# Patient Record
Sex: FEMALE | Race: WHITE | NOT HISPANIC OR LATINO | Employment: OTHER | URBAN - METROPOLITAN AREA
[De-identification: names, ages, dates, MRNs, and addresses within clinical notes are randomized per-mention and may not be internally consistent; named-entity substitution may affect disease eponyms.]

---

## 2017-01-20 ENCOUNTER — OFFICE VISIT (OUTPATIENT)
Dept: CARDIOLOGY | Facility: MEDICAL CENTER | Age: 72
End: 2017-01-20
Payer: MEDICARE

## 2017-01-20 VITALS
HEIGHT: 67 IN | BODY MASS INDEX: 25.43 KG/M2 | HEART RATE: 74 BPM | SYSTOLIC BLOOD PRESSURE: 106 MMHG | WEIGHT: 162 LBS | OXYGEN SATURATION: 93 % | DIASTOLIC BLOOD PRESSURE: 70 MMHG

## 2017-01-20 DIAGNOSIS — I10 ESSENTIAL HYPERTENSION: ICD-10-CM

## 2017-01-20 DIAGNOSIS — I48.0 PAROXYSMAL ATRIAL FIBRILLATION (HCC): ICD-10-CM

## 2017-01-20 PROCEDURE — 99214 OFFICE O/P EST MOD 30 MIN: CPT | Performed by: INTERNAL MEDICINE

## 2017-01-20 RX ORDER — FLECAINIDE ACETATE 100 MG/1
100 TABLET ORAL 2 TIMES DAILY
Qty: 60 TAB | Refills: 11 | Status: SHIPPED | OUTPATIENT
Start: 2017-01-20 | End: 2018-01-29 | Stop reason: SDUPTHER

## 2017-01-20 ASSESSMENT — ENCOUNTER SYMPTOMS
PND: 0
FEVER: 0
COUGH: 0
PALPITATIONS: 1
NAUSEA: 0
NERVOUS/ANXIOUS: 1
HEARTBURN: 0
DIZZINESS: 0
SHORTNESS OF BREATH: 0
DEPRESSION: 1
EYE DISCHARGE: 0
BACK PAIN: 1
BLURRED VISION: 0
BRUISES/BLEEDS EASILY: 0
HEADACHES: 0
MYALGIAS: 0
CHILLS: 0

## 2017-01-20 NOTE — MR AVS SNAPSHOT
"Jeanie Hutchison   2017 1:00 PM   Office Visit   MRN: [id number]    Department:  Heart [facility]   Dept Phone:  [phone]    Description:  Female : 1945   Provider:  Spenser Allen M.D.           Allergies as of 2017     No Known Allergies      You were diagnosed with     Paroxysmal atrial fibrillation (CMS-HCC)   [317121]       Essential hypertension   [0421815]         Vital Signs     Blood Pressure Pulse Height Weight Body Mass Index Oxygen Saturation    106/70 mmHg 74 1.689 m (5' 6.5\") 73.483 kg (162 lb) 25.76 kg/m2 93%    Smoking Status                   Never Smoker            Basic Information     Date Of Birth Sex Race Ethnicity Preferred Language    1945 Female White Non- English      Your appointments     2017  1:15 PM   FOLLOW UP with Spenser Allen M.D.   [facility] (--)    [address]  [facility] [id number] [phone]              Problem List              ICD-10-CM Priority Class Noted - Resolved    Drug abuse F19.10   2014 - Present    Anxiety F41.9   2014 - Present    Clavicle fracture S42.009A   2014 - Present    Poor historian Z78.9   2014 - Present    Non compliance w medication regimen Z91.14   2014 - Present    Hypotension I95.9 High  2014 - Present    Syncope and collapse R55   2014 - Present    PAF (paroxysmal atrial fibrillation) (CMS-HCC) I48.0   9/10/2014 - Present    HTN (hypertension) I10   2015 - Present      Health Maintenance        Date Due Completion Dates    IMM DTaP/Tdap/Td Vaccine (1 - Tdap) 8/3/1966 ---    PAP SMEAR 8/3/1968 ---    COLONOSCOPY 8/3/1997 ---    IMM ZOSTER VACCINE 8/3/2007 ---    IMM PNEUMOCOCCAL 65+ (ADULT) LOW/MEDIUM RISK SERIES (1 of 2 - PCV13) 8/3/2012 ---    MAMMOGRAM 2013, 2011, 2009, 2009, 9/3/2008, 9/3/2008, 2008, 2008, 2008, 2006, 2005, " 8/11/2004    BONE DENSITY 6/17/2016 6/17/2011    IMM INFLUENZA (1) 9/1/2016 ---            Current Immunizations     No immunizations on file.      Below and/or attached are the medications your provider expects you to take. Review all of your home medications and newly ordered medications with your provider and/or pharmacist. Follow medication instructions as directed by your provider and/or pharmacist. Please keep your medication list with you and share with your provider. Update the information when medications are discontinued, doses are changed, or new medications (including over-the-counter products) are added; and carry medication information at all times in the event of emergency situations     Allergies:  No Known Allergies          Medications  Valid as of: January 20, 2017 -  1:36 PM    Generic Name Brand Name Tablet Size Instructions for use    Aspirin (Tablet Delayed Response) aspirin 81 MG Take 1 Tab by mouth every day.        Carvedilol (Tab) COREG 12.5 MG Take 1 Tab by mouth 2 times a day, with meals. GENERIC FOR COREG        Flecainide Acetate (Tab) TAMBOCOR 100 MG Take 1 Tab by mouth 2 times a day.        Levothyroxine Sodium (Tab) SYNTHROID 50 MCG TAKE 1 TABLET BY MOUTH DAILY -GENERICFOR SYNTHROID        LORazepam (Tab) ATIVAN 1 MG Take 1 Tab by mouth every 8 hours as needed for Anxiety.        Oxycodone-Acetaminophen (Tab) PERCOCET 5-325 MG         PARoxetine HCl (TABLET SR 24 HR) PAXIL-CR 37.5 MG Take 37.5 mg by mouth every day.        Zolpidem Tartrate (Tab) AMBIEN 10 MG Take 10 mg by mouth at bedtime as needed. Indications: Trouble Sleeping        .                 Medicines prescribed today were sent to:     [id number] - [place] - [address]    [address] [place] [address]    Phone: [phone] Fax: [phone]    Open 24 Hours?: No    [id number] - [id number]    [id number] [place] [address]    Phone: [phone] Fax:  [phone]    Open 24 Hours?: No      Medication refill instructions:       If your prescription bottle indicates you have medication refills left, it is not necessary to call your provider’s office. Please contact your pharmacy and they will refill your medication.    If your prescription bottle indicates you do not have any refills left, you may request refills at any time through one of the following ways: The online Epunchit system (except Urgent Care), by calling your provider’s office, or by asking your pharmacy to contact your provider’s office with a refill request. Medication refills are processed only during regular business hours and may not be available until the next business day. Your provider may request additional information or to have a follow-up visit with you prior to refilling your medication.   *Please Note: Medication refills are assigned a new Rx number when refilled electronically. Your pharmacy may indicate that no refills were authorized even though a new prescription for the same medication is available at the pharmacy. Please request the medicine by name with the pharmacy before contacting your provider for a refill.           Epunchit Access Code: Activation code not generated  Current Epunchit Status: Active

## 2017-01-20 NOTE — PROGRESS NOTES
Subjective:   Jeanie Hutchison is a 71 y.o. female who presents today in follow-up for paroxysmal atrial fibrillation. She continues to have unknown episodes of breakthrough atrial fibrillation which always improved with an extra 50 mg of flecainide.  Very major. Family stressors. Continue. One of her daughters is in ICU now      Past Medical History   Diagnosis Date   • A-fib 5/1/2014   • Psychiatric disorder      history of depression and anxiety    • Chronic pain      r/t pelvic fracture     Past Surgical History   Procedure Laterality Date   • Breast biopsy       bilateral neg breast bxs   • Other orthopedic surgery       pelvis fracture. 10 years ago    • Clavicle orif  5/21/2014     Performed by Wilfred Gonzalez M.D. at SURGERY [facility]     No family history on file.  History   Smoking status   • Never Smoker    Smokeless tobacco   • Not on file     No Known Allergies  Outpatient Encounter Prescriptions as of 1/20/2017   Medication Sig Dispense Refill   • flecainide (TAMBOCOR) 100 MG Tab Take 1 Tab by mouth 2 times a day. 60 Tab 11   • carvedilol (COREG) 12.5 MG Tab Take 1 Tab by mouth 2 times a day, with meals. GENERIC FOR COREG 90 Tab 1   • lorazepam (ATIVAN) 1 MG Tab Take 1 Tab by mouth every 8 hours as needed for Anxiety.     • oxycodone-acetaminophen (PERCOCET) 5-325 MG Tab      • paroxetine (PAXIL-CR) 37.5 MG CR tablet Take 37.5 mg by mouth every day.     • aspirin EC 81 MG EC tablet Take 1 Tab by mouth every day. 30 Tab 2   • zolpidem (AMBIEN) 10 MG TABS Take 10 mg by mouth at bedtime as needed. Indications: Trouble Sleeping     • [DISCONTINUED] flecainide (TAMBOCOR) 50 MG tablet Take 1.5 Tabs by mouth 2 times a day. 90 Tab 11   • levothyroxine (SYNTHROID) 50 MCG Tab TAKE 1 TABLET BY MOUTH DAILY -GENERICFOR SYNTHROID 90 Tab 0     No facility-administered encounter medications on file as of 1/20/2017.     Review of Systems   Constitutional: Negative for fever, chills and malaise/fatigue.   Eyes:  "Negative for blurred vision and discharge.   Respiratory: Negative for cough and shortness of breath.    Cardiovascular: Positive for palpitations. Negative for chest pain, leg swelling and PND.   Gastrointestinal: Negative for heartburn and nausea.   Genitourinary: Negative for dysuria and urgency.   Musculoskeletal: Positive for back pain. Negative for myalgias.   Skin: Negative for itching and rash.   Neurological: Negative for dizziness and headaches.   Endo/Heme/Allergies: Negative for environmental allergies. Does not bruise/bleed easily.   Psychiatric/Behavioral: Positive for depression. The patient is nervous/anxious.         Objective:   /70 mmHg  Pulse 74  Ht 1.689 m (5' 6.5\")  Wt 73.483 kg (162 lb)  BMI 25.76 kg/m2  SpO2 93%    Physical Exam   Constitutional: She is oriented to person, place, and time. She appears well-developed and well-nourished.   HENT:   Head: Normocephalic and atraumatic.   Eyes: Conjunctivae and EOM are normal. No scleral icterus.   Neck: Neck supple. No JVD present. No thyromegaly present.   Cardiovascular: Normal rate, regular rhythm and normal heart sounds.  Exam reveals no gallop and no friction rub.    No murmur heard.  Pulmonary/Chest: Effort normal and breath sounds normal. No respiratory distress. She has no wheezes. She has no rales. She exhibits no tenderness.   Abdominal: Soft. Bowel sounds are normal. She exhibits no distension and no mass. There is no tenderness.   Neurological: She is alert and oriented to person, place, and time. Coordination normal.   Skin: Skin is warm and dry. No rash noted. No pallor.   Psychiatric: She has a normal mood and affect. Her behavior is normal. Judgment and thought content normal.   Tearful       Assessment:     1. Paroxysmal atrial fibrillation (CMS-[facility])  flecainide (TAMBOCOR) 100 MG Tab   2. Essential hypertension         Medical Decision Making:  Today's Assessment / Status / Plan:   Increase flecainide to 100 mg twice a " day  She may take an extra 50 mg if there is breakthrough arrhythmias, but I predict no further breakthrough  Follow-up in 3 months

## 2017-01-20 NOTE — Clinical Note
[facility]   [address]  [place] [address]  Phone: [phone]  Fax: [phone]              Jeanie Hutchison  1945    Encounter Date: 1/20/2017    Spenser Allen M.D.          PROGRESS NOTE:  Subjective:   Jeanie Hutchison is a 71 y.o. female who presents today in follow-up for paroxysmal atrial fibrillation. She continues to have unknown episodes of breakthrough atrial fibrillation which always improved with an extra 50 mg of flecainide.  Very major. Family stressors. Continue. One of her daughters is in ICU now      Past Medical History   Diagnosis Date   • A-fib 5/1/2014   • Psychiatric disorder      history of depression and anxiety    • Chronic pain      r/t pelvic fracture     Past Surgical History   Procedure Laterality Date   • Breast biopsy       bilateral neg breast bxs   • Other orthopedic surgery       pelvis fracture. 10 years ago    • Clavicle orif  5/21/2014     Performed by Wilfred Gonzalez M.D. at SURGERY [facility]     No family history on file.  History   Smoking status   • Never Smoker    Smokeless tobacco   • Not on file     No Known Allergies  Outpatient Encounter Prescriptions as of 1/20/2017   Medication Sig Dispense Refill   • flecainide (TAMBOCOR) 100 MG Tab Take 1 Tab by mouth 2 times a day. 60 Tab 11   • carvedilol (COREG) 12.5 MG Tab Take 1 Tab by mouth 2 times a day, with meals. GENERIC FOR COREG 90 Tab 1   • lorazepam (ATIVAN) 1 MG Tab Take 1 Tab by mouth every 8 hours as needed for Anxiety.     • oxycodone-acetaminophen (PERCOCET) 5-325 MG Tab      • paroxetine (PAXIL-CR) 37.5 MG CR tablet Take 37.5 mg by mouth every day.     • aspirin EC 81 MG EC tablet Take 1 Tab by mouth every day. 30 Tab 2   • zolpidem (AMBIEN) 10 MG TABS Take 10 mg by mouth at bedtime as needed. Indications: Trouble Sleeping     • [DISCONTINUED] flecainide (TAMBOCOR) 50 MG tablet Take 1.5 Tabs by mouth 2 times a day. 90  "Tab 11   • levothyroxine (SYNTHROID) 50 MCG Tab TAKE 1 TABLET BY MOUTH DAILY -GENERICFOR SYNTHROID 90 Tab 0     No facility-administered encounter medications on file as of 1/20/2017.     Review of Systems   Constitutional: Negative for fever, chills and malaise/fatigue.   Eyes: Negative for blurred vision and discharge.   Respiratory: Negative for cough and shortness of breath.    Cardiovascular: Positive for palpitations. Negative for chest pain, leg swelling and PND.   Gastrointestinal: Negative for heartburn and nausea.   Genitourinary: Negative for dysuria and urgency.   Musculoskeletal: Positive for back pain. Negative for myalgias.   Skin: Negative for itching and rash.   Neurological: Negative for dizziness and headaches.   Endo/Heme/Allergies: Negative for environmental allergies. Does not bruise/bleed easily.   Psychiatric/Behavioral: Positive for depression. The patient is nervous/anxious.         Objective:   /70 mmHg  Pulse 74  Ht 1.689 m (5' 6.5\")  Wt 73.483 kg (162 lb)  BMI 25.76 kg/m2  SpO2 93%    Physical Exam   Constitutional: She is oriented to person, place, and time. She appears well-developed and well-nourished.   HENT:   Head: Normocephalic and atraumatic.   Eyes: Conjunctivae and EOM are normal. No scleral icterus.   Neck: Neck supple. No JVD present. No thyromegaly present.   Cardiovascular: Normal rate, regular rhythm and normal heart sounds.  Exam reveals no gallop and no friction rub.    No murmur heard.  Pulmonary/Chest: Effort normal and breath sounds normal. No respiratory distress. She has no wheezes. She has no rales. She exhibits no tenderness.   Abdominal: Soft. Bowel sounds are normal. She exhibits no distension and no mass. There is no tenderness.   Neurological: She is alert and oriented to person, place, and time. Coordination normal.   Skin: Skin is warm and dry. No rash noted. No pallor.   Psychiatric: She has a normal mood and affect. Her behavior is normal. " Judgment and thought content normal.   Tearful       Assessment:     1. Paroxysmal atrial fibrillation (CMS-HCC)  flecainide (TAMBOCOR) 100 MG Tab   2. Essential hypertension         Medical Decision Making:  Today's Assessment / Status / Plan:   Increase flecainide to 100 mg twice a day  She may take an extra 50 mg if there is breakthrough arrhythmias, but I predict no further breakthrough  Follow-up in 3 months        Anais Iraheta M.D.  [address]  [address] [address]  VIA In Basket

## 2017-04-21 ENCOUNTER — OFFICE VISIT (OUTPATIENT)
Dept: CARDIOLOGY | Facility: MEDICAL CENTER | Age: 72
End: 2017-04-21
Payer: MEDICARE

## 2017-04-21 VITALS
HEIGHT: 66 IN | WEIGHT: 166 LBS | OXYGEN SATURATION: 95 % | HEART RATE: 64 BPM | SYSTOLIC BLOOD PRESSURE: 110 MMHG | DIASTOLIC BLOOD PRESSURE: 70 MMHG | BODY MASS INDEX: 26.68 KG/M2

## 2017-04-21 DIAGNOSIS — I48.0 PAROXYSMAL ATRIAL FIBRILLATION (HCC): ICD-10-CM

## 2017-04-21 DIAGNOSIS — I10 ESSENTIAL HYPERTENSION: ICD-10-CM

## 2017-04-21 PROCEDURE — 3017F COLORECTAL CA SCREEN DOC REV: CPT | Mod: 8P | Performed by: INTERNAL MEDICINE

## 2017-04-21 PROCEDURE — 1101F PT FALLS ASSESS-DOCD LE1/YR: CPT | Mod: 8P | Performed by: INTERNAL MEDICINE

## 2017-04-21 PROCEDURE — 1036F TOBACCO NON-USER: CPT | Performed by: INTERNAL MEDICINE

## 2017-04-21 PROCEDURE — 3014F SCREEN MAMMO DOC REV: CPT | Mod: 8P | Performed by: INTERNAL MEDICINE

## 2017-04-21 PROCEDURE — G8432 DEP SCR NOT DOC, RNG: HCPCS | Performed by: INTERNAL MEDICINE

## 2017-04-21 PROCEDURE — 99213 OFFICE O/P EST LOW 20 MIN: CPT | Performed by: INTERNAL MEDICINE

## 2017-04-21 PROCEDURE — 4040F PNEUMOC VAC/ADMIN/RCVD: CPT | Mod: 8P | Performed by: INTERNAL MEDICINE

## 2017-04-21 PROCEDURE — G8419 CALC BMI OUT NRM PARAM NOF/U: HCPCS | Performed by: INTERNAL MEDICINE

## 2017-04-21 ASSESSMENT — ENCOUNTER SYMPTOMS
DIZZINESS: 0
SHORTNESS OF BREATH: 0
CHILLS: 0
PALPITATIONS: 1
BLURRED VISION: 0
BACK PAIN: 1
FEVER: 0
NERVOUS/ANXIOUS: 1
COUGH: 0
PND: 0
NAUSEA: 0
HEARTBURN: 0
DEPRESSION: 1
MYALGIAS: 0
BRUISES/BLEEDS EASILY: 0
HEADACHES: 0
EYE DISCHARGE: 0

## 2017-04-21 NOTE — PROGRESS NOTES
Subjective:   Jeanie Hutchison is a 71 y.o. female who presents today in follow-up for Rythmol therapy for paroxysmal atrial fibrillation.  Since the increase in Rythmol, she's had a single episode of atrial fibrillation after drinking excessive wine. She took half of a flecainide and within 30 minutes palpitations had resolved.  Family stressors continue  She's now on Depakote for anxiety and depression    Past Medical History   Diagnosis Date   • A-fib 5/1/2014   • Psychiatric disorder      history of depression and anxiety    • Chronic pain      r/t pelvic fracture     Past Surgical History   Procedure Laterality Date   • Breast biopsy       bilateral neg breast bxs   • Other orthopedic surgery       pelvis fracture. 10 years ago    • Clavicle orif  5/21/2014     Performed by Wilfred Gonzalez M.D. at SURGERY [facility]     No family history on file.  History   Smoking status   • Never Smoker    Smokeless tobacco   • Not on file     No Known Allergies  Outpatient Encounter Prescriptions as of 4/21/2017   Medication Sig Dispense Refill   • flecainide (TAMBOCOR) 100 MG Tab Take 1 Tab by mouth 2 times a day. 60 Tab 11   • carvedilol (COREG) 12.5 MG Tab Take 1 Tab by mouth 2 times a day, with meals. GENERIC FOR COREG 90 Tab 1   • lorazepam (ATIVAN) 1 MG Tab Take 1 Tab by mouth every 8 hours as needed for Anxiety.     • oxycodone-acetaminophen (PERCOCET) 5-325 MG Tab      • paroxetine (PAXIL-CR) 37.5 MG CR tablet Take 37.5 mg by mouth every day.     • aspirin EC 81 MG EC tablet Take 1 Tab by mouth every day. 30 Tab 2   • levothyroxine (SYNTHROID) 50 MCG Tab TAKE 1 TABLET BY MOUTH DAILY -GENERICFOR SYNTHROID 90 Tab 0   • zolpidem (AMBIEN) 10 MG TABS Take 10 mg by mouth at bedtime as needed. Indications: Trouble Sleeping       No facility-administered encounter medications on file as of 4/21/2017.     Review of Systems   Constitutional: Negative for fever, chills and malaise/fatigue.   Eyes: Negative for blurred  "vision and discharge.   Respiratory: Negative for cough and shortness of breath.    Cardiovascular: Positive for palpitations. Negative for chest pain, leg swelling and PND.   Gastrointestinal: Negative for heartburn and nausea.   Genitourinary: Negative for dysuria and urgency.   Musculoskeletal: Positive for back pain. Negative for myalgias.   Skin: Negative for itching and rash.   Neurological: Negative for dizziness and headaches.   Endo/Heme/Allergies: Negative for environmental allergies. Does not bruise/bleed easily.   Psychiatric/Behavioral: Positive for depression. The patient is nervous/anxious.         Objective:   /70 mmHg  Pulse 64  Ht 1.689 m (5' 6.5\")  Wt 75.297 kg (166 lb)  BMI 26.39 kg/m2  SpO2 95%    Physical Exam   Constitutional: She is oriented to person, place, and time. She appears well-developed and well-nourished.   HENT:   Head: Normocephalic and atraumatic.   Eyes: Conjunctivae and EOM are normal. No scleral icterus.   Neck: Neck supple. No JVD present. No thyromegaly present.   Cardiovascular: Normal rate, regular rhythm and normal heart sounds.  Exam reveals no gallop and no friction rub.    No murmur heard.  Pulmonary/Chest: Effort normal and breath sounds normal. No respiratory distress. She has no wheezes. She has no rales. She exhibits no tenderness.   Abdominal: Soft. Bowel sounds are normal. She exhibits no distension and no mass. There is no tenderness.   Neurological: She is alert and oriented to person, place, and time. Coordination normal.   Skin: Skin is warm and dry. No rash noted. No pallor.   Psychiatric: She has a normal mood and affect. Her behavior is normal. Judgment and thought content normal.   Tearful       Assessment:     1. Paroxysmal atrial fibrillation (CMS-HCC)     2. Essential hypertension         Medical Decision Making:  Today's Assessment / Status / Plan:   Clinical status has improved  Continue current dose of flecainide  Alcohol usage " discussed  Return in 6 months

## 2017-04-21 NOTE — MR AVS SNAPSHOT
"Jeanie Hutchison   2017 1:15 PM   Office Visit   MRN: [id number]    Department:  [facility]   Dept Phone:  [phone]    Description:  Female : 1945   Provider:  Spenser Allen M.D.           Allergies as of 2017     No Known Allergies      You were diagnosed with     Paroxysmal atrial fibrillation (CMS-[facility])   [682198]       Essential hypertension   [1875542]         Vital Signs     Blood Pressure Pulse Height Weight Body Mass Index Oxygen Saturation    110/70 mmHg 64 1.689 m (5' 6.5\") 75.297 kg (166 lb) 26.39 kg/m2 95%    Smoking Status                   Never Smoker            Basic Information     Date Of Birth Sex Race Ethnicity Preferred Language    1945 Female White Non- English      Your appointments     May 31, 2017 11:20 AM   Access New Patient with Jane Armstrong M.D.   [facility] [address]    [address]  [address]  [place] [id number] [phone]           Please bring Photo ID, Insurance Cards, All Medication Bottles and copies of any legal documents (such as Living Will, Power of ) If speaking a language besides English please bring an adult . Please arrive 30 minutes prior for check in and registration. You will be receiving a confirmation call a few days before your appointment from our automated call confirmation system.            Oct 31, 2017  1:00 PM   FOLLOW UP with Spenser Allen M.D.   [facility]    [address]  [address]  [place] [id number] [phone]              Problem List              ICD-10-CM Priority Class Noted - Resolved    Drug abuse F19.10   2014 - Present    Anxiety F41.9   2014 - Present    Clavicle fracture S42.009A   2014 - Present    Poor historian Z78.9   2014 - Present    Non compliance w medication regimen Z91.14   2014 - Present    Hypotension I95.9 High  2014 " - Present    Syncope and collapse R55   5/17/2014 - Present    PAF (paroxysmal atrial fibrillation) (CMS-HCC) I48.0   9/10/2014 - Present    HTN (hypertension) I10   1/20/2015 - Present      Health Maintenance        Date Due Completion Dates    IMM DTaP/Tdap/Td Vaccine (1 - Tdap) 8/3/1966 ---    PAP SMEAR 8/3/1968 ---    COLONOSCOPY 8/3/1997 ---    IMM ZOSTER VACCINE 8/3/2007 ---    IMM PNEUMOCOCCAL 65+ (ADULT) LOW/MEDIUM RISK SERIES (1 of 2 - PCV13) 8/3/2012 ---    MAMMOGRAM 7/11/2013 7/11/2012, 6/17/2011, 6/18/2009, 6/18/2009, 9/3/2008, 9/3/2008, 2/20/2008, 2/4/2008, 2/4/2008, 11/13/2006, 8/16/2005, 8/11/2004    BONE DENSITY 6/17/2016 6/17/2011            Current Immunizations     No immunizations on file.      Below and/or attached are the medications your provider expects you to take. Review all of your home medications and newly ordered medications with your provider and/or pharmacist. Follow medication instructions as directed by your provider and/or pharmacist. Please keep your medication list with you and share with your provider. Update the information when medications are discontinued, doses are changed, or new medications (including over-the-counter products) are added; and carry medication information at all times in the event of emergency situations     Allergies:  No Known Allergies          Medications  Valid as of: April 21, 2017 -  1:48 PM    Generic Name Brand Name Tablet Size Instructions for use    Aspirin (Tablet Delayed Response) aspirin 81 MG Take 1 Tab by mouth every day.        Carvedilol (Tab) COREG 12.5 MG Take 1 Tab by mouth 2 times a day, with meals. GENERIC FOR COREG        Flecainide Acetate (Tab) TAMBOCOR 100 MG Take 1 Tab by mouth 2 times a day.        Levothyroxine Sodium (Tab) SYNTHROID 50 MCG TAKE 1 TABLET BY MOUTH DAILY -GENERICFOR SYNTHROID        LORazepam (Tab) ATIVAN 1 MG Take 1 Tab by mouth every 8 hours as needed for Anxiety.        Oxycodone-Acetaminophen (Tab) PERCOCET  5-325 MG         PARoxetine HCl (TABLET SR 24 HR) PAXIL-CR 37.5 MG Take 37.5 mg by mouth every day.        Zolpidem Tartrate (Tab) AMBIEN 10 MG Take 10 mg by mouth at bedtime as needed. Indications: Trouble Sleeping        .                 Medicines prescribed today were sent to:     [id number] - [place] - [address]    [address] [place] [address]    Phone: [phone] Fax: [phone]    Open 24 Hours?: No    [id number] - [place], [place] - [id number]     [id number] [place] [address]    Phone: [phone] Fax: [phone]    Open 24 Hours?: No      Medication refill instructions:       If your prescription bottle indicates you have medication refills left, it is not necessary to call your provider’s office. Please contact your pharmacy and they will refill your medication.    If your prescription bottle indicates you do not have any refills left, you may request refills at any time through one of the following ways: The online TextDigger system (except Urgent Care), by calling your provider’s office, or by asking your pharmacy to contact your provider’s office with a refill request. Medication refills are processed only during regular business hours and may not be available until the next business day. Your provider may request additional information or to have a follow-up visit with you prior to refilling your medication.   *Please Note: Medication refills are assigned a new Rx number when refilled electronically. Your pharmacy may indicate that no refills were authorized even though a new prescription for the same medication is available at the pharmacy. Please request the medicine by name with the pharmacy before contacting your provider for a refill.           TextDigger Access Code: Activation code not generated  Current TextDigger Status: Active

## 2017-04-21 NOTE — Clinical Note
[facility]   [address]   [address]  [place] [address]  Phone: [phone]  Fax: [phone]              Jeanie Hutchison  1945    Encounter Date: 4/21/2017    Spenser Allen M.D.          PROGRESS NOTE:  Subjective:   Jeanie Hutchison is a 71 y.o. female who presents today in follow-up for Rythmol therapy for paroxysmal atrial fibrillation.  Since the increase in Rythmol, she's had a single episode of atrial fibrillation after drinking excessive wine. She took half of a flecainide and within 30 minutes palpitations had resolved.  Family stressors continue  She's now on Depakote for anxiety and depression    Past Medical History   Diagnosis Date   • A-fib 5/1/2014   • Psychiatric disorder      history of depression and anxiety    • Chronic pain      r/t pelvic fracture     Past Surgical History   Procedure Laterality Date   • Breast biopsy       bilateral neg breast bxs   • Other orthopedic surgery       pelvis fracture. 10 years ago    • Clavicle orif  5/21/2014     Performed by Wilfred Gonzalez M.D. at SURGERY [facility]     No family history on file.  History   Smoking status   • Never Smoker    Smokeless tobacco   • Not on file     No Known Allergies  Outpatient Encounter Prescriptions as of 4/21/2017   Medication Sig Dispense Refill   • flecainide (TAMBOCOR) 100 MG Tab Take 1 Tab by mouth 2 times a day. 60 Tab 11   • carvedilol (COREG) 12.5 MG Tab Take 1 Tab by mouth 2 times a day, with meals. GENERIC FOR COREG 90 Tab 1   • lorazepam (ATIVAN) 1 MG Tab Take 1 Tab by mouth every 8 hours as needed for Anxiety.     • oxycodone-acetaminophen (PERCOCET) 5-325 MG Tab      • paroxetine (PAXIL-CR) 37.5 MG CR tablet Take 37.5 mg by mouth every day.     • aspirin EC 81 MG EC tablet Take 1 Tab by mouth every day. 30 Tab 2   • levothyroxine (SYNTHROID) 50 MCG Tab TAKE 1 TABLET BY MOUTH DAILY -GENERICFOR SYNTHROID 90 Tab 0   • zolpidem  "(AMBIEN) 10 MG TABS Take 10 mg by mouth at bedtime as needed. Indications: Trouble Sleeping       No facility-administered encounter medications on file as of 4/21/2017.     Review of Systems   Constitutional: Negative for fever, chills and malaise/fatigue.   Eyes: Negative for blurred vision and discharge.   Respiratory: Negative for cough and shortness of breath.    Cardiovascular: Positive for palpitations. Negative for chest pain, leg swelling and PND.   Gastrointestinal: Negative for heartburn and nausea.   Genitourinary: Negative for dysuria and urgency.   Musculoskeletal: Positive for back pain. Negative for myalgias.   Skin: Negative for itching and rash.   Neurological: Negative for dizziness and headaches.   Endo/Heme/Allergies: Negative for environmental allergies. Does not bruise/bleed easily.   Psychiatric/Behavioral: Positive for depression. The patient is nervous/anxious.         Objective:   /70 mmHg  Pulse 64  Ht 1.689 m (5' 6.5\")  Wt 75.297 kg (166 lb)  BMI 26.39 kg/m2  SpO2 95%    Physical Exam   Constitutional: She is oriented to person, place, and time. She appears well-developed and well-nourished.   HENT:   Head: Normocephalic and atraumatic.   Eyes: Conjunctivae and EOM are normal. No scleral icterus.   Neck: Neck supple. No JVD present. No thyromegaly present.   Cardiovascular: Normal rate, regular rhythm and normal heart sounds.  Exam reveals no gallop and no friction rub.    No murmur heard.  Pulmonary/Chest: Effort normal and breath sounds normal. No respiratory distress. She has no wheezes. She has no rales. She exhibits no tenderness.   Abdominal: Soft. Bowel sounds are normal. She exhibits no distension and no mass. There is no tenderness.   Neurological: She is alert and oriented to person, place, and time. Coordination normal.   Skin: Skin is warm and dry. No rash noted. No pallor.   Psychiatric: She has a normal mood and affect. Her behavior is normal. Judgment and " thought content normal.   Tearful       Assessment:     1. Paroxysmal atrial fibrillation (CMS-HCC)     2. Essential hypertension         Medical Decision Making:  Today's Assessment / Status / Plan:   Clinical status has improved  Continue current dose of flecainide  Alcohol usage discussed  Return in 6 months        Jane Armstrong M.D.  [address] [place] [address]  [place] [address]  VIA In Basket

## 2017-05-22 ENCOUNTER — HOSPITAL ENCOUNTER (OUTPATIENT)
Dept: LAB | Facility: MEDICAL CENTER | Age: 72
End: 2017-05-22
Attending: PSYCHIATRY & NEUROLOGY
Payer: MEDICARE

## 2017-05-22 LAB
ALBUMIN SERPL BCP-MCNC: 4.2 G/DL (ref 3.2–4.9)
ALBUMIN/GLOB SERPL: 1.5 G/DL
ALP SERPL-CCNC: 77 U/L (ref 30–99)
ALT SERPL-CCNC: 15 U/L (ref 2–50)
ANION GAP SERPL CALC-SCNC: 8 MMOL/L (ref 0–11.9)
AST SERPL-CCNC: 17 U/L (ref 12–45)
BASOPHILS # BLD AUTO: 1.1 % (ref 0–1.8)
BASOPHILS # BLD: 0.06 K/UL (ref 0–0.12)
BILIRUB SERPL-MCNC: 1.1 MG/DL (ref 0.1–1.5)
BUN SERPL-MCNC: 17 MG/DL (ref 8–22)
CALCIUM SERPL-MCNC: 9.5 MG/DL (ref 8.5–10.5)
CHLORIDE SERPL-SCNC: 107 MMOL/L (ref 96–112)
CO2 SERPL-SCNC: 25 MMOL/L (ref 20–33)
CREAT SERPL-MCNC: 0.62 MG/DL (ref 0.5–1.4)
EOSINOPHIL # BLD AUTO: 0.18 K/UL (ref 0–0.51)
EOSINOPHIL NFR BLD: 3.3 % (ref 0–6.9)
ERYTHROCYTE [DISTWIDTH] IN BLOOD BY AUTOMATED COUNT: 45.3 FL (ref 35.9–50)
GFR SERPL CREATININE-BSD FRML MDRD: >60 ML/MIN/1.73 M 2
GLOBULIN SER CALC-MCNC: 2.8 G/DL (ref 1.9–3.5)
GLUCOSE SERPL-MCNC: 82 MG/DL (ref 65–99)
HCT VFR BLD AUTO: 45.3 % (ref 37–47)
HGB BLD-MCNC: 14.9 G/DL (ref 12–16)
IMM GRANULOCYTES # BLD AUTO: 0.02 K/UL (ref 0–0.11)
IMM GRANULOCYTES NFR BLD AUTO: 0.4 % (ref 0–0.9)
LYMPHOCYTES # BLD AUTO: 1.73 K/UL (ref 1–4.8)
LYMPHOCYTES NFR BLD: 31.4 % (ref 22–41)
MCH RBC QN AUTO: 33.1 PG (ref 27–33)
MCHC RBC AUTO-ENTMCNC: 32.9 G/DL (ref 33.6–35)
MCV RBC AUTO: 100.7 FL (ref 81.4–97.8)
MONOCYTES # BLD AUTO: 0.44 K/UL (ref 0–0.85)
MONOCYTES NFR BLD AUTO: 8 % (ref 0–13.4)
NEUTROPHILS # BLD AUTO: 3.08 K/UL (ref 2–7.15)
NEUTROPHILS NFR BLD: 55.8 % (ref 44–72)
NRBC # BLD AUTO: 0 K/UL
NRBC BLD AUTO-RTO: 0 /100 WBC
PLATELET # BLD AUTO: 215 K/UL (ref 164–446)
PMV BLD AUTO: 9.5 FL (ref 9–12.9)
POTASSIUM SERPL-SCNC: 4.3 MMOL/L (ref 3.6–5.5)
PROT SERPL-MCNC: 7 G/DL (ref 6–8.2)
RBC # BLD AUTO: 4.5 M/UL (ref 4.2–5.4)
SODIUM SERPL-SCNC: 140 MMOL/L (ref 135–145)
T4 FREE SERPL-MCNC: 0.84 NG/DL (ref 0.53–1.43)
TSH SERPL DL<=0.005 MIU/L-ACNC: 5.28 UIU/ML (ref 0.3–3.7)
VALPROATE SERPL-MCNC: 35.6 UG/ML (ref 50–100)
WBC # BLD AUTO: 5.5 K/UL (ref 4.8–10.8)

## 2017-05-22 PROCEDURE — 84443 ASSAY THYROID STIM HORMONE: CPT

## 2017-05-22 PROCEDURE — 85025 COMPLETE CBC W/AUTO DIFF WBC: CPT

## 2017-05-22 PROCEDURE — 84439 ASSAY OF FREE THYROXINE: CPT

## 2017-05-22 PROCEDURE — 80164 ASSAY DIPROPYLACETIC ACD TOT: CPT

## 2017-05-22 PROCEDURE — 36415 COLL VENOUS BLD VENIPUNCTURE: CPT

## 2017-05-22 PROCEDURE — 80053 COMPREHEN METABOLIC PANEL: CPT

## 2017-05-23 ENCOUNTER — TELEPHONE (OUTPATIENT)
Dept: MEDICAL GROUP | Facility: MEDICAL CENTER | Age: 72
End: 2017-05-23

## 2017-05-23 NOTE — TELEPHONE ENCOUNTER
Future Appointments       Provider Department Center    5/31/2017 11:20 AM Jane Armstrong M.D. [facility] [address]    10/31/2017 1:00 PM Spenser Allen M.D. [facility]           Left message for patient to call back regarding new patient pre-visit planning. Please transfer call to [phone].

## 2017-05-31 ENCOUNTER — OFFICE VISIT (OUTPATIENT)
Dept: MEDICAL GROUP | Facility: MEDICAL CENTER | Age: 72
End: 2017-05-31
Payer: MEDICARE

## 2017-05-31 VITALS
RESPIRATION RATE: 14 BRPM | DIASTOLIC BLOOD PRESSURE: 80 MMHG | SYSTOLIC BLOOD PRESSURE: 112 MMHG | HEIGHT: 67 IN | TEMPERATURE: 98.1 F | BODY MASS INDEX: 26.4 KG/M2 | WEIGHT: 168.21 LBS | HEART RATE: 70 BPM | OXYGEN SATURATION: 95 %

## 2017-05-31 DIAGNOSIS — G89.29 OTHER CHRONIC PAIN: ICD-10-CM

## 2017-05-31 DIAGNOSIS — E03.9 HYPOTHYROIDISM, UNSPECIFIED TYPE: ICD-10-CM

## 2017-05-31 DIAGNOSIS — Z78.0 POST-MENOPAUSAL: ICD-10-CM

## 2017-05-31 DIAGNOSIS — I10 ESSENTIAL HYPERTENSION: ICD-10-CM

## 2017-05-31 DIAGNOSIS — F32.A DEPRESSION, UNSPECIFIED DEPRESSION TYPE: ICD-10-CM

## 2017-05-31 DIAGNOSIS — Z12.39 BREAST CANCER SCREENING: ICD-10-CM

## 2017-05-31 DIAGNOSIS — Z00.00 HEALTH CARE MAINTENANCE: ICD-10-CM

## 2017-05-31 DIAGNOSIS — I48.0 PAROXYSMAL ATRIAL FIBRILLATION (HCC): ICD-10-CM

## 2017-05-31 PROCEDURE — 4040F PNEUMOC VAC/ADMIN/RCVD: CPT | Performed by: FAMILY MEDICINE

## 2017-05-31 PROCEDURE — G0009 ADMIN PNEUMOCOCCAL VACCINE: HCPCS | Performed by: FAMILY MEDICINE

## 2017-05-31 PROCEDURE — G8419 CALC BMI OUT NRM PARAM NOF/U: HCPCS | Performed by: FAMILY MEDICINE

## 2017-05-31 PROCEDURE — 3014F SCREEN MAMMO DOC REV: CPT | Mod: 8P | Performed by: FAMILY MEDICINE

## 2017-05-31 PROCEDURE — 3017F COLORECTAL CA SCREEN DOC REV: CPT | Mod: 8P | Performed by: FAMILY MEDICINE

## 2017-05-31 PROCEDURE — 90732 PPSV23 VACC 2 YRS+ SUBQ/IM: CPT | Performed by: FAMILY MEDICINE

## 2017-05-31 PROCEDURE — 1101F PT FALLS ASSESS-DOCD LE1/YR: CPT | Performed by: FAMILY MEDICINE

## 2017-05-31 PROCEDURE — 99204 OFFICE O/P NEW MOD 45 MIN: CPT | Mod: 25 | Performed by: FAMILY MEDICINE

## 2017-05-31 PROCEDURE — 1036F TOBACCO NON-USER: CPT | Performed by: FAMILY MEDICINE

## 2017-05-31 RX ORDER — LEVOTHYROXINE SODIUM 0.03 MG/1
25 TABLET ORAL
Qty: 30 TAB | Refills: 2 | Status: SHIPPED | OUTPATIENT
Start: 2017-05-31 | End: 2017-09-05 | Stop reason: SDUPTHER

## 2017-05-31 ASSESSMENT — PATIENT HEALTH QUESTIONNAIRE - PHQ9: CLINICAL INTERPRETATION OF PHQ2 SCORE: 3

## 2017-05-31 NOTE — MR AVS SNAPSHOT
"Jeanie Hutchison   2017 11:20 AM   Office Visit   MRN: [id number]    Department:  [address] [place] [address]   Dept Phone:  [phone]    Description:  Female : 1945   Provider:  Jane Armstrong M.D.           Reason for Visit     Establish Care           Allergies as of 2017     No Known Allergies      You were diagnosed with     Essential hypertension   [1761853]       Paroxysmal atrial fibrillation (CMS-HCC)   [507470]       Depression, unspecified depression type   [8125704]       Other chronic pain   [338.29.ICD-9-CM]       Hypothyroidism, unspecified type   [1791806]       Health care maintenance   [704865]       Breast cancer screening   [332440]       Post-menopausal   [645278]         Vital Signs     Blood Pressure Pulse Temperature Respirations Height Weight    112/80 mmHg 70 36.7 °C (98.1 °F) 14 1.689 m (5' 6.5\") 76.3 kg (168 lb 3.4 oz)    Body Mass Index Oxygen Saturation Smoking Status             26.75 kg/m2 95% Never Smoker          Basic Information     Date Of Birth Sex Race Ethnicity Preferred Language    1945 Female White Non- English      Your appointments     Sep 06, 2017 11:40 AM   Established Patient with Jane Armstrong M.D.   [facility] [address]    [address] [facility]  [address]  [facility] [id number] [phone]           You will be receiving a confirmation call a few days before your appointment from our automated call confirmation system.            Oct 31, 2017  1:00 PM   FOLLOW UP with Spenser Allen M.D.   [facility]    [address]  [address]  [facility] [id number] [phone]              Problem List              ICD-10-CM Priority Class Noted - Resolved    Drug abuse F19.10   2014 - Present    Anxiety F41.9   2014 - Present    Clavicle fracture S42.009A   2014 - Present    Poor historian Z78.9   2014 - Present    Non " compliance w medication regimen Z91.14   5/17/2014 - Present    Syncope and collapse R55   5/17/2014 - Present    PAF (paroxysmal atrial fibrillation) (CMS-HCC) I48.0   9/10/2014 - Present    HTN (hypertension) I10   1/20/2015 - Present      Health Maintenance        Date Due Completion Dates    PAP SMEAR 8/3/1968 ---    COLONOSCOPY 8/3/1997 ---    IMM DTaP/Tdap/Td Vaccine (1 - Tdap) 12/4/2003 12/3/2003    MAMMOGRAM 7/11/2013 7/11/2012, 6/17/2011, 6/18/2009, 6/18/2009, 9/3/2008, 9/3/2008, 2/20/2008, 2/4/2008, 2/4/2008, 11/13/2006, 8/16/2005, 8/11/2004    IMM PNEUMOCOCCAL 65+ (ADULT) LOW/MEDIUM RISK SERIES (2 of 2 - PPSV23) 1/13/2016 1/13/2015    BONE DENSITY 6/17/2016 6/17/2011            Current Immunizations     13-VALENT PCV PREVNAR 1/13/2015    Hepatitis B Vaccine Recombivax (Adol/Adult) 12/3/2009, 8/15/2007    Pneumococcal polysaccharide vaccine (PPSV-23)  Incomplete    SHINGLES VACCINE 5/15/2007    TD Vaccine 12/3/2003      Below and/or attached are the medications your provider expects you to take. Review all of your home medications and newly ordered medications with your provider and/or pharmacist. Follow medication instructions as directed by your provider and/or pharmacist. Please keep your medication list with you and share with your provider. Update the information when medications are discontinued, doses are changed, or new medications (including over-the-counter products) are added; and carry medication information at all times in the event of emergency situations     Allergies:  No Known Allergies          Medications  Valid as of: May 31, 2017 - 12:05 PM    Generic Name Brand Name Tablet Size Instructions for use    Aspirin (Tablet Delayed Response) aspirin 81 MG Take 1 Tab by mouth every day.        Carvedilol (Tab) COREG 12.5 MG Take 1 Tab by mouth 2 times a day, with meals. GENERIC FOR COREG        Flecainide Acetate (Tab) TAMBOCOR 100 MG Take 1 Tab by mouth 2 times a day.        Levothyroxine  Sodium (Tab) SYNTHROID 25 MCG Take 1 Tab by mouth Every morning on an empty stomach.        LORazepam (Tab) ATIVAN 1 MG Take 1 Tab by mouth every 8 hours as needed for Anxiety.        PARoxetine HCl (TABLET SR 24 HR) PAXIL-CR 37.5 MG Take 37.5 mg by mouth every day.        .                 Medicines prescribed today were sent to:     [id number] - [place], [place] - [address]    [address] [place] [address]    Phone: [phone] Fax: [phone]    Open 24 Hours?: No    [id number] - [place], [place] - [id number]     [id number]   [place] [address]    Phone: [phone] Fax: [phone]    Open 24 Hours?: No      Medication refill instructions:       If your prescription bottle indicates you have medication refills left, it is not necessary to call your provider’s office. Please contact your pharmacy and they will refill your medication.    If your prescription bottle indicates you do not have any refills left, you may request refills at any time through one of the following ways: The online [employer] system (except Urgent Care), by calling your provider’s office, or by asking your pharmacy to contact your provider’s office with a refill request. Medication refills are processed only during regular business hours and may not be available until the next business day. Your provider may request additional information or to have a follow-up visit with you prior to refilling your medication.   *Please Note: Medication refills are assigned a new Rx number when refilled electronically. Your pharmacy may indicate that no refills were authorized even though a new prescription for the same medication is available at the pharmacy. Please request the medicine by name with the pharmacy before contacting your provider for a refill.        Your To Do List     Future Labs/Procedures Complete By Expires    DS-BONE DENSITY STUDY (DEXA)  As directed 12/1/2017    MA-SCREEN MAMMO W/CAD-BILAT  As  directed 7/2/2018         [employer] Access Code: Activation code not generated  Current [employer] Status: Active

## 2017-05-31 NOTE — PROGRESS NOTES
CC: Establish a new PCP.    HPI:  Jeanie presents today to establish a new primary care relationship.    Patient has been going through a lot of life stressors as mentioned below. However she has been the main care giver for the rest oif the family ( , daughter). Has the following medical issues:    Essential hypertension, BP has been adequately controlled on current medication. Denies headache, chest pain, and SOB.Has been on Carvedilol 12.5 mg bid.No side effects.    Paroxysmal atrial fibrillation , she has been stable, and asymptomatic.Denies palpitation, chest pain, and SOB.Has been on Flecainide 100 mg bids.    Depression, her mood has been fluctuating. She has been having a lot life stressors( one daughter passed away, the other one is very sick has just discharged from ICU , her  is sick and becomne alcoholic, and depressed after the death of the daughter).She was on lorazepam 1 mg 4 times a day, was seen by psych, he start to taper it down. Now she is on lorazepoam 1 mg 3 times a day.She laso has been on Paxil, and Depakote.    Has been having a chronic coccygeal pain , she follows up with pain specialist. Has been on oxycodone.I discussed with patient the risk of taking oxycodone with lorazepam. However psych has been tapering down the lorazepam.Both psych, and pain specialist know about both medications.    Hypothyroidism,her recent TSH was high. She has not been taking her levothyroxine.She was on levothyroxine 50 mcg, has not been taking it for a while, she feels like she gains a lot of weight.    Due for PPSV 23, Tdap.Due for mammogram, bone density scan.        Patient Active Problem List    Diagnosis Date Noted   • HTN (hypertension) 01/20/2015   • PAF (paroxysmal atrial fibrillation) (CMS-[facility]) 09/10/2014   • Drug abuse 05/17/2014   • Anxiety 05/17/2014   • Clavicle fracture 05/17/2014   • Poor historian 05/17/2014   • Non compliance w medication regimen 05/17/2014   • Syncope and  "collapse 05/17/2014       Current Outpatient Prescriptions   Medication Sig Dispense Refill   • levothyroxine (SYNTHROID) 25 MCG Tab Take 1 Tab by mouth Every morning on an empty stomach. 30 Tab 2   • flecainide (TAMBOCOR) 100 MG Tab Take 1 Tab by mouth 2 times a day. 60 Tab 11   • carvedilol (COREG) 12.5 MG Tab Take 1 Tab by mouth 2 times a day, with meals. GENERIC FOR COREG 90 Tab 1   • lorazepam (ATIVAN) 1 MG Tab Take 1 Tab by mouth every 8 hours as needed for Anxiety.     • paroxetine (PAXIL-CR) 37.5 MG CR tablet Take 37.5 mg by mouth every day.     • aspirin EC 81 MG EC tablet Take 1 Tab by mouth every day. 30 Tab 2     No current facility-administered medications for this visit.         Allergies as of 05/31/2017   • (No Known Allergies)        Social History     Social History   • Marital Status:      Spouse Name: N/A   • Number of Children: N/A   • Years of Education: N/A     Occupational History   • Not on file.     Social History Main Topics   • Smoking status: Never Smoker    • Smokeless tobacco: Not on file   • Alcohol Use: Yes      Comment: occasional    • Drug Use: Yes     Special: Oral      Comment: takes friends tranqualizers    • Sexual Activity: Not on file     Other Topics Concern   • Not on file     Social History Narrative       History reviewed. No pertinent family history.    Past Surgical History   Procedure Laterality Date   • Breast biopsy       bilateral neg breast bxs   • Other orthopedic surgery       pelvis fracture. 10 years ago    • Clavicle orif  5/21/2014     Performed by Wilfred Gonzalez M.D. at SURGERY [facility]       ROS:  Denies any Headache, Blurred Vision, Confusion Chest pain,  Shortness of breath,  Abdominal pain, Changes of bowel or bladder, Lower ext edema, Fevers, Nights sweats, Weight Changes, Focal weakness or numbness.  All other systems are negative.    /80 mmHg  Pulse 70  Temp(Src) 36.7 °C (98.1 °F)  Resp 14  Ht 1.689 m (5' 6.5\")  Wt 76.3 kg " (168 lb 3.4 oz)  BMI 26.75 kg/m2  SpO2 95%    Physical Exam:  Gen:         Alert and oriented, No apparent distress.  HEENT:   Perrla, TM clear,  Oralpharynx no erythema or exudates.  Neck:       No Jugular venous distension, Lymphadenopathy, Thyromegaly, Bruits.  Lungs:     Clear to auscultation bilaterally  CV:          Regular rate and rhythm. No murmurs, rubs or gallops.  Abd:         Soft non tender, non distended. Normal active bowel sounds. No hepatosplenomegaly, No pulsatile masses.  Ext:          No clubbing, cyanosis, edema.      Assessment and Plan.   71 y.o. female     1. Essential hypertension  Has been adequately controlled on current medication. Denies headache, chest pain, and SOB.  Continue on Carvedilol 12.5 mg bid.    2. Paroxysmal atrial fibrillation (CMS-HCC)  Stable, asymptomatic.  Has been on Flecainide 100 mg bids.    3. Depression, unspecified depression type  Fluctuating. Has been having a lot life stressors( one daughter passed away, the other one is very sick , her  is sick and becomne alcoholic, and depressed after the death of the daughter).  Was on lorazepam 1 mg 4 times a day, was seen by psych, he start to taper it down. Now she is on lorazepoam 1 mg 3 times a day.  Continue on Paxil.    4. Other chronic pain  Chronic coccygeal pain , she follows up with pain specialist. On oxycodone.  Discussed the risk of taking oxycodone with lorazepam. However psych has been tapering down the lorazepam.    5. Hypothyroidism, unspecified type  Recent TSH was high. She has not been taking her levothyroxine.  Will restart her on levothyroxine 25 mcg daily.    - TSH+FREE T4  - levothyroxine (SYNTHROID) 25 MCG Tab; Take 1 Tab by mouth Every morning on an empty stomach.  Dispense: 30 Tab; Refill: 2    6. Health care maintenance  Due for PPSV 23, Tdap.( will give PPSV 23 today, and Tdap next visit)  Due for mammogram, bone density scan.    - PNEUMOCOCCAL POLYSACCHARIDE VACCINE 23-VALENT =>3YO  SQ/IM  - MA-SCREEN MAMMO W/CAD-BILAT; Future  - DS-BONE DENSITY STUDY (DEXA); Future

## 2017-07-25 ENCOUNTER — HOSPITAL ENCOUNTER (OUTPATIENT)
Dept: RADIOLOGY | Facility: MEDICAL CENTER | Age: 72
End: 2017-07-25
Attending: FAMILY MEDICINE
Payer: MEDICARE

## 2017-07-25 DIAGNOSIS — Z12.31 ENCOUNTER FOR SCREENING MAMMOGRAM FOR BREAST CANCER: ICD-10-CM

## 2017-07-25 DIAGNOSIS — Z78.0 POST-MENOPAUSAL: ICD-10-CM

## 2017-07-25 PROCEDURE — 77063 BREAST TOMOSYNTHESIS BI: CPT

## 2017-07-25 PROCEDURE — 77080 DXA BONE DENSITY AXIAL: CPT

## 2017-09-05 DIAGNOSIS — E03.9 HYPOTHYROIDISM, UNSPECIFIED TYPE: ICD-10-CM

## 2017-09-05 RX ORDER — LEVOTHYROXINE SODIUM 0.03 MG/1
25 TABLET ORAL
Qty: 90 TAB | Refills: 1 | Status: SHIPPED | OUTPATIENT
Start: 2017-09-05 | End: 2018-02-12 | Stop reason: SDUPTHER

## 2017-09-06 ENCOUNTER — OFFICE VISIT (OUTPATIENT)
Dept: MEDICAL GROUP | Facility: MEDICAL CENTER | Age: 72
End: 2017-09-06
Payer: MEDICARE

## 2017-09-06 VITALS
OXYGEN SATURATION: 94 % | DIASTOLIC BLOOD PRESSURE: 88 MMHG | RESPIRATION RATE: 14 BRPM | HEART RATE: 74 BPM | BODY MASS INDEX: 26.54 KG/M2 | WEIGHT: 169.09 LBS | HEIGHT: 67 IN | SYSTOLIC BLOOD PRESSURE: 128 MMHG | TEMPERATURE: 98.6 F

## 2017-09-06 DIAGNOSIS — I10 ESSENTIAL HYPERTENSION: ICD-10-CM

## 2017-09-06 DIAGNOSIS — F32.4 MAJOR DEPRESSIVE DISORDER WITH SINGLE EPISODE, IN PARTIAL REMISSION (HCC): ICD-10-CM

## 2017-09-06 DIAGNOSIS — Z23 NEED FOR INFLUENZA VACCINATION: ICD-10-CM

## 2017-09-06 DIAGNOSIS — I48.0 PAF (PAROXYSMAL ATRIAL FIBRILLATION) (HCC): ICD-10-CM

## 2017-09-06 PROCEDURE — G0008 ADMIN INFLUENZA VIRUS VAC: HCPCS | Performed by: FAMILY MEDICINE

## 2017-09-06 PROCEDURE — 90662 IIV NO PRSV INCREASED AG IM: CPT | Performed by: FAMILY MEDICINE

## 2017-09-06 PROCEDURE — 99214 OFFICE O/P EST MOD 30 MIN: CPT | Mod: 25 | Performed by: FAMILY MEDICINE

## 2017-09-06 NOTE — PROGRESS NOTES
CC: multiple medical issues/ gained weight.    HPI:   Jeanie presents today to discuss the following medical issues:    Essential hypertension, BP has been adequately controlled on current medication. Denies headache, chest pain, and SOB.Has been on Carvedilol 12.5 mg bid.No side effects.     Paroxysmal atrial fibrillation , she has been stable, and asymptomatic.Denies palpitation, chest pain, and SOB.Has been on Flecainide 100 mg bids.     Depression, her mood has been fluctuating. She has been having a lot life stressors( one daughter passed away, the other one has been very sick was recently discharged from hospital, her  is sick and becomes alcoholic, and depressed after the death of the daughter).She was on lorazepam 1 mg 2 times a day,and Paxil 37.5 mg daily. She follows up with psychiatry every month.     Hypothyroidism,her recent TSH was high. She has not been taking her levothyroxine.She was on levothyroxine 50 mcg, has not been taking it for a while, she feels like she gains a lot of weight.Was started on levothyroxine 25 mg, advised to recheck her TSH last visit but she did not. However she stated that she has been gaining weight.     She is due for the flu shot.        Patient Active Problem List    Diagnosis Date Noted   • HTN (hypertension) 01/20/2015   • PAF (paroxysmal atrial fibrillation) (CMS-[facility]) 09/10/2014   • Drug abuse 05/17/2014   • Anxiety 05/17/2014   • Clavicle fracture 05/17/2014   • Poor historian 05/17/2014   • Non compliance w medication regimen 05/17/2014   • Syncope and collapse 05/17/2014       Current Outpatient Prescriptions   Medication Sig Dispense Refill   • levothyroxine (SYNTHROID) 25 MCG Tab Take 1 Tab by mouth Every morning on an empty stomach. 90 Tab 1   • flecainide (TAMBOCOR) 100 MG Tab Take 1 Tab by mouth 2 times a day. 60 Tab 11   • carvedilol (COREG) 12.5 MG Tab Take 1 Tab by mouth 2 times a day, with meals. GENERIC FOR COREG 90 Tab 1   • lorazepam (ATIVAN) 1  "MG Tab Take 1 Tab by mouth every 8 hours as needed for Anxiety.     • paroxetine (PAXIL-CR) 37.5 MG CR tablet Take 37.5 mg by mouth every day.     • aspirin EC 81 MG EC tablet Take 1 Tab by mouth every day. 30 Tab 2     No current facility-administered medications for this visit.          Allergies as of 09/06/2017   • (No Known Allergies)        ROS: Denies any chest pain, Shortness of breath, Changes bowel or bladder, Lower extremity edema.    Physical Exam:  /88   Pulse 74   Temp 37 °C (98.6 °F)   Resp 14   Ht 1.689 m (5' 6.5\")   Wt 76.7 kg (169 lb 1.5 oz)   SpO2 94%   BMI 26.88 kg/m²   Gen.: Well-developed, well-nourished, no apparent distress,pleasant and cooperative with the examination  Skin:  Warm and dry with good turgor. No rashes or suspicious lesions in visible areas  HEENT:Sinuses nontender with palpation, TMs clear, nares patent with pink mucosa and clear rhinorrhea,no septal deviation ,polyps or lesions. lips without lesions, oropharynx clear.  Neck: Trachea midline,no masses or adenopathy. No JVD.  Cor: Regular rate and rhythm without murmur, gallop or rub.  Lungs: Respirations unlabored.Clear to auscultation with equal breath sounds bilaterally. No wheezes, rhonchi.  Extremities: No cyanosis, clubbing or edema.      Assessment and Plan.   72 y.o. female     1. Need for influenza vaccination  Due for flu shot.    - INFLUENZA VACCINE, HIGH DOSE (65+ ONLY)    2. Essential hypertension  Has been adequately controlled on current medication. Denies headache, chest pain, and SOB.  Continue on Carvedilol 12.5 mg bid    3. PAF (paroxysmal atrial fibrillation) (CMS-HCC)  Stable, asymptomatic.  Has been on Flecainide 100 mg bids.    4. Major depressive disorder with single episode, in partial remission (CMS-HCC)  Fluctuating. Has been having a lot life stressors( one daughter passed away, the other one is very sick , her  is sick and becomne alcoholic, and depressed after the death of the " daughter).  Continue on Paxil 37.5 mg daily, was tapered down on the lorazepam to 1 mg bid.  Continue follow up with psychotherapy, and psychiatry.     5. Hypothyroidism, unspecified type  Recent TSH was high. She has not been taking her levothyroxine.  levothyroxine 25 mcg daily was restrated  TSH was ordered last visit, did not do it, promised to do it today.

## 2017-10-31 ENCOUNTER — OFFICE VISIT (OUTPATIENT)
Dept: CARDIOLOGY | Facility: MEDICAL CENTER | Age: 72
End: 2017-10-31
Payer: MEDICARE

## 2017-10-31 VITALS
OXYGEN SATURATION: 94 % | SYSTOLIC BLOOD PRESSURE: 130 MMHG | BODY MASS INDEX: 27.15 KG/M2 | HEART RATE: 75 BPM | HEIGHT: 67 IN | DIASTOLIC BLOOD PRESSURE: 84 MMHG | WEIGHT: 173 LBS

## 2017-10-31 DIAGNOSIS — I10 ESSENTIAL HYPERTENSION: ICD-10-CM

## 2017-10-31 DIAGNOSIS — I48.0 PAROXYSMAL ATRIAL FIBRILLATION (HCC): ICD-10-CM

## 2017-10-31 PROCEDURE — 99214 OFFICE O/P EST MOD 30 MIN: CPT | Performed by: INTERNAL MEDICINE

## 2017-10-31 ASSESSMENT — ENCOUNTER SYMPTOMS
BACK PAIN: 1
MYALGIAS: 0
CHILLS: 0
HEARTBURN: 0
BRUISES/BLEEDS EASILY: 0
NAUSEA: 0
EYE DISCHARGE: 0
DIZZINESS: 0
DEPRESSION: 1
FEVER: 0
HEADACHES: 0
NERVOUS/ANXIOUS: 1
BLURRED VISION: 0
PND: 0
PALPITATIONS: 1
COUGH: 0
SHORTNESS OF BREATH: 0
INSOMNIA: 1

## 2017-10-31 NOTE — LETTER
[facility]   [address]   [address]  [place] [address]  Phone: [phone]  Fax: [phone]              Jeanie Hutchison  1945    Encounter Date: 10/31/2017    Spenser Allen M.D.          PROGRESS NOTE:  Subjective:   Jeanie Hutchison is a 72 y.o. female who presents today In follow-up for paroxysmal atrial fibrillation and flecainide therapy  Depression and excess alcohol still an issue although things are somewhat better  She drinks wine to put her to sleep.  She's had some recurrent palpitations about every 7 days or so and takes an extra 200 mg of flecainide at that moment.  She is faithful in taking 100 mg of flecainide twice a day  No other issues except for she has restarted her thyroid as suggested by PCP.    Past Medical History:   Diagnosis Date   • A-fib (CMS-HCC) 5/1/2014   • Chronic pain     r/t pelvic fracture   • Psychiatric disorder     history of depression and anxiety      Past Surgical History:   Procedure Laterality Date   • CLAVICLE ORIF  5/21/2014    Performed by Wilfred Gonzalez M.D. at SURGERY [facility]   • BREAST BIOPSY      bilateral neg breast bxs   • OTHER ORTHOPEDIC SURGERY      pelvis fracture. 10 years ago      History reviewed. No pertinent family history.  History   Smoking Status   • Never Smoker   Smokeless Tobacco   • Never Used     No Known Allergies  Outpatient Encounter Prescriptions as of 10/31/2017   Medication Sig Dispense Refill   • levothyroxine (SYNTHROID) 25 MCG Tab Take 1 Tab by mouth Every morning on an empty stomach. 90 Tab 1   • flecainide (TAMBOCOR) 100 MG Tab Take 1 Tab by mouth 2 times a day. 60 Tab 11   • carvedilol (COREG) 12.5 MG Tab Take 1 Tab by mouth 2 times a day, with meals. GENERIC FOR COREG 90 Tab 1   • lorazepam (ATIVAN) 1 MG Tab Take 1 Tab by mouth every 8 hours as needed for Anxiety.     • paroxetine (PAXIL-CR) 37.5 MG CR tablet Take 37.5 mg by mouth every  "day.     • aspirin EC 81 MG EC tablet Take 1 Tab by mouth every day. 30 Tab 2     No facility-administered encounter medications on file as of 10/31/2017.      Review of Systems   Constitutional: Negative for chills, fever and malaise/fatigue.   Eyes: Negative for blurred vision and discharge.   Respiratory: Negative for cough and shortness of breath.    Cardiovascular: Positive for palpitations. Negative for chest pain, leg swelling and PND.   Gastrointestinal: Negative for heartburn and nausea.   Genitourinary: Negative for dysuria and urgency.   Musculoskeletal: Positive for back pain. Negative for myalgias.   Skin: Negative for itching and rash.   Neurological: Negative for dizziness and headaches.   Endo/Heme/Allergies: Negative for environmental allergies. Does not bruise/bleed easily.   Psychiatric/Behavioral: Positive for depression. The patient is nervous/anxious and has insomnia.         Objective:   /84   Pulse 75   Ht 1.689 m (5' 6.5\")   Wt 78.5 kg (173 lb)   SpO2 94%   BMI 27.50 kg/m²      Physical Exam   Constitutional: She is oriented to person, place, and time. She appears well-developed and well-nourished.   HENT:   Head: Normocephalic and atraumatic.   Eyes: Conjunctivae and EOM are normal. No scleral icterus.   Neck: Neck supple. No JVD present. No thyromegaly present.   Cardiovascular: Normal rate, regular rhythm and normal heart sounds.  Exam reveals no gallop and no friction rub.    No murmur heard.  Pulmonary/Chest: Effort normal and breath sounds normal. No respiratory distress. She has no wheezes. She has no rales. She exhibits no tenderness.   Abdominal: Soft. Bowel sounds are normal. She exhibits no distension and no mass. There is no tenderness.   Neurological: She is alert and oriented to person, place, and time. Coordination normal.   Skin: Skin is warm and dry. No rash noted. No pallor.   Psychiatric: She has a normal mood and affect. Her behavior is normal. Judgment and " thought content normal.       Assessment:     1. Paroxysmal atrial fibrillation (CMS-HCC)     2. Essential hypertension         Medical Decision Making:  Today's Assessment / Status / Plan:   We discussed alcohol usage  We discussed taking an extra 100 mg of flecainide and an extra carvedilol tablet if and when atrial fibrillation recurs  Return in 6 months      Jane Armstrong M.D.  [address] [place] [id number]  VIA In Basket

## 2017-12-01 DIAGNOSIS — I10 ESSENTIAL HYPERTENSION: ICD-10-CM

## 2017-12-01 RX ORDER — CARVEDILOL 12.5 MG/1
12.5 TABLET ORAL 2 TIMES DAILY WITH MEALS
Qty: 90 TAB | Refills: 3 | Status: SHIPPED | OUTPATIENT
Start: 2017-12-01 | End: 2019-03-26

## 2017-12-08 ENCOUNTER — OFFICE VISIT (OUTPATIENT)
Dept: MEDICAL GROUP | Facility: MEDICAL CENTER | Age: 72
End: 2017-12-08
Payer: MEDICARE

## 2017-12-08 VITALS
BODY MASS INDEX: 27.8 KG/M2 | WEIGHT: 173 LBS | HEIGHT: 66 IN | DIASTOLIC BLOOD PRESSURE: 62 MMHG | HEART RATE: 63 BPM | OXYGEN SATURATION: 93 % | RESPIRATION RATE: 16 BRPM | TEMPERATURE: 97.5 F | SYSTOLIC BLOOD PRESSURE: 98 MMHG

## 2017-12-08 DIAGNOSIS — E03.4 HYPOTHYROIDISM DUE TO ACQUIRED ATROPHY OF THYROID: ICD-10-CM

## 2017-12-08 DIAGNOSIS — F32.1 MODERATE SINGLE CURRENT EPISODE OF MAJOR DEPRESSIVE DISORDER (HCC): ICD-10-CM

## 2017-12-08 DIAGNOSIS — E78.2 MIXED HYPERLIPIDEMIA: ICD-10-CM

## 2017-12-08 DIAGNOSIS — I48.0 PAF (PAROXYSMAL ATRIAL FIBRILLATION) (HCC): ICD-10-CM

## 2017-12-08 DIAGNOSIS — I10 ESSENTIAL HYPERTENSION: ICD-10-CM

## 2017-12-08 DIAGNOSIS — Z12.11 COLON CANCER SCREENING: ICD-10-CM

## 2017-12-08 PROCEDURE — 99214 OFFICE O/P EST MOD 30 MIN: CPT | Performed by: FAMILY MEDICINE

## 2017-12-08 RX ORDER — GABAPENTIN 300 MG/1
300 CAPSULE ORAL 3 TIMES DAILY
COMMUNITY
End: 2020-01-27

## 2017-12-08 NOTE — PROGRESS NOTES
CC: Depression/ HTN/ thyroid disorder/ others    HPI:   Jeanie presents today to discuss the following medical issues:    Moderate single current episode of major depressive disorder (CMS-[facility])  His mood has been fluctuating. She has been having on going life stressors, one daughter passed away, the other one sick , her  is sick and become alcoholic, and depressed after the death of the daughter.She is currently on Paxil 37.5 mg daily, was tapered down on the lorazepam to 1 mg bid.She follows up with psychiatry next week.     Paroxysmal atrial fibrillation  She has been stable, and asymptomatic.She has been on Flecainide 100 mg bids.    Essential hypertension  Her BP has been slightly low, however patient has been asymptomatic. He denies lightheadedness, headache, chest pain, and SOB. She has been on Carvedilol 12.5 mg bid.    Hypothyroidism due to acquired atrophy of thyroid  She has been tolerating Levothyroxine, no palpitation, no cold or heat intolerance, has been levothyroxine 25 mcg daily.     Mixed hyperlipidemia  Her last lipid panel in 10/2015 was ( T chol 223,,HDL 61 , RQU450).She has been on diet control.No h/o DM, or CAD.    She is due fr colon cancer screening      Patient Active Problem List    Diagnosis Date Noted   • Moderate single current episode of major depressive disorder (CMS-[facility]) 12/08/2017   • HTN (hypertension) 01/20/2015   • PAF (paroxysmal atrial fibrillation) (CMS-HCC) 09/10/2014   • Drug abuse 05/17/2014   • Anxiety 05/17/2014       Current Outpatient Prescriptions   Medication Sig Dispense Refill   • gabapentin (NEURONTIN) 300 MG Cap Take 300 mg by mouth 3 times a day.     • carvedilol (COREG) 12.5 MG Tab Take 1 Tab by mouth 2 times a day, with meals. GENERIC FOR COREG 90 Tab 3   • levothyroxine (SYNTHROID) 25 MCG Tab Take 1 Tab by mouth Every morning on an empty stomach. 90 Tab 1   • flecainide (TAMBOCOR) 100 MG Tab Take 1 Tab by mouth 2 times a day. 60 Tab 11   •  "lorazepam (ATIVAN) 1 MG Tab Take 1 Tab by mouth every 8 hours as needed for Anxiety.     • paroxetine (PAXIL-CR) 37.5 MG CR tablet Take 37.5 mg by mouth every day.     • aspirin EC 81 MG EC tablet Take 1 Tab by mouth every day. 30 Tab 2     No current facility-administered medications for this visit.          Allergies as of 12/08/2017   • (No Known Allergies)        ROS: Denies any chest pain, Shortness of breath, Changes bowel or bladder, Lower extremity edema.    Physical Exam:  BP (!) 98/62   Pulse 63   Temp 36.4 °C (97.5 °F)   Resp 16   Ht 1.676 m (5' 6\")   Wt 78.5 kg (173 lb)   SpO2 93%   BMI 27.92 kg/m²   Gen.: Well-developed, well-nourished, no apparent distress,pleasant and cooperative with the examination  Skin:  Warm and dry with good turgor. No rashes or suspicious lesions in visible areas  HEENT:Sinuses nontender with palpation, TMs clear, nares patent with pink mucosa and clear rhinorrhea,no septal deviation ,polyps or lesions. lips without lesions, oropharynx clear.  Neck: Trachea midline,no masses or adenopathy. No JVD.  Cor: Regular rate and rhythm without murmur, gallop or rub.  Lungs: Respirations unlabored.Clear to auscultation with equal breath sounds bilaterally. No wheezes, rhonchi.  Extremities: No cyanosis, clubbing or edema.        Assessment and Plan.   72 y.o. female     1. Moderate single current episode of major depressive disorder (CMS-[facility])  Fluctuating. Has been having a lot life stressors( one daughter passed away, the other one is very sick , her  is sick and becomne alcoholic, and depressed after the death of the daughter).  Continue on Paxil 37.5 mg daily, was tapered down on the lorazepam to 1 mg bid.  Has appointment with psychiatry next week.     2. PAF (paroxysmal atrial fibrillation) (CMS-HCC)  Stable, asymptomatic.  Has been on Flecainide 100 mg bids.    3. Essential hypertension  Slightly low, however patient has been asymptomatic. He denies lightheadedness, " headache, chest pain, and SOB.  Will decrease Carvedilol to 6.25 mg bid ( he was on 12.5 mg bid)    4. Hypothyroidism due to acquired atrophy of thyroid  He has been tolerating Levothyroxine, no palpitation, no cold or heat intolerance  Continue levothyroxine 25 mcg daily.     5. Mixed hyperlipidemia  Last lipid panel in 2015 was ( T chol 223,,HDL 61 , HKV200).Has been on diet control.  Will repeat lipid panel.    - LIPID PANEL    6. Colon cancer screening    - OCCULT BLOOD FECES IMMUNOASSAY; Future

## 2017-12-11 ENCOUNTER — HOSPITAL ENCOUNTER (OUTPATIENT)
Facility: MEDICAL CENTER | Age: 72
End: 2017-12-11
Attending: FAMILY MEDICINE
Payer: MEDICARE

## 2017-12-11 PROCEDURE — 82274 ASSAY TEST FOR BLOOD FECAL: CPT

## 2017-12-14 DIAGNOSIS — Z12.11 COLON CANCER SCREENING: ICD-10-CM

## 2017-12-14 LAB — HEMOCCULT STL QL IA: NEGATIVE

## 2018-01-29 DIAGNOSIS — I48.0 PAROXYSMAL ATRIAL FIBRILLATION (HCC): ICD-10-CM

## 2018-01-29 RX ORDER — FLECAINIDE ACETATE 100 MG/1
100 TABLET ORAL 2 TIMES DAILY
Qty: 180 TAB | Refills: 2 | Status: SHIPPED | OUTPATIENT
Start: 2018-01-29 | End: 2019-01-15

## 2018-02-12 DIAGNOSIS — E03.9 HYPOTHYROIDISM, UNSPECIFIED TYPE: ICD-10-CM

## 2018-02-12 RX ORDER — LEVOTHYROXINE SODIUM 0.03 MG/1
25 TABLET ORAL
Qty: 90 TAB | Refills: 1 | Status: SHIPPED
Start: 2018-02-12 | End: 2020-01-27

## 2018-03-09 ENCOUNTER — OFFICE VISIT (OUTPATIENT)
Dept: MEDICAL GROUP | Facility: MEDICAL CENTER | Age: 73
End: 2018-03-09
Payer: MEDICARE

## 2018-03-09 VITALS
RESPIRATION RATE: 16 BRPM | OXYGEN SATURATION: 95 % | HEIGHT: 66 IN | TEMPERATURE: 96.9 F | BODY MASS INDEX: 28.28 KG/M2 | SYSTOLIC BLOOD PRESSURE: 128 MMHG | HEART RATE: 82 BPM | DIASTOLIC BLOOD PRESSURE: 84 MMHG | WEIGHT: 176 LBS

## 2018-03-09 DIAGNOSIS — F41.8 DEPRESSION WITH ANXIETY: ICD-10-CM

## 2018-03-09 DIAGNOSIS — E03.4 HYPOTHYROIDISM DUE TO ACQUIRED ATROPHY OF THYROID: ICD-10-CM

## 2018-03-09 DIAGNOSIS — I10 ESSENTIAL HYPERTENSION: ICD-10-CM

## 2018-03-09 DIAGNOSIS — E78.2 MIXED HYPERLIPIDEMIA: ICD-10-CM

## 2018-03-09 DIAGNOSIS — I48.0 PAF (PAROXYSMAL ATRIAL FIBRILLATION) (HCC): ICD-10-CM

## 2018-03-09 PROCEDURE — 99214 OFFICE O/P EST MOD 30 MIN: CPT | Performed by: FAMILY MEDICINE

## 2018-03-11 PROBLEM — E78.2 MIXED HYPERLIPIDEMIA: Status: ACTIVE | Noted: 2018-03-11

## 2018-03-11 NOTE — PROGRESS NOTES
CC: HTN, HLD, Thyroid disorder, Afib, Depression and anxiety.    HPI:    Jeanie presents today to discuss the following medical issues:    Essential hypertension  Has been adequately controlled on current medication. Denies headache, chest pain, and SOB.She has been tolerating on Carvedilol to 6.25 mg bid.No side effects.    PAF (paroxysmal atrial fibrillation) (CMS-HCC)  She has been asymptomatic.She has normal rate and rhythm.Currently on ASA, and Flecainide 100 mg bids.Continue follow up with cardiology Dr Abraham.    Depression with anxiety  Patient stated that she has no change in her mood , which has been fluctuating. She is going through a lot of life stressors( one daughter passed away, the other one is very sick , her  is sick and becomne alcoholic, and depressed after the death of the daughter).However she has been on Paxil 37.5 mg daily, has been seeing a psychiatrist, is able to taper her off of the lorazepam . Denies suicidal ideation.    Hypothyroidism due to acquired atrophy of thyroid  She has been tolerating Levothyroxine, no palpitation, no cold or heat intolerance, has been levothyroxine 25 mcg daily.      Mixed hyperlipidemia  She did not check her lipid panel since 2015 , it was ( T chol 223,,HDL 61, [id number]). It was ordered last visit but she did not do it.However she has no h/o DM, CAD, or stroke, but has A fib. She has been on diet control.      Patient Active Problem List    Diagnosis Date Noted   • Moderate single current episode of major depressive disorder (CMS-HCC) 12/08/2017   • HTN (hypertension) 01/20/2015   • PAF (paroxysmal atrial fibrillation) (CMS-HCC) 09/10/2014   • Drug abuse 05/17/2014   • Anxiety 05/17/2014       Current Outpatient Prescriptions   Medication Sig Dispense Refill   • flecainide (TAMBOCOR) 100 MG Tab Take 1 Tab by mouth 2 times a day. 180 Tab 2   • gabapentin (NEURONTIN) 300 MG Cap Take 300 mg by mouth 3 times a day.     • carvedilol (COREG) 12.5 MG  "Tab Take 1 Tab by mouth 2 times a day, with meals. GENERIC FOR COREG 90 Tab 3   • paroxetine (PAXIL-CR) 37.5 MG CR tablet Take 37.5 mg by mouth every day.     • aspirin EC 81 MG EC tablet Take 1 Tab by mouth every day. 30 Tab 2   • levothyroxine (SYNTHROID) 25 MCG Tab Take 1 Tab by mouth Every morning on an empty stomach. 90 Tab 1   • lorazepam (ATIVAN) 1 MG Tab Take 1 Tab by mouth every 8 hours as needed for Anxiety.       No current facility-administered medications for this visit.          Allergies as of 03/09/2018   • (No Known Allergies)        ROS: Denies any chest pain, Shortness of breath, Changes bowel or bladder, Lower extremity edema.    Physical Exam:  /84   Pulse 82   Temp 36.1 °C (96.9 °F)   Resp 16   Ht 1.676 m (5' 6\")   Wt 79.8 kg (176 lb)   SpO2 95%   BMI 28.41 kg/m²   Gen.: Well-developed, well-nourished, no apparent distress,pleasant and cooperative with the examination  Skin:  Warm and dry with good turgor. No rashes or suspicious lesions in visible areas  HEENT:Sinuses nontender with palpation, TMs clear, nares patent with pink mucosa and clear rhinorrhea,no septal deviation ,polyps or lesions. lips without lesions, oropharynx clear.  Neck: Trachea midline,no masses or adenopathy. No JVD.  Cor: Regular rate and rhythm without murmur, gallop or rub.  Lungs: Respirations unlabored.Clear to auscultation with equal breath sounds bilaterally. No wheezes, rhonchi.  Extremities: No cyanosis, clubbing or edema.      Assessment and Plan.   72 y.o. female     1. Essential hypertension  Has been adequately controlled on current medication. Denies headache, chest pain, and SOB.  Continue on Carvedilol to 6.25 mg bid.    - CBC WITH DIFFERENTIAL; Future  - COMP METABOLIC PANEL; Future  - LIPID PANEL    2. PAF (paroxysmal atrial fibrillation) (CMS-HCC)  Asymptomatic.has normal rate and rhythm.  Continue on ASA, and Flecainide 100 mg bids.    3. Depression with anxiety  No change, her mood has " been fluctuating. Has been having a lot life stressors( one daughter passed away, the other one is very sick , her  is sick and becomne alcoholic, and depressed after the death of the daughter).  Continue on Paxil 37.5 mg daily, has been seeing a psychiatrist, is able to taper her off of the lorazepam     4. Hypothyroidism due to acquired atrophy of thyroid  He has been tolerating Levothyroxine, no palpitation, no cold or heat intolerance  Continue levothyroxine 25 mcg daily.     - TSH+FREE T4     5. Mixed hyperlipidemia  Her last lipid panel was done in 2015 was ( T chol 223,,HDL 61 , YQZ113).Was ordered last visit but she did not do it.  She has been on diet control.  Will repeat lipid panel again.     - LIPID PANEL

## 2018-06-20 ENCOUNTER — OFFICE VISIT (OUTPATIENT)
Dept: MEDICAL GROUP | Facility: MEDICAL CENTER | Age: 73
End: 2018-06-20
Payer: MEDICARE

## 2018-06-20 VITALS
TEMPERATURE: 98.2 F | OXYGEN SATURATION: 93 % | SYSTOLIC BLOOD PRESSURE: 130 MMHG | DIASTOLIC BLOOD PRESSURE: 90 MMHG | RESPIRATION RATE: 16 BRPM | HEART RATE: 77 BPM | WEIGHT: 171.3 LBS | BODY MASS INDEX: 27.53 KG/M2 | HEIGHT: 66 IN

## 2018-06-20 DIAGNOSIS — M54.50 CHRONIC LOW BACK PAIN WITHOUT SCIATICA, UNSPECIFIED BACK PAIN LATERALITY: ICD-10-CM

## 2018-06-20 DIAGNOSIS — F33.1 MODERATE EPISODE OF RECURRENT MAJOR DEPRESSIVE DISORDER (HCC): ICD-10-CM

## 2018-06-20 DIAGNOSIS — G89.29 CHRONIC LOW BACK PAIN WITHOUT SCIATICA, UNSPECIFIED BACK PAIN LATERALITY: ICD-10-CM

## 2018-06-20 DIAGNOSIS — S92.301D: ICD-10-CM

## 2018-06-20 DIAGNOSIS — I48.0 PAF (PAROXYSMAL ATRIAL FIBRILLATION) (HCC): ICD-10-CM

## 2018-06-20 PROCEDURE — 99214 OFFICE O/P EST MOD 30 MIN: CPT | Performed by: FAMILY MEDICINE

## 2018-06-20 NOTE — PROGRESS NOTES
CC: Afib, thyroid hypofunction, MT bone fracture, depression    HPI:   Jeanie presents today  To discuss the following:    PAF (paroxysmal atrial fibrillation) ([facility])  Patient has been asymptomatic, denies palpitation, chest pain, and SOB, has normal rate and rhythm.Has been on ASA, and Flecainide 100 mg bids.    Moderate episode of recurrent major depressive disorder ([facility])  Patient stated that her mood has been fluctuating. Has been having a lot life stressors( one daughter passed away, the other one is very sick , her  is sick and becomne alcoholic, and depressed after the death of the daughter). However she denies suicidal ideation.She has been doing fine on Paxil 37.5 mg daily, lorazepam was stopped, she follows up with psychiatrist,     Open nondisplaced fracture of metatarsal bone of right foot with routine healing, unspecified metatarsal, subsequent encounter  Patient had a fracture of her 5th metatarsal bone on the right had after she knocked on her door ( at night, because she wsas locked out side her house, and her  was sleeping), however she did asked for medical advise few days after the accident , so she was found that she is not a surgical  candidate .Has been on brace.has appt next week for an x ray and evaluation     Hypothyroidism due to acquired atrophy of thyroid  She has been tolerating Levothyroxine, no palpitation, no cold or heat intolerance, has been levothyroxine 25 mcg daily.     Patient Active Problem List    Diagnosis Date Noted   • Mixed hyperlipidemia 03/11/2018   • Moderate single current episode of major depressive disorder ([facility]) 12/08/2017   • HTN (hypertension) 01/20/2015   • PAF (paroxysmal atrial fibrillation) ([facility]) 09/10/2014   • Drug abuse 05/17/2014   • Anxiety 05/17/2014       Current Outpatient Prescriptions   Medication Sig Dispense Refill   • levothyroxine (SYNTHROID) 25 MCG Tab Take 1 Tab by mouth Every morning on an empty stomach. 90 Tab 1   • flecainide  "(TAMBOCOR) 100 MG Tab Take 1 Tab by mouth 2 times a day. 180 Tab 2   • gabapentin (NEURONTIN) 300 MG Cap Take 300 mg by mouth 3 times a day.     • carvedilol (COREG) 12.5 MG Tab Take 1 Tab by mouth 2 times a day, with meals. GENERIC FOR COREG 90 Tab 3   • lorazepam (ATIVAN) 1 MG Tab Take 1 Tab by mouth every 8 hours as needed for Anxiety.     • paroxetine (PAXIL-CR) 37.5 MG CR tablet Take 37.5 mg by mouth every day.     • aspirin EC 81 MG EC tablet Take 1 Tab by mouth every day. 30 Tab 2     No current facility-administered medications for this visit.          Allergies as of 06/20/2018   • (No Known Allergies)        ROS: Denies any chest pain, Shortness of breath, Changes bowel or bladder, Lower extremity edema.    Physical Exam:  /90   Pulse 77   Temp 36.8 °C (98.2 °F)   Resp 16   Ht 1.676 m (5' 6\")   Wt 77.7 kg (171 lb 4.8 oz)   SpO2 93%   BMI 27.65 kg/m²   Gen.: Well-developed, well-nourished, no apparent distress,pleasant and cooperative with the examination  Skin:  Warm and dry with good turgor. No rashes or suspicious lesions in visible areas  HEENT:Sinuses nontender with palpation, TMs clear, nares patent with pink mucosa and clear rhinorrhea,no septal deviation ,polyps or lesions. lips without lesions, oropharynx clear.  Neck: Trachea midline,no masses or adenopathy. No JVD.  Cor: Regular rate and rhythm without murmur, gallop or rub.  Lungs: Respirations unlabored.Clear to auscultation with equal breath sounds bilaterally. No wheezes, rhonchi.  Extremities: No cyanosis, clubbing or edema. Right hand in on brace        Assessment and Plan.   72 y.o. female     1. PAF (paroxysmal atrial fibrillation) (HCC)  Asymptomatic.has normal rate and rhythm.  Continue on ASA, and Flecainide 100 mg bids.    2. Moderate episode of recurrent major depressive disorder (HCC)  Mood has been fluctuating. Has been having a lot life stressors( one daughter passed away, the other one is very sick , her  is " sick and becomne alcoholic, and depressed after the death of the daughter). Denies suicidal ideation.  Continue on Paxil 37.5 mg daily,   Continue follow up with psychiatrist, lorazepam was stopped    3. Open nondisplaced fracture of metatarsal bone of right foot with routine healing, unspecified metatarsal, subsequent encounter  Presented late ( 3-4 days later), so surgery was not an option.  Stable, on brace.has appt next week for an x ray and evaluation     4. Hypothyroidism due to acquired atrophy of thyroid  He has been tolerating Levothyroxine, no palpitation, no cold or heat intolerance  Continue levothyroxine 25 mcg daily.

## 2018-12-12 ENCOUNTER — OFFICE VISIT (OUTPATIENT)
Dept: MEDICAL GROUP | Facility: MEDICAL CENTER | Age: 73
End: 2018-12-12
Payer: MEDICARE

## 2018-12-12 VITALS
TEMPERATURE: 98.2 F | WEIGHT: 166.23 LBS | DIASTOLIC BLOOD PRESSURE: 70 MMHG | BODY MASS INDEX: 26.71 KG/M2 | HEART RATE: 72 BPM | OXYGEN SATURATION: 91 % | RESPIRATION RATE: 16 BRPM | HEIGHT: 66 IN | SYSTOLIC BLOOD PRESSURE: 120 MMHG

## 2018-12-12 DIAGNOSIS — R06.83 SNORING: ICD-10-CM

## 2018-12-12 DIAGNOSIS — I10 ESSENTIAL HYPERTENSION: ICD-10-CM

## 2018-12-12 DIAGNOSIS — I48.0 PAF (PAROXYSMAL ATRIAL FIBRILLATION) (HCC): ICD-10-CM

## 2018-12-12 DIAGNOSIS — F43.21 GRIEF REACTION: ICD-10-CM

## 2018-12-12 DIAGNOSIS — F10.21 PERSONAL HISTORY OF ALCOHOLISM (HCC): ICD-10-CM

## 2018-12-12 PROCEDURE — 99214 OFFICE O/P EST MOD 30 MIN: CPT | Performed by: FAMILY MEDICINE

## 2018-12-12 RX ORDER — BACLOFEN 20 MG/1
20 TABLET ORAL 3 TIMES DAILY
COMMUNITY
End: 2019-01-02 | Stop reason: SDUPTHER

## 2018-12-12 RX ORDER — AMIODARONE HYDROCHLORIDE 200 MG/1
200 TABLET ORAL DAILY
COMMUNITY
End: 2019-01-15 | Stop reason: SDUPTHER

## 2018-12-12 RX ORDER — TRAZODONE HYDROCHLORIDE 50 MG/1
50 TABLET ORAL NIGHTLY
COMMUNITY
End: 2019-01-02 | Stop reason: SDUPTHER

## 2018-12-12 RX ORDER — HYDROXYZINE PAMOATE 50 MG/1
50 CAPSULE ORAL 3 TIMES DAILY PRN
COMMUNITY
End: 2019-01-08 | Stop reason: SDUPTHER

## 2018-12-12 RX ORDER — CARVEDILOL 3.12 MG/1
3.12 TABLET ORAL 2 TIMES DAILY WITH MEALS
COMMUNITY
End: 2019-01-08 | Stop reason: SDUPTHER

## 2018-12-12 RX ORDER — LISINOPRIL 20 MG/1
20 TABLET ORAL DAILY
COMMUNITY
End: 2019-01-08 | Stop reason: SDUPTHER

## 2018-12-12 RX ORDER — CELECOXIB 200 MG/1
200 CAPSULE ORAL 2 TIMES DAILY
COMMUNITY
End: 2019-01-08 | Stop reason: SDUPTHER

## 2018-12-13 NOTE — PROGRESS NOTES
CC: Grief reaction, A. fib, hypertension, history of alcoholism, snoring.    HPI:   Jeanie presents today to discuss for medical issues.    Grief reaction  Patient's and her  has been my patients for a while now.  He lost their younger daughter 4 years ago and it was a great psychological trauma for them.  Both of them developed severe depression, and gradually got well.  However 3 months ago they lost their older daughter and was another traumatic event for them.  Patient was severely depressed, started to drink a lot, so she decided to go to rehab in [place] and she was successful to get off of alcohol.  Patient has an appointment with grief counselor next week.  However she has been doing better since she come out of rehab.    PAF (paroxysmal atrial fibrillation) ([facility])  He denies any new diagnosis when she was in the rehab for alcoholism.  She developed withdrawal symptoms from alcohol, and went into atrial fibrillation.  She is currently on amiodarone, carvedilol and aspirin.  Wants to establish care with a cardiologist in [place].    Essential hypertension  Blood pressure has been adequately controlled.  Denies headache, chest pain and shortness of breath.  Has been on lisinopril 20 mg daily.  No side effects.    Personal history of alcoholism ([facility])  Patient underwent rehab for alcoholism in [place] after the dates of 4 second-order 3 months ago.  It was successful.  She intends to never go back to alcohol.    Snoring  Patient has been snoring a lot at night, which has been feeling somnolent during the day.    Patient Active Problem List    Diagnosis Date Noted   • Mixed hyperlipidemia 03/11/2018   • Moderate single current episode of major depressive disorder ([facility]) 12/08/2017   • HTN (hypertension) 01/20/2015   • PAF (paroxysmal atrial fibrillation) ([facility]) 09/10/2014   • Drug abuse ([facility]) 05/17/2014   • Anxiety 05/17/2014       Current Outpatient Prescriptions   Medication Sig Dispense Refill   •  "carvedilol (COREG) 3.125 MG Tab Take 3.125 mg by mouth 2 times a day, with meals.     • amiodarone (CORDARONE) 200 MG Tab Take 200 mg by mouth every day.     • celecoxib (CELEBREX) 200 MG Cap Take 200 mg by mouth 2 times a day.     • lisinopril (PRINIVIL) 20 MG Tab Take 20 mg by mouth every day.     • hydrOXYzine pamoate (VISTARIL) 50 MG Cap Take 50 mg by mouth 3 times a day as needed for Itching.     • traZODone (DESYREL) 50 MG Tab Take 50 mg by mouth every evening.     • baclofen (LIORESAL) 20 MG tablet Take 20 mg by mouth 3 times a day.     • levothyroxine (SYNTHROID) 25 MCG Tab Take 1 Tab by mouth Every morning on an empty stomach. 90 Tab 1   • gabapentin (NEURONTIN) 300 MG Cap Take 300 mg by mouth 3 times a day.     • paroxetine (PAXIL-CR) 37.5 MG CR tablet Take 37.5 mg by mouth every day.     • aspirin EC 81 MG EC tablet Take 1 Tab by mouth every day. 30 Tab 2   • flecainide (TAMBOCOR) 100 MG Tab Take 1 Tab by mouth 2 times a day. 180 Tab 2   • carvedilol (COREG) 12.5 MG Tab Take 1 Tab by mouth 2 times a day, with meals. GENERIC FOR COREG 90 Tab 3   • lorazepam (ATIVAN) 1 MG Tab Take 1 Tab by mouth every 8 hours as needed for Anxiety.       No current facility-administered medications for this visit.          Allergies as of 12/12/2018   • (No Known Allergies)        ROS: Denies any chest pain, Shortness of breath, Changes bowel or bladder, Lower extremity edema.    Physical Exam:  /70 (BP Location: Right arm, Patient Position: Sitting, BP Cuff Size: Adult)   Pulse 72   Temp 36.8 °C (98.2 °F)   Resp 16   Ht 1.676 m (5' 6\")   Wt 75.4 kg (166 lb 3.6 oz)   SpO2 91%   BMI 26.83 kg/m²   Gen.: Well-developed, well-nourished, no apparent distress,pleasant and cooperative with the examination  Skin:  Warm and dry with good turgor. No rashes or suspicious lesions in visible areas  HEENT:Sinuses nontender with palpation, TMs clear, nares patent with pink mucosa and clear rhinorrhea,no septal deviation " ,polyps or lesions. lips without lesions, oropharynx clear.  Neck: Trachea midline,no masses or adenopathy. No JVD.  Cor: Regular rate and rhythm without murmur, gallop or rub.  Lungs: Respirations unlabored.Clear to auscultation with equal breath sounds bilaterally. No wheezes, rhonchi.  Extremities: No cyanosis, clubbing or edema.        Assessment and Plan.   73 y.o. female     1. Grief reaction  Feels a little bit better.  Continue follow-up with grief counseling.  Advised on monthly follow-up visit.    2. PAF (paroxysmal atrial fibrillation) (HCC)  Stable.  No RVR.  Continue on amiodarone 100 mg daily.  Will refer patient to cardiology.    - REFERRAL TO CARDIOLOGY    3. Essential hypertension  Adequately controlled.  Continue on lisinopril 20 mg daily.    4. Personal history of alcoholism (HCC)  Status post rehab.  Was successful.  Patient has been sober for more than a month.    5. Snoring  Associated with tiredness and somnolence during the daytime, rule out sleep apnea.  Will refer to sleep studies.    - REFERRAL TO SLEEP STUDIES

## 2019-01-02 RX ORDER — PAROXETINE HYDROCHLORIDE HEMIHYDRATE 37.5 MG/1
37.5 TABLET, FILM COATED, EXTENDED RELEASE ORAL DAILY
Qty: 90 TAB | Refills: 3 | Status: SHIPPED
Start: 2019-01-02 | End: 2020-01-27

## 2019-01-02 RX ORDER — BACLOFEN 20 MG/1
20 TABLET ORAL 3 TIMES DAILY
Qty: 90 TAB | Refills: 3 | Status: SHIPPED
Start: 2019-01-02 | End: 2020-01-27

## 2019-01-02 RX ORDER — TRAZODONE HYDROCHLORIDE 50 MG/1
50 TABLET ORAL
Qty: 90 TAB | Refills: 3 | Status: SHIPPED | OUTPATIENT
Start: 2019-01-02 | End: 2019-01-15

## 2019-01-08 RX ORDER — CELECOXIB 200 MG/1
200 CAPSULE ORAL 2 TIMES DAILY
Qty: 60 CAP | Refills: 3 | Status: SHIPPED | OUTPATIENT
Start: 2019-01-08 | End: 2019-01-11 | Stop reason: SDUPTHER

## 2019-01-08 RX ORDER — CARVEDILOL 3.12 MG/1
3.12 TABLET ORAL 2 TIMES DAILY WITH MEALS
Qty: 60 TAB | Refills: 3 | Status: SHIPPED | OUTPATIENT
Start: 2019-01-08 | End: 2019-01-18

## 2019-01-08 RX ORDER — HYDROXYZINE PAMOATE 50 MG/1
50 CAPSULE ORAL 3 TIMES DAILY PRN
Qty: 90 CAP | Refills: 3 | Status: SHIPPED
Start: 2019-01-08 | End: 2020-01-27

## 2019-01-08 RX ORDER — LISINOPRIL 20 MG/1
20 TABLET ORAL DAILY
Qty: 90 TAB | Refills: 3 | Status: SHIPPED
Start: 2019-01-08 | End: 2020-01-15

## 2019-01-11 RX ORDER — CELECOXIB 200 MG/1
200 CAPSULE ORAL 2 TIMES DAILY
Qty: 60 CAP | Refills: 3 | Status: SHIPPED
Start: 2019-01-11 | End: 2020-01-27

## 2019-01-14 RX ORDER — AMIODARONE HYDROCHLORIDE 200 MG/1
200 TABLET ORAL DAILY
Qty: 30 TAB | OUTPATIENT
Start: 2019-01-14

## 2019-01-15 ENCOUNTER — OFFICE VISIT (OUTPATIENT)
Dept: MEDICAL GROUP | Facility: MEDICAL CENTER | Age: 74
End: 2019-01-15
Payer: MEDICARE

## 2019-01-15 VITALS
WEIGHT: 167 LBS | TEMPERATURE: 98.6 F | BODY MASS INDEX: 26.84 KG/M2 | RESPIRATION RATE: 16 BRPM | SYSTOLIC BLOOD PRESSURE: 100 MMHG | OXYGEN SATURATION: 92 % | HEART RATE: 63 BPM | DIASTOLIC BLOOD PRESSURE: 62 MMHG | HEIGHT: 66 IN

## 2019-01-15 DIAGNOSIS — I48.0 PAF (PAROXYSMAL ATRIAL FIBRILLATION) (HCC): ICD-10-CM

## 2019-01-15 DIAGNOSIS — I10 ESSENTIAL HYPERTENSION: ICD-10-CM

## 2019-01-15 DIAGNOSIS — F51.04 CHRONIC INSOMNIA: ICD-10-CM

## 2019-01-15 PROCEDURE — 99214 OFFICE O/P EST MOD 30 MIN: CPT | Performed by: FAMILY MEDICINE

## 2019-01-15 RX ORDER — MIRTAZAPINE 15 MG/1
15 TABLET, FILM COATED ORAL
Qty: 30 TAB | Refills: 3 | Status: SHIPPED
Start: 2019-01-15 | End: 2020-01-27

## 2019-01-15 RX ORDER — AMIODARONE HYDROCHLORIDE 200 MG/1
200 TABLET ORAL DAILY
Qty: 30 TAB | Refills: 0 | Status: SHIPPED | OUTPATIENT
Start: 2019-01-15 | End: 2019-02-16 | Stop reason: SDUPTHER

## 2019-01-16 NOTE — PROGRESS NOTES
CC: Depression/insomnia, A. fib, hypertension.    HPI:   Jeanie presents today to discuss the following medical issues:    Depression/chronic insomnia  As a part of her grief she has been having trouble falling or staying asleep.  She has been on trazodone, baclofen, and hydroxyzine, all those have not been helping.  However her depression and grief has improved a lot.  She has been seeing a grief counseling client has been helping.    PAF (paroxysmal atrial fibrillation) ([facility])  Patient has been asymptomatic.  Denies palpitation, chest pain, shortness of breath.  She has normal rate and rhythm.  Patient was referred to cardiology but she has not seen him yet, will see him later this months.  She needs a refill of her amiodarone.  Has been on carvedilol and aspirin as well.    Essential hypertension  Blood pressure has been adequately controlled.  Denies headache, chest pain, shortness of breath.  She has been on lisinopril 20 mg daily, carvedilol 3.125 mg twice a day.  No side effects.    Patient Active Problem List    Diagnosis Date Noted   • Mixed hyperlipidemia 03/11/2018   • Moderate single current episode of major depressive disorder ([facility]) 12/08/2017   • HTN (hypertension) 01/20/2015   • PAF (paroxysmal atrial fibrillation) ([facility]) 09/10/2014   • Drug abuse ([facility]) 05/17/2014   • Anxiety 05/17/2014       Current Outpatient Prescriptions   Medication Sig Dispense Refill   • mirtazapine (REMERON) 15 MG Tab Take 1 Tab by mouth every bedtime. 30 Tab 3   • amiodarone (CORDARONE) 200 MG Tab Take 1 Tab by mouth every day. 30 Tab 0   • celecoxib (CELEBREX) 200 MG Cap Take 1 Cap by mouth 2 times a day. 60 Cap 3   • carvedilol (COREG) 3.125 MG Tab Take 1 Tab by mouth 2 times a day, with meals. 60 Tab 3   • lisinopril (PRINIVIL) 20 MG Tab Take 1 Tab by mouth every day. 90 Tab 3   • hydrOXYzine pamoate (VISTARIL) 50 MG Cap Take 1 Cap by mouth 3 times a day as needed for Itching. 90 Cap 3   • paroxetine (PAXIL-CR) 37.5 MG CR  "tablet Take 1 Tab by mouth every day. 90 Tab 3   • baclofen (LIORESAL) 20 MG tablet Take 1 Tab by mouth 3 times a day. 90 Tab 3   • levothyroxine (SYNTHROID) 25 MCG Tab Take 1 Tab by mouth Every morning on an empty stomach. 90 Tab 1   • gabapentin (NEURONTIN) 300 MG Cap Take 300 mg by mouth 3 times a day.     • carvedilol (COREG) 12.5 MG Tab Take 1 Tab by mouth 2 times a day, with meals. GENERIC FOR COREG 90 Tab 3   • lorazepam (ATIVAN) 1 MG Tab Take 1 Tab by mouth every 8 hours as needed for Anxiety.     • aspirin EC 81 MG EC tablet Take 1 Tab by mouth every day. 30 Tab 2     No current facility-administered medications for this visit.          Allergies as of 01/15/2019   • (No Known Allergies)        ROS: Denies any chest pain, Shortness of breath, Changes bowel or bladder, Lower extremity edema.    Physical Exam:  /62 (BP Location: Right arm, Patient Position: Sitting, BP Cuff Size: Adult)   Pulse 63   Temp 37 °C (98.6 °F) (Temporal)   Resp 16   Ht 1.676 m (5' 6\")   Wt 75.8 kg (167 lb)   SpO2 92%   BMI 26.95 kg/m²   Gen.: Well-developed, well-nourished, no apparent distress,pleasant and cooperative with the examination  Skin:  Warm and dry with good turgor.  Cor: Regular rate and rhythm without murmur, gallop or rub.  Lungs: Respirations unlabored.Clear to auscultation with equal breath sounds bilaterally. No wheezes, rhonchi.  Extremities: No cyanosis, clubbing or edema.  Psych: Alert and oriented x 3.slightly flat affect, has normal judgement,insight and memory.          Assessment and Plan.   73 y.o. female     1. Chronic insomnia  Has been having trouble falling or staying asleep.  She has been on trazodone, baclofen, and hydroxyzine, all those have not been helping.  However her depression and grief has improved a lot.  Will replace trazodone with mirtazapine, advised to stop baclofen gradually.    - mirtazapine (REMERON) 15 MG Tab; Take 1 Tab by mouth every bedtime.  Dispense: 30 Tab; Refill: " 3    2. PAF (paroxysmal atrial fibrillation) (HCC)  Asymptomatic.  Has normal rate and rhythm.  Needs a refill of mild amiodarone.  Patient was referred to cardiology, has an appointment with him this months.    - amiodarone (CORDARONE) 200 MG Tab; Take 1 Tab by mouth every day.  Dispense: 30 Tab; Refill: 0    3. Essential hypertension  Adequately controlled.  Continue on lisinopril 20 mg daily, carvedilol 3.125 mg twice a day.    - CBC WITH DIFFERENTIAL; Future  - COMP METABOLIC PANEL; Future  - TSH; Future  - LIPID PANEL

## 2019-01-18 ENCOUNTER — OFFICE VISIT (OUTPATIENT)
Dept: CARDIOLOGY | Facility: MEDICAL CENTER | Age: 74
End: 2019-01-18
Payer: MEDICARE

## 2019-01-18 VITALS
HEIGHT: 66 IN | BODY MASS INDEX: 25.86 KG/M2 | WEIGHT: 160.9 LBS | SYSTOLIC BLOOD PRESSURE: 128 MMHG | OXYGEN SATURATION: 94 % | DIASTOLIC BLOOD PRESSURE: 90 MMHG | HEART RATE: 60 BPM

## 2019-01-18 DIAGNOSIS — Z79.899 HIGH RISK MEDICATION USE: ICD-10-CM

## 2019-01-18 DIAGNOSIS — I10 HTN (HYPERTENSION), MALIGNANT: ICD-10-CM

## 2019-01-18 DIAGNOSIS — F10.20 ETOHISM (HCC): ICD-10-CM

## 2019-01-18 DIAGNOSIS — I48.0 PAROXYSMAL ATRIAL FIBRILLATION (HCC): ICD-10-CM

## 2019-01-18 LAB — EKG IMPRESSION: NORMAL

## 2019-01-18 PROCEDURE — 99214 OFFICE O/P EST MOD 30 MIN: CPT | Performed by: INTERNAL MEDICINE

## 2019-01-18 PROCEDURE — 93000 ELECTROCARDIOGRAM COMPLETE: CPT | Performed by: INTERNAL MEDICINE

## 2019-01-18 RX ORDER — CARVEDILOL 6.25 MG/1
6.25 TABLET ORAL 2 TIMES DAILY WITH MEALS
Qty: 60 TAB | Refills: 11 | Status: SHIPPED
Start: 2019-01-18 | End: 2020-01-15

## 2019-01-18 ASSESSMENT — ENCOUNTER SYMPTOMS
COUGH: 0
BLURRED VISION: 0
HEADACHES: 0
SENSORY CHANGE: 0
FEVER: 0
PND: 0
LOSS OF CONSCIOUSNESS: 0
BRUISES/BLEEDS EASILY: 0
DOUBLE VISION: 0
FALLS: 0
WEIGHT LOSS: 0
CHILLS: 0
BLOOD IN STOOL: 0
ORTHOPNEA: 0
ABDOMINAL PAIN: 0
NAUSEA: 0
MYALGIAS: 0
VOMITING: 0
CLAUDICATION: 0
EYE PAIN: 0
SPEECH CHANGE: 0
DIZZINESS: 0
DEPRESSION: 0
PALPITATIONS: 0
HALLUCINATIONS: 0
EYE DISCHARGE: 0
SHORTNESS OF BREATH: 0

## 2019-01-18 NOTE — PROGRESS NOTES
Chief Complaint   Patient presents with   • Atrial Fibrillation       Subjective:   Jeanie Hutchison is a 73 y.o. female who presents today for cardiac care and management of PAF on Amiodarone. Used to be on Flecainide therapy (changed to Amiodarone due to possible side effects).    I have independently interpreted and reviewed patient's ECG with patient today, which showed normal sinus rhythm, normal NV, QT intervals. No evidence of acute coronary syndrome.    In the interim, patient has been doing well without having any symptoms. Patient denies having chest pain, dyspnea, palpitation, presyncope, syncope episodes. Able to climb up at least 2 flights of stairs.      Past Medical History:   Diagnosis Date   • A-fib ([facility]) 5/1/2014   • Chronic pain     r/t pelvic fracture   • Psychiatric disorder     history of depression and anxiety      Past Surgical History:   Procedure Laterality Date   • CLAVICLE ORIF  5/21/2014    Performed by Wilfred Gonzalez M.D. at SURGERY [facility]   • BREAST BIOPSY      bilateral neg breast bxs   • OTHER ORTHOPEDIC SURGERY      pelvis fracture. 10 years ago      History reviewed. No pertinent family history.  Social History     Social History   • Marital status:      Spouse name: N/A   • Number of children: N/A   • Years of education: N/A     Occupational History   • Not on file.     Social History Main Topics   • Smoking status: Never Smoker   • Smokeless tobacco: Never Used   • Alcohol use Yes      Comment: occasional    • Drug use: Yes     Types: Oral      Comment: takes friends tranqualizers    • Sexual activity: Not on file     Other Topics Concern   • Not on file     Social History Narrative   • No narrative on file     No Known Allergies  Outpatient Encounter Prescriptions as of 1/18/2019   Medication Sig Dispense Refill   • carvedilol (COREG) 6.25 MG Tab Take 1 Tab by mouth 2 times a day, with meals. 60 Tab 11   • mirtazapine (REMERON) 15 MG Tab Take 1 Tab by mouth  every bedtime. 30 Tab 3   • amiodarone (CORDARONE) 200 MG Tab Take 1 Tab by mouth every day. 30 Tab 0   • celecoxib (CELEBREX) 200 MG Cap Take 1 Cap by mouth 2 times a day. 60 Cap 3   • lisinopril (PRINIVIL) 20 MG Tab Take 1 Tab by mouth every day. 90 Tab 3   • paroxetine (PAXIL-CR) 37.5 MG CR tablet Take 1 Tab by mouth every day. 90 Tab 3   • baclofen (LIORESAL) 20 MG tablet Take 1 Tab by mouth 3 times a day. 90 Tab 3   • levothyroxine (SYNTHROID) 25 MCG Tab Take 1 Tab by mouth Every morning on an empty stomach. 90 Tab 1   • gabapentin (NEURONTIN) 300 MG Cap Take 300 mg by mouth 3 times a day.     • aspirin EC 81 MG EC tablet Take 1 Tab by mouth every day. 30 Tab 2   • hydrOXYzine pamoate (VISTARIL) 50 MG Cap Take 1 Cap by mouth 3 times a day as needed for Itching. (Patient not taking: Reported on 1/18/2019) 90 Cap 3   • [DISCONTINUED] carvedilol (COREG) 3.125 MG Tab Take 1 Tab by mouth 2 times a day, with meals. 60 Tab 3   • carvedilol (COREG) 12.5 MG Tab Take 1 Tab by mouth 2 times a day, with meals. GENERIC FOR COREG (Patient not taking: Reported on 1/18/2019) 90 Tab 3   • lorazepam (ATIVAN) 1 MG Tab Take 1 Tab by mouth every 8 hours as needed for Anxiety.       No facility-administered encounter medications on file as of 1/18/2019.      Review of Systems   Constitutional: Negative for chills, fever, malaise/fatigue and weight loss.   HENT: Negative for ear discharge, ear pain, hearing loss and nosebleeds.    Eyes: Negative for blurred vision, double vision, pain and discharge.   Respiratory: Negative for cough and shortness of breath.    Cardiovascular: Negative for chest pain, palpitations, orthopnea, claudication, leg swelling and PND.   Gastrointestinal: Negative for abdominal pain, blood in stool, melena, nausea and vomiting.   Genitourinary: Negative for dysuria and hematuria.   Musculoskeletal: Negative for falls, joint pain and myalgias.   Skin: Negative for itching and rash.   Neurological: Negative for  "dizziness, sensory change, speech change, loss of consciousness and headaches.   Endo/Heme/Allergies: Negative for environmental allergies. Does not bruise/bleed easily.   Psychiatric/Behavioral: Negative for depression, hallucinations and suicidal ideas.        Objective:   /90 (BP Location: Left arm, Patient Position: Sitting, BP Cuff Size: Adult)   Pulse 60   Ht 1.676 m (5' 6\")   Wt 73 kg (160 lb 14.4 oz)   SpO2 94%   BMI 25.97 kg/m²     Physical Exam   Constitutional: She is oriented to person, place, and time. No distress.   HENT:   Head: Normocephalic and atraumatic.   Right Ear: External ear normal.   Left Ear: External ear normal.   Eyes: Right eye exhibits no discharge. Left eye exhibits no discharge.   Neck: No JVD present. No thyromegaly present.   Cardiovascular: Normal rate, regular rhythm, normal heart sounds and intact distal pulses.  Exam reveals no gallop and no friction rub.    No murmur heard.  Pulmonary/Chest: Breath sounds normal. No respiratory distress.   Abdominal: Bowel sounds are normal. She exhibits no distension. There is no tenderness.   Musculoskeletal: She exhibits no edema or tenderness.   Neurological: She is alert and oriented to person, place, and time. No cranial nerve deficit.   Skin: Skin is warm and dry. She is not diaphoretic.   Psychiatric: She has a normal mood and affect. Her behavior is normal.   Nursing note and vitals reviewed.      Assessment:     1. Paroxysmal atrial fibrillation (HCC)  COMP METABOLIC PANEL    LIPID PANEL    TSH+FREE T4    ZZZ CHEST X-RAY 2 VW   2. HTN (hypertension), malignant  COMP METABOLIC PANEL    LIPID PANEL    TSH+FREE T4    ZZZ CHEST X-RAY 2 VW    carvedilol (COREG) 6.25 MG Tab   3. ETOHism (HCC)  COMP METABOLIC PANEL    LIPID PANEL    TSH+FREE T4    ZZZ CHEST X-RAY 2 VW   4. High risk medication use  COMP METABOLIC PANEL    LIPID PANEL    TSH+FREE T4    ZZZ CHEST X-RAY 2 VW       Medical Decision Making:  Today's Assessment / Status " / Plan:   Today, based on physical examination findings, patient is euvolemic. No JVD, lungs are clear to auscultation, no pitting edema in bilateral lower extremities, no ascites.    Dry weight is 160 lbs.    Continue Amiodarone 200 mg po daily. Will monitor for side effects closely.    For HTN control, will increase Coreg to 6.25 mg po bid, continue Lisinopril 20 mg po daily.    I will see patient back in clinic with lab tests and studies results in 6 months.    I thank you Dr. Armstrong for referring patient to our Cardiology Clinic today.

## 2019-01-18 NOTE — LETTER
[facility]   [address]  [place] [address]  Phone: [phone]  Fax: [phone]              Jeanie Hutchison  1945    Encounter Date: 1/18/2019    Ro Orozco M.D.          PROGRESS NOTE:  Chief Complaint   Patient presents with   • Atrial Fibrillation       Subjective:   Jeanie Hutchison is a 73 y.o. female who presents today for cardiac care and management of PAF on Amiodarone. Used to be on Flecainide therapy (changed to Amiodarone due to possible side effects).    I have independently interpreted and reviewed patient's ECG with patient today, which showed normal sinus rhythm, normal WA, QT intervals. No evidence of acute coronary syndrome.    In the interim, patient has been doing well without having any symptoms. Patient denies having chest pain, dyspnea, palpitation, presyncope, syncope episodes. Able to climb up at least 2 flights of stairs.      Past Medical History:   Diagnosis Date   • A-fib ([facility]) 5/1/2014   • Chronic pain     r/t pelvic fracture   • Psychiatric disorder     history of depression and anxiety      Past Surgical History:   Procedure Laterality Date   • CLAVICLE ORIF  5/21/2014    Performed by Wilfred Gonzalez M.D. at SURGERY [facility]   • BREAST BIOPSY      bilateral neg breast bxs   • OTHER ORTHOPEDIC SURGERY      pelvis fracture. 10 years ago      History reviewed. No pertinent family history.  Social History     Social History   • Marital status:      Spouse name: N/A   • Number of children: N/A   • Years of education: N/A     Occupational History   • Not on file.     Social History Main Topics   • Smoking status: Never Smoker   • Smokeless tobacco: Never Used   • Alcohol use Yes      Comment: occasional    • Drug use: Yes     Types: Oral      Comment: takes friends tranqualizers    • Sexual activity: Not on file     Other Topics Concern   • Not on file     Social History Narrative   • No narrative on  file     No Known Allergies  Outpatient Encounter Prescriptions as of 1/18/2019   Medication Sig Dispense Refill   • carvedilol (COREG) 6.25 MG Tab Take 1 Tab by mouth 2 times a day, with meals. 60 Tab 11   • mirtazapine (REMERON) 15 MG Tab Take 1 Tab by mouth every bedtime. 30 Tab 3   • amiodarone (CORDARONE) 200 MG Tab Take 1 Tab by mouth every day. 30 Tab 0   • celecoxib (CELEBREX) 200 MG Cap Take 1 Cap by mouth 2 times a day. 60 Cap 3   • lisinopril (PRINIVIL) 20 MG Tab Take 1 Tab by mouth every day. 90 Tab 3   • paroxetine (PAXIL-CR) 37.5 MG CR tablet Take 1 Tab by mouth every day. 90 Tab 3   • baclofen (LIORESAL) 20 MG tablet Take 1 Tab by mouth 3 times a day. 90 Tab 3   • levothyroxine (SYNTHROID) 25 MCG Tab Take 1 Tab by mouth Every morning on an empty stomach. 90 Tab 1   • gabapentin (NEURONTIN) 300 MG Cap Take 300 mg by mouth 3 times a day.     • aspirin EC 81 MG EC tablet Take 1 Tab by mouth every day. 30 Tab 2   • hydrOXYzine pamoate (VISTARIL) 50 MG Cap Take 1 Cap by mouth 3 times a day as needed for Itching. (Patient not taking: Reported on 1/18/2019) 90 Cap 3   • [DISCONTINUED] carvedilol (COREG) 3.125 MG Tab Take 1 Tab by mouth 2 times a day, with meals. 60 Tab 3   • carvedilol (COREG) 12.5 MG Tab Take 1 Tab by mouth 2 times a day, with meals. GENERIC FOR COREG (Patient not taking: Reported on 1/18/2019) 90 Tab 3   • lorazepam (ATIVAN) 1 MG Tab Take 1 Tab by mouth every 8 hours as needed for Anxiety.       No facility-administered encounter medications on file as of 1/18/2019.      Review of Systems   Constitutional: Negative for chills, fever, malaise/fatigue and weight loss.   HENT: Negative for ear discharge, ear pain, hearing loss and nosebleeds.    Eyes: Negative for blurred vision, double vision, pain and discharge.   Respiratory: Negative for cough and shortness of breath.    Cardiovascular: Negative for chest pain, palpitations, orthopnea, claudication, leg swelling and PND.    "  Gastrointestinal: Negative for abdominal pain, blood in stool, melena, nausea and vomiting.   Genitourinary: Negative for dysuria and hematuria.   Musculoskeletal: Negative for falls, joint pain and myalgias.   Skin: Negative for itching and rash.   Neurological: Negative for dizziness, sensory change, speech change, loss of consciousness and headaches.   Endo/Heme/Allergies: Negative for environmental allergies. Does not bruise/bleed easily.   Psychiatric/Behavioral: Negative for depression, hallucinations and suicidal ideas.        Objective:   /90 (BP Location: Left arm, Patient Position: Sitting, BP Cuff Size: Adult)   Pulse 60   Ht 1.676 m (5' 6\")   Wt 73 kg (160 lb 14.4 oz)   SpO2 94%   BMI 25.97 kg/m²      Physical Exam   Constitutional: She is oriented to person, place, and time. No distress.   HENT:   Head: Normocephalic and atraumatic.   Right Ear: External ear normal.   Left Ear: External ear normal.   Eyes: Right eye exhibits no discharge. Left eye exhibits no discharge.   Neck: No JVD present. No thyromegaly present.   Cardiovascular: Normal rate, regular rhythm, normal heart sounds and intact distal pulses.  Exam reveals no gallop and no friction rub.    No murmur heard.  Pulmonary/Chest: Breath sounds normal. No respiratory distress.   Abdominal: Bowel sounds are normal. She exhibits no distension. There is no tenderness.   Musculoskeletal: She exhibits no edema or tenderness.   Neurological: She is alert and oriented to person, place, and time. No cranial nerve deficit.   Skin: Skin is warm and dry. She is not diaphoretic.   Psychiatric: She has a normal mood and affect. Her behavior is normal.   Nursing note and vitals reviewed.      Assessment:     1. Paroxysmal atrial fibrillation (HCC)  COMP METABOLIC PANEL    LIPID PANEL    TSH+FREE T4    ZZZ CHEST X-RAY 2 VW   2. HTN (hypertension), malignant  COMP METABOLIC PANEL    LIPID PANEL    TSH+FREE T4    ZZZ CHEST X-RAY 2 VW    carvedilol " (COREG) 6.25 MG Tab   3. ETOHism (HCC)  COMP METABOLIC PANEL    LIPID PANEL    TSH+FREE T4    ZZZ CHEST X-RAY 2 VW   4. High risk medication use  COMP METABOLIC PANEL    LIPID PANEL    TSH+FREE T4    ZZZ CHEST X-RAY 2 VW       Medical Decision Making:  Today's Assessment / Status / Plan:   Today, based on physical examination findings, patient is euvolemic. No JVD, lungs are clear to auscultation, no pitting edema in bilateral lower extremities, no ascites.    Dry weight is 160 lbs.    Continue Amiodarone 200 mg po daily. Will monitor for side effects closely.    For HTN control, will increase Coreg to 6.25 mg po bid, continue Lisinopril 20 mg po daily.    I will see patient back in clinic with lab tests and studies results in 6 months.    I thank you Dr. Armstrong for referring patient to our Cardiology Clinic today.            Jane Armstrong M.D.  [address] [place] [id number]  VIA In Basket

## 2019-01-28 ENCOUNTER — TELEPHONE (OUTPATIENT)
Dept: MEDICAL GROUP | Facility: MEDICAL CENTER | Age: 74
End: 2019-01-28

## 2019-01-28 NOTE — TELEPHONE ENCOUNTER
If she tried trazodone and mirtazapine and both did not help, then she need to work on her sleep hygiene

## 2019-01-28 NOTE — TELEPHONE ENCOUNTER
1. Caller Name: Jeanie                      Call Back Number: [phone] (home)       2. Message: patient started taking the mirtazapine and it has not helped. Patient still cannot fall asleep and is requesting to try another script.     3. Patient approves office to leave a detailed voicemail/MyChart message: N\A

## 2019-02-16 DIAGNOSIS — I48.0 PAF (PAROXYSMAL ATRIAL FIBRILLATION) (HCC): ICD-10-CM

## 2019-02-19 RX ORDER — AMIODARONE HYDROCHLORIDE 200 MG/1
TABLET ORAL
Qty: 30 TAB | Refills: 0 | Status: SHIPPED | OUTPATIENT
Start: 2019-02-19 | End: 2019-03-26

## 2019-02-28 ENCOUNTER — HOSPITAL ENCOUNTER (INPATIENT)
Dept: HOSPITAL 8 - ED | Age: 74
LOS: 3 days | Discharge: HOME | DRG: 682 | End: 2019-03-03
Attending: HOSPITALIST | Admitting: HOSPITALIST
Payer: MEDICARE

## 2019-02-28 VITALS — SYSTOLIC BLOOD PRESSURE: 140 MMHG | DIASTOLIC BLOOD PRESSURE: 81 MMHG

## 2019-02-28 VITALS — SYSTOLIC BLOOD PRESSURE: 103 MMHG | DIASTOLIC BLOOD PRESSURE: 65 MMHG

## 2019-02-28 VITALS — DIASTOLIC BLOOD PRESSURE: 62 MMHG | SYSTOLIC BLOOD PRESSURE: 119 MMHG

## 2019-02-28 VITALS — BODY MASS INDEX: 27.07 KG/M2 | WEIGHT: 168.43 LBS | HEIGHT: 66 IN

## 2019-02-28 DIAGNOSIS — R17: ICD-10-CM

## 2019-02-28 DIAGNOSIS — I10: ICD-10-CM

## 2019-02-28 DIAGNOSIS — F17.210: ICD-10-CM

## 2019-02-28 DIAGNOSIS — R79.1: ICD-10-CM

## 2019-02-28 DIAGNOSIS — R25.1: ICD-10-CM

## 2019-02-28 DIAGNOSIS — N17.0: Primary | ICD-10-CM

## 2019-02-28 DIAGNOSIS — E86.0: ICD-10-CM

## 2019-02-28 DIAGNOSIS — B97.4: ICD-10-CM

## 2019-02-28 DIAGNOSIS — J18.9: ICD-10-CM

## 2019-02-28 DIAGNOSIS — I48.91: ICD-10-CM

## 2019-02-28 DIAGNOSIS — Z82.49: ICD-10-CM

## 2019-02-28 DIAGNOSIS — F32.9: ICD-10-CM

## 2019-02-28 DIAGNOSIS — Z90.49: ICD-10-CM

## 2019-02-28 DIAGNOSIS — Z80.1: ICD-10-CM

## 2019-02-28 LAB
ALBUMIN SERPL-MCNC: 3.6 G/DL (ref 3.4–5)
ALP SERPL-CCNC: 61 U/L (ref 45–117)
ALT SERPL-CCNC: 29 U/L (ref 12–78)
ANION GAP SERPL CALC-SCNC: 8 MMOL/L (ref 5–15)
BASOPHILS # BLD AUTO: 0.02 X10^3/UL (ref 0–0.1)
BASOPHILS NFR BLD AUTO: 0 % (ref 0–1)
BILIRUB SERPL-MCNC: 1.8 MG/DL (ref 0.2–1)
CALCIUM SERPL-MCNC: 8.8 MG/DL (ref 8.5–10.1)
CHLORIDE SERPL-SCNC: 106 MMOL/L (ref 98–107)
CREAT SERPL-MCNC: 1.21 MG/DL (ref 0.55–1.02)
CULTURE INDICATED?: NO
EOSINOPHIL # BLD AUTO: 0.06 X10^3/UL (ref 0–0.4)
EOSINOPHIL NFR BLD AUTO: 1 % (ref 1–7)
ERYTHROCYTE [DISTWIDTH] IN BLOOD BY AUTOMATED COUNT: 15.4 % (ref 9.6–15.2)
LYMPHOCYTES # BLD AUTO: 1.53 X10^3/UL (ref 1–3.4)
LYMPHOCYTES NFR BLD AUTO: 22 % (ref 22–44)
MCH RBC QN AUTO: 32.3 PG (ref 27–34.8)
MCHC RBC AUTO-ENTMCNC: 33.3 G/DL (ref 32.4–35.8)
MCV RBC AUTO: 97.2 FL (ref 80–100)
MD: NO
MICROSCOPIC: (no result)
MONOCYTES # BLD AUTO: 0.62 X10^3/UL (ref 0.2–0.8)
MONOCYTES NFR BLD AUTO: 9 % (ref 2–9)
NEUTROPHILS # BLD AUTO: 4.66 X10^3/UL (ref 1.8–6.8)
NEUTROPHILS NFR BLD AUTO: 68 % (ref 42–75)
PLATELET # BLD AUTO: 244 X10^3/UL (ref 130–400)
PMV BLD AUTO: 7.3 FL (ref 7.4–10.4)
PROT SERPL-MCNC: 6.8 G/DL (ref 6.4–8.2)
RBC # BLD AUTO: 4.5 X10^6/UL (ref 3.82–5.3)
TROPONIN I SERPL-MCNC: < 0.015 NG/ML (ref 0–0.04)

## 2019-02-28 PROCEDURE — 85379 FIBRIN DEGRADATION QUANT: CPT

## 2019-02-28 PROCEDURE — 87400 INFLUENZA A/B EACH AG IA: CPT

## 2019-02-28 PROCEDURE — 84484 ASSAY OF TROPONIN QUANT: CPT

## 2019-02-28 PROCEDURE — 83735 ASSAY OF MAGNESIUM: CPT

## 2019-02-28 PROCEDURE — 87040 BLOOD CULTURE FOR BACTERIA: CPT

## 2019-02-28 PROCEDURE — 71275 CT ANGIOGRAPHY CHEST: CPT

## 2019-02-28 PROCEDURE — 80053 COMPREHEN METABOLIC PANEL: CPT

## 2019-02-28 PROCEDURE — 84100 ASSAY OF PHOSPHORUS: CPT

## 2019-02-28 PROCEDURE — 93005 ELECTROCARDIOGRAM TRACING: CPT

## 2019-02-28 PROCEDURE — 83880 ASSAY OF NATRIURETIC PEPTIDE: CPT

## 2019-02-28 PROCEDURE — 86756 RESPIRATORY VIRUS ANTIBODY: CPT

## 2019-02-28 PROCEDURE — 36415 COLL VENOUS BLD VENIPUNCTURE: CPT

## 2019-02-28 PROCEDURE — 99285 EMERGENCY DEPT VISIT HI MDM: CPT

## 2019-02-28 PROCEDURE — 84145 PROCALCITONIN (PCT): CPT

## 2019-02-28 PROCEDURE — 71046 X-RAY EXAM CHEST 2 VIEWS: CPT

## 2019-02-28 PROCEDURE — 85025 COMPLETE CBC W/AUTO DIFF WBC: CPT

## 2019-02-28 PROCEDURE — 81003 URINALYSIS AUTO W/O SCOPE: CPT

## 2019-02-28 RX ADMIN — CEFTRIAXONE SCH MLS/HR: 1 INJECTION, SOLUTION INTRAVENOUS at 20:30

## 2019-02-28 RX ADMIN — GUAIFENESIN AND DEXTROMETHORPHAN SCH ML: 100; 10 SYRUP ORAL at 21:19

## 2019-02-28 RX ADMIN — HEPARIN SODIUM SCH UNITS: 5000 INJECTION, SOLUTION INTRAVENOUS; SUBCUTANEOUS at 20:32

## 2019-02-28 RX ADMIN — SODIUM CHLORIDE AND POTASSIUM CHLORIDE SCH MLS/HR: .9; .15 SOLUTION INTRAVENOUS at 20:30

## 2019-02-28 RX ADMIN — DOXYCYCLINE HYCLATE SCH MG: 100 TABLET, FILM COATED ORAL at 20:32

## 2019-02-28 RX ADMIN — SODIUM CHLORIDE SCH MLS/HR: 0.9 INJECTION, SOLUTION INTRAVENOUS at 17:33

## 2019-02-28 RX ADMIN — SODIUM CHLORIDE SCH MLS/HR: 0.9 INJECTION, SOLUTION INTRAVENOUS at 20:30

## 2019-02-28 NOTE — NUR
REPORT CALLED TO RECEIVING RN ED. PT A&O, RESPS EVEN AND UNLABORED, SINUS 
LAURA ON CARDIAC MONITOR WITH NO ECTOPY. PT AWAITING TRANSPORT TO ROOM 420 AT 
THIS TIME.

## 2019-02-28 NOTE — NUR
PT UNABLE TO RECALL HOME MEDS, PT STATES PREFERRED PHARMACY IS [facility]. HOME MEDS VERIFIED BY CALLING PTS HOME PHARMACY, MED REC COMPLETE.

## 2019-02-28 NOTE — NUR
NARCISA Olivera at bedside to admit pt. IVF initiated. Pt provided with juice and 
water with EDMDs ok.

## 2019-02-28 NOTE — NUR
pt presents to ED with c/o cough and generalized weakness x 3 weeks. pt a&o, 
resps even and unlabored, neuro intact. pt has 4/5 strength to all extremities, 
gait steady. EKG taken and reviewed by EDMD and EDPA. all results back, chart 
up for recheck. awaiting further orders at this time.

## 2019-02-28 NOTE — NUR
labs ordered and pending at this time. pt to be admitted, pt updated with 
results and POC by EDPA. EDPA Long at bedside at this time. all monitors in 
place, pt is sinus kim rate 50's on cardiac monitor with no ectopy. call 
light in reach. 

-------------------------------------------------------------------------------

Addendum: 02/28/19 at 1551 by AUBRIE

-------------------------------------------------------------------------------

labs ordered and pending at this time. pt to be admitted, pt updated with 
results and POC by EDPA. EDPA Long at bedside at this time. all monitors in 
place, pt is sinus kim rate 50's on cardiac monitor with no ectopy. pt denies 
any chest pain/pressure. call light in reach.

## 2019-02-28 NOTE — NUR
PT BACK FROM CT. PT A&O, RESPS EVEN AND UNLABORED. PT DENIES PAIN. SINUS LAURA 
RATE 50'S ON CARDIAC MONITOR. CALL LIGHT IN REACH. PT AWAITING ADMIT ORDERS AND 
ROOM ASSIGNMENT. CONTACT AND DROPLET ISOLATION PRECAUTIONS IN PLACE D/T +RSV. 
PT AND FAMILY EDUCATED REGARDING ISOLATION PRECAUTIONS.

## 2019-02-28 NOTE — NUR
pt to CT, pt a&o, resps even and unlabored, nadn at this time. son at bedside, 
son updated with POC with pts permission.

## 2019-03-01 VITALS — DIASTOLIC BLOOD PRESSURE: 97 MMHG | SYSTOLIC BLOOD PRESSURE: 140 MMHG

## 2019-03-01 VITALS — DIASTOLIC BLOOD PRESSURE: 71 MMHG | SYSTOLIC BLOOD PRESSURE: 126 MMHG

## 2019-03-01 VITALS — DIASTOLIC BLOOD PRESSURE: 89 MMHG | SYSTOLIC BLOOD PRESSURE: 149 MMHG

## 2019-03-01 LAB
ALBUMIN SERPL-MCNC: 3.2 G/DL (ref 3.4–5)
ALP SERPL-CCNC: 59 U/L (ref 45–117)
ALT SERPL-CCNC: 24 U/L (ref 12–78)
ANION GAP SERPL CALC-SCNC: 3 MMOL/L (ref 5–15)
BASOPHILS # BLD AUTO: 0.02 X10^3/UL (ref 0–0.1)
BASOPHILS NFR BLD AUTO: 0 % (ref 0–1)
BILIRUB SERPL-MCNC: 1.2 MG/DL (ref 0.2–1)
CALCIUM SERPL-MCNC: 7.9 MG/DL (ref 8.5–10.1)
CHLORIDE SERPL-SCNC: 108 MMOL/L (ref 98–107)
CREAT SERPL-MCNC: 0.93 MG/DL (ref 0.55–1.02)
EOSINOPHIL # BLD AUTO: 0.1 X10^3/UL (ref 0–0.4)
EOSINOPHIL NFR BLD AUTO: 2 % (ref 1–7)
ERYTHROCYTE [DISTWIDTH] IN BLOOD BY AUTOMATED COUNT: 15.6 % (ref 9.6–15.2)
LYMPHOCYTES # BLD AUTO: 2.1 X10^3/UL (ref 1–3.4)
LYMPHOCYTES NFR BLD AUTO: 33 % (ref 22–44)
MCH RBC QN AUTO: 33.4 PG (ref 27–34.8)
MCHC RBC AUTO-ENTMCNC: 34.2 G/DL (ref 32.4–35.8)
MCV RBC AUTO: 97.5 FL (ref 80–100)
MD: NO
MONOCYTES # BLD AUTO: 0.8 X10^3/UL (ref 0.2–0.8)
MONOCYTES NFR BLD AUTO: 13 % (ref 2–9)
NEUTROPHILS # BLD AUTO: 3.42 X10^3/UL (ref 1.8–6.8)
NEUTROPHILS NFR BLD AUTO: 53 % (ref 42–75)
PLATELET # BLD AUTO: 191 X10^3/UL (ref 130–400)
PMV BLD AUTO: 7.7 FL (ref 7.4–10.4)
PROT SERPL-MCNC: 6.2 G/DL (ref 6.4–8.2)
RBC # BLD AUTO: 4.02 X10^6/UL (ref 3.82–5.3)

## 2019-03-01 RX ADMIN — CARVEDILOL SCH MG: 6.25 TABLET, FILM COATED ORAL at 20:43

## 2019-03-01 RX ADMIN — SODIUM CHLORIDE AND POTASSIUM CHLORIDE SCH MLS/HR: .9; .15 SOLUTION INTRAVENOUS at 15:15

## 2019-03-01 RX ADMIN — VANCOMYCIN HYDROCHLORIDE SCH MLS/HR: 1 INJECTION, POWDER, LYOPHILIZED, FOR SOLUTION INTRAVENOUS at 17:30

## 2019-03-01 RX ADMIN — HEPARIN SODIUM SCH UNITS: 5000 INJECTION, SOLUTION INTRAVENOUS; SUBCUTANEOUS at 09:15

## 2019-03-01 RX ADMIN — BENZONATATE SCH MG: 100 CAPSULE ORAL at 11:47

## 2019-03-01 RX ADMIN — BACLOFEN SCH MG: 10 TABLET ORAL at 15:16

## 2019-03-01 RX ADMIN — GUAIFENESIN AND DEXTROMETHORPHAN SCH ML: 100; 10 SYRUP ORAL at 00:00

## 2019-03-01 RX ADMIN — HEPARIN SODIUM SCH UNITS: 5000 INJECTION, SOLUTION INTRAVENOUS; SUBCUTANEOUS at 17:54

## 2019-03-01 RX ADMIN — BENZONATATE SCH MG: 100 CAPSULE ORAL at 20:44

## 2019-03-01 RX ADMIN — SODIUM CHLORIDE AND POTASSIUM CHLORIDE SCH MLS/HR: .9; .15 SOLUTION INTRAVENOUS at 06:08

## 2019-03-01 RX ADMIN — MIRTAZAPINE SCH MG: 15 TABLET, ORALLY DISINTEGRATING ORAL at 20:45

## 2019-03-01 RX ADMIN — GUAIFENESIN AND DEXTROMETHORPHAN SCH ML: 100; 10 SYRUP ORAL at 05:51

## 2019-03-01 RX ADMIN — GUAIFENESIN AND DEXTROMETHORPHAN SCH ML: 100; 10 SYRUP ORAL at 09:14

## 2019-03-01 RX ADMIN — TRAZODONE HYDROCHLORIDE SCH MG: 50 TABLET ORAL at 20:45

## 2019-03-01 RX ADMIN — HEPARIN SODIUM SCH UNITS: 5000 INJECTION, SOLUTION INTRAVENOUS; SUBCUTANEOUS at 02:40

## 2019-03-01 RX ADMIN — DOXYCYCLINE HYCLATE SCH MG: 100 TABLET, FILM COATED ORAL at 20:42

## 2019-03-01 RX ADMIN — ZOLPIDEM TARTRATE PRN MG: 5 TABLET, COATED ORAL at 21:30

## 2019-03-01 RX ADMIN — CEFTRIAXONE SCH MLS/HR: 1 INJECTION, SOLUTION INTRAVENOUS at 20:42

## 2019-03-01 RX ADMIN — SODIUM CHLORIDE AND POTASSIUM CHLORIDE SCH MLS/HR: .9; .15 SOLUTION INTRAVENOUS at 20:42

## 2019-03-01 RX ADMIN — BACLOFEN SCH MG: 10 TABLET ORAL at 20:43

## 2019-03-01 RX ADMIN — BENZONATATE SCH MG: 100 CAPSULE ORAL at 15:16

## 2019-03-01 RX ADMIN — GUAIFENESIN SCH MG: 600 TABLET, EXTENDED RELEASE ORAL at 11:47

## 2019-03-01 RX ADMIN — GUAIFENESIN SCH MG: 600 TABLET, EXTENDED RELEASE ORAL at 20:44

## 2019-03-01 RX ADMIN — DOXYCYCLINE HYCLATE SCH MG: 100 TABLET, FILM COATED ORAL at 09:14

## 2019-03-02 VITALS — DIASTOLIC BLOOD PRESSURE: 96 MMHG | SYSTOLIC BLOOD PRESSURE: 151 MMHG

## 2019-03-02 VITALS — SYSTOLIC BLOOD PRESSURE: 140 MMHG | DIASTOLIC BLOOD PRESSURE: 99 MMHG

## 2019-03-02 VITALS — DIASTOLIC BLOOD PRESSURE: 99 MMHG | SYSTOLIC BLOOD PRESSURE: 156 MMHG

## 2019-03-02 VITALS — DIASTOLIC BLOOD PRESSURE: 75 MMHG | SYSTOLIC BLOOD PRESSURE: 143 MMHG

## 2019-03-02 RX ADMIN — BUDESONIDE AND FORMOTEROL FUMARATE DIHYDRATE SCH MG: 160; 4.5 AEROSOL RESPIRATORY (INHALATION) at 09:52

## 2019-03-02 RX ADMIN — BACLOFEN SCH MG: 10 TABLET ORAL at 21:12

## 2019-03-02 RX ADMIN — CEFTRIAXONE SCH MLS/HR: 1 INJECTION, SOLUTION INTRAVENOUS at 21:12

## 2019-03-02 RX ADMIN — DOXYCYCLINE HYCLATE SCH MG: 100 TABLET, FILM COATED ORAL at 21:12

## 2019-03-02 RX ADMIN — LISINOPRIL SCH MG: 20 TABLET ORAL at 09:52

## 2019-03-02 RX ADMIN — GUAIFENESIN SCH MG: 600 TABLET, EXTENDED RELEASE ORAL at 21:12

## 2019-03-02 RX ADMIN — MIRTAZAPINE SCH MG: 15 TABLET, ORALLY DISINTEGRATING ORAL at 21:12

## 2019-03-02 RX ADMIN — SODIUM CHLORIDE AND POTASSIUM CHLORIDE SCH MLS/HR: .9; .15 SOLUTION INTRAVENOUS at 05:49

## 2019-03-02 RX ADMIN — BACLOFEN SCH MG: 10 TABLET ORAL at 16:31

## 2019-03-02 RX ADMIN — HEPARIN SODIUM SCH UNITS: 5000 INJECTION, SOLUTION INTRAVENOUS; SUBCUTANEOUS at 17:06

## 2019-03-02 RX ADMIN — DOXYCYCLINE HYCLATE SCH MG: 100 TABLET, FILM COATED ORAL at 09:52

## 2019-03-02 RX ADMIN — BACLOFEN SCH MG: 10 TABLET ORAL at 09:52

## 2019-03-02 RX ADMIN — ASPIRIN 81 MG SCH MG: 81 TABLET ORAL at 09:52

## 2019-03-02 RX ADMIN — SODIUM CHLORIDE AND POTASSIUM CHLORIDE SCH MLS/HR: .9; .15 SOLUTION INTRAVENOUS at 17:00

## 2019-03-02 RX ADMIN — BENZONATATE SCH MG: 100 CAPSULE ORAL at 16:31

## 2019-03-02 RX ADMIN — TRAZODONE HYDROCHLORIDE SCH MG: 50 TABLET ORAL at 21:12

## 2019-03-02 RX ADMIN — CARVEDILOL SCH MG: 6.25 TABLET, FILM COATED ORAL at 09:52

## 2019-03-02 RX ADMIN — BENZONATATE SCH MG: 100 CAPSULE ORAL at 21:12

## 2019-03-02 RX ADMIN — HEPARIN SODIUM SCH UNITS: 5000 INJECTION, SOLUTION INTRAVENOUS; SUBCUTANEOUS at 02:41

## 2019-03-02 RX ADMIN — GUAIFENESIN SCH MG: 600 TABLET, EXTENDED RELEASE ORAL at 09:52

## 2019-03-02 RX ADMIN — CARVEDILOL SCH MG: 6.25 TABLET, FILM COATED ORAL at 21:13

## 2019-03-02 RX ADMIN — VANCOMYCIN HYDROCHLORIDE SCH MLS/HR: 1 INJECTION, POWDER, LYOPHILIZED, FOR SOLUTION INTRAVENOUS at 17:00

## 2019-03-02 RX ADMIN — ZOLPIDEM TARTRATE PRN MG: 5 TABLET, COATED ORAL at 21:12

## 2019-03-02 RX ADMIN — BENZONATATE SCH MG: 100 CAPSULE ORAL at 09:51

## 2019-03-02 RX ADMIN — HEPARIN SODIUM SCH UNITS: 5000 INJECTION, SOLUTION INTRAVENOUS; SUBCUTANEOUS at 09:58

## 2019-03-02 RX ADMIN — AMIODARONE HYDROCHLORIDE SCH MG: 200 TABLET ORAL at 09:52

## 2019-03-03 VITALS — SYSTOLIC BLOOD PRESSURE: 128 MMHG | DIASTOLIC BLOOD PRESSURE: 92 MMHG

## 2019-03-03 VITALS — DIASTOLIC BLOOD PRESSURE: 111 MMHG | SYSTOLIC BLOOD PRESSURE: 153 MMHG

## 2019-03-03 VITALS — SYSTOLIC BLOOD PRESSURE: 154 MMHG | DIASTOLIC BLOOD PRESSURE: 101 MMHG

## 2019-03-03 VITALS — DIASTOLIC BLOOD PRESSURE: 96 MMHG | SYSTOLIC BLOOD PRESSURE: 150 MMHG

## 2019-03-03 RX ADMIN — BENZONATATE SCH MG: 100 CAPSULE ORAL at 08:18

## 2019-03-03 RX ADMIN — HEPARIN SODIUM SCH UNITS: 5000 INJECTION, SOLUTION INTRAVENOUS; SUBCUTANEOUS at 10:15

## 2019-03-03 RX ADMIN — BACLOFEN SCH MG: 10 TABLET ORAL at 08:18

## 2019-03-03 RX ADMIN — AMIODARONE HYDROCHLORIDE SCH MG: 200 TABLET ORAL at 08:19

## 2019-03-03 RX ADMIN — CARVEDILOL SCH MG: 6.25 TABLET, FILM COATED ORAL at 08:18

## 2019-03-03 RX ADMIN — ASPIRIN 81 MG SCH MG: 81 TABLET ORAL at 08:19

## 2019-03-03 RX ADMIN — BACLOFEN SCH MG: 10 TABLET ORAL at 16:27

## 2019-03-03 RX ADMIN — LISINOPRIL SCH MG: 20 TABLET ORAL at 08:19

## 2019-03-03 RX ADMIN — BENZONATATE SCH MG: 100 CAPSULE ORAL at 16:27

## 2019-03-03 RX ADMIN — DOXYCYCLINE HYCLATE SCH MG: 100 TABLET, FILM COATED ORAL at 08:19

## 2019-03-03 RX ADMIN — BUDESONIDE AND FORMOTEROL FUMARATE DIHYDRATE SCH MG: 160; 4.5 AEROSOL RESPIRATORY (INHALATION) at 08:22

## 2019-03-03 RX ADMIN — SODIUM CHLORIDE AND POTASSIUM CHLORIDE SCH MLS/HR: .9; .15 SOLUTION INTRAVENOUS at 08:22

## 2019-03-03 RX ADMIN — SODIUM CHLORIDE AND POTASSIUM CHLORIDE SCH MLS/HR: .9; .15 SOLUTION INTRAVENOUS at 01:37

## 2019-03-03 RX ADMIN — HEPARIN SODIUM SCH UNITS: 5000 INJECTION, SOLUTION INTRAVENOUS; SUBCUTANEOUS at 00:12

## 2019-03-03 RX ADMIN — GUAIFENESIN SCH MG: 600 TABLET, EXTENDED RELEASE ORAL at 08:18

## 2019-03-25 ENCOUNTER — TELEPHONE (OUTPATIENT)
Dept: CARDIOLOGY | Facility: MEDICAL CENTER | Age: 74
End: 2019-03-25

## 2019-03-25 NOTE — TELEPHONE ENCOUNTER
side effects from Amiodarone   Received: Today   Message Contents   Kindra Mckeon R.N.             TT/Guillermina       Patient is having side effects for the Amiodarone. She is having balance issues, lack of appetite, and losing hair. She can be reached at [phone].      Returned patient call. Pt states this has been x 2-3 weeks. Pt states at first she thought symptoms were related to being ill but then they continued and she looked up side effects of medications and she thinks it is her amiodarone. Pt states dizziness, off balance, uncoordinated, hair loss. ER advised. Pt adamantly refuses after having gone recently for pneumonia. Discussed that it may be medication related or otherwise and an assessment is a sound idea. Pt agreeable to being seen tomorrow but refuses ER. Pt urged to seek urgent help if symptoms worsen. Pt states understanding.

## 2019-03-26 ENCOUNTER — OFFICE VISIT (OUTPATIENT)
Dept: CARDIOLOGY | Facility: MEDICAL CENTER | Age: 74
End: 2019-03-26
Payer: MEDICARE

## 2019-03-26 VITALS
BODY MASS INDEX: 24.6 KG/M2 | OXYGEN SATURATION: 96 % | HEIGHT: 66 IN | HEART RATE: 66 BPM | WEIGHT: 153.06 LBS | SYSTOLIC BLOOD PRESSURE: 138 MMHG | DIASTOLIC BLOOD PRESSURE: 80 MMHG

## 2019-03-26 DIAGNOSIS — Z79.899 HIGH RISK MEDICATION USE: ICD-10-CM

## 2019-03-26 DIAGNOSIS — F10.20 ETOHISM (HCC): ICD-10-CM

## 2019-03-26 DIAGNOSIS — I48.0 PAROXYSMAL ATRIAL FIBRILLATION (HCC): Primary | ICD-10-CM

## 2019-03-26 DIAGNOSIS — I10 HTN (HYPERTENSION), MALIGNANT: ICD-10-CM

## 2019-03-26 LAB — EKG IMPRESSION: NORMAL

## 2019-03-26 PROCEDURE — 99215 OFFICE O/P EST HI 40 MIN: CPT | Performed by: INTERNAL MEDICINE

## 2019-03-26 PROCEDURE — 93000 ELECTROCARDIOGRAM COMPLETE: CPT | Performed by: INTERNAL MEDICINE

## 2019-03-26 ASSESSMENT — ENCOUNTER SYMPTOMS
DOUBLE VISION: 0
NAUSEA: 0
CLAUDICATION: 0
HALLUCINATIONS: 0
ABDOMINAL PAIN: 0
BLOOD IN STOOL: 0
BLURRED VISION: 0
FEVER: 0
SHORTNESS OF BREATH: 0
PALPITATIONS: 0
HEADACHES: 0
PND: 0
VOMITING: 0
EYE DISCHARGE: 0
CHILLS: 0
FALLS: 0
DEPRESSION: 0
DIZZINESS: 0
WEIGHT LOSS: 0
SPEECH CHANGE: 0
EYE PAIN: 0
ORTHOPNEA: 0
MYALGIAS: 0
SENSORY CHANGE: 0
BRUISES/BLEEDS EASILY: 0
LOSS OF CONSCIOUSNESS: 0
COUGH: 0

## 2019-03-26 NOTE — LETTER
[facility]   [address] [place] [address] [address]  [place] [address]  Phone: [phone]  Fax: [phone]              Jeanie Hutchison  1945    Encounter Date: 3/26/2019    Ro Orozco M.D.          PROGRESS NOTE:  Chief Complaint   Patient presents with   • Atrial Fibrillation       Subjective:   Jeanie Hutchison is a 73 y.o. female who presents today for cardiac care and management of PAF on Amiodarone. Used to be on Flecainide therapy (changed to Amiodarone due to possible side effects).     I have independently interpreted and reviewed patient's ECG with patient today, which showed normal sinus rhythm, normal FL, QT intervals. No evidence of acute coronary syndrome.     Since last visit, I put patient on amiodarone and replacement of flecainide therapy.  However, she has been experiencing possible side effects from amiodarone.  She has severe ataxia, peripheral neuropathy, low energy level.    Past Medical History:   Diagnosis Date   • A-fib (Formerly [facility]) 5/1/2014   • Chronic pain     r/t pelvic fracture   • Psychiatric disorder     history of depression and anxiety      Past Surgical History:   Procedure Laterality Date   • CLAVICLE ORIF  5/21/2014    Performed by Wilfred Gonzalez M.D. at SURGERY [facility]   • BREAST BIOPSY      bilateral neg breast bxs   • OTHER ORTHOPEDIC SURGERY      pelvis fracture. 10 years ago      History reviewed. No pertinent family history.  Social History     Social History   • Marital status:      Spouse name: N/A   • Number of children: N/A   • Years of education: N/A     Occupational History   • Not on file.     Social History Main Topics   • Smoking status: Never Smoker   • Smokeless tobacco: Never Used   • Alcohol use Yes      Comment: occasional    • Drug use: Yes     Types: Oral      Comment: takes friends tranqualizers    • Sexual activity: Not on file     Other Topics Concern   • Not on file     Social History Narrative     • No narrative on file     No Known Allergies  Outpatient Encounter Prescriptions as of 3/26/2019   Medication Sig Dispense Refill   • carvedilol (COREG) 6.25 MG Tab Take 1 Tab by mouth 2 times a day, with meals. 60 Tab 11   • mirtazapine (REMERON) 15 MG Tab Take 1 Tab by mouth every bedtime. 30 Tab 3   • celecoxib (CELEBREX) 200 MG Cap Take 1 Cap by mouth 2 times a day. 60 Cap 3   • lisinopril (PRINIVIL) 20 MG Tab Take 1 Tab by mouth every day. 90 Tab 3   • hydrOXYzine pamoate (VISTARIL) 50 MG Cap Take 1 Cap by mouth 3 times a day as needed for Itching. 90 Cap 3   • paroxetine (PAXIL-CR) 37.5 MG CR tablet Take 1 Tab by mouth every day. 90 Tab 3   • baclofen (LIORESAL) 20 MG tablet Take 1 Tab by mouth 3 times a day. 90 Tab 3   • levothyroxine (SYNTHROID) 25 MCG Tab Take 1 Tab by mouth Every morning on an empty stomach. 90 Tab 1   • gabapentin (NEURONTIN) 300 MG Cap Take 300 mg by mouth 3 times a day.     • lorazepam (ATIVAN) 1 MG Tab Take 1 Tab by mouth every 8 hours as needed for Anxiety.     • aspirin EC 81 MG EC tablet Take 1 Tab by mouth every day. 30 Tab 2   • [DISCONTINUED] amiodarone (CORDARONE) 200 MG Tab TAKE ONE TABLET BY MOUTH EVERY DAY 30 Tab 0   • [DISCONTINUED] carvedilol (COREG) 12.5 MG Tab Take 1 Tab by mouth 2 times a day, with meals. GENERIC FOR COREG (Patient not taking: Reported on 3/26/2019) 90 Tab 3     No facility-administered encounter medications on file as of 3/26/2019.      Review of Systems   Constitutional: Negative for chills, fever, malaise/fatigue and weight loss.   HENT: Negative for ear discharge, ear pain, hearing loss and nosebleeds.    Eyes: Negative for blurred vision, double vision, pain and discharge.   Respiratory: Negative for cough and shortness of breath.    Cardiovascular: Negative for chest pain, palpitations, orthopnea, claudication, leg swelling and PND.   Gastrointestinal: Negative for abdominal pain, blood in stool, melena, nausea and vomiting.   Genitourinary:  "Negative for dysuria and hematuria.   Musculoskeletal: Negative for falls, joint pain and myalgias.   Skin: Negative for itching and rash.   Neurological: Negative for dizziness, sensory change, speech change, loss of consciousness and headaches.   Endo/Heme/Allergies: Negative for environmental allergies. Does not bruise/bleed easily.   Psychiatric/Behavioral: Negative for depression, hallucinations and suicidal ideas.        Objective:   /80 (BP Location: Left arm, Patient Position: Sitting, BP Cuff Size: Adult)   Pulse 66   Ht 1.676 m (5' 6\")   Wt 69.4 kg (153 lb 1 oz)   SpO2 96%   BMI 24.70 kg/m²      Physical Exam   Constitutional: She is oriented to person, place, and time. No distress.   HENT:   Head: Normocephalic and atraumatic.   Right Ear: External ear normal.   Left Ear: External ear normal.   Eyes: Right eye exhibits no discharge. Left eye exhibits no discharge.   Neck: No JVD present. No thyromegaly present.   Cardiovascular: Normal rate, regular rhythm, normal heart sounds and intact distal pulses.  Exam reveals no gallop and no friction rub.    No murmur heard.  Pulmonary/Chest: Breath sounds normal. No respiratory distress.   Abdominal: Bowel sounds are normal. She exhibits no distension. There is no tenderness.   Musculoskeletal: She exhibits no edema or tenderness.   Neurological: She is alert and oriented to person, place, and time.   +ataxia.   Skin: Skin is warm and dry. She is not diaphoretic.   Psychiatric: She has a normal mood and affect. Her behavior is normal.   Nursing note and vitals reviewed.      Assessment:     1. Paroxysmal atrial fibrillation (HCC)  EKG   2. HTN (hypertension), malignant     3. ETOHism (HCC)     4. High risk medication use         Medical Decision Making:  Today's Assessment / Status / Plan:   Will stop Amiodarone therapy due to potential side effects.  Her presentation is also concern for possible neurological issues.  I recommend patient to consider " going to the ER for further workup to make sure that she does not have active CVA.  At this time she will think about it and make decision.  Continue Lisinopril and Carvedilol.      Jane Armstrong M.D.  [address] [place] [id number]  VIA In Basket

## 2019-03-26 NOTE — PROGRESS NOTES
Chief Complaint   Patient presents with   • Atrial Fibrillation       Subjective:   Jeanie Hutchison is a 73 y.o. female who presents today for cardiac care and management of PAF on Amiodarone. Used to be on Flecainide therapy (changed to Amiodarone due to possible side effects).     I have independently interpreted and reviewed patient's ECG with patient today, which showed normal sinus rhythm, normal ND, QT intervals. No evidence of acute coronary syndrome.     Since last visit, I put patient on amiodarone and replacement of flecainide therapy.  However, she has been experiencing possible side effects from amiodarone.  She has severe ataxia, peripheral neuropathy, low energy level.    Past Medical History:   Diagnosis Date   • A-fib (Formerly [facility]) 5/1/2014   • Chronic pain     r/t pelvic fracture   • Psychiatric disorder     history of depression and anxiety      Past Surgical History:   Procedure Laterality Date   • CLAVICLE ORIF  5/21/2014    Performed by Wilfred Gonzalez M.D. at SURGERY [facility]   • BREAST BIOPSY      bilateral neg breast bxs   • OTHER ORTHOPEDIC SURGERY      pelvis fracture. 10 years ago      History reviewed. No pertinent family history.  Social History     Social History   • Marital status:      Spouse name: N/A   • Number of children: N/A   • Years of education: N/A     Occupational History   • Not on file.     Social History Main Topics   • Smoking status: Never Smoker   • Smokeless tobacco: Never Used   • Alcohol use Yes      Comment: occasional    • Drug use: Yes     Types: Oral      Comment: takes friends tranqualizers    • Sexual activity: Not on file     Other Topics Concern   • Not on file     Social History Narrative   • No narrative on file     No Known Allergies  Outpatient Encounter Prescriptions as of 3/26/2019   Medication Sig Dispense Refill   • carvedilol (COREG) 6.25 MG Tab Take 1 Tab by mouth 2 times a day, with meals. 60 Tab 11   • mirtazapine (REMERON) 15 MG Tab  Take 1 Tab by mouth every bedtime. 30 Tab 3   • celecoxib (CELEBREX) 200 MG Cap Take 1 Cap by mouth 2 times a day. 60 Cap 3   • lisinopril (PRINIVIL) 20 MG Tab Take 1 Tab by mouth every day. 90 Tab 3   • hydrOXYzine pamoate (VISTARIL) 50 MG Cap Take 1 Cap by mouth 3 times a day as needed for Itching. 90 Cap 3   • paroxetine (PAXIL-CR) 37.5 MG CR tablet Take 1 Tab by mouth every day. 90 Tab 3   • baclofen (LIORESAL) 20 MG tablet Take 1 Tab by mouth 3 times a day. 90 Tab 3   • levothyroxine (SYNTHROID) 25 MCG Tab Take 1 Tab by mouth Every morning on an empty stomach. 90 Tab 1   • gabapentin (NEURONTIN) 300 MG Cap Take 300 mg by mouth 3 times a day.     • lorazepam (ATIVAN) 1 MG Tab Take 1 Tab by mouth every 8 hours as needed for Anxiety.     • aspirin EC 81 MG EC tablet Take 1 Tab by mouth every day. 30 Tab 2   • [DISCONTINUED] amiodarone (CORDARONE) 200 MG Tab TAKE ONE TABLET BY MOUTH EVERY DAY 30 Tab 0   • [DISCONTINUED] carvedilol (COREG) 12.5 MG Tab Take 1 Tab by mouth 2 times a day, with meals. GENERIC FOR COREG (Patient not taking: Reported on 3/26/2019) 90 Tab 3     No facility-administered encounter medications on file as of 3/26/2019.      Review of Systems   Constitutional: Negative for chills, fever, malaise/fatigue and weight loss.   HENT: Negative for ear discharge, ear pain, hearing loss and nosebleeds.    Eyes: Negative for blurred vision, double vision, pain and discharge.   Respiratory: Negative for cough and shortness of breath.    Cardiovascular: Negative for chest pain, palpitations, orthopnea, claudication, leg swelling and PND.   Gastrointestinal: Negative for abdominal pain, blood in stool, melena, nausea and vomiting.   Genitourinary: Negative for dysuria and hematuria.   Musculoskeletal: Negative for falls, joint pain and myalgias.   Skin: Negative for itching and rash.   Neurological: Negative for dizziness, sensory change, speech change, loss of consciousness and headaches.  "  Endo/Heme/Allergies: Negative for environmental allergies. Does not bruise/bleed easily.   Psychiatric/Behavioral: Negative for depression, hallucinations and suicidal ideas.        Objective:   /80 (BP Location: Left arm, Patient Position: Sitting, BP Cuff Size: Adult)   Pulse 66   Ht 1.676 m (5' 6\")   Wt 69.4 kg (153 lb 1 oz)   SpO2 96%   BMI 24.70 kg/m²      Physical Exam   Constitutional: She is oriented to person, place, and time. No distress.   HENT:   Head: Normocephalic and atraumatic.   Right Ear: External ear normal.   Left Ear: External ear normal.   Eyes: Right eye exhibits no discharge. Left eye exhibits no discharge.   Neck: No JVD present. No thyromegaly present.   Cardiovascular: Normal rate, regular rhythm, normal heart sounds and intact distal pulses.  Exam reveals no gallop and no friction rub.    No murmur heard.  Pulmonary/Chest: Breath sounds normal. No respiratory distress.   Abdominal: Bowel sounds are normal. She exhibits no distension. There is no tenderness.   Musculoskeletal: She exhibits no edema or tenderness.   Neurological: She is alert and oriented to person, place, and time.   +ataxia.   Skin: Skin is warm and dry. She is not diaphoretic.   Psychiatric: She has a normal mood and affect. Her behavior is normal.   Nursing note and vitals reviewed.      Assessment:     1. Paroxysmal atrial fibrillation (HCC)  EKG   2. HTN (hypertension), malignant     3. ETOHism (HCC)     4. High risk medication use         Medical Decision Making:  Today's Assessment / Status / Plan:   Will stop Amiodarone therapy due to potential side effects.  Her presentation is also concern for possible neurological issues.  I recommend patient to consider going to the ER for further workup to make sure that she does not have active CVA.  At this time she will think about it and make decision.  Continue Lisinopril and Carvedilol.  "

## 2019-04-25 ENCOUNTER — OFFICE VISIT (OUTPATIENT)
Dept: MEDICAL GROUP | Facility: MEDICAL CENTER | Age: 74
End: 2019-04-25
Payer: MEDICARE

## 2019-04-25 VITALS
SYSTOLIC BLOOD PRESSURE: 128 MMHG | RESPIRATION RATE: 16 BRPM | TEMPERATURE: 97.8 F | BODY MASS INDEX: 26.52 KG/M2 | HEART RATE: 86 BPM | DIASTOLIC BLOOD PRESSURE: 86 MMHG | HEIGHT: 66 IN | OXYGEN SATURATION: 91 % | WEIGHT: 165 LBS

## 2019-04-25 DIAGNOSIS — F10.10 ALCOHOL ABUSE: ICD-10-CM

## 2019-04-25 DIAGNOSIS — I48.0 PAF (PAROXYSMAL ATRIAL FIBRILLATION) (HCC): ICD-10-CM

## 2019-04-25 DIAGNOSIS — I10 ESSENTIAL HYPERTENSION: ICD-10-CM

## 2019-04-25 DIAGNOSIS — F32.1 MODERATE SINGLE CURRENT EPISODE OF MAJOR DEPRESSIVE DISORDER (HCC): ICD-10-CM

## 2019-04-25 DIAGNOSIS — F41.9 ANXIETY: ICD-10-CM

## 2019-04-25 PROCEDURE — 99214 OFFICE O/P EST MOD 30 MIN: CPT | Performed by: FAMILY MEDICINE

## 2019-04-25 RX ORDER — ALPRAZOLAM 0.25 MG/1
0.25 TABLET ORAL NIGHTLY PRN
Qty: 10 TAB | Refills: 0 | Status: SHIPPED | OUTPATIENT
Start: 2019-04-25 | End: 2019-04-25

## 2019-04-25 NOTE — PROGRESS NOTES
CC: Alcohol abuse, depression/anxiety, hypertension, A. fib, hypothyroidism    HPI:   Jeanie presents today to discuss the following medical issues:    Alcohol abuse  It has been a real problem. It has really been affecting her quality of life.  Patient has a lot of life stressors she lost 2 of her daughters.  However  become severely depressed and alcoholic, eventually become demented.  Patient has been having drinking issues after she lost her second daughter, she was detoxed, or sober for few months, now she is back again.  She has been missing her appointments, she was brought there to the clinic by her friend.  She has been just sitting at home sleeping and drinking.  Was called to be seen today to discuss the issue.  Patient advised to be admitted for acute detox.    Moderate single current episode of major depressive disorder (HCC)/anxiety  Has worsened, probably because of severe alcohol abuse.  She denies suicidal ideation.  She has been on Paxil.    Essential hypertension  Has been adequately controlled on current medication. Denies headache, chest pain, and SOB.patient has been on lisinopril 20 mg.  No side effects.    PAF (paroxysmal atrial fibrillation) ([facility])  Patient has been having normal heart rate and rhythm.  Denies palpitation, chest pain, and shortness of breath.  She has been on aspirin    Acquired hypothyroidism  He has been tolerating Levothyroxine, no palpitation, no cold or heat intolerance, has been on levothyroxine 25 mcg          Patient Active Problem List    Diagnosis Date Noted   • Mixed hyperlipidemia 03/11/2018   • Moderate single current episode of major depressive disorder (HCC) 12/08/2017   • HTN (hypertension) 01/20/2015   • PAF (paroxysmal atrial fibrillation) ([facility]) 09/10/2014   • Drug abuse (HCC) 05/17/2014   • Anxiety 05/17/2014       Current Outpatient Prescriptions   Medication Sig Dispense Refill   • carvedilol (COREG) 6.25 MG Tab Take 1 Tab by mouth 2 times a day,  "with meals. 60 Tab 11   • mirtazapine (REMERON) 15 MG Tab Take 1 Tab by mouth every bedtime. 30 Tab 3   • celecoxib (CELEBREX) 200 MG Cap Take 1 Cap by mouth 2 times a day. 60 Cap 3   • lisinopril (PRINIVIL) 20 MG Tab Take 1 Tab by mouth every day. 90 Tab 3   • hydrOXYzine pamoate (VISTARIL) 50 MG Cap Take 1 Cap by mouth 3 times a day as needed for Itching. 90 Cap 3   • paroxetine (PAXIL-CR) 37.5 MG CR tablet Take 1 Tab by mouth every day. 90 Tab 3   • levothyroxine (SYNTHROID) 25 MCG Tab Take 1 Tab by mouth Every morning on an empty stomach. 90 Tab 1   • lorazepam (ATIVAN) 1 MG Tab Take 1 Tab by mouth every 8 hours as needed for Anxiety.     • baclofen (LIORESAL) 20 MG tablet Take 1 Tab by mouth 3 times a day. 90 Tab 3   • gabapentin (NEURONTIN) 300 MG Cap Take 300 mg by mouth 3 times a day.     • aspirin EC 81 MG EC tablet Take 1 Tab by mouth every day. 30 Tab 2     No current facility-administered medications for this visit.          Allergies as of 04/25/2019   • (No Known Allergies)        ROS: Denies any chest pain, Shortness of breath, Changes bowel or bladder, Lower extremity edema.    Physical Exam:  /86 (BP Location: Right arm, Patient Position: Sitting, BP Cuff Size: Adult)   Pulse 86   Temp 36.6 °C (97.8 °F) (Temporal)   Resp 16   Ht 1.676 m (5' 6\")   Wt 74.8 kg (165 lb)   SpO2 91%   BMI 26.63 kg/m²   Gen.: Well-developed, well-nourished, no apparent distress,pleasant and cooperative with the examination  Skin:  Warm and dry with good turgor. No rashes or suspicious lesions in visible areas  HEENT:Sinuses nontender with palpation, TMs clear, nares patent with pink mucosa and clear rhinorrhea,no septal deviation ,polyps or lesions. lips without lesions, oropharynx clear.  Neck: Trachea midline,no masses or adenopathy. No JVD.  Cor: Regular rate and rhythm without murmur, gallop or rub.  Lungs: Respirations unlabored.Clear to auscultation with equal breath sounds bilaterally. No wheezes, " rhonchi.  Extremities: No cyanosis, clubbing or edema.        Assessment and Plan.   73 y.o. female     1. Alcohol abuse  Probably related to her depression and anxiety .  It has really been affecting her quality of life.  It has been a vicious [place], more drinking, more depression, and that leads to more drinking again.  Discussed detox program, name of the resources given to the patient.  Patient advised to go to the [facility] for acute detox.    2. Moderate single current episode of major depressive disorder (HCC)  Has worsened, probably because of severe alcohol abuse.  Patient sent to for acute detox  Refer to behavioral health after acute detox.    - REFERRAL TO BEHAVIORAL HEALTH    2. Essential hypertension  Adequately controlled.  Continue on lisinopril 20 mg    3. PAF (paroxysmal atrial fibrillation) (HCC)  Stable, asymptomatic.  Continue aspirin    4. Anxiety    - REFERRAL TO BEHAVIORAL HEALTH    5.  Acquired hypothyroidism  He has been tolerating Levothyroxine, no palpitation, no cold or heat intolerance  Continue on levothyroxine 25 mcg

## 2019-05-10 ENCOUNTER — OFFICE VISIT (OUTPATIENT)
Dept: MEDICAL GROUP | Facility: MEDICAL CENTER | Age: 74
End: 2019-05-10
Payer: MEDICARE

## 2019-05-10 VITALS
HEART RATE: 74 BPM | RESPIRATION RATE: 16 BRPM | TEMPERATURE: 98.7 F | OXYGEN SATURATION: 94 % | HEIGHT: 66 IN | DIASTOLIC BLOOD PRESSURE: 78 MMHG | BODY MASS INDEX: 27.95 KG/M2 | SYSTOLIC BLOOD PRESSURE: 100 MMHG | WEIGHT: 173.94 LBS

## 2019-05-10 DIAGNOSIS — E03.4 HYPOTHYROIDISM DUE TO ACQUIRED ATROPHY OF THYROID: ICD-10-CM

## 2019-05-10 DIAGNOSIS — N30.00 ACUTE CYSTITIS WITHOUT HEMATURIA: ICD-10-CM

## 2019-05-10 PROCEDURE — 99214 OFFICE O/P EST MOD 30 MIN: CPT | Performed by: FAMILY MEDICINE

## 2019-05-10 RX ORDER — LEVOTHYROXINE SODIUM 0.07 MG/1
75 TABLET ORAL
Qty: 60 TAB | Refills: 0 | Status: SHIPPED | OUTPATIENT
Start: 2019-05-10 | End: 2019-07-22 | Stop reason: SDUPTHER

## 2019-05-10 RX ORDER — QUETIAPINE FUMARATE 50 MG/1
50 TABLET, FILM COATED ORAL 2 TIMES DAILY
COMMUNITY
End: 2019-05-28 | Stop reason: SDUPTHER

## 2019-05-10 RX ORDER — NITROFURANTOIN 25; 75 MG/1; MG/1
100 CAPSULE ORAL 2 TIMES DAILY
Qty: 14 CAP | Refills: 0 | Status: SHIPPED | OUTPATIENT
Start: 2019-05-10 | End: 2019-05-17

## 2019-05-12 NOTE — PROGRESS NOTES
CC: Burning urination, abnormal thyroid function    HPI:   Jeanie presents today for follow-up after alcohol detoxification at [facility].  Patient stated that she feels better after the detoxification program.  But they did some urine and blood test there and she came today to discuss the result (which is scanned in epic today).     Dysuria:  Patient has been having burning urination however denies any fever, chills, abdominal /flank pain, nausea, or vomiting.  Patient came in today with the urine test that was done at [facility] showed UTI.    Hypothyroidism.  Patient has history of hypothyroidism, has been on levothyroxine 25 mcg daily.  However lately her symptoms has worsened.  She has been more depressed, which lead to the current alcohol problems for which she was detoxified.  She also been having hair falling and weight gain.  Patient stated that she has been feeling tired and weak all the time which increases her depression.  TSH was checked at [facility] and it was very high(43.17).    Patient Active Problem List    Diagnosis Date Noted   • Alcohol abuse 04/25/2019   • Mixed hyperlipidemia 03/11/2018   • Moderate single current episode of major depressive disorder (HCC) 12/08/2017   • HTN (hypertension) 01/20/2015   • PAF (paroxysmal atrial fibrillation) ([facility]) 09/10/2014   • Drug abuse (HCC) 05/17/2014   • Anxiety 05/17/2014       Current Outpatient Prescriptions   Medication Sig Dispense Refill   • quetiapine (SEROQUEL) 50 MG tablet Take 50 mg by mouth 2 times a day.     • levothyroxine (SYNTHROID) 75 MCG Tab Take 1 Tab by mouth Every morning on an empty stomach. 60 Tab 0   • nitrofurantoin monohyd macro (MACROBID) 100 MG Cap Take 1 Cap by mouth 2 times a day for 7 days. 14 Cap 0   • carvedilol (COREG) 6.25 MG Tab Take 1 Tab by mouth 2 times a day, with meals. 60 Tab 11   • mirtazapine (REMERON) 15 MG Tab Take 1 Tab by mouth every bedtime. 30 Tab 3   •  "celecoxib (CELEBREX) 200 MG Cap Take 1 Cap by mouth 2 times a day. 60 Cap 3   • lisinopril (PRINIVIL) 20 MG Tab Take 1 Tab by mouth every day. 90 Tab 3   • hydrOXYzine pamoate (VISTARIL) 50 MG Cap Take 1 Cap by mouth 3 times a day as needed for Itching. 90 Cap 3   • paroxetine (PAXIL-CR) 37.5 MG CR tablet Take 1 Tab by mouth every day. 90 Tab 3   • baclofen (LIORESAL) 20 MG tablet Take 1 Tab by mouth 3 times a day. 90 Tab 3   • levothyroxine (SYNTHROID) 25 MCG Tab Take 1 Tab by mouth Every morning on an empty stomach. 90 Tab 1   • gabapentin (NEURONTIN) 300 MG Cap Take 300 mg by mouth 3 times a day.     • lorazepam (ATIVAN) 1 MG Tab Take 1 Tab by mouth every 8 hours as needed for Anxiety.     • aspirin EC 81 MG EC tablet Take 1 Tab by mouth every day. 30 Tab 2     No current facility-administered medications for this visit.          Allergies as of 05/10/2019   • (No Known Allergies)        ROS: Denies any chest pain, Shortness of breath, Changes bowel or bladder, Lower extremity edema.    Physical Exam:  /78 (BP Location: Right arm, Patient Position: Sitting, BP Cuff Size: Adult)   Pulse 74   Temp 37.1 °C (98.7 °F) (Temporal)   Resp 16   Ht 1.676 m (5' 6\")   Wt 78.9 kg (173 lb 15.1 oz)   SpO2 94%   BMI 28.08 kg/m²   Gen.: Well-developed, well-nourished, no apparent distress,pleasant and cooperative with the examination  Skin:  Warm and dry with good turgor. No rashes or suspicious lesions in visible areas  HEENT:Sinuses nontender with palpation, TMs clear, nares patent with pink mucosa and clear rhinorrhea,no septal deviation ,polyps or lesions. lips without lesions, oropharynx clear.  Neck: Trachea midline,no masses or adenopathy. No JVD.  Cor: Regular rate and rhythm without murmur, gallop or rub.  Lungs: Respirations unlabored.Clear to auscultation with equal breath sounds bilaterally. No wheezes, rhonchi.  Extremities: No cyanosis, clubbing or edema.  Abdomen: Soft, no tenderness, no distention, " normal bowel sounds.  No costovertebral angle tenderness        Assessment and Plan.   73 y.o. female     1. Hypothyroidism due to acquired atrophy of thyroid  Recent TSH really elevated(43.17).  Will increase levothyroxine to 75 mcg daily.  We will repeat TSH/free T4 in 6 weeks.    - levothyroxine (SYNTHROID) 75 MCG Tab; Take 1 Tab by mouth Every morning on an empty stomach.  Dispense: 60 Tab; Refill: 0  - TSH+FREE T4    2. Acute cystitis without hematuria  We will start patient on Macrobid 100 mg twice a day for a week.  The test was done and [facility], so no culture will be sent.  On just wait to see if the patient responds to treatment.    - nitrofurantoin monohyd macro (MACROBID) 100 MG Cap; Take 1 Cap by mouth 2 times a day for 7 days.  Dispense: 14 Cap; Refill: 0

## 2019-05-13 ENCOUNTER — TELEPHONE (OUTPATIENT)
Dept: MEDICAL GROUP | Facility: MEDICAL CENTER | Age: 74
End: 2019-05-13

## 2019-05-28 RX ORDER — QUETIAPINE FUMARATE 50 MG/1
50 TABLET, FILM COATED ORAL 2 TIMES DAILY
Qty: 60 TAB | Refills: 2 | Status: SHIPPED
Start: 2019-05-28 | End: 2020-01-27

## 2019-06-29 ENCOUNTER — HOSPITAL ENCOUNTER (INPATIENT)
Dept: HOSPITAL 8 - ED | Age: 74
LOS: 10 days | Discharge: HOME HEALTH | DRG: 641 | End: 2019-07-09
Attending: INTERNAL MEDICINE | Admitting: HOSPITALIST
Payer: MEDICARE

## 2019-06-29 VITALS — DIASTOLIC BLOOD PRESSURE: 83 MMHG | SYSTOLIC BLOOD PRESSURE: 127 MMHG

## 2019-06-29 VITALS — HEIGHT: 66 IN | BODY MASS INDEX: 34.72 KG/M2 | WEIGHT: 216.05 LBS

## 2019-06-29 VITALS — DIASTOLIC BLOOD PRESSURE: 88 MMHG | SYSTOLIC BLOOD PRESSURE: 138 MMHG

## 2019-06-29 DIAGNOSIS — B96.20: ICD-10-CM

## 2019-06-29 DIAGNOSIS — E86.0: Primary | ICD-10-CM

## 2019-06-29 DIAGNOSIS — F10.239: ICD-10-CM

## 2019-06-29 DIAGNOSIS — R73.9: ICD-10-CM

## 2019-06-29 DIAGNOSIS — E83.51: ICD-10-CM

## 2019-06-29 DIAGNOSIS — I48.0: ICD-10-CM

## 2019-06-29 DIAGNOSIS — D75.89: ICD-10-CM

## 2019-06-29 DIAGNOSIS — D69.59: ICD-10-CM

## 2019-06-29 DIAGNOSIS — E83.39: ICD-10-CM

## 2019-06-29 DIAGNOSIS — F41.9: ICD-10-CM

## 2019-06-29 DIAGNOSIS — D53.9: ICD-10-CM

## 2019-06-29 DIAGNOSIS — Z79.82: ICD-10-CM

## 2019-06-29 DIAGNOSIS — E66.3: ICD-10-CM
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
## 2019-06-29 DIAGNOSIS — I10: ICD-10-CM

## 2019-06-29 DIAGNOSIS — E87.2: ICD-10-CM

## 2019-06-29 DIAGNOSIS — E87.0: ICD-10-CM

## 2019-06-29 DIAGNOSIS — N39.0: ICD-10-CM

## 2019-06-29 DIAGNOSIS — F32.9: ICD-10-CM

## 2019-06-29 LAB
ALBUMIN SERPL-MCNC: 3.4 G/DL (ref 3.4–5)
ALP SERPL-CCNC: 74 U/L (ref 45–117)
ALT SERPL-CCNC: 66 U/L (ref 12–78)
ANION GAP SERPL CALC-SCNC: 13 MMOL/L (ref 5–15)
APTT BLD: 27 SECONDS (ref 25–31)
BASOPHILS # BLD AUTO: 0.01 X10^3/UL (ref 0–0.1)
BASOPHILS NFR BLD AUTO: 0 % (ref 0–1)
BILIRUB SERPL-MCNC: 1.5 MG/DL (ref 0.2–1)
CALCIUM SERPL-MCNC: 7.7 MG/DL (ref 8.5–10.1)
CHLORIDE SERPL-SCNC: 109 MMOL/L (ref 98–107)
CLOSTRIDIUM DIFFICILE ANTIGEN: NEGATIVE
CLOSTRIDIUM DIFFICILE TOXIN: NEGATIVE
CREAT SERPL-MCNC: 0.89 MG/DL (ref 0.55–1.02)
EOSINOPHIL # BLD AUTO: 0.03 X10^3/UL (ref 0–0.4)
EOSINOPHIL NFR BLD AUTO: 1 % (ref 1–7)
ERYTHROCYTE [DISTWIDTH] IN BLOOD BY AUTOMATED COUNT: 14.9 % (ref 9.6–15.2)
INR PPP: 1.01 (ref 0.93–1.1)
LYMPHOCYTES # BLD AUTO: 0.85 X10^3/UL (ref 1–3.4)
LYMPHOCYTES NFR BLD AUTO: 14 % (ref 22–44)
MCH RBC QN AUTO: 34.8 PG (ref 27–34.8)
MCHC RBC AUTO-ENTMCNC: 33 G/DL (ref 32.4–35.8)
MCV RBC AUTO: 105.4 FL (ref 80–100)
MD: NO
MONOCYTES # BLD AUTO: 0.36 X10^3/UL (ref 0.2–0.8)
MONOCYTES NFR BLD AUTO: 6 % (ref 2–9)
NEUTROPHILS # BLD AUTO: 4.65 X10^3/UL (ref 1.8–6.8)
NEUTROPHILS NFR BLD AUTO: 79 % (ref 42–75)
PLATELET # BLD AUTO: 110 X10^3/UL (ref 130–400)
PMV BLD AUTO: 7.6 FL (ref 7.4–10.4)
PROT SERPL-MCNC: 6.4 G/DL (ref 6.4–8.2)
PROTHROMBIN TIME: 10.6 SECONDS (ref 9.6–11.5)
RBC # BLD AUTO: 3.9 X10^6/UL (ref 3.82–5.3)
TROPONIN I SERPL-MCNC: < 0.015 NG/ML (ref 0–0.04)
TROPONIN I SERPL-MCNC: < 0.015 NG/ML (ref 0–0.04)

## 2019-06-29 PROCEDURE — 85025 COMPLETE CBC W/AUTO DIFF WBC: CPT

## 2019-06-29 PROCEDURE — 89055 LEUKOCYTE ASSESSMENT FECAL: CPT

## 2019-06-29 PROCEDURE — 96376 TX/PRO/DX INJ SAME DRUG ADON: CPT

## 2019-06-29 PROCEDURE — 36415 COLL VENOUS BLD VENIPUNCTURE: CPT

## 2019-06-29 PROCEDURE — 96375 TX/PRO/DX INJ NEW DRUG ADDON: CPT

## 2019-06-29 PROCEDURE — 87086 URINE CULTURE/COLONY COUNT: CPT

## 2019-06-29 PROCEDURE — 80307 DRUG TEST PRSMV CHEM ANLYZR: CPT

## 2019-06-29 PROCEDURE — 87077 CULTURE AEROBIC IDENTIFY: CPT

## 2019-06-29 PROCEDURE — 81001 URINALYSIS AUTO W/SCOPE: CPT

## 2019-06-29 PROCEDURE — 93306 TTE W/DOPPLER COMPLETE: CPT

## 2019-06-29 PROCEDURE — 83605 ASSAY OF LACTIC ACID: CPT

## 2019-06-29 PROCEDURE — 93005 ELECTROCARDIOGRAM TRACING: CPT

## 2019-06-29 PROCEDURE — 83735 ASSAY OF MAGNESIUM: CPT

## 2019-06-29 PROCEDURE — 82330 ASSAY OF CALCIUM: CPT

## 2019-06-29 PROCEDURE — 85610 PROTHROMBIN TIME: CPT

## 2019-06-29 PROCEDURE — 84484 ASSAY OF TROPONIN QUANT: CPT

## 2019-06-29 PROCEDURE — 83880 ASSAY OF NATRIURETIC PEPTIDE: CPT

## 2019-06-29 PROCEDURE — 85730 THROMBOPLASTIN TIME PARTIAL: CPT

## 2019-06-29 PROCEDURE — 87186 SC STD MICRODIL/AGAR DIL: CPT

## 2019-06-29 PROCEDURE — 84100 ASSAY OF PHOSPHORUS: CPT

## 2019-06-29 PROCEDURE — 84443 ASSAY THYROID STIM HORMONE: CPT

## 2019-06-29 PROCEDURE — 87324 CLOSTRIDIUM AG IA: CPT

## 2019-06-29 PROCEDURE — 71045 X-RAY EXAM CHEST 1 VIEW: CPT

## 2019-06-29 PROCEDURE — 96366 THER/PROPH/DIAG IV INF ADDON: CPT

## 2019-06-29 PROCEDURE — 96365 THER/PROPH/DIAG IV INF INIT: CPT

## 2019-06-29 PROCEDURE — 87040 BLOOD CULTURE FOR BACTERIA: CPT

## 2019-06-29 PROCEDURE — 80053 COMPREHEN METABOLIC PANEL: CPT

## 2019-06-29 RX ADMIN — SODIUM CHLORIDE SCH MLS/HR: 0.9 INJECTION, SOLUTION INTRAVENOUS at 18:01

## 2019-06-29 RX ADMIN — LORAZEPAM PRN MG: 2 INJECTION INTRAMUSCULAR; INTRAVENOUS at 20:50

## 2019-06-29 RX ADMIN — SODIUM CHLORIDE SCH MLS/HR: 0.9 INJECTION, SOLUTION INTRAVENOUS at 16:00

## 2019-06-29 RX ADMIN — ENOXAPARIN SODIUM SCH MG: 40 INJECTION SUBCUTANEOUS at 18:01

## 2019-06-29 RX ADMIN — CALCIUM CARBONATE (ANTACID) CHEW TAB 500 MG SCH MG: 500 CHEW TAB at 18:01

## 2019-06-29 RX ADMIN — TRAZODONE HYDROCHLORIDE SCH MG: 50 TABLET ORAL at 20:49

## 2019-06-29 RX ADMIN — MIRTAZAPINE SCH MG: 15 TABLET, ORALLY DISINTEGRATING ORAL at 20:49

## 2019-06-29 RX ADMIN — Medication SCH MG: at 20:49

## 2019-06-29 RX ADMIN — CALCIUM CARBONATE (ANTACID) CHEW TAB 500 MG SCH MG: 500 CHEW TAB at 20:49

## 2019-06-29 RX ADMIN — SODIUM CHLORIDE SCH MLS/HR: 0.9 INJECTION, SOLUTION INTRAVENOUS at 18:13

## 2019-06-29 RX ADMIN — CARVEDILOL SCH MG: 6.25 TABLET, FILM COATED ORAL at 20:48

## 2019-06-29 NOTE — NUR
PT BACK FROM RAD. HR IN THE 70'S AND REGULAR. PT REPORTED N/V OVER IN RAD. 
ZOFRAN REPEATED PER DR. KIM. EKG REPEATED.

## 2019-06-29 NOTE — NUR
PT BIB [facility] FROM HOME FOR N/V/D FOR 2 DAYS WITH NEAR SYNCOPE THIS AM.  
CALLED [facility] AFTER PT FELT LIKE SHE WAS GOING TO HAVE A "WHITE OUT."  PT DID 
NOT HAVE LOC.  PT WAS IN A-FIB RVR ON ARRIVAL TO ER. FLUIDS GIVEN BY [facility]. BS 
.  PT ON MONITOR. WAITING FOR ORDERS. PT DENIES CP OR SOB. 20

## 2019-06-29 NOTE — NUR
BREAK RN: PT GIVEN WARM BLANKET, REPORTS NO OTHER NEEDS AT THIS TIME. PT IN 
BED, NAD, FAMILY MEMBER AT BEDSIDE, AWAITING ADMIT BED.

## 2019-06-30 VITALS — SYSTOLIC BLOOD PRESSURE: 139 MMHG | DIASTOLIC BLOOD PRESSURE: 97 MMHG

## 2019-06-30 VITALS — DIASTOLIC BLOOD PRESSURE: 88 MMHG | SYSTOLIC BLOOD PRESSURE: 135 MMHG

## 2019-06-30 VITALS — SYSTOLIC BLOOD PRESSURE: 126 MMHG | DIASTOLIC BLOOD PRESSURE: 86 MMHG

## 2019-06-30 VITALS — DIASTOLIC BLOOD PRESSURE: 92 MMHG | SYSTOLIC BLOOD PRESSURE: 138 MMHG

## 2019-06-30 LAB
ALBUMIN SERPL-MCNC: 2.8 G/DL (ref 3.4–5)
ALP SERPL-CCNC: 54 U/L (ref 45–117)
ALT SERPL-CCNC: 51 U/L (ref 12–78)
ANION GAP SERPL CALC-SCNC: 7 MMOL/L (ref 5–15)
BASOPHILS # BLD AUTO: 0.01 X10^3/UL (ref 0–0.1)
BASOPHILS NFR BLD AUTO: 0 % (ref 0–1)
BILIRUB SERPL-MCNC: 2.2 MG/DL (ref 0.2–1)
CALCIUM SERPL-MCNC: 6.7 MG/DL (ref 8.5–10.1)
CHLORIDE SERPL-SCNC: 113 MMOL/L (ref 98–107)
CREAT SERPL-MCNC: 0.71 MG/DL (ref 0.55–1.02)
EOSINOPHIL # BLD AUTO: 0.1 X10^3/UL (ref 0–0.4)
EOSINOPHIL NFR BLD AUTO: 3 % (ref 1–7)
ERYTHROCYTE [DISTWIDTH] IN BLOOD BY AUTOMATED COUNT: 14.9 % (ref 9.6–15.2)
LYMPHOCYTES # BLD AUTO: 1.14 X10^3/UL (ref 1–3.4)
LYMPHOCYTES NFR BLD AUTO: 29 % (ref 22–44)
MCH RBC QN AUTO: 35.1 PG (ref 27–34.8)
MCHC RBC AUTO-ENTMCNC: 33.4 G/DL (ref 32.4–35.8)
MCV RBC AUTO: 104.9 FL (ref 80–100)
MD: NO
MICROSCOPIC: (no result)
MONOCYTES # BLD AUTO: 0.27 X10^3/UL (ref 0.2–0.8)
MONOCYTES NFR BLD AUTO: 7 % (ref 2–9)
NEUTROPHILS # BLD AUTO: 2.38 X10^3/UL (ref 1.8–6.8)
NEUTROPHILS NFR BLD AUTO: 61 % (ref 42–75)
PLATELET # BLD AUTO: 77 X10^3/UL (ref 130–400)
PMV BLD AUTO: 6.9 FL (ref 7.4–10.4)
PROT SERPL-MCNC: 5.2 G/DL (ref 6.4–8.2)
RBC # BLD AUTO: 3.19 X10^6/UL (ref 3.82–5.3)
TROPONIN I SERPL-MCNC: < 0.015 NG/ML (ref 0–0.04)

## 2019-06-30 RX ADMIN — CHLORDIAZEPOXIDE HYDROCHLORIDE SCH MG: 25 CAPSULE ORAL at 09:41

## 2019-06-30 RX ADMIN — CHLORDIAZEPOXIDE HYDROCHLORIDE SCH MG: 25 CAPSULE ORAL at 16:11

## 2019-06-30 RX ADMIN — ASPIRIN SCH MG: 81 TABLET, COATED ORAL at 09:41

## 2019-06-30 RX ADMIN — Medication SCH MG: at 09:41

## 2019-06-30 RX ADMIN — FOLIC ACID SCH MG: 1 TABLET ORAL at 09:41

## 2019-06-30 RX ADMIN — TRAZODONE HYDROCHLORIDE SCH MG: 50 TABLET ORAL at 21:28

## 2019-06-30 RX ADMIN — CALCIUM CARBONATE (ANTACID) CHEW TAB 500 MG SCH MG: 500 CHEW TAB at 11:00

## 2019-06-30 RX ADMIN — SODIUM CHLORIDE SCH MLS/HR: 0.9 INJECTION, SOLUTION INTRAVENOUS at 00:59

## 2019-06-30 RX ADMIN — CHLORDIAZEPOXIDE HYDROCHLORIDE SCH MG: 25 CAPSULE ORAL at 21:26

## 2019-06-30 RX ADMIN — PANTOPRAZOLE SODIUM SCH MG: 20 TABLET, DELAYED RELEASE ORAL at 09:41

## 2019-06-30 RX ADMIN — CALCIUM CARBONATE (ANTACID) CHEW TAB 500 MG SCH MG: 500 CHEW TAB at 05:48

## 2019-06-30 RX ADMIN — Medication SCH MG: at 21:26

## 2019-06-30 RX ADMIN — ENOXAPARIN SODIUM SCH MG: 40 INJECTION SUBCUTANEOUS at 17:26

## 2019-06-30 RX ADMIN — CARVEDILOL SCH MG: 6.25 TABLET, FILM COATED ORAL at 21:26

## 2019-06-30 RX ADMIN — MIRTAZAPINE SCH MG: 15 TABLET, ORALLY DISINTEGRATING ORAL at 21:27

## 2019-06-30 RX ADMIN — PANTOPRAZOLE SODIUM SCH MG: 20 TABLET, DELAYED RELEASE ORAL at 16:11

## 2019-06-30 RX ADMIN — CALCIUM CARBONATE (ANTACID) CHEW TAB 500 MG SCH MG: 500 CHEW TAB at 21:27

## 2019-06-30 RX ADMIN — CARVEDILOL SCH MG: 6.25 TABLET, FILM COATED ORAL at 09:41

## 2019-06-30 RX ADMIN — CALCIUM CARBONATE (ANTACID) CHEW TAB 500 MG SCH MG: 500 CHEW TAB at 16:00

## 2019-06-30 RX ADMIN — LORAZEPAM PRN MG: 2 INJECTION INTRAMUSCULAR; INTRAVENOUS at 00:54

## 2019-06-30 RX ADMIN — SODIUM CHLORIDE SCH MLS/HR: 0.9 INJECTION, SOLUTION INTRAVENOUS at 09:41

## 2019-07-01 VITALS — DIASTOLIC BLOOD PRESSURE: 79 MMHG | SYSTOLIC BLOOD PRESSURE: 113 MMHG

## 2019-07-01 VITALS — DIASTOLIC BLOOD PRESSURE: 85 MMHG | SYSTOLIC BLOOD PRESSURE: 126 MMHG

## 2019-07-01 VITALS — SYSTOLIC BLOOD PRESSURE: 158 MMHG | DIASTOLIC BLOOD PRESSURE: 102 MMHG

## 2019-07-01 VITALS — DIASTOLIC BLOOD PRESSURE: 99 MMHG | SYSTOLIC BLOOD PRESSURE: 147 MMHG

## 2019-07-01 VITALS — SYSTOLIC BLOOD PRESSURE: 139 MMHG | DIASTOLIC BLOOD PRESSURE: 80 MMHG

## 2019-07-01 VITALS — DIASTOLIC BLOOD PRESSURE: 64 MMHG | SYSTOLIC BLOOD PRESSURE: 92 MMHG

## 2019-07-01 VITALS — SYSTOLIC BLOOD PRESSURE: 140 MMHG | DIASTOLIC BLOOD PRESSURE: 96 MMHG

## 2019-07-01 VITALS — DIASTOLIC BLOOD PRESSURE: 95 MMHG | SYSTOLIC BLOOD PRESSURE: 144 MMHG

## 2019-07-01 LAB
ALBUMIN SERPL-MCNC: 3 G/DL (ref 3.4–5)
ALP SERPL-CCNC: 63 U/L (ref 45–117)
ALT SERPL-CCNC: 41 U/L (ref 12–78)
ANION GAP SERPL CALC-SCNC: 7 MMOL/L (ref 5–15)
BILIRUB SERPL-MCNC: 1.4 MG/DL (ref 0.2–1)
CALCIUM SERPL-MCNC: 8.1 MG/DL (ref 8.5–10.1)
CHLORIDE SERPL-SCNC: 113 MMOL/L (ref 98–107)
CREAT SERPL-MCNC: 0.65 MG/DL (ref 0.55–1.02)
PROT SERPL-MCNC: 5.8 G/DL (ref 6.4–8.2)

## 2019-07-01 RX ADMIN — CALCIUM CARBONATE (ANTACID) CHEW TAB 500 MG SCH MG: 500 CHEW TAB at 05:24

## 2019-07-01 RX ADMIN — THIAMINE HYDROCHLORIDE SCH MLS/HR: 100 INJECTION, SOLUTION INTRAMUSCULAR; INTRAVENOUS at 16:18

## 2019-07-01 RX ADMIN — ASPIRIN SCH MG: 81 TABLET, COATED ORAL at 07:39

## 2019-07-01 RX ADMIN — PANTOPRAZOLE SODIUM SCH MG: 20 TABLET, DELAYED RELEASE ORAL at 05:24

## 2019-07-01 RX ADMIN — CARVEDILOL SCH MG: 6.25 TABLET, FILM COATED ORAL at 07:39

## 2019-07-01 RX ADMIN — CEFDINIR SCH MG: 300 CAPSULE ORAL at 07:51

## 2019-07-01 RX ADMIN — CALCIUM CARBONATE (ANTACID) CHEW TAB 500 MG SCH MG: 500 CHEW TAB at 20:31

## 2019-07-01 RX ADMIN — CARVEDILOL SCH MG: 6.25 TABLET, FILM COATED ORAL at 07:51

## 2019-07-01 RX ADMIN — MIRTAZAPINE SCH MG: 15 TABLET, ORALLY DISINTEGRATING ORAL at 20:31

## 2019-07-01 RX ADMIN — DIBASIC SODIUM PHOSPHATE, MONOBASIC POTASSIUM PHOSPHATE AND MONOBASIC SODIUM PHOSPHATE SCH MG: 852; 155; 130 TABLET ORAL at 07:51

## 2019-07-01 RX ADMIN — CHLORDIAZEPOXIDE HYDROCHLORIDE SCH MG: 25 CAPSULE ORAL at 23:52

## 2019-07-01 RX ADMIN — CHLORDIAZEPOXIDE HYDROCHLORIDE SCH MG: 25 CAPSULE ORAL at 17:46

## 2019-07-01 RX ADMIN — CALCIUM CARBONATE (ANTACID) CHEW TAB 500 MG SCH MG: 500 CHEW TAB at 10:40

## 2019-07-01 RX ADMIN — FOLIC ACID SCH MG: 1 TABLET ORAL at 07:39

## 2019-07-01 RX ADMIN — Medication SCH MG: at 20:31

## 2019-07-01 RX ADMIN — Medication SCH MG: at 07:39

## 2019-07-01 RX ADMIN — CARVEDILOL SCH MG: 6.25 TABLET, FILM COATED ORAL at 20:30

## 2019-07-01 RX ADMIN — PANTOPRAZOLE SODIUM SCH MG: 20 TABLET, DELAYED RELEASE ORAL at 16:18

## 2019-07-01 RX ADMIN — CALCIUM CARBONATE (ANTACID) CHEW TAB 500 MG SCH MG: 500 CHEW TAB at 16:00

## 2019-07-01 RX ADMIN — TRAZODONE HYDROCHLORIDE SCH MG: 50 TABLET ORAL at 20:30

## 2019-07-01 RX ADMIN — CHLORDIAZEPOXIDE HYDROCHLORIDE SCH MG: 25 CAPSULE ORAL at 07:39

## 2019-07-01 RX ADMIN — DIBASIC SODIUM PHOSPHATE, MONOBASIC POTASSIUM PHOSPHATE AND MONOBASIC SODIUM PHOSPHATE SCH MG: 852; 155; 130 TABLET ORAL at 20:31

## 2019-07-01 RX ADMIN — ENOXAPARIN SODIUM SCH MG: 40 INJECTION SUBCUTANEOUS at 17:46

## 2019-07-01 RX ADMIN — CEFDINIR SCH MG: 300 CAPSULE ORAL at 20:31

## 2019-07-01 RX ADMIN — BUDESONIDE AND FORMOTEROL FUMARATE DIHYDRATE SCH MG: 160; 4.5 AEROSOL RESPIRATORY (INHALATION) at 07:40

## 2019-07-02 VITALS — SYSTOLIC BLOOD PRESSURE: 150 MMHG | DIASTOLIC BLOOD PRESSURE: 80 MMHG

## 2019-07-02 VITALS — SYSTOLIC BLOOD PRESSURE: 124 MMHG | DIASTOLIC BLOOD PRESSURE: 88 MMHG

## 2019-07-02 VITALS — DIASTOLIC BLOOD PRESSURE: 98 MMHG | SYSTOLIC BLOOD PRESSURE: 153 MMHG

## 2019-07-02 VITALS — DIASTOLIC BLOOD PRESSURE: 86 MMHG | SYSTOLIC BLOOD PRESSURE: 144 MMHG

## 2019-07-02 VITALS — SYSTOLIC BLOOD PRESSURE: 135 MMHG | DIASTOLIC BLOOD PRESSURE: 95 MMHG

## 2019-07-02 LAB
ALBUMIN SERPL-MCNC: 2.9 G/DL (ref 3.4–5)
ALP SERPL-CCNC: 59 U/L (ref 45–117)
ALT SERPL-CCNC: 37 U/L (ref 12–78)
ANION GAP SERPL CALC-SCNC: 4 MMOL/L (ref 5–15)
BILIRUB SERPL-MCNC: 1.1 MG/DL (ref 0.2–1)
CALCIUM SERPL-MCNC: 8.2 MG/DL (ref 8.5–10.1)
CHLORIDE SERPL-SCNC: 111 MMOL/L (ref 98–107)
CREAT SERPL-MCNC: 0.72 MG/DL (ref 0.55–1.02)
PROT SERPL-MCNC: 5.8 G/DL (ref 6.4–8.2)

## 2019-07-02 RX ADMIN — CARVEDILOL SCH MG: 25 TABLET, FILM COATED ORAL at 20:07

## 2019-07-02 RX ADMIN — Medication SCH MG: at 20:06

## 2019-07-02 RX ADMIN — PANTOPRAZOLE SODIUM SCH MG: 20 TABLET, DELAYED RELEASE ORAL at 16:25

## 2019-07-02 RX ADMIN — FOLIC ACID SCH MG: 1 TABLET ORAL at 09:00

## 2019-07-02 RX ADMIN — CARVEDILOL SCH MG: 25 TABLET, FILM COATED ORAL at 09:00

## 2019-07-02 RX ADMIN — CEFDINIR SCH MG: 300 CAPSULE ORAL at 20:07

## 2019-07-02 RX ADMIN — BUDESONIDE AND FORMOTEROL FUMARATE DIHYDRATE SCH MG: 160; 4.5 AEROSOL RESPIRATORY (INHALATION) at 11:05

## 2019-07-02 RX ADMIN — PANTOPRAZOLE SODIUM SCH MG: 20 TABLET, DELAYED RELEASE ORAL at 09:00

## 2019-07-02 RX ADMIN — DIBASIC SODIUM PHOSPHATE, MONOBASIC POTASSIUM PHOSPHATE AND MONOBASIC SODIUM PHOSPHATE SCH MG: 852; 155; 130 TABLET ORAL at 08:59

## 2019-07-02 RX ADMIN — THIAMINE HYDROCHLORIDE SCH MLS/HR: 100 INJECTION, SOLUTION INTRAMUSCULAR; INTRAVENOUS at 16:25

## 2019-07-02 RX ADMIN — CHLORDIAZEPOXIDE HYDROCHLORIDE SCH MG: 25 CAPSULE ORAL at 09:00

## 2019-07-02 RX ADMIN — CALCIUM CARBONATE (ANTACID) CHEW TAB 500 MG SCH MG: 500 CHEW TAB at 16:23

## 2019-07-02 RX ADMIN — ASPIRIN SCH MG: 81 TABLET, COATED ORAL at 09:00

## 2019-07-02 RX ADMIN — CHLORDIAZEPOXIDE HYDROCHLORIDE PRN MG: 25 CAPSULE ORAL at 23:51

## 2019-07-02 RX ADMIN — Medication SCH MG: at 09:00

## 2019-07-02 RX ADMIN — CALCIUM CARBONATE (ANTACID) CHEW TAB 500 MG SCH MG: 500 CHEW TAB at 06:36

## 2019-07-02 RX ADMIN — APIXABAN SCH MG: 5 TABLET, FILM COATED ORAL at 20:07

## 2019-07-02 RX ADMIN — CEFDINIR SCH MG: 300 CAPSULE ORAL at 08:59

## 2019-07-02 RX ADMIN — CALCIUM CARBONATE (ANTACID) CHEW TAB 500 MG SCH MG: 500 CHEW TAB at 20:08

## 2019-07-02 RX ADMIN — DIBASIC SODIUM PHOSPHATE, MONOBASIC POTASSIUM PHOSPHATE AND MONOBASIC SODIUM PHOSPHATE SCH MG: 852; 155; 130 TABLET ORAL at 20:07

## 2019-07-02 RX ADMIN — CALCIUM CARBONATE (ANTACID) CHEW TAB 500 MG SCH MG: 500 CHEW TAB at 11:05

## 2019-07-03 VITALS — SYSTOLIC BLOOD PRESSURE: 147 MMHG | DIASTOLIC BLOOD PRESSURE: 96 MMHG

## 2019-07-03 VITALS — DIASTOLIC BLOOD PRESSURE: 90 MMHG | SYSTOLIC BLOOD PRESSURE: 145 MMHG

## 2019-07-03 VITALS — SYSTOLIC BLOOD PRESSURE: 110 MMHG | DIASTOLIC BLOOD PRESSURE: 74 MMHG

## 2019-07-03 VITALS — DIASTOLIC BLOOD PRESSURE: 70 MMHG | SYSTOLIC BLOOD PRESSURE: 103 MMHG

## 2019-07-03 LAB
ALBUMIN SERPL-MCNC: 3 G/DL (ref 3.4–5)
ALP SERPL-CCNC: 55 U/L (ref 45–117)
ALT SERPL-CCNC: 34 U/L (ref 12–78)
ANION GAP SERPL CALC-SCNC: 7 MMOL/L (ref 5–15)
BILIRUB SERPL-MCNC: 1.4 MG/DL (ref 0.2–1)
CALCIUM SERPL-MCNC: 8.6 MG/DL (ref 8.5–10.1)
CHLORIDE SERPL-SCNC: 109 MMOL/L (ref 98–107)
CREAT SERPL-MCNC: 0.68 MG/DL (ref 0.55–1.02)
PROT SERPL-MCNC: 5.9 G/DL (ref 6.4–8.2)

## 2019-07-03 RX ADMIN — APIXABAN SCH MG: 5 TABLET, FILM COATED ORAL at 08:53

## 2019-07-03 RX ADMIN — FOLIC ACID SCH MG: 1 TABLET ORAL at 08:53

## 2019-07-03 RX ADMIN — PANTOPRAZOLE SODIUM SCH MG: 20 TABLET, DELAYED RELEASE ORAL at 17:14

## 2019-07-03 RX ADMIN — BUDESONIDE AND FORMOTEROL FUMARATE DIHYDRATE SCH MG: 160; 4.5 AEROSOL RESPIRATORY (INHALATION) at 08:54

## 2019-07-03 RX ADMIN — CARVEDILOL SCH MG: 25 TABLET, FILM COATED ORAL at 20:25

## 2019-07-03 RX ADMIN — CARVEDILOL SCH MG: 25 TABLET, FILM COATED ORAL at 08:52

## 2019-07-03 RX ADMIN — DOCUSATE SODIUM SCH MG: 100 CAPSULE, LIQUID FILLED ORAL at 12:12

## 2019-07-03 RX ADMIN — DIBASIC SODIUM PHOSPHATE, MONOBASIC POTASSIUM PHOSPHATE AND MONOBASIC SODIUM PHOSPHATE SCH MG: 852; 155; 130 TABLET ORAL at 20:25

## 2019-07-03 RX ADMIN — PANTOPRAZOLE SODIUM SCH MG: 20 TABLET, DELAYED RELEASE ORAL at 08:53

## 2019-07-03 RX ADMIN — DIBASIC SODIUM PHOSPHATE, MONOBASIC POTASSIUM PHOSPHATE AND MONOBASIC SODIUM PHOSPHATE SCH MG: 852; 155; 130 TABLET ORAL at 08:53

## 2019-07-03 RX ADMIN — APIXABAN SCH MG: 5 TABLET, FILM COATED ORAL at 20:24

## 2019-07-03 RX ADMIN — Medication SCH MG: at 08:53

## 2019-07-03 RX ADMIN — LEVOTHYROXINE SODIUM SCH MCG: 75 TABLET ORAL at 08:52

## 2019-07-03 RX ADMIN — CEFDINIR SCH MG: 300 CAPSULE ORAL at 20:25

## 2019-07-03 RX ADMIN — DOCUSATE SODIUM SCH MG: 100 CAPSULE, LIQUID FILLED ORAL at 20:24

## 2019-07-03 RX ADMIN — CEFDINIR SCH MG: 300 CAPSULE ORAL at 08:53

## 2019-07-03 RX ADMIN — Medication SCH MG: at 20:25

## 2019-07-04 VITALS — SYSTOLIC BLOOD PRESSURE: 117 MMHG | DIASTOLIC BLOOD PRESSURE: 80 MMHG

## 2019-07-04 VITALS — DIASTOLIC BLOOD PRESSURE: 90 MMHG | SYSTOLIC BLOOD PRESSURE: 150 MMHG

## 2019-07-04 VITALS — DIASTOLIC BLOOD PRESSURE: 83 MMHG | SYSTOLIC BLOOD PRESSURE: 132 MMHG

## 2019-07-04 VITALS — DIASTOLIC BLOOD PRESSURE: 97 MMHG | SYSTOLIC BLOOD PRESSURE: 151 MMHG

## 2019-07-04 RX ADMIN — DOCUSATE SODIUM SCH MG: 100 CAPSULE, LIQUID FILLED ORAL at 09:00

## 2019-07-04 RX ADMIN — APIXABAN SCH MG: 5 TABLET, FILM COATED ORAL at 09:09

## 2019-07-04 RX ADMIN — PANTOPRAZOLE SODIUM SCH MG: 20 TABLET, DELAYED RELEASE ORAL at 17:22

## 2019-07-04 RX ADMIN — FOLIC ACID SCH MG: 1 TABLET ORAL at 09:09

## 2019-07-04 RX ADMIN — CARVEDILOL SCH MG: 25 TABLET, FILM COATED ORAL at 09:09

## 2019-07-04 RX ADMIN — CHLORDIAZEPOXIDE HYDROCHLORIDE PRN MG: 25 CAPSULE ORAL at 00:57

## 2019-07-04 RX ADMIN — CEFDINIR SCH MG: 300 CAPSULE ORAL at 09:09

## 2019-07-04 RX ADMIN — CHLORDIAZEPOXIDE HYDROCHLORIDE PRN MG: 25 CAPSULE ORAL at 22:04

## 2019-07-04 RX ADMIN — Medication SCH MG: at 09:09

## 2019-07-04 RX ADMIN — APIXABAN SCH MG: 5 TABLET, FILM COATED ORAL at 20:56

## 2019-07-04 RX ADMIN — ACETAMINOPHEN PRN MG: 325 TABLET, FILM COATED ORAL at 23:06

## 2019-07-04 RX ADMIN — LEVOTHYROXINE SODIUM SCH MCG: 75 TABLET ORAL at 05:57

## 2019-07-04 RX ADMIN — CEFDINIR SCH MG: 300 CAPSULE ORAL at 20:56

## 2019-07-04 RX ADMIN — CARVEDILOL SCH MG: 25 TABLET, FILM COATED ORAL at 20:56

## 2019-07-04 RX ADMIN — DIBASIC SODIUM PHOSPHATE, MONOBASIC POTASSIUM PHOSPHATE AND MONOBASIC SODIUM PHOSPHATE SCH MG: 852; 155; 130 TABLET ORAL at 20:56

## 2019-07-04 RX ADMIN — DIBASIC SODIUM PHOSPHATE, MONOBASIC POTASSIUM PHOSPHATE AND MONOBASIC SODIUM PHOSPHATE SCH MG: 852; 155; 130 TABLET ORAL at 09:09

## 2019-07-04 RX ADMIN — BUDESONIDE AND FORMOTEROL FUMARATE DIHYDRATE SCH MG: 160; 4.5 AEROSOL RESPIRATORY (INHALATION) at 09:10

## 2019-07-04 RX ADMIN — DOCUSATE SODIUM SCH MG: 100 CAPSULE, LIQUID FILLED ORAL at 20:56

## 2019-07-04 RX ADMIN — PANTOPRAZOLE SODIUM SCH MG: 20 TABLET, DELAYED RELEASE ORAL at 05:57

## 2019-07-04 RX ADMIN — Medication SCH MG: at 20:56

## 2019-07-05 VITALS — SYSTOLIC BLOOD PRESSURE: 143 MMHG | DIASTOLIC BLOOD PRESSURE: 93 MMHG

## 2019-07-05 VITALS — DIASTOLIC BLOOD PRESSURE: 90 MMHG | SYSTOLIC BLOOD PRESSURE: 141 MMHG

## 2019-07-05 VITALS — DIASTOLIC BLOOD PRESSURE: 92 MMHG | SYSTOLIC BLOOD PRESSURE: 125 MMHG

## 2019-07-05 VITALS — DIASTOLIC BLOOD PRESSURE: 94 MMHG | SYSTOLIC BLOOD PRESSURE: 153 MMHG

## 2019-07-05 RX ADMIN — CEFDINIR SCH MG: 300 CAPSULE ORAL at 20:46

## 2019-07-05 RX ADMIN — DIBASIC SODIUM PHOSPHATE, MONOBASIC POTASSIUM PHOSPHATE AND MONOBASIC SODIUM PHOSPHATE SCH MG: 852; 155; 130 TABLET ORAL at 08:36

## 2019-07-05 RX ADMIN — ACETAMINOPHEN PRN MG: 325 TABLET, FILM COATED ORAL at 20:46

## 2019-07-05 RX ADMIN — LEVOTHYROXINE SODIUM SCH MCG: 75 TABLET ORAL at 06:00

## 2019-07-05 RX ADMIN — DOCUSATE SODIUM SCH MG: 100 CAPSULE, LIQUID FILLED ORAL at 08:42

## 2019-07-05 RX ADMIN — BUDESONIDE AND FORMOTEROL FUMARATE DIHYDRATE SCH MG: 160; 4.5 AEROSOL RESPIRATORY (INHALATION) at 09:00

## 2019-07-05 RX ADMIN — DOCUSATE SODIUM SCH MG: 100 CAPSULE, LIQUID FILLED ORAL at 20:47

## 2019-07-05 RX ADMIN — DIBASIC SODIUM PHOSPHATE, MONOBASIC POTASSIUM PHOSPHATE AND MONOBASIC SODIUM PHOSPHATE SCH MG: 852; 155; 130 TABLET ORAL at 20:46

## 2019-07-05 RX ADMIN — CEFDINIR SCH MG: 300 CAPSULE ORAL at 08:36

## 2019-07-05 RX ADMIN — Medication SCH MG: at 08:35

## 2019-07-05 RX ADMIN — Medication SCH MG: at 20:47

## 2019-07-05 RX ADMIN — APIXABAN SCH MG: 5 TABLET, FILM COATED ORAL at 20:47

## 2019-07-05 RX ADMIN — APIXABAN SCH MG: 5 TABLET, FILM COATED ORAL at 08:35

## 2019-07-05 RX ADMIN — PANTOPRAZOLE SODIUM SCH MG: 20 TABLET, DELAYED RELEASE ORAL at 06:04

## 2019-07-05 RX ADMIN — ACETAMINOPHEN PRN MG: 325 TABLET, FILM COATED ORAL at 11:05

## 2019-07-05 RX ADMIN — ACETAMINOPHEN PRN MG: 325 TABLET, FILM COATED ORAL at 03:39

## 2019-07-05 RX ADMIN — FOLIC ACID SCH MG: 1 TABLET ORAL at 08:35

## 2019-07-05 RX ADMIN — PANTOPRAZOLE SODIUM SCH MG: 20 TABLET, DELAYED RELEASE ORAL at 16:00

## 2019-07-05 RX ADMIN — CARVEDILOL SCH MG: 25 TABLET, FILM COATED ORAL at 20:47

## 2019-07-05 RX ADMIN — CARVEDILOL SCH MG: 25 TABLET, FILM COATED ORAL at 08:36

## 2019-07-06 VITALS — DIASTOLIC BLOOD PRESSURE: 80 MMHG | SYSTOLIC BLOOD PRESSURE: 126 MMHG

## 2019-07-06 VITALS — DIASTOLIC BLOOD PRESSURE: 91 MMHG | SYSTOLIC BLOOD PRESSURE: 145 MMHG

## 2019-07-06 VITALS — SYSTOLIC BLOOD PRESSURE: 144 MMHG | DIASTOLIC BLOOD PRESSURE: 99 MMHG

## 2019-07-06 VITALS — SYSTOLIC BLOOD PRESSURE: 145 MMHG | DIASTOLIC BLOOD PRESSURE: 97 MMHG

## 2019-07-06 LAB
ALBUMIN SERPL-MCNC: 3.2 G/DL (ref 3.4–5)
ALP SERPL-CCNC: 59 U/L (ref 45–117)
ALT SERPL-CCNC: 30 U/L (ref 12–78)
ANION GAP SERPL CALC-SCNC: 7 MMOL/L (ref 5–15)
BASOPHILS # BLD AUTO: 0.02 X10^3/UL (ref 0–0.1)
BASOPHILS NFR BLD AUTO: 1 % (ref 0–1)
BILIRUB SERPL-MCNC: 1 MG/DL (ref 0.2–1)
CALCIUM SERPL-MCNC: 8.6 MG/DL (ref 8.5–10.1)
CHLORIDE SERPL-SCNC: 108 MMOL/L (ref 98–107)
CREAT SERPL-MCNC: 0.92 MG/DL (ref 0.55–1.02)
EOSINOPHIL # BLD AUTO: 0.12 X10^3/UL (ref 0–0.4)
EOSINOPHIL NFR BLD AUTO: 3 % (ref 1–7)
ERYTHROCYTE [DISTWIDTH] IN BLOOD BY AUTOMATED COUNT: 14.2 % (ref 9.6–15.2)
LYMPHOCYTES # BLD AUTO: 1.41 X10^3/UL (ref 1–3.4)
LYMPHOCYTES NFR BLD AUTO: 34 % (ref 22–44)
MCH RBC QN AUTO: 34.6 PG (ref 27–34.8)
MCHC RBC AUTO-ENTMCNC: 33.1 G/DL (ref 32.4–35.8)
MCV RBC AUTO: 104.4 FL (ref 80–100)
MD: NO
MONOCYTES # BLD AUTO: 0.62 X10^3/UL (ref 0.2–0.8)
MONOCYTES NFR BLD AUTO: 15 % (ref 2–9)
NEUTROPHILS # BLD AUTO: 1.97 X10^3/UL (ref 1.8–6.8)
NEUTROPHILS NFR BLD AUTO: 48 % (ref 42–75)
PLATELET # BLD AUTO: 175 X10^3/UL (ref 130–400)
PMV BLD AUTO: 7 FL (ref 7.4–10.4)
PROT SERPL-MCNC: 6.2 G/DL (ref 6.4–8.2)
RBC # BLD AUTO: 3.53 X10^6/UL (ref 3.82–5.3)

## 2019-07-06 RX ADMIN — APIXABAN SCH MG: 5 TABLET, FILM COATED ORAL at 20:38

## 2019-07-06 RX ADMIN — BUDESONIDE AND FORMOTEROL FUMARATE DIHYDRATE SCH MG: 160; 4.5 AEROSOL RESPIRATORY (INHALATION) at 08:59

## 2019-07-06 RX ADMIN — ACETAMINOPHEN PRN MG: 325 TABLET, FILM COATED ORAL at 20:37

## 2019-07-06 RX ADMIN — FOLIC ACID SCH MG: 1 TABLET ORAL at 08:56

## 2019-07-06 RX ADMIN — PANTOPRAZOLE SODIUM SCH MG: 20 TABLET, DELAYED RELEASE ORAL at 16:25

## 2019-07-06 RX ADMIN — DOCUSATE SODIUM SCH MG: 100 CAPSULE, LIQUID FILLED ORAL at 20:38

## 2019-07-06 RX ADMIN — Medication SCH MG: at 20:37

## 2019-07-06 RX ADMIN — LEVOTHYROXINE SODIUM SCH MCG: 75 TABLET ORAL at 05:32

## 2019-07-06 RX ADMIN — CARVEDILOL SCH MG: 25 TABLET, FILM COATED ORAL at 20:38

## 2019-07-06 RX ADMIN — Medication SCH MG: at 08:55

## 2019-07-06 RX ADMIN — CEFDINIR SCH MG: 300 CAPSULE ORAL at 08:55

## 2019-07-06 RX ADMIN — DOCUSATE SODIUM SCH MG: 100 CAPSULE, LIQUID FILLED ORAL at 08:56

## 2019-07-06 RX ADMIN — PANTOPRAZOLE SODIUM SCH MG: 20 TABLET, DELAYED RELEASE ORAL at 05:32

## 2019-07-06 RX ADMIN — APIXABAN SCH MG: 5 TABLET, FILM COATED ORAL at 08:56

## 2019-07-06 RX ADMIN — DIBASIC SODIUM PHOSPHATE, MONOBASIC POTASSIUM PHOSPHATE AND MONOBASIC SODIUM PHOSPHATE SCH MG: 852; 155; 130 TABLET ORAL at 08:55

## 2019-07-06 RX ADMIN — CARVEDILOL SCH MG: 25 TABLET, FILM COATED ORAL at 08:55

## 2019-07-07 VITALS — DIASTOLIC BLOOD PRESSURE: 99 MMHG | SYSTOLIC BLOOD PRESSURE: 151 MMHG

## 2019-07-07 VITALS — SYSTOLIC BLOOD PRESSURE: 126 MMHG | DIASTOLIC BLOOD PRESSURE: 82 MMHG

## 2019-07-07 VITALS — DIASTOLIC BLOOD PRESSURE: 89 MMHG | SYSTOLIC BLOOD PRESSURE: 133 MMHG

## 2019-07-07 VITALS — DIASTOLIC BLOOD PRESSURE: 60 MMHG | SYSTOLIC BLOOD PRESSURE: 120 MMHG

## 2019-07-07 RX ADMIN — APIXABAN SCH MG: 5 TABLET, FILM COATED ORAL at 20:22

## 2019-07-07 RX ADMIN — BUDESONIDE AND FORMOTEROL FUMARATE DIHYDRATE SCH MG: 160; 4.5 AEROSOL RESPIRATORY (INHALATION) at 09:00

## 2019-07-07 RX ADMIN — LISINOPRIL SCH MG: 5 TABLET ORAL at 10:02

## 2019-07-07 RX ADMIN — PANTOPRAZOLE SODIUM SCH MG: 20 TABLET, DELAYED RELEASE ORAL at 18:13

## 2019-07-07 RX ADMIN — Medication SCH MG: at 10:03

## 2019-07-07 RX ADMIN — PANTOPRAZOLE SODIUM SCH MG: 20 TABLET, DELAYED RELEASE ORAL at 05:38

## 2019-07-07 RX ADMIN — CARVEDILOL SCH MG: 25 TABLET, FILM COATED ORAL at 10:02

## 2019-07-07 RX ADMIN — LEVOTHYROXINE SODIUM SCH MCG: 75 TABLET ORAL at 05:38

## 2019-07-07 RX ADMIN — DOCUSATE SODIUM SCH MG: 100 CAPSULE, LIQUID FILLED ORAL at 09:00

## 2019-07-07 RX ADMIN — Medication SCH MG: at 20:22

## 2019-07-07 RX ADMIN — APIXABAN SCH MG: 5 TABLET, FILM COATED ORAL at 10:02

## 2019-07-07 RX ADMIN — CARVEDILOL SCH MG: 25 TABLET, FILM COATED ORAL at 20:23

## 2019-07-07 RX ADMIN — FOLIC ACID SCH MG: 1 TABLET ORAL at 10:03

## 2019-07-08 VITALS — DIASTOLIC BLOOD PRESSURE: 92 MMHG | SYSTOLIC BLOOD PRESSURE: 145 MMHG

## 2019-07-08 VITALS — SYSTOLIC BLOOD PRESSURE: 131 MMHG | DIASTOLIC BLOOD PRESSURE: 80 MMHG

## 2019-07-08 VITALS — SYSTOLIC BLOOD PRESSURE: 116 MMHG | DIASTOLIC BLOOD PRESSURE: 70 MMHG

## 2019-07-08 VITALS — DIASTOLIC BLOOD PRESSURE: 77 MMHG | SYSTOLIC BLOOD PRESSURE: 119 MMHG

## 2019-07-08 RX ADMIN — PANTOPRAZOLE SODIUM SCH MG: 20 TABLET, DELAYED RELEASE ORAL at 05:59

## 2019-07-08 RX ADMIN — Medication SCH MG: at 08:56

## 2019-07-08 RX ADMIN — PANTOPRAZOLE SODIUM SCH MG: 20 TABLET, DELAYED RELEASE ORAL at 16:16

## 2019-07-08 RX ADMIN — CARVEDILOL SCH MG: 25 TABLET, FILM COATED ORAL at 20:44

## 2019-07-08 RX ADMIN — APIXABAN SCH MG: 5 TABLET, FILM COATED ORAL at 08:57

## 2019-07-08 RX ADMIN — Medication SCH MG: at 20:44

## 2019-07-08 RX ADMIN — CARVEDILOL SCH MG: 25 TABLET, FILM COATED ORAL at 08:57

## 2019-07-08 RX ADMIN — APIXABAN SCH MG: 5 TABLET, FILM COATED ORAL at 20:44

## 2019-07-08 RX ADMIN — BUDESONIDE AND FORMOTEROL FUMARATE DIHYDRATE SCH MG: 160; 4.5 AEROSOL RESPIRATORY (INHALATION) at 08:57

## 2019-07-08 RX ADMIN — FOLIC ACID SCH MG: 1 TABLET ORAL at 08:56

## 2019-07-08 RX ADMIN — LEVOTHYROXINE SODIUM SCH MCG: 75 TABLET ORAL at 05:59

## 2019-07-08 RX ADMIN — LISINOPRIL SCH MG: 5 TABLET ORAL at 08:57

## 2019-07-09 VITALS — DIASTOLIC BLOOD PRESSURE: 74 MMHG | SYSTOLIC BLOOD PRESSURE: 126 MMHG

## 2019-07-09 VITALS — DIASTOLIC BLOOD PRESSURE: 88 MMHG | SYSTOLIC BLOOD PRESSURE: 138 MMHG

## 2019-07-09 VITALS — DIASTOLIC BLOOD PRESSURE: 73 MMHG | SYSTOLIC BLOOD PRESSURE: 116 MMHG

## 2019-07-09 RX ADMIN — Medication SCH MG: at 08:51

## 2019-07-09 RX ADMIN — PANTOPRAZOLE SODIUM SCH MG: 20 TABLET, DELAYED RELEASE ORAL at 05:30

## 2019-07-09 RX ADMIN — PANTOPRAZOLE SODIUM SCH MG: 20 TABLET, DELAYED RELEASE ORAL at 16:08

## 2019-07-09 RX ADMIN — APIXABAN SCH MG: 5 TABLET, FILM COATED ORAL at 08:51

## 2019-07-09 RX ADMIN — CARVEDILOL SCH MG: 25 TABLET, FILM COATED ORAL at 08:51

## 2019-07-09 RX ADMIN — LEVOTHYROXINE SODIUM SCH MCG: 75 TABLET ORAL at 05:30

## 2019-07-09 RX ADMIN — BUDESONIDE AND FORMOTEROL FUMARATE DIHYDRATE SCH MG: 160; 4.5 AEROSOL RESPIRATORY (INHALATION) at 08:51

## 2019-07-09 RX ADMIN — FOLIC ACID SCH MG: 1 TABLET ORAL at 08:51

## 2019-07-09 RX ADMIN — LISINOPRIL SCH MG: 5 TABLET ORAL at 08:51

## 2019-07-19 ENCOUNTER — OFFICE VISIT (OUTPATIENT)
Dept: MEDICAL GROUP | Facility: MEDICAL CENTER | Age: 74
End: 2019-07-19
Payer: MEDICARE

## 2019-07-19 VITALS
SYSTOLIC BLOOD PRESSURE: 100 MMHG | WEIGHT: 168 LBS | BODY MASS INDEX: 27 KG/M2 | OXYGEN SATURATION: 93 % | HEART RATE: 71 BPM | HEIGHT: 66 IN | TEMPERATURE: 98.4 F | DIASTOLIC BLOOD PRESSURE: 60 MMHG | RESPIRATION RATE: 16 BRPM

## 2019-07-19 DIAGNOSIS — I48.91 ATRIAL FIBRILLATION WITH RVR (HCC): ICD-10-CM

## 2019-07-19 DIAGNOSIS — F10.930 ALCOHOL WITHDRAWAL SYNDROME WITHOUT COMPLICATION (HCC): ICD-10-CM

## 2019-07-19 DIAGNOSIS — F51.04 CHRONIC INSOMNIA: ICD-10-CM

## 2019-07-19 DIAGNOSIS — I10 ESSENTIAL HYPERTENSION: ICD-10-CM

## 2019-07-19 DIAGNOSIS — Z09 HOSPITAL DISCHARGE FOLLOW-UP: ICD-10-CM

## 2019-07-19 DIAGNOSIS — N30.00 ACUTE CYSTITIS WITHOUT HEMATURIA: ICD-10-CM

## 2019-07-19 PROCEDURE — 99214 OFFICE O/P EST MOD 30 MIN: CPT | Performed by: FAMILY MEDICINE

## 2019-07-19 RX ORDER — FOLIC ACID 1 MG/1
1 TABLET ORAL DAILY
COMMUNITY
End: 2019-09-02 | Stop reason: SDUPTHER

## 2019-07-19 NOTE — PROGRESS NOTES
CC: Hospital discharge follow-up    HPI:   Jeanie presents today for hospital discharge follow-up  Patient coming today for hospital follow-up.  Patient was admitted to was admitted to [facility] on 6/29/2019 due to nausea and vomiting.  On admission the patient found to have dehydration and alcohol withdrawal.  She was started on CIWA protocol, folic acid, thiamine, and and IV fluids.  Patient was also found to have A. fib with RVR probably because of her dehydration, echocardiogram was obtained, HAVEN ejection fraction 55 to 60%, carvedilol was increased to 12.5 mg twice a day patient was on 6.25 mg twice a day..  Patient chads score is 2-3 so was started on Eliquis 5 mg twice a day.  Patient was found also to have a UTI, urine culture grew E. coli, patient initially was started on IV antibiotics and later transition to oral antibiotics cefdinir.  Patient was discharged from the hospital in stable condition on 7/4/2019.  Came in today for follow-up.  Denies any dizziness, chest pain, palpitation, or shortness of breath.  Has been tolerating Eliquis very well.  No history of falls.  Patient has completed her oral antibiotics for UTI, currently denies any burning urination, abdominal pain, nausea, or vomiting.  ,  Patient Active Problem List    Diagnosis Date Noted   • Alcohol abuse 04/25/2019   • Mixed hyperlipidemia 03/11/2018   • Moderate single current episode of major depressive disorder ([facility]) 12/08/2017   • HTN (hypertension) 01/20/2015   • PAF (paroxysmal atrial fibrillation) ([facility]) 09/10/2014   • Drug abuse (HCC) 05/17/2014   • Anxiety 05/17/2014       Current Outpatient Prescriptions   Medication Sig Dispense Refill   • apixaban (ELIQUIS) 5mg Tab Take 5 mg by mouth 2 Times a Day.     • folic acid (FOLVITE) 1 MG Tab Take 1 mg by mouth every day.     • quetiapine (SEROQUEL) 50 MG tablet Take 1 Tab by mouth 2 times a day. 60 Tab 2   • levothyroxine (SYNTHROID) 75 MCG Tab Take 1 Tab by mouth Every morning  "on an empty stomach. 60 Tab 0   • carvedilol (COREG) 6.25 MG Tab Take 1 Tab by mouth 2 times a day, with meals. 60 Tab 11   • mirtazapine (REMERON) 15 MG Tab Take 1 Tab by mouth every bedtime. 30 Tab 3   • celecoxib (CELEBREX) 200 MG Cap Take 1 Cap by mouth 2 times a day. 60 Cap 3   • lisinopril (PRINIVIL) 20 MG Tab Take 1 Tab by mouth every day. 90 Tab 3   • hydrOXYzine pamoate (VISTARIL) 50 MG Cap Take 1 Cap by mouth 3 times a day as needed for Itching. 90 Cap 3   • paroxetine (PAXIL-CR) 37.5 MG CR tablet Take 1 Tab by mouth every day. 90 Tab 3   • baclofen (LIORESAL) 20 MG tablet Take 1 Tab by mouth 3 times a day. 90 Tab 3   • levothyroxine (SYNTHROID) 25 MCG Tab Take 1 Tab by mouth Every morning on an empty stomach. 90 Tab 1   • gabapentin (NEURONTIN) 300 MG Cap Take 300 mg by mouth 3 times a day.     • lorazepam (ATIVAN) 1 MG Tab Take 1 Tab by mouth every 8 hours as needed for Anxiety.     • aspirin EC 81 MG EC tablet Take 1 Tab by mouth every day. 30 Tab 2     No current facility-administered medications for this visit.          Allergies as of 07/19/2019   • (No Known Allergies)        ROS: Denies any chest pain, Shortness of breath, Changes bowel or bladder, Lower extremity edema.    Physical Exam:  /60 (BP Location: Right arm, Patient Position: Sitting, BP Cuff Size: Adult)   Pulse 71   Temp 36.9 °C (98.4 °F) (Temporal)   Resp 16   Ht 1.676 m (5' 6\")   Wt 76.2 kg (168 lb)   SpO2 93%   BMI 27.12 kg/m²   Gen.: Well-developed, well-nourished, no apparent distress,pleasant and cooperative with the examination  Skin:  Warm and dry with good turgor. No rashes or suspicious lesions in visible areas  HEENT:Sinuses nontender with palpation, TMs clear, nares patent with pink mucosa and clear rhinorrhea,no septal deviation ,polyps or lesions. lips without lesions, oropharynx clear.  Neck: Trachea midline,no masses or adenopathy. No JVD.  Cor: Regular rate and rhythm without murmur, gallop or rub.  Lungs: " Respirations unlabored.Clear to auscultation with equal breath sounds bilaterally. No wheezes, rhonchi.  Extremities: No cyanosis, clubbing or edema.  Abdomen: Soft, no tenderness, no distention, normal bowel sounds.    Assessment and Plan.   73 y.o. female     1. Hospital discharge follow-up  Hospital discharge summary from [facility]    2. Atrial fibrillation with RVR (HCC)  Resolved.  No patient has normal heart rate and rhythm.  Continue on Eliquis 5 mg twice a day and carvedilol 6.25 mg twice a day.    3. Essential hypertension  Blood pressure has been adequately controlled.  Continue on lisinopril 20 mg daily.    4. Chronic insomnia  Patient reported trouble falling asleep, mirtazapine 15 mg has not been helping.  Will increase the dose to mirtazapine 30 mg.    5. Alcohol withdrawal syndrome without complication (HCC)  Stable.  Discussed decreasing alcohol consumption patient was sent to detox program multiple times.  Continue on thiamine and folic acid    6. Acute cystitis without hematuria  Resolved.  Asymptomatic.  Patient finished her oral antibiotics.

## 2019-07-22 DIAGNOSIS — E03.4 HYPOTHYROIDISM DUE TO ACQUIRED ATROPHY OF THYROID: ICD-10-CM

## 2019-07-22 RX ORDER — LEVOTHYROXINE SODIUM 0.07 MG/1
TABLET ORAL
Qty: 60 TAB | Refills: 0 | Status: SHIPPED
Start: 2019-07-22 | End: 2020-01-27

## 2019-08-09 ENCOUNTER — HOSPITAL ENCOUNTER (INPATIENT)
Dept: HOSPITAL 8 - ED | Age: 74
LOS: 7 days | Discharge: HOME HEALTH | DRG: 897 | End: 2019-08-16
Attending: INTERNAL MEDICINE | Admitting: INTERNAL MEDICINE
Payer: MEDICARE

## 2019-08-09 VITALS — HEIGHT: 65 IN | BODY MASS INDEX: 30.67 KG/M2 | WEIGHT: 184.09 LBS

## 2019-08-09 DIAGNOSIS — F41.1: ICD-10-CM

## 2019-08-09 DIAGNOSIS — F10.239: Primary | ICD-10-CM

## 2019-08-09 DIAGNOSIS — I10: ICD-10-CM

## 2019-08-09 DIAGNOSIS — G47.00: ICD-10-CM

## 2019-08-09 DIAGNOSIS — D53.9: ICD-10-CM

## 2019-08-09 DIAGNOSIS — D69.6: ICD-10-CM

## 2019-08-09 DIAGNOSIS — D68.59: ICD-10-CM

## 2019-08-09 DIAGNOSIS — E03.9: ICD-10-CM

## 2019-08-09 DIAGNOSIS — Z90.49: ICD-10-CM

## 2019-08-09 DIAGNOSIS — I48.0: ICD-10-CM

## 2019-08-09 DIAGNOSIS — F33.1: ICD-10-CM

## 2019-08-09 PROCEDURE — 99285 EMERGENCY DEPT VISIT HI MDM: CPT

## 2019-08-09 PROCEDURE — 82607 VITAMIN B-12: CPT

## 2019-08-09 PROCEDURE — 80053 COMPREHEN METABOLIC PANEL: CPT

## 2019-08-09 PROCEDURE — 93005 ELECTROCARDIOGRAM TRACING: CPT

## 2019-08-09 PROCEDURE — 83735 ASSAY OF MAGNESIUM: CPT

## 2019-08-09 PROCEDURE — 80307 DRUG TEST PRSMV CHEM ANLYZR: CPT

## 2019-08-09 PROCEDURE — 36415 COLL VENOUS BLD VENIPUNCTURE: CPT

## 2019-08-09 PROCEDURE — 80048 BASIC METABOLIC PNL TOTAL CA: CPT

## 2019-08-09 PROCEDURE — 70450 CT HEAD/BRAIN W/O DYE: CPT

## 2019-08-09 PROCEDURE — 83690 ASSAY OF LIPASE: CPT

## 2019-08-09 PROCEDURE — C9113 INJ PANTOPRAZOLE SODIUM, VIA: HCPCS

## 2019-08-09 PROCEDURE — 85025 COMPLETE CBC W/AUTO DIFF WBC: CPT

## 2019-08-09 PROCEDURE — 84100 ASSAY OF PHOSPHORUS: CPT

## 2019-08-09 PROCEDURE — 76700 US EXAM ABDOM COMPLETE: CPT

## 2019-08-09 PROCEDURE — 82040 ASSAY OF SERUM ALBUMIN: CPT

## 2019-08-16 VITALS — DIASTOLIC BLOOD PRESSURE: 83 MMHG | SYSTOLIC BLOOD PRESSURE: 150 MMHG

## 2019-08-20 ENCOUNTER — TELEPHONE (OUTPATIENT)
Dept: MEDICAL GROUP | Facility: MEDICAL CENTER | Age: 74
End: 2019-08-20

## 2019-08-20 DIAGNOSIS — R53.81 PHYSICAL DEBILITY: ICD-10-CM

## 2019-08-21 ENCOUNTER — OFFICE VISIT (OUTPATIENT)
Dept: MEDICAL GROUP | Facility: MEDICAL CENTER | Age: 74
End: 2019-08-21
Payer: MEDICARE

## 2019-08-21 VITALS
RESPIRATION RATE: 16 BRPM | WEIGHT: 165 LBS | OXYGEN SATURATION: 93 % | HEART RATE: 65 BPM | HEIGHT: 66 IN | BODY MASS INDEX: 26.52 KG/M2 | TEMPERATURE: 98 F | DIASTOLIC BLOOD PRESSURE: 74 MMHG | SYSTOLIC BLOOD PRESSURE: 128 MMHG

## 2019-08-21 DIAGNOSIS — R29.6 RECURRENT FALLS: ICD-10-CM

## 2019-08-21 DIAGNOSIS — I48.0 PAF (PAROXYSMAL ATRIAL FIBRILLATION) (HCC): ICD-10-CM

## 2019-08-21 DIAGNOSIS — F10.930 ALCOHOL WITHDRAWAL SYNDROME WITHOUT COMPLICATION (HCC): ICD-10-CM

## 2019-08-21 DIAGNOSIS — F32.1 MODERATE SINGLE CURRENT EPISODE OF MAJOR DEPRESSIVE DISORDER (HCC): ICD-10-CM

## 2019-08-21 DIAGNOSIS — Z09 HOSPITAL DISCHARGE FOLLOW-UP: ICD-10-CM

## 2019-08-21 PROCEDURE — 99214 OFFICE O/P EST MOD 30 MIN: CPT | Performed by: FAMILY MEDICINE

## 2019-08-29 ENCOUNTER — TELEPHONE (OUTPATIENT)
Dept: MEDICAL GROUP | Facility: MEDICAL CENTER | Age: 74
End: 2019-08-29

## 2019-08-30 NOTE — TELEPHONE ENCOUNTER
I called the number provided and left a message.  Anyway patient need to be seen immediately here in the clinic or the ER to be evaluated

## 2019-09-03 RX ORDER — FOLIC ACID 1 MG/1
1 TABLET ORAL DAILY
Qty: 14 TAB | Refills: 0 | Status: SHIPPED | OUTPATIENT
Start: 2019-09-03 | End: 2019-09-03 | Stop reason: SDUPTHER

## 2019-09-03 RX ORDER — FOLIC ACID 1 MG/1
1 TABLET ORAL DAILY
Qty: 14 TAB | Refills: 0 | Status: SHIPPED
Start: 2019-09-03 | End: 2020-01-27

## 2019-10-18 ENCOUNTER — OFFICE VISIT (OUTPATIENT)
Dept: MEDICAL GROUP | Facility: MEDICAL CENTER | Age: 74
End: 2019-10-18
Payer: MEDICARE

## 2019-10-18 VITALS
RESPIRATION RATE: 16 BRPM | WEIGHT: 166.67 LBS | OXYGEN SATURATION: 91 % | SYSTOLIC BLOOD PRESSURE: 142 MMHG | DIASTOLIC BLOOD PRESSURE: 98 MMHG | TEMPERATURE: 97.5 F | HEART RATE: 70 BPM | BODY MASS INDEX: 26.79 KG/M2 | HEIGHT: 66 IN

## 2019-10-18 DIAGNOSIS — F32.1 MODERATE SINGLE CURRENT EPISODE OF MAJOR DEPRESSIVE DISORDER (HCC): ICD-10-CM

## 2019-10-18 DIAGNOSIS — Z09 HOSPITAL DISCHARGE FOLLOW-UP: ICD-10-CM

## 2019-10-18 DIAGNOSIS — E03.9 ACQUIRED HYPOTHYROIDISM: ICD-10-CM

## 2019-10-18 DIAGNOSIS — F10.10 ALCOHOL ABUSE: ICD-10-CM

## 2019-10-18 DIAGNOSIS — Z92.89 S/P ALCOHOL DETOXIFICATION: ICD-10-CM

## 2019-10-18 PROCEDURE — 99214 OFFICE O/P EST MOD 30 MIN: CPT | Performed by: FAMILY MEDICINE

## 2019-10-18 NOTE — PROGRESS NOTES
CC: Hospital discharge follow-up    HPI:   Jeanie presents today for hospital discharge follow-up.    Patient was admitted to [facility] 3 weeks ago for because she has been having alcohol withdrawal, patient tried to detox herself at home but she could not tolerate that, so she was admitted to [facility] for alcohol detoxification.  Patient developed A. fib with RVR which was then treated.  Then patient was sent to [facility] for rehab where she stayed for 2 weeks.    Came in today for follow-up.  Since depressed.  Patient has a history of losing her 2 daughters within the past 5 years.  And her  is currently very depressed and develops alcohol induced dementia, currently is he stays at home, has been completely independent on her.  Patient stated that her depression has progressively getting worse.  However she denies any suicidal ideation.  She has been seeing a psychologist, however she refused seeing a psychiatrist, she stated that she used to see one, but he never talked, he just wanted to prescribe medication.  He is currently on Paxil 37.5 mg daily.  Patient has history of hypothyroidism, she has been tolerating Levothyroxine, no palpitation, no cold or heat intolerance, she is currently on levothyroxine 25 mcg daily.  She has history of proximal A. fib, currently she has normal rhythm and rate.  She is currently on carvedilol 6.25 mg twice a day and Eliquis which she has been on taking only half the dose, 2.5 mg twice a day.        Patient Active Problem List    Diagnosis Date Noted   • Recurrent falls 08/21/2019   • Alcohol abuse 04/25/2019   • Mixed hyperlipidemia 03/11/2018   • Moderate single current episode of major depressive disorder ([facility]) 12/08/2017   • HTN (hypertension) 01/20/2015   • PAF (paroxysmal atrial fibrillation) ([facility]) 09/10/2014   • Drug abuse ([facility]) 05/17/2014   • Anxiety 05/17/2014       Current Outpatient Medications   Medication Sig Dispense  "Refill   • folic acid (FOLVITE) 1 MG Tab Take 1 Tab by mouth every day. 14 Tab 0   • levothyroxine (SYNTHROID) 75 MCG Tab TAKE ONE TABLET BY MOUTH EVERY MORNING ON AN EMPTY STOMACH 60 Tab 0   • apixaban (ELIQUIS) 5mg Tab Take 5 mg by mouth 2 Times a Day.     • quetiapine (SEROQUEL) 50 MG tablet Take 1 Tab by mouth 2 times a day. 60 Tab 2   • carvedilol (COREG) 6.25 MG Tab Take 1 Tab by mouth 2 times a day, with meals. 60 Tab 11   • mirtazapine (REMERON) 15 MG Tab Take 1 Tab by mouth every bedtime. 30 Tab 3   • celecoxib (CELEBREX) 200 MG Cap Take 1 Cap by mouth 2 times a day. 60 Cap 3   • lisinopril (PRINIVIL) 20 MG Tab Take 1 Tab by mouth every day. 90 Tab 3   • hydrOXYzine pamoate (VISTARIL) 50 MG Cap Take 1 Cap by mouth 3 times a day as needed for Itching. 90 Cap 3   • paroxetine (PAXIL-CR) 37.5 MG CR tablet Take 1 Tab by mouth every day. 90 Tab 3   • baclofen (LIORESAL) 20 MG tablet Take 1 Tab by mouth 3 times a day. 90 Tab 3   • levothyroxine (SYNTHROID) 25 MCG Tab Take 1 Tab by mouth Every morning on an empty stomach. 90 Tab 1   • gabapentin (NEURONTIN) 300 MG Cap Take 300 mg by mouth 3 times a day.     • lorazepam (ATIVAN) 1 MG Tab Take 1 Tab by mouth every 8 hours as needed for Anxiety.     • aspirin EC 81 MG EC tablet Take 1 Tab by mouth every day. 30 Tab 2     No current facility-administered medications for this visit.          Allergies as of 10/18/2019   • (No Known Allergies)        ROS: Denies any chest pain, Shortness of breath, Changes bowel or bladder, Lower extremity edema.    Physical Exam:  /98 (BP Location: Right arm, Patient Position: Sitting, BP Cuff Size: Adult long)   Pulse 70   Temp 36.4 °C (97.5 °F) (Temporal)   Resp 16   Ht 1.676 m (5' 6\")   Wt 75.6 kg (166 lb 10.7 oz)   SpO2 91%   BMI 26.90 kg/m²   Gen.: Well-developed, well-nourished, no apparent distress,pleasant and cooperative with the examination  Skin:  Warm and dry with good turgor. No rashes or suspicious lesions in " visible areas  Eye: PERRLA, conjunctiva and sclera clear, lids normal  HEENT:Sinuses nontender with palpation, TMs clear, nares patent with pink mucosa, and clear rhinorrhea,no septal deviation ,polyps or lesions. lips without lesions, oropharynx clear.  Neck: Trachea midline,no masses or adenopathy. No JVD.  Thyroid: normal consistency and size. No masses or nodules. Not tender with palpation.  Cor: Regular rate and rhythm without murmur, gallop or rub.  Lungs: Respirations unlabored.Clear to auscultation with equal breath sounds bilaterally. No wheezes, rhonchi.  Abdomen: Soft nontender without hepatosplenomegaly or masses appreciated, normoactive bowel sounds. No hernias.  Extremities: No cyanosis, clubbing or edema, Symmetrical without deformities or malformations. Pulses 2+ and symmetrical both upper and lower extremities  Psych: Alert and oriented x 3.Depressed affect,however normal judgement,insight . Bad memory.        Assessment and Plan.   74 y.o. female     1. Hospital discharge follow-up  Stable.  Advised for alcohol withdrawal symptoms at [facility].  She has been detoxed for about 2 weeks.    2. Alcohol abuse/S/P alcohol detoxification  Currently stable.  But she is not sure should continue to be detoxed    3. Moderate single current episode of major depressive disorder (HCC)  Progressive getting worse.  However she denies any suicidal ideation.  Patient refused seeing a psychiatrist.  But she is okay to continue seeing her psychologist.  Continue on Paxil 37.5 mg daily.  Patient advised to have more frequent office visit to talk.  She agreed, however she is not sure that it that will help because she stated that her life already ruined and what ever she lost will never come back.    4. Acquired hypothyroidism  He has been tolerating Levothyroxine, no palpitation, no cold or heat intolerance  Continue on levothyroxine 25 mcg daily.    - TSH; Future  - FREE THYROXINE; Future    5.  Paroxysmal  A. Fib  Stable no RVR.  Continue carvedilol, and Eliquis.

## 2019-10-30 ENCOUNTER — TELEPHONE (OUTPATIENT)
Dept: MEDICAL GROUP | Facility: MEDICAL CENTER | Age: 74
End: 2019-10-30

## 2019-10-30 DIAGNOSIS — R39.9 UTI SYMPTOMS: ICD-10-CM

## 2019-10-31 ENCOUNTER — HOSPITAL ENCOUNTER (OUTPATIENT)
Dept: LAB | Facility: MEDICAL CENTER | Age: 74
End: 2019-10-31
Attending: FAMILY MEDICINE
Payer: MEDICARE

## 2019-10-31 DIAGNOSIS — R39.9 UTI SYMPTOMS: ICD-10-CM

## 2019-10-31 DIAGNOSIS — E03.9 ACQUIRED HYPOTHYROIDISM: ICD-10-CM

## 2019-10-31 LAB
APPEARANCE UR: ABNORMAL
BACTERIA #/AREA URNS HPF: ABNORMAL /HPF
BILIRUB UR QL STRIP.AUTO: NEGATIVE
COLOR UR: ABNORMAL
EPI CELLS #/AREA URNS HPF: NEGATIVE /HPF
GLUCOSE UR STRIP.AUTO-MCNC: 100 MG/DL
HYALINE CASTS #/AREA URNS LPF: ABNORMAL /LPF
KETONES UR STRIP.AUTO-MCNC: NEGATIVE MG/DL
LEUKOCYTE ESTERASE UR QL STRIP.AUTO: ABNORMAL
MICRO URNS: ABNORMAL
NITRITE UR QL STRIP.AUTO: POSITIVE
PH UR STRIP.AUTO: 5.5 [PH] (ref 5–8)
PROT UR QL STRIP: 100 MG/DL
RBC # URNS HPF: ABNORMAL /HPF
RBC UR QL AUTO: ABNORMAL
SP GR UR STRIP.AUTO: 1.02
T4 FREE SERPL-MCNC: 0.81 NG/DL (ref 0.53–1.43)
TSH SERPL DL<=0.005 MIU/L-ACNC: 9.78 UIU/ML (ref 0.38–5.33)
UROBILINOGEN UR STRIP.AUTO-MCNC: 1 MG/DL
WBC #/AREA URNS HPF: ABNORMAL /HPF

## 2019-10-31 PROCEDURE — 87077 CULTURE AEROBIC IDENTIFY: CPT

## 2019-10-31 PROCEDURE — 36415 COLL VENOUS BLD VENIPUNCTURE: CPT

## 2019-10-31 PROCEDURE — 84439 ASSAY OF FREE THYROXINE: CPT

## 2019-10-31 PROCEDURE — 87086 URINE CULTURE/COLONY COUNT: CPT

## 2019-10-31 PROCEDURE — 84443 ASSAY THYROID STIM HORMONE: CPT

## 2019-10-31 PROCEDURE — 87186 SC STD MICRODIL/AGAR DIL: CPT

## 2019-10-31 PROCEDURE — 81001 URINALYSIS AUTO W/SCOPE: CPT

## 2019-11-01 DIAGNOSIS — N30.00 ACUTE CYSTITIS WITHOUT HEMATURIA: ICD-10-CM

## 2019-11-01 RX ORDER — NITROFURANTOIN 25; 75 MG/1; MG/1
100 CAPSULE ORAL 2 TIMES DAILY
Qty: 14 CAP | Refills: 0 | Status: SHIPPED | OUTPATIENT
Start: 2019-11-01 | End: 2019-11-08

## 2019-11-03 LAB
BACTERIA UR CULT: ABNORMAL
BACTERIA UR CULT: ABNORMAL
SIGNIFICANT IND 70042: ABNORMAL
SITE SITE: ABNORMAL
SOURCE SOURCE: ABNORMAL

## 2019-11-27 ENCOUNTER — TELEPHONE (OUTPATIENT)
Dept: MEDICAL GROUP | Facility: MEDICAL CENTER | Age: 74
End: 2019-11-27

## 2020-01-13 ENCOUNTER — OFFICE VISIT (OUTPATIENT)
Dept: MEDICAL GROUP | Facility: MEDICAL CENTER | Age: 75
End: 2020-01-13
Payer: MEDICARE

## 2020-01-13 ENCOUNTER — TELEPHONE (OUTPATIENT)
Dept: MEDICAL GROUP | Facility: MEDICAL CENTER | Age: 75
End: 2020-01-13

## 2020-01-13 VITALS
BODY MASS INDEX: 26.68 KG/M2 | WEIGHT: 166 LBS | TEMPERATURE: 97.1 F | HEART RATE: 110 BPM | OXYGEN SATURATION: 94 % | DIASTOLIC BLOOD PRESSURE: 82 MMHG | RESPIRATION RATE: 16 BRPM | SYSTOLIC BLOOD PRESSURE: 124 MMHG | HEIGHT: 66 IN

## 2020-01-13 DIAGNOSIS — I48.20 CHRONIC ATRIAL FIBRILLATION (HCC): ICD-10-CM

## 2020-01-13 DIAGNOSIS — N30.00 ACUTE CYSTITIS WITHOUT HEMATURIA: ICD-10-CM

## 2020-01-13 DIAGNOSIS — Z09 HOSPITAL DISCHARGE FOLLOW-UP: ICD-10-CM

## 2020-01-13 DIAGNOSIS — F10.10 ALCOHOL ABUSE: ICD-10-CM

## 2020-01-13 PROCEDURE — 99214 OFFICE O/P EST MOD 30 MIN: CPT | Performed by: FAMILY MEDICINE

## 2020-01-13 NOTE — TELEPHONE ENCOUNTER
Patient was just here and doesn't know if she should continue taking her medications she stated that a lot of things changed but doesn't know what medications she should continue taking.

## 2020-01-14 NOTE — PROGRESS NOTES
CC: Hospital discharge follow-up    HPI:   Jeanie presents today for hospital discharge follow-up.  Patient was admitted to [facility], with palpitation and shortness of breath.  Was found to have rapid A. fib with RVR.  Was admitted to the ICU where her condition was controlled over few days, and then she was sent to the regular floor.  Patient has also been treated for urinary tract infection and possible pneumonia.  Patient is a chronic alcohol abuser and has been detoxed during this hospitalization.    Patient came in today for follow-up.  Patient stated that she was fine when she came to the clinic, she drove by herself.  However now she feels a little bit dizzy, has mild palpitation, denies any chest pain or shortness of breath.  Her heart rate was between .  Patient advised to go to the hospital but she stated that she does not want to.  She wants somebody to take her back home, but she left her phone at home and she does not remember any phone number of 5 relatives.  Patient insisted not to go to the hospital or call the ambulance to take her.  Medical assistance took the patient downstairs in a wheelchair, patient asked to call an Uber to take her to home.    Patient Active Problem List    Diagnosis Date Noted   • Recurrent falls 08/21/2019   • Alcohol abuse 04/25/2019   • Mixed hyperlipidemia 03/11/2018   • Moderate single current episode of major depressive disorder ([facility]) 12/08/2017   • HTN (hypertension) 01/20/2015   • PAF (paroxysmal atrial fibrillation) ([facility]) 09/10/2014   • Drug abuse ([facility]) 05/17/2014   • Anxiety 05/17/2014       Current Outpatient Medications   Medication Sig Dispense Refill   • folic acid (FOLVITE) 1 MG Tab Take 1 Tab by mouth every day. 14 Tab 0   • apixaban (ELIQUIS) 5mg Tab Take 5 mg by mouth 2 Times a Day.     • carvedilol (COREG) 6.25 MG Tab Take 1 Tab by mouth 2 times a day, with meals. 60 Tab 11   • levothyroxine (SYNTHROID) 75 MCG Tab TAKE ONE TABLET BY MOUTH  "EVERY MORNING ON AN EMPTY STOMACH 60 Tab 0   • quetiapine (SEROQUEL) 50 MG tablet Take 1 Tab by mouth 2 times a day. 60 Tab 2   • mirtazapine (REMERON) 15 MG Tab Take 1 Tab by mouth every bedtime. 30 Tab 3   • celecoxib (CELEBREX) 200 MG Cap Take 1 Cap by mouth 2 times a day. 60 Cap 3   • lisinopril (PRINIVIL) 20 MG Tab Take 1 Tab by mouth every day. 90 Tab 3   • hydrOXYzine pamoate (VISTARIL) 50 MG Cap Take 1 Cap by mouth 3 times a day as needed for Itching. 90 Cap 3   • paroxetine (PAXIL-CR) 37.5 MG CR tablet Take 1 Tab by mouth every day. 90 Tab 3   • baclofen (LIORESAL) 20 MG tablet Take 1 Tab by mouth 3 times a day. 90 Tab 3   • levothyroxine (SYNTHROID) 25 MCG Tab Take 1 Tab by mouth Every morning on an empty stomach. 90 Tab 1   • gabapentin (NEURONTIN) 300 MG Cap Take 300 mg by mouth 3 times a day.     • lorazepam (ATIVAN) 1 MG Tab Take 1 Tab by mouth every 8 hours as needed for Anxiety.     • aspirin EC 81 MG EC tablet Take 1 Tab by mouth every day. 30 Tab 2     No current facility-administered medications for this visit.          Allergies as of 01/13/2020   • (No Known Allergies)        ROS: Denies any chest pain, Shortness of breath, Changes bowel or bladder, Lower extremity edema.    Physical Exam:  /82 (BP Location: Right arm, Patient Position: Sitting, BP Cuff Size: Adult)   Pulse (!) 110   Temp 36.2 °C (97.1 °F) (Temporal)   Resp 16   Ht 1.676 m (5' 6\")   Wt 75.3 kg (166 lb)   SpO2 94%   BMI 26.79 kg/m²   Gen.: Well-developed, well-nourished, feels dizzy  Skin:  Warm and dry with good turgor. No rashes or suspicious lesions in visible areas  HEENT:Sinuses nontender with palpation, TMs clear, nares patent with pink mucosa and clear rhinorrhea,no septal deviation ,polyps or lesions. lips without lesions, oropharynx clear.  Neck: Trachea midline,no masses or adenopathy. No JVD.  Cor: Tachycardia (heart rate 110 ) irregular irregular rate and rhythm without murmur, gallop or rub.  Lungs: " Respirations unlabored.Clear to auscultation with equal breath sounds bilaterally. No wheezes, rhonchi.  Extremities: No cyanosis, clubbing or edema.      Assessment and Plan.   74 y.o. female     1. Hospital discharge follow-up  No discharge summary sent from [facility].  History was taken from patient    2. Chronic atrial fibrillation  Slightly high heart rate, between .  Patient is feeling dizzy.    Refused to be sent to the hospital.  However patient advised to call the ambulance if her dizziness continues.    3. Alcohol abuse  Has been off of alcohol for a few days.  Was detoxed at the hospital.    4. Acute cystitis without hematuria  Status post treatment, currently asymptomatic.

## 2020-01-15 ENCOUNTER — OFFICE VISIT (OUTPATIENT)
Dept: CARDIOLOGY | Facility: MEDICAL CENTER | Age: 75
End: 2020-01-15
Payer: MEDICARE

## 2020-01-15 VITALS
SYSTOLIC BLOOD PRESSURE: 112 MMHG | WEIGHT: 154.65 LBS | OXYGEN SATURATION: 92 % | BODY MASS INDEX: 24.85 KG/M2 | HEART RATE: 76 BPM | DIASTOLIC BLOOD PRESSURE: 54 MMHG | HEIGHT: 66 IN

## 2020-01-15 DIAGNOSIS — I10 ESSENTIAL HYPERTENSION: ICD-10-CM

## 2020-01-15 DIAGNOSIS — I48.91 ATRIAL FIBRILLATION, UNSPECIFIED TYPE (HCC): ICD-10-CM

## 2020-01-15 DIAGNOSIS — F32.A DEPRESSION, UNSPECIFIED DEPRESSION TYPE: ICD-10-CM

## 2020-01-15 DIAGNOSIS — E78.2 MIXED HYPERLIPIDEMIA: ICD-10-CM

## 2020-01-15 DIAGNOSIS — I48.0 PAF (PAROXYSMAL ATRIAL FIBRILLATION) (HCC): ICD-10-CM

## 2020-01-15 DIAGNOSIS — F10.10 ALCOHOL ABUSE: ICD-10-CM

## 2020-01-15 LAB — EKG IMPRESSION: NORMAL

## 2020-01-15 PROCEDURE — 99215 OFFICE O/P EST HI 40 MIN: CPT | Performed by: INTERNAL MEDICINE

## 2020-01-15 PROCEDURE — 93000 ELECTROCARDIOGRAM COMPLETE: CPT | Performed by: INTERNAL MEDICINE

## 2020-01-15 RX ORDER — PANTOPRAZOLE SODIUM 40 MG/1
40 TABLET, DELAYED RELEASE ORAL DAILY
COMMUNITY
End: 2020-01-27

## 2020-01-15 RX ORDER — METOPROLOL SUCCINATE 50 MG/1
50 TABLET, EXTENDED RELEASE ORAL DAILY
Qty: 90 TAB | Refills: 3 | Status: ON HOLD
Start: 2020-01-15 | End: 2020-02-09

## 2020-01-15 RX ORDER — LEVOTHYROXINE SODIUM 0.12 MG/1
125 TABLET ORAL
COMMUNITY
End: 2020-04-27 | Stop reason: SDUPTHER

## 2020-01-15 ASSESSMENT — ENCOUNTER SYMPTOMS
HEARTBURN: 0
DIARRHEA: 0
BACK PAIN: 0
ALTERED MENTAL STATUS: 0
SHORTNESS OF BREATH: 0
PALPITATIONS: 1
CLAUDICATION: 0
DECREASED APPETITE: 0
SYNCOPE: 0
FEVER: 0
NAUSEA: 0
FLANK PAIN: 0
PND: 0
ORTHOPNEA: 0
VOMITING: 0
BLURRED VISION: 0
WEIGHT LOSS: 0
WEIGHT GAIN: 0
CONSTIPATION: 0
COUGH: 0
IRREGULAR HEARTBEAT: 0
NEAR-SYNCOPE: 0
DEPRESSION: 1
DYSPNEA ON EXERTION: 0
DIZZINESS: 0
ABDOMINAL PAIN: 0

## 2020-01-15 NOTE — PROGRESS NOTES
"Cardiology Note    Chief Complaint   Patient presents with   • Atrial Fibrillation       History of Present Illness: Jeanie Hutchison is a 74 y.o. female PMH alcohol abuse, paroxysmal atrial fibrillation, HTN, HLD who presents for follow up.    Around 12/2018 went for alcohol rehab in [place] and changed to amiodarone from flecainide, however, amio has since been discontinued due to TSH elevated. Since, she was admitted to [facility] for AF with RVR. At the time, noted active alcohol abuse and detoxed inpatient.     Describes relapsed to alcohol after rehab in [place] \"Hope something.\" She is very depressed due to passing of her daughters. She lives for her grandchildren, but they remind her of her daughters which further compounds. Not on good terms with her . She does want to get better. She denies SI/HI. She will attempt to reduce alcohol intake.    Review of Systems   Constitution: Negative for decreased appetite, fever, malaise/fatigue, weight gain and weight loss.   HENT: Negative for congestion and nosebleeds.    Eyes: Negative for blurred vision.   Cardiovascular: Positive for palpitations. Negative for chest pain, claudication, dyspnea on exertion, irregular heartbeat, leg swelling, near-syncope, orthopnea, paroxysmal nocturnal dyspnea and syncope.   Respiratory: Negative for cough and shortness of breath.    Endocrine: Negative for cold intolerance and heat intolerance.   Skin: Negative for rash.   Musculoskeletal: Negative for back pain.   Gastrointestinal: Negative for abdominal pain, constipation, diarrhea, heartburn, melena, nausea and vomiting.   Genitourinary: Negative for dysuria, flank pain and hematuria.   Neurological: Negative for dizziness.   Psychiatric/Behavioral: Positive for depression. Negative for altered mental status.         Past Medical History:   Diagnosis Date   • A-fib ([facility]) 5/1/2014   • Chronic pain     r/t pelvic fracture   • Psychiatric disorder     history of " depression and anxiety          Past Surgical History:   Procedure Laterality Date   • CLAVICLE ORIF  5/21/2014    Performed by Wilfred Gonzalez M.D. at SURGERY [facility] ORS   • BREAST BIOPSY      bilateral neg breast bxs   • OTHER ORTHOPEDIC SURGERY      pelvis fracture. 10 years ago          Current Outpatient Medications   Medication Sig Dispense Refill   • levothyroxine (SYNTHROID) 125 MCG Tab      • pantoprazole (PROTONIX) 40 MG Tablet Delayed Response Take 40 mg by mouth every day.     • metoprolol SR (TOPROL XL) 50 MG TABLET SR 24 HR Take 1 Tab by mouth every day. 90 Tab 3   • folic acid (FOLVITE) 1 MG Tab Take 1 Tab by mouth every day. 14 Tab 0   • apixaban (ELIQUIS) 5mg Tab Take 5 mg by mouth 2 Times a Day.     • quetiapine (SEROQUEL) 50 MG tablet Take 1 Tab by mouth 2 times a day. 60 Tab 2   • hydrOXYzine pamoate (VISTARIL) 50 MG Cap Take 1 Cap by mouth 3 times a day as needed for Itching. 90 Cap 3   • paroxetine (PAXIL-CR) 37.5 MG CR tablet Take 1 Tab by mouth every day. 90 Tab 3   • baclofen (LIORESAL) 20 MG tablet Take 1 Tab by mouth 3 times a day. 90 Tab 3   • levothyroxine (SYNTHROID) 75 MCG Tab TAKE ONE TABLET BY MOUTH EVERY MORNING ON AN EMPTY STOMACH (Patient not taking: Reported on 1/15/2020) 60 Tab 0   • mirtazapine (REMERON) 15 MG Tab Take 1 Tab by mouth every bedtime. (Patient not taking: Reported on 1/15/2020) 30 Tab 3   • celecoxib (CELEBREX) 200 MG Cap Take 1 Cap by mouth 2 times a day. (Patient not taking: Reported on 1/15/2020) 60 Cap 3   • levothyroxine (SYNTHROID) 25 MCG Tab Take 1 Tab by mouth Every morning on an empty stomach. (Patient not taking: Reported on 1/15/2020) 90 Tab 1   • gabapentin (NEURONTIN) 300 MG Cap Take 300 mg by mouth 3 times a day.     • lorazepam (ATIVAN) 1 MG Tab Take 1 Tab by mouth every 8 hours as needed for Anxiety.       No current facility-administered medications for this visit.          No Known Allergies      History reviewed. No pertinent family  "history.      Social History     Socioeconomic History   • Marital status:      Spouse name: Not on file   • Number of children: Not on file   • Years of education: Not on file   • Highest education level: Not on file   Occupational History   • Not on file   Social Needs   • Financial resource strain: Not on file   • Food insecurity:     Worry: Not on file     Inability: Not on file   • Transportation needs:     Medical: Not on file     Non-medical: Not on file   Tobacco Use   • Smoking status: Never Smoker   • Smokeless tobacco: Never Used   Substance and Sexual Activity   • Alcohol use: Yes     Comment: occasional    • Drug use: Yes     Types: Oral     Comment: takes friends tranqualizers    • Sexual activity: Not on file   Lifestyle   • Physical activity:     Days per week: Not on file     Minutes per session: Not on file   • Stress: Not on file   Relationships   • Social connections:     Talks on phone: Not on file     Gets together: Not on file     Attends Rastafarian service: Not on file     Active member of club or organization: Not on file     Attends meetings of clubs or organizations: Not on file     Relationship status: Not on file   • Intimate partner violence:     Fear of current or ex partner: Not on file     Emotionally abused: Not on file     Physically abused: Not on file     Forced sexual activity: Not on file   Other Topics Concern   • Not on file   Social History Narrative   • Not on file         Physical Exam:  Ambulatory Vitals  /54 (BP Location: Left arm, Patient Position: Sitting, BP Cuff Size: Adult)   Pulse 76   Ht 1.676 m (5' 6\")   Wt 70.1 kg (154 lb 10.4 oz)   SpO2 92%    BP Readings from Last 4 Encounters:   01/15/20 112/54   01/13/20 124/82   10/18/19 142/98   08/21/19 128/74     Weight/BMI:   Vitals:    01/15/20 0906   BP: 112/54   Weight: 70.1 kg (154 lb 10.4 oz)   Height: 1.676 m (5' 6\")    Body mass index is 24.96 kg/m².  Wt Readings from Last 4 Encounters:   01/15/20 " 70.1 kg (154 lb 10.4 oz)   01/13/20 75.3 kg (166 lb)   10/18/19 75.6 kg (166 lb 10.7 oz)   08/21/19 74.8 kg (165 lb)       Physical Exam    Lab Data Review:  Lab Results   Component Value Date/Time    CHOLSTRLTOT 223 (H) 10/13/2015 08:39 AM     (H) 10/13/2015 08:39 AM    HDL 61 10/13/2015 08:39 AM    TRIGLYCERIDE 138 10/13/2015 08:39 AM       Lab Results   Component Value Date/Time    SODIUM 140 05/22/2017 08:33 AM    POTASSIUM 4.3 05/22/2017 08:33 AM    CHLORIDE 107 05/22/2017 08:33 AM    CO2 25 05/22/2017 08:33 AM    GLUCOSE 82 05/22/2017 08:33 AM    BUN 17 05/22/2017 08:33 AM    CREATININE 0.62 05/22/2017 08:33 AM     CrCl cannot be calculated (Patient's most recent lab result is older than the maximum 7 days allowed.).  Lab Results   Component Value Date/Time    ALKPHOSPHAT 77 05/22/2017 08:33 AM    ASTSGOT 17 05/22/2017 08:33 AM    ALTSGPT 15 05/22/2017 08:33 AM    TBILIRUBIN 1.1 05/22/2017 08:33 AM      Lab Results   Component Value Date/Time    WBC 5.5 05/22/2017 08:33 AM     No results found for: HBA1C  No components found for: TROP      Cardiac Imaging and Procedures Review:        Medical Decision Making:  Problem List Items Addressed This Visit     PAF (paroxysmal atrial fibrillation) (HCC)     Cont eliquis for cva ppx. Educated on risk of increased bleeding with alcohol abuse. Can d/c aspirin. Cont rate control, changed to metoprolol for further titration with less effect on BP. Check TTE.         Relevant Medications    metoprolol SR (TOPROL XL) 50 MG TABLET SR 24 HR    HTN (hypertension)     Well controlled. D/c lisinopril and monitor.          Relevant Medications    metoprolol SR (TOPROL XL) 50 MG TABLET SR 24 HR    Mixed hyperlipidemia     Check lipids.         Relevant Medications    metoprolol SR (TOPROL XL) 50 MG TABLET SR 24 HR    Alcohol abuse     Discussed for 15 min. Her drinking is at source of current problems.          Depression     Refer to psychiatry.            Other Visit  Diagnoses     Atrial fibrillation, unspecified type (HCC)        Relevant Medications    metoprolol SR (TOPROL XL) 50 MG TABLET SR 24 HR    Other Relevant Orders    EKG (Completed)    CBC WITH DIFFERENTIAL    Comp Metabolic Panel    LIPID PANEL    TSH    EC-ECHOCARDIOGRAM COMPLETE W/O CONT    REFERRAL TO PSYCHIATRY          It was my pleasure to meet with Ms. Hutchison.

## 2020-01-16 NOTE — ASSESSMENT & PLAN NOTE
Cont eliquis for cva ppx. Educated on risk of increased bleeding with alcohol abuse. Can d/c aspirin. Cont rate control, changed to metoprolol for further titration with less effect on BP. Check TTE.

## 2020-01-21 ENCOUNTER — HOSPITAL ENCOUNTER (OUTPATIENT)
Dept: CARDIOLOGY | Facility: MEDICAL CENTER | Age: 75
End: 2020-01-21
Attending: INTERNAL MEDICINE
Payer: MEDICARE

## 2020-01-21 ENCOUNTER — TELEPHONE (OUTPATIENT)
Dept: CARDIOLOGY | Facility: MEDICAL CENTER | Age: 75
End: 2020-01-21

## 2020-01-21 DIAGNOSIS — I48.91 ATRIAL FIBRILLATION, UNSPECIFIED TYPE (HCC): ICD-10-CM

## 2020-01-21 PROCEDURE — 93306 TTE W/DOPPLER COMPLETE: CPT

## 2020-01-21 NOTE — TELEPHONE ENCOUNTER
Received call from echo lab again. Pt left, but did tell them that she never started the metoprolol VR prescribed on 1/15/20. She is going to pick that up now. Tiger text to SUMANTH, who advises that pt be seen or get an EKG today if possible. ALENA has open slot today at 2pm. LVM with pt at [phone] requesting call back. Will schedule pt if she agrees. Edwin text to ALENA, who reports that she already s/w pt in the hospital today, and advises that a hold be placed on her 2pm slot today and on 1/23/20 slot. Holds placed on both these slots. When pt returns call will determine if she should be seen today or 1/23/20.

## 2020-01-21 NOTE — TELEPHONE ENCOUNTER
AUGUSTM with pt at [phone] requesting call back. Explained that we would like to see her in clinic to f/u and have placed a hold on JA schedule on 1/23/20 at 9:20am. Requested that she call back to schedule this appt if possible.

## 2020-01-21 NOTE — TELEPHONE ENCOUNTER
Received call from Lashawn in echo lab. Pt is there for her appointment, and her HR has been 140-170 the entirety of her exam. Lashawn says that pt is somewhat vague about whether she is symptomatic, though pt does say she doesn't feel like she is in Afib. Lashawn advised that pt go to the ER, but pt refused and requested that our office be called. Advised that with a sustained HR in that range, we would recommend ER as well. Lashawn believes that pt will still refuse.    To VR

## 2020-01-22 ENCOUNTER — TELEPHONE (OUTPATIENT)
Dept: MEDICAL GROUP | Facility: MEDICAL CENTER | Age: 75
End: 2020-01-22

## 2020-01-22 PROCEDURE — 93306 TTE W/DOPPLER COMPLETE: CPT | Mod: 26 | Performed by: INTERNAL MEDICINE

## 2020-01-22 NOTE — TELEPHONE ENCOUNTER
Message   Received: Today   Papito Will M.D.  PAVEL Feliz,   Tried to call Ms Hutchison, but no answer. If you are able to reach her let her know can increase her metoprolol to twice per day to improve with heart rate control if her heart rate is still over 100. Also, she should attempt to hold further alcohol for the time being.      Pt has still not returned phone call, but noted that she did schedule appt with JA tomorrow at 9:20am. If pt returns call, will increase metoprolol. Otherwise will a/w appt with JA tomorrow.

## 2020-01-23 NOTE — TELEPHONE ENCOUNTER
1. Caller Name: Jacky                                         Call Back Number: [phone] (home)         Patient approves a detailed voicemail message: yes    Pt called stating that she was seen last week and had an episode that caused her appt to be shortened. Pt would like to know what medications she should be on as currently she is only taking metoprolol and eliquis. Please advise     Thank you

## 2020-01-23 NOTE — TELEPHONE ENCOUNTER
Pt no showed for her JA appt this morning. Called pt to f/u. She forgot about the appt and says she tends to forget unless she writes things down. She says that she is overall feeling well, denies CP, SOB, or palpitations. She reports that she has been periodically checking her pulse and that it has been below 100, but she isn't sure of the exact HR. Requested that she write down her HR a couple times today and tomorrow, and call us with an update. When she calls tomorrow we will decide if metoprolol should be increased and if appt should be made sooner than her 2/13/20 VR appt.

## 2020-01-24 NOTE — TELEPHONE ENCOUNTER
Called pt to f/u. She reports that she has been keeping an eye on her HR, and it has stayed consistently below 100. Encouraged her to continue monitoring HR, and to notify us if it is regularly over 100, or if she experiences any symptoms.

## 2020-01-27 ENCOUNTER — APPOINTMENT (OUTPATIENT)
Dept: RADIOLOGY | Facility: MEDICAL CENTER | Age: 75
DRG: 896 | End: 2020-01-27
Attending: HOSPITALIST
Payer: MEDICARE

## 2020-01-27 ENCOUNTER — HOSPITAL ENCOUNTER (INPATIENT)
Facility: MEDICAL CENTER | Age: 75
LOS: 13 days | DRG: 896 | End: 2020-02-09
Attending: EMERGENCY MEDICINE | Admitting: HOSPITALIST
Payer: MEDICARE

## 2020-01-27 DIAGNOSIS — J96.01 ACUTE RESPIRATORY FAILURE WITH HYPOXEMIA (HCC): ICD-10-CM

## 2020-01-27 DIAGNOSIS — Z78.9 POOR INTRAVENOUS ACCESS: ICD-10-CM

## 2020-01-27 DIAGNOSIS — F10.931 ALCOHOL WITHDRAWAL SYNDROME, WITH DELIRIUM (HCC): ICD-10-CM

## 2020-01-27 DIAGNOSIS — R29.6 RECURRENT FALLS: ICD-10-CM

## 2020-01-27 DIAGNOSIS — F10.930 ALCOHOL WITHDRAWAL SYNDROME WITHOUT COMPLICATION (HCC): ICD-10-CM

## 2020-01-27 PROBLEM — R11.2 NAUSEA AND VOMITING: Status: ACTIVE | Noted: 2020-01-27

## 2020-01-27 PROBLEM — E86.0 DEHYDRATION: Status: ACTIVE | Noted: 2020-01-27

## 2020-01-27 PROBLEM — F10.939 ALCOHOL WITHDRAWAL (HCC): Status: ACTIVE | Noted: 2020-01-27

## 2020-01-27 PROBLEM — E83.42 HYPOMAGNESEMIA: Status: ACTIVE | Noted: 2020-01-27

## 2020-01-27 PROBLEM — M25.571 ACUTE RIGHT ANKLE PAIN: Status: ACTIVE | Noted: 2020-01-27

## 2020-01-27 PROBLEM — F10.20 ALCOHOL DEPENDENCE (HCC): Status: ACTIVE | Noted: 2020-01-27

## 2020-01-27 PROBLEM — E87.6 HYPOKALEMIA: Status: ACTIVE | Noted: 2020-01-27

## 2020-01-27 LAB
ALBUMIN SERPL BCP-MCNC: 3.1 G/DL (ref 3.2–4.9)
ALBUMIN/GLOB SERPL: 1 G/DL
ALP SERPL-CCNC: 110 U/L (ref 30–99)
ALT SERPL-CCNC: 20 U/L (ref 2–50)
ANION GAP SERPL CALC-SCNC: 19 MMOL/L (ref 0–11.9)
APPEARANCE UR: ABNORMAL
AST SERPL-CCNC: 39 U/L (ref 12–45)
BACTERIA #/AREA URNS HPF: ABNORMAL /HPF
BASOPHILS # BLD AUTO: 0.6 % (ref 0–1.8)
BASOPHILS # BLD: 0.04 K/UL (ref 0–0.12)
BILIRUB SERPL-MCNC: 2.3 MG/DL (ref 0.1–1.5)
BILIRUB UR QL STRIP.AUTO: NEGATIVE
BUN SERPL-MCNC: 9 MG/DL (ref 8–22)
CALCIUM SERPL-MCNC: 8.7 MG/DL (ref 8.5–10.5)
CHLORIDE SERPL-SCNC: 103 MMOL/L (ref 96–112)
CO2 SERPL-SCNC: 19 MMOL/L (ref 20–33)
COLOR UR: ABNORMAL
CREAT SERPL-MCNC: 0.57 MG/DL (ref 0.5–1.4)
EKG IMPRESSION: NORMAL
EOSINOPHIL # BLD AUTO: 0.01 K/UL (ref 0–0.51)
EOSINOPHIL NFR BLD: 0.1 % (ref 0–6.9)
EPI CELLS #/AREA URNS HPF: ABNORMAL /HPF
ERYTHROCYTE [DISTWIDTH] IN BLOOD BY AUTOMATED COUNT: 54.5 FL (ref 35.9–50)
ETHANOL BLD-MCNC: 0.08 G/DL
GLOBULIN SER CALC-MCNC: 3.1 G/DL (ref 1.9–3.5)
GLUCOSE SERPL-MCNC: 88 MG/DL (ref 65–99)
GLUCOSE UR STRIP.AUTO-MCNC: NEGATIVE MG/DL
HCT VFR BLD AUTO: 41.4 % (ref 37–47)
HGB BLD-MCNC: 13.9 G/DL (ref 12–16)
HYALINE CASTS #/AREA URNS LPF: ABNORMAL /LPF
IMM GRANULOCYTES # BLD AUTO: 0.03 K/UL (ref 0–0.11)
IMM GRANULOCYTES NFR BLD AUTO: 0.4 % (ref 0–0.9)
KETONES UR STRIP.AUTO-MCNC: 15 MG/DL
LEUKOCYTE ESTERASE UR QL STRIP.AUTO: ABNORMAL
LIPASE SERPL-CCNC: 14 U/L (ref 11–82)
LYMPHOCYTES # BLD AUTO: 1.73 K/UL (ref 1–4.8)
LYMPHOCYTES NFR BLD: 25.4 % (ref 22–41)
MAGNESIUM SERPL-MCNC: 1.4 MG/DL (ref 1.5–2.5)
MCH RBC QN AUTO: 34 PG (ref 27–33)
MCHC RBC AUTO-ENTMCNC: 33.6 G/DL (ref 33.6–35)
MCV RBC AUTO: 101.2 FL (ref 81.4–97.8)
MICRO URNS: ABNORMAL
MONOCYTES # BLD AUTO: 0.43 K/UL (ref 0–0.85)
MONOCYTES NFR BLD AUTO: 6.3 % (ref 0–13.4)
NEUTROPHILS # BLD AUTO: 4.57 K/UL (ref 2–7.15)
NEUTROPHILS NFR BLD: 67.2 % (ref 44–72)
NITRITE UR QL STRIP.AUTO: POSITIVE
NRBC # BLD AUTO: 0.08 K/UL
NRBC BLD-RTO: 1.2 /100 WBC
PH UR STRIP.AUTO: 5 [PH] (ref 5–8)
PHOSPHATE SERPL-MCNC: 3.5 MG/DL (ref 2.5–4.5)
PLATELET # BLD AUTO: 174 K/UL (ref 164–446)
PMV BLD AUTO: 9.3 FL (ref 9–12.9)
POTASSIUM SERPL-SCNC: 3.3 MMOL/L (ref 3.6–5.5)
PROT SERPL-MCNC: 6.2 G/DL (ref 6–8.2)
PROT UR QL STRIP: 30 MG/DL
RBC # BLD AUTO: 4.09 M/UL (ref 4.2–5.4)
RBC # URNS HPF: ABNORMAL /HPF
RBC UR QL AUTO: ABNORMAL
SODIUM SERPL-SCNC: 141 MMOL/L (ref 135–145)
SP GR UR STRIP.AUTO: 1.02
UROBILINOGEN UR STRIP.AUTO-MCNC: 1 MG/DL
WBC # BLD AUTO: 6.8 K/UL (ref 4.8–10.8)
WBC #/AREA URNS HPF: ABNORMAL /HPF

## 2020-01-27 PROCEDURE — 80307 DRUG TEST PRSMV CHEM ANLYZR: CPT

## 2020-01-27 PROCEDURE — 700101 HCHG RX REV CODE 250: Performed by: HOSPITALIST

## 2020-01-27 PROCEDURE — 99223 1ST HOSP IP/OBS HIGH 75: CPT | Performed by: HOSPITALIST

## 2020-01-27 PROCEDURE — 700105 HCHG RX REV CODE 258: Performed by: EMERGENCY MEDICINE

## 2020-01-27 PROCEDURE — 700105 HCHG RX REV CODE 258: Performed by: HOSPITALIST

## 2020-01-27 PROCEDURE — 96376 TX/PRO/DX INJ SAME DRUG ADON: CPT

## 2020-01-27 PROCEDURE — 700102 HCHG RX REV CODE 250 W/ 637 OVERRIDE(OP): Performed by: HOSPITALIST

## 2020-01-27 PROCEDURE — 87086 URINE CULTURE/COLONY COUNT: CPT

## 2020-01-27 PROCEDURE — 81001 URINALYSIS AUTO W/SCOPE: CPT

## 2020-01-27 PROCEDURE — 80053 COMPREHEN METABOLIC PANEL: CPT

## 2020-01-27 PROCEDURE — 87186 SC STD MICRODIL/AGAR DIL: CPT

## 2020-01-27 PROCEDURE — A9270 NON-COVERED ITEM OR SERVICE: HCPCS | Performed by: HOSPITALIST

## 2020-01-27 PROCEDURE — 96375 TX/PRO/DX INJ NEW DRUG ADDON: CPT

## 2020-01-27 PROCEDURE — 87077 CULTURE AEROBIC IDENTIFY: CPT

## 2020-01-27 PROCEDURE — 700111 HCHG RX REV CODE 636 W/ 250 OVERRIDE (IP): Performed by: EMERGENCY MEDICINE

## 2020-01-27 PROCEDURE — 96374 THER/PROPH/DIAG INJ IV PUSH: CPT

## 2020-01-27 PROCEDURE — 83690 ASSAY OF LIPASE: CPT

## 2020-01-27 PROCEDURE — 73610 X-RAY EXAM OF ANKLE: CPT | Mod: RT

## 2020-01-27 PROCEDURE — 93005 ELECTROCARDIOGRAM TRACING: CPT | Performed by: EMERGENCY MEDICINE

## 2020-01-27 PROCEDURE — 94760 N-INVAS EAR/PLS OXIMETRY 1: CPT

## 2020-01-27 PROCEDURE — 99285 EMERGENCY DEPT VISIT HI MDM: CPT

## 2020-01-27 PROCEDURE — HZ2ZZZZ DETOXIFICATION SERVICES FOR SUBSTANCE ABUSE TREATMENT: ICD-10-PCS | Performed by: HOSPITALIST

## 2020-01-27 PROCEDURE — 700111 HCHG RX REV CODE 636 W/ 250 OVERRIDE (IP): Performed by: HOSPITALIST

## 2020-01-27 PROCEDURE — 85025 COMPLETE CBC W/AUTO DIFF WBC: CPT

## 2020-01-27 PROCEDURE — 84100 ASSAY OF PHOSPHORUS: CPT

## 2020-01-27 PROCEDURE — 83735 ASSAY OF MAGNESIUM: CPT

## 2020-01-27 PROCEDURE — 770020 HCHG ROOM/CARE - TELE (206)

## 2020-01-27 RX ORDER — LORAZEPAM 2 MG/ML
2 INJECTION INTRAMUSCULAR
Status: DISCONTINUED | OUTPATIENT
Start: 2020-01-27 | End: 2020-01-30

## 2020-01-27 RX ORDER — LORAZEPAM 1 MG/1
3 TABLET ORAL
Status: DISCONTINUED | OUTPATIENT
Start: 2020-01-27 | End: 2020-01-30

## 2020-01-27 RX ORDER — LORAZEPAM 1 MG/1
4 TABLET ORAL
Status: DISCONTINUED | OUTPATIENT
Start: 2020-01-27 | End: 2020-01-30

## 2020-01-27 RX ORDER — LORAZEPAM 2 MG/ML
1.5 INJECTION INTRAMUSCULAR
Status: DISCONTINUED | OUTPATIENT
Start: 2020-01-27 | End: 2020-01-30

## 2020-01-27 RX ORDER — BACLOFEN 10 MG/1
20 TABLET ORAL 3 TIMES DAILY
Status: ON HOLD | COMMUNITY
End: 2020-02-06

## 2020-01-27 RX ORDER — POLYETHYLENE GLYCOL 3350 17 G/17G
1 POWDER, FOR SOLUTION ORAL
Status: DISCONTINUED | OUTPATIENT
Start: 2020-01-27 | End: 2020-01-30

## 2020-01-27 RX ORDER — FOLIC ACID 1 MG/1
1 TABLET ORAL DAILY
Status: DISCONTINUED | OUTPATIENT
Start: 2020-01-28 | End: 2020-01-30

## 2020-01-27 RX ORDER — LEVOTHYROXINE SODIUM 0.12 MG/1
125 TABLET ORAL
Status: DISCONTINUED | OUTPATIENT
Start: 2020-01-27 | End: 2020-01-30

## 2020-01-27 RX ORDER — HYDRALAZINE HYDROCHLORIDE 10 MG/1
5 TABLET, FILM COATED ORAL EVERY 6 HOURS PRN
Status: DISCONTINUED | OUTPATIENT
Start: 2020-01-27 | End: 2020-01-30

## 2020-01-27 RX ORDER — SODIUM CHLORIDE, SODIUM LACTATE, POTASSIUM CHLORIDE, CALCIUM CHLORIDE 600; 310; 30; 20 MG/100ML; MG/100ML; MG/100ML; MG/100ML
1000 INJECTION, SOLUTION INTRAVENOUS ONCE
Status: COMPLETED | OUTPATIENT
Start: 2020-01-27 | End: 2020-01-27

## 2020-01-27 RX ORDER — LORAZEPAM 1 MG/1
1 TABLET ORAL EVERY 4 HOURS PRN
Status: DISCONTINUED | OUTPATIENT
Start: 2020-01-27 | End: 2020-01-30

## 2020-01-27 RX ORDER — SODIUM CHLORIDE 9 MG/ML
INJECTION, SOLUTION INTRAVENOUS CONTINUOUS
Status: DISCONTINUED | OUTPATIENT
Start: 2020-01-27 | End: 2020-01-30

## 2020-01-27 RX ORDER — BISACODYL 10 MG
10 SUPPOSITORY, RECTAL RECTAL
Status: DISCONTINUED | OUTPATIENT
Start: 2020-01-27 | End: 2020-01-30

## 2020-01-27 RX ORDER — HYDRALAZINE HYDROCHLORIDE 20 MG/ML
20 INJECTION INTRAMUSCULAR; INTRAVENOUS EVERY 6 HOURS PRN
Status: DISCONTINUED | OUTPATIENT
Start: 2020-01-27 | End: 2020-02-06

## 2020-01-27 RX ORDER — HEPARIN SODIUM 5000 [USP'U]/ML
5000 INJECTION, SOLUTION INTRAVENOUS; SUBCUTANEOUS EVERY 12 HOURS
Status: DISCONTINUED | OUTPATIENT
Start: 2020-01-27 | End: 2020-01-30

## 2020-01-27 RX ORDER — FOLIC ACID 1 MG/1
1 TABLET ORAL DAILY
Status: ON HOLD | COMMUNITY
End: 2020-02-09

## 2020-01-27 RX ORDER — ACETAMINOPHEN 325 MG/1
650 TABLET ORAL EVERY 6 HOURS PRN
Status: DISCONTINUED | OUTPATIENT
Start: 2020-01-27 | End: 2020-01-30

## 2020-01-27 RX ORDER — LORAZEPAM 2 MG/ML
0.5 INJECTION INTRAMUSCULAR EVERY 4 HOURS PRN
Status: DISCONTINUED | OUTPATIENT
Start: 2020-01-27 | End: 2020-01-30

## 2020-01-27 RX ORDER — THIAMINE MONONITRATE (VIT B1) 100 MG
100 TABLET ORAL DAILY
Status: DISCONTINUED | OUTPATIENT
Start: 2020-01-28 | End: 2020-01-30

## 2020-01-27 RX ORDER — METOPROLOL SUCCINATE 50 MG/1
50 TABLET, EXTENDED RELEASE ORAL DAILY
Status: DISCONTINUED | OUTPATIENT
Start: 2020-01-28 | End: 2020-01-30

## 2020-01-27 RX ORDER — LABETALOL HYDROCHLORIDE 5 MG/ML
10 INJECTION, SOLUTION INTRAVENOUS EVERY 4 HOURS PRN
Status: DISCONTINUED | OUTPATIENT
Start: 2020-01-27 | End: 2020-02-06

## 2020-01-27 RX ORDER — POTASSIUM CHLORIDE 20 MEQ/1
40 TABLET, EXTENDED RELEASE ORAL ONCE
Status: COMPLETED | OUTPATIENT
Start: 2020-01-27 | End: 2020-01-27

## 2020-01-27 RX ORDER — AMOXICILLIN AND CLAVULANATE POTASSIUM 875; 125 MG/1; MG/1
1 TABLET, FILM COATED ORAL 2 TIMES DAILY
Status: ON HOLD | COMMUNITY
Start: 2020-01-08 | End: 2020-02-09

## 2020-01-27 RX ORDER — LORAZEPAM 2 MG/ML
1 INJECTION INTRAMUSCULAR ONCE
Status: COMPLETED | OUTPATIENT
Start: 2020-01-27 | End: 2020-01-27

## 2020-01-27 RX ORDER — LORAZEPAM 1 MG/1
0.5 TABLET ORAL EVERY 4 HOURS PRN
Status: DISCONTINUED | OUTPATIENT
Start: 2020-01-27 | End: 2020-01-30

## 2020-01-27 RX ORDER — AMOXICILLIN 250 MG
2 CAPSULE ORAL 2 TIMES DAILY
Status: DISCONTINUED | OUTPATIENT
Start: 2020-01-27 | End: 2020-01-30

## 2020-01-27 RX ORDER — ONDANSETRON 2 MG/ML
4 INJECTION INTRAMUSCULAR; INTRAVENOUS ONCE
Status: COMPLETED | OUTPATIENT
Start: 2020-01-27 | End: 2020-01-27

## 2020-01-27 RX ORDER — LORAZEPAM 2 MG/ML
1 INJECTION INTRAMUSCULAR
Status: DISCONTINUED | OUTPATIENT
Start: 2020-01-27 | End: 2020-01-30

## 2020-01-27 RX ORDER — LORAZEPAM 1 MG/1
2 TABLET ORAL
Status: DISCONTINUED | OUTPATIENT
Start: 2020-01-27 | End: 2020-01-30

## 2020-01-27 RX ORDER — PANTOPRAZOLE SODIUM 40 MG/1
40 TABLET, DELAYED RELEASE ORAL DAILY
Status: ON HOLD | COMMUNITY
End: 2020-02-09

## 2020-01-27 RX ADMIN — LORAZEPAM 1 MG: 2 INJECTION INTRAMUSCULAR; INTRAVENOUS at 21:21

## 2020-01-27 RX ADMIN — SODIUM CHLORIDE, POTASSIUM CHLORIDE, SODIUM LACTATE AND CALCIUM CHLORIDE 1000 ML: 600; 310; 30; 20 INJECTION, SOLUTION INTRAVENOUS at 11:53

## 2020-01-27 RX ADMIN — HEPARIN SODIUM 5000 UNITS: 5000 INJECTION, SOLUTION INTRAVENOUS; SUBCUTANEOUS at 18:08

## 2020-01-27 RX ADMIN — THIAMINE HYDROCHLORIDE: 100 INJECTION, SOLUTION INTRAMUSCULAR; INTRAVENOUS at 22:06

## 2020-01-27 RX ADMIN — LEVOTHYROXINE SODIUM 125 MCG: 125 TABLET ORAL at 18:08

## 2020-01-27 RX ADMIN — LORAZEPAM 1 MG: 2 INJECTION INTRAMUSCULAR; INTRAVENOUS at 14:28

## 2020-01-27 RX ADMIN — FAMOTIDINE 20 MG: 10 INJECTION INTRAVENOUS at 18:09

## 2020-01-27 RX ADMIN — LORAZEPAM 0.5 MG: 2 INJECTION INTRAMUSCULAR; INTRAVENOUS at 18:29

## 2020-01-27 RX ADMIN — LORAZEPAM 1 MG: 2 INJECTION INTRAMUSCULAR; INTRAVENOUS at 11:53

## 2020-01-27 RX ADMIN — SODIUM CHLORIDE: 9 INJECTION, SOLUTION INTRAVENOUS at 18:09

## 2020-01-27 RX ADMIN — ONDANSETRON 4 MG: 2 INJECTION INTRAMUSCULAR; INTRAVENOUS at 11:53

## 2020-01-27 RX ADMIN — POTASSIUM CHLORIDE 40 MEQ: 1500 TABLET, EXTENDED RELEASE ORAL at 18:08

## 2020-01-27 SDOH — HEALTH STABILITY: MENTAL HEALTH: HOW MANY STANDARD DRINKS CONTAINING ALCOHOL DO YOU HAVE ON A TYPICAL DAY?: 5 OR 6

## 2020-01-27 SDOH — HEALTH STABILITY: MENTAL HEALTH: HOW OFTEN DO YOU HAVE A DRINK CONTAINING ALCOHOL?: 4 OR MORE TIMES A WEEK

## 2020-01-27 ASSESSMENT — ENCOUNTER SYMPTOMS
VOMITING: 0
HEMOPTYSIS: 0
BRUISES/BLEEDS EASILY: 0
DIARRHEA: 1
FEVER: 0
SEIZURES: 0
SPEECH CHANGE: 0
BLOOD IN STOOL: 0
EYE DISCHARGE: 0
TREMORS: 1
FLANK PAIN: 0
HEADACHES: 1
CHILLS: 0
EYE REDNESS: 0
NERVOUS/ANXIOUS: 1
DIZZINESS: 1
HALLUCINATIONS: 0
PALPITATIONS: 0
COUGH: 0
SPUTUM PRODUCTION: 0
STRIDOR: 0
ABDOMINAL PAIN: 0
MYALGIAS: 1
SHORTNESS OF BREATH: 0
SORE THROAT: 0
FOCAL WEAKNESS: 0
EYE PAIN: 0
LOSS OF CONSCIOUSNESS: 0

## 2020-01-27 ASSESSMENT — LIFESTYLE VARIABLES
VISUAL DISTURBANCES: NOT PRESENT
AGITATION: NORMAL ACTIVITY
TREMOR: MODERATE TREMOR WITH ARMS EXTENDED
CONSUMPTION TOTAL: POSITIVE
ANXIETY: MODERATELY ANXIOUS OR GUARDED, SO ANXIETY IS INFERRED
PAROXYSMAL SWEATS: NO SWEAT VISIBLE
ANXIETY: MODERATELY ANXIOUS OR GUARDED, SO ANXIETY IS INFERRED
AGITATION: SOMEWHAT MORE THAN NORMAL ACTIVITY
AUDITORY DISTURBANCES: NOT PRESENT
PAROXYSMAL SWEATS: NO SWEAT VISIBLE
AGITATION: NORMAL ACTIVITY
HEADACHE, FULLNESS IN HEAD: NOT PRESENT
AUDITORY DISTURBANCES: NOT PRESENT
HEADACHE, FULLNESS IN HEAD: NOT PRESENT
EVER_SMOKED: NEVER
TOTAL SCORE: 1
HAVE YOU EVER FELT YOU SHOULD CUT DOWN ON YOUR DRINKING: YES
NAUSEA AND VOMITING: MILD NAUSEA WITH NO VOMITING
TOTAL SCORE: 11
AUDITORY DISTURBANCES: NOT PRESENT
HAVE PEOPLE ANNOYED YOU BY CRITICIZING YOUR DRINKING: NO
AVERAGE NUMBER OF DAYS PER WEEK YOU HAVE A DRINK CONTAINING ALCOHOL: 7
TOTAL SCORE: 1
VISUAL DISTURBANCES: NOT PRESENT
TREMOR: *
ORIENTATION AND CLOUDING OF SENSORIUM: ORIENTED AND CAN DO SERIAL ADDITIONS
HOW MANY TIMES IN THE PAST YEAR HAVE YOU HAD 5 OR MORE DRINKS IN A DAY: 10
ORIENTATION AND CLOUDING OF SENSORIUM: ORIENTED AND CAN DO SERIAL ADDITIONS
ANXIETY: *
AGITATION: SOMEWHAT MORE THAN NORMAL ACTIVITY
NAUSEA AND VOMITING: NO NAUSEA AND NO VOMITING
ORIENTATION AND CLOUDING OF SENSORIUM: ORIENTED AND CAN DO SERIAL ADDITIONS
TOTAL SCORE: 8
HEADACHE, FULLNESS IN HEAD: NOT PRESENT
ON A TYPICAL DAY WHEN YOU DRINK ALCOHOL HOW MANY DRINKS DO YOU HAVE: 2
TOTAL SCORE: 1
ORIENTATION AND CLOUDING OF SENSORIUM: ORIENTED AND CAN DO SERIAL ADDITIONS
PAROXYSMAL SWEATS: BARELY PERCEPTIBLE SWEATING. PALMS MOIST
TOTAL SCORE: 8
ANXIETY: MODERATELY ANXIOUS OR GUARDED, SO ANXIETY IS INFERRED
VISUAL DISTURBANCES: NOT PRESENT
DO YOU DRINK ALCOHOL: YES
EVER HAD A DRINK FIRST THING IN THE MORNING TO STEADY YOUR NERVES TO GET RID OF A HANGOVER: NO
NAUSEA AND VOMITING: MILD NAUSEA WITH NO VOMITING
EVER_SMOKED: NEVER
VISUAL DISTURBANCES: NOT PRESENT
TOTAL SCORE: 8
EVER FELT BAD OR GUILTY ABOUT YOUR DRINKING: NO
TREMOR: *
AUDITORY DISTURBANCES: NOT PRESENT
NAUSEA AND VOMITING: *
HEADACHE, FULLNESS IN HEAD: NOT PRESENT
PAROXYSMAL SWEATS: NO SWEAT VISIBLE
TREMOR: *

## 2020-01-27 ASSESSMENT — COPD QUESTIONNAIRES
HAVE YOU SMOKED AT LEAST 100 CIGARETTES IN YOUR ENTIRE LIFE: NO/DON'T KNOW
DO YOU EVER COUGH UP ANY MUCUS OR PHLEGM?: NO/ONLY WITH OCCASIONAL COLDS OR INFECTIONS

## 2020-01-27 NOTE — ED NOTES
Assist RN: First contact with patient. EKG completed per orders. Vital signs rechecked. Patient conversing approrpiately with staff. Denies needs. Gurney in low locked position with sidreails up x 2. Patient remains in high visibility room near nursing station. Hospiatlist Dr. Schreiber at bedside.

## 2020-01-27 NOTE — H&P
Hospital Medicine History & Physical Note    Date of Service  1/27/2020    Primary Care Physician  Jane Armstrong M.D.    Consultants  None    Code Status  Full code     Chief Complaint  Anxiety tremors and right ankle pain    History of Presenting Illness  74 y.o. female with a past medical history of paroxysmal atrial fibrillation, anxiety, depression, alcohol dependence who presented 1/27/2020 with 2-week history of nausea, vomiting and diarrhea.  In addition to worsening anxiety and depression.  She denies having fevers and chills.  She reports that she has been drinking 4-6 shots daily not eating much.  She feels depressed because her daughter passed recently.  She denies suicidal and homicidal ideation.  She denies having abdominal pain.  She reports right ankle pain.  That is moderate rated as 6/10, no radiation denies history of trauma worse with movement no relieving factors.    Review of Systems  Review of Systems   Constitutional: Negative for chills and fever.   HENT: Negative for congestion and sore throat.    Eyes: Negative for pain, discharge and redness.   Respiratory: Negative for cough, hemoptysis, sputum production, shortness of breath and stridor.    Cardiovascular: Negative for chest pain, palpitations and leg swelling.   Gastrointestinal: Positive for diarrhea. Negative for abdominal pain, blood in stool and vomiting.   Genitourinary: Negative for flank pain, hematuria and urgency.   Musculoskeletal: Positive for joint pain (Right Ankle pain ) and myalgias.   Skin: Negative.    Neurological: Positive for dizziness, tremors and headaches. Negative for speech change, focal weakness, seizures and loss of consciousness.   Endo/Heme/Allergies: Does not bruise/bleed easily.   Psychiatric/Behavioral: Negative for hallucinations and suicidal ideas. The patient is nervous/anxious.      Past Medical History   has a past medical history of A-fib (HCC) (5/1/2014), Chronic pain, and Psychiatric  disorder.  Alcohol dependence, anxiety and depression.    Surgical History   has a past surgical history that includes breast biopsy; other orthopedic surgery; and clavicle orif (5/21/2014).     Family History  family history includes Diabetes in her mother; Hyperlipidemia in her father.     Social History   reports that she has never smoked. She has never used smokeless tobacco. She reports current alcohol use. She reports previous drug use. Drug: Oral.    Allergies  No Known Allergies    Medications  Prior to Admission Medications   Prescriptions Last Dose Informant Patient Reported? Taking?   apixaban (ELIQUIS) 5mg Tab   Yes No   Sig: Take 5 mg by mouth 2 Times a Day.   baclofen (LIORESAL) 20 MG tablet   No No   Sig: Take 1 Tab by mouth 3 times a day.   celecoxib (CELEBREX) 200 MG Cap   No No   Sig: Take 1 Cap by mouth 2 times a day.   Patient not taking: Reported on 1/15/2020   folic acid (FOLVITE) 1 MG Tab   No No   Sig: Take 1 Tab by mouth every day.   gabapentin (NEURONTIN) 300 MG Cap   Yes No   Sig: Take 300 mg by mouth 3 times a day.   hydrOXYzine pamoate (VISTARIL) 50 MG Cap   No No   Sig: Take 1 Cap by mouth 3 times a day as needed for Itching.   levothyroxine (SYNTHROID) 125 MCG Tab   Yes No   levothyroxine (SYNTHROID) 25 MCG Tab   No No   Sig: Take 1 Tab by mouth Every morning on an empty stomach.   Patient not taking: Reported on 1/15/2020   levothyroxine (SYNTHROID) 75 MCG Tab   No No   Sig: TAKE ONE TABLET BY MOUTH EVERY MORNING ON AN EMPTY STOMACH   Patient not taking: Reported on 1/15/2020   lorazepam (ATIVAN) 1 MG Tab   Yes No   Sig: Take 1 Tab by mouth every 8 hours as needed for Anxiety.   metoprolol SR (TOPROL XL) 50 MG TABLET SR 24 HR   No No   Sig: Take 1 Tab by mouth every day.   mirtazapine (REMERON) 15 MG Tab   No No   Sig: Take 1 Tab by mouth every bedtime.   Patient not taking: Reported on 1/15/2020   pantoprazole (PROTONIX) 40 MG Tablet Delayed Response   Yes No   Sig: Take 40 mg by  mouth every day.   paroxetine (PAXIL-CR) 37.5 MG CR tablet   No No   Sig: Take 1 Tab by mouth every day.   quetiapine (SEROQUEL) 50 MG tablet   No No   Sig: Take 1 Tab by mouth 2 times a day.      Facility-Administered Medications: None       Physical Exam  Temp:  [37.1 °C (98.7 °F)] 37.1 °C (98.7 °F)  Pulse:  [] 96  Resp:  [16-20] 16  BP: (128-149)/() 138/94  SpO2:  [94 %-95 %] 94 %    Physical Exam  Constitutional:       General: She is not in acute distress.     Appearance: She is not toxic-appearing.   HENT:      Head: Normocephalic and atraumatic.      Right Ear: External ear normal.      Left Ear: External ear normal.      Nose: No congestion or rhinorrhea.      Mouth/Throat:      Mouth: Mucous membranes are dry.      Pharynx: No oropharyngeal exudate or posterior oropharyngeal erythema.   Eyes:      General: No scleral icterus.        Right eye: No discharge.         Left eye: No discharge.      Conjunctiva/sclera: Conjunctivae normal.      Pupils: Pupils are equal, round, and reactive to light.   Neck:      Musculoskeletal: Neck supple. No neck rigidity or muscular tenderness.   Cardiovascular:      Rate and Rhythm: Tachycardia present.      Heart sounds: No friction rub. No gallop.    Pulmonary:      Breath sounds: No stridor. No wheezing or rhonchi.   Abdominal:      General: There is no distension.      Palpations: Abdomen is soft.      Tenderness: There is no tenderness. There is no guarding or rebound.   Musculoskeletal:         General: No swelling.      Right lower leg: No edema.      Left lower leg: No edema.   Skin:     Capillary Refill: Capillary refill takes 2 to 3 seconds.      Coloration: Skin is not jaundiced or pale.      Findings: No bruising or erythema.   Neurological:      Mental Status: She is alert and oriented to person, place, and time.      Cranial Nerves: No cranial nerve deficit.      Sensory: No sensory deficit.      Motor: No weakness.      Coordination: Coordination  abnormal (Secondary to tumor).   Psychiatric:      Comments: Slowed          Laboratory:  Recent Labs     01/27/20  1130   WBC 6.8   RBC 4.09*   HEMOGLOBIN 13.9   HEMATOCRIT 41.4   .2*   MCH 34.0*   MCHC 33.6   RDW 54.5*   PLATELETCT 174   MPV 9.3     Recent Labs     01/27/20  1130   SODIUM 141   POTASSIUM 3.3*   CHLORIDE 103   CO2 19*   GLUCOSE 88   BUN 9   CREATININE 0.57   CALCIUM 8.7     Recent Labs     01/27/20  1130   ALTSGPT 20   ASTSGOT 39   ALKPHOSPHAT 110*   TBILIRUBIN 2.3*   LIPASE 14   GLUCOSE 88         No results for input(s): NTPROBNP in the last 72 hours.      No results for input(s): TROPONINT in the last 72 hours.    Urinalysis:    No results found     Imaging:  DX-ANKLE 3+ VIEWS RIGHT    (Results Pending)     Assessment/Plan:  I anticipate this patient is appropriate for observation status at this time.    * Alcohol withdrawal (HCC)  Assessment & Plan  [facility] protocol   Continuous cardiac monitoring  Monitor electrolytes and replace accordingly, particularly magnesium, potassium and phosphorus  Fall, seizure, aspiration precautions.  Thiamine folic acid and multivitamin.     Nausea and vomiting  Assessment & Plan  Likely secondary to alcoholic gastritis.  Associated with diarrhea no leukocytosis, low suspicion for C. difficile.  Supportive care with intravenous fluids, counseling provided.    Hypomagnesemia  Assessment & Plan  Replacing continue to monitor and replace as needed.    Hypokalemia  Assessment & Plan  Replacing, associated with hypomagnesemia  Continue to monitor replace as needed     Dehydration  Assessment & Plan  Secondary to reduced oral intake nausea vomiting and diarrhea.  Intravenous fluids.  Encourage oral intake.  Antiemetics.    Acute right ankle pain- (present on admission)  Assessment & Plan  Checking x-ray.  Multimodal pain control.    Alcohol dependence (HCC)  Assessment & Plan  With alcohol withdrawal.  See alcohol withdrawal above.  Counseling provided.  Thiamine  folic acid and multivitamin.    Depression- (present on admission)  Assessment & Plan  Worse with the recent death of her daughter.  Supportive care for now.  Will hold on starting antidepressant at this point as she is going through alcohol withdrawal.    HTN (hypertension)- (present on admission)  Assessment & Plan  Resume home metoprolol.  Vital signs per policy.  Hydralazine and labetalol as needed for extreme hypertension.     PAF (paroxysmal atrial fibrillation) (HCC)- (present on admission)  Assessment & Plan  Resume home metoprolol for rate control.  Does not appear to be on anticoagulation, counseled on starting anticoagulation.  Wants to defer this decision at this point.    Anxiety- (present on admission)  Assessment & Plan  Exacerbated by recent death of her daughter.  Supportive care.    VTE prophylaxis: SCDs, Heparin SC

## 2020-01-27 NOTE — ED TRIAGE NOTES
Pt arrived via ems. Pt declined zofran oral.     Chief Complaint   Patient presents with   • Anxiety     severe, never had this bad, recent death of daughter, denies si or hi.    • N/V     started today   • Loose Stools     x 2 wks     Pt oriented to ed. Pt ambulated to bathroom to provide urine.

## 2020-01-27 NOTE — ASSESSMENT & PLAN NOTE
[facility] protocol   Continuous cardiac monitoring  -actively withdrawing, scores remain high  -added depakote  Per psych  -I added librium as well  -aggressively replace lytes  -MVI/thiamine/folate  -h/o WD's requiring intubation, so remains very high risk

## 2020-01-27 NOTE — ED PROVIDER NOTES
ED Provider Note    Scribed for Avery Abdullahi D.O. by Pamela Hilton. 1/27/2020  11:16 AM    Primary care provider: Jane Armstrong M.D.  Means of arrival: EMS  History obtained from: Patient and EMS  History limited by: None    CHIEF COMPLAINT  Chief Complaint   Patient presents with   • Anxiety     severe, never had this bad, recent death of daughter, denies si or hi.    • N/V     started today   • Loose Stools     x 2 wks       HPI  Jeanie Hutchison is a 74 y.o. female who presents to the Emergency Department for with complaint of nausea, vomiting and diarrhea for the last 2 weeks.  In addition, she states that she is have anxiety tach secondary to her recent daughter's death.  She denies suicidal homicidal ideation, she does drink alcohol on daily basis and her last drink was prior to arrival here in the emergency department.  She did notify EMS and they did bring her here, since that time, she is had no vomiting, denies abdominal pain, fever, shakes, chills, sweats, [place], melena, hematemesis, shortness of breath, chest pain, headache, suicidal homicidal ideation.  REVIEW OF SYSTEMS  Pertinent positives include nausea vomiting, diarrhea, anxiety pertinent negatives include no fever, congestion all other systems reviewed and negative.    PAST MEDICAL HISTORY  Past Medical History:   Diagnosis Date   • A-fib (HCC) 5/1/2014   • Chronic pain     r/t pelvic fracture   • Psychiatric disorder     history of depression and anxiety        SURGICAL HISTORY  Past Surgical History:   Procedure Laterality Date   • CLAVICLE ORIF  5/21/2014    Performed by Wilfred Gonzalez M.D. at SURGERY [facility]   • BREAST BIOPSY      bilateral neg breast bxs   • OTHER ORTHOPEDIC SURGERY      pelvis fracture. 10 years ago         SOCIAL HISTORY  Social History     Tobacco Use   • Smoking status: Never Smoker   • Smokeless tobacco: Never Used   Substance Use Topics   • Alcohol use: Yes     Comment: occasional    •  Drug use: Not Currently     Types: Oral     Comment: takes friends tranqualizers       Social History     Substance and Sexual Activity   Drug Use Not Currently   • Types: Oral    Comment: takes friends tranqualizers        FAMILY HISTORY  History reviewed. No pertinent family history.    CURRENT MEDICATIONS  Home Medications    **Home medications have not yet been reviewed for this encounter**         ALLERGIES  No Known Allergies    PHYSICAL EXAM  VITAL SIGNS: /109   Pulse (!) 106   Temp 37.1 °C (98.7 °F) (Oral)   Resp 20     Nursing notes and vitals reviewed.  Constitutional: Well developed, Well nourished, moderate distress, Non-toxic appearance.   Eyes: PERRLA, EOMI, Conjunctiva normal, No discharge.   Cardiovascular: Tachycardic,  No murmurs, No rubs, No gallops.   Thorax & Lungs: No respiratory distress, No rales, No rhonchi, No wheezing, No chest tenderness.   Abdomen: Bowel sounds normal, Soft, No tenderness, No guarding, No rebound, No masses, No pulsatile masses.   Skin: Warm, Dry, No erythema, No rash.   Musculoskeletal: Intact distal pulses, No edema, No cyanosis, No clubbing. Good range of motion in all major joints. No tenderness to palpation or major deformities noted, no CVA tenderness, no midline back tenderness.   Neurologic: Alert & oriented x 3, Normal motor function, Normal sensory function, No focal deficits noted slightly tremulous.  Psychiatric: Extremely anxious, slightly pressured speech  DIAGNOSTIC STUDIES/PROCEDURES    LABS  Results for orders placed or performed during the hospital encounter of 01/27/20   CBC WITH DIFFERENTIAL   Result Value Ref Range    WBC 6.8 4.8 - 10.8 K/uL    RBC 4.09 (L) 4.20 - 5.40 M/uL    Hemoglobin 13.9 12.0 - 16.0 g/dL    Hematocrit 41.4 37.0 - 47.0 %    .2 (H) 81.4 - 97.8 fL    MCH 34.0 (H) 27.0 - 33.0 pg    MCHC 33.6 33.6 - 35.0 g/dL    RDW 54.5 (H) 35.9 - 50.0 fL    Platelet Count 174 164 - 446 K/uL    MPV 9.3 9.0 - 12.9 fL     Neutrophils-Polys 67.20 44.00 - 72.00 %    Lymphocytes 25.40 22.00 - 41.00 %    Monocytes 6.30 0.00 - 13.40 %    Eosinophils 0.10 0.00 - 6.90 %    Basophils 0.60 0.00 - 1.80 %    Immature Granulocytes 0.40 0.00 - 0.90 %    Nucleated RBC 1.20 /100 WBC    Neutrophils (Absolute) 4.57 2.00 - 7.15 K/uL    Lymphs (Absolute) 1.73 1.00 - 4.80 K/uL    Monos (Absolute) 0.43 0.00 - 0.85 K/uL    Eos (Absolute) 0.01 0.00 - 0.51 K/uL    Baso (Absolute) 0.04 0.00 - 0.12 K/uL    Immature Granulocytes (abs) 0.03 0.00 - 0.11 K/uL    NRBC (Absolute) 0.08 K/uL   COMP METABOLIC PANEL   Result Value Ref Range    Sodium 141 135 - 145 mmol/L    Potassium 3.3 (L) 3.6 - 5.5 mmol/L    Chloride 103 96 - 112 mmol/L    Co2 19 (L) 20 - 33 mmol/L    Anion Gap 19.0 (H) 0.0 - 11.9    Glucose 88 65 - 99 mg/dL    Bun 9 8 - 22 mg/dL    Creatinine 0.57 0.50 - 1.40 mg/dL    Calcium 8.7 8.5 - 10.5 mg/dL    AST(SGOT) 39 12 - 45 U/L    ALT(SGPT) 20 2 - 50 U/L    Alkaline Phosphatase 110 (H) 30 - 99 U/L    Total Bilirubin 2.3 (H) 0.1 - 1.5 mg/dL    Albumin 3.1 (L) 3.2 - 4.9 g/dL    Total Protein 6.2 6.0 - 8.2 g/dL    Globulin 3.1 1.9 - 3.5 g/dL    A-G Ratio 1.0 g/dL   LIPASE   Result Value Ref Range    Lipase 14 11 - 82 U/L   MAGNESIUM   Result Value Ref Range    Magnesium 1.4 (L) 1.5 - 2.5 mg/dL   PHOSPHORUS   Result Value Ref Range    Phosphorus 3.5 2.5 - 4.5 mg/dL   DIAGNOSTIC ALCOHOL   Result Value Ref Range    Diagnostic Alcohol 0.08 (H) 0.00 g/dL   ESTIMATED GFR   Result Value Ref Range    GFR If African American >60 >60 mL/min/1.73 m 2    GFR If Non African American >60 >60 mL/min/1.73 m 2       All labs reviewed by me.    RADIOLOGY  No orders to display     The radiologist's interpretation of all radiological studies have been reviewed by me.    COURSE & MEDICAL DECISION MAKING  Pertinent Labs & Imaging studies reviewed. (See chart for details)    11:16 AM - Patient seen and examined at bedside.   This is a charming 74 y.o. female that presents with  anxiety, alcohol withdrawal, tremors, nausea, diarrhea.  She has no significant lecture abnormality.  She received IV fluids secondary to her dehydrated state with dry mucous membranes and responded appropriate IV fluid administration.  The patient has a cap refill less than 2 seconds and normal heart rate.  She received Ativan 1 mg IV x2 secondary to her tremors and alcohol withdrawal and anxiety.  I do believe the patient would require further hospitalization secondary to alcohol withdrawal, anxiety and possible psychiatric consultation.  I discussed the patient with Dr. Benavides for further evaluation at 1420      FINAL IMPRESSION  Alcohol withdrawal  Anxiety  Nausea vomiting     Pamela TEIXEIRA (Scribe), am scribing for, and in the presence of, Avery Abdullahi D.O    Electronically signed by: Pamela Hilton (Joniibe), 1/27/2020    Avery TEIXEIRA D.O. personally performed the services described in this documentation, as scribed by Pamela Hilton in my presence, and it is both accurate and complete.    The note accurately reflects work and decisions made by me.  Avery Abdullahi D.O.  1/27/2020  2:53 PM

## 2020-01-27 NOTE — ED NOTES
Med rec completed per pt and home pharmacy   Allergies reviewed    Pt completed a 3 day course of Augmentin on 1/11/2020

## 2020-01-28 LAB
ALBUMIN SERPL BCP-MCNC: 2.6 G/DL (ref 3.2–4.9)
ALBUMIN/GLOB SERPL: 1 G/DL
ALP SERPL-CCNC: 85 U/L (ref 30–99)
ALT SERPL-CCNC: 14 U/L (ref 2–50)
ANION GAP SERPL CALC-SCNC: 6 MMOL/L (ref 0–11.9)
AST SERPL-CCNC: 25 U/L (ref 12–45)
BASOPHILS # BLD AUTO: 0.4 % (ref 0–1.8)
BASOPHILS # BLD: 0.02 K/UL (ref 0–0.12)
BILIRUB SERPL-MCNC: 3.1 MG/DL (ref 0.1–1.5)
BUN SERPL-MCNC: 11 MG/DL (ref 8–22)
CALCIUM SERPL-MCNC: 8 MG/DL (ref 8.5–10.5)
CHLORIDE SERPL-SCNC: 107 MMOL/L (ref 96–112)
CO2 SERPL-SCNC: 26 MMOL/L (ref 20–33)
CREAT SERPL-MCNC: 0.66 MG/DL (ref 0.5–1.4)
EOSINOPHIL # BLD AUTO: 0.06 K/UL (ref 0–0.51)
EOSINOPHIL NFR BLD: 1.1 % (ref 0–6.9)
ERYTHROCYTE [DISTWIDTH] IN BLOOD BY AUTOMATED COUNT: 54.4 FL (ref 35.9–50)
GLOBULIN SER CALC-MCNC: 2.5 G/DL (ref 1.9–3.5)
GLUCOSE SERPL-MCNC: 155 MG/DL (ref 65–99)
HCT VFR BLD AUTO: 34.2 % (ref 37–47)
HGB BLD-MCNC: 11 G/DL (ref 12–16)
IMM GRANULOCYTES # BLD AUTO: 0.02 K/UL (ref 0–0.11)
IMM GRANULOCYTES NFR BLD AUTO: 0.4 % (ref 0–0.9)
LYMPHOCYTES # BLD AUTO: 1.54 K/UL (ref 1–4.8)
LYMPHOCYTES NFR BLD: 27.5 % (ref 22–41)
MAGNESIUM SERPL-MCNC: 1.5 MG/DL (ref 1.5–2.5)
MCH RBC QN AUTO: 33.3 PG (ref 27–33)
MCHC RBC AUTO-ENTMCNC: 32.7 G/DL (ref 33.6–35)
MCV RBC AUTO: 101.8 FL (ref 81.4–97.8)
MONOCYTES # BLD AUTO: 0.4 K/UL (ref 0–0.85)
MONOCYTES NFR BLD AUTO: 7.2 % (ref 0–13.4)
NEUTROPHILS # BLD AUTO: 3.55 K/UL (ref 2–7.15)
NEUTROPHILS NFR BLD: 63.4 % (ref 44–72)
NRBC # BLD AUTO: 0.04 K/UL
NRBC BLD-RTO: 0.7 /100 WBC
PHOSPHATE SERPL-MCNC: 2.4 MG/DL (ref 2.5–4.5)
PLATELET # BLD AUTO: 128 K/UL (ref 164–446)
PMV BLD AUTO: 9.5 FL (ref 9–12.9)
POTASSIUM SERPL-SCNC: 3.3 MMOL/L (ref 3.6–5.5)
PROT SERPL-MCNC: 5.1 G/DL (ref 6–8.2)
RBC # BLD AUTO: 3.3 M/UL (ref 4.2–5.4)
SODIUM SERPL-SCNC: 139 MMOL/L (ref 135–145)
T4 FREE SERPL-MCNC: 1.23 NG/DL (ref 0.53–1.43)
TSH SERPL DL<=0.005 MIU/L-ACNC: 0.55 UIU/ML (ref 0.38–5.33)
WBC # BLD AUTO: 5.6 K/UL (ref 4.8–10.8)

## 2020-01-28 PROCEDURE — 99223 1ST HOSP IP/OBS HIGH 75: CPT | Mod: GC | Performed by: PSYCHIATRY & NEUROLOGY

## 2020-01-28 PROCEDURE — 700111 HCHG RX REV CODE 636 W/ 250 OVERRIDE (IP): Performed by: PSYCHIATRY & NEUROLOGY

## 2020-01-28 PROCEDURE — 84100 ASSAY OF PHOSPHORUS: CPT

## 2020-01-28 PROCEDURE — 700102 HCHG RX REV CODE 250 W/ 637 OVERRIDE(OP): Performed by: HOSPITALIST

## 2020-01-28 PROCEDURE — A9270 NON-COVERED ITEM OR SERVICE: HCPCS | Performed by: HOSPITALIST

## 2020-01-28 PROCEDURE — 36415 COLL VENOUS BLD VENIPUNCTURE: CPT

## 2020-01-28 PROCEDURE — 770020 HCHG ROOM/CARE - TELE (206)

## 2020-01-28 PROCEDURE — 83735 ASSAY OF MAGNESIUM: CPT

## 2020-01-28 PROCEDURE — 99233 SBSQ HOSP IP/OBS HIGH 50: CPT | Performed by: INTERNAL MEDICINE

## 2020-01-28 PROCEDURE — 700102 HCHG RX REV CODE 250 W/ 637 OVERRIDE(OP): Performed by: STUDENT IN AN ORGANIZED HEALTH CARE EDUCATION/TRAINING PROGRAM

## 2020-01-28 PROCEDURE — A9270 NON-COVERED ITEM OR SERVICE: HCPCS | Performed by: STUDENT IN AN ORGANIZED HEALTH CARE EDUCATION/TRAINING PROGRAM

## 2020-01-28 PROCEDURE — A9270 NON-COVERED ITEM OR SERVICE: HCPCS | Performed by: INTERNAL MEDICINE

## 2020-01-28 PROCEDURE — 84439 ASSAY OF FREE THYROXINE: CPT

## 2020-01-28 PROCEDURE — 84443 ASSAY THYROID STIM HORMONE: CPT

## 2020-01-28 PROCEDURE — 700105 HCHG RX REV CODE 258: Performed by: HOSPITALIST

## 2020-01-28 PROCEDURE — 85025 COMPLETE CBC W/AUTO DIFF WBC: CPT

## 2020-01-28 PROCEDURE — 80053 COMPREHEN METABOLIC PANEL: CPT

## 2020-01-28 PROCEDURE — 700111 HCHG RX REV CODE 636 W/ 250 OVERRIDE (IP): Performed by: HOSPITALIST

## 2020-01-28 PROCEDURE — 700102 HCHG RX REV CODE 250 W/ 637 OVERRIDE(OP): Performed by: INTERNAL MEDICINE

## 2020-01-28 RX ORDER — GABAPENTIN 100 MG/1
100 CAPSULE ORAL 3 TIMES DAILY
Status: DISCONTINUED | OUTPATIENT
Start: 2020-01-28 | End: 2020-01-28

## 2020-01-28 RX ORDER — DIVALPROEX SODIUM 250 MG/1
250 TABLET, DELAYED RELEASE ORAL EVERY 8 HOURS
Status: DISCONTINUED | OUTPATIENT
Start: 2020-01-28 | End: 2020-01-30

## 2020-01-28 RX ORDER — LORAZEPAM 2 MG/ML
1 INJECTION INTRAMUSCULAR ONCE
Status: COMPLETED | OUTPATIENT
Start: 2020-01-28 | End: 2020-01-28

## 2020-01-28 RX ADMIN — FAMOTIDINE 20 MG: 10 INJECTION INTRAVENOUS at 05:37

## 2020-01-28 RX ADMIN — THERA TABS 1 TABLET: TAB at 05:36

## 2020-01-28 RX ADMIN — METOPROLOL SUCCINATE 50 MG: 50 TABLET, EXTENDED RELEASE ORAL at 05:36

## 2020-01-28 RX ADMIN — SERTRALINE 50 MG: 50 TABLET, FILM COATED ORAL at 16:54

## 2020-01-28 RX ADMIN — LORAZEPAM 1 MG: 2 INJECTION INTRAMUSCULAR; INTRAVENOUS at 16:27

## 2020-01-28 RX ADMIN — DIVALPROEX SODIUM 250 MG: 250 TABLET, DELAYED RELEASE ORAL at 18:19

## 2020-01-28 RX ADMIN — LORAZEPAM 2 MG: 2 TABLET ORAL at 02:51

## 2020-01-28 RX ADMIN — ACETAMINOPHEN 650 MG: 325 TABLET, FILM COATED ORAL at 23:09

## 2020-01-28 RX ADMIN — DIBASIC SODIUM PHOSPHATE, MONOBASIC POTASSIUM PHOSPHATE AND MONOBASIC SODIUM PHOSPHATE 1 TABLET: 852; 155; 130 TABLET ORAL at 09:48

## 2020-01-28 RX ADMIN — LORAZEPAM 0.5 MG: 2 INJECTION INTRAMUSCULAR; INTRAVENOUS at 14:04

## 2020-01-28 RX ADMIN — Medication 100 MG: at 05:36

## 2020-01-28 RX ADMIN — LEVOTHYROXINE SODIUM 125 MCG: 125 TABLET ORAL at 05:38

## 2020-01-28 RX ADMIN — DIBASIC SODIUM PHOSPHATE, MONOBASIC POTASSIUM PHOSPHATE AND MONOBASIC SODIUM PHOSPHATE 1 TABLET: 852; 155; 130 TABLET ORAL at 21:09

## 2020-01-28 RX ADMIN — Medication 400 MG: at 16:55

## 2020-01-28 RX ADMIN — Medication 400 MG: at 05:36

## 2020-01-28 RX ADMIN — LORAZEPAM 1 MG: 2 INJECTION INTRAMUSCULAR; INTRAVENOUS at 18:46

## 2020-01-28 RX ADMIN — FAMOTIDINE 20 MG: 10 INJECTION INTRAVENOUS at 16:53

## 2020-01-28 RX ADMIN — ACETAMINOPHEN 650 MG: 325 TABLET, FILM COATED ORAL at 02:50

## 2020-01-28 RX ADMIN — LORAZEPAM 2 MG: 2 TABLET ORAL at 21:09

## 2020-01-28 RX ADMIN — DIBASIC SODIUM PHOSPHATE, MONOBASIC POTASSIUM PHOSPHATE AND MONOBASIC SODIUM PHOSPHATE 1 TABLET: 852; 155; 130 TABLET ORAL at 16:54

## 2020-01-28 RX ADMIN — HEPARIN SODIUM 5000 UNITS: 5000 INJECTION, SOLUTION INTRAVENOUS; SUBCUTANEOUS at 16:53

## 2020-01-28 RX ADMIN — SODIUM CHLORIDE: 9 INJECTION, SOLUTION INTRAVENOUS at 21:13

## 2020-01-28 RX ADMIN — DIVALPROEX SODIUM 250 MG: 250 TABLET, DELAYED RELEASE ORAL at 21:09

## 2020-01-28 RX ADMIN — HEPARIN SODIUM 5000 UNITS: 5000 INJECTION, SOLUTION INTRAVENOUS; SUBCUTANEOUS at 05:35

## 2020-01-28 RX ADMIN — SODIUM CHLORIDE: 9 INJECTION, SOLUTION INTRAVENOUS at 12:05

## 2020-01-28 RX ADMIN — LORAZEPAM 0.5 MG: 1 TABLET ORAL at 09:48

## 2020-01-28 RX ADMIN — LORAZEPAM 1 MG: 1 TABLET ORAL at 23:09

## 2020-01-28 RX ADMIN — FOLIC ACID 1 MG: 1 TABLET ORAL at 05:37

## 2020-01-28 ASSESSMENT — LIFESTYLE VARIABLES
VISUAL DISTURBANCES: NOT PRESENT
NAUSEA AND VOMITING: MILD NAUSEA WITH NO VOMITING
TREMOR: *
AGITATION: NORMAL ACTIVITY
AUDITORY DISTURBANCES: NOT PRESENT
HEADACHE, FULLNESS IN HEAD: NOT PRESENT
ORIENTATION AND CLOUDING OF SENSORIUM: ORIENTED AND CAN DO SERIAL ADDITIONS
NAUSEA AND VOMITING: *
NAUSEA AND VOMITING: NO NAUSEA AND NO VOMITING
VISUAL DISTURBANCES: NOT PRESENT
AUDITORY DISTURBANCES: NOT PRESENT
HEADACHE, FULLNESS IN HEAD: NOT PRESENT
VISUAL DISTURBANCES: NOT PRESENT
AGITATION: *
PAROXYSMAL SWEATS: NO SWEAT VISIBLE
HEADACHE, FULLNESS IN HEAD: NOT PRESENT
VISUAL DISTURBANCES: NOT PRESENT
TOTAL SCORE: 14
PAROXYSMAL SWEATS: NO SWEAT VISIBLE
PAROXYSMAL SWEATS: BARELY PERCEPTIBLE SWEATING. PALMS MOIST
AGITATION: SOMEWHAT MORE THAN NORMAL ACTIVITY
TREMOR: *
HEADACHE, FULLNESS IN HEAD: NOT PRESENT
AUDITORY DISTURBANCES: NOT PRESENT
TREMOR: MODERATE TREMOR WITH ARMS EXTENDED
TOTAL SCORE: 14
PAROXYSMAL SWEATS: BARELY PERCEPTIBLE SWEATING. PALMS MOIST
AGITATION: NORMAL ACTIVITY
AGITATION: *
TREMOR: *
PAROXYSMAL SWEATS: BARELY PERCEPTIBLE SWEATING. PALMS MOIST
TREMOR: MODERATE TREMOR WITH ARMS EXTENDED
ORIENTATION AND CLOUDING OF SENSORIUM: ORIENTED AND CAN DO SERIAL ADDITIONS
VISUAL DISTURBANCES: NOT PRESENT
TOTAL SCORE: 9
HEADACHE, FULLNESS IN HEAD: NOT PRESENT
ORIENTATION AND CLOUDING OF SENSORIUM: ORIENTED AND CAN DO SERIAL ADDITIONS
TREMOR: *
TREMOR: *
PAROXYSMAL SWEATS: BARELY PERCEPTIBLE SWEATING. PALMS MOIST
NAUSEA AND VOMITING: MILD NAUSEA WITH NO VOMITING
AGITATION: *
ORIENTATION AND CLOUDING OF SENSORIUM: ORIENTED AND CAN DO SERIAL ADDITIONS
ANXIETY: MODERATELY ANXIOUS OR GUARDED, SO ANXIETY IS INFERRED
VISUAL DISTURBANCES: NOT PRESENT
ORIENTATION AND CLOUDING OF SENSORIUM: ORIENTED AND CAN DO SERIAL ADDITIONS
AUDITORY DISTURBANCES: NOT PRESENT
AUDITORY DISTURBANCES: NOT PRESENT
ANXIETY: *
HEADACHE, FULLNESS IN HEAD: MODERATE
ANXIETY: *
NAUSEA AND VOMITING: NO NAUSEA AND NO VOMITING
AUDITORY DISTURBANCES: NOT PRESENT
VISUAL DISTURBANCES: NOT PRESENT
TOTAL SCORE: 4
NAUSEA AND VOMITING: NO NAUSEA AND NO VOMITING
AGITATION: *
AGITATION: SOMEWHAT MORE THAN NORMAL ACTIVITY
AUDITORY DISTURBANCES: NOT PRESENT
TREMOR: *
TREMOR: *
ANXIETY: MODERATELY ANXIOUS OR GUARDED, SO ANXIETY IS INFERRED
TOTAL SCORE: 11
PAROXYSMAL SWEATS: BARELY PERCEPTIBLE SWEATING. PALMS MOIST
TOTAL SCORE: 11
ORIENTATION AND CLOUDING OF SENSORIUM: ORIENTED AND CAN DO SERIAL ADDITIONS
HEADACHE, FULLNESS IN HEAD: MODERATE
AGITATION: NORMAL ACTIVITY
ANXIETY: *
TOTAL SCORE: 8
SUBSTANCE_ABUSE: 1
VISUAL DISTURBANCES: NOT PRESENT
PAROXYSMAL SWEATS: BARELY PERCEPTIBLE SWEATING. PALMS MOIST
TOTAL SCORE: 7
VISUAL DISTURBANCES: NOT PRESENT
ORIENTATION AND CLOUDING OF SENSORIUM: ORIENTED AND CAN DO SERIAL ADDITIONS
NAUSEA AND VOMITING: NO NAUSEA AND NO VOMITING
AUDITORY DISTURBANCES: NOT PRESENT
ANXIETY: MODERATELY ANXIOUS OR GUARDED, SO ANXIETY IS INFERRED
ANXIETY: *
HEADACHE, FULLNESS IN HEAD: MODERATE
PAROXYSMAL SWEATS: BARELY PERCEPTIBLE SWEATING. PALMS MOIST
AUDITORY DISTURBANCES: NOT PRESENT
NAUSEA AND VOMITING: MILD NAUSEA WITH NO VOMITING
ANXIETY: MODERATELY ANXIOUS OR GUARDED, SO ANXIETY IS INFERRED
TOTAL SCORE: 10
ANXIETY: *
HEADACHE, FULLNESS IN HEAD: NOT PRESENT
ORIENTATION AND CLOUDING OF SENSORIUM: ORIENTED AND CAN DO SERIAL ADDITIONS
NAUSEA AND VOMITING: MILD NAUSEA WITH NO VOMITING
ORIENTATION AND CLOUDING OF SENSORIUM: ORIENTED AND CAN DO SERIAL ADDITIONS

## 2020-01-28 ASSESSMENT — ENCOUNTER SYMPTOMS
COUGH: 0
FEVER: 0
ABDOMINAL PAIN: 0
CHILLS: 0
NAUSEA: 0
PALPITATIONS: 0
VOMITING: 0
DEPRESSION: 1
SHORTNESS OF BREATH: 0

## 2020-01-28 NOTE — ASSESSMENT & PLAN NOTE
Secondary to reduced oral intake nausea vomiting and diarrhea.  Intravenous fluids, continue at same rate today, consider decreasing tomorrow

## 2020-01-28 NOTE — PROGRESS NOTES
Pt arrived to unit via stretcher at 17:35. Pt oriented to room, unit, and plan of care. Tele-monitor placed and monitor room notified. All questions answered at this time. Call light within reach; fall precautions in place.

## 2020-01-28 NOTE — ASSESSMENT & PLAN NOTE
Resume home metoprolol.  Vital signs per policy.  Hydralazine and labetalol as needed for extreme hypertension.

## 2020-01-28 NOTE — PROGRESS NOTES
Assumed care of patient at 0700 from night shift RN Donna. Assessment completed, pt A&Ox4. Reports minimal ankle pain but declines medical intervention, repositioned. POC discussed with patient. Patient questions answered, agreeable to plan of care. Will continue CIWA monitoring. Fall / seizure/ aspiration precautions in place. Call bell within reach. Bed alarm on. Will continue to monitor.

## 2020-01-28 NOTE — PSYCHIATRY
"PSYCHIATRIC CONSULTATION:  Reason for admission:   Nausea, vomiting, diarrhea, right ankle pain  Reason for consult: Depression  Requesting Physician: Dr. Elodia Schreiber    Legal status:  Not on hold    Chief Complaint: \"I can't get my act together\"    HPI:   Jeanie Hutchison is a 74 y.o. year old female with a PMH of paroxysmal atrial fibrillation, anxiety, depression, EtOH dependence who was admitted to [facility] on 1/27/20 with nausea, vomiting, diarrhea, right ankle pain, low mood and anxiety. Psychiatry was consulted for her low mood.    Patient lost eldest daughter to melanoma 5 years ago. She lost her second daughter due to possible EtOH dependence, respiratory failure and ARDS 1.5 years ago. Her  has EtOH abuse and is now in assisted living past 3 months. Patient started drinking EtOH 5 years ago since the demise of her eldest daughter - \"only thing that make me feel normal\", \"calms me\". Since the demise of her second daughter 1.5 years ago, EtOH intake increased. She currently drinks on an average of 5 shots per day, but can be more , can be less.     Feels low mood, sad, hopelessness, tearful. Appetite increase past few years. Sleep disturbances. Only sleeps 3 -4 hrs/day, interrupted. No longer enjoys sports. Denies SI or HI. Denies AVH. Denies PTSD.     She was admitted to [facility] recently (~ 2 weeks ago) following ? drug overdose, was in ICU on ventilator. Patient does not remember what she overdosed on. She was s/b psychiatry but doesn't remember what medication she was given / discharged on.     She saw Dr. Ferrell few months ago, was given paroxetine - stopped it > 1 month ago as did not notice any effect. Couldn't remember if she had any side-effects. Remeron, ativan, hydroxyzine were all on her H&P, but patient doesn't really recollect names of all psych meds she was on.        Review of Systems:  Psychiatric:  Depression:  +  Clarisa: -   Anxiety/Panic Attacks : anxiety ++. No e/o " "panic attacks  PTSD symptom: denies  Psychosis: denies AVH  Other:  Constitutional: well built, tearful. Denies fever / chills. Generalized weakness  Neurologic:  Headaches chronic. No neck pain /stiffness / visual disturbances / focal weakness  ENT:  No ear ache / pain / dishcarge  Skin:  No redness / cellulitis / rash  Musculoskeletal:  Normal movement of all limbs. No joint swelling / pain / tenderness  CV:  No dyspnea / chest pain / pedal edema  Lungs:  Cough +. No dyspnea / wheeze / crepts  GI:  Nausea, non-bilious, non-bloody vomiting, non-bloody diarrhea. No abdominal pain  :    No dysuria / hematuria / flank pain or tenderness  All other systems reviewed and are negative.        Psychiatric Examination: observed phenomenon:  Vitals: /95   Pulse 83   Temp 36 °C (96.8 °F) (Temporal)   Resp 16   Ht 1.676 m (5' 6\")   Wt 72.6 kg (160 lb 0.9 oz)   SpO2 91%   BMI 25.83 kg/m²  Body mass index is 25.83 kg/m².      Appearance: lying in right lateral position with face covered up with bedsheets and pillows. Well bulit. tearful  Muscle Strength/Tone: normal. No flaccidity or rigidity  Gait/Station: not examined  Speech: normal  Thought Process: normal  Associations: no loose associations  Abnormal/Psychotic Thoughts (ex): -  Insight/Judgement: Preserved  Orientation: alert and oriented x 4  Memory: impaired slightly - could not recollect psych meds despite giving out few names  Attention/Concentration: preserved  Language: normal  Fund of Knowledge:   Mood:  Low mood, tearful          Affect:  Sad, blunt         SI/HI:   Denies SI/HI  Neurological Testing:( ie clock, cube drawing, MMSE, MOCA,etc.)       Past Psychiatric Hx:   Has diagnosis of depression and anxiety. S/b Dr. Ferrell few months ago, was given Paroxetine 37.5 mg per chart. Per pt - did not help, doesn't remember side-effects  Per chart - she also was on remeron 15 mg nightly but pt doesn't remember  Chart review also suggested h/o ativan, " hydroxyzine intake - ? Unsure when / duration  Per patient - she was admitted to ICU at [facility], was on ventilator 2 weeks ago, was s/b psychiatry but doesn't remember what medication she was given while in-pt or at [facility] Psychiatric Hx:    Social Hx:  Social History     Socioeconomic History   • Marital status:      Spouse name: Not on file   • Number of children: Not on file   • Years of education: Not on file   • Highest education level: Not on file   Occupational History   • Not on file   Social Needs   • Financial resource strain: Not on file   • Food insecurity:     Worry: Not on file     Inability: Not on file   • Transportation needs:     Medical: Not on file     Non-medical: Not on file   Tobacco Use   • Smoking status: Never Smoker   • Smokeless tobacco: Never Used   Substance and Sexual Activity   • Alcohol use: Yes     Frequency: 4 or more times a week     Drinks per session: 5 or 6     Comment: occasional    • Drug use: Not Currently     Types: Oral     Comment: takes friends tranqualizers    • Sexual activity: Not on file   Lifestyle   • Physical activity:     Days per week: Not on file     Minutes per session: Not on file   • Stress: Not on file   Relationships   • Social connections:     Talks on phone: Not on file     Gets together: Not on file     Attends Judaism service: Not on file     Active member of club or organization: Not on file     Attends meetings of clubs or organizations: Not on file     Relationship status: Not on file   • Intimate partner violence:     Fear of current or ex partner: Not on file     Emotionally abused: Not on file     Physically abused: Not on file     Forced sexual activity: Not on file   Other Topics Concern   • Not on file   Social History Narrative   • Not on file       Drug/Alcohol/Tobacco Hx:   Drugs: did dope while in college, nil since, never injected   Alcohol: 4-5 shots of vodka / day on average, some days more, some days  less. EtOH dependence for ~ 5 years   Tobacco: denies    Medical Hx: labs, MARS, medications, etc were reviewed. Only those findings of potential interest to psychiatry are noted below:  Neurological: None   TBIs:    SZs:   Strokes:   Other:  Other medical:   Thyroid:   Diabetes:   Cardiovascular disease: paroxysmal atrial fibrillation  Past Medical History:   Diagnosis Date   • A-fib (HCC) 5/1/2014   • Chronic pain     r/t pelvic fracture   • Psychiatric disorder     history of depression and anxiety      Past Surgical History:   Procedure Laterality Date   • CLAVICLE ORIF  5/21/2014    Performed by Wilfred Gonzalez M.D. at [facility]   • BREAST BIOPSY      bilateral neg breast bxs   • OTHER ORTHOPEDIC SURGERY      pelvis fracture. 10 years ago        Allergies:   No Known Allergies    Medications:  Current Facility-Administered Medications   Medication Dose Route Frequency Provider Last Rate Last Dose   • phosphorus (K-PHOS-NEUTRAL) per tablet 1 Tab  1 Tab Oral Q6HRS Gerry Taveras M.D.   1 Tab at 01/28/20 0948   • senna-docusate (PERICOLACE or SENOKOT S) 8.6-50 MG per tablet 2 Tab  2 Tab Oral BID Elodia Schreiber M.D.   Stopped at 01/27/20 1800    And   • polyethylene glycol/lytes (MIRALAX) PACKET 1 Packet  1 Packet Oral QDAY PRN Elodia Schreiber M.D.        And   • magnesium hydroxide (MILK OF MAGNESIA) suspension 30 mL  30 mL Oral QDAY PRN Elodia Schreiber M.D.        And   • bisacodyl (DULCOLAX) suppository 10 mg  10 mg Rectal QDAY PRN Elodia Schreiber M.D.       • Respiratory Care per Protocol   Nebulization Continuous RT Asem HAILEY Schreiber M.D.       • NS infusion   Intravenous Continuous Asem HAILEY Schreiber M.D. 100 mL/hr at 01/27/20 1809     • heparin injection 5,000 Units  5,000 Units Subcutaneous Q12HRS Aseflorentino Schreiber M.D.   5,000 Units at 01/28/20 0535   • acetaminophen (TYLENOL) tablet 650 mg  650 mg Oral Q6HRS PRN Aseflorentino Schreiber M.D.   650 mg at 01/28/20 0250   • LORazepam (ATIVAN)  tablet 0.5 mg  0.5 mg Oral Q4HRS PRN Elodia Schreiber M.D.   0.5 mg at 01/28/20 0948   • LORazepam (ATIVAN) tablet 1 mg  1 mg Oral Q4HRS PRN Elodia Schreiber M.D.   Stopped at 01/28/20 0536    Or   • LORazepam (ATIVAN) injection 0.5 mg  0.5 mg Intravenous Q4HRS PRN Elodia Schreiber M.D.   0.5 mg at 01/27/20 1829   • LORazepam (ATIVAN) tablet 2 mg  2 mg Oral Q2HRS PRN Elodia Schreiber M.D.   2 mg at 01/28/20 0251    Or   • LORazepam (ATIVAN) injection 1 mg  1 mg Intravenous Q2HRS PRN Elodia Schreiber M.D.   1 mg at 01/27/20 2121   • LORazepam (ATIVAN) tablet 3 mg  3 mg Oral Q HOUR PRN Eloida Schreiber M.D.        Or   • LORazepam (ATIVAN) injection 1.5 mg  1.5 mg Intravenous Q HOUR PRN Elodia Schreiber M.D.       • LORazepam (ATIVAN) tablet 4 mg  4 mg Oral Q15 MIN PRN Elodia Schreiber M.D.        Or   • LORazepam (ATIVAN) injection 2 mg  2 mg Intravenous Q15 MIN PRN Elodia Schreiber M.D.       • thiamine tablet 100 mg  100 mg Oral DAILY Elodia Schreiber M.D.   100 mg at 01/28/20 0536    And   • multivitamin (THERAGRAN) tablet 1 Tab  1 Tab Oral DAILY Elodia Schreiber M.D.   1 Tab at 01/28/20 0536    And   • folic acid (FOLVITE) tablet 1 mg  1 mg Oral DAILY Elodia Schreiber M.D.   1 mg at 01/28/20 0537   • magnesium oxide (MAG-OX) tablet 400 mg  400 mg Oral BID Elodia Schreiber M.D.   400 mg at 01/28/20 0536   • levothyroxine (SYNTHROID) tablet 125 mcg  125 mcg Oral AM ES Elodia Schreiber M.D.   125 mcg at 01/28/20 0538   • metoprolol SR (TOPROL XL) tablet 50 mg  50 mg Oral DAILY Elodia Schreiber M.D.   50 mg at 01/28/20 0536   • hydrALAZINE (APRESOLINE) tablet 5 mg  5 mg Oral Q6HRS PRN Elodia Schreiber M.D.       • hydrALAZINE (APRESOLINE) injection 20 mg  20 mg Intravenous Q6HRS PRN Elodia Schreiber M.D.       • labetalol (NORMODYNE/TRANDATE) injection 10 mg  10 mg Intravenous Q4HRS PRN Elodia Schreiber M.D.       • famotidine (PEPCID) injection 20 mg  20 mg Intravenous BID Elodia Schreiber M.D.   20 mg at 01/28/20  0537       Labs/ Testing:  Recent Results (from the past 48 hour(s))   CBC WITH DIFFERENTIAL    Collection Time: 01/27/20 11:30 AM   Result Value Ref Range    WBC 6.8 4.8 - 10.8 K/uL    RBC 4.09 (L) 4.20 - 5.40 M/uL    Hemoglobin 13.9 12.0 - 16.0 g/dL    Hematocrit 41.4 37.0 - 47.0 %    .2 (H) 81.4 - 97.8 fL    MCH 34.0 (H) 27.0 - 33.0 pg    MCHC 33.6 33.6 - 35.0 g/dL    RDW 54.5 (H) 35.9 - 50.0 fL    Platelet Count 174 164 - 446 K/uL    MPV 9.3 9.0 - 12.9 fL    Neutrophils-Polys 67.20 44.00 - 72.00 %    Lymphocytes 25.40 22.00 - 41.00 %    Monocytes 6.30 0.00 - 13.40 %    Eosinophils 0.10 0.00 - 6.90 %    Basophils 0.60 0.00 - 1.80 %    Immature Granulocytes 0.40 0.00 - 0.90 %    Nucleated RBC 1.20 /100 WBC    Neutrophils (Absolute) 4.57 2.00 - 7.15 K/uL    Lymphs (Absolute) 1.73 1.00 - 4.80 K/uL    Monos (Absolute) 0.43 0.00 - 0.85 K/uL    Eos (Absolute) 0.01 0.00 - 0.51 K/uL    Baso (Absolute) 0.04 0.00 - 0.12 K/uL    Immature Granulocytes (abs) 0.03 0.00 - 0.11 K/uL    NRBC (Absolute) 0.08 K/uL   COMP METABOLIC PANEL    Collection Time: 01/27/20 11:30 AM   Result Value Ref Range    Sodium 141 135 - 145 mmol/L    Potassium 3.3 (L) 3.6 - 5.5 mmol/L    Chloride 103 96 - 112 mmol/L    Co2 19 (L) 20 - 33 mmol/L    Anion Gap 19.0 (H) 0.0 - 11.9    Glucose 88 65 - 99 mg/dL    Bun 9 8 - 22 mg/dL    Creatinine 0.57 0.50 - 1.40 mg/dL    Calcium 8.7 8.5 - 10.5 mg/dL    AST(SGOT) 39 12 - 45 U/L    ALT(SGPT) 20 2 - 50 U/L    Alkaline Phosphatase 110 (H) 30 - 99 U/L    Total Bilirubin 2.3 (H) 0.1 - 1.5 mg/dL    Albumin 3.1 (L) 3.2 - 4.9 g/dL    Total Protein 6.2 6.0 - 8.2 g/dL    Globulin 3.1 1.9 - 3.5 g/dL    A-G Ratio 1.0 g/dL   LIPASE    Collection Time: 01/27/20 11:30 AM   Result Value Ref Range    Lipase 14 11 - 82 U/L   MAGNESIUM    Collection Time: 01/27/20 11:30 AM   Result Value Ref Range    Magnesium 1.4 (L) 1.5 - 2.5 mg/dL   PHOSPHORUS    Collection Time: 01/27/20 11:30 AM   Result Value Ref Range     Phosphorus 3.5 2.5 - 4.5 mg/dL   DIAGNOSTIC ALCOHOL    Collection Time: 20 11:30 AM   Result Value Ref Range    Diagnostic Alcohol 0.08 (H) 0.00 g/dL   ESTIMATED GFR    Collection Time: 20 11:30 AM   Result Value Ref Range    GFR If African American >60 >60 mL/min/1.73 m 2    GFR If Non African American >60 >60 mL/min/1.73 m 2   EKG    Collection Time: 20  2:31 PM   Result Value Ref Range    Report       [facility]    Test Date:  2020  Pt Name:    MARISELA DURHAM              Department: ER  MRN:        [id number]                      Room:       [facility]  Gender:     Female                       Technician: [id number]  :        [id number]                   Requested By:STEPH MUNSON  Order #:    [id number]                    Reading MD: STEPH MUNSON DO    Measurements  Intervals                                Axis  Rate:       92                           P:          52  AL:         160                          QRS:        -54  QRSD:       80                           T:          28  QT:         424  QTc:        525    Interpretive Statements  SINUS RHYTHM  VENTRICULAR PREMATURE COMPLEX  PROBABLE LEFT ATRIAL ABNORMALITY  LEFT ANTERIOR FASCICULAR BLOCK  RSR' IN V1 OR V2, PROBABLY NORMAL VARIANT  BORDERLINE T ABNORMALITIES, ANTERIOR LEADS  PROLONGED QT INTERVAL  Compared to ECG 01/15/2020 10:21:42  Ventricular premature complex(es) now pr esent  RSR' in V1 or V2 now presentT-wave abnormality now present  Prolonged QT interval now present  Incomplete right bundle-branch block no longer present  Electronically Signed On 2020 14:51:43 PST by STEPH MUNSON DO     URINALYSIS,CULTURE IF INDICATED    Collection Time: 20  3:15 PM   Result Value Ref Range    Color DK Yellow     Character Turbid (A)     Specific Gravity 1.020 <1.035    Ph 5.0 5.0 - 8.0    Glucose Negative Negative mg/dL    Ketones 15 (A) Negative mg/dL    Protein 30 (A)  Negative mg/dL    Bilirubin Negative Negative    Urobilinogen, Urine 1.0 Negative    Nitrite Positive (A) Negative    Leukocyte Esterase Moderate (A) Negative    Occult Blood Small (A) Negative    Micro Urine Req Microscopic    URINE MICROSCOPIC (W/UA)    Collection Time: 01/27/20  3:15 PM   Result Value Ref Range    WBC Packed (A) /hpf    RBC 0-2 /hpf    Bacteria Many (A) None /hpf    Epithelial Cells Few /hpf    Hyaline Cast 0-2 /lpf   CBC with Differential    Collection Time: 01/28/20  2:00 AM   Result Value Ref Range    WBC 5.6 4.8 - 10.8 K/uL    RBC 3.30 (L) 4.20 - 5.40 M/uL    Hemoglobin 11.0 (L) 12.0 - 16.0 g/dL    Hematocrit 34.2 (L) 37.0 - 47.0 %    .8 (H) 81.4 - 97.8 fL    MCH 33.3 (H) 27.0 - 33.0 pg    MCHC 32.7 (L) 33.6 - 35.0 g/dL    RDW 54.4 (H) 35.9 - 50.0 fL    Platelet Count 128 (L) 164 - 446 K/uL    MPV 9.5 9.0 - 12.9 fL    Neutrophils-Polys 63.40 44.00 - 72.00 %    Lymphocytes 27.50 22.00 - 41.00 %    Monocytes 7.20 0.00 - 13.40 %    Eosinophils 1.10 0.00 - 6.90 %    Basophils 0.40 0.00 - 1.80 %    Immature Granulocytes 0.40 0.00 - 0.90 %    Nucleated RBC 0.70 /100 WBC    Neutrophils (Absolute) 3.55 2.00 - 7.15 K/uL    Lymphs (Absolute) 1.54 1.00 - 4.80 K/uL    Monos (Absolute) 0.40 0.00 - 0.85 K/uL    Eos (Absolute) 0.06 0.00 - 0.51 K/uL    Baso (Absolute) 0.02 0.00 - 0.12 K/uL    Immature Granulocytes (abs) 0.02 0.00 - 0.11 K/uL    NRBC (Absolute) 0.04 K/uL   Comp Metabolic Panel (CMP)    Collection Time: 01/28/20  2:00 AM   Result Value Ref Range    Sodium 139 135 - 145 mmol/L    Potassium 3.3 (L) 3.6 - 5.5 mmol/L    Chloride 107 96 - 112 mmol/L    Co2 26 20 - 33 mmol/L    Anion Gap 6.0 0.0 - 11.9    Glucose 155 (H) 65 - 99 mg/dL    Bun 11 8 - 22 mg/dL    Creatinine 0.66 0.50 - 1.40 mg/dL    Calcium 8.0 (L) 8.5 - 10.5 mg/dL    AST(SGOT) 25 12 - 45 U/L    ALT(SGPT) 14 2 - 50 U/L    Alkaline Phosphatase 85 30 - 99 U/L    Total Bilirubin 3.1 (H) 0.1 - 1.5 mg/dL    Albumin 2.6 (L) 3.2 - 4.9  g/dL    Total Protein 5.1 (L) 6.0 - 8.2 g/dL    Globulin 2.5 1.9 - 3.5 g/dL    A-G Ratio 1.0 g/dL   Magnesium    Collection Time: 01/28/20  2:00 AM   Result Value Ref Range    Magnesium 1.5 1.5 - 2.5 mg/dL   Phosphorus    Collection Time: 01/28/20  2:00 AM   Result Value Ref Range    Phosphorus 2.4 (L) 2.5 - 4.5 mg/dL   FREE THYROXINE    Collection Time: 01/28/20  2:00 AM   Result Value Ref Range    Free T-4 1.23 0.53 - 1.43 ng/dL   TSH    Collection Time: 01/28/20  2:00 AM   Result Value Ref Range    TSH 0.550 0.380 - 5.330 uIU/mL   ESTIMATED GFR    Collection Time: 01/28/20  2:00 AM   Result Value Ref Range    GFR If African American >60 >60 mL/min/1.73 m 2    GFR If Non African American >60 >60 mL/min/1.73 m 2       DX-ANKLE 3+ VIEWS RIGHT   Final Result      No evidence of fracture or dislocation.          ECG: QTc 525      ASSESSMENT:   # Depression  # EtOH withdrawal (CIWAs likely underscored due to Metoprolol)  # EtOH abuse  # Prolonged QTc  # nausea, vomiting, diarrhea  # right ankle pain - no acute fracture  # paroxysmal atrial fibrillation      PLAN:  # Start Zoloft 50 mg daily  # Add depakote 250 mg TID for withdrawal  # 1 mg IV Ativan one dose prescribed for CIWA 14    Legal status: N/A  Anticipate F/U within 48 hours.   Thank you for the consult.   ,DirectAddress_Unknown

## 2020-01-28 NOTE — ASSESSMENT & PLAN NOTE
Hospitalization complicated by alcohol withdrawal  Status post ICU stay, mechanical ventilation  Patient counseled and educated regarding alcohol cessation  Will optimize nutrition  We will monitor electrolytes, replace as clinically appropriate  Clinically improved at this time  We will continue folic acid, thiamine, multivitamins

## 2020-01-28 NOTE — CARE PLAN
Problem: Psychosocial Needs:  Goal: Level of anxiety will decrease  Outcome: PROGRESSING AS EXPECTED   POC discussed with patient. CIWA protocol continued, medicated as needed. All questions answered.  Problem: Communication  Goal: The ability to communicate needs accurately and effectively will improve  Outcome: PROGRESSING AS EXPECTED   Patient opening up and discussing depression and start of ETOH abuse related to the death of her daughters. Would benefit from psych intervention.

## 2020-01-28 NOTE — PROGRESS NOTES
2 RN skin check complete with Giovana HOOVER.   Devices in place glasses.  Skin assessed under devices pink and blanching.  Confirmed pressure ulcers found on N/A.  New potential pressure ulcers noted on N/A. Wound consult placed no.  The following interventions in place pillows in use for support/position.      Bilateral ears pink and blanching.  Sacrum pink and intact.   Bilateral heels dry and blanching.

## 2020-01-28 NOTE — PROGRESS NOTES
Received report from day shift RN, assumed pt care. A&O x 4. No report of pain. Fall precautions in place. Seizures precautions and aspiration precautions in use. Call light and bedside table within reach. Assessment completed. Will continue to monitor.

## 2020-01-28 NOTE — PROGRESS NOTES
Pt having breakthrough increased anxiety. Offered emotional support. VIJAY 14. Psych team at bedside. Now x1 Ativan 1mg admin.

## 2020-01-28 NOTE — ASSESSMENT & PLAN NOTE
Patient remains in persistent atrial fibrillation with RVR  Rates have relatively improved, still approximately around 105    Not on anticoagulation, per previous outpatient cardiology notes supposed to be on Eliquis    Given that she has been not anticoagulated, would not recommend rhythm control for now, previous echocardiogram obtained in the outpatient setting reviewed by me    Would recommend aggressive rate control strategy  Transition to metoprolol 100 mg twice daily  Continue digoxin 125 mcg daily, will check digoxin levels in 48 hours from the first dose of 125 mcg    Continue Lovenox to Coumadin bridge towards the therapeutic INR    We will prefer Coumadin in the acute setting, given if any risks of bleeding easily reversible    Patient with recent critical illness, high risk of peptic ulcer disease/stress ulcer/alcoholic gastritis.  Continue omeprazole/sucralfate.  Monitor for any concerns of bleeding.    If no acute issues, would recommend outpatient cardiology follow-up    Continue close monitoring on telemetry    Monitor and replace electrolytes as clinically appropriate

## 2020-01-28 NOTE — ASSESSMENT & PLAN NOTE
Likely secondary to alcoholic gastritis.   -continue supportive Care with IVF's and IV anti-emetics, seems to be improving today

## 2020-01-28 NOTE — PROGRESS NOTES
Hospital Medicine Daily Progress Note    Date of Service  1/28/2020    Chief Complaint  74 y.o. female admitted 1/27/2020 with ETOH WD and severe depression    Hospital Course    see below      Interval Problem Update  ETOH WD-CIWA scores have ranged from 8-11. Feels very sleepy today. Eating some food. Noted electrolyte abnormalities. TELE showed SR-ST.    Depression-quite severe, but denies any active SI/HI. Has lost 2 daughters recently and  has severe medical issues.     Consultants/Specialty  Psychiatry    Code Status  fcfc    Disposition  tele    Review of Systems  Review of Systems   Constitutional: Positive for malaise/fatigue. Negative for chills and fever.   Respiratory: Negative for cough and shortness of breath.    Cardiovascular: Negative for chest pain and palpitations.   Gastrointestinal: Negative for abdominal pain, nausea and vomiting.   Psychiatric/Behavioral: Positive for depression and substance abuse. Negative for suicidal ideas.   All other systems reviewed and are negative.       Physical Exam  Temp:  [36 °C (96.8 °F)-36.7 °C (98.1 °F)] 36.3 °C (97.4 °F)  Pulse:  [83-96] 85  Resp:  [16-18] 17  BP: (128-149)/() 135/90  SpO2:  [90 %-95 %] 90 %    Physical Exam  Vitals signs and nursing note reviewed.   Constitutional:       General: She is not in acute distress.     Appearance: She is well-developed.      Comments: Fatigued, depressed appearing   HENT:      Head: Normocephalic and atraumatic.      Mouth/Throat:      Pharynx: No oropharyngeal exudate.   Eyes:      General: No scleral icterus.     Pupils: Pupils are equal, round, and reactive to light.   Neck:      Musculoskeletal: Normal range of motion and neck supple.      Thyroid: No thyromegaly.   Cardiovascular:      Rate and Rhythm: Normal rate and regular rhythm.      Heart sounds: Normal heart sounds. No murmur.   Pulmonary:      Effort: Pulmonary effort is normal. No respiratory distress.      Breath sounds: Normal breath  sounds. No wheezing.   Abdominal:      General: Bowel sounds are normal. There is no distension.      Palpations: Abdomen is soft.      Tenderness: There is no tenderness.   Musculoskeletal: Normal range of motion.         General: No tenderness.   Skin:     General: Skin is warm and dry.      Findings: No rash.   Neurological:      Mental Status: She is alert and oriented to person, place, and time.      Cranial Nerves: No cranial nerve deficit.         Fluids    Intake/Output Summary (Last 24 hours) at 1/28/2020 1343  Last data filed at 1/28/2020 0834  Gross per 24 hour   Intake 1200 ml   Output --   Net 1200 ml       Laboratory  Recent Labs     01/27/20  1130 01/28/20  0200   WBC 6.8 5.6   RBC 4.09* 3.30*   HEMOGLOBIN 13.9 11.0*   HEMATOCRIT 41.4 34.2*   .2* 101.8*   MCH 34.0* 33.3*   MCHC 33.6 32.7*   RDW 54.5* 54.4*   PLATELETCT 174 128*   MPV 9.3 9.5     Recent Labs     01/27/20  1130 01/28/20  0200   SODIUM 141 139   POTASSIUM 3.3* 3.3*   CHLORIDE 103 107   CO2 19* 26   GLUCOSE 88 155*   BUN 9 11   CREATININE 0.57 0.66   CALCIUM 8.7 8.0*                   Imaging  DX-ANKLE 3+ VIEWS RIGHT   Final Result      No evidence of fracture or dislocation.           Assessment/Plan  * Alcohol withdrawal (HCC)- (present on admission)  Assessment & Plan  [facility] protocol   Continuous cardiac monitoring  -actively withdrawing, scores around 8-11  -aggressively replace lytes  -MVI/thiamine/folate    Nausea and vomiting- (present on admission)  Assessment & Plan  Likely secondary to alcoholic gastritis.   -continue supportive Care with IVF's and IV anti-emetics, seems to be improving today    Hypomagnesemia- (present on admission)  Assessment & Plan  -replace aggressively, 2/2 ETOHism    Hypokalemia- (present on admission)  Assessment & Plan  Low again along with low PO4 levels, start KPHOS neutral, check labs daily    Dehydration- (present on admission)  Assessment & Plan  Secondary to reduced oral intake nausea  vomiting and diarrhea.  Intravenous fluids, continue at same rate today, consider decreasing tomorrow    Acute right ankle pain- (present on admission)  Assessment & Plan  Checking x-ray which is negative for fracture  -continue  Multimodal pain control.    Alcohol dependence (HCC)- (present on admission)  Assessment & Plan  With alcohol withdrawal.  See alcohol withdrawal above.  Counseling provided.  Thiamine folic acid and multivitamin.    Depression- (present on admission)  Assessment & Plan  Worse with the recent death of her daughter.  Supportive care for now. No active SI/HI  -quite severe  -consulted psych, almost certainly will start SNRI/SSRI    HTN (hypertension)- (present on admission)  Assessment & Plan  Resume home metoprolol.  Vital signs per policy.  Hydralazine and labetalol as needed for extreme hypertension.     PAF (paroxysmal atrial fibrillation) (HCC)- (present on admission)  Assessment & Plan  Resume home metoprolol for rate control, remains in NSR and good HR control at this time  Does not appear to be on anticoagulation, counseled on starting anticoagulation.  Wants to defer this decision at this point.    Anxiety- (present on admission)  Assessment & Plan  Exacerbated by recent death of her daughter.  Supportive care.       VTE prophylaxis: heparin

## 2020-01-29 LAB
ALBUMIN SERPL BCP-MCNC: 2.7 G/DL (ref 3.2–4.9)
ALBUMIN/GLOB SERPL: 1 G/DL
ALP SERPL-CCNC: 82 U/L (ref 30–99)
ALT SERPL-CCNC: 12 U/L (ref 2–50)
ANION GAP SERPL CALC-SCNC: 7 MMOL/L (ref 0–11.9)
AST SERPL-CCNC: 23 U/L (ref 12–45)
BACTERIA UR CULT: ABNORMAL
BACTERIA UR CULT: ABNORMAL
BASOPHILS # BLD AUTO: 0.5 % (ref 0–1.8)
BASOPHILS # BLD: 0.03 K/UL (ref 0–0.12)
BILIRUB SERPL-MCNC: 1.6 MG/DL (ref 0.1–1.5)
BUN SERPL-MCNC: 5 MG/DL (ref 8–22)
CALCIUM SERPL-MCNC: 7.7 MG/DL (ref 8.5–10.5)
CHLORIDE SERPL-SCNC: 107 MMOL/L (ref 96–112)
CO2 SERPL-SCNC: 26 MMOL/L (ref 20–33)
CREAT SERPL-MCNC: 0.5 MG/DL (ref 0.5–1.4)
EOSINOPHIL # BLD AUTO: 0.11 K/UL (ref 0–0.51)
EOSINOPHIL NFR BLD: 1.9 % (ref 0–6.9)
ERYTHROCYTE [DISTWIDTH] IN BLOOD BY AUTOMATED COUNT: 53.8 FL (ref 35.9–50)
GLOBULIN SER CALC-MCNC: 2.6 G/DL (ref 1.9–3.5)
GLUCOSE SERPL-MCNC: 91 MG/DL (ref 65–99)
HCT VFR BLD AUTO: 33.5 % (ref 37–47)
HGB BLD-MCNC: 11 G/DL (ref 12–16)
IMM GRANULOCYTES # BLD AUTO: 0.02 K/UL (ref 0–0.11)
IMM GRANULOCYTES NFR BLD AUTO: 0.3 % (ref 0–0.9)
LYMPHOCYTES # BLD AUTO: 2.05 K/UL (ref 1–4.8)
LYMPHOCYTES NFR BLD: 35.3 % (ref 22–41)
MAGNESIUM SERPL-MCNC: 1.4 MG/DL (ref 1.5–2.5)
MCH RBC QN AUTO: 33.3 PG (ref 27–33)
MCHC RBC AUTO-ENTMCNC: 32.8 G/DL (ref 33.6–35)
MCV RBC AUTO: 101.5 FL (ref 81.4–97.8)
MONOCYTES # BLD AUTO: 0.43 K/UL (ref 0–0.85)
MONOCYTES NFR BLD AUTO: 7.4 % (ref 0–13.4)
NEUTROPHILS # BLD AUTO: 3.17 K/UL (ref 2–7.15)
NEUTROPHILS NFR BLD: 54.6 % (ref 44–72)
NRBC # BLD AUTO: 0.02 K/UL
NRBC BLD-RTO: 0.3 /100 WBC
PHOSPHATE SERPL-MCNC: 3.3 MG/DL (ref 2.5–4.5)
PLATELET # BLD AUTO: 115 K/UL (ref 164–446)
PMV BLD AUTO: 9.8 FL (ref 9–12.9)
POTASSIUM SERPL-SCNC: 3.3 MMOL/L (ref 3.6–5.5)
PROT SERPL-MCNC: 5.3 G/DL (ref 6–8.2)
RBC # BLD AUTO: 3.3 M/UL (ref 4.2–5.4)
SIGNIFICANT IND 70042: ABNORMAL
SITE SITE: ABNORMAL
SODIUM SERPL-SCNC: 140 MMOL/L (ref 135–145)
SOURCE SOURCE: ABNORMAL
WBC # BLD AUTO: 5.8 K/UL (ref 4.8–10.8)

## 2020-01-29 PROCEDURE — 700111 HCHG RX REV CODE 636 W/ 250 OVERRIDE (IP): Performed by: HOSPITALIST

## 2020-01-29 PROCEDURE — 700102 HCHG RX REV CODE 250 W/ 637 OVERRIDE(OP): Performed by: STUDENT IN AN ORGANIZED HEALTH CARE EDUCATION/TRAINING PROGRAM

## 2020-01-29 PROCEDURE — 770020 HCHG ROOM/CARE - TELE (206)

## 2020-01-29 PROCEDURE — 80053 COMPREHEN METABOLIC PANEL: CPT

## 2020-01-29 PROCEDURE — 700102 HCHG RX REV CODE 250 W/ 637 OVERRIDE(OP): Performed by: HOSPITALIST

## 2020-01-29 PROCEDURE — 700105 HCHG RX REV CODE 258: Performed by: HOSPITALIST

## 2020-01-29 PROCEDURE — A9270 NON-COVERED ITEM OR SERVICE: HCPCS | Performed by: HOSPITALIST

## 2020-01-29 PROCEDURE — 700102 HCHG RX REV CODE 250 W/ 637 OVERRIDE(OP): Performed by: INTERNAL MEDICINE

## 2020-01-29 PROCEDURE — 84100 ASSAY OF PHOSPHORUS: CPT

## 2020-01-29 PROCEDURE — 99232 SBSQ HOSP IP/OBS MODERATE 35: CPT | Mod: GC | Performed by: PSYCHIATRY & NEUROLOGY

## 2020-01-29 PROCEDURE — A9270 NON-COVERED ITEM OR SERVICE: HCPCS | Performed by: STUDENT IN AN ORGANIZED HEALTH CARE EDUCATION/TRAINING PROGRAM

## 2020-01-29 PROCEDURE — A9270 NON-COVERED ITEM OR SERVICE: HCPCS | Performed by: INTERNAL MEDICINE

## 2020-01-29 PROCEDURE — 99233 SBSQ HOSP IP/OBS HIGH 50: CPT | Performed by: INTERNAL MEDICINE

## 2020-01-29 PROCEDURE — 83735 ASSAY OF MAGNESIUM: CPT

## 2020-01-29 PROCEDURE — 85025 COMPLETE CBC W/AUTO DIFF WBC: CPT

## 2020-01-29 RX ORDER — CHLORDIAZEPOXIDE HYDROCHLORIDE 25 MG/1
25 CAPSULE, GELATIN COATED ORAL EVERY 8 HOURS
Status: DISCONTINUED | OUTPATIENT
Start: 2020-01-29 | End: 2020-01-30

## 2020-01-29 RX ORDER — POTASSIUM CHLORIDE 20 MEQ/1
30 TABLET, EXTENDED RELEASE ORAL 2 TIMES DAILY
Status: DISCONTINUED | OUTPATIENT
Start: 2020-01-29 | End: 2020-01-30

## 2020-01-29 RX ADMIN — CEFTRIAXONE SODIUM 2 G: 2 INJECTION, POWDER, FOR SOLUTION INTRAMUSCULAR; INTRAVENOUS at 04:28

## 2020-01-29 RX ADMIN — CHLORDIAZEPOXIDE HYDROCHLORIDE 25 MG: 25 CAPSULE ORAL at 13:32

## 2020-01-29 RX ADMIN — LORAZEPAM 1 MG: 2 INJECTION INTRAMUSCULAR; INTRAVENOUS at 23:26

## 2020-01-29 RX ADMIN — POTASSIUM CHLORIDE 30 MEQ: 1500 TABLET, EXTENDED RELEASE ORAL at 16:18

## 2020-01-29 RX ADMIN — SERTRALINE 50 MG: 50 TABLET, FILM COATED ORAL at 04:33

## 2020-01-29 RX ADMIN — MAGNESIUM 64 MG (MAGNESIUM CHLORIDE) TABLET,DELAYED RELEASE 64 MG: at 16:18

## 2020-01-29 RX ADMIN — FOLIC ACID 1 MG: 1 TABLET ORAL at 04:27

## 2020-01-29 RX ADMIN — POTASSIUM CHLORIDE 30 MEQ: 1500 TABLET, EXTENDED RELEASE ORAL at 07:40

## 2020-01-29 RX ADMIN — LORAZEPAM 3 MG: 2 TABLET ORAL at 20:23

## 2020-01-29 RX ADMIN — DIVALPROEX SODIUM 250 MG: 250 TABLET, DELAYED RELEASE ORAL at 04:33

## 2020-01-29 RX ADMIN — LORAZEPAM 1.5 MG: 2 INJECTION INTRAMUSCULAR; INTRAVENOUS at 05:26

## 2020-01-29 RX ADMIN — LEVOTHYROXINE SODIUM 125 MCG: 125 TABLET ORAL at 04:27

## 2020-01-29 RX ADMIN — DIVALPROEX SODIUM 250 MG: 250 TABLET, DELAYED RELEASE ORAL at 13:32

## 2020-01-29 RX ADMIN — DIVALPROEX SODIUM 250 MG: 250 TABLET, DELAYED RELEASE ORAL at 20:10

## 2020-01-29 RX ADMIN — METOPROLOL SUCCINATE 50 MG: 50 TABLET, EXTENDED RELEASE ORAL at 04:31

## 2020-01-29 RX ADMIN — FAMOTIDINE 20 MG: 10 INJECTION INTRAVENOUS at 04:29

## 2020-01-29 RX ADMIN — LORAZEPAM 1.5 MG: 2 INJECTION INTRAMUSCULAR; INTRAVENOUS at 21:30

## 2020-01-29 RX ADMIN — HEPARIN SODIUM 5000 UNITS: 5000 INJECTION, SOLUTION INTRAVENOUS; SUBCUTANEOUS at 16:18

## 2020-01-29 RX ADMIN — LORAZEPAM 2 MG: 2 TABLET ORAL at 01:33

## 2020-01-29 RX ADMIN — DIBASIC SODIUM PHOSPHATE, MONOBASIC POTASSIUM PHOSPHATE AND MONOBASIC SODIUM PHOSPHATE 1 TABLET: 852; 155; 130 TABLET ORAL at 04:30

## 2020-01-29 RX ADMIN — LORAZEPAM 3 MG: 2 TABLET ORAL at 16:18

## 2020-01-29 RX ADMIN — LORAZEPAM 3 MG: 2 TABLET ORAL at 07:40

## 2020-01-29 RX ADMIN — LORAZEPAM 2 MG: 2 INJECTION INTRAMUSCULAR; INTRAVENOUS at 19:46

## 2020-01-29 RX ADMIN — Medication 100 MG: at 04:27

## 2020-01-29 RX ADMIN — Medication 400 MG: at 04:27

## 2020-01-29 RX ADMIN — FAMOTIDINE 20 MG: 10 INJECTION INTRAVENOUS at 16:18

## 2020-01-29 RX ADMIN — CHLORDIAZEPOXIDE HYDROCHLORIDE 25 MG: 25 CAPSULE ORAL at 20:10

## 2020-01-29 RX ADMIN — MAGNESIUM 64 MG (MAGNESIUM CHLORIDE) TABLET,DELAYED RELEASE 64 MG: at 07:40

## 2020-01-29 RX ADMIN — LORAZEPAM 3 MG: 2 TABLET ORAL at 10:19

## 2020-01-29 RX ADMIN — LORAZEPAM 1.5 MG: 2 INJECTION INTRAMUSCULAR; INTRAVENOUS at 04:21

## 2020-01-29 RX ADMIN — LORAZEPAM 1.5 MG: 2 INJECTION INTRAMUSCULAR; INTRAVENOUS at 12:09

## 2020-01-29 RX ADMIN — LORAZEPAM 1.5 MG: 2 INJECTION INTRAMUSCULAR; INTRAVENOUS at 18:43

## 2020-01-29 RX ADMIN — HEPARIN SODIUM 5000 UNITS: 5000 INJECTION, SOLUTION INTRAVENOUS; SUBCUTANEOUS at 04:28

## 2020-01-29 RX ADMIN — LORAZEPAM 2 MG: 2 INJECTION INTRAMUSCULAR; INTRAVENOUS at 20:05

## 2020-01-29 RX ADMIN — THERA TABS 1 TABLET: TAB at 04:32

## 2020-01-29 ASSESSMENT — LIFESTYLE VARIABLES
VISUAL DISTURBANCES: MILD SENSITIVITY
TOTAL SCORE: 4
ORIENTATION AND CLOUDING OF SENSORIUM: DATE DISORIENTATION BY MORE THAN TWO CALENDAR DAYS
AUDITORY DISTURBANCES: NOT PRESENT
TOTAL SCORE: 15
TOTAL SCORE: 17
AGITATION: MODERATELY FIDGETY AND RESTLESS
TOTAL SCORE: 25
ORIENTATION AND CLOUDING OF SENSORIUM: DISORIENTED FOR PLACE AND / OR PERSON
TREMOR: NO TREMOR
TOTAL SCORE: 25
PAROXYSMAL SWEATS: NO SWEAT VISIBLE
HEADACHE, FULLNESS IN HEAD: NOT PRESENT
ANXIETY: MILDLY ANXIOUS
ORIENTATION AND CLOUDING OF SENSORIUM: ORIENTED AND CAN DO SERIAL ADDITIONS
TREMOR: *
ANXIETY: *
NAUSEA AND VOMITING: NO NAUSEA AND NO VOMITING
PAROXYSMAL SWEATS: BARELY PERCEPTIBLE SWEATING. PALMS MOIST
VISUAL DISTURBANCES: MILD SENSITIVITY
ANXIETY: *
AUDITORY DISTURBANCES: VERY MILD HARSHNESS OR ABILITY TO FRIGHTEN
AUDITORY DISTURBANCES: NOT PRESENT
TREMOR: TREMOR NOT VISIBLE BUT CAN BE FELT, FINGERTIP TO FINGERTIP
ORIENTATION AND CLOUDING OF SENSORIUM: DATE DISORIENTATION BY MORE THAN TWO CALENDAR DAYS
AGITATION: *
HEADACHE, FULLNESS IN HEAD: NOT PRESENT
HEADACHE, FULLNESS IN HEAD: MODERATELY SEVERE
AGITATION: *
VISUAL DISTURBANCES: NOT PRESENT
NAUSEA AND VOMITING: *
PAROXYSMAL SWEATS: NO SWEAT VISIBLE
NAUSEA AND VOMITING: *
HEADACHE, FULLNESS IN HEAD: MILD
PAROXYSMAL SWEATS: BARELY PERCEPTIBLE SWEATING. PALMS MOIST
AGITATION: *
VISUAL DISTURBANCES: MILD SENSITIVITY
HEADACHE, FULLNESS IN HEAD: NOT PRESENT
TREMOR: TREMOR NOT VISIBLE BUT CAN BE FELT, FINGERTIP TO FINGERTIP
TOTAL SCORE: 17
VISUAL DISTURBANCES: NOT PRESENT
ANXIETY: MODERATELY ANXIOUS OR GUARDED, SO ANXIETY IS INFERRED
HEADACHE, FULLNESS IN HEAD: NOT PRESENT
TREMOR: *
PAROXYSMAL SWEATS: *
ORIENTATION AND CLOUDING OF SENSORIUM: DISORIENTED FOR PLACE AND / OR PERSON
PAROXYSMAL SWEATS: NO SWEAT VISIBLE
PAROXYSMAL SWEATS: NO SWEAT VISIBLE
PAROXYSMAL SWEATS: *
ANXIETY: MODERATELY ANXIOUS OR GUARDED, SO ANXIETY IS INFERRED
ORIENTATION AND CLOUDING OF SENSORIUM: DATE DISORIENTATION BY MORE THAN TWO CALENDAR DAYS
HEADACHE, FULLNESS IN HEAD: NOT PRESENT
TREMOR: TREMOR NOT VISIBLE BUT CAN BE FELT, FINGERTIP TO FINGERTIP
HEADACHE, FULLNESS IN HEAD: NOT PRESENT
AGITATION: SOMEWHAT MORE THAN NORMAL ACTIVITY
NAUSEA AND VOMITING: MILD NAUSEA WITH NO VOMITING
TREMOR: TREMOR NOT VISIBLE BUT CAN BE FELT, FINGERTIP TO FINGERTIP
AUDITORY DISTURBANCES: NOT PRESENT
VISUAL DISTURBANCES: SEVERE HALLUCINATIONS
TOTAL SCORE: 25
ORIENTATION AND CLOUDING OF SENSORIUM: DISORIENTED FOR PLACE AND / OR PERSON
ORIENTATION AND CLOUDING OF SENSORIUM: DATE DISORIENTATION BY MORE THAN TWO CALENDAR DAYS
NAUSEA AND VOMITING: MILD NAUSEA WITH NO VOMITING
VISUAL DISTURBANCES: MILD SENSITIVITY
ORIENTATION AND CLOUDING OF SENSORIUM: DATE DISORIENTATION BY MORE THAN TWO CALENDAR DAYS
ANXIETY: *
ANXIETY: *
TOTAL SCORE: 19
TREMOR: NO TREMOR
PAROXYSMAL SWEATS: BARELY PERCEPTIBLE SWEATING. PALMS MOIST
VISUAL DISTURBANCES: SEVERE HALLUCINATIONS
TOTAL SCORE: 14
AGITATION: *
ORIENTATION AND CLOUDING OF SENSORIUM: ORIENTED AND CAN DO SERIAL ADDITIONS
TREMOR: MODERATE TREMOR WITH ARMS EXTENDED
AGITATION: *
AGITATION: *
NAUSEA AND VOMITING: *
HEADACHE, FULLNESS IN HEAD: NOT PRESENT
ORIENTATION AND CLOUDING OF SENSORIUM: CANNOT DO SERIAL ADDITIONS OR IS UNCERTAIN ABOUT DATE
PAROXYSMAL SWEATS: NO SWEAT VISIBLE
SUBSTANCE_ABUSE: 1
ORIENTATION AND CLOUDING OF SENSORIUM: DISORIENTED FOR PLACE AND / OR PERSON
VISUAL DISTURBANCES: MILD SENSITIVITY
ANXIETY: NO ANXIETY (AT EASE)
AGITATION: SOMEWHAT MORE THAN NORMAL ACTIVITY
AUDITORY DISTURBANCES: NOT PRESENT
NAUSEA AND VOMITING: NO NAUSEA AND NO VOMITING
PAROXYSMAL SWEATS: *
TREMOR: MODERATE TREMOR WITH ARMS EXTENDED
AUDITORY DISTURBANCES: MILD HARSHNESS OR ABILITY TO FRIGHTEN
VISUAL DISTURBANCES: MODERATELY SEVERE HALLUCINATIONS
TOTAL SCORE: 17
PAROXYSMAL SWEATS: *
ANXIETY: MODERATELY ANXIOUS OR GUARDED, SO ANXIETY IS INFERRED
ORIENTATION AND CLOUDING OF SENSORIUM: DATE DISORIENTATION BY MORE THAN TWO CALENDAR DAYS
HEADACHE, FULLNESS IN HEAD: NOT PRESENT
TREMOR: *
NAUSEA AND VOMITING: INTERMITTENT NAUSEA WITH DRY HEAVES
AUDITORY DISTURBANCES: NOT PRESENT
ORIENTATION AND CLOUDING OF SENSORIUM: DISORIENTED FOR PLACE AND / OR PERSON
ORIENTATION AND CLOUDING OF SENSORIUM: DATE DISORIENTATION BY MORE THAN TWO CALENDAR DAYS
TREMOR: MODERATE TREMOR WITH ARMS EXTENDED
TOTAL SCORE: 13
AGITATION: *
NAUSEA AND VOMITING: INTERMITTENT NAUSEA WITH DRY HEAVES
VISUAL DISTURBANCES: NOT PRESENT
ANXIETY: *
HEADACHE, FULLNESS IN HEAD: NOT PRESENT
PAROXYSMAL SWEATS: *
AGITATION: *
TOTAL SCORE: 29
VISUAL DISTURBANCES: VERY MILD SENSITIVITY
AGITATION: *
TOTAL SCORE: 30
VISUAL DISTURBANCES: NOT PRESENT
AUDITORY DISTURBANCES: NOT PRESENT
AUDITORY DISTURBANCES: NOT PRESENT
AGITATION: NORMAL ACTIVITY
AUDITORY DISTURBANCES: VERY MILD HARSHNESS OR ABILITY TO FRIGHTEN
ANXIETY: *
AUDITORY DISTURBANCES: NOT PRESENT
HEADACHE, FULLNESS IN HEAD: NOT PRESENT
NAUSEA AND VOMITING: NO NAUSEA AND NO VOMITING
NAUSEA AND VOMITING: NO NAUSEA AND NO VOMITING
ANXIETY: MILDLY ANXIOUS
VISUAL DISTURBANCES: NOT PRESENT
TOTAL SCORE: 7
ORIENTATION AND CLOUDING OF SENSORIUM: DISORIENTED FOR PLACE AND / OR PERSON
ANXIETY: *
TREMOR: NO TREMOR
HEADACHE, FULLNESS IN HEAD: NOT PRESENT
AUDITORY DISTURBANCES: NOT PRESENT
ANXIETY: *
PAROXYSMAL SWEATS: BARELY PERCEPTIBLE SWEATING. PALMS MOIST
TREMOR: TREMOR NOT VISIBLE BUT CAN BE FELT, FINGERTIP TO FINGERTIP
VISUAL DISTURBANCES: MILD SENSITIVITY
AGITATION: *
AUDITORY DISTURBANCES: NOT PRESENT
TOTAL SCORE: 21
AUDITORY DISTURBANCES: NOT PRESENT
AGITATION: *
PAROXYSMAL SWEATS: BARELY PERCEPTIBLE SWEATING. PALMS MOIST
PAROXYSMAL SWEATS: BARELY PERCEPTIBLE SWEATING. PALMS MOIST
AUDITORY DISTURBANCES: NOT PRESENT
HEADACHE, FULLNESS IN HEAD: NOT PRESENT
VISUAL DISTURBANCES: MILD SENSITIVITY
TREMOR: MODERATE TREMOR WITH ARMS EXTENDED
NAUSEA AND VOMITING: NO NAUSEA AND NO VOMITING
NAUSEA AND VOMITING: MILD NAUSEA WITH NO VOMITING
AUDITORY DISTURBANCES: NOT PRESENT
TREMOR: NO TREMOR
NAUSEA AND VOMITING: NO NAUSEA AND NO VOMITING
AGITATION: MODERATELY FIDGETY AND RESTLESS
NAUSEA AND VOMITING: MILD NAUSEA WITH NO VOMITING
TOTAL SCORE: 4
HEADACHE, FULLNESS IN HEAD: NOT PRESENT
ANXIETY: *
ANXIETY: MODERATELY ANXIOUS OR GUARDED, SO ANXIETY IS INFERRED
HEADACHE, FULLNESS IN HEAD: NOT PRESENT
NAUSEA AND VOMITING: NO NAUSEA AND NO VOMITING

## 2020-01-29 ASSESSMENT — COGNITIVE AND FUNCTIONAL STATUS - GENERAL
SUGGESTED CMS G CODE MODIFIER MOBILITY: CK
TOILETING: A LITTLE
HELP NEEDED FOR BATHING: A LOT
DAILY ACTIVITIY SCORE: 18
CLIMB 3 TO 5 STEPS WITH RAILING: A LOT
STANDING UP FROM CHAIR USING ARMS: A LOT
SUGGESTED CMS G CODE MODIFIER DAILY ACTIVITY: CK
DRESSING REGULAR UPPER BODY CLOTHING: A LOT
MOBILITY SCORE: 17
WALKING IN HOSPITAL ROOM: A LITTLE
MOVING FROM LYING ON BACK TO SITTING ON SIDE OF FLAT BED: A LITTLE
MOVING TO AND FROM BED TO CHAIR: A LITTLE
DRESSING REGULAR LOWER BODY CLOTHING: A LITTLE

## 2020-01-29 ASSESSMENT — ENCOUNTER SYMPTOMS
FEVER: 0
COUGH: 0
POLYDIPSIA: 0
SEIZURES: 0
VOMITING: 0
CHILLS: 0
DIZZINESS: 0
ABDOMINAL PAIN: 0
NAUSEA: 0
CONSTIPATION: 0
DIARRHEA: 0
FOCAL WEAKNESS: 0
NECK PAIN: 0
INSOMNIA: 1
DEPRESSION: 1
PALPITATIONS: 0
SHORTNESS OF BREATH: 0
HEMOPTYSIS: 0
TREMORS: 1
VOMITING: 1
LOSS OF CONSCIOUSNESS: 0
FLANK PAIN: 0
SORE THROAT: 0
EYE REDNESS: 0
PHOTOPHOBIA: 0
COUGH: 1
DIARRHEA: 1
BRUISES/BLEEDS EASILY: 0
NERVOUS/ANXIOUS: 1

## 2020-01-29 ASSESSMENT — PATIENT HEALTH QUESTIONNAIRE - PHQ9
SUM OF ALL RESPONSES TO PHQ9 QUESTIONS 1 AND 2: 0
2. FEELING DOWN, DEPRESSED, IRRITABLE, OR HOPELESS: NOT AT ALL
1. LITTLE INTEREST OR PLEASURE IN DOING THINGS: NOT AT ALL

## 2020-01-29 NOTE — PROGRESS NOTES
Received report from day shift RNGiovana. Assumed care of pt. Pt reports no pain at this time. Updated pt on plan of care. Pt resting comfortably in bed. Bed alarm in place. Educated on use of call light. Hourly rounding and continuous monitoring in place.

## 2020-01-29 NOTE — PROGRESS NOTES
Pt noted to have +ucx that have not been addressed in any of previous MD notes. Pt noted to be grimacing while urinating. Dr Dietz updated. 1x dose 2g rocephin ordered.

## 2020-01-29 NOTE — PROGRESS NOTES
Pt beginning to be more forgetful and continuously attempting to go to bathroom without calling for assistance. Lap belt applied, pt demonstrated ability to remove belt on her own.

## 2020-01-29 NOTE — PSYCHIATRY
PSYCHIATRIC FOLLOW UP:    Reason for admission:   Nausea, vomiting, diarrhea, right ankle pain  Reason for consult: Depression  Requesting Physician: Dr. Elodia Schreiber  Supervising Physician : Dr. Inman    HPI:    Jeanie Hutchison is a 74 y.o. year old female with a PMH of paroxysmal atrial fibrillation (not on anticoagulation), anxiety, depression, EtOH dependence who was admitted to [facility] on 1/27/20 for EtOH withdrawal and low mood and anxiety. Psychiatry consulted for low mood and depression trigered by tragic events in family over past 5 years (loss of two daughters, 's health issues). Patient was evaluated by psychiatry and commenced on zoloft 50 mg/day (start date 1/28/20) for depression, and depakote added to CIWA protocol for EtOH withdrawal.      Interval events 1/29/20 :  CIWAs have been between 8-14 yesterday, 17 this am. Had 8.5 mg PO ativan + 5.5 mg IV ativan over the past 24 hours (including 3 mg at 7.30 am). Patient drowsy and wanted to fall asleep during pre-rounds. Tremors +.  Denies SI / HI / PTSD / AVH. One episode of diarrhea and vomiting this am. K and Mag still low. Was slightly more alert and awake on rounding with consultant.      Review of Systems:  Review of Systems   Constitutional: Negative for chills and fever.   HENT: Negative for nosebleeds and sore throat.    Eyes: Negative for photophobia and redness.   Respiratory: Positive for cough. Negative for hemoptysis and shortness of breath.    Cardiovascular: Negative for chest pain and leg swelling.   Gastrointestinal: Positive for diarrhea and vomiting. Negative for abdominal pain and constipation.   Genitourinary: Positive for dysuria. Negative for flank pain and hematuria.   Musculoskeletal: Positive for joint pain. Negative for neck pain.   Skin: Negative for itching.   Neurological: Positive for tremors. Negative for dizziness, focal weakness, seizures and loss of consciousness.   Endo/Heme/Allergies: Negative for  "polydipsia. Does not bruise/bleed easily.   Psychiatric/Behavioral: Positive for depression and substance abuse. Negative for suicidal ideas. The patient is nervous/anxious and has insomnia.           Psychiatric Examination: observed phenomenon:  Vitals: /74   Pulse 92   Temp 36.6 °C (97.9 °F) (Temporal)   Resp 18   Ht 1.676 m (5' 6\")   Wt 74.3 kg (163 lb 12.8 oz)   SpO2 94%   BMI 26.44 kg/m²  Body mass index is 26.44 kg/m².    Appearance: drowsy but easily arousable, wanted to to left alone to sleep, clean clothes  Muscle Strength/Tone: normal  Gait/Station: unable to assess  Speech: slightly slurred - possibly related to ativan  Thought Process: normal  Associations: no loose associations  Abnormal/Psychotic Thoughts (ex): none  Insight/Judgement: unable to assess  Orientation: alert, oriented   Memory: impaired - likely due to sedation and drowsiness  Attention/Concentration: poor - likely due to sedation  Language: normal  Fund of Knowledge: limited  Mood:     Low, drowsy       Affect:   Drowsy - blunt        SI/HI:   Denies SI/HI      Soc history:    Lost two daughters in the past 5 years   has EtOH addiction, health issues and is in assisted living past 3 months  Drugs: did dope while in college, nil since, never injected  Alcohol: 4-5 shots of vodka / day on average, some days more, some days less. EtOH dependence for ~ 5 years  Tobacco: denies      Lab results/tests:   Recent Results (from the past 48 hour(s))   CBC WITH DIFFERENTIAL    Collection Time: 01/27/20 11:30 AM   Result Value Ref Range    WBC 6.8 4.8 - 10.8 K/uL    RBC 4.09 (L) 4.20 - 5.40 M/uL    Hemoglobin 13.9 12.0 - 16.0 g/dL    Hematocrit 41.4 37.0 - 47.0 %    .2 (H) 81.4 - 97.8 fL    MCH 34.0 (H) 27.0 - 33.0 pg    MCHC 33.6 33.6 - 35.0 g/dL    RDW 54.5 (H) 35.9 - 50.0 fL    Platelet Count 174 164 - 446 K/uL    MPV 9.3 9.0 - 12.9 fL    Neutrophils-Polys 67.20 44.00 - 72.00 %    Lymphocytes 25.40 22.00 - 41.00 %    " Monocytes 6.30 0.00 - 13.40 %    Eosinophils 0.10 0.00 - 6.90 %    Basophils 0.60 0.00 - 1.80 %    Immature Granulocytes 0.40 0.00 - 0.90 %    Nucleated RBC 1.20 /100 WBC    Neutrophils (Absolute) 4.57 2.00 - 7.15 K/uL    Lymphs (Absolute) 1.73 1.00 - 4.80 K/uL    Monos (Absolute) 0.43 0.00 - 0.85 K/uL    Eos (Absolute) 0.01 0.00 - 0.51 K/uL    Baso (Absolute) 0.04 0.00 - 0.12 K/uL    Immature Granulocytes (abs) 0.03 0.00 - 0.11 K/uL    NRBC (Absolute) 0.08 K/uL   COMP METABOLIC PANEL    Collection Time: 01/27/20 11:30 AM   Result Value Ref Range    Sodium 141 135 - 145 mmol/L    Potassium 3.3 (L) 3.6 - 5.5 mmol/L    Chloride 103 96 - 112 mmol/L    Co2 19 (L) 20 - 33 mmol/L    Anion Gap 19.0 (H) 0.0 - 11.9    Glucose 88 65 - 99 mg/dL    Bun 9 8 - 22 mg/dL    Creatinine 0.57 0.50 - 1.40 mg/dL    Calcium 8.7 8.5 - 10.5 mg/dL    AST(SGOT) 39 12 - 45 U/L    ALT(SGPT) 20 2 - 50 U/L    Alkaline Phosphatase 110 (H) 30 - 99 U/L    Total Bilirubin 2.3 (H) 0.1 - 1.5 mg/dL    Albumin 3.1 (L) 3.2 - 4.9 g/dL    Total Protein 6.2 6.0 - 8.2 g/dL    Globulin 3.1 1.9 - 3.5 g/dL    A-G Ratio 1.0 g/dL   LIPASE    Collection Time: 01/27/20 11:30 AM   Result Value Ref Range    Lipase 14 11 - 82 U/L   MAGNESIUM    Collection Time: 01/27/20 11:30 AM   Result Value Ref Range    Magnesium 1.4 (L) 1.5 - 2.5 mg/dL   PHOSPHORUS    Collection Time: 01/27/20 11:30 AM   Result Value Ref Range    Phosphorus 3.5 2.5 - 4.5 mg/dL   DIAGNOSTIC ALCOHOL    Collection Time: 01/27/20 11:30 AM   Result Value Ref Range    Diagnostic Alcohol 0.08 (H) 0.00 g/dL   ESTIMATED GFR    Collection Time: 01/27/20 11:30 AM   Result Value Ref Range    GFR If African American >60 >60 mL/min/1.73 m 2    GFR If Non African American >60 >60 mL/min/1.73 m 2   EKG    Collection Time: 01/27/20  2:31 PM   Result Value Ref Range    Report       [facility] Emergency Dept.    Test Date:  2020-01-27  Pt Name:    MARISELA DURHAM              Department: ER  MRN:         [id number]                      Room:       [facility]  Gender:     Female                       Technician: [id number]  :        1945                   Requested By:STEPH MUNSON  Order #:    [id number]                    Reading MD: STEPH MUNSON DO    Measurements  Intervals                                Axis  Rate:       92                           P:          52  ME:         160                          QRS:        -54  QRSD:       80                           T:          28  QT:         424  QTc:        525    Interpretive Statements  SINUS RHYTHM  VENTRICULAR PREMATURE COMPLEX  PROBABLE LEFT ATRIAL ABNORMALITY  LEFT ANTERIOR FASCICULAR BLOCK  RSR' IN V1 OR V2, PROBABLY NORMAL VARIANT  BORDERLINE T ABNORMALITIES, ANTERIOR LEADS  PROLONGED QT INTERVAL  Compared to ECG 01/15/2020 10:21:42  Ventricular premature complex(es) now pr esent  RSR' in V1 or V2 now presentT-wave abnormality now present  Prolonged QT interval now present  Incomplete right bundle-branch block no longer present  Electronically Signed On 2020 14:51:43 PST by STEPH MUNSON DO     URINALYSIS,CULTURE IF INDICATED    Collection Time: 20  3:15 PM   Result Value Ref Range    Color DK Yellow     Character Turbid (A)     Specific Gravity 1.020 <1.035    Ph 5.0 5.0 - 8.0    Glucose Negative Negative mg/dL    Ketones 15 (A) Negative mg/dL    Protein 30 (A) Negative mg/dL    Bilirubin Negative Negative    Urobilinogen, Urine 1.0 Negative    Nitrite Positive (A) Negative    Leukocyte Esterase Moderate (A) Negative    Occult Blood Small (A) Negative    Micro Urine Req Microscopic    URINE MICROSCOPIC (W/UA)    Collection Time: 20  3:15 PM   Result Value Ref Range    WBC Packed (A) /hpf    RBC 0-2 /hpf    Bacteria Many (A) None /hpf    Epithelial Cells Few /hpf    Hyaline Cast 0-2 /lpf   URINE CULTURE(NEW)    Collection Time: 20  3:15 PM   Result Value Ref Range    Significant Indicator POS (POS)      Source UR     Site -     Culture Result - (A)     Culture Result (A)      Non-lactose fermenting Gram negative landon  >100,000 cfu/mL     CBC with Differential    Collection Time: 01/28/20  2:00 AM   Result Value Ref Range    WBC 5.6 4.8 - 10.8 K/uL    RBC 3.30 (L) 4.20 - 5.40 M/uL    Hemoglobin 11.0 (L) 12.0 - 16.0 g/dL    Hematocrit 34.2 (L) 37.0 - 47.0 %    .8 (H) 81.4 - 97.8 fL    MCH 33.3 (H) 27.0 - 33.0 pg    MCHC 32.7 (L) 33.6 - 35.0 g/dL    RDW 54.4 (H) 35.9 - 50.0 fL    Platelet Count 128 (L) 164 - 446 K/uL    MPV 9.5 9.0 - 12.9 fL    Neutrophils-Polys 63.40 44.00 - 72.00 %    Lymphocytes 27.50 22.00 - 41.00 %    Monocytes 7.20 0.00 - 13.40 %    Eosinophils 1.10 0.00 - 6.90 %    Basophils 0.40 0.00 - 1.80 %    Immature Granulocytes 0.40 0.00 - 0.90 %    Nucleated RBC 0.70 /100 WBC    Neutrophils (Absolute) 3.55 2.00 - 7.15 K/uL    Lymphs (Absolute) 1.54 1.00 - 4.80 K/uL    Monos (Absolute) 0.40 0.00 - 0.85 K/uL    Eos (Absolute) 0.06 0.00 - 0.51 K/uL    Baso (Absolute) 0.02 0.00 - 0.12 K/uL    Immature Granulocytes (abs) 0.02 0.00 - 0.11 K/uL    NRBC (Absolute) 0.04 K/uL   Comp Metabolic Panel (CMP)    Collection Time: 01/28/20  2:00 AM   Result Value Ref Range    Sodium 139 135 - 145 mmol/L    Potassium 3.3 (L) 3.6 - 5.5 mmol/L    Chloride 107 96 - 112 mmol/L    Co2 26 20 - 33 mmol/L    Anion Gap 6.0 0.0 - 11.9    Glucose 155 (H) 65 - 99 mg/dL    Bun 11 8 - 22 mg/dL    Creatinine 0.66 0.50 - 1.40 mg/dL    Calcium 8.0 (L) 8.5 - 10.5 mg/dL    AST(SGOT) 25 12 - 45 U/L    ALT(SGPT) 14 2 - 50 U/L    Alkaline Phosphatase 85 30 - 99 U/L    Total Bilirubin 3.1 (H) 0.1 - 1.5 mg/dL    Albumin 2.6 (L) 3.2 - 4.9 g/dL    Total Protein 5.1 (L) 6.0 - 8.2 g/dL    Globulin 2.5 1.9 - 3.5 g/dL    A-G Ratio 1.0 g/dL   Magnesium    Collection Time: 01/28/20  2:00 AM   Result Value Ref Range    Magnesium 1.5 1.5 - 2.5 mg/dL   Phosphorus    Collection Time: 01/28/20  2:00 AM   Result Value Ref Range    Phosphorus 2.4 (L)  2.5 - 4.5 mg/dL   FREE THYROXINE    Collection Time: 01/28/20  2:00 AM   Result Value Ref Range    Free T-4 1.23 0.53 - 1.43 ng/dL   TSH    Collection Time: 01/28/20  2:00 AM   Result Value Ref Range    TSH 0.550 0.380 - 5.330 uIU/mL   ESTIMATED GFR    Collection Time: 01/28/20  2:00 AM   Result Value Ref Range    GFR If African American >60 >60 mL/min/1.73 m 2    GFR If Non African American >60 >60 mL/min/1.73 m 2   CBC WITH DIFFERENTIAL    Collection Time: 01/29/20  1:44 AM   Result Value Ref Range    WBC 5.8 4.8 - 10.8 K/uL    RBC 3.30 (L) 4.20 - 5.40 M/uL    Hemoglobin 11.0 (L) 12.0 - 16.0 g/dL    Hematocrit 33.5 (L) 37.0 - 47.0 %    .5 (H) 81.4 - 97.8 fL    MCH 33.3 (H) 27.0 - 33.0 pg    MCHC 32.8 (L) 33.6 - 35.0 g/dL    RDW 53.8 (H) 35.9 - 50.0 fL    Platelet Count 115 (L) 164 - 446 K/uL    MPV 9.8 9.0 - 12.9 fL    Neutrophils-Polys 54.60 44.00 - 72.00 %    Lymphocytes 35.30 22.00 - 41.00 %    Monocytes 7.40 0.00 - 13.40 %    Eosinophils 1.90 0.00 - 6.90 %    Basophils 0.50 0.00 - 1.80 %    Immature Granulocytes 0.30 0.00 - 0.90 %    Nucleated RBC 0.30 /100 WBC    Neutrophils (Absolute) 3.17 2.00 - 7.15 K/uL    Lymphs (Absolute) 2.05 1.00 - 4.80 K/uL    Monos (Absolute) 0.43 0.00 - 0.85 K/uL    Eos (Absolute) 0.11 0.00 - 0.51 K/uL    Baso (Absolute) 0.03 0.00 - 0.12 K/uL    Immature Granulocytes (abs) 0.02 0.00 - 0.11 K/uL    NRBC (Absolute) 0.02 K/uL   Comp Metabolic Panel    Collection Time: 01/29/20  1:44 AM   Result Value Ref Range    Sodium 140 135 - 145 mmol/L    Potassium 3.3 (L) 3.6 - 5.5 mmol/L    Chloride 107 96 - 112 mmol/L    Co2 26 20 - 33 mmol/L    Anion Gap 7.0 0.0 - 11.9    Glucose 91 65 - 99 mg/dL    Bun 5 (L) 8 - 22 mg/dL    Creatinine 0.50 0.50 - 1.40 mg/dL    Calcium 7.7 (L) 8.5 - 10.5 mg/dL    AST(SGOT) 23 12 - 45 U/L    ALT(SGPT) 12 2 - 50 U/L    Alkaline Phosphatase 82 30 - 99 U/L    Total Bilirubin 1.6 (H) 0.1 - 1.5 mg/dL    Albumin 2.7 (L) 3.2 - 4.9 g/dL    Total Protein 5.3 (L)  6.0 - 8.2 g/dL    Globulin 2.6 1.9 - 3.5 g/dL    A-G Ratio 1.0 g/dL   MAGNESIUM    Collection Time: 01/29/20  1:44 AM   Result Value Ref Range    Magnesium 1.4 (L) 1.5 - 2.5 mg/dL   PHOSPHORUS    Collection Time: 01/29/20  1:44 AM   Result Value Ref Range    Phosphorus 3.3 2.5 - 4.5 mg/dL   ESTIMATED GFR    Collection Time: 01/29/20  1:44 AM   Result Value Ref Range    GFR If African American >60 >60 mL/min/1.73 m 2    GFR If Non African American >60 >60 mL/min/1.73 m 2     DX-ANKLE 3+ VIEWS RIGHT   Final Result      No evidence of fracture or dislocation.               ASSESSMENT:   # Depression  # EtOH withdrawal - on CIWA, and depakote  # EtOH abuse  # Hypokalemia  # Hypomagnesemia  # Hypocalcemia, Alek 7.7; corrected calcium 8.7  # Prolonged QTc  # nausea, vomiting, diarrhea - ongoing, but better  # right ankle pain - no acute fracture on x-ray  # paroxysmal atrial fibrillation - on toprol, not on anticoagulation        PLAN:  # Continue Zoloft 50 mg daily  # Continue depakote 250 mg TID for withdrawal  # Continue treating for EtOH withdrawal before any further changes to psych meds  # Mag and K repletion per primary team     Legal status: N/A

## 2020-01-30 ENCOUNTER — APPOINTMENT (OUTPATIENT)
Dept: RADIOLOGY | Facility: MEDICAL CENTER | Age: 75
DRG: 896 | End: 2020-01-30
Attending: PSYCHIATRY & NEUROLOGY
Payer: MEDICARE

## 2020-01-30 LAB
ACTION RANGE TRIGGERED IACRT: NO
ALBUMIN SERPL BCP-MCNC: 2.5 G/DL (ref 3.2–4.9)
ALBUMIN/GLOB SERPL: 1 G/DL
ALP SERPL-CCNC: 69 U/L (ref 30–99)
ALT SERPL-CCNC: 11 U/L (ref 2–50)
ANION GAP SERPL CALC-SCNC: 6 MMOL/L (ref 0–11.9)
AST SERPL-CCNC: 28 U/L (ref 12–45)
BASE EXCESS BLDA CALC-SCNC: -1 MMOL/L (ref -4–3)
BILIRUB SERPL-MCNC: 1.5 MG/DL (ref 0.1–1.5)
BODY TEMPERATURE: ABNORMAL DEGREES
BUN SERPL-MCNC: 5 MG/DL (ref 8–22)
CALCIUM SERPL-MCNC: 7.8 MG/DL (ref 8.5–10.5)
CHLORIDE SERPL-SCNC: 107 MMOL/L (ref 96–112)
CO2 BLDA-SCNC: 23 MMOL/L (ref 20–33)
CO2 SERPL-SCNC: 25 MMOL/L (ref 20–33)
CREAT SERPL-MCNC: 0.42 MG/DL (ref 0.5–1.4)
EKG IMPRESSION: NORMAL
GLOBULIN SER CALC-MCNC: 2.5 G/DL (ref 1.9–3.5)
GLUCOSE SERPL-MCNC: 89 MG/DL (ref 65–99)
HCO3 BLDA-SCNC: 22.4 MMOL/L (ref 17–25)
HOROWITZ INDEX BLDA+IHG-RTO: 110 MM[HG]
INST. QUALIFIED PATIENT IIQPT: YES
MAGNESIUM SERPL-MCNC: 2.7 MG/DL (ref 1.5–2.5)
O2/TOTAL GAS SETTING VFR VENT: 50 %
PCO2 BLDA: 31.7 MMHG (ref 26–37)
PH BLDA: 7.46 [PH] (ref 7.4–7.5)
PHOSPHATE SERPL-MCNC: 2.7 MG/DL (ref 2.5–4.5)
PO2 BLDA: 55 MMHG (ref 64–87)
POTASSIUM SERPL-SCNC: 4.8 MMOL/L (ref 3.6–5.5)
PROT SERPL-MCNC: 5 G/DL (ref 6–8.2)
SAO2 % BLDA: 90 % (ref 93–99)
SODIUM SERPL-SCNC: 138 MMOL/L (ref 135–145)
SPECIMEN DRAWN FROM PATIENT: ABNORMAL

## 2020-01-30 PROCEDURE — 83735 ASSAY OF MAGNESIUM: CPT

## 2020-01-30 PROCEDURE — 51798 US URINE CAPACITY MEASURE: CPT

## 2020-01-30 PROCEDURE — 700105 HCHG RX REV CODE 258: Performed by: PSYCHIATRY & NEUROLOGY

## 2020-01-30 PROCEDURE — 84100 ASSAY OF PHOSPHORUS: CPT

## 2020-01-30 PROCEDURE — 700111 HCHG RX REV CODE 636 W/ 250 OVERRIDE (IP): Performed by: PSYCHIATRY & NEUROLOGY

## 2020-01-30 PROCEDURE — 700111 HCHG RX REV CODE 636 W/ 250 OVERRIDE (IP): Performed by: HOSPITALIST

## 2020-01-30 PROCEDURE — 700102 HCHG RX REV CODE 250 W/ 637 OVERRIDE(OP): Performed by: PSYCHIATRY & NEUROLOGY

## 2020-01-30 PROCEDURE — C1751 CATH, INF, PER/CENT/MIDLINE: HCPCS

## 2020-01-30 PROCEDURE — 36556 INSERT NON-TUNNEL CV CATH: CPT

## 2020-01-30 PROCEDURE — A9270 NON-COVERED ITEM OR SERVICE: HCPCS | Performed by: PSYCHIATRY & NEUROLOGY

## 2020-01-30 PROCEDURE — 5A1955Z RESPIRATORY VENTILATION, GREATER THAN 96 CONSECUTIVE HOURS: ICD-10-PCS | Performed by: PSYCHIATRY & NEUROLOGY

## 2020-01-30 PROCEDURE — 770022 HCHG ROOM/CARE - ICU (200)

## 2020-01-30 PROCEDURE — 93005 ELECTROCARDIOGRAM TRACING: CPT | Performed by: INTERNAL MEDICINE

## 2020-01-30 PROCEDURE — 31500 INSERT EMERGENCY AIRWAY: CPT

## 2020-01-30 PROCEDURE — 02HV33Z INSERTION OF INFUSION DEVICE INTO SUPERIOR VENA CAVA, PERCUTANEOUS APPROACH: ICD-10-PCS | Performed by: PSYCHIATRY & NEUROLOGY

## 2020-01-30 PROCEDURE — 99291 CRITICAL CARE FIRST HOUR: CPT | Mod: 25 | Performed by: INTERNAL MEDICINE

## 2020-01-30 PROCEDURE — 94002 VENT MGMT INPAT INIT DAY: CPT

## 2020-01-30 PROCEDURE — 80053 COMPREHEN METABOLIC PANEL: CPT

## 2020-01-30 PROCEDURE — 302136 NUTRITION PUMP: Performed by: PSYCHIATRY & NEUROLOGY

## 2020-01-30 PROCEDURE — 92950 HEART/LUNG RESUSCITATION CPR: CPT

## 2020-01-30 PROCEDURE — 0BH17EZ INSERTION OF ENDOTRACHEAL AIRWAY INTO TRACHEA, VIA NATURAL OR ARTIFICIAL OPENING: ICD-10-PCS | Performed by: PSYCHIATRY & NEUROLOGY

## 2020-01-30 PROCEDURE — 700101 HCHG RX REV CODE 250: Performed by: INTERNAL MEDICINE

## 2020-01-30 PROCEDURE — 99152 MOD SED SAME PHYS/QHP 5/>YRS: CPT

## 2020-01-30 PROCEDURE — 36556 INSERT NON-TUNNEL CV CATH: CPT | Mod: RT | Performed by: PSYCHIATRY & NEUROLOGY

## 2020-01-30 PROCEDURE — 302214 INTUBATION BOX: Performed by: PSYCHIATRY & NEUROLOGY

## 2020-01-30 PROCEDURE — 82803 BLOOD GASES ANY COMBINATION: CPT

## 2020-01-30 PROCEDURE — 31500 INSERT EMERGENCY AIRWAY: CPT | Performed by: PSYCHIATRY & NEUROLOGY

## 2020-01-30 PROCEDURE — 93010 ELECTROCARDIOGRAM REPORT: CPT | Performed by: INTERNAL MEDICINE

## 2020-01-30 PROCEDURE — 99231 SBSQ HOSP IP/OBS SF/LOW 25: CPT | Performed by: PSYCHIATRY & NEUROLOGY

## 2020-01-30 PROCEDURE — 700105 HCHG RX REV CODE 258: Performed by: HOSPITALIST

## 2020-01-30 PROCEDURE — 700111 HCHG RX REV CODE 636 W/ 250 OVERRIDE (IP): Performed by: INTERNAL MEDICINE

## 2020-01-30 PROCEDURE — 71045 X-RAY EXAM CHEST 1 VIEW: CPT

## 2020-01-30 PROCEDURE — 700101 HCHG RX REV CODE 250: Performed by: PSYCHIATRY & NEUROLOGY

## 2020-01-30 RX ORDER — DEXMEDETOMIDINE HYDROCHLORIDE 4 UG/ML
.1-1.5 INJECTION, SOLUTION INTRAVENOUS CONTINUOUS
Status: DISCONTINUED | OUTPATIENT
Start: 2020-01-30 | End: 2020-01-30

## 2020-01-30 RX ORDER — DILTIAZEM HYDROCHLORIDE 5 MG/ML
5 INJECTION INTRAVENOUS ONCE
Status: COMPLETED | OUTPATIENT
Start: 2020-01-30 | End: 2020-01-30

## 2020-01-30 RX ORDER — ROCURONIUM BROMIDE 10 MG/ML
80 INJECTION, SOLUTION INTRAVENOUS ONCE
Status: COMPLETED | OUTPATIENT
Start: 2020-01-30 | End: 2020-01-30

## 2020-01-30 RX ORDER — LEVOTHYROXINE SODIUM 0.12 MG/1
125 TABLET ORAL
Status: DISCONTINUED | OUTPATIENT
Start: 2020-01-31 | End: 2020-02-08

## 2020-01-30 RX ORDER — BISACODYL 10 MG
10 SUPPOSITORY, RECTAL RECTAL
Status: DISCONTINUED | OUTPATIENT
Start: 2020-01-30 | End: 2020-02-06

## 2020-01-30 RX ORDER — DIVALPROEX SODIUM 125 MG/1
250 CAPSULE, COATED PELLETS ORAL EVERY 8 HOURS
Status: DISCONTINUED | OUTPATIENT
Start: 2020-01-30 | End: 2020-02-01

## 2020-01-30 RX ORDER — MAGNESIUM SULFATE HEPTAHYDRATE 40 MG/ML
2 INJECTION, SOLUTION INTRAVENOUS ONCE
Status: COMPLETED | OUTPATIENT
Start: 2020-01-30 | End: 2020-01-30

## 2020-01-30 RX ORDER — PROPOFOL 10 MG/ML
100 INJECTION, EMULSION INTRAVENOUS ONCE
Status: COMPLETED | OUTPATIENT
Start: 2020-01-30 | End: 2020-01-30

## 2020-01-30 RX ORDER — AMOXICILLIN 250 MG
2 CAPSULE ORAL 2 TIMES DAILY
Status: DISCONTINUED | OUTPATIENT
Start: 2020-01-30 | End: 2020-02-06

## 2020-01-30 RX ORDER — METOPROLOL TARTRATE 1 MG/ML
5 INJECTION, SOLUTION INTRAVENOUS
Status: COMPLETED | OUTPATIENT
Start: 2020-01-30 | End: 2020-02-06

## 2020-01-30 RX ORDER — PHENOBARBITAL SODIUM 130 MG/ML
130 INJECTION INTRAMUSCULAR; INTRAVENOUS
Status: DISCONTINUED | OUTPATIENT
Start: 2020-01-30 | End: 2020-01-30

## 2020-01-30 RX ORDER — ACETAMINOPHEN 325 MG/1
650 TABLET ORAL EVERY 6 HOURS PRN
Status: DISCONTINUED | OUTPATIENT
Start: 2020-01-30 | End: 2020-01-30

## 2020-01-30 RX ORDER — SODIUM CHLORIDE, SODIUM LACTATE, POTASSIUM CHLORIDE, CALCIUM CHLORIDE 600; 310; 30; 20 MG/100ML; MG/100ML; MG/100ML; MG/100ML
INJECTION, SOLUTION INTRAVENOUS CONTINUOUS
Status: DISCONTINUED | OUTPATIENT
Start: 2020-01-30 | End: 2020-02-02

## 2020-01-30 RX ORDER — THIAMINE MONONITRATE (VIT B1) 100 MG
100 TABLET ORAL DAILY
Status: COMPLETED | OUTPATIENT
Start: 2020-01-30 | End: 2020-01-31

## 2020-01-30 RX ORDER — FOLIC ACID 1 MG/1
1 TABLET ORAL DAILY
Status: COMPLETED | OUTPATIENT
Start: 2020-01-30 | End: 2020-01-31

## 2020-01-30 RX ORDER — PHENOBARBITAL SODIUM 130 MG/ML
260 INJECTION INTRAMUSCULAR; INTRAVENOUS
Status: DISCONTINUED | OUTPATIENT
Start: 2020-01-30 | End: 2020-01-30

## 2020-01-30 RX ORDER — POLYETHYLENE GLYCOL 3350 17 G/17G
1 POWDER, FOR SOLUTION ORAL
Status: DISCONTINUED | OUTPATIENT
Start: 2020-01-30 | End: 2020-02-06

## 2020-01-30 RX ORDER — IPRATROPIUM BROMIDE AND ALBUTEROL SULFATE 2.5; .5 MG/3ML; MG/3ML
3 SOLUTION RESPIRATORY (INHALATION)
Status: DISCONTINUED | OUTPATIENT
Start: 2020-01-30 | End: 2020-02-06

## 2020-01-30 RX ORDER — FAMOTIDINE 20 MG/1
20 TABLET, FILM COATED ORAL EVERY 12 HOURS
Status: DISCONTINUED | OUTPATIENT
Start: 2020-01-30 | End: 2020-02-05

## 2020-01-30 RX ORDER — POTASSIUM CHLORIDE 7.45 MG/ML
10 INJECTION INTRAVENOUS
Status: COMPLETED | OUTPATIENT
Start: 2020-01-30 | End: 2020-01-30

## 2020-01-30 RX ORDER — SODIUM CHLORIDE, SODIUM LACTATE, POTASSIUM CHLORIDE, AND CALCIUM CHLORIDE .6; .31; .03; .02 G/100ML; G/100ML; G/100ML; G/100ML
1000 INJECTION, SOLUTION INTRAVENOUS ONCE
Status: COMPLETED | OUTPATIENT
Start: 2020-01-30 | End: 2020-01-30

## 2020-01-30 RX ORDER — DIGOXIN 0.25 MG/ML
500 INJECTION INTRAMUSCULAR; INTRAVENOUS ONCE
Status: COMPLETED | OUTPATIENT
Start: 2020-01-30 | End: 2020-01-30

## 2020-01-30 RX ORDER — LORAZEPAM 2 MG/ML
2 INJECTION INTRAMUSCULAR
Status: DISCONTINUED | OUTPATIENT
Start: 2020-01-30 | End: 2020-02-06

## 2020-01-30 RX ADMIN — MAGNESIUM SULFATE IN WATER 2 G: 40 INJECTION, SOLUTION INTRAVENOUS at 11:44

## 2020-01-30 RX ADMIN — SODIUM CHLORIDE: 9 INJECTION, SOLUTION INTRAVENOUS at 04:55

## 2020-01-30 RX ADMIN — LORAZEPAM 1.5 MG: 2 INJECTION INTRAMUSCULAR; INTRAVENOUS at 00:46

## 2020-01-30 RX ADMIN — FAMOTIDINE 20 MG: 10 INJECTION INTRAVENOUS at 06:17

## 2020-01-30 RX ADMIN — Medication 100 MG: at 15:40

## 2020-01-30 RX ADMIN — FOLIC ACID 1 MG: 1 TABLET ORAL at 15:40

## 2020-01-30 RX ADMIN — ENOXAPARIN SODIUM 40 MG: 100 INJECTION SUBCUTANEOUS at 17:42

## 2020-01-30 RX ADMIN — PROPOFOL 20 MCG/KG/MIN: 10 INJECTION, EMULSION INTRAVENOUS at 08:36

## 2020-01-30 RX ADMIN — SODIUM CHLORIDE, POTASSIUM CHLORIDE, SODIUM LACTATE AND CALCIUM CHLORIDE 1000 ML: 600; 310; 30; 20 INJECTION, SOLUTION INTRAVENOUS at 13:18

## 2020-01-30 RX ADMIN — DIGOXIN 500 MCG: 0.25 INJECTION INTRAMUSCULAR; INTRAVENOUS at 05:03

## 2020-01-30 RX ADMIN — ROCURONIUM BROMIDE 80 MG: 10 INJECTION, SOLUTION INTRAVENOUS at 07:50

## 2020-01-30 RX ADMIN — LORAZEPAM 1.5 MG: 2 INJECTION INTRAMUSCULAR; INTRAVENOUS at 03:18

## 2020-01-30 RX ADMIN — POTASSIUM CHLORIDE 10 MEQ: 7.46 INJECTION, SOLUTION INTRAVENOUS at 10:47

## 2020-01-30 RX ADMIN — DILTIAZEM HYDROCHLORIDE 5 MG: 5 INJECTION INTRAVENOUS at 06:28

## 2020-01-30 RX ADMIN — FAMOTIDINE 20 MG: 20 TABLET ORAL at 09:22

## 2020-01-30 RX ADMIN — FAMOTIDINE 20 MG: 20 TABLET ORAL at 17:42

## 2020-01-30 RX ADMIN — POTASSIUM CHLORIDE 10 MEQ: 7.46 INJECTION, SOLUTION INTRAVENOUS at 11:55

## 2020-01-30 RX ADMIN — MAGNESIUM SULFATE IN WATER 2 G: 40 INJECTION, SOLUTION INTRAVENOUS at 09:22

## 2020-01-30 RX ADMIN — DIVALPROEX SODIUM 250 MG: 125 CAPSULE, COATED PELLETS ORAL at 13:05

## 2020-01-30 RX ADMIN — FENTANYL CITRATE 100 MCG/HR: 50 INJECTION, SOLUTION INTRAMUSCULAR; INTRAVENOUS at 09:26

## 2020-01-30 RX ADMIN — POTASSIUM CHLORIDE 10 MEQ: 7.46 INJECTION, SOLUTION INTRAVENOUS at 13:03

## 2020-01-30 RX ADMIN — DEXMEDETOMIDINE HYDROCHLORIDE 0.2 MCG/KG/HR: 100 INJECTION, SOLUTION INTRAVENOUS at 05:05

## 2020-01-30 RX ADMIN — DIVALPROEX SODIUM 250 MG: 125 CAPSULE, COATED PELLETS ORAL at 22:34

## 2020-01-30 RX ADMIN — LORAZEPAM 1.5 MG: 2 INJECTION INTRAMUSCULAR; INTRAVENOUS at 02:04

## 2020-01-30 RX ADMIN — POTASSIUM CHLORIDE 10 MEQ: 7.46 INJECTION, SOLUTION INTRAVENOUS at 09:37

## 2020-01-30 RX ADMIN — DILTIAZEM HYDROCHLORIDE 5 MG: 5 INJECTION INTRAVENOUS at 06:03

## 2020-01-30 RX ADMIN — THERA TABS 1 TABLET: TAB at 15:40

## 2020-01-30 RX ADMIN — LORAZEPAM 2 MG: 2 INJECTION INTRAMUSCULAR; INTRAVENOUS at 01:38

## 2020-01-30 RX ADMIN — POTASSIUM BICARBONATE 25 MEQ: 978 TABLET, EFFERVESCENT ORAL at 09:44

## 2020-01-30 RX ADMIN — LORAZEPAM 2 MG: 2 INJECTION INTRAMUSCULAR; INTRAVENOUS at 03:51

## 2020-01-30 RX ADMIN — LORAZEPAM 2 MG: 2 INJECTION INTRAMUSCULAR; INTRAVENOUS at 01:12

## 2020-01-30 RX ADMIN — PROPOFOL 100 MG: 10 INJECTION, EMULSION INTRAVENOUS at 07:50

## 2020-01-30 RX ADMIN — SODIUM CHLORIDE, POTASSIUM CHLORIDE, SODIUM LACTATE AND CALCIUM CHLORIDE: 600; 310; 30; 20 INJECTION, SOLUTION INTRAVENOUS at 17:09

## 2020-01-30 RX ADMIN — SODIUM CHLORIDE, POTASSIUM CHLORIDE, SODIUM LACTATE AND CALCIUM CHLORIDE 1000 ML: 600; 310; 30; 20 INJECTION, SOLUTION INTRAVENOUS at 17:08

## 2020-01-30 RX ADMIN — HEPARIN SODIUM 5000 UNITS: 5000 INJECTION, SOLUTION INTRAVENOUS; SUBCUTANEOUS at 06:17

## 2020-01-30 ASSESSMENT — LIFESTYLE VARIABLES
ANXIETY: *
TREMOR: *
PAROXYSMAL SWEATS: *
VISUAL DISTURBANCES: MODERATELY SEVERE HALLUCINATIONS
HEADACHE, FULLNESS IN HEAD: VERY MILD
NAUSEA AND VOMITING: NO NAUSEA AND NO VOMITING
AUDITORY DISTURBANCES: NOT PRESENT
HEADACHE, FULLNESS IN HEAD: NOT PRESENT
NAUSEA AND VOMITING: *
AGITATION: *
HEADACHE, FULLNESS IN HEAD: NOT PRESENT
PAROXYSMAL SWEATS: *
AGITATION: MODERATELY FIDGETY AND RESTLESS
HEADACHE, FULLNESS IN HEAD: VERY MILD
TOTAL SCORE: 20
TOTAL SCORE: 26
NAUSEA AND VOMITING: *
TREMOR: NO TREMOR
ORIENTATION AND CLOUDING OF SENSORIUM: DISORIENTED FOR PLACE AND / OR PERSON
VISUAL DISTURBANCES: MILD SENSITIVITY
AGITATION: *
PAROXYSMAL SWEATS: *
VISUAL DISTURBANCES: VERY MILD SENSITIVITY
NAUSEA AND VOMITING: NO NAUSEA AND NO VOMITING
TREMOR: *
ORIENTATION AND CLOUDING OF SENSORIUM: DISORIENTED FOR PLACE AND / OR PERSON
TOTAL SCORE: 28
PAROXYSMAL SWEATS: *
ANXIETY: *
AGITATION: *
VISUAL DISTURBANCES: MODERATELY SEVERE HALLUCINATIONS
ORIENTATION AND CLOUDING OF SENSORIUM: DISORIENTED FOR PLACE AND / OR PERSON
TOTAL SCORE: 19
AUDITORY DISTURBANCES: VERY MILD HARSHNESS OR ABILITY TO FRIGHTEN
AGITATION: *
AGITATION: *
ANXIETY: *
ANXIETY: *
TREMOR: NO TREMOR
AUDITORY DISTURBANCES: NOT PRESENT
HEADACHE, FULLNESS IN HEAD: NOT PRESENT
VISUAL DISTURBANCES: MODERATELY SEVERE HALLUCINATIONS
PAROXYSMAL SWEATS: NO SWEAT VISIBLE
TREMOR: *
AUDITORY DISTURBANCES: VERY MILD HARSHNESS OR ABILITY TO FRIGHTEN
NAUSEA AND VOMITING: MILD NAUSEA WITH NO VOMITING
ANXIETY: *
VISUAL DISTURBANCES: MODERATELY SEVERE HALLUCINATIONS
ORIENTATION AND CLOUDING OF SENSORIUM: DISORIENTED FOR PLACE AND / OR PERSON
TOTAL SCORE: 26
ORIENTATION AND CLOUDING OF SENSORIUM: DISORIENTED FOR PLACE AND / OR PERSON
TREMOR: NO TREMOR
TOTAL SCORE: 23
NAUSEA AND VOMITING: INTERMITTENT NAUSEA WITH DRY HEAVES
PAROXYSMAL SWEATS: *
AUDITORY DISTURBANCES: NOT PRESENT
ORIENTATION AND CLOUDING OF SENSORIUM: DISORIENTED FOR PLACE AND / OR PERSON
HEADACHE, FULLNESS IN HEAD: MODERATE
ANXIETY: MODERATELY ANXIOUS OR GUARDED, SO ANXIETY IS INFERRED
AUDITORY DISTURBANCES: NOT PRESENT

## 2020-01-30 NOTE — PROGRESS NOTES
Cortrak Placement    Tube Team verified patient name and medical record number prior to tube placement.  Cortrak tube (43 inches, 10 Irish) placed at 65 cm in right nare.  Per Cortrak picture, tube appears to be in the stomach.  Nursing Instructions: Awaiting KUB to confirm placement before use for medications or feeding. Once placement confirmed, flush tube with 30 ml of water, and then remove and save stylet, in patient medication drawer.

## 2020-01-30 NOTE — PROGRESS NOTES
Late entry: Pt report received from Jorge A portillo RN, during report pt labored swallow breathing, RT notified and pt changed to oxymask. Pt continued to desaturate to 89-90, Dr. Rosales notified.

## 2020-01-30 NOTE — DOCUMENTATION QUERY
[facility]                                                                       Query Response Note      PATIENT:               MARISELA DURHAM  ACCT #:                  [id number]  MRN:                     [id number]  :                      1945  ADMIT DATE:       2020 11:01 AM  DISCH DATE:          RESPONDING  PROVIDER #:        [id number]           QUERY TEXT:    On 20- Urine culture result:  Escherichia coli > 100,000 cfu/ml. Rocephin IVPB was given on 1/29/20 x1 dose.  Considering the clinical findings, can a diagnosis be provided to support these findings?     If an appropriate response is not listed below, please respond with a new note.            The patient's Clinical Indicators include:   - Urine culture : E-coli >100,000 cfu/ml   - Rocephin IV x 1 dose  Options provided:   -- Urinary tract infection   -- Abnormal urine culture E- coli   -- Findings clinically insignificant   -- Unable to determine      Query created by: Radha Clark on 2020 1:52 PM    RESPONSE TEXT:    Abnormal urine culture E- coli          Electronically signed by:  ETJAL COMBS MD 2020 3:40 PM

## 2020-01-30 NOTE — PROGRESS NOTES
CIWA scores continuously above 25, pt now sustaining HR 160s-170s. Rapid response called. See rapid flow sheet.

## 2020-01-30 NOTE — ASSESSMENT & PLAN NOTE
Physiologic s/sx improved   Thiamine/folate/MVI  Continuous cardiac monitoring  Aspiration seizure precautions

## 2020-01-30 NOTE — CONSULTS
Critical Care Consultation    Date of consult: 1/30/2020    Referring Physician  Dr Alvarez    Reason for Consultation  EtOH withdrawal    History of Presenting Illness  All information taken from chart review and sign out as patient currently quite encephalopathic and unable to give me meaningful information.    74 y.o. female with history of PAF on Eliquis, hypothyroidism, HTN, GERD, anxiety, depression, EtOH dependence who presented 1/27/2020 with alcohol withdrawal and worsening depression associated with the recent passing of her daughter.  While on telemetry patient's EtOH withdrawal progressed with elevating CIWA scores and transferred to ICU.  She is received approximately 11 mg of Ativan over the last 5 hours, arouses to tactile stimuli but disoriented and drifts immediately back to sleep.  On this admission she has been seen by psychiatry as well with titration of her antidepressant.  On arrival to ICU patient also noted to be in A. fib with RVR and rate in the 180s, SBP holding in 110s.    Code Status  Full Code    Review of Systems  Review of Systems   Unable to perform ROS: Acuity of condition       Past Medical History   has a past medical history of A-fib (HCC) (5/1/2014), Chronic pain, and Psychiatric disorder.    Surgical History   has a past surgical history that includes breast biopsy; other orthopedic surgery; and clavicle orif (5/21/2014).    Family History  family history includes Diabetes in her mother; Hyperlipidemia in her father.    Social History   reports that she has never smoked. She has never used smokeless tobacco. She reports current alcohol use. She reports previous drug use. Drug: Oral.    Medications  Home Medications     Reviewed by Giovana Rowan R.N. (Registered Nurse) on 01/27/20 at 1754  Med List Status: Complete   Medication Last Dose Status   amoxicillin-clavulanate (AUGMENTIN) 875-125 MG Tab 1/11/2020 Active   baclofen (LIORESAL) 10 MG Tab  Active   folic acid (FOLVITE)  1 MG Tab  Active   levothyroxine (SYNTHROID) 125 MCG Tab 1/26/2020 Active   metoprolol SR (TOPROL XL) 50 MG TABLET SR 24 HR 1/27/2020 Active   pantoprazole (PROTONIX) 40 MG Tablet Delayed Response  Active              Current Facility-Administered Medications   Medication Dose Route Frequency Provider Last Rate Last Dose   • Pharmacy Consult Request - Benzodiazepine review   Other PHARMACY TO DOSE Anitha Alvarez M.D.       • digoxin (LANOXIN) injection 500 mcg  500 mcg Intravenous Once Jeffrey Rodriguez M.D.       • dexmedetomidine (PRECEDEX) 400 mcg/100mL NS premix infusion  0.1-1.5 mcg/kg/hr Intravenous Continuous Jeffrey Rodriguez M.D.       • magnesium chloride (MAG-64) delayed-release tablet 64 mg  64 mg Oral BID Gerry Taveras M.D.   64 mg at 01/29/20 1618   • potassium chloride SA (Kdur) tablet 30 mEq  30 mEq Oral BID Gerry Taveras M.D.   30 mEq at 01/29/20 1618   • divalproex (DEPAKOTE) delayed-release tablet 250 mg  250 mg Oral Q8HRS Camila Street M.D.   250 mg at 01/29/20 2010   • sertraline (ZOLOFT) tablet 50 mg  50 mg Oral DAILY Camila Street M.D.   50 mg at 01/29/20 0433   • senna-docusate (PERICOLACE or SENOKOT S) 8.6-50 MG per tablet 2 Tab  2 Tab Oral BID Elodia Schreiber M.D.   Stopped at 01/27/20 1800    And   • polyethylene glycol/lytes (MIRALAX) PACKET 1 Packet  1 Packet Oral QDAY PRN Elodia Schreiber M.D.        And   • magnesium hydroxide (MILK OF MAGNESIA) suspension 30 mL  30 mL Oral QDAY PRN Elodia Schreiber M.D.        And   • bisacodyl (DULCOLAX) suppository 10 mg  10 mg Rectal QDAY PRN Asem HAILEY Schreiber M.D.       • Respiratory Care per Protocol   Nebulization Continuous RT Asem HAILEY Schreiber M.D.       • NS infusion   Intravenous Continuous Asem HAILEY Schreiber M.D. 100 mL/hr at 01/28/20 2113     • heparin injection 5,000 Units  5,000 Units Subcutaneous Q12HRS Elodia Schreiber M.D.   5,000 Units at 01/29/20 1618   • acetaminophen (TYLENOL) tablet 650 mg  650 mg Oral Q6HRS PRN Elodia  HAILEY Schreiber M.D.   650 mg at 01/28/20 2309   • thiamine tablet 100 mg  100 mg Oral DAILY Elodia Schreiber M.D.   100 mg at 01/29/20 0427    And   • multivitamin (THERAGRAN) tablet 1 Tab  1 Tab Oral DAILY Elodia Schreiber M.D.   1 Tab at 01/29/20 0432    And   • folic acid (FOLVITE) tablet 1 mg  1 mg Oral DAILY Elodia Schreiber M.D.   1 mg at 01/29/20 0427   • levothyroxine (SYNTHROID) tablet 125 mcg  125 mcg Oral AM ES Elodia Schreiber M.D.   125 mcg at 01/29/20 0427   • metoprolol SR (TOPROL XL) tablet 50 mg  50 mg Oral DAILY Elodia Schreiber M.D.   50 mg at 01/29/20 0431   • hydrALAZINE (APRESOLINE) tablet 5 mg  5 mg Oral Q6HRS PRN Elodia Schreiber M.D.       • hydrALAZINE (APRESOLINE) injection 20 mg  20 mg Intravenous Q6HRS PRN Elodia Schreiber M.D.       • labetalol (NORMODYNE/TRANDATE) injection 10 mg  10 mg Intravenous Q4HRS PRN Elodia Schreiber M.D.       • famotidine (PEPCID) injection 20 mg  20 mg Intravenous BID Elodia Schreiber M.D.   20 mg at 01/29/20 1618       Allergies  No Known Allergies    Vital Signs last 24 hours  Temp:  [36.1 °C (96.9 °F)-37.4 °C (99.3 °F)] 37.4 °C (99.3 °F)  Pulse:  [] 151  Resp:  [14-20] 14  BP: (116-147)/() 124/90  SpO2:  [91 %-96 %] 93 %    Physical Exam  Physical Exam  Vitals signs and nursing note reviewed.   Constitutional:       Appearance: She is ill-appearing and toxic-appearing. She is not diaphoretic.   HENT:      Head: Normocephalic and atraumatic.   Eyes:      Conjunctiva/sclera: Conjunctivae normal.      Pupils: Pupils are equal, round, and reactive to light.   Neck:      Musculoskeletal: Neck supple.   Cardiovascular:      Rate and Rhythm: Tachycardia present. Rhythm irregular.      Pulses: Normal pulses.   Pulmonary:      Breath sounds: Rhonchi (Scattered) present. No wheezing or rales.   Abdominal:      General: There is no distension.      Palpations: Abdomen is soft.      Tenderness: There is no tenderness.   Musculoskeletal:         General: No  swelling.   Skin:     General: Skin is warm and dry.   Neurological:      Cranial Nerves: No cranial nerve deficit.   Psychiatric:      Comments: Encephalopathic         Fluids  No intake or output data in the 24 hours ending 01/30/20 0451    Laboratory  Recent Results (from the past 48 hour(s))   CBC WITH DIFFERENTIAL    Collection Time: 01/29/20  1:44 AM   Result Value Ref Range    WBC 5.8 4.8 - 10.8 K/uL    RBC 3.30 (L) 4.20 - 5.40 M/uL    Hemoglobin 11.0 (L) 12.0 - 16.0 g/dL    Hematocrit 33.5 (L) 37.0 - 47.0 %    .5 (H) 81.4 - 97.8 fL    MCH 33.3 (H) 27.0 - 33.0 pg    MCHC 32.8 (L) 33.6 - 35.0 g/dL    RDW 53.8 (H) 35.9 - 50.0 fL    Platelet Count 115 (L) 164 - 446 K/uL    MPV 9.8 9.0 - 12.9 fL    Neutrophils-Polys 54.60 44.00 - 72.00 %    Lymphocytes 35.30 22.00 - 41.00 %    Monocytes 7.40 0.00 - 13.40 %    Eosinophils 1.90 0.00 - 6.90 %    Basophils 0.50 0.00 - 1.80 %    Immature Granulocytes 0.30 0.00 - 0.90 %    Nucleated RBC 0.30 /100 WBC    Neutrophils (Absolute) 3.17 2.00 - 7.15 K/uL    Lymphs (Absolute) 2.05 1.00 - 4.80 K/uL    Monos (Absolute) 0.43 0.00 - 0.85 K/uL    Eos (Absolute) 0.11 0.00 - 0.51 K/uL    Baso (Absolute) 0.03 0.00 - 0.12 K/uL    Immature Granulocytes (abs) 0.02 0.00 - 0.11 K/uL    NRBC (Absolute) 0.02 K/uL   Comp Metabolic Panel    Collection Time: 01/29/20  1:44 AM   Result Value Ref Range    Sodium 140 135 - 145 mmol/L    Potassium 3.3 (L) 3.6 - 5.5 mmol/L    Chloride 107 96 - 112 mmol/L    Co2 26 20 - 33 mmol/L    Anion Gap 7.0 0.0 - 11.9    Glucose 91 65 - 99 mg/dL    Bun 5 (L) 8 - 22 mg/dL    Creatinine 0.50 0.50 - 1.40 mg/dL    Calcium 7.7 (L) 8.5 - 10.5 mg/dL    AST(SGOT) 23 12 - 45 U/L    ALT(SGPT) 12 2 - 50 U/L    Alkaline Phosphatase 82 30 - 99 U/L    Total Bilirubin 1.6 (H) 0.1 - 1.5 mg/dL    Albumin 2.7 (L) 3.2 - 4.9 g/dL    Total Protein 5.3 (L) 6.0 - 8.2 g/dL    Globulin 2.6 1.9 - 3.5 g/dL    A-G Ratio 1.0 g/dL   MAGNESIUM    Collection Time: 01/29/20  1:44 AM    Result Value Ref Range    Magnesium 1.4 (L) 1.5 - 2.5 mg/dL   PHOSPHORUS    Collection Time: 20  1:44 AM   Result Value Ref Range    Phosphorus 3.3 2.5 - 4.5 mg/dL   ESTIMATED GFR    Collection Time: 20  1:44 AM   Result Value Ref Range    GFR If African American >60 >60 mL/min/1.73 m 2    GFR If Non African American >60 >60 mL/min/1.73 m 2   EKG    Collection Time: 20  4:02 AM   Result Value Ref Range    Report       [facility]    Test Date:  2020  Pt Name:    MARISELA DURHAM              Department: 183  MRN:        [id number]                      Room:       T802  Gender:     Female                       Technician: DIANN  :        [id number]                   Requested By:ASTRID HARO  Order #:    [id number]                    Reading MD:    Measurements  Intervals                                Axis  Rate:       160                          P:  VT:                                      QRS:        -46  QRSD:       84                           T:          63  QT:         292  QTc:        477    Interpretive Statements  ATRIAL FIBRILLATION WITH RAPID V-RATE  MULTIFORM VENTRICULAR PREMATURE COMPLEXES  LEFT ANTERIOR FASCICULAR BLOCK  NONSPECIFIC T ABNORMALITIES, ANT-LAT LEADS  Compared to ECG 2020 14:31:53  Sinus rhythm no longer present  Prolonged QT interval no longer present  T-wave abnormality still present         Imaging  DX-ANKLE 3+ VIEWS RIGHT   Final Result      No evidence of fracture or dislocation.          Assessment/Plan  * Alcohol withdrawal (HCC)- (present on admission)  Assessment & Plan  Thiamine/folate/MVI  Continuous cardiac monitoring  Aspiration seizure precautions  DC CIWA protocol  Start Precedex infusion with active titration  If fails Precedex will likely need intubation and utilization of propofol  Given current mentation not at best candidate for phenobarbital    Hypomagnesemia- (present on admission)  Assessment & Plan  Replace to keep greater than  2    Hypokalemia- (present on admission)  Assessment & Plan  Replace to keep greater than 4    Acute right ankle pain- (present on admission)  Assessment & Plan  No evidence of fracture on CXR per report    Alcohol dependence (HCC)- (present on admission)  Assessment & Plan  Counseling when clinically appropriate    Depression- (present on admission)  Assessment & Plan  Psychiatry following  Continue Zoloft    HTN (hypertension)- (present on admission)  Assessment & Plan  Currently with labile pressure  Continue metoprolol with holding parameters    PAF (paroxysmal atrial fibrillation) (HCC)- (present on admission)  Assessment & Plan  Likely exacerbated by underlying EtOH withdrawal  Maintain K greater than 4 mag greater than 2  Give 500 mcg of digoxin  Currently maintaining BP, if further deterioration may need emergent cardioversion  If BP sustained will utilize BB  Reviewed echo from 1/21      Discussed patient condition and risk of morbidity and/or mortality with Hospitalist, RN, RT and Pharmacy.    The patient remains critically ill.  Critical care time = 40 minutes in directly providing and coordinating critical care and extensive data review.  No time overlap and excludes procedures.

## 2020-01-30 NOTE — PROGRESS NOTES
Pt intubated per Dr. Rosales with RT and RN at bedside. Pt tolerated well. Central line preparation started pt had 3 blown IV's since 0430.

## 2020-01-30 NOTE — PROCEDURES
Central Line Insertion  Date/Time: 1/30/2020 8:56 AM  Performed by: Benjie Rosales M.D.  Authorized by: Benjie Rosales M.D.     Consent:     Consent obtained:  Emergent situation  Pre-procedure details:     Hand hygiene: Hand hygiene performed prior to insertion      Sterile barrier technique: All elements of maximal sterile technique followed      Skin preparation:  2% chlorhexidine    Skin preparation agent: Skin preparation agent completely dried prior to procedure    Sedation:     Sedation type:  Deep  Anesthesia:     Anesthesia method:  Local infiltration    Local anesthetic:  Lidocaine 1% w/o epi  Procedure details:     Location:  R internal jugular    Patient position:  Flat    Procedural supplies:  Triple lumen    Catheter size:  7 Fr    Ultrasound guidance: yes      Sterile ultrasound techniques: Sterile gel and sterile probe covers were used      Number of attempts:  1    Successful placement: yes    Post-procedure details:     Post-procedure:  Dressing applied and line sutured    Assessment:  Blood return through all ports, free fluid flow, no pneumothorax on x-ray and placement verified by x-ray    Patient tolerance of procedure:  Tolerated well, no immediate complications  Comments:      Placed for poor access in the setting of acute hypoxemic resp failure and alcohol withdrawls

## 2020-01-30 NOTE — PROGRESS NOTES
Hospital Medicine Daily Progress Note    Date of Service  1/29/2020    Chief Complaint  74 y.o. female admitted 1/27/2020 with ETOH WD and severe depression    Hospital Course    see below      Interval Problem Update  ETOH WD-remains active withdrawal. Psychiatry added depakote yesterday and zoloft. Patient is somewhat confused this AM.     Depression-quite severe, as noted above. No improvement at this time.     Consultants/Specialty  Psychiatry    Code Status  fcfc    Disposition  tele    Review of Systems  Review of Systems   Constitutional: Positive for malaise/fatigue (a bit worse today). Negative for fever.   Respiratory: Negative for cough and shortness of breath.    Cardiovascular: Negative for palpitations.   Gastrointestinal: Negative for abdominal pain, diarrhea, nausea and vomiting.   Psychiatric/Behavioral: Positive for depression and substance abuse. Negative for suicidal ideas.   All other systems reviewed and are negative.       Physical Exam  Temp:  [36.2 °C (97.1 °F)-36.9 °C (98.5 °F)] 36.2 °C (97.1 °F)  Pulse:  [] 107  Resp:  [17-18] 18  BP: (116-151)/(74-99) 130/95  SpO2:  [90 %-95 %] 92 %    Physical Exam  Vitals signs and nursing note reviewed.   Constitutional:       General: She is not in acute distress.     Appearance: She is well-developed.      Comments: Very Fatigued, depressed appearing   HENT:      Head: Normocephalic and atraumatic.      Mouth/Throat:      Pharynx: No oropharyngeal exudate.   Eyes:      General:         Right eye: No discharge.         Left eye: No discharge.      Pupils: Pupils are equal, round, and reactive to light.   Neck:      Musculoskeletal: Normal range of motion and neck supple.      Thyroid: No thyromegaly.   Cardiovascular:      Rate and Rhythm: Normal rate and regular rhythm.      Heart sounds: Normal heart sounds. No murmur.   Pulmonary:      Effort: Pulmonary effort is normal.      Breath sounds: Normal breath sounds. No wheezing.   Abdominal:       General: Bowel sounds are normal.      Palpations: Abdomen is soft.      Tenderness: There is no tenderness.   Musculoskeletal: Normal range of motion.         General: No tenderness.   Skin:     General: Skin is warm and dry.      Findings: No rash.   Neurological:      Mental Status: She is alert and oriented to person, place, and time.      Cranial Nerves: No cranial nerve deficit.         Fluids    Intake/Output Summary (Last 24 hours) at 1/29/2020 1639  Last data filed at 1/28/2020 1700  Gross per 24 hour   Intake 240 ml   Output --   Net 240 ml       Laboratory  Recent Labs     01/27/20  1130 01/28/20  0200 01/29/20  0144   WBC 6.8 5.6 5.8   RBC 4.09* 3.30* 3.30*   HEMOGLOBIN 13.9 11.0* 11.0*   HEMATOCRIT 41.4 34.2* 33.5*   .2* 101.8* 101.5*   MCH 34.0* 33.3* 33.3*   MCHC 33.6 32.7* 32.8*   RDW 54.5* 54.4* 53.8*   PLATELETCT 174 128* 115*   MPV 9.3 9.5 9.8     Recent Labs     01/27/20  1130 01/28/20  0200 01/29/20  0144   SODIUM 141 139 140   POTASSIUM 3.3* 3.3* 3.3*   CHLORIDE 103 107 107   CO2 19* 26 26   GLUCOSE 88 155* 91   BUN 9 11 5*   CREATININE 0.57 0.66 0.50   CALCIUM 8.7 8.0* 7.7*                   Imaging  DX-ANKLE 3+ VIEWS RIGHT   Final Result      No evidence of fracture or dislocation.           Assessment/Plan  * Alcohol withdrawal (HCC)- (present on admission)  Assessment & Plan  [facility] protocol   Continuous cardiac monitoring  -actively withdrawing, scores remain high  -added depakote  Per psych  -I added librium as well  -aggressively replace lytes  -MVI/thiamine/folate  -h/o WD's requiring intubation, so remains very high risk    Nausea and vomiting- (present on admission)  Assessment & Plan  Likely secondary to alcoholic gastritis.   -continue supportive Care with IVF's and IV anti-emetics, seems to be improving today    Hypomagnesemia- (present on admission)  Assessment & Plan  -replace aggressively, 2/2 ETOHism    Hypokalemia- (present on admission)  Assessment & Plan  Low again  along with low PO4 levels, start KPHOS neutral, check labs daily    Dehydration- (present on admission)  Assessment & Plan  Secondary to reduced oral intake nausea vomiting and diarrhea.  Intravenous fluids, continue at same rate today, consider decreasing tomorrow    Acute right ankle pain- (present on admission)  Assessment & Plan  Checking x-ray which is negative for fracture  -continue  Multimodal pain control.    Alcohol dependence (HCC)- (present on admission)  Assessment & Plan  With alcohol withdrawal.  See alcohol withdrawal above.  Counseling provided.  Thiamine folic acid and multivitamin.    Depression- (present on admission)  Assessment & Plan  Worse with the recent death of her daughter.  Supportive care for now. No active SI/HI  -quite severe  -consulted psych, almost certainly will start SNRI/SSRI, started zoloft 50 mg daily, monitor closely    HTN (hypertension)- (present on admission)  Assessment & Plan  Resume home metoprolol.  Vital signs per policy.  Hydralazine and labetalol as needed for extreme hypertension.     PAF (paroxysmal atrial fibrillation) (HCC)- (present on admission)  Assessment & Plan  Resume home metoprolol for rate control, remains in NSR and good HR control at this time  Does not appear to be on anticoagulation, counseled on starting anticoagulation.  Wants to defer this decision at this point.    Anxiety- (present on admission)  Assessment & Plan  Exacerbated by recent death of her daughter.  Supportive care.       VTE prophylaxis: heparin

## 2020-01-30 NOTE — PROGRESS NOTES
Pt w/ climbing CIWA scores again, jumping out of bed and pulling at lines and tele box. A&O to person only. Dr Dietz paged to receive orders for NV soft wrist restraints.

## 2020-01-30 NOTE — PROGRESS NOTES
Pt. In A-Fib RVR, rate 170-180, /84 MAP 96 notified Dr. Rodriguez who gave orders for 500mcg IV Digoxin.

## 2020-01-30 NOTE — ASSESSMENT & PLAN NOTE
Likely exacerbated by underlying EtOH withdrawal  Rate control improved with metoprolol      Considered anticoag prior to d/c in the past she did not want this; given her abuse hx may be a fall/hemorrhage risk  If needs bronchodilator will utilize Xopenex to avoid cardio stimulation

## 2020-01-30 NOTE — PROCEDURES
Intubation  Date/Time: 1/30/2020 8:54 AM  Performed by: Benjie Rosales M.D.  Authorized by: Benjie Rosales M.D.     Consent:     Consent obtained:  Emergent situation  Pre-procedure details:     Patient status:  Altered mental status    Mallampati score:  III    Pretreatment medications:  None    Paralytics:  Rocuronium  Procedure details:     Preoxygenation:  Bag valve mask    CPR in progress: no      Intubation method:  Oral    Oral intubation technique:  Video-assisted    Laryngoscope type:  GlideScope    Laryngoscope blade:  Mac 3    Cormack-Lehane Classification:  Grade 1    Tube size (mm):  7.5    Tube type:  Cuffed    Number of attempts:  1    Ventilation between attempts: no      Cricoid pressure: no      Tube visualized through cords: yes    Placement assessment:     ETT to teeth:  23    Tube secured with:  ETT walsh    Breath sounds:  Equal and absent over the epigastrium    Placement verification: chest rise, condensation, CXR verification and equal breath sounds      CXR findings:  ETT in proper place  Post-procedure details:     Patient tolerance of procedure:  Tolerated well, no immediate complications  Comments:      Intubated for acute hypoxemic respiratory failure and airway protection

## 2020-01-30 NOTE — PROGRESS NOTES
Received report from day shift RNPina. Assumed care of pt. Pt reports no pain at this time. Pt w/ severe withdrawal symptoms, most recent CIWA score of 30. Bed alarm in place. Educated on use of call light. Hourly rounding and continuous monitoring in place.

## 2020-01-30 NOTE — PROGRESS NOTES
Spoke with Son Reilly via phone. Updated contact information in [employer]. Reilly faxing over POA paperwork. Updated Reilly and events of the morning and status of pt.

## 2020-01-30 NOTE — DISCHARGE PLANNING
· This RNCM left voicemail for Pt's son, Reilly, at [phone] requesting a call back to do d/c plan assessment

## 2020-01-31 ENCOUNTER — APPOINTMENT (OUTPATIENT)
Dept: RADIOLOGY | Facility: MEDICAL CENTER | Age: 75
DRG: 896 | End: 2020-01-31
Attending: PSYCHIATRY & NEUROLOGY
Payer: MEDICARE

## 2020-01-31 PROBLEM — J96.01 ACUTE RESPIRATORY FAILURE WITH HYPOXEMIA (HCC): Status: ACTIVE | Noted: 2020-01-31

## 2020-01-31 LAB
ACTION RANGE TRIGGERED IACRT: NO
ANION GAP SERPL CALC-SCNC: 4 MMOL/L (ref 0–11.9)
BASE EXCESS BLDA CALC-SCNC: 1 MMOL/L (ref -4–3)
BASOPHILS # BLD AUTO: 0.3 % (ref 0–1.8)
BASOPHILS # BLD: 0.02 K/UL (ref 0–0.12)
BODY TEMPERATURE: ABNORMAL DEGREES
BUN SERPL-MCNC: 6 MG/DL (ref 8–22)
CALCIUM SERPL-MCNC: 7.9 MG/DL (ref 8.5–10.5)
CHLORIDE SERPL-SCNC: 106 MMOL/L (ref 96–112)
CO2 BLDA-SCNC: 28 MMOL/L (ref 20–33)
CO2 SERPL-SCNC: 28 MMOL/L (ref 20–33)
CREAT SERPL-MCNC: 0.44 MG/DL (ref 0.5–1.4)
EOSINOPHIL # BLD AUTO: 0.13 K/UL (ref 0–0.51)
EOSINOPHIL NFR BLD: 2.2 % (ref 0–6.9)
ERYTHROCYTE [DISTWIDTH] IN BLOOD BY AUTOMATED COUNT: 58.1 FL (ref 35.9–50)
GLUCOSE SERPL-MCNC: 102 MG/DL (ref 65–99)
HCO3 BLDA-SCNC: 26.6 MMOL/L (ref 17–25)
HCT VFR BLD AUTO: 36.4 % (ref 37–47)
HGB BLD-MCNC: 11.6 G/DL (ref 12–16)
HOROWITZ INDEX BLDA+IHG-RTO: 238 MM[HG]
IMM GRANULOCYTES # BLD AUTO: 0.03 K/UL (ref 0–0.11)
IMM GRANULOCYTES NFR BLD AUTO: 0.5 % (ref 0–0.9)
INST. QUALIFIED PATIENT IIQPT: YES
LYMPHOCYTES # BLD AUTO: 2.02 K/UL (ref 1–4.8)
LYMPHOCYTES NFR BLD: 34.3 % (ref 22–41)
MAGNESIUM SERPL-MCNC: 2.1 MG/DL (ref 1.5–2.5)
MCH RBC QN AUTO: 33.1 PG (ref 27–33)
MCHC RBC AUTO-ENTMCNC: 31.9 G/DL (ref 33.6–35)
MCV RBC AUTO: 104 FL (ref 81.4–97.8)
MONOCYTES # BLD AUTO: 0.48 K/UL (ref 0–0.85)
MONOCYTES NFR BLD AUTO: 8.1 % (ref 0–13.4)
NEUTROPHILS # BLD AUTO: 3.21 K/UL (ref 2–7.15)
NEUTROPHILS NFR BLD: 54.6 % (ref 44–72)
NRBC # BLD AUTO: 0.02 K/UL
NRBC BLD-RTO: 0.3 /100 WBC
O2/TOTAL GAS SETTING VFR VENT: 40 %
PCO2 BLDA: 45.5 MMHG (ref 26–37)
PCO2 TEMP ADJ BLDA: 45.8 MMHG (ref 26–37)
PH BLDA: 7.38 [PH] (ref 7.4–7.5)
PH TEMP ADJ BLDA: 7.37 [PH] (ref 7.4–7.5)
PHOSPHATE SERPL-MCNC: 2.4 MG/DL (ref 2.5–4.5)
PLATELET # BLD AUTO: 98 K/UL (ref 164–446)
PMV BLD AUTO: 10.5 FL (ref 9–12.9)
PO2 BLDA: 95 MMHG (ref 64–87)
PO2 TEMP ADJ BLDA: 96 MMHG (ref 64–87)
POTASSIUM SERPL-SCNC: 4 MMOL/L (ref 3.6–5.5)
RBC # BLD AUTO: 3.5 M/UL (ref 4.2–5.4)
SAO2 % BLDA: 97 % (ref 93–99)
SODIUM SERPL-SCNC: 138 MMOL/L (ref 135–145)
SPECIMEN DRAWN FROM PATIENT: ABNORMAL
WBC # BLD AUTO: 5.9 K/UL (ref 4.8–10.8)

## 2020-01-31 PROCEDURE — 700102 HCHG RX REV CODE 250 W/ 637 OVERRIDE(OP): Performed by: PSYCHIATRY & NEUROLOGY

## 2020-01-31 PROCEDURE — 83735 ASSAY OF MAGNESIUM: CPT

## 2020-01-31 PROCEDURE — 99291 CRITICAL CARE FIRST HOUR: CPT | Performed by: PSYCHIATRY & NEUROLOGY

## 2020-01-31 PROCEDURE — 700111 HCHG RX REV CODE 636 W/ 250 OVERRIDE (IP): Performed by: PSYCHIATRY & NEUROLOGY

## 2020-01-31 PROCEDURE — 770022 HCHG ROOM/CARE - ICU (200)

## 2020-01-31 PROCEDURE — A9270 NON-COVERED ITEM OR SERVICE: HCPCS | Performed by: PSYCHIATRY & NEUROLOGY

## 2020-01-31 PROCEDURE — 80048 BASIC METABOLIC PNL TOTAL CA: CPT

## 2020-01-31 PROCEDURE — 85025 COMPLETE CBC W/AUTO DIFF WBC: CPT

## 2020-01-31 PROCEDURE — 700105 HCHG RX REV CODE 258: Performed by: PSYCHIATRY & NEUROLOGY

## 2020-01-31 PROCEDURE — 84100 ASSAY OF PHOSPHORUS: CPT

## 2020-01-31 PROCEDURE — 94003 VENT MGMT INPAT SUBQ DAY: CPT

## 2020-01-31 PROCEDURE — 71045 X-RAY EXAM CHEST 1 VIEW: CPT

## 2020-01-31 PROCEDURE — 82803 BLOOD GASES ANY COMBINATION: CPT

## 2020-01-31 RX ADMIN — POTASSIUM BICARBONATE 25 MEQ: 978 TABLET, EFFERVESCENT ORAL at 05:31

## 2020-01-31 RX ADMIN — FAMOTIDINE 20 MG: 20 TABLET ORAL at 17:28

## 2020-01-31 RX ADMIN — Medication 100 MG: at 05:30

## 2020-01-31 RX ADMIN — FOLIC ACID 1 MG: 1 TABLET ORAL at 05:31

## 2020-01-31 RX ADMIN — FAMOTIDINE 20 MG: 20 TABLET ORAL at 05:31

## 2020-01-31 RX ADMIN — SODIUM CHLORIDE, POTASSIUM CHLORIDE, SODIUM LACTATE AND CALCIUM CHLORIDE: 600; 310; 30; 20 INJECTION, SOLUTION INTRAVENOUS at 06:43

## 2020-01-31 RX ADMIN — METOPROLOL TARTRATE 25 MG: 25 TABLET, FILM COATED ORAL at 05:32

## 2020-01-31 RX ADMIN — FENTANYL CITRATE 50 MCG: 50 INJECTION, SOLUTION INTRAMUSCULAR; INTRAVENOUS at 01:07

## 2020-01-31 RX ADMIN — DIVALPROEX SODIUM 250 MG: 125 CAPSULE, COATED PELLETS ORAL at 12:54

## 2020-01-31 RX ADMIN — SENNOSIDES AND DOCUSATE SODIUM 2 TABLET: 8.6; 5 TABLET ORAL at 05:31

## 2020-01-31 RX ADMIN — LEVOTHYROXINE SODIUM 125 MCG: 125 TABLET ORAL at 05:31

## 2020-01-31 RX ADMIN — DIBASIC SODIUM PHOSPHATE, MONOBASIC POTASSIUM PHOSPHATE AND MONOBASIC SODIUM PHOSPHATE 1 TABLET: 852; 155; 130 TABLET ORAL at 11:24

## 2020-01-31 RX ADMIN — SENNOSIDES AND DOCUSATE SODIUM 2 TABLET: 8.6; 5 TABLET ORAL at 17:29

## 2020-01-31 RX ADMIN — PROPOFOL 10 MCG/KG/MIN: 10 INJECTION, EMULSION INTRAVENOUS at 01:07

## 2020-01-31 RX ADMIN — POTASSIUM BICARBONATE 25 MEQ: 978 TABLET, EFFERVESCENT ORAL at 17:28

## 2020-01-31 RX ADMIN — DIVALPROEX SODIUM 250 MG: 125 CAPSULE, COATED PELLETS ORAL at 22:45

## 2020-01-31 RX ADMIN — DIBASIC SODIUM PHOSPHATE, MONOBASIC POTASSIUM PHOSPHATE AND MONOBASIC SODIUM PHOSPHATE 1 TABLET: 852; 155; 130 TABLET ORAL at 17:29

## 2020-01-31 RX ADMIN — THERA TABS 1 TABLET: TAB at 05:31

## 2020-01-31 RX ADMIN — SERTRALINE 50 MG: 50 TABLET, FILM COATED ORAL at 05:31

## 2020-01-31 RX ADMIN — DIVALPROEX SODIUM 250 MG: 125 CAPSULE, COATED PELLETS ORAL at 05:30

## 2020-01-31 RX ADMIN — ENOXAPARIN SODIUM 40 MG: 100 INJECTION SUBCUTANEOUS at 17:29

## 2020-01-31 ASSESSMENT — ENCOUNTER SYMPTOMS
DIAPHORESIS: 0
SHORTNESS OF BREATH: 1

## 2020-01-31 NOTE — PSYCHIATRY
"PSYCHIATRIC FOLLOW UP:    Reason for admission : alcohol withrawal   Reason for consult:  depression  Requesting physician: arpit   HPI:     Pt is a 73 y/o woman with severe ETOH disorder, MDD with multiple suicide attempts after both her daughters  several years ago. She has had multiple episodes of withdrawal requiring intubation, and was intubated this morning. She was responsive so seen by me today, but wasn't able to communicate at this time and is only really responsive when suctioned or when anything around her mouth is disturbed. She is confused when able to respond .       Review of Systems:  Review of Systems   Constitutional: Positive for malaise/fatigue. Negative for diaphoresis.   HENT: Negative.    Respiratory: Positive for shortness of breath (.respiratory failure. ).    Skin: Negative.      Pt unable to participate due to AMS.      Psychiatric Examination: observed phenomenon:  Vitals: /63   Pulse 79   Temp 36.8 °C (98.2 °F) (Temporal)   Resp 18   Ht 1.676 m (5' 6\")   Wt 72.1 kg (158 lb 15.2 oz)   SpO2 99%   BMI 25.66 kg/m²  Body mass index is 25.66 kg/m².    Appearance: grooming wnl   Muscle Strength/Tone: psychomotor retardation   Gait/Station: unable to amhbulate, intubated   Speech: not able to communicate properly.   Thought Process: confused  Associations:no loose associations   Abnormal/Psychotic Thoughts (ex): unable to evaluate   Insight/Judgement:unable to evaluate   Orientation:not oriented  Memory:n/a  Attention/Concentration:poor   Language:n/a  Fund of Knowledge:n/a  Mood:          Unable to evaluate   Affect:         Flat   SI/HI:   Unable to evaluate       Soc history:    Son indicates she has done this \"approximately 10 times\" in the last few years and has had severe withdrawals including intubations.     Lab results/tests:   Recent Results (from the past 48 hour(s))   EKG    Collection Time: 20  4:02 AM   Result Value Ref Range    Report       [facility]    Test Date:  2020  Pt Name:    MARISELA DURHAM              Department: 183  MRN:        [id number]                      Room:       R102  Gender:     Female                       Technician: DIANN  :        [id number]                   Requested By:ASTRID HARO  Order #:    [id number]                    Reading MD: Reilly Reese MD    Measurements  Intervals                                Axis  Rate:       160                          P:  SC:                                      QRS:        -46  QRSD:       84                           T:          63  QT:         292  QTc:        477    Interpretive Statements  ATRIAL FIBRILLATION WITH RAPID V-RATE  MULTIFORM VENTRICULAR PREMATURE COMPLEXES  LEFT ANTERIOR FASCICULAR BLOCK  NONSPECIFIC T ABNORMALITIES, ANT-LAT LEADS  Compared to ECG 2020 14:31:53  Sinus rhythm no longer present  Electronically Signed On 2020 19:21:33 PST by Reilly Reese MD     ISTAT ARTERIAL BLOOD GAS    Collection Time: 20  9:09 AM   Result Value Ref Range    Ph 7.456 7.400 - 7.500    Pco2 31.7 26.0 - 37.0 mmHg    Po2 55 (L) 64 - 87 mmHg    Tco2 23 20 - 33 mmol/L    S02 90 (L) 93 - 99 %    Hco3 22.4 17.0 - 25.0 mmol/L    BE -1 -4 - 3 mmol/L    Body Temp see below degrees    O2 Therapy 50 %    iPF Ratio 110     Specimen Arterial     Action Range Triggered NO     Inst. Qualified Patient YES    Comp Metabolic Panel    Collection Time: 20  4:15 PM   Result Value Ref Range    Sodium 138 135 - 145 mmol/L    Potassium 4.8 3.6 - 5.5 mmol/L    Chloride 107 96 - 112 mmol/L    Co2 25 20 - 33 mmol/L    Anion Gap 6.0 0.0 - 11.9    Glucose 89 65 - 99 mg/dL    Bun 5 (L) 8 - 22 mg/dL    Creatinine 0.42 (L) 0.50 - 1.40 mg/dL    Calcium 7.8 (L) 8.5 - 10.5 mg/dL    AST(SGOT) 28 12 - 45 U/L    ALT(SGPT) 11 2 - 50 U/L    Alkaline Phosphatase 69 30 - 99 U/L    Total Bilirubin 1.5 0.1 - 1.5 mg/dL    Albumin 2.5 (L) 3.2 - 4.9 g/dL    Total Protein 5.0 (L) 6.0 - 8.2  g/dL    Globulin 2.5 1.9 - 3.5 g/dL    A-G Ratio 1.0 g/dL   MAGNESIUM    Collection Time: 01/30/20  4:15 PM   Result Value Ref Range    Magnesium 2.7 (H) 1.5 - 2.5 mg/dL   PHOSPHORUS    Collection Time: 01/30/20  4:15 PM   Result Value Ref Range    Phosphorus 2.7 2.5 - 4.5 mg/dL   ESTIMATED GFR    Collection Time: 01/30/20  4:15 PM   Result Value Ref Range    GFR If African American >60 >60 mL/min/1.73 m 2    GFR If Non African American >60 >60 mL/min/1.73 m 2   CBC with Differential    Collection Time: 01/31/20  4:00 AM   Result Value Ref Range    WBC 5.9 4.8 - 10.8 K/uL    RBC 3.50 (L) 4.20 - 5.40 M/uL    Hemoglobin 11.6 (L) 12.0 - 16.0 g/dL    Hematocrit 36.4 (L) 37.0 - 47.0 %    .0 (H) 81.4 - 97.8 fL    MCH 33.1 (H) 27.0 - 33.0 pg    MCHC 31.9 (L) 33.6 - 35.0 g/dL    RDW 58.1 (H) 35.9 - 50.0 fL    Platelet Count 98 (L) 164 - 446 K/uL    MPV 10.5 9.0 - 12.9 fL    Neutrophils-Polys 54.60 44.00 - 72.00 %    Lymphocytes 34.30 22.00 - 41.00 %    Monocytes 8.10 0.00 - 13.40 %    Eosinophils 2.20 0.00 - 6.90 %    Basophils 0.30 0.00 - 1.80 %    Immature Granulocytes 0.50 0.00 - 0.90 %    Nucleated RBC 0.30 /100 WBC    Neutrophils (Absolute) 3.21 2.00 - 7.15 K/uL    Lymphs (Absolute) 2.02 1.00 - 4.80 K/uL    Monos (Absolute) 0.48 0.00 - 0.85 K/uL    Eos (Absolute) 0.13 0.00 - 0.51 K/uL    Baso (Absolute) 0.02 0.00 - 0.12 K/uL    Immature Granulocytes (abs) 0.03 0.00 - 0.11 K/uL    NRBC (Absolute) 0.02 K/uL   Basic Metabolic Panel (BMP)    Collection Time: 01/31/20  4:00 AM   Result Value Ref Range    Sodium 138 135 - 145 mmol/L    Potassium 4.0 3.6 - 5.5 mmol/L    Chloride 106 96 - 112 mmol/L    Co2 28 20 - 33 mmol/L    Glucose 102 (H) 65 - 99 mg/dL    Bun 6 (L) 8 - 22 mg/dL    Creatinine 0.44 (L) 0.50 - 1.40 mg/dL    Calcium 7.9 (L) 8.5 - 10.5 mg/dL    Anion Gap 4.0 0.0 - 11.9   Magnesium    Collection Time: 01/31/20  4:00 AM   Result Value Ref Range    Magnesium 2.1 1.5 - 2.5 mg/dL   Phosphorus    Collection  Time: 01/31/20  4:00 AM   Result Value Ref Range    Phosphorus 2.4 (L) 2.5 - 4.5 mg/dL   ESTIMATED GFR    Collection Time: 01/31/20  4:00 AM   Result Value Ref Range    GFR If African American >60 >60 mL/min/1.73 m 2    GFR If Non African American >60 >60 mL/min/1.73 m 2   ISTAT ARTERIAL BLOOD GAS    Collection Time: 01/31/20  4:31 AM   Result Value Ref Range    Ph 7.376 (L) 7.400 - 7.500    Pco2 45.5 (H) 26.0 - 37.0 mmHg    Po2 95 (H) 64 - 87 mmHg    Tco2 28 20 - 33 mmol/L    S02 97 93 - 99 %    Hco3 26.6 (H) 17.0 - 25.0 mmol/L    BE 1 -4 - 3 mmol/L    Body Temp 98.9 F degrees    O2 Therapy 40 %    iPF Ratio 238     Ph Temp Fawad 7.373 (L) 7.400 - 7.500    Pco2 Temp Co 45.8 (H) 26.0 - 37.0 mmHg    Po2 Temp Cor 96 (H) 64 - 87 mmHg    Specimen Arterial     Action Range Triggered NO     Inst. Qualified Patient YES      DX-CHEST-PORTABLE (1 VIEW)   Final Result      1.  Increased bibasilar atelectasis which could obscure an additional process   2.  Probable small BILATERAL pleural effusions   3.  Persistently enlarged cardiac silhouette      DX-ABDOMEN FOR TUBE PLACEMENT   Final Result      Enteric tube tip projects over the stomach.      DX-CHEST-PORTABLE (1 VIEW)   Final Result      1.  Supportive tubing as described above.   2.  Mild bibasilar infiltrate or atelectasis.   3.  No pneumothorax.      DX-ANKLE 3+ VIEWS RIGHT   Final Result      No evidence of fracture or dislocation.               Assessment:  etoh use disorder   etoh withdrawal   Respiratory failure   MDD, severe, without psychotic features            Plan:  Continue depakote, psych will follow peripherally until she is more awake.

## 2020-01-31 NOTE — CARE PLAN
Problem: Fluid Volume:  Goal: Will maintain balanced intake and output  Outcome: PROGRESSING AS EXPECTED     Problem: Psychosocial Needs:  Goal: Level of anxiety will decrease  Outcome: PROGRESSING AS EXPECTED     Problem: Skin Integrity  Goal: Risk for impaired skin integrity will decrease  Outcome: PROGRESSING AS EXPECTED     Problem: Communication  Goal: The ability to communicate needs accurately and effectively will improve  Outcome: PROGRESSING AS EXPECTED     Problem: Safety  Goal: Will remain free from injury  Outcome: PROGRESSING AS EXPECTED  Goal: Will remain free from falls  Outcome: PROGRESSING AS EXPECTED     Problem: Infection  Goal: Will remain free from infection  Outcome: PROGRESSING AS EXPECTED     Problem: Venous Thromboembolism (VTW)/Deep Vein Thrombosis (DVT) Prevention:  Goal: Patient will participate in Venous Thrombosis (VTE)/Deep Vein Thrombosis (DVT)Prevention Measures  Outcome: PROGRESSING AS EXPECTED     Problem: Bowel/Gastric:  Goal: Normal bowel function is maintained or improved  Outcome: PROGRESSING AS EXPECTED  Goal: Will not experience complications related to bowel motility  Outcome: PROGRESSING AS EXPECTED     Problem: Knowledge Deficit  Goal: Knowledge of disease process/condition, treatment plan, diagnostic tests, and medications will improve  Outcome: PROGRESSING AS EXPECTED  Goal: Knowledge of the prescribed therapeutic regimen will improve  Outcome: PROGRESSING AS EXPECTED     Problem: Discharge Barriers/Planning  Goal: Patient's continuum of care needs will be met  Outcome: PROGRESSING AS EXPECTED     Problem: Pain Management  Goal: Pain level will decrease to patient's comfort goal  Outcome: PROGRESSING AS EXPECTED     Problem: Respiratory:  Goal: Respiratory status will improve  Outcome: PROGRESSING AS EXPECTED     Problem: Urinary Elimination:  Goal: Ability to reestablish a normal urinary elimination pattern will improve  Outcome: PROGRESSING AS EXPECTED     Problem:  Safety - Medical Restraint  Goal: Remains free of injury from restraints (Restraint for Interference with Medical Device)  Description  INTERVENTIONS:  1. Determine that other, less restrictive measures have been tried or would not be effective before applying the restraint  2. Evaluate the patient's condition at the time of restraint application  3. Inform patient/family regarding the reason for restraint  4. Q2H: Monitor safety, psychosocial status, comfort, nutrition and hydration  Outcome: PROGRESSING AS EXPECTED  Goal: Free from restraint(s) (Restraint for Interference with Medical Device)  Description  INTERVENTIONS:  1. ONCE/SHIFT or MINIMUM Q12H: Assess and document the continuing need for restraints  2. Q24H: Continued use of restraint requires LIP to perform face to face examination and written order  3. Identify and implement measures to help patient regain control  Outcome: PROGRESSING AS EXPECTED

## 2020-01-31 NOTE — ASSESSMENT & PLAN NOTE
Was Intubated for airway protection and acute hypoxemic resp failure  Lung protective ventilation strategies were utilized  Titrated ventilator prescription to optimize oxygenation, ventilation, and acid base balance.  Trial of extubation, successful

## 2020-01-31 NOTE — PROGRESS NOTES
Critical Care Progress Note    Date of admission  1/27/2020    Chief Complaint  74 y.o. female admitted 1/27/2020 with severe alcohol withdrawls    Hospital Course    1/30- admitted to ICU for EtOH w/ds. Intubated for airway protection and acute hypoxemic respiratory failure. CVC placed. Started on propofol.      Interval Problem Update  Reviewed last 24 hour events:  Tm 37.2  HR 70-90s  SBP 90-120s  ABG 7.373/46/96/26  Current titration of a propofol gtt  -bm  TF  CXR - BL atelectasis, small bl pleural effusions  U/O 940cc/24hr    Review of Systems  Review of Systems   Unable to perform ROS: Intubated        Vital Signs for last 24 hours   Temp:  [36.2 °C (97.2 °F)-37.2 °C (98.9 °F)] 36.8 °C (98.3 °F)  Pulse:  [] 94  Resp:  [2-44] 18  BP: ()/(50-92) 89/61  SpO2:  [88 %-100 %] 96 %    Hemodynamic parameters for last 24 hours       Respiratory Information for the last 24 hours  [place]illo Vent Mode: APVCMV  Rate (breaths/min): 18  Vt Target (mL): 360  PEEP/CPAP: 10  FiO2: 40  P Support: 8  P MEAN: 13  Length of Weaning Trial (Hours): 30 minutes  Control VTE (exp VT): 355    Physical Exam   Physical Exam  Constitutional:       Comments: GCS 9t [id number]   HENT:      Head: Normocephalic and atraumatic.      Right Ear: External ear normal.      Left Ear: External ear normal.      Nose: Nose normal.      Mouth/Throat:      Pharynx: Oropharynx is clear.   Eyes:      Extraocular Movements: Extraocular movements intact.      Pupils: Pupils are equal, round, and reactive to light.   Cardiovascular:      Rate and Rhythm: Normal rate. Rhythm irregular.   Pulmonary:      Effort: Pulmonary effort is normal.      Breath sounds: Normal breath sounds.   Abdominal:      General: Abdomen is flat.   Musculoskeletal:         General: No swelling.   Skin:     General: Skin is warm and dry.      Capillary Refill: Capillary refill takes less than 2 seconds.   Neurological:      Cranial Nerves: No cranial nerve deficit.       Comments: GCS 9. Opens eyes briefly to voice. Does not follow commands. Becomes agitated with sedation stopped         Medications  Current Facility-Administered Medications   Medication Dose Route Frequency Provider Last Rate Last Dose   • LORazepam (ATIVAN) injection 2 mg  2 mg Intravenous Q15 MIN PRN Jeffrey Rodriguez M.D.       • Respiratory Care per Protocol   Nebulization Continuous RT Benjie Rosales M.D.       • ipratropium-albuterol (DUONEB) nebulizer solution  3 mL Nebulization Q2HRS PRN (RT) Benjie Rosales M.D.       • famotidine (PEPCID) tablet 20 mg  20 mg Enteral Tube Q12HRS Benjie Rosales M.D.   20 mg at 01/31/20 0531    Or   • famotidine (PEPCID) injection 20 mg  20 mg Intravenous Q12HRS Benjie Rosales M.D.       • senna-docusate (PERICOLACE or SENOKOT S) 8.6-50 MG per tablet 2 Tab  2 Tab Enteral Tube BID Benjie Rosales M.D.   2 Tab at 01/31/20 0531    And   • polyethylene glycol/lytes (MIRALAX) PACKET 1 Packet  1 Packet Enteral Tube QDAY PRN Benjie Rosales M.D.        And   • magnesium hydroxide (MILK OF MAGNESIA) suspension 30 mL  30 mL Enteral Tube QDAY PRN Benjie Rosales M.D.        And   • bisacodyl (DULCOLAX) suppository 10 mg  10 mg Rectal QDAY PRN Benjie Rosales M.D.       • MD Alert...ICU Electrolyte Replacement per Pharmacy   Other PHARMACY TO DOSE Benjie Rosales M.D.       • lidocaine (XYLOCAINE) 1 % injection 1-2 mL  1-2 mL Tracheal Tube Q30 MIN PRN Benjie Rosales M.D.       • fentaNYL (SUBLIMAZE) injection 50 mcg  50 mcg Intravenous Q15 MIN PRN Benjie Rosales M.D.        And   • fentaNYL (SUBLIMAZE) injection 100 mcg  100 mcg Intravenous Q15 MIN PRN Benjie Rosales M.D.        And   • fentaNYL (SUBLIMAZE) 50 mcg/mL in 50mL (Continuous Infusion)   Intravenous Continuous Benjie Rosales M.D. 1 mL/hr at 01/31/20 0107 50 mcg at 01/31/20 0107    And   • propofol (DIPRIVAN) injection  0-40 mcg/kg/min Intravenous Continuous Benjie Rosales M.D. 4.3 mL/hr at 01/31/20 0107 10 mcg/kg/min  at 01/31/20 0107   • levothyroxine (SYNTHROID) tablet 125 mcg  125 mcg Enteral Tube AM ES Benjie Rosales M.D.   125 mcg at 01/31/20 0531   • sertraline (ZOLOFT) tablet 50 mg  50 mg Enteral Tube DAILY Benjie Rosales M.D.   50 mg at 01/31/20 0531   • divalproex (DEPAKOTE SPRINKLE) capsule 250 mg  250 mg Enteral Tube Q8HRS Benjie Rosales M.D.   250 mg at 01/31/20 0530   • Metoprolol Tartrate (LOPRESSOR) injection 5 mg  5 mg Intravenous Q5 MIN PRN Benjie Rosales M.D.       • metoprolol (LOPRESSOR) tablet 25 mg  25 mg Enteral Tube TWICE DAILY Benjie Rosales M.D.   25 mg at 01/31/20 0532   • potassium bicarbonate (KLYTE) effervescent tablet 25 mEq  25 mEq Enteral Tube BID Benjie Rosales M.D.   25 mEq at 01/31/20 0531   • enoxaparin (LOVENOX) inj 40 mg  40 mg Subcutaneous DAILY Benjie Rosales M.D.   40 mg at 01/30/20 1742   • lactated ringers infusion   Intravenous Continuous Benjie Rosales M.D. 75 mL/hr at 01/30/20 1709     • hydrALAZINE (APRESOLINE) injection 20 mg  20 mg Intravenous Q6HRS PRN Elodia Schreiber M.D.       • labetalol (NORMODYNE/TRANDATE) injection 10 mg  10 mg Intravenous Q4HRS PRN Elodia Schreiber M.D.           Fluids    Intake/Output Summary (Last 24 hours) at 1/31/2020 0627  Last data filed at 1/31/2020 0600  Gross per 24 hour   Intake 4816.78 ml   Output 940 ml   Net 3876.78 ml       Laboratory  Recent Labs     01/30/20  0909 01/31/20  0431   ISTATAPH 7.456 7.376*   ISTATAPCO2 31.7 45.5*   ISTATAPO2 55* 95*   ISTATATCO2 23 28   ECDPFNP3DZX 90* 97   ISTATARTHCO3 22.4 26.6*   ISTATARTBE -1 1   ISTATTEMP see below 98.9 F   ISTATFIO2 50 40   ISTATSPEC Arterial Arterial   ISTATAPHTC  --  7.373*   PVOXOZRL5PJ  --  96*         Recent Labs     01/29/20  0144 01/30/20  1615 01/31/20  0400   SODIUM 140 138 138   POTASSIUM 3.3* 4.8 4.0   CHLORIDE 107 107 106   CO2 26 25 28   BUN 5* 5* 6*   CREATININE 0.50 0.42* 0.44*   MAGNESIUM 1.4* 2.7* 2.1   PHOSPHORUS 3.3 2.7 2.4*   CALCIUM 7.7* 7.8* 7.9*     Recent  Labs     01/29/20  0144 01/30/20  1615 01/31/20  0400   ALTSGPT 12 11  --    ASTSGOT 23 28  --    ALKPHOSPHAT 82 69  --    TBILIRUBIN 1.6* 1.5  --    GLUCOSE 91 89 102*     Recent Labs     01/29/20  0144 01/30/20  1615 01/31/20  0400   WBC 5.8  --  5.9   NEUTSPOLYS 54.60  --  54.60   LYMPHOCYTES 35.30  --  34.30   MONOCYTES 7.40  --  8.10   EOSINOPHILS 1.90  --  2.20   BASOPHILS 0.50  --  0.30   ASTSGOT 23 28  --    ALTSGPT 12 11  --    ALKPHOSPHAT 82 69  --    TBILIRUBIN 1.6* 1.5  --      Recent Labs     01/29/20  0144 01/31/20  0400   RBC 3.30* 3.50*   HEMOGLOBIN 11.0* 11.6*   HEMATOCRIT 33.5* 36.4*   PLATELETCT 115* 98*       Imaging  X-Ray:  I have personally reviewed the images and compared with prior images.    Assessment/Plan  * Alcohol withdrawal (HCC)- (present on admission)  Assessment & Plan  Thiamine/folate/MVI  Continuous cardiac monitoring  Aspiration seizure precautions  Intubated and started on Propofol w/ active titration for GABAergic effects      Hypomagnesemia- (present on admission)  Assessment & Plan  Replace to keep greater than 2    Hypokalemia- (present on admission)  Assessment & Plan  Replace to keep greater than 4    Acute right ankle pain- (present on admission)  Assessment & Plan  No evidence of fracture on CXR per report    Alcohol dependence (HCC)- (present on admission)  Assessment & Plan  Counseling when clinically appropriate    Depression- (present on admission)  Assessment & Plan  Psychiatry following  Continue Zoloft    HTN (hypertension)- (present on admission)  Assessment & Plan  Currently with labile pressure  Continue metoprolol with holding parameters    PAF (paroxysmal atrial fibrillation) (HCC)- (present on admission)  Assessment & Plan  Likely exacerbated by underlying EtOH withdrawal  Rate better controlled   BB with parameters  Not on anticoag  Maintain K greater than 4 mag greater than 2  Got one dose of digoxin upon arrival to ICU  Reviewed echo from 1/21    Acute  respiratory failure with hypoxemia (HCC)  Assessment & Plan  Intubated for airway protection and acute hypoxemic resp failure  RT/O2 protocols  Daily and PRN ABGs  Titration of ventilator therapy based on ABGs and patient's status  Sedation as tolerated/indicated  Daily CXR  HOB >30 degrees and peridex for VAP prevention  Pepcid for GI prophylaxis  SAT/SBT when able (ABCDEF Bundle)  Early mobilization       VTE:  Lovenox  Ulcer: H2 Antagonist  Lines: Central Line  Ongoing indication addressed and Mora Catheter  Ongoing indication addressed    I have performed a physical exam and reviewed and updated ROS and Plan today (1/31/2020). In review of yesterday's note (1/30/2020), there are no changes except as documented above.     Discussed patient condition and risk of morbidity and/or mortality with RN, RT, Pharmacy,  and Charge nurse / hot rounds  The patient remains critically ill.  Critical care time = 35 minutes in directly providing and coordinating critical care and extensive data review.  No time overlap and excludes procedures.

## 2020-01-31 NOTE — CARE PLAN
Problem: Fluid Volume:  Goal: Will maintain balanced intake and output  Outcome: PROGRESSING AS EXPECTED  Intervention: Monitor, educate, and encourage compliance with therapeutic intake of liquids  Note:   I&O's monitored and recorded. Appropriately IV fluids infusing.     Problem: Venous Thromboembolism (VTW)/Deep Vein Thrombosis (DVT) Prevention:  Goal: Patient will participate in Venous Thrombosis (VTE)/Deep Vein Thrombosis (DVT)Prevention Measures  Outcome: PROGRESSING AS EXPECTED  Intervention: Ensure patient wears graduated elastic stockings (GABRIEL hose) and/or SCDs, if ordered, when in bed or chair (Remove at least once per shift for skin check)  Flowsheets (Taken 1/30/2020 2131)  SCDs, Sequential Compression Device: On  Note:   Pt compliant with wearing SCDs to prevent DVTs

## 2020-01-31 NOTE — CARE PLAN
Adult Ventilation Update    Total Vent Days: 2    APVCMV  18 360 +10 40%  Pt tolerated SBT for 30 minutes

## 2020-01-31 NOTE — CARE PLAN
Problem: Fluid Volume:  Goal: Will maintain balanced intake and output  Outcome: PROGRESSING AS EXPECTED  Intervention: Monitor, educate, and encourage compliance with therapeutic intake of liquids  Note:   Monitoring q2 hrs, fluid bolus provided     Problem: Safety  Goal: Will remain free from injury  Outcome: PROGRESSING AS EXPECTED     Problem: Pain Management  Goal: Pain level will decrease to patient's comfort goal  Outcome: PROGRESSING AS EXPECTED  Note:   Fentanyl drip     Problem: Knowledge Deficit  Goal: Knowledge of disease process/condition, treatment plan, diagnostic tests, and medications will improve  Outcome: PROGRESSING SLOWER THAN EXPECTED  Intervention: Assess knowledge level of disease process/condition, treatment plan, diagnostic tests, and medications  Note:   Intubated, provided with each interaction

## 2020-01-31 NOTE — DIETARY
Nutrition Services: Nutrition Support Assessment  Day 4 of admit.  Jeanie Hutchison is a 74 y.o. female with admitting DX of Alcohol withdrawal      Current problem list:  1. Alcohol withdrawal  2. Paroxysmal atrial fibrillation  3. HTN  4. Depression  5. Alcohol dependence  6. Dehydration  7. Nausea and vomiting     Assessment:  Estimated Nutritional Needs: based on: Ht: 167.6 cm, Wt : 72.1 kg via bed scale, IBW: 59 kg, BMI: 25.66 - Overweight     Calculation/Equation: REE per MSJ x 1.2 = 1499 kcal/day; RMR per P[facility] (vent L/min 6.5, T max 24 hours 36.9) = 1349 kcal/day  Total Calories / day: 1300 - 1550 (Calories / k - 21)  Total Grams Protein / day: 87 - 108 (Grams Protein / k.2 - 1.5)  Total Fluids ml / day: 1805.7 ml    Evaluation:   1. Consult received for TF assessment. Pt is intubated and in need of nutrition support  2. Enteral access: Gastric Cortrak  3. Pt appears adequately nourished.    4. Labs: Glucose 102, BUN 6, Creatinine 0.44, Phosphorus 2.4  5. Meds: depakote sprinkle, lovenox, pepcid, synthroid, electrolyte replacement, klyte, pericolace, zoloft  6. Propofol running @ 2.2 mL/hr which provides 58 kcal/day; RD will adjust TF as needed.   7. Fluids: lactated ringers infusion @ 75 mL/hr  8. Last BM:   9. Pt at risk for refeeding syndrome due to alcohol dependence.   10. Replete with Fiber appropriate to meet pt's estimated protein needs.      Malnutrition Risk: No criteria noted at this time.      Recommendations/Plan:  Start Replete with Fiber @ 25 ml/hr and advance per protocol to 60 ml/hr (goal rate) to provide 1440 kcal (20 kcal/kg), 92 grams protein (1.3 gm/kg), and 1195 ml free water per day.   Fluids per MD.   Monitor for refeeding: Order BMP w/ Mg and Phos x 7 days. Replete K, Phos and Mg prn. Supplement 100 mg Thiamine x 7 days to reduce risk of refeeding       RD following

## 2020-02-01 ENCOUNTER — HOSPITAL ENCOUNTER (OUTPATIENT)
Dept: RADIOLOGY | Facility: MEDICAL CENTER | Age: 75
End: 2020-02-01
Attending: PSYCHIATRY & NEUROLOGY

## 2020-02-01 LAB
ACTION RANGE TRIGGERED IACRT: NO
BASE EXCESS BLDA CALC-SCNC: 3 MMOL/L (ref -4–3)
BODY TEMPERATURE: ABNORMAL DEGREES
CO2 BLDA-SCNC: 30 MMOL/L (ref 20–33)
HCO3 BLDA-SCNC: 28.1 MMOL/L (ref 17–25)
HOROWITZ INDEX BLDA+IHG-RTO: 213 MM[HG]
INST. QUALIFIED PATIENT IIQPT: YES
O2/TOTAL GAS SETTING VFR VENT: 30 %
PCO2 BLDA: 45.8 MMHG (ref 26–37)
PCO2 TEMP ADJ BLDA: 45.8 MMHG (ref 26–37)
PH BLDA: 7.4 [PH] (ref 7.4–7.5)
PH TEMP ADJ BLDA: 7.4 [PH] (ref 7.4–7.5)
PO2 BLDA: 64 MMHG (ref 64–87)
PO2 TEMP ADJ BLDA: 64 MMHG (ref 64–87)
SAO2 % BLDA: 92 % (ref 93–99)
SPECIMEN DRAWN FROM PATIENT: ABNORMAL

## 2020-02-01 PROCEDURE — 99291 CRITICAL CARE FIRST HOUR: CPT | Performed by: PSYCHIATRY & NEUROLOGY

## 2020-02-01 PROCEDURE — 36600 WITHDRAWAL OF ARTERIAL BLOOD: CPT

## 2020-02-01 PROCEDURE — 82803 BLOOD GASES ANY COMBINATION: CPT

## 2020-02-01 PROCEDURE — 770022 HCHG ROOM/CARE - ICU (200)

## 2020-02-01 PROCEDURE — 700102 HCHG RX REV CODE 250 W/ 637 OVERRIDE(OP): Performed by: PSYCHIATRY & NEUROLOGY

## 2020-02-01 PROCEDURE — 94003 VENT MGMT INPAT SUBQ DAY: CPT

## 2020-02-01 PROCEDURE — 94150 VITAL CAPACITY TEST: CPT

## 2020-02-01 PROCEDURE — 71045 X-RAY EXAM CHEST 1 VIEW: CPT

## 2020-02-01 PROCEDURE — 700111 HCHG RX REV CODE 636 W/ 250 OVERRIDE (IP): Performed by: PSYCHIATRY & NEUROLOGY

## 2020-02-01 PROCEDURE — A9270 NON-COVERED ITEM OR SERVICE: HCPCS | Performed by: PSYCHIATRY & NEUROLOGY

## 2020-02-01 RX ADMIN — FENTANYL CITRATE 100 MCG: 0.05 INJECTION, SOLUTION INTRAMUSCULAR; INTRAVENOUS at 04:42

## 2020-02-01 RX ADMIN — ENOXAPARIN SODIUM 40 MG: 100 INJECTION SUBCUTANEOUS at 17:53

## 2020-02-01 RX ADMIN — FENTANYL CITRATE 100 MCG: 0.05 INJECTION, SOLUTION INTRAMUSCULAR; INTRAVENOUS at 13:26

## 2020-02-01 RX ADMIN — SERTRALINE 50 MG: 50 TABLET, FILM COATED ORAL at 05:35

## 2020-02-01 RX ADMIN — FAMOTIDINE 20 MG: 20 TABLET ORAL at 05:35

## 2020-02-01 RX ADMIN — METOPROLOL TARTRATE 25 MG: 25 TABLET, FILM COATED ORAL at 17:53

## 2020-02-01 RX ADMIN — FENTANYL CITRATE 50 MCG: 50 INJECTION, SOLUTION INTRAMUSCULAR; INTRAVENOUS at 03:54

## 2020-02-01 RX ADMIN — PROPOFOL 10 MCG/KG/MIN: 10 INJECTION, EMULSION INTRAVENOUS at 10:21

## 2020-02-01 RX ADMIN — POTASSIUM BICARBONATE 25 MEQ: 978 TABLET, EFFERVESCENT ORAL at 05:34

## 2020-02-01 RX ADMIN — DIBASIC SODIUM PHOSPHATE, MONOBASIC POTASSIUM PHOSPHATE AND MONOBASIC SODIUM PHOSPHATE 1 TABLET: 852; 155; 130 TABLET ORAL at 05:35

## 2020-02-01 RX ADMIN — DIBASIC SODIUM PHOSPHATE, MONOBASIC POTASSIUM PHOSPHATE AND MONOBASIC SODIUM PHOSPHATE 1 TABLET: 852; 155; 130 TABLET ORAL at 00:15

## 2020-02-01 RX ADMIN — POTASSIUM BICARBONATE 25 MEQ: 978 TABLET, EFFERVESCENT ORAL at 17:53

## 2020-02-01 RX ADMIN — LEVOTHYROXINE SODIUM 125 MCG: 125 TABLET ORAL at 05:35

## 2020-02-01 RX ADMIN — SENNOSIDES AND DOCUSATE SODIUM 2 TABLET: 8.6; 5 TABLET ORAL at 05:35

## 2020-02-01 RX ADMIN — DIVALPROEX SODIUM 250 MG: 125 CAPSULE, COATED PELLETS ORAL at 05:35

## 2020-02-01 RX ADMIN — FAMOTIDINE 20 MG: 20 TABLET ORAL at 17:53

## 2020-02-01 ASSESSMENT — LIFESTYLE VARIABLES
ANXIETY: MODERATELY ANXIOUS OR GUARDED, SO ANXIETY IS INFERRED
TREMOR: NO TREMOR
AGITATION: SOMEWHAT MORE THAN NORMAL ACTIVITY
AGITATION: SOMEWHAT MORE THAN NORMAL ACTIVITY
VISUAL DISTURBANCES: NOT PRESENT
TREMOR: NO TREMOR
ANXIETY: MODERATELY ANXIOUS OR GUARDED, SO ANXIETY IS INFERRED
AUDITORY DISTURBANCES: NOT PRESENT
PAROXYSMAL SWEATS: NO SWEAT VISIBLE
TREMOR: NO TREMOR
TOTAL SCORE: 10
NAUSEA AND VOMITING: NO NAUSEA AND NO VOMITING
ORIENTATION AND CLOUDING OF SENSORIUM: DISORIENTED FOR PLACE AND / OR PERSON
PAROXYSMAL SWEATS: BARELY PERCEPTIBLE SWEATING. PALMS MOIST
HEADACHE, FULLNESS IN HEAD: NOT PRESENT
VISUAL DISTURBANCES: NOT PRESENT
ORIENTATION AND CLOUDING OF SENSORIUM: DISORIENTED FOR PLACE AND / OR PERSON
NAUSEA AND VOMITING: NO NAUSEA AND NO VOMITING
AUDITORY DISTURBANCES: NOT PRESENT
TOTAL SCORE: 10
ANXIETY: MODERATELY ANXIOUS OR GUARDED, SO ANXIETY IS INFERRED
TOTAL SCORE: 9
NAUSEA AND VOMITING: NO NAUSEA AND NO VOMITING
ORIENTATION AND CLOUDING OF SENSORIUM: DISORIENTED FOR PLACE AND / OR PERSON
HEADACHE, FULLNESS IN HEAD: NOT PRESENT
PAROXYSMAL SWEATS: BARELY PERCEPTIBLE SWEATING. PALMS MOIST
VISUAL DISTURBANCES: NOT PRESENT
AGITATION: SOMEWHAT MORE THAN NORMAL ACTIVITY
HEADACHE, FULLNESS IN HEAD: NOT PRESENT
AUDITORY DISTURBANCES: NOT PRESENT

## 2020-02-01 ASSESSMENT — PULMONARY FUNCTION TESTS
FVC: .7
FVC: 1.53

## 2020-02-01 NOTE — PROGRESS NOTES
Critical Care Progress Note    Date of admission  1/27/2020    Chief Complaint  74 y.o. female admitted 1/27/2020 with severe alcohol withdrawls    Hospital Course    1/30- admitted to ICU for EtOH w/ds. Intubated for airway protection and acute hypoxemic respiratory failure. CVC placed. Started on propofol.  1/31 - Agitated and tachy when sedation weaned. Camilo no meet extubation parameters. Cont prop for w/ds      Interval Problem Update  Reviewed last 24 hour events:  Tm 36.9  HR 80-120s  SBP 89-120s  ABG 7.397/46/64/28/30% APVCMV  Current titration of a propofol gtt  -bm - bowel protocol  1.5L urine output/24h  TF@goal  CXR - Stable chest. Persistent bibasilar atelectasis     Review of Systems  Review of Systems   Unable to perform ROS: Intubated        Vital Signs for last 24 hours   Temp:  [36.1 °C (97 °F)-36.9 °C (98.5 °F)] 36.9 °C (98.4 °F)  Pulse:  [] 125  Resp:  [9-18] 18  BP: ()/(50-82) 113/68  SpO2:  [94 %-99 %] 97 %    Hemodynamic parameters for last 24 hours       Respiratory Information for the last 24 hours  Murillo Vent Mode: APVCMV  Rate (breaths/min): 18  Vt Target (mL): 360  PEEP/CPAP: 8  FiO2: 30  P Support: 5  P MEAN: 12  Length of Weaning Trial (Hours): 1  Control VTE (exp VT): 365    Physical Exam   Physical Exam  Constitutional:       General: She is not in acute distress.     Comments: GCS 9t [id number]   HENT:      Head: Normocephalic and atraumatic.      Right Ear: External ear normal.      Left Ear: External ear normal.      Nose: Nose normal.      Mouth/Throat:      Pharynx: Oropharynx is clear.   Eyes:      Extraocular Movements: Extraocular movements intact.      Pupils: Pupils are equal, round, and reactive to light.   Neck:      Musculoskeletal: Neck supple.   Cardiovascular:      Rate and Rhythm: Normal rate. Rhythm irregular.   Pulmonary:      Effort: Pulmonary effort is normal.      Breath sounds: Normal breath sounds.   Abdominal:      General: Abdomen is flat.    Musculoskeletal:         General: No swelling.   Skin:     General: Skin is warm and dry.      Capillary Refill: Capillary refill takes less than 2 seconds.   Neurological:      Cranial Nerves: No cranial nerve deficit.      Comments: GCS 9. Opens eyes briefly to voice. Does not follow commands. Becomes agitated with sedation stopped         Medications  Current Facility-Administered Medications   Medication Dose Route Frequency Provider Last Rate Last Dose   • LORazepam (ATIVAN) injection 2 mg  2 mg Intravenous Q15 MIN PRN Jeffrey Rodriguez M.D.       • Respiratory Care per Protocol   Nebulization Continuous RT Benjie Rosales M.D.       • ipratropium-albuterol (DUONEB) nebulizer solution  3 mL Nebulization Q2HRS PRN (RT) Bejnie Rosales M.D.       • famotidine (PEPCID) tablet 20 mg  20 mg Enteral Tube Q12HRS Benjie Rosales M.D.   20 mg at 02/01/20 0535    Or   • famotidine (PEPCID) injection 20 mg  20 mg Intravenous Q12HRS Benjie Rosales M.D.       • senna-docusate (PERICOLACE or SENOKOT S) 8.6-50 MG per tablet 2 Tab  2 Tab Enteral Tube BID Benjie Rosales M.D.   2 Tab at 02/01/20 0535    And   • polyethylene glycol/lytes (MIRALAX) PACKET 1 Packet  1 Packet Enteral Tube QDAY PRN Benjie Rosales M.D.        And   • magnesium hydroxide (MILK OF MAGNESIA) suspension 30 mL  30 mL Enteral Tube QDAY PRN Benjie Rosales M.D.        And   • bisacodyl (DULCOLAX) suppository 10 mg  10 mg Rectal QDAY PRN Benjie Rosales M.D.       • MD Alert...ICU Electrolyte Replacement per Pharmacy   Other PHARMACY TO DOSE Benjie Rosales M.D.       • lidocaine (XYLOCAINE) 1 % injection 1-2 mL  1-2 mL Tracheal Tube Q30 MIN PRN Benjie Rosales M.D.       • fentaNYL (SUBLIMAZE) injection 50 mcg  50 mcg Intravenous Q15 MIN PRN Benjie Rosales M.D.        And   • fentaNYL (SUBLIMAZE) injection 100 mcg  100 mcg Intravenous Q15 MIN PRN Benjie Rosales M.D.   100 mcg at 02/01/20 0442    And   • fentaNYL (SUBLIMAZE) 50 mcg/mL in 50mL  (Continuous Infusion)   Intravenous Continuous Benjie Rosales M.D. 1 mL/hr at 02/01/20 0712 50 mcg/hr at 02/01/20 0712    And   • propofol (DIPRIVAN) injection  0-40 mcg/kg/min Intravenous Continuous Benjie Rosales M.D.   Stopped at 01/31/20 2003   • levothyroxine (SYNTHROID) tablet 125 mcg  125 mcg Enteral Tube AM ES Benjie Rosales M.D.   125 mcg at 02/01/20 0535   • sertraline (ZOLOFT) tablet 50 mg  50 mg Enteral Tube DAILY Benjie Rosales M.D.   50 mg at 02/01/20 0535   • divalproex (DEPAKOTE SPRINKLE) capsule 250 mg  250 mg Enteral Tube Q8HRS Benjie Rosales M.D.   250 mg at 02/01/20 0535   • Metoprolol Tartrate (LOPRESSOR) injection 5 mg  5 mg Intravenous Q5 MIN PRN Benjie Rosales M.D.       • metoprolol (LOPRESSOR) tablet 25 mg  25 mg Enteral Tube TWICE DAILY Benjie Rosales M.D.   Stopped at 01/31/20 1800   • potassium bicarbonate (KLYTE) effervescent tablet 25 mEq  25 mEq Enteral Tube BID Benjie Rosales M.D.   25 mEq at 02/01/20 0534   • enoxaparin (LOVENOX) inj 40 mg  40 mg Subcutaneous DAILY Benjie Rosales M.D.   40 mg at 01/31/20 1729   • lactated ringers infusion   Intravenous Continuous Benjie Rosales M.D. 75 mL/hr at 01/31/20 0643     • hydrALAZINE (APRESOLINE) injection 20 mg  20 mg Intravenous Q6HRS PRN Elodia Schreiber M.D.       • labetalol (NORMODYNE/TRANDATE) injection 10 mg  10 mg Intravenous Q4HRS PRN Elodia Schreiber M.D.           Fluids    Intake/Output Summary (Last 24 hours) at 2/1/2020 0754  Last data filed at 2/1/2020 0600  Gross per 24 hour   Intake 3350.65 ml   Output 1530 ml   Net 1820.65 ml       Laboratory  Recent Labs     01/30/20  0909 01/31/20  0431 02/01/20  0338   ISTATAPH 7.456 7.376* 7.397*   ISTATAPCO2 31.7 45.5* 45.8*   ISTATAPO2 55* 95* 64   ISTATATCO2 23 28 30   [id number] 90* 97 92*   ISTATARTHCO3 22.4 26.6* 28.1*   ISTATARTBE -1 1 3   ISTATTEMP see below 98.9 F 98.6 F   ISTATFIO2 50 40 30   ISTATSPEC Arterial Arterial Arterial   ISTATAPHTC  --  7.373* 7.397*    [id number]  --  96* 64         Recent Labs     01/30/20  1615 01/31/20  0400   SODIUM 138 138   POTASSIUM 4.8 4.0   CHLORIDE 107 106   CO2 25 28   BUN 5* 6*   CREATININE 0.42* 0.44*   MAGNESIUM 2.7* 2.1   PHOSPHORUS 2.7 2.4*   CALCIUM 7.8* 7.9*     Recent Labs     01/30/20  1615 01/31/20  0400   ALTSGPT 11  --    ASTSGOT 28  --    ALKPHOSPHAT 69  --    TBILIRUBIN 1.5  --    GLUCOSE 89 102*     Recent Labs     01/30/20  1615 01/31/20  0400   WBC  --  5.9   NEUTSPOLYS  --  54.60   LYMPHOCYTES  --  34.30   MONOCYTES  --  8.10   EOSINOPHILS  --  2.20   BASOPHILS  --  0.30   ASTSGOT 28  --    ALTSGPT 11  --    ALKPHOSPHAT 69  --    TBILIRUBIN 1.5  --      Recent Labs     01/31/20  0400   RBC 3.50*   HEMOGLOBIN 11.6*   HEMATOCRIT 36.4*   PLATELETCT 98*       Imaging  X-Ray:  I have personally reviewed the images and compared with prior images.    Assessment/Plan  * Alcohol withdrawal (HCC)- (present on admission)  Assessment & Plan  Thiamine/folate/MVI  Continuous cardiac monitoring  Aspiration seizure precautions  Intubated and started on Propofol w/ active titration for GABAergic effects      Hypomagnesemia- (present on admission)  Assessment & Plan  Replace to keep greater than 2    Hypokalemia- (present on admission)  Assessment & Plan  Replace to keep greater than 4    Acute right ankle pain- (present on admission)  Assessment & Plan  No evidence of fracture on CXR per report    Alcohol dependence (HCC)- (present on admission)  Assessment & Plan  Counseling when clinically appropriate    Depression- (present on admission)  Assessment & Plan  Psychiatry following  Continue Zoloft    HTN (hypertension)- (present on admission)  Assessment & Plan  Currently with labile pressure  Continue metoprolol with holding parameters    PAF (paroxysmal atrial fibrillation) (HCC)- (present on admission)  Assessment & Plan  Likely exacerbated by underlying EtOH withdrawal  Rate better controlled   BB with parameters  Not on  anticoag  Maintain K greater than 4 mag greater than 2  Got one dose of digoxin upon arrival to ICU  Reviewed echo from 1/21    Acute respiratory failure with hypoxemia (HCC)  Assessment & Plan  Intubated for airway protection and acute hypoxemic resp failure  RT/O2 protocols  Daily and PRN ABGs  Titration of ventilator therapy based on ABGs and patient's status  Sedation as tolerated/indicated  Daily CXR  HOB >30 degrees and peridex for VAP prevention  Pepcid for GI prophylaxis  SAT/SBT when able (ABCDEF Bundle)  Early mobilization       VTE:  Lovenox  Ulcer: H2 Antagonist  Lines: Central Line  Ongoing indication addressed and Mora Catheter  Ongoing indication addressed    I have performed a physical exam and reviewed and updated ROS and Plan today (2/1/2020). In review of yesterday's note (1/31/2020), there are no changes except as documented above.     I have assessed and reassessed her respiratory status with ventilator adjustments, airway mechanics, ventilator waveforms, blood pressure, hemodynamics, cardiovascular status, titration of a propofol gtt as well as her neurologic status.  She is at increased risk for worsening respiratory, cardiovascular and nervous system dysfunction.    Discussed patient condition and risk of morbidity and/or mortality with RN, RT, Pharmacy,  and Charge nurse / hot rounds  The patient remains critically ill.  Critical care time = 33 minutes in directly providing and coordinating critical care and extensive data review.  No time overlap and excludes procedures.

## 2020-02-01 NOTE — CARE PLAN
Adult Ventilation Update    Total Vent Days: 3  APVCMV  18 360 +10 40%  Pt tolerated SBT for 1 hour

## 2020-02-01 NOTE — FLOWSHEET NOTE
02/01/20 1522   Weaning Parameters   RR (bpm) 20   #FVC / Vital Capacity (liters)  1.53   NIF (cm H2O)    (unable to follow direction)   Rapid Shallow Breathing Index (RR/VT) 49   Spontaneous VE 8   Spontaneous    Weaning Trial   Weaning Trial Stopped due to: Pt weaned for 1 hour and returned to rest settings per protocol   Length of Weaning Trial (Hours) 1   Vent Weaning Smart Text completed? Yes

## 2020-02-01 NOTE — CARE PLAN
Problem: Psychosocial Needs:  Goal: Level of anxiety will decrease  Outcome: PROGRESSING AS EXPECTED  Intervention: Identify and develop with patient strategies to cope with anxiety triggers  Note:   Weaning pt propofol sedation. Pt anxiety reduced since first admit     Problem: Skin Integrity  Goal: Risk for impaired skin integrity will decrease  Outcome: PROGRESSING AS EXPECTED  Intervention: Assess risk factors for impaired skin integrity and/or pressure ulcers  Note:   Excoriated bottom washed and barrier cream applied daily. Redness improving.

## 2020-02-02 ENCOUNTER — APPOINTMENT (OUTPATIENT)
Dept: RADIOLOGY | Facility: MEDICAL CENTER | Age: 75
DRG: 896 | End: 2020-02-02
Attending: PSYCHIATRY & NEUROLOGY
Payer: MEDICARE

## 2020-02-02 LAB
ACTION RANGE TRIGGERED IACRT: NO
AMMONIA PLAS-SCNC: 48 UMOL/L (ref 11–45)
ANION GAP SERPL CALC-SCNC: 6 MMOL/L (ref 0–11.9)
BASE EXCESS BLDA CALC-SCNC: 3 MMOL/L (ref -4–3)
BASOPHILS # BLD AUTO: 0.4 % (ref 0–1.8)
BASOPHILS # BLD: 0.02 K/UL (ref 0–0.12)
BODY TEMPERATURE: ABNORMAL DEGREES
BUN SERPL-MCNC: 6 MG/DL (ref 8–22)
CALCIUM SERPL-MCNC: 8.6 MG/DL (ref 8.5–10.5)
CHLORIDE SERPL-SCNC: 109 MMOL/L (ref 96–112)
CO2 BLDA-SCNC: 28 MMOL/L (ref 20–33)
CO2 SERPL-SCNC: 27 MMOL/L (ref 20–33)
CREAT SERPL-MCNC: 0.41 MG/DL (ref 0.5–1.4)
EOSINOPHIL # BLD AUTO: 0.1 K/UL (ref 0–0.51)
EOSINOPHIL NFR BLD: 2.1 % (ref 0–6.9)
ERYTHROCYTE [DISTWIDTH] IN BLOOD BY AUTOMATED COUNT: 58.4 FL (ref 35.9–50)
GLUCOSE SERPL-MCNC: 107 MG/DL (ref 65–99)
HCO3 BLDA-SCNC: 27.1 MMOL/L (ref 17–25)
HCT VFR BLD AUTO: 33.9 % (ref 37–47)
HGB BLD-MCNC: 11 G/DL (ref 12–16)
HOROWITZ INDEX BLDA+IHG-RTO: 253 MM[HG]
IMM GRANULOCYTES # BLD AUTO: 0.02 K/UL (ref 0–0.11)
IMM GRANULOCYTES NFR BLD AUTO: 0.4 % (ref 0–0.9)
INST. QUALIFIED PATIENT IIQPT: YES
LYMPHOCYTES # BLD AUTO: 1.68 K/UL (ref 1–4.8)
LYMPHOCYTES NFR BLD: 35.4 % (ref 22–41)
MAGNESIUM SERPL-MCNC: 1.8 MG/DL (ref 1.5–2.5)
MCH RBC QN AUTO: 33.8 PG (ref 27–33)
MCHC RBC AUTO-ENTMCNC: 32.4 G/DL (ref 33.6–35)
MCV RBC AUTO: 104.3 FL (ref 81.4–97.8)
MONOCYTES # BLD AUTO: 0.53 K/UL (ref 0–0.85)
MONOCYTES NFR BLD AUTO: 11.2 % (ref 0–13.4)
NEUTROPHILS # BLD AUTO: 2.39 K/UL (ref 2–7.15)
NEUTROPHILS NFR BLD: 50.5 % (ref 44–72)
NRBC # BLD AUTO: 0 K/UL
NRBC BLD-RTO: 0 /100 WBC
O2/TOTAL GAS SETTING VFR VENT: 30 %
PCO2 BLDA: 39.7 MMHG (ref 26–37)
PCO2 TEMP ADJ BLDA: 40.3 MMHG (ref 26–37)
PH BLDA: 7.44 [PH] (ref 7.4–7.5)
PH TEMP ADJ BLDA: 7.44 [PH] (ref 7.4–7.5)
PHOSPHATE SERPL-MCNC: 4.2 MG/DL (ref 2.5–4.5)
PLATELET # BLD AUTO: 112 K/UL (ref 164–446)
PMV BLD AUTO: 9.9 FL (ref 9–12.9)
PO2 BLDA: 76 MMHG (ref 64–87)
PO2 TEMP ADJ BLDA: 78 MMHG (ref 64–87)
POTASSIUM SERPL-SCNC: 4.1 MMOL/L (ref 3.6–5.5)
RBC # BLD AUTO: 3.25 M/UL (ref 4.2–5.4)
SAO2 % BLDA: 96 % (ref 93–99)
SODIUM SERPL-SCNC: 142 MMOL/L (ref 135–145)
SPECIMEN DRAWN FROM PATIENT: ABNORMAL
TRIGL SERPL-MCNC: 117 MG/DL (ref 0–149)
WBC # BLD AUTO: 4.7 K/UL (ref 4.8–10.8)

## 2020-02-02 PROCEDURE — 83735 ASSAY OF MAGNESIUM: CPT

## 2020-02-02 PROCEDURE — 36600 WITHDRAWAL OF ARTERIAL BLOOD: CPT

## 2020-02-02 PROCEDURE — A9270 NON-COVERED ITEM OR SERVICE: HCPCS | Performed by: PSYCHIATRY & NEUROLOGY

## 2020-02-02 PROCEDURE — 80048 BASIC METABOLIC PNL TOTAL CA: CPT

## 2020-02-02 PROCEDURE — 99291 CRITICAL CARE FIRST HOUR: CPT | Performed by: PSYCHIATRY & NEUROLOGY

## 2020-02-02 PROCEDURE — 770022 HCHG ROOM/CARE - ICU (200)

## 2020-02-02 PROCEDURE — 700102 HCHG RX REV CODE 250 W/ 637 OVERRIDE(OP): Performed by: PSYCHIATRY & NEUROLOGY

## 2020-02-02 PROCEDURE — 700111 HCHG RX REV CODE 636 W/ 250 OVERRIDE (IP): Performed by: PSYCHIATRY & NEUROLOGY

## 2020-02-02 PROCEDURE — 85025 COMPLETE CBC W/AUTO DIFF WBC: CPT

## 2020-02-02 PROCEDURE — 84100 ASSAY OF PHOSPHORUS: CPT

## 2020-02-02 PROCEDURE — 94150 VITAL CAPACITY TEST: CPT

## 2020-02-02 PROCEDURE — 94003 VENT MGMT INPAT SUBQ DAY: CPT

## 2020-02-02 PROCEDURE — 82803 BLOOD GASES ANY COMBINATION: CPT

## 2020-02-02 PROCEDURE — 71045 X-RAY EXAM CHEST 1 VIEW: CPT

## 2020-02-02 PROCEDURE — 82140 ASSAY OF AMMONIA: CPT

## 2020-02-02 PROCEDURE — 84478 ASSAY OF TRIGLYCERIDES: CPT

## 2020-02-02 RX ORDER — FUROSEMIDE 10 MG/ML
20 INJECTION INTRAMUSCULAR; INTRAVENOUS ONCE
Status: COMPLETED | OUTPATIENT
Start: 2020-02-02 | End: 2020-02-02

## 2020-02-02 RX ORDER — MAGNESIUM SULFATE HEPTAHYDRATE 40 MG/ML
2 INJECTION, SOLUTION INTRAVENOUS ONCE
Status: COMPLETED | OUTPATIENT
Start: 2020-02-02 | End: 2020-02-02

## 2020-02-02 RX ADMIN — MAGNESIUM SULFATE IN WATER 2 G: 40 INJECTION, SOLUTION INTRAVENOUS at 08:44

## 2020-02-02 RX ADMIN — ENOXAPARIN SODIUM 40 MG: 100 INJECTION SUBCUTANEOUS at 17:56

## 2020-02-02 RX ADMIN — PROPOFOL 20 MCG/KG/MIN: 10 INJECTION, EMULSION INTRAVENOUS at 10:35

## 2020-02-02 RX ADMIN — FENTANYL CITRATE 50 MCG: 0.05 INJECTION, SOLUTION INTRAMUSCULAR; INTRAVENOUS at 12:01

## 2020-02-02 RX ADMIN — FENTANYL CITRATE 50 MCG: 0.05 INJECTION, SOLUTION INTRAMUSCULAR; INTRAVENOUS at 21:13

## 2020-02-02 RX ADMIN — FAMOTIDINE 20 MG: 20 TABLET ORAL at 17:56

## 2020-02-02 RX ADMIN — SERTRALINE 50 MG: 50 TABLET, FILM COATED ORAL at 05:01

## 2020-02-02 RX ADMIN — PROPOFOL 20 MCG/KG/MIN: 10 INJECTION, EMULSION INTRAVENOUS at 02:04

## 2020-02-02 RX ADMIN — METOPROLOL TARTRATE 25 MG: 25 TABLET, FILM COATED ORAL at 05:01

## 2020-02-02 RX ADMIN — FENTANYL CITRATE 50 MCG: 0.05 INJECTION, SOLUTION INTRAMUSCULAR; INTRAVENOUS at 13:37

## 2020-02-02 RX ADMIN — FENTANYL CITRATE 50 MCG: 0.05 INJECTION, SOLUTION INTRAMUSCULAR; INTRAVENOUS at 19:10

## 2020-02-02 RX ADMIN — FENTANYL CITRATE 50 MCG: 0.05 INJECTION, SOLUTION INTRAMUSCULAR; INTRAVENOUS at 23:25

## 2020-02-02 RX ADMIN — FENTANYL CITRATE 50 MCG: 0.05 INJECTION, SOLUTION INTRAMUSCULAR; INTRAVENOUS at 15:47

## 2020-02-02 RX ADMIN — POTASSIUM BICARBONATE 25 MEQ: 978 TABLET, EFFERVESCENT ORAL at 17:57

## 2020-02-02 RX ADMIN — LEVOTHYROXINE SODIUM 125 MCG: 125 TABLET ORAL at 05:07

## 2020-02-02 RX ADMIN — POTASSIUM BICARBONATE 25 MEQ: 978 TABLET, EFFERVESCENT ORAL at 05:01

## 2020-02-02 RX ADMIN — FAMOTIDINE 20 MG: 20 TABLET ORAL at 05:01

## 2020-02-02 RX ADMIN — FENTANYL CITRATE 50 MCG: 0.05 INJECTION, SOLUTION INTRAMUSCULAR; INTRAVENOUS at 10:28

## 2020-02-02 RX ADMIN — FENTANYL CITRATE 50 MCG: 0.05 INJECTION, SOLUTION INTRAMUSCULAR; INTRAVENOUS at 17:57

## 2020-02-02 RX ADMIN — FUROSEMIDE 20 MG: 10 INJECTION, SOLUTION INTRAVENOUS at 08:43

## 2020-02-02 ASSESSMENT — PULMONARY FUNCTION TESTS
FVC: .75
FVC: .96

## 2020-02-02 NOTE — PROGRESS NOTES
Critical Care Progress Note    Date of admission  1/27/2020    Chief Complaint  74 y.o. female admitted 1/27/2020 with severe alcohol withdrawls    Hospital Course    1/30- admitted to ICU for EtOH w/ds. Intubated for airway protection and acute hypoxemic respiratory failure. CVC placed. Started on propofol.  1/31 - Agitated and tachy when sedation weaned. Camilo no meet extubation parameters. Cont prop for w/ds  2/1 - On prop. Does not meet parameters for extubation      Interval Problem Update  Reviewed last 24 hour events:  Tm 37.3  -120s  SBP 90-140s  ABG 7.438/40/78/27/30% APVCMV  Current titration of a propofol gtt  +BM  2.4L urine output/24h  TF@goal  Repleted magnesium  CXR - Stable chest. Persistent bibasilar atelectasis,  mild bl edema - 20mg iv lasix given  Attempted call Mrs. Douglas DAOAKenyatta, however I was unable to reach her by phone.    Review of Systems  Review of Systems   Unable to perform ROS: Intubated        Vital Signs for last 24 hours   Temp:  [36.9 °C (98.5 °F)-37.3 °C (99.2 °F)] 37.3 °C (99.2 °F)  Pulse:  [] 105  Resp:  [8-36] 12  BP: ()/(56-97) 149/97  SpO2:  [92 %-99 %] 97 %    Hemodynamic parameters for last 24 hours       Respiratory Information for the last 24 hours  Murillo Vent Mode: APVCMV  Rate (breaths/min): 18  Vt Target (mL): 360  PEEP/CPAP: 8  FiO2: 30  P Support: 5  P MEAN: 11  Length of Weaning Trial (Hours): 1.0  Control VTE (exp VT): 361    Physical Exam   Physical Exam  Constitutional:       General: She is not in acute distress.     Comments: GCS 9t [id number]   HENT:      Head: Normocephalic and atraumatic.      Right Ear: External ear normal.      Left Ear: External ear normal.      Nose: Nose normal.      Mouth/Throat:      Pharynx: Oropharynx is clear.   Eyes:      Extraocular Movements: Extraocular movements intact.      Pupils: Pupils are equal, round, and reactive to light.   Neck:      Musculoskeletal: Neck supple.   Cardiovascular:      Rate and  Rhythm: Normal rate. Rhythm irregular.   Pulmonary:      Effort: Pulmonary effort is normal.      Breath sounds: Normal breath sounds.   Abdominal:      General: Abdomen is flat.   Musculoskeletal:         General: No swelling.   Skin:     General: Skin is warm and dry.      Capillary Refill: Capillary refill takes less than 2 seconds.   Neurological:      Cranial Nerves: No cranial nerve deficit.      Comments: GCS 9. Opens eyes briefly to voice. Does not follow commands. Becomes agitated with sedation stopped         Medications  Current Facility-Administered Medications   Medication Dose Route Frequency Provider Last Rate Last Dose   • LORazepam (ATIVAN) injection 2 mg  2 mg Intravenous Q15 MIN PRN Jeffrey Rodriguez M.D.       • Respiratory Care per Protocol   Nebulization Continuous RT Benjie Rosales M.D.       • ipratropium-albuterol (DUONEB) nebulizer solution  3 mL Nebulization Q2HRS PRN (RT) Benjie Rosales M.D.       • famotidine (PEPCID) tablet 20 mg  20 mg Enteral Tube Q12HRS Benjie Rosales M.D.   20 mg at 02/02/20 0501    Or   • famotidine (PEPCID) injection 20 mg  20 mg Intravenous Q12HRS Benjie Rosales M.D.       • senna-docusate (PERICOLACE or SENOKOT S) 8.6-50 MG per tablet 2 Tab  2 Tab Enteral Tube BID Benjie Rosales M.D.   Stopped at 02/01/20 1800    And   • polyethylene glycol/lytes (MIRALAX) PACKET 1 Packet  1 Packet Enteral Tube QDAY PRN Bejnie Rosales M.D.        And   • magnesium hydroxide (MILK OF MAGNESIA) suspension 30 mL  30 mL Enteral Tube QDAY PRN Benjie Rosales M.D.        And   • bisacodyl (DULCOLAX) suppository 10 mg  10 mg Rectal QDAY PRN Benjie Rosales M.D.       • MD Alert...ICU Electrolyte Replacement per Pharmacy   Other PHARMACY TO DOSE Benjie Rosales M.D.       • lidocaine (XYLOCAINE) 1 % injection 1-2 mL  1-2 mL Tracheal Tube Q30 MIN PRN Benjie Rosales M.D.       • fentaNYL (SUBLIMAZE) injection 50 mcg  50 mcg Intravenous Q15 MIN PRN Benjie Rosales M.D.        And   •  fentaNYL (SUBLIMAZE) injection 100 mcg  100 mcg Intravenous Q15 MIN PRN Benjie Rosales M.D.   100 mcg at 02/01/20 1326    And   • fentaNYL (SUBLIMAZE) 50 mcg/mL in 50mL (Continuous Infusion)   Intravenous Continuous Benjie Rosales M.D.   Stopped at 02/01/20 1100    And   • propofol (DIPRIVAN) injection  0-40 mcg/kg/min Intravenous Continuous Benjie Rosales M.D. 8.7 mL/hr at 02/02/20 0534 20 mcg/kg/min at 02/02/20 0534   • levothyroxine (SYNTHROID) tablet 125 mcg  125 mcg Enteral Tube AM ES Benjie Rosales M.D.   125 mcg at 02/02/20 0507   • sertraline (ZOLOFT) tablet 50 mg  50 mg Enteral Tube DAILY Benjie Rosales M.D.   50 mg at 02/02/20 0501   • Metoprolol Tartrate (LOPRESSOR) injection 5 mg  5 mg Intravenous Q5 MIN PRN Benjie Rosales M.D.       • metoprolol (LOPRESSOR) tablet 25 mg  25 mg Enteral Tube TWICE DAILY Benjie Rosales M.D.   25 mg at 02/02/20 0501   • potassium bicarbonate (KLYTE) effervescent tablet 25 mEq  25 mEq Enteral Tube BID Benjie Rosales M.D.   25 mEq at 02/02/20 0501   • enoxaparin (LOVENOX) inj 40 mg  40 mg Subcutaneous DAILY Benjie Rosales M.D.   40 mg at 02/01/20 1753   • lactated ringers infusion   Intravenous Continuous Benjie Rosales M.D. 75 mL/hr at 02/01/20 1900     • hydrALAZINE (APRESOLINE) injection 20 mg  20 mg Intravenous Q6HRS PRN Elodia Schreiber M.D.       • labetalol (NORMODYNE/TRANDATE) injection 10 mg  10 mg Intravenous Q4HRS PRN Elodia Schreiber M.D.           Fluids    Intake/Output Summary (Last 24 hours) at 2/2/2020 0611  Last data filed at 2/2/2020 0500  Gross per 24 hour   Intake 2988.9 ml   Output 2410 ml   Net 578.9 ml       Laboratory  Recent Labs     01/31/20  0431 02/01/20  0338 02/02/20  0411   ISTATAPH 7.376* 7.397* 7.443   ISTATAPCO2 45.5* 45.8* 39.7*   ISTATAPO2 95* 64 76   ISTATATCO2 28 30 28   [id number] 97 92* 96   ISTATARTHCO3 26.6* 28.1* 27.1*   ISTATARTBE 1 3 3   ISTATTEMP 98.9 F 98.6 F 99.2 F   ISTATFIO2 40 30 30   ISTATSPEC Arterial Arterial  Arterial   ISTATAPHTC 7.373* 7.397* 7.438   [id number] 96* 64 78         Recent Labs     01/30/20 1615 01/31/20 0400 02/02/20 0410   SODIUM 138 138 142   POTASSIUM 4.8 4.0 4.1   CHLORIDE 107 106 109   CO2 25 28 27   BUN 5* 6* 6*   CREATININE 0.42* 0.44* 0.41*   MAGNESIUM 2.7* 2.1 1.8   PHOSPHORUS 2.7 2.4* 4.2   CALCIUM 7.8* 7.9* 8.6     Recent Labs     01/30/20 1615 01/31/20 0400 02/02/20 0410   ALTSGPT 11  --   --    ASTSGOT 28  --   --    ALKPHOSPHAT 69  --   --    TBILIRUBIN 1.5  --   --    GLUCOSE 89 102* 107*     Recent Labs     01/30/20 1615 01/31/20 0400 02/02/20 0410   WBC  --  5.9 4.7*   NEUTSPOLYS  --  54.60 50.50   LYMPHOCYTES  --  34.30 35.40   MONOCYTES  --  8.10 11.20   EOSINOPHILS  --  2.20 2.10   BASOPHILS  --  0.30 0.40   ASTSGOT 28  --   --    ALTSGPT 11  --   --    ALKPHOSPHAT 69  --   --    TBILIRUBIN 1.5  --   --      Recent Labs     01/31/20 0400 02/02/20 0410   RBC 3.50* 3.25*   HEMOGLOBIN 11.6* 11.0*   HEMATOCRIT 36.4* 33.9*   PLATELETCT 98* 112*       Imaging  X-Ray:  I have personally reviewed the images and compared with prior images.    Assessment/Plan  * Alcohol withdrawal (HCC)- (present on admission)  Assessment & Plan  Thiamine/folate/MVI  Continuous cardiac monitoring  Aspiration seizure precautions  Continue Propofol w/ active titration for GABAergic effects; still going through w/ds agitated upon stopping sedation; does not follow commands  wean as able      Hypomagnesemia- (present on admission)  Assessment & Plan  Replace to keep greater than 2    Hypokalemia- (present on admission)  Assessment & Plan  Replace to keep greater than 4    Acute right ankle pain- (present on admission)  Assessment & Plan  No evidence of fracture on CXR per report    Alcohol dependence (HCC)- (present on admission)  Assessment & Plan  Counseling when clinically appropriate    Depression- (present on admission)  Assessment & Plan  Psychiatry following  Continue Zoloft    HTN (hypertension)-  (present on admission)  Assessment & Plan  Currently with labile pressure  Continue metoprolol with holding parameters    PAF (paroxysmal atrial fibrillation) (HCC)- (present on admission)  Assessment & Plan  Likely exacerbated by underlying EtOH withdrawal  Rate better controlled   BB with parameters  Not on anticoag  Maintain K greater than 4 mag greater than 2  Got one dose of digoxin upon arrival to ICU    May need anticoag prior to d/c in the past she did not want this; given her abuse hx may be a fall/hemorrhage risk      Acute respiratory failure with hypoxemia (HCC)  Assessment & Plan  Intubated for airway protection and acute hypoxemic resp failure  RT/O2 protocols  Daily and PRN ABGs  Titration of ventilator therapy based on ABGs and patient's status  Sedation as tolerated/indicated  Daily CXR  HOB >30 degrees and peridex for VAP prevention  Pepcid for GI prophylaxis  SAT/SBT when able (ABCDEF Bundle)  Early mobilization    Agitated and tachy when holding sedation; does not follow commands. MS limiting extubation at present       VTE:  Lovenox  Ulcer: H2 Antagonist  Lines: Central Line  Ongoing indication addressed and Mora Catheter  Ongoing indication addressed    I have performed a physical exam and reviewed and updated ROS and Plan today (2/2/2020). In review of yesterday's note (2/1/2020), there are no changes except as documented above.     I have assessed and reassessed her respiratory status with ventilator adjustments, airway mechanics, ventilator waveforms, blood pressure, hemodynamics, cardiovascular status, titration of a propofol gtt as well as her neurologic status.  She is at increased risk for worsening respiratory, cardiovascular and nervous system dysfunction.    Discussed patient condition and risk of morbidity and/or mortality with RN, RT, Pharmacy,  and Charge nurse / hot rounds  The patient remains critically ill.  Critical care time = 34 minutes in directly providing and  coordinating critical care and extensive data review.  No time overlap and excludes procedures.

## 2020-02-02 NOTE — RESPIRATORY CARE
Ventilator Weaning Update    Patient is on vent day 4.  SBT was tolerated for a minimum of 1.0 hours on settings of 5/8.    Wean parameters for this SBT were:  #FVC / Vital Capacity (liters) : 0.75 (02/02/20 0504)  NIF (cm H2O) : -25.9 (02/02/20 0504)  Rapid Shallow Breathing Index (RR/VT): 58 (02/02/20 0504)  RR (bpm): 15 (02/02/20 0504)  Spontaneous VE: 6.7 (02/02/20 0504)  Spontaneous VT: 573 (02/02/20 0504)    Barriers to Wean:   Weaning Trial Stopped due to:: Pt weaned for 1 hour and returned to rest settings per protocol

## 2020-02-02 NOTE — CARE PLAN
Adult Ventilation Update    Total Vent Days: 3    7.5 ETT @ 24    APVcmv 18, 360, +8, 30%    #FVC / Vital Capacity (liters) : 1.53 (02/01/20 1522)  NIF (cm H2O) : (unable to follow direction) (02/01/20 1522)  Rapid Shallow Breathing Index (RR/VT): 49 (02/01/20 1522)  Plateau Pressure (Q Shift): 19 (02/01/20 0644)  Static Compliance (ml / cm H2O): 39 (02/01/20 1114)    Cough: Productive (02/01/20 1600)  Sputum Amount: Small (02/01/20 1600)  Sputum Color: Clear;White (02/01/20 1600)  Sputum Consistency: Thin (02/01/20 1600)    Events/Summary/Plan: 1 SBT done 5/8, RSBI 49 not following for FVC or NIF

## 2020-02-02 NOTE — CARE PLAN
Adult Ventilation Update    Total Vent Days: 4  Vent: APVCMV 18/360/+8/40%    Patient Lines/Drains/Airways Status      Active Airway       Name: Placement date: Placement time: Site: Days:    Airway ETT Oral 7.5  01/30/20 0807   Oral  2                    In the last 24 hours, the patient tolerated SBT for 1.0 on settings of 5/8.    #FVC / Vital Capacity (liters) : 1.53 (02/01/20 1522)  NIF (cm H2O) : (unable to follow direction) (02/01/20 1522)  Rapid Shallow Breathing Index (RR/VT): 49 (02/01/20 1522)  Plateau Pressure (Q Shift): 15 (02/01/20 1817)  Static Compliance (ml / cm H2O): 45.4 (02/01/20 2156)    Patient failed trials because of Barriers to Wean: Other (Comments)(ETOH) (01/30/20 1520)  Barriers to SBT Weaning Trial Stopped due to:: Pt weaned for 1 hour and returned to rest settings per protocol (02/01/20 1522)  Length of Weaning Trial Length of Weaning Trial (Hours): 1 (02/01/20 1522)    Cough: Productive (02/02/20 0000)  Sputum Amount: Small (02/02/20 0000)  Sputum Color: Clear;White (02/02/20 0000)  Sputum Consistency: Thin (02/02/20 0000)    Mobility  Level of Mobility: Level IV (01/31/20 2000)  Activity Performed: Unable to mobilize (02/01/20 0800)  Time Activity Tolerated: 15 min (01/29/20 0101)  Distance Per Occurrence (ft.): 5 feet (01/29/20 0101)  # of Times Distance was Traveled: 2 (01/29/20 0101)  Assistance: Assistance of Two or More (01/29/20 0101)  Ambulation Tolerance: Tolerates Poorly (01/29/20 0101)  Pt Calls for Assistance: No (01/29/20 0101)  Staff Present for Mobilization: RN;CNA (01/29/20 0101)  Gait: Shuffle;Unsteady (01/29/20 0101)  Assistive Devices: Hand held assist (01/29/20 0101)  Reason Not Mobilized: Bed rest;Unstable condition (02/01/20 0800)  Mobilization Comments: etoh w/d, not following commands, increased agitation with movement (01/31/20 2000)    Events/Summary/Plan: Pt currently stable on vent.

## 2020-02-02 NOTE — PROGRESS NOTES
Pt produced many soft/loose brown BM. Pt not mobilized due to tachycardia with stimulation and agitation.

## 2020-02-03 ENCOUNTER — APPOINTMENT (OUTPATIENT)
Dept: RADIOLOGY | Facility: MEDICAL CENTER | Age: 75
DRG: 896 | End: 2020-02-03
Attending: PSYCHIATRY & NEUROLOGY
Payer: MEDICARE

## 2020-02-03 LAB
ACTION RANGE TRIGGERED IACRT: NO
ALBUMIN SERPL BCP-MCNC: 2.6 G/DL (ref 3.2–4.9)
ALBUMIN/GLOB SERPL: 1 G/DL
ALP SERPL-CCNC: 83 U/L (ref 30–99)
ALT SERPL-CCNC: 7 U/L (ref 2–50)
ANION GAP SERPL CALC-SCNC: 5 MMOL/L (ref 0–11.9)
AST SERPL-CCNC: 12 U/L (ref 12–45)
BASE EXCESS BLDA CALC-SCNC: 6 MMOL/L (ref -4–3)
BASOPHILS # BLD AUTO: 0.6 % (ref 0–1.8)
BASOPHILS # BLD: 0.04 K/UL (ref 0–0.12)
BILIRUB SERPL-MCNC: 0.7 MG/DL (ref 0.1–1.5)
BODY TEMPERATURE: ABNORMAL DEGREES
BUN SERPL-MCNC: 9 MG/DL (ref 8–22)
CALCIUM SERPL-MCNC: 8.6 MG/DL (ref 8.5–10.5)
CHLORIDE SERPL-SCNC: 106 MMOL/L (ref 96–112)
CO2 BLDA-SCNC: 29 MMOL/L (ref 20–33)
CO2 SERPL-SCNC: 27 MMOL/L (ref 20–33)
CREAT SERPL-MCNC: 0.47 MG/DL (ref 0.5–1.4)
EOSINOPHIL # BLD AUTO: 0.11 K/UL (ref 0–0.51)
EOSINOPHIL NFR BLD: 1.7 % (ref 0–6.9)
ERYTHROCYTE [DISTWIDTH] IN BLOOD BY AUTOMATED COUNT: 57.8 FL (ref 35.9–50)
GLOBULIN SER CALC-MCNC: 2.7 G/DL (ref 1.9–3.5)
GLUCOSE SERPL-MCNC: 126 MG/DL (ref 65–99)
HCO3 BLDA-SCNC: 28.4 MMOL/L (ref 17–25)
HCT VFR BLD AUTO: 36.3 % (ref 37–47)
HGB BLD-MCNC: 11.7 G/DL (ref 12–16)
HOROWITZ INDEX BLDA+IHG-RTO: 217 MM[HG]
IMM GRANULOCYTES # BLD AUTO: 0.02 K/UL (ref 0–0.11)
IMM GRANULOCYTES NFR BLD AUTO: 0.3 % (ref 0–0.9)
INST. QUALIFIED PATIENT IIQPT: YES
LYMPHOCYTES # BLD AUTO: 1.75 K/UL (ref 1–4.8)
LYMPHOCYTES NFR BLD: 27.3 % (ref 22–41)
MAGNESIUM SERPL-MCNC: 2 MG/DL (ref 1.5–2.5)
MCH RBC QN AUTO: 33.3 PG (ref 27–33)
MCHC RBC AUTO-ENTMCNC: 32.2 G/DL (ref 33.6–35)
MCV RBC AUTO: 103.4 FL (ref 81.4–97.8)
MONOCYTES # BLD AUTO: 0.75 K/UL (ref 0–0.85)
MONOCYTES NFR BLD AUTO: 11.7 % (ref 0–13.4)
NEUTROPHILS # BLD AUTO: 3.74 K/UL (ref 2–7.15)
NEUTROPHILS NFR BLD: 58.4 % (ref 44–72)
NRBC # BLD AUTO: 0 K/UL
NRBC BLD-RTO: 0 /100 WBC
O2/TOTAL GAS SETTING VFR VENT: 30 %
PCO2 BLDA: 31.6 MMHG (ref 26–37)
PCO2 TEMP ADJ BLDA: 32 MMHG (ref 26–37)
PH BLDA: 7.56 [PH] (ref 7.4–7.5)
PH TEMP ADJ BLDA: 7.56 [PH] (ref 7.4–7.5)
PHOSPHATE SERPL-MCNC: 4.3 MG/DL (ref 2.5–4.5)
PLATELET # BLD AUTO: 143 K/UL (ref 164–446)
PMV BLD AUTO: 10.1 FL (ref 9–12.9)
PO2 BLDA: 65 MMHG (ref 64–87)
PO2 TEMP ADJ BLDA: 66 MMHG (ref 64–87)
POTASSIUM SERPL-SCNC: 3.9 MMOL/L (ref 3.6–5.5)
PROT SERPL-MCNC: 5.3 G/DL (ref 6–8.2)
RBC # BLD AUTO: 3.51 M/UL (ref 4.2–5.4)
SAO2 % BLDA: 95 % (ref 93–99)
SODIUM SERPL-SCNC: 138 MMOL/L (ref 135–145)
SPECIMEN DRAWN FROM PATIENT: ABNORMAL
WBC # BLD AUTO: 6.4 K/UL (ref 4.8–10.8)

## 2020-02-03 PROCEDURE — 700111 HCHG RX REV CODE 636 W/ 250 OVERRIDE (IP)

## 2020-02-03 PROCEDURE — 82803 BLOOD GASES ANY COMBINATION: CPT

## 2020-02-03 PROCEDURE — 84100 ASSAY OF PHOSPHORUS: CPT

## 2020-02-03 PROCEDURE — 700102 HCHG RX REV CODE 250 W/ 637 OVERRIDE(OP): Performed by: INTERNAL MEDICINE

## 2020-02-03 PROCEDURE — 700102 HCHG RX REV CODE 250 W/ 637 OVERRIDE(OP): Performed by: PSYCHIATRY & NEUROLOGY

## 2020-02-03 PROCEDURE — A9270 NON-COVERED ITEM OR SERVICE: HCPCS | Performed by: INTERNAL MEDICINE

## 2020-02-03 PROCEDURE — 83735 ASSAY OF MAGNESIUM: CPT

## 2020-02-03 PROCEDURE — 80053 COMPREHEN METABOLIC PANEL: CPT

## 2020-02-03 PROCEDURE — 85025 COMPLETE CBC W/AUTO DIFF WBC: CPT

## 2020-02-03 PROCEDURE — 94150 VITAL CAPACITY TEST: CPT

## 2020-02-03 PROCEDURE — 700101 HCHG RX REV CODE 250: Performed by: INTERNAL MEDICINE

## 2020-02-03 PROCEDURE — 700111 HCHG RX REV CODE 636 W/ 250 OVERRIDE (IP): Performed by: PSYCHIATRY & NEUROLOGY

## 2020-02-03 PROCEDURE — 71045 X-RAY EXAM CHEST 1 VIEW: CPT

## 2020-02-03 PROCEDURE — 99291 CRITICAL CARE FIRST HOUR: CPT | Performed by: INTERNAL MEDICINE

## 2020-02-03 PROCEDURE — 94003 VENT MGMT INPAT SUBQ DAY: CPT

## 2020-02-03 PROCEDURE — A9270 NON-COVERED ITEM OR SERVICE: HCPCS | Performed by: PSYCHIATRY & NEUROLOGY

## 2020-02-03 PROCEDURE — 770022 HCHG ROOM/CARE - ICU (200)

## 2020-02-03 PROCEDURE — 36600 WITHDRAWAL OF ARTERIAL BLOOD: CPT

## 2020-02-03 RX ORDER — LIDOCAINE HYDROCHLORIDE 10 MG/ML
INJECTION, SOLUTION EPIDURAL; INFILTRATION; INTRACAUDAL; PERINEURAL
Status: COMPLETED
Start: 2020-02-03 | End: 2020-02-03

## 2020-02-03 RX ORDER — DEXMEDETOMIDINE HYDROCHLORIDE 4 UG/ML
.1-1.5 INJECTION, SOLUTION INTRAVENOUS CONTINUOUS
Status: DISCONTINUED | OUTPATIENT
Start: 2020-02-03 | End: 2020-02-05

## 2020-02-03 RX ADMIN — POTASSIUM BICARBONATE 25 MEQ: 978 TABLET, EFFERVESCENT ORAL at 17:34

## 2020-02-03 RX ADMIN — FENTANYL CITRATE 50 MCG: 0.05 INJECTION, SOLUTION INTRAMUSCULAR; INTRAVENOUS at 03:49

## 2020-02-03 RX ADMIN — FAMOTIDINE 20 MG: 20 TABLET ORAL at 17:33

## 2020-02-03 RX ADMIN — METOPROLOL TARTRATE 25 MG: 25 TABLET, FILM COATED ORAL at 17:33

## 2020-02-03 RX ADMIN — METOPROLOL TARTRATE 25 MG: 25 TABLET, FILM COATED ORAL at 05:22

## 2020-02-03 RX ADMIN — LEVOTHYROXINE SODIUM 125 MCG: 125 TABLET ORAL at 05:22

## 2020-02-03 RX ADMIN — PROPOFOL 30 MCG/KG/MIN: 10 INJECTION, EMULSION INTRAVENOUS at 06:13

## 2020-02-03 RX ADMIN — ENOXAPARIN SODIUM 40 MG: 100 INJECTION SUBCUTANEOUS at 17:33

## 2020-02-03 RX ADMIN — PROPOFOL 10 MCG/KG/MIN: 10 INJECTION, EMULSION INTRAVENOUS at 12:41

## 2020-02-03 RX ADMIN — SERTRALINE 50 MG: 50 TABLET, FILM COATED ORAL at 05:23

## 2020-02-03 RX ADMIN — POTASSIUM BICARBONATE 25 MEQ: 978 TABLET, EFFERVESCENT ORAL at 07:46

## 2020-02-03 RX ADMIN — POTASSIUM BICARBONATE 25 MEQ: 978 TABLET, EFFERVESCENT ORAL at 05:22

## 2020-02-03 RX ADMIN — LIDOCAINE HYDROCHLORIDE 2 ML: 10 INJECTION, SOLUTION EPIDURAL; INFILTRATION; INTRACAUDAL at 01:06

## 2020-02-03 RX ADMIN — Medication 2 ML: at 01:06

## 2020-02-03 RX ADMIN — DEXMEDETOMIDINE HYDROCHLORIDE 0.2 MCG/KG/HR: 100 INJECTION, SOLUTION INTRAVENOUS at 10:32

## 2020-02-03 RX ADMIN — FAMOTIDINE 20 MG: 20 TABLET ORAL at 05:22

## 2020-02-03 ASSESSMENT — PULMONARY FUNCTION TESTS: FVC: .86

## 2020-02-03 NOTE — PROGRESS NOTES
Pt mobilized to sit at side of bed with 2x RN and RT. Pt tolerated activity for 5min, max assist required.

## 2020-02-03 NOTE — CARE PLAN
Problem: Infection  Goal: Will remain free from infection  Outcome: PROGRESSING AS EXPECTED     Problem: Venous Thromboembolism (VTW)/Deep Vein Thrombosis (DVT) Prevention:  Goal: Patient will participate in Venous Thrombosis (VTE)/Deep Vein Thrombosis (DVT)Prevention Measures  Outcome: PROGRESSING AS EXPECTED     Problem: Bowel/Gastric:  Goal: Normal bowel function is maintained or improved  Outcome: PROGRESSING AS EXPECTED

## 2020-02-03 NOTE — CARE PLAN
Problem: Nutritional:  Goal: Nutrition support tolerated and meeting greater than 85% of estimated needs  Outcome: PROGRESSING AS EXPECTED

## 2020-02-03 NOTE — RESPIRATORY CARE
Ventilator Weaning Update    Patient is on vent day 5.  SBT was tolerated for a minimum of 0.5 hours on settings of 5/8.    Wean parameters for this SBT were:  #FVC / Vital Capacity (liters) : 0.86 (02/03/20 0545)  NIF (cm H2O) : -38.2 (02/03/20 0545)  Rapid Shallow Breathing Index (RR/VT): 12 (02/03/20 0545)  RR (bpm): 12 (02/03/20 0545)  Spontaneous VE: (!) 4.8 (02/03/20 0545)  Spontaneous VT: 464 (02/03/20 0545)    Barriers to Wean:   Weaning Trial Stopped due to:: Pt weaned for 1 hour and returned to rest settings per protocol

## 2020-02-03 NOTE — DIETARY
Nutrition Services:   Day 7 of admit.  Jeanie Hutchison is a 74 y.o. female with admitting DX of Alcohol withdrawal       Pt is currently TF of Replete Fiber @ 60 ml/hr, providing 1440 kcals, 92 grams protein, 1195 mL free water per day. Propofol running @ 13 mL/hr which provides 343 kcal/day; RD will adjust TF.     Recommendations/Plan:  With propofol: Peptamen Intense VHP @ 45 ml/hr to provide 1080 kcal + kcal from prop (20 kcal/kg), 99 grams protein (1.4 gm/kg), and 907 ml free water per day.   Without propofol: Replete with Fiber @ 60 ml/hr to provide 1440 kcal (20 kcal/kg), 92 grams protein (1.3 gm/kg), and 1195 ml free water per day.   Fluids per MD.     RD following

## 2020-02-03 NOTE — CARE PLAN
Adult Ventilation Update    Total Vent Days: 5  Vent: [id number]/+8/30%    Patient Lines/Drains/Airways Status      Active Airway       Name: Placement date: Placement time: Site: Days:    Airway ETT Oral 7.5  01/30/20 0807   Oral  5                    In the last 24 hours, the patient tolerated SBT for 3.0 on settings of 5/8.    #FVC / Vital Capacity (liters) : 0.96 (02/02/20 1635)  NIF (cm H2O) : -40 (02/02/20 1635)  Rapid Shallow Breathing Index (RR/VT): 19 (02/02/20 1635)  Plateau Pressure (Q Shift): 16 (02/02/20 2217)  Static Compliance (ml / cm H2O): 44.4 (02/02/20 2217)    Patient failed trials because of Barriers to Wean: Other (Comments)(ETOH) (01/30/20 1520)  Barriers to SBT Weaning Trial Stopped due to:: Pt weaned for 1 hour and returned to rest settings per protocol (02/02/20 1635)  Length of Weaning Trial Length of Weaning Trial (Hours): 2.0 hours (02/02/20 1635)    Cough: Productive (02/02/20 2217)  Sputum Amount: Small (02/02/20 2217)  Sputum Color: Clear;White (02/02/20 2217)  Sputum Consistency: Thick;Thin (02/02/20 2217)    Mobility  Level of Mobility: Level IV (02/02/20 0200)  Activity Performed: Unable to mobilize (02/02/20 0800)  Time Activity Tolerated: 15 min (01/29/20 0101)  Distance Per Occurrence (ft.): 5 feet (01/29/20 0101)  # of Times Distance was Traveled: 2 (01/29/20 0101)  Assistance: Assistance of Two or More (02/02/20 0200)  Ambulation Tolerance: Tolerates Poorly (02/02/20 0200)  Pt Calls for Assistance: No (02/02/20 0200)  Staff Present for Mobilization: RN (02/02/20 0200)  Gait: Shuffle;Unsteady (01/29/20 0101)  Assistive Devices: Hand held assist (01/29/20 0101)  Reason Not Mobilized: Bed rest;Unstable condition (02/02/20 0800)  Mobilization Comments: HR up to 150s with turns and agitated with stimulation (02/02/20 0200)    Events/Summary/Plan: SBT trial ended, placed pt back on rest settings (02/02/20 1635). Pt currently stable on vent.

## 2020-02-03 NOTE — PROGRESS NOTES
Critical Care Progress Note    Date of admission  1/27/2020    Chief Complaint  74 y.o. female admitted 1/27/2020 with severe alcohol withdrawls    Hospital Course    1/30- admitted to ICU for EtOH w/ds. Intubated for airway protection and acute hypoxemic respiratory failure. CVC placed. Started on propofol.  1/31 - Agitated and tachy when sedation weaned. Camilo no meet extubation parameters. Cont prop for w/ds  2/1 - On prop. Does not meet parameters for extubation. Attempted call Mrs. Hutchison's Kenyatta CASTELLON, however I was unable to reach her by phone.       Interval Problem Update  Reviewed last 24 hour events:              - Breakthrough tachycardia              - Tm: Afebrile              - HR: 50-160s (tachycardia resolved with Propfol              - SBP:               - Neuro: Propofol, Doesn't follow but MAEW.              - GI: EN              - UOP: 2.2 L              - Mora: Yes              - Lines: CVC              - PPx: H2, Lovenox              - CXR (personally reviewed):                  Failed SAT due to tachycardia        Review of Systems  Review of Systems   Unable to perform ROS: Intubated        Vital Signs for last 24 hours   Temp:  [36.3 °C (97.3 °F)-37.3 °C (99.2 °F)] 36.8 °C (98.2 °F)  Pulse:  [] 106  Resp:  [15-41] 18  BP: ()/() 108/66  SpO2:  [92 %-100 %] 94 %    Hemodynamic parameters for last 24 hours       Respiratory Information for the last 24 hours  Murillo Vent Mode: APVCMV  Rate (breaths/min): 18  Vt Target (mL): 360  PEEP/CPAP: 8  FiO2: 30  P MEAN: 10  Length of Weaning Trial (Hours): 0.5  Control VTE (exp VT): 359    Physical Exam   Physical Exam  Constitutional:       General: She is not in acute distress.  HENT:      Head: Normocephalic and atraumatic.      Right Ear: External ear normal.      Left Ear: External ear normal.      Nose: Nose normal.      Mouth/Throat:      Pharynx: Oropharynx is clear.   Eyes:      Extraocular Movements: Extraocular  movements intact.      Pupils: Pupils are equal, round, and reactive to light.   Neck:      Musculoskeletal: Neck supple.   Cardiovascular:      Rate and Rhythm: Normal rate. Rhythm irregular.   Pulmonary:      Effort: Pulmonary effort is normal.      Breath sounds: Normal breath sounds.   Abdominal:      General: Abdomen is flat.   Musculoskeletal:         General: No swelling.   Skin:     General: Skin is warm and dry.      Capillary Refill: Capillary refill takes less than 2 seconds.   Neurological:      Cranial Nerves: No cranial nerve deficit.      Comments: GCS 9. Opens eyes briefly to voice. Does not follow commands. Becomes agitated with sedation stopped         Medications  Current Facility-Administered Medications   Medication Dose Route Frequency Provider Last Rate Last Dose   • dexmedetomidine (PRECEDEX) 400 mcg/100mL NS premix infusion  0.1-1.5 mcg/kg/hr Intravenous Continuous Spenser Sebastian M.D. 5.8 mL/hr at 02/03/20 1130 0.3 mcg/kg/hr at 02/03/20 1130   • LORazepam (ATIVAN) injection 2 mg  2 mg Intravenous Q15 MIN PRN Jeffrey Rodriguez M.D.       • Respiratory Care per Protocol   Nebulization Continuous RT Benjie Rosales M.D.       • ipratropium-albuterol (DUONEB) nebulizer solution  3 mL Nebulization Q2HRS PRN (RT) Benjie Rosales M.D.       • famotidine (PEPCID) tablet 20 mg  20 mg Enteral Tube Q12HRS Benjie Rosales M.D.   20 mg at 02/03/20 0522   • senna-docusate (PERICOLACE or SENOKOT S) 8.6-50 MG per tablet 2 Tab  2 Tab Enteral Tube BID Benjie Rosales M.D.   Stopped at 02/01/20 1800    And   • polyethylene glycol/lytes (MIRALAX) PACKET 1 Packet  1 Packet Enteral Tube QDAY PRN Benjie Rosales M.D.        And   • magnesium hydroxide (MILK OF MAGNESIA) suspension 30 mL  30 mL Enteral Tube QDAY PRN Benjie Rosales M.D.        And   • bisacodyl (DULCOLAX) suppository 10 mg  10 mg Rectal QDAY PRN Benjie Rosales M.D.       • MD Alert...ICU Electrolyte Replacement per Pharmacy   Other PHARMACY TO  DOSE Benjie Rosales M.D.       • lidocaine (XYLOCAINE) 1 % injection 1-2 mL  1-2 mL Tracheal Tube Q30 MIN PRN Benjie Rosales M.D.   2 mL at 02/03/20 0106   • fentaNYL (SUBLIMAZE) injection 50 mcg  50 mcg Intravenous Q15 MIN PRN Benjie Rosales M.D.   50 mcg at 02/03/20 0349    And   • fentaNYL (SUBLIMAZE) injection 100 mcg  100 mcg Intravenous Q15 MIN PRN Benjie Rosales M.D.   100 mcg at 02/01/20 1326    And   • propofol (DIPRIVAN) injection  0-40 mcg/kg/min Intravenous Continuous Benjie Rosales M.D. 4.3 mL/hr at 02/03/20 1241 10 mcg/kg/min at 02/03/20 1241   • levothyroxine (SYNTHROID) tablet 125 mcg  125 mcg Enteral Tube AM ES Benjie Rosales M.D.   125 mcg at 02/03/20 0522   • sertraline (ZOLOFT) tablet 50 mg  50 mg Enteral Tube DAILY Benjie Rosales M.D.   50 mg at 02/03/20 0523   • Metoprolol Tartrate (LOPRESSOR) injection 5 mg  5 mg Intravenous Q5 MIN PRN Benjie Rosales M.D.       • metoprolol (LOPRESSOR) tablet 25 mg  25 mg Enteral Tube TWICE DAILY Benjie Rosales M.D.   25 mg at 02/03/20 0522   • potassium bicarbonate (KLYTE) effervescent tablet 25 mEq  25 mEq Enteral Tube BID Benjie Rosales M.D.   25 mEq at 02/03/20 0522   • enoxaparin (LOVENOX) inj 40 mg  40 mg Subcutaneous DAILY Benjie Rosales M.D.   40 mg at 02/02/20 1756   • hydrALAZINE (APRESOLINE) injection 20 mg  20 mg Intravenous Q6HRS PRN Elodia Schreiber M.D.       • labetalol (NORMODYNE/TRANDATE) injection 10 mg  10 mg Intravenous Q4HRS PRN Elodia Schreiber M.D.           Fluids    Intake/Output Summary (Last 24 hours) at 2/3/2020 1435  Last data filed at 2/3/2020 1200  Gross per 24 hour   Intake 2032.23 ml   Output 1310 ml   Net 722.23 ml       Laboratory  Recent Labs     02/01/20  0338 02/02/20  0411 02/03/20  0430   ISTATAPH 7.397* 7.443 7.561*   ISTATAPCO2 45.8* 39.7* 31.6   ISTATAPO2 64 76 65   ISTATATCO2 30 28 29   [id number] 92* 96 95   ISTATARTHCO3 28.1* 27.1* 28.4*   ISTATARTBE 3 3 6*   ISTATTEMP 98.6 F 99.2 F 99.1 F   ISTATFIO2  30 30 30   ISTATSPEC Arterial Arterial Arterial   ISTATAPHTC 7.397* 7.438 7.556*   [id number] 64 78 66         Recent Labs     02/02/20 0410 02/03/20  0530   SODIUM 142 138   POTASSIUM 4.1 3.9   CHLORIDE 109 106   CO2 27 27   BUN 6* 9   CREATININE 0.41* 0.47*   MAGNESIUM 1.8 2.0   PHOSPHORUS 4.2 4.3   CALCIUM 8.6 8.6     Recent Labs     02/02/20 0410 02/03/20  0530   ALTSGPT  --  7   ASTSGOT  --  12   ALKPHOSPHAT  --  83   TBILIRUBIN  --  0.7   GLUCOSE 107* 126*     Recent Labs     02/02/20 0410 02/03/20  0530   WBC 4.7* 6.4   NEUTSPOLYS 50.50 58.40   LYMPHOCYTES 35.40 27.30   MONOCYTES 11.20 11.70   EOSINOPHILS 2.10 1.70   BASOPHILS 0.40 0.60   ASTSGOT  --  12   ALTSGPT  --  7   ALKPHOSPHAT  --  83   TBILIRUBIN  --  0.7     Recent Labs     02/02/20 0410 02/03/20  0530   RBC 3.25* 3.51*   HEMOGLOBIN 11.0* 11.7*   HEMATOCRIT 33.9* 36.3*   PLATELETCT 112* 143*       Imaging  X-Ray:  I have personally reviewed the images and compared with prior images. and My impression is: Endotracheal tube 3 cm above the marlene.  There is a right IJ central venous catheter with the tip near the cavoatrial junction.  There is a feeding tube that descends below the level of the diaphragm.  There is some subtle haziness over the lower lobes bilaterally no focal opacities, possibly layering effusions. No pneumothorax.    Assessment/Plan  * Acute respiratory failure with hypoxemia (HCC)  Assessment & Plan  Intubated for airway protection and acute hypoxemic resp failure  Lung protective ventilation strategies  Titrate ventilator prescription to optimize oxygenation, ventilation, and acid base balance.  Daily ABC trials        Alcohol withdrawal (HCC)- (present on admission)  Assessment & Plan  Thiamine/folate/MVI  Continuous cardiac monitoring  Aspiration seizure precautions  Titrate and transition Propofol to Precedex as patient does not tolerate sedation holidays abruptly from propofol.        Hypomagnesemia- (present on  admission)  Assessment & Plan  Replace to keep greater than 2    Hypokalemia- (present on admission)  Assessment & Plan  Replace to keep greater than 4    Acute right ankle pain- (present on admission)  Assessment & Plan  No evidence of fracture on CXR per report    Alcohol dependence (HCC)- (present on admission)  Assessment & Plan  Counseling when clinically appropriate    Depression- (present on admission)  Assessment & Plan  Psychiatry following  Continue Zoloft    HTN (hypertension)- (present on admission)  Assessment & Plan  Currently with labile pressure  Continue metoprolol with holding parameters    PAF (paroxysmal atrial fibrillation) (HCC)- (present on admission)  Assessment & Plan  Likely exacerbated by underlying EtOH withdrawal  Rate control improved with metoprolol      May need anticoag prior to d/c in the past she did not want this; given her abuse hx may be a fall/hemorrhage risk         VTE:  Lovenox  Ulcer: H2 Antagonist  Lines: Central Line  Ongoing indication addressed and Mora Catheter  Ongoing indication addressed    I have performed a physical exam and reviewed and updated ROS and Plan today (2/3/2020). In review of yesterday's note (2/2/2020), there are no changes except as documented above.     I have assessed and reassessed her respiratory status with ventilator adjustments, airway mechanics, ventilator waveforms, blood pressure, hemodynamics, cardiovascular status, titration of a propofol gtt as well as her neurologic status.  She is at increased risk for worsening respiratory, cardiovascular and nervous system dysfunction.    Discussed patient condition and risk of morbidity and/or mortality with RN, RT, Pharmacy,  and Charge nurse / hot rounds     Critical care time 40 minutes in directly providing and coordinating critical care and extensive data review.  No time overlap and excludes procedures.

## 2020-02-03 NOTE — PROGRESS NOTES
updated on pt HR 150s afib, recommends to increase sedation (pt curently on 15mcg/kg/min) will possibly give IV metoprolol if rate continues to be high.

## 2020-02-03 NOTE — RESPIRATORY CARE
Ventilator Weaning Update    Patient is on vent day 4.  SBT was tolerated for a minimum of 2.0 hours hours on settings of 5/8.    Wean parameters for this SBT were:  #FVC / Vital Capacity (liters) : 0.96 (02/02/20 1635)  NIF (cm H2O) : -40 (02/02/20 1635)  Rapid Shallow Breathing Index (RR/VT): 19 (02/02/20 1635)  RR (bpm): 10 (02/02/20 1635)  Spontaneous VE: 7.2 (02/02/20 1635)  Spontaneous VT: 532 (02/02/20 1635)                   Events/Summary/Plan: SBT trial ended, placed pt back on rest settings (02/02/20 1635)

## 2020-02-03 NOTE — CARE PLAN
Problem: Ventilation Defect:  Goal: Ability to achieve and maintain unassisted ventilation or tolerate decreased levels of ventilator support  Outcome: NOT MET   Adult Ventilation Update    Total Vent Days: 4    Patient Lines/Drains/Airways Status      Active Airway       Name: Placement date: Placement time: Site: Days:    Airway ETT Oral 7.5  01/30/20   0807   Oral  3                    In the last 12 hours, the patient tolerated SBT for 2 hours on settings of 5/8.    #FVC / Vital Capacity (liters) : 0.96 (02/02/20 1635)  NIF (cm H2O) : -40 (02/02/20 1635)  Rapid Shallow Breathing Index (RR/VT): 19 (02/02/20 1635)  Plateau Pressure (Q Shift): 15 (02/01/20 1817)  Static Compliance (ml / cm H2O): 22.1 (02/02/20 1425)    Patient failed trials because of Barriers to Wean: Other (Comments)(ETOH) (01/30/20 1520)  Barriers to SBT Weaning Trial Stopped due to:: Pt weaned for 1 hour and returned to rest settings per protocol (02/02/20 1635)  Length of Weaning Trial Length of Weaning Trial (Hours): 2.0 hours (02/02/20 1635)    APVCMV  18  360  8  30%

## 2020-02-04 ENCOUNTER — APPOINTMENT (OUTPATIENT)
Dept: RADIOLOGY | Facility: MEDICAL CENTER | Age: 75
DRG: 896 | End: 2020-02-04
Attending: PSYCHIATRY & NEUROLOGY
Payer: MEDICARE

## 2020-02-04 LAB
ACTION RANGE TRIGGERED IACRT: NO
ANION GAP SERPL CALC-SCNC: 8 MMOL/L (ref 0–11.9)
BASE EXCESS BLDA CALC-SCNC: 0 MMOL/L (ref -4–3)
BASOPHILS # BLD AUTO: 0.5 % (ref 0–1.8)
BASOPHILS # BLD: 0.04 K/UL (ref 0–0.12)
BODY TEMPERATURE: NORMAL DEGREES
BUN SERPL-MCNC: 11 MG/DL (ref 8–22)
CALCIUM SERPL-MCNC: 8.8 MG/DL (ref 8.5–10.5)
CHLORIDE SERPL-SCNC: 105 MMOL/L (ref 96–112)
CO2 BLDA-SCNC: 25 MMOL/L (ref 20–33)
CO2 SERPL-SCNC: 25 MMOL/L (ref 20–33)
CREAT SERPL-MCNC: 0.44 MG/DL (ref 0.5–1.4)
CRP SERPL HS-MCNC: 1.16 MG/DL (ref 0–0.75)
EOSINOPHIL # BLD AUTO: 0.07 K/UL (ref 0–0.51)
EOSINOPHIL NFR BLD: 0.8 % (ref 0–6.9)
ERYTHROCYTE [DISTWIDTH] IN BLOOD BY AUTOMATED COUNT: 55.1 FL (ref 35.9–50)
GLUCOSE SERPL-MCNC: 136 MG/DL (ref 65–99)
HCO3 BLDA-SCNC: 23.7 MMOL/L (ref 17–25)
HCT VFR BLD AUTO: 39.2 % (ref 37–47)
HGB BLD-MCNC: 12.5 G/DL (ref 12–16)
HOROWITZ INDEX BLDA+IHG-RTO: 257 MM[HG]
IMM GRANULOCYTES # BLD AUTO: 0.03 K/UL (ref 0–0.11)
IMM GRANULOCYTES NFR BLD AUTO: 0.4 % (ref 0–0.9)
INST. QUALIFIED PATIENT IIQPT: YES
LYMPHOCYTES # BLD AUTO: 0.97 K/UL (ref 1–4.8)
LYMPHOCYTES NFR BLD: 11.4 % (ref 22–41)
MAGNESIUM SERPL-MCNC: 1.9 MG/DL (ref 1.5–2.5)
MCH RBC QN AUTO: 32.9 PG (ref 27–33)
MCHC RBC AUTO-ENTMCNC: 31.9 G/DL (ref 33.6–35)
MCV RBC AUTO: 103.2 FL (ref 81.4–97.8)
MONOCYTES # BLD AUTO: 0.67 K/UL (ref 0–0.85)
MONOCYTES NFR BLD AUTO: 7.8 % (ref 0–13.4)
NEUTROPHILS # BLD AUTO: 6.76 K/UL (ref 2–7.15)
NEUTROPHILS NFR BLD: 79.1 % (ref 44–72)
NRBC # BLD AUTO: 0 K/UL
NRBC BLD-RTO: 0 /100 WBC
O2/TOTAL GAS SETTING VFR VENT: 30 %
PCO2 BLDA: 33.7 MMHG (ref 26–37)
PCO2 TEMP ADJ BLDA: 33.7 MMHG (ref 26–37)
PH BLDA: 7.45 [PH] (ref 7.4–7.5)
PH TEMP ADJ BLDA: 7.45 [PH] (ref 7.4–7.5)
PHOSPHATE SERPL-MCNC: 3.4 MG/DL (ref 2.5–4.5)
PLATELET # BLD AUTO: 180 K/UL (ref 164–446)
PMV BLD AUTO: 10.1 FL (ref 9–12.9)
PO2 BLDA: 77 MMHG (ref 64–87)
PO2 TEMP ADJ BLDA: 77 MMHG (ref 64–87)
POTASSIUM SERPL-SCNC: 4.3 MMOL/L (ref 3.6–5.5)
PREALB SERPL-MCNC: 9 MG/DL (ref 18–38)
RBC # BLD AUTO: 3.8 M/UL (ref 4.2–5.4)
SAO2 % BLDA: 96 % (ref 93–99)
SODIUM SERPL-SCNC: 138 MMOL/L (ref 135–145)
SPECIMEN DRAWN FROM PATIENT: NORMAL
WBC # BLD AUTO: 8.5 K/UL (ref 4.8–10.8)

## 2020-02-04 PROCEDURE — 700102 HCHG RX REV CODE 250 W/ 637 OVERRIDE(OP): Performed by: PSYCHIATRY & NEUROLOGY

## 2020-02-04 PROCEDURE — 83735 ASSAY OF MAGNESIUM: CPT

## 2020-02-04 PROCEDURE — A9270 NON-COVERED ITEM OR SERVICE: HCPCS | Performed by: PSYCHIATRY & NEUROLOGY

## 2020-02-04 PROCEDURE — 85025 COMPLETE CBC W/AUTO DIFF WBC: CPT

## 2020-02-04 PROCEDURE — 99291 CRITICAL CARE FIRST HOUR: CPT | Performed by: INTERNAL MEDICINE

## 2020-02-04 PROCEDURE — 94003 VENT MGMT INPAT SUBQ DAY: CPT

## 2020-02-04 PROCEDURE — 700111 HCHG RX REV CODE 636 W/ 250 OVERRIDE (IP): Performed by: INTERNAL MEDICINE

## 2020-02-04 PROCEDURE — 84134 ASSAY OF PREALBUMIN: CPT

## 2020-02-04 PROCEDURE — 700111 HCHG RX REV CODE 636 W/ 250 OVERRIDE (IP): Performed by: PSYCHIATRY & NEUROLOGY

## 2020-02-04 PROCEDURE — 94690 O2 UPTK REST INDIRECT: CPT

## 2020-02-04 PROCEDURE — 80048 BASIC METABOLIC PNL TOTAL CA: CPT

## 2020-02-04 PROCEDURE — 700101 HCHG RX REV CODE 250: Performed by: INTERNAL MEDICINE

## 2020-02-04 PROCEDURE — 770022 HCHG ROOM/CARE - ICU (200)

## 2020-02-04 PROCEDURE — 86140 C-REACTIVE PROTEIN: CPT

## 2020-02-04 PROCEDURE — 71045 X-RAY EXAM CHEST 1 VIEW: CPT

## 2020-02-04 PROCEDURE — 84100 ASSAY OF PHOSPHORUS: CPT

## 2020-02-04 PROCEDURE — 82803 BLOOD GASES ANY COMBINATION: CPT

## 2020-02-04 PROCEDURE — 36600 WITHDRAWAL OF ARTERIAL BLOOD: CPT

## 2020-02-04 RX ORDER — MAGNESIUM SULFATE HEPTAHYDRATE 40 MG/ML
2 INJECTION, SOLUTION INTRAVENOUS ONCE
Status: COMPLETED | OUTPATIENT
Start: 2020-02-04 | End: 2020-02-04

## 2020-02-04 RX ADMIN — SERTRALINE 50 MG: 50 TABLET, FILM COATED ORAL at 05:27

## 2020-02-04 RX ADMIN — DEXMEDETOMIDINE HYDROCHLORIDE 0.6 MCG/KG/HR: 100 INJECTION, SOLUTION INTRAVENOUS at 02:03

## 2020-02-04 RX ADMIN — DEXMEDETOMIDINE HYDROCHLORIDE 0.6 MCG/KG/HR: 100 INJECTION, SOLUTION INTRAVENOUS at 08:47

## 2020-02-04 RX ADMIN — FAMOTIDINE 20 MG: 20 TABLET ORAL at 05:26

## 2020-02-04 RX ADMIN — POTASSIUM BICARBONATE 25 MEQ: 978 TABLET, EFFERVESCENT ORAL at 17:45

## 2020-02-04 RX ADMIN — METOPROLOL TARTRATE 25 MG: 25 TABLET, FILM COATED ORAL at 17:46

## 2020-02-04 RX ADMIN — FAMOTIDINE 20 MG: 20 TABLET ORAL at 17:46

## 2020-02-04 RX ADMIN — POTASSIUM BICARBONATE 25 MEQ: 978 TABLET, EFFERVESCENT ORAL at 05:26

## 2020-02-04 RX ADMIN — LEVOTHYROXINE SODIUM 125 MCG: 125 TABLET ORAL at 05:26

## 2020-02-04 RX ADMIN — MAGNESIUM SULFATE IN WATER 2 G: 40 INJECTION, SOLUTION INTRAVENOUS at 07:47

## 2020-02-04 RX ADMIN — DEXMEDETOMIDINE HYDROCHLORIDE 0.3 MCG/KG/HR: 100 INJECTION, SOLUTION INTRAVENOUS at 23:41

## 2020-02-04 RX ADMIN — METOPROLOL TARTRATE 25 MG: 25 TABLET, FILM COATED ORAL at 05:26

## 2020-02-04 RX ADMIN — ENOXAPARIN SODIUM 40 MG: 100 INJECTION SUBCUTANEOUS at 17:45

## 2020-02-04 NOTE — CARE PLAN
Problem: Ventilation Defect:  Goal: Ability to achieve and maintain unassisted ventilation or tolerate decreased levels of ventilator support  Outcome: NOT MET   Adult Ventilation Update    Total Vent Days: 5    Patient Lines/Drains/Airways Status      Active Airway       Name: Placement date: Placement time: Site: Days:    Airway ETT Oral 7.5  01/30/20   0807   Oral  4                    APVCMV  18  360  8  30%    Events/Summary/Plan: NO SBT trial, per MD pt went tachy with reduction in sedation, so did not perform SBT (02/03/20 2426)       Please advise- should statin be added to allergy list?

## 2020-02-04 NOTE — RESPIRATORY CARE
Ventilator Weaning Update    Patient is on vent day 6.  SBT was tolerated for a minimum of 1.0 hours on settings of 5/8.    Wean parameters for this SBT were:  #FVC / Vital Capacity (liters) : (Not following) (02/04/20 0514)  NIF (cm H2O) : (Not following) (02/04/20 0514)  Rapid Shallow Breathing Index (RR/VT): 37 (02/04/20 0514)  RR (bpm): 14 (02/04/20 0514)  Spontaneous VE: 6 (02/04/20 0514)  Spontaneous VT: 425 (02/04/20 0514)    Barriers to Wean:   Weaning Trial Stopped due to:: Pt weaned for 1 hour and returned to rest settings per protocol

## 2020-02-04 NOTE — CARE PLAN
Problem: Skin Integrity  Goal: Risk for impaired skin integrity will decrease  Outcome: PROGRESSING AS EXPECTED  Note:   Patient's sacrum is excoriated due to incontinence, barrier cream applied. Patient turned ever 2 hours, skin assessed every two hours.      Problem: Respiratory:  Goal: Respiratory status will improve  Note:   Patient is intubated, tolerated SBT this morning.

## 2020-02-04 NOTE — PROGRESS NOTES
Critical Care Progress Note    Date of admission  1/27/2020    Chief Complaint  74 y.o. female admitted 1/27/2020 with severe alcohol withdrawls    Hospital Course    1/30- admitted to ICU for EtOH w/ds. Intubated for airway protection and acute hypoxemic respiratory failure. CVC placed. Started on propofol.  1/31 - Agitated and tachy when sedation weaned. Camilo no meet extubation parameters. Cont prop for w/ds  2/1 - On prop. Does not meet parameters for extubation. Attempted call Mrs. Hutchison's Kenyatta CASTELLON, however I was unable to reach her by phone.       Interval Problem Update  Reviewed last 24 hour events:              - No over night events              - Tm: Afebrile              - HR: 90-1 15              - SBP: 117-155              - Neuro: Opens eyes to voice, follows intermittently, weak on LUE              - GI: EN              - UOP: 1.6 L              - Mora: Yes              - Lines: CVC              - PPx: H2, Lovenox              - CXR (personally reviewed):                  Infusions:  Dexmedetomidine    ABG- 7.4/34/77/24    Review of Systems  Review of Systems   Unable to perform ROS: Intubated        Vital Signs for last 24 hours   Temp:  [36.6 °C (97.9 °F)-37.2 °C (98.9 °F)] 37 °C (98.6 °F)  Pulse:  [] 117  Resp:  [11-22] 18  BP: ()/(50-99) 122/67  SpO2:  [93 %-100 %] 96 %    Hemodynamic parameters for last 24 hours       Respiratory Information for the last 24 hours  Murillo Vent Mode: APVCMV  Rate (breaths/min): 18  Vt Target (mL): 360  PEEP/CPAP: 8  FiO2: 30  P Support: 5  P MEAN: 10  Length of Weaning Trial (Hours): 1.0  Control VTE (exp VT): 374    Physical Exam   Physical Exam  Constitutional:       General: She is not in acute distress.  HENT:      Head: Normocephalic and atraumatic.      Right Ear: External ear normal.      Left Ear: External ear normal.      Nose: Nose normal.      Mouth/Throat:      Pharynx: Oropharynx is clear.   Eyes:      Extraocular Movements:  Extraocular movements intact.      Pupils: Pupils are equal, round, and reactive to light.   Neck:      Musculoskeletal: Neck supple.   Cardiovascular:      Rate and Rhythm: Normal rate. Rhythm irregular.   Pulmonary:      Effort: Pulmonary effort is normal.      Breath sounds: Normal breath sounds.   Abdominal:      General: Abdomen is flat.   Musculoskeletal:         General: No swelling.   Skin:     General: Skin is warm and dry.      Capillary Refill: Capillary refill takes less than 2 seconds.   Neurological:      Cranial Nerves: No cranial nerve deficit.      Comments: Opens eyes to voice. Nods appropriately. Does would not follow commands for me but did for RN earlier this am. Unable or unwilling to lift or hold her head off pillow?             Medications  Current Facility-Administered Medications   Medication Dose Route Frequency Provider Last Rate Last Dose   • magnesium sulfate IVPB premix 2 g  2 g Intravenous Once Spenser Sebastian M.D. 25 mL/hr at 02/04/20 0747 2 g at 02/04/20 0747   • dexmedetomidine (PRECEDEX) 400 mcg/100mL NS premix infusion  0.1-1.5 mcg/kg/hr Intravenous Continuous Spenser Sebastian M.D. 11.6 mL/hr at 02/04/20 0847 0.6 mcg/kg/hr at 02/04/20 0847   • LORazepam (ATIVAN) injection 2 mg  2 mg Intravenous Q15 MIN PRN Jeffrey Rodriguez M.D.       • Respiratory Care per Protocol   Nebulization Continuous RT Benjie Rosales M.D.       • ipratropium-albuterol (DUONEB) nebulizer solution  3 mL Nebulization Q2HRS PRN (RT) Benjie Rosales M.D.       • famotidine (PEPCID) tablet 20 mg  20 mg Enteral Tube Q12HRS Benjie Rosales M.D.   20 mg at 02/04/20 0526   • senna-docusate (PERICOLACE or SENOKOT S) 8.6-50 MG per tablet 2 Tab  2 Tab Enteral Tube BID Benjie Rosales M.D.   Stopped at 02/01/20 1800    And   • polyethylene glycol/lytes (MIRALAX) PACKET 1 Packet  1 Packet Enteral Tube QDAY PRN Benjie Rosales M.D.        And   • magnesium hydroxide (MILK OF MAGNESIA) suspension 30 mL  30 mL Enteral Tube  QDAY PRN Benjie Rosales M.D.        And   • bisacodyl (DULCOLAX) suppository 10 mg  10 mg Rectal QDAY PRN Benjie Rosales M.D.       • MD Alert...ICU Electrolyte Replacement per Pharmacy   Other PHARMACY TO DOSE Benjie Rosales M.D.       • lidocaine (XYLOCAINE) 1 % injection 1-2 mL  1-2 mL Tracheal Tube Q30 MIN PRN Benjie Rosales M.D.   2 mL at 02/03/20 0106   • fentaNYL (SUBLIMAZE) injection 50 mcg  50 mcg Intravenous Q15 MIN PRN Benjie Rosales M.D.   50 mcg at 02/03/20 0349    And   • fentaNYL (SUBLIMAZE) injection 100 mcg  100 mcg Intravenous Q15 MIN PRN Benjie Rosales M.D.   100 mcg at 02/01/20 1326    And   • propofol (DIPRIVAN) injection  0-40 mcg/kg/min Intravenous Continuous Benjie Rosales M.D.   Stopped at 02/03/20 2100   • levothyroxine (SYNTHROID) tablet 125 mcg  125 mcg Enteral Tube AM ES Benjie Rosales M.D.   125 mcg at 02/04/20 0526   • sertraline (ZOLOFT) tablet 50 mg  50 mg Enteral Tube DAILY Benjie Rosales M.D.   50 mg at 02/04/20 0527   • Metoprolol Tartrate (LOPRESSOR) injection 5 mg  5 mg Intravenous Q5 MIN PRN Benjie Rosales M.D.       • metoprolol (LOPRESSOR) tablet 25 mg  25 mg Enteral Tube TWICE DAILY Benjie Rosales M.D.   25 mg at 02/04/20 0526   • potassium bicarbonate (KLYTE) effervescent tablet 25 mEq  25 mEq Enteral Tube BID Benjie Rosales M.D.   25 mEq at 02/04/20 0526   • enoxaparin (LOVENOX) inj 40 mg  40 mg Subcutaneous DAILY Benjie Rosales M.D.   40 mg at 02/03/20 1733   • hydrALAZINE (APRESOLINE) injection 20 mg  20 mg Intravenous Q6HRS PRN Elodia Schreiber M.D.       • labetalol (NORMODYNE/TRANDATE) injection 10 mg  10 mg Intravenous Q4HRS PRN Elodia Schreiber M.D.           Fluids    Intake/Output Summary (Last 24 hours) at 2/4/2020 0933  Last data filed at 2/4/2020 0800  Gross per 24 hour   Intake 1579.88 ml   Output 1645 ml   Net -65.12 ml       Laboratory  Recent Labs     02/02/20  0411 02/03/20  0430 02/04/20  0412   ISTATAPH 7.443 7.561* 7.454   ISTATAPCO2 39.7*  31.6 33.7   ISTATAPO2 76 65 77   ISTATATCO2 28 29 25   [id number] 96 95 96   ISTATARTHCO3 27.1* 28.4* 23.7   ISTATARTBE 3 6* 0   ISTATTEMP 99.2 F 99.1 F 98.6 F   ISTATFIO2 30 30 30   ISTATSPEC Arterial Arterial Arterial   ISTATAPHTC 7.438 7.556* 7.454   [id number] 78 66 77         Recent Labs     02/02/20 0410 02/03/20  0530 02/04/20  0523   SODIUM 142 138 138   POTASSIUM 4.1 3.9 4.3   CHLORIDE 109 106 105   CO2 27 27 25   BUN 6* 9 11   CREATININE 0.41* 0.47* 0.44*   MAGNESIUM 1.8 2.0 1.9   PHOSPHORUS 4.2 4.3 3.4   CALCIUM 8.6 8.6 8.8     Recent Labs     02/02/20 0410 02/03/20 0530 02/04/20  0523   ALTSGPT  --  7  --    ASTSGOT  --  12  --    ALKPHOSPHAT  --  83  --    TBILIRUBIN  --  0.7  --    PREALBUMIN  --   --  9.0*   GLUCOSE 107* 126* 136*     Recent Labs     02/02/20 0410 02/03/20 0530 02/04/20  0523   WBC 4.7* 6.4 8.5   NEUTSPOLYS 50.50 58.40 79.10*   LYMPHOCYTES 35.40 27.30 11.40*   MONOCYTES 11.20 11.70 7.80   EOSINOPHILS 2.10 1.70 0.80   BASOPHILS 0.40 0.60 0.50   ASTSGOT  --  12  --    ALTSGPT  --  7  --    ALKPHOSPHAT  --  83  --    TBILIRUBIN  --  0.7  --      Recent Labs     02/02/20 0410 02/03/20 0530 02/04/20  0523   RBC 3.25* 3.51* 3.80*   HEMOGLOBIN 11.0* 11.7* 12.5   HEMATOCRIT 33.9* 36.3* 39.2   PLATELETCT 112* 143* 180       Imaging  X-Ray:  I have personally reviewed the images and compared with prior images. and My impression is: Endotracheal tube 2 cm above the marlene.  There is a right IJ central venous catheter with the tip near the cavoatrial junction.  There is a feeding tube that descends below the level of the diaphragm.  There is some subtle haziness over the lower lobes bilaterally no focal opacities, possibly layering effusions. No pneumothorax.    Assessment/Plan  * Acute respiratory failure with hypoxemia (HCC)  Assessment & Plan  Intubated for airway protection and acute hypoxemic resp failure  Lung protective ventilation strategies  Titrate ventilator prescription to  optimize oxygenation, ventilation, and acid base balance.  Daily ABC trials, too weak for trial of extubation today.        Alcohol withdrawal (HCC)- (present on admission)  Assessment & Plan  Physiologic s/sx improved on Precedex  Thiamine/folate/MVI  Continuous cardiac monitoring  Aspiration seizure precautions          Hypomagnesemia- (present on admission)  Assessment & Plan  Replace to keep greater than 2    Hypokalemia- (present on admission)  Assessment & Plan  Replace to keep greater than 4    Acute right ankle pain- (present on admission)  Assessment & Plan  No evidence of fracture on CXR per report    Alcohol dependence (HCC)- (present on admission)  Assessment & Plan  Counseling when clinically appropriate    Depression- (present on admission)  Assessment & Plan  Psychiatry following  Continue Zoloft    HTN (hypertension)- (present on admission)  Assessment & Plan  Currently with labile pressure  Continue metoprolol with holding parameters    PAF (paroxysmal atrial fibrillation) (HCC)- (present on admission)  Assessment & Plan  Likely exacerbated by underlying EtOH withdrawal  Rate control improved with metoprolol      May need anticoag prior to d/c in the past she did not want this; given her abuse hx may be a fall/hemorrhage risk         VTE:  Lovenox  Ulcer: H2 Antagonist  Lines: Central Line  Ongoing indication addressed and Mora Catheter  Ongoing indication addressed    I have performed a physical exam and reviewed and updated ROS and Plan today (2/4/2020). In review of yesterday's note (2/3/2020), there are no changes except as documented above.     I have assessed and reassessed her respiratory status with ventilator adjustments, airway mechanics, ventilator waveforms, blood pressure, hemodynamics, cardiovascular status, titration of a propofol gtt as well as her neurologic status.  She is at increased risk for worsening respiratory, cardiovascular and nervous system dysfunction.    Discussed  patient condition and risk of morbidity and/or mortality with RN, RT, Pharmacy,  and Charge nurse / hot rounds     Critical care time 40minutes in directly providing and coordinating critical care and extensive data review.  No time overlap and excludes procedures.

## 2020-02-04 NOTE — RESPIRATORY CARE
Conscious Sedation Respiratory Update    FiO2%: 30 % (02/04/20 0600)  O2 (LPM): 6 (01/30/20 0715)  O2 Daily Delivery Respiratory : Room Air with O2 Available (01/27/20 1240)    Events/Summary/Plan: Metabolic study done.  Pt. tolerated test well. (02/04/20 0734)

## 2020-02-04 NOTE — CARE PLAN
Problem: Fluid Volume:  Goal: Will maintain balanced intake and output  Outcome: PROGRESSING AS EXPECTED  Intake and output measured and charted.      Problem: Safety - Medical Restraint  Goal: Remains free of injury from restraints (Restraint for Interference with Medical Device)  Description  INTERVENTIONS:  1. Determine that other, less restrictive measures have been tried or would not be effective before applying the restraint  2. Evaluate the patient's condition at the time of restraint application  3. Inform patient/family regarding the reason for restraint  4. Q2H: Monitor safety, psychosocial status, comfort, nutrition and hydration  Outcome: PROGRESSING AS EXPECTED     Education provided; no evidence of learning. Q2 hour turns and safety monitoring. Frequent toileting. Skin assessment under restraint and range of motion q2 hours.

## 2020-02-04 NOTE — CARE PLAN
Adult Ventilation Update    Total Vent Days: 6  Vent: APVCMV 18/360/+8/30%    Patient Lines/Drains/Airways Status      Active Airway       Name: Placement date: Placement time: Site: Days:    Airway ETT Oral 7.5  01/30/20   0807   Oral  4                    In the last 24 hours, the patient tolerated SBT for 1.0 on settings of 5/8.    #FVC / Vital Capacity (liters) : 0.86 (02/03/20 0545)  NIF (cm H2O) : -38.2 (02/03/20 0545)  Rapid Shallow Breathing Index (RR/VT): 12 (02/03/20 0545)  Plateau Pressure (Q Shift): 16 (02/03/20 1837)  Static Compliance (ml / cm H2O): 55.3 (02/03/20 2212)    Patient failed trials because of Barriers to Wean: Other (Comments)(PEr MD no SBT if tachy with titration of sedation) (02/03/20 1455)  Barriers to SBT Weaning Trial Stopped due to:: Pt weaned for 1 hour and returned to rest settings per protocol (02/03/20 0545)  Length of Weaning Trial Length of Weaning Trial (Hours): 0.5 (02/03/20 0545)    Cough: Productive (02/03/20 2212)  Sputum Amount: Small (02/03/20 2212)  Sputum Color: White;Clear (02/03/20 2212)  Sputum Consistency: Thick;Thin (02/03/20 2212)    Mobility  Level of Mobility: Level II (02/03/20 1600)  Activity Performed: Unable to mobilize (02/03/20 1600)  Time Activity Tolerated: 5 min (02/03/20 0400)  Distance Per Occurrence (ft.): 5 feet (01/29/20 0101)  # of Times Distance was Traveled: 2 (01/29/20 0101)  Assistance: Assistance of Two or More (02/03/20 1600)  Ambulation Tolerance: General Weakness (02/03/20 0400)  Pt Calls for Assistance: No (02/03/20 1600)  Staff Present for Mobilization: RN (02/03/20 1600)  Gait: Shuffle;Unsteady (01/29/20 0101)  Assistive Devices: Hand held assist (02/03/20 0400)  Reason Not Mobilized: Bed rest;Unstable condition (02/02/20 0800)  Mobilization Comments: hr up 130 with turning. (02/03/20 1600)    Events/Summary/Plan: NO SBT trial, per MD pt went tachy with reduction in sedation, so did not perform SBT (02/03/20 3118)

## 2020-02-05 ENCOUNTER — APPOINTMENT (OUTPATIENT)
Dept: RADIOLOGY | Facility: MEDICAL CENTER | Age: 75
DRG: 896 | End: 2020-02-05
Attending: PSYCHIATRY & NEUROLOGY
Payer: MEDICARE

## 2020-02-05 ENCOUNTER — APPOINTMENT (OUTPATIENT)
Dept: RADIOLOGY | Facility: MEDICAL CENTER | Age: 75
DRG: 896 | End: 2020-02-05
Attending: INTERNAL MEDICINE
Payer: MEDICARE

## 2020-02-05 LAB
ANION GAP SERPL CALC-SCNC: 5 MMOL/L (ref 0–11.9)
BASOPHILS # BLD AUTO: 0.6 % (ref 0–1.8)
BASOPHILS # BLD: 0.06 K/UL (ref 0–0.12)
BUN SERPL-MCNC: 14 MG/DL (ref 8–22)
CALCIUM SERPL-MCNC: 8.1 MG/DL (ref 8.5–10.5)
CHLORIDE SERPL-SCNC: 106 MMOL/L (ref 96–112)
CO2 SERPL-SCNC: 26 MMOL/L (ref 20–33)
CREAT SERPL-MCNC: 0.43 MG/DL (ref 0.5–1.4)
EOSINOPHIL # BLD AUTO: 0.03 K/UL (ref 0–0.51)
EOSINOPHIL NFR BLD: 0.3 % (ref 0–6.9)
ERYTHROCYTE [DISTWIDTH] IN BLOOD BY AUTOMATED COUNT: 58.4 FL (ref 35.9–50)
GLUCOSE SERPL-MCNC: 99 MG/DL (ref 65–99)
HCT VFR BLD AUTO: 33.8 % (ref 37–47)
HGB BLD-MCNC: 10.6 G/DL (ref 12–16)
IMM GRANULOCYTES # BLD AUTO: 0.06 K/UL (ref 0–0.11)
IMM GRANULOCYTES NFR BLD AUTO: 0.6 % (ref 0–0.9)
LYMPHOCYTES # BLD AUTO: 1.47 K/UL (ref 1–4.8)
LYMPHOCYTES NFR BLD: 14.4 % (ref 22–41)
MAGNESIUM SERPL-MCNC: 1.8 MG/DL (ref 1.5–2.5)
MCH RBC QN AUTO: 32.9 PG (ref 27–33)
MCHC RBC AUTO-ENTMCNC: 31.4 G/DL (ref 33.6–35)
MCV RBC AUTO: 105 FL (ref 81.4–97.8)
MONOCYTES # BLD AUTO: 1.19 K/UL (ref 0–0.85)
MONOCYTES NFR BLD AUTO: 11.6 % (ref 0–13.4)
NEUTROPHILS # BLD AUTO: 7.41 K/UL (ref 2–7.15)
NEUTROPHILS NFR BLD: 72.5 % (ref 44–72)
NRBC # BLD AUTO: 0 K/UL
NRBC BLD-RTO: 0 /100 WBC
PHOSPHATE SERPL-MCNC: 2.9 MG/DL (ref 2.5–4.5)
PLATELET # BLD AUTO: 204 K/UL (ref 164–446)
PMV BLD AUTO: 9.9 FL (ref 9–12.9)
POTASSIUM SERPL-SCNC: 3.6 MMOL/L (ref 3.6–5.5)
RBC # BLD AUTO: 3.22 M/UL (ref 4.2–5.4)
SODIUM SERPL-SCNC: 137 MMOL/L (ref 135–145)
TRIGL SERPL-MCNC: 113 MG/DL (ref 0–149)
WBC # BLD AUTO: 10.2 K/UL (ref 4.8–10.8)

## 2020-02-05 PROCEDURE — A9270 NON-COVERED ITEM OR SERVICE: HCPCS | Performed by: PSYCHIATRY & NEUROLOGY

## 2020-02-05 PROCEDURE — 700111 HCHG RX REV CODE 636 W/ 250 OVERRIDE (IP): Performed by: PSYCHIATRY & NEUROLOGY

## 2020-02-05 PROCEDURE — 700111 HCHG RX REV CODE 636 W/ 250 OVERRIDE (IP): Performed by: INTERNAL MEDICINE

## 2020-02-05 PROCEDURE — A9270 NON-COVERED ITEM OR SERVICE: HCPCS | Performed by: INTERNAL MEDICINE

## 2020-02-05 PROCEDURE — 700102 HCHG RX REV CODE 250 W/ 637 OVERRIDE(OP): Performed by: INTERNAL MEDICINE

## 2020-02-05 PROCEDURE — 71045 X-RAY EXAM CHEST 1 VIEW: CPT

## 2020-02-05 PROCEDURE — 84100 ASSAY OF PHOSPHORUS: CPT

## 2020-02-05 PROCEDURE — 80048 BASIC METABOLIC PNL TOTAL CA: CPT

## 2020-02-05 PROCEDURE — 85025 COMPLETE CBC W/AUTO DIFF WBC: CPT

## 2020-02-05 PROCEDURE — 700102 HCHG RX REV CODE 250 W/ 637 OVERRIDE(OP): Performed by: PSYCHIATRY & NEUROLOGY

## 2020-02-05 PROCEDURE — 770020 HCHG ROOM/CARE - TELE (206)

## 2020-02-05 PROCEDURE — 700101 HCHG RX REV CODE 250: Performed by: INTERNAL MEDICINE

## 2020-02-05 PROCEDURE — 83735 ASSAY OF MAGNESIUM: CPT

## 2020-02-05 PROCEDURE — 99291 CRITICAL CARE FIRST HOUR: CPT | Performed by: INTERNAL MEDICINE

## 2020-02-05 PROCEDURE — 84478 ASSAY OF TRIGLYCERIDES: CPT

## 2020-02-05 RX ORDER — MAGNESIUM SULFATE HEPTAHYDRATE 40 MG/ML
2 INJECTION, SOLUTION INTRAVENOUS ONCE
Status: COMPLETED | OUTPATIENT
Start: 2020-02-05 | End: 2020-02-05

## 2020-02-05 RX ADMIN — POTASSIUM BICARBONATE 50 MEQ: 978 TABLET, EFFERVESCENT ORAL at 12:00

## 2020-02-05 RX ADMIN — OMEPRAZOLE 40 MG: KIT at 12:50

## 2020-02-05 RX ADMIN — POTASSIUM BICARBONATE 25 MEQ: 978 TABLET, EFFERVESCENT ORAL at 18:00

## 2020-02-05 RX ADMIN — MAGNESIUM SULFATE HEPTAHYDRATE 2 G: 40 INJECTION, SOLUTION INTRAVENOUS at 10:18

## 2020-02-05 RX ADMIN — SERTRALINE 50 MG: 50 TABLET, FILM COATED ORAL at 06:36

## 2020-02-05 RX ADMIN — POTASSIUM BICARBONATE 25 MEQ: 978 TABLET, EFFERVESCENT ORAL at 06:36

## 2020-02-05 RX ADMIN — DEXMEDETOMIDINE HYDROCHLORIDE 0.1 MCG/KG/HR: 100 INJECTION, SOLUTION INTRAVENOUS at 14:30

## 2020-02-05 RX ADMIN — METOPROLOL TARTRATE 25 MG: 25 TABLET, FILM COATED ORAL at 06:36

## 2020-02-05 RX ADMIN — METOPROLOL TARTRATE 25 MG: 25 TABLET, FILM COATED ORAL at 18:00

## 2020-02-05 RX ADMIN — ENOXAPARIN SODIUM 40 MG: 100 INJECTION SUBCUTANEOUS at 18:00

## 2020-02-05 RX ADMIN — FAMOTIDINE 20 MG: 20 TABLET ORAL at 06:36

## 2020-02-05 RX ADMIN — LEVOTHYROXINE SODIUM 125 MCG: 125 TABLET ORAL at 06:36

## 2020-02-05 NOTE — RESPIRATORY CARE
COPD EDUCATION by COPD CLINICAL EDUCATOR  2/5/2020 at 8:00 AM by Emperatriz Christianson, RRT     Patient reviewed by COPD education team. Patient does not have a history or diagnosis of COPD and is a non-smoker, therefore does not qualify for the COPD program.

## 2020-02-05 NOTE — PROGRESS NOTES
Critical Care Progress Note    Date of admission  1/27/2020    Chief Complaint  74 y.o. female admitted 1/27/2020 with severe alcohol withdrawls    Hospital Course    1/30- admitted to ICU for EtOH w/ds. Intubated for airway protection and acute hypoxemic respiratory failure. CVC placed. Started on propofol.  1/31 - Agitated and tachy when sedation weaned. Camilo no meet extubation parameters. Cont prop for w/ds  2/1 - On prop. Does not meet parameters for extubation. Attempted call Mrs. Hutchison's Kenyatta CASTELLON, however I was unable to reach her by phone.       Interval Problem Update  Reviewed last 24 hour events:              - Extubated 2/4              - Tm: Afebrile              - HR: 70s              - SBP: 100-115              - Neuro: Opens eyes to voice,neuro intact              - GI: EN,+BM              - UOP: 1.6 L              - Mora: Yes              - Lines: CVC              - PPx: Home PPI, Lovenox              - CXR (personally reviewed):                  Infusions:  Dexmedetomidine at 0.2-->0.1    ABG- 7.4/34/77/24    Review of Systems  Review of Systems   Unable to perform ROS: Intubated        Vital Signs for last 24 hours   Temp:  [36.7 °C (98 °F)-37.5 °C (99.5 °F)] 36.9 °C (98.4 °F)  Pulse:  [64-79] 64  Resp:  [20-54] 23  BP: ()/(51-77) 96/61  SpO2:  [92 %-98 %] 94 %    Hemodynamic parameters for last 24 hours       Respiratory Information for the last 24 hours       Physical Exam   Physical Exam  Constitutional:       General: She is not in acute distress.  HENT:      Head: Normocephalic and atraumatic.      Right Ear: External ear normal.      Left Ear: External ear normal.      Nose: Nose normal.      Mouth/Throat:      Pharynx: Oropharynx is clear.   Eyes:      Extraocular Movements: Extraocular movements intact.      Pupils: Pupils are equal, round, and reactive to light.   Neck:      Musculoskeletal: Neck supple.   Cardiovascular:      Rate and Rhythm: Normal rate. Rhythm irregular.    Pulmonary:      Effort: Pulmonary effort is normal.      Breath sounds: Normal breath sounds.   Abdominal:      General: Abdomen is flat.   Musculoskeletal:         General: No swelling.   Skin:     General: Skin is warm and dry.      Capillary Refill: Capillary refill takes less than 2 seconds.   Neurological:      Cranial Nerves: No cranial nerve deficit.      Comments: Opens eyes to voice.  Calm.  Nods to questions appropriately.  Moves all extremities equally.  Able to lift and hold her head up off the pillow.             Medications  Current Facility-Administered Medications   Medication Dose Route Frequency Provider Last Rate Last Dose   • omeprazole (FIRST-OMEPRAZOLE) 2 mg/mL oral susp 40 mg  40 mg Enteral Tube DAILY Spenser Sebastian M.D.   40 mg at 02/05/20 1250   • dexmedetomidine (PRECEDEX) 400 mcg/100mL NS premix infusion  0.1-1.5 mcg/kg/hr Intravenous Continuous Spenser Sebastian M.D.   Stopped at 02/05/20 1557   • LORazepam (ATIVAN) injection 2 mg  2 mg Intravenous Q15 MIN PRN Jeffrey Rodriguez M.D.       • Respiratory Care per Protocol   Nebulization Continuous RT Benjie Rosales M.D.       • ipratropium-albuterol (DUONEB) nebulizer solution  3 mL Nebulization Q2HRS PRN (RT) Benjie Rosales M.D.       • senna-docusate (PERICOLACE or SENOKOT S) 8.6-50 MG per tablet 2 Tab  2 Tab Enteral Tube BID Benjie Rosales M.D.   Stopped at 02/01/20 1800    And   • polyethylene glycol/lytes (MIRALAX) PACKET 1 Packet  1 Packet Enteral Tube QDAY PRN Benjie Rosales M.D.        And   • magnesium hydroxide (MILK OF MAGNESIA) suspension 30 mL  30 mL Enteral Tube QDAY PRN Benjie Rosales M.D.        And   • bisacodyl (DULCOLAX) suppository 10 mg  10 mg Rectal QDAY PRN Benjie Rosales M.D.       • MD Alert...ICU Electrolyte Replacement per Pharmacy   Other PHARMACY TO DOSE Benjie oRsales M.D.       • levothyroxine (SYNTHROID) tablet 125 mcg  125 mcg Enteral Tube AM ES Benjie Rosales M.D.   125 mcg at 02/05/20 0636   •  sertraline (ZOLOFT) tablet 50 mg  50 mg Enteral Tube DAILY Benjie Rosales M.D.   50 mg at 02/05/20 0636   • Metoprolol Tartrate (LOPRESSOR) injection 5 mg  5 mg Intravenous Q5 MIN PRN Benjie Rosales M.D.       • metoprolol (LOPRESSOR) tablet 25 mg  25 mg Enteral Tube TWICE DAILY Benjie Rosales M.D.   25 mg at 02/05/20 0636   • potassium bicarbonate (KLYTE) effervescent tablet 25 mEq  25 mEq Enteral Tube BID Benjie Rosales M.D.   25 mEq at 02/05/20 0636   • enoxaparin (LOVENOX) inj 40 mg  40 mg Subcutaneous DAILY Benjie Rosales M.D.   40 mg at 02/04/20 1745   • hydrALAZINE (APRESOLINE) injection 20 mg  20 mg Intravenous Q6HRS PRN Elodia Schreiber M.D.       • labetalol (NORMODYNE/TRANDATE) injection 10 mg  10 mg Intravenous Q4HRS PRN Elodia Schreiber M.D.           Fluids    Intake/Output Summary (Last 24 hours) at 2/5/2020 1721  Last data filed at 2/5/2020 1600  Gross per 24 hour   Intake 993.53 ml   Output 655 ml   Net 338.53 ml       Laboratory  Recent Labs     02/03/20  0430 02/04/20  0412   ISTATAPH 7.561* 7.454   ISTATAPCO2 31.6 33.7   ISTATAPO2 65 77   ISTATATCO2 29 25   [id number] 95 96   ISTATARTHCO3 28.4* 23.7   ISTATARTBE 6* 0   ISTATTEMP 99.1 F 98.6 F   ISTATFIO2 30 30   ISTATSPEC Arterial Arterial   ISTATAPHTC 7.556* 7.454   PBRPTLES2RH 66 77         Recent Labs     02/03/20  0530 02/04/20  0523 02/05/20  0531   SODIUM 138 138 137   POTASSIUM 3.9 4.3 3.6   CHLORIDE 106 105 106   CO2 27 25 26   BUN 9 11 14   CREATININE 0.47* 0.44* 0.43*   MAGNESIUM 2.0 1.9 1.8   PHOSPHORUS 4.3 3.4 2.9   CALCIUM 8.6 8.8 8.1*     Recent Labs     02/03/20 0530 02/04/20 0523 02/05/20 0531   ALTSGPT 7  --   --    ASTSGOT 12  --   --    ALKPHOSPHAT 83  --   --    TBILIRUBIN 0.7  --   --    PREALBUMIN  --  9.0*  --    GLUCOSE 126* 136* 99     Recent Labs     02/03/20 0530 02/04/20 0523 02/05/20 0531   WBC 6.4 8.5 10.2   NEUTSPOLYS 58.40 79.10* 72.50*   LYMPHOCYTES 27.30 11.40* 14.40*   MONOCYTES 11.70 7.80 11.60    EOSINOPHILS 1.70 0.80 0.30   BASOPHILS 0.60 0.50 0.60   ASTSGOT 12  --   --    ALTSGPT 7  --   --    ALKPHOSPHAT 83  --   --    TBILIRUBIN 0.7  --   --      Recent Labs     02/03/20  0530 02/04/20  0523 02/05/20  0531   RBC 3.51* 3.80* 3.22*   HEMOGLOBIN 11.7* 12.5 10.6*   HEMATOCRIT 36.3* 39.2 33.8*   PLATELETCT 143* 180 204       Imaging  X-Ray:  I have personally reviewed the images and compared with prior images. and My impression is: There is a right IJ central venous catheter with the tip near the cavoatrial junction.  There is a feeding tube that descends below the level of the diaphragm.  There is some subtle haziness over the lower lobes bilaterally no focal opacities.No pneumothorax.  Orthopedic hardware noted in the left clavicular region    Assessment/Plan  * Acute respiratory failure with hypoxemia (HCC)  Assessment & Plan  Intubated for airway protection and acute hypoxemic resp failure  Lung protective ventilation strategies  Titrate ventilator prescription to optimize oxygenation, ventilation, and acid base balance.  Trial of extubation        Alcohol withdrawal (HCC)- (present on admission)  Assessment & Plan  Physiologic s/sx improved on decreasing Precedex requirements.  Thiamine/folate/MVI  Continuous cardiac monitoring  Aspiration seizure precautions          Hypomagnesemia- (present on admission)  Assessment & Plan  Replace to keep greater than 2    Hypokalemia- (present on admission)  Assessment & Plan  Replace to keep greater than 4    Acute right ankle pain- (present on admission)  Assessment & Plan  No evidence of fracture on CXR per report    Alcohol dependence (HCC)- (present on admission)  Assessment & Plan  Counseling when clinically appropriate    Depression- (present on admission)  Assessment & Plan  Psychiatry following  Continue Zoloft    HTN (hypertension)- (present on admission)  Assessment & Plan  Controlled  metoprolol    PAF (paroxysmal atrial fibrillation) ([facility])- (present on  admission)  Assessment & Plan  Likely exacerbated by underlying EtOH withdrawal  Rate control improved with metoprolol      May need anticoag prior to d/c in the past she did not want this; given her abuse hx may be a fall/hemorrhage risk         VTE:  Lovenox  Ulcer: H2 Antagonist  Lines: Central Line  Ongoing indication addressed and Mora Catheter  Ongoing indication addressed    I have performed a physical exam and reviewed and updated ROS and Plan today (2/5/2020). In review of yesterday's note (2/4/2020), there are no changes except as documented above.     I have assessed and reassessed her respiratory status with ventilator adjustments, airway mechanics, ventilator waveforms, blood pressure, hemodynamics, cardiovascular status, titration of a propofol gtt as well as her neurologic status.  She is at increased risk for worsening respiratory, cardiovascular and nervous system dysfunction.    Discussed patient condition and risk of morbidity and/or mortality with RN, RT, Pharmacy,  and Charge nurse / hot rounds     Critical care time 45 minutes in directly providing and coordinating critical care and extensive data review.  No time overlap and excludes procedures.

## 2020-02-05 NOTE — CARE PLAN
Problem: Communication  Goal: The ability to communicate needs accurately and effectively will improve  Outcome: MET  Note:   Discussed with pt plan of care     Problem: Safety  Goal: Will remain free from injury  Outcome: MET  Note:   Hourly rounding, 2pt identifiers used     Problem: Infection  Goal: Will remain free from infection  Outcome: MET  Note:   Labs/culture/abx per order

## 2020-02-05 NOTE — PROGRESS NOTES
Patient noted to have pulled Cortrak out approximately 5cm. Tube feeds paused pending Cortrak machine and x-ray verification.

## 2020-02-05 NOTE — CARE PLAN
Problem: Oxygenation:  Goal: Maintain adequate oxygenation dependent on patient condition  Outcome: PROGRESSING AS EXPECTED   2L NC  Problem: Ventilation Defect:  Goal: Ability to achieve and maintain unassisted ventilation or tolerate decreased levels of ventilator support  Outcome: MET   Extubated @ 1610 per Dr. Sebastian.

## 2020-02-05 NOTE — CARE PLAN
Problem: Skin Integrity  Goal: Risk for impaired skin integrity will decrease  Outcome: PROGRESSING AS EXPECTED     Q2 hour turns with pillows for support and repositioning. Patient incontinent; frequent clean up with barrier wipes and barrier cream to sacrum and groin. Frequent Mora care. Skin assessed under devices.     Problem: Safety  Goal: Will remain free from falls  Outcome: PROGRESSING AS EXPECTED     Frequent toileting offered. Bilateral soft wrist restraints in place. Bed in low position and locked. Education on call light. Bed alarm activated.

## 2020-02-05 NOTE — RESPIRATORY CARE
Extubation    Cuff leak noted yes  Stridor present no  Patient toleration pt tolerated well  Events/Summary/Plan: extubated per MD order, placed pt on 2L NC.  No distress noted. (02/04/20 4450)

## 2020-02-06 ENCOUNTER — APPOINTMENT (OUTPATIENT)
Dept: RADIOLOGY | Facility: MEDICAL CENTER | Age: 75
DRG: 896 | End: 2020-02-06
Attending: INTERNAL MEDICINE
Payer: MEDICARE

## 2020-02-06 PROBLEM — E87.6 HYPOKALEMIA: Status: RESOLVED | Noted: 2020-01-27 | Resolved: 2020-02-06

## 2020-02-06 PROBLEM — R53.81 DEBILITY: Status: ACTIVE | Noted: 2020-02-06

## 2020-02-06 PROBLEM — R11.2 NAUSEA AND VOMITING: Status: RESOLVED | Noted: 2020-01-27 | Resolved: 2020-02-06

## 2020-02-06 PROBLEM — D53.9 MACROCYTIC ANEMIA: Status: ACTIVE | Noted: 2020-02-06

## 2020-02-06 PROBLEM — F10.939 ALCOHOL WITHDRAWAL (HCC): Status: RESOLVED | Noted: 2020-01-27 | Resolved: 2020-02-06

## 2020-02-06 PROBLEM — F32.1 CURRENT MODERATE EPISODE OF MAJOR DEPRESSIVE DISORDER WITHOUT PRIOR EPISODE (HCC): Status: ACTIVE | Noted: 2020-01-15

## 2020-02-06 PROBLEM — E86.0 DEHYDRATION: Status: RESOLVED | Noted: 2020-01-27 | Resolved: 2020-02-06

## 2020-02-06 PROBLEM — E83.42 HYPOMAGNESEMIA: Status: RESOLVED | Noted: 2020-01-27 | Resolved: 2020-02-06

## 2020-02-06 PROBLEM — M25.571 ACUTE RIGHT ANKLE PAIN: Status: RESOLVED | Noted: 2020-01-27 | Resolved: 2020-02-06

## 2020-02-06 LAB
ANION GAP SERPL CALC-SCNC: 6 MMOL/L (ref 0–11.9)
BASOPHILS # BLD AUTO: 0.7 % (ref 0–1.8)
BASOPHILS # BLD: 0.07 K/UL (ref 0–0.12)
BUN SERPL-MCNC: 11 MG/DL (ref 8–22)
CALCIUM SERPL-MCNC: 7.9 MG/DL (ref 8.5–10.5)
CHLORIDE SERPL-SCNC: 106 MMOL/L (ref 96–112)
CO2 SERPL-SCNC: 25 MMOL/L (ref 20–33)
CREAT SERPL-MCNC: 0.43 MG/DL (ref 0.5–1.4)
EKG IMPRESSION: NORMAL
EOSINOPHIL # BLD AUTO: 0.07 K/UL (ref 0–0.51)
EOSINOPHIL NFR BLD: 0.7 % (ref 0–6.9)
ERYTHROCYTE [DISTWIDTH] IN BLOOD BY AUTOMATED COUNT: 57 FL (ref 35.9–50)
GLUCOSE SERPL-MCNC: 113 MG/DL (ref 65–99)
HCT VFR BLD AUTO: 34.1 % (ref 37–47)
HGB BLD-MCNC: 10.7 G/DL (ref 12–16)
IMM GRANULOCYTES # BLD AUTO: 0.11 K/UL (ref 0–0.11)
IMM GRANULOCYTES NFR BLD AUTO: 1.1 % (ref 0–0.9)
INR PPP: 1.05 (ref 0.87–1.13)
LYMPHOCYTES # BLD AUTO: 1.66 K/UL (ref 1–4.8)
LYMPHOCYTES NFR BLD: 16.5 % (ref 22–41)
MAGNESIUM SERPL-MCNC: 2 MG/DL (ref 1.5–2.5)
MCH RBC QN AUTO: 32.6 PG (ref 27–33)
MCHC RBC AUTO-ENTMCNC: 31.4 G/DL (ref 33.6–35)
MCV RBC AUTO: 104 FL (ref 81.4–97.8)
MONOCYTES # BLD AUTO: 1.22 K/UL (ref 0–0.85)
MONOCYTES NFR BLD AUTO: 12.1 % (ref 0–13.4)
NEUTROPHILS # BLD AUTO: 6.95 K/UL (ref 2–7.15)
NEUTROPHILS NFR BLD: 68.9 % (ref 44–72)
NRBC # BLD AUTO: 0 K/UL
NRBC BLD-RTO: 0 /100 WBC
PHOSPHATE SERPL-MCNC: 2.1 MG/DL (ref 2.5–4.5)
PLATELET # BLD AUTO: 273 K/UL (ref 164–446)
PMV BLD AUTO: 10 FL (ref 9–12.9)
POTASSIUM SERPL-SCNC: 3.7 MMOL/L (ref 3.6–5.5)
PROTHROMBIN TIME: 14 SEC (ref 12–14.6)
RBC # BLD AUTO: 3.28 M/UL (ref 4.2–5.4)
SODIUM SERPL-SCNC: 137 MMOL/L (ref 135–145)
WBC # BLD AUTO: 10.1 K/UL (ref 4.8–10.8)

## 2020-02-06 PROCEDURE — 700102 HCHG RX REV CODE 250 W/ 637 OVERRIDE(OP): Performed by: INTERNAL MEDICINE

## 2020-02-06 PROCEDURE — 94760 N-INVAS EAR/PLS OXIMETRY 1: CPT

## 2020-02-06 PROCEDURE — 99232 SBSQ HOSP IP/OBS MODERATE 35: CPT | Performed by: INTERNAL MEDICINE

## 2020-02-06 PROCEDURE — 700111 HCHG RX REV CODE 636 W/ 250 OVERRIDE (IP): Performed by: INTERNAL MEDICINE

## 2020-02-06 PROCEDURE — A9270 NON-COVERED ITEM OR SERVICE: HCPCS | Performed by: PSYCHIATRY & NEUROLOGY

## 2020-02-06 PROCEDURE — 93005 ELECTROCARDIOGRAM TRACING: CPT | Performed by: INTERNAL MEDICINE

## 2020-02-06 PROCEDURE — 99233 SBSQ HOSP IP/OBS HIGH 50: CPT | Performed by: INTERNAL MEDICINE

## 2020-02-06 PROCEDURE — 80048 BASIC METABOLIC PNL TOTAL CA: CPT

## 2020-02-06 PROCEDURE — 700101 HCHG RX REV CODE 250: Performed by: INTERNAL MEDICINE

## 2020-02-06 PROCEDURE — 84100 ASSAY OF PHOSPHORUS: CPT

## 2020-02-06 PROCEDURE — 700102 HCHG RX REV CODE 250 W/ 637 OVERRIDE(OP): Performed by: PSYCHIATRY & NEUROLOGY

## 2020-02-06 PROCEDURE — 94640 AIRWAY INHALATION TREATMENT: CPT

## 2020-02-06 PROCEDURE — 700101 HCHG RX REV CODE 250: Performed by: PSYCHIATRY & NEUROLOGY

## 2020-02-06 PROCEDURE — 700111 HCHG RX REV CODE 636 W/ 250 OVERRIDE (IP): Performed by: HOSPITALIST

## 2020-02-06 PROCEDURE — 92610 EVALUATE SWALLOWING FUNCTION: CPT

## 2020-02-06 PROCEDURE — 99232 SBSQ HOSP IP/OBS MODERATE 35: CPT | Performed by: PSYCHIATRY & NEUROLOGY

## 2020-02-06 PROCEDURE — A9270 NON-COVERED ITEM OR SERVICE: HCPCS | Performed by: INTERNAL MEDICINE

## 2020-02-06 PROCEDURE — 93010 ELECTROCARDIOGRAM REPORT: CPT | Performed by: INTERNAL MEDICINE

## 2020-02-06 PROCEDURE — A9270 NON-COVERED ITEM OR SERVICE: HCPCS | Performed by: HOSPITALIST

## 2020-02-06 PROCEDURE — 770020 HCHG ROOM/CARE - TELE (206)

## 2020-02-06 PROCEDURE — 700102 HCHG RX REV CODE 250 W/ 637 OVERRIDE(OP): Performed by: HOSPITALIST

## 2020-02-06 PROCEDURE — 85025 COMPLETE CBC W/AUTO DIFF WBC: CPT

## 2020-02-06 PROCEDURE — 36415 COLL VENOUS BLD VENIPUNCTURE: CPT

## 2020-02-06 PROCEDURE — 85610 PROTHROMBIN TIME: CPT

## 2020-02-06 PROCEDURE — 83735 ASSAY OF MAGNESIUM: CPT

## 2020-02-06 RX ORDER — ONDANSETRON 2 MG/ML
4 INJECTION INTRAMUSCULAR; INTRAVENOUS EVERY 6 HOURS PRN
Status: DISCONTINUED | OUTPATIENT
Start: 2020-02-06 | End: 2020-02-06

## 2020-02-06 RX ORDER — SUCRALFATE ORAL 1 G/10ML
1 SUSPENSION ORAL EVERY 6 HOURS
Status: DISCONTINUED | OUTPATIENT
Start: 2020-02-06 | End: 2020-02-06

## 2020-02-06 RX ORDER — ONDANSETRON 2 MG/ML
4 INJECTION INTRAMUSCULAR; INTRAVENOUS EVERY 4 HOURS PRN
Status: DISCONTINUED | OUTPATIENT
Start: 2020-02-06 | End: 2020-02-09

## 2020-02-06 RX ORDER — DILTIAZEM HYDROCHLORIDE 5 MG/ML
0.25 INJECTION INTRAVENOUS ONCE
Status: COMPLETED | OUTPATIENT
Start: 2020-02-06 | End: 2020-02-06

## 2020-02-06 RX ORDER — MAGNESIUM SULFATE HEPTAHYDRATE 40 MG/ML
2 INJECTION, SOLUTION INTRAVENOUS ONCE
Status: COMPLETED | OUTPATIENT
Start: 2020-02-06 | End: 2020-02-06

## 2020-02-06 RX ORDER — DIGOXIN 125 MCG
125 TABLET ORAL DAILY
Status: DISCONTINUED | OUTPATIENT
Start: 2020-02-08 | End: 2020-02-07

## 2020-02-06 RX ORDER — DIGOXIN 0.25 MG/ML
250 INJECTION INTRAMUSCULAR; INTRAVENOUS ONCE
Status: COMPLETED | OUTPATIENT
Start: 2020-02-06 | End: 2020-02-06

## 2020-02-06 RX ORDER — DIGOXIN 0.25 MG/ML
500 INJECTION INTRAMUSCULAR; INTRAVENOUS ONCE
Status: COMPLETED | OUTPATIENT
Start: 2020-02-06 | End: 2020-02-06

## 2020-02-06 RX ORDER — DIGOXIN 125 MCG
250 TABLET ORAL ONCE
Status: COMPLETED | OUTPATIENT
Start: 2020-02-07 | End: 2020-02-07

## 2020-02-06 RX ORDER — FOLIC ACID 1 MG/1
1 TABLET ORAL DAILY
Status: DISCONTINUED | OUTPATIENT
Start: 2020-02-06 | End: 2020-02-07

## 2020-02-06 RX ORDER — DIGOXIN 125 MCG
250 TABLET ORAL ONCE
Status: DISCONTINUED | OUTPATIENT
Start: 2020-02-07 | End: 2020-02-06

## 2020-02-06 RX ORDER — FUROSEMIDE 10 MG/ML
10 INJECTION INTRAMUSCULAR; INTRAVENOUS
Status: DISCONTINUED | OUTPATIENT
Start: 2020-02-06 | End: 2020-02-09

## 2020-02-06 RX ORDER — ZOLPIDEM TARTRATE 5 MG/1
5 TABLET ORAL ONCE
Status: COMPLETED | OUTPATIENT
Start: 2020-02-06 | End: 2020-02-06

## 2020-02-06 RX ORDER — SODIUM CHLORIDE FOR INHALATION 3 %
3 VIAL, NEBULIZER (ML) INHALATION
Status: DISCONTINUED | OUTPATIENT
Start: 2020-02-06 | End: 2020-02-07

## 2020-02-06 RX ORDER — ONDANSETRON 4 MG/1
4 TABLET, ORALLY DISINTEGRATING ORAL EVERY 4 HOURS PRN
Status: DISCONTINUED | OUTPATIENT
Start: 2020-02-06 | End: 2020-02-06

## 2020-02-06 RX ORDER — SUCRALFATE ORAL 1 G/10ML
1 SUSPENSION ORAL EVERY 6 HOURS
Status: DISCONTINUED | OUTPATIENT
Start: 2020-02-06 | End: 2020-02-08

## 2020-02-06 RX ORDER — THIAMINE MONONITRATE (VIT B1) 100 MG
100 TABLET ORAL DAILY
Status: DISCONTINUED | OUTPATIENT
Start: 2020-02-06 | End: 2020-02-06

## 2020-02-06 RX ORDER — ACETAMINOPHEN 325 MG/1
650 TABLET ORAL EVERY 6 HOURS PRN
Status: DISCONTINUED | OUTPATIENT
Start: 2020-02-06 | End: 2020-02-06

## 2020-02-06 RX ORDER — ACETAMINOPHEN 325 MG/1
650 TABLET ORAL EVERY 6 HOURS PRN
Status: DISCONTINUED | OUTPATIENT
Start: 2020-02-06 | End: 2020-02-07

## 2020-02-06 RX ORDER — ACETYLCYSTEINE 200 MG/ML
3 SOLUTION ORAL; RESPIRATORY (INHALATION)
Status: DISCONTINUED | OUTPATIENT
Start: 2020-02-06 | End: 2020-02-07

## 2020-02-06 RX ORDER — DIGOXIN 125 MCG
125 TABLET ORAL DAILY
Status: DISCONTINUED | OUTPATIENT
Start: 2020-02-08 | End: 2020-02-06

## 2020-02-06 RX ORDER — THIAMINE MONONITRATE (VIT B1) 100 MG
100 TABLET ORAL DAILY
Status: DISCONTINUED | OUTPATIENT
Start: 2020-02-06 | End: 2020-02-08

## 2020-02-06 RX ORDER — IPRATROPIUM BROMIDE AND ALBUTEROL SULFATE 2.5; .5 MG/3ML; MG/3ML
3 SOLUTION RESPIRATORY (INHALATION)
Status: DISCONTINUED | OUTPATIENT
Start: 2020-02-06 | End: 2020-02-06

## 2020-02-06 RX ORDER — FOLIC ACID 1 MG/1
1 TABLET ORAL DAILY
Status: DISCONTINUED | OUTPATIENT
Start: 2020-02-06 | End: 2020-02-06

## 2020-02-06 RX ORDER — ONDANSETRON 4 MG/1
4 TABLET, ORALLY DISINTEGRATING ORAL EVERY 4 HOURS PRN
Status: DISCONTINUED | OUTPATIENT
Start: 2020-02-06 | End: 2020-02-08

## 2020-02-06 RX ADMIN — IPRATROPIUM BROMIDE 0.5 MG: 0.5 SOLUTION RESPIRATORY (INHALATION) at 18:36

## 2020-02-06 RX ADMIN — METOPROLOL TARTRATE 5 MG: 5 INJECTION, SOLUTION INTRAVENOUS at 00:02

## 2020-02-06 RX ADMIN — FUROSEMIDE 10 MG: 10 INJECTION, SOLUTION INTRAVENOUS at 14:49

## 2020-02-06 RX ADMIN — DIGOXIN 250 MCG: 0.25 INJECTION INTRAMUSCULAR; INTRAVENOUS at 21:56

## 2020-02-06 RX ADMIN — DIGOXIN 500 MCG: 0.25 INJECTION INTRAMUSCULAR; INTRAVENOUS at 14:42

## 2020-02-06 RX ADMIN — THERA TABS 1 TABLET: TAB at 15:01

## 2020-02-06 RX ADMIN — ONDANSETRON 4 MG: 2 INJECTION INTRAMUSCULAR; INTRAVENOUS at 11:13

## 2020-02-06 RX ADMIN — ENOXAPARIN SODIUM 80 MG: 80 INJECTION, SOLUTION INTRAVENOUS; SUBCUTANEOUS at 14:48

## 2020-02-06 RX ADMIN — ZOLPIDEM TARTRATE 5 MG: 5 TABLET ORAL at 00:56

## 2020-02-06 RX ADMIN — DILTIAZEM HYDROCHLORIDE 19.25 MG: 5 INJECTION INTRAVENOUS at 03:46

## 2020-02-06 RX ADMIN — SERTRALINE 50 MG: 50 TABLET, FILM COATED ORAL at 06:11

## 2020-02-06 RX ADMIN — ACETAMINOPHEN 650 MG: 325 TABLET, FILM COATED ORAL at 21:58

## 2020-02-06 RX ADMIN — ACETAMINOPHEN 650 MG: 325 TABLET, FILM COATED ORAL at 14:49

## 2020-02-06 RX ADMIN — METOPROLOL TARTRATE 25 MG: 25 TABLET, FILM COATED ORAL at 12:40

## 2020-02-06 RX ADMIN — POTASSIUM BICARBONATE 25 MEQ: 978 TABLET, EFFERVESCENT ORAL at 14:49

## 2020-02-06 RX ADMIN — METOPROLOL TARTRATE 25 MG: 25 TABLET, FILM COATED ORAL at 23:10

## 2020-02-06 RX ADMIN — POTASSIUM BICARBONATE 25 MEQ: 978 TABLET, EFFERVESCENT ORAL at 06:11

## 2020-02-06 RX ADMIN — METOPROLOL TARTRATE 25 MG: 25 TABLET, FILM COATED ORAL at 06:11

## 2020-02-06 RX ADMIN — METOPROLOL TARTRATE 5 MG: 5 INJECTION, SOLUTION INTRAVENOUS at 11:49

## 2020-02-06 RX ADMIN — HYDRALAZINE HYDROCHLORIDE 20 MG: 20 INJECTION, SOLUTION INTRAMUSCULAR; INTRAVENOUS at 11:10

## 2020-02-06 RX ADMIN — SUCRALFATE 1 G: 1 SUSPENSION ORAL at 18:00

## 2020-02-06 RX ADMIN — ACETYLCYSTEINE 3 ML: 200 INHALANT RESPIRATORY (INHALATION) at 18:40

## 2020-02-06 RX ADMIN — MAGNESIUM SULFATE HEPTAHYDRATE 2 G: 40 INJECTION, SOLUTION INTRAVENOUS at 14:49

## 2020-02-06 RX ADMIN — DIBASIC SODIUM PHOSPHATE, MONOBASIC POTASSIUM PHOSPHATE AND MONOBASIC SODIUM PHOSPHATE 2 TABLET: 852; 155; 130 TABLET ORAL at 14:59

## 2020-02-06 RX ADMIN — METOPROLOL TARTRATE 5 MG: 5 INJECTION, SOLUTION INTRAVENOUS at 00:48

## 2020-02-06 RX ADMIN — LEVOTHYROXINE SODIUM 125 MCG: 125 TABLET ORAL at 06:11

## 2020-02-06 RX ADMIN — OMEPRAZOLE 40 MG: KIT at 06:11

## 2020-02-06 RX ADMIN — FOLIC ACID 1 MG: 1 TABLET ORAL at 15:00

## 2020-02-06 RX ADMIN — Medication 100 MG: at 15:02

## 2020-02-06 RX ADMIN — ONDANSETRON 4 MG: 2 INJECTION INTRAMUSCULAR; INTRAVENOUS at 15:35

## 2020-02-06 ASSESSMENT — ENCOUNTER SYMPTOMS
ABDOMINAL PAIN: 0
NECK PAIN: 0
WEAKNESS: 1
CONSTIPATION: 0
DOUBLE VISION: 0
PALPITATIONS: 0
COUGH: 1
DEPRESSION: 0
DIARRHEA: 1
BACK PAIN: 0
NERVOUS/ANXIOUS: 1
CHILLS: 0
HEADACHES: 0
NAUSEA: 0
FEVER: 0
BLOOD IN STOOL: 0
DIZZINESS: 0
SHORTNESS OF BREATH: 1
SPUTUM PRODUCTION: 1
PND: 0
FALLS: 0
ROS GI COMMENTS: FMS IN PLACE
HEARTBURN: 0
VOMITING: 0
HEMOPTYSIS: 0
MYALGIAS: 0
WHEEZING: 0
BLURRED VISION: 0

## 2020-02-06 ASSESSMENT — CHA2DS2 SCORE
DIABETES: NO
AGE 65 TO 74: YES
VASCULAR DISEASE: NO
CHA2DS2 VASC SCORE: 3
CHF OR LEFT VENTRICULAR DYSFUNCTION: NO
PRIOR STROKE OR TIA OR THROMBOEMBOLISM: NO
AGE 75 OR GREATER: NO
SEX: FEMALE
HYPERTENSION: YES

## 2020-02-06 ASSESSMENT — LIFESTYLE VARIABLES: SUBSTANCE_ABUSE: 1

## 2020-02-06 NOTE — CARE PLAN
Problem: Psychosocial Needs:  Goal: Level of anxiety will decrease  Outcome: PROGRESSING AS EXPECTED     Problem: Safety  Goal: Will remain free from falls  Outcome: PROGRESSING AS EXPECTED  Note:   Fall precautions in place. Bed in lowest position. Non-skid socks in place. Personal possessions within reach. Mobility sign on door. Bed-alarm on. Call light within reach. Pt educated regarding fall prevention and states understanding.

## 2020-02-06 NOTE — ASSESSMENT & PLAN NOTE
Status post critical illness  Required mechanical ventilation this hospitalization  Initiate physical therapy, Occupational Therapy, speech therapy  Patient has underlying dysphagia from recent mechanical ventilation/critical illness  Advance diet per SLP recommendations  Aspiration, seizure, fall precautions in place  Anticipate patient will need postacute placement, SNF orders placed  Discussed with case management/social workers regarding disposition planning

## 2020-02-06 NOTE — ASSESSMENT & PLAN NOTE
Alcoholism related  No evidence of acute bleeding    Patient initiated on anticoagulation for underlying atrial fibrillation, recent critical illness/history of alcohol abuse with risk factors for peptic ulcer disease/alcoholic gastritis, given this patient will be maintained on omeprazole/sucralfate.    Monitor Hb / Restrictive transfusion strategy

## 2020-02-06 NOTE — PROGRESS NOTES
Order received for midline placement. This RN at beside to place, pt refusing, reports CVC is painless and does not want midline placed. Education provided, pt continues to refuse. Primary day shift  RN notified. Please call [phone] if pt agreeable to midline placement.

## 2020-02-06 NOTE — PROGRESS NOTES
1041 Spoke with Primary RN who stated patient doesn't need Midline at this time will call if they need any help with a PIV.

## 2020-02-06 NOTE — PROGRESS NOTES
Received report from ICU RN. Pt transferred to unit with ACLS RN. Hooked to tele monitor, Afib 120. Pt oriented to room. Call light and personal belongings within reach, bed in lowest locked position. POC addressed. No further questions at this time.

## 2020-02-06 NOTE — PROGRESS NOTES
Pulmonary Care Progress Note    Date of admission  1/27/2020    Chief Complaint  74 y.o. female admitted 1/27/2020 with severe alcohol withdrawls    Hospital Course    1/30- admitted to ICU for EtOH w/ds. Intubated for airway protection and acute hypoxemic respiratory failure. CVC placed. Started on propofol.    Interval Problem Update              - Extubated 2/4 2/6, Chart review from the past 24 hours includes imaging, laboratory studies, vital signs and notes available.  Pertinent data for today's visit includes transfer to telemetry, still on core track tube feeds, having some emesis.  No obvious aspiration.  Nasal prong oxygen.  Minimal scattered rhonchi but no respiratory distress.  Bedside RN indicates swallow eval pending.  Cautioned elevating the head of bed while still on tube feeds.  Respiratory status slowly better, still fragile               Review of Systems   Review of Systems   Constitutional: Negative for chills and fever.   HENT: Negative for congestion and sore throat.    Eyes: Negative for photophobia and discharge.   Respiratory: Positive for cough, sputum production, shortness of breath and wheezing.    Cardiovascular: Negative for chest pain, palpitations and leg swelling.   Gastrointestinal: Positive for  nausea and vomiting.   Musculoskeletal: Negative for back pain and myalgias.   Neurological: Negative for focal weakness, weakness and headaches.     Vital Signs for last 24 hours   Temp:  [36.7 °C (98.1 °F)-37.4 °C (99.3 °F)] 37.3 °C (99.1 °F)  Pulse:  [] 126  Resp:  [17-36] 17  BP: ()/() 132/86  SpO2:  [93 %-97 %] 93 %      Physical Exam  Constitutional:       General: She is not in acute distress.  HENT:      Head: Normocephalic and atraumatic.      Right Ear: External ear normal.      Left Ear: External ear normal.      Nose: Nose normal.      Mouth/Throat:      Pharynx: Oropharynx is clear.   Eyes:      Extraocular Movements: Extraocular movements intact.       Pupils: Pupils are equal, round, and reactive to light.   Neck:      Musculoskeletal: Neck supple.   Cardiovascular:      Rate and Rhythm: Normal rate. Rhythm irregular.   Pulmonary:      Effort: Pulmonary effort is normal.      Breath sounds: Rhonchi present.   Abdominal:      General: Abdomen is flat. There is no distension.      Tenderness: There is no tenderness. There is no guarding.      Comments: Emesis noted   Musculoskeletal:         General: No swelling.   Skin:     General: Skin is warm and dry.      Capillary Refill: Capillary refill takes less than 2 seconds.   Neurological:      Cranial Nerves: No cranial nerve deficit.      Comments: Opens eyes to voice.  Calm.  Nods to questions appropriately.  Moves all extremities equally.  Able to lift and hold her head up off the pillow.             Medications  Current Facility-Administered Medications   Medication Dose Route Frequency Provider Last Rate Last Dose   • omeprazole (FIRST-OMEPRAZOLE) 2 mg/mL oral susp 40 mg  40 mg Enteral Tube DAILY Spenser Sebastian M.D.   40 mg at 02/06/20 0611   • LORazepam (ATIVAN) injection 2 mg  2 mg Intravenous Q15 MIN PRN Jeffrey Rodriguez M.D.       • Respiratory Care per Protocol   Nebulization Continuous RT Benjie Rosales M.D.       • ipratropium-albuterol (DUONEB) nebulizer solution  3 mL Nebulization Q2HRS PRN (RT) Benjie Rosales M.D.       • senna-docusate (PERICOLACE or SENOKOT S) 8.6-50 MG per tablet 2 Tab  2 Tab Enteral Tube BID Benjie Rosales M.D.   Stopped at 02/01/20 1800    And   • polyethylene glycol/lytes (MIRALAX) PACKET 1 Packet  1 Packet Enteral Tube QDAY PRN Benjie Rosales M.D.        And   • magnesium hydroxide (MILK OF MAGNESIA) suspension 30 mL  30 mL Enteral Tube QDAY PRN Benjie Rosales M.D.        And   • bisacodyl (DULCOLAX) suppository 10 mg  10 mg Rectal QDAY PRN Benjie Rosales M.D.       • levothyroxine (SYNTHROID) tablet 125 mcg  125 mcg Enteral Tube AM ES Benjie Rosales M.D.   125 mcg at  02/06/20 0611   • sertraline (ZOLOFT) tablet 50 mg  50 mg Enteral Tube DAILY Benjie Rosales M.D.   50 mg at 02/06/20 0611   • Metoprolol Tartrate (LOPRESSOR) injection 5 mg  5 mg Intravenous Q5 MIN PRN Benjie Rosales M.D.   5 mg at 02/06/20 0048   • metoprolol (LOPRESSOR) tablet 25 mg  25 mg Enteral Tube TWICE DAILY Benjie Rosales M.D.   25 mg at 02/06/20 0611   • potassium bicarbonate (KLYTE) effervescent tablet 25 mEq  25 mEq Enteral Tube BID Benjie Rosales M.D.   25 mEq at 02/06/20 0611   • enoxaparin (LOVENOX) inj 40 mg  40 mg Subcutaneous DAILY Benjie Rosales M.D.   40 mg at 02/05/20 1800   • hydrALAZINE (APRESOLINE) injection 20 mg  20 mg Intravenous Q6HRS PRN Elodia Schreiber M.D.       • labetalol (NORMODYNE/TRANDATE) injection 10 mg  10 mg Intravenous Q4HRS PRN Elodia Schreiber M.D.           Fluids    Intake/Output Summary (Last 24 hours) at 2/6/2020 0826  Last data filed at 2/6/2020 0520  Gross per 24 hour   Intake 785.11 ml   Output 960 ml   Net -174.89 ml       Laboratory  Recent Labs     02/04/20  0412   ISTATAPH 7.454   ISTATAPCO2 33.7   ISTATAPO2 77   ISTATATCO2 25   DJPNJIT8LEG 96   ISTATARTHCO3 23.7   ISTATARTBE 0   ISTATTEMP 98.6 F   ISTATFIO2 30   ISTATSPEC Arterial   ISTATAPHTC 7.454   NFCRPNZA7GE 77         Recent Labs     02/04/20  0523 02/05/20  0531 02/06/20  0339   SODIUM 138 137 137   POTASSIUM 4.3 3.6 3.7   CHLORIDE 105 106 106   CO2 25 26 25   BUN 11 14 11   CREATININE 0.44* 0.43* 0.43*   MAGNESIUM 1.9 1.8 2.0   PHOSPHORUS 3.4 2.9 2.1*   CALCIUM 8.8 8.1* 7.9*     Recent Labs     02/04/20 0523 02/05/20 0531 02/06/20  0339   PREALBUMIN 9.0*  --   --    GLUCOSE 136* 99 113*     Recent Labs     02/04/20 0523 02/05/20  0531 02/06/20  0339   WBC 8.5 10.2 10.1   NEUTSPOLYS 79.10* 72.50* 68.90   LYMPHOCYTES 11.40* 14.40* 16.50*   MONOCYTES 7.80 11.60 12.10   EOSINOPHILS 0.80 0.30 0.70   BASOPHILS 0.50 0.60 0.70     Recent Labs     02/04/20 0523 02/05/20  0531 02/06/20  0339   RBC 3.80*  3.22* 3.28*   HEMOGLOBIN 12.5 10.6* 10.7*   HEMATOCRIT 39.2 33.8* 34.1*   PLATELETCT 180 204 273           Assessment/Plan  * Acute respiratory failure with hypoxemia (HCC)  Assessment & Plan  Was Intubated for airway protection and acute hypoxemic resp failure  Lung protective ventilation strategies were utilized  Titrated ventilator prescription to optimize oxygenation, ventilation, and acid base balance.  Trial of extubation, successful        Alcohol withdrawal (HCC)- (present on admission)  Assessment & Plan  Physiologic s/sx improved   Thiamine/folate/MVI  Continuous cardiac monitoring  Aspiration seizure precautions          Hypomagnesemia- (present on admission)  Assessment & Plan  Replace to keep greater than 2    Alcohol dependence (HCC)- (present on admission)  Assessment & Plan  Counseling when clinically appropriate    Depression- (present on admission)  Assessment & Plan  Psychiatry following  Continue Zoloft    HTN (hypertension)- (present on admission)  Assessment & Plan  Controlled  metoprolol    PAF (paroxysmal atrial fibrillation) (HCC)- (present on admission)  Assessment & Plan  Likely exacerbated by underlying EtOH withdrawal  Rate control improved with metoprolol      Considered anticoag prior to d/c in the past she did not want this; given her abuse hx may be a fall/hemorrhage risk           I have performed a physical exam and reviewed and updated ROS and Plan today (2/6/2020). In review of yesterday's note (2/5/2020), there are no changes except as documented above.     Isis Hughes MD , FCCP, Pulmonary Service

## 2020-02-06 NOTE — PROGRESS NOTES
Dr. Carlos Ramos notified that pt is sustained in Afib 130-140 range despite two doses IV Metoprolol. IV Cardizem ordered.

## 2020-02-06 NOTE — PROGRESS NOTES
2 RN skin check complete with Shefali.   Devices in place pulse ox on R earlobe, Right triple IJ, Mora, BMS, Core track.  Skin assessed under devices yes.  Confirmed pressure ulcers found on N/A.  New potential pressure ulcers noted on Right back of ear and right earlobe. Wound consult placed yes.  The following interventions in place Q2 turns, barrier cream, grey foam pads, silicone oxygen tubing.     Left ear red and blanching. Right ear has small scab behind ear and below earlobe. Pictures will be uploaded to [facility].  Nare with coretrack assessed, no sign of skin breakdown.   Central line dressing clean, dry and intact. Redness around stitching.   Trunk intact. Bruising from lovenox injections.   Sacrum is red, excoriated, and blanching. A spot that is more red than the rest above the coccyx, blanching.  Moisture related.   Bilateral lower extremities intact and blanching.   Bilateral heels red and blanching.

## 2020-02-06 NOTE — PROGRESS NOTES
MD notified of patient remaining in A-Fib 120s-140s, one time dose of metoprolol ordered. Will admin and monitor.

## 2020-02-06 NOTE — THERAPY
"Speech Language Therapy Clinical Swallow Evaluation completed.  Functional Status: Pt was A&Ox4, cooperative. Oral mech exam was WNL. Dentition is intact. Vocal quality is mildly hoarse. Pt was presented with ice chips (5), nectars via tsp/cup (1 oz), thins via tsp/cup (1 oz), purees via tsp (3 oz), soft solids (2 oz), dry solids (cracker). Oral phase was mildly prolonged for dry solids with pt reporting food sticking, needing NTL rinse to clear. Otherwise, mastication and bolus manipulation was functional. Pharyngeal swallow response was timely. Hyolaryngeal excursion was palpated as complete. Delayed breathy sounding cough occurred x2 after intake of thins via cup. No s/sx of aspiration occurred with all other textures trialed. Pt did have some difficulty self-feeding d/t hand tremors and may need assistance with tray setup/feeding. Recommend initiating a diet of Dysphagia 3/NTL with direct supervision and assistance with feeding as needed. Please wait to pull Cortrak until pt has tolerated at least 50% of 1-2 meals without any overt s/sx of aspiration.     Recommendations - Diet:  Dysphagia III, Nectar Thick Liquid                          Strategies: Direct supervision during meals and Head of Bed at 90 Degrees                          Medication Administration: Float Whole with Puree, Whole with Liquid Wash (per pt preference)  Plan of Care: Will benefit from Speech Therapy 3 times per week  Post-Acute Therapy: Recommend outpatient or home health transitional care services for continued speech therapy services      See \"Rehab Therapy-Acute\" Patient Summary Report for complete documentation.   "

## 2020-02-06 NOTE — PSYCHIATRY
"PSYCHIATRIC FOLLOW UP:    Reason for admission : ETOH withdrawal   Reason for consult:  Depression   Requesting physician: arpit.     HPI:       Pt is a 73 y/o woman with severe alcohol withdrawal, was intubated and treated in the ICU. Transferred to [facility] yesterday evening. She did have sustained a fib 130-140- range yesterday even after two doses of metoprolol, then received cardizem. Refused midline placement today. Has been depressed and tearful. She is still confused but more able to respond.     Review of Systems:  Review of Systems   Constitutional: Positive for malaise/fatigue. Negative for diaphoresis.   HENT: Negative.    Respiratory: Positive for shortness of breath. Negative for cough.    Cardiovascular: Positive for palpitations. Negative for leg swelling.   Gastrointestinal: Positive for nausea. Negative for diarrhea.   Musculoskeletal: Negative.    Skin: Negative.    Neurological: Positive for speech change (.+ams) and weakness.   Endo/Heme/Allergies: Negative.    Psychiatric/Behavioral: Positive for depression and substance abuse. The patient is nervous/anxious.           Psychiatric Examination: observed phenomenon:  Vitals: /86   Pulse (!) 126   Temp 37.3 °C (99.1 °F) (Temporal)   Resp 17   Ht 1.676 m (5' 6\")   Wt 77 kg (169 lb 12.1 oz)   SpO2 93%   BMI 27.40 kg/m²  Body mass index is 27.4 kg/m².    Appearance: grooming fair   Muscle Strength/Tone: psychomotor retardation   Gait/Station: unable to ambulate    Speech: minimal, slightly slurred  Thought Process: confused   Associations:no loose associations   Abnormal/Psychotic Thoughts (ex): uhnable to fully assess   Insight/Judgement: appears impaired  Memory:unable to fully assess  Attention/Concentration: decreased  Language: improving   Fund of Knowledge:improving   Mood:          Depressed  Affect:         blunted  SI/HI:   denies      Soc history:    Pt lost two children in the last 5 years, her  got a hematoma and is in " assisted living. Son rebecca checks on her daily.     Lab results/tests:   Recent Results (from the past 48 hour(s))   CBC with Differential    Collection Time: 02/05/20  5:31 AM   Result Value Ref Range    WBC 10.2 4.8 - 10.8 K/uL    RBC 3.22 (L) 4.20 - 5.40 M/uL    Hemoglobin 10.6 (L) 12.0 - 16.0 g/dL    Hematocrit 33.8 (L) 37.0 - 47.0 %    .0 (H) 81.4 - 97.8 fL    MCH 32.9 27.0 - 33.0 pg    MCHC 31.4 (L) 33.6 - 35.0 g/dL    RDW 58.4 (H) 35.9 - 50.0 fL    Platelet Count 204 164 - 446 K/uL    MPV 9.9 9.0 - 12.9 fL    Neutrophils-Polys 72.50 (H) 44.00 - 72.00 %    Lymphocytes 14.40 (L) 22.00 - 41.00 %    Monocytes 11.60 0.00 - 13.40 %    Eosinophils 0.30 0.00 - 6.90 %    Basophils 0.60 0.00 - 1.80 %    Immature Granulocytes 0.60 0.00 - 0.90 %    Nucleated RBC 0.00 /100 WBC    Neutrophils (Absolute) 7.41 (H) 2.00 - 7.15 K/uL    Lymphs (Absolute) 1.47 1.00 - 4.80 K/uL    Monos (Absolute) 1.19 (H) 0.00 - 0.85 K/uL    Eos (Absolute) 0.03 0.00 - 0.51 K/uL    Baso (Absolute) 0.06 0.00 - 0.12 K/uL    Immature Granulocytes (abs) 0.06 0.00 - 0.11 K/uL    NRBC (Absolute) 0.00 K/uL   Basic Metabolic Panel (BMP)    Collection Time: 02/05/20  5:31 AM   Result Value Ref Range    Sodium 137 135 - 145 mmol/L    Potassium 3.6 3.6 - 5.5 mmol/L    Chloride 106 96 - 112 mmol/L    Co2 26 20 - 33 mmol/L    Glucose 99 65 - 99 mg/dL    Bun 14 8 - 22 mg/dL    Creatinine 0.43 (L) 0.50 - 1.40 mg/dL    Calcium 8.1 (L) 8.5 - 10.5 mg/dL    Anion Gap 5.0 0.0 - 11.9   MAGNESIUM    Collection Time: 02/05/20  5:31 AM   Result Value Ref Range    Magnesium 1.8 1.5 - 2.5 mg/dL   PHOSPHORUS    Collection Time: 02/05/20  5:31 AM   Result Value Ref Range    Phosphorus 2.9 2.5 - 4.5 mg/dL   Triglyceride    Collection Time: 02/05/20  5:31 AM   Result Value Ref Range    Triglycerides 113 0 - 149 mg/dL   ESTIMATED GFR    Collection Time: 02/05/20  5:31 AM   Result Value Ref Range    GFR If African American >60 >60 mL/min/1.73 m 2    GFR If Non  African American >60 >60 mL/min/1.73 m 2   CBC with Differential    Collection Time: 02/06/20  3:39 AM   Result Value Ref Range    WBC 10.1 4.8 - 10.8 K/uL    RBC 3.28 (L) 4.20 - 5.40 M/uL    Hemoglobin 10.7 (L) 12.0 - 16.0 g/dL    Hematocrit 34.1 (L) 37.0 - 47.0 %    .0 (H) 81.4 - 97.8 fL    MCH 32.6 27.0 - 33.0 pg    MCHC 31.4 (L) 33.6 - 35.0 g/dL    RDW 57.0 (H) 35.9 - 50.0 fL    Platelet Count 273 164 - 446 K/uL    MPV 10.0 9.0 - 12.9 fL    Neutrophils-Polys 68.90 44.00 - 72.00 %    Lymphocytes 16.50 (L) 22.00 - 41.00 %    Monocytes 12.10 0.00 - 13.40 %    Eosinophils 0.70 0.00 - 6.90 %    Basophils 0.70 0.00 - 1.80 %    Immature Granulocytes 1.10 (H) 0.00 - 0.90 %    Nucleated RBC 0.00 /100 WBC    Neutrophils (Absolute) 6.95 2.00 - 7.15 K/uL    Lymphs (Absolute) 1.66 1.00 - 4.80 K/uL    Monos (Absolute) 1.22 (H) 0.00 - 0.85 K/uL    Eos (Absolute) 0.07 0.00 - 0.51 K/uL    Baso (Absolute) 0.07 0.00 - 0.12 K/uL    Immature Granulocytes (abs) 0.11 0.00 - 0.11 K/uL    NRBC (Absolute) 0.00 K/uL   Basic Metabolic Panel (BMP)    Collection Time: 02/06/20  3:39 AM   Result Value Ref Range    Sodium 137 135 - 145 mmol/L    Potassium 3.7 3.6 - 5.5 mmol/L    Chloride 106 96 - 112 mmol/L    Co2 25 20 - 33 mmol/L    Glucose 113 (H) 65 - 99 mg/dL    Bun 11 8 - 22 mg/dL    Creatinine 0.43 (L) 0.50 - 1.40 mg/dL    Calcium 7.9 (L) 8.5 - 10.5 mg/dL    Anion Gap 6.0 0.0 - 11.9   MAGNESIUM    Collection Time: 02/06/20  3:39 AM   Result Value Ref Range    Magnesium 2.0 1.5 - 2.5 mg/dL   PHOSPHORUS    Collection Time: 02/06/20  3:39 AM   Result Value Ref Range    Phosphorus 2.1 (L) 2.5 - 4.5 mg/dL   ESTIMATED GFR    Collection Time: 02/06/20  3:39 AM   Result Value Ref Range    GFR If African American >60 >60 mL/min/1.73 m 2    GFR If Non African American >60 >60 mL/min/1.73 m 2     DX-ABDOMEN FOR TUBE PLACEMENT   Final Result      Feeding tube tip in the gastric body.      DX-CHEST-PORTABLE (1 VIEW)   Final Result      1.   Removal of endotracheal tube.   2.  Stable interstitial edema and probable trace pleural effusions.      DX-CHEST-PORTABLE (1 VIEW)   Final Result         1. Stable lines and tubes.   2. Interstitial edema has improved.   3. Bibasilar atelectasis. No significant pleural effusions.         DX-CHEST-PORTABLE (1 VIEW)   Final Result         1.  Pulmonary edema and/or infiltrates are identified, which are stable since the prior exam.   2.  Trace pleural effusions, stable      DX-CHEST-PORTABLE (1 VIEW)   Final Result         1.  Pulmonary edema and/or infiltrates are identified, which are stable since the prior exam.   2.  Trace pleural effusions      DX-CHEST-PORTABLE (1 VIEW)   Final Result      Stable chest. Persistent bibasilar atelectasis or pneumonia, minor diffuse interstitial edema, and small bilateral pleural effusions.      DX-CHEST-PORTABLE (1 VIEW)   Final Result      1.  Increased bibasilar atelectasis which could obscure an additional process   2.  Probable small BILATERAL pleural effusions   3.  Persistently enlarged cardiac silhouette      DX-ABDOMEN FOR TUBE PLACEMENT   Final Result      Enteric tube tip projects over the stomach.      DX-CHEST-PORTABLE (1 VIEW)   Final Result      1.  Supportive tubing as described above.   2.  Mild bibasilar infiltrate or atelectasis.   3.  No pneumothorax.      DX-ANKLE 3+ VIEWS RIGHT   Final Result      No evidence of fracture or dislocation.      IR-MIDLINE CATHETER INSERTION WO GUIDANCE > AGE 3    (Results Pending)            Assessment:  MDD, recurrent, severe, without psychotic features   ETOH withdrawal   Prolonged QTc  TBI             Plan:  Continue zoloft, medical management     Will follow

## 2020-02-06 NOTE — CARE PLAN
Problem: Nutritional:  Goal: Nutrition support tolerated and meeting greater than 85% of estimated needs  Outcome: MET     TF at goal per I/O flowsheet.

## 2020-02-07 LAB
ALBUMIN SERPL BCP-MCNC: 2.8 G/DL (ref 3.2–4.9)
ALBUMIN/GLOB SERPL: 1 G/DL
ALP SERPL-CCNC: 55 U/L (ref 30–99)
ALT SERPL-CCNC: 5 U/L (ref 2–50)
ANION GAP SERPL CALC-SCNC: 10 MMOL/L (ref 0–11.9)
AST SERPL-CCNC: 10 U/L (ref 12–45)
BASOPHILS # BLD AUTO: 1 % (ref 0–1.8)
BASOPHILS # BLD: 0.08 K/UL (ref 0–0.12)
BILIRUB SERPL-MCNC: 0.7 MG/DL (ref 0.1–1.5)
BUN SERPL-MCNC: 8 MG/DL (ref 8–22)
CALCIUM SERPL-MCNC: 8 MG/DL (ref 8.5–10.5)
CHLORIDE SERPL-SCNC: 106 MMOL/L (ref 96–112)
CO2 SERPL-SCNC: 23 MMOL/L (ref 20–33)
CREAT SERPL-MCNC: 0.4 MG/DL (ref 0.5–1.4)
EOSINOPHIL # BLD AUTO: 0.03 K/UL (ref 0–0.51)
EOSINOPHIL NFR BLD: 0.4 % (ref 0–6.9)
ERYTHROCYTE [DISTWIDTH] IN BLOOD BY AUTOMATED COUNT: 54.9 FL (ref 35.9–50)
FERRITIN SERPL-MCNC: 149 NG/ML (ref 10–291)
FOLATE SERPL-MCNC: >24 NG/ML
GLOBULIN SER CALC-MCNC: 2.7 G/DL (ref 1.9–3.5)
GLUCOSE SERPL-MCNC: 89 MG/DL (ref 65–99)
HCT VFR BLD AUTO: 32.4 % (ref 37–47)
HGB BLD-MCNC: 10.6 G/DL (ref 12–16)
HGB RETIC QN AUTO: 28.9 PG/CELL (ref 29–35)
IMM GRANULOCYTES # BLD AUTO: 0.22 K/UL (ref 0–0.11)
IMM GRANULOCYTES NFR BLD AUTO: 2.7 % (ref 0–0.9)
IMM RETICS NFR: 31.1 % (ref 9.3–17.4)
INR PPP: 1.11 (ref 0.87–1.13)
IRON SATN MFR SERPL: 18 % (ref 15–55)
IRON SERPL-MCNC: 46 UG/DL (ref 40–170)
LYMPHOCYTES # BLD AUTO: 1.62 K/UL (ref 1–4.8)
LYMPHOCYTES NFR BLD: 20 % (ref 22–41)
MAGNESIUM SERPL-MCNC: 2.1 MG/DL (ref 1.5–2.5)
MCH RBC QN AUTO: 33.5 PG (ref 27–33)
MCHC RBC AUTO-ENTMCNC: 32.7 G/DL (ref 33.6–35)
MCV RBC AUTO: 102.5 FL (ref 81.4–97.8)
MONOCYTES # BLD AUTO: 1.31 K/UL (ref 0–0.85)
MONOCYTES NFR BLD AUTO: 16.2 % (ref 0–13.4)
NEUTROPHILS # BLD AUTO: 4.84 K/UL (ref 2–7.15)
NEUTROPHILS NFR BLD: 59.7 % (ref 44–72)
NRBC # BLD AUTO: 0 K/UL
NRBC BLD-RTO: 0 /100 WBC
PHOSPHATE SERPL-MCNC: 3.5 MG/DL (ref 2.5–4.5)
PLATELET # BLD AUTO: 323 K/UL (ref 164–446)
PMV BLD AUTO: 9.6 FL (ref 9–12.9)
POTASSIUM SERPL-SCNC: 3.6 MMOL/L (ref 3.6–5.5)
PROT SERPL-MCNC: 5.5 G/DL (ref 6–8.2)
PROTHROMBIN TIME: 14.6 SEC (ref 12–14.6)
RBC # BLD AUTO: 3.16 M/UL (ref 4.2–5.4)
RETICS # AUTO: 0.1 M/UL (ref 0.04–0.06)
RETICS/RBC NFR: 3.1 % (ref 0.8–2.1)
SODIUM SERPL-SCNC: 139 MMOL/L (ref 135–145)
TIBC SERPL-MCNC: 253 UG/DL (ref 250–450)
TSH SERPL DL<=0.005 MIU/L-ACNC: 4.18 UIU/ML (ref 0.38–5.33)
VIT B12 SERPL-MCNC: 848 PG/ML (ref 211–911)
WBC # BLD AUTO: 8.1 K/UL (ref 4.8–10.8)

## 2020-02-07 PROCEDURE — 83735 ASSAY OF MAGNESIUM: CPT

## 2020-02-07 PROCEDURE — 85046 RETICYTE/HGB CONCENTRATE: CPT

## 2020-02-07 PROCEDURE — 85610 PROTHROMBIN TIME: CPT

## 2020-02-07 PROCEDURE — 82607 VITAMIN B-12: CPT

## 2020-02-07 PROCEDURE — 770020 HCHG ROOM/CARE - TELE (206)

## 2020-02-07 PROCEDURE — 700111 HCHG RX REV CODE 636 W/ 250 OVERRIDE (IP): Performed by: INTERNAL MEDICINE

## 2020-02-07 PROCEDURE — 99233 SBSQ HOSP IP/OBS HIGH 50: CPT | Performed by: INTERNAL MEDICINE

## 2020-02-07 PROCEDURE — 97535 SELF CARE MNGMENT TRAINING: CPT

## 2020-02-07 PROCEDURE — A9270 NON-COVERED ITEM OR SERVICE: HCPCS | Performed by: INTERNAL MEDICINE

## 2020-02-07 PROCEDURE — A9270 NON-COVERED ITEM OR SERVICE: HCPCS | Performed by: PSYCHIATRY & NEUROLOGY

## 2020-02-07 PROCEDURE — 700101 HCHG RX REV CODE 250: Performed by: INTERNAL MEDICINE

## 2020-02-07 PROCEDURE — 84443 ASSAY THYROID STIM HORMONE: CPT

## 2020-02-07 PROCEDURE — 82746 ASSAY OF FOLIC ACID SERUM: CPT

## 2020-02-07 PROCEDURE — 97162 PT EVAL MOD COMPLEX 30 MIN: CPT

## 2020-02-07 PROCEDURE — 99232 SBSQ HOSP IP/OBS MODERATE 35: CPT | Performed by: INTERNAL MEDICINE

## 2020-02-07 PROCEDURE — 82728 ASSAY OF FERRITIN: CPT

## 2020-02-07 PROCEDURE — 700102 HCHG RX REV CODE 250 W/ 637 OVERRIDE(OP): Performed by: INTERNAL MEDICINE

## 2020-02-07 PROCEDURE — 83540 ASSAY OF IRON: CPT

## 2020-02-07 PROCEDURE — 85025 COMPLETE CBC W/AUTO DIFF WBC: CPT

## 2020-02-07 PROCEDURE — 36415 COLL VENOUS BLD VENIPUNCTURE: CPT

## 2020-02-07 PROCEDURE — 97165 OT EVAL LOW COMPLEX 30 MIN: CPT

## 2020-02-07 PROCEDURE — 84100 ASSAY OF PHOSPHORUS: CPT

## 2020-02-07 PROCEDURE — 94640 AIRWAY INHALATION TREATMENT: CPT

## 2020-02-07 PROCEDURE — 80053 COMPREHEN METABOLIC PANEL: CPT

## 2020-02-07 PROCEDURE — 700102 HCHG RX REV CODE 250 W/ 637 OVERRIDE(OP): Performed by: PSYCHIATRY & NEUROLOGY

## 2020-02-07 PROCEDURE — 83550 IRON BINDING TEST: CPT

## 2020-02-07 RX ORDER — FOLIC ACID 1 MG/1
1 TABLET ORAL DAILY
Status: DISCONTINUED | OUTPATIENT
Start: 2020-02-08 | End: 2020-02-09

## 2020-02-07 RX ORDER — METOPROLOL TARTRATE 50 MG/1
50 TABLET, FILM COATED ORAL EVERY 8 HOURS
Status: DISCONTINUED | OUTPATIENT
Start: 2020-02-07 | End: 2020-02-08

## 2020-02-07 RX ORDER — POTASSIUM CHLORIDE 20 MEQ/1
20 TABLET, EXTENDED RELEASE ORAL ONCE
Status: COMPLETED | OUTPATIENT
Start: 2020-02-07 | End: 2020-02-07

## 2020-02-07 RX ORDER — AMOXICILLIN 250 MG
1 CAPSULE ORAL 2 TIMES DAILY
Status: DISCONTINUED | OUTPATIENT
Start: 2020-02-07 | End: 2020-02-09

## 2020-02-07 RX ORDER — BISACODYL 10 MG
10 SUPPOSITORY, RECTAL RECTAL
Status: DISCONTINUED | OUTPATIENT
Start: 2020-02-07 | End: 2020-02-09

## 2020-02-07 RX ORDER — WARFARIN SODIUM 5 MG/1
5 TABLET ORAL DAILY
Status: DISCONTINUED | OUTPATIENT
Start: 2020-02-07 | End: 2020-02-09 | Stop reason: HOSPADM

## 2020-02-07 RX ORDER — ACETAMINOPHEN 325 MG/1
650 TABLET ORAL EVERY 6 HOURS PRN
Status: DISCONTINUED | OUTPATIENT
Start: 2020-02-07 | End: 2020-02-09 | Stop reason: HOSPADM

## 2020-02-07 RX ORDER — METOPROLOL TARTRATE 50 MG/1
50 TABLET, FILM COATED ORAL TWICE DAILY
Status: DISCONTINUED | OUTPATIENT
Start: 2020-02-07 | End: 2020-02-07

## 2020-02-07 RX ORDER — SODIUM CHLORIDE FOR INHALATION 3 %
3 VIAL, NEBULIZER (ML) INHALATION
Status: DISCONTINUED | OUTPATIENT
Start: 2020-02-07 | End: 2020-02-09

## 2020-02-07 RX ORDER — POLYETHYLENE GLYCOL 3350 17 G/17G
1 POWDER, FOR SOLUTION ORAL 2 TIMES DAILY
Status: DISCONTINUED | OUTPATIENT
Start: 2020-02-07 | End: 2020-02-09

## 2020-02-07 RX ORDER — DIGOXIN 125 MCG
125 TABLET ORAL DAILY
Status: DISCONTINUED | OUTPATIENT
Start: 2020-02-08 | End: 2020-02-09 | Stop reason: HOSPADM

## 2020-02-07 RX ADMIN — METOPROLOL TARTRATE 50 MG: 50 TABLET, FILM COATED ORAL at 14:33

## 2020-02-07 RX ADMIN — OMEPRAZOLE 40 MG: KIT at 05:39

## 2020-02-07 RX ADMIN — ENOXAPARIN SODIUM 80 MG: 80 INJECTION, SOLUTION INTRAVENOUS; SUBCUTANEOUS at 05:39

## 2020-02-07 RX ADMIN — ACETYLCYSTEINE 3 ML: 200 INHALANT RESPIRATORY (INHALATION) at 07:12

## 2020-02-07 RX ADMIN — THERA TABS 1 TABLET: TAB at 05:39

## 2020-02-07 RX ADMIN — SERTRALINE 50 MG: 50 TABLET, FILM COATED ORAL at 05:38

## 2020-02-07 RX ADMIN — ACETAMINOPHEN 650 MG: 325 TABLET, FILM COATED ORAL at 20:47

## 2020-02-07 RX ADMIN — SUCRALFATE 1 G: 1 SUSPENSION ORAL at 00:13

## 2020-02-07 RX ADMIN — DIGOXIN 250 MCG: 125 TABLET ORAL at 05:40

## 2020-02-07 RX ADMIN — SUCRALFATE 1 G: 1 SUSPENSION ORAL at 05:38

## 2020-02-07 RX ADMIN — FOLIC ACID 1 MG: 1 TABLET ORAL at 05:39

## 2020-02-07 RX ADMIN — LEVOTHYROXINE SODIUM 125 MCG: 125 TABLET ORAL at 05:39

## 2020-02-07 RX ADMIN — SUCRALFATE 1 G: 1 SUSPENSION ORAL at 17:22

## 2020-02-07 RX ADMIN — SUCRALFATE 1 G: 1 SUSPENSION ORAL at 11:34

## 2020-02-07 RX ADMIN — Medication 100 MG: at 05:39

## 2020-02-07 RX ADMIN — FUROSEMIDE 10 MG: 10 INJECTION, SOLUTION INTRAVENOUS at 15:58

## 2020-02-07 RX ADMIN — METOPROLOL TARTRATE 25 MG: 25 TABLET, FILM COATED ORAL at 05:39

## 2020-02-07 RX ADMIN — POTASSIUM CHLORIDE 20 MEQ: 1500 TABLET, EXTENDED RELEASE ORAL at 20:47

## 2020-02-07 RX ADMIN — METOPROLOL TARTRATE 50 MG: 50 TABLET, FILM COATED ORAL at 20:47

## 2020-02-07 RX ADMIN — SODIUM CHLORIDE SOLN NEBU 3% 3 ML: 3 NEBU SOLN at 07:12

## 2020-02-07 RX ADMIN — ENOXAPARIN SODIUM 80 MG: 80 INJECTION, SOLUTION INTRAVENOUS; SUBCUTANEOUS at 17:10

## 2020-02-07 RX ADMIN — IPRATROPIUM BROMIDE 0.5 MG: 0.5 SOLUTION RESPIRATORY (INHALATION) at 07:09

## 2020-02-07 RX ADMIN — ACETAMINOPHEN 650 MG: 325 TABLET, FILM COATED ORAL at 05:46

## 2020-02-07 RX ADMIN — WARFARIN SODIUM 5 MG: 5 TABLET ORAL at 17:23

## 2020-02-07 RX ADMIN — FUROSEMIDE 10 MG: 10 INJECTION, SOLUTION INTRAVENOUS at 05:39

## 2020-02-07 ASSESSMENT — ENCOUNTER SYMPTOMS
BLOOD IN STOOL: 0
HEMOPTYSIS: 0
WEAKNESS: 1
FEVER: 0
SHORTNESS OF BREATH: 1
BACK PAIN: 0
NAUSEA: 0
FALLS: 0
SPUTUM PRODUCTION: 1
HEARTBURN: 0
CHILLS: 0
CONSTIPATION: 0
NERVOUS/ANXIOUS: 1
COUGH: 1
DIARRHEA: 1
MYALGIAS: 0
DEPRESSION: 0
NECK PAIN: 0
HEADACHES: 0
BLURRED VISION: 0
ABDOMINAL PAIN: 0
PALPITATIONS: 0
PND: 0
DIZZINESS: 0
WHEEZING: 0
ROS GI COMMENTS: FMS IN PLACE
VOMITING: 0
DOUBLE VISION: 0

## 2020-02-07 ASSESSMENT — GAIT ASSESSMENTS
ASSISTIVE DEVICE: FRONT WHEEL WALKER
DISTANCE (FEET): 15
DEVIATION: SHUFFLED GAIT;BRADYKINETIC
GAIT LEVEL OF ASSIST: MINIMAL ASSIST

## 2020-02-07 ASSESSMENT — COGNITIVE AND FUNCTIONAL STATUS - GENERAL
SUGGESTED CMS G CODE MODIFIER DAILY ACTIVITY: CJ
SUGGESTED CMS G CODE MODIFIER MOBILITY: CK
WALKING IN HOSPITAL ROOM: A LITTLE
MOVING FROM LYING ON BACK TO SITTING ON SIDE OF FLAT BED: A LITTLE
DAILY ACTIVITIY SCORE: 21
MOBILITY SCORE: 19
STANDING UP FROM CHAIR USING ARMS: A LITTLE
HELP NEEDED FOR BATHING: A LITTLE
TOILETING: A LITTLE
DRESSING REGULAR LOWER BODY CLOTHING: A LITTLE
CLIMB 3 TO 5 STEPS WITH RAILING: A LOT

## 2020-02-07 ASSESSMENT — LIFESTYLE VARIABLES: SUBSTANCE_ABUSE: 1

## 2020-02-07 ASSESSMENT — ACTIVITIES OF DAILY LIVING (ADL): TOILETING: INDEPENDENT

## 2020-02-07 NOTE — PROGRESS NOTES
Inpatient Anticoagulation Service Note    Date: 2/7/2020    Reason for Anticoagulation: Atrial Fibrillation   Target INR: 2.0 to 3.0  [id number] VASc Score: 3  HAS-BLED Score: 2   Hemoglobin Value: (!) 10.6  Hematocrit Value: (!) 32.4  Lab Platelet Value: 323    INR from last 7 days     Date/Time INR Value    02/07/20 0321  1.11    02/06/20 1438  1.05        Dose from last 7 days     Date/Time Dose (mg)    02/07/20 1347  5    02/06/20 1554  5        Significant Interactions: Thyroid Medications, Proton Pump Inhibitor  Bridge Therapy: Yes(Lovenox 80 mg BID)     Comments: Pt started on warfairn 5 mg daily. Will trend the INR and adjust dosing over the next few days if needed. Pt is bridged with Lovenox. DDI with warfarin noted.     Education Material Provided?: No  Pharmacist suggested discharge dosing: TBD, warfarin 5 mg daily for now. Pt will need follow up INR within 3 days of discharge     Antwan Hartmann, PharmD

## 2020-02-07 NOTE — PROGRESS NOTES
Pulmonary Care Progress Note    Date of admission  1/27/2020    Chief Complaint  74 y.o. female admitted 1/27/2020 with severe alcohol withdrawls    Hospital Course    1/30- admitted to ICU for EtOH w/ds. Intubated for airway protection and acute hypoxemic respiratory failure. CVC placed. Started on propofol.    Interval Problem Update              - Extubated 2/4 2/6, Pertinent data for today's visit includes transfer to telemetry, still on core track tube feeds, having some emesis.  No obvious aspiration.  Nasal prong oxygen.  Minimal scattered rhonchi but no respiratory distress.  Bedside RN indicates swallow eval pending.  Cautioned elevating the head of bed while still on tube feeds.  Respiratory status slowly better, still fragile           2/7,Chart review from the past 24 hours includes imaging, laboratory studies, vital signs and notes available.  Pertinent data for today's visit includes detailed review with the patient, bedside nurse and respiratory therapy.  Mucomyst is making her nauseated and creating emesis.  Discontinued and will utilize bicarb if she needs mucolytic.  Albuterol as contribution to cardio stimulation with atrial fibrillation noted, although wheeze free at present, will utilize Xopenex if needed but seems to be improving from a pulmonary standpoint.  Still low flow oxygen, and offered her follow-up in my office regarding underlying COPD, the need for outpatient lung function testing, and guiding outpatient bronchodilator therapy in the setting of atrial fibrillation     review of Systems   Review of Systems   Constitutional: Negative for chills and fever.   HENT: Negative for congestion and sore throat.    Eyes: Negative for photophobia and discharge.   Respiratory: Positive for cough, sputum production, shortness of breath and wheezing.    Cardiovascular: Negative for chest pain, palpitations and leg swelling.   Gastrointestinal: Positive for  nausea and vomiting.    Musculoskeletal: Negative for back pain and myalgias.   Neurological: Negative for focal weakness, weakness and headaches.     Vital Signs for last 24 hours   Temp:  [36.6 °C (97.9 °F)-37.4 °C (99.4 °F)] 36.8 °C (98.2 °F)  Pulse:  [103-138] 108  Resp:  [16-18] 16  BP: (106-160)/() 160/109  SpO2:  [90 %-98 %] 96 %      Physical Exam  Constitutional:       General: She is not in acute distress.  HENT:      Head: Normocephalic and atraumatic.      Right Ear: External ear normal.      Left Ear: External ear normal.      Nose: Nose normal.      Mouth/Throat:      Pharynx: Oropharynx is clear.   Eyes:      Extraocular Movements: Extraocular movements intact.      Pupils: Pupils are equal, round, and reactive to light.   Neck:      Musculoskeletal: Neck supple.   Cardiovascular:      Rate and Rhythm: Normal rate. Rhythm irregular.   Pulmonary:      Effort: Pulmonary effort is normal.      Breath sounds: Rhonchi present.   Abdominal:      General: Abdomen is flat. There is no distension.      Tenderness: There is no tenderness. There is no guarding.      Comments: Emesis noted   Musculoskeletal:         General: No swelling.   Skin:     General: Skin is warm and dry.      Capillary Refill: Capillary refill takes less than 2 seconds.   Neurological:      Cranial Nerves: No cranial nerve deficit.      Comments: Opens eyes to voice.  Calm.  Nods to questions appropriately.  Moves all extremities equally.  Able to lift and hold her head up off the pillow.             Medications  Current Facility-Administered Medications   Medication Dose Route Frequency Provider Last Rate Last Dose   • metoprolol (LOPRESSOR) tablet 25 mg  25 mg Enteral Tube Q8HRS Nisreen Mello M.D.   25 mg at 02/07/20 0539   • sodium chloride 3% nebulizer solution 3 mL  3 mL Nebulization 4X/DAY (RT) Nisreen Mello M.D.   3 mL at 02/07/20 0712   • acetylcysteine (MUCOMYST) 20 % solution 3 mL  3 mL Inhalation Q4HRS (RT) Nisreen Mello M.D.   3 mL at 02/07/20  0712   • enoxaparin (LOVENOX) inj 80 mg  1 mg/kg Subcutaneous Q12HRS Nisreen Mello M.D.   80 mg at 02/07/20 0539   • furosemide (LASIX) injection 10 mg  10 mg Intravenous BID DIURETIC Nisreen Mello M.D.   10 mg at 02/07/20 0539   • MD Alert...Warfarin per Pharmacy   Other PHARMACY TO DOSE Nisreen Melol M.D.       • ondansetron (ZOFRAN) syringe/vial injection 4 mg  4 mg Intravenous Q4HRS PRN Nisreen Mello M.D.   4 mg at 02/06/20 1535   • [START ON 2/8/2020] digoxin (LANOXIN) tablet 125 mcg  125 mcg Enteral Tube DAILY AT 1800 Nisreen Mello M.D.       • folic acid (FOLVITE) tablet 1 mg  1 mg Enteral Tube DAILY Nisreen Mello M.D.   1 mg at 02/07/20 0539   • multivitamin (THERAGRAN) tablet 1 Tab  1 Tab Enteral Tube DAILY Nisreen Mello M.D.   1 Tab at 02/07/20 0539   • sucralfate (CARAFATE) 1 GM/10ML suspension 1 g  1 g Enteral Tube Q6HRS Nisreen Mello M.D.   1 g at 02/07/20 0538   • thiamine tablet 100 mg  100 mg Enteral Tube DAILY Nisreen Mello M.D.   100 mg at 02/07/20 0539   • acetaminophen (TYLENOL) tablet 650 mg  650 mg Enteral Tube Q6HRS PRN Nisreen Mello M.D.   650 mg at 02/07/20 0546   • ondansetron (ZOFRAN ODT) dispertab 4 mg  4 mg Enteral Tube Q4HRS PRN Nisreen Mello M.D.       • ipratropium (ATROVENT) 0.02 % nebulizer solution 0.5 mg  0.5 mg Nebulization Q4HRS (RT) Nisreen Mello M.D.   0.5 mg at 02/07/20 0709   • omeprazole (FIRST-OMEPRAZOLE) 2 mg/mL oral susp 40 mg  40 mg Enteral Tube DAILY Spenser Sebastian M.D.   40 mg at 02/07/20 0539   • levothyroxine (SYNTHROID) tablet 125 mcg  125 mcg Enteral Tube AM ES Benjie Rosales M.D.   125 mcg at 02/07/20 0539   • sertraline (ZOLOFT) tablet 50 mg  50 mg Enteral Tube DAILY Benjie Rosales M.D.   50 mg at 02/07/20 0538       Fluids    Intake/Output Summary (Last 24 hours) at 2/7/2020 0831  Last data filed at 2/6/2020 2000  Gross per 24 hour   Intake 110 ml   Output 1401 ml   Net -1291 ml       Laboratory          Recent Labs     02/05/20  0531 02/06/20  0339 02/07/20  0321    SODIUM 137 137 139   POTASSIUM 3.6 3.7 3.6   CHLORIDE 106 106 106   CO2 26 25 23   BUN 14 11 8   CREATININE 0.43* 0.43* 0.40*   MAGNESIUM 1.8 2.0 2.1   PHOSPHORUS 2.9 2.1* 3.5   CALCIUM 8.1* 7.9* 8.0*     Recent Labs     02/05/20  0531 02/06/20  0339 02/07/20  0321   ALTSGPT  --   --  5   ASTSGOT  --   --  10*   ALKPHOSPHAT  --   --  55   TBILIRUBIN  --   --  0.7   GLUCOSE 99 113* 89     Recent Labs     02/05/20  0531 02/06/20  0339 02/07/20  0321   WBC 10.2 10.1 8.1   NEUTSPOLYS 72.50* 68.90 59.70   LYMPHOCYTES 14.40* 16.50* 20.00*   MONOCYTES 11.60 12.10 16.20*   EOSINOPHILS 0.30 0.70 0.40   BASOPHILS 0.60 0.70 1.00   ASTSGOT  --   --  10*   ALTSGPT  --   --  5   ALKPHOSPHAT  --   --  55   TBILIRUBIN  --   --  0.7     Recent Labs     02/05/20  0531 02/06/20  0339 02/06/20  1438 02/07/20  0321   RBC 3.22* 3.28*  --  3.16*   HEMOGLOBIN 10.6* 10.7*  --  10.6*   HEMATOCRIT 33.8* 34.1*  --  32.4*   PLATELETCT 204 273  --  323   PROTHROMBTM  --   --  14.0 14.6   INR  --   --  1.05 1.11   IRON  --   --   --  46   FERRITIN  --   --   --  149.0   TOTIRONBC  --   --   --  253           Assessment/Plan  * Acute respiratory failure with hypoxemia (HCC)- (present on admission)  Assessment & Plan  Was Intubated for airway protection and acute hypoxemic resp failure  Lung protective ventilation strategies were utilized  Titrated ventilator prescription to optimize oxygenation, ventilation, and acid base balance.  Trial of extubation, successful        PAF (paroxysmal atrial fibrillation) (HCC)- (present on admission)  Assessment & Plan  Likely exacerbated by underlying EtOH withdrawal  Rate control improved with metoprolol      Considered anticoag prior to d/c in the past she did not want this; given her abuse hx may be a fall/hemorrhage risk      Alcohol dependence (HCC)- (present on admission)  Assessment & Plan  Counseling when clinically appropriate    Current moderate episode of major depressive disorder without prior  episode (HCC)- (present on admission)  Assessment & Plan  Psychiatry following  Continue Zoloft      Pertinent data for 2/7  visit includes detailed review with the patient, bedside nurse and respiratory therapy.  Mucomyst is making her nauseated and creating emesis.  Discontinued and will utilize bicarb if she needs mucolytic.  Albuterol as contribution to cardio stimulation with atrial fibrillation noted, although wheeze free at present, will utilize Xopenex if needed but seems to be improving from a pulmonary standpoint.  Still low flow oxygen, and offered her follow-up in my office regarding underlying COPD, the need for outpatient lung function testing, and guiding outpatient bronchodilator therapy in the setting of atrial fibrillation       I have performed a physical exam and reviewed and updated ROS and Plan today (2/7/2020). In review of yesterday's note (2/6/2020), there are no changes except as documented above.     Isis Hughes MD , FCCP, Pulmonary Service

## 2020-02-07 NOTE — DISCHARGE PLANNING
Agency/Facility Name: [facility]  Spoke To: [facility]  Outcome: Patient accepted. Bed available tomorrow.

## 2020-02-07 NOTE — PROGRESS NOTES
Report received from Elsa. Pt transported to tele unit on 2/5 @ 9443. Pt in chronic a-fib at this time (hx of paroxysmal a-fib), cards not yet consulted. Pt awake in bed A&Ox4; no complaints at this time. POC discussed; all questions answered. Bed locked in lowest position; call light in reach; bed alarm in use. Family in room at this time.

## 2020-02-07 NOTE — CARE PLAN
"  Problem: Fluid Volume:  Goal: Will maintain balanced intake and output  Outcome: PROGRESSING SLOWER THAN EXPECTED   Pt with low oral intake of fluids at this time; stated \"I will drink more in the morning.\"    Problem: Skin Integrity  Goal: Risk for impaired skin integrity will decrease  Outcome: PROGRESSING AS EXPECTED   Pt on waffle mattress; encouraged to turn every 2 hours.    Problem: Safety  Goal: Will remain free from falls  Outcome: PROGRESSING AS EXPECTED   Pt high fall risk; bed alarm in place; educated to call prior to ambulating; calls appropriately.    Problem: Urinary Elimination:  Goal: Ability to reestablish a normal urinary elimination pattern will improve  Outcome: PROGRESSING SLOWER THAN EXPECTED   Pt experiencing urgency and frequency.    Problem: Mobility  Goal: Risk for activity intolerance will decrease  Outcome: PROGRESSING SLOWER THAN EXPECTED   Pt states \"I still feel weak.\" Required x1 assist to commode.  "

## 2020-02-07 NOTE — DISCHARGE PLANNING
Anticipated Discharge Disposition:   · SNF  Action:   · LSW met with pt at bedside to discuss medical recommendation of SNF for continued therapy and skilled services. Pt agreeable with plan and completed CHOICE form for [facility]. Per pt her  is currently residing at [facility] as a LTC resident.   · CHOICE faxed to [facility] ext [phone].  · Per provider, Dr. Mello in IDT/discharge rounds pt is anticipated to be ready to discharge tomorrow 02/08/2020.    Barriers to Discharge:   · SNF acceptance     Plan:   · Care coordination will continue to follow up with discharge plans/barriers as needed.

## 2020-02-07 NOTE — PROGRESS NOTES
Inpatient Anticoagulation Service Note    Date: 2/6/2020    Reason for Anticoagulation: Atrial Fibrillation   Target INR: 2.0 to 3.0  [id number] VASc Score: 3  HAS-BLED Score: 2   Hemoglobin Value: (!) 10.7  Hematocrit Value: (!) 34.1  Lab Platelet Value: 273    INR from last 7 days     Date/Time INR Value    02/06/20 1438  1.05        Dose from last 7 days     Date/Time Dose (mg)    02/06/20 1554  5        Significant Interactions: Thyroid Medications, Proton Pump Inhibitor  Bridge Therapy: Yes(Lovenox 80 mg BID)     Comments: Pt started on warfarin and Lovenox for AFib. Will start with warfarin 5 mg daily and trend the INR. DDI with warfarin noted.    Education Material Provided?: No  Pharmacist suggested discharge dosing: TBD, starting with warfarin 5 mg daily     Antwan Hartmann, PharmD

## 2020-02-07 NOTE — THERAPY
"Occupational Therapy Evaluation completed.   Functional Status: Pt is a 75 y/o female admitted with anxiety and found to be going through etoh w/drawal. SHe was intubated for respiratory failure and was extubated on 2/4. SHe was pleasant and cooperative. Setup feeding. Supv-Dulce sit<>stand, improved throughout session. Dulce functional mobility with FWW. Dulce LB dressing. Sup toileting. Supv grooming in stance at the sink. Pt limited by weakness, fatigue, impaired balance which impacts independence in self care and functional mobility.  Plan of Care: Will benefit from Occupational Therapy 3 times per week  Discharge Recommendations:  Equipment: Will Continue to Assess for Equipment Needs. At this time, Recommend post-acute placement for additional occupational therapy services prior to discharge home. Depends on progress made - she could progress to level where she could d/c with home health. Per patient she also wants to go to alcohol rehab.    See \"Rehab Therapy-Acute\" Patient Summary Report for complete documentation.    "

## 2020-02-07 NOTE — DIETARY
Nutrition Services: Update   Day 11 of admit.  Jeanie Hutchison is a 74 y.o. female with admitting DX of Alcohol withdrawal     Pt was previously receiving TF. TF ordered was d/c today. Pt is currently on regular, dysphagia 3 diet with nectar thick liquids and 1:1 supervision. Per chart pt PO 50-75% of breakfast this morning. Wt 2/4: 77 kg via bed scale - wt increased suspect related to fluids or items on bed when weighed.     Malnutrition Risk: No criteria noted at this time.     Recommendations/Plan:  1. Diet upgrades per SLP.    2. Encourage intake of meals  3. Document intake of all meals as % taken in ADL's to provide interdisciplinary communication across all shifts.   4. Monitor weight.  5. Nutrition rep will continue to see patient for ongoing meal and snack preferences.  6. Obtain supplement order per RD as needed.    RD following

## 2020-02-07 NOTE — DISCHARGE PLANNING
Anticipated Discharge Disposition:   · SNF: [facility]     Action:   · [facility] accepted pt and able to accept. Provider updated and request transportation and discharge be set up for Sunday 02/09/2020.  · Transportation set up via [facility] for Sunday 02/09/2020 at 1100.   · Provider and BSN updated.   · COBRA initiated and left in pt's hard chart for weekend care coordinator.   · LSW left report for weekend care coordination staff.     Barriers to Discharge:   · No barriers identified at this time.     Plan:   · Care coordination will continue to follow up and provide assistance with discharge plans/barriers.

## 2020-02-07 NOTE — PROGRESS NOTES
"      Spiritual Care Note    Patient Information     Patient's Name: Jeanie Hutchison   MRN: [id number]    YOB: 1945   Age and Gender: 74 y.o. female   Service Area: [address]   Room (and Bed): T8/   Ethnicity or Nationality:     Primary Language: English   Cheondoism/Spiritual preference: None   Place of Residence: [place], [place]   Family/Friends/Others Present: no   Clinical Team Present: HealthCareProvider   Medical Diagnosis(-es)/Procedure(s): Alcohol withdrawal   Code Status: Full Code    Date of Admission: 1/27/2020   Length of Stay: 11 days        Spiritual Care Provider Information:  Name of Spiritual Care Provider: Jaelyn Aviles  Title of Spiritual Care Provider: Associate   Phone Number: [phone]  E-mail: [email or web address]  Total time : 15 minutes    Spiritual Screen Results:    Gen Nursing  Spiritual Screen  Is your spiritual health or inner well-being important to you as you cope with your medical condition?: No  Would you like to receive a visit from our Spiritual Care team or your own Taoist or spiritual leader?: No  Was spiritual care education provided to the patient?: Declined     Palliative Care  PC Cheondoism/Spiritual Screening  Was spiritual care education provided to the patient?: Declined      Encounter/Request Information  Encounter/Request Type   Visited With: Patient, Health care provider  Nature of the Visit: Initial, On shift  Continue Visiting: (UPON REQUEST)  General Visit: Yes  Referral From/ Origin of Request: Epic nursing    Religous Needs/Values       Spiritual Assessment   Spiritual Care Encounters    Observations/Symptoms: (Pt alert, sitting in chair after ambulating from Bathroom)    Interacton/Conversation:  introduced self to pt. Pt welcomed visit, yet described herself, \"I'm not an especially spiritual person.\" Pt stated, \"You're kind of freaking me out, though.,You remind me so much of a friend of mine who " "is a little older than you and she passed away a couple of years ago. She was born and raised in [place].\" Pt declined any particular spiritual care needs at this time. Pt confirmed she understood she could ask nurse if she would like to hold conversation with  again. Pt was grateful for 's visit.    Assessment: (none at this time)    Interventions: Conversation, Companionship    Outcomes: Love/Belonging/ Compassion/Kindness/Acceptance, Value/Dignity/Respect    Plan: Visit Upon Request    Notes:            "

## 2020-02-07 NOTE — PROGRESS NOTES
Assumed care at 0700. Bedside report received from Petr. Patient's chart and MAR reviewed. Pt denies pain at this time. Pt is A & O 4. Patient was updated on plan of care for the day. Questions answered and concerns addressed.  Pt denies any additional needs at this time. White board updated. Call light, phone and personal belongings within reach.

## 2020-02-07 NOTE — DISCHARGE PLANNING
Received Transport Form at 1420  Spoke to Dedrick at [facility]  Transport is scheduled for Sunday 2/9/20 at 1100 going to [facility].    [facility] at [place] informed of transport time.

## 2020-02-08 LAB
ANION GAP SERPL CALC-SCNC: 8 MMOL/L (ref 0–11.9)
BUN SERPL-MCNC: 7 MG/DL (ref 8–22)
CALCIUM SERPL-MCNC: 8.5 MG/DL (ref 8.5–10.5)
CHLORIDE SERPL-SCNC: 104 MMOL/L (ref 96–112)
CO2 SERPL-SCNC: 26 MMOL/L (ref 20–33)
CREAT SERPL-MCNC: 0.37 MG/DL (ref 0.5–1.4)
GLUCOSE SERPL-MCNC: 89 MG/DL (ref 65–99)
HCT VFR BLD AUTO: 36.8 % (ref 37–47)
HGB BLD-MCNC: 12 G/DL (ref 12–16)
INR PPP: 1.11 (ref 0.87–1.13)
MAGNESIUM SERPL-MCNC: 1.8 MG/DL (ref 1.5–2.5)
PHOSPHATE SERPL-MCNC: 3.1 MG/DL (ref 2.5–4.5)
POTASSIUM SERPL-SCNC: 3.4 MMOL/L (ref 3.6–5.5)
PROTHROMBIN TIME: 14.6 SEC (ref 12–14.6)
SODIUM SERPL-SCNC: 138 MMOL/L (ref 135–145)

## 2020-02-08 PROCEDURE — 85018 HEMOGLOBIN: CPT

## 2020-02-08 PROCEDURE — 700102 HCHG RX REV CODE 250 W/ 637 OVERRIDE(OP): Performed by: INTERNAL MEDICINE

## 2020-02-08 PROCEDURE — 80048 BASIC METABOLIC PNL TOTAL CA: CPT

## 2020-02-08 PROCEDURE — A9270 NON-COVERED ITEM OR SERVICE: HCPCS | Performed by: INTERNAL MEDICINE

## 2020-02-08 PROCEDURE — 85014 HEMATOCRIT: CPT

## 2020-02-08 PROCEDURE — 36415 COLL VENOUS BLD VENIPUNCTURE: CPT

## 2020-02-08 PROCEDURE — 770020 HCHG ROOM/CARE - TELE (206)

## 2020-02-08 PROCEDURE — 83735 ASSAY OF MAGNESIUM: CPT

## 2020-02-08 PROCEDURE — 84100 ASSAY OF PHOSPHORUS: CPT

## 2020-02-08 PROCEDURE — 99233 SBSQ HOSP IP/OBS HIGH 50: CPT | Performed by: INTERNAL MEDICINE

## 2020-02-08 PROCEDURE — 99231 SBSQ HOSP IP/OBS SF/LOW 25: CPT | Performed by: INTERNAL MEDICINE

## 2020-02-08 PROCEDURE — A9270 NON-COVERED ITEM OR SERVICE: HCPCS | Performed by: HOSPITALIST

## 2020-02-08 PROCEDURE — 700102 HCHG RX REV CODE 250 W/ 637 OVERRIDE(OP): Performed by: PSYCHIATRY & NEUROLOGY

## 2020-02-08 PROCEDURE — 85610 PROTHROMBIN TIME: CPT

## 2020-02-08 PROCEDURE — 700111 HCHG RX REV CODE 636 W/ 250 OVERRIDE (IP): Performed by: INTERNAL MEDICINE

## 2020-02-08 PROCEDURE — A9270 NON-COVERED ITEM OR SERVICE: HCPCS | Performed by: PSYCHIATRY & NEUROLOGY

## 2020-02-08 PROCEDURE — 700102 HCHG RX REV CODE 250 W/ 637 OVERRIDE(OP): Performed by: HOSPITALIST

## 2020-02-08 RX ORDER — MAGNESIUM SULFATE HEPTAHYDRATE 40 MG/ML
4 INJECTION, SOLUTION INTRAVENOUS ONCE
Status: COMPLETED | OUTPATIENT
Start: 2020-02-08 | End: 2020-02-08

## 2020-02-08 RX ORDER — METOPROLOL TARTRATE 50 MG/1
50 TABLET, FILM COATED ORAL ONCE
Status: COMPLETED | OUTPATIENT
Start: 2020-02-08 | End: 2020-02-08

## 2020-02-08 RX ORDER — POTASSIUM CHLORIDE 20 MEQ/1
40 TABLET, EXTENDED RELEASE ORAL EVERY 4 HOURS
Status: COMPLETED | OUTPATIENT
Start: 2020-02-08 | End: 2020-02-08

## 2020-02-08 RX ORDER — ONDANSETRON 4 MG/1
4 TABLET, ORALLY DISINTEGRATING ORAL EVERY 4 HOURS PRN
Status: DISCONTINUED | OUTPATIENT
Start: 2020-02-08 | End: 2020-02-09

## 2020-02-08 RX ORDER — LEVOTHYROXINE SODIUM 0.12 MG/1
125 TABLET ORAL
Status: DISCONTINUED | OUTPATIENT
Start: 2020-02-08 | End: 2020-02-09 | Stop reason: HOSPADM

## 2020-02-08 RX ORDER — THIAMINE MONONITRATE (VIT B1) 100 MG
100 TABLET ORAL DAILY
Status: DISCONTINUED | OUTPATIENT
Start: 2020-02-08 | End: 2020-02-09

## 2020-02-08 RX ORDER — OMEPRAZOLE 20 MG/1
20 CAPSULE, DELAYED RELEASE ORAL DAILY
Status: DISCONTINUED | OUTPATIENT
Start: 2020-02-08 | End: 2020-02-09 | Stop reason: HOSPADM

## 2020-02-08 RX ORDER — POTASSIUM CHLORIDE 20 MEQ/1
20 TABLET, EXTENDED RELEASE ORAL ONCE
Status: COMPLETED | OUTPATIENT
Start: 2020-02-08 | End: 2020-02-08

## 2020-02-08 RX ORDER — METOPROLOL TARTRATE 50 MG/1
100 TABLET, FILM COATED ORAL TWICE DAILY
Status: DISCONTINUED | OUTPATIENT
Start: 2020-02-08 | End: 2020-02-09 | Stop reason: HOSPADM

## 2020-02-08 RX ORDER — SUCRALFATE ORAL 1 G/10ML
1 SUSPENSION ORAL EVERY 6 HOURS
Status: DISCONTINUED | OUTPATIENT
Start: 2020-02-08 | End: 2020-02-09

## 2020-02-08 RX ADMIN — POTASSIUM CHLORIDE 20 MEQ: 1500 TABLET, EXTENDED RELEASE ORAL at 06:31

## 2020-02-08 RX ADMIN — ACETAMINOPHEN 650 MG: 325 TABLET, FILM COATED ORAL at 03:51

## 2020-02-08 RX ADMIN — ENOXAPARIN SODIUM 80 MG: 80 INJECTION, SOLUTION INTRAVENOUS; SUBCUTANEOUS at 16:56

## 2020-02-08 RX ADMIN — SUCRALFATE 1 G: 1 SUSPENSION ORAL at 10:33

## 2020-02-08 RX ADMIN — WARFARIN SODIUM 5 MG: 5 TABLET ORAL at 18:33

## 2020-02-08 RX ADMIN — FUROSEMIDE 10 MG: 10 INJECTION, SOLUTION INTRAVENOUS at 04:57

## 2020-02-08 RX ADMIN — Medication 100 MG: at 06:31

## 2020-02-08 RX ADMIN — LEVOTHYROXINE SODIUM 125 MCG: 125 TABLET ORAL at 04:57

## 2020-02-08 RX ADMIN — METOPROLOL TARTRATE 50 MG: 50 TABLET, FILM COATED ORAL at 08:11

## 2020-02-08 RX ADMIN — SERTRALINE HYDROCHLORIDE 50 MG: 50 TABLET ORAL at 06:31

## 2020-02-08 RX ADMIN — THERA TABS 1 TABLET: TAB at 04:58

## 2020-02-08 RX ADMIN — OMEPRAZOLE 20 MG: 20 CAPSULE, DELAYED RELEASE ORAL at 06:31

## 2020-02-08 RX ADMIN — POTASSIUM CHLORIDE 40 MEQ: 1500 TABLET, EXTENDED RELEASE ORAL at 10:33

## 2020-02-08 RX ADMIN — FUROSEMIDE 10 MG: 10 INJECTION, SOLUTION INTRAVENOUS at 15:51

## 2020-02-08 RX ADMIN — MAGNESIUM SULFATE IN WATER 4 G: 40 INJECTION, SOLUTION INTRAVENOUS at 08:11

## 2020-02-08 RX ADMIN — SUCRALFATE 1 G: 1 SUSPENSION ORAL at 16:56

## 2020-02-08 RX ADMIN — METOPROLOL TARTRATE 100 MG: 50 TABLET, FILM COATED ORAL at 16:55

## 2020-02-08 RX ADMIN — FOLIC ACID 1 MG: 1 TABLET ORAL at 04:58

## 2020-02-08 RX ADMIN — POTASSIUM CHLORIDE 40 MEQ: 1500 TABLET, EXTENDED RELEASE ORAL at 15:50

## 2020-02-08 RX ADMIN — METOPROLOL TARTRATE 50 MG: 50 TABLET, FILM COATED ORAL at 04:58

## 2020-02-08 RX ADMIN — SUCRALFATE 1 G: 1 SUSPENSION ORAL at 00:08

## 2020-02-08 RX ADMIN — ENOXAPARIN SODIUM 80 MG: 80 INJECTION, SOLUTION INTRAVENOUS; SUBCUTANEOUS at 04:58

## 2020-02-08 RX ADMIN — SUCRALFATE 1 G: 1 SUSPENSION ORAL at 06:31

## 2020-02-08 RX ADMIN — DIGOXIN 125 MCG: 125 TABLET ORAL at 04:58

## 2020-02-08 ASSESSMENT — ENCOUNTER SYMPTOMS
NAUSEA: 0
CHILLS: 0
DIZZINESS: 0
HEADACHES: 0
CONSTIPATION: 0
BACK PAIN: 0
MYALGIAS: 0
BLOOD IN STOOL: 0
NECK PAIN: 0
DEPRESSION: 0
BLURRED VISION: 0
ROS GI COMMENTS: FMS IN PLACE
PND: 0
ABDOMINAL PAIN: 0
FEVER: 0
HEMOPTYSIS: 0
VOMITING: 0
SHORTNESS OF BREATH: 1
DIARRHEA: 1
PALPITATIONS: 0
FALLS: 0
HEARTBURN: 0
WEAKNESS: 1
WHEEZING: 0
SPUTUM PRODUCTION: 1
NERVOUS/ANXIOUS: 1
DOUBLE VISION: 0
COUGH: 1

## 2020-02-08 ASSESSMENT — LIFESTYLE VARIABLES: SUBSTANCE_ABUSE: 1

## 2020-02-08 NOTE — PROGRESS NOTES
Assumed care at 0700. Bedside report received from Bobbi. Patient's chart and MAR reviewed. Pt denies pain at this time. Pt is A & O 4. Patient was updated on plan of care for the day. Questions answered and concerns addressed.  Pt denies any additional needs at this time. White board updated. Call light, phone and personal belongings within reach.

## 2020-02-08 NOTE — PROGRESS NOTES
Received report from previous nurse, Monique, regarding prior 12 hours.  POC reviewed with pt, white board updated, pt verbalized understanding, call light within reach.  Pt encouraged to call before getting up.  Bed in lowest position, treaded slippers on.

## 2020-02-08 NOTE — CARE PLAN
Pt educated regarding plan of care and medications. All questions answered.  Pt turned and positioned every two hours. Incontinence care provided and barrier paste applied as needed.

## 2020-02-08 NOTE — CARE PLAN
Pt turned and positioned every two hours. Incontinence care provided and barrier paste applied as needed.  Pt educated on the importance fall prevention methods, such as treaded sock and the bed alarm. Pt stated they will use the call light prior to any attempts of ambulation. Ambulatory ability assessed, treaded socks in place, bed locked and in low position, frequent trips to bathroom offered, and call light and phone within reach.

## 2020-02-08 NOTE — PROGRESS NOTES
[facility] Medicine Daily Progress Note    Date of Service  2/7/2020    Chief Complaint  74 y.o. female admitted 1/27/2020 with Anxiety / Tremors / Ankle pain     Hospital Course    Patient presented with above concerns to the hospital, underlying history of alcoholism, recent stressors, admitted to the hospital for alcohol withdrawal/detoxification.  Hospitalization complicated by worsening alcohol withdrawal requiring transfer to the ICU on January 30, 2020, further complicated by development of respiratory failure requiring intubation.  Successfully extubated, clinically improved in the ICU and now transferred to the telemetry unit, hospitals medicine resuming care February 6, 2020      Interval Problem Update  Patient seen and evaluated on rounds  Discussed with nursing staff on rounds    Mora removed, fecal management system removed, IJ removed, cortrack to be removed today, diet initiated    Patient doing well, alert and oriented x4  Advised ongoing ambulation  Still with RVR, metoprolol uptitrated  Continue close clinical monitoring, anticipate SNF soon  Interval laboratory studies to be obtained tomorrow    Consultants/Specialty  Pulmonology/critical care    Code Status  Full code    Disposition  Continue telemetry monitoring, PT/OT/SLP evaluations.  Anticipate postacute placement to skilled nursing facility, orders placed  Discussed with /case management on rounds     Review of Systems  Review of Systems   Constitutional: Positive for malaise/fatigue. Negative for chills and fever.   HENT: Negative for hearing loss and tinnitus.    Eyes: Negative for blurred vision and double vision.   Respiratory: Positive for cough, sputum production and shortness of breath. Negative for hemoptysis and wheezing.    Cardiovascular: Positive for leg swelling. Negative for chest pain, palpitations and PND.   Gastrointestinal: Positive for diarrhea. Negative for abdominal pain, blood in stool, constipation,  heartburn, melena, nausea and vomiting.        FMS in place   Genitourinary:        Mora in place   Musculoskeletal: Negative for back pain, falls, joint pain, myalgias and neck pain.   Skin: Negative for rash.   Neurological: Positive for weakness. Negative for dizziness and headaches.   Psychiatric/Behavioral: Positive for substance abuse (ETOH Abuse). Negative for depression and suicidal ideas. The patient is nervous/anxious.         Physical Exam  Temp:  [36.4 °C (97.6 °F)-36.9 °C (98.5 °F)] 36.4 °C (97.6 °F)  Pulse:  [103-129] 117  Resp:  [16] 16  BP: (136-160)/() 155/99  SpO2:  [91 %-98 %] 97 %    Physical Exam  HENT:      Head: Normocephalic and atraumatic.      Right Ear: External ear normal.      Left Ear: External ear normal.      Nose: Nose normal.      Mouth/Throat:      Mouth: Mucous membranes are moist.   Eyes:      Extraocular Movements: Extraocular movements intact.      Conjunctiva/sclera: Conjunctivae normal.      Pupils: Pupils are equal, round, and reactive to light.   Neck:      Musculoskeletal: Normal range of motion.      Comments: Right IJ in place.  Cardiovascular:      Rate and Rhythm: Tachycardia present. Rhythm irregular.      Pulses: Normal pulses.      Heart sounds: No murmur. No friction rub. No gallop.    Pulmonary:      Effort: Pulmonary effort is normal. No respiratory distress.      Breath sounds: No stridor. No wheezing, rhonchi or rales.      Comments: Improved aeration rhonchi  Chest:      Chest wall: No tenderness.   Abdominal:      General: Abdomen is flat. Bowel sounds are normal. There is no distension.      Palpations: Abdomen is soft. There is no mass.      Tenderness: There is no tenderness. There is no right CVA tenderness, left CVA tenderness, guarding or rebound.      Hernia: No hernia is present.      Comments: Fecal management system in place   Genitourinary:     Comments: Mora in place, draining yellow urine.  No purulence in the urine.  No  hematuria.  Musculoskeletal: Normal range of motion.      Right lower leg: Edema present.      Left lower leg: Edema present.   Skin:     General: Skin is warm and dry.      Capillary Refill: Capillary refill takes less than 2 seconds.   Neurological:      General: No focal deficit present.      Mental Status: She is alert and oriented to person, place, and time. Mental status is at baseline.      Cranial Nerves: No cranial nerve deficit.      Sensory: No sensory deficit.      Motor: No weakness.      Coordination: Coordination normal.      Gait: Gait normal.      Deep Tendon Reflexes: Reflexes normal.   Psychiatric:         Mood and Affect: Mood normal.         Behavior: Behavior normal.         Thought Content: Thought content normal.         Judgment: Judgment normal.         Fluids    Intake/Output Summary (Last 24 hours) at 2/7/2020 1931  Last data filed at 2/7/2020 1555  Gross per 24 hour   Intake 720 ml   Output 100 ml   Net 620 ml       Laboratory  Recent Labs     02/05/20  0531 02/06/20  0339 02/07/20  0321   WBC 10.2 10.1 8.1   RBC 3.22* 3.28* 3.16*   HEMOGLOBIN 10.6* 10.7* 10.6*   HEMATOCRIT 33.8* 34.1* 32.4*   .0* 104.0* 102.5*   MCH 32.9 32.6 33.5*   MCHC 31.4* 31.4* 32.7*   RDW 58.4* 57.0* 54.9*   PLATELETCT 204 273 323   MPV 9.9 10.0 9.6     Recent Labs     02/05/20  0531 02/06/20  0339 02/07/20  0321   SODIUM 137 137 139   POTASSIUM 3.6 3.7 3.6   CHLORIDE 106 106 106   CO2 26 25 23   GLUCOSE 99 113* 89   BUN 14 11 8   CREATININE 0.43* 0.43* 0.40*   CALCIUM 8.1* 7.9* 8.0*     Recent Labs     02/06/20  1438 02/07/20  0321   INR 1.05 1.11         Recent Labs     02/05/20  0531   TRIGLYCERIDE 113       Imaging  DX-ABDOMEN FOR TUBE PLACEMENT   Final Result      Feeding tube tip in the gastric body.      DX-CHEST-PORTABLE (1 VIEW)   Final Result      1.  Removal of endotracheal tube.   2.  Stable interstitial edema and probable trace pleural effusions.      DX-CHEST-PORTABLE (1 VIEW)   Final Result          1. Stable lines and tubes.   2. Interstitial edema has improved.   3. Bibasilar atelectasis. No significant pleural effusions.         DX-CHEST-PORTABLE (1 VIEW)   Final Result         1.  Pulmonary edema and/or infiltrates are identified, which are stable since the prior exam.   2.  Trace pleural effusions, stable      DX-CHEST-PORTABLE (1 VIEW)   Final Result         1.  Pulmonary edema and/or infiltrates are identified, which are stable since the prior exam.   2.  Trace pleural effusions      DX-CHEST-PORTABLE (1 VIEW)   Final Result      Stable chest. Persistent bibasilar atelectasis or pneumonia, minor diffuse interstitial edema, and small bilateral pleural effusions.      DX-CHEST-PORTABLE (1 VIEW)   Final Result      1.  Increased bibasilar atelectasis which could obscure an additional process   2.  Probable small BILATERAL pleural effusions   3.  Persistently enlarged cardiac silhouette      DX-ABDOMEN FOR TUBE PLACEMENT   Final Result      Enteric tube tip projects over the stomach.      DX-CHEST-PORTABLE (1 VIEW)   Final Result      1.  Supportive tubing as described above.   2.  Mild bibasilar infiltrate or atelectasis.   3.  No pneumothorax.      DX-ANKLE 3+ VIEWS RIGHT   Final Result      No evidence of fracture or dislocation.           Assessment/Plan  * Acute respiratory failure with hypoxemia (HCC)- (present on admission)  Assessment & Plan  Status post mechanical ventilation, improved    RT protocol in place  Improving, monitor    PAF (paroxysmal atrial fibrillation) (HCC)- (present on admission)  Assessment & Plan  Patient remains in persistent atrial fibrillation with RVR  Rates have relatively improved, still approximately around 110    Not on anticoagulation, per previous outpatient cardiology notes supposed to be on Eliquis    Given that she has been not anticoagulated, would not recommend rhythm control for now, previous echocardiogram obtained in the outpatient setting reviewed by  me    Would recommend aggressive rate control strategy  Metoprolol 50 mg every 8 hour  Loaded with digoxin yesterday  Continue digoxin 125 mcg daily, will check digoxin levels in 48 hours from the first dose of 125 mcg    Continue Lovenox to Coumadin bridge towards the therapeutic INR    We will prefer Coumadin in the acute setting, given if any risks of bleeding easily reversible    Patient with recent critical illness, high risk of peptic ulcer disease/stress ulcer/alcoholic gastritis.  Continue omeprazole/sucralfate.  Monitor for any concerns of bleeding.    If no acute issues, would recommend outpatient cardiology follow-up    Continue close monitoring on telemetry    Monitor and replace electrolytes as clinically appropriate    Debility- (present on admission)  Assessment & Plan  Status post critical illness  Required mechanical ventilation this hospitalization  Initiate physical therapy, Occupational Therapy, speech therapy  Patient has underlying dysphagia from recent mechanical ventilation/critical illness  Advance diet per SLP recommendations  Aspiration, seizure, fall precautions in place  Anticipate patient will need postacute placement, SNF orders placed  Discussed with case management/social workers regarding disposition planning    Alcohol dependence (HCC)- (present on admission)  Assessment & Plan  Hospitalization complicated by alcohol withdrawal  Status post ICU stay, mechanical ventilation  Patient counseled and educated regarding alcohol cessation  Will optimize nutrition  We will monitor electrolytes, replace as clinically appropriate  Clinically improved at this time  We will continue folic acid, thiamine, multivitamins    Macrocytic anemia- (present on admission)  Assessment & Plan  Alcoholism related  No evidence of acute bleeding    Patient initiated on anticoagulation for underlying atrial fibrillation, recent critical illness/history of alcohol abuse with risk factors for peptic ulcer  disease/alcoholic gastritis, given this patient will be maintained on omeprazole/sucralfate.    Monitor Hb / Restrictive transfusion strategy    Current moderate episode of major depressive disorder without prior episode (HCC)- (present on admission)  Assessment & Plan  Continue sertraline, outpatient psychiatric follow-up       VTE prophylaxis: Lovenox/Coumadin

## 2020-02-08 NOTE — THERAPY
"Physical Therapy Evaluation completed.   Bed Mobility:  Supine to Sit: Supervised  Transfers: Sit to Stand: Supervised  Gait: Level Of Assist: Minimal Assist with Front-Wheel Walker       Plan of Care: Will benefit from Physical Therapy 3 times per week  Discharge Recommendations: Equipment: Will Continue to Assess for Equipment Needs. Recommend post-acute placement for continued physical therapy services prior to discharge home.         Assessment: 75 yo, previously indep F presents with impaired gait, balance and activity tolerance. Patient's HR ranging from 80-140s with minimal activity in Afib/Aflutter, limiting progression of mobility. Patient able to ambulate 15' to and from bathroom with Micah for safety with all transfers. Patient requires Min cueing for safe use of FWW and sequencing. Patient will benefit from placement for further PT, as she was indep prior to admit.      See \"Rehab Therapy-Acute\" Patient Summary Report for complete documentation.     Sofia Napier, PT, DPT, GCS  "

## 2020-02-08 NOTE — WOUND TEAM
Received wound consult for R ear. Dry yellow scab 0.3x0.3 cm on posterior ear. Unknown etiology. Unable to remove, therefore, honey colloid applied to encourage autolytic debridement. Wound team will continue to follow.    Pt has O2 cannula in place with gray foam behind ear. Tender  applied to cheeks and cannula can be removed from behind ear.

## 2020-02-08 NOTE — CARE PLAN
Problem: Fluid Volume:  Goal: Will maintain balanced intake and output  Outcome: PROGRESSING AS EXPECTED     Problem: Psychosocial Needs:  Goal: Level of anxiety will decrease  Outcome: PROGRESSING AS EXPECTED     Problem: Skin Integrity  Goal: Risk for impaired skin integrity will decrease  Outcome: PROGRESSING AS EXPECTED     Problem: Safety  Goal: Will remain free from falls  Outcome: PROGRESSING AS EXPECTED     Problem: Venous Thromboembolism (VTW)/Deep Vein Thrombosis (DVT) Prevention:  Goal: Patient will participate in Venous Thrombosis (VTE)/Deep Vein Thrombosis (DVT)Prevention Measures  Outcome: PROGRESSING AS EXPECTED     Problem: Pain Management  Goal: Pain level will decrease to patient's comfort goal  Outcome: PROGRESSING AS EXPECTED

## 2020-02-08 NOTE — PROGRESS NOTES
Pulmonary Care Progress Note    Date of admission  1/27/2020    Chief Complaint  74 y.o. female admitted 1/27/2020 with severe alcohol withdrawls    Hospital Course    1/30- admitted to ICU for EtOH w/ds. Intubated for airway protection and acute hypoxemic respiratory failure. CVC placed. Started on propofol.    Interval Problem Update              - Extubated 2/4 2/6, Pertinent data for today's visit includes transfer to telemetry, still on core track tube feeds, having some emesis.  No obvious aspiration.  Nasal prong oxygen.  Minimal scattered rhonchi but no respiratory distress.  Bedside RN indicates swallow eval pending.  Cautioned elevating the head of bed while still on tube feeds.  Respiratory status slowly better, still fragile           2/7,  Pertinent data for today's visit includes detailed review with the patient, bedside nurse and respiratory therapy.  Mucomyst is making her nauseated and creating emesis.  Discontinued and will utilize bicarb if she needs mucolytic.  Albuterol as contribution to cardio stimulation with atrial fibrillation noted, although wheeze free at present, will utilize Xopenex if needed but seems to be improving from a pulmonary standpoint.  Still low flow oxygen, and offered her follow-up in my office regarding underlying COPD, the need for outpatient lung function testing, and guiding outpatient bronchodilator therapy in the setting of atrial fibrillation     2/8,Chart review from the past 24 hours includes imaging, laboratory studies, vital signs and notes available.  Pertinent data for today's visit includes much better spirits, no longer tearful.  Emesis resolved off Mucomyst.  No problems with bronchodilator therapy aggravating heart rate or rhythm.  Preparing for rehab, gave patient my card and offered follow-up in 4 to 6 weeks if she would like, we would consider lung function testing and adjustment of her outpatient bronchodilators.  Pulmonary will sign off, please  call if again needed   Review of Systems   Constitutional: Negative for chills and fever.   HENT: Negative for congestion and sore throat.    Eyes: Negative for photophobia and discharge.   Respiratory: Positive for cough, sputum production, shortness of breath and wheezing.    Cardiovascular: Negative for chest pain, palpitations and leg swelling.   Gastrointestinal: Positive for  nausea and vomiting.   Musculoskeletal: Negative for back pain and myalgias.   Neurological: Negative for focal weakness, weakness and headaches.     Vital Signs for last 24 hours   Temp:  [36.4 °C (97.6 °F)-36.9 °C (98.5 °F)] 36.9 °C (98.5 °F)  Pulse:  [101-134] 120  Resp:  [16] 16  BP: (134-155)/() 150/105  SpO2:  [91 %-100 %] 100 %      Physical Exam  Constitutional:       General: She is not in acute distress.  HENT:      Head: Normocephalic and atraumatic.      Right Ear: External ear normal.      Left Ear: External ear normal.      Nose: Nose normal.      Mouth/Throat:      Pharynx: Oropharynx is clear.   Eyes:      Extraocular Movements: Extraocular movements intact.      Pupils: Pupils are equal, round, and reactive to light.   Neck:      Musculoskeletal: Neck supple.   Cardiovascular:      Rate and Rhythm: Normal rate. Rhythm irregular.   Pulmonary:      Effort: Pulmonary effort is normal.      Breath sounds: Rhonchi present.   Abdominal:      General: Abdomen is flat. There is no distension.      Tenderness: There is no tenderness. There is no guarding.      Comments: Emesis noted   Musculoskeletal:         General: No swelling.   Skin:     General: Skin is warm and dry.      Capillary Refill: Capillary refill takes less than 2 seconds.   Neurological:      Cranial Nerves: No cranial nerve deficit.      Comments: Opens eyes to voice.  Calm.  Nods to questions appropriately.  Moves all extremities equally.  Able to lift and hold her head up off the pillow.             Medications  Current Facility-Administered Medications    Medication Dose Route Frequency Provider Last Rate Last Dose   • levothyroxine (SYNTHROID) tablet 125 mcg  125 mcg Oral AM ES Benjie Rosales M.D.   125 mcg at 02/08/20 0457   • multivitamin (THERAGRAN) tablet 1 Tab  1 Tab Oral DAILY Nisreen Mello M.D.   1 Tab at 02/08/20 0458   • ondansetron (ZOFRAN ODT) dispertab 4 mg  4 mg Oral Q4HRS PRN Nisreen Mello M.D.       • sertraline (ZOLOFT) tablet 50 mg  50 mg Oral DAILY Benjie Rosales M.D.   50 mg at 02/08/20 0631   • thiamine tablet 100 mg  100 mg Oral DAILY Nisreen Mello M.D.   100 mg at 02/08/20 0631   • sucralfate (CARAFATE) 1 GM/10ML suspension 1 g  1 g Oral Q6HRS Nisreen Mello M.D.   1 g at 02/08/20 0631   • omeprazole (PRILOSEC) capsule 20 mg  20 mg Oral DAILY Spenser Sebastian M.D.   20 mg at 02/08/20 0631   • magnesium sulfate IVPB premix 4 g  4 g Intravenous Once Nisreen Mello M.D. 25 mL/hr at 02/08/20 0811 4 g at 02/08/20 0811   • potassium chloride SA (Kdur) tablet 40 mEq  40 mEq Oral Q4HRS Nisreen Mello M.D.       • metoprolol (LOPRESSOR) tablet 100 mg  100 mg Oral TWICE DAILY Nisreen Mello M.D.       • warfarin (COUMADIN) tablet 5 mg  5 mg Oral DAILY AT 1800 Nisreen Mello M.D.   5 mg at 02/07/20 1723   • ipratropium (ATROVENT) 0.02 % nebulizer solution 0.5 mg  0.5 mg Nebulization Q4H PRN (RT) Isis Hughes M.D.       • sodium chloride 3% nebulizer solution 3 mL  3 mL Nebulization 4X/DAY PRN (RT) Isis Hughes M.D.       • polyethylene glycol/lytes (MIRALAX) PACKET 1 Packet  1 Packet Oral BID Nisreen Mello M.D.       • senna-docusate (PERICOLACE or SENOKOT S) 8.6-50 MG per tablet 1 Tab  1 Tab Oral BID Nisreen Mello M.D.       • bisacodyl (DULCOLAX) suppository 10 mg  10 mg Rectal QDAY PRN Nisreen Mello M.D.       • acetaminophen (TYLENOL) tablet 650 mg  650 mg Oral Q6HRS PRN Nisreen Mello M.D.   650 mg at 02/08/20 0351   • digoxin (LANOXIN) tablet 125 mcg  125 mcg Oral DAILY AT 1800 Nisreen Mello M.D.   125 mcg at 02/08/20 0458   • folic acid (FOLVITE) tablet 1 mg   1 mg Oral DAILY Nisreen Mello M.D.   1 mg at 02/08/20 0458   • enoxaparin (LOVENOX) inj 80 mg  1 mg/kg Subcutaneous Q12HRS Nisreen Mello M.D.   80 mg at 02/08/20 0458   • furosemide (LASIX) injection 10 mg  10 mg Intravenous BID DIURETIC Nisreen Mello M.D.   10 mg at 02/08/20 0457   • MD Alert...Warfarin per Pharmacy   Other PHARMACY TO DOSE Nisreen Mello M.D.       • ondansetron (ZOFRAN) syringe/vial injection 4 mg  4 mg Intravenous Q4HRS PRN Nisreen Mello M.D.   4 mg at 02/06/20 1535       Fluids    Intake/Output Summary (Last 24 hours) at 2/8/2020 0828  Last data filed at 2/7/2020 2020  Gross per 24 hour   Intake 720 ml   Output 200 ml   Net 520 ml       Laboratory          Recent Labs     02/06/20  0339 02/07/20  0321 02/08/20  0330   SODIUM 137 139 138   POTASSIUM 3.7 3.6 3.4*   CHLORIDE 106 106 104   CO2 25 23 26   BUN 11 8 7*   CREATININE 0.43* 0.40* 0.37*   MAGNESIUM 2.0 2.1 1.8   PHOSPHORUS 2.1* 3.5 3.1   CALCIUM 7.9* 8.0* 8.5     Recent Labs     02/06/20  0339 02/07/20  0321 02/08/20  0330   ALTSGPT  --  5  --    ASTSGOT  --  10*  --    ALKPHOSPHAT  --  55  --    TBILIRUBIN  --  0.7  --    GLUCOSE 113* 89 89     Recent Labs     02/06/20  0339 02/07/20  0321   WBC 10.1 8.1   NEUTSPOLYS 68.90 59.70   LYMPHOCYTES 16.50* 20.00*   MONOCYTES 12.10 16.20*   EOSINOPHILS 0.70 0.40   BASOPHILS 0.70 1.00   ASTSGOT  --  10*   ALTSGPT  --  5   ALKPHOSPHAT  --  55   TBILIRUBIN  --  0.7     Recent Labs     02/06/20  0339 02/06/20  1438 02/07/20  0321 02/08/20  0330   RBC 3.28*  --  3.16*  --    HEMOGLOBIN 10.7*  --  10.6* 12.0   HEMATOCRIT 34.1*  --  32.4* 36.8*   PLATELETCT 273  --  323  --    PROTHROMBTM  --  14.0 14.6 14.6   INR  --  1.05 1.11 1.11   IRON  --   --  46  --    FERRITIN  --   --  149.0  --    TOTIRONBC  --   --  253  --            Assessment/Plan  * Acute respiratory failure with hypoxemia (HCC)- (present on admission)  Assessment & Plan  Was Intubated for airway protection and acute hypoxemic resp  failure  Lung protective ventilation strategies were utilized  Titrated ventilator prescription to optimize oxygenation, ventilation, and acid base balance.  Trial of extubation, successful        PAF (paroxysmal atrial fibrillation) (HCC)- (present on admission)  Assessment & Plan  Likely exacerbated by underlying EtOH withdrawal  Rate control improved with metoprolol      Considered anticoag prior to d/c in the past she did not want this; given her abuse hx may be a fall/hemorrhage risk  If needs bronchodilator will utilize Xopenex to avoid cardio stimulation    Alcohol dependence (HCC)- (present on admission)  Assessment & Plan  Counseling when clinically appropriate    Current moderate episode of major depressive disorder without prior episode (HCC)- (present on admission)  Assessment & Plan  Psychiatry following  Continue Zoloft      Pertinent data for today's visit includes much better spirits, no longer tearful.  Emesis resolved off Mucomyst.  No problems with bronchodilator therapy aggravating heart rate or rhythm.  Preparing for rehab, gave patient my card and offered follow-up in 4 to 6 weeks if she would like, we would consider lung function testing and adjustment of her outpatient bronchodilators.  Pulmonary will sign off, please call if again needed        I have performed a physical exam and reviewed and updated ROS and Plan today (2/8/2020). In review of yesterday's note (2/7/2020), there are no changes except as documented above.     Isis Hughes MD , FCCP, Pulmonary Service

## 2020-02-08 NOTE — PROGRESS NOTES
Monitor summary:     --/0.08/--     Aflutter 100 - 110     with rare, occasional PVCs, rare couplets

## 2020-02-08 NOTE — PROGRESS NOTES
Inpatient Anticoagulation Service Note    Date: 2/8/2020    Reason for Anticoagulation: Atrial Fibrillation   Target INR: 2.0 to 3.0  [id number] VASc Score: 3  HAS-BLED Score: 2   Hemoglobin Value: 12  Hematocrit Value: (!) 36.8  Lab Platelet Value: 323    INR from last 7 days     Date/Time INR Value    02/08/20 0330  1.11    02/07/20 0321  1.11    02/06/20 1438  1.05        Dose from last 7 days     Date/Time Dose (mg)    02/08/20 0833  5    02/07/20 1347  5    02/06/20 1554  5        Significant Interactions: Thyroid Medications, Proton Pump Inhibitor  Bridge Therapy: Yes(Lovenox 80 mg BID)     Comments: INR 1.11 after 3 doses of warfarin. Will continue warfarin 5 mg daily for now and trend the INR. Pt is being bridged with Lovenox. DDI with warfarin noted.    Education Material Provided?: No  Pharmacist suggested discharge dosing: TBD, warfarin 5 mg daily for now. Pt will need a follow up INR within 3 days of discharge     Antwan Hartmann, PharmD

## 2020-02-09 VITALS
WEIGHT: 153.44 LBS | DIASTOLIC BLOOD PRESSURE: 99 MMHG | HEART RATE: 100 BPM | HEIGHT: 66 IN | RESPIRATION RATE: 16 BRPM | BODY MASS INDEX: 24.66 KG/M2 | SYSTOLIC BLOOD PRESSURE: 152 MMHG | TEMPERATURE: 98.1 F | OXYGEN SATURATION: 92 %

## 2020-02-09 PROBLEM — J96.01 ACUTE RESPIRATORY FAILURE WITH HYPOXEMIA (HCC): Status: RESOLVED | Noted: 2020-01-31 | Resolved: 2020-02-09

## 2020-02-09 LAB
ANION GAP SERPL CALC-SCNC: 9 MMOL/L (ref 0–11.9)
BUN SERPL-MCNC: 8 MG/DL (ref 8–22)
CALCIUM SERPL-MCNC: 8.7 MG/DL (ref 8.5–10.5)
CHLORIDE SERPL-SCNC: 106 MMOL/L (ref 96–112)
CO2 SERPL-SCNC: 23 MMOL/L (ref 20–33)
CREAT SERPL-MCNC: 0.64 MG/DL (ref 0.5–1.4)
GLUCOSE SERPL-MCNC: 82 MG/DL (ref 65–99)
HCT VFR BLD AUTO: 38 % (ref 37–47)
HGB BLD-MCNC: 12.6 G/DL (ref 12–16)
INR PPP: 1.38 (ref 0.87–1.13)
MAGNESIUM SERPL-MCNC: 1.9 MG/DL (ref 1.5–2.5)
PHOSPHATE SERPL-MCNC: 2.6 MG/DL (ref 2.5–4.5)
POTASSIUM SERPL-SCNC: 3.6 MMOL/L (ref 3.6–5.5)
PROTHROMBIN TIME: 17.3 SEC (ref 12–14.6)
SODIUM SERPL-SCNC: 138 MMOL/L (ref 135–145)

## 2020-02-09 PROCEDURE — A9270 NON-COVERED ITEM OR SERVICE: HCPCS | Performed by: PSYCHIATRY & NEUROLOGY

## 2020-02-09 PROCEDURE — 85610 PROTHROMBIN TIME: CPT

## 2020-02-09 PROCEDURE — 84100 ASSAY OF PHOSPHORUS: CPT

## 2020-02-09 PROCEDURE — 36415 COLL VENOUS BLD VENIPUNCTURE: CPT

## 2020-02-09 PROCEDURE — 99239 HOSP IP/OBS DSCHRG MGMT >30: CPT | Performed by: INTERNAL MEDICINE

## 2020-02-09 PROCEDURE — 85018 HEMOGLOBIN: CPT

## 2020-02-09 PROCEDURE — 700102 HCHG RX REV CODE 250 W/ 637 OVERRIDE(OP): Performed by: INTERNAL MEDICINE

## 2020-02-09 PROCEDURE — 85014 HEMATOCRIT: CPT

## 2020-02-09 PROCEDURE — A9270 NON-COVERED ITEM OR SERVICE: HCPCS | Performed by: INTERNAL MEDICINE

## 2020-02-09 PROCEDURE — 80048 BASIC METABOLIC PNL TOTAL CA: CPT

## 2020-02-09 PROCEDURE — 700111 HCHG RX REV CODE 636 W/ 250 OVERRIDE (IP): Performed by: INTERNAL MEDICINE

## 2020-02-09 PROCEDURE — 83735 ASSAY OF MAGNESIUM: CPT

## 2020-02-09 PROCEDURE — 700102 HCHG RX REV CODE 250 W/ 637 OVERRIDE(OP): Performed by: PSYCHIATRY & NEUROLOGY

## 2020-02-09 RX ORDER — METOPROLOL TARTRATE 100 MG/1
100 TABLET ORAL 2 TIMES DAILY
Qty: 60 TAB
Start: 2020-02-09 | End: 2020-08-03 | Stop reason: SDUPTHER

## 2020-02-09 RX ORDER — MAGNESIUM SULFATE HEPTAHYDRATE 40 MG/ML
2 INJECTION, SOLUTION INTRAVENOUS ONCE
Status: COMPLETED | OUTPATIENT
Start: 2020-02-09 | End: 2020-02-09

## 2020-02-09 RX ORDER — OMEPRAZOLE 20 MG/1
20 CAPSULE, DELAYED RELEASE ORAL DAILY
Qty: 30 CAP
Start: 2020-02-10 | End: 2023-05-23

## 2020-02-09 RX ORDER — WARFARIN SODIUM 5 MG/1
5 TABLET ORAL DAILY
Qty: 30 TAB | Refills: 3
Start: 2020-02-09 | End: 2020-03-26

## 2020-02-09 RX ORDER — POTASSIUM CHLORIDE 20 MEQ/1
40 TABLET, EXTENDED RELEASE ORAL EVERY 4 HOURS
Status: COMPLETED | OUTPATIENT
Start: 2020-02-09 | End: 2020-02-09

## 2020-02-09 RX ORDER — DIGOXIN 125 MCG
125 TABLET ORAL DAILY
Qty: 30 TAB
Start: 2020-02-09 | End: 2021-02-23 | Stop reason: SDUPTHER

## 2020-02-09 RX ADMIN — SENNOSIDES AND DOCUSATE SODIUM 1 TABLET: 8.6; 5 TABLET ORAL at 04:31

## 2020-02-09 RX ADMIN — SERTRALINE HYDROCHLORIDE 50 MG: 50 TABLET ORAL at 04:31

## 2020-02-09 RX ADMIN — SUCRALFATE 1 G: 1 SUSPENSION ORAL at 00:56

## 2020-02-09 RX ADMIN — METOPROLOL TARTRATE 100 MG: 50 TABLET, FILM COATED ORAL at 04:31

## 2020-02-09 RX ADMIN — SUCRALFATE 1 G: 1 SUSPENSION ORAL at 06:08

## 2020-02-09 RX ADMIN — POTASSIUM CHLORIDE 40 MEQ: 1500 TABLET, EXTENDED RELEASE ORAL at 08:18

## 2020-02-09 RX ADMIN — ACETAMINOPHEN 650 MG: 325 TABLET, FILM COATED ORAL at 00:57

## 2020-02-09 RX ADMIN — OMEPRAZOLE 20 MG: 20 CAPSULE, DELAYED RELEASE ORAL at 04:31

## 2020-02-09 RX ADMIN — POTASSIUM CHLORIDE 40 MEQ: 1500 TABLET, EXTENDED RELEASE ORAL at 11:07

## 2020-02-09 RX ADMIN — Medication 100 MG: at 04:31

## 2020-02-09 RX ADMIN — LEVOTHYROXINE SODIUM 125 MCG: 125 TABLET ORAL at 04:32

## 2020-02-09 RX ADMIN — FOLIC ACID 1 MG: 1 TABLET ORAL at 04:31

## 2020-02-09 RX ADMIN — THERA TABS 1 TABLET: TAB at 04:31

## 2020-02-09 RX ADMIN — ENOXAPARIN SODIUM 80 MG: 80 INJECTION, SOLUTION INTRAVENOUS; SUBCUTANEOUS at 04:31

## 2020-02-09 RX ADMIN — MAGNESIUM SULFATE HEPTAHYDRATE 2 G: 40 INJECTION, SOLUTION INTRAVENOUS at 08:20

## 2020-02-09 NOTE — PROGRESS NOTES
[facility] Medicine Daily Progress Note    Date of Service  2/8/2020    Chief Complaint  74 y.o. female admitted 1/27/2020 with Anxiety / Tremors / Ankle pain     Hospital Course    Patient presented with above concerns to the hospital, underlying history of alcoholism, recent stressors, admitted to the hospital for alcohol withdrawal/detoxification.  Hospitalization complicated by worsening alcohol withdrawal requiring transfer to the ICU on January 30, 2020, further complicated by development of respiratory failure requiring intubation.  Successfully extubated, clinically improved in the ICU and now transferred to the telemetry unit, [facility] medicine resuming care February 6, 2020      Interval Problem Update  Patient seen and evaluated on rounds  Discussed with nursing staff on rounds    Still having intermittent RVR, heart rates up to 105    Metoprolol increased 200 mg twice daily    Overall patient feeling better, getting stronger    Appears that rehab will be able to accept her tomorrow, rates remain controlled    We will continue close clinical monitoring    Continue anticoagulation, Lovenox to Coumadin bridge, closely monitoring hemoglobin, electrolytes, renal function and INR    Consultants/Specialty  Pulmonology/critical care    Code Status  Full code    Disposition  Continue telemetry monitoring, PT/OT/SLP evaluations.  Anticipate postacute placement to skilled nursing facility, orders placed  Discussed with /case management on rounds     Review of Systems  Review of Systems   Constitutional: Positive for malaise/fatigue. Negative for chills and fever.   HENT: Negative for hearing loss and tinnitus.    Eyes: Negative for blurred vision and double vision.   Respiratory: Positive for cough, sputum production and shortness of breath. Negative for hemoptysis and wheezing.    Cardiovascular: Positive for leg swelling. Negative for chest pain, palpitations and PND.   Gastrointestinal: Positive for  diarrhea. Negative for abdominal pain, blood in stool, constipation, heartburn, melena, nausea and vomiting.        FMS in place   Genitourinary:        Mora in place   Musculoskeletal: Negative for back pain, falls, joint pain, myalgias and neck pain.   Skin: Negative for rash.   Neurological: Positive for weakness. Negative for dizziness and headaches.   Psychiatric/Behavioral: Positive for substance abuse (ETOH Abuse). Negative for depression and suicidal ideas. The patient is nervous/anxious.         Physical Exam  Temp:  [36.2 °C (97.1 °F)-36.9 °C (98.5 °F)] 36.2 °C (97.1 °F)  Pulse:  [] 99  Resp:  [15-16] 15  BP: (124-154)/() 132/95  SpO2:  [92 %-100 %] 93 %    Physical Exam  HENT:      Head: Normocephalic and atraumatic.      Right Ear: External ear normal.      Left Ear: External ear normal.      Nose: Nose normal.      Mouth/Throat:      Mouth: Mucous membranes are moist.   Eyes:      Extraocular Movements: Extraocular movements intact.      Conjunctiva/sclera: Conjunctivae normal.      Pupils: Pupils are equal, round, and reactive to light.   Neck:      Musculoskeletal: Normal range of motion.      Comments: Right IJ in place.  Cardiovascular:      Rate and Rhythm: Tachycardia present. Rhythm irregular.      Pulses: Normal pulses.      Heart sounds: No murmur. No friction rub. No gallop.    Pulmonary:      Effort: Pulmonary effort is normal. No respiratory distress.      Breath sounds: No stridor. No wheezing, rhonchi or rales.      Comments: Improved aeration rhonchi  Chest:      Chest wall: No tenderness.   Abdominal:      General: Abdomen is flat. Bowel sounds are normal. There is no distension.      Palpations: Abdomen is soft. There is no mass.      Tenderness: There is no tenderness. There is no right CVA tenderness, left CVA tenderness, guarding or rebound.      Hernia: No hernia is present.      Comments: Fecal management system in place   Genitourinary:     Comments: Mora in place,  draining yellow urine.  No purulence in the urine.  No hematuria.  Musculoskeletal: Normal range of motion.      Right lower leg: Edema present.      Left lower leg: Edema present.   Skin:     General: Skin is warm and dry.      Capillary Refill: Capillary refill takes less than 2 seconds.   Neurological:      General: No focal deficit present.      Mental Status: She is alert and oriented to person, place, and time. Mental status is at baseline.      Cranial Nerves: No cranial nerve deficit.      Sensory: No sensory deficit.      Motor: No weakness.      Coordination: Coordination normal.      Gait: Gait normal.      Deep Tendon Reflexes: Reflexes normal.   Psychiatric:         Mood and Affect: Mood normal.         Behavior: Behavior normal.         Thought Content: Thought content normal.         Judgment: Judgment normal.         Fluids    Intake/Output Summary (Last 24 hours) at 2/8/2020 1741  Last data filed at 2/7/2020 2020  Gross per 24 hour   Intake --   Output 200 ml   Net -200 ml       Laboratory  Recent Labs     02/06/20  0339 02/07/20  0321 02/08/20  0330   WBC 10.1 8.1  --    RBC 3.28* 3.16*  --    HEMOGLOBIN 10.7* 10.6* 12.0   HEMATOCRIT 34.1* 32.4* 36.8*   .0* 102.5*  --    MCH 32.6 33.5*  --    MCHC 31.4* 32.7*  --    RDW 57.0* 54.9*  --    PLATELETCT 273 323  --    MPV 10.0 9.6  --      Recent Labs     02/06/20  0339 02/07/20  0321 02/08/20  0330   SODIUM 137 139 138   POTASSIUM 3.7 3.6 3.4*   CHLORIDE 106 106 104   CO2 25 23 26   GLUCOSE 113* 89 89   BUN 11 8 7*   CREATININE 0.43* 0.40* 0.37*   CALCIUM 7.9* 8.0* 8.5     Recent Labs     02/06/20  1438 02/07/20  0321 02/08/20  0330   INR 1.05 1.11 1.11               Imaging  DX-ABDOMEN FOR TUBE PLACEMENT   Final Result      Feeding tube tip in the gastric body.      DX-CHEST-PORTABLE (1 VIEW)   Final Result      1.  Removal of endotracheal tube.   2.  Stable interstitial edema and probable trace pleural effusions.      DX-CHEST-PORTABLE (1  VIEW)   Final Result         1. Stable lines and tubes.   2. Interstitial edema has improved.   3. Bibasilar atelectasis. No significant pleural effusions.         DX-CHEST-PORTABLE (1 VIEW)   Final Result         1.  Pulmonary edema and/or infiltrates are identified, which are stable since the prior exam.   2.  Trace pleural effusions, stable      DX-CHEST-PORTABLE (1 VIEW)   Final Result         1.  Pulmonary edema and/or infiltrates are identified, which are stable since the prior exam.   2.  Trace pleural effusions      DX-CHEST-PORTABLE (1 VIEW)   Final Result      Stable chest. Persistent bibasilar atelectasis or pneumonia, minor diffuse interstitial edema, and small bilateral pleural effusions.      DX-CHEST-PORTABLE (1 VIEW)   Final Result      1.  Increased bibasilar atelectasis which could obscure an additional process   2.  Probable small BILATERAL pleural effusions   3.  Persistently enlarged cardiac silhouette      DX-ABDOMEN FOR TUBE PLACEMENT   Final Result      Enteric tube tip projects over the stomach.      DX-CHEST-PORTABLE (1 VIEW)   Final Result      1.  Supportive tubing as described above.   2.  Mild bibasilar infiltrate or atelectasis.   3.  No pneumothorax.      DX-ANKLE 3+ VIEWS RIGHT   Final Result      No evidence of fracture or dislocation.           Assessment/Plan  * Acute respiratory failure with hypoxemia (HCC)- (present on admission)  Assessment & Plan  Status post mechanical ventilation, improved    RT protocol in place  Improving, monitor  Gentle diuresis been continued    PAF (paroxysmal atrial fibrillation) (HCC)- (present on admission)  Assessment & Plan  Patient remains in persistent atrial fibrillation with RVR  Rates have relatively improved, still approximately around 105    Not on anticoagulation, per previous outpatient cardiology notes supposed to be on Eliquis    Given that she has been not anticoagulated, would not recommend rhythm control for now, previous  echocardiogram obtained in the outpatient setting reviewed by me    Would recommend aggressive rate control strategy  Transition to metoprolol 100 mg twice daily  Continue digoxin 125 mcg daily, will check digoxin levels in 48 hours from the first dose of 125 mcg    Continue Lovenox to Coumadin bridge towards the therapeutic INR    We will prefer Coumadin in the acute setting, given if any risks of bleeding easily reversible    Patient with recent critical illness, high risk of peptic ulcer disease/stress ulcer/alcoholic gastritis.  Continue omeprazole/sucralfate.  Monitor for any concerns of bleeding.    If no acute issues, would recommend outpatient cardiology follow-up    Continue close monitoring on telemetry    Monitor and replace electrolytes as clinically appropriate    Debility- (present on admission)  Assessment & Plan  Status post critical illness  Required mechanical ventilation this hospitalization  Initiate physical therapy, Occupational Therapy, speech therapy  Patient has underlying dysphagia from recent mechanical ventilation/critical illness  Advance diet per SLP recommendations  Aspiration, seizure, fall precautions in place  Anticipate patient will need postacute placement, SNF orders placed  Discussed with case management/social workers regarding disposition planning    Alcohol dependence (HCC)- (present on admission)  Assessment & Plan  Hospitalization complicated by alcohol withdrawal  Status post ICU stay, mechanical ventilation  Patient counseled and educated regarding alcohol cessation  Will optimize nutrition  We will monitor electrolytes, replace as clinically appropriate  Clinically improved at this time  We will continue folic acid, thiamine, multivitamins    Macrocytic anemia- (present on admission)  Assessment & Plan  Alcoholism related  No evidence of acute bleeding    Patient initiated on anticoagulation for underlying atrial fibrillation, recent critical illness/history of alcohol  abuse with risk factors for peptic ulcer disease/alcoholic gastritis, given this patient will be maintained on omeprazole/sucralfate.    Monitor Hb / Restrictive transfusion strategy    Current moderate episode of major depressive disorder without prior episode (HCC)- (present on admission)  Assessment & Plan  Continue sertraline, outpatient psychiatric follow-up       VTE prophylaxis: Lovenox/Coumadin

## 2020-02-09 NOTE — DISCHARGE PLANNING
SW updated on discharge time to [place].  SW completed Transfer packet and completed COBRA.  Per PT she has updated her son regarding the transfer to [place] today.

## 2020-02-09 NOTE — CARE PLAN
Problem: Fluid Volume:  Goal: Will maintain balanced intake and output  Outcome: PROGRESSING AS EXPECTED   Pt has good oral fluid intake.    Problem: Skin Integrity  Goal: Risk for impaired skin integrity will decrease  Outcome: PROGRESSING AS EXPECTED   Pt on waffle mattress; encouraged to turn every 2 hours.    Problem: Safety  Goal: Will remain free from falls  Outcome: PROGRESSING AS EXPECTED   Pt high fall risk; bed alarm in place; calls prior to ambulating.    Problem: Respiratory:  Goal: Respiratory status will improve  Outcome: PROGRESSING SLOWER THAN EXPECTED   Pt has diminished lung sounds in bilat bases.    Problem: Urinary Elimination:  Goal: Ability to reestablish a normal urinary elimination pattern will improve  Outcome: PROGRESSING SLOWER THAN EXPECTED   Pt experiencing urgency/frequency.

## 2020-02-09 NOTE — DISCHARGE SUMMARY
Discharge Summary    CHIEF COMPLAINT ON ADMISSION  Chief Complaint   Patient presents with   • Anxiety     severe, never had this bad, recent death of daughter, denies si or hi.    • N/V     started today   • Loose Stools     x 2 wks       Reason for Admission  EMS     Admission Date  1/27/2020    CODE STATUS  Full Code    HPI & HOSPITAL COURSE  This is a 74 y.o. female here with anxiety, nausea/vomiting and diarrhea.     Patient with underlying severe stressors recently prior to presentation, alcohol dependence and alcohol abuse, history of atrial fibrillation followed by cardiology.  Patient presented to the hospital on January 27, 2020 with complaints of profound anxiety, nausea and vomiting and was found to have active alcohol withdrawal.  Patient was admitted to the hospital.  Patient was found to have worsening depression, psychiatry evaluation was obtained and patient was initiated on SSRI.  Patient had worsening alcohol withdrawal during her hospitalization requiring transfer to the ICU on January 30, 2020, alcohol withdrawal further complicated by development of respiratory failure requiring need for intubation and mechanical ventilation in the ICU.  Patient was successfully extubated, weaned off mechanical ventilation and subsequently transferred out of ICU to the regular nursing floor.  During the hospitalization, with her critical illness patient required initiation of enteral feeding, cortrack was placed.  Enteral feeding was to complicated by development of severe diarrhea requiring placement of fecal management system.  In addition because of critical illness, Mora catheter was placed.  Also with her critical illness, central venous access catheter was placed.  With improvement, following extubation all these line drains have been removed.  Patient has had excoriation to the skin from underlying critical illness/debility, she was followed closely with aggressive nursing interventions during her  hospitalization.  Her respiratory failure has completely resolved at this time.  Patient has known history of paroxysmal atrial fibrillation, prior to discharge patient has been initiated on anticoagulation with Coumadin, patient was bridged with Lovenox during her hospitalization.  Do not recommend further bridging following discharge.  Patient being discharged on Coumadin 5 mg once daily, Coumadin dosing needs to be followed closely by physicians at the [facility] nursing facility and appropriate dosage needs to be established to maintain a therapeutic INR of 2-3.  Patient was noted to have intermittent RVR during her hospitalization, at this time because of lack of anticoagulation rhythm control was not recommended.  Rate control was recommended.  Patient's beta-blocker regimen was titrated and digoxin was initiated.  Patient is on metoprolol 100 mg twice daily at this time and digoxin 125 mcg daily.  Patient does intermittently get RVR, recommend outpatient cardiology follow-up.  Previous echocardiogram in January does not reveal any evidence of congestive heart failure.  Recommend monitoring of digoxin levels at the [facility] nursing facility, with the first trough level being February 10, 2020 at the [facility].    Patient with underlying alcohol dependence, she needs ongoing counseling and education at the [facility] nursing facility.  Also with underlying depression, she has been started on sertraline.  Recommend outpatient follow-up with psychiatry while at the [facility] nursing facility.  In addition patient should be given all resources upon discharge from the skilled nursing facility and if able established with AA.    Patient with underlying debility, seen by physical therapy, Occupational Therapy and speech therapy during her hospitalization.  Recommendations for discharge to skilled nursing facility.    For ongoing rehabilitation needs, patient at this time being discharged to skilled nursing  facility.  Patient will need ongoing monitoring of her rate control at the [facility], would not recommend transferring to the hospital unless and until heart rates are greater than 130 or any hemodynamic instability is noted.  Ongoing titration of regimen per physician at the [facility].  Continue metoprolol/digoxin, check digoxin levels trough levels on February 10, 2020.  Continue Coumadin, dosage of Coumadin per physicians at the [facility] with aim for a therapeutic INR of 2-3.  Monitor CBC/CMP in 1 week of hospital discharge at the [facility].  Outpatient cardiology follow-up.  We informed the schedulers to arrange outpatient follow-up for the patient.  To follow-up with PCP following discharge from the [facility].      Therefore, she is discharged in good and stable condition to [facility].    The patient met 2-midnight criteria for an inpatient stay at the time of discharge.    Discharge Date  02/09/20    FOLLOW UP ITEMS POST DISCHARGE  SNF discharge as noted above     DISCHARGE DIAGNOSES  Principal Problem (Resolved):    Acute respiratory failure with hypoxemia (HCC) POA: Yes  Active Problems:    PAF (paroxysmal atrial fibrillation) (HCC) POA: Yes    Alcohol dependence (HCC) POA: Yes    Debility POA: Yes    Current moderate episode of major depressive disorder without prior episode (HCC) POA: Yes    Macrocytic anemia POA: Yes      FOLLOW UP  Future Appointments   Date Time Provider Department Center   2/13/2020  1:00 PM HARRY Barraza None     No follow-up provider specified.    MEDICATIONS ON DISCHARGE     Medication List      START taking these medications      Instructions   digoxin 125 MCG Tabs  Commonly known as:  LANOXIN   Take 1 Tab by mouth every day at 6 PM.  Dose:  125 mcg     metoprolol 100 MG Tabs  Commonly known as:  LOPRESSOR   Take 1 Tab by mouth 2 Times a Day.  Dose:  100 mg     multivitamin  Tabs  Start taking on:  February 10, 2020   Take 1 Tab by mouth every day.  Dose:  1 Tab     omeprazole 20 MG delayed-release capsule  Start taking on:  February 10, 2020  Commonly known as:  PRILOSEC   Take 1 Cap by mouth every day.  Dose:  20 mg     sertraline 50 MG Tabs  Start taking on:  February 10, 2020  Commonly known as:  ZOLOFT   Take 1 Tab by mouth every day.  Dose:  50 mg     warfarin 5 MG Tabs  Commonly known as:  COUMADIN   Take 1 Tab by mouth every day at 6 PM.  Dose:  5 mg        CONTINUE taking these medications      Instructions   levothyroxine 125 MCG Tabs  Commonly known as:  SYNTHROID   Take 125 mcg by mouth Every morning on an empty stomach.  Dose:  125 mcg        STOP taking these medications    amoxicillin-clavulanate 875-125 MG Tabs  Commonly known as:  AUGMENTIN     folic acid 1 MG Tabs  Commonly known as:  FOLVITE     metoprolol SR 50 MG Tb24  Commonly known as:  TOPROL XL     pantoprazole 40 MG Tbec  Commonly known as:  PROTONIX            Allergies  No Known Allergies    DIET  Orders Placed This Encounter   Procedures   • Diet Order Regular     Standing Status:   Standing     Number of Occurrences:   1     Order Specific Question:   Diet:     Answer:   Regular [1]     Order Specific Question:   Texture/Fiber modifications:     Answer:   Dysphagia 3(Mechanical Soft)specify fluid consistency(question 6) [3]     Order Specific Question:   Consistency/Fluid modifications:     Answer:   Nectar Thick [2]     Order Specific Question:   Miscellaneous modifications:     Answer:   SLP - 1:1 Supervision by Nursing [21]     Order Specific Question:   Miscellaneous modifications:     Answer:   SLP - Deliver to Nursing Station [22]       ACTIVITY  As tolerated.  Weight bearing as tolerated    CONSULTATIONS  Pulmonology/critical care    PROCEDURES  During the hospitalization patient required intubation and placement of central venous catheter    LABORATORY  Lab Results   Component Value Date    SODIUM  138 02/09/2020    POTASSIUM 3.6 02/09/2020    CHLORIDE 106 02/09/2020    CO2 23 02/09/2020    GLUCOSE 82 02/09/2020    BUN 8 02/09/2020    CREATININE 0.64 02/09/2020        Lab Results   Component Value Date    WBC 8.1 02/07/2020    HEMOGLOBIN 12.6 02/09/2020    HEMATOCRIT 38.0 02/09/2020    PLATELETCT 323 02/07/2020        Total time of the discharge process exceeds 35 minutes.

## 2020-02-09 NOTE — PROGRESS NOTES
Pt dc'd to SNF. IV and monitor removed; monitor room notified. Pt left unit via wheelchair with pt transport. Personal belongings with pt when leaving unit. Pt given discharge instructions prior to leaving unit including prescription and when to visit with physician; verbalizes understanding. Copy of discharge instructions with pt and in the chart. PT left with COBRA and all belongings. Report given to SNF RN.

## 2020-02-09 NOTE — PROGRESS NOTES
Report received from [facility]. Pt still in a-fib, metoprolol increased to 100mg bid. Mg and K replaced today. Pt awake in bed A&Ox4; no complaints at this time. POC discussed; all questions answered. Bed locked in lowest position; call light in reach; bed alarm in use.

## 2020-02-09 NOTE — DISCHARGE INSTRUCTIONS
Discharge Instructions    Discharged to home by medical transportation with escort. Discharged via wheelchair, hospital escort: Yes.  Special equipment needed: Not Applicable    Be sure to schedule a follow-up appointment with your primary care doctor or any specialists as instructed.     Discharge Plan:   Diet Plan: Discussed  Activity Level: Discussed  Confirmed Follow up Appointment: Appointment Scheduled  Confirmed Symptoms Management: Discussed  Medication Reconciliation Updated: Yes  Influenza Vaccine Indication: Not indicated: Previously immunized this influenza season and > 8 years of age    I understand that a diet low in cholesterol, fat, and sodium is recommended for good health. Unless I have been given specific instructions below for another diet, I accept this instruction as my diet prescription.   Other diet: Resume regular diet    Special Instructions: None    · Is patient discharged on Warfarin / Coumadin?   Yes    You are receiving the drug warfarin. Please understand the importance of monitoring warfarin with scheduled PT/INR blood draws.  Follow-up with Skilled nursing facility.    IMPORTANT: HOW TO USE THIS INFORMATION:  This is a summary and does NOT have all possible information about this product. This information does not assure that this product is safe, effective, or appropriate for you. This information is not individual medical advice and does not substitute for the advice of your health care professional. Always ask your health care professional for complete information about this product and your specific health needs.      WARFARIN - ORAL (WARF-uh-rin)      COMMON BRAND NAME(S): Coumadin      WARNING:  Warfarin can cause very serious (possibly fatal) bleeding. This is more likely to occur when you first start taking this medication or if you take too much warfarin. To decrease your risk for bleeding, your doctor or other health care provider will monitor you closely and check your lab  "results (INR test) to make sure you are not taking too much warfarin. Keep all medical and laboratory appointments. Tell your doctor right away if you notice any signs of serious bleeding. See also Side Effects section.      USES:  This medication is used to treat blood clots (such as in deep vein thrombosis-DVT or pulmonary embolus-PE) and/or to prevent new clots from forming in your body. Preventing harmful blood clots helps to reduce the risk of a stroke or heart attack. Conditions that increase your risk of developing blood clots include a certain type of irregular heart rhythm (atrial fibrillation), heart valve replacement, recent heart attack, and certain surgeries (such as hip/knee replacement). Warfarin is commonly called a \"blood thinner,\" but the more correct term is \"anticoagulant.\" It helps to keep blood flowing smoothly in your body by decreasing the amount of certain substances (clotting proteins) in your blood.      HOW TO USE:  Read the Medication Guide provided by your pharmacist before you start taking warfarin and each time you get a refill. If you have any questions, ask your doctor or pharmacist. Take this medication by mouth with or without food as directed by your doctor or other health care professional, usually once a day. It is very important to take it exactly as directed. Do not increase the dose, take it more frequently, or stop using it unless directed by your doctor. Dosage is based on your medical condition, laboratory tests (such as INR), and response to treatment. Your doctor or other health care provider will monitor you closely while you are taking this medication to determine the right dose for you. Use this medication regularly to get the most benefit from it. To help you remember, take it at the same time each day. It is important to eat a balanced, consistent diet while taking warfarin. Some foods can affect how warfarin works in your body and may affect your treatment and " dose. Avoid sudden large increases or decreases in your intake of foods high in vitamin K (such as broccoli, cauliflower, cabbage, brussels sprouts, kale, spinach, and other green leafy vegetables, liver, green tea, certain vitamin supplements). If you are trying to lose weight, check with your doctor before you try to go on a diet. Cranberry products may also affect how your warfarin works. Limit the amount of cranberry juice (16 ounces/480 milliliters a day) or other cranberry products you may drink or eat.      SIDE EFFECTS:  Nausea, loss of appetite, or stomach/abdominal pain may occur. If any of these effects persist or worsen, tell your doctor or pharmacist promptly. Remember that your doctor has prescribed this medication because he or she has judged that the benefit to you is greater than the risk of side effects. Many people using this medication do not have serious side effects. This medication can cause serious bleeding if it affects your blood clotting proteins too much (shown by unusually high INR lab results). Even if your doctor stops your medication, this risk of bleeding can continue for up to a week. Tell your doctor right away if you have any signs of serious bleeding, including: unusual pain/swelling/discomfort, unusual/easy bruising, prolonged bleeding from cuts or gums, persistent/frequent nosebleeds, unusually heavy/prolonged menstrual flow, pink/dark urine, coughing up blood, vomit that is bloody or looks like coffee grounds, severe headache, dizziness/fainting, unusual or persistent tiredness/weakness, bloody/black/tarry stools, chest pain, shortness of breath, difficulty swallowing. Tell your doctor right away if any of these unlikely but serious side effects occur: persistent nausea/vomiting, severe stomach/abdominal pain, yellowing eyes/skin. This drug rarely has caused very serious (possibly fatal) problems if its effects lead to small blood clots (usually at the beginning of treatment).  This can lead to severe skin/tissue damage that may require surgery or amputation if left untreated. Patients with certain blood conditions (protein C or S deficiency) may be at greater risk. Get medical help right away if any of these rare but serious side effects occur: painful/red/purplish patches on the skin (such as on the toe, breast, abdomen), change in the amount of urine, vision changes, confusion, slurred speech, weakness on one side of the body. A very serious allergic reaction to this drug is rare. However, get medical help right away if you notice any symptoms of a serious allergic reaction, including: rash, itching/swelling (especially of the face/tongue/throat), severe dizziness, trouble breathing. This is not a complete list of possible side effects. If you notice other effects not listed above, contact your doctor or pharmacist. In the [place] - Call your doctor for medical advice about side effects. You may report side effects to FDA at 5-942-AXN-6418. In [place] - Call your doctor for medical advice about side effects. You may report side effects to [facility] [place] at 1-103.877.4462.      PRECAUTIONS:  Before taking warfarin, tell your doctor or pharmacist if you are allergic to it; or if you have any other allergies. This product may contain inactive ingredients, which can cause allergic reactions or other problems. Talk to your pharmacist for more details. Before using this medication, tell your doctor or pharmacist your medical history, especially of: blood disorders (such as anemia, hemophilia), bleeding problems (such as bleeding of the stomach/intestines, bleeding in the brain), blood vessel disorders (such as aneurysms), recent major injury/surgery, liver disease, alcohol use, mental/mood disorders (including memory problems), frequent falls/injuries. It is important that all your doctors and dentists know that you take warfarin. Before having surgery or any medical/dental procedures, tell your  doctor or dentist that you are taking this medication and about all the products you use (including prescription drugs, nonprescription drugs, and herbal products). Avoid getting injections into the muscles. If you must have an injection into a muscle (for example, a flu shot), it should be given in the arm. This way, it will be easier to check for bleeding and/or apply pressure bandages. This medication may cause stomach bleeding. Daily use of alcohol while using this medicine will increase your risk for stomach bleeding and may also affect how this medication works. Limit or avoid alcoholic beverages. If you have not been eating well, if you have an illness or infection that causes fever, vomiting, or diarrhea for more than 2 days, or if you start using any antibiotic medications, contact your doctor or pharmacist immediately because these conditions can affect how warfarin works. This medication can cause heavy bleeding. To lower the chance of getting cut, bruised, or injured, use great caution with sharp objects like safety razors and nail cutters. Use an electric razor when shaving and a soft toothbrush when brushing your teeth. Avoid activities such as contact sports. If you fall or injure yourself, especially if you hit your head, call your doctor immediately. Your doctor may need to check you. The Food & Drug Administration has stated that generic warfarin products are interchangeable. However, consult your doctor or pharmacist before switching warfarin products. Be careful not to take more than one medication that contains warfarin unless specifically directed by the doctor or health care provider who is monitoring your warfarin treatment. Older adults may be at greater risk for bleeding while using this drug. This medication is not recommended for use during pregnancy because of serious (possibly fatal) harm to an unborn baby. Discuss the use of reliable forms of birth control with your doctor. If you  "become pregnant or think you may be pregnant, tell your doctor immediately. If you are planning pregnancy, discuss a plan for managing your condition with your doctor before you become pregnant. Your doctor may switch the type of medication you use during pregnancy. Very small amounts of this medication may pass into breast milk but is unlikely to harm a nursing infant. Consult your doctor before breast-feeding.      DRUG INTERACTIONS:  Drug interactions may change how your medications work or increase your risk for serious side effects. This document does not contain all possible drug interactions. Keep a list of all the products you use (including prescription/nonprescription drugs and herbal products) and share it with your doctor and pharmacist. Do not start, stop, or change the dosage of any medicines without your doctor's approval. Warfarin interacts with many prescription, nonprescription, vitamin, and herbal products. This includes medications that are applied to the skin or inside the vagina or rectum. The interactions with warfarin usually result in an increase or decrease in the \"blood-thinning\" (anticoagulant) effect. Your doctor or other health care professional should closely monitor you to prevent serious bleeding or clotting problems. While taking warfarin, it is very important to tell your doctor or pharmacist of any changes in medications, vitamins, or herbal products that you are taking. Some products that may interact with this drug include: capecitabine, imatinib, mifepristone. Aspirin, aspirin-like drugs (salicylates), and nonsteroidal anti-inflammatory drugs (NSAIDs such as ibuprofen, naproxen, celecoxib) may have effects similar to warfarin. These drugs may increase the risk of bleeding problems if taken during treatment with warfarin. Carefully check all prescription/nonprescription product labels (including drugs applied to the skin such as pain-relieving creams) since the products may " contain NSAIDs or salicylates. Talk to your doctor about using a different medication (such as acetaminophen) to treat pain/fever. Low-dose aspirin and related drugs (such as clopidogrel, ticlopidine) should be continued if prescribed by your doctor for specific medical reasons such as heart attack or stroke prevention. Consult your doctor or pharmacist for more details. Many herbal products interact with warfarin. Tell your doctor before taking any herbal products, especially bromelains, coenzyme Q10, cranberry, danshen, dong quai, fenugreek, garlic, ginkgo biloba, ginseng, and [place]'s wort, among others. This medication may interfere with a certain laboratory test to measure theophylline levels, possibly causing false test results. Make sure laboratory personnel and all your doctors know you use this drug.      OVERDOSE:  If overdose is suspected, contact a poison control center or emergency room immediately. [place] residents can call the [place] Poison Hotline at 1-358.188.4531. [place] residents can call a provincial poison control center. Symptoms of overdose may include: bloody/black/tarry stools, pink/dark urine, unusual/prolonged bleeding.      NOTES:  Do not share this medication with others. Laboratory and/or medical tests (such as INR, complete blood count) must be performed periodically to monitor your progress or check for side effects. Consult your doctor for more details.      MISSED DOSE:  For the best possible benefit, do not miss any doses. If you do miss a dose and remember on the same day, take it as soon as you remember. If you remember on the next day, skip the missed dose and resume your usual dosing schedule. Do not double the dose to catch up because this could increase your risk for bleeding. Keep a record of missed doses to give to your doctor or pharmacist. Contact your doctor or pharmacist if you miss 2 or more doses in a row.      STORAGE:  Store at room temperature away from light  and moisture. Do not store in the bathroom. Keep all medications away from children and pets. Do not flush medications down the toilet or pour them into a drain unless instructed to do so. Properly discard this product when it is  or no longer needed. Consult your pharmacist or local waste disposal company for more details about how to safely discard your product.      MEDICAL ALERT:  Your condition and medication can cause complications in a medical emergency. For information about enrolling in MedicAlert, call 1-916.790.7027 ([place]) or 1-534.439.3921 ([place]).      Information last revised 2010 Copyright(c)  [employer]             Depression / Suicide Risk    As you are discharged from this [facility] facility, it is important to learn how to keep safe from harming yourself.    Recognize the warning signs:  · Abrupt changes in personality, positive or negative- including increase in energy   · Giving away possessions  · Change in eating patterns- significant weight changes-  positive or negative  · Change in sleeping patterns- unable to sleep or sleeping all the time   · Unwillingness or inability to communicate  · Depression  · Unusual sadness, discouragement and loneliness  · Talk of wanting to die  · Neglect of personal appearance   · Rebelliousness- reckless behavior  · Withdrawal from people/activities they love  · Confusion- inability to concentrate     If you or a loved one observes any of these behaviors or has concerns about self-harm, here's what you can do:  · Talk about it- your feelings and reasons for harming yourself  · Remove any means that you might use to hurt yourself (examples: pills, rope, extension cords, firearm)  · Get professional help from the community (Mental Health, Substance Abuse, psychological counseling)  · Do not be alone:Call your Safe Contact- someone whom you trust who will be there for you.  · Call your local CRISIS HOTLINE 924-6325 or  495.748.8187  · Call your local Children's Mobile Crisis Response Team [place] (773) 004-2937 or www.SumAll  · Call the toll free National Suicide Prevention Hotlines   · National Suicide Prevention Lifeline 513-188-YYKQ (0879)  · National Hope Line Network 800-SUICIDE (643-5275)        Alcohol Abuse and Nutrition  Alcohol abuse is any pattern of alcohol consumption that harms your health, relationships, or work. Alcohol abuse can affect how your body breaks down and absorbs nutrients from food by causing your liver to work abnormally. Additionally, many people who abuse alcohol do not eat enough carbohydrates, protein, fat, vitamins, and minerals. This can cause poor nutrition (malnutrition) and a lack of nutrients (nutrient deficiencies), which can lead to further complications.  Nutrients that are commonly lacking (deficient) among people who abuse alcohol include:  · Vitamins.  ¨ Vitamin A. This is stored in your liver. It is important for your vision, metabolism, and ability to fight off infections (immunity).  ¨ B vitamins. These include vitamins such as folate, thiamin, and niacin. These are important in new cell growth and maintenance.  ¨ Vitamin C. This plays an important role in iron absorption, wound healing, and immunity.  ¨ Vitamin D. This is produced by your liver, but you can also get vitamin D from food. Vitamin D is necessary for your body to absorb and use calcium.  · Minerals.  ¨ Calcium. This is important for your bones and your heart and blood vessel (cardiovascular) function.  ¨ Iron. This is important for blood, muscle, and nervous system functioning.  ¨ Magnesium. This plays an important role in muscle and nerve function, and it helps to control blood sugar and blood pressure.  ¨ Zinc. This is important for the normal function of your nervous system and digestive system (gastrointestinal tract).  Nutrition is an essential component of therapy for alcohol abuse. Your health  care provider or dietitian will work with you to design a plan that can help restore nutrients to your body and prevent potential complications.  What is my plan?  Your dietitian may develop a specific diet plan that is based on your condition and any other complications you may have. A diet plan will commonly include:  · A balanced diet.  ¨ Grains: 6-8 oz per day.  ¨ Vegetables: 2-3 cups per day.  ¨ Fruits: 1-2 cups per day.  ¨ Meat and other protein: 5-6 oz per day.  ¨ Dairy: 2-3 cups per day.  · Vitamin and mineral supplements.  What do I need to know about alcohol and nutrition?  · Consume foods that are high in antioxidants, such as grapes, berries, nuts, green tea, and dark green and orange vegetables. This can help to counteract some of the stress that is placed on your liver by consuming alcohol.  · Avoid food and drinks that are high in fat and sugar. Foods such as sugared soft drinks, salty snack foods, and candy contain empty calories. This means that they lack important nutrients such as protein, fiber, and vitamins.  · Eat frequent meals and snacks. Try to eat 5-6 small meals each day.  · Eat a variety of fresh fruits and vegetables each day. This will help you get plenty of water, fiber, and vitamins in your diet.  · Drink plenty of water and other clear fluids. Try to drink at least 48-64 oz (1.5-2 L) of water per day.  · If you are a vegetarian, eat a variety of protein-rich foods. Pair whole grains with plant-based proteins at meals and snacks to obtain the greatest nutrient benefit from your food. For example, eat rice with beans, put peanut butter on whole-grain toast, or eat oatmeal with sunflower seeds.  · Soak beans and whole grains overnight before cooking. This can help your body to absorb the nutrients more easily.  · Include foods fortified with vitamins and minerals in your diet. Commonly fortified foods include milk, orange juice, cereal, and bread.  · If you are malnourished, your  dietitian may recommend a high-protein, high-calorie diet. This may include:  ¨ 2,000-3,000 calories (kilocalories) per day.  ¨  grams of protein per day.  · Your health care provider may recommend a complete nutritional supplement beverage. This can help to restore calories, protein, and vitamins to your body. Depending on your condition, you may be advised to consume this instead of or in addition to meals.  · Limit your intake of caffeine. Replace drinks like coffee and black tea with decaffeinated coffee and herbal tea.  · Eat a variety of foods that are high in omega fatty acids. These include fish, nuts and seeds, and soybeans. These foods may help your liver to recover and may also stabilize your mood.  · Certain medicines may cause changes in your appetite, taste, and weight. Work with your health care provider and dietitian to make any adjustments to your medicines and diet plan.  · Include other healthy lifestyle choices in your daily routine.  ¨ Be physically active.  ¨ Get enough sleep.  ¨ Spend time doing activities that you enjoy.  · If you are unable to take in enough food and calories by mouth, your health care provider may recommend a feeding tube. This is a tube that passes through your nose and throat, directly into your stomach. Nutritional supplement beverages can be given to you through the feeding tube to help you get the nutrients you need.  · Take vitamin or mineral supplements as recommended by your health care provider.  What foods can I eat?  Grains   Enriched pasta. Enriched rice. Fortified whole-grain bread. Fortified whole-grain cereal. Barley. Brown rice. Quinoa. Millet.  Vegetables   All fresh, frozen, and canned vegetables. Spinach. Kale. Artichoke. Carrots. Winter squash and pumpkin. Sweet potatoes. Broccoli. Cabbage. Cucumbers. Tomatoes. Sweet peppers. Green beans. Peas. Corn.  Fruits   All fresh and frozen fruits. Berries. Grapes. [place]. Papaya. Guava. Cherries. Apples.  Bananas. Peaches. Plums. Pineapple. Watermelon. Cantaloupe. Oranges. Avocado.  Meats and Other Protein Sources   Beef liver. Lean beef. Pork. Fresh and canned chicken. Fresh fish. Oysters. Sardines. Canned tuna. Shrimp. Eggs with yolks. Nuts and seeds. Peanut butter. Beans and lentils. Soybeans. Tofu.  Dairy   Whole, low-fat, and nonfat milk. Whole, low-fat, and nonfat yogurt. Cottage cheese. Sour cream. Hard and soft cheeses.  Beverages   Water. Herbal tea. Decaffeinated coffee. Decaffeinated green tea. 100% fruit juice. 100% vegetable juice. Instant breakfast shakes.  Condiments   Ketchup. Mayonnaise. Mustard. Salad dressing. Barbecue sauce.  Sweets and Desserts   Sugar-free ice cream. Sugar-free pudding. Sugar-free gelatin.  Fats and Oils   Butter. Vegetable oil, flaxseed oil, olive oil, and walnut oil.  Other   Complete nutrition shakes. Protein bars. Sugar-free gum.  The items listed above may not be a complete list of recommended foods or beverages. Contact your dietitian for more options.   What foods are not recommended?  Grains   Sugar-sweetened breakfast cereals. Flavored instant oatmeal. Fried breads.  Vegetables   Breaded or deep-fried vegetables.  Fruits   Dried fruit with added sugar. Candied fruit. Canned fruit in syrup.  Meats and Other Protein Sources   Breaded or deep-fried meats.  Dairy   Flavored milks. Fried cheese curds or fried cheese sticks.  Beverages   Alcohol. Sugar-sweetened soft drinks. Sugar-sweetened tea. Caffeinated coffee and tea.  Condiments   Sugar. Honey. Agave nectar. Molasses.  Sweets and Desserts   Chocolate. Cake. Cookies. Candy.  Other   Potato chips. Pretzels. Salted nuts. Candied nuts.  The items listed above may not be a complete list of foods and beverages to avoid. Contact your dietitian for more information.   This information is not intended to replace advice given to you by your health care provider. Make sure you discuss any questions you have with your health care  provider.  Document Released: 10/12/2006 Document Revised: 04/26/2017 Document Reviewed: 07/21/2015  [employer] Interactive Patient Education © 2017 [employer]        Atrial Fibrillation  Introduction  Atrial fibrillation is a type of heartbeat that is irregular or fast (rapid). If you have this condition, your heart keeps quivering in a weird (chaotic) way. This condition can make it so your heart cannot pump blood normally. Having this condition gives a person more risk for stroke, heart failure, and other heart problems. There are different types of atrial fibrillation. Talk with your doctor to learn about the type that you have.  Follow these instructions at home:  · Take over-the-counter and prescription medicines only as told by your doctor.  · If your doctor prescribed a blood-thinning medicine, take it exactly as told. Taking too much of it can cause bleeding. If you do not take enough of it, you will not have the protection that you need against stroke and other problems.  · Do not use any tobacco products. These include cigarettes, chewing tobacco, and e-cigarettes. If you need help quitting, ask your doctor.  · If you have apnea (obstructive sleep apnea), manage it as told by your doctor.  · Do not drink alcohol.  · Do not drink beverages that have caffeine. These include coffee, soda, and tea.  · Maintain a healthy weight. Do not use diet pills unless your doctor says they are safe for you. Diet pills may make heart problems worse.  · Follow diet instructions as told by your doctor.  · Exercise regularly as told by your doctor.  · Keep all follow-up visits as told by your doctor. This is important.  Contact a doctor if:  · You notice a change in the speed, rhythm, or strength of your heartbeat.  · You are taking a blood-thinning medicine and you notice more bruising.  · You get tired more easily when you move or exercise.  Get help right away if:  · You have pain in your chest or your belly  (abdomen).  · You have sweating or weakness.  · You feel sick to your stomach (nauseous).  · You notice blood in your throw up (vomit), poop (stool), or pee (urine).  · You are short of breath.  · You suddenly have swollen feet and ankles.  · You feel dizzy.  · Your suddenly get weak or numb in your face, arms, or legs, especially if it happens on one side of your body.  · You have trouble talking, trouble understanding, or both.  · Your face or your eyelid droops on one side.  These symptoms may be an emergency. Do not wait to see if the symptoms will go away. Get medical help right away. Call your local emergency services (911 in the [place] Do not drive yourself to the hospital.   This information is not intended to replace advice given to you by your health care provider. Make sure you discuss any questions you have with your health care provider.  Document Released: 09/26/2009 Document Revised: 05/25/2017 Document Reviewed: 04/13/2016  © 2017 Elsevier            Acute Respiratory Failure, Adult  Acute respiratory failure occurs when there is not enough oxygen passing from your lungs to your body. When this happens, your lungs have trouble removing carbon dioxide from the blood. This causes your blood oxygen level to drop too low as carbon dioxide builds up.  Acute respiratory failure is a medical emergency. It can develop quickly, but it is temporary if treated promptly. Your lung capacity, or how much air your lungs can hold, may improve with time, exercise, and treatment.  What are the causes?  There are many possible causes of acute respiratory failure, including:  · Lung injury.  · Chest injury or damage to the ribs or tissues near the lungs.  · Lung conditions that affect the flow of air and blood into and out of the lungs, such as pneumonia, acute respiratory distress syndrome, and cystic fibrosis.  · Medical conditions, such as strokes or spinal cord injuries, that affect the muscles and nerves that  control breathing.  · Blood infection (sepsis).  · Inflammation of the pancreas (pancreatitis).  · A blood clot in the lungs (pulmonary embolism).  · A large-volume blood transfusion.  · Burns.  · Near-drowning.  · Seizure.  · Smoke inhalation.  · Reaction to medicines.  · Alcohol or drug overdose.  What increases the risk?  This condition is more likely to develop in people who have:  · A blocked airway.  · Asthma.  · A condition or disease that damages or weakens the muscles, nerves, bones, or tissues that are involved in breathing.  · A serious infection.  · A health problem that blocks the unconscious reflex that is involved in breathing, such as hypothyroidism or sleep apnea.  · A lung injury or trauma.  What are the signs or symptoms?  Trouble breathing is the main symptom of acute respiratory failure. Symptoms may also include:  · Rapid breathing.  · Restlessness or anxiety.  · Skin, lips, or fingernails that appear blue (cyanosis).  · Rapid heart rate.  · Abnormal heart rhythms (arrhythmias).  · Confusion or changes in behavior.  · Tiredness or loss of energy.  · Feeling sleepy or having a loss of consciousness.  How is this diagnosed?  Your health care provider can diagnose acute respiratory failure with a medical history and physical exam. During the exam, your health care provider will listen to your heart and check for crackling or wheezing sounds in your lungs. Your may also have tests to confirm the diagnosis and determine what is causing respiratory failure. These tests may include:  · Measuring the amount of oxygen in your blood (pulse oximetry). The measurement comes from a small device that is placed on your finger, earlobe, or toe.  · Other blood tests to measure blood gases and to look for signs of infection.  · Sampling your cerebral spinal fluid or tracheal fluid to check for infections.  · Chest X-ray to look for fluid in spaces that should be filled with air.  · Electrocardiogram (ECG) to  look at the heart's electrical activity.  How is this treated?  Treatment for this condition usually takes places in a hospital intensive care unit (ICU). Treatment depends on what is causing the condition. It may include one or more treatments until your symptoms improve. Treatment may include:  · Supplemental oxygen. Extra oxygen is given through a tube in the nose, a face mask, or a velazquez.  · A device such as a continuous positive airway pressure (CPAP) or bi-level positive airway pressure (BiPAP or BPAP) machine. This treatment uses mild air pressure to keep the airways open. A mask or other device will be placed over your nose or mouth. A tube that is connected to a motor will deliver oxygen through the mask.  · Ventilator. This treatment helps move air into and out of the lungs. This may be done with a bag and mask or a machine. For this treatment, a tube is placed in your windpipe (trachea) so air and oxygen can flow to the lungs.  · Extracorporeal membrane oxygenation (ECMO). This treatment temporarily takes over the function of the heart and lungs, supplying oxygen and removing carbon dioxide. ECMO gives the lungs a chance to recover. It may be used if a ventilator is not effective.  · Tracheostomy. This is a procedure that creates a hole in the neck to insert a breathing tube.  · Receiving fluids and medicines.  · Rocking the bed to help breathing.  Follow these instructions at home:  · Take over-the-counter and prescription medicines only as told by your health care provider.  · Return to normal activities as told by your health care provider. Ask your health care provider what activities are safe for you.  · Keep all follow-up visits as told by your health care provider. This is important.  How is this prevented?  Treating infections and medical conditions that may lead to acute respiratory failure can help prevent the condition from developing.  Contact a health care provider if:  · You have a  fever.  · Your symptoms do not improve or they get worse.  Get help right away if:  · You are having trouble breathing.  · You lose consciousness.  · Your have cyanosis or turn blue.  · You develop a rapid heart rate.  · You are confused.  These symptoms may represent a serious problem that is an emergency. Do not wait to see if the symptoms will go away. Get medical help right away. Call your local emergency services (911 in the [place] Do not drive yourself to the hospital.   This information is not intended to replace advice given to you by your health care provider. Make sure you discuss any questions you have with your health care provider.  Document Released: 12/23/2014 Document Revised: 07/15/2017 Document Reviewed: 07/05/2017  Elsevier Interactive Patient Education © 2017 Elsevier Inc[employer]

## 2020-03-02 ASSESSMENT — ENCOUNTER SYMPTOMS
MUSCULOSKELETAL NEGATIVE: 1
DIAPHORESIS: 0
NERVOUS/ANXIOUS: 1
DIARRHEA: 0
SHORTNESS OF BREATH: 1
DEPRESSION: 1
COUGH: 0
WEAKNESS: 1
SPEECH CHANGE: 1
PALPITATIONS: 1
NAUSEA: 1

## 2020-03-02 ASSESSMENT — LIFESTYLE VARIABLES: SUBSTANCE_ABUSE: 1

## 2020-03-18 ENCOUNTER — TELEPHONE (OUTPATIENT)
Dept: CARDIOLOGY | Facility: MEDICAL CENTER | Age: 75
End: 2020-03-18

## 2020-03-20 ENCOUNTER — HOSPITAL ENCOUNTER (OUTPATIENT)
Dept: BEHAVIORAL HEALTH | Facility: MEDICAL CENTER | Age: 75
End: 2020-03-20
Attending: PSYCHIATRY & NEUROLOGY
Payer: MEDICARE

## 2020-03-20 DIAGNOSIS — F10.21 ALCOHOL DEPENDENCE IN REMISSION (HCC): ICD-10-CM

## 2020-03-20 PROCEDURE — 90791 PSYCH DIAGNOSTIC EVALUATION: CPT

## 2020-03-20 SDOH — HEALTH STABILITY: MENTAL HEALTH: HOW OFTEN DO YOU HAVE 6 OR MORE DRINKS ON ONE OCCASION?: DAILY OR ALMOST DAILY

## 2020-03-20 NOTE — PROGRESS NOTES
I have reviewed the note by Concepcion Sales RN and agree with the assessment and treatment plan.    1. Admit to Intensive Outpatient Program - date pending   - Group therapy per schedule,    - Psychoeducational groups per schedule,   - Individual/family counseling sessions with  per treatment plan.  2. Symptoms necessitating Intensive Outpatient Treatment: excessive alcohol use  3. Medical screening/Physical exam per primary care provider or referring facility.    Marietta Cheema MD

## 2020-03-20 NOTE — BH THERAPY
[facility]  INITIAL ASSESSMENT    Name: Jeanie Hutchison  MRN: [id number]  : 1945  Age: 74 y.o.  Date of assessment: 3/20/2020  PCP: Jane Armstrong M.D.  Persons in attendance: Patient  Total session time: 60 minutes      CHIEF COMPLAINT AND HISTORY OF PRESENTING PROBLEM:  (as stated by Patient):  Jeanie Hutchison is a 74 y.o., White female referred for assessment by [facility] treatment.  Primary presenting issue includes   Chief Complaint   Patient presents with   • Addiction Problem   . Depression, unresolved grief     FAMILY/SOCIAL HISTORY  Current living situation/household members: alone,  in Assisted Living x 3 months  Relevant family history/structure/dynamics: daughters  seven and two years ago,  declined past year - he's alcoholic  Current family/social stressors:  away, care of the home and yard has declined  Quality/quantity of current family and/or social support: son, two friends, sister  Does patient/parent report a family history of behavioral health issues, diagnoses, or treatment? Yes  Family History   Problem Relation Age of Onset   • Diabetes Mother    • Anxiety disorder Mother    • Depression Mother    • Hyperlipidemia Father         BEHAVIORAL HEALTH TREATMENT HISTORY  Does patient/parent report a history of prior behavioral health treatment for patient? Yes:    Dates Level of Care Facilty/Provider Diagnosis/Problem Medications   2018 [facility] alcoholism    -3/18/20 [facility]idential [place] alcoholism    -present OP Dr [facility] life                                                           History of untreated behavioral health issues identified? Yes    MEDICAL HISTORY  Primary care behavioral health screenings: @PHQ@   Past medical/surgical history:   Past Medical History:   Diagnosis Date   • A-fib (HCC) 2014   • Alcoholism ([facility])    • Chronic pain     r/t pelvic fracture   • Psychiatric disorder      history of depression and anxiety       Past Surgical History:   Procedure Laterality Date   • CLAVICLE ORIF  5/21/2014    Performed by Wilfred Gonzalez M.D. at SURGERY [facility]   • BREAST BIOPSY      bilateral neg breast bxs   • OTHER ORTHOPEDIC SURGERY      pelvis fracture. 10 years ago         Medication Allergies:  Patient has no known allergies.   Medical history provided by patient during current evaluation: brief    Patient reports last physical exam: 2/18/2020 at [place]  Does patient/parent report any history of or current developmental concerns? No  Does patient/parent report nutritional concerns? No  Does patient/parent report change in appetite or weight loss/gain? No  Does patient/parent report history of eating disorder symptoms? No  Does patient/parent report dental problem? No  Does patient/parent report physical pain? No   Indicate if pain is acute or chronic, and location: aches to tailbone   Pain scale rating: [unfilled]   Does patient/parent report functional impact of medical, developmental, or pain issues?   no    EDUCATIONAL/LEARNING HISTORY  Is patient currently enrolled in a school/educational program?   No:   Highest grade level completed: Masters in Nursing  School performance/functioning: good  History of Special Education/repeated grades/learning issues: no  Preferred learning style: none identified  Current learning needs (large print, language barrier, etc):  None noted  What is the pts  attitude about school and attitude about school when they were a student?  Do they have future education plans? no  Vocational assessment indicated? no       Referred tor assessment?    no    To whom?      EMPLOYMENT/RESOURCES  Is the patient currently employed? No  Does the patient/parent report adequate financial resources? Yes  Does patient identify impact of presenting issue on work functioning? No  Work or income-related stressors:  retired     HISTORY:  Does patient report  current or past enlistment? No    [If yes, complete below items]  Does patient report history of exposure to combat? No  Does patient report history of  sexual trauma? No  Does patient report other -related stressors? No    SPIRITUAL/CULTURAL/IDENTITY:  What are the patient’s/family’s spiritual beliefs or practices? none  What is the patient’s cultural or ethnic background/identity?   How does the patient identify their sexual orientation? heterosexual  How does the patient identify their gender? female  Does the patient identify any spiritual/cultural/identity factors as relevant to the presenting issue? No    LEGAL HISTORY  Has the patient ever been involved with juvenile, adult, or family legal systems? No   [If yes, trigger section below:]  Does patient report ever being a victim of a crime?  No  Does patient report involvement in any current legal issues?  No  Does patient report ever being arrested or committing a crime? No  Does patient report any current agency (parole/probation/CPS/) involvement? No    ABUSE/NEGLECT/TRAUMA SCREENING  Does patient report feeling “unsafe” in his/her home, or afraid of anyone? No  Does patient report any history of physical, sexual, or emotional abuse? No  Does parent or significant other report any of the above? No  Is there evidence of neglect by self? No  Is there evidence of neglect by a caregiver? No  Does the patient/parent report any history of CPS/APS/police involvement related to suspected abuse/neglect or domestic violence? No  Does the patient/parent report any other history of potentially traumatic life events? No  Based on the information provided during the current assessment, is a mandated report of suspected abuse/neglect being made?  No     SAFETY ASSESSMENT - SELF  Does patient acknowledge current or past symptoms of dangerousness to self? No  Does parent/significant other report patient has current or past symptoms of  dangerousness to self? No      Recent change in frequency/specificity/intensity of suicidal thoughts or self-harm behavior? No  Current access to firearms, medications, or other identified means of suicide/self-harm? No  If yes, willing to restrict access to means of suicide/self-harm? Yes  Protective factors present: Future-oriented, Hopefulness, Optimism and Strong family connections    Current Suicide Risk: Low  Crisis Safety Plan completed and copy given to patient: No      COLUMBIA-SUICIDE SEVERITY RATING SCALE Screen Version    SUICIDE IDEATION DEFINITIONS AND PROMPTS  Past month or since last visit:    Ask questions that are bolded and underlined.  YES  NO    Ask Questions 1 and 2    1) Wish to be Dead: no  Person endorses thoughts about a wish to be dead or not alive anymore, or wish to fall asleep and not wake up.   Have you wished you were dead or wished you could go to sleep and not wake up?    2) Suicidal Thoughts: no  General non-specific thoughts of wanting to end one’s life/commit suicide, “I’ve thought about killing myself” without general thoughts of ways to kill oneself/associated methods, intent, or plan.   Have you actually had any thoughts of killing yourself?    If YES to 2, ask questions 3, 4, 5, and 6. If NO to 2, go directly to question 6.    3) Suicidal Thoughts with Method (without Specific Plan or Intent to Act):   Person endorses thoughts of suicide and has thought of a least one method during the assessment period. This is different than a specific plan with time, place or method details worked out. “I thought about taking an overdose but I never made a specific plan as to when where or how I would actually do it….and I would never go through with it.”   Have you been thinking about how you might kill yourself?    4) Suicidal Intent (without Specific Plan):   Active suicidal thoughts of killing oneself and patient reports having some intent to act on such thoughts, as opposed to “I have  the thoughts but I definitely will not do anything about them.”   Have you had these thoughts and had some intention of acting on them?    5) Suicide Intent with Specific Plan:   Thoughts of killing oneself with details of plan fully or partially worked out and person has some intent to carry it out.   Have you started to work out or worked out the details of how to kill yourself? Do you intend to carry out this plan?    6) Suicide Behavior Question:   Have you ever done anything, started to do anything, or prepared to do anything to end your life?   Examples: Collected pills, obtained a gun, gave away valuables, wrote a will or suicide note, took out pills but didn’t swallow any, held a gun but changed your mind or it was grabbed from your hand, went to the roof but didn’t jump; or actually took pills, tried to shoot yourself, cut yourself, tried to hang yourself, etc.   If YES, ask: How long ago did you do any of these?   Over a year ago? Between three months and a year ago? Within the last three months?          SAFETY ASSESSMENT - OTHERS  Does paor past symptoms of aggressive behavior or risk to others? No  Does parent/significant othtient acknowledge current or past symptoms of aggressive behavior or risk to others? No  Does parent/significant other report patient has current or past symptoms of aggressive behavior or risk to others? No    Recent change in frequency/specificity/intensity of thoughts or threats to harm others? No  Current access to firearms/other identified means of harm? No  If yes, willing to restrict access to weapons/means of harm? Yes  Protective factors present: Well-developed sense of empathy    Current Homicide Risk:  Low  Crisis Safety Plan completed and copy given to patient? No  Based on information provided during the current assessment, is a mandated “duty to warn” being exercised? No    SUBSTANCE USE/ADDICTION HISTORY  [] Not applicable - patient 10 years of age or younger    Is  there a family history of substance use/addiction? No  Does patient acknowledge or parent/significant other report use of/dependence on substances? Yes  Last time patient used alcohol: January 2020  Within the past week? No  Last time patient used marijuana: 40 years ago  Within the past month? No  Any other street drugs ever tried even once? No  Any use of prescription medications/pills without a prescription, or for reasons others than originally prescribed?  No  Any other addictive behavior reported (gambling, shopping, sex)? No     Drug History:  Amphetamine:  Amphetamine frequency: Never used      Cannibis:  Cannabis frequency: Past rare use      Cocaine:  Cocaine frequency: Past rare use      Ecstasy:  Ecstasy frequency: Never used      Hallucinogen:  Hallucinogen frequency: Never used      Inhalant:   Inhalant frequency: Never used      Opiate:  Opiate frequency: Never used  Cannabis frequency: Past rare use      Other:  Other drug frequency: Never used      Sedative:   Sedative frequency: Never used          What consequences does the patient associate with any of the above substance use and or addictive behaviors? Health problems: serious heart problems    Patient’s motivation/readiness for change: highly motivated to establish local care after residential treatment    [] Patient denies use of any substance/addictive behaviors    STRENGTHS/ASSETS  Strengths Identified by interviewer: Self-awareness, Family suppport, Sense of humor, Cognitive flexibility and History of effective treatment  Strengths Identified by patient: tenacious     MENTAL STATUS/OBSERVATIONS   Participation: Active verbal participation, Attentive, Engaged and Open to feedback  Grooming: Casual and Neat  Orientation:Alert and Fully Oriented   Behavior: Calm  Eye contact: Good   Mood:Depressed  Affect:Flexible, Full range, Congruent with content, Sad and Tearful  Thought process: Logical and Goal-directed  Thought content:  Within normal  limits  Speech: Rate within normal limits and Volume within normal limits  Perception: Within normal limits  Memory: No gross evidence of memory deficits  Insight: Adequate  Judgment:  Adequate  Other:    Family/couple interaction observations: n/a    RESULTS OF SCREENING MEASURES:  [] Not applicable  Measure:   Score:     Measure:   Score:       CLINICAL FORMULATION: 74 year old CF for intake following residential treatment in [place] at [facility]. Mehnaz describes a history of drinking wine socially up until seven years ago following the death of her daughter when she switched to hard liquor. She states things exacerbated further with the death of her second daughter in 2018. She received residential treatment then. She is tearful in describing the unresolved grief and loss and the attempts to numb with vodka. Most recently, she returned home to [place] on Wednesday following thirty days in [place], [place] for residential treatment. She is looking for continuity of care and recovery locally. Her support system includes her son locally and a couple of friends. 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 Her  is in Assisted Living x 3 months; she felt she could no longer care for him at home. She is not able to see him with current restrictions. At this time she is offered two counseling sessions weekly until the IOP resumes following Covid-19 pandemic protocols.       DIAGNOSTIC IMPRESSION(S): AUD, severe    IDENTIFIED NEEDS/PLAN:  [If any of these marked, trigger DISPOSITION list]  Mood/anxiety and Substance use/Addictive behavior  Refer to [facility]: Outpatient Therapy followed by [facility] MWF when groups resume.     Does patient express agreement with the above plan? Yes     Referral appointment(s) scheduled? No but will call her Monday at [phone] as she is also scheduling with cardiologist and psychologist and will know her schedule then.      Concepcion Sales,  PAVEL

## 2020-03-23 ENCOUNTER — TELEPHONE (OUTPATIENT)
Dept: BEHAVIORAL HEALTH | Facility: MEDICAL CENTER | Age: 75
End: 2020-03-23

## 2020-03-25 ENCOUNTER — OFFICE VISIT (OUTPATIENT)
Dept: BEHAVIORAL HEALTH | Facility: CLINIC | Age: 75
End: 2020-03-25
Payer: MEDICARE

## 2020-03-25 ENCOUNTER — APPOINTMENT (OUTPATIENT)
Dept: BEHAVIORAL HEALTH | Facility: MEDICAL CENTER | Age: 75
End: 2020-03-25
Attending: PSYCHIATRY & NEUROLOGY
Payer: MEDICARE

## 2020-03-25 DIAGNOSIS — F10.21 ALCOHOL DEPENDENCE IN REMISSION (HCC): ICD-10-CM

## 2020-03-25 PROCEDURE — 98968 PH1 ASSMT&MGMT NQHP 21-30: CPT | Mod: CR | Performed by: MARRIAGE & FAMILY THERAPIST

## 2020-03-25 PROCEDURE — 98967 PH1 ASSMT&MGMT NQHP 11-20: CPT | Mod: CR | Performed by: MARRIAGE & FAMILY THERAPIST

## 2020-03-25 NOTE — BH THERAPY
[facility]  Therapy Progress Note    Patient Name: Jeanie Hutchison  Patient MRN: [id number]  Today's Date: 3/25/2020     Type of session:Individual psychotherapy  Length of session: 45 minutes  Persons in attendance:Patient    Subjective/New Info: This appt was conducted by telephone as authorized by COVID-19. The call was initiated by this therapist and pt agreed to this media use.  Pt was tearful throughout the session with much grief and loss from a dtr who  7 years ago to melanoma and another dtr who  2 years ago to a rare syndrome. Son from first dtr lives with  of second dtr and their two girls in [place]. Pt's  is in assisted living with physical issues and Hx of alcoholism.  Pt just competed 30 IP days at [place], [place] and remains sober.  She denied SI.  She denied any spiritual beliefs. She reported chronic insomnia and has tried medications. She appeared open to suggestions and recommendations.     Objective/Observations:   Participation: Active verbal participation, Attentive, Engaged and Open to feedback   Grooming: not observed   Cognition: Alert and Fully Oriented   Eye contact: NA   Mood: Depressed   Affect: Labile   Thought process: Logical   Speech: Rate within normal limits and Soft   Other:     Diagnoses:   1. Alcohol dependence in remission (HCC)         Current risk:   SUICIDE: Low   Homicide: Low   Self-harm: Low   Relapse: Moderate   Other:    Safety Plan reviewed? Not Indicated   If evidence of imminent risk is present, intervention/plan:     Therapeutic Intervention(s): Cognitive modification, Establish rapport, Limit-setting, Maladaptive behavior addressed and Supportive psychotherapy    Treatment Goal(s)/Objec, whom e she has a long term established relationship withtive(s) addressed: Pt to connect with support resources in the community.  Pt to explore and start online AAmeetings at [employer]. Pt to check in with PCP, Dr. Armstrong whom she has a  long standing relationship with.  Pt to explore self calming techniques and check out guided imagery sessions on YouTube to aid her sleep.     Progress toward Treatment Goals: Mild improvement    Plan:  - Patient is in agreement with the above plan:  YES, review homework assigned next session on 3/30/20 at 3pm.    GABRIELE Gallardo  3/25/2020

## 2020-03-26 ENCOUNTER — TELEPHONE (OUTPATIENT)
Dept: CARDIOLOGY | Facility: MEDICAL CENTER | Age: 75
End: 2020-03-26

## 2020-03-26 ENCOUNTER — TELEPHONE (OUTPATIENT)
Dept: VASCULAR LAB | Facility: MEDICAL CENTER | Age: 75
End: 2020-03-26

## 2020-03-26 ENCOUNTER — OFFICE VISIT (OUTPATIENT)
Dept: CARDIOLOGY | Facility: MEDICAL CENTER | Age: 75
End: 2020-03-26
Payer: MEDICARE

## 2020-03-26 ENCOUNTER — HOME HEALTH ADMISSION (OUTPATIENT)
Dept: HOME HEALTH SERVICES | Facility: HOME HEALTHCARE | Age: 75
End: 2020-03-26
Payer: MEDICARE

## 2020-03-26 VITALS — BODY MASS INDEX: 24.11 KG/M2 | WEIGHT: 150 LBS | HEIGHT: 66 IN

## 2020-03-26 DIAGNOSIS — F19.10 DRUG ABUSE (HCC): ICD-10-CM

## 2020-03-26 DIAGNOSIS — F32.A DEPRESSION, UNSPECIFIED DEPRESSION TYPE: ICD-10-CM

## 2020-03-26 DIAGNOSIS — Z79.899 HIGH RISK MEDICATION USE: ICD-10-CM

## 2020-03-26 DIAGNOSIS — I48.0 PAF (PAROXYSMAL ATRIAL FIBRILLATION) (HCC): ICD-10-CM

## 2020-03-26 DIAGNOSIS — F10.21 ALCOHOL DEPENDENCE IN REMISSION (HCC): ICD-10-CM

## 2020-03-26 DIAGNOSIS — I48.91 ATRIAL FIBRILLATION, UNSPECIFIED TYPE (HCC): ICD-10-CM

## 2020-03-26 DIAGNOSIS — E78.2 MIXED HYPERLIPIDEMIA: ICD-10-CM

## 2020-03-26 PROCEDURE — 99999 PR NO CHARGE: CPT | Mod: CR | Performed by: INTERNAL MEDICINE

## 2020-03-26 RX ORDER — APIXABAN 5 MG/1
5 TABLET, FILM COATED ORAL 2 TIMES DAILY
COMMUNITY
Start: 2020-03-21 | End: 2020-08-24 | Stop reason: SDUPTHER

## 2020-03-26 ASSESSMENT — ENCOUNTER SYMPTOMS
DEPRESSION: 1
BACK PAIN: 0
FLANK PAIN: 0
ORTHOPNEA: 0
HEARTBURN: 0
PALPITATIONS: 1
DECREASED APPETITE: 0
CONSTIPATION: 0
DIZZINESS: 0
SYNCOPE: 0
WEIGHT LOSS: 0
SHORTNESS OF BREATH: 0
IRREGULAR HEARTBEAT: 0
FEVER: 0
ABDOMINAL PAIN: 0
ALTERED MENTAL STATUS: 0
NEAR-SYNCOPE: 0
NAUSEA: 0
DYSPNEA ON EXERTION: 0
COUGH: 0
PND: 0
CLAUDICATION: 0
DIARRHEA: 0
BLURRED VISION: 0
WEIGHT GAIN: 0
VOMITING: 0

## 2020-03-26 ASSESSMENT — FIBROSIS 4 INDEX: FIB4 SCORE: 1

## 2020-03-26 NOTE — TELEPHONE ENCOUNTER
[facility]    Received anticoagulation referral.    PCP: Nathaniel  INS: Medicare  Preferred location: [address]    Please see notes on referral.  Pt needs close monitoring with DOAC.  LM for pt to call clinic and establish care.     Dedrick Schulte, PharmD

## 2020-03-26 NOTE — PROGRESS NOTES
"Cardiology Note    Chief Complaint   Patient presents with   • Atrial Fibrillation       History of Present Illness: Jeanie Hutchison is a 74 y.o. female PMH alcohol abuse, paroxysmal atrial fibrillation, HTN, HLD who presents for follow up.    Admission 1/2020 for alcohol withdrawal requiring MICU and intubation.  [facility] admission 12/2019 alcohol intoxication leading to AF w RVR with stay in MICU leading to intubation    Claims last drank alcohol 6 weeks due to going to being in skilled nursing facility. At current time sounds very confused and keeps repeating herself \"I went to a place..and...I went to a place...\" she is redirectable and answers that she is not currently drinking nor having any cardiac symptoms. Denies SI/HI.    Previously noted: around 12/2018 went for alcohol rehab in [place] and changed to amiodarone from flecainide, however, amio has since been discontinued due to TSH elevated. Since, she was admitted to [facility] for AF with RVR. At the time, noted active alcohol abuse and detoxed inpatient. Describes relapsed to alcohol after rehab in [place] \"Hope something.\" She is very depressed due to passing of her daughters. She lives for her grandchildren, but they remind her of her daughters which further compounds. Not on good terms with her .     Review of Systems   Constitution: Negative for decreased appetite, fever, malaise/fatigue, weight gain and weight loss.   HENT: Negative for congestion and nosebleeds.    Eyes: Negative for blurred vision.   Cardiovascular: Positive for palpitations. Negative for chest pain, claudication, dyspnea on exertion, irregular heartbeat, leg swelling, near-syncope, orthopnea, paroxysmal nocturnal dyspnea and syncope.   Respiratory: Negative for cough and shortness of breath.    Endocrine: Negative for cold intolerance and heat intolerance.   Skin: Negative for rash.   Musculoskeletal: Negative for back pain.   Gastrointestinal: Negative for " abdominal pain, constipation, diarrhea, heartburn, melena, nausea and vomiting.   Genitourinary: Negative for dysuria, flank pain and hematuria.   Neurological: Negative for dizziness.   Psychiatric/Behavioral: Positive for depression. Negative for altered mental status.         Past Medical History:   Diagnosis Date   • A-fib ([facility]) 5/1/2014   • Alcoholism ([facility])    • Chronic pain     r/t pelvic fracture   • Psychiatric disorder     history of depression and anxiety          Past Surgical History:   Procedure Laterality Date   • CLAVICLE ORIF  5/21/2014    Performed by Wilfred Gonzalez M.D. at [facility]   • BREAST BIOPSY      bilateral neg breast bxs   • OTHER ORTHOPEDIC SURGERY      pelvis fracture. 10 years ago          Current Outpatient Medications   Medication Sig Dispense Refill   • ELIQUIS 5 MG Tab Take 5 mg by mouth 2 Times a Day.     • digoxin (LANOXIN) 125 MCG Tab Take 1 Tab by mouth every day at 6 PM. 30 Tab    • metoprolol (LOPRESSOR) 100 MG Tab Take 1 Tab by mouth 2 Times a Day. 60 Tab    • multivitamin (THERAGRAN) Tab Take 1 Tab by mouth every day. 30 Tab    • omeprazole (PRILOSEC) 20 MG delayed-release capsule Take 1 Cap by mouth every day. 30 Cap    • sertraline (ZOLOFT) 50 MG Tab Take 1 Tab by mouth every day. 30 Tab 11   • levothyroxine (SYNTHROID) 125 MCG Tab Take 125 mcg by mouth Every morning on an empty stomach.     • warfarin (COUMADIN) 5 MG Tab Take 1 Tab by mouth every day at 6 PM. (Patient not taking: Reported on 3/26/2020) 30 Tab 3     No current facility-administered medications for this visit.          No Known Allergies      Family History   Problem Relation Age of Onset   • Diabetes Mother    • Anxiety disorder Mother    • Depression Mother    • Hyperlipidemia Father          Social History     Socioeconomic History   • Marital status:      Spouse name: Not on file   • Number of children: Not on file   • Years of education: Not on file   • Highest education level:  "Not on file   Occupational History   • Not on file   Social Needs   • Financial resource strain: Not on file   • Food insecurity     Worry: Not on file     Inability: Not on file   • Transportation needs     Medical: Not on file     Non-medical: Not on file   Tobacco Use   • Smoking status: Never Smoker   • Smokeless tobacco: Never Used   Substance and Sexual Activity   • Alcohol use: Yes     Frequency: 4 or more times a week     Drinks per session: 5 or 6     Binge frequency: Daily or almost daily     Comment: last drink January 2020   • Drug use: Not Currently     Types: Oral     Comment: takes friends tranqualizers    • Sexual activity: Not Currently   Lifestyle   • Physical activity     Days per week: Not on file     Minutes per session: Not on file   • Stress: Not on file   Relationships   • Social connections     Talks on phone: Not on file     Gets together: Not on file     Attends [employer] service: Not on file     Active member of club or organization: Not on file     Attends meetings of clubs or organizations: Not on file     Relationship status: Not on file   • Intimate partner violence     Fear of current or ex partner: Not on file     Emotionally abused: Not on file     Physically abused: Not on file     Forced sexual activity: Not on file   Other Topics Concern   • Not on file   Social History Narrative   • Not on file         Physical Exam:  Ambulatory Vitals  Ht 1.676 m (5' 6\")   Wt 68 kg (150 lb)    BP Readings from Last 4 Encounters:   02/09/20 152/99   01/15/20 112/54   01/13/20 124/82   10/18/19 142/98     Weight/BMI:   Vitals:    03/26/20 1022   Weight: 68 kg (150 lb)   Height: 1.676 m (5' 6\")    Body mass index is 24.21 kg/m².  Wt Readings from Last 4 Encounters:   03/26/20 68 kg (150 lb)   02/08/20 69.6 kg (153 lb 7 oz)   01/15/20 70.1 kg (154 lb 10.4 oz)   01/13/20 75.3 kg (166 lb)       Physical Exam  Telephone interview    Lab Data Review:  Lab Results   Component Value Date/Time    " CHOLSTRLTOT 223 (H) 10/13/2015 08:39 AM     (H) 10/13/2015 08:39 AM    HDL 61 10/13/2015 08:39 AM    TRIGLYCERIDE 113 02/05/2020 05:31 AM       Lab Results   Component Value Date/Time    SODIUM 138 02/09/2020 03:09 AM    POTASSIUM 3.6 02/09/2020 03:09 AM    CHLORIDE 106 02/09/2020 03:09 AM    CO2 23 02/09/2020 03:09 AM    GLUCOSE 82 02/09/2020 03:09 AM    BUN 8 02/09/2020 03:09 AM    CREATININE 0.64 02/09/2020 03:09 AM     CrCl cannot be calculated (Patient's most recent lab result is older than the maximum 7 days allowed.).  Lab Results   Component Value Date/Time    ALKPHOSPHAT 55 02/07/2020 03:21 AM    ASTSGOT 10 (L) 02/07/2020 03:21 AM    ALTSGPT 5 02/07/2020 03:21 AM    TBILIRUBIN 0.7 02/07/2020 03:21 AM      Lab Results   Component Value Date/Time    WBC 8.1 02/07/2020 03:21 AM     No results found for: HBA1C  No components found for: TROP      Cardiac Imaging and Procedures Review:      TTE 1/2020  CONCLUSIONS  Technically limited and difficult to interpret while in atrial   fibrillation with rapid ventricular rate.  Left ventricular size and function probably normal. Right ventricular   size and function probably normal.  Normal pericardium without effusion.  Ascending aorta diameter is 2.8  cm, which is normal.    Medical Decision Making:  Problem List Items Addressed This Visit     PAF (paroxysmal atrial fibrillation) (HCC)    Relevant Medications    ELIQUIS 5 MG Tab    Alcohol dependence (HCC)    Drug abuse (HCC)    Mixed hyperlipidemia    Relevant Medications    ELIQUIS 5 MG Tab        Paroxysmal AF - chadsvasc 2 - continue eliquis. Continue rate control. Referred to anticoag clinic. Referred for home visits. Concerned about patient's alcohol and doac use.     HLD check lipids.    Alcohol and drug abuse - councilled against use and danger in association with current medications.     Depression - previously referred to psychiatry. Advised that am always available for urgent visit if she wants to  talk. Advised call 911 if any thoughts of self harm or harm towards others.    It was my pleasure to meet with Ms. Hutchison.    This encounter was conducted via telephone.  Verbal consent was obtained. Patient's identity was verified.    22 min spent interviewing patient.

## 2020-03-26 NOTE — TELEPHONE ENCOUNTER
Papito Will M.D.  Olga Wen R.N.; Jane Armstrong M.D.             Wood Espinoza,   Can we establish home visits for this patient? Am concerned about her ability to manage medications and abstain from alcohol. Referred to [facility] clinic to call her at regular intervals to remind her how to dose eliquis. But may need someone to check in also. Maybe her Dr Armstrong has some resources.      D/w VR. He would like referral to be placed to home health. Referral placed. LVM with pt at [phone] notifying that as discussed at VR appt today, referral would be placed to home health. Advised her to notify us of any questions and to let us know if she does not hear from home health in 1-2 weeks.

## 2020-03-27 NOTE — TELEPHONE ENCOUNTER
Home health referral changed to [facility].    ----- Message -----   From: Mac Valiente   Sent: 3/26/2020   2:19 PM PDT   To: Guillermina Luevano, Janice García, *     We are unfortunately at capacity and we will most likely not be able to see patients until Tuesday. I see that she has Medicare, so other Home Health agencies can take this patient.   ----- Message -----   From: Papito Will M.D.   Sent: 3/26/2020   2:11 PM PDT   To: Guillermina Luevano, Janice García, *       Can you please expand on the denial reason?   ----- Message -----   From: Mac Valiente   Sent: 3/26/2020   1:47 PM PDT   To: Janice Villanueva, *     Reason for referral denial: Unable to see patient in safe and timely matter               Unfortunately, we are not able to accept this referral for the reason listed above. If further clarity is needed, our Transitional Care Specialists are available to discuss any barriers to service at [facility].

## 2020-03-30 ENCOUNTER — TELEPHONE (OUTPATIENT)
Dept: VASCULAR LAB | Facility: MEDICAL CENTER | Age: 75
End: 2020-03-30

## 2020-03-30 ENCOUNTER — APPOINTMENT (OUTPATIENT)
Dept: BEHAVIORAL HEALTH | Facility: CLINIC | Age: 75
End: 2020-03-30
Payer: MEDICARE

## 2020-03-30 ENCOUNTER — TELEPHONE (OUTPATIENT)
Dept: BEHAVIORAL HEALTH | Facility: CLINIC | Age: 75
End: 2020-03-30

## 2020-03-30 NOTE — TELEPHONE ENCOUNTER
Called [facility] to see if/when they will be opening pt to service.   Per [facility] their systems were down.  Will try again tomorrow.     Rashida Bose, PharmD, BCACP, [facility]

## 2020-03-31 ENCOUNTER — TELEPHONE (OUTPATIENT)
Dept: VASCULAR LAB | Facility: MEDICAL CENTER | Age: 75
End: 2020-03-31

## 2020-03-31 NOTE — TELEPHONE ENCOUNTER
[facility]     Received anticoagulation referral.     PCP: Nathaniel  INS: Medicare  Preferred location: [address]    Spoke with [facility], they do not have a referral or know this pt.  It does not appear they have HH.     Please see notes on referral.  Pt needs close monitoring with DOAC.  LM for pt to call clinic and establish care.      Dedrick Schulte, PharmD

## 2020-04-01 ENCOUNTER — TELEPHONE (OUTPATIENT)
Dept: BEHAVIORAL HEALTH | Facility: CLINIC | Age: 75
End: 2020-04-01

## 2020-04-06 ENCOUNTER — OFFICE VISIT (OUTPATIENT)
Dept: BEHAVIORAL HEALTH | Facility: CLINIC | Age: 75
End: 2020-04-06
Payer: MEDICARE

## 2020-04-06 ENCOUNTER — TELEPHONE (OUTPATIENT)
Dept: MEDICAL GROUP | Facility: MEDICAL CENTER | Age: 75
End: 2020-04-06

## 2020-04-06 DIAGNOSIS — F51.05 INSOMNIA DUE TO OTHER MENTAL DISORDER: ICD-10-CM

## 2020-04-06 DIAGNOSIS — F10.21 ALCOHOL DEPENDENCE IN REMISSION (HCC): ICD-10-CM

## 2020-04-06 DIAGNOSIS — F99 INSOMNIA DUE TO OTHER MENTAL DISORDER: ICD-10-CM

## 2020-04-06 DIAGNOSIS — F33.1 MAJOR DEPRESSIVE DISORDER, RECURRENT EPISODE, MODERATE (HCC): ICD-10-CM

## 2020-04-06 PROCEDURE — 98968 PH1 ASSMT&MGMT NQHP 21-30: CPT | Mod: CR | Performed by: MARRIAGE & FAMILY THERAPIST

## 2020-04-06 PROCEDURE — 98967 PH1 ASSMT&MGMT NQHP 11-20: CPT | Mod: CR | Performed by: MARRIAGE & FAMILY THERAPIST

## 2020-04-06 RX ORDER — QUETIAPINE FUMARATE 25 MG/1
25 TABLET, FILM COATED ORAL
Qty: 90 TAB | Refills: 0 | Status: SHIPPED | OUTPATIENT
Start: 2020-04-06 | End: 2020-09-09

## 2020-04-06 NOTE — BH THERAPY
[facility]  Therapy Progress Note    Patient Name: Jeanie Hutchison  Patient MRN: [id number]  Today's Date: 4/6/2020     Type of session:Individual psychotherapy  Length of session: 45 minutes  Persons in attendance:Patient    Subjective/New Info: This appt was conducted via telephone as authorized by COVID-19.  The call was initiated by this therapist and pt agreed to this media use. Pt stated that she drank on 3/31/20 as she did not sleep well the night before and wanted to get to sleep.  Drinking caused her esophagus to flair up and she vomited causing more pain and irritation.  She was hospitalized on 4/1/20 for 3 days at [place] and has been weak and tired and did not get to her homework assigned last week. She admitted that this was the second time she drank since returning home from  at [place].  Her  is now on lock down at assisted living due to COVID-19. This pt could definitely benefit from participating in a group setting PHP/IOP Program as a step down from  but due to COVID-19 the programs have been closed due to group settings.     Objective/Observations:   Participation: Active verbal participation, Attentive and Engaged   Grooming: not observed   Cognition: Alert   Eye contact: NA   Mood: Depressed   Affect: Constricted   Thought process: Logical   Speech: Rate within normal limits and Volume within normal limits   Other:     Diagnoses:   1. Alcohol dependence in remission (HCC)    2. Major depressive disorder, recurrent episode, moderate (HCC)         Current risk:   SUICIDE: Low   Homicide: Low   Self-harm: Low   Relapse: High   Other:    Safety Plan reviewed? Not Indicated   If evidence of imminent risk is present, intervention/plan:     Therapeutic Intervention(s): Cognitive modification, Limit-setting, Maladaptive behavior addressed, Positive behavior reinforced and Supportive psychotherapy    Treatment Goal(s)/Objective(s) addressed: Sobriety maintenance and  "depression reduction.     Progress toward Treatment Goals: Moderate decline    Plan:  - \"Homework\" recommendation: Exercise 2-3 times a week for 30 minutes, look up SMART Recovery, try a guided imagery  to help fall asleep. Review with therapist next session on 4/8/20.    GABRIELE aGllardo  4/6/2020                                 "

## 2020-04-08 ENCOUNTER — OFFICE VISIT (OUTPATIENT)
Dept: BEHAVIORAL HEALTH | Facility: CLINIC | Age: 75
End: 2020-04-08
Payer: MEDICARE

## 2020-04-08 DIAGNOSIS — F10.20 ACUTE ALCOHOLISM (HCC): ICD-10-CM

## 2020-04-08 PROCEDURE — 98968 PH1 ASSMT&MGMT NQHP 21-30: CPT | Mod: CR | Performed by: MARRIAGE & FAMILY THERAPIST

## 2020-04-08 PROCEDURE — 98967 PH1 ASSMT&MGMT NQHP 11-20: CPT | Mod: CR | Performed by: MARRIAGE & FAMILY THERAPIST

## 2020-04-08 NOTE — BH THERAPY
" [facility]  [facility]    Patient Name: Jeanie Hutchison  Patient MRN: [id number]  Today's Date: 4/8/2020     Type of session:Individual psychotherapy  Length of session: 45 minutes  Persons in attendance:Patient    Subjective/New Info: This appt. was conducted via telephone as authorized by COVID-19. The call was initiated by this therapist and pt agreed to this media use.  Pt stated she did not sleep at all last night and that is why she drank alcohol one week ago to help her get to sleep, even though it made her ill. Past sleep aids she has tried incude Trazodone, Serequel and melatonin. She got motivated for exercise and rode her incumbent bike the past 3 days for 30 minutes a day. She has cravings for alcohol to help her sleep. Discussed possibly moving to ZOOM with assistance of [facility] to help her get set up.     Objective/Observations:   Participation: Active verbal participation, Attentive, Engaged and Open to feedback   Grooming: not observed   Cognition: Alert and Fully Oriented   Eye contact: NA   Mood: Depressed   Affect: Flexible   Thought process: Logical and Goal-directed   Speech: Rate within normal limits and Volume within normal limits   Other:     Diagnoses:   1. Acute alcoholism (HCC)         Current risk:   SUICIDE: Low   Homicide: Low   Self-harm: Low   Relapse: Moderate   Other:    Safety Plan reviewed? Not Indicated   If evidence of imminent risk is present, intervention/plan:     Therapeutic Intervention(s): Cognitive modification, Limit-setting, Maladaptive behavior addressed, Positive behavior reinforced and Supportive psychotherapy    Treatment Goal(s)/Objective(s) addressed: Sobriety maintenance and depression reduction.      Progress toward Treatment Goals: Moderate improvement    Plan:  - \"Homework\" recommendation: Check out AA online, look up PAWS. Look into signing up for [employer] and [employer].   Discuss with therapist next session on 4/13/20.    Molly" GABRIELE Snyder  4/8/2020

## 2020-04-10 ENCOUNTER — TELEPHONE (OUTPATIENT)
Dept: VASCULAR LAB | Facility: MEDICAL CENTER | Age: 75
End: 2020-04-10

## 2020-04-10 ENCOUNTER — TELEPHONE (OUTPATIENT)
Dept: MEDICAL GROUP | Facility: MEDICAL CENTER | Age: 75
End: 2020-04-10

## 2020-04-10 NOTE — LETTER
April 10, 2020    Jeanie Hutchison  [address] [address]      Dear Jeanie Hutchison ,    We have been unsuccessful in our attempts to contact you regarding your Anticoagulation Service referral.  Anticoagulants are medications that can cause potential harmful side effects when not monitored properly. Without proper monitoring there is potential for serious, sometimes life-threatening bleeding problems or life-threatening blood clots or stroke could result.    Please contact our clinic so we may assist you.  We are open Monday-Friday 8 am until 5 pm.  You may reach our Service at [phone].    To monitor your anticoagulant effectively, we need to be able to communicate with you.  This is a requirement to be followed by our Service.  Please contact the clinic as soon as possible to establish care and provide your current contact information.  Thank you.        Sincerely,        Dino Kenney, PharmD, [facility]  Pharmacy Clinical Supervisor  [facility]  Outpatient Anticoagulation Service

## 2020-04-10 NOTE — TELEPHONE ENCOUNTER
Received anticoagulation referral for Eliquis due to Afib from Dr. Will on 03/26/20    Per referral - Patient long history of alcohol abuse and confusing medications. She              states was switched to eliquis. Can continue this but needs regular              check ins to make sure taking appropriately and maintaining              abstinence from alcohol.    Tried to call x2, phone isn't working, will send letter and FYI to referring provider.     Insurance: medicare  [facility]: [facility]  Locations to be seen:  [facility] at [phone], fax [phone]    Rashida Bose, PharmD

## 2020-04-13 ENCOUNTER — TELEMEDICINE (OUTPATIENT)
Dept: BEHAVIORAL HEALTH | Facility: CLINIC | Age: 75
End: 2020-04-13
Payer: MEDICARE

## 2020-04-13 DIAGNOSIS — F10.21 ALCOHOL DEPENDENCE IN REMISSION (HCC): ICD-10-CM

## 2020-04-13 PROCEDURE — 98968 PH1 ASSMT&MGMT NQHP 21-30: CPT | Mod: CR | Performed by: MARRIAGE & FAMILY THERAPIST

## 2020-04-13 PROCEDURE — 98967 PH1 ASSMT&MGMT NQHP 11-20: CPT | Mod: CR | Performed by: MARRIAGE & FAMILY THERAPIST

## 2020-04-13 NOTE — BH THERAPY
" [facility]  Therapy Progress Note    Patient Name: Jeanie Hutchison  Patient MRN: [id number]  Today's Date: 4/13/2020     Type of session:Individual psychotherapy  Length of session: 45 minutes  Persons in attendance:Patient    Subjective/New Info: This appt was conducted via telephone as authorized by COVID-19. The call was initiated by this therapist and pt agreed to this media use.  Pt did not get to any homework assigned.  She expressed interest in SMART Recovery. Insomnia continues and she stated that her drinking was related to her anxiety and insomnia to help her sleep. She did try guided imagery but no sleep followed. She did her recumbent bike a couple times for some exercise.     Objective/Observations:   Participation: Active verbal participation, Attentive, Engaged and Open to feedback   Grooming: not observed   Cognition: Alert and Fully Oriented   Eye contact: NA   Mood: Depressed   Affect: Flexible   Thought process: Logical and Goal-directed   Speech: Rate within normal limits and Volume within normal limits   Other:     Diagnoses:   1. Alcohol dependence in remission (HCC)         Current risk:   SUICIDE: Low   Homicide: Low   Self-harm: Low   Relapse: Moderate   Other:    Safety Plan reviewed? Not Indicated   If evidence of imminent risk is present, intervention/plan:     Therapeutic Intervention(s): Cognitive modification, Limit-setting, Maladaptive behavior addressed, Positive behavior reinforced and Supportive psychotherapy    Treatment Goal(s)/Objective(s) addressed: Sobriety maintenance & depression reduction.     Progress toward Treatment Goals: Mild improvement    Plan:  - \"Homework\" recommendation: Look into online SMART Recovery meetings, look at schedule for topic interests, complete handout on addiction and loss. Review next session on 4/15/20.    GABRIELE Gallardo  4/13/2020                                 "

## 2020-04-15 ENCOUNTER — OFFICE VISIT (OUTPATIENT)
Dept: BEHAVIORAL HEALTH | Facility: CLINIC | Age: 75
End: 2020-04-15
Payer: MEDICARE

## 2020-04-15 DIAGNOSIS — F33.1 MAJOR DEPRESSIVE DISORDER, RECURRENT EPISODE, MODERATE (HCC): ICD-10-CM

## 2020-04-15 DIAGNOSIS — F10.21 ALCOHOL DEPENDENCE IN REMISSION (HCC): ICD-10-CM

## 2020-04-15 PROCEDURE — 98968 PH1 ASSMT&MGMT NQHP 21-30: CPT | Mod: CR | Performed by: MARRIAGE & FAMILY THERAPIST

## 2020-04-15 PROCEDURE — 98967 PH1 ASSMT&MGMT NQHP 11-20: CPT | Mod: CR | Performed by: MARRIAGE & FAMILY THERAPIST

## 2020-04-15 NOTE — BH THERAPY
" [facility]  Therapy Progress Note    Patient Name: Jeanie Hutchison  Patient MRN: [id number]  Today's Date: 4/15/2020     Type of session:Individual psychotherapy  Length of session: 45 minutes  Persons in attendance:Patient    Subjective/New Info: This appt was conducted via telephone as agreed upon by pt and authorized by [employer].The call was initiated by this therapist and pt agreed to this media use. We discussed at length her frustration with not being able to use technology due to forgotten passwords and out dated equipment.  Pt processed the thought to upgrade to new laptop and printer.Pt accepted a referral to an [employer]cco for hire. Pt remains sober.     Objective/Observations:   Participation: Active verbal participation, Attentive, Engaged and Open to feedback   Grooming: Not observed   Cognition: Alert and Fully Oriented   Eye contact: NA   Mood: Depressed   Affect: Flexible   Thought process: Logical and Goal-directed   Speech: Rate within normal limits and Volume within normal limits   Other:     Diagnoses:   1. Alcohol dependence in remission ([facility])    2. Major depressive disorder, recurrent episode, moderate ([facility])         Current risk:   SUICIDE: Low   Homicide: Low   Self-harm: Low   Relapse: Moderate   Other:    Safety Plan reviewed? Not Indicated   If evidence of imminent risk is present, intervention/plan:     Therapeutic Intervention(s): Clarify:  Clarify feelings and Clarify thoughts, Goal-setting, Positive behavior reinforced and Supportive psychotherapy    Treatment Goal(s)/Objective(s) addressed: Technology and need for new equipment to run ZOOM and MyChart.  Sobriety maintenance.    Progress toward Treatment Goals: Mild improvement    Plan:  - \"Homework\" recommendation: Complete Addiction  & Loss handout, make appt. with PCP, google laptops and printers and consider upgrading equipment to be able to access ZOOM for virtual appts.     Molly Vo, " GABRIELE  4/15/2020

## 2020-04-16 ENCOUNTER — TELEPHONE (OUTPATIENT)
Dept: MEDICAL GROUP | Facility: MEDICAL CENTER | Age: 75
End: 2020-04-16

## 2020-04-16 ENCOUNTER — TELEPHONE (OUTPATIENT)
Dept: VASCULAR LAB | Facility: MEDICAL CENTER | Age: 75
End: 2020-04-16

## 2020-04-16 DIAGNOSIS — F99 INSOMNIA DUE TO OTHER MENTAL DISORDER: ICD-10-CM

## 2020-04-16 DIAGNOSIS — F51.05 INSOMNIA DUE TO OTHER MENTAL DISORDER: ICD-10-CM

## 2020-04-16 RX ORDER — MIRTAZAPINE 15 MG/1
15 TABLET, FILM COATED ORAL
Qty: 30 TAB | Refills: 3 | Status: ON HOLD
Start: 2020-04-16 | End: 2021-02-09

## 2020-04-16 NOTE — TELEPHONE ENCOUNTER
Patient is having trouble sleeping, trazodone is not helping she is asking about lunesta to help her sleep or can she get something else

## 2020-04-22 ENCOUNTER — TELEPHONE (OUTPATIENT)
Dept: VASCULAR LAB | Facility: MEDICAL CENTER | Age: 75
End: 2020-04-22

## 2020-04-27 ENCOUNTER — TELEPHONE (OUTPATIENT)
Dept: MEDICAL GROUP | Facility: MEDICAL CENTER | Age: 75
End: 2020-04-27

## 2020-04-27 ENCOUNTER — OFFICE VISIT (OUTPATIENT)
Dept: BEHAVIORAL HEALTH | Facility: CLINIC | Age: 75
End: 2020-04-27
Payer: MEDICARE

## 2020-04-27 ENCOUNTER — TELEPHONE (OUTPATIENT)
Dept: BEHAVIORAL HEALTH | Facility: MEDICAL CENTER | Age: 75
End: 2020-04-27

## 2020-04-27 DIAGNOSIS — F10.21 ALCOHOL DEPENDENCE IN REMISSION (HCC): ICD-10-CM

## 2020-04-27 RX ORDER — POTASSIUM CHLORIDE 20 MEQ/1
TABLET, EXTENDED RELEASE ORAL
Qty: 60 TAB | Refills: 0 | Status: ON HOLD
Start: 2020-04-27 | End: 2021-02-09

## 2020-04-27 RX ORDER — POTASSIUM CHLORIDE 20 MEQ/1
TABLET, EXTENDED RELEASE ORAL
COMMUNITY
Start: 2020-04-05 | End: 2020-04-27 | Stop reason: SDUPTHER

## 2020-04-27 RX ORDER — LEVOTHYROXINE SODIUM 0.12 MG/1
125 TABLET ORAL
Qty: 90 TAB | Refills: 3 | Status: SHIPPED | OUTPATIENT
Start: 2020-04-27 | End: 2023-05-23

## 2020-04-27 NOTE — TELEPHONE ENCOUNTER
LM to call this writer re: starting IOP on 5/4/2020. Please call for details and getting started with us.

## 2020-04-30 RX ORDER — FOLIC ACID 1 MG/1
1 TABLET ORAL DAILY
Qty: 90 TAB | Refills: 3 | Status: SHIPPED | OUTPATIENT
Start: 2020-04-30 | End: 2023-05-23

## 2020-05-09 RX ORDER — HYDROXYZINE PAMOATE 50 MG/1
CAPSULE ORAL
Qty: 90 CAP | Refills: 0 | Status: ON HOLD
Start: 2020-05-09 | End: 2021-02-09

## 2020-06-09 NOTE — DISCHARGE PLANNING
----- Message from Blossom Hernandez PA-C sent at 6/8/2020  8:14 AM CDT -----  Glu sl up at 102, and lipids are quite up, need to work hard on diet/ exercise/ wt loss. Want her to repeat labs in 6 mos for lipids and comp and do a1c, with dx hyperlipidemia and elevated fasting glu. pls order and send order to pt.   Care Transition Team Assessment  In the case of an emergency, pt's NOK is Reilly Hutchison (Son) [phone].     This RNCM spoke with Dione Hai, Pt's sister, over the phone and obtained the information used in this assessment. Dione verified accuracy of facesheet. Pt lives alone in a 2 story house. Pt's  suffered a hematoma from a fall that resulted in brain damage and now he lives in an assisted living facility. Pt lost two children over the last five years. Pt uses the [facility] pharmacy on [address]. Prior to current hospitalization, pt was completely independent with ADLS/IADLS. Pt drives her vehicle to and from doctors appointments. Pt collects social security and long-term. Pt is a retired RN. Pt has current alcohol abuse and history of opioid abuse. Pt struggles with anxiety and depression. Pt has support from her son, Reilly, who checks on her daily. Pt reported to have an AD, but Dione does not know of where a copy is. Pt's discharge plan TBD. Pt's family would like assistance in getting Pt into a SA rehab facility, should Pt agree. CM to F/U with Pt and offer SA resources once Pt oriented and off the ventilator (if applicable).       Upon D/C, pt's son, Reilly will provide transport home if applicable.       Information Source  Orientation : Unable to Assess  Information Given By: Relative(Sister)  Informant's Name: Dione Hai    Readmission Evaluation  Is this a readmission?: Yes - unplanned readmission  Why do you think you were readmitted?: Alcohol withdrawl, suspected AMA d/c from [place] within last months    Elopement Risk  Legal Hold: No  Ambulatory or Self Mobile in Wheelchair: No-Not an Elopement Risk  Disoriented: No  Psychiatric Symptoms: None  History of Wandering: No  Elopement this Admit: No  Vocalizing Wanting to Leave: No  Displays Behaviors, Body Language Wanting to Leave: No-Not at Risk for Elopement  Elopement Risk: Not at Risk for  Elopement    Interdisciplinary Discharge Planning  Primary Care Physician: Dr. Armstrong  Lives with - Patient's Self Care Capacity: Alone and Able to Care For Self(Pt struggles with ongoing alcohol addiction )  Patient or legal guardian wants to designate a caregiver (see row info): No  Support Systems: Children, Other (Comments)(sibling)  Housing / Facility: [address] [facility]  Prior Services: Intermittent Physical Support for ADL Per Family  Durable Medical Equipment: Not Applicable    Discharge Preparedness  What is your plan after discharge?: Uncertain - pending medical team collaboration  What are your discharge supports?: Child  Prior Functional Level: Ambulatory, Drives Self, Independent with Activities of Daily Living, Independent with Medication Management  Difficulity with ADLs: None  Difficulity with IADLs: None    Functional Assesment  Prior Functional Level: Ambulatory, Drives Self, Independent with Activities of Daily Living, Independent with Medication Management    Finances  Financial Barriers to Discharge: No  Prescription Coverage: Yes    Vision / Hearing Impairment  Vision Impairment : No  Right Eye Vision: Impaired, Wears Glasses  Left Eye Vision: Impaired, Wears Glasses  Hearing Impairment : No       Advance Directive  Advance Directive?: DPOA for Health Care(Per Pt's sister, POA: son and sister, no idea where copy is)    Domestic Abuse  Have you ever been the victim of abuse or violence?: No  Physical Abuse or Sexual Abuse: No  Verbal Abuse or Emotional Abuse: No  Possible Abuse Reported to:: Not Applicable    Psychological Assessment  History of Substance Abuse: Alcohol, Prescription opioids  Date Last Used - Alcohol: 1/27/20  Date Last Used - Prescription Opioids: 11/2018  Substance Abuse Comments: Family trying to get pt into inpatient SA Rehab  History of Psychiatric Problems: Yes(Depression and Anxiety)  Newly Diagnosed Illness: No    Discharge Risks or Barriers  Discharge risks or barriers?:  Substance abuse  Patient risk factors: Recent loss    Anticipated Discharge Information  Anticipated discharge disposition: Discharge needs currently unknown, Home, Chemical dependency treatment  Discharge Address: [address] [place] [id number]  Discharge Contact Phone Number: [phone]

## 2020-06-25 NOTE — ADDENDUM NOTE
Addended by: OCTAVIANO MACHUCA on: 6/25/2020 03:34 PM     Modules accepted: Level of Service, SmartSet

## 2020-07-30 ENCOUNTER — OFFICE VISIT (OUTPATIENT)
Dept: MEDICAL GROUP | Facility: MEDICAL CENTER | Age: 75
End: 2020-07-30
Payer: MEDICARE

## 2020-07-30 VITALS
TEMPERATURE: 99 F | BODY MASS INDEX: 24.43 KG/M2 | HEIGHT: 66 IN | RESPIRATION RATE: 16 BRPM | DIASTOLIC BLOOD PRESSURE: 80 MMHG | WEIGHT: 152 LBS | OXYGEN SATURATION: 94 % | HEART RATE: 81 BPM | SYSTOLIC BLOOD PRESSURE: 138 MMHG

## 2020-07-30 DIAGNOSIS — G47.00 INSOMNIA, UNSPECIFIED TYPE: ICD-10-CM

## 2020-07-30 DIAGNOSIS — Z09 HOSPITAL DISCHARGE FOLLOW-UP: ICD-10-CM

## 2020-07-30 DIAGNOSIS — I45.81 LONG Q-T SYNDROME: ICD-10-CM

## 2020-07-30 DIAGNOSIS — M62.838 MUSCLE SPASM: ICD-10-CM

## 2020-07-30 PROCEDURE — 99214 OFFICE O/P EST MOD 30 MIN: CPT | Performed by: FAMILY MEDICINE

## 2020-07-30 RX ORDER — TIZANIDINE 4 MG/1
4 TABLET ORAL 2 TIMES DAILY
Qty: 14 TAB | Refills: 0 | Status: SHIPPED | OUTPATIENT
Start: 2020-07-30 | End: 2020-09-09

## 2020-07-30 RX ORDER — TRIAZOLAM 0.12 MG/1
0.12 TABLET ORAL NIGHTLY PRN
Qty: 7 TAB | Refills: 0 | Status: SHIPPED | OUTPATIENT
Start: 2020-07-30 | End: 2020-08-06

## 2020-07-30 ASSESSMENT — FIBROSIS 4 INDEX: FIB4 SCORE: 1

## 2020-07-30 NOTE — PROGRESS NOTES
CC: Hospital discharge follow-up    HPI:   Jeanie presents today for hospital discharge follow-up    Hospital discharge follow-up  Patient was hospitalized to [facility], had cardiac arrest, underwent cardiac resuscitation that was successful.  Patient was found to have a long QT syndrome probably because of excessive alcohol drinking.  However coronary angiogram showed no sign of coronary disease, echocardiogram showed normal left ventricular size, slightly low ejection fraction, 50%.  AICD was placed.  Patient has been doing fine since then.  Patient was seen by the cardiology recently, AICD was interrogated.  As per cardiology we will change metoprolol to nadolol.     Insomnia, unspecified type  Has been on having a problem with sleeping, so was given triazolam at the rehab, prescribed by out there is an error in the prescription.  Requested a new prescription.  Patient stated that she has been on rehab for alcohol when she was in the hospital and she has not been drinking at all    Muscle spasm  Been having muscle spasm on her right hand and right chest due to the cardiac compression.  Tizanidine has been given at the rehab center has been helping.          Patient Active Problem List    Diagnosis Date Noted   • PAF (paroxysmal atrial fibrillation) ([facility]) 09/10/2014     Priority: High   • Debility 02/06/2020     Priority: Medium   • Alcohol dependence ([facility]) 01/27/2020     Priority: Medium   • Macrocytic anemia 02/06/2020     Priority: Low   • Current moderate episode of major depressive disorder without prior episode ([facility]) 01/15/2020     Priority: Low   • Recurrent falls 08/21/2019   • Alcohol abuse 04/25/2019   • Mixed hyperlipidemia 03/11/2018   • Moderate single current episode of major depressive disorder ([facility]) 12/08/2017   • Drug abuse ([facility]) 05/17/2014       Current Outpatient Medications   Medication Sig Dispense Refill   • triazolam (HALCION) 0.125 MG tablet Take 1 Tab by mouth at bedtime as  "needed for up to 7 days. 7 Tab 0   • tizanidine (ZANAFLEX) 4 MG Tab Take 1 Tab by mouth 2 times a day for 7 days. 14 Tab 0   • hydrOXYzine pamoate (VISTARIL) 50 MG Cap TAKE ONE CAPSULE BY MOUTH THREE TIMES A DAY AS NEEDED FOR ITCHING 90 Cap 0   • folic acid (FOLVITE) 1 MG Tab Take 1 Tab by mouth every day. 90 Tab 3   • levothyroxine (SYNTHROID) 125 MCG Tab Take 1 Tab by mouth Every morning on an empty stomach. 90 Tab 3   • potassium chloride SA (KDUR) 20 MEQ Tab CR TAKE 2 TABLETS BY MOUTH 60 Tab 0   • ELIQUIS 5 MG Tab Take 5 mg by mouth 2 Times a Day.     • metoprolol (LOPRESSOR) 100 MG Tab Take 1 Tab by mouth 2 Times a Day. 60 Tab    • multivitamin (THERAGRAN) Tab Take 1 Tab by mouth every day. 30 Tab    • mirtazapine (REMERON) 15 MG Tab Take 1 Tab by mouth every bedtime. (Patient not taking: Reported on 7/30/2020) 30 Tab 3   • QUEtiapine (SEROQUEL) 25 MG Tab Take 1 Tab by mouth every bedtime. (Patient not taking: Reported on 7/30/2020) 90 Tab 0   • digoxin (LANOXIN) 125 MCG Tab Take 1 Tab by mouth every day at 6 PM. (Patient not taking: Reported on 7/30/2020) 30 Tab    • omeprazole (PRILOSEC) 20 MG delayed-release capsule Take 1 Cap by mouth every day. (Patient not taking: Reported on 7/30/2020) 30 Cap    • sertraline (ZOLOFT) 50 MG Tab Take 1 Tab by mouth every day. (Patient not taking: Reported on 7/30/2020) 30 Tab 11     No current facility-administered medications for this visit.          Allergies as of 07/30/2020   • (No Known Allergies)        ROS: Denies any chest pain, Shortness of breath, Changes bowel or bladder, Lower extremity edema.    Physical Exam:  /80 (BP Location: Left arm, Patient Position: Sitting)   Pulse 81   Temp 37.2 °C (99 °F) (Temporal)   Resp 16   Ht 1.676 m (5' 6\")   Wt 68.9 kg (152 lb)   SpO2 94%   BMI 24.53 kg/m²   Gen.: Well-developed, well-nourished, no apparent distress,pleasant and cooperative with the examination  Skin:  Warm and dry with good turgor. No rashes or " suspicious lesions in visible areas  HEENT:Sinuses nontender with palpation, TMs clear, nares patent with pink mucosa and clear rhinorrhea,no septal deviation ,polyps or lesions. lips without lesions, oropharynx clear.  Neck: Trachea midline,no masses or adenopathy. No JVD.  Cor: Regular rate and rhythm without murmur, gallop or rub.  Lungs: Respirations unlabored.Clear to auscultation with equal breath sounds bilaterally. No wheezes, rhonchi.  Extremities: No cyanosis, clubbing or edema.        Assessment and Plan.   74 y.o. female     1. Hospital discharge follow-up  Status post hospitalization at [facility], status post cardiac arrest with successful resuscitation, was found to have long QT syndrome ICD placement    2. Long Q-T syndrome  Status post AICD placement  As per cardiology we will change metoprolol to nadolol.  ICD was interrogated recently by cardiology  Continue follow-up with cardiology    3. Insomnia, unspecified type  Has been on having a problem with sleeping, so was given triazolam at the rehab, prescribed by out there is an error in the prescription.  Requested a new prescription.  Patient advised to avoid alcohol taking especially this medication.  Medication refilled only for a week.    - triazolam (HALCION) 0.125 MG tablet; Take 1 Tab by mouth at bedtime as needed for up to 7 days.  Dispense: 7 Tab; Refill: 0    4. Muscle spasm  Been having muscle spasm on her right hand and right chest due to the cardiac compression.  Tizanidine has been given at the rehab center has been helping.  Medication refilled only for a week.    - tizanidine (ZANAFLEX) 4 MG Tab; Take 1 Tab by mouth 2 times a day for 7 days.  Dispense: 14 Tab; Refill: 0

## 2020-08-03 RX ORDER — METOPROLOL TARTRATE 100 MG/1
100 TABLET ORAL 2 TIMES DAILY
Qty: 180 TAB | Refills: 1 | Status: SHIPPED | OUTPATIENT
Start: 2020-08-03 | End: 2020-12-23 | Stop reason: SDUPTHER

## 2020-08-12 ENCOUNTER — TELEPHONE (OUTPATIENT)
Dept: MEDICAL GROUP | Facility: MEDICAL CENTER | Age: 75
End: 2020-08-12

## 2020-08-12 NOTE — TELEPHONE ENCOUNTER
Patient was recently hospitalized dur to intoxication.  PT went to her house yesterday and found her intoxicated again. There was nothing much he could do to help her but he helped her back to bed and gave her fluid. Apparently this is the second time, last was with a Home Health Nurse. He just wanted to let you know

## 2020-08-13 ENCOUNTER — HOSPITAL ENCOUNTER (INPATIENT)
Dept: HOSPITAL 8 - ED | Age: 75
LOS: 6 days | Discharge: HOME | DRG: 896 | End: 2020-08-19
Attending: INTERNAL MEDICINE | Admitting: HOSPITALIST
Payer: MEDICARE

## 2020-08-13 VITALS — WEIGHT: 153.22 LBS | HEIGHT: 66 IN | BODY MASS INDEX: 24.62 KG/M2

## 2020-08-13 DIAGNOSIS — B96.1: ICD-10-CM

## 2020-08-13 DIAGNOSIS — F32.9: ICD-10-CM

## 2020-08-13 DIAGNOSIS — E03.9: ICD-10-CM

## 2020-08-13 DIAGNOSIS — G47.00: ICD-10-CM

## 2020-08-13 DIAGNOSIS — F29: ICD-10-CM

## 2020-08-13 DIAGNOSIS — N39.0: ICD-10-CM

## 2020-08-13 DIAGNOSIS — I10: ICD-10-CM

## 2020-08-13 DIAGNOSIS — F10.24: ICD-10-CM

## 2020-08-13 DIAGNOSIS — F10.220: ICD-10-CM

## 2020-08-13 DIAGNOSIS — I48.0: ICD-10-CM

## 2020-08-13 DIAGNOSIS — G92: ICD-10-CM

## 2020-08-13 DIAGNOSIS — F10.239: Primary | ICD-10-CM

## 2020-08-13 LAB
ALBUMIN SERPL-MCNC: 3.5 G/DL (ref 3.4–5)
ALP SERPL-CCNC: 97 U/L (ref 45–117)
ALT SERPL-CCNC: 19 U/L (ref 12–78)
ANION GAP SERPL CALC-SCNC: 12 MMOL/L (ref 5–15)
BASOPHILS # BLD AUTO: 0.02 X10^3/UL (ref 0–0.1)
BASOPHILS NFR BLD AUTO: 0 % (ref 0–1)
BILIRUB SERPL-MCNC: 2.8 MG/DL (ref 0.2–1)
CALCIUM SERPL-MCNC: 9.1 MG/DL (ref 8.5–10.1)
CHLORIDE SERPL-SCNC: 105 MMOL/L (ref 98–107)
CREAT SERPL-MCNC: 0.88 MG/DL (ref 0.55–1.02)
EOSINOPHIL # BLD AUTO: 0.02 X10^3/UL (ref 0–0.4)
EOSINOPHIL NFR BLD AUTO: 0 % (ref 1–7)
ERYTHROCYTE [DISTWIDTH] IN BLOOD BY AUTOMATED COUNT: 14.1 % (ref 9.6–15.2)
LYMPHOCYTES # BLD AUTO: 3.57 X10^3/UL (ref 1–3.4)
LYMPHOCYTES NFR BLD AUTO: 39 % (ref 22–44)
MCH RBC QN AUTO: 32.5 PG (ref 27–34.8)
MCHC RBC AUTO-ENTMCNC: 32.9 G/DL (ref 32.4–35.8)
MCV RBC AUTO: 98.7 FL (ref 80–100)
MD: NO
MONOCYTES # BLD AUTO: 0.72 X10^3/UL (ref 0.2–0.8)
MONOCYTES NFR BLD AUTO: 8 % (ref 2–9)
NEUTROPHILS # BLD AUTO: 4.78 X10^3/UL (ref 1.8–6.8)
NEUTROPHILS NFR BLD AUTO: 52 % (ref 42–75)
PLATELET # BLD AUTO: 284 X10^3/UL (ref 130–400)
PMV BLD AUTO: 7.8 FL (ref 7.4–10.4)
PROT SERPL-MCNC: 7.3 G/DL (ref 6.4–8.2)
RBC # BLD AUTO: 4.65 X10^6/UL (ref 3.82–5.3)
TROPONIN I SERPL-MCNC: 0.02 NG/ML (ref 0–0.04)
TROPONIN I SERPL-MCNC: 0.02 NG/ML (ref 0–0.04)

## 2020-08-13 PROCEDURE — 84443 ASSAY THYROID STIM HORMONE: CPT

## 2020-08-13 PROCEDURE — 84484 ASSAY OF TROPONIN QUANT: CPT

## 2020-08-13 PROCEDURE — 83735 ASSAY OF MAGNESIUM: CPT

## 2020-08-13 PROCEDURE — 80307 DRUG TEST PRSMV CHEM ANLYZR: CPT

## 2020-08-13 PROCEDURE — 87077 CULTURE AEROBIC IDENTIFY: CPT

## 2020-08-13 PROCEDURE — 80053 COMPREHEN METABOLIC PANEL: CPT

## 2020-08-13 PROCEDURE — 81001 URINALYSIS AUTO W/SCOPE: CPT

## 2020-08-13 PROCEDURE — 36415 COLL VENOUS BLD VENIPUNCTURE: CPT

## 2020-08-13 PROCEDURE — 83690 ASSAY OF LIPASE: CPT

## 2020-08-13 PROCEDURE — 87086 URINE CULTURE/COLONY COUNT: CPT

## 2020-08-13 PROCEDURE — 93005 ELECTROCARDIOGRAM TRACING: CPT

## 2020-08-13 PROCEDURE — 84100 ASSAY OF PHOSPHORUS: CPT

## 2020-08-13 PROCEDURE — 71045 X-RAY EXAM CHEST 1 VIEW: CPT

## 2020-08-13 PROCEDURE — 87186 SC STD MICRODIL/AGAR DIL: CPT

## 2020-08-13 PROCEDURE — 80048 BASIC METABOLIC PNL TOTAL CA: CPT

## 2020-08-13 PROCEDURE — 85025 COMPLETE CBC W/AUTO DIFF WBC: CPT

## 2020-08-13 PROCEDURE — 84439 ASSAY OF FREE THYROXINE: CPT

## 2020-08-13 RX ADMIN — METOPROLOL TARTRATE PRN MG: 5 INJECTION, SOLUTION INTRAVENOUS at 21:54

## 2020-08-13 RX ADMIN — METOPROLOL TARTRATE PRN MG: 5 INJECTION, SOLUTION INTRAVENOUS at 21:29

## 2020-08-13 RX ADMIN — METOPROLOL TARTRATE PRN MG: 5 INJECTION, SOLUTION INTRAVENOUS at 20:24

## 2020-08-13 NOTE — NUR
PT PROVIDED WITH MEAL TRAY PER REQUEST, AFTER ERP VERBAL OK. PROVIDED WITH WARM 
BLANKETS. VITALS STABLE, PT DENIES ANY NEEDS OR CONCERNS AT THIS TIME. CALL 
LIGHT IN REACH.

## 2020-08-14 VITALS — DIASTOLIC BLOOD PRESSURE: 88 MMHG | SYSTOLIC BLOOD PRESSURE: 132 MMHG

## 2020-08-14 VITALS — DIASTOLIC BLOOD PRESSURE: 97 MMHG | SYSTOLIC BLOOD PRESSURE: 137 MMHG

## 2020-08-14 VITALS — SYSTOLIC BLOOD PRESSURE: 111 MMHG | DIASTOLIC BLOOD PRESSURE: 73 MMHG

## 2020-08-14 VITALS — DIASTOLIC BLOOD PRESSURE: 69 MMHG | SYSTOLIC BLOOD PRESSURE: 104 MMHG

## 2020-08-14 VITALS — DIASTOLIC BLOOD PRESSURE: 84 MMHG | SYSTOLIC BLOOD PRESSURE: 118 MMHG

## 2020-08-14 VITALS — SYSTOLIC BLOOD PRESSURE: 128 MMHG | DIASTOLIC BLOOD PRESSURE: 90 MMHG

## 2020-08-14 VITALS — DIASTOLIC BLOOD PRESSURE: 88 MMHG | SYSTOLIC BLOOD PRESSURE: 145 MMHG

## 2020-08-14 LAB
ANION GAP SERPL CALC-SCNC: 7 MMOL/L (ref 5–15)
BASOPHILS # BLD AUTO: 0.06 X10^3/UL (ref 0–0.1)
BASOPHILS NFR BLD AUTO: 1 % (ref 0–1)
CALCIUM SERPL-MCNC: 8.1 MG/DL (ref 8.5–10.1)
CHLORIDE SERPL-SCNC: 109 MMOL/L (ref 98–107)
CREAT SERPL-MCNC: 0.57 MG/DL (ref 0.55–1.02)
EOSINOPHIL # BLD AUTO: 0.07 X10^3/UL (ref 0–0.4)
EOSINOPHIL NFR BLD AUTO: 1 % (ref 1–7)
ERYTHROCYTE [DISTWIDTH] IN BLOOD BY AUTOMATED COUNT: 14.1 % (ref 9.6–15.2)
LYMPHOCYTES # BLD AUTO: 2.95 X10^3/UL (ref 1–3.4)
LYMPHOCYTES NFR BLD AUTO: 39 % (ref 22–44)
MCH RBC QN AUTO: 32.7 PG (ref 27–34.8)
MCHC RBC AUTO-ENTMCNC: 32.9 G/DL (ref 32.4–35.8)
MCV RBC AUTO: 99.6 FL (ref 80–100)
MD: NO
MICROSCOPIC: (no result)
MONOCYTES # BLD AUTO: 0.55 X10^3/UL (ref 0.2–0.8)
MONOCYTES NFR BLD AUTO: 7 % (ref 2–9)
NEUTROPHILS # BLD AUTO: 3.92 X10^3/UL (ref 1.8–6.8)
NEUTROPHILS NFR BLD AUTO: 52 % (ref 42–75)
PLATELET # BLD AUTO: 194 X10^3/UL (ref 130–400)
PMV BLD AUTO: 8.1 FL (ref 7.4–10.4)
RBC # BLD AUTO: 3.88 X10^6/UL (ref 3.82–5.3)
TROPONIN I SERPL-MCNC: 0.02 NG/ML (ref 0–0.04)

## 2020-08-14 RX ADMIN — CHLORDIAZEPOXIDE HYDROCHLORIDE SCH MG: 25 CAPSULE ORAL at 17:37

## 2020-08-14 RX ADMIN — LORAZEPAM PRN MG: 2 INJECTION INTRAMUSCULAR; INTRAVENOUS at 04:48

## 2020-08-14 RX ADMIN — LORAZEPAM PRN MG: 2 INJECTION INTRAMUSCULAR; INTRAVENOUS at 15:23

## 2020-08-14 RX ADMIN — METOPROLOL SUCCINATE SCH MG: 100 TABLET, FILM COATED, EXTENDED RELEASE ORAL at 05:58

## 2020-08-14 RX ADMIN — LORAZEPAM PRN MG: 2 INJECTION INTRAMUSCULAR; INTRAVENOUS at 19:21

## 2020-08-14 RX ADMIN — FOLIC ACID SCH MG: 1 TABLET ORAL at 08:18

## 2020-08-14 RX ADMIN — LORAZEPAM PRN MG: 2 INJECTION INTRAMUSCULAR; INTRAVENOUS at 12:26

## 2020-08-14 RX ADMIN — LORAZEPAM PRN MG: 2 INJECTION INTRAMUSCULAR; INTRAVENOUS at 01:50

## 2020-08-14 RX ADMIN — CHLORDIAZEPOXIDE HYDROCHLORIDE SCH MG: 25 CAPSULE ORAL at 22:31

## 2020-08-14 RX ADMIN — CHLORDIAZEPOXIDE HYDROCHLORIDE SCH MG: 25 CAPSULE ORAL at 11:46

## 2020-08-14 RX ADMIN — DILTIAZEM HYDROCHLORIDE SCH MLS/HR: 5 INJECTION INTRAVENOUS at 08:19

## 2020-08-14 RX ADMIN — PANTOPRAZOLE SODIUM SCH MG: 40 TABLET, DELAYED RELEASE ORAL at 05:58

## 2020-08-14 RX ADMIN — DILTIAZEM HYDROCHLORIDE SCH MLS/HR: 5 INJECTION INTRAVENOUS at 19:55

## 2020-08-14 RX ADMIN — POTASSIUM CHLORIDE SCH MEQ: 20 TABLET, EXTENDED RELEASE ORAL at 08:19

## 2020-08-14 RX ADMIN — CEFTRIAXONE SCH MLS/HR: 1 INJECTION, SOLUTION INTRAVENOUS at 15:23

## 2020-08-14 RX ADMIN — METOPROLOL SUCCINATE SCH MG: 100 TABLET, FILM COATED, EXTENDED RELEASE ORAL at 17:37

## 2020-08-14 RX ADMIN — LORAZEPAM PRN MG: 2 INJECTION INTRAMUSCULAR; INTRAVENOUS at 08:18

## 2020-08-14 RX ADMIN — LORAZEPAM PRN MG: 2 INJECTION INTRAMUSCULAR; INTRAVENOUS at 22:31

## 2020-08-14 RX ADMIN — Medication PRN MG: at 21:15

## 2020-08-14 RX ADMIN — Medication SCH TAB: at 08:19

## 2020-08-14 RX ADMIN — CHLORDIAZEPOXIDE HYDROCHLORIDE SCH MG: 25 CAPSULE ORAL at 05:59

## 2020-08-14 RX ADMIN — LEVOTHYROXINE SODIUM SCH MCG: 125 TABLET ORAL at 05:58

## 2020-08-14 RX ADMIN — APIXABAN SCH MG: 5 TABLET, FILM COATED ORAL at 20:00

## 2020-08-14 RX ADMIN — APIXABAN SCH MG: 5 TABLET, FILM COATED ORAL at 08:19

## 2020-08-14 RX ADMIN — DOCUSATE SODIUM 50MG AND SENNOSIDES 8.6MG SCH TAB: 8.6; 5 TABLET, FILM COATED ORAL at 08:19

## 2020-08-15 VITALS — SYSTOLIC BLOOD PRESSURE: 143 MMHG | DIASTOLIC BLOOD PRESSURE: 94 MMHG

## 2020-08-15 VITALS — DIASTOLIC BLOOD PRESSURE: 69 MMHG | SYSTOLIC BLOOD PRESSURE: 102 MMHG

## 2020-08-15 VITALS — SYSTOLIC BLOOD PRESSURE: 114 MMHG | DIASTOLIC BLOOD PRESSURE: 79 MMHG

## 2020-08-15 LAB
ANION GAP SERPL CALC-SCNC: 8 MMOL/L (ref 5–15)
BASOPHILS # BLD AUTO: 0.04 X10^3/UL (ref 0–0.1)
BASOPHILS NFR BLD AUTO: 1 % (ref 0–1)
CALCIUM SERPL-MCNC: 9.2 MG/DL (ref 8.5–10.1)
CHLORIDE SERPL-SCNC: 107 MMOL/L (ref 98–107)
CREAT SERPL-MCNC: 0.47 MG/DL (ref 0.55–1.02)
EOSINOPHIL # BLD AUTO: 0.14 X10^3/UL (ref 0–0.4)
EOSINOPHIL NFR BLD AUTO: 2 % (ref 1–7)
ERYTHROCYTE [DISTWIDTH] IN BLOOD BY AUTOMATED COUNT: 14.5 % (ref 9.6–15.2)
LYMPHOCYTES # BLD AUTO: 2.81 X10^3/UL (ref 1–3.4)
LYMPHOCYTES NFR BLD AUTO: 37 % (ref 22–44)
MCH RBC QN AUTO: 32.6 PG (ref 27–34.8)
MCHC RBC AUTO-ENTMCNC: 32.8 G/DL (ref 32.4–35.8)
MCV RBC AUTO: 99.5 FL (ref 80–100)
MD: NO
MONOCYTES # BLD AUTO: 0.5 X10^3/UL (ref 0.2–0.8)
MONOCYTES NFR BLD AUTO: 7 % (ref 2–9)
NEUTROPHILS # BLD AUTO: 4.1 X10^3/UL (ref 1.8–6.8)
NEUTROPHILS NFR BLD AUTO: 54 % (ref 42–75)
PLATELET # BLD AUTO: 175 X10^3/UL (ref 130–400)
PMV BLD AUTO: 8.5 FL (ref 7.4–10.4)
RBC # BLD AUTO: 3.75 X10^6/UL (ref 3.82–5.3)

## 2020-08-15 RX ADMIN — CHLORDIAZEPOXIDE HYDROCHLORIDE SCH MG: 25 CAPSULE ORAL at 21:04

## 2020-08-15 RX ADMIN — PANTOPRAZOLE SODIUM SCH MG: 40 TABLET, DELAYED RELEASE ORAL at 05:12

## 2020-08-15 RX ADMIN — APIXABAN SCH MG: 5 TABLET, FILM COATED ORAL at 08:53

## 2020-08-15 RX ADMIN — LORAZEPAM PRN MG: 2 INJECTION INTRAMUSCULAR; INTRAVENOUS at 01:57

## 2020-08-15 RX ADMIN — CHLORDIAZEPOXIDE HYDROCHLORIDE SCH MG: 25 CAPSULE ORAL at 05:12

## 2020-08-15 RX ADMIN — FOLIC ACID SCH MG: 1 TABLET ORAL at 08:54

## 2020-08-15 RX ADMIN — METOPROLOL SUCCINATE SCH MG: 100 TABLET, FILM COATED, EXTENDED RELEASE ORAL at 05:14

## 2020-08-15 RX ADMIN — LORAZEPAM PRN MG: 2 INJECTION INTRAMUSCULAR; INTRAVENOUS at 05:45

## 2020-08-15 RX ADMIN — Medication PRN MG: at 23:57

## 2020-08-15 RX ADMIN — DILTIAZEM HYDROCHLORIDE SCH MLS/HR: 5 INJECTION INTRAVENOUS at 09:05

## 2020-08-15 RX ADMIN — LORAZEPAM PRN MG: 2 INJECTION INTRAMUSCULAR; INTRAVENOUS at 23:58

## 2020-08-15 RX ADMIN — POTASSIUM CHLORIDE SCH MEQ: 20 TABLET, EXTENDED RELEASE ORAL at 08:53

## 2020-08-15 RX ADMIN — LORAZEPAM PRN MG: 2 INJECTION INTRAMUSCULAR; INTRAVENOUS at 16:39

## 2020-08-15 RX ADMIN — LORAZEPAM PRN MG: 2 INJECTION INTRAMUSCULAR; INTRAVENOUS at 21:04

## 2020-08-15 RX ADMIN — LEVOTHYROXINE SODIUM SCH MCG: 125 TABLET ORAL at 05:12

## 2020-08-15 RX ADMIN — CHLORDIAZEPOXIDE HYDROCHLORIDE SCH MG: 25 CAPSULE ORAL at 16:39

## 2020-08-15 RX ADMIN — Medication SCH TAB: at 08:54

## 2020-08-15 RX ADMIN — DOCUSATE SODIUM 50MG AND SENNOSIDES 8.6MG SCH TAB: 8.6; 5 TABLET, FILM COATED ORAL at 08:54

## 2020-08-15 RX ADMIN — Medication SCH MG: at 08:53

## 2020-08-15 RX ADMIN — CEFTRIAXONE SCH MLS/HR: 1 INJECTION, SOLUTION INTRAVENOUS at 14:22

## 2020-08-15 RX ADMIN — CHLORDIAZEPOXIDE HYDROCHLORIDE SCH MG: 25 CAPSULE ORAL at 11:27

## 2020-08-15 RX ADMIN — APIXABAN SCH MG: 5 TABLET, FILM COATED ORAL at 21:04

## 2020-08-16 VITALS — DIASTOLIC BLOOD PRESSURE: 91 MMHG | SYSTOLIC BLOOD PRESSURE: 137 MMHG

## 2020-08-16 VITALS — DIASTOLIC BLOOD PRESSURE: 96 MMHG | SYSTOLIC BLOOD PRESSURE: 143 MMHG

## 2020-08-16 VITALS — DIASTOLIC BLOOD PRESSURE: 85 MMHG | SYSTOLIC BLOOD PRESSURE: 129 MMHG

## 2020-08-16 VITALS — SYSTOLIC BLOOD PRESSURE: 140 MMHG | DIASTOLIC BLOOD PRESSURE: 92 MMHG

## 2020-08-16 LAB
ANION GAP SERPL CALC-SCNC: 8 MMOL/L (ref 5–15)
BASOPHILS # BLD AUTO: 0.02 X10^3/UL (ref 0–0.1)
BASOPHILS NFR BLD AUTO: 0 % (ref 0–1)
CALCIUM SERPL-MCNC: 9.8 MG/DL (ref 8.5–10.1)
CHLORIDE SERPL-SCNC: 104 MMOL/L (ref 98–107)
CREAT SERPL-MCNC: 0.55 MG/DL (ref 0.55–1.02)
EOSINOPHIL # BLD AUTO: 0.14 X10^3/UL (ref 0–0.4)
EOSINOPHIL NFR BLD AUTO: 2 % (ref 1–7)
ERYTHROCYTE [DISTWIDTH] IN BLOOD BY AUTOMATED COUNT: 13.9 % (ref 9.6–15.2)
LYMPHOCYTES # BLD AUTO: 1.84 X10^3/UL (ref 1–3.4)
LYMPHOCYTES NFR BLD AUTO: 28 % (ref 22–44)
MCH RBC QN AUTO: 32.5 PG (ref 27–34.8)
MCHC RBC AUTO-ENTMCNC: 33.2 G/DL (ref 32.4–35.8)
MCV RBC AUTO: 97.8 FL (ref 80–100)
MD: NO
MONOCYTES # BLD AUTO: 0.39 X10^3/UL (ref 0.2–0.8)
MONOCYTES NFR BLD AUTO: 6 % (ref 2–9)
NEUTROPHILS # BLD AUTO: 4.09 X10^3/UL (ref 1.8–6.8)
NEUTROPHILS NFR BLD AUTO: 63 % (ref 42–75)
PLATELET # BLD AUTO: 154 X10^3/UL (ref 130–400)
PMV BLD AUTO: 8.7 FL (ref 7.4–10.4)
RBC # BLD AUTO: 4.11 X10^6/UL (ref 3.82–5.3)

## 2020-08-16 RX ADMIN — METOPROLOL SUCCINATE SCH MG: 100 TABLET, FILM COATED, EXTENDED RELEASE ORAL at 05:49

## 2020-08-16 RX ADMIN — POTASSIUM CHLORIDE SCH MEQ: 20 TABLET, EXTENDED RELEASE ORAL at 09:01

## 2020-08-16 RX ADMIN — CHLORDIAZEPOXIDE HYDROCHLORIDE SCH MG: 25 CAPSULE ORAL at 05:48

## 2020-08-16 RX ADMIN — APIXABAN SCH MG: 5 TABLET, FILM COATED ORAL at 09:01

## 2020-08-16 RX ADMIN — LEVOTHYROXINE SODIUM SCH MCG: 125 TABLET ORAL at 05:49

## 2020-08-16 RX ADMIN — Medication SCH MG: at 09:01

## 2020-08-16 RX ADMIN — PANTOPRAZOLE SODIUM SCH MG: 40 TABLET, DELAYED RELEASE ORAL at 05:49

## 2020-08-16 RX ADMIN — Medication SCH TAB: at 09:01

## 2020-08-16 RX ADMIN — CEFTRIAXONE SCH MLS/HR: 1 INJECTION, SOLUTION INTRAVENOUS at 14:29

## 2020-08-16 RX ADMIN — CHLORDIAZEPOXIDE HYDROCHLORIDE SCH MG: 25 CAPSULE ORAL at 11:00

## 2020-08-16 RX ADMIN — FOLIC ACID SCH MG: 1 TABLET ORAL at 09:01

## 2020-08-17 VITALS — DIASTOLIC BLOOD PRESSURE: 88 MMHG | SYSTOLIC BLOOD PRESSURE: 122 MMHG

## 2020-08-17 VITALS — SYSTOLIC BLOOD PRESSURE: 120 MMHG | DIASTOLIC BLOOD PRESSURE: 84 MMHG

## 2020-08-17 VITALS — DIASTOLIC BLOOD PRESSURE: 72 MMHG | SYSTOLIC BLOOD PRESSURE: 103 MMHG

## 2020-08-17 VITALS — SYSTOLIC BLOOD PRESSURE: 128 MMHG | DIASTOLIC BLOOD PRESSURE: 87 MMHG

## 2020-08-17 RX ADMIN — DOCUSATE SODIUM 50MG AND SENNOSIDES 8.6MG SCH TAB: 8.6; 5 TABLET, FILM COATED ORAL at 00:58

## 2020-08-17 RX ADMIN — APIXABAN SCH MG: 5 TABLET, FILM COATED ORAL at 22:33

## 2020-08-17 RX ADMIN — APIXABAN SCH MG: 5 TABLET, FILM COATED ORAL at 00:58

## 2020-08-17 RX ADMIN — FOLIC ACID SCH MG: 1 TABLET ORAL at 08:47

## 2020-08-17 RX ADMIN — LORAZEPAM PRN MG: 2 INJECTION INTRAMUSCULAR; INTRAVENOUS at 22:32

## 2020-08-17 RX ADMIN — THIAMINE HYDROCHLORIDE SCH MLS/HR: 100 INJECTION, SOLUTION INTRAMUSCULAR; INTRAVENOUS at 08:47

## 2020-08-17 RX ADMIN — METOPROLOL SUCCINATE SCH MG: 100 TABLET, FILM COATED, EXTENDED RELEASE ORAL at 05:49

## 2020-08-17 RX ADMIN — DOCUSATE SODIUM 50MG AND SENNOSIDES 8.6MG SCH TAB: 8.6; 5 TABLET, FILM COATED ORAL at 08:47

## 2020-08-17 RX ADMIN — APIXABAN SCH MG: 5 TABLET, FILM COATED ORAL at 08:47

## 2020-08-17 RX ADMIN — PANTOPRAZOLE SODIUM SCH MG: 40 TABLET, DELAYED RELEASE ORAL at 05:49

## 2020-08-17 RX ADMIN — CEFTRIAXONE SCH MLS/HR: 1 INJECTION, SOLUTION INTRAVENOUS at 15:21

## 2020-08-17 RX ADMIN — Medication SCH TAB: at 08:47

## 2020-08-17 RX ADMIN — POTASSIUM CHLORIDE SCH MEQ: 20 TABLET, EXTENDED RELEASE ORAL at 08:47

## 2020-08-17 RX ADMIN — LEVOTHYROXINE SODIUM SCH MCG: 125 TABLET ORAL at 05:49

## 2020-08-18 VITALS — SYSTOLIC BLOOD PRESSURE: 127 MMHG | DIASTOLIC BLOOD PRESSURE: 88 MMHG

## 2020-08-18 VITALS — DIASTOLIC BLOOD PRESSURE: 81 MMHG | SYSTOLIC BLOOD PRESSURE: 115 MMHG

## 2020-08-18 VITALS — SYSTOLIC BLOOD PRESSURE: 114 MMHG | DIASTOLIC BLOOD PRESSURE: 79 MMHG

## 2020-08-18 VITALS — SYSTOLIC BLOOD PRESSURE: 125 MMHG | DIASTOLIC BLOOD PRESSURE: 87 MMHG

## 2020-08-18 VITALS — DIASTOLIC BLOOD PRESSURE: 77 MMHG | SYSTOLIC BLOOD PRESSURE: 110 MMHG

## 2020-08-18 RX ADMIN — APIXABAN SCH MG: 5 TABLET, FILM COATED ORAL at 08:42

## 2020-08-18 RX ADMIN — Medication SCH TAB: at 08:43

## 2020-08-18 RX ADMIN — PANTOPRAZOLE SODIUM SCH MG: 40 TABLET, DELAYED RELEASE ORAL at 05:27

## 2020-08-18 RX ADMIN — CEFTRIAXONE SCH MLS/HR: 1 INJECTION, SOLUTION INTRAVENOUS at 16:02

## 2020-08-18 RX ADMIN — FOLIC ACID SCH MG: 1 TABLET ORAL at 08:43

## 2020-08-18 RX ADMIN — POTASSIUM CHLORIDE SCH MEQ: 20 TABLET, EXTENDED RELEASE ORAL at 08:42

## 2020-08-18 RX ADMIN — LEVOTHYROXINE SODIUM SCH MCG: 125 TABLET ORAL at 05:29

## 2020-08-18 RX ADMIN — METOPROLOL SUCCINATE SCH MG: 100 TABLET, FILM COATED, EXTENDED RELEASE ORAL at 05:29

## 2020-08-18 RX ADMIN — DOCUSATE SODIUM 50MG AND SENNOSIDES 8.6MG SCH TAB: 8.6; 5 TABLET, FILM COATED ORAL at 08:43

## 2020-08-18 RX ADMIN — THIAMINE HYDROCHLORIDE SCH MLS/HR: 100 INJECTION, SOLUTION INTRAMUSCULAR; INTRAVENOUS at 08:49

## 2020-08-18 RX ADMIN — APIXABAN SCH MG: 5 TABLET, FILM COATED ORAL at 21:46

## 2020-08-18 RX ADMIN — LORAZEPAM PRN MG: 2 INJECTION INTRAMUSCULAR; INTRAVENOUS at 21:47

## 2020-08-19 VITALS — SYSTOLIC BLOOD PRESSURE: 122 MMHG | DIASTOLIC BLOOD PRESSURE: 82 MMHG

## 2020-08-19 VITALS — DIASTOLIC BLOOD PRESSURE: 63 MMHG | SYSTOLIC BLOOD PRESSURE: 110 MMHG

## 2020-08-19 VITALS — DIASTOLIC BLOOD PRESSURE: 72 MMHG | SYSTOLIC BLOOD PRESSURE: 104 MMHG

## 2020-08-19 RX ADMIN — METOPROLOL SUCCINATE SCH MG: 100 TABLET, FILM COATED, EXTENDED RELEASE ORAL at 06:27

## 2020-08-19 RX ADMIN — PANTOPRAZOLE SODIUM SCH MG: 40 TABLET, DELAYED RELEASE ORAL at 06:27

## 2020-08-19 RX ADMIN — Medication PRN MG: at 01:16

## 2020-08-19 RX ADMIN — POTASSIUM CHLORIDE SCH MEQ: 20 TABLET, EXTENDED RELEASE ORAL at 10:02

## 2020-08-19 RX ADMIN — LEVOTHYROXINE SODIUM SCH MCG: 125 TABLET ORAL at 06:27

## 2020-08-19 RX ADMIN — Medication SCH TAB: at 10:02

## 2020-08-19 RX ADMIN — DOCUSATE SODIUM 50MG AND SENNOSIDES 8.6MG SCH TAB: 8.6; 5 TABLET, FILM COATED ORAL at 10:01

## 2020-08-19 RX ADMIN — FOLIC ACID SCH MG: 1 TABLET ORAL at 10:02

## 2020-08-19 RX ADMIN — APIXABAN SCH MG: 5 TABLET, FILM COATED ORAL at 10:02

## 2020-08-19 RX ADMIN — THIAMINE HYDROCHLORIDE SCH MLS/HR: 100 INJECTION, SOLUTION INTRAMUSCULAR; INTRAVENOUS at 10:01

## 2020-08-24 RX ORDER — APIXABAN 5 MG/1
5 TABLET, FILM COATED ORAL 2 TIMES DAILY
Qty: 180 TAB | Refills: 2 | Status: SHIPPED | OUTPATIENT
Start: 2020-08-24 | End: 2021-09-03 | Stop reason: SDUPTHER

## 2020-09-09 DIAGNOSIS — M62.838 MUSCLE SPASM: ICD-10-CM

## 2020-09-09 DIAGNOSIS — F99 INSOMNIA DUE TO OTHER MENTAL DISORDER: ICD-10-CM

## 2020-09-09 DIAGNOSIS — F51.05 INSOMNIA DUE TO OTHER MENTAL DISORDER: ICD-10-CM

## 2020-09-09 RX ORDER — QUETIAPINE FUMARATE 25 MG/1
TABLET, FILM COATED ORAL
Qty: 90 TAB | Refills: 0 | Status: SHIPPED | OUTPATIENT
Start: 2020-09-09 | End: 2021-01-07 | Stop reason: SDUPTHER

## 2020-09-09 RX ORDER — TIZANIDINE 4 MG/1
TABLET ORAL
Qty: 14 TAB | Refills: 0 | Status: ON HOLD
Start: 2020-09-09 | End: 2021-02-09

## 2020-10-22 ENCOUNTER — HOSPITAL ENCOUNTER (EMERGENCY)
Dept: HOSPITAL 8 - ED | Age: 75
Discharge: PSYCHIATRIC OR SUBSTANCE USE FACILITY | End: 2020-10-22
Payer: MEDICARE

## 2020-10-22 VITALS — BODY MASS INDEX: 24.8 KG/M2 | HEIGHT: 66 IN | WEIGHT: 154.32 LBS

## 2020-10-22 VITALS — DIASTOLIC BLOOD PRESSURE: 82 MMHG | SYSTOLIC BLOOD PRESSURE: 126 MMHG

## 2020-10-22 DIAGNOSIS — R10.9: ICD-10-CM
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
## 2020-10-22 DIAGNOSIS — I48.91: ICD-10-CM

## 2020-10-22 DIAGNOSIS — F32.9: Primary | ICD-10-CM

## 2020-10-22 DIAGNOSIS — Z95.0: ICD-10-CM

## 2020-10-22 DIAGNOSIS — F10.229: ICD-10-CM

## 2020-10-22 LAB
<PLATELET ESTIMATE>: (no result)
ALBUMIN SERPL-MCNC: 3.2 G/DL (ref 3.4–5)
ALP SERPL-CCNC: 153 U/L (ref 45–117)
ALT SERPL-CCNC: 121 U/L (ref 12–78)
ANION GAP SERPL CALC-SCNC: 16 MMOL/L (ref 5–15)
BASOPHILS # BLD AUTO: 0 X10^3/UL (ref 0–0.1)
BASOPHILS NFR BLD AUTO: 1 % (ref 0–1)
BILIRUB DIRECT SERPL-MCNC: NORMAL MG/DL
BILIRUB SERPL-MCNC: 1.8 MG/DL (ref 0.2–1)
CALCIUM SERPL-MCNC: 8.7 MG/DL (ref 8.5–10.1)
CHLORIDE SERPL-SCNC: 107 MMOL/L (ref 98–107)
CREAT SERPL-MCNC: 0.54 MG/DL (ref 0.55–1.02)
EOSINOPHIL # BLD AUTO: 0.1 X10^3/UL (ref 0–0.4)
EOSINOPHIL NFR BLD AUTO: 1 % (ref 1–7)
ERYTHROCYTE [DISTWIDTH] IN BLOOD BY AUTOMATED COUNT: 16.4 % (ref 9.6–15.2)
LG PLATELETS BLD QL SMEAR: (no result)
LYMPHOCYTES # BLD AUTO: 1.9 X10^3/UL (ref 1–3.4)
LYMPHOCYTES NFR BLD AUTO: 38 % (ref 22–44)
MCH RBC QN AUTO: 31.8 PG (ref 27–34.8)
MCHC RBC AUTO-ENTMCNC: 32.4 G/DL (ref 32.4–35.8)
MD: (no result)
MICROSCOPIC: (no result)
MONOCYTES # BLD AUTO: 0.3 X10^3/UL (ref 0.2–0.8)
MONOCYTES NFR BLD AUTO: 6 % (ref 2–9)
NEUTROPHILS # BLD AUTO: 2.7 X10^3/UL (ref 1.8–6.8)
NEUTROPHILS NFR BLD AUTO: 54 % (ref 42–75)
PLATELET # BLD AUTO: 56 X10^3/UL (ref 130–400)
PMV BLD AUTO: 8.1 FL (ref 7.4–10.4)
PROT SERPL-MCNC: 6.7 G/DL (ref 6.4–8.2)
RBC # BLD AUTO: 4.33 X10^6/UL (ref 3.82–5.3)

## 2020-10-22 PROCEDURE — 87186 SC STD MICRODIL/AGAR DIL: CPT

## 2020-10-22 PROCEDURE — 36415 COLL VENOUS BLD VENIPUNCTURE: CPT

## 2020-10-22 PROCEDURE — 80053 COMPREHEN METABOLIC PANEL: CPT

## 2020-10-22 PROCEDURE — 80307 DRUG TEST PRSMV CHEM ANLYZR: CPT

## 2020-10-22 PROCEDURE — 99285 EMERGENCY DEPT VISIT HI MDM: CPT

## 2020-10-22 PROCEDURE — 71045 X-RAY EXAM CHEST 1 VIEW: CPT

## 2020-10-22 PROCEDURE — 96372 THER/PROPH/DIAG INJ SC/IM: CPT

## 2020-10-22 PROCEDURE — 85025 COMPLETE CBC W/AUTO DIFF WBC: CPT

## 2020-10-22 PROCEDURE — 81001 URINALYSIS AUTO W/SCOPE: CPT

## 2020-10-22 PROCEDURE — 83690 ASSAY OF LIPASE: CPT

## 2020-10-22 PROCEDURE — 87077 CULTURE AEROBIC IDENTIFY: CPT

## 2020-10-22 PROCEDURE — 87086 URINE CULTURE/COLONY COUNT: CPT

## 2020-10-22 NOTE — NUR
AMBULATED PATIENT TO [facility] AND BACK TO [facility].

## 2020-10-22 NOTE — NUR
PT ABLE TO PROVIDE RN WITH A URINE SAMPLE. RN WALKED URINE SAMPLE DOWN TO LAB. 
PT REQUESING "SOMETHING THAT YOU GIVE ALCOHOLICS WHO ARE DETOXING." RN TO 
INFORM ERMD. MEAL TRAY ORDERED.

## 2020-10-22 NOTE — NUR
LUNCH PROVIDED TO PT. RN INFORMED ERMD THAT PT IS REQUESTING MEDICATION FOR 
DETOX. NO NEW INTERVENTIONS AT THIS TIME.

## 2020-10-22 NOTE — NUR
SW AND RN TALKED TO PT ABOUT A DISCHARGE PLAN. SW GAVE PT OPTIONS OF REHAB AND 
OR MENTAL HEALTH FACILITY. PT STATED "SHE JUST WANTS TO GO HOME AND GO TO 
SLEEP." PT STILL DENIES SI AT THIS TIME. OKAY BY PT TO TALK TO SON NELLY. 
NELLY'S NUMBER [phone]. IKE CURRENTLY TALKING TO NELLY.

## 2020-10-22 NOTE — NUR
RN HELPED PT TO THE [facility] TO PROVIDE WITH A URINE SAMPLE. PT STATED DOESNT NEED 
TO VOID. OKAY BY LATRICE TO PROVIDE PT WITH WATER. PT ALSO REQUESTING FOOD. OKAY 
BY LATRICE TO ORDER PT A MEAL TRAY.

## 2020-10-22 NOTE — NUR
PATIENT TRANSFERRED [facility]IA [facility]URNEY [facility] BY ED TECH.  ALL PATIENT BELONGINGS TAKEN 
UP WITH PATIENT.

## 2020-10-22 NOTE — NUR
REPORT TO DWIGHT PRICE TO ASSUME PRIMARY CARE. 



PER REPORT FROM AMANDA NP PT IS WILLING TO GO VOLUNTARY TO [facility]. AMANDA TO 
CALL SW TO UPDATE ON POC.

## 2020-10-22 NOTE — NUR
BEDSIDE REPORT RECEIVED FROM DWIGHT CASTILLO. PATIENT RESTING IN [facility] WITH EYES 
CLOSED, CONNECTED TO CARDIAC MONITOR, NO SIGNS OF ACUTE DISTRESS, CALL LIGHT 
WITHIN REACH.

## 2020-10-23 ENCOUNTER — HOSPITAL ENCOUNTER (INPATIENT)
Dept: HOSPITAL 8 - 5SO | Age: 75
LOS: 5 days | Discharge: PSYCHIATRIC OR SUBSTANCE USE FACILITY | DRG: 309 | End: 2020-10-28
Payer: MEDICARE

## 2020-10-23 VITALS — DIASTOLIC BLOOD PRESSURE: 94 MMHG | SYSTOLIC BLOOD PRESSURE: 137 MMHG

## 2020-10-23 VITALS — DIASTOLIC BLOOD PRESSURE: 88 MMHG | SYSTOLIC BLOOD PRESSURE: 125 MMHG

## 2020-10-23 VITALS — SYSTOLIC BLOOD PRESSURE: 147 MMHG | DIASTOLIC BLOOD PRESSURE: 106 MMHG

## 2020-10-23 VITALS — WEIGHT: 158.73 LBS | HEIGHT: 66 IN | BODY MASS INDEX: 25.51 KG/M2

## 2020-10-23 VITALS — DIASTOLIC BLOOD PRESSURE: 92 MMHG | SYSTOLIC BLOOD PRESSURE: 132 MMHG

## 2020-10-23 DIAGNOSIS — I10: ICD-10-CM

## 2020-10-23 DIAGNOSIS — Z87.891: ICD-10-CM

## 2020-10-23 DIAGNOSIS — F51.04: ICD-10-CM

## 2020-10-23 DIAGNOSIS — E86.0: ICD-10-CM

## 2020-10-23 DIAGNOSIS — N39.0: ICD-10-CM

## 2020-10-23 DIAGNOSIS — Z86.74: ICD-10-CM

## 2020-10-23 DIAGNOSIS — Z79.01: ICD-10-CM

## 2020-10-23 DIAGNOSIS — D69.6: ICD-10-CM

## 2020-10-23 DIAGNOSIS — F33.2: ICD-10-CM

## 2020-10-23 DIAGNOSIS — Z82.0: ICD-10-CM

## 2020-10-23 DIAGNOSIS — Z80.1: ICD-10-CM

## 2020-10-23 DIAGNOSIS — Z79.899: ICD-10-CM

## 2020-10-23 DIAGNOSIS — K70.10: ICD-10-CM

## 2020-10-23 DIAGNOSIS — Z95.810: ICD-10-CM

## 2020-10-23 DIAGNOSIS — I48.0: Primary | ICD-10-CM

## 2020-10-23 DIAGNOSIS — I47.2: ICD-10-CM

## 2020-10-23 DIAGNOSIS — F41.9: ICD-10-CM

## 2020-10-23 DIAGNOSIS — I35.8: ICD-10-CM

## 2020-10-23 DIAGNOSIS — E03.9: ICD-10-CM

## 2020-10-23 DIAGNOSIS — F10.239: ICD-10-CM

## 2020-10-23 DIAGNOSIS — Z82.5: ICD-10-CM

## 2020-10-23 LAB
ALBUMIN SERPL-MCNC: 3.4 G/DL (ref 3.4–5)
ALP SERPL-CCNC: 148 U/L (ref 45–117)
ALT SERPL-CCNC: 93 U/L (ref 12–78)
ANION GAP SERPL CALC-SCNC: 11 MMOL/L (ref 5–15)
BASOPHILS # BLD AUTO: 0 X10^3/UL (ref 0–0.1)
BASOPHILS NFR BLD AUTO: 1 % (ref 0–1)
BILIRUB SERPL-MCNC: 3.6 MG/DL (ref 0.2–1)
CALCIUM SERPL-MCNC: 9.4 MG/DL (ref 8.5–10.1)
CHLORIDE SERPL-SCNC: 104 MMOL/L (ref 98–107)
CREAT SERPL-MCNC: 0.61 MG/DL (ref 0.55–1.02)
EOSINOPHIL # BLD AUTO: 0 X10^3/UL (ref 0–0.4)
EOSINOPHIL NFR BLD AUTO: 1 % (ref 1–7)
ERYTHROCYTE [DISTWIDTH] IN BLOOD BY AUTOMATED COUNT: 16 % (ref 9.6–15.2)
INR PPP: 1.02 (ref 0.93–1.1)
LYMPHOCYTES # BLD AUTO: 1.6 X10^3/UL (ref 1–3.4)
LYMPHOCYTES NFR BLD AUTO: 36 % (ref 22–44)
MCH RBC QN AUTO: 32.3 PG (ref 27–34.8)
MCHC RBC AUTO-ENTMCNC: 33 G/DL (ref 32.4–35.8)
MD: (no result)
MONOCYTES # BLD AUTO: 0.3 X10^3/UL (ref 0.2–0.8)
MONOCYTES NFR BLD AUTO: 8 % (ref 2–9)
NEUTROPHILS # BLD AUTO: 2.4 X10^3/UL (ref 1.8–6.8)
NEUTROPHILS NFR BLD AUTO: 54 % (ref 42–75)
PLATELET # BLD AUTO: 34 X10^3/UL (ref 130–400)
PMV BLD AUTO: 8.9 FL (ref 7.4–10.4)
PROT SERPL-MCNC: 6.8 G/DL (ref 6.4–8.2)
PROTHROMBIN TIME: 10.8 SECONDS (ref 9.6–11.5)
RBC # BLD AUTO: 4.37 X10^6/UL (ref 3.82–5.3)
T4 FREE SERPL-MCNC: 0.86 NG/DL (ref 0.76–1.46)

## 2020-10-23 PROCEDURE — 82607 VITAMIN B-12: CPT

## 2020-10-23 PROCEDURE — 84132 ASSAY OF SERUM POTASSIUM: CPT

## 2020-10-23 PROCEDURE — 84439 ASSAY OF FREE THYROXINE: CPT

## 2020-10-23 PROCEDURE — 80061 LIPID PANEL: CPT

## 2020-10-23 PROCEDURE — 84443 ASSAY THYROID STIM HORMONE: CPT

## 2020-10-23 PROCEDURE — 84100 ASSAY OF PHOSPHORUS: CPT

## 2020-10-23 PROCEDURE — 80053 COMPREHEN METABOLIC PANEL: CPT

## 2020-10-23 PROCEDURE — 85025 COMPLETE CBC W/AUTO DIFF WBC: CPT

## 2020-10-23 PROCEDURE — 85610 PROTHROMBIN TIME: CPT

## 2020-10-23 PROCEDURE — 36415 COLL VENOUS BLD VENIPUNCTURE: CPT

## 2020-10-23 PROCEDURE — 80048 BASIC METABOLIC PNL TOTAL CA: CPT

## 2020-10-23 PROCEDURE — 4B02XTZ MEASUREMENT OF CARDIAC DEFIBRILLATOR, EXTERNAL APPROACH: ICD-10-PCS

## 2020-10-23 PROCEDURE — 83735 ASSAY OF MAGNESIUM: CPT

## 2020-10-23 PROCEDURE — 71045 X-RAY EXAM CHEST 1 VIEW: CPT

## 2020-10-23 RX ADMIN — CHLORDIAZEPOXIDE HYDROCHLORIDE SCH MG: 25 CAPSULE ORAL at 10:19

## 2020-10-23 RX ADMIN — APIXABAN SCH MG: 5 TABLET, FILM COATED ORAL at 11:00

## 2020-10-23 RX ADMIN — METOPROLOL SUCCINATE SCH MG: 50 TABLET, FILM COATED, EXTENDED RELEASE ORAL at 18:01

## 2020-10-23 RX ADMIN — Medication SCH TAB: at 10:19

## 2020-10-23 RX ADMIN — CEFTRIAXONE SCH MLS/HR: 1 INJECTION, SOLUTION INTRAVENOUS at 14:05

## 2020-10-23 RX ADMIN — Medication SCH MG: at 20:19

## 2020-10-23 RX ADMIN — LORAZEPAM PRN MG: 2 INJECTION INTRAMUSCULAR; INTRAVENOUS at 14:12

## 2020-10-23 RX ADMIN — CHLORDIAZEPOXIDE HYDROCHLORIDE SCH MG: 25 CAPSULE ORAL at 20:18

## 2020-10-23 RX ADMIN — APIXABAN SCH MG: 5 TABLET, FILM COATED ORAL at 20:53

## 2020-10-23 RX ADMIN — POTASSIUM CHLORIDE SCH MLS/HR: 2 INJECTION, SOLUTION, CONCENTRATE INTRAVENOUS at 15:43

## 2020-10-23 RX ADMIN — Medication SCH MG: at 18:00

## 2020-10-23 RX ADMIN — PANTOPRAZOLE SODIUM SCH MG: 40 TABLET, DELAYED RELEASE ORAL at 14:06

## 2020-10-24 VITALS — SYSTOLIC BLOOD PRESSURE: 136 MMHG | DIASTOLIC BLOOD PRESSURE: 88 MMHG

## 2020-10-24 VITALS — DIASTOLIC BLOOD PRESSURE: 94 MMHG | SYSTOLIC BLOOD PRESSURE: 135 MMHG

## 2020-10-24 VITALS — SYSTOLIC BLOOD PRESSURE: 117 MMHG | DIASTOLIC BLOOD PRESSURE: 79 MMHG

## 2020-10-24 VITALS — SYSTOLIC BLOOD PRESSURE: 126 MMHG | DIASTOLIC BLOOD PRESSURE: 84 MMHG

## 2020-10-24 VITALS — SYSTOLIC BLOOD PRESSURE: 126 MMHG | DIASTOLIC BLOOD PRESSURE: 85 MMHG

## 2020-10-24 VITALS — DIASTOLIC BLOOD PRESSURE: 91 MMHG | SYSTOLIC BLOOD PRESSURE: 135 MMHG

## 2020-10-24 LAB
ALBUMIN SERPL-MCNC: 3 G/DL (ref 3.4–5)
ALP SERPL-CCNC: 161 U/L (ref 45–117)
ALT SERPL-CCNC: 74 U/L (ref 12–78)
ANION GAP SERPL CALC-SCNC: 7 MMOL/L (ref 5–15)
BASOPHILS # BLD AUTO: 0 X10^3/UL (ref 0–0.1)
BASOPHILS NFR BLD AUTO: 1 % (ref 0–1)
BILIRUB SERPL-MCNC: 2 MG/DL (ref 0.2–1)
CALCIUM SERPL-MCNC: 8.9 MG/DL (ref 8.5–10.1)
CHLORIDE SERPL-SCNC: 108 MMOL/L (ref 98–107)
CHOL/HDL RATIO: 1.8
CREAT SERPL-MCNC: 0.58 MG/DL (ref 0.55–1.02)
EOSINOPHIL # BLD AUTO: 0.1 X10^3/UL (ref 0–0.4)
EOSINOPHIL NFR BLD AUTO: 2 % (ref 1–7)
ERYTHROCYTE [DISTWIDTH] IN BLOOD BY AUTOMATED COUNT: 15.8 % (ref 9.6–15.2)
HDL CHOL %: 56 % (ref 28–40)
HDL CHOLESTEROL (DIRECT): 108 MG/DL (ref 40–60)
LDL CHOLESTEROL,CALCULATED: 73 MG/DL (ref 54–169)
LDLC/HDLC SERPL: 0.7 {RATIO} (ref 0.5–3)
LYMPHOCYTES # BLD AUTO: 1.6 X10^3/UL (ref 1–3.4)
LYMPHOCYTES NFR BLD AUTO: 39 % (ref 22–44)
MCH RBC QN AUTO: 32.5 PG (ref 27–34.8)
MCHC RBC AUTO-ENTMCNC: 32.8 G/DL (ref 32.4–35.8)
MD: (no result)
MONOCYTES # BLD AUTO: 0.3 X10^3/UL (ref 0.2–0.8)
MONOCYTES NFR BLD AUTO: 8 % (ref 2–9)
NEUTROPHILS # BLD AUTO: 2.2 X10^3/UL (ref 1.8–6.8)
NEUTROPHILS NFR BLD AUTO: 51 % (ref 42–75)
PLATELET # BLD AUTO: 33 X10^3/UL (ref 130–400)
PMV BLD AUTO: 9 FL (ref 7.4–10.4)
PROT SERPL-MCNC: 6.2 G/DL (ref 6.4–8.2)
RBC # BLD AUTO: 3.97 X10^6/UL (ref 3.82–5.3)
TRIGL SERPL-MCNC: 58 MG/DL (ref 50–200)
VLDLC SERPL CALC-MCNC: 12 MG/DL (ref 0–25)

## 2020-10-24 RX ADMIN — TEMAZEPAM PRN MG: 15 CAPSULE ORAL at 21:46

## 2020-10-24 RX ADMIN — CHLORDIAZEPOXIDE HYDROCHLORIDE SCH MG: 25 CAPSULE ORAL at 14:36

## 2020-10-24 RX ADMIN — METOPROLOL SUCCINATE SCH MG: 50 TABLET, FILM COATED, EXTENDED RELEASE ORAL at 17:13

## 2020-10-24 RX ADMIN — POTASSIUM CHLORIDE SCH MLS/HR: 2 INJECTION, SOLUTION, CONCENTRATE INTRAVENOUS at 14:36

## 2020-10-24 RX ADMIN — Medication SCH MG: at 17:12

## 2020-10-24 RX ADMIN — LORAZEPAM PRN MG: 2 INJECTION INTRAMUSCULAR; INTRAVENOUS at 00:18

## 2020-10-24 RX ADMIN — METOPROLOL SUCCINATE SCH MG: 50 TABLET, FILM COATED, EXTENDED RELEASE ORAL at 09:00

## 2020-10-24 RX ADMIN — CEFTRIAXONE SCH MLS/HR: 1 INJECTION, SOLUTION INTRAVENOUS at 12:45

## 2020-10-24 RX ADMIN — Medication SCH MG: at 20:19

## 2020-10-24 RX ADMIN — APIXABAN SCH MG: 5 TABLET, FILM COATED ORAL at 20:20

## 2020-10-24 RX ADMIN — Medication SCH TAB: at 09:00

## 2020-10-24 RX ADMIN — CHLORDIAZEPOXIDE HYDROCHLORIDE SCH MG: 25 CAPSULE ORAL at 02:37

## 2020-10-24 RX ADMIN — CHLORDIAZEPOXIDE HYDROCHLORIDE SCH MG: 25 CAPSULE ORAL at 20:20

## 2020-10-24 RX ADMIN — LORAZEPAM PRN MG: 2 INJECTION INTRAMUSCULAR; INTRAVENOUS at 23:27

## 2020-10-24 RX ADMIN — Medication SCH MG: at 09:00

## 2020-10-24 RX ADMIN — POTASSIUM CHLORIDE SCH MLS/HR: 2 INJECTION, SOLUTION, CONCENTRATE INTRAVENOUS at 02:49

## 2020-10-24 RX ADMIN — LEVOTHYROXINE SODIUM SCH MCG: 125 TABLET ORAL at 05:36

## 2020-10-24 RX ADMIN — APIXABAN SCH MG: 5 TABLET, FILM COATED ORAL at 09:00

## 2020-10-24 RX ADMIN — HYDROCODONE BITARTRATE AND ACETAMINOPHEN PRN TAB: 5; 325 TABLET ORAL at 17:15

## 2020-10-24 RX ADMIN — POTASSIUM CHLORIDE SCH MLS/HR: 2 INJECTION, SOLUTION, CONCENTRATE INTRAVENOUS at 22:48

## 2020-10-24 RX ADMIN — CHLORDIAZEPOXIDE HYDROCHLORIDE SCH MG: 25 CAPSULE ORAL at 09:00

## 2020-10-24 RX ADMIN — PANTOPRAZOLE SODIUM SCH MG: 40 TABLET, DELAYED RELEASE ORAL at 09:00

## 2020-10-25 VITALS — DIASTOLIC BLOOD PRESSURE: 76 MMHG | SYSTOLIC BLOOD PRESSURE: 108 MMHG

## 2020-10-25 VITALS — SYSTOLIC BLOOD PRESSURE: 131 MMHG | DIASTOLIC BLOOD PRESSURE: 87 MMHG

## 2020-10-25 VITALS — DIASTOLIC BLOOD PRESSURE: 87 MMHG | SYSTOLIC BLOOD PRESSURE: 131 MMHG

## 2020-10-25 VITALS — DIASTOLIC BLOOD PRESSURE: 97 MMHG | SYSTOLIC BLOOD PRESSURE: 138 MMHG

## 2020-10-25 VITALS — DIASTOLIC BLOOD PRESSURE: 84 MMHG | SYSTOLIC BLOOD PRESSURE: 119 MMHG

## 2020-10-25 VITALS — DIASTOLIC BLOOD PRESSURE: 91 MMHG | SYSTOLIC BLOOD PRESSURE: 138 MMHG

## 2020-10-25 LAB
ANION GAP SERPL CALC-SCNC: 7 MMOL/L (ref 5–15)
BASOPHILS # BLD AUTO: 0 X10^3/UL (ref 0–0.1)
BASOPHILS NFR BLD AUTO: 1 % (ref 0–1)
CALCIUM SERPL-MCNC: 8.8 MG/DL (ref 8.5–10.1)
CHLORIDE SERPL-SCNC: 109 MMOL/L (ref 98–107)
CREAT SERPL-MCNC: 0.55 MG/DL (ref 0.55–1.02)
EOSINOPHIL # BLD AUTO: 0.2 X10^3/UL (ref 0–0.4)
EOSINOPHIL NFR BLD AUTO: 4 % (ref 1–7)
ERYTHROCYTE [DISTWIDTH] IN BLOOD BY AUTOMATED COUNT: 15.6 % (ref 9.6–15.2)
LYMPHOCYTES # BLD AUTO: 1.5 X10^3/UL (ref 1–3.4)
LYMPHOCYTES NFR BLD AUTO: 40 % (ref 22–44)
MCH RBC QN AUTO: 32.7 PG (ref 27–34.8)
MCHC RBC AUTO-ENTMCNC: 33 G/DL (ref 32.4–35.8)
MD: (no result)
MONOCYTES # BLD AUTO: 0.4 X10^3/UL (ref 0.2–0.8)
MONOCYTES NFR BLD AUTO: 10 % (ref 2–9)
NEUTROPHILS # BLD AUTO: 1.7 X10^3/UL (ref 1.8–6.8)
NEUTROPHILS NFR BLD AUTO: 46 % (ref 42–75)
PLATELET # BLD AUTO: 44 X10^3/UL (ref 130–400)
PMV BLD AUTO: 9.7 FL (ref 7.4–10.4)
RBC # BLD AUTO: 3.98 X10^6/UL (ref 3.82–5.3)

## 2020-10-25 RX ADMIN — CEFTRIAXONE SCH MLS/HR: 1 INJECTION, SOLUTION INTRAVENOUS at 13:00

## 2020-10-25 RX ADMIN — POTASSIUM CHLORIDE SCH MLS/HR: 2 INJECTION, SOLUTION, CONCENTRATE INTRAVENOUS at 11:00

## 2020-10-25 RX ADMIN — APIXABAN SCH MG: 5 TABLET, FILM COATED ORAL at 09:32

## 2020-10-25 RX ADMIN — CHLORDIAZEPOXIDE HYDROCHLORIDE SCH MG: 25 CAPSULE ORAL at 09:32

## 2020-10-25 RX ADMIN — HYDROCODONE BITARTRATE AND ACETAMINOPHEN PRN TAB: 5; 325 TABLET ORAL at 18:02

## 2020-10-25 RX ADMIN — Medication SCH MG: at 09:32

## 2020-10-25 RX ADMIN — POTASSIUM CHLORIDE SCH MLS/HR: 2 INJECTION, SOLUTION, CONCENTRATE INTRAVENOUS at 21:56

## 2020-10-25 RX ADMIN — Medication SCH MG: at 18:02

## 2020-10-25 RX ADMIN — APIXABAN SCH MG: 5 TABLET, FILM COATED ORAL at 21:56

## 2020-10-25 RX ADMIN — METOPROLOL SUCCINATE SCH MG: 50 TABLET, FILM COATED, EXTENDED RELEASE ORAL at 05:04

## 2020-10-25 RX ADMIN — PANTOPRAZOLE SODIUM SCH MG: 40 TABLET, DELAYED RELEASE ORAL at 07:31

## 2020-10-25 RX ADMIN — CHLORDIAZEPOXIDE HYDROCHLORIDE SCH MG: 25 CAPSULE ORAL at 02:13

## 2020-10-25 RX ADMIN — HYDROCODONE BITARTRATE AND ACETAMINOPHEN PRN TAB: 5; 325 TABLET ORAL at 12:53

## 2020-10-25 RX ADMIN — LEVOTHYROXINE SODIUM SCH MCG: 125 TABLET ORAL at 05:04

## 2020-10-25 RX ADMIN — Medication SCH MG: at 21:56

## 2020-10-25 RX ADMIN — LORAZEPAM PRN MG: 2 INJECTION INTRAMUSCULAR; INTRAVENOUS at 07:32

## 2020-10-25 RX ADMIN — METOPROLOL SUCCINATE SCH MG: 50 TABLET, FILM COATED, EXTENDED RELEASE ORAL at 18:02

## 2020-10-25 RX ADMIN — Medication SCH TAB: at 09:32

## 2020-10-25 RX ADMIN — HYDROCODONE BITARTRATE AND ACETAMINOPHEN PRN TAB: 5; 325 TABLET ORAL at 03:45

## 2020-10-26 VITALS — DIASTOLIC BLOOD PRESSURE: 87 MMHG | SYSTOLIC BLOOD PRESSURE: 130 MMHG

## 2020-10-26 VITALS — DIASTOLIC BLOOD PRESSURE: 77 MMHG | SYSTOLIC BLOOD PRESSURE: 111 MMHG

## 2020-10-26 VITALS — SYSTOLIC BLOOD PRESSURE: 131 MMHG | DIASTOLIC BLOOD PRESSURE: 87 MMHG

## 2020-10-26 VITALS — SYSTOLIC BLOOD PRESSURE: 126 MMHG | DIASTOLIC BLOOD PRESSURE: 86 MMHG

## 2020-10-26 VITALS — DIASTOLIC BLOOD PRESSURE: 87 MMHG | SYSTOLIC BLOOD PRESSURE: 133 MMHG

## 2020-10-26 LAB
ANION GAP SERPL CALC-SCNC: 6 MMOL/L (ref 5–15)
BASOPHILS # BLD AUTO: 0 X10^3/UL (ref 0–0.1)
BASOPHILS NFR BLD AUTO: 1 % (ref 0–1)
CALCIUM SERPL-MCNC: 8.6 MG/DL (ref 8.5–10.1)
CHLORIDE SERPL-SCNC: 112 MMOL/L (ref 98–107)
CREAT SERPL-MCNC: 0.48 MG/DL (ref 0.55–1.02)
EOSINOPHIL # BLD AUTO: 0.1 X10^3/UL (ref 0–0.4)
EOSINOPHIL NFR BLD AUTO: 4 % (ref 1–7)
ERYTHROCYTE [DISTWIDTH] IN BLOOD BY AUTOMATED COUNT: 15.3 % (ref 9.6–15.2)
LYMPHOCYTES # BLD AUTO: 1.4 X10^3/UL (ref 1–3.4)
LYMPHOCYTES NFR BLD AUTO: 44 % (ref 22–44)
MCH RBC QN AUTO: 32.7 PG (ref 27–34.8)
MCHC RBC AUTO-ENTMCNC: 32.8 G/DL (ref 32.4–35.8)
MD: (no result)
MONOCYTES # BLD AUTO: 0.4 X10^3/UL (ref 0.2–0.8)
MONOCYTES NFR BLD AUTO: 13 % (ref 2–9)
NEUTROPHILS # BLD AUTO: 1.2 X10^3/UL (ref 1.8–6.8)
NEUTROPHILS NFR BLD AUTO: 38 % (ref 42–75)
PLATELET # BLD AUTO: 55 X10^3/UL (ref 130–400)
PMV BLD AUTO: 8.6 FL (ref 7.4–10.4)
RBC # BLD AUTO: 3.78 X10^6/UL (ref 3.82–5.3)

## 2020-10-26 RX ADMIN — CEFTRIAXONE SCH MLS/HR: 1 INJECTION, SOLUTION INTRAVENOUS at 13:12

## 2020-10-26 RX ADMIN — LEVOTHYROXINE SODIUM SCH MCG: 125 TABLET ORAL at 06:32

## 2020-10-26 RX ADMIN — Medication SCH MG: at 08:05

## 2020-10-26 RX ADMIN — Medication SCH TAB: at 08:05

## 2020-10-26 RX ADMIN — PANTOPRAZOLE SODIUM SCH MG: 40 TABLET, DELAYED RELEASE ORAL at 06:32

## 2020-10-26 RX ADMIN — CHLORDIAZEPOXIDE HYDROCHLORIDE SCH MG: 25 CAPSULE ORAL at 20:52

## 2020-10-26 RX ADMIN — METOPROLOL SUCCINATE SCH MG: 50 TABLET, FILM COATED, EXTENDED RELEASE ORAL at 06:32

## 2020-10-26 RX ADMIN — METOPROLOL SUCCINATE SCH MG: 50 TABLET, FILM COATED, EXTENDED RELEASE ORAL at 18:04

## 2020-10-26 RX ADMIN — POTASSIUM CHLORIDE SCH MLS/HR: 2 INJECTION, SOLUTION, CONCENTRATE INTRAVENOUS at 13:03

## 2020-10-26 RX ADMIN — CHLORDIAZEPOXIDE HYDROCHLORIDE SCH MG: 25 CAPSULE ORAL at 13:11

## 2020-10-26 RX ADMIN — APIXABAN SCH MG: 5 TABLET, FILM COATED ORAL at 08:05

## 2020-10-26 RX ADMIN — HYDROCODONE BITARTRATE AND ACETAMINOPHEN PRN TAB: 5; 325 TABLET ORAL at 00:10

## 2020-10-26 RX ADMIN — APIXABAN SCH MG: 5 TABLET, FILM COATED ORAL at 20:51

## 2020-10-26 RX ADMIN — HYDROCODONE BITARTRATE AND ACETAMINOPHEN PRN TAB: 5; 325 TABLET ORAL at 22:24

## 2020-10-27 VITALS — DIASTOLIC BLOOD PRESSURE: 93 MMHG | SYSTOLIC BLOOD PRESSURE: 144 MMHG

## 2020-10-27 VITALS — SYSTOLIC BLOOD PRESSURE: 119 MMHG | DIASTOLIC BLOOD PRESSURE: 80 MMHG

## 2020-10-27 VITALS — SYSTOLIC BLOOD PRESSURE: 154 MMHG | DIASTOLIC BLOOD PRESSURE: 107 MMHG

## 2020-10-27 VITALS — SYSTOLIC BLOOD PRESSURE: 138 MMHG | DIASTOLIC BLOOD PRESSURE: 92 MMHG

## 2020-10-27 VITALS — SYSTOLIC BLOOD PRESSURE: 120 MMHG | DIASTOLIC BLOOD PRESSURE: 82 MMHG

## 2020-10-27 VITALS — SYSTOLIC BLOOD PRESSURE: 122 MMHG | DIASTOLIC BLOOD PRESSURE: 85 MMHG

## 2020-10-27 VITALS — DIASTOLIC BLOOD PRESSURE: 87 MMHG | SYSTOLIC BLOOD PRESSURE: 135 MMHG

## 2020-10-27 LAB
ANION GAP SERPL CALC-SCNC: 6 MMOL/L (ref 5–15)
BASOPHILS # BLD AUTO: 0 X10^3/UL (ref 0–0.1)
BASOPHILS NFR BLD AUTO: 1 % (ref 0–1)
CALCIUM SERPL-MCNC: 8.6 MG/DL (ref 8.5–10.1)
CHLORIDE SERPL-SCNC: 112 MMOL/L (ref 98–107)
CREAT SERPL-MCNC: 0.59 MG/DL (ref 0.55–1.02)
EOSINOPHIL # BLD AUTO: 0.1 X10^3/UL (ref 0–0.4)
EOSINOPHIL NFR BLD AUTO: 3 % (ref 1–7)
ERYTHROCYTE [DISTWIDTH] IN BLOOD BY AUTOMATED COUNT: 15.3 % (ref 9.6–15.2)
LYMPHOCYTES # BLD AUTO: 1.5 X10^3/UL (ref 1–3.4)
LYMPHOCYTES NFR BLD AUTO: 41 % (ref 22–44)
MCH RBC QN AUTO: 32.6 PG (ref 27–34.8)
MCHC RBC AUTO-ENTMCNC: 32.9 G/DL (ref 32.4–35.8)
MD: NO
MONOCYTES # BLD AUTO: 0.6 X10^3/UL (ref 0.2–0.8)
MONOCYTES NFR BLD AUTO: 16 % (ref 2–9)
NEUTROPHILS # BLD AUTO: 1.5 X10^3/UL (ref 1.8–6.8)
NEUTROPHILS NFR BLD AUTO: 40 % (ref 42–75)
PLATELET # BLD AUTO: 77 X10^3/UL (ref 130–400)
PMV BLD AUTO: 8.2 FL (ref 7.4–10.4)
RBC # BLD AUTO: 3.74 X10^6/UL (ref 3.82–5.3)

## 2020-10-27 RX ADMIN — PANTOPRAZOLE SODIUM SCH MG: 40 TABLET, DELAYED RELEASE ORAL at 07:49

## 2020-10-27 RX ADMIN — CHLORDIAZEPOXIDE HYDROCHLORIDE SCH MG: 25 CAPSULE ORAL at 16:07

## 2020-10-27 RX ADMIN — CHLORDIAZEPOXIDE HYDROCHLORIDE SCH MG: 25 CAPSULE ORAL at 20:53

## 2020-10-27 RX ADMIN — TEMAZEPAM PRN MG: 15 CAPSULE ORAL at 02:06

## 2020-10-27 RX ADMIN — Medication SCH TAB: at 07:50

## 2020-10-27 RX ADMIN — LEVOTHYROXINE SODIUM SCH MCG: 125 TABLET ORAL at 05:26

## 2020-10-27 RX ADMIN — APIXABAN SCH MG: 5 TABLET, FILM COATED ORAL at 07:50

## 2020-10-27 RX ADMIN — POTASSIUM CHLORIDE SCH MLS/HR: 2 INJECTION, SOLUTION, CONCENTRATE INTRAVENOUS at 10:55

## 2020-10-27 RX ADMIN — CHLORDIAZEPOXIDE HYDROCHLORIDE SCH MG: 25 CAPSULE ORAL at 05:26

## 2020-10-27 RX ADMIN — METOPROLOL SUCCINATE SCH MG: 50 TABLET, FILM COATED, EXTENDED RELEASE ORAL at 05:27

## 2020-10-27 RX ADMIN — CHLORDIAZEPOXIDE HYDROCHLORIDE SCH MG: 25 CAPSULE ORAL at 10:55

## 2020-10-27 RX ADMIN — CHLORDIAZEPOXIDE HYDROCHLORIDE SCH MG: 25 CAPSULE ORAL at 03:00

## 2020-10-27 RX ADMIN — METOPROLOL SUCCINATE SCH MG: 50 TABLET, FILM COATED, EXTENDED RELEASE ORAL at 17:33

## 2020-10-27 RX ADMIN — CEFTRIAXONE SCH MLS/HR: 1 INJECTION, SOLUTION INTRAVENOUS at 12:16

## 2020-10-27 RX ADMIN — HYDROCODONE BITARTRATE AND ACETAMINOPHEN PRN TAB: 5; 325 TABLET ORAL at 07:50

## 2020-10-27 RX ADMIN — APIXABAN SCH MG: 5 TABLET, FILM COATED ORAL at 20:53

## 2020-10-28 ENCOUNTER — HOSPITAL ENCOUNTER (INPATIENT)
Dept: HOSPITAL 8 - 3E | Age: 75
LOS: 6 days | Discharge: HOME | DRG: 885 | End: 2020-11-03
Attending: PSYCHIATRY & NEUROLOGY | Admitting: PSYCHIATRY & NEUROLOGY
Payer: MEDICARE

## 2020-10-28 VITALS — SYSTOLIC BLOOD PRESSURE: 139 MMHG | DIASTOLIC BLOOD PRESSURE: 89 MMHG

## 2020-10-28 VITALS — SYSTOLIC BLOOD PRESSURE: 134 MMHG | DIASTOLIC BLOOD PRESSURE: 85 MMHG

## 2020-10-28 VITALS — SYSTOLIC BLOOD PRESSURE: 121 MMHG | DIASTOLIC BLOOD PRESSURE: 84 MMHG

## 2020-10-28 VITALS — DIASTOLIC BLOOD PRESSURE: 96 MMHG | SYSTOLIC BLOOD PRESSURE: 157 MMHG

## 2020-10-28 VITALS — HEIGHT: 66 IN | WEIGHT: 149.91 LBS | BODY MASS INDEX: 24.09 KG/M2

## 2020-10-28 DIAGNOSIS — R62.7: ICD-10-CM

## 2020-10-28 DIAGNOSIS — Z79.890: ICD-10-CM

## 2020-10-28 DIAGNOSIS — G47.00: ICD-10-CM

## 2020-10-28 DIAGNOSIS — Z95.810: ICD-10-CM

## 2020-10-28 DIAGNOSIS — J81.1: ICD-10-CM

## 2020-10-28 DIAGNOSIS — F10.20: ICD-10-CM

## 2020-10-28 DIAGNOSIS — I10: ICD-10-CM

## 2020-10-28 DIAGNOSIS — I48.0: ICD-10-CM

## 2020-10-28 DIAGNOSIS — K21.9: ICD-10-CM

## 2020-10-28 DIAGNOSIS — Z91.81: ICD-10-CM

## 2020-10-28 DIAGNOSIS — J96.01: ICD-10-CM

## 2020-10-28 DIAGNOSIS — E03.9: ICD-10-CM

## 2020-10-28 DIAGNOSIS — F33.2: Primary | ICD-10-CM

## 2020-10-28 DIAGNOSIS — Z86.74: ICD-10-CM

## 2020-10-28 DIAGNOSIS — E87.6: ICD-10-CM

## 2020-10-28 LAB
BASOPHILS # BLD AUTO: 0 X10^3/UL (ref 0–0.1)
BASOPHILS NFR BLD AUTO: 1 % (ref 0–1)
EOSINOPHIL # BLD AUTO: 0.1 X10^3/UL (ref 0–0.4)
EOSINOPHIL NFR BLD AUTO: 3 % (ref 1–7)
ERYTHROCYTE [DISTWIDTH] IN BLOOD BY AUTOMATED COUNT: 15.4 % (ref 9.6–15.2)
LYMPHOCYTES # BLD AUTO: 1.3 X10^3/UL (ref 1–3.4)
LYMPHOCYTES NFR BLD AUTO: 36 % (ref 22–44)
MCH RBC QN AUTO: 32.5 PG (ref 27–34.8)
MCHC RBC AUTO-ENTMCNC: 32.6 G/DL (ref 32.4–35.8)
MD: NO
MONOCYTES # BLD AUTO: 0.7 X10^3/UL (ref 0.2–0.8)
MONOCYTES NFR BLD AUTO: 18 % (ref 2–9)
NEUTROPHILS # BLD AUTO: 1.6 X10^3/UL (ref 1.8–6.8)
NEUTROPHILS NFR BLD AUTO: 43 % (ref 42–75)
PLATELET # BLD AUTO: 89 X10^3/UL (ref 130–400)
PMV BLD AUTO: 8.3 FL (ref 7.4–10.4)
RBC # BLD AUTO: 3.97 X10^6/UL (ref 3.82–5.3)

## 2020-10-28 PROCEDURE — 84145 PROCALCITONIN (PCT): CPT

## 2020-10-28 PROCEDURE — 83880 ASSAY OF NATRIURETIC PEPTIDE: CPT

## 2020-10-28 PROCEDURE — 71045 X-RAY EXAM CHEST 1 VIEW: CPT

## 2020-10-28 PROCEDURE — 93005 ELECTROCARDIOGRAM TRACING: CPT

## 2020-10-28 PROCEDURE — 36415 COLL VENOUS BLD VENIPUNCTURE: CPT

## 2020-10-28 PROCEDURE — 93306 TTE W/DOPPLER COMPLETE: CPT

## 2020-10-28 PROCEDURE — 80048 BASIC METABOLIC PNL TOTAL CA: CPT

## 2020-10-28 PROCEDURE — 82803 BLOOD GASES ANY COMBINATION: CPT

## 2020-10-28 PROCEDURE — 84443 ASSAY THYROID STIM HORMONE: CPT

## 2020-10-28 PROCEDURE — 84484 ASSAY OF TROPONIN QUANT: CPT

## 2020-10-28 PROCEDURE — 85379 FIBRIN DEGRADATION QUANT: CPT

## 2020-10-28 PROCEDURE — 85025 COMPLETE CBC W/AUTO DIFF WBC: CPT

## 2020-10-28 RX ADMIN — TEMAZEPAM PRN MG: 15 CAPSULE ORAL at 00:13

## 2020-10-28 RX ADMIN — CHLORDIAZEPOXIDE HYDROCHLORIDE SCH MG: 25 CAPSULE ORAL at 06:06

## 2020-10-28 RX ADMIN — ACAMPROSATE CALCIUM ENTERIC-COATED SCH MG: 333 TABLET, DELAYED RELEASE ORAL at 20:23

## 2020-10-28 RX ADMIN — POTASSIUM CHLORIDE SCH MLS/HR: 2 INJECTION, SOLUTION, CONCENTRATE INTRAVENOUS at 11:27

## 2020-10-28 RX ADMIN — APIXABAN SCH MG: 5 TABLET, FILM COATED ORAL at 20:23

## 2020-10-28 RX ADMIN — TEMAZEPAM PRN MG: 15 CAPSULE ORAL at 03:23

## 2020-10-28 RX ADMIN — PANTOPRAZOLE SODIUM SCH MG: 40 TABLET, DELAYED RELEASE ORAL at 10:12

## 2020-10-28 RX ADMIN — METOPROLOL SUCCINATE SCH MG: 50 TABLET, FILM COATED, EXTENDED RELEASE ORAL at 17:51

## 2020-10-28 RX ADMIN — LEVOTHYROXINE SODIUM SCH MCG: 125 TABLET ORAL at 06:06

## 2020-10-28 RX ADMIN — CHLORDIAZEPOXIDE HYDROCHLORIDE SCH MG: 25 CAPSULE ORAL at 11:27

## 2020-10-28 RX ADMIN — Medication PRN MG: at 20:23

## 2020-10-28 RX ADMIN — CEFTRIAXONE SCH MLS/HR: 1 INJECTION, SOLUTION INTRAVENOUS at 14:28

## 2020-10-28 RX ADMIN — METOPROLOL SUCCINATE SCH MG: 50 TABLET, FILM COATED, EXTENDED RELEASE ORAL at 06:06

## 2020-10-28 RX ADMIN — APIXABAN SCH MG: 5 TABLET, FILM COATED ORAL at 10:12

## 2020-10-28 RX ADMIN — Medication SCH TAB: at 10:12

## 2020-10-29 VITALS — SYSTOLIC BLOOD PRESSURE: 129 MMHG | DIASTOLIC BLOOD PRESSURE: 74 MMHG

## 2020-10-29 VITALS — DIASTOLIC BLOOD PRESSURE: 77 MMHG | SYSTOLIC BLOOD PRESSURE: 133 MMHG

## 2020-10-29 RX ADMIN — METOPROLOL SUCCINATE SCH MG: 50 TABLET, FILM COATED, EXTENDED RELEASE ORAL at 06:09

## 2020-10-29 RX ADMIN — Medication SCH TAB: at 10:00

## 2020-10-29 RX ADMIN — ACAMPROSATE CALCIUM ENTERIC-COATED SCH MG: 333 TABLET, DELAYED RELEASE ORAL at 21:08

## 2020-10-29 RX ADMIN — Medication PRN MG: at 21:08

## 2020-10-29 RX ADMIN — FOLIC ACID SCH MG: 1 TABLET ORAL at 09:59

## 2020-10-29 RX ADMIN — PANTOPRAZOLE SODIUM SCH MG: 40 TABLET, DELAYED RELEASE ORAL at 06:09

## 2020-10-29 RX ADMIN — METOPROLOL SUCCINATE SCH MG: 50 TABLET, FILM COATED, EXTENDED RELEASE ORAL at 16:43

## 2020-10-29 RX ADMIN — ACAMPROSATE CALCIUM ENTERIC-COATED SCH MG: 333 TABLET, DELAYED RELEASE ORAL at 10:00

## 2020-10-29 RX ADMIN — LEVOTHYROXINE SODIUM SCH MCG: 125 TABLET ORAL at 06:09

## 2020-10-29 RX ADMIN — ACAMPROSATE CALCIUM ENTERIC-COATED SCH MG: 333 TABLET, DELAYED RELEASE ORAL at 16:41

## 2020-10-29 RX ADMIN — APIXABAN SCH MG: 5 TABLET, FILM COATED ORAL at 10:00

## 2020-10-29 RX ADMIN — APIXABAN SCH MG: 5 TABLET, FILM COATED ORAL at 21:08

## 2020-10-29 RX ADMIN — Medication SCH MG: at 10:00

## 2020-10-30 VITALS — DIASTOLIC BLOOD PRESSURE: 58 MMHG | SYSTOLIC BLOOD PRESSURE: 99 MMHG

## 2020-10-30 VITALS — DIASTOLIC BLOOD PRESSURE: 65 MMHG | SYSTOLIC BLOOD PRESSURE: 99 MMHG

## 2020-10-30 VITALS — DIASTOLIC BLOOD PRESSURE: 83 MMHG | SYSTOLIC BLOOD PRESSURE: 130 MMHG

## 2020-10-30 RX ADMIN — ACAMPROSATE CALCIUM ENTERIC-COATED SCH MG: 333 TABLET, DELAYED RELEASE ORAL at 20:23

## 2020-10-30 RX ADMIN — METOPROLOL SUCCINATE SCH MG: 50 TABLET, FILM COATED, EXTENDED RELEASE ORAL at 06:10

## 2020-10-30 RX ADMIN — METOPROLOL SUCCINATE SCH MG: 50 TABLET, FILM COATED, EXTENDED RELEASE ORAL at 18:01

## 2020-10-30 RX ADMIN — APIXABAN SCH MG: 5 TABLET, FILM COATED ORAL at 10:18

## 2020-10-30 RX ADMIN — ACAMPROSATE CALCIUM ENTERIC-COATED SCH MG: 333 TABLET, DELAYED RELEASE ORAL at 14:53

## 2020-10-30 RX ADMIN — Medication PRN MG: at 20:23

## 2020-10-30 RX ADMIN — Medication SCH TAB: at 10:18

## 2020-10-30 RX ADMIN — FOLIC ACID SCH MG: 1 TABLET ORAL at 10:18

## 2020-10-30 RX ADMIN — APIXABAN SCH MG: 5 TABLET, FILM COATED ORAL at 20:23

## 2020-10-30 RX ADMIN — Medication SCH MG: at 10:18

## 2020-10-30 RX ADMIN — LEVOTHYROXINE SODIUM SCH MCG: 125 TABLET ORAL at 06:13

## 2020-10-30 RX ADMIN — ACAMPROSATE CALCIUM ENTERIC-COATED SCH MG: 333 TABLET, DELAYED RELEASE ORAL at 10:18

## 2020-10-30 RX ADMIN — VENLAFAXINE HYDROCHLORIDE SCH MG: 37.5 CAPSULE, EXTENDED RELEASE ORAL at 13:12

## 2020-10-30 RX ADMIN — PANTOPRAZOLE SODIUM SCH MG: 40 TABLET, DELAYED RELEASE ORAL at 06:10

## 2020-10-31 VITALS — SYSTOLIC BLOOD PRESSURE: 106 MMHG | DIASTOLIC BLOOD PRESSURE: 73 MMHG

## 2020-10-31 VITALS — DIASTOLIC BLOOD PRESSURE: 76 MMHG | SYSTOLIC BLOOD PRESSURE: 116 MMHG

## 2020-10-31 VITALS — SYSTOLIC BLOOD PRESSURE: 139 MMHG | DIASTOLIC BLOOD PRESSURE: 92 MMHG

## 2020-10-31 VITALS — SYSTOLIC BLOOD PRESSURE: 133 MMHG | DIASTOLIC BLOOD PRESSURE: 89 MMHG

## 2020-10-31 VITALS — DIASTOLIC BLOOD PRESSURE: 88 MMHG | SYSTOLIC BLOOD PRESSURE: 135 MMHG

## 2020-10-31 VITALS — SYSTOLIC BLOOD PRESSURE: 134 MMHG | DIASTOLIC BLOOD PRESSURE: 90 MMHG

## 2020-10-31 LAB
BASOPHILS # BLD AUTO: 0.1 X10^3/UL (ref 0–0.1)
BASOPHILS NFR BLD AUTO: 1 % (ref 0–1)
EOSINOPHIL # BLD AUTO: 0.1 X10^3/UL (ref 0–0.4)
EOSINOPHIL NFR BLD AUTO: 2 % (ref 1–7)
ERYTHROCYTE [DISTWIDTH] IN BLOOD BY AUTOMATED COUNT: 15.1 % (ref 9.6–15.2)
LYMPHOCYTES # BLD AUTO: 1.7 X10^3/UL (ref 1–3.4)
LYMPHOCYTES NFR BLD AUTO: 40 % (ref 22–44)
MCH RBC QN AUTO: 32.6 PG (ref 27–34.8)
MCHC RBC AUTO-ENTMCNC: 33.1 G/DL (ref 32.4–35.8)
MD: NO
MONOCYTES # BLD AUTO: 0.9 X10^3/UL (ref 0.2–0.8)
MONOCYTES NFR BLD AUTO: 20 % (ref 2–9)
NEUTROPHILS # BLD AUTO: 1.6 X10^3/UL (ref 1.8–6.8)
NEUTROPHILS NFR BLD AUTO: 37 % (ref 42–75)
PLATELET # BLD AUTO: 220 X10^3/UL (ref 130–400)
PMV BLD AUTO: 7.7 FL (ref 7.4–10.4)
RBC # BLD AUTO: 3.64 X10^6/UL (ref 3.82–5.3)

## 2020-10-31 RX ADMIN — Medication SCH TAB: at 08:56

## 2020-10-31 RX ADMIN — Medication PRN MG: at 20:32

## 2020-10-31 RX ADMIN — ACAMPROSATE CALCIUM ENTERIC-COATED SCH MG: 333 TABLET, DELAYED RELEASE ORAL at 16:17

## 2020-10-31 RX ADMIN — Medication SCH MG: at 08:56

## 2020-10-31 RX ADMIN — ACAMPROSATE CALCIUM ENTERIC-COATED SCH MG: 333 TABLET, DELAYED RELEASE ORAL at 20:32

## 2020-10-31 RX ADMIN — METOPROLOL SUCCINATE SCH MG: 50 TABLET, FILM COATED, EXTENDED RELEASE ORAL at 18:00

## 2020-10-31 RX ADMIN — PANTOPRAZOLE SODIUM SCH MG: 40 TABLET, DELAYED RELEASE ORAL at 06:17

## 2020-10-31 RX ADMIN — METOPROLOL SUCCINATE SCH MG: 50 TABLET, FILM COATED, EXTENDED RELEASE ORAL at 06:17

## 2020-10-31 RX ADMIN — FOLIC ACID SCH MG: 1 TABLET ORAL at 08:56

## 2020-10-31 RX ADMIN — ACAMPROSATE CALCIUM ENTERIC-COATED SCH MG: 333 TABLET, DELAYED RELEASE ORAL at 08:56

## 2020-10-31 RX ADMIN — LEVOTHYROXINE SODIUM SCH MCG: 125 TABLET ORAL at 06:17

## 2020-10-31 RX ADMIN — VENLAFAXINE HYDROCHLORIDE SCH MG: 37.5 CAPSULE, EXTENDED RELEASE ORAL at 08:56

## 2020-10-31 RX ADMIN — APIXABAN SCH MG: 5 TABLET, FILM COATED ORAL at 20:31

## 2020-10-31 RX ADMIN — APIXABAN SCH MG: 5 TABLET, FILM COATED ORAL at 08:56

## 2020-11-01 VITALS — DIASTOLIC BLOOD PRESSURE: 84 MMHG | SYSTOLIC BLOOD PRESSURE: 120 MMHG

## 2020-11-01 VITALS — DIASTOLIC BLOOD PRESSURE: 89 MMHG | SYSTOLIC BLOOD PRESSURE: 142 MMHG

## 2020-11-01 VITALS — SYSTOLIC BLOOD PRESSURE: 123 MMHG | DIASTOLIC BLOOD PRESSURE: 79 MMHG

## 2020-11-01 VITALS — DIASTOLIC BLOOD PRESSURE: 87 MMHG | SYSTOLIC BLOOD PRESSURE: 129 MMHG

## 2020-11-01 LAB
<PLATELET ESTIMATE>: ADEQUATE
<PLT MORPHOLOGY>: (no result)
ANION GAP SERPL CALC-SCNC: 8 MMOL/L (ref 5–15)
BILIRUB DIRECT SERPL-MCNC: NORMAL MG/DL
CALCIUM SERPL-MCNC: 9.2 MG/DL (ref 8.5–10.1)
CHLORIDE SERPL-SCNC: 105 MMOL/L (ref 98–107)
CREAT SERPL-MCNC: 0.65 MG/DL (ref 0.55–1.02)
ERYTHROCYTE [DISTWIDTH] IN BLOOD BY AUTOMATED COUNT: 15.3 % (ref 9.6–15.2)
LYMPH#(MANUAL): 1.4 X10^3/UL (ref 1–3.4)
LYMPHS% (MANUAL): 35 % (ref 22–44)
MCH RBC QN AUTO: 32.5 PG (ref 27–34.8)
MCHC RBC AUTO-ENTMCNC: 33 G/DL (ref 32.4–35.8)
MD: YES
MONOS#(MANUAL): 0.76 X10^3/UL (ref 0.3–2.7)
MONOS% (MANUAL): 19 % (ref 2–9)
PLATELET # BLD AUTO: 252 X10^3/UL (ref 130–400)
PMV BLD AUTO: 7.5 FL (ref 7.4–10.4)
RBC # BLD AUTO: 3.99 X10^6/UL (ref 3.82–5.3)
SEG#(MANUAL): 1.84 X10^3/UL (ref 1.8–6.8)
SEGS% (MANUAL): 46 % (ref 42–75)
TROPONIN I SERPL-MCNC: < 0.015 NG/ML (ref 0–0.04)
TROPONIN I SERPL-MCNC: < 0.015 NG/ML (ref 0–0.04)

## 2020-11-01 RX ADMIN — ACAMPROSATE CALCIUM ENTERIC-COATED SCH MG: 333 TABLET, DELAYED RELEASE ORAL at 16:29

## 2020-11-01 RX ADMIN — POTASSIUM CHLORIDE SCH MEQ: 20 TABLET, EXTENDED RELEASE ORAL at 09:08

## 2020-11-01 RX ADMIN — FOLIC ACID SCH MG: 1 TABLET ORAL at 09:00

## 2020-11-01 RX ADMIN — APIXABAN SCH MG: 5 TABLET, FILM COATED ORAL at 09:00

## 2020-11-01 RX ADMIN — VENLAFAXINE HYDROCHLORIDE SCH MG: 37.5 CAPSULE, EXTENDED RELEASE ORAL at 09:00

## 2020-11-01 RX ADMIN — APIXABAN SCH MG: 5 TABLET, FILM COATED ORAL at 20:45

## 2020-11-01 RX ADMIN — PANTOPRAZOLE SODIUM SCH MG: 40 TABLET, DELAYED RELEASE ORAL at 05:35

## 2020-11-01 RX ADMIN — Medication SCH TAB: at 09:00

## 2020-11-01 RX ADMIN — ACAMPROSATE CALCIUM ENTERIC-COATED SCH MG: 333 TABLET, DELAYED RELEASE ORAL at 09:01

## 2020-11-01 RX ADMIN — Medication SCH MG: at 08:59

## 2020-11-01 RX ADMIN — METOPROLOL SUCCINATE SCH MG: 50 TABLET, FILM COATED, EXTENDED RELEASE ORAL at 07:15

## 2020-11-01 RX ADMIN — LEVOTHYROXINE SODIUM SCH MCG: 125 TABLET ORAL at 05:36

## 2020-11-01 RX ADMIN — ACAMPROSATE CALCIUM ENTERIC-COATED SCH MG: 333 TABLET, DELAYED RELEASE ORAL at 20:45

## 2020-11-02 VITALS — SYSTOLIC BLOOD PRESSURE: 125 MMHG | DIASTOLIC BLOOD PRESSURE: 88 MMHG

## 2020-11-02 VITALS — DIASTOLIC BLOOD PRESSURE: 82 MMHG | SYSTOLIC BLOOD PRESSURE: 148 MMHG

## 2020-11-02 VITALS — DIASTOLIC BLOOD PRESSURE: 89 MMHG | SYSTOLIC BLOOD PRESSURE: 126 MMHG

## 2020-11-02 VITALS — SYSTOLIC BLOOD PRESSURE: 117 MMHG | DIASTOLIC BLOOD PRESSURE: 77 MMHG

## 2020-11-02 VITALS — SYSTOLIC BLOOD PRESSURE: 132 MMHG | DIASTOLIC BLOOD PRESSURE: 86 MMHG

## 2020-11-02 RX ADMIN — ACAMPROSATE CALCIUM ENTERIC-COATED SCH MG: 333 TABLET, DELAYED RELEASE ORAL at 08:56

## 2020-11-02 RX ADMIN — VENLAFAXINE HYDROCHLORIDE SCH MG: 37.5 CAPSULE, EXTENDED RELEASE ORAL at 08:56

## 2020-11-02 RX ADMIN — ACAMPROSATE CALCIUM ENTERIC-COATED SCH MG: 333 TABLET, DELAYED RELEASE ORAL at 16:15

## 2020-11-02 RX ADMIN — FOLIC ACID SCH MG: 1 TABLET ORAL at 08:57

## 2020-11-02 RX ADMIN — ACAMPROSATE CALCIUM ENTERIC-COATED SCH MG: 333 TABLET, DELAYED RELEASE ORAL at 20:53

## 2020-11-02 RX ADMIN — APIXABAN SCH MG: 5 TABLET, FILM COATED ORAL at 08:57

## 2020-11-02 RX ADMIN — LEVOTHYROXINE SODIUM SCH MCG: 125 TABLET ORAL at 05:39

## 2020-11-02 RX ADMIN — APIXABAN SCH MG: 5 TABLET, FILM COATED ORAL at 20:53

## 2020-11-02 RX ADMIN — METOPROLOL SUCCINATE SCH MG: 100 TABLET, FILM COATED, EXTENDED RELEASE ORAL at 05:38

## 2020-11-02 RX ADMIN — Medication SCH MG: at 08:56

## 2020-11-02 RX ADMIN — Medication SCH TAB: at 08:57

## 2020-11-02 RX ADMIN — PANTOPRAZOLE SODIUM SCH MG: 40 TABLET, DELAYED RELEASE ORAL at 05:39

## 2020-11-02 RX ADMIN — POTASSIUM CHLORIDE SCH MEQ: 20 TABLET, EXTENDED RELEASE ORAL at 08:56

## 2020-11-03 VITALS — SYSTOLIC BLOOD PRESSURE: 129 MMHG | DIASTOLIC BLOOD PRESSURE: 89 MMHG

## 2020-11-03 VITALS — DIASTOLIC BLOOD PRESSURE: 87 MMHG | SYSTOLIC BLOOD PRESSURE: 135 MMHG

## 2020-11-03 VITALS — SYSTOLIC BLOOD PRESSURE: 144 MMHG | DIASTOLIC BLOOD PRESSURE: 99 MMHG

## 2020-11-03 RX ADMIN — Medication SCH MG: at 08:12

## 2020-11-03 RX ADMIN — APIXABAN SCH MG: 5 TABLET, FILM COATED ORAL at 08:12

## 2020-11-03 RX ADMIN — FOLIC ACID SCH MG: 1 TABLET ORAL at 08:12

## 2020-11-03 RX ADMIN — METOPROLOL SUCCINATE SCH MG: 100 TABLET, FILM COATED, EXTENDED RELEASE ORAL at 06:21

## 2020-11-03 RX ADMIN — PANTOPRAZOLE SODIUM SCH MG: 40 TABLET, DELAYED RELEASE ORAL at 06:21

## 2020-11-03 RX ADMIN — Medication SCH TAB: at 08:12

## 2020-11-03 RX ADMIN — LEVOTHYROXINE SODIUM SCH MCG: 125 TABLET ORAL at 06:21

## 2020-11-03 RX ADMIN — VENLAFAXINE HYDROCHLORIDE SCH MG: 37.5 CAPSULE, EXTENDED RELEASE ORAL at 08:13

## 2020-11-03 RX ADMIN — ACAMPROSATE CALCIUM ENTERIC-COATED SCH MG: 333 TABLET, DELAYED RELEASE ORAL at 08:13

## 2020-11-03 RX ADMIN — ACAMPROSATE CALCIUM ENTERIC-COATED SCH MG: 333 TABLET, DELAYED RELEASE ORAL at 16:27

## 2020-11-30 ENCOUNTER — HOSPITAL ENCOUNTER (EMERGENCY)
Dept: HOSPITAL 8 - ED | Age: 75
LOS: 1 days | Discharge: PSYCHIATRIC OR SUBSTANCE USE FACILITY | End: 2020-12-01
Payer: MEDICARE

## 2020-11-30 VITALS — BODY MASS INDEX: 23.03 KG/M2 | HEIGHT: 66 IN | WEIGHT: 143.3 LBS
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
## 2020-11-30 DIAGNOSIS — F10.129: ICD-10-CM

## 2020-11-30 DIAGNOSIS — R94.31: ICD-10-CM

## 2020-11-30 DIAGNOSIS — F33.9: Primary | ICD-10-CM

## 2020-11-30 DIAGNOSIS — R45.851: ICD-10-CM

## 2020-11-30 DIAGNOSIS — I48.91: ICD-10-CM

## 2020-11-30 DIAGNOSIS — E03.9: ICD-10-CM

## 2020-11-30 DIAGNOSIS — Z90.49: ICD-10-CM

## 2020-11-30 DIAGNOSIS — I10: ICD-10-CM

## 2020-11-30 LAB
<PLATELET ESTIMATE>: (no result)
<PLT MORPHOLOGY>: (no result)
ALBUMIN SERPL-MCNC: 3.4 G/DL (ref 3.4–5)
ALP SERPL-CCNC: 126 U/L (ref 45–117)
ALT SERPL-CCNC: 69 U/L (ref 12–78)
ANION GAP SERPL CALC-SCNC: 9 MMOL/L (ref 5–15)
BASOPHILS # BLD AUTO: 0.1 X10^3/UL (ref 0–0.1)
BASOPHILS NFR BLD AUTO: 1 % (ref 0–1)
BILIRUB SERPL-MCNC: 2 MG/DL (ref 0.2–1)
CALCIUM SERPL-MCNC: 8.6 MG/DL (ref 8.5–10.1)
CHLORIDE SERPL-SCNC: 107 MMOL/L (ref 98–107)
CREAT SERPL-MCNC: 0.77 MG/DL (ref 0.55–1.02)
EOSINOPHIL # BLD AUTO: 0.1 X10^3/UL (ref 0–0.4)
EOSINOPHIL NFR BLD AUTO: 1 % (ref 1–7)
ERYTHROCYTE [DISTWIDTH] IN BLOOD BY AUTOMATED COUNT: 16.5 % (ref 9.6–15.2)
LYMPHOCYTES # BLD AUTO: 1.5 X10^3/UL (ref 1–3.4)
LYMPHOCYTES NFR BLD AUTO: 34 % (ref 22–44)
MACROCYTES BLD QL SMEAR: (no result)
MCH RBC QN AUTO: 33.6 PG (ref 27–34.8)
MCHC RBC AUTO-ENTMCNC: 33.4 G/DL (ref 32.4–35.8)
MD: (no result)
MONOCYTES # BLD AUTO: 0.4 X10^3/UL (ref 0.2–0.8)
MONOCYTES NFR BLD AUTO: 8 % (ref 2–9)
NEUTROPHILS # BLD AUTO: 2.6 X10^3/UL (ref 1.8–6.8)
NEUTROPHILS NFR BLD AUTO: 56 % (ref 42–75)
OVALOCYTES BLD QL SMEAR: (no result)
PLATELET # BLD AUTO: 80 X10^3/UL (ref 130–400)
PMV BLD AUTO: 8.8 FL (ref 7.4–10.4)
PROT SERPL-MCNC: 7.1 G/DL (ref 6.4–8.2)
RBC # BLD AUTO: 4.37 X10^6/UL (ref 3.82–5.3)
VANCOMYCIN TROUGH SERPL-MCNC: < 1.7 MG/DL (ref 2.8–20)

## 2020-11-30 PROCEDURE — 36415 COLL VENOUS BLD VENIPUNCTURE: CPT

## 2020-11-30 PROCEDURE — 80053 COMPREHEN METABOLIC PANEL: CPT

## 2020-11-30 PROCEDURE — 96365 THER/PROPH/DIAG IV INF INIT: CPT

## 2020-11-30 PROCEDURE — 96375 TX/PRO/DX INJ NEW DRUG ADDON: CPT

## 2020-11-30 PROCEDURE — 80299 QUANTITATIVE ASSAY DRUG: CPT

## 2020-11-30 PROCEDURE — 80329 ANALGESICS NON-OPIOID 1 OR 2: CPT

## 2020-11-30 PROCEDURE — 80320 DRUG SCREEN QUANTALCOHOLS: CPT

## 2020-11-30 PROCEDURE — 96372 THER/PROPH/DIAG INJ SC/IM: CPT

## 2020-11-30 PROCEDURE — 93005 ELECTROCARDIOGRAM TRACING: CPT

## 2020-11-30 PROCEDURE — 87426 SARSCOV CORONAVIRUS AG IA: CPT

## 2020-11-30 PROCEDURE — 99285 EMERGENCY DEPT VISIT HI MDM: CPT

## 2020-11-30 PROCEDURE — 85025 COMPLETE CBC W/AUTO DIFF WBC: CPT

## 2020-11-30 PROCEDURE — 80307 DRUG TEST PRSMV CHEM ANLYZR: CPT

## 2020-11-30 PROCEDURE — G0480 DRUG TEST DEF 1-7 CLASSES: HCPCS

## 2020-11-30 NOTE — NUR
ASSISTED IN PT CARE. IV ACCESS OBTAINED, LABS DRAWN AND SENT. NO FURTHER NEED 
EXPRESSED AT THIS TIME.

## 2020-11-30 NOTE — NUR
PT ON BEDPAN AGAIN TO VOID, UNSUCCESSFUL. PT EDUCATED ON USING IN AND OUT CATH 
TO GET URINE, PT REFUSING AT THIS TIME

## 2020-11-30 NOTE — NUR
bib melissa with cc of etoh and si. pt is crying, eyes are red and puffy, pt 
upset about two daughters who have passed away, "i cant seem to get over it". 
pt admits to drinking earlier today but is unsure how much. pt with negative 
self talk, "im so old and ugly" repeating this comment many times.

pt states, "i just want to sleep and never wake up". pt denies plan and has no 
hisotry of si. 

per [facility] son on his way to hospital.

## 2020-12-01 VITALS — SYSTOLIC BLOOD PRESSURE: 126 MMHG | DIASTOLIC BLOOD PRESSURE: 80 MMHG

## 2020-12-01 NOTE — NUR
PT PULLED IV OUT. REQUESTING ATIVAN. PT HANDS STEADY, NOT TACHYCARDIC ON THE 
MONITOR, NO S/S OF WITHDRAWAL. PT PROVIDED WITH ANOTHER SPRITE PER REQUEST. 
CALL LIGHT IN REACH.

## 2020-12-01 NOTE — NUR
Assisted in pt care. Pt given PO fluids--tolerates well. Pt requesting Ativan. 
Tremors noted. Pt medicated per MAR. IV fluids infusing after IV repositioned. 
IV is now patent and infusing well. Lights dimmed and call light in reach.

## 2020-12-01 NOTE — NUR
PT BELONGINGS PLACED IN APPROPRIATE LOCKER (X1 BAG). PT DENIES ANY NEEDS AT 
THIS TIME. WILL CONTINUE TO MONITOR.

## 2020-12-01 NOTE — NUR
PT PROVIDED WITH MEAL TRAY, VSS, PT TO MONITORING EQUIP D/T CIWA, SI 
PRECAUTIONS OBSERVED, NAD NOTED

## 2020-12-01 NOTE — NUR
ONE TIME IM ATIVAN ORDER OBTAINED BY ERMD, PT MEDICATED PER MAR. PT ASSISTED 
WITH BEDPAN, D/T NOT BEING ABLE TO BEAR WEIGHT.

## 2020-12-23 ENCOUNTER — OFFICE VISIT (OUTPATIENT)
Dept: MEDICAL GROUP | Facility: MEDICAL CENTER | Age: 75
End: 2020-12-23
Payer: MEDICARE

## 2020-12-23 VITALS
TEMPERATURE: 97 F | DIASTOLIC BLOOD PRESSURE: 50 MMHG | OXYGEN SATURATION: 96 % | BODY MASS INDEX: 23.78 KG/M2 | HEART RATE: 66 BPM | RESPIRATION RATE: 18 BRPM | WEIGHT: 148 LBS | SYSTOLIC BLOOD PRESSURE: 90 MMHG | HEIGHT: 66 IN

## 2020-12-23 DIAGNOSIS — G47.30 SLEEP APNEA, UNSPECIFIED TYPE: ICD-10-CM

## 2020-12-23 DIAGNOSIS — Z92.89 S/P ALCOHOL DETOXIFICATION: ICD-10-CM

## 2020-12-23 DIAGNOSIS — Z09 HOSPITAL DISCHARGE FOLLOW-UP: ICD-10-CM

## 2020-12-23 DIAGNOSIS — I48.91 ATRIAL FIBRILLATION WITH RVR (HCC): ICD-10-CM

## 2020-12-23 PROCEDURE — 99214 OFFICE O/P EST MOD 30 MIN: CPT | Performed by: FAMILY MEDICINE

## 2020-12-23 RX ORDER — METOPROLOL TARTRATE 100 MG/1
100 TABLET ORAL 2 TIMES DAILY
Qty: 180 TAB | Refills: 1 | Status: SHIPPED | OUTPATIENT
Start: 2020-12-23 | End: 2021-09-10 | Stop reason: SDUPTHER

## 2020-12-23 RX ORDER — THIAMINE HCL 50 MG
100 TABLET ORAL DAILY
COMMUNITY
End: 2023-05-23

## 2020-12-23 ASSESSMENT — FIBROSIS 4 INDEX: FIB4 SCORE: 1.01

## 2020-12-23 NOTE — PROGRESS NOTES
CC: Hospital discharge follow-up    HPI:   Jeanie presents today to discuss the following:  Patient was admitted to [facility] on 12/18/2020 with alcohol intoxication,, atrial fibrillation with rapid ventricular response.  As per patient she had detoxed and treated with IV medication decrease heart heart rate. there is no hospital discharge summary, however patient was treated for both conditions, and was discharged in stable condition on 12/22/2020.     Came in today for follow-up.  Patient stated that she has been feeling better.  She has an appointment with [facility] cardiologist as outpatient, she preferred to see her previous cardiologist at [facility].  Currently denies any palpitation, chest pain, shortness of breath.  She has been having some tremors, she was detoxed at [facility].    Patient Active Problem List    Diagnosis Date Noted   • PAF (paroxysmal atrial fibrillation) ([facility]) 09/10/2014     Priority: High   • Debility 02/06/2020     Priority: Medium   • Alcohol dependence ([facility]) 01/27/2020     Priority: Medium   • Macrocytic anemia 02/06/2020     Priority: Low   • Current moderate episode of major depressive disorder without prior episode ([facility]) 01/15/2020     Priority: Low   • Recurrent falls 08/21/2019   • Alcohol abuse 04/25/2019   • Mixed hyperlipidemia 03/11/2018   • Moderate single current episode of major depressive disorder ([facility]) 12/08/2017   • Drug abuse ([facility]) 05/17/2014       Current Outpatient Medications   Medication Sig Dispense Refill   • thiamine (VITAMIN B-1) 50 MG Tab Take 100 mg by mouth every day.     • QUEtiapine (SEROQUEL) 25 MG Tab TAKE 1 TABLET ORALLY EVERY BEDTIME 90 Tab 0   • tizanidine (ZANAFLEX) 4 MG Tab TAKE 1 TABLET BY MOUTH TWICE DAILY FOR 7 DAYS 14 Tab 0   • ELIQUIS 5 MG Tab Take 1 Tab by mouth 2 Times a Day. 180 Tab 2   • hydrOXYzine pamoate (VISTARIL) 50 MG Cap TAKE ONE CAPSULE BY MOUTH THREE TIMES A DAY AS NEEDED FOR ITCHING 90 Cap 0  "  • folic acid (FOLVITE) 1 MG Tab Take 1 Tab by mouth every day. 90 Tab 3   • levothyroxine (SYNTHROID) 125 MCG Tab Take 1 Tab by mouth Every morning on an empty stomach. 90 Tab 3   • potassium chloride SA (KDUR) 20 MEQ Tab CR TAKE 2 TABLETS BY MOUTH 60 Tab 0   • digoxin (LANOXIN) 125 MCG Tab Take 1 Tab by mouth every day at 6 PM. 30 Tab    • multivitamin (THERAGRAN) Tab Take 1 Tab by mouth every day. 30 Tab    • metoprolol (LOPRESSOR) 100 MG Tab Take 1 Tab by mouth 2 Times a Day. 180 Tab 1   • mirtazapine (REMERON) 15 MG Tab Take 1 Tab by mouth every bedtime. (Patient not taking: Reported on 7/30/2020) 30 Tab 3   • omeprazole (PRILOSEC) 20 MG delayed-release capsule Take 1 Cap by mouth every day. (Patient not taking: Reported on 7/30/2020) 30 Cap    • sertraline (ZOLOFT) 50 MG Tab Take 1 Tab by mouth every day. (Patient not taking: Reported on 7/30/2020) 30 Tab 11     No current facility-administered medications for this visit.          Allergies as of 12/23/2020   • (No Known Allergies)        ROS: Denies any chest pain, Shortness of breath, Changes bowel or bladder, Lower extremity edema.    Physical Exam:  BP (!) 90/50 (BP Location: Right arm, Patient Position: Sitting, BP Cuff Size: Adult)   Pulse 66   Temp 36.1 °C (97 °F) (Temporal)   Resp 18   Ht 1.676 m (5' 6\")   Wt 67.1 kg (148 lb)   SpO2 96%   BMI 23.89 kg/m²   Gen.: Well-developed, well-nourished, no apparent distress,pleasant and cooperative with the examination  Skin:  Warm and dry with good turgor. No rashes or suspicious lesions in visible areas  HEENT:Sinuses nontender with palpation, TMs clear, nares patent with pink mucosa and clear rhinorrhea,no septal deviation ,polyps or lesions. lips without lesions, oropharynx clear.  Neck: Trachea midline,no masses or adenopathy. No JVD.  Cor: Regular rate and rhythm without murmur, gallop or rub.  Lungs: Respirations unlabored.Clear to auscultation with equal breath sounds bilaterally. No wheezes, " rhonchi.  Extremities: No cyanosis, clubbing or edema.      Assessment and Plan.   75 y.o. female     1. Hospital discharge follow-up  No Hospital discharge summary    2. Atrial fibrillation with RVR (HCC)  Stable.  No RVR  Continue Eliquis, metoprolol.  Heart rate in the low side, blood pressure today is slightly labile advised to skip metoprolol today.    3. S/P alcohol detoxification  Patient has been detoxed multiple times, but she always goes back and drink again.  Stating that she just cannot do it.    4. Sleep apnea, unspecified type  Was told that she has sleep apnea before, she has been having sleeping at night.  Wants to be evaluated by sleep medicine specialist.  - REFERRAL TO PULMONARY AND SLEEP MEDICINE

## 2020-12-29 ENCOUNTER — OFFICE VISIT (OUTPATIENT)
Dept: CARDIOLOGY | Facility: MEDICAL CENTER | Age: 75
End: 2020-12-29
Payer: MEDICARE

## 2020-12-29 ENCOUNTER — NON-PROVIDER VISIT (OUTPATIENT)
Dept: CARDIOLOGY | Facility: MEDICAL CENTER | Age: 75
End: 2020-12-29
Payer: MEDICARE

## 2020-12-29 VITALS
RESPIRATION RATE: 14 BRPM | BODY MASS INDEX: 24.43 KG/M2 | HEIGHT: 66 IN | WEIGHT: 152 LBS | DIASTOLIC BLOOD PRESSURE: 62 MMHG | HEART RATE: 72 BPM | OXYGEN SATURATION: 94 % | SYSTOLIC BLOOD PRESSURE: 102 MMHG

## 2020-12-29 DIAGNOSIS — I48.0 PAF (PAROXYSMAL ATRIAL FIBRILLATION) (HCC): ICD-10-CM

## 2020-12-29 DIAGNOSIS — F10.21 ALCOHOL DEPENDENCE IN REMISSION (HCC): ICD-10-CM

## 2020-12-29 DIAGNOSIS — Z45.02 AICD DISCHARGE: ICD-10-CM

## 2020-12-29 DIAGNOSIS — E78.2 MIXED HYPERLIPIDEMIA: ICD-10-CM

## 2020-12-29 DIAGNOSIS — Z86.79 HISTORY OF VENTRICULAR TACHYCARDIA: ICD-10-CM

## 2020-12-29 DIAGNOSIS — I47.20 VENTRICULAR TACHYCARDIA (HCC): ICD-10-CM

## 2020-12-29 DIAGNOSIS — Z95.810 AICD (AUTOMATIC CARDIOVERTER/DEFIBRILLATOR) PRESENT: ICD-10-CM

## 2020-12-29 PROCEDURE — 99214 OFFICE O/P EST MOD 30 MIN: CPT | Mod: 25 | Performed by: INTERNAL MEDICINE

## 2020-12-29 PROCEDURE — 93283 PRGRMG EVAL IMPLANTABLE DFB: CPT | Performed by: INTERNAL MEDICINE

## 2020-12-29 ASSESSMENT — ENCOUNTER SYMPTOMS
PND: 0
ALTERED MENTAL STATUS: 0
COUGH: 0
DIARRHEA: 0
DECREASED APPETITE: 0
CLAUDICATION: 0
NAUSEA: 0
DYSPNEA ON EXERTION: 0
VOMITING: 0
DIZZINESS: 0
WEIGHT LOSS: 0
CONSTIPATION: 0
BACK PAIN: 0
NEAR-SYNCOPE: 0
ABDOMINAL PAIN: 0
HEARTBURN: 0
ORTHOPNEA: 0
IRREGULAR HEARTBEAT: 0
PALPITATIONS: 0
FLANK PAIN: 0
BLURRED VISION: 0
FEVER: 0
SYNCOPE: 0
DEPRESSION: 1
WEIGHT GAIN: 0
SHORTNESS OF BREATH: 0

## 2020-12-29 ASSESSMENT — FIBROSIS 4 INDEX: FIB4 SCORE: 1.01

## 2020-12-29 NOTE — PROGRESS NOTES
"Cardiology Note    Chief Complaint   Patient presents with   • Atrial Fibrillation       History of Present Illness: Jeanie Hutchison is a 74 y.o. female PMH alcohol abuse, paroxysmal atrial fibrillation, HTN, HLD who presents for follow up.    Admission 1/2020 for alcohol withdrawal requiring MICU and intubation.  [facility] admission 12/2019 alcohol intoxication leading to AF w RVR with stay in MICU leading to intubation  Admission 12/2020 [facility] for legal hold for alcohol (blood 0.3), detox and suicidal thoughts.     Patient believes she is at the wrong place today. She states had ICD placed July. This visit states yesterday had shock. Then corrects two weeks ago was shocked. Denies syncope associated with shock. Was admitted at [facility]. Lives alone with son nearby. Her  is at assisted living facility. Her son helps with medications; sets patient's pill box. Claims compliance with medications. Last use of alcohol 1.5 weeks ago she states. She is seeing psychiastry Dr Bryant, independent office.    Records from [facility]:   07/2/2020 - AICD implant for polymorphic VT, long QT, and atrial fibrillation    Previously noted: around 12/2018 went for alcohol rehab in [place] and changed to amiodarone from flecainide, however, amio has since been discontinued due to TSH elevated. Since, she was admitted to [facility] for AF with RVR. At the time, noted active alcohol abuse and detoxed inpatient. Describes relapsed to alcohol after rehab in [place] \"Hope something.\" She is very depressed due to passing of her daughters. Not on good terms with her .     Review of Systems   Constitution: Negative for decreased appetite, fever, malaise/fatigue, weight gain and weight loss.   HENT: Negative for congestion and nosebleeds.    Eyes: Negative for blurred vision.   Cardiovascular: Negative for chest pain, claudication, dyspnea on exertion, irregular heartbeat, leg swelling, near-syncope, orthopnea, " palpitations, paroxysmal nocturnal dyspnea and syncope.   Respiratory: Negative for cough and shortness of breath.    Endocrine: Negative for cold intolerance and heat intolerance.   Skin: Negative for rash.   Musculoskeletal: Negative for back pain.   Gastrointestinal: Negative for abdominal pain, constipation, diarrhea, heartburn, melena, nausea and vomiting.   Genitourinary: Negative for dysuria, flank pain and hematuria.   Neurological: Negative for dizziness.   Psychiatric/Behavioral: Positive for depression. Negative for altered mental status.         Past Medical History:   Diagnosis Date   • A-fib ([facility]) 5/1/2014   • Alcoholism ([facility])    • Chronic pain     r/t pelvic fracture   • Psychiatric disorder     history of depression and anxiety          Past Surgical History:   Procedure Laterality Date   • CLAVICLE ORIF  5/21/2014    Performed by Wilfred Gonzalez M.D. at [facility]   • BREAST BIOPSY      bilateral neg breast bxs   • OTHER ORTHOPEDIC SURGERY      pelvis fracture. 10 years ago          Current Outpatient Medications   Medication Sig Dispense Refill   • thiamine (VITAMIN B-1) 50 MG Tab Take 100 mg by mouth every day.     • metoprolol (LOPRESSOR) 100 MG Tab Take 1 Tab by mouth 2 Times a Day. 180 Tab 1   • QUEtiapine (SEROQUEL) 25 MG Tab TAKE 1 TABLET ORALLY EVERY BEDTIME 90 Tab 0   • ELIQUIS 5 MG Tab Take 1 Tab by mouth 2 Times a Day. 180 Tab 2   • folic acid (FOLVITE) 1 MG Tab Take 1 Tab by mouth every day. 90 Tab 3   • levothyroxine (SYNTHROID) 125 MCG Tab Take 1 Tab by mouth Every morning on an empty stomach. 90 Tab 3   • potassium chloride SA (KDUR) 20 MEQ Tab CR TAKE 2 TABLETS BY MOUTH 60 Tab 0   • digoxin (LANOXIN) 125 MCG Tab Take 1 Tab by mouth every day at 6 PM. 30 Tab    • multivitamin (THERAGRAN) Tab Take 1 Tab by mouth every day. 30 Tab    • omeprazole (PRILOSEC) 20 MG delayed-release capsule Take 1 Cap by mouth every day. 30 Cap    • tizanidine (ZANAFLEX) 4 MG Tab TAKE 1  TABLET BY MOUTH TWICE DAILY FOR 7 DAYS (Patient not taking: Reported on 12/29/2020) 14 Tab 0   • hydrOXYzine pamoate (VISTARIL) 50 MG Cap TAKE ONE CAPSULE BY MOUTH THREE TIMES A DAY AS NEEDED FOR ITCHING (Patient not taking: Reported on 12/29/2020) 90 Cap 0   • mirtazapine (REMERON) 15 MG Tab Take 1 Tab by mouth every bedtime. (Patient not taking: Reported on 7/30/2020) 30 Tab 3   • sertraline (ZOLOFT) 50 MG Tab Take 1 Tab by mouth every day. (Patient not taking: Reported on 7/30/2020) 30 Tab 11     No current facility-administered medications for this visit.          No Known Allergies      Family History   Problem Relation Age of Onset   • Diabetes Mother    • Anxiety disorder Mother    • Depression Mother    • Hyperlipidemia Father          Social History     Socioeconomic History   • Marital status:      Spouse name: Not on file   • Number of children: Not on file   • Years of education: Not on file   • Highest education level: Not on file   Occupational History   • Not on file   Social Needs   • Financial resource strain: Not on file   • Food insecurity     Worry: Not on file     Inability: Not on file   • Transportation needs     Medical: Not on file     Non-medical: Not on file   Tobacco Use   • Smoking status: Never Smoker   • Smokeless tobacco: Never Used   Substance and Sexual Activity   • Alcohol use: Yes     Frequency: 4 or more times a week     Drinks per session: 5 or 6     Binge frequency: Daily or almost daily     Comment: last drink January 2020   • Drug use: Not Currently     Types: Oral     Comment: takes friends tranqualizers    • Sexual activity: Not Currently   Lifestyle   • Physical activity     Days per week: Not on file     Minutes per session: Not on file   • Stress: Not on file   Relationships   • Social connections     Talks on phone: Not on file     Gets together: Not on file     Attends Protestant service: Not on file     Active member of club or organization: Not on file      "Attends meetings of clubs or organizations: Not on file     Relationship status: Not on file   • Intimate partner violence     Fear of current or ex partner: Not on file     Emotionally abused: Not on file     Physically abused: Not on file     Forced sexual activity: Not on file   Other Topics Concern   • Not on file   Social History Narrative   • Not on file         Physical Exam:  Ambulatory Vitals  /62 (BP Location: Right arm, Patient Position: Sitting, BP Cuff Size: Adult)   Pulse 72   Resp 14   Ht 1.676 m (5' 6\")   Wt 68.9 kg (152 lb)   SpO2 94%    BP Readings from Last 4 Encounters:   12/29/20 102/62   12/23/20 (!) 90/50   07/30/20 138/80   02/09/20 152/99     Weight/BMI:   Vitals:    12/29/20 1324   BP: 102/62   Weight: 68.9 kg (152 lb)   Height: 1.676 m (5' 6\")    Body mass index is 24.53 kg/m².  Wt Readings from Last 4 Encounters:   12/29/20 68.9 kg (152 lb)   12/23/20 67.1 kg (148 lb)   07/30/20 68.9 kg (152 lb)   03/26/20 68 kg (150 lb)       Physical Exam   Constitutional: She is oriented to person, place, and time and well-developed, well-nourished, and in no distress. No distress.   HENT:   Head: Normocephalic and atraumatic.   Eyes: Pupils are equal, round, and reactive to light. Conjunctivae are normal.   Neck: Normal range of motion. Neck supple. No JVD present.   Cardiovascular: Normal rate, regular rhythm, normal heart sounds and intact distal pulses. Exam reveals no gallop and no friction rub.   No murmur heard.  Pulmonary/Chest: Effort normal and breath sounds normal. No respiratory distress. She has no wheezes. She has no rales. She exhibits no tenderness.   Abdominal: Soft. Bowel sounds are normal. She exhibits no distension.   Musculoskeletal:         General: No edema.   Neurological: She is alert and oriented to person, place, and time.   Skin: Skin is warm and dry.   Psychiatric: Affect and judgment normal.       Lab Data Review:  Lab Results   Component Value Date/Time    " CHOLSTRLTOT 223 (H) 10/13/2015 08:39 AM     (H) 10/13/2015 08:39 AM    HDL 61 10/13/2015 08:39 AM    TRIGLYCERIDE 113 02/05/2020 05:31 AM       Lab Results   Component Value Date/Time    SODIUM 138 02/09/2020 03:09 AM    POTASSIUM 3.6 02/09/2020 03:09 AM    CHLORIDE 106 02/09/2020 03:09 AM    CO2 23 02/09/2020 03:09 AM    GLUCOSE 82 02/09/2020 03:09 AM    BUN 8 02/09/2020 03:09 AM    CREATININE 0.64 02/09/2020 03:09 AM     CrCl cannot be calculated (Patient's most recent lab result is older than the maximum 7 days allowed.).  Lab Results   Component Value Date/Time    ALKPHOSPHAT 55 02/07/2020 03:21 AM    ASTSGOT 10 (L) 02/07/2020 03:21 AM    ALTSGPT 5 02/07/2020 03:21 AM    TBILIRUBIN 0.7 02/07/2020 03:21 AM      Lab Results   Component Value Date/Time    WBC 8.1 02/07/2020 03:21 AM     No results found for: HBA1C  No components found for: TROP      Cardiac Imaging and Procedures Review:      TTE 1/2020  CONCLUSIONS  Technically limited and difficult to interpret while in atrial   fibrillation with rapid ventricular rate.  Left ventricular size and function probably normal. Right ventricular   size and function probably normal.  Normal pericardium without effusion.  Ascending aorta diameter is 2.8  cm, which is normal.    Medical Decision Making:  Problem List Items Addressed This Visit     PAF (paroxysmal atrial fibrillation) (HCC)    Relevant Orders    EC-ECHOCARDIOGRAM COMPLETE W/O CONT    Alcohol dependence (HCC)    Mixed hyperlipidemia    History of ventricular tachycardia    AICD discharge        Attempted to obtain outside records from [facility], however, these were incomplete and notes regarding decision making for ICD not included. Appears she will continue to see EP at [facility] for regular interrogations. She did not intend to come here today. Recommend she maintain cardiac care all in one place. As well, she seems partial to alcohol detox at [facility]. May benefit most from general cardiology  follow up with [facility]. Patient in agreement.     ICD / hx polymorphic VT - extensively councilled against alcohol again. On interrogation appears innapropriate shock for AF with RVR. Continue rate control.    Paroxysmal AF - chadsvasc 2 - continue eliquis. Continue rate control. Recently was added digoxin she says.    Alcohol and drug abuse - councilled against use and danger in association with current medications.     It was my pleasure to meet with Ms. Hutchison.    60 min spent reviewing outside records.

## 2020-12-29 NOTE — PATIENT INSTRUCTIONS
Inappropriate shock for atrial fibrillation. Follow up with EP.    Atrial Fibrillation  Atrial fibrillation is a type of irregular or rapid heartbeat (arrhythmia). In atrial fibrillation, the top part of the heart (atria) quivers in a chaotic pattern. This makes the heart unable to pump blood normally. Having atrial fibrillation can increase your risk for other health problems, such as:  · Blood can pool in the atria and form clots. If a clot travels to the brain, it can cause a stroke.  · The heart muscle may weaken from the irregular blood flow. This can cause heart failure.  Atrial fibrillation may start suddenly and stop on its own, or it may become a long-lasting problem.  What are the causes?  This condition is caused by some heart-related conditions or procedures, including:  · High blood pressure. This is the most common cause.  · Heart failure.  · Heart valve conditions.  · Inflammation of the sac that surrounds the heart (pericarditis).  · Heart surgery.  · Coronary artery disease.  · Certain heart rhythm disorders, such as Lind-Parkinson-White syndrome.  Other causes include:  · Pneumonia.  · Obstructive sleep apnea.  · Lung cancer.  · Thyroid problems, especially if the thyroid is overactive (hyperthyroidism).  · Excessive alcohol or drug use.  Sometimes, the cause of this condition is not known.  What increases the risk?  This condition is more likely to develop in:  · Older people.  · People who smoke.  · People who have diabetes mellitus.  · People who are overweight (obese).  · Athletes who exercise vigorously.  · People who have a family history.  What are the signs or symptoms?  Symptoms of this condition include:  · A feeling that your heart is beating rapidly or irregularly.  · A feeling of discomfort or pain in your chest.  · Shortness of breath.  · Sudden light-headedness or weakness.  · Getting tired easily during exercise.  In some cases, there are no symptoms.  How is this diagnosed?  Your  health care provider may be able to detect atrial fibrillation when taking your pulse. If detected, this condition may be diagnosed with:  · Electrocardiogram (ECG).  · Ambulatory cardiac monitor. This device records your heartbeats for 24 hours or more.  · Transthoracic echocardiogram (TTE) to evaluate how blood flows through your heart.  · Transesophageal echocardiogram (HAVEN) to view more detailed images of your heart.  · A stress test.  · Imaging tests, such as a CT scan or chest X-ray.  · Blood tests.  How is this treated?  This condition may be treated with:  · Medicines to slow down the heart rate or bring the heart's rhythm back to normal.  · Medicines to prevent blood clots from forming.  · Electrical cardioversion. This delivers a low-energy shock to the heart to reset its rhythm.  · Ablation. This procedure destroys the part of the heart tissue that sends abnormal signals.  · Left atrial appendage occlusion/excision. This seals off a common place in the atria where blood clots can form (left atrial appendage).  The goal of treatment is to prevent blood clots from forming and to keep your heart beating at a normal rate and rhythm. Treatment depends on underlying medical conditions and how you feel when you are experiencing fibrillation.  Follow these instructions at home:  Medicines  · Take over-the counter and prescription medicines only as told by your health care provider.  · If your health care provider prescribed a blood-thinning medicine (anticoagulant), take it exactly as told. Taking too much blood-thinning medicine can cause bleeding. Taking too little can enable a blood clot to form and travel to the brain, causing a stroke.  Lifestyle         · Do not use any products that contain nicotine or tobacco, such as cigarettes and e-cigarettes. If you need help quitting, ask your health care provider.  · Do not drink beverages that contain caffeine, such as coffee, soda, and tea.  · Follow diet  "instructions as told by your health care provider.  · Exercise regularly as told by your health care provider.  · Do not drink alcohol.  General instructions  · If you have obstructive sleep apnea, manage your condition as told by your health care provider.  · Maintain a healthy weight. Do not use diet pills unless your health care provider approves. Diet pills may make heart problems worse.  · Keep all follow-up visits as told by your health care provider. This is important.  Contact a health care provider if you:  · Notice a change in the rate, rhythm, or strength of your heartbeat.  · Are taking an anticoagulant and you notice increased bruising.  · Tire more easily when you exercise or exert yourself.  · Have a sudden change in weight.  Get help right away if you have:    · Chest pain, abdominal pain, sweating, or weakness.  · Difficulty breathing.  · Blood in your vomit, stool (feces), or urine.  · Any symptoms of a stroke. \"BE FAST\" is an easy way to remember the main warning signs of a stroke:  ? B - Balance. Signs are dizziness, sudden trouble walking, or loss of balance.  ? E - Eyes. Signs are trouble seeing or a sudden change in vision.  ? F - Face. Signs are sudden weakness or numbness of the face, or the face or eyelid drooping on one side.  ? A - Arms. Signs are weakness or numbness in an arm. This happens suddenly and usually on one side of the body.  ? S - Speech. Signs are sudden trouble speaking, slurred speech, or trouble understanding what people say.  ? T - Time. Time to call emergency services. Write down what time symptoms started.  · Other signs of a stroke, such as:  ? A sudden, severe headache with no known cause.  ? Nausea or vomiting.  ? Seizure.  These symptoms may represent a serious problem that is an emergency. Do not wait to see if the symptoms will go away. Get medical help right away. Call your local emergency services (911 in the [place] Do not drive yourself to the " Hospitals in [place].  Summary  · Atrial fibrillation is a type of irregular or rapid heartbeat (arrhythmia).  · Symptoms include a feeling that your heart is beating fast or irregularly. In some cases, you may not have symptoms.  · The condition is treated with medicines to slow down the heart rate or bring the heart's rhythm back to normal. You may also need blood-thinning medicines to prevent blood clots.  · Get help right away if you have symptoms or signs of a stroke.  This information is not intended to replace advice given to you by your health care provider. Make sure you discuss any questions you have with your health care provider.  Document Released: 12/18/2006 Document Revised: 02/07/2019 Document Reviewed: 02/08/2019  Elsevier Patient Education © 2020 [employer]

## 2020-12-30 ENCOUNTER — TELEPHONE (OUTPATIENT)
Dept: CARDIOLOGY | Facility: MEDICAL CENTER | Age: 75
End: 2020-12-30

## 2020-12-30 NOTE — TELEPHONE ENCOUNTER
Papito Will M.D.  Olga Wen R.N.             Wood Espinoza,   Would you mind making sure that patient is scheduled for general cardiology follow up with [facility]? She favors them and she didn't remember who I was today.   Thank you!      Called [facility] and s/w Cynthia. She explains that pt is a pt of their office but was only seen once in August with Dr. Baires, and has no showed for 4 appts since then. If pt wishes to schedule another appt she can call them at [phone]. LVM with pt at [phone] notifying that [facility] doesn't currently have her scheduled for a f/u, but provided with their number if she would like to continue following with them instead of [facility].

## 2021-01-07 DIAGNOSIS — F99 INSOMNIA DUE TO OTHER MENTAL DISORDER: ICD-10-CM

## 2021-01-07 DIAGNOSIS — F51.05 INSOMNIA DUE TO OTHER MENTAL DISORDER: ICD-10-CM

## 2021-01-07 RX ORDER — QUETIAPINE FUMARATE 25 MG/1
TABLET, FILM COATED ORAL
Qty: 90 TAB | Refills: 2 | Status: ON HOLD
Start: 2021-01-07 | End: 2021-02-09

## 2021-01-15 DIAGNOSIS — Z23 NEED FOR VACCINATION: ICD-10-CM

## 2021-01-21 ENCOUNTER — TELEPHONE (OUTPATIENT)
Dept: SLEEP MEDICINE | Facility: MEDICAL CENTER | Age: 76
End: 2021-01-21

## 2021-01-21 ENCOUNTER — SLEEP CENTER VISIT (OUTPATIENT)
Dept: SLEEP MEDICINE | Facility: MEDICAL CENTER | Age: 76
End: 2021-01-21
Payer: MEDICARE

## 2021-01-21 VITALS
HEART RATE: 75 BPM | SYSTOLIC BLOOD PRESSURE: 110 MMHG | WEIGHT: 150 LBS | HEIGHT: 66 IN | BODY MASS INDEX: 24.11 KG/M2 | OXYGEN SATURATION: 93 % | DIASTOLIC BLOOD PRESSURE: 80 MMHG | RESPIRATION RATE: 16 BRPM

## 2021-01-21 DIAGNOSIS — G47.33 OSA (OBSTRUCTIVE SLEEP APNEA): ICD-10-CM

## 2021-01-21 DIAGNOSIS — F10.10 ALCOHOL ABUSE: ICD-10-CM

## 2021-01-21 DIAGNOSIS — I48.0 PAF (PAROXYSMAL ATRIAL FIBRILLATION) (HCC): ICD-10-CM

## 2021-01-21 DIAGNOSIS — G47.09 OTHER INSOMNIA: ICD-10-CM

## 2021-01-21 DIAGNOSIS — Z78.9 NON-SMOKER: ICD-10-CM

## 2021-01-21 DIAGNOSIS — E78.2 MIXED HYPERLIPIDEMIA: ICD-10-CM

## 2021-01-21 DIAGNOSIS — R53.81 DEBILITY: ICD-10-CM

## 2021-01-21 PROCEDURE — 99204 OFFICE O/P NEW MOD 45 MIN: CPT | Performed by: INTERNAL MEDICINE

## 2021-01-21 RX ORDER — ZOLPIDEM TARTRATE 5 MG/1
5 TABLET ORAL NIGHTLY PRN
Qty: 2 TAB | Refills: 0 | Status: ON HOLD
Start: 2021-01-21 | End: 2021-02-09

## 2021-01-21 ASSESSMENT — FIBROSIS 4 INDEX: FIB4 SCORE: 1.01

## 2021-01-21 NOTE — PROGRESS NOTES
CC: Evaluation of sleep apnea    HPI:  Jeanie Hutchison is a 75 y.o. female kindly referred by Jane Armstrong M.D. for evaluation of sleep apnea.  She had a sleep study done   about a year ago but does not know the name of the clinic.  She not receiving CPAP or oxygen at night. Recently  about 6 days ago. Had been  51 years.    Symptom Summary:  Snoring: +  Very loud snoring: +  Witnessed apneas: unknown  Resuscitative snorts: denies  Nocturnal shortness of breath: +  Non-restorative sleep: -  Insomnia: + +  Nocturnal awakenings: +  Nocturia: +  EDS: +  Fatigue: +  Tiredness: +  Falls asleep accidentally: +   Napping or returning to bed after arising: rarely  Restless legs: seldom  Limb movements during sleep: unknown  Nocturnal headaches: -    Significant comorbidities and modifying factors include recent hospitalization at 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      Patient Active Problem List    Diagnosis Date Noted   • PAF (paroxysmal atrial fibrillation) ([facility]) 09/10/2014     Priority: High   • Debility 02/06/2020     Priority: Medium   • Alcohol dependence ([facility]) 01/27/2020     Priority: Medium   • Macrocytic anemia 02/06/2020     Priority: Low   • Current moderate episode of major depressive disorder without prior episode ([facility]) 01/15/2020     Priority: Low   • CLEO (obstructive sleep apnea) 01/21/2021   • Non-smoker 01/21/2021   • Other insomnia 01/21/2021   • History of ventricular tachycardia 12/29/2020   • AICD discharge 12/29/2020   • Recurrent falls 08/21/2019   • Alcohol abuse 04/25/2019   • Mixed hyperlipidemia 03/11/2018   • Moderate single current episode of major depressive disorder  ([facility]) 12/08/2017   • Drug abuse ([facility]) 05/17/2014       Past Medical History:   Diagnosis Date   • A-fib ([facility]) 5/1/2014   • Alcoholism ([facility])    • Apnea, sleep    • Chronic pain     r/t pelvic fracture   • Insomnia     Trouble going to Sleep    • Psychiatric disorder     history of depression and anxiety    • Snoring         Past Surgical History:   Procedure Laterality Date   • CLAVICLE ORIF  5/21/2014    Performed by Wilfred Gonzalez M.D. at [facility]   • BREAST BIOPSY      bilateral neg breast bxs   • OTHER ORTHOPEDIC SURGERY      pelvis fracture. 10 years ago    • TONSILLECTOMY         Family History   Problem Relation Age of Onset   • Diabetes Mother    • Anxiety disorder Mother    • Depression Mother    • Hyperlipidemia Father        Social History     Socioeconomic History   • Marital status:      Spouse name: Not on file   • Number of children: Not on file   • Years of education: Not on file   • Highest education level: Not on file   Occupational History   • Not on file   Social Needs   • Financial resource strain: Not on file   • Food insecurity     Worry: Not on file     Inability: Not on file   • Transportation needs     Medical: Not on file     Non-medical: Not on file   Tobacco Use   • Smoking status: Never Smoker   • Smokeless tobacco: Never Used   Substance and Sexual Activity   • Alcohol use: Yes     Frequency: 4 or more times a week     Drinks per session: 5 or 6     Binge frequency: Daily or almost daily     Comment: last drink January 2020   • Drug use: Not Currently     Types: Oral     Comment: takes friends tranqualizers    • Sexual activity: Not Currently   Lifestyle   • Physical activity     Days per week: Not on file     Minutes per session: Not on file   • Stress: Not on file   Relationships   • Social connections     Talks on phone: Not on file     Gets together: Not on file     Attends Spiritism service: Not on file     Active member of club or organization: Not on  file     Attends meetings of clubs or organizations: Not on file     Relationship status: Not on file   • Intimate partner violence     Fear of current or ex partner: Not on file     Emotionally abused: Not on file     Physically abused: Not on file     Forced sexual activity: Not on file   Other Topics Concern   • Not on file   Social History Narrative   • Not on file       Current Outpatient Medications   Medication Sig Dispense Refill   • QUEtiapine (SEROQUEL) 25 MG Tab TAKE 1 TABLET ORALLY EVERY BEDTIME 90 Tab 2   • thiamine (VITAMIN B-1) 50 MG Tab Take 100 mg by mouth every day.     • metoprolol (LOPRESSOR) 100 MG Tab Take 1 Tab by mouth 2 Times a Day. 180 Tab 1   • ELIQUIS 5 MG Tab Take 1 Tab by mouth 2 Times a Day. 180 Tab 2   • folic acid (FOLVITE) 1 MG Tab Take 1 Tab by mouth every day. 90 Tab 3   • levothyroxine (SYNTHROID) 125 MCG Tab Take 1 Tab by mouth Every morning on an empty stomach. 90 Tab 3   • potassium chloride SA (KDUR) 20 MEQ Tab CR TAKE 2 TABLETS BY MOUTH 60 Tab 0   • digoxin (LANOXIN) 125 MCG Tab Take 1 Tab by mouth every day at 6 PM. 30 Tab    • multivitamin (THERAGRAN) Tab Take 1 Tab by mouth every day. 30 Tab    • omeprazole (PRILOSEC) 20 MG delayed-release capsule Take 1 Cap by mouth every day. 30 Cap    • zolpidem (AMBIEN) 5 MG Tab Take 1 Tab by mouth at bedtime as needed for Sleep (1 to 2 po qhs prn insomnia/sleep study. Bring to sleep study.) for up to 2 doses. (Patient not taking: Reported on 1/21/2021) 2 Tab 0   • tizanidine (ZANAFLEX) 4 MG Tab TAKE 1 TABLET BY MOUTH TWICE DAILY FOR 7 DAYS (Patient not taking: Reported on 12/29/2020) 14 Tab 0   • hydrOXYzine pamoate (VISTARIL) 50 MG Cap TAKE ONE CAPSULE BY MOUTH THREE TIMES A DAY AS NEEDED FOR ITCHING (Patient not taking: Reported on 12/29/2020) 90 Cap 0   • mirtazapine (REMERON) 15 MG Tab Take 1 Tab by mouth every bedtime. (Patient not taking: Reported on 7/30/2020) 30 Tab 3   • sertraline (ZOLOFT) 50 MG Tab Take 1 Tab by mouth  "every day. (Patient not taking: Reported on 7/30/2020) 30 Tab 11     No current facility-administered medications for this visit.     \"CURRENT RX\"    ALLERGIES: Patient has no known allergies.    ROS    Constitutional: Denies fever, chills, sweats,  weight loss, fatigue.  Eyes: Denies vision loss, pain, drainage, double vision, glasses.  Ears/Nose/Mouth/Throat: Denies earache, difficulty hearing, rhinitis/nasal congestion, injury, recurrent sore throat, persistent hoarseness, decayed teeth/toothaches, ringing or buzzing in the ears.  Cardiovascular: Denies chest pain, tightness, palpitations, swelling in legs/feet, fainting, difficulty breathing when lying down but gets better when sitting up.   Respiratory: Denies shortness of breath, cough, sputum, wheezing, painful breathing, coughing up blood.   Sleep: per HPI  Gastrointestinal: Denies  difficulty swallowing, nausea, abdominal pain, diarrhea, constipation, heartburn.  Genitourinary: Denies  blood in urine, discharge, frequent urination.   Musculoskeletal: Denies painful joints, sore muscles, back pain.   Integumentary: Denies rashes, lumps, color changes.   Neurological: Denies frequent headaches,weakness, dizziness.    PHYSICAL EXAM  Normal weight  /80 (BP Location: Left arm, Patient Position: Sitting, BP Cuff Size: Adult)   Pulse 75   Resp 16   Ht 1.676 m (5' 6\")   Wt 68 kg (150 lb)   SpO2 93%   BMI 24.21 kg/m²   Appearance: Well-nourished, well-developed, no acute distress  Eyes:  PERRLA, EOMI; glasses  ENMT: masked  Neck: Supple, trachea midline, no masses  Respiratory effort:  No intercostal retractions or use of accessory muscles  Lung auscultation:  No wheezes rhonchi rubs or rales  Cardiac: No murmurs, rubs, or gallops; regular rhythm, normal rate; no edema  Abdomen:  No tenderness, no organomegaly  Musculoskeletal:  Grossly normal; gait and station normal; digits and nails normal  Skin:  No rashes, petechiae, cyanosis  Neurologic: without " focal signs; oriented to person, time, place, and purpose; judgement intact  Psychiatric:  No depression, anxiety, agitation      Medical Decision Making:  The medical record was reviewed in its entirety including the referral notes, records from primary care, consultants notes, hospital records, labs, imaging, microbiology, immunology, and immunizations.  Care gaps identified and reviewed with the patient.    Diagnostic and titration nocturnal polysomnograms, home sleep apnea tests, continuous nocturnal oximetry results, multiple sleep latency tests, and compliance reports reviewed.        1. CLEO (obstructive sleep apnea)  - zolpidem (AMBIEN) 5 MG Tab; Take 1 Tab by mouth at bedtime as needed for Sleep (1 to 2 po qhs prn insomnia/sleep study. Bring to sleep study.) for up to 2 doses. (Patient not taking: Reported on 1/21/2021)  Dispense: 2 Tab; Refill: 0  - Polysomnography, 4 or More; Future    2. Alcohol abuse    3. Debility    4. Mixed hyperlipidemia    5. PAF (paroxysmal atrial fibrillation) (HCC)    6. Non-smoker    7. Other insomnia    If the insomnia persists after any and all sleep apnea has been adequately addressed, then referral to [facility]/Dr. Jeni Solorio for is CBT-I would be in order.    Practice sleep hygiene by keeping a good sleep environment:    1.  Removing distractions such as television, computers and other engaging activities.  2.  Keep the sleep room dark, cool and quiet  3.  Commit to a consistent bedtime and wake up time  4.  Avoid staying in bed while awake for greater than 20-30 minutes. If unable to fall asleep during this time then recommend going to a different location in the house to engage in a relaxation activity prior to returning to bed.   5. Recommend finishing meals 3 hours before bedtime especially if you are prone to indigestion or heartburn.  6.  Avoid smoking for at least 3 hours before bedtime.  7.  Recommend engaging in regular physical activity daily  (its ok to exercise prior to bedtime).  8.  Avoid stressful situations before bedtime  9.  Avoid napping during the daytime or limiting the nap to less than 20 minutes.  10. Go to bed only when tired.  11. Limited activities in bed to sleep and sex  12. Recommend engaging in relaxing activities prior to bed such as reading, writing, listening to calming music, meditation, stretching or taking a bath.          PLAN:   The patient has signs and symptoms consistent with obstructive sleep apnea hypopnea syndrome. Will schedule a nocturnal polysomnogram or a home sleep apnea test depending on signs and symptoms as well as insurance restrictions.    The risks of untreated sleep apnea were discussed with the patient at length. Patients with CLEO are at increased risk of cardiovascular disease including coronary artery disease, systemic arterial hypertension, pulmonary arterial hypertension, cardiac arrythmias, and stroke. CLEO patients have an increased risk of motor vehicle accidents, type 2 diabetes, chronic kidney disease, and non-alcoholic liver disease. The patient was advised to avoid driving a motor vehicle when drowsy.    Positive airway pressure will favorably impact many of the adverse conditions and effects provoked by CLEO.    Have advised the patient to follow up with the appropriate healthcare practitioners for all other medical problems and issues.    Mask wearing, handwashing, and social distancing protocols reviewed and encouraged.      Return for after sleep study.

## 2021-01-28 ENCOUNTER — APPOINTMENT (OUTPATIENT)
Dept: RADIOLOGY | Facility: MEDICAL CENTER | Age: 76
DRG: 897 | End: 2021-01-28
Attending: EMERGENCY MEDICINE
Payer: MEDICARE

## 2021-01-28 ENCOUNTER — HOSPITAL ENCOUNTER (INPATIENT)
Facility: MEDICAL CENTER | Age: 76
LOS: 12 days | DRG: 897 | End: 2021-02-09
Attending: EMERGENCY MEDICINE | Admitting: INTERNAL MEDICINE
Payer: MEDICARE

## 2021-01-28 DIAGNOSIS — F32.1 CURRENT MODERATE EPISODE OF MAJOR DEPRESSIVE DISORDER WITHOUT PRIOR EPISODE (HCC): ICD-10-CM

## 2021-01-28 DIAGNOSIS — R55 SYNCOPE, UNSPECIFIED SYNCOPE TYPE: ICD-10-CM

## 2021-01-28 DIAGNOSIS — R26.9 GAIT DISORDER: ICD-10-CM

## 2021-01-28 DIAGNOSIS — F10.239 ALCOHOL DEPENDENCE WITH WITHDRAWAL WITH COMPLICATION (HCC): ICD-10-CM

## 2021-01-28 DIAGNOSIS — F33.3 SEVERE EPISODE OF RECURRENT MAJOR DEPRESSIVE DISORDER, WITH PSYCHOTIC FEATURES (HCC): ICD-10-CM

## 2021-01-28 PROBLEM — R45.851 SUICIDAL IDEATION: Status: ACTIVE | Noted: 2021-01-28

## 2021-01-28 LAB
ALBUMIN SERPL BCP-MCNC: 3.9 G/DL (ref 3.2–4.9)
ALBUMIN/GLOB SERPL: 1.3 G/DL
ALP SERPL-CCNC: 68 U/L (ref 30–99)
ALT SERPL-CCNC: 21 U/L (ref 2–50)
ANION GAP SERPL CALC-SCNC: 11 MMOL/L (ref 7–16)
AST SERPL-CCNC: 22 U/L (ref 12–45)
BASOPHILS # BLD AUTO: 1.3 % (ref 0–1.8)
BASOPHILS # BLD: 0.09 K/UL (ref 0–0.12)
BILIRUB SERPL-MCNC: 0.2 MG/DL (ref 0.1–1.5)
BUN SERPL-MCNC: 14 MG/DL (ref 8–22)
CALCIUM SERPL-MCNC: 9.7 MG/DL (ref 8.5–10.5)
CHLORIDE SERPL-SCNC: 106 MMOL/L (ref 96–112)
CO2 SERPL-SCNC: 25 MMOL/L (ref 20–33)
CREAT SERPL-MCNC: 0.65 MG/DL (ref 0.5–1.4)
EOSINOPHIL # BLD AUTO: 0.06 K/UL (ref 0–0.51)
EOSINOPHIL NFR BLD: 0.9 % (ref 0–6.9)
ERYTHROCYTE [DISTWIDTH] IN BLOOD BY AUTOMATED COUNT: 51.6 FL (ref 35.9–50)
ETHANOL BLD-MCNC: 266 MG/DL (ref 0–10)
FLUAV RNA SPEC QL NAA+PROBE: NEGATIVE
FLUBV RNA SPEC QL NAA+PROBE: NEGATIVE
GLOBULIN SER CALC-MCNC: 2.9 G/DL (ref 1.9–3.5)
GLUCOSE SERPL-MCNC: 97 MG/DL (ref 65–99)
HCT VFR BLD AUTO: 44 % (ref 37–47)
HGB BLD-MCNC: 14.2 G/DL (ref 12–16)
IMM GRANULOCYTES # BLD AUTO: 0.04 K/UL (ref 0–0.11)
IMM GRANULOCYTES NFR BLD AUTO: 0.6 % (ref 0–0.9)
LACTATE BLD-SCNC: 2.9 MMOL/L (ref 0.5–2)
LACTATE BLD-SCNC: 2.9 MMOL/L (ref 0.5–2)
LYMPHOCYTES # BLD AUTO: 2.54 K/UL (ref 1–4.8)
LYMPHOCYTES NFR BLD: 36.8 % (ref 22–41)
MAGNESIUM SERPL-MCNC: 2.3 MG/DL (ref 1.5–2.5)
MCH RBC QN AUTO: 32.7 PG (ref 27–33)
MCHC RBC AUTO-ENTMCNC: 32.3 G/DL (ref 33.6–35)
MCV RBC AUTO: 101.4 FL (ref 81.4–97.8)
MONOCYTES # BLD AUTO: 0.59 K/UL (ref 0–0.85)
MONOCYTES NFR BLD AUTO: 8.6 % (ref 0–13.4)
NEUTROPHILS # BLD AUTO: 3.58 K/UL (ref 2–7.15)
NEUTROPHILS NFR BLD: 51.8 % (ref 44–72)
NRBC # BLD AUTO: 0 K/UL
NRBC BLD-RTO: 0 /100 WBC
PLATELET # BLD AUTO: 294 K/UL (ref 164–446)
PMV BLD AUTO: 9.1 FL (ref 9–12.9)
POTASSIUM SERPL-SCNC: 4.5 MMOL/L (ref 3.6–5.5)
PROT SERPL-MCNC: 6.8 G/DL (ref 6–8.2)
RBC # BLD AUTO: 4.34 M/UL (ref 4.2–5.4)
RSV RNA SPEC QL NAA+PROBE: NEGATIVE
SARS-COV-2 RNA RESP QL NAA+PROBE: NOTDETECTED
SODIUM SERPL-SCNC: 142 MMOL/L (ref 135–145)
SPECIMEN SOURCE: NORMAL
TROPONIN T SERPL-MCNC: <6 NG/L (ref 6–19)
WBC # BLD AUTO: 6.9 K/UL (ref 4.8–10.8)

## 2021-01-28 PROCEDURE — 84484 ASSAY OF TROPONIN QUANT: CPT

## 2021-01-28 PROCEDURE — 93005 ELECTROCARDIOGRAM TRACING: CPT | Performed by: EMERGENCY MEDICINE

## 2021-01-28 PROCEDURE — HZ2ZZZZ DETOXIFICATION SERVICES FOR SUBSTANCE ABUSE TREATMENT: ICD-10-PCS | Performed by: INTERNAL MEDICINE

## 2021-01-28 PROCEDURE — 99285 EMERGENCY DEPT VISIT HI MDM: CPT

## 2021-01-28 PROCEDURE — 85025 COMPLETE CBC W/AUTO DIFF WBC: CPT

## 2021-01-28 PROCEDURE — 82077 ASSAY SPEC XCP UR&BREATH IA: CPT

## 2021-01-28 PROCEDURE — 36415 COLL VENOUS BLD VENIPUNCTURE: CPT

## 2021-01-28 PROCEDURE — C9803 HOPD COVID-19 SPEC COLLECT: HCPCS | Performed by: EMERGENCY MEDICINE

## 2021-01-28 PROCEDURE — 71045 X-RAY EXAM CHEST 1 VIEW: CPT

## 2021-01-28 PROCEDURE — 80053 COMPREHEN METABOLIC PANEL: CPT

## 2021-01-28 PROCEDURE — 83735 ASSAY OF MAGNESIUM: CPT

## 2021-01-28 PROCEDURE — 770020 HCHG ROOM/CARE - TELE (206)

## 2021-01-28 PROCEDURE — 83605 ASSAY OF LACTIC ACID: CPT

## 2021-01-28 PROCEDURE — 0241U HCHG SARS-COV-2 COVID-19 NFCT DS RESP RNA 4 TRGT MIC: CPT

## 2021-01-28 PROCEDURE — 700101 HCHG RX REV CODE 250: Performed by: EMERGENCY MEDICINE

## 2021-01-28 PROCEDURE — 96365 THER/PROPH/DIAG IV INF INIT: CPT

## 2021-01-28 PROCEDURE — 96366 THER/PROPH/DIAG IV INF ADDON: CPT

## 2021-01-28 PROCEDURE — 87040 BLOOD CULTURE FOR BACTERIA: CPT

## 2021-01-28 RX ORDER — MAGNESIUM SULFATE HEPTAHYDRATE 40 MG/ML
2 INJECTION, SOLUTION INTRAVENOUS ONCE
Status: COMPLETED | OUTPATIENT
Start: 2021-01-28 | End: 2021-01-29

## 2021-01-28 RX ORDER — ACETAMINOPHEN 325 MG/1
650 TABLET ORAL EVERY 6 HOURS PRN
Status: DISCONTINUED | OUTPATIENT
Start: 2021-01-28 | End: 2021-02-09 | Stop reason: HOSPADM

## 2021-01-28 RX ORDER — OMEPRAZOLE 20 MG/1
20 CAPSULE, DELAYED RELEASE ORAL DAILY
Status: DISCONTINUED | OUTPATIENT
Start: 2021-01-29 | End: 2021-01-29

## 2021-01-28 RX ORDER — SULFAMETHOXAZOLE AND TRIMETHOPRIM 800; 160 MG/1; MG/1
1 TABLET ORAL 2 TIMES DAILY
Status: ON HOLD | COMMUNITY
Start: 2021-01-19 | End: 2021-02-09

## 2021-01-28 RX ORDER — AMOXICILLIN 250 MG
2 CAPSULE ORAL 2 TIMES DAILY
Status: DISCONTINUED | OUTPATIENT
Start: 2021-01-28 | End: 2021-02-09 | Stop reason: HOSPADM

## 2021-01-28 RX ORDER — LORAZEPAM 0.5 MG/1
0.5 TABLET ORAL EVERY 4 HOURS PRN
Status: DISCONTINUED | OUTPATIENT
Start: 2021-01-28 | End: 2021-01-31

## 2021-01-28 RX ORDER — LORAZEPAM 2 MG/ML
2 INJECTION INTRAMUSCULAR
Status: DISCONTINUED | OUTPATIENT
Start: 2021-01-28 | End: 2021-01-31

## 2021-01-28 RX ORDER — DIAZEPAM 5 MG/1
10 TABLET ORAL EVERY 6 HOURS
Status: DISCONTINUED | OUTPATIENT
Start: 2021-01-28 | End: 2021-01-29

## 2021-01-28 RX ORDER — DIAZEPAM 5 MG/1
5 TABLET ORAL EVERY 6 HOURS
Status: DISCONTINUED | OUTPATIENT
Start: 2021-01-29 | End: 2021-01-29

## 2021-01-28 RX ORDER — LORAZEPAM 2 MG/ML
0.5 INJECTION INTRAMUSCULAR EVERY 4 HOURS PRN
Status: DISCONTINUED | OUTPATIENT
Start: 2021-01-28 | End: 2021-01-31

## 2021-01-28 RX ORDER — LORAZEPAM 2 MG/ML
1 INJECTION INTRAMUSCULAR
Status: DISCONTINUED | OUTPATIENT
Start: 2021-01-28 | End: 2021-01-31

## 2021-01-28 RX ORDER — QUETIAPINE FUMARATE 100 MG/1
100 TABLET, FILM COATED ORAL
Status: ON HOLD | COMMUNITY
End: 2021-02-09

## 2021-01-28 RX ORDER — DIGOXIN 125 MCG
125 TABLET ORAL DAILY
Status: DISCONTINUED | OUTPATIENT
Start: 2021-01-29 | End: 2021-02-09 | Stop reason: HOSPADM

## 2021-01-28 RX ORDER — LEVOTHYROXINE SODIUM 0.12 MG/1
125 TABLET ORAL
Status: DISCONTINUED | OUTPATIENT
Start: 2021-01-29 | End: 2021-01-29

## 2021-01-28 RX ORDER — LORAZEPAM 2 MG/1
2 TABLET ORAL
Status: DISCONTINUED | OUTPATIENT
Start: 2021-01-28 | End: 2021-01-31

## 2021-01-28 RX ORDER — FOLIC ACID 1 MG/1
1 TABLET ORAL DAILY
Status: DISCONTINUED | OUTPATIENT
Start: 2021-01-29 | End: 2021-01-29

## 2021-01-28 RX ORDER — GAUZE BANDAGE 2" X 2"
100 BANDAGE TOPICAL DAILY
Status: DISCONTINUED | OUTPATIENT
Start: 2021-01-29 | End: 2021-01-29

## 2021-01-28 RX ORDER — MIRTAZAPINE 15 MG/1
15 TABLET, FILM COATED ORAL
Status: DISCONTINUED | OUTPATIENT
Start: 2021-01-28 | End: 2021-01-29

## 2021-01-28 RX ORDER — LORAZEPAM 1 MG/1
1 TABLET ORAL EVERY 4 HOURS PRN
Status: DISCONTINUED | OUTPATIENT
Start: 2021-01-28 | End: 2021-01-31

## 2021-01-28 RX ORDER — BISACODYL 10 MG
10 SUPPOSITORY, RECTAL RECTAL
Status: DISCONTINUED | OUTPATIENT
Start: 2021-01-28 | End: 2021-02-09 | Stop reason: HOSPADM

## 2021-01-28 RX ORDER — VENLAFAXINE HYDROCHLORIDE 75 MG/1
75 CAPSULE, EXTENDED RELEASE ORAL DAILY
Status: ON HOLD | COMMUNITY
Start: 2020-12-11 | End: 2021-02-09

## 2021-01-28 RX ORDER — LORAZEPAM 2 MG/ML
1.5 INJECTION INTRAMUSCULAR
Status: DISCONTINUED | OUTPATIENT
Start: 2021-01-28 | End: 2021-01-31

## 2021-01-28 RX ORDER — GAUZE BANDAGE 2" X 2"
100 BANDAGE TOPICAL DAILY
Status: DISCONTINUED | OUTPATIENT
Start: 2021-01-29 | End: 2021-01-31

## 2021-01-28 RX ORDER — QUETIAPINE FUMARATE 25 MG/1
50 TABLET, FILM COATED ORAL NIGHTLY
Status: DISCONTINUED | OUTPATIENT
Start: 2021-01-28 | End: 2021-01-29

## 2021-01-28 RX ORDER — LORAZEPAM 2 MG/1
4 TABLET ORAL
Status: DISCONTINUED | OUTPATIENT
Start: 2021-01-28 | End: 2021-01-31

## 2021-01-28 RX ORDER — FOLIC ACID 1 MG/1
1 TABLET ORAL DAILY
Status: COMPLETED | OUTPATIENT
Start: 2021-01-29 | End: 2021-02-01

## 2021-01-28 RX ORDER — MAGNESIUM OXIDE 400 MG/1
400 TABLET ORAL DAILY
COMMUNITY
End: 2021-11-30 | Stop reason: SDUPTHER

## 2021-01-28 RX ORDER — POLYETHYLENE GLYCOL 3350 17 G/17G
1 POWDER, FOR SOLUTION ORAL
Status: DISCONTINUED | OUTPATIENT
Start: 2021-01-28 | End: 2021-02-09 | Stop reason: HOSPADM

## 2021-01-28 RX ORDER — POTASSIUM CHLORIDE 20 MEQ/1
20 TABLET, EXTENDED RELEASE ORAL DAILY
Status: DISCONTINUED | OUTPATIENT
Start: 2021-01-29 | End: 2021-01-29

## 2021-01-28 RX ORDER — METOPROLOL TARTRATE 50 MG/1
100 TABLET, FILM COATED ORAL 2 TIMES DAILY
Status: DISCONTINUED | OUTPATIENT
Start: 2021-01-29 | End: 2021-01-31

## 2021-01-28 RX ADMIN — THIAMINE HYDROCHLORIDE: 100 INJECTION, SOLUTION INTRAMUSCULAR; INTRAVENOUS at 20:13

## 2021-01-28 ASSESSMENT — LIFESTYLE VARIABLES
TREMOR: NO TREMOR
ORIENTATION AND CLOUDING OF SENSORIUM: CANNOT DO SERIAL ADDITIONS OR IS UNCERTAIN ABOUT DATE
PAROXYSMAL SWEATS: NO SWEAT VISIBLE
VISUAL DISTURBANCES: NOT PRESENT
AGITATION: SOMEWHAT MORE THAN NORMAL ACTIVITY
HEADACHE, FULLNESS IN HEAD: NOT PRESENT
TOTAL SCORE: 3
ANXIETY: MILDLY ANXIOUS
NAUSEA AND VOMITING: NO NAUSEA AND NO VOMITING
AUDITORY DISTURBANCES: NOT PRESENT

## 2021-01-28 ASSESSMENT — FIBROSIS 4 INDEX
FIB4 SCORE: 1.01
FIB4 SCORE: 1.19

## 2021-01-28 NOTE — PROGRESS NOTES
CC: Hospital discharge follow-up, status post fall and head trauma.    HPI:   Jeanie presents today discuss the following issue:    Hospital discharge follow-up  Patient was admitted to [facility] from 8/9-8/16/2019, she sustained a ground-level fall with head trauma.  Patient stated that she had a CAT scan of the head and was unremarkable, and she was detoxed due to alcohol withdrawal symptoms. Hospital discharge summary was not sent from [facility].  History was taken from patient.  Patient has history of A. fib, has been asymptomatic, currently she has normal heart rate and rhythm, she is currently on Eliquis and carvedilol.  Patient has history of depression which has worsened as a result of excessive alcohol consumption.  Patient was detoxed multiple times in the past, but she resumed her drinking.  Currently follow-up with psychologist for grief counseling.      Patient Active Problem List    Diagnosis Date Noted   • Recurrent falls 08/21/2019   • Alcohol abuse 04/25/2019   • Mixed hyperlipidemia 03/11/2018   • Moderate single current episode of major depressive disorder ([facility]) 12/08/2017   • HTN (hypertension) 01/20/2015   • PAF (paroxysmal atrial fibrillation) ([facility]) 09/10/2014   • Drug abuse ([facility]) 05/17/2014   • Anxiety 05/17/2014       Current Outpatient Medications   Medication Sig Dispense Refill   • levothyroxine (SYNTHROID) 75 MCG Tab TAKE ONE TABLET BY MOUTH EVERY MORNING ON AN EMPTY STOMACH 60 Tab 0   • apixaban (ELIQUIS) 5mg Tab Take 5 mg by mouth 2 Times a Day.     • folic acid (FOLVITE) 1 MG Tab Take 1 mg by mouth every day.     • quetiapine (SEROQUEL) 50 MG tablet Take 1 Tab by mouth 2 times a day. 60 Tab 2   • carvedilol (COREG) 6.25 MG Tab Take 1 Tab by mouth 2 times a day, with meals. 60 Tab 11   • mirtazapine (REMERON) 15 MG Tab Take 1 Tab by mouth every bedtime. 30 Tab 3   • celecoxib (CELEBREX) 200 MG Cap Take 1 Cap by mouth 2 times a day. 60 Cap 3   • lisinopril  "(PRINIVIL) 20 MG Tab Take 1 Tab by mouth every day. 90 Tab 3   • hydrOXYzine pamoate (VISTARIL) 50 MG Cap Take 1 Cap by mouth 3 times a day as needed for Itching. 90 Cap 3   • paroxetine (PAXIL-CR) 37.5 MG CR tablet Take 1 Tab by mouth every day. 90 Tab 3   • baclofen (LIORESAL) 20 MG tablet Take 1 Tab by mouth 3 times a day. 90 Tab 3   • levothyroxine (SYNTHROID) 25 MCG Tab Take 1 Tab by mouth Every morning on an empty stomach. 90 Tab 1   • gabapentin (NEURONTIN) 300 MG Cap Take 300 mg by mouth 3 times a day.     • lorazepam (ATIVAN) 1 MG Tab Take 1 Tab by mouth every 8 hours as needed for Anxiety.     • aspirin EC 81 MG EC tablet Take 1 Tab by mouth every day. 30 Tab 2     No current facility-administered medications for this visit.          Allergies as of 08/21/2019   • (No Known Allergies)        ROS: Denies any chest pain, Shortness of breath, Changes bowel or bladder, Lower extremity edema.    Physical Exam:  /74 (BP Location: Right arm, Patient Position: Sitting, BP Cuff Size: Adult)   Pulse 65   Temp 36.7 °C (98 °F) (Temporal)   Resp 16   Ht 1.676 m (5' 6\")   Wt 74.8 kg (165 lb)   SpO2 93%   BMI 26.63 kg/m²   Gen.: Well-developed, well-nourished, no apparent distress,pleasant and cooperative with the examination  Skin:  Warm and dry with good turgor. No rashes or suspicious lesions in visible areas  HEENT:Sinuses nontender with palpation, TMs clear, nares patent with pink mucosa and clear rhinorrhea,no septal deviation ,polyps or lesions. lips without lesions, oropharynx clear.  Neck: Trachea midline,no masses or adenopathy. No JVD.  Cor: Regular rate and rhythm without murmur, gallop or rub.  Lungs: Respirations unlabored.Clear to auscultation with equal breath sounds bilaterally. No wheezes, rhonchi.  Extremities: No cyanosis, clubbing or edema.        Assessment and Plan.   74 y.o. female     1. Hospital discharge follow-up  Hospital discharge summary was not sent from [facility].  " History was taken from patient  [facility] called to get the discharge summary, will review discharge summary and follow-up with patient.    2. Alcohol withdrawal syndrome without complication (HCC)  Patient was detoxed in the hospital.  Patient stated that she is not a daily drinker, however sometimes she drinks a lot continuously for few days then get drunk and all the complications follows including recurrent falls.  Discussed with patient complications of alcohol consumption.  She will try to quit.    3. PAF (paroxysmal atrial fibrillation) (HCC)  No RVR.  Continue on Eliquis and carvedilol.    4. Moderate single current episode of major depressive disorder (HCC)  It has been fluctuating.  However denies any suicidal ideation.  Discussed with patient that excessive alcohol consumption makes it worse  Continue on Paxil and Seroquel.  Continue follow-up with counselor    5. Recurrent falls  As a result of alcohol consumption.  Discussed with patient complications of alcohol consumption.  She will try to quit.   Statement Selected

## 2021-01-29 ENCOUNTER — APPOINTMENT (OUTPATIENT)
Dept: CARDIOLOGY | Facility: MEDICAL CENTER | Age: 76
DRG: 897 | End: 2021-01-29
Attending: STUDENT IN AN ORGANIZED HEALTH CARE EDUCATION/TRAINING PROGRAM
Payer: MEDICARE

## 2021-01-29 PROBLEM — R26.9 GAIT DISORDER: Status: ACTIVE | Noted: 2021-01-29

## 2021-01-29 PROBLEM — F32.A DEPRESSION: Status: ACTIVE | Noted: 2021-01-29

## 2021-01-29 LAB
AMPHET UR QL SCN: NEGATIVE
BARBITURATES UR QL SCN: NEGATIVE
BENZODIAZ UR QL SCN: POSITIVE
BZE UR QL SCN: NEGATIVE
CANNABINOIDS UR QL SCN: NEGATIVE
DIGOXIN SERPL-MCNC: 0.4 NG/ML (ref 0.8–2)
EST. AVERAGE GLUCOSE BLD GHB EST-MCNC: 91 MG/DL
FOLATE SERPL-MCNC: >40 NG/ML
HBA1C MFR BLD: 4.8 % (ref 0–5.6)
METHADONE UR QL SCN: NEGATIVE
OPIATES UR QL SCN: NEGATIVE
OXYCODONE UR QL SCN: NEGATIVE
PCP UR QL SCN: NEGATIVE
PROPOXYPH UR QL SCN: NEGATIVE
T4 FREE SERPL-MCNC: 1.79 NG/DL (ref 0.93–1.7)
TSH SERPL DL<=0.005 MIU/L-ACNC: 0.02 UIU/ML (ref 0.38–5.33)
VIT B12 SERPL-MCNC: 525 PG/ML (ref 211–911)

## 2021-01-29 PROCEDURE — 700102 HCHG RX REV CODE 250 W/ 637 OVERRIDE(OP): Performed by: INTERNAL MEDICINE

## 2021-01-29 PROCEDURE — A9270 NON-COVERED ITEM OR SERVICE: HCPCS | Performed by: PSYCHIATRY & NEUROLOGY

## 2021-01-29 PROCEDURE — 80307 DRUG TEST PRSMV CHEM ANLYZR: CPT

## 2021-01-29 PROCEDURE — 700102 HCHG RX REV CODE 250 W/ 637 OVERRIDE(OP): Performed by: PSYCHIATRY & NEUROLOGY

## 2021-01-29 PROCEDURE — 700102 HCHG RX REV CODE 250 W/ 637 OVERRIDE(OP): Performed by: STUDENT IN AN ORGANIZED HEALTH CARE EDUCATION/TRAINING PROGRAM

## 2021-01-29 PROCEDURE — 36415 COLL VENOUS BLD VENIPUNCTURE: CPT

## 2021-01-29 PROCEDURE — 770020 HCHG ROOM/CARE - TELE (206)

## 2021-01-29 PROCEDURE — A9270 NON-COVERED ITEM OR SERVICE: HCPCS | Performed by: STUDENT IN AN ORGANIZED HEALTH CARE EDUCATION/TRAINING PROGRAM

## 2021-01-29 PROCEDURE — 82746 ASSAY OF FOLIC ACID SERUM: CPT

## 2021-01-29 PROCEDURE — 700105 HCHG RX REV CODE 258: Performed by: STUDENT IN AN ORGANIZED HEALTH CARE EDUCATION/TRAINING PROGRAM

## 2021-01-29 PROCEDURE — 84443 ASSAY THYROID STIM HORMONE: CPT

## 2021-01-29 PROCEDURE — 97161 PT EVAL LOW COMPLEX 20 MIN: CPT

## 2021-01-29 PROCEDURE — 84439 ASSAY OF FREE THYROXINE: CPT

## 2021-01-29 PROCEDURE — 83036 HEMOGLOBIN GLYCOSYLATED A1C: CPT

## 2021-01-29 PROCEDURE — 700101 HCHG RX REV CODE 250: Performed by: INTERNAL MEDICINE

## 2021-01-29 PROCEDURE — 99223 1ST HOSP IP/OBS HIGH 75: CPT | Mod: AI | Performed by: INTERNAL MEDICINE

## 2021-01-29 PROCEDURE — A9270 NON-COVERED ITEM OR SERVICE: HCPCS | Performed by: INTERNAL MEDICINE

## 2021-01-29 PROCEDURE — 700111 HCHG RX REV CODE 636 W/ 250 OVERRIDE (IP): Performed by: INTERNAL MEDICINE

## 2021-01-29 PROCEDURE — 80162 ASSAY OF DIGOXIN TOTAL: CPT

## 2021-01-29 PROCEDURE — 99223 1ST HOSP IP/OBS HIGH 75: CPT | Performed by: PSYCHIATRY & NEUROLOGY

## 2021-01-29 PROCEDURE — 82607 VITAMIN B-12: CPT

## 2021-01-29 RX ORDER — QUETIAPINE FUMARATE 25 MG/1
100 TABLET, FILM COATED ORAL NIGHTLY
Status: DISCONTINUED | OUTPATIENT
Start: 2021-01-29 | End: 2021-01-30

## 2021-01-29 RX ORDER — OMEPRAZOLE 20 MG/1
20 CAPSULE, DELAYED RELEASE ORAL DAILY
Status: DISCONTINUED | OUTPATIENT
Start: 2021-01-29 | End: 2021-02-01

## 2021-01-29 RX ORDER — CLONIDINE HYDROCHLORIDE 0.1 MG/1
0.1 TABLET ORAL
Status: DISCONTINUED | OUTPATIENT
Start: 2021-01-29 | End: 2021-01-31

## 2021-01-29 RX ORDER — SODIUM CHLORIDE, SODIUM LACTATE, POTASSIUM CHLORIDE, CALCIUM CHLORIDE 600; 310; 30; 20 MG/100ML; MG/100ML; MG/100ML; MG/100ML
INJECTION, SOLUTION INTRAVENOUS CONTINUOUS
Status: DISCONTINUED | OUTPATIENT
Start: 2021-01-29 | End: 2021-01-31

## 2021-01-29 RX ORDER — VENLAFAXINE HYDROCHLORIDE 37.5 MG/1
75 CAPSULE, EXTENDED RELEASE ORAL DAILY
Status: DISCONTINUED | OUTPATIENT
Start: 2021-01-29 | End: 2021-01-29

## 2021-01-29 RX ORDER — QUETIAPINE FUMARATE 25 MG/1
12.5 TABLET, FILM COATED ORAL 3 TIMES DAILY
Status: DISCONTINUED | OUTPATIENT
Start: 2021-01-29 | End: 2021-01-29

## 2021-01-29 RX ORDER — GABAPENTIN 100 MG/1
200 CAPSULE ORAL EVERY EVENING
Status: DISCONTINUED | OUTPATIENT
Start: 2021-01-29 | End: 2021-02-09 | Stop reason: HOSPADM

## 2021-01-29 RX ORDER — QUETIAPINE FUMARATE 25 MG/1
100 TABLET, FILM COATED ORAL
Status: DISCONTINUED | OUTPATIENT
Start: 2021-01-29 | End: 2021-01-29

## 2021-01-29 RX ORDER — CHLORDIAZEPOXIDE HYDROCHLORIDE 25 MG/1
25 CAPSULE, GELATIN COATED ORAL EVERY 8 HOURS
Status: DISCONTINUED | OUTPATIENT
Start: 2021-01-29 | End: 2021-01-31

## 2021-01-29 RX ORDER — MAGNESIUM OXIDE 400 MG/1
400 TABLET ORAL DAILY
Status: DISCONTINUED | OUTPATIENT
Start: 2021-01-29 | End: 2021-01-29

## 2021-01-29 RX ORDER — VENLAFAXINE HYDROCHLORIDE 75 MG/1
150 CAPSULE, EXTENDED RELEASE ORAL DAILY
Status: DISCONTINUED | OUTPATIENT
Start: 2021-01-30 | End: 2021-02-09 | Stop reason: HOSPADM

## 2021-01-29 RX ORDER — CLONIDINE HYDROCHLORIDE 0.1 MG/1
0.1 TABLET ORAL ONCE
Status: COMPLETED | OUTPATIENT
Start: 2021-01-29 | End: 2021-01-29

## 2021-01-29 RX ORDER — LEVOTHYROXINE SODIUM 0.1 MG/1
100 TABLET ORAL
Status: DISCONTINUED | OUTPATIENT
Start: 2021-01-30 | End: 2021-01-30

## 2021-01-29 RX ORDER — CHLORDIAZEPOXIDE HYDROCHLORIDE 25 MG/1
25 CAPSULE, GELATIN COATED ORAL 3 TIMES DAILY
Status: CANCELLED | OUTPATIENT
Start: 2021-01-29

## 2021-01-29 RX ORDER — GABAPENTIN 100 MG/1
100 CAPSULE ORAL 2 TIMES DAILY
Status: DISCONTINUED | OUTPATIENT
Start: 2021-01-29 | End: 2021-02-01

## 2021-01-29 RX ADMIN — GABAPENTIN 100 MG: 100 CAPSULE ORAL at 15:41

## 2021-01-29 RX ADMIN — CHLORDIAZEPOXIDE HYDROCHLORIDE 25 MG: 25 CAPSULE ORAL at 21:09

## 2021-01-29 RX ADMIN — QUETIAPINE FUMARATE 100 MG: 25 TABLET ORAL at 00:39

## 2021-01-29 RX ADMIN — Medication 100 MG: at 06:13

## 2021-01-29 RX ADMIN — CLONIDINE HYDROCHLORIDE 0.1 MG: 0.1 TABLET ORAL at 15:42

## 2021-01-29 RX ADMIN — FOLIC ACID 1 MG: 1 TABLET ORAL at 06:13

## 2021-01-29 RX ADMIN — THERA TABS 1 TABLET: TAB at 06:13

## 2021-01-29 RX ADMIN — QUETIAPINE FUMARATE 12.5 MG: 25 TABLET ORAL at 06:12

## 2021-01-29 RX ADMIN — Medication 400 MG: at 06:13

## 2021-01-29 RX ADMIN — CLONIDINE HYDROCHLORIDE 0.1 MG: 0.1 TABLET ORAL at 12:44

## 2021-01-29 RX ADMIN — METOPROLOL TARTRATE 100 MG: 50 TABLET, FILM COATED ORAL at 06:19

## 2021-01-29 RX ADMIN — LEVOTHYROXINE SODIUM 125 MCG: 0.12 TABLET ORAL at 06:12

## 2021-01-29 RX ADMIN — APIXABAN 5 MG: 5 TABLET, FILM COATED ORAL at 06:12

## 2021-01-29 RX ADMIN — METOPROLOL TARTRATE 100 MG: 50 TABLET, FILM COATED ORAL at 17:36

## 2021-01-29 RX ADMIN — APIXABAN 5 MG: 5 TABLET, FILM COATED ORAL at 17:37

## 2021-01-29 RX ADMIN — MIRTAZAPINE 15 MG: 15 TABLET, FILM COATED ORAL at 00:41

## 2021-01-29 RX ADMIN — DIAZEPAM 10 MG: 5 TABLET ORAL at 00:41

## 2021-01-29 RX ADMIN — OMEPRAZOLE 20 MG: 20 CAPSULE, DELAYED RELEASE ORAL at 11:17

## 2021-01-29 RX ADMIN — QUETIAPINE FUMARATE 100 MG: 25 TABLET ORAL at 21:09

## 2021-01-29 RX ADMIN — GABAPENTIN 200 MG: 100 CAPSULE ORAL at 17:37

## 2021-01-29 RX ADMIN — Medication 400 MG: at 17:37

## 2021-01-29 RX ADMIN — SODIUM CHLORIDE, POTASSIUM CHLORIDE, SODIUM LACTATE AND CALCIUM CHLORIDE: 600; 310; 30; 20 INJECTION, SOLUTION INTRAVENOUS at 11:17

## 2021-01-29 RX ADMIN — THIAMINE HYDROCHLORIDE: 100 INJECTION, SOLUTION INTRAMUSCULAR; INTRAVENOUS at 01:18

## 2021-01-29 RX ADMIN — CHLORDIAZEPOXIDE HYDROCHLORIDE 25 MG: 25 CAPSULE ORAL at 13:48

## 2021-01-29 RX ADMIN — VENLAFAXINE HYDROCHLORIDE 75 MG: 37.5 CAPSULE, EXTENDED RELEASE ORAL at 09:41

## 2021-01-29 RX ADMIN — DIAZEPAM 10 MG: 5 TABLET ORAL at 09:41

## 2021-01-29 RX ADMIN — QUETIAPINE FUMARATE 12.5 MG: 25 TABLET ORAL at 11:17

## 2021-01-29 RX ADMIN — MAGNESIUM SULFATE IN WATER 2 G: 40 INJECTION, SOLUTION INTRAVENOUS at 00:52

## 2021-01-29 RX ADMIN — DIGOXIN 125 MCG: 125 TABLET ORAL at 17:38

## 2021-01-29 ASSESSMENT — CHA2DS2 SCORE
PRIOR STROKE OR TIA OR THROMBOEMBOLISM: NO
SEX: FEMALE
DIABETES: NO
AGE 65 TO 74: YES
CHF OR LEFT VENTRICULAR DYSFUNCTION: NO
AGE 75 OR GREATER: NO
VASCULAR DISEASE: NO
CHA2DS2 VASC SCORE: 2
HYPERTENSION: NO

## 2021-01-29 ASSESSMENT — COGNITIVE AND FUNCTIONAL STATUS - GENERAL
MOBILITY SCORE: 22
MOVING TO AND FROM BED TO CHAIR: A LITTLE
CLIMB 3 TO 5 STEPS WITH RAILING: A LITTLE
DRESSING REGULAR UPPER BODY CLOTHING: A LITTLE
MOBILITY SCORE: 22
SUGGESTED CMS G CODE MODIFIER DAILY ACTIVITY: CJ
SUGGESTED CMS G CODE MODIFIER MOBILITY: CJ
CLIMB 3 TO 5 STEPS WITH RAILING: A LITTLE
DAILY ACTIVITIY SCORE: 21
DRESSING REGULAR LOWER BODY CLOTHING: A LITTLE
WALKING IN HOSPITAL ROOM: A LITTLE
SUGGESTED CMS G CODE MODIFIER MOBILITY: CJ
HELP NEEDED FOR BATHING: A LITTLE

## 2021-01-29 ASSESSMENT — LIFESTYLE VARIABLES
ANXIETY: NO ANXIETY (AT EASE)
AGITATION: NORMAL ACTIVITY
TOTAL SCORE: 0
TOTAL SCORE: 1
SUBSTANCE_ABUSE: 1
AUDITORY DISTURBANCES: NOT PRESENT
VISUAL DISTURBANCES: NOT PRESENT
TOTAL SCORE: 0
TOTAL SCORE: 2
HEADACHE, FULLNESS IN HEAD: NOT PRESENT
ANXIETY: NO ANXIETY (AT EASE)
ANXIETY: NO ANXIETY (AT EASE)
TOTAL SCORE: 2
EVER FELT BAD OR GUILTY ABOUT YOUR DRINKING: YES
AUDITORY DISTURBANCES: NOT PRESENT
CONSUMPTION TOTAL: POSITIVE
VISUAL DISTURBANCES: NOT PRESENT
AGITATION: NORMAL ACTIVITY
PAROXYSMAL SWEATS: NO SWEAT VISIBLE
ON A TYPICAL DAY WHEN YOU DRINK ALCOHOL HOW MANY DRINKS DO YOU HAVE: 3
TREMOR: TREMOR NOT VISIBLE BUT CAN BE FELT, FINGERTIP TO FINGERTIP
ALCOHOL_USE: YES
ORIENTATION AND CLOUDING OF SENSORIUM: ORIENTED AND CAN DO SERIAL ADDITIONS
PAROXYSMAL SWEATS: NO SWEAT VISIBLE
HOW MANY TIMES IN THE PAST YEAR HAVE YOU HAD 5 OR MORE DRINKS IN A DAY: 0
NAUSEA AND VOMITING: NO NAUSEA AND NO VOMITING
TREMOR: NO TREMOR
EVER HAD A DRINK FIRST THING IN THE MORNING TO STEADY YOUR NERVES TO GET RID OF A HANGOVER: NO
ANXIETY: NO ANXIETY (AT EASE)
HAVE PEOPLE ANNOYED YOU BY CRITICIZING YOUR DRINKING: NO
PAROXYSMAL SWEATS: NO SWEAT VISIBLE
AGITATION: NORMAL ACTIVITY
AVERAGE NUMBER OF DAYS PER WEEK YOU HAVE A DRINK CONTAINING ALCOHOL: 7
ORIENTATION AND CLOUDING OF SENSORIUM: ORIENTED AND CAN DO SERIAL ADDITIONS
TOTAL SCORE: 2
HAVE YOU EVER FELT YOU SHOULD CUT DOWN ON YOUR DRINKING: YES
PAROXYSMAL SWEATS: NO SWEAT VISIBLE
AUDITORY DISTURBANCES: NOT PRESENT
TREMOR: TREMOR NOT VISIBLE BUT CAN BE FELT, FINGERTIP TO FINGERTIP
NAUSEA AND VOMITING: NO NAUSEA AND NO VOMITING
VISUAL DISTURBANCES: NOT PRESENT
ORIENTATION AND CLOUDING OF SENSORIUM: ORIENTED AND CAN DO SERIAL ADDITIONS
HEADACHE, FULLNESS IN HEAD: NOT PRESENT
NAUSEA AND VOMITING: NO NAUSEA AND NO VOMITING
VISUAL DISTURBANCES: NOT PRESENT
ORIENTATION AND CLOUDING OF SENSORIUM: ORIENTED AND CAN DO SERIAL ADDITIONS
HEADACHE, FULLNESS IN HEAD: NOT PRESENT
TREMOR: NO TREMOR
AGITATION: NORMAL ACTIVITY
TOTAL SCORE: 1
NAUSEA AND VOMITING: NO NAUSEA AND NO VOMITING
AUDITORY DISTURBANCES: NOT PRESENT
HEADACHE, FULLNESS IN HEAD: NOT PRESENT
DOES PATIENT WANT TO STOP DRINKING: NO

## 2021-01-29 ASSESSMENT — PATIENT HEALTH QUESTIONNAIRE - PHQ9
8. MOVING OR SPEAKING SO SLOWLY THAT OTHER PEOPLE COULD HAVE NOTICED. OR THE OPPOSITE, BEING SO FIGETY OR RESTLESS THAT YOU HAVE BEEN MOVING AROUND A LOT MORE THAN USUAL: SEVERAL DAYS
7. TROUBLE CONCENTRATING ON THINGS, SUCH AS READING THE NEWSPAPER OR WATCHING TELEVISION: SEVERAL DAYS
6. FEELING BAD ABOUT YOURSELF - OR THAT YOU ARE A FAILURE OR HAVE LET YOURSELF OR YOUR FAMILY DOWN: SEVERAL DAYS
1. LITTLE INTEREST OR PLEASURE IN DOING THINGS: SEVERAL DAYS
SUM OF ALL RESPONSES TO PHQ9 QUESTIONS 1 AND 2: 3
3. TROUBLE FALLING OR STAYING ASLEEP OR SLEEPING TOO MUCH: MORE THAN HALF THE DAYS
5. POOR APPETITE OR OVEREATING: SEVERAL DAYS
SUM OF ALL RESPONSES TO PHQ QUESTIONS 1-9: 11
2. FEELING DOWN, DEPRESSED, IRRITABLE, OR HOPELESS: MORE THAN HALF THE DAYS
9. THOUGHTS THAT YOU WOULD BE BETTER OFF DEAD, OR OF HURTING YOURSELF: NOT AT ALL
4. FEELING TIRED OR HAVING LITTLE ENERGY: MORE THAN HALF THE DAYS

## 2021-01-29 ASSESSMENT — GAIT ASSESSMENTS
DISTANCE (FEET): 250
ASSISTIVE DEVICE: HAND HELD ASSIST
GAIT LEVEL OF ASSIST: MINIMAL ASSIST

## 2021-01-29 ASSESSMENT — FIBROSIS 4 INDEX: FIB4 SCORE: 1.19

## 2021-01-29 ASSESSMENT — ENCOUNTER SYMPTOMS
INSOMNIA: 1
RESPIRATORY NEGATIVE: 1
GASTROINTESTINAL NEGATIVE: 1
EYES NEGATIVE: 1
DEPRESSION: 1
NEUROLOGICAL NEGATIVE: 1
MUSCULOSKELETAL NEGATIVE: 1
CARDIOVASCULAR NEGATIVE: 1

## 2021-01-29 NOTE — ED NOTES
Pt pacemaker interrogated, data sent. Pending fax results, pt pacemaker medtronic. Pt updated on POC, NAD noted.

## 2021-01-29 NOTE — CONSULTS
"PSYCHIATRIC INTAKE EVALUATION    *Reason for admission:  Acute intoxication on 2 pints of vodka daily. Syncopal episode. 's  one week ago.            *Reason for consult:  depression      *Requesting Physician/APN:  JEF Torres MD        Legal Hold status:  on legal hold         *Chief Complaint:: depression       *HPI (includes Psychiatric ROS):   74 yo female whose  was in a facility since around 2020 as he was having memory issues and she could not care for him. It was in lockdown and that is the last time she saw him. He  \"unexpectedly\" and the  was last week. She reports that being  to him was \"a lot of fun\" but goes on to describe a turbulent marriage in some ways as he was having \"affairs\".    She began drinking about 6-7 years ago after her dtr  of melanoma. 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 Its unclear how long or if she has been sober in this time. She drinks because \"it makes me feel better\".    Depression. Not SI/HI. Related to events primarily. Sleep is variable, appetite had been down but is \"picking up\", energy \"I don't know if im tired or lazy\", hopeless, poor motivation, and some anhedonia.     Anxiety: over \"the way my life is going\". It comes and goes.     PTSD: denies  Clarisa: denies  Psychosis: denies      *Medical Review Of Symptoms (not dx conditions):   Review of Systems   Constitutional: Positive for malaise/fatigue.   HENT: Negative.    Eyes: Negative.    Respiratory: Negative.    Cardiovascular: Negative.    Gastrointestinal: Negative.    Genitourinary: Negative.    Musculoskeletal: Negative.    Skin: Negative.    Neurological: Negative.    Psychiatric/Behavioral: Positive for depression and substance abuse. The patient has insomnia.        *Psychiatric Examination:   Vitals:   Vitals:    21 2350 21 0422 21 0729 21 1126   BP: 117/73 131/99 145/109 (!) 165/106   Pulse: 84 (!) 102 (!) 105 (!) 108 " "  Resp: 18 20 20 16   Temp: 36.2 °C (97.2 °F) (!) 35.7 °C (96.2 °F) 36.1 °C (96.9 °F) 36.8 °C (98.3 °F)   TempSrc: Temporal Temporal Temporal Temporal   SpO2: 92% 92% 94% 94%   Weight: 68.1 kg (150 lb 2.1 oz)      Height:         General Appearance:  Overwt, intermittent eye contact, cooperative   Abnormal Movements: none   Gait and Posture: not observed  Speech: within normal limits  Thought Process: normal rate  Associations:   linear  Abnormal or Psychotic Thoughts: none  Judgement and Insight: fair   Orientation: grossly intact  Recent and Remote Memory: grossly intact  Attention Span and Concentration: intact  Language:fluent  Fund of Knowledge: not tested  Mood and Affect: depressed  SI/HI:  suicidal - no and homicidal - no       *PAST MEDICAL/PSYCH/FAMILY/SOCIAL(as reported by patient):       *medical hx:           Past Medical History:   Diagnosis Date   • A-fib ([facility]) 5/1/2014   • Alcoholism ([facility])    • Apnea, sleep    • Chronic pain     r/t pelvic fracture   • Insomnia     Trouble going to Sleep    • Psychiatric disorder     history of depression and anxiety    • Snoring      Past Surgical History:   Procedure Laterality Date   • CLAVICLE ORIF  5/21/2014    Performed by Wilfred Gonzalez M.D. at [facility]   • BREAST BIOPSY      bilateral neg breast bxs   • OTHER ORTHOPEDIC SURGERY      pelvis fracture. 10 years ago    • TONSILLECTOMY       *psychiatric hx:    SAs:denies any   Hospitalizations:none  Med Hx:was on tricyclic(s), names not recalled, before prozac came out. Prozac and zoloft, she is not sure they worked but was drinking as well. Trazodone. ambien which made her \"loopy\".   Other: on 2 SSRIs that can each be increased as their therapeutic dose is higher.    *family Psych hx:  dad may have been an alcoholic. no drugs or mental illness known     *social hx: lives alone, 2 GFs that call and check on her. Son who is \"disgusted\" at her drinking but also checks on her.   Alcohol: daily "   Drugs:denies     *MEDICAL HX: labs, MARS, medications, etc were reviewed. Only those findings of potential interest to psychiatry are noted below:    *Current Medical issues:   see below     *Allergies:  No Known Allergies   *Current Medications:    Current Facility-Administered Medications:   •  QUEtiapine (Seroquel) tablet 12.5 mg, 12.5 mg, Oral, TID, 12.5 mg at 01/29/21 1117 **AND** QUEtiapine (Seroquel) tablet 100 mg, 100 mg, Oral, Nightly, Manny Torres M.D., 100 mg at 01/29/21 0039  •  venlafaxine XR (EFFEXOR XR) capsule 75 mg, 75 mg, Oral, DAILY, Galo Gardiner M.D., 75 mg at 01/29/21 0941  •  omeprazole (PRILOSEC) capsule 20 mg, 20 mg, Oral, DAILY, Galo Gardiner M.D., 20 mg at 01/29/21 1117  •  lactated ringers infusion, , Intravenous, Continuous, Galo Gardiner M.D., Last Rate: 75 mL/hr at 01/29/21 1117, New Bag at 01/29/21 1117  •  chlordiazePOXIDE (LIBRIUM) capsule 25 mg, 25 mg, Oral, Q8HRS, Galo Gardiner M.D., 25 mg at 01/29/21 1348  •  cloNIDine (CATAPRES) tablet 0.1 mg, 0.1 mg, Oral, BID, Spenser Nelson M.D.  •  digoxin (LANOXIN) tablet 125 mcg, 125 mcg, Oral, DAILY AT 1800, Manny Torres M.D.  •  apixaban (ELIQUIS) tablet 5 mg, 5 mg, Oral, BID, Manny Torres M.D., 5 mg at 01/29/21 0612  •  levothyroxine (SYNTHROID) tablet 125 mcg, 125 mcg, Oral, AM ES, Manny Torres M.D., 125 mcg at 01/29/21 0612  •  metoprolol tartrate (LOPRESSOR) tablet 100 mg, 100 mg, Oral, BID, Manny Torres M.D., 100 mg at 01/29/21 0619  •  mirtazapine (Remeron) tablet 15 mg, 15 mg, Oral, QHS, Manny Torres M.D., 15 mg at 01/29/21 0041  •  senna-docusate (PERICOLACE or SENOKOT S) 8.6-50 MG per tablet 2 Tab, 2 Tab, Oral, BID **AND** polyethylene glycol/lytes (MIRALAX) PACKET 1 Packet, 1 Packet, Oral, QDAY PRN **AND** magnesium hydroxide (MILK OF MAGNESIA) suspension 30 mL, 30 mL, Oral, QDAY PRN **AND** bisacodyl (DULCOLAX) suppository 10 mg, 10 mg, Rectal, QDAY PRN, Manny Torres M.D.  •  acetaminophen (Tylenol)  tablet 650 mg, 650 mg, Oral, Q6HRS PRN, Manny Torres M.D.  •  LORazepam (ATIVAN) tablet 0.5 mg, 0.5 mg, Oral, Q4HRS PRN, Manny Torres M.D.  •  LORazepam (ATIVAN) tablet 1 mg, 1 mg, Oral, Q4HRS PRN **OR** LORazepam (ATIVAN) injection 0.5 mg, 0.5 mg, Intravenous, Q4HRS PRN, Manny Torres M.D.  •  LORazepam (ATIVAN) tablet 2 mg, 2 mg, Oral, Q2HRS PRN **OR** LORazepam (ATIVAN) injection 1 mg, 1 mg, Intravenous, Q2HRS PRN, Manny Torres M.D.  •  LORazepam (ATIVAN) tablet 3 mg, 3 mg, Oral, Q HOUR PRN **OR** LORazepam (ATIVAN) injection 1.5 mg, 1.5 mg, Intravenous, Q HOUR PRN, Manny Torres M.D.  •  LORazepam (ATIVAN) tablet 4 mg, 4 mg, Oral, Q15 MIN PRN **OR** LORazepam (ATIVAN) injection 2 mg, 2 mg, Intravenous, Q15 MIN PRN, Manny Torres M.D.  •  [COMPLETED] magnesium sulfate IVPB premix 2 g, 2 g, Intravenous, Once, Stopped at 01/29/21 0252 **AND** magnesium oxide (MAG-OX) tablet 400 mg, 400 mg, Oral, BID, Manny Torres M.D., 400 mg at 01/29/21 0613  •  [COMPLETED] detox IV 1000 mL (D5LR + magnesium 1 g + thiamine 100 mg + folic acid 1 mg) infusion, , Intravenous, Once, Stopped at 01/29/21 0904 **AND** thiamine (Vitamin B-1) tablet 100 mg, 100 mg, Oral, DAILY, 100 mg at 01/29/21 0613 **AND** multivitamin (THERAGRAN) tablet 1 Tab, 1 Tab, Oral, DAILY, 1 Tab at 01/29/21 0613 **AND** folic acid (FOLVITE) tablet 1 mg, 1 mg, Oral, DAILY, Manny Torres M.D., 1 mg at 01/29/21 0613  *ECG: personally reviewed QTc  457  *Cranial Imaging: personally reviewed CT neg          *Labs:  Recent Labs     01/28/21 2003   WBC 6.9   RBC 4.34   HEMOGLOBIN 14.2   HEMATOCRIT 44.0   .4*   MCH 32.7   RDW 51.6*   PLATELETCT 294   MPV 9.1   NEUTSPOLYS 51.80   LYMPHOCYTES 36.80   MONOCYTES 8.60   EOSINOPHILS 0.90   BASOPHILS 1.30     Lab Results   Component Value Date/Time    SODIUM 142 01/28/2021 08:03 PM    POTASSIUM 4.5 01/28/2021 08:03 PM    CHLORIDE 106 01/28/2021 08:03 PM    CO2 25 01/28/2021 08:03 PM    GLUCOSE 97  01/28/2021 08:03 PM    BUN 14 01/28/2021 08:03 PM    CREATININE 0.65 01/28/2021 08:03 PM         No results found for: BREATHALIZER  No components found for: BLOODALCOHOL   Lab Results   Component Value Date/Time    AMPHUR Negative 05/17/2014 1040    BARBSURINE Negative 05/17/2014 1040    BENZODIAZU Positive (A) 05/17/2014 1040    COCAINEMET Negative 05/17/2014 1040    METHADONE Negative 05/17/2014 1040    ECSTASY Negative 05/17/2014 1040    OPIATES Negative 05/17/2014 1040    OXYCODN Positive (A) 05/17/2014 1040    PCPURINE Negative 05/17/2014 1040    PROPOXY Negative 05/17/2014 1040    CANNABINOID Negative 05/17/2014 1040     Lab Results   Component Value Date/Time    FREET4 1.79 (H) 01/29/2021 1313   Results for MARISELA DURHAM (MRN [id number]) as of 1/29/2021 15:29   Ref. Range 1/29/2021 13:13   TSH Latest Ref Range: 0.380 - 5.330 uIU/mL 0.020 (L)         *ASSESSMENT/RECOMENDATIONS:    1. Depressive disoder unspc with anxiety  - Dc zoloft. Continue effexor and increase dose to 150 mg.   -dc remeron  -gabapentin for anxiety, which may help with alcohol cravings as well and will increase dose at hs for sleep.    -no seroquel for now: see how gabapentin works.        2. Alcohol use disorder  -severe    3. Bereavement            4. Medical:  -hypothyroidism  -syncope  -p afib, pacemaker  -gait disorder    Legal hold:on hold. Extend (though she is denying SI: will wait until she is through withdrawals before final decisions)  notified to provide referrals on dc.  Observation status: routine  Privileges (while on legal hold): no restrictions     Note: asked if she would be interested in therapy: she declined.    *Will Follow  *Thank you for the consult

## 2021-01-29 NOTE — ED NOTES
Med rec partial per phone call with pt Son. Pt was unable to verify any medications she was taking during interview. Pt son was able to verify medications but was not able to verify strengths of some of the medications. Pt son states that pt takes seroquel 12.5 mg tid and 100 mg qhs. Son states that pt finished a 5 day course of bactrim ds 1 cap bid on 01/24/21. Allergies reviewed.

## 2021-01-29 NOTE — ED NOTES
covid swab collected and sent, labs collected and sent. Pt updated on POC. NAD noted. Pt on 1L O2, spo2 95%

## 2021-01-29 NOTE — ASSESSMENT & PLAN NOTE
Significant grief due to loss of 2 daughters and .   -Quetiapine discontinued as per psychiatry recommendation, gabapentin regimen started  -Home venlafaxine 75mg increased to 150 mg daily as per psych recommendation    Patient cleared from legal hold. Limited medical capacity and decision making, impaired memory. After cognitive evaluation recommend 24/7 supervision.

## 2021-01-29 NOTE — PROGRESS NOTES
Daily Progress Note    Date of Service: 2021  Primary Team: [facility] DA Orange Team   Attending: Malick Johnston M.D.   Senior Resident: Dr. Gardiner  Intern: Dr. Nelson  Contact:  [phone]    ID: 75 year-old F with history of alcohol abuse and depression admitted for alcohol withdrawal. History of two ICU hospitalizations and intubations in last 6 months due to severe withdrawal w/ delirium tremens.    Chief Complaint:   intoxication    Subjective:  Interval Updates: NAEON. Denies SI. Pacemaker interrogation without any pauses.    Ms. Hutchison was tearful this morning. Denies SI/HI. She tells me that she started drinking about 7-8 years ago when one of her daughters . Another daughter has  since then, and her  of 50+ years also  about a week ago. She says she drinks to counteract the intrusive thoughts of grief. 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 Denies nausea, vomiting, chest pain, abdominal pain, shortness of breath, numbness, tingling, abdominal distension, melena, hematochezia, hematemesis.    Consultants/Specialty:  N/A    Review of Systems:   Review of Systems   All other systems reviewed and are negative.      Objective Data:   Physical Exam:   Vitals:   Temp:  [35.7 °C (96.2 °F)-36.8 °C (98.3 °F)] 36.8 °C (98.3 °F)  Pulse:  [] 108  Resp:  [15-23] 16  BP: ()/() 165/106  SpO2:  [92 %-97 %] 94 %    Physical Exam  Constitutional:       Appearance: Normal appearance.   HENT:      Head: Normocephalic and atraumatic.      Right Ear: External ear normal.      Left Ear: External ear normal.      Nose: Nose normal. No congestion.      Mouth/Throat:      Mouth: Mucous membranes are moist.      Pharynx: Oropharynx is clear.   Eyes:      General: No scleral icterus.     Extraocular Movements: Extraocular movements intact.      Pupils: Pupils are equal, round, and reactive to light.   Neck:      Musculoskeletal:  Normal range of motion and neck supple.   Cardiovascular:      Rate and Rhythm: Regular rhythm. Tachycardia present.      Pulses: Normal pulses.      Heart sounds: No murmur. No friction rub. No gallop.    Pulmonary:      Effort: Pulmonary effort is normal. No respiratory distress.      Breath sounds: Normal breath sounds. No wheezing or rales.   Abdominal:      General: Abdomen is flat. Bowel sounds are normal. There is no distension.      Palpations: Abdomen is soft.      Tenderness: There is no abdominal tenderness.   Musculoskeletal:         General: No swelling or deformity.      Right lower leg: No edema.      Left lower leg: No edema.   Skin:     General: Skin is warm and dry.      Capillary Refill: Capillary refill takes less than 2 seconds.      Coloration: Skin is not jaundiced.   Neurological:      General: No focal deficit present.      Mental Status: She is alert and oriented to person, place, and time. Mental status is at baseline.      Cranial Nerves: No cranial nerve deficit.      Comments: No asterixis, mild tremor of both hands         Labs:   Recent Labs     01/28/21 2003   SODIUM 142   POTASSIUM 4.5   CHLORIDE 106   CO2 25   GLUCOSE 97   BUN 14      Recent Labs     01/28/21 2003   ALBUMIN 3.9   TBILIRUBIN 0.2   ALKPHOSPHAT 68   TOTPROTEIN 6.8   ALTSGPT 21   ASTSGOT 22   CREATININE 0.65      Recent Labs     01/28/21 2003   WBC 6.9   RBC 4.34   HEMOGLOBIN 14.2   HEMATOCRIT 44.0   .4*   MCH 32.7   RDW 51.6*   PLATELETCT 294   MPV 9.1   NEUTSPOLYS 51.80   LYMPHOCYTES 36.80   MONOCYTES 8.60   EOSINOPHILS 0.90   BASOPHILS 1.30        Imaging:   DX-CHEST-PORTABLE (1 VIEW)   Final Result         1.  Probable mild pulmonary edema   2.  Cardiomegaly      EC-ECHOCARDIOGRAM COMPLETE W/O CONT    (Results Pending)        Problem Representation:     Alcohol dependence (HCC)- (present on admission)  Assessment & Plan  Anticipate alcohol withdrawal with severe features. Received rally bag.  -Thiamine,  folate, multivitamin  -Start chlordiazepoxide 25mg TID due to history of severe withdrawal  -[facility] protocol with lorazepam  -Clonidine 0.1mg for hypertension/tachycardia, may schedule if needed  -Fall/seizure/aspiration precautions  -Remain admitted for telemetry for monitoring at least 1 day    PAF (paroxysmal atrial fibrillation) (HCC)- (present on admission)  Assessment & Plan  -Continue rate control with metoprolol 100mg BID and digoxin 125mcg daily  -Check digoxin level  -Clonidine as above  -Continue apixaban 5mg BID  -Repeat echo in the setting of pulm edema on imaging, significant alcohol abuse    Syncope  Assessment & Plan  Most likely due to intoxication and vasovagal. Device interrogated with no periods of pauses. Initial BAL 200s. Last drink 1/28 evening prior to admission.  -Continue to monitor on telemetry  -Check TSH  -Defer head imaging at this time    Depression  Assessment & Plan  Significant grief due to loss of 2 daughters and .   -Continue quetiapine for insomnia 2/2 intrusive thoughts  -Continue home venlafaxine 75mg    Gait disorder  Assessment & Plan  Follow-up physical therapy evaluation    Suicidal ideation  Assessment & Plan  Denies SI at this time.    Code Status: FC  Diet: cardiac  Lines/Tubes/Drains:  IVF: LR 100mL/hr  Prophylaxis:  DVT: on apixaban  GI:   Disposition: remain admitted for withdrawal

## 2021-01-29 NOTE — ED TRIAGE NOTES
"Chief Complaint   Patient presents with   • Syncope     during ems  pt had 10 second syncopal episode; ems assisted to ground, no injuries   • ETOH Intoxication     pt states she is currently intoxicated and drinks       BIB EMS to r[facility] for above complaint, pt on monitor and in gown, labs drawn and sent. Pt consists of: neighbors calling ems d/t patient not being able to ambulate well on driveway, helped pt back into drivers seat until ems arrived. Upon ems arrival pt was helped from car and had a 10 second syncopal episode, assisted to ground into [place], pt states she has etoh abuse. Pt   last week and son per ems states that is the reason to increase etoh. Pt is A&Ox2, disoriented to place and event. NAD noted.  Per ems bg 116, and hx of paced rhythm   Medications given en route:n/a     BP (!) 93/59   Pulse 96   Temp 36.5 °C (97.7 °F) (Temporal)   Resp 18   Ht 1.676 m (5' 6\")   Wt 68 kg (150 lb)   SpO2 91%   BMI 24.21 kg/m²   "

## 2021-01-29 NOTE — CARE PLAN
Problem: Safety  Goal: Will remain free from injury  Outcome: PROGRESSING AS EXPECTED  Goal: Will remain free from falls  Outcome: PROGRESSING AS EXPECTED     Problem: Infection  Goal: Will remain free from infection  Outcome: PROGRESSING AS EXPECTED     Problem: Venous Thromboembolism (VTW)/Deep Vein Thrombosis (DVT) Prevention:  Goal: Patient will participate in Venous Thrombosis (VTE)/Deep Vein Thrombosis (DVT)Prevention Measures  Outcome: PROGRESSING AS EXPECTED     Problem: Bowel/Gastric:  Goal: Normal bowel function is maintained or improved  Outcome: PROGRESSING AS EXPECTED  Goal: Will not experience complications related to bowel motility  Outcome: PROGRESSING AS EXPECTED     Problem: Knowledge Deficit  Goal: Knowledge of disease process/condition, treatment plan, diagnostic tests, and medications will improve  Outcome: PROGRESSING AS EXPECTED  Goal: Knowledge of the prescribed therapeutic regimen will improve  Outcome: PROGRESSING AS EXPECTED     Problem: Discharge Barriers/Planning  Goal: Patient's continuum of care needs will be met  Outcome: PROGRESSING AS EXPECTED     Problem: Respiratory:  Goal: Respiratory status will improve  Outcome: PROGRESSING AS EXPECTED     Problem: Communication  Goal: The ability to communicate needs accurately and effectively will improve  Outcome: MET

## 2021-01-29 NOTE — H&P
Hospital Medicine History & Physical Note    Date of Service  1/28/2021    Primary Care Physician  Jane Armstrong M.D.    Consultants  Psychiatry    Code Status  Full Code    Chief Complaint  Chief Complaint   Patient presents with   • Syncope     during ems  pt had 10 second syncopal episode; ems assisted to ground, no injuries   • ETOH Intoxication     pt states she is currently intoxicated and drinks       History of Presenting Illness  75 y.o. female who presented 1/28/2021 with a history of alcohol dependence and alcohol withdrawal DTs requiring intubation twice in the last 6 months, atrial fibrillation, permanent pacemaker, sleep apnea, chronic pain.  Depression and grief.    She presents today after neighbors discovered her intoxicated in her car.  She is brought to emergency department via EMS for evaluation of acute alcohol intoxication onset after drinking two pints of vodka today. Per EMS while they were picking her up she had a syncopal episode lasting about 10 seconds. They assisted her to the ground and she did not hit her head or sustain any injuries. She recently lost her  of 51 years.  She is distraught and tearful and refuses to answer suicidal ideation.    She endorses nausea but denies any associated chest pain, shortness of breath, fever, weakness, lightheadedness, tremors, leg swelling, vomiting, headache, blurry vision, dysuria, hematuria, or abdominal pain. She has been taking her medication as prescribed. She was diagnosed with a UTI yesterday but is currently asymptomatic.       Review of Systems  Review of Systems   Unable to perform ROS: Acuity of condition       Past Medical History   has a past medical history of A-fib ([facility]) (5/1/2014), Alcoholism (HCC), Apnea, sleep, Chronic pain, Insomnia, Psychiatric disorder, and Snoring.    Surgical History   has a past surgical history that includes breast biopsy; other orthopedic surgery; clavicle orif (5/21/2014); and  tonsillectomy.     Family History  family history includes Anxiety disorder in her mother; Depression in her mother; Diabetes in her mother; Hyperlipidemia in her father.     Social History   reports that she has never smoked. She has never used smokeless tobacco. She reports current alcohol use. She reports previous drug use. Drug: Oral.    Allergies  No Known Allergies    Medications  Prior to Admission Medications   Prescriptions Last Dose Informant Patient Reported? Taking?   ELIQUIS 5 MG Tab  Patient No No   Sig: Take 1 Tab by mouth 2 Times a Day.   QUEtiapine (SEROQUEL) 100 MG Tab 1/27/2021 at PM Patient Yes Yes   Sig: Take 100 mg by mouth every bedtime.   QUEtiapine (SEROQUEL) 25 MG Tab 1/28/2021 at AM Patient No No   Sig: TAKE 1 TABLET ORALLY EVERY BEDTIME   Patient taking differently: Take 12.5 mg by mouth 3 times a day. TAKE 1 TABLET ORALLY EVERY BEDTIM   digoxin (LANOXIN) 125 MCG Tab 1/28/2021 at AM Patient No No   Sig: Take 1 Tab by mouth every day at 6 PM.   folic acid (FOLVITE) 1 MG Tab 1/27/2021 at 1200 Patient No No   Sig: Take 1 Tab by mouth every day.   hydrOXYzine pamoate (VISTARIL) 50 MG Cap 1/28/2021 at AM Patient No No   Sig: TAKE ONE CAPSULE BY MOUTH THREE TIMES A DAY AS NEEDED FOR ITCHING   Patient taking differently: Take 50 mg by mouth every day.   levothyroxine (SYNTHROID) 125 MCG Tab 1/28/2021 at AM Patient No No   Sig: Take 1 Tab by mouth Every morning on an empty stomach.   magnesium oxide (MAG-OX) 400 MG Tab tablet 1/28/2021 at AM  Yes Yes   Sig: Take 400 mg by mouth every day.   metoprolol (LOPRESSOR) 100 MG Tab 1/28/2021 at AM Patient No No   Sig: Take 1 Tab by mouth 2 Times a Day.   mirtazapine (REMERON) 15 MG Tab 1/27/2021 at PM Patient No No   Sig: Take 1 Tab by mouth every bedtime.   multivitamin (THERAGRAN) Tab 1/26/2021 at unknown Patient No No   Sig: Take 1 Tab by mouth every day.   omeprazole (PRILOSEC) 20 MG delayed-release capsule Not Taking at Unknown time Patient No No    Sig: Take 1 Cap by mouth every day.   Patient not taking: Reported on 1/28/2021   potassium chloride SA (KDUR) 20 MEQ Tab CR Not Taking at Unknown time Patient No No   Sig: TAKE 2 TABLETS BY MOUTH   Patient not taking: Reported on 1/28/2021   sertraline (ZOLOFT) 50 MG Tab Not Taking at Unknown time Patient No No   Sig: Take 1 Tab by mouth every day.   Patient not taking: Reported on 1/28/2021   sulfamethoxazole-trimethoprim (BACTRIM DS) 800-160 MG tablet 1/24/2021 at finished Patient Yes No   Sig: Take 1 Tab by mouth 2 times a day.   thiamine (VITAMIN B-1) 50 MG Tab 1/28/2021 at AM Patient Yes No   Sig: Take 100 mg by mouth every day.   tizanidine (ZANAFLEX) 4 MG Tab Not Taking at Unknown time Patient No No   Sig: TAKE 1 TABLET BY MOUTH TWICE DAILY FOR 7 DAYS   Patient not taking: Reported on 12/29/2020   venlafaxine XR (EFFEXOR XR) 75 MG CAPSULE SR 24 HR 1/27/2021 at PM Patient Yes No   Sig: Take 75 mg by mouth every day.   zolpidem (AMBIEN) 5 MG Tab Not Taking at Unknown time Patient No No   Sig: Take 1 Tab by mouth at bedtime as needed for Sleep (1 to 2 po qhs prn insomnia/sleep study. Bring to sleep study.) for up to 2 doses.   Patient not taking: Reported on 1/28/2021      Facility-Administered Medications: None       Physical Exam  Temp:  [36.2 °C (97.2 °F)-36.5 °C (97.7 °F)] 36.2 °C (97.2 °F)  Pulse:  [] 84  Resp:  [15-23] 18  BP: ()/(50-75) 117/73  SpO2:  [92 %-97 %] 92 %    Physical Exam  Vitals signs and nursing note reviewed.   Constitutional:       General: She is not in acute distress.     Appearance: Normal appearance. She is normal weight. She is not toxic-appearing.   HENT:      Head: Normocephalic and atraumatic.      Nose: Nose normal. No congestion or rhinorrhea.      Mouth/Throat:      Mouth: Mucous membranes are moist.      Pharynx: Oropharynx is clear.   Eyes:      Extraocular Movements: Extraocular movements intact.      Conjunctiva/sclera: Conjunctivae normal.      Pupils:  Pupils are equal, round, and reactive to light.   Neck:      Musculoskeletal: Normal range of motion and neck supple. No muscular tenderness.      Vascular: No carotid bruit.   Cardiovascular:      Rate and Rhythm: Normal rate and regular rhythm.      Pulses: Normal pulses.      Heart sounds: Murmur present. No gallop.    Pulmonary:      Effort: No respiratory distress.      Breath sounds: Normal breath sounds. No wheezing or rales.   Abdominal:      General: Abdomen is flat. Bowel sounds are normal. There is no distension.      Palpations: Abdomen is soft. There is no mass.      Tenderness: There is no abdominal tenderness.      Hernia: No hernia is present.   Musculoskeletal:         General: No tenderness or signs of injury.   Lymphadenopathy:      Cervical: No cervical adenopathy.   Skin:     Capillary Refill: Capillary refill takes less than 2 seconds.      Coloration: Skin is not jaundiced or pale.      Findings: No bruising.   Neurological:      General: No focal deficit present.      Mental Status: She is alert and oriented to person, place, and time. Mental status is at baseline.      Cranial Nerves: No cranial nerve deficit.      Motor: No weakness.      Coordination: Coordination normal.         Laboratory:  Recent Labs     01/28/21 2003   WBC 6.9   RBC 4.34   HEMOGLOBIN 14.2   HEMATOCRIT 44.0   .4*   MCH 32.7   MCHC 32.3*   RDW 51.6*   PLATELETCT 294   MPV 9.1     Recent Labs     01/28/21 2003   SODIUM 142   POTASSIUM 4.5   CHLORIDE 106   CO2 25   GLUCOSE 97   BUN 14   CREATININE 0.65   CALCIUM 9.7     Recent Labs     01/28/21 2003   ALTSGPT 21   ASTSGOT 22   ALKPHOSPHAT 68   TBILIRUBIN 0.2   GLUCOSE 97         No results for input(s): NTPROBNP in the last 72 hours.      Recent Labs     01/28/21  2003   TROPONINT <6       Imaging:  DX-CHEST-PORTABLE (1 VIEW)   Final Result         1.  Probable mild pulmonary edema   2.  Cardiomegaly            Assessment/Plan:  I anticipate this patient will  require at least two midnights for appropriate medical management, necessitating inpatient admission.    PAF (paroxysmal atrial fibrillation) (HCC)- (present on admission)  Assessment & Plan  Rate controlled, continue anticoagulation    Alcohol dependence (HCC)- (present on admission)  Assessment & Plan  Anticipate alcohol withdrawal, CIWA protocol ordered    Gait disorder  Assessment & Plan  Follow-up physical therapy evaluation    Suicidal ideation  Assessment & Plan  Legal hold, suicide precautions, follow-up psychiatry consult    Syncope  Assessment & Plan  Most likely vasovagal, follow-up permanent pacemaker interrogation, telemetry and orthostatic blood pressures.

## 2021-01-29 NOTE — NON-PROVIDER
MEDICAL STUDENT PROGRESS NOTE    Attending: Vickie  Senior Resident: Abdifatah      PATIENT: Jeanie Hutchison; [id number]; 1945    ID: 75 y.o. female admitted for syncope while intoxicated day 1 in hospital for monitoring of alcohol withdrawal. She has a PMH of ETOH withdrawal requiring intubation 2x in the last 6 months, depression, afib with pacemaker, CLEO, hypothyroid.     SUBJECTIVE: No acute events overnight, hemo stable, started on CIWA protocol switched diazepam with librium 25 TID. Last CIWA score was 0. Today she reports feeling well overall with no symptoms of withdrawal or syncope currently. She reports some mild abdominal pain but it is not too bothersome. She is saddened by the passing of her  but she does not have any SI/HI/AVH. Her last drink was yesterday.     ROS:  Constitutional- negative for fever chills, pain well controlled  HEENT- no double vision, changes in hearing, cough  CV- no palpations, chest pain, SOB  GI- no pain nausea or vomiting, last BM today  - voiding bladder  MSK- no for myalgia  Neuro- no headaches    OBJECTIVE:     Vitals:    01/28/21 2350 01/29/21 0422 01/29/21 0729 01/29/21 1126   BP: 117/73 131/99 145/109 (!) 165/106   Pulse: 84 (!) 102 (!) 105 (!) 108   Resp: 18 20 20 16   Temp: 36.2 °C (97.2 °F) (!) 35.7 °C (96.2 °F) 36.1 °C (96.9 °F) 36.8 °C (98.3 °F)   TempSrc: Temporal Temporal Temporal Temporal   SpO2: 92% 92% 94% 94%   Weight: 68.1 kg (150 lb 2.1 oz)      Height:           Intake/Output Summary (Last 24 hours) at 1/29/2021 1138  Last data filed at 1/29/2021 0910  Gross per 24 hour   Intake 1450 ml   Output --   Net 1450 ml       PHYSICAL EXAM:  General: No acute distress, resting comfortably in bed.  HEENT: normocephalic, nontraumatic, PERRLA, EOMI  Cardiovascular: Heart RRR with no murmurs, rubs or gallops. Distal Pulses 2+  Respiratory: symmetric chest expansion, lungs CTA bilaterally with no wheezes rales or rhonci  Abdomen: soft, nontender, nondistended, no  "masses palpated, +BS  Musculoskeletal: spontaneously moves all extremities, Strength 5/5 all extremities, no peripheral edema  Neuro: non focal with no numbness, tingling or changes in sensation, ANOx4, some bilateral tremors in hands  Psych: sad and tearful when talking about the recent passing of her .       LABS:  Recent Labs     01/28/21 2003   WBC 6.9   RBC 4.34   HEMOGLOBIN 14.2   HEMATOCRIT 44.0   .4*   MCH 32.7   RDW 51.6*   PLATELETCT 294   MPV 9.1   NEUTSPOLYS 51.80   LYMPHOCYTES 36.80   MONOCYTES 8.60   EOSINOPHILS 0.90   BASOPHILS 1.30     Recent Labs     01/28/21 2003   SODIUM 142   POTASSIUM 4.5   CHLORIDE 106   CO2 25   BUN 14   CREATININE 0.65   CALCIUM 9.7   MAGNESIUM 2.3   ALBUMIN 3.9     Estimated GFR/CRCL = Estimated Creatinine Clearance: 72.2 mL/min (by C-G formula based on SCr of 0.65 mg/dL).  Recent Labs     01/28/21 2003   GLUCOSE 97     Recent Labs     01/28/21 2003   ASTSGOT 22   ALTSGPT 21   TBILIRUBIN 0.2   ALKPHOSPHAT 68   GLOBULIN 2.9             No results for input(s): INR, APTT, FIBRINOGEN in the last 72 hours.    Invalid input(s): DIMER    Lab interpretation- lactic acid slightly elevated rest normal    MICROBIOLOGY:   Results     Procedure Component Value Units Date/Time    BLOOD CULTURE [[id number]] Collected: 01/28/21 2003    Order Status: Completed Specimen: Blood from Peripheral Updated: 01/29/21 0718     Significant Indicator NEG     Source BLD     Site PERIPHERAL     Culture Result No Growth  Note: Blood cultures are incubated for 5 days and  are monitored continuously.Positive blood cultures  are called to the RN and reported as soon as  they are identified.      Narrative:      1 of 2 for Blood Culture x 2 sites order. Per Hospital  Policy: Only change Specimen Src: to \"Line\" if specified by  physician order.  No site indicated    BLOOD CULTURE [[id number]] Collected: 01/28/21 2101    Order Status: Completed Specimen: Blood from Peripheral Updated: 01/29/21 " "0354    Narrative:      2 of 2 blood culture x2  Sites order. Per Hospital Policy:  Only change Specimen Src: to \"Line\" if specified by physician  order.    SARS-CoV-2 PCR (24 hour In-House): Collect NP swab in VTM [[id number]]     Order Status: Sent Specimen: Respirate from Nasopharyngeal     COV-2, FLU A/B, AND RSV BY PCR (2-4 HOURS CEPHEID): Collect NP swab in VTM [[id number]] Collected: 01/28/21 2102    Order Status: Completed Specimen: Respirate Updated: 01/28/21 2152     Influenza virus A RNA Negative     Influenza virus B, PCR Negative     RSV, PCR Negative     SARS-CoV-2 by PCR NotDetected     Comment: PATIENTS: Important information regarding your results and instructions can  be found at [email or web address]   \"After your  Covid-19 Test\"  [facility] providers: PLEASE REFER TO DE-ESCALATION AND RETESTING PROTOCOL  on [email or web address]  **The [employer] GeneXpert Xpress SARS-CoV-2 Test has been made available for  use under the Emergency Use Authorization (EUA) only.          SARS-CoV-2 Source NP Swab    Narrative:      Have you been in close contact with a person who is suspected  or known to be positive for COVID-19 within the last 30 days  (e.g. last seen that person < 30 days ago)->No           IMAGING:  DX-CHEST-PORTABLE (1 VIEW)   Final Result         1.  Probable mild pulmonary edema   2.  Cardiomegaly      EC-ECHOCARDIOGRAM COMPLETE W/O CONT    (Results Pending)       MEDS:  Current Facility-Administered Medications   Medication Last Admin   • QUEtiapine (Seroquel) tablet 12.5 mg 12.5 mg at 01/29/21 1117    And   • QUEtiapine (Seroquel) tablet 100 mg 100 mg at 01/29/21 0039   • venlafaxine XR (EFFEXOR XR) capsule 75 mg 75 mg at 01/29/21 0941   • omeprazole (PRILOSEC) capsule 20 mg 20 mg at 01/29/21 1117   • lactated ringers infusion New Bag at 01/29/21 1117   • chlordiazePOXIDE (LIBRIUM) capsule 25 mg     • digoxin (LANOXIN) tablet 125 mcg     • apixaban (ELIQUIS) tablet 5 mg 5 mg " at 01/29/21 0612   • levothyroxine (SYNTHROID) tablet 125 mcg 125 mcg at 01/29/21 0612   • metoprolol tartrate (LOPRESSOR) tablet 100 mg 100 mg at 01/29/21 0619   • mirtazapine (Remeron) tablet 15 mg 15 mg at 01/29/21 0041   • senna-docusate (PERICOLACE or SENOKOT S) 8.6-50 MG per tablet 2 Tab      And   • polyethylene glycol/lytes (MIRALAX) PACKET 1 Packet      And   • magnesium hydroxide (MILK OF MAGNESIA) suspension 30 mL      And   • bisacodyl (DULCOLAX) suppository 10 mg     • acetaminophen (Tylenol) tablet 650 mg     • LORazepam (ATIVAN) tablet 0.5 mg     • LORazepam (ATIVAN) tablet 1 mg      Or   • LORazepam (ATIVAN) injection 0.5 mg     • LORazepam (ATIVAN) tablet 2 mg      Or   • LORazepam (ATIVAN) injection 1 mg     • LORazepam (ATIVAN) tablet 3 mg      Or   • LORazepam (ATIVAN) injection 1.5 mg     • LORazepam (ATIVAN) tablet 4 mg      Or   • LORazepam (ATIVAN) injection 2 mg     • magnesium oxide (MAG-OX) tablet 400 mg 400 mg at 01/29/21 0613   • thiamine (Vitamin B-1) tablet 100 mg 100 mg at 01/29/21 0613    And   • multivitamin (THERAGRAN) tablet 1 Tab 1 Tab at 01/29/21 0613    And   • folic acid (FOLVITE) tablet 1 mg 1 mg at 01/29/21 0613       PROBLEM LIST:  Active Hospital Problems    Diagnosis   • Alcohol dependence (HCC) [F10.20]     Priority: High   • PAF (paroxysmal atrial fibrillation) (HCC) [I48.0]     Priority: High   • Gait disorder [R26.9]   • Suicidal ideation [R45.851]   • Syncope [R55]       ASSESSMENT AND PLAN:   Assessment- Mrs. Hutchison is a 76y/o F day 1 in hospital for monitoring of alcohol withdrawal. She feels well today with no symptoms but her last drink was yesterday.       Plan-  ETOH withdrawal- CIWA protocol with librium 25 TID. Omeprazole 20 added. Monitor vitals closely and administer ativan if needed per protocol.     Afib with previous MI- cont home med of eloquis 5, pacemaker interrogated awaiting results    Derpression- consult psychology continue Seroquel and restart  venlafaxine.      Disposition- cont hospital stay for ETOH withdrawal

## 2021-01-29 NOTE — ED PROVIDER NOTES
ED Provider Note    Scribed for Brad Schulte M.D. by Kaylen Tolbert. 1/28/2021  7:44 PM    Primary care provider: Jane Armstrong M.D.  Means of arrival: EMS  History obtained from: Patient  History limited by: None    CHIEF COMPLAINT  Chief Complaint   Patient presents with   • Syncope     during ems  pt had 10 second syncopal episode; ems assisted to ground, no injuries   • ETOH Intoxication     pt states she is currently intoxicated and drinks       HPI  Jeanie Hutchison is a 75 y.o. female who presents to the Emergency Department via EMS for evaluation of acute alcohol intoxication onset after drinking two pints of vodka today. Per EMS while they were picking her up she had a syncopal episode lasting about 10 seconds. They assisted her to the ground and she did not hit her head or sustain any injuries. She recently lost her  of 51 years and has been drinking a pint of vodka daily. She endorses nausea but denies any associated chest pain, shortness of breath, fever, weakness, lightheadedness, tremors, leg swelling, vomiting, headache, blurry vision, dysuria, hematuria, or abdominal pain. She has been taking her medication as prescribed. She was diagnosed with a UTI yesterday but is currently asymptomatic.     REVIEW OF SYSTEMS  Pertinent positives include: alcohol intoxication, syncope, and nausea. Pertinent negatives include: no chest pain, shortness of breath, fever, weakness, lightheadedness, tremors, leg swelling, vomiting, headache, blurry vision, dysuria, hematuria, or abdominal pain. See history of present illness. All other systems are negative.     PAST MEDICAL HISTORY   has a past medical history of A-fib (HCC) (5/1/2014), Alcoholism (HCC), Apnea, sleep, Chronic pain, Insomnia, Psychiatric disorder, and Snoring.    SURGICAL HISTORY   has a past surgical history that includes breast biopsy; other orthopedic surgery; clavicle orif (5/21/2014); and tonsillectomy.    SOCIAL  HISTORY  Social History     Tobacco Use   • Smoking status: Never Smoker   • Smokeless tobacco: Never Used   Substance Use Topics   • Alcohol use: Yes     Frequency: 4 or more times a week     Drinks per session: 5 or 6     Binge frequency: Daily or almost daily     Comment: last drink January 2020   • Drug use: Not Currently     Types: Oral     Comment: takes friends tranqualizers       Social History     Substance and Sexual Activity   Drug Use Not Currently   • Types: Oral    Comment: takes friends tranqualizers        FAMILY HISTORY  Family History   Problem Relation Age of Onset   • Diabetes Mother    • Anxiety disorder Mother    • Depression Mother    • Hyperlipidemia Father        CURRENT MEDICATIONS  Home Medications     Reviewed by Sisi Richardson (Pharmacy Tech) on 01/28/21 at 2312  Med List Status: Partial   Medication Last Dose Status   digoxin (LANOXIN) 125 MCG Tab 1/28/2021 Active   ELIQUIS 5 MG Tab 1/28/2021 Active   folic acid (FOLVITE) 1 MG Tab 1/27/2021 Active   hydrOXYzine pamoate (VISTARIL) 50 MG Cap 1/28/2021 Active   levothyroxine (SYNTHROID) 125 MCG Tab 1/28/2021 Active   metoprolol (LOPRESSOR) 100 MG Tab 1/28/2021 Active   mirtazapine (REMERON) 15 MG Tab 1/27/2021 Active   multivitamin (THERAGRAN) Tab 1/26/2021 Active   omeprazole (PRILOSEC) 20 MG delayed-release capsule Not Taking Active   potassium chloride SA (KDUR) 20 MEQ Tab CR Not Taking Active   QUEtiapine (SEROQUEL) 100 MG Tab 1/27/2021 Active   QUEtiapine (SEROQUEL) 25 MG Tab 1/28/2021 Active   sertraline (ZOLOFT) 50 MG Tab Not Taking Active   sulfamethoxazole-trimethoprim (BACTRIM DS) 800-160 MG tablet 1/24/2021 Active   thiamine (VITAMIN B-1) 50 MG Tab 1/28/2021 Active   tizanidine (ZANAFLEX) 4 MG Tab Not Taking Active   venlafaxine XR (EFFEXOR XR) 75 MG CAPSULE SR 24 HR 1/27/2021 Active   zolpidem (AMBIEN) 5 MG Tab Not Taking Active                ALLERGIES  No Known Allergies    PHYSICAL EXAM  VITAL SIGNS: BP (!) 93/59    "Pulse 96   Temp 36.5 °C (97.7 °F) (Temporal)   Resp 18   Ht 1.676 m (5' 6\")   Wt 68 kg (150 lb)   SpO2 91%   BMI 24.21 kg/m²     Constitutional: Alert in no apparent distress.  HENT: No signs of trauma, Bilateral external ears normal, Nose normal. Uvula midline.   Eyes: Pupils are equal and reactive, Conjunctiva normal, Non-icteric.   Neck: Normal range of motion, No tenderness, Supple, No stridor.   Lymphatic: No lymphadenopathy noted.   Cardiovascular: Regular rate and rhythm, no murmurs.   Thorax & Lungs: Normal breath sounds, No respiratory distress, No wheezing, No chest tenderness.   Abdomen:  Soft, No tenderness, No peritoneal signs, No masses, No pulsatile masses.   Skin: Warm, Dry, No erythema, No rash.   Back: No bony tenderness, No CVA tenderness.   Extremities: Intact distal pulses, No edema, No tenderness, No cyanosis.  Musculoskeletal: Good range of motion in all major joints. No tenderness to palpation or major deformities noted.   Neurologic: Alert , Normal motor function, Normal sensory function, No focal deficits noted.   Psychiatric: Affect normal, Judgment normal, Mood normal.     DIAGNOSTIC STUDIES / PROCEDURES    LABS  Labs Reviewed   DIAGNOSTIC ALCOHOL - Abnormal; Notable for the following components:       Result Value    Diagnostic Alcohol 266.0 (*)     All other components within normal limits   CBC WITH DIFFERENTIAL - Abnormal; Notable for the following components:    .4 (*)     MCHC 32.3 (*)     RDW 51.6 (*)     All other components within normal limits   LACTIC ACID - Abnormal; Notable for the following components:    Lactic Acid 2.9 (*)     All other components within normal limits   LACTIC ACID - Abnormal; Notable for the following components:    Lactic Acid 2.9 (*)     All other components within normal limits   COMP METABOLIC PANEL   BLOOD CULTURE    Narrative:     1 of 2 for Blood Culture x 2 sites order. Per Hospital  Policy: Only change Specimen Src: to \"Line\" if " specified by  physician order.   COV-2, FLU A/B, AND RSV BY PCR    Narrative:     Have you been in close contact with a person who is suspected  or known to be positive for COVID-19 within the last 30 days  (e.g. last seen that person < 30 days ago)->No   TROPONIN   MAGNESIUM   ESTIMATED GFR   LACTIC ACID   BLOOD CULTURE   SARS-COV-2, PCR (IN-HOUSE)   MAGNESIUM   BASIC METABOLIC PANEL   PHOSPHORUS      All labs reviewed by me.    EKG  12 Lead EKG interpreted by me to show:  Indication: syncope  A-fib  Rate 88  Axis: Normal  Intervals: Normal  Normal T waves  Normal ST segments  My impression of this EKG: No STEMI. No significant change from prior    RADIOLOGY  DX-CHEST-PORTABLE (1 VIEW)   Final Result         1.  Probable mild pulmonary edema   2.  Cardiomegaly        The radiologist's interpretation of all radiological studies have been reviewed by me.    COURSE & MEDICAL DECISION MAKING  Nursing notes, VS, PMSFHx reviewed in chart.    75 y.o. female p/w chief complaint of alcohol intoxication and syncope.    7:44 PM Patient seen and examined at bedside. Patient treated with detox IV. She is currently on oxygen as she is hypoxic to 88% on room air.    I verified that the patient was wearing a mask and I was wearing appropriate PPE every time I entered the room. The patient's mask was on the patient at all times during my encounter except for a brief view of the oropharynx.     The differential diagnoses include but are not limited to:   Reviewed records from 12/29 which show that patient's blood pressure in clinic was 90/50.  Unclear etiology of syncope, will have pacemaker interrogated  Patient hypoxic at this time and given mild pulmonary edema plan for admission, would consider echo given syncopal episode and new pulmonary edema  Elevated lactic, type b lactic acidosis from alcohol intoxication.  No fever, cough or SOB.  No increased urinary frequency.  I do not believe this elevated lactic acid to be secondary  to sepsis therefore no antibiotics and fluids were given at this time we will continue to trend as inpatient.    History of complicated withdrawals from alcohol requiring ICU and admission earlier this year    [employer] Pacemaker interrogated with no obvious evidence of arrhythmia at this time contributing to her syncope.    Intravenous fluids administered for syncope and alcohol abuse.  Patient not appropriate for oral rehydration, as surgical process needs to be ruled out before trial of oral rehydration.   On repeat evaluation, improved.  Pt w/ positive fluid response.    9:15 PM Paged Hospitalist.    9:41 PM I discussed the patient's case and the above findings with Dr. Torres (Hospitalist) who agrees to evaluate the patient for hospitalization.    DISPOSITION:  Patient will be hospitalized by Dr. Torres in guarded condition.    FINAL IMPRESSION  1. Syncope, unspecified syncope type          I, Kaylen Tolbert (Scrib[place]ick), am scribing for, and in the presence of, Brad Shculte M.D..    Electronically signed by: Kaylen Tolbert (Scribe), 1/28/2021    IBrad M.D. personally performed the services described in this documentation, as scribed by Kaylen Tolbert in my presence, and it is both accurate and complete.    The note accurately reflects work and decisions made by me.  Brad Schulte M.D.  1/29/2021  2:32 AM

## 2021-01-29 NOTE — ASSESSMENT & PLAN NOTE
alcohol withdrawal with severe features initially, now improved  -Thiamine, folate, multivitamin  Completed [facility] protocol  -Clonidine 0.1mg bid PRN for SBP >160  Neurontin continue  -Fall/seizure/aspiration precautions  Alcohol cessation counseling

## 2021-01-29 NOTE — CARE PLAN
Problem: Safety  Goal: Will remain free from injury  Outcome: PROGRESSING AS EXPECTED  Goal: Will remain free from falls  Outcome: PROGRESSING AS EXPECTED     Problem: Infection  Goal: Will remain free from infection  Outcome: PROGRESSING AS EXPECTED     Problem: Venous Thromboembolism (VTW)/Deep Vein Thrombosis (DVT) Prevention:  Goal: Patient will participate in Venous Thrombosis (VTE)/Deep Vein Thrombosis (DVT)Prevention Measures  Outcome: PROGRESSING AS EXPECTED     Problem: Bowel/Gastric:  Goal: Normal bowel function is maintained or improved  Outcome: PROGRESSING AS EXPECTED  Goal: Will not experience complications related to bowel motility  Outcome: PROGRESSING AS EXPECTED     Problem: Knowledge Deficit  Goal: Knowledge of disease process/condition, treatment plan, diagnostic tests, and medications will improve  Outcome: PROGRESSING AS EXPECTED  Goal: Knowledge of the prescribed therapeutic regimen will improve  Outcome: PROGRESSING AS EXPECTED     Problem: Discharge Barriers/Planning  Goal: Patient's continuum of care needs will be met  Outcome: PROGRESSING AS EXPECTED     Problem: Respiratory:  Goal: Respiratory status will improve  Outcome: PROGRESSING AS EXPECTED

## 2021-01-29 NOTE — ASSESSMENT & PLAN NOTE
-Continue rate control with metoprolol and digoxin   -Digoxin level 0.4 on 1/29/21  -Clonidine PRN  -Continue apixaban 5mg BID  -Patient refused repeat echo

## 2021-01-29 NOTE — PROGRESS NOTES
Patient transferred to [facility] on monitor by elizabeth Kathleen in stable condition.  Denies pain, able to walk from stretcher to bed with SBA, CIWA 0.    Vitals:    01/28/21 2350   BP: 117/73   Pulse: 84   Resp: 18   Temp: 36.2 °C (97.2 °F)   SpO2: 92%     Patient oriented to room, call light, tv, bed controls.      Will monitor closely overnight.    Tay Sutherland R.N.

## 2021-01-29 NOTE — ED NOTES
ERP at bedside. EKG done and read by ERP. Labs collected and sent. Pt medicated per MAR, NAD noted.

## 2021-01-29 NOTE — ED NOTES
Staff from [employer] called stating that pt pacemaker is in a fib rvr but is working appropriately. States that pt is frequent flyer and has had this happen before. Faxing over results

## 2021-01-29 NOTE — ED NOTES
Pt transferred to [id number] via almita ghosh/ RN, report given, pt on monitor. Pt A&Ox2-3, disoriented to time and event. 1L O2 via NC. NAD noted.

## 2021-01-29 NOTE — ASSESSMENT & PLAN NOTE
Seen by psychiatry, the patient had significant risk of self-harm in light of her lack of ability to care for self  Accommodations to place the patient under 24/7 supervision, skilled nursing referral  Has been cleared from legal hold

## 2021-01-29 NOTE — THERAPY
Physical Therapy   Initial Evaluation     Patient Name: Jeanie Hutchison  Age:  75 y.o., Sex:  female  Medical Record #: [id number]  Today's Date: 2021     Precautions: Fall Risk    Assessment  Patient is 75 y.o. female admitted on 21 for syncope while intoxicated. PMH: ETOH withdrawal requiring intubation 2x in the last 6 months, depression, afib with pacemaker, CLEO, hypothyroid.  Pt reports her spouse of 51 years  x 1 week ago and that she recently lost her 2 daughters and she is having trouble coping with the loss. Functionally, pt would benefit from FWW for safe ambulation, as she is unsteady on her feet, but she does not want one. Anticipate pt will be appropriate for discharge to home when medically cleared, however I am concerned for her safety and mental health as she is dealing with grief and appears very depressed. Recommend pt's son check in on her daily.     Plan    Recommend Physical Therapy for Evaluation only    DC Equipment Recommendations: Front-Wheel Walker (pt refusing)  Discharge Recommendations: Anticipate that the patient will have no further physical therapy needs after discharge from the hospital       Subjective    Pt agreeable to PT.     Objective       21 1201   Prior Living Situation   Prior Services Home-Independent;None   Housing / Facility 2 Story House  (Pt reports does not go upstairs.)   Steps Into Home 1   Steps In Home 16   Rail Right Rail  (Steps in Home)   Equipment Owned Front-Wheel Walker  (FWW was her  spouse's)   Lives with - Patient's Self Care Capacity Alone and Able to Care For Self   Comments Pt reports her adult son lives nearby   Prior Level of Functional Mobility   Bed Mobility Independent   Transfer Status Independent   Ambulation Independent   Distance Ambulation (Feet) 300   Assistive Devices Used None   Stairs Independent   Comments per pt report   History of Falls   History of Falls No   Cognition    Cognition / Consciousness X    Speech/ Communication Delayed Responses   Level of Consciousness Alert   Comments Pt cooperative, anxious with somewhat flat affect.   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   Coordination Lower Body    Coordination Lower Body  WDL   Balance Assessment   Sitting Balance (Static) Good   Sitting Balance (Dynamic) Good   Standing Balance (Static) Fair +   Standing Balance (Dynamic) Fair   Weight Shift Sitting Good   Weight Shift Standing Fair   Comments without AD   Gait Analysis   Gait Level Of Assist Minimal Assist   Assistive Device Hand Held Assist   Distance (Feet) 250   # of Times Distance was Traveled 1   Deviation Decreased Heel Strike;Decreased Toe Off;Increased Base Of Support;Bradykinetic   # of Stairs Climbed 0   Weight Bearing Status Full   Comments HHA due to tremors   Bed Mobility    Supine to Sit Supervised   Scooting Supervised   Functional Mobility   Sit to Stand Supervised   Bed, Chair, Wheelchair Transfer Supervised   Mobility gait in mart   How much help from another person does the patient currently need...   6 clicks Mobility Score 22   Activity Tolerance   Sitting Edge of Bed 10   Standing 10   Edema / Skin Assessment   Edema / Skin  Not Assessed   Patient / Family Goals    Patient / Family Goal #1 Home   Education Group   Role of Physical Therapist Patient Response Patient;Acceptance;Explanation;Action Demonstration;Verbal Demonstration   Use of Assistive Device Patient Response Patient;Acceptance;Explanation;Demonstration;Verbal Demonstration;Action Demonstration   Problem List    Problems Impaired Balance   Anticipated Discharge Equipment and Recommendations   DC  Equipment Recommendations Front-Wheel Walker   Discharge Recommendations Anticipate that the patient will have no further physical therapy needs after discharge from the hospital

## 2021-01-30 ENCOUNTER — APPOINTMENT (OUTPATIENT)
Dept: CARDIOLOGY | Facility: MEDICAL CENTER | Age: 76
DRG: 897 | End: 2021-01-30
Attending: STUDENT IN AN ORGANIZED HEALTH CARE EDUCATION/TRAINING PROGRAM
Payer: MEDICARE

## 2021-01-30 LAB
ALBUMIN SERPL BCP-MCNC: 3.2 G/DL (ref 3.2–4.9)
ALBUMIN/GLOB SERPL: 1.2 G/DL
ALP SERPL-CCNC: 67 U/L (ref 30–99)
ALT SERPL-CCNC: 13 U/L (ref 2–50)
ANION GAP SERPL CALC-SCNC: 11 MMOL/L (ref 7–16)
AST SERPL-CCNC: 22 U/L (ref 12–45)
BASOPHILS # BLD AUTO: 1.9 % (ref 0–1.8)
BASOPHILS # BLD: 0.13 K/UL (ref 0–0.12)
BILIRUB SERPL-MCNC: 0.6 MG/DL (ref 0.1–1.5)
BUN SERPL-MCNC: 13 MG/DL (ref 8–22)
CALCIUM SERPL-MCNC: 9 MG/DL (ref 8.5–10.5)
CHLORIDE SERPL-SCNC: 103 MMOL/L (ref 96–112)
CO2 SERPL-SCNC: 24 MMOL/L (ref 20–33)
CREAT SERPL-MCNC: 0.54 MG/DL (ref 0.5–1.4)
EOSINOPHIL # BLD AUTO: 0.15 K/UL (ref 0–0.51)
EOSINOPHIL NFR BLD: 2.2 % (ref 0–6.9)
ERYTHROCYTE [DISTWIDTH] IN BLOOD BY AUTOMATED COUNT: 49.7 FL (ref 35.9–50)
GLOBULIN SER CALC-MCNC: 2.7 G/DL (ref 1.9–3.5)
GLUCOSE SERPL-MCNC: 97 MG/DL (ref 65–99)
HCT VFR BLD AUTO: 42.5 % (ref 37–47)
HGB BLD-MCNC: 14.1 G/DL (ref 12–16)
IMM GRANULOCYTES # BLD AUTO: 0.1 K/UL (ref 0–0.11)
IMM GRANULOCYTES NFR BLD AUTO: 1.5 % (ref 0–0.9)
LACTATE BLD-SCNC: 2.2 MMOL/L (ref 0.5–2)
LYMPHOCYTES # BLD AUTO: 2.46 K/UL (ref 1–4.8)
LYMPHOCYTES NFR BLD: 36.8 % (ref 22–41)
MAGNESIUM SERPL-MCNC: 1.9 MG/DL (ref 1.5–2.5)
MCH RBC QN AUTO: 33 PG (ref 27–33)
MCHC RBC AUTO-ENTMCNC: 33.2 G/DL (ref 33.6–35)
MCV RBC AUTO: 99.5 FL (ref 81.4–97.8)
MONOCYTES # BLD AUTO: 0.67 K/UL (ref 0–0.85)
MONOCYTES NFR BLD AUTO: 10 % (ref 0–13.4)
NEUTROPHILS # BLD AUTO: 3.18 K/UL (ref 2–7.15)
NEUTROPHILS NFR BLD: 47.6 % (ref 44–72)
NRBC # BLD AUTO: 0 K/UL
NRBC BLD-RTO: 0 /100 WBC
PHOSPHATE SERPL-MCNC: 3.7 MG/DL (ref 2.5–4.5)
PLATELET # BLD AUTO: 237 K/UL (ref 164–446)
PMV BLD AUTO: 9.8 FL (ref 9–12.9)
POTASSIUM SERPL-SCNC: 4.2 MMOL/L (ref 3.6–5.5)
PROT SERPL-MCNC: 5.9 G/DL (ref 6–8.2)
RBC # BLD AUTO: 4.27 M/UL (ref 4.2–5.4)
SODIUM SERPL-SCNC: 138 MMOL/L (ref 135–145)
WBC # BLD AUTO: 6.7 K/UL (ref 4.8–10.8)

## 2021-01-30 PROCEDURE — 83735 ASSAY OF MAGNESIUM: CPT

## 2021-01-30 PROCEDURE — 700102 HCHG RX REV CODE 250 W/ 637 OVERRIDE(OP): Performed by: INTERNAL MEDICINE

## 2021-01-30 PROCEDURE — A9270 NON-COVERED ITEM OR SERVICE: HCPCS | Performed by: STUDENT IN AN ORGANIZED HEALTH CARE EDUCATION/TRAINING PROGRAM

## 2021-01-30 PROCEDURE — 84100 ASSAY OF PHOSPHORUS: CPT

## 2021-01-30 PROCEDURE — 83605 ASSAY OF LACTIC ACID: CPT

## 2021-01-30 PROCEDURE — 36415 COLL VENOUS BLD VENIPUNCTURE: CPT

## 2021-01-30 PROCEDURE — A9270 NON-COVERED ITEM OR SERVICE: HCPCS | Performed by: INTERNAL MEDICINE

## 2021-01-30 PROCEDURE — 99232 SBSQ HOSP IP/OBS MODERATE 35: CPT | Mod: GC | Performed by: INTERNAL MEDICINE

## 2021-01-30 PROCEDURE — 700102 HCHG RX REV CODE 250 W/ 637 OVERRIDE(OP): Performed by: STUDENT IN AN ORGANIZED HEALTH CARE EDUCATION/TRAINING PROGRAM

## 2021-01-30 PROCEDURE — 85025 COMPLETE CBC W/AUTO DIFF WBC: CPT

## 2021-01-30 PROCEDURE — 770020 HCHG ROOM/CARE - TELE (206)

## 2021-01-30 PROCEDURE — A9270 NON-COVERED ITEM OR SERVICE: HCPCS | Performed by: PSYCHIATRY & NEUROLOGY

## 2021-01-30 PROCEDURE — 700102 HCHG RX REV CODE 250 W/ 637 OVERRIDE(OP): Performed by: PSYCHIATRY & NEUROLOGY

## 2021-01-30 PROCEDURE — 700105 HCHG RX REV CODE 258: Performed by: STUDENT IN AN ORGANIZED HEALTH CARE EDUCATION/TRAINING PROGRAM

## 2021-01-30 PROCEDURE — 80053 COMPREHEN METABOLIC PANEL: CPT

## 2021-01-30 RX ORDER — LEVOTHYROXINE SODIUM 0.1 MG/1
100 TABLET ORAL
Status: DISCONTINUED | OUTPATIENT
Start: 2021-01-31 | End: 2021-02-09 | Stop reason: HOSPADM

## 2021-01-30 RX ORDER — LEVOTHYROXINE SODIUM 0.05 MG/1
50 TABLET ORAL
Status: DISCONTINUED | OUTPATIENT
Start: 2021-01-30 | End: 2021-01-30

## 2021-01-30 RX ORDER — CHOLECALCIFEROL (VITAMIN D3) 125 MCG
5 CAPSULE ORAL ONCE
Status: COMPLETED | OUTPATIENT
Start: 2021-01-30 | End: 2021-01-30

## 2021-01-30 RX ADMIN — APIXABAN 5 MG: 5 TABLET, FILM COATED ORAL at 05:44

## 2021-01-30 RX ADMIN — ACETAMINOPHEN 650 MG: 325 TABLET ORAL at 19:41

## 2021-01-30 RX ADMIN — ACETAMINOPHEN 650 MG: 325 TABLET ORAL at 03:37

## 2021-01-30 RX ADMIN — LEVOTHYROXINE SODIUM 50 MCG: 0.05 TABLET ORAL at 05:45

## 2021-01-30 RX ADMIN — APIXABAN 5 MG: 5 TABLET, FILM COATED ORAL at 17:51

## 2021-01-30 RX ADMIN — THERA TABS 1 TABLET: TAB at 05:44

## 2021-01-30 RX ADMIN — LORAZEPAM 0.5 MG: 0.5 TABLET ORAL at 13:06

## 2021-01-30 RX ADMIN — Medication 400 MG: at 05:45

## 2021-01-30 RX ADMIN — LORAZEPAM 1 MG: 1 TABLET ORAL at 22:48

## 2021-01-30 RX ADMIN — VENLAFAXINE HYDROCHLORIDE 150 MG: 75 CAPSULE, EXTENDED RELEASE ORAL at 05:48

## 2021-01-30 RX ADMIN — GABAPENTIN 200 MG: 100 CAPSULE ORAL at 17:52

## 2021-01-30 RX ADMIN — CHLORDIAZEPOXIDE HYDROCHLORIDE 25 MG: 25 CAPSULE ORAL at 05:44

## 2021-01-30 RX ADMIN — LORAZEPAM 2 MG: 2 TABLET ORAL at 20:28

## 2021-01-30 RX ADMIN — Medication 100 MG: at 05:48

## 2021-01-30 RX ADMIN — Medication 400 MG: at 17:51

## 2021-01-30 RX ADMIN — SODIUM CHLORIDE, POTASSIUM CHLORIDE, SODIUM LACTATE AND CALCIUM CHLORIDE: 600; 310; 30; 20 INJECTION, SOLUTION INTRAVENOUS at 22:48

## 2021-01-30 RX ADMIN — OMEPRAZOLE 20 MG: 20 CAPSULE, DELAYED RELEASE ORAL at 05:45

## 2021-01-30 RX ADMIN — Medication 5 MG: at 21:19

## 2021-01-30 RX ADMIN — CHLORDIAZEPOXIDE HYDROCHLORIDE 25 MG: 25 CAPSULE ORAL at 21:19

## 2021-01-30 RX ADMIN — CHLORDIAZEPOXIDE HYDROCHLORIDE 25 MG: 25 CAPSULE ORAL at 13:07

## 2021-01-30 RX ADMIN — DIGOXIN 125 MCG: 125 TABLET ORAL at 17:52

## 2021-01-30 RX ADMIN — METOPROLOL TARTRATE 100 MG: 50 TABLET, FILM COATED ORAL at 05:45

## 2021-01-30 RX ADMIN — LORAZEPAM 0.5 MG: 0.5 TABLET ORAL at 18:00

## 2021-01-30 RX ADMIN — GABAPENTIN 100 MG: 100 CAPSULE ORAL at 15:03

## 2021-01-30 RX ADMIN — FOLIC ACID 1 MG: 1 TABLET ORAL at 05:45

## 2021-01-30 RX ADMIN — LORAZEPAM 0.5 MG: 0.5 TABLET ORAL at 09:14

## 2021-01-30 RX ADMIN — GABAPENTIN 100 MG: 100 CAPSULE ORAL at 05:44

## 2021-01-30 ASSESSMENT — ENCOUNTER SYMPTOMS
COUGH: 0
BRUISES/BLEEDS EASILY: 0
DIZZINESS: 0
NERVOUS/ANXIOUS: 0
SORE THROAT: 0
DOUBLE VISION: 0
INSOMNIA: 0
FEVER: 0
SHORTNESS OF BREATH: 0
POLYDIPSIA: 0
ABDOMINAL PAIN: 0
PALPITATIONS: 0
HEADACHES: 0
DIAPHORESIS: 1
CHILLS: 0
FOCAL WEAKNESS: 0
BLURRED VISION: 0
VOMITING: 0
MYALGIAS: 0

## 2021-01-30 ASSESSMENT — LIFESTYLE VARIABLES
HEADACHE, FULLNESS IN HEAD: NOT PRESENT
ORIENTATION AND CLOUDING OF SENSORIUM: ORIENTED AND CAN DO SERIAL ADDITIONS
AUDITORY DISTURBANCES: NOT PRESENT
ANXIETY: MILDLY ANXIOUS
VISUAL DISTURBANCES: NOT PRESENT
PAROXYSMAL SWEATS: NO SWEAT VISIBLE
PAROXYSMAL SWEATS: NO SWEAT VISIBLE
AUDITORY DISTURBANCES: NOT PRESENT
AUDITORY DISTURBANCES: NOT PRESENT
VISUAL DISTURBANCES: NOT PRESENT
AGITATION: NORMAL ACTIVITY
TOTAL SCORE: 2
AUDITORY DISTURBANCES: NOT PRESENT
TOTAL SCORE: 6
PAROXYSMAL SWEATS: NO SWEAT VISIBLE
HEADACHE, FULLNESS IN HEAD: MODERATE
ANXIETY: *
HEADACHE, FULLNESS IN HEAD: NOT PRESENT
TREMOR: NO TREMOR
TOTAL SCORE: 5
PAROXYSMAL SWEATS: NO SWEAT VISIBLE
ANXIETY: NO ANXIETY (AT EASE)
TOTAL SCORE: 12
AGITATION: NORMAL ACTIVITY
TREMOR: TREMOR NOT VISIBLE BUT CAN BE FELT, FINGERTIP TO FINGERTIP
NAUSEA AND VOMITING: NO NAUSEA AND NO VOMITING
TOTAL SCORE: 8
AUDITORY DISTURBANCES: NOT PRESENT
HEADACHE, FULLNESS IN HEAD: VERY MILD
PAROXYSMAL SWEATS: NO SWEAT VISIBLE
ORIENTATION AND CLOUDING OF SENSORIUM: ORIENTED AND CAN DO SERIAL ADDITIONS
AUDITORY DISTURBANCES: NOT PRESENT
VISUAL DISTURBANCES: NOT PRESENT
AGITATION: NORMAL ACTIVITY
NAUSEA AND VOMITING: NO NAUSEA AND NO VOMITING
NAUSEA AND VOMITING: NO NAUSEA AND NO VOMITING
ANXIETY: MODERATELY ANXIOUS OR GUARDED, SO ANXIETY IS INFERRED
TREMOR: *
ORIENTATION AND CLOUDING OF SENSORIUM: ORIENTED AND CAN DO SERIAL ADDITIONS
PAROXYSMAL SWEATS: NO SWEAT VISIBLE
ORIENTATION AND CLOUDING OF SENSORIUM: ORIENTED AND CAN DO SERIAL ADDITIONS
TOTAL SCORE: 1
HEADACHE, FULLNESS IN HEAD: NOT PRESENT
TREMOR: TREMOR NOT VISIBLE BUT CAN BE FELT, FINGERTIP TO FINGERTIP
VISUAL DISTURBANCES: NOT PRESENT
ANXIETY: MODERATELY ANXIOUS OR GUARDED, SO ANXIETY IS INFERRED
AGITATION: NORMAL ACTIVITY
PAROXYSMAL SWEATS: NO SWEAT VISIBLE
TREMOR: *
TOTAL SCORE: 6
NAUSEA AND VOMITING: NO NAUSEA AND NO VOMITING
VISUAL DISTURBANCES: NOT PRESENT
HEADACHE, FULLNESS IN HEAD: VERY MILD
AGITATION: NORMAL ACTIVITY
ORIENTATION AND CLOUDING OF SENSORIUM: ORIENTED AND CAN DO SERIAL ADDITIONS
ORIENTATION AND CLOUDING OF SENSORIUM: ORIENTED AND CAN DO SERIAL ADDITIONS
TREMOR: *
ANXIETY: MODERATELY ANXIOUS OR GUARDED, SO ANXIETY IS INFERRED
ORIENTATION AND CLOUDING OF SENSORIUM: ORIENTED AND CAN DO SERIAL ADDITIONS
VISUAL DISTURBANCES: NOT PRESENT
ANXIETY: MODERATELY ANXIOUS OR GUARDED, SO ANXIETY IS INFERRED
HEADACHE, FULLNESS IN HEAD: MODERATE
NAUSEA AND VOMITING: NO NAUSEA AND NO VOMITING
NAUSEA AND VOMITING: MILD NAUSEA WITH NO VOMITING
AUDITORY DISTURBANCES: NOT PRESENT
NAUSEA AND VOMITING: *
TREMOR: TREMOR NOT VISIBLE BUT CAN BE FELT, FINGERTIP TO FINGERTIP
AGITATION: NORMAL ACTIVITY
VISUAL DISTURBANCES: NOT PRESENT
AGITATION: SOMEWHAT MORE THAN NORMAL ACTIVITY

## 2021-01-30 ASSESSMENT — PAIN DESCRIPTION - PAIN TYPE
TYPE: ACUTE PAIN

## 2021-01-30 ASSESSMENT — FIBROSIS 4 INDEX: FIB4 SCORE: 1.88

## 2021-01-30 NOTE — CARE PLAN
Problem: Safety  Goal: Will remain free from injury  Outcome: PROGRESSING AS EXPECTED  Goal: Will remain free from falls  Outcome: PROGRESSING AS EXPECTED  Intervention: Implement fall precautions  Note: Fall precautions in place. Bed in lowest position. Non-skid socks in place. Personal possessions within reach. Mobility sign on door. Bed-alarm on. Call light within reach. Pt educated regarding fall prevention and states understanding.

## 2021-01-30 NOTE — PROGRESS NOTES
Assumed care of patient at bedside report from NOC RN. Updated on POC. Patient currently sleeping. Call light within reach. Whiteboard updated. Fall precautions in place. Bed locked and in lowest position.

## 2021-01-30 NOTE — PROGRESS NOTES
Daily Progress Note:     Date of Service: 1/30/2021  Primary Team: [facility] IM White Team   Attending: Malick Johnston M.D.   Senior Resident: Dr. Gardiner  Intern: Dr. Park  Contact:  [phone]    Chief Complaint:   Acute alcohol intoxication    Subjective:   Patient feels well currently with no overt symptoms of withdrawal. She reports some brief diaphoresis overnight , that has since resolved. Minimal tremor in both hands, nearly unnoticeable. Denies SI. Legal hold remains per psychiatry, pending re-evaluation when patient is no longer at acute risk of withdrawal.    Consultants/Specialty:  Psychiatry    Review of Systems:    Review of Systems   Constitutional: Positive for diaphoresis. Negative for chills and fever.   HENT: Negative for hearing loss and sore throat.    Eyes: Negative for blurred vision and double vision.   Respiratory: Negative for cough and shortness of breath.    Cardiovascular: Negative for chest pain and palpitations.   Gastrointestinal: Negative for abdominal pain and vomiting.   Genitourinary: Negative for dysuria and urgency.   Musculoskeletal: Negative for joint pain and myalgias.   Skin: Negative for itching and rash.   Neurological: Negative for dizziness, focal weakness and headaches.   Endo/Heme/Allergies: Negative for polydipsia. Does not bruise/bleed easily.   Psychiatric/Behavioral: The patient is not nervous/anxious and does not have insomnia.        Objective Data:   Physical Exam:   Vitals:   Temp:  [36 °C (96.8 °F)-37.2 °C (98.9 °F)] 36 °C (96.8 °F)  Pulse:  [70-98] 86  Resp:  [16-18] 18  BP: (127-163)/() 143/111  SpO2:  [90 %-97 %] 90 %    Physical Exam  Vitals signs reviewed.   Constitutional:       General: She is not in acute distress.  HENT:      Head: Normocephalic and atraumatic.      Mouth/Throat:      Mouth: Mucous membranes are moist.      Pharynx: No oropharyngeal exudate or posterior oropharyngeal erythema.   Eyes:      General: No scleral icterus.      Extraocular Movements: Extraocular movements intact.      Pupils: Pupils are equal, round, and reactive to light.   Cardiovascular:      Rate and Rhythm: Normal rate and regular rhythm.      Heart sounds: No murmur.   Pulmonary:      Effort: No respiratory distress.      Breath sounds: No wheezing.   Abdominal:      General: There is no distension.      Palpations: Abdomen is soft.      Tenderness: There is no abdominal tenderness.   Musculoskeletal:         General: No tenderness or signs of injury.   Skin:     Findings: No erythema or rash.   Neurological:      General: No focal deficit present.      Mental Status: She is alert and oriented to person, place, and time.   Psychiatric:         Behavior: Behavior normal.         Thought Content: Thought content normal.           Labs:   Recent Labs     01/28/21 2003 01/30/21  0029   WBC 6.9 6.7   RBC 4.34 4.27   HEMOGLOBIN 14.2 14.1   HEMATOCRIT 44.0 42.5   .4* 99.5*   MCH 32.7 33.0   RDW 51.6* 49.7   PLATELETCT 294 237   MPV 9.1 9.8   NEUTSPOLYS 51.80 47.60   LYMPHOCYTES 36.80 36.80   MONOCYTES 8.60 10.00   EOSINOPHILS 0.90 2.20   BASOPHILS 1.30 1.90*      Recent Labs     01/28/21 2003 01/30/21  0955   SODIUM 142 138   POTASSIUM 4.5 4.2   CHLORIDE 106 103   CO2 25 24   GLUCOSE 97 97   BUN 14 13      Recent Labs     01/28/21 2003 01/30/21  0955   ALBUMIN 3.9 3.2   TBILIRUBIN 0.2 0.6   ALKPHOSPHAT 68 67   TOTPROTEIN 6.8 5.9*   ALTSGPT 21 13   ASTSGOT 22 22   CREATININE 0.65 0.54        Imaging:   DX-CHEST-PORTABLE (1 VIEW)   Final Result         1.  Probable mild pulmonary edema   2.  Cardiomegaly      EC-ECHOCARDIOGRAM COMPLETE W/O CONT    (Results Pending)        * Alcohol dependence (HCC)- (present on admission)  Assessment & Plan  Anticipate alcohol withdrawal with severe features.  -Thiamine, folate, multivitamin  -Chlordiazepoxide 25mg TID due to history of severe withdrawal  -[facility] protocol with lorazepam  -Clonidine 0.1mg bid  -Fall/seizure/aspiration  precautions  -Remain on telemetry for monitoring    PAF (paroxysmal atrial fibrillation) ([facility])- (present on admission)  Assessment & Plan  -Continue rate control with metoprolol 100mg BID and digoxin 125mcg daily  -Digoxin level 0.4  -Clonidine as above  -Continue apixaban 5mg BID  -Repeat echo pending    Syncope  Assessment & Plan  Most likely due to intoxication and vasovagal. Device interrogated with no periods of pauses. Initial BAL 200s. Last drink 1/28 evening prior to admission.  -Continue to monitor on telemetry  -TSH 0.020, Free T4 1.79  -Defer head imaging at this time    Depression  Assessment & Plan  Significant grief due to loss of 2 daughters and .   -Quetiapine discontinued as per psychiatry recommendation, gabapentin regimen started  -Home venlafaxine 75mg increased to 150 mg daily as per psych recommendation    Gait disorder  Assessment & Plan  Follow-up physical therapy evaluation    Suicidal ideation  Assessment & Plan  Denies SI at this time.    -Legal hold still in place. Psychiatry will reassess when patient is no longer at risk for acute alcohol withdrawal.

## 2021-01-30 NOTE — NON-PROVIDER
MEDICAL STUDENT PROGRESS NOTE    Attending: Vickie  Senior Resident: Abdifatah        PATIENT: Jeanie Hutchison; [id number]; 1945     ID: 75 y.o. female admitted for syncope while intoxicated day 1 in hospital for monitoring of alcohol withdrawal. She has a PMH of ETOH withdrawal requiring intubation 2x in the last 6 months, depression, afib with pacemaker, CLEO, hypothyroid.     SUBJECTIVE:No acute events overnight, hemo stable, started on CIWA protocol  with librium 25 TID. Last CIWA score was 0. Today she reports feeling well overall with no symptoms of withdrawal or syncope currently. She denies SOB, chest pain, seizures and states she slept well overnight. She denies SI/HI/AVH.     ROS:  Constitutional- negative for fever chills, pain well controlled  HEENT- no double vision, changes in hearing, cough  CV- no palpations, chest pain, SOB  GI- no pain nausea or vomiting, last BM today  - voiding bladder  MSK- no for myalgia  Neuro- no headaches    OBJECTIVE:     Vitals:    01/30/21 0001 01/30/21 0346 01/30/21 0754 01/30/21 1149   BP: 152/97 133/95 127/84 143/111   Pulse: 89 95 70 86   Resp: 16 16 18 18   Temp: 36.3 °C (97.4 °F) 36.3 °C (97.3 °F) 37.2 °C (98.9 °F) 36 °C (96.8 °F)   TempSrc: Temporal Temporal Temporal Temporal   SpO2: 94% 97% 95% 90%   Weight:       Height:           Intake/Output Summary (Last 24 hours) at 1/30/2021 1224  Last data filed at 1/30/2021 0800  Gross per 24 hour   Intake 1101 ml   Output 800 ml   Net 301 ml       PHYSICAL EXAM:  General: No acute distress, resting comfortably in bed.  HEENT: normocephalic, nontraumatic, PERRLA, EOMI  Cardiovascular: Heart RRR with no murmurs, rubs or gallops. Distal Pulses 2+  Respiratory: symmetric chest expansion, lungs CTA bilaterally with no wheezes rales or rhonci  Abdomen: soft, nontender, nondistended, no masses palpated, +BS  Musculoskeletal: spontaneously moves all extremities, Strength 5/5 all extremities, no peripheral edema  Neuro: non focal  "with no numbness, tingling or changes in sensation, ANOx4, some bilateral tremors in hands  Psych: sad and tearful when talking about the recent passing of her .       LABS:  Recent Labs     01/28/21 2003 01/30/21  0029   WBC 6.9 6.7   RBC 4.34 4.27   HEMOGLOBIN 14.2 14.1   HEMATOCRIT 44.0 42.5   .4* 99.5*   MCH 32.7 33.0   RDW 51.6* 49.7   PLATELETCT 294 237   MPV 9.1 9.8   NEUTSPOLYS 51.80 47.60   LYMPHOCYTES 36.80 36.80   MONOCYTES 8.60 10.00   EOSINOPHILS 0.90 2.20   BASOPHILS 1.30 1.90*     Recent Labs     01/28/21 2003 01/30/21  0955   SODIUM 142 138   POTASSIUM 4.5 4.2   CHLORIDE 106 103   CO2 25 24   BUN 14 13   CREATININE 0.65 0.54   CALCIUM 9.7 9.0   MAGNESIUM 2.3 1.9   PHOSPHORUS  --  3.7   ALBUMIN 3.9 3.2     Estimated GFR/CRCL = Estimated Creatinine Clearance: 109.1 mL/min (by C-G formula based on SCr of 0.54 mg/dL).  Recent Labs     01/28/21 2003 01/30/21  0955   GLUCOSE 97 97     Recent Labs     01/28/21 2003 01/30/21  0955   ASTSGOT 22 22   ALTSGPT 21 13   TBILIRUBIN 0.2 0.6   ALKPHOSPHAT 68 67   GLOBULIN 2.9 2.7             No results for input(s): INR, APTT, FIBRINOGEN in the last 72 hours.    Invalid input(s): DIMER    Lab interpretation- lactic acid slightly elevated rest normal    MICROBIOLOGY:   Results     Procedure Component Value Units Date/Time    BLOOD CULTURE [[id number]] Collected: 01/28/21 2101    Order Status: Completed Specimen: Blood from Peripheral Updated: 01/30/21 0652     Significant Indicator NEG     Source BLD     Site PERIPHERAL     Culture Result No Growth  Note: Blood cultures are incubated for 5 days and  are monitored continuously.Positive blood cultures  are called to the RN and reported as soon as  they are identified.      Narrative:      2 of 2 blood culture x2  Sites order. Per Hospital Policy:  Only change Specimen Src: to \"Line\" if specified by physician  order.  No site indicated    BLOOD CULTURE [[id number]] Collected: 01/28/21 2003    Order " "Status: Completed Specimen: Blood from Peripheral Updated: 01/29/21 0718     Significant Indicator NEG     Source BLD     Site PERIPHERAL     Culture Result No Growth  Note: Blood cultures are incubated for 5 days and  are monitored continuously.Positive blood cultures  are called to the RN and reported as soon as  they are identified.      Narrative:      1 of 2 for Blood Culture x 2 sites order. Per Hospital  Policy: Only change Specimen Src: to \"Line\" if specified by  physician order.  No site indicated    SARS-CoV-2 PCR (24 hour In-House): Collect NP swab in VTM [[id number]]     Order Status: Sent Specimen: Respirate from Nasopharyngeal     COV-2, FLU A/B, AND RSV BY PCR (2-4 HOURS CEPBid NerdID): Collect NP swab in VTM [[id number]] Collected: 01/28/21 2102    Order Status: Completed Specimen: Respirate Updated: 01/28/21 2152     Influenza virus A RNA Negative     Influenza virus B, PCR Negative     RSV, PCR Negative     SARS-CoV-2 by PCR NotDetected     Comment: PATIENTS: Important information regarding your results and instructions can  be found at [email or web address]   \"After your  Covid-19 Test\"  [facility] providers: PLEASE REFER TO DE-ESCALATION AND RETESTING PROTOCOL  on insidePrime [email or web address]  **The [employer] GeneXpert Xpress SARS-CoV-2 Test has been made available for  use under the Emergency Use Authorization (EUA) only.          SARS-CoV-2 Source NP Swab    Narrative:      Have you been in close contact with a person who is suspected  or known to be positive for COVID-19 within the last 30 days  (e.g. last seen that person < 30 days ago)->No           IMAGING:  DX-CHEST-PORTABLE (1 VIEW)   Final Result         1.  Probable mild pulmonary edema   2.  Cardiomegaly      EC-ECHOCARDIOGRAM COMPLETE W/O CONT    (Results Pending)       MEDS:  Current Facility-Administered Medications   Medication Last Admin   • levothyroxine (SYNTHROID) tablet 50 mcg 50 mcg at 01/30/21 0552   • omeprazole (PRILOSEC) " capsule 20 mg 20 mg at 01/30/21 0545   • lactated ringers infusion New Bag at 01/29/21 1117   • chlordiazePOXIDE (LIBRIUM) capsule 25 mg 25 mg at 01/30/21 0544   • cloNIDine (CATAPRES) tablet 0.1 mg Stopped at 01/29/21 2100   • venlafaxine XR (EFFEXOR XR) capsule 150 mg 150 mg at 01/30/21 0548   • gabapentin (NEURONTIN) capsule 100 mg 100 mg at 01/30/21 0544   • gabapentin (NEURONTIN) capsule 200 mg 200 mg at 01/29/21 1737   • digoxin (LANOXIN) tablet 125 mcg 125 mcg at 01/29/21 1738   • apixaban (ELIQUIS) tablet 5 mg 5 mg at 01/30/21 0544   • metoprolol tartrate (LOPRESSOR) tablet 100 mg 100 mg at 01/30/21 0545   • senna-docusate (PERICOLACE or SENOKOT S) 8.6-50 MG per tablet 2 Tab      And   • polyethylene glycol/lytes (MIRALAX) PACKET 1 Packet      And   • magnesium hydroxide (MILK OF MAGNESIA) suspension 30 mL      And   • bisacodyl (DULCOLAX) suppository 10 mg     • acetaminophen (Tylenol) tablet 650 mg 650 mg at 01/30/21 0337   • LORazepam (ATIVAN) tablet 0.5 mg 0.5 mg at 01/30/21 0914   • LORazepam (ATIVAN) tablet 1 mg      Or   • LORazepam (ATIVAN) injection 0.5 mg     • LORazepam (ATIVAN) tablet 2 mg      Or   • LORazepam (ATIVAN) injection 1 mg     • LORazepam (ATIVAN) tablet 3 mg      Or   • LORazepam (ATIVAN) injection 1.5 mg     • LORazepam (ATIVAN) tablet 4 mg      Or   • LORazepam (ATIVAN) injection 2 mg     • magnesium oxide (MAG-OX) tablet 400 mg 400 mg at 01/30/21 0545   • thiamine (Vitamin B-1) tablet 100 mg 100 mg at 01/30/21 0548    And   • multivitamin (THERAGRAN) tablet 1 Tab 1 Tab at 01/30/21 0544    And   • folic acid (FOLVITE) tablet 1 mg 1 mg at 01/30/21 0545       PROBLEM LIST:  Active Hospital Problems    Diagnosis   • Alcohol dependence (HCC) [F10.20]     Priority: High   • PAF (paroxysmal atrial fibrillation) (HCC) [I48.0]     Priority: High   • Syncope [R55]     Priority: High   • Gait disorder [R26.9]   • Depression [F32.9]   • Suicidal ideation [R45.851]       ASSESSMENT AND PLAN:    Assessment- Mrs. Hutchison is a 76y/o F day 1 in hospital for monitoring of alcohol withdrawal. She feels well today with no symptoms.         Plan-  ETOH withdrawal- CIWA protocol with librium 25 TID. Omeprazole 20. Monitor vitals closely and administer ativan if needed per protocol.      Afib with previous MI- cont home med of eloquis 5     Derpression- consult psychology venlafaxine increased to 150mg, hold seroquel, started ANA MARIA 100 tid 200mg qd at night for sleep     Disposition- cont hospital stay for ETOH withdrawal

## 2021-01-31 PROBLEM — F10.931 ALCOHOL WITHDRAWAL SYNDROME, WITH DELIRIUM (HCC): Status: ACTIVE | Noted: 2021-01-31

## 2021-01-31 LAB
ALBUMIN SERPL BCP-MCNC: 3.8 G/DL (ref 3.2–4.9)
ALBUMIN/GLOB SERPL: 1.2 G/DL
ALP SERPL-CCNC: 74 U/L (ref 30–99)
ALT SERPL-CCNC: 13 U/L (ref 2–50)
ANION GAP SERPL CALC-SCNC: 11 MMOL/L (ref 7–16)
AST SERPL-CCNC: 18 U/L (ref 12–45)
BASOPHILS # BLD AUTO: 1.4 % (ref 0–1.8)
BASOPHILS # BLD: 0.09 K/UL (ref 0–0.12)
BILIRUB SERPL-MCNC: 0.9 MG/DL (ref 0.1–1.5)
BUN SERPL-MCNC: 13 MG/DL (ref 8–22)
CALCIUM SERPL-MCNC: 9.5 MG/DL (ref 8.5–10.5)
CHLORIDE SERPL-SCNC: 105 MMOL/L (ref 96–112)
CO2 SERPL-SCNC: 22 MMOL/L (ref 20–33)
CREAT SERPL-MCNC: 0.54 MG/DL (ref 0.5–1.4)
EKG IMPRESSION: NORMAL
EOSINOPHIL # BLD AUTO: 0.19 K/UL (ref 0–0.51)
EOSINOPHIL NFR BLD: 2.9 % (ref 0–6.9)
ERYTHROCYTE [DISTWIDTH] IN BLOOD BY AUTOMATED COUNT: 51.2 FL (ref 35.9–50)
GLOBULIN SER CALC-MCNC: 3.1 G/DL (ref 1.9–3.5)
GLUCOSE SERPL-MCNC: 110 MG/DL (ref 65–99)
HCT VFR BLD AUTO: 41.6 % (ref 37–47)
HGB BLD-MCNC: 13.2 G/DL (ref 12–16)
IMM GRANULOCYTES # BLD AUTO: 0.02 K/UL (ref 0–0.11)
IMM GRANULOCYTES NFR BLD AUTO: 0.3 % (ref 0–0.9)
LYMPHOCYTES # BLD AUTO: 2.66 K/UL (ref 1–4.8)
LYMPHOCYTES NFR BLD: 40.9 % (ref 22–41)
MAGNESIUM SERPL-MCNC: 1.8 MG/DL (ref 1.5–2.5)
MCH RBC QN AUTO: 33.7 PG (ref 27–33)
MCHC RBC AUTO-ENTMCNC: 31.7 G/DL (ref 33.6–35)
MCV RBC AUTO: 106.1 FL (ref 81.4–97.8)
MONOCYTES # BLD AUTO: 0.44 K/UL (ref 0–0.85)
MONOCYTES NFR BLD AUTO: 6.8 % (ref 0–13.4)
NEUTROPHILS # BLD AUTO: 3.1 K/UL (ref 2–7.15)
NEUTROPHILS NFR BLD: 47.7 % (ref 44–72)
NRBC # BLD AUTO: 0 K/UL
NRBC BLD-RTO: 0 /100 WBC
PHOSPHATE SERPL-MCNC: 3.9 MG/DL (ref 2.5–4.5)
PLATELET # BLD AUTO: 242 K/UL (ref 164–446)
PMV BLD AUTO: 9.5 FL (ref 9–12.9)
POTASSIUM SERPL-SCNC: 4.1 MMOL/L (ref 3.6–5.5)
PROT SERPL-MCNC: 6.9 G/DL (ref 6–8.2)
RBC # BLD AUTO: 3.92 M/UL (ref 4.2–5.4)
SODIUM SERPL-SCNC: 138 MMOL/L (ref 135–145)
WBC # BLD AUTO: 6.5 K/UL (ref 4.8–10.8)

## 2021-01-31 PROCEDURE — 99232 SBSQ HOSP IP/OBS MODERATE 35: CPT | Mod: GC | Performed by: INTERNAL MEDICINE

## 2021-01-31 PROCEDURE — 83735 ASSAY OF MAGNESIUM: CPT

## 2021-01-31 PROCEDURE — 700102 HCHG RX REV CODE 250 W/ 637 OVERRIDE(OP): Performed by: STUDENT IN AN ORGANIZED HEALTH CARE EDUCATION/TRAINING PROGRAM

## 2021-01-31 PROCEDURE — A9270 NON-COVERED ITEM OR SERVICE: HCPCS | Performed by: STUDENT IN AN ORGANIZED HEALTH CARE EDUCATION/TRAINING PROGRAM

## 2021-01-31 PROCEDURE — 700105 HCHG RX REV CODE 258: Performed by: STUDENT IN AN ORGANIZED HEALTH CARE EDUCATION/TRAINING PROGRAM

## 2021-01-31 PROCEDURE — 85025 COMPLETE CBC W/AUTO DIFF WBC: CPT

## 2021-01-31 PROCEDURE — A9270 NON-COVERED ITEM OR SERVICE: HCPCS | Performed by: INTERNAL MEDICINE

## 2021-01-31 PROCEDURE — 700102 HCHG RX REV CODE 250 W/ 637 OVERRIDE(OP): Performed by: INTERNAL MEDICINE

## 2021-01-31 PROCEDURE — 700111 HCHG RX REV CODE 636 W/ 250 OVERRIDE (IP): Performed by: INTERNAL MEDICINE

## 2021-01-31 PROCEDURE — A9270 NON-COVERED ITEM OR SERVICE: HCPCS | Performed by: PSYCHIATRY & NEUROLOGY

## 2021-01-31 PROCEDURE — 84100 ASSAY OF PHOSPHORUS: CPT

## 2021-01-31 PROCEDURE — 80053 COMPREHEN METABOLIC PANEL: CPT

## 2021-01-31 PROCEDURE — 700102 HCHG RX REV CODE 250 W/ 637 OVERRIDE(OP): Performed by: PSYCHIATRY & NEUROLOGY

## 2021-01-31 PROCEDURE — 770020 HCHG ROOM/CARE - TELE (206)

## 2021-01-31 PROCEDURE — 99291 CRITICAL CARE FIRST HOUR: CPT | Performed by: PSYCHIATRY & NEUROLOGY

## 2021-01-31 RX ORDER — LORAZEPAM 2 MG/ML
2 INJECTION INTRAMUSCULAR EVERY 4 HOURS
Status: DISCONTINUED | OUTPATIENT
Start: 2021-01-31 | End: 2021-02-02

## 2021-01-31 RX ORDER — CLONIDINE HYDROCHLORIDE 0.1 MG/1
0.1 TABLET ORAL EVERY 8 HOURS PRN
Status: DISCONTINUED | OUTPATIENT
Start: 2021-01-31 | End: 2021-01-31 | Stop reason: CLARIF

## 2021-01-31 RX ORDER — METOPROLOL TARTRATE 50 MG/1
100 TABLET, FILM COATED ORAL EVERY 8 HOURS PRN
Status: DISCONTINUED | OUTPATIENT
Start: 2021-01-31 | End: 2021-01-31 | Stop reason: CLARIF

## 2021-01-31 RX ORDER — CLONIDINE HYDROCHLORIDE 0.1 MG/1
0.1 TABLET ORAL EVERY 8 HOURS PRN
Status: DISCONTINUED | OUTPATIENT
Start: 2021-01-31 | End: 2021-01-31

## 2021-01-31 RX ORDER — DEXMEDETOMIDINE HYDROCHLORIDE 4 UG/ML
.1-1 INJECTION, SOLUTION INTRAVENOUS CONTINUOUS
Status: DISCONTINUED | OUTPATIENT
Start: 2021-01-31 | End: 2021-02-02

## 2021-01-31 RX ORDER — CLONIDINE HYDROCHLORIDE 0.1 MG/1
0.1 TABLET ORAL 2 TIMES DAILY PRN
Status: DISCONTINUED | OUTPATIENT
Start: 2021-01-31 | End: 2021-02-09 | Stop reason: HOSPADM

## 2021-01-31 RX ORDER — METOPROLOL TARTRATE 100 MG/1
100 TABLET ORAL TWICE DAILY
Status: DISCONTINUED | OUTPATIENT
Start: 2021-01-31 | End: 2021-02-09 | Stop reason: HOSPADM

## 2021-01-31 RX ORDER — LORAZEPAM 2 MG/ML
1-2 INJECTION INTRAMUSCULAR
Status: DISCONTINUED | OUTPATIENT
Start: 2021-01-31 | End: 2021-02-02

## 2021-01-31 RX ADMIN — LORAZEPAM 2 MG: 2 INJECTION INTRAMUSCULAR; INTRAVENOUS at 17:04

## 2021-01-31 RX ADMIN — LORAZEPAM 2 MG: 2 INJECTION INTRAMUSCULAR; INTRAVENOUS at 21:50

## 2021-01-31 RX ADMIN — LORAZEPAM 1 MG: 2 INJECTION INTRAMUSCULAR; INTRAVENOUS at 09:55

## 2021-01-31 RX ADMIN — LORAZEPAM 2 MG: 2 INJECTION INTRAMUSCULAR; INTRAVENOUS at 15:24

## 2021-01-31 RX ADMIN — LORAZEPAM 2 MG: 2 INJECTION INTRAMUSCULAR; INTRAVENOUS at 16:49

## 2021-01-31 RX ADMIN — Medication 400 MG: at 17:04

## 2021-01-31 RX ADMIN — DIGOXIN 125 MCG: 125 TABLET ORAL at 17:04

## 2021-01-31 RX ADMIN — FOLIC ACID 1 MG: 1 TABLET ORAL at 05:32

## 2021-01-31 RX ADMIN — GABAPENTIN 100 MG: 100 CAPSULE ORAL at 14:48

## 2021-01-31 RX ADMIN — LORAZEPAM 2 MG: 2 INJECTION INTRAMUSCULAR; INTRAVENOUS at 16:23

## 2021-01-31 RX ADMIN — LORAZEPAM 1 MG: 1 TABLET ORAL at 02:47

## 2021-01-31 RX ADMIN — GABAPENTIN 200 MG: 100 CAPSULE ORAL at 20:05

## 2021-01-31 RX ADMIN — LORAZEPAM 2 MG: 2 INJECTION INTRAMUSCULAR; INTRAVENOUS at 14:45

## 2021-01-31 RX ADMIN — LORAZEPAM 2 MG: 2 INJECTION INTRAMUSCULAR; INTRAVENOUS at 15:49

## 2021-01-31 RX ADMIN — GABAPENTIN 100 MG: 100 CAPSULE ORAL at 05:31

## 2021-01-31 RX ADMIN — APIXABAN 5 MG: 5 TABLET, FILM COATED ORAL at 17:04

## 2021-01-31 RX ADMIN — LORAZEPAM 2 MG: 2 INJECTION INTRAMUSCULAR; INTRAVENOUS at 20:00

## 2021-01-31 RX ADMIN — METOPROLOL TARTRATE 100 MG: 50 TABLET, FILM COATED ORAL at 12:45

## 2021-01-31 RX ADMIN — LORAZEPAM 2 MG: 2 INJECTION INTRAMUSCULAR; INTRAVENOUS at 14:59

## 2021-01-31 RX ADMIN — LORAZEPAM 2 MG: 2 INJECTION INTRAMUSCULAR; INTRAVENOUS at 17:27

## 2021-01-31 RX ADMIN — SODIUM CHLORIDE, POTASSIUM CHLORIDE, SODIUM LACTATE AND CALCIUM CHLORIDE: 600; 310; 30; 20 INJECTION, SOLUTION INTRAVENOUS at 09:56

## 2021-01-31 RX ADMIN — LORAZEPAM 1 MG: 2 INJECTION INTRAMUSCULAR; INTRAVENOUS at 12:09

## 2021-01-31 RX ADMIN — OMEPRAZOLE 20 MG: 20 CAPSULE, DELAYED RELEASE ORAL at 05:32

## 2021-01-31 RX ADMIN — LORAZEPAM 1 MG: 1 TABLET ORAL at 06:39

## 2021-01-31 RX ADMIN — LEVOTHYROXINE SODIUM 100 MCG: 0.1 TABLET ORAL at 05:31

## 2021-01-31 RX ADMIN — VENLAFAXINE HYDROCHLORIDE 150 MG: 75 CAPSULE, EXTENDED RELEASE ORAL at 05:31

## 2021-01-31 RX ADMIN — APIXABAN 5 MG: 5 TABLET, FILM COATED ORAL at 05:32

## 2021-01-31 RX ADMIN — Medication 400 MG: at 05:32

## 2021-01-31 RX ADMIN — THERA TABS 1 TABLET: TAB at 05:32

## 2021-01-31 RX ADMIN — LORAZEPAM 2 MG: 2 INJECTION INTRAMUSCULAR; INTRAVENOUS at 14:14

## 2021-01-31 RX ADMIN — LORAZEPAM 2 MG: 2 INJECTION INTRAMUSCULAR; INTRAVENOUS at 18:08

## 2021-01-31 RX ADMIN — Medication 100 MG: at 05:32

## 2021-01-31 RX ADMIN — CHLORDIAZEPOXIDE HYDROCHLORIDE 25 MG: 25 CAPSULE ORAL at 05:32

## 2021-01-31 RX ADMIN — LORAZEPAM 1 MG: 2 INJECTION INTRAMUSCULAR; INTRAVENOUS at 08:10

## 2021-01-31 RX ADMIN — LORAZEPAM 2 MG: 2 INJECTION INTRAMUSCULAR; INTRAVENOUS at 21:20

## 2021-01-31 ASSESSMENT — LIFESTYLE VARIABLES
NAUSEA AND VOMITING: INTERMITTENT NAUSEA WITH DRY HEAVES
AGITATION: NORMAL ACTIVITY
AGITATION: *
TREMOR: *
VISUAL DISTURBANCES: MILD SENSITIVITY
VISUAL DISTURBANCES: NOT PRESENT
AGITATION: NORMAL ACTIVITY
VISUAL DISTURBANCES: NOT PRESENT
TREMOR: *
VISUAL DISTURBANCES: NOT PRESENT
HEADACHE, FULLNESS IN HEAD: NOT PRESENT
VISUAL DISTURBANCES: NOT PRESENT
PAROXYSMAL SWEATS: BARELY PERCEPTIBLE SWEATING. PALMS MOIST
ORIENTATION AND CLOUDING OF SENSORIUM: DATE DISORIENTATION BY MORE THAN TWO CALENDAR DAYS
VISUAL DISTURBANCES: NOT PRESENT
HEADACHE, FULLNESS IN HEAD: MILD
PAROXYSMAL SWEATS: BEADS OF SWEAT OBVIOUS ON FOREHEAD
AUDITORY DISTURBANCES: NOT PRESENT
NAUSEA AND VOMITING: *
TREMOR: *
NAUSEA AND VOMITING: *
AUDITORY DISTURBANCES: NOT PRESENT
HEADACHE, FULLNESS IN HEAD: MILD
ORIENTATION AND CLOUDING OF SENSORIUM: ORIENTED AND CAN DO SERIAL ADDITIONS
NAUSEA AND VOMITING: INTERMITTENT NAUSEA WITH DRY HEAVES
HEADACHE, FULLNESS IN HEAD: MILD
VISUAL DISTURBANCES: NOT PRESENT
HEADACHE, FULLNESS IN HEAD: NOT PRESENT
HEADACHE, FULLNESS IN HEAD: MILD
ORIENTATION AND CLOUDING OF SENSORIUM: DATE DISORIENTATION BY MORE THAN TWO CALENDAR DAYS
ANXIETY: *
ANXIETY: *
PAROXYSMAL SWEATS: BEADS OF SWEAT OBVIOUS ON FOREHEAD
AUDITORY DISTURBANCES: NOT PRESENT
AGITATION: NORMAL ACTIVITY
ORIENTATION AND CLOUDING OF SENSORIUM: DATE DISORIENTATION BY MORE THAN TWO CALENDAR DAYS
AGITATION: NORMAL ACTIVITY
ANXIETY: *
ORIENTATION AND CLOUDING OF SENSORIUM: DATE DISORIENTATION BY MORE THAN TWO CALENDAR DAYS
AUDITORY DISTURBANCES: NOT PRESENT
ANXIETY: MODERATELY ANXIOUS OR GUARDED, SO ANXIETY IS INFERRED
VISUAL DISTURBANCES: MILD SENSITIVITY
AGITATION: MODERATELY FIDGETY AND RESTLESS
AUDITORY DISTURBANCES: NOT PRESENT
AUDITORY DISTURBANCES: NOT PRESENT
HEADACHE, FULLNESS IN HEAD: NOT PRESENT
NAUSEA AND VOMITING: MILD NAUSEA WITH NO VOMITING
TOTAL SCORE: 25
TOTAL SCORE: 26
VISUAL DISTURBANCES: MILD SENSITIVITY
PAROXYSMAL SWEATS: BEADS OF SWEAT OBVIOUS ON FOREHEAD
ORIENTATION AND CLOUDING OF SENSORIUM: DATE DISORIENTATION BY MORE THAN TWO CALENDAR DAYS
HEADACHE, FULLNESS IN HEAD: VERY MILD
PAROXYSMAL SWEATS: BEADS OF SWEAT OBVIOUS ON FOREHEAD
ORIENTATION AND CLOUDING OF SENSORIUM: DATE DISORIENTATION BY MORE THAN TWO CALENDAR DAYS
AUDITORY DISTURBANCES: NOT PRESENT
HEADACHE, FULLNESS IN HEAD: MILD
AGITATION: MODERATELY FIDGETY AND RESTLESS
ANXIETY: MODERATELY ANXIOUS OR GUARDED, SO ANXIETY IS INFERRED
PAROXYSMAL SWEATS: *
HEADACHE, FULLNESS IN HEAD: MILD
VISUAL DISTURBANCES: NOT PRESENT
VISUAL DISTURBANCES: NOT PRESENT
AGITATION: *
PAROXYSMAL SWEATS: BEADS OF SWEAT OBVIOUS ON FOREHEAD
TREMOR: *
VISUAL DISTURBANCES: NOT PRESENT
TREMOR: MODERATE TREMOR WITH ARMS EXTENDED
NAUSEA AND VOMITING: *
PAROXYSMAL SWEATS: BEADS OF SWEAT OBVIOUS ON FOREHEAD
HEADACHE, FULLNESS IN HEAD: MILD
TREMOR: *
VISUAL DISTURBANCES: NOT PRESENT
AUDITORY DISTURBANCES: NOT PRESENT
TOTAL SCORE: 25
PAROXYSMAL SWEATS: BEADS OF SWEAT OBVIOUS ON FOREHEAD
TOTAL SCORE: 8
ORIENTATION AND CLOUDING OF SENSORIUM: DATE DISORIENTATION BY MORE THAN TWO CALENDAR DAYS
ANXIETY: MODERATELY ANXIOUS OR GUARDED, SO ANXIETY IS INFERRED
ANXIETY: MODERATELY ANXIOUS OR GUARDED, SO ANXIETY IS INFERRED
HEADACHE, FULLNESS IN HEAD: NOT PRESENT
AGITATION: MODERATELY FIDGETY AND RESTLESS
HEADACHE, FULLNESS IN HEAD: MILD
AUDITORY DISTURBANCES: NOT PRESENT
TREMOR: MODERATE TREMOR WITH ARMS EXTENDED
NAUSEA AND VOMITING: MILD NAUSEA WITH NO VOMITING
NAUSEA AND VOMITING: *
AUDITORY DISTURBANCES: NOT PRESENT
NAUSEA AND VOMITING: *
PAROXYSMAL SWEATS: BARELY PERCEPTIBLE SWEATING. PALMS MOIST
AGITATION: MODERATELY FIDGETY AND RESTLESS
AUDITORY DISTURBANCES: NOT PRESENT
HEADACHE, FULLNESS IN HEAD: MILD
PAROXYSMAL SWEATS: BARELY PERCEPTIBLE SWEATING. PALMS MOIST
TOTAL SCORE: 11
VISUAL DISTURBANCES: NOT PRESENT
HEADACHE, FULLNESS IN HEAD: NOT PRESENT
ORIENTATION AND CLOUDING OF SENSORIUM: DISORIENTED FOR PLACE AND / OR PERSON
AUDITORY DISTURBANCES: NOT PRESENT
PAROXYSMAL SWEATS: *
PAROXYSMAL SWEATS: NO SWEAT VISIBLE
TREMOR: MODERATE TREMOR WITH ARMS EXTENDED
AGITATION: *
TREMOR: MODERATE TREMOR WITH ARMS EXTENDED
PAROXYSMAL SWEATS: *
ORIENTATION AND CLOUDING OF SENSORIUM: DATE DISORIENTATION BY MORE THAN TWO CALENDAR DAYS
HEADACHE, FULLNESS IN HEAD: MILD
VISUAL DISTURBANCES: NOT PRESENT
ANXIETY: NO ANXIETY (AT EASE)
NAUSEA AND VOMITING: INTERMITTENT NAUSEA WITH DRY HEAVES
ORIENTATION AND CLOUDING OF SENSORIUM: DATE DISORIENTATION BY MORE THAN TWO CALENDAR DAYS
VISUAL DISTURBANCES: NOT PRESENT
AGITATION: MODERATELY FIDGETY AND RESTLESS
ANXIETY: *
PAROXYSMAL SWEATS: *
TREMOR: MODERATE TREMOR WITH ARMS EXTENDED
HEADACHE, FULLNESS IN HEAD: VERY MILD
AUDITORY DISTURBANCES: NOT PRESENT
AGITATION: MODERATELY FIDGETY AND RESTLESS
NAUSEA AND VOMITING: INTERMITTENT NAUSEA WITH DRY HEAVES
TOTAL SCORE: 11
ORIENTATION AND CLOUDING OF SENSORIUM: DATE DISORIENTATION BY MORE THAN TWO CALENDAR DAYS
AUDITORY DISTURBANCES: NOT PRESENT
AUDITORY DISTURBANCES: NOT PRESENT
ORIENTATION AND CLOUDING OF SENSORIUM: DATE DISORIENTATION BY MORE THAN TWO CALENDAR DAYS
HEADACHE, FULLNESS IN HEAD: MILD
TREMOR: *
TOTAL SCORE: 25
TREMOR: MODERATE TREMOR WITH ARMS EXTENDED
TOTAL SCORE: 25
VISUAL DISTURBANCES: MILD SENSITIVITY
VISUAL DISTURBANCES: MILD SENSITIVITY
TOTAL SCORE: 25
ANXIETY: *
ORIENTATION AND CLOUDING OF SENSORIUM: DATE DISORIENTATION BY MORE THAN TWO CALENDAR DAYS
AGITATION: NORMAL ACTIVITY
TREMOR: MODERATE TREMOR WITH ARMS EXTENDED
ORIENTATION AND CLOUDING OF SENSORIUM: DATE DISORIENTATION BY MORE THAN TWO CALENDAR DAYS
NAUSEA AND VOMITING: *
AGITATION: MODERATELY FIDGETY AND RESTLESS
NAUSEA AND VOMITING: MILD NAUSEA WITH NO VOMITING
TOTAL SCORE: 25
VISUAL DISTURBANCES: MILD SENSITIVITY
NAUSEA AND VOMITING: *
AGITATION: MODERATELY FIDGETY AND RESTLESS
TOTAL SCORE: 11
ORIENTATION AND CLOUDING OF SENSORIUM: DATE DISORIENTATION BY MORE THAN TWO CALENDAR DAYS
TREMOR: MODERATE TREMOR WITH ARMS EXTENDED
ORIENTATION AND CLOUDING OF SENSORIUM: ORIENTED AND CAN DO SERIAL ADDITIONS
PAROXYSMAL SWEATS: BEADS OF SWEAT OBVIOUS ON FOREHEAD
TOTAL SCORE: 25
TREMOR: *
ORIENTATION AND CLOUDING OF SENSORIUM: DISORIENTED FOR PLACE AND / OR PERSON
TREMOR: MODERATE TREMOR WITH ARMS EXTENDED
TOTAL SCORE: 23
AUDITORY DISTURBANCES: NOT PRESENT
TREMOR: NO TREMOR
TOTAL SCORE: 23
ORIENTATION AND CLOUDING OF SENSORIUM: DATE DISORIENTATION BY MORE THAN TWO CALENDAR DAYS
TREMOR: MODERATE TREMOR WITH ARMS EXTENDED
PAROXYSMAL SWEATS: BARELY PERCEPTIBLE SWEATING. PALMS MOIST
ANXIETY: MODERATELY ANXIOUS OR GUARDED, SO ANXIETY IS INFERRED
AGITATION: *
NAUSEA AND VOMITING: MILD NAUSEA WITH NO VOMITING
AGITATION: MODERATELY FIDGETY AND RESTLESS
PAROXYSMAL SWEATS: *
AUDITORY DISTURBANCES: NOT PRESENT
ORIENTATION AND CLOUDING OF SENSORIUM: DISORIENTED FOR PLACE AND / OR PERSON
AGITATION: NORMAL ACTIVITY
VISUAL DISTURBANCES: NOT PRESENT
NAUSEA AND VOMITING: *
NAUSEA AND VOMITING: INTERMITTENT NAUSEA WITH DRY HEAVES
TOTAL SCORE: 25
TREMOR: MODERATE TREMOR WITH ARMS EXTENDED
HEADACHE, FULLNESS IN HEAD: MODERATE
VISUAL DISTURBANCES: NOT PRESENT
TOTAL SCORE: 4
PAROXYSMAL SWEATS: BARELY PERCEPTIBLE SWEATING. PALMS MOIST
HEADACHE, FULLNESS IN HEAD: NOT PRESENT
TOTAL SCORE: 25
NAUSEA AND VOMITING: INTERMITTENT NAUSEA WITH DRY HEAVES
ANXIETY: MODERATELY ANXIOUS OR GUARDED, SO ANXIETY IS INFERRED
AGITATION: *
ORIENTATION AND CLOUDING OF SENSORIUM: DATE DISORIENTATION BY MORE THAN TWO CALENDAR DAYS
AGITATION: *
AUDITORY DISTURBANCES: NOT PRESENT
AUDITORY DISTURBANCES: NOT PRESENT
PAROXYSMAL SWEATS: BARELY PERCEPTIBLE SWEATING. PALMS MOIST
NAUSEA AND VOMITING: MILD NAUSEA WITH NO VOMITING
ANXIETY: MODERATELY ANXIOUS OR GUARDED, SO ANXIETY IS INFERRED
ANXIETY: MODERATELY ANXIOUS OR GUARDED, SO ANXIETY IS INFERRED
NAUSEA AND VOMITING: *
ANXIETY: MODERATELY ANXIOUS OR GUARDED, SO ANXIETY IS INFERRED
TOTAL SCORE: 8
AUDITORY DISTURBANCES: NOT PRESENT
ANXIETY: MODERATELY ANXIOUS OR GUARDED, SO ANXIETY IS INFERRED
HEADACHE, FULLNESS IN HEAD: MILD
ANXIETY: MODERATELY ANXIOUS OR GUARDED, SO ANXIETY IS INFERRED
ANXIETY: *
TREMOR: *
TREMOR: *
TOTAL SCORE: 23
TOTAL SCORE: 25
ANXIETY: *
TOTAL SCORE: 25
NAUSEA AND VOMITING: *
TREMOR: *
HEADACHE, FULLNESS IN HEAD: MILD
ANXIETY: *
ORIENTATION AND CLOUDING OF SENSORIUM: ORIENTED AND CAN DO SERIAL ADDITIONS
PAROXYSMAL SWEATS: NO SWEAT VISIBLE
VISUAL DISTURBANCES: NOT PRESENT
NAUSEA AND VOMITING: NO NAUSEA AND NO VOMITING
ANXIETY: MODERATELY ANXIOUS OR GUARDED, SO ANXIETY IS INFERRED
AUDITORY DISTURBANCES: NOT PRESENT
AUDITORY DISTURBANCES: NOT PRESENT
ANXIETY: *
PAROXYSMAL SWEATS: *
AGITATION: MODERATELY FIDGETY AND RESTLESS
TOTAL SCORE: 25

## 2021-01-31 ASSESSMENT — ENCOUNTER SYMPTOMS
DOUBLE VISION: 0
NAUSEA: 0
SORE THROAT: 0
VOMITING: 0
BRUISES/BLEEDS EASILY: 0
FEVER: 0
HALLUCINATIONS: 0
NERVOUS/ANXIOUS: 0
CHILLS: 0
POLYDIPSIA: 0
PALPITATIONS: 0
COUGH: 0
ABDOMINAL PAIN: 0
HEADACHES: 0
WEAKNESS: 0
SHORTNESS OF BREATH: 0
MYALGIAS: 0
PHOTOPHOBIA: 0
NECK PAIN: 0

## 2021-01-31 ASSESSMENT — PAIN DESCRIPTION - PAIN TYPE
TYPE: ACUTE PAIN

## 2021-01-31 ASSESSMENT — FIBROSIS 4 INDEX: FIB4 SCORE: 1.51

## 2021-01-31 NOTE — CARE PLAN
Problem: Safety  Goal: Will remain free from injury  Outcome: PROGRESSING AS EXPECTED  Note: Fall precautions in place. Bed in lowest position. Non-skid socks in place. Personal possessions within reach. Mobility sign on door. Bed-alarm on. Call light within reach. Pt educated regarding fall prevention and states understanding.        Problem: Infection  Goal: Will remain free from infection  Outcome: PROGRESSING AS EXPECTED     Problem: Knowledge Deficit  Goal: Knowledge of disease process/condition, treatment plan, diagnostic tests, and medications will improve  Outcome: PROGRESSING AS EXPECTED  Note: Pt educated regarding plan of care and medications. All questions answered.        Problem: Pain Management  Goal: Pain level will decrease to patient's comfort goal  Outcome: PROGRESSING AS EXPECTED  Flowsheets  Taken 1/30/2021 2130 by Cassie Santamaria, R.ANKIT.  Pain Rating Scale (NPRS): 3  Taken 1/30/2021 1600 by Preeti Gan R.NMiles  Non Verbal Scale:   Calm   Unlabored Breathing  Taken 1/29/2021 1201 by Anastasiya Felipe, PT  Therapist Pain Assessment:   Nurse Notified   0   Post Activity Pain Same as Prior to Activity

## 2021-01-31 NOTE — PROGRESS NOTES
Assumed care of patient at bedside report from DAY RN. Updated on POC. Patient currently A & O x 4; on 0 L O2; up self; with complaints of acute pain. Call light within reach. Whiteboard updated. Fall precautions in place. Bed locked and in lowest position. All questions answered. No other needs indicated at this time.

## 2021-01-31 NOTE — PROGRESS NOTES
Daily Progress Note:     Date of Service: 1/31/2021  Primary Team: [facility] White Team   Attending: Malick Johnston M.D.   Senior Resident: Dr. Gardiner  Intern: Dr. Park  Contact:  [phone]    Chief Complaint:   Acute alcohol intoxication    Subjective:   Overnight patient had been given ativan multiple times and had CIWA scores of 12 (CIWA score was typically 6 during previous daytime). On exam during morning patient denies any symptoms of withdrawal, has minimal tremors, and has no complaints of pain or dyspnea. Librium will be discontinued but patient will remain on CIWA protocol and on continued close monitoring for symptoms of acute alcohol withdrawal. Legal hold remains per psychiatry, pending re-evaluation when patient is no longer at acute risk of withdrawal.    Consultants/Specialty:  Psychiatry    Review of Systems:    Review of Systems   Constitutional: Negative for chills and fever.   HENT: Negative for hearing loss and sore throat.    Eyes: Negative for double vision and photophobia.   Respiratory: Negative for cough and shortness of breath.    Cardiovascular: Negative for chest pain and palpitations.   Gastrointestinal: Negative for abdominal pain, nausea and vomiting.   Genitourinary: Negative for dysuria and urgency.   Musculoskeletal: Negative for myalgias and neck pain.   Skin: Negative for itching and rash.   Neurological: Negative for weakness and headaches.   Endo/Heme/Allergies: Negative for polydipsia. Does not bruise/bleed easily.   Psychiatric/Behavioral: Negative for hallucinations. The patient is not nervous/anxious.        Objective Data:   Physical Exam:   Vitals:   Temp:  [36.1 °C (97 °F)-36.8 °C (98.2 °F)] 36.4 °C (97.5 °F)  Pulse:  [] 102  Resp:  [18] 18  BP: (131-149)/() 131/108  SpO2:  [92 %-99 %] 95 %    Physical Exam  Vitals signs reviewed.   Constitutional:       General: She is not in acute distress.  HENT:      Head: Normocephalic and atraumatic.       Mouth/Throat:      Mouth: Mucous membranes are moist.      Pharynx: No oropharyngeal exudate or posterior oropharyngeal erythema.   Eyes:      General: No scleral icterus.     Extraocular Movements: Extraocular movements intact.      Pupils: Pupils are equal, round, and reactive to light.   Cardiovascular:      Rate and Rhythm: Regular rhythm. Tachycardia present.      Heart sounds: No murmur.   Pulmonary:      Effort: No respiratory distress.      Breath sounds: Normal breath sounds. No wheezing.   Abdominal:      General: There is no distension.      Palpations: Abdomen is soft.      Tenderness: There is no abdominal tenderness.   Musculoskeletal:      Right lower leg: No edema.      Left lower leg: No edema.   Skin:     Findings: No erythema or rash.   Neurological:      General: No focal deficit present.      Mental Status: She is alert and oriented to person, place, and time.   Psychiatric:         Mood and Affect: Mood normal.         Thought Content: Thought content normal.           Labs:   Recent Labs     01/28/21 2003 01/30/21 0029 01/31/21  0152   WBC 6.9 6.7 6.5   RBC 4.34 4.27 3.92*   HEMOGLOBIN 14.2 14.1 13.2   HEMATOCRIT 44.0 42.5 41.6   .4* 99.5* 106.1*   MCH 32.7 33.0 33.7*   RDW 51.6* 49.7 51.2*   PLATELETCT 294 237 242   MPV 9.1 9.8 9.5   NEUTSPOLYS 51.80 47.60 47.70   LYMPHOCYTES 36.80 36.80 40.90   MONOCYTES 8.60 10.00 6.80   EOSINOPHILS 0.90 2.20 2.90   BASOPHILS 1.30 1.90* 1.40      Recent Labs     01/28/21 2003 01/30/21  0955 01/31/21  0152   SODIUM 142 138 138   POTASSIUM 4.5 4.2 4.1   CHLORIDE 106 103 105   CO2 25 24 22   GLUCOSE 97 97 110*   BUN 14 13 13      Recent Labs     01/28/21 2003 01/30/21  0955 01/31/21  0152   ALBUMIN 3.9 3.2 3.8   TBILIRUBIN 0.2 0.6 0.9   ALKPHOSPHAT 68 67 74   TOTPROTEIN 6.8 5.9* 6.9   ALTSGPT 21 13 13   ASTSGOT 22 22 18   CREATININE 0.65 0.54 0.54        Imaging:   DX-CHEST-PORTABLE (1 VIEW)   Final Result         1.  Probable mild pulmonary edema    2.  Cardiomegaly           * Alcohol dependence (HCC)- (present on admission)  Assessment & Plan  Anticipate alcohol withdrawal with severe features.  -Thiamine, folate, multivitamin  -Chlordiazepoxide 25mg TID discontinued  -CIWA protocol with lorazepam  -Clonidine 0.1mg bid PRN for SBP >160  -Fall/seizure/aspiration precautions  -Remain on telemetry for monitoring    PAF (paroxysmal atrial fibrillation) (HCC)- (present on admission)  Assessment & Plan  -Continue rate control with metoprolol 100mg BID and digoxin 125mcg daily  -Digoxin level 0.4 on 1/29/21  -Clonidine as above  -Continue apixaban 5mg BID  -Patient refused repeat echo     Syncope  Assessment & Plan  Most likely due to intoxication and vasovagal. Device interrogated with no periods of pauses. Initial BAL 200s. Last drink 1/28 evening prior to admission.  -Continue to monitor on telemetry  -TSH 0.020, Free T4 1.79  -Defer head imaging at this time    Depression  Assessment & Plan  Significant grief due to loss of 2 daughters and .   -Quetiapine discontinued as per psychiatry recommendation, gabapentin regimen started  -Home venlafaxine 75mg increased to 150 mg daily as per psych recommendation    Gait disorder  Assessment & Plan  Follow-up physical therapy evaluation    Suicidal ideation  Assessment & Plan  Denies SI at this time.    -Legal hold still in place. Psychiatry will reassess when patient is no longer at risk for acute alcohol withdrawal.

## 2021-01-31 NOTE — PROGRESS NOTES
Page sent out to on-call VAN bustamante MD. Dr. Espinosa returned page. Updated MD that pt is requesting her medications she usually takes to help her sleep but it appears day team has discontinued her seroquel. MD to order melatonin. No other orders at this time.

## 2021-01-31 NOTE — PROGRESS NOTES
Assumed care of patient at bedside report from NOC RN. Updated on POC. Patient up to restroom,  on room air;  no complaints of acute pain. Call light within reach. Whiteboard updated. Fall precautions in place. Bed locked and in lowest position. All questions answered. No other needs indicated at this time.

## 2021-02-01 LAB
ALBUMIN SERPL BCP-MCNC: 3.6 G/DL (ref 3.2–4.9)
ALBUMIN/GLOB SERPL: 1.3 G/DL
ALP SERPL-CCNC: 63 U/L (ref 30–99)
ALT SERPL-CCNC: 8 U/L (ref 2–50)
ANION GAP SERPL CALC-SCNC: 12 MMOL/L (ref 7–16)
AST SERPL-CCNC: 15 U/L (ref 12–45)
BASOPHILS # BLD AUTO: 1 % (ref 0–1.8)
BASOPHILS # BLD: 0.08 K/UL (ref 0–0.12)
BILIRUB SERPL-MCNC: 0.9 MG/DL (ref 0.1–1.5)
BUN SERPL-MCNC: 13 MG/DL (ref 8–22)
CALCIUM SERPL-MCNC: 9.1 MG/DL (ref 8.5–10.5)
CHLORIDE SERPL-SCNC: 104 MMOL/L (ref 96–112)
CO2 SERPL-SCNC: 23 MMOL/L (ref 20–33)
CREAT SERPL-MCNC: 0.62 MG/DL (ref 0.5–1.4)
EOSINOPHIL # BLD AUTO: 0.23 K/UL (ref 0–0.51)
EOSINOPHIL NFR BLD: 2.9 % (ref 0–6.9)
ERYTHROCYTE [DISTWIDTH] IN BLOOD BY AUTOMATED COUNT: 47.2 FL (ref 35.9–50)
GLOBULIN SER CALC-MCNC: 2.7 G/DL (ref 1.9–3.5)
GLUCOSE SERPL-MCNC: 111 MG/DL (ref 65–99)
HCT VFR BLD AUTO: 41.5 % (ref 37–47)
HGB BLD-MCNC: 13.8 G/DL (ref 12–16)
IMM GRANULOCYTES # BLD AUTO: 0.04 K/UL (ref 0–0.11)
IMM GRANULOCYTES NFR BLD AUTO: 0.5 % (ref 0–0.9)
LYMPHOCYTES # BLD AUTO: 2.53 K/UL (ref 1–4.8)
LYMPHOCYTES NFR BLD: 32.4 % (ref 22–41)
MAGNESIUM SERPL-MCNC: 1.9 MG/DL (ref 1.5–2.5)
MCH RBC QN AUTO: 32.7 PG (ref 27–33)
MCHC RBC AUTO-ENTMCNC: 33.3 G/DL (ref 33.6–35)
MCV RBC AUTO: 98.3 FL (ref 81.4–97.8)
MONOCYTES # BLD AUTO: 0.66 K/UL (ref 0–0.85)
MONOCYTES NFR BLD AUTO: 8.5 % (ref 0–13.4)
NEUTROPHILS # BLD AUTO: 4.27 K/UL (ref 2–7.15)
NEUTROPHILS NFR BLD: 54.7 % (ref 44–72)
NRBC # BLD AUTO: 0 K/UL
NRBC BLD-RTO: 0 /100 WBC
PHOSPHATE SERPL-MCNC: 4 MG/DL (ref 2.5–4.5)
PLATELET # BLD AUTO: 258 K/UL (ref 164–446)
PMV BLD AUTO: 9.4 FL (ref 9–12.9)
POTASSIUM SERPL-SCNC: 3.9 MMOL/L (ref 3.6–5.5)
PROT SERPL-MCNC: 6.3 G/DL (ref 6–8.2)
RBC # BLD AUTO: 4.22 M/UL (ref 4.2–5.4)
SODIUM SERPL-SCNC: 139 MMOL/L (ref 135–145)
WBC # BLD AUTO: 7.8 K/UL (ref 4.8–10.8)

## 2021-02-01 PROCEDURE — 700105 HCHG RX REV CODE 258: Performed by: INTERNAL MEDICINE

## 2021-02-01 PROCEDURE — 700105 HCHG RX REV CODE 258: Performed by: PSYCHIATRY & NEUROLOGY

## 2021-02-01 PROCEDURE — 770022 HCHG ROOM/CARE - ICU (200)

## 2021-02-01 PROCEDURE — 700111 HCHG RX REV CODE 636 W/ 250 OVERRIDE (IP): Performed by: INTERNAL MEDICINE

## 2021-02-01 PROCEDURE — 99233 SBSQ HOSP IP/OBS HIGH 50: CPT | Performed by: PSYCHIATRY & NEUROLOGY

## 2021-02-01 PROCEDURE — A9270 NON-COVERED ITEM OR SERVICE: HCPCS | Performed by: INTERNAL MEDICINE

## 2021-02-01 PROCEDURE — 700102 HCHG RX REV CODE 250 W/ 637 OVERRIDE(OP): Performed by: INTERNAL MEDICINE

## 2021-02-01 PROCEDURE — 700102 HCHG RX REV CODE 250 W/ 637 OVERRIDE(OP): Performed by: STUDENT IN AN ORGANIZED HEALTH CARE EDUCATION/TRAINING PROGRAM

## 2021-02-01 PROCEDURE — 85025 COMPLETE CBC W/AUTO DIFF WBC: CPT

## 2021-02-01 PROCEDURE — 99291 CRITICAL CARE FIRST HOUR: CPT | Performed by: INTERNAL MEDICINE

## 2021-02-01 PROCEDURE — 700102 HCHG RX REV CODE 250 W/ 637 OVERRIDE(OP): Performed by: PSYCHIATRY & NEUROLOGY

## 2021-02-01 PROCEDURE — 700111 HCHG RX REV CODE 636 W/ 250 OVERRIDE (IP): Performed by: PSYCHIATRY & NEUROLOGY

## 2021-02-01 PROCEDURE — A9270 NON-COVERED ITEM OR SERVICE: HCPCS | Performed by: PSYCHIATRY & NEUROLOGY

## 2021-02-01 PROCEDURE — A9270 NON-COVERED ITEM OR SERVICE: HCPCS | Performed by: STUDENT IN AN ORGANIZED HEALTH CARE EDUCATION/TRAINING PROGRAM

## 2021-02-01 PROCEDURE — 80053 COMPREHEN METABOLIC PANEL: CPT

## 2021-02-01 PROCEDURE — 84100 ASSAY OF PHOSPHORUS: CPT

## 2021-02-01 PROCEDURE — 83735 ASSAY OF MAGNESIUM: CPT

## 2021-02-01 PROCEDURE — 700101 HCHG RX REV CODE 250: Performed by: PSYCHIATRY & NEUROLOGY

## 2021-02-01 RX ORDER — SODIUM CHLORIDE, SODIUM LACTATE, POTASSIUM CHLORIDE, AND CALCIUM CHLORIDE .6; .31; .03; .02 G/100ML; G/100ML; G/100ML; G/100ML
1000 INJECTION, SOLUTION INTRAVENOUS ONCE
Status: COMPLETED | OUTPATIENT
Start: 2021-02-01 | End: 2021-02-01

## 2021-02-01 RX ORDER — POTASSIUM CHLORIDE 20 MEQ/1
40 TABLET, EXTENDED RELEASE ORAL ONCE
Status: COMPLETED | OUTPATIENT
Start: 2021-02-01 | End: 2021-02-01

## 2021-02-01 RX ORDER — MAGNESIUM SULFATE HEPTAHYDRATE 40 MG/ML
2 INJECTION, SOLUTION INTRAVENOUS ONCE
Status: COMPLETED | OUTPATIENT
Start: 2021-02-01 | End: 2021-02-01

## 2021-02-01 RX ADMIN — THERA TABS 1 TABLET: TAB at 08:38

## 2021-02-01 RX ADMIN — LORAZEPAM 2 MG: 2 INJECTION INTRAMUSCULAR; INTRAVENOUS at 13:05

## 2021-02-01 RX ADMIN — LORAZEPAM 2 MG: 2 INJECTION INTRAMUSCULAR; INTRAVENOUS at 09:50

## 2021-02-01 RX ADMIN — GABAPENTIN 200 MG: 100 CAPSULE ORAL at 21:47

## 2021-02-01 RX ADMIN — OMEPRAZOLE 20 MG: 20 CAPSULE, DELAYED RELEASE ORAL at 08:46

## 2021-02-01 RX ADMIN — DEXMEDETOMIDINE HYDROCHLORIDE 0.2 MCG/KG/HR: 100 INJECTION, SOLUTION INTRAVENOUS at 23:46

## 2021-02-01 RX ADMIN — LEVOTHYROXINE SODIUM 100 MCG: 0.1 TABLET ORAL at 08:37

## 2021-02-01 RX ADMIN — LORAZEPAM 2 MG: 2 INJECTION INTRAMUSCULAR; INTRAVENOUS at 00:59

## 2021-02-01 RX ADMIN — THIAMINE HYDROCHLORIDE 100 MG: 100 INJECTION, SOLUTION INTRAMUSCULAR; INTRAVENOUS at 13:04

## 2021-02-01 RX ADMIN — LORAZEPAM 2 MG: 2 INJECTION INTRAMUSCULAR; INTRAVENOUS at 18:35

## 2021-02-01 RX ADMIN — Medication 400 MG: at 08:47

## 2021-02-01 RX ADMIN — VENLAFAXINE HYDROCHLORIDE 150 MG: 75 CAPSULE, EXTENDED RELEASE ORAL at 08:46

## 2021-02-01 RX ADMIN — GABAPENTIN 100 MG: 100 CAPSULE ORAL at 08:38

## 2021-02-01 RX ADMIN — DEXMEDETOMIDINE HYDROCHLORIDE 0.2 MCG/KG/HR: 100 INJECTION, SOLUTION INTRAVENOUS at 01:18

## 2021-02-01 RX ADMIN — DIGOXIN 125 MCG: 125 TABLET ORAL at 18:34

## 2021-02-01 RX ADMIN — POTASSIUM CHLORIDE 40 MEQ: 1500 TABLET, EXTENDED RELEASE ORAL at 09:50

## 2021-02-01 RX ADMIN — SODIUM CHLORIDE, POTASSIUM CHLORIDE, SODIUM LACTATE AND CALCIUM CHLORIDE 1000 ML: 600; 310; 30; 20 INJECTION, SOLUTION INTRAVENOUS at 04:41

## 2021-02-01 RX ADMIN — FOLIC ACID 1 MG: 1 TABLET ORAL at 08:39

## 2021-02-01 RX ADMIN — LORAZEPAM 2 MG: 2 INJECTION INTRAMUSCULAR; INTRAVENOUS at 21:47

## 2021-02-01 RX ADMIN — MAGNESIUM SULFATE 2 G: 2 INJECTION INTRAVENOUS at 09:50

## 2021-02-01 RX ADMIN — APIXABAN 5 MG: 5 TABLET, FILM COATED ORAL at 18:34

## 2021-02-01 RX ADMIN — APIXABAN 5 MG: 5 TABLET, FILM COATED ORAL at 08:39

## 2021-02-01 RX ADMIN — METOPROLOL TARTRATE 100 MG: 50 TABLET, FILM COATED ORAL at 18:34

## 2021-02-01 ASSESSMENT — LIFESTYLE VARIABLES
HEADACHE, FULLNESS IN HEAD: NOT PRESENT
NAUSEA AND VOMITING: NO NAUSEA AND NO VOMITING
AGITATION: NORMAL ACTIVITY
AUDITORY DISTURBANCES: NOT PRESENT
HEADACHE, FULLNESS IN HEAD: NOT PRESENT
TREMOR: NO TREMOR
TOTAL SCORE: 2
AUDITORY DISTURBANCES: NOT PRESENT
AGITATION: NORMAL ACTIVITY
PAROXYSMAL SWEATS: NO SWEAT VISIBLE
HEADACHE, FULLNESS IN HEAD: NOT PRESENT
ORIENTATION AND CLOUDING OF SENSORIUM: DATE DISORIENTATION BY NO MORE THAN TWO CALENDAR DAYS
VISUAL DISTURBANCES: NOT PRESENT
NAUSEA AND VOMITING: NO NAUSEA AND NO VOMITING
ANXIETY: NO ANXIETY (AT EASE)
PAROXYSMAL SWEATS: NO SWEAT VISIBLE
AGITATION: NORMAL ACTIVITY
NAUSEA AND VOMITING: NO NAUSEA AND NO VOMITING
TOTAL SCORE: 2
AUDITORY DISTURBANCES: NOT PRESENT
TREMOR: NO TREMOR
ANXIETY: NO ANXIETY (AT EASE)
PAROXYSMAL SWEATS: NO SWEAT VISIBLE
ORIENTATION AND CLOUDING OF SENSORIUM: DATE DISORIENTATION BY NO MORE THAN TWO CALENDAR DAYS
VISUAL DISTURBANCES: NOT PRESENT
TREMOR: NO TREMOR
TOTAL SCORE: 2
ANXIETY: NO ANXIETY (AT EASE)
ORIENTATION AND CLOUDING OF SENSORIUM: DATE DISORIENTATION BY NO MORE THAN TWO CALENDAR DAYS
VISUAL DISTURBANCES: NOT PRESENT

## 2021-02-01 ASSESSMENT — FIBROSIS 4 INDEX: FIB4 SCORE: 1.51

## 2021-02-01 ASSESSMENT — PAIN DESCRIPTION - PAIN TYPE
TYPE: ACUTE PAIN

## 2021-02-01 NOTE — PROGRESS NOTES
Critical Care Progress Note    Date of admission  1/28/2021    Chief Complaint  75 y.o. female admitted 1/28/2021 with a history of alcoholism admitted to the ICU 1/28 for EtOH w/d.     Hospital Course  2/1 reportedly AICD fired overnight, plan to have the device interrogated     Interval Problem Update  Reviewed last 24 hour events:  Somnolent but follows commands on precedex gtt  Good UOP  SR/ST  MAP > 65  Replace K/Mg     Review of Systems  Review of Systems   Unable to perform ROS: Mental acuity        Vital Signs for last 24 hours   Temp:  [36.1 °C (96.9 °F)-36.8 °C (98.3 °F)] 36.2 °C (97.2 °F)  Pulse:  [] 96  Resp:  [17-32] 31  BP: (111-154)/() 143/98  SpO2:  [93 %-99 %] 93 %    Hemodynamic parameters for last 24 hours       Respiratory Information for the last 24 hours       Physical Exam   Physical Exam  Vitals signs and nursing note reviewed.   Constitutional:       Appearance: She is ill-appearing.   HENT:      Head: Normocephalic and atraumatic.      Right Ear: External ear normal.      Left Ear: External ear normal.      Nose: Nose normal.      Mouth/Throat:      Mouth: Mucous membranes are moist.   Eyes:      Pupils: Pupils are equal, round, and reactive to light.   Neck:      Musculoskeletal: No neck rigidity.   Cardiovascular:      Rate and Rhythm: Normal rate and regular rhythm.   Pulmonary:      Effort: Pulmonary effort is normal. No respiratory distress.      Breath sounds: No wheezing or rales.   Chest:      Chest wall: No tenderness.   Abdominal:      General: Abdomen is flat. There is no distension.      Palpations: Abdomen is soft.      Tenderness: There is no abdominal tenderness. There is no guarding or rebound.   Musculoskeletal:      Right lower leg: No edema.      Left lower leg: No edema.   Lymphadenopathy:      Cervical: No cervical adenopathy.   Skin:     General: Skin is warm and dry.      Capillary Refill: Capillary refill takes less than 2 seconds.   Neurological:       Subjective:       Patient ID: Panfilo Valadez is a 65 y.o. male.    Chief Complaint: Follow-up (with labs)    HPI No acutely swollen tender painful joint. Has eye retijnal infection  trested by Ophthalmologist with Boltrex, another medicine and prednisone and feels kirsten. Lower back doing OK with SI pain on Gabapentin 300 mg BID.and continue on allopurinol 300 mg a day with no acute Gout episode. Will see nephrologist  For mild increase in creatinine and BUN. Rt wrist with pain and no swelling        Current medications include:  Current Outpatient Medications on File Prior to Visit   Medication Sig Dispense Refill    allopurinol (ZYLOPRIM) 100 MG tablet TAKE 3 TABLETS DAILY 270 tablet 3    ALPRAZolam (XANAX) 0.5 MG tablet alprazolam 0.5 mg tablet      calcitRIOL (ROCALTROL) 0.5 MCG Cap Take 0.5 mcg by mouth once daily.  3    clonazePAM (KLONOPIN) 0.5 MG tablet Take 0.5 mg by mouth once daily.  3    cyclobenzaprine (FLEXERIL) 10 MG tablet cyclobenzaprine 10 mg tablet   TAKE 1 TABLET BY MOUTH 3 TIMES A DAY AS NEEDED FOR MUSCLE SPASMS      diclofenac sodium (VOLTAREN) 1 % Gel APPLY 1 GRAM ON PAINFUL JOINT AREA FOUR TIMES PER DAY AS NEEDED 100 g 4    ferrous sulfate 325 (65 FE) MG EC tablet ferrous sulfate 325 mg (65 mg iron) tablet,delayed release   TAKE 1 TABLET BY MOUTH TWICE A DAY      folic acid (FOLVITE) 1 MG tablet TAKE 5 TABLETS ONE TIME DAILY  3    furosemide (LASIX) 40 MG tablet Take 40 mg by mouth once daily.  3    gabapentin (NEURONTIN) 300 MG capsule       HYDROcodone-acetaminophen (NORCO) 7.5-325 mg per tablet Take 1 tablet by mouth 3 (three) times daily as needed for Pain. 90 tablet 0    labetalol (NORMODYNE) 100 MG tablet       lisinopril 10 MG tablet       montelukast (SINGULAIR) 10 mg tablet Take 10 mg by mouth every evening.  3    omega 3-dha-epa-fish oil (FISH OIL) 1,000 mg (120 mg-180 mg) Cap Fish Oil 1,000 mg (120 mg-180 mg) capsule   Take 2 capsules every day by oral route.       Comments: Somnolent, answers most questions appropriately. Follows commands, moves all 4 ext         Medications  Current Facility-Administered Medications   Medication Dose Route Frequency Provider Last Rate Last Admin   • MD Alert...ICU Electrolyte Replacement per Pharmacy   Other PHARMACY TO DOSE Hank Castillo M.D.       • metoprolol tartrate (LOPRESSOR) tablet 100 mg  100 mg Oral TWICE DAILY Edwar Park M.D.   Stopped at 02/01/21 0600   • cloNIDine (CATAPRES) tablet 0.1 mg  0.1 mg Oral BID PRN Edwar Park M.D.       • dexmedetomidine (PRECEDEX) 400 mcg/100mL NS premix infusion  0.1-1 mcg/kg/hr Intravenous Continuous Benjie Rosales M.D.   Stopped at 02/01/21 0700   • LORazepam (ATIVAN) injection 2 mg  2 mg Intravenous Q4HRS Benjie Rosales M.D.   2 mg at 02/01/21 0950   • LORazepam (ATIVAN) injection 1-2 mg  1-2 mg Intravenous Q2HRS PRN Benjie Rosales M.D.       • thiamine (B-1) IVPB 100 mg in 100 mL D5W (premix)  100 mg Intravenous DAILY Benjie Rosales M.D.       • levothyroxine (SYNTHROID) tablet 100 mcg  100 mcg Oral AM ES Edwar Park M.D.   100 mcg at 02/01/21 0837   • venlafaxine XR (EFFEXOR XR) capsule 150 mg  150 mg Oral DAILY Anna Marie Velez M.D.   150 mg at 02/01/21 0846   • gabapentin (NEURONTIN) capsule 200 mg  200 mg Oral Q EVENING Anna Marie Velez M.D.   200 mg at 01/31/21 2005   • digoxin (LANOXIN) tablet 125 mcg  125 mcg Oral DAILY AT 1800 Manny Torres M.D.   125 mcg at 01/31/21 1704   • apixaban (ELIQUIS) tablet 5 mg  5 mg Oral BID Manny Torres M.D.   5 mg at 02/01/21 0839   • senna-docusate (PERICOLACE or SENOKOT S) 8.6-50 MG per tablet 2 Tab  2 Tab Oral BID Manny Torres M.D.   Stopped at 02/01/21 0600    And   • polyethylene glycol/lytes (MIRALAX) PACKET 1 Packet  1 Packet Oral QDAY PRN Manny Torres M.D.        And   • magnesium hydroxide (MILK OF MAGNESIA) suspension 30 mL  30 mL Oral QDAY PRN Manny Torres M.D.        And   • bisacodyl  omeprazole-sodium bicarbonate (ZEGERID) 40-1.1 mg-gram per capsule       predniSONE (DELTASONE) 10 MG tablet prednisone 10 mg tablet  TAKE 1 TAB THREE TIMES DAILY FOR 3 DAYS, 1 TWICE DAILY X 2 DAYS, THEN 1 TAB DAILY FOR 2 DAYS. 30 tablet 3    QVAR REDIHALER 80 mcg/actuation HFAB       sildenafil (VIAGRA) 100 MG tablet Take 100 mg by mouth once daily.  11    tamsulosin (FLOMAX) 0.4 mg Cap       warfarin (COUMADIN) 2 MG tablet TAKE 1 TABLET BY MOUTH ONCE A DAY TAKE WITH COUMADIN 5 MG/DAY.  3    warfarin (COUMADIN) 5 MG tablet warfarin 5 mg tablet       No current facility-administered medications on file prior to visit.        Lab Results:  WBC   Date Value Ref Range Status   05/15/2019 7.85 4.3 - 10.8 X 10 3/ul Final     Hemoglobin   Date Value Ref Range Status   05/15/2019 15.8 14 - 18 g/dL Final     Hematocrit   Date Value Ref Range Status   05/15/2019 50.2 42 - 52 % Final     Color, UA   Date Value Ref Range Status   05/15/2019 YELLOW  Final     Ketones, UA   Date Value Ref Range Status   05/15/2019 NEGATIVE NEGATIVE mg/dL Final     Sodium   Date Value Ref Range Status   05/15/2019 139 136 - 145 mmol/L Final     Potassium   Date Value Ref Range Status   05/15/2019 5.0 3.5 - 5.1 mmol/L Final     Chloride   Date Value Ref Range Status   05/15/2019 106 98 - 107 mmol/L Final     CO2   Date Value Ref Range Status   05/15/2019 24 22 - 29 mmol/L Final     BUN, Bld   Date Value Ref Range Status   05/15/2019 28.1 (H) 8 - 23 mg/dL Final     Creatinine   Date Value Ref Range Status   05/15/2019 2.71 (H) 0.70 - 1.20 mg/dL Final     Comment:     RECOMMEND REPEAT CREATININE WITHIN 90 DAYS        Review of Systems   Constitutional:        Gained wt 10 lbs   HENT:        Dry eyes onn artificial tear drops   Eyes: Negative.    Respiratory: Negative.    Cardiovascular: Negative.    Gastrointestinal: Negative.    Endocrine: Negative.    Genitourinary: Negative.    Musculoskeletal: Positive for arthralgias and back pain.  "  Allergic/Immunologic: Positive for environmental allergies.   Neurological: Negative.    Hematological: Negative.    Psychiatric/Behavioral: Negative.          Objective:   /80 (BP Location: Left arm, Patient Position: Sitting, BP Method: Large (Manual))   Pulse 95   Temp 96.7 °F (35.9 °C) (Temporal)   Resp 16   Ht 6' 2" (1.88 m)   Wt (!) 140.6 kg (310 lb)   BMI 39.80 kg/m²      Physical Exam   Constitutional: He is well-developed, well-nourished, and in no distress.   HENT:   Dryness in mouth   Eyes: Conjunctivae and EOM are normal. Pupils are equal, round, and reactive to light.   Neck: Normal range of motion. Neck supple.   Cardiovascular: Normal rate and regular rhythm.    Pulmonary/Chest: Effort normal and breath sounds normal.   Abdominal: Soft. Bowel sounds are normal.   Neurological:   SLR=negative    Skin: Skin is warm and dry.     Musculoskeletal:   Hands mcp with synovial thickening and rt shoilder and bilateral SI joint bilaterally           Assessment:       1. Inflammatory spondylopathy, unspecified spinal region    2. Rupture of ligament of knee joint, right, subsequent encounter    3. Hyperuricemia    4. Proteinuria, unspecified type    5. Stage 4 chronic kidney disease    6. Abnormal creatinine clearance glomerular filtration    7. Hypertension, unspecified type    8. Xerophthalmia    9. Lumbosacral radiculopathy            Plan:        decrease in Kidney function  To see nephrologist Dr Pop.     " (DULCOLAX) suppository 10 mg  10 mg Rectal QDAY PRN Manny Torres M.D.       • acetaminophen (Tylenol) tablet 650 mg  650 mg Oral Q6HRS PRN Manny Torres M.D.   650 mg at 01/30/21 1941       Fluids    Intake/Output Summary (Last 24 hours) at 2/1/2021 1225  Last data filed at 2/1/2021 1100  Gross per 24 hour   Intake 1052.9 ml   Output 1650 ml   Net -597.1 ml       Laboratory          Recent Labs     01/30/21  0955 01/31/21  0152 02/01/21  0420   SODIUM 138 138 139   POTASSIUM 4.2 4.1 3.9   CHLORIDE 103 105 104   CO2 24 22 23   BUN 13 13 13   CREATININE 0.54 0.54 0.62   MAGNESIUM 1.9 1.8 1.9   PHOSPHORUS 3.7 3.9 4.0   CALCIUM 9.0 9.5 9.1     Recent Labs     01/30/21  0955 01/31/21  0152 02/01/21  0420   ALTSGPT 13 13 8   ASTSGOT 22 18 15   ALKPHOSPHAT 67 74 63   TBILIRUBIN 0.6 0.9 0.9   GLUCOSE 97 110* 111*     Recent Labs     01/30/21  0029 01/30/21  0955 01/31/21 0152 02/01/21  0420   WBC 6.7  --  6.5 7.8   NEUTSPOLYS 47.60  --  47.70 54.70   LYMPHOCYTES 36.80  --  40.90 32.40   MONOCYTES 10.00  --  6.80 8.50   EOSINOPHILS 2.20  --  2.90 2.90   BASOPHILS 1.90*  --  1.40 1.00   ASTSGOT  --  22 18 15   ALTSGPT  --  13 13 8   ALKPHOSPHAT  --  67 74 63   TBILIRUBIN  --  0.6 0.9 0.9     Recent Labs     01/30/21  0029 01/31/21  0152 02/01/21  0420   RBC 4.27 3.92* 4.22   HEMOGLOBIN 14.1 13.2 13.8   HEMATOCRIT 42.5 41.6 41.5   PLATELETCT 237 242 258       Imaging  X-Ray:  I have personally reviewed the images and compared with prior images.    Assessment/Plan  Alcohol withdrawal syndrome, with delirium (HCC)  Assessment & Plan  Etoh W/ds and delirium    Plan:  Scheduled and PRN RASS based lorazepam  Precedex gtt for agitation  Thiamine and folate supplementation  Electrolyte replacement    PAF (paroxysmal atrial fibrillation) (HCC)- (present on admission)  Assessment & Plan  Continue home meds; BB, digoxin and Eliquis       VTE:  NOAC  Ulcer: Not Indicated  Lines: None    I have performed a physical exam and reviewed  and updated ROS and Plan today (2/1/2021). In review of yesterday's note (1/31/2021), there are no changes except as documented above.     Discussed patient condition and risk of morbidity and/or mortality with Hospitalist, RN, RT, Charge nurse / hot rounds and Patient  The patient remains critically ill.  Critical care time = 48 minutes in directly providing and coordinating critical care and extensive data review.  No time overlap and excludes procedures.

## 2021-02-01 NOTE — CONSULTS
Critical Care Consultation    Date of consult: 1/31/2021    Referring Physician  Malick Johnston M.D.    Reason for Consultation  etoh w/ds    History of Presenting Illness  75 y.o. female w/ etoh abuse w/ hx of DT's requiring prior intuabtion, AF, paced who presented 1/28/2021 after being found intoxicated in her care. She was on the floor until this evening when began to more frequent periods of agitation along with escalating BDZ requirements. AT present she is unable to provide any hx and is only able to tell me her name. She is tachycardic on room air and protecting her airway.    Code Status  Full Code    Review of Systems  Review of Systems   Unable to perform ROS: Acuity of condition       Past Medical History   has a past medical history of A-fib (HCC) (5/1/2014), Alcoholism (HCC), Apnea, sleep, Chronic pain, Insomnia, Psychiatric disorder, and Snoring. She also has no past medical history of Daytime sleepiness, Gasping for breath, or Morning headache.    Surgical History   has a past surgical history that includes breast biopsy; other orthopedic surgery; clavicle orif (5/21/2014); and tonsillectomy.    Family History  family history includes Anxiety disorder in her mother; Depression in her mother; Diabetes in her mother; Hyperlipidemia in her father.    Social History   reports that she has never smoked. She has never used smokeless tobacco. She reports current alcohol use. She reports previous drug use. Drug: Oral.    Medications  Home Medications     Reviewed by Sisi Magana (Pharmacy Tech) on 01/29/21 at 1049  Med List Status: Complete   Medication Last Dose Status   digoxin (LANOXIN) 125 MCG Tab 1/28/2021 Active   ELIQUIS 5 MG Tab 1/28/2021 Active   folic acid (FOLVITE) 1 MG Tab NOT TAKING Active   hydrOXYzine pamoate (VISTARIL) 50 MG Cap 1/28/2021 Active   levothyroxine (SYNTHROID) 125 MCG Tab 1/28/2021 Active   magnesium oxide (MAG-OX) 400 MG Tab tablet 1/28/2021 Active   metoprolol  (LOPRESSOR) 100 MG Tab 1/28/2021 Active   mirtazapine (REMERON) 15 MG Tab NOT TAKING Active   multivitamin (THERAGRAN) Tab 1/26/2021 Active   omeprazole (PRILOSEC) 20 MG delayed-release capsule Not Taking Active   potassium chloride SA (KDUR) 20 MEQ Tab CR Not Taking Active   QUEtiapine (SEROQUEL) 100 MG Tab 1/27/2021 Active   QUEtiapine (SEROQUEL) 25 MG Tab 1/28/2021 Active   sertraline (ZOLOFT) 50 MG Tab Not Taking Active   sulfamethoxazole-trimethoprim (BACTRIM DS) 800-160 MG tablet 1/24/2021 Active   thiamine (VITAMIN B-1) 50 MG Tab 1/28/2021 Active   tizanidine (ZANAFLEX) 4 MG Tab Not Taking Active   venlafaxine XR (EFFEXOR XR) 75 MG CAPSULE SR 24 HR 1/27/2021 Active   zolpidem (AMBIEN) 5 MG Tab Not Taking Active              Current Facility-Administered Medications   Medication Dose Route Frequency Provider Last Rate Last Admin   • metoprolol tartrate (LOPRESSOR) tablet 100 mg  100 mg Oral TWICE DAILY Edwar Park M.D.   100 mg at 01/31/21 1245   • cloNIDine (CATAPRES) tablet 0.1 mg  0.1 mg Oral BID PRN Edwar Park M.D.       • dexmedetomidine (PRECEDEX) 400 mcg/100mL NS premix infusion  0.1-1 mcg/kg/hr Intravenous Continuous Benjie Rosales M.D.       • LORazepam (ATIVAN) injection 2 mg  2 mg Intravenous Q4HRS Benjie Rosales M.D.       • LORazepam (ATIVAN) injection 1-2 mg  1-2 mg Intravenous Q2HRS PRN Benjie Rosales M.D.       • levothyroxine (SYNTHROID) tablet 100 mcg  100 mcg Oral AM ES Edwar Park M.D.   100 mcg at 01/31/21 0531   • omeprazole (PRILOSEC) capsule 20 mg  20 mg Oral DAILY Galo Gardiner M.D.   20 mg at 01/31/21 0532   • venlafaxine XR (EFFEXOR XR) capsule 150 mg  150 mg Oral DAILY Anna Marie Velez M.D.   150 mg at 01/31/21 0531   • gabapentin (NEURONTIN) capsule 100 mg  100 mg Oral BID Anna Marie Velez M.D.   100 mg at 01/31/21 1448   • gabapentin (NEURONTIN) capsule 200 mg  200 mg Oral Q EVENING Anna Marie Velez M.D.   200 mg at 01/31/21 2005   •  digoxin (LANOXIN) tablet 125 mcg  125 mcg Oral DAILY AT 1800 Manny Torres M.D.   125 mcg at 01/31/21 1704   • apixaban (ELIQUIS) tablet 5 mg  5 mg Oral BID Manny Torres M.D.   5 mg at 01/31/21 1704   • senna-docusate (PERICOLACE or SENOKOT S) 8.6-50 MG per tablet 2 Tab  2 Tab Oral BID Manny Torres M.D.        And   • polyethylene glycol/lytes (MIRALAX) PACKET 1 Packet  1 Packet Oral QDAY PRN Manny Torres M.D.        And   • magnesium hydroxide (MILK OF MAGNESIA) suspension 30 mL  30 mL Oral QDAY PRN Manny Torres M.D.        And   • bisacodyl (DULCOLAX) suppository 10 mg  10 mg Rectal QDAY PRN Manny Torres M.D.       • acetaminophen (Tylenol) tablet 650 mg  650 mg Oral Q6HRS PRN Manny Torres M.D.   650 mg at 01/30/21 1941   • LORazepam (ATIVAN) tablet 0.5 mg  0.5 mg Oral Q4HRS PRANKIT Torres M.D.   0.5 mg at 01/30/21 1800   • LORazepam (ATIVAN) tablet 1 mg  1 mg Oral Q4HRS PRANKIT Torres M.D.   1 mg at 01/31/21 0639    Or   • LORazepam (ATIVAN) injection 0.5 mg  0.5 mg Intravenous Q4HRS PRANKIT Torres M.D.       • LORazepam (ATIVAN) tablet 2 mg  2 mg Oral Q2HRS PRANKIT Torres M.D.   2 mg at 01/30/21 2028    Or   • LORazepam (ATIVAN) injection 1 mg  1 mg Intravenous Q2HRS PRANKIT Torres M.D.   1 mg at 01/31/21 1209   • LORazepam (ATIVAN) tablet 3 mg  3 mg Oral Q HOUR PRANKIT Torres M.D.        Or   • LORazepam (ATIVAN) injection 1.5 mg  1.5 mg Intravenous Q HOUR PRANKIT Torres M.D.       • LORazepam (ATIVAN) tablet 4 mg  4 mg Oral Q15 MIN PRANKIT Torres M.D.        Or   • LORazepam (ATIVAN) injection 2 mg  2 mg Intravenous Q15 MIN PRN Manny Torres M.D.   2 mg at 01/31/21 2150   • magnesium oxide (MAG-OX) tablet 400 mg  400 mg Oral BID Manny Torres M.D.   400 mg at 01/31/21 1704   • thiamine (Vitamin B-1) tablet 100 mg  100 mg Oral DAILY Manny Torres M.D.   100 mg at 01/31/21 0532    And   • multivitamin (THERAGRAN) tablet 1 Tab  1 Tab Oral DAILY  Manny Torres M.D.   1 Tab at 01/31/21 0532    And   • folic acid (FOLVITE) tablet 1 mg  1 mg Oral DAILY Manny Torres M.D.   1 mg at 01/31/21 0532       Allergies  No Known Allergies    Vital Signs last 24 hours  Temp:  [36.1 °C (97 °F)-36.8 °C (98.3 °F)] 36.8 °C (98.3 °F)  Pulse:  [] 100  Resp:  [18-30] 26  BP: (111-154)/() 154/113  SpO2:  [93 %-99 %] 94 %    Physical Exam  Physical Exam  Constitutional:       General: She is not in acute distress.     Appearance: She is ill-appearing and diaphoretic.   HENT:      Head: Normocephalic and atraumatic.      Right Ear: External ear normal.      Left Ear: External ear normal.      Mouth/Throat:      Mouth: Mucous membranes are dry.      Pharynx: Oropharynx is clear.   Eyes:      Extraocular Movements: Extraocular movements intact.      Pupils: Pupils are equal, round, and reactive to light.   Cardiovascular:      Rate and Rhythm: Tachycardia present.      Pulses: Normal pulses.   Pulmonary:      Effort: No respiratory distress.   Abdominal:      General: Abdomen is flat.   Musculoskeletal:         General: No swelling.   Neurological:      General: No focal deficit present.         Fluids    Intake/Output Summary (Last 24 hours) at 1/31/2021 1458  Last data filed at 1/31/2021 1600  Gross per 24 hour   Intake 530 ml   Output --   Net 530 ml       Laboratory  Recent Results (from the past 48 hour(s))   CBC WITH DIFFERENTIAL    Collection Time: 01/30/21 12:29 AM   Result Value Ref Range    WBC 6.7 4.8 - 10.8 K/uL    RBC 4.27 4.20 - 5.40 M/uL    Hemoglobin 14.1 12.0 - 16.0 g/dL    Hematocrit 42.5 37.0 - 47.0 %    MCV 99.5 (H) 81.4 - 97.8 fL    MCH 33.0 27.0 - 33.0 pg    MCHC 33.2 (L) 33.6 - 35.0 g/dL    RDW 49.7 35.9 - 50.0 fL    Platelet Count 237 164 - 446 K/uL    MPV 9.8 9.0 - 12.9 fL    Neutrophils-Polys 47.60 44.00 - 72.00 %    Lymphocytes 36.80 22.00 - 41.00 %    Monocytes 10.00 0.00 - 13.40 %    Eosinophils 2.20 0.00 - 6.90 %    Basophils 1.90 (H)  0.00 - 1.80 %    Immature Granulocytes 1.50 (H) 0.00 - 0.90 %    Nucleated RBC 0.00 /100 WBC    Neutrophils (Absolute) 3.18 2.00 - 7.15 K/uL    Lymphs (Absolute) 2.46 1.00 - 4.80 K/uL    Monos (Absolute) 0.67 0.00 - 0.85 K/uL    Eos (Absolute) 0.15 0.00 - 0.51 K/uL    Baso (Absolute) 0.13 (H) 0.00 - 0.12 K/uL    Immature Granulocytes (abs) 0.10 0.00 - 0.11 K/uL    NRBC (Absolute) 0.00 K/uL   MAGNESIUM    Collection Time: 01/30/21  9:55 AM   Result Value Ref Range    Magnesium 1.9 1.5 - 2.5 mg/dL   Comp Metabolic Panel    Collection Time: 01/30/21  9:55 AM   Result Value Ref Range    Sodium 138 135 - 145 mmol/L    Potassium 4.2 3.6 - 5.5 mmol/L    Chloride 103 96 - 112 mmol/L    Co2 24 20 - 33 mmol/L    Anion Gap 11.0 7.0 - 16.0    Glucose 97 65 - 99 mg/dL    Bun 13 8 - 22 mg/dL    Creatinine 0.54 0.50 - 1.40 mg/dL    Calcium 9.0 8.5 - 10.5 mg/dL    AST(SGOT) 22 12 - 45 U/L    ALT(SGPT) 13 2 - 50 U/L    Alkaline Phosphatase 67 30 - 99 U/L    Total Bilirubin 0.6 0.1 - 1.5 mg/dL    Albumin 3.2 3.2 - 4.9 g/dL    Total Protein 5.9 (L) 6.0 - 8.2 g/dL    Globulin 2.7 1.9 - 3.5 g/dL    A-G Ratio 1.2 g/dL   LACTIC ACID    Collection Time: 01/30/21  9:55 AM   Result Value Ref Range    Lactic Acid 2.2 (H) 0.5 - 2.0 mmol/L   PHOSPHORUS    Collection Time: 01/30/21  9:55 AM   Result Value Ref Range    Phosphorus 3.7 2.5 - 4.5 mg/dL   ESTIMATED GFR    Collection Time: 01/30/21  9:55 AM   Result Value Ref Range    GFR If African American >60 >60 mL/min/1.73 m 2    GFR If Non African American >60 >60 mL/min/1.73 m 2   CBC WITH DIFFERENTIAL    Collection Time: 01/31/21  1:52 AM   Result Value Ref Range    WBC 6.5 4.8 - 10.8 K/uL    RBC 3.92 (L) 4.20 - 5.40 M/uL    Hemoglobin 13.2 12.0 - 16.0 g/dL    Hematocrit 41.6 37.0 - 47.0 %    .1 (H) 81.4 - 97.8 fL    MCH 33.7 (H) 27.0 - 33.0 pg    MCHC 31.7 (L) 33.6 - 35.0 g/dL    RDW 51.2 (H) 35.9 - 50.0 fL    Platelet Count 242 164 - 446 K/uL    MPV 9.5 9.0 - 12.9 fL     Neutrophils-Polys 47.70 44.00 - 72.00 %    Lymphocytes 40.90 22.00 - 41.00 %    Monocytes 6.80 0.00 - 13.40 %    Eosinophils 2.90 0.00 - 6.90 %    Basophils 1.40 0.00 - 1.80 %    Immature Granulocytes 0.30 0.00 - 0.90 %    Nucleated RBC 0.00 /100 WBC    Neutrophils (Absolute) 3.10 2.00 - 7.15 K/uL    Lymphs (Absolute) 2.66 1.00 - 4.80 K/uL    Monos (Absolute) 0.44 0.00 - 0.85 K/uL    Eos (Absolute) 0.19 0.00 - 0.51 K/uL    Baso (Absolute) 0.09 0.00 - 0.12 K/uL    Immature Granulocytes (abs) 0.02 0.00 - 0.11 K/uL    NRBC (Absolute) 0.00 K/uL   Comp Metabolic Panel    Collection Time: 01/31/21  1:52 AM   Result Value Ref Range    Sodium 138 135 - 145 mmol/L    Potassium 4.1 3.6 - 5.5 mmol/L    Chloride 105 96 - 112 mmol/L    Co2 22 20 - 33 mmol/L    Anion Gap 11.0 7.0 - 16.0    Glucose 110 (H) 65 - 99 mg/dL    Bun 13 8 - 22 mg/dL    Creatinine 0.54 0.50 - 1.40 mg/dL    Calcium 9.5 8.5 - 10.5 mg/dL    AST(SGOT) 18 12 - 45 U/L    ALT(SGPT) 13 2 - 50 U/L    Alkaline Phosphatase 74 30 - 99 U/L    Total Bilirubin 0.9 0.1 - 1.5 mg/dL    Albumin 3.8 3.2 - 4.9 g/dL    Total Protein 6.9 6.0 - 8.2 g/dL    Globulin 3.1 1.9 - 3.5 g/dL    A-G Ratio 1.2 g/dL   PHOSPHORUS    Collection Time: 01/31/21  1:52 AM   Result Value Ref Range    Phosphorus 3.9 2.5 - 4.5 mg/dL   MAGNESIUM    Collection Time: 01/31/21  1:52 AM   Result Value Ref Range    Magnesium 1.8 1.5 - 2.5 mg/dL   ESTIMATED GFR    Collection Time: 01/31/21  1:52 AM   Result Value Ref Range    GFR If African American >60 >60 mL/min/1.73 m 2    GFR If Non African American >60 >60 mL/min/1.73 m 2       Imaging  DX-CHEST-PORTABLE (1 VIEW)   Final Result         1.  Probable mild pulmonary edema   2.  Cardiomegaly          Assessment/Plan  Alcohol withdrawal syndrome, with delirium (HCC)  Assessment & Plan  Etoh W/ds and delirium  Tx to ICU  Scheduled and PRN RASS based lorazepam  Precedex gtt  Thiamine and MV  Monitor and replete lytes    PAF (paroxysmal atrial fibrillation)  (HCC)- (present on admission)  Assessment & Plan  Continue home meds; BB, digoxin and Eliquis      Discussed patient condition and risk of morbidity and/or mortality with RN, RT, Pharmacy, [employer] [facility] resident, Code status disscussed, Charge nurse / hot rounds and Patient.    The patient remains critically ill.  Critical care time = 35 minutes in directly providing and coordinating critical care and extensive data review.  No time overlap and excludes procedures.

## 2021-02-01 NOTE — PROGRESS NOTES
TRANSFER NOTE    Ms. Hutchison is a 75 year old female who is admitted on 01/28 for Acute alcohol intoxication and syncope. Her medical history includes Atrial fibrillation and hypothyroidism.   Patient has history of recurrent  alcohol withdrawal and has been intubated twice for airway protection and hypoxic respiratory failure.     I was paged by the nurse around 09:45 about patient being more lethargic and requiring higher amount of ativan. I went and assessed the patient. She is very drowsy and confused, oriented to self only. Mild tremors were observed on examination.   Vitals showed HR of low 100's, BP of 154/113 and saturating on 2L of oxygen.   Her CIWA scores have been consistently at 25 and above since 14:00 this afternoon and she received total of 30 mg of Ativan since morning.   Of note, Librium was discontinued this morning given low CIWA scores until yesterday.     Given her prior history of respiratory failure and DT's requiring intubation, case was discussed with Dr. Rosales, Intensivist who evaluated the patient and agreed for transfer.     Patient will be transferred to ICU for Acute Alcohol withdrawl and close monitoring.   -Precedex drip  -Scheduled and PRN Ativan   -As patient is lethargic and more somnolent, would consider keeping NPO   -Continue beta blocker, digoxin and Apixaban

## 2021-02-01 NOTE — CONSULTS
"PSYCHIATRIC FOLLOW-UP:(established)  *Reason for admission:   Acute intoxication on 2 pints of vodka daily. Syncopal episode. 's  one week ago.           *Legal Hold Status: on legal hold                *HPI: did open her eyes but could not sustain them or reply. She is very sedated.          *Psychiatric Examination:    Vitals:   Vitals:    21 2037 21 0100 21 0200 21 0400   BP: 154/113 128/103 154/110    Pulse: 100 100 100    Resp: (!) 26 (!) 32 (!) 25    Temp: 36.8 °C (98.3 °F) 36.1 °C (96.9 °F)  36.3 °C (97.3 °F)   TempSrc: Temporal Temporal  Temporal   SpO2: 94% 95% 98%    Weight: 70.2 kg (154 lb 11.5 oz)  67.9 kg (149 lb 9.6 oz)    Height:   1.676 m (5' 5.98\")      General Appearance:  poor eye contact  Abnormal Movements: none   Gait and Posture: not observed  Speech: none  Thought Process: unable to assess  Associations:   unable to assess  Abnormal or Psychotic Thoughts: unable to assess  Judgement and Insight: unable to assess  Orientation: unable to determine  Recent and Remote Memory: unable to determine  Attention Span and Concentration: diminished and obtunded  Language:unable to assess  Fund of Knowledge: not tested  Mood and Affect: unable to asssess  SI/HI:  unable to assess         *ASSESSMENT/RECOMENDATIONS:     1. Depressive disoder unspc with anxiety  -  effexor xr 150 mg.   -gabapentin 200 mg hs only as perhaps the daytime dose is causing her sedation and sensitivity to ativan's potential sedation.     2. Alcohol use disorder  -severe     3. Bereavement            4. Medical:  -hypothyroidism  -syncope  -p afib, pacemaker  -gait disorder       Legal hold: extended until she can be reassessed for SI.  Observation status: q 15 minute checks  Privileges (while on legal hold): per nursing discretion      *Will Follow  Dr Murphy will be available - if needed.  "

## 2021-02-01 NOTE — ASSESSMENT & PLAN NOTE
Etoh W/ds and delirium    Plan:  Scheduled and PRN RASS based lorazepam  Precedex gtt for agitation  Thiamine and folate supplementation  Electrolyte replacement

## 2021-02-02 LAB
ALBUMIN SERPL BCP-MCNC: 3.4 G/DL (ref 3.2–4.9)
ALBUMIN/GLOB SERPL: 1.4 G/DL
ALP SERPL-CCNC: 61 U/L (ref 30–99)
ALT SERPL-CCNC: 11 U/L (ref 2–50)
ANION GAP SERPL CALC-SCNC: 9 MMOL/L (ref 7–16)
AST SERPL-CCNC: 19 U/L (ref 12–45)
BACTERIA BLD CULT: NORMAL
BASOPHILS # BLD AUTO: 0.8 % (ref 0–1.8)
BASOPHILS # BLD: 0.06 K/UL (ref 0–0.12)
BILIRUB SERPL-MCNC: 0.9 MG/DL (ref 0.1–1.5)
BUN SERPL-MCNC: 13 MG/DL (ref 8–22)
CALCIUM SERPL-MCNC: 8.6 MG/DL (ref 8.5–10.5)
CHLORIDE SERPL-SCNC: 103 MMOL/L (ref 96–112)
CO2 SERPL-SCNC: 23 MMOL/L (ref 20–33)
CREAT SERPL-MCNC: 0.5 MG/DL (ref 0.5–1.4)
EOSINOPHIL # BLD AUTO: 0.19 K/UL (ref 0–0.51)
EOSINOPHIL NFR BLD: 2.6 % (ref 0–6.9)
ERYTHROCYTE [DISTWIDTH] IN BLOOD BY AUTOMATED COUNT: 46.8 FL (ref 35.9–50)
GLOBULIN SER CALC-MCNC: 2.5 G/DL (ref 1.9–3.5)
GLUCOSE SERPL-MCNC: 110 MG/DL (ref 65–99)
HCT VFR BLD AUTO: 39.9 % (ref 37–47)
HGB BLD-MCNC: 13.6 G/DL (ref 12–16)
IMM GRANULOCYTES # BLD AUTO: 0.04 K/UL (ref 0–0.11)
IMM GRANULOCYTES NFR BLD AUTO: 0.6 % (ref 0–0.9)
LYMPHOCYTES # BLD AUTO: 2.35 K/UL (ref 1–4.8)
LYMPHOCYTES NFR BLD: 32.5 % (ref 22–41)
MAGNESIUM SERPL-MCNC: 2.2 MG/DL (ref 1.5–2.5)
MCH RBC QN AUTO: 33.5 PG (ref 27–33)
MCHC RBC AUTO-ENTMCNC: 34.1 G/DL (ref 33.6–35)
MCV RBC AUTO: 98.3 FL (ref 81.4–97.8)
MONOCYTES # BLD AUTO: 0.59 K/UL (ref 0–0.85)
MONOCYTES NFR BLD AUTO: 8.1 % (ref 0–13.4)
NEUTROPHILS # BLD AUTO: 4.01 K/UL (ref 2–7.15)
NEUTROPHILS NFR BLD: 55.4 % (ref 44–72)
NRBC # BLD AUTO: 0 K/UL
NRBC BLD-RTO: 0 /100 WBC
PHOSPHATE SERPL-MCNC: 4.1 MG/DL (ref 2.5–4.5)
PLATELET # BLD AUTO: 205 K/UL (ref 164–446)
PMV BLD AUTO: 9.4 FL (ref 9–12.9)
POTASSIUM SERPL-SCNC: 4.3 MMOL/L (ref 3.6–5.5)
PROT SERPL-MCNC: 5.9 G/DL (ref 6–8.2)
RBC # BLD AUTO: 4.06 M/UL (ref 4.2–5.4)
SIGNIFICANT IND 70042: NORMAL
SITE SITE: NORMAL
SODIUM SERPL-SCNC: 135 MMOL/L (ref 135–145)
SOURCE SOURCE: NORMAL
WBC # BLD AUTO: 7.2 K/UL (ref 4.8–10.8)

## 2021-02-02 PROCEDURE — 85025 COMPLETE CBC W/AUTO DIFF WBC: CPT

## 2021-02-02 PROCEDURE — 700102 HCHG RX REV CODE 250 W/ 637 OVERRIDE(OP): Performed by: INTERNAL MEDICINE

## 2021-02-02 PROCEDURE — A9270 NON-COVERED ITEM OR SERVICE: HCPCS | Performed by: STUDENT IN AN ORGANIZED HEALTH CARE EDUCATION/TRAINING PROGRAM

## 2021-02-02 PROCEDURE — 80053 COMPREHEN METABOLIC PANEL: CPT

## 2021-02-02 PROCEDURE — 700102 HCHG RX REV CODE 250 W/ 637 OVERRIDE(OP): Performed by: PSYCHIATRY & NEUROLOGY

## 2021-02-02 PROCEDURE — 700102 HCHG RX REV CODE 250 W/ 637 OVERRIDE(OP): Performed by: STUDENT IN AN ORGANIZED HEALTH CARE EDUCATION/TRAINING PROGRAM

## 2021-02-02 PROCEDURE — 83735 ASSAY OF MAGNESIUM: CPT

## 2021-02-02 PROCEDURE — A9270 NON-COVERED ITEM OR SERVICE: HCPCS | Performed by: PSYCHIATRY & NEUROLOGY

## 2021-02-02 PROCEDURE — 84100 ASSAY OF PHOSPHORUS: CPT

## 2021-02-02 PROCEDURE — 700102 HCHG RX REV CODE 250 W/ 637 OVERRIDE(OP): Performed by: HOSPITALIST

## 2021-02-02 PROCEDURE — 700111 HCHG RX REV CODE 636 W/ 250 OVERRIDE (IP): Performed by: PSYCHIATRY & NEUROLOGY

## 2021-02-02 PROCEDURE — 770020 HCHG ROOM/CARE - TELE (206)

## 2021-02-02 PROCEDURE — A9270 NON-COVERED ITEM OR SERVICE: HCPCS | Performed by: HOSPITALIST

## 2021-02-02 PROCEDURE — 99233 SBSQ HOSP IP/OBS HIGH 50: CPT | Performed by: HOSPITALIST

## 2021-02-02 PROCEDURE — A9270 NON-COVERED ITEM OR SERVICE: HCPCS | Performed by: INTERNAL MEDICINE

## 2021-02-02 RX ORDER — LORAZEPAM 2 MG/1
2 TABLET ORAL
Status: DISCONTINUED | OUTPATIENT
Start: 2021-02-02 | End: 2021-02-03

## 2021-02-02 RX ORDER — LORAZEPAM 2 MG/ML
1.5 INJECTION INTRAMUSCULAR
Status: DISCONTINUED | OUTPATIENT
Start: 2021-02-02 | End: 2021-02-03

## 2021-02-02 RX ORDER — GAUZE BANDAGE 2" X 2"
100 BANDAGE TOPICAL DAILY
Status: COMPLETED | OUTPATIENT
Start: 2021-02-02 | End: 2021-02-05

## 2021-02-02 RX ORDER — LORAZEPAM 1 MG/1
1 TABLET ORAL EVERY 4 HOURS PRN
Status: DISCONTINUED | OUTPATIENT
Start: 2021-02-02 | End: 2021-02-03

## 2021-02-02 RX ORDER — CHLORDIAZEPOXIDE HYDROCHLORIDE 5 MG/1
5 CAPSULE, GELATIN COATED ORAL 3 TIMES DAILY
Status: DISCONTINUED | OUTPATIENT
Start: 2021-02-02 | End: 2021-02-03

## 2021-02-02 RX ORDER — LORAZEPAM 2 MG/1
4 TABLET ORAL
Status: DISCONTINUED | OUTPATIENT
Start: 2021-02-02 | End: 2021-02-03

## 2021-02-02 RX ORDER — LORAZEPAM 2 MG/ML
0.5 INJECTION INTRAMUSCULAR EVERY 4 HOURS PRN
Status: DISCONTINUED | OUTPATIENT
Start: 2021-02-02 | End: 2021-02-03

## 2021-02-02 RX ORDER — LORAZEPAM 2 MG/ML
2 INJECTION INTRAMUSCULAR
Status: DISCONTINUED | OUTPATIENT
Start: 2021-02-02 | End: 2021-02-03

## 2021-02-02 RX ORDER — FOLIC ACID 1 MG/1
1 TABLET ORAL DAILY
Status: COMPLETED | OUTPATIENT
Start: 2021-02-02 | End: 2021-02-05

## 2021-02-02 RX ORDER — LORAZEPAM 0.5 MG/1
0.5 TABLET ORAL EVERY 4 HOURS PRN
Status: DISCONTINUED | OUTPATIENT
Start: 2021-02-02 | End: 2021-02-05

## 2021-02-02 RX ORDER — LORAZEPAM 2 MG/ML
1 INJECTION INTRAMUSCULAR
Status: DISCONTINUED | OUTPATIENT
Start: 2021-02-02 | End: 2021-02-03

## 2021-02-02 RX ADMIN — THIAMINE HYDROCHLORIDE 100 MG: 100 INJECTION, SOLUTION INTRAMUSCULAR; INTRAVENOUS at 06:06

## 2021-02-02 RX ADMIN — DOCUSATE SODIUM 50 MG AND SENNOSIDES 8.6 MG 2 TABLET: 8.6; 5 TABLET, FILM COATED ORAL at 17:47

## 2021-02-02 RX ADMIN — FOLIC ACID 1 MG: 1 TABLET ORAL at 09:37

## 2021-02-02 RX ADMIN — LORAZEPAM 2 MG: 2 INJECTION INTRAMUSCULAR; INTRAVENOUS at 00:27

## 2021-02-02 RX ADMIN — METOPROLOL TARTRATE 100 MG: 50 TABLET, FILM COATED ORAL at 17:47

## 2021-02-02 RX ADMIN — CHLORDIAZEPOXIDE HYDROCHLORIDE 5 MG: 5 CAPSULE ORAL at 11:59

## 2021-02-02 RX ADMIN — METOPROLOL TARTRATE 100 MG: 50 TABLET, FILM COATED ORAL at 09:42

## 2021-02-02 RX ADMIN — THERA TABS 1 TABLET: TAB at 09:37

## 2021-02-02 RX ADMIN — VENLAFAXINE HYDROCHLORIDE 150 MG: 75 CAPSULE, EXTENDED RELEASE ORAL at 09:43

## 2021-02-02 RX ADMIN — DIGOXIN 125 MCG: 125 TABLET ORAL at 17:47

## 2021-02-02 RX ADMIN — GABAPENTIN 200 MG: 100 CAPSULE ORAL at 20:25

## 2021-02-02 RX ADMIN — APIXABAN 5 MG: 5 TABLET, FILM COATED ORAL at 09:42

## 2021-02-02 RX ADMIN — CHLORDIAZEPOXIDE HYDROCHLORIDE 5 MG: 5 CAPSULE ORAL at 17:47

## 2021-02-02 RX ADMIN — LEVOTHYROXINE SODIUM 100 MCG: 0.1 TABLET ORAL at 09:42

## 2021-02-02 RX ADMIN — Medication 100 MG: at 09:37

## 2021-02-02 RX ADMIN — APIXABAN 5 MG: 5 TABLET, FILM COATED ORAL at 17:47

## 2021-02-02 ASSESSMENT — LIFESTYLE VARIABLES
HEADACHE, FULLNESS IN HEAD: NOT PRESENT
NAUSEA AND VOMITING: NO NAUSEA AND NO VOMITING
AUDITORY DISTURBANCES: NOT PRESENT
TREMOR: NO TREMOR
AUDITORY DISTURBANCES: NOT PRESENT
AUDITORY DISTURBANCES: NOT PRESENT
TREMOR: NO TREMOR
TOTAL SCORE: 3
PAROXYSMAL SWEATS: NO SWEAT VISIBLE
HEADACHE, FULLNESS IN HEAD: NOT PRESENT
NAUSEA AND VOMITING: NO NAUSEA AND NO VOMITING
VISUAL DISTURBANCES: NOT PRESENT
ORIENTATION AND CLOUDING OF SENSORIUM: CANNOT DO SERIAL ADDITIONS OR IS UNCERTAIN ABOUT DATE
ORIENTATION AND CLOUDING OF SENSORIUM: CANNOT DO SERIAL ADDITIONS OR IS UNCERTAIN ABOUT DATE
AGITATION: NORMAL ACTIVITY
AGITATION: NORMAL ACTIVITY
NAUSEA AND VOMITING: NO NAUSEA AND NO VOMITING
TOTAL SCORE: 1
ANXIETY: NO ANXIETY (AT EASE)
ORIENTATION AND CLOUDING OF SENSORIUM: CANNOT DO SERIAL ADDITIONS OR IS UNCERTAIN ABOUT DATE
HEADACHE, FULLNESS IN HEAD: NOT PRESENT
PAROXYSMAL SWEATS: NO SWEAT VISIBLE
VISUAL DISTURBANCES: NOT PRESENT
AUDITORY DISTURBANCES: NOT PRESENT
SUBSTANCE_ABUSE: 1
ANXIETY: NO ANXIETY (AT EASE)
AGITATION: NORMAL ACTIVITY
TREMOR: NO TREMOR
TOTAL SCORE: 1
AGITATION: NORMAL ACTIVITY
PAROXYSMAL SWEATS: NO SWEAT VISIBLE
ANXIETY: *
ORIENTATION AND CLOUDING OF SENSORIUM: CANNOT DO SERIAL ADDITIONS OR IS UNCERTAIN ABOUT DATE
PAROXYSMAL SWEATS: NO SWEAT VISIBLE
TREMOR: NO TREMOR
NAUSEA AND VOMITING: NO NAUSEA AND NO VOMITING
ANXIETY: NO ANXIETY (AT EASE)
HEADACHE, FULLNESS IN HEAD: NOT PRESENT
TOTAL SCORE: 1
VISUAL DISTURBANCES: NOT PRESENT
VISUAL DISTURBANCES: NOT PRESENT

## 2021-02-02 ASSESSMENT — ENCOUNTER SYMPTOMS
MEMORY LOSS: 1
NEUROLOGICAL NEGATIVE: 1
FEVER: 0
FOCAL WEAKNESS: 0
CONSTITUTIONAL NEGATIVE: 1
PALPITATIONS: 0
COUGH: 0
NERVOUS/ANXIOUS: 1
WEAKNESS: 0
VOMITING: 0
DEPRESSION: 0
BACK PAIN: 0
CHILLS: 0
NAUSEA: 0
NECK PAIN: 0
GASTROINTESTINAL NEGATIVE: 1
HEARTBURN: 0
POLYDIPSIA: 0
RESPIRATORY NEGATIVE: 1
MUSCULOSKELETAL NEGATIVE: 1
BRUISES/BLEEDS EASILY: 0
DIZZINESS: 0
HEADACHES: 0
CARDIOVASCULAR NEGATIVE: 1
EYES NEGATIVE: 1

## 2021-02-02 NOTE — PROGRESS NOTES
Hospital Medicine Daily Progress Note    Date of Service  2021    Chief Complaint  75 y.o. female admitted 2021 with alcoholism, history of DTs, history of intubation, atrial fibrillation, anticoagulation, admitted to the hospital where she was found intoxicated.  Patient initially admitted to the medical unit for alcohol withdrawal treatments, required transfer to ICU secondary to escalating sedation requirements.  The patient apparently was drinking 2 pints of vodka daily, had to attend her 's  1 week prior to admission.  Placed on legal hold, psychiatry following    Hospital Course  75-year-old female admitted with alcohol intoxication, recent passing of her , placed on legal hold    Interval Problem Update  Patient seen and examined today. ICU Care  Care and plan discussed in IDT/Hot rounds.  Lines and assistive devices reviewed.    Patient tolerating treatment and therapies.  All Data, Medication data reviewed.  Case discussed with nursing as available.  Plan of Care reviewed with patient and notified of changes.   the patient is somnolent but wakes up to command, denies pain, currently no tremor, limited ROS secondary to medication effects  Consultants/Specialty  Psychiatry  Intensivist  Code Status  Full Code, currently legal hold status    Disposition  TBD    Review of Systems  Review of Systems   Constitutional: Negative.  Negative for chills and fever.   HENT: Negative.    Eyes: Negative.    Respiratory: Negative.  Negative for cough.    Cardiovascular: Negative.  Negative for chest pain and palpitations.   Gastrointestinal: Negative.  Negative for heartburn, nausea and vomiting.   Genitourinary: Negative.  Negative for dysuria and frequency.   Musculoskeletal: Negative.  Negative for back pain and neck pain.   Skin: Negative.  Negative for itching and rash.   Neurological: Negative.  Negative for dizziness, focal weakness, weakness and headaches.   Endo/Heme/Allergies:  Negative.  Negative for polydipsia. Does not bruise/bleed easily.   Psychiatric/Behavioral: Positive for memory loss and substance abuse. Negative for depression. The patient is nervous/anxious.         Physical Exam  Temp:  [36.3 °C (97.4 °F)-36.4 °C (97.5 °F)] 36.4 °C (97.5 °F)  Pulse:  [] 65  Resp:  [14-31] 14  BP: ()/() 126/83  SpO2:  [88 %-98 %] 98 %    Physical Exam  Vitals signs and nursing note reviewed.   Constitutional:       General: She is sleeping.      Appearance: She is well-developed. She is not diaphoretic.      Interventions: She is sedated.   HENT:      Head: Normocephalic and atraumatic.      Nose: Nose normal.   Eyes:      Conjunctiva/sclera: Conjunctivae normal.      Pupils: Pupils are equal, round, and reactive to light.   Neck:      Musculoskeletal: Normal range of motion and neck supple.      Thyroid: No thyromegaly.      Vascular: No JVD.   Cardiovascular:      Rate and Rhythm: Normal rate and regular rhythm.      Heart sounds: Normal heart sounds. No friction rub. No gallop.    Pulmonary:      Effort: Pulmonary effort is normal.      Breath sounds: Normal breath sounds. No wheezing or rales.   Abdominal:      General: Bowel sounds are normal. There is no distension.      Palpations: Abdomen is soft. There is no mass.      Tenderness: There is no abdominal tenderness. There is no guarding or rebound.   Musculoskeletal: Normal range of motion.         General: No tenderness.   Lymphadenopathy:      Cervical: No cervical adenopathy.   Skin:     General: Skin is warm and dry.   Neurological:      Mental Status: She is oriented to person, place, and time. She is lethargic.      Cranial Nerves: No cranial nerve deficit.   Psychiatric:         Behavior: Behavior normal.         Fluids    Intake/Output Summary (Last 24 hours) at 2/2/2021 1126  Last data filed at 2/2/2021 0700  Gross per 24 hour   Intake 314.5 ml   Output 1000 ml   Net -685.5 ml       Laboratory  Recent Labs      01/31/21  0152 02/01/21  0420 02/02/21  0500   WBC 6.5 7.8 7.2   RBC 3.92* 4.22 4.06*   HEMOGLOBIN 13.2 13.8 13.6   HEMATOCRIT 41.6 41.5 39.9   .1* 98.3* 98.3*   MCH 33.7* 32.7 33.5*   MCHC 31.7* 33.3* 34.1   RDW 51.2* 47.2 46.8   PLATELETCT 242 258 205   MPV 9.5 9.4 9.4     Recent Labs     01/31/21  0152 02/01/21  0420 02/02/21  0500   SODIUM 138 139 135   POTASSIUM 4.1 3.9 4.3   CHLORIDE 105 104 103   CO2 22 23 23   GLUCOSE 110* 111* 110*   BUN 13 13 13   CREATININE 0.54 0.62 0.50   CALCIUM 9.5 9.1 8.6                   Imaging  DX-CHEST-PORTABLE (1 VIEW)   Final Result         1.  Probable mild pulmonary edema   2.  Cardiomegaly           Assessment/Plan  * Alcohol dependence (HCC)- (present on admission)  Assessment & Plan   alcohol withdrawal with severe features.  -Thiamine, folate, multivitamin  -Chlordiazepoxide   -CIWA protocol  -Clonidine 0.1mg bid PRN for SBP >160  Neurontin  -Fall/seizure/aspiration precautions  -Remain on telemetry for monitoring    Alcohol withdrawal syndrome, with delirium (HCC)  Assessment & Plan  S/p transfer to ICU for closer monitoring, required Dex only short period of time      PAF (paroxysmal atrial fibrillation) ([facility])- (present on admission)  Assessment & Plan  -Continue rate control with metoprolol 100mg BID and digoxin 125mcg daily  -Digoxin level 0.4 on 1/29/21  -Clonidine as above  -Continue apixaban 5mg BID  -Patient refused repeat echo     Syncope  Assessment & Plan  Most likely due to intoxication and vasovagal. Device interrogated with no periods of pauses. Initial BAL 200s. Last drink 1/28 evening prior to admission.  -Continue to monitor on telemetry  -TSH 0.020, Free T4 1.79      Depression  Assessment & Plan  Significant grief due to loss of 2 daughters and .   -Quetiapine discontinued as per psychiatry recommendation, gabapentin regimen started  -Home venlafaxine 75mg increased to 150 mg daily as per psych recommendation    Gait disorder  Assessment  & Plan  Follow-up physical therapy evaluation    Suicidal ideation  Assessment & Plan  Denies SI at this time.    -Legal hold still in place. Psychiatry will reassess when patient is no longer at risk for acute alcohol withdrawal.     Plan  Continue with medical regimen for alcohol withdrawal  Antidepressive/psychiatric treatment as per psychiatry team  Monitor electrolytes  Crease activity as tolerated  Assess for discharge needs  See orders  Medically complex high risk  VTE prophylaxis: Lovenox  I have performed a physical exam and reviewed and updated ROS and Plan today . In review of yesterday's note , there are no changes except as documented above.        Please note that this dictation was created using voice recognition software. I have made every reasonable attempt to correct obvious errors, but I expect that there are errors of grammar and possibly context that I did not discover before finalizing the note.    This patient was seen under COVID 19 pandemic disaster response conditions.  During a disaster, the provisions of care is subject to the Crisis Standard of Care

## 2021-02-02 NOTE — DISCHARGE PLANNING
Filed petition to the court via [employer]. Waiting on verified petition.    Received verified petition from the court. Sent copy to SNEHA Cronin, scanned copy into .

## 2021-02-02 NOTE — CONSULTS
Brief Behavioral Health Note    Attempted to re-evaluate patient. Patient opened her eyes occasionally but was unable to fully engage in the interview process. Patient appeared sedated, impoverished speech and not completely oriented.   Will follow up when patient is awake, alert and able to participate in the interview.    Thank you    Livia Vaughn, Ph.D., [facility]

## 2021-02-02 NOTE — ASSESSMENT & PLAN NOTE
S/p transfer to ICU for closer monitoring, required Dex only short period of time  No further signs of alcohol withdrawal

## 2021-02-03 LAB
ANION GAP SERPL CALC-SCNC: 10 MMOL/L (ref 7–16)
BACTERIA BLD CULT: NORMAL
BUN SERPL-MCNC: 16 MG/DL (ref 8–22)
CALCIUM SERPL-MCNC: 9.6 MG/DL (ref 8.5–10.5)
CHLORIDE SERPL-SCNC: 100 MMOL/L (ref 96–112)
CO2 SERPL-SCNC: 24 MMOL/L (ref 20–33)
CREAT SERPL-MCNC: 0.62 MG/DL (ref 0.5–1.4)
ERYTHROCYTE [DISTWIDTH] IN BLOOD BY AUTOMATED COUNT: 47 FL (ref 35.9–50)
GLUCOSE SERPL-MCNC: 86 MG/DL (ref 65–99)
HCT VFR BLD AUTO: 42.6 % (ref 37–47)
HGB BLD-MCNC: 14.1 G/DL (ref 12–16)
MAGNESIUM SERPL-MCNC: 2.1 MG/DL (ref 1.5–2.5)
MCH RBC QN AUTO: 32.9 PG (ref 27–33)
MCHC RBC AUTO-ENTMCNC: 33.1 G/DL (ref 33.6–35)
MCV RBC AUTO: 99.3 FL (ref 81.4–97.8)
PHOSPHATE SERPL-MCNC: 4.2 MG/DL (ref 2.5–4.5)
PLATELET # BLD AUTO: 242 K/UL (ref 164–446)
PMV BLD AUTO: 9.9 FL (ref 9–12.9)
POTASSIUM SERPL-SCNC: 3.9 MMOL/L (ref 3.6–5.5)
RBC # BLD AUTO: 4.29 M/UL (ref 4.2–5.4)
SIGNIFICANT IND 70042: NORMAL
SITE SITE: NORMAL
SODIUM SERPL-SCNC: 134 MMOL/L (ref 135–145)
SOURCE SOURCE: NORMAL
WBC # BLD AUTO: 7.9 K/UL (ref 4.8–10.8)

## 2021-02-03 PROCEDURE — A9270 NON-COVERED ITEM OR SERVICE: HCPCS | Performed by: STUDENT IN AN ORGANIZED HEALTH CARE EDUCATION/TRAINING PROGRAM

## 2021-02-03 PROCEDURE — 700102 HCHG RX REV CODE 250 W/ 637 OVERRIDE(OP): Performed by: PSYCHIATRY & NEUROLOGY

## 2021-02-03 PROCEDURE — 770004 HCHG ROOM/CARE - ONCOLOGY PRIVATE *

## 2021-02-03 PROCEDURE — 700102 HCHG RX REV CODE 250 W/ 637 OVERRIDE(OP): Performed by: STUDENT IN AN ORGANIZED HEALTH CARE EDUCATION/TRAINING PROGRAM

## 2021-02-03 PROCEDURE — 83735 ASSAY OF MAGNESIUM: CPT

## 2021-02-03 PROCEDURE — A9270 NON-COVERED ITEM OR SERVICE: HCPCS | Performed by: PSYCHIATRY & NEUROLOGY

## 2021-02-03 PROCEDURE — 700102 HCHG RX REV CODE 250 W/ 637 OVERRIDE(OP): Performed by: HOSPITALIST

## 2021-02-03 PROCEDURE — 85027 COMPLETE CBC AUTOMATED: CPT

## 2021-02-03 PROCEDURE — 700102 HCHG RX REV CODE 250 W/ 637 OVERRIDE(OP): Performed by: INTERNAL MEDICINE

## 2021-02-03 PROCEDURE — 80048 BASIC METABOLIC PNL TOTAL CA: CPT

## 2021-02-03 PROCEDURE — 84100 ASSAY OF PHOSPHORUS: CPT

## 2021-02-03 PROCEDURE — A9270 NON-COVERED ITEM OR SERVICE: HCPCS | Performed by: INTERNAL MEDICINE

## 2021-02-03 PROCEDURE — A9270 NON-COVERED ITEM OR SERVICE: HCPCS | Performed by: HOSPITALIST

## 2021-02-03 PROCEDURE — 99233 SBSQ HOSP IP/OBS HIGH 50: CPT | Performed by: HOSPITALIST

## 2021-02-03 RX ORDER — POTASSIUM CHLORIDE 20 MEQ/1
40 TABLET, EXTENDED RELEASE ORAL ONCE
Status: COMPLETED | OUTPATIENT
Start: 2021-02-03 | End: 2021-02-03

## 2021-02-03 RX ADMIN — VENLAFAXINE HYDROCHLORIDE 150 MG: 75 CAPSULE, EXTENDED RELEASE ORAL at 05:01

## 2021-02-03 RX ADMIN — LORAZEPAM 0.5 MG: 0.5 TABLET ORAL at 21:15

## 2021-02-03 RX ADMIN — Medication 100 MG: at 05:02

## 2021-02-03 RX ADMIN — THERA TABS 1 TABLET: TAB at 05:01

## 2021-02-03 RX ADMIN — FOLIC ACID 1 MG: 1 TABLET ORAL at 05:01

## 2021-02-03 RX ADMIN — METOPROLOL TARTRATE 100 MG: 50 TABLET, FILM COATED ORAL at 17:14

## 2021-02-03 RX ADMIN — LEVOTHYROXINE SODIUM 100 MCG: 0.1 TABLET ORAL at 05:01

## 2021-02-03 RX ADMIN — APIXABAN 5 MG: 5 TABLET, FILM COATED ORAL at 05:10

## 2021-02-03 RX ADMIN — APIXABAN 5 MG: 5 TABLET, FILM COATED ORAL at 17:13

## 2021-02-03 RX ADMIN — DIGOXIN 125 MCG: 125 TABLET ORAL at 17:14

## 2021-02-03 RX ADMIN — CHLORDIAZEPOXIDE HYDROCHLORIDE 5 MG: 5 CAPSULE ORAL at 05:02

## 2021-02-03 RX ADMIN — POTASSIUM CHLORIDE 40 MEQ: 1500 TABLET, EXTENDED RELEASE ORAL at 12:22

## 2021-02-03 RX ADMIN — DOCUSATE SODIUM 50 MG AND SENNOSIDES 8.6 MG 2 TABLET: 8.6; 5 TABLET, FILM COATED ORAL at 05:01

## 2021-02-03 RX ADMIN — METOPROLOL TARTRATE 100 MG: 50 TABLET, FILM COATED ORAL at 05:10

## 2021-02-03 RX ADMIN — GABAPENTIN 200 MG: 100 CAPSULE ORAL at 20:49

## 2021-02-03 ASSESSMENT — ENCOUNTER SYMPTOMS
POLYDIPSIA: 0
HEARTBURN: 0
BRUISES/BLEEDS EASILY: 0
VOMITING: 0
NERVOUS/ANXIOUS: 1
MUSCULOSKELETAL NEGATIVE: 1
NEUROLOGICAL NEGATIVE: 1
MEMORY LOSS: 1
CARDIOVASCULAR NEGATIVE: 1
HEADACHES: 0
EYES NEGATIVE: 1
CONSTITUTIONAL NEGATIVE: 1
DIZZINESS: 0
FOCAL WEAKNESS: 0
NAUSEA: 0
FEVER: 0
WEAKNESS: 0
DEPRESSION: 0
NECK PAIN: 0
PALPITATIONS: 0
BACK PAIN: 0
CHILLS: 0
COUGH: 0
GASTROINTESTINAL NEGATIVE: 1
RESPIRATORY NEGATIVE: 1

## 2021-02-03 ASSESSMENT — PAIN DESCRIPTION - PAIN TYPE: TYPE: ACUTE PAIN

## 2021-02-03 ASSESSMENT — LIFESTYLE VARIABLES
AGITATION: NORMAL ACTIVITY
ANXIETY: *
AUDITORY DISTURBANCES: NOT PRESENT
ORIENTATION AND CLOUDING OF SENSORIUM: CANNOT DO SERIAL ADDITIONS OR IS UNCERTAIN ABOUT DATE
ORIENTATION AND CLOUDING OF SENSORIUM: CANNOT DO SERIAL ADDITIONS OR IS UNCERTAIN ABOUT DATE
ANXIETY: *
ANXIETY: *
TREMOR: NO TREMOR
TREMOR: NO TREMOR
PAROXYSMAL SWEATS: NO SWEAT VISIBLE
HEADACHE, FULLNESS IN HEAD: NOT PRESENT
AUDITORY DISTURBANCES: NOT PRESENT
TOTAL SCORE: 9
VISUAL DISTURBANCES: NOT PRESENT
AGITATION: *
VISUAL DISTURBANCES: NOT PRESENT
ANXIETY: *
HEADACHE, FULLNESS IN HEAD: NOT PRESENT
NAUSEA AND VOMITING: NO NAUSEA AND NO VOMITING
NAUSEA AND VOMITING: NO NAUSEA AND NO VOMITING
TOTAL SCORE: 3
AUDITORY DISTURBANCES: NOT PRESENT
NAUSEA AND VOMITING: NO NAUSEA AND NO VOMITING
NAUSEA AND VOMITING: NO NAUSEA AND NO VOMITING
AGITATION: SOMEWHAT MORE THAN NORMAL ACTIVITY
TREMOR: NO TREMOR
SUBSTANCE_ABUSE: 1
TOTAL SCORE: 5
VISUAL DISTURBANCES: NOT PRESENT
ORIENTATION AND CLOUDING OF SENSORIUM: CANNOT DO SERIAL ADDITIONS OR IS UNCERTAIN ABOUT DATE
HEADACHE, FULLNESS IN HEAD: NOT PRESENT
PAROXYSMAL SWEATS: NO SWEAT VISIBLE
PAROXYSMAL SWEATS: NO SWEAT VISIBLE
ORIENTATION AND CLOUDING OF SENSORIUM: CANNOT DO SERIAL ADDITIONS OR IS UNCERTAIN ABOUT DATE
VISUAL DISTURBANCES: NOT PRESENT
AUDITORY DISTURBANCES: NOT PRESENT
HEADACHE, FULLNESS IN HEAD: NOT PRESENT
AGITATION: NORMAL ACTIVITY
PAROXYSMAL SWEATS: BARELY PERCEPTIBLE SWEATING. PALMS MOIST
TREMOR: NO TREMOR
TOTAL SCORE: 3

## 2021-02-03 ASSESSMENT — FIBROSIS 4 INDEX: FIB4 SCORE: 2.04

## 2021-02-03 NOTE — DISCHARGE PLANNING
Anticipated Disposition:  TBD    Action:   -> Chart review completed. Legal hold paper extended.        ~ This CM called Reilly Son, provided updates and obtained assessment information. Pt lives alone in a 2 story house, was fully independent with ADLs/IDLs prior to this hospitalization. Pt uses [facility] off [address].           Barriers to Discharge:   Medical clearance  Legal Hold         Plan:  Please follow up with attending physician for medical clearance          Care Transition Team Assessment    Information Source  Orientation : Disoriented to Event, Disoriented to Person  Information Given By: Relative  Informant's Name: Reilly   Who is responsible for making decisions for patient? : Patient         Elopement Risk  Legal Hold: Elopement Risk  Ambulatory or Self Mobile in Wheelchair: Yes  Disoriented: No  Psychiatric Symptoms: None  History of Wandering: No  Elopement this Admit: No  Vocalizing Wanting to Leave: No  Displays Behaviors, Body Language Wanting to Leave: No-Not at Risk for Elopement  Time of Legal Hold: 1215  Date of Legal Hold: 02/01/21  Elopement Risk: Not at Risk for Elopement  Wanderguard On: Unavailable  Personal Belongings: Hospital Clothing Only    Interdisciplinary Discharge Planning  Does Admitting Nurse Feel This Could be a Complex Discharge?: No  Primary Care Physician: Jane Armstrong M.D  Lives with - Patient's Self Care Capacity: Alone and Able to Care For Self  Patient or legal guardian wants to designate a caregiver: No  Support Systems: Children  Housing / Facility: 2 Story House  Do You Take your Prescribed Medications Regularly: Yes  Able to Return to Previous ADL's: Future Time w/Therapy  Mobility Issues: No  Prior Services: Home-Independent  Patient Prefers to be Discharged to:: TBD  Assistance Needed: Unknown at this Time  Durable Medical Equipment: Unknown    Discharge Preparedness  What is your plan after discharge?: Uncertain - pending  medical team collaboration  What are your discharge supports?: Child  Prior Functional Level: Ambulatory, Independent with Activities of Daily Living    Functional Assesment  Prior Functional Level: Ambulatory, Independent with Activities of Daily Living    Finances  Financial Barriers to Discharge: No  Prescription Coverage: Yes    Vision / Hearing Impairment  Vision Impairment : No  Hearing Impairment : No              Domestic Abuse  Have you ever been the victim of abuse or violence?: No  Physical Abuse or Sexual Abuse: No  Verbal Abuse or Emotional Abuse: No  Possible Abuse/Neglect Reported to:: Not Applicable              Anticipated Discharge Information  Discharge Disposition: Still a Patient (30)

## 2021-02-03 NOTE — CARE PLAN
Problem: Safety  Goal: Will remain free from falls  Outcome: PROGRESSING AS EXPECTED  Intervention: Assess risk factors for falls  Note: Patient is a high fall risk. Bed alarm in the lowest position, bed locked, bed alarm on, pt has fall risk socks on, call light within reach, fall risk bracelet in place, pt educated  `  Intervention: Implement fall precautions  Flowsheets (Taken 2/2/2021 2000)  Environmental Precautions:   Treaded Slipper Socks on Patient   Personal Belongings, Wastebasket, Call Bell etc. in Easy Reach   Transferred to Stronger Side   Report Given to Other Health Care Providers Regarding Fall Risk   Bed in Low Position     Problem: Infection  Goal: Will remain free from infection  Outcome: PROGRESSING AS EXPECTED  Intervention: Assess signs and symptoms of infection  Note: No signs or symptoms of infection  Intervention: Implement standard precautions and perform hand washing before and after patient contact  Note: Complete

## 2021-02-03 NOTE — CONSULTS
"[facility]   TRIAGE ASSESSMENT    Name: Jeanie Hutchison  MRN: [id number]  : 1945  Age: 75 y.o.  Date of assessment: 2/3/2021  PCP: Jane Armstrong M.D.  Persons in attendance: Patient     Length of Interview: 60 minutes    PRESENTING ISSUE (as stated by Patient): No response    Chief Complaint   Patient presents with   • Syncope     during ems  pt had 10 second syncopal episode; ems assisted to ground, no injuries   • ETOH Intoxication     pt states she is currently intoxicated and drinks      HPI:  Mrs. Hutchison is a 75 year old  female who was admitted on 2021 with alcoholism, history of DT's, history of intubation, atrial fibrillation, anticoagulation.     BEHAVIORAL HEALTH ASSESSMENT: Mrs Hutchison was seen lying on hospital bed, drowsy but more able to engage than previous attempts. Patient appeared confused but was oriented to self and location. Patient's speech was slow, impoverished, with apparent word finding problems. Patient appeared to have difficulty processing content and often lingered for a extended period of time without responding to a question. Patient attempted to explain the passing of her daughters and said, \"My, My, my., My\" never being able to complete the sentence. This happened several times throughout the interview.   Patient had difficulty tracking the interview with questions needing to be repeated multiple times. When patient was able to answer, the response appeared to be appropriate to the question asked.   Patient admits to long term alcohol use stating, \"my .....long pause... \". Patient became tearful and reached for a tissue, patient's hands trembling and she needed assistance getting the tissue out of the box, appeared weak. Patient discussed minimally the loss of her two daughters, one  7 years ago and the second  2 years ago. Patient appears overcome with grief. \"I need to find a way to get back to myself again\". " "  Patient denies suicidal ideations, stating that she cannot recall making such a statement. Received verbal concent to contact patient's family. Contacted patients son Reilly Hutchison [phone] and Patient's sister Kenyatta Valles  [phone]. Both family members report patient's difficulty in managing the death of her daughters and now her . Patient's alcohol consumption increased and her ability to care for self has diminished. Patient's son and sister both confirmed the need for long term care placement due to safety issues based on their observation. Patient's son Ulices states, \"she has a huge house, and she lives there alone, she cannot manage that nor take care of herself. She has not been unable to deal with the deaths and seems confused most of the time. There are moments of clarity but it is not consistent\".   Patient's judgement and insight appears impaired at this time. Patient does not appear to experience auditory or visual hallucinations and no homicidal ideations. Patient's memory appears impaired.    CURRENT LIVING SITUATION/SOCIAL SUPPORT: Patient currently resides alone. Patient has one surviving son, Reilly Hutchison who lives in the [facility] and checks one her frequently. Reilly reports he has DPOA for both health and financial decision making. He reports concern for his mother's safety and feels living at home alone is no longer a safe option for her. Reilly reports he has considered long term care arrangements prior to this admission due to her continued physical deterioration and ongoing alcohol use. Patient's spouse resided in a long term care facility due to dementia and  two weeks ago. Patient states they were  51 years.    BEHAVIORAL HEALTH TREATMENT HISTORY  Does patient/parent report a history of prior behavioral health treatment for patient?   Patient's sister Kenyatta and son Reilly report the family has placed patient twice in an alcohol " "treatment program with the hope that she would get help with alcohol use as well as her grief. Patient left AMA both times from the facilities according to the family report. Patient has been seen by inpatient psychiatry on 1/30/2020 and 2/6/2020 and assessed for outpatient treatment program through behavioral health on 3/20/2020. It's unclear if she ever attended [facility]'s outpatient treatment program.   Patient reports going through outpatient counseling following an affair that she discovered her  entangled in, patient states, \"that was a horrible time\".       SAFETY ASSESSMENT - SELF  Does patient acknowledge current or past symptoms of dangerousness to self? no  Does parent/significant other report patient has current or past symptoms of dangerousness to self? yes  Does presenting problem suggest symptoms of dangerousness to self? No-at time of interview-this changes when patient is under the influence of alcohol    SAFETY ASSESSMENT - OTHERS  Does patient acknowledge current or past symptoms of aggressive behavior or risk to others? no  Does parent/significant other report patient has current or past symptoms of aggressive behavior or risk to others?  no  Does presenting problem suggest symptoms of dangerousness to others? No    Crisis Safety Plan completed and copy given to patient? N\A    ABUSE/NEGLECT SCREENING  Does patient report feeling “unsafe” in his/her home, or afraid of anyone?  no  Does patient report any history of physical, sexual, or emotional abuse?  no  Does parent or significant other report any of the above? no  Is there evidence of neglect by self?  yes  Is there evidence of neglect by a caregiver? no  Does the patient/parent report any history of CPS/APS/police involvement related to suspected abuse/neglect or domestic violence? no  Based on the information provided during the current assessment, is a mandated report of suspected abuse/neglect being made?  No    SUBSTANCE USE " SCREENING  Diagnostic Alcohol 266.0 upon admission  History of alcohol abuse, hospitalizations due to complication with alcohol withdrawal, unable to maintain sobriety regardless of negative consequences.    MENTAL STATUS   Participation: Limited verbal participation  Grooming: Disheveled  Orientation: Drowsy/Somnolent  Behavior: Calm  Eye contact: Poor  Mood: Depressed  Affect: Flat  Thought process: slow processing  Thought content: Rumination  Speech: Latency of response  Perception: Within normal limits  Memory:  Recent:  Poor  Insight: Poor  Judgment:  Poor  Other:    Collateral information:   Source:  [] Significant other present in person:   [x] Significant other by telephone-son Reilly Hutchison; sister-Kenyatta Valles  [] [facility]   [x] [facility] Nursing Staff  [x] [facility] Medical Record  [x] Other: Attending physician Dr. 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 Discontinue Legal Hold  2. Recommend Speech Cog Eval  3. Case Management to assist with locating SNF or Long term Care facility with the involvement of patient's son who is DPOA  4. Patient is not safe to go home  alone without 24 hour care or SNF placement    Thank you,    Livia Vaughn, Ph.D.  2/3/2021

## 2021-02-03 NOTE — PROGRESS NOTES
Assumed care of pt @ 1915, bedside report received from SNEHA Simon.  Pt is confused and needs frequent reorientation. Bed locked and lowered with call light within reach and questions answered during present time. Bed alarm is on.

## 2021-02-03 NOTE — DISCHARGE PLANNING
Received Choice form at [phone]  Agency/Facility Name: [facility]  Referral sent per Choice form @ [phone]

## 2021-02-03 NOTE — PROGRESS NOTES
[facility] Medicine Daily Progress Note    Date of Service  2/3/2021    Chief Complaint  75 y.o. female admitted 2021 with alcoholism, history of DTs, history of intubation, atrial fibrillation, anticoagulation, admitted to the hospital where she was found intoxicated.  Patient initially admitted to the medical unit for alcohol withdrawal treatments, required transfer to ICU secondary to escalating sedation requirements.  The patient apparently was drinking 2 pints of vodka daily, had to attend her 's  1 week prior to admission.  Placed on legal hold, psychiatry following    Hospital Course  75-year-old female admitted with alcohol intoxication, recent passing of her , placed on legal hold    Interval Problem Update  Patient seen and examined today. ICU Care  Care and plan discussed in IDT/Hot rounds.  Lines and assistive devices reviewed.    Patient tolerating treatment and therapies.  All Data, Medication data reviewed.  Case discussed with nursing as available.  Plan of Care reviewed with patient and notified of changes.  2/2 the patient is somnolent but wakes up to command, denies pain, currently no tremor, limited ROS secondary to medication effects  2/3 the patient is less somnolent today, she is conversant, when asked how she is she says she is fine, on further questioning the patient becomes tearful and is grieving over her 's death, she is cognizant of her recent alcohol abuse, psychiatry follow-up noted, patient is taken off legal hold, speech/cog eval is recommended as well as likely placement into a skilled nursing facility for the time being secondary to lack of ability to take care of self.  Patient is afebrile, heart rate in the 60s to 70s, blood pressure on the higher side, the patient is on low-flow oxygen/off oxygen saturating fine, CBC is improved, hemoglobin 14.1, chemistry without major abnormalities today.  Potassium 3.9, magnesium  2.1,  Consultants/Specialty  Psychiatry  Intensivist  Code Status  Full Code, legal hold was lifted    Disposition  TBD, likely skilled nursing, ECF    Review of Systems  Review of Systems   Constitutional: Negative.  Negative for chills and fever.   HENT: Negative.    Eyes: Negative.    Respiratory: Negative.  Negative for cough.    Cardiovascular: Negative.  Negative for chest pain and palpitations.   Gastrointestinal: Negative.  Negative for heartburn, nausea and vomiting.   Genitourinary: Negative.  Negative for dysuria and frequency.   Musculoskeletal: Negative.  Negative for back pain and neck pain.   Skin: Negative.  Negative for itching and rash.   Neurological: Negative.  Negative for dizziness, focal weakness, weakness and headaches.   Endo/Heme/Allergies: Negative.  Negative for polydipsia. Does not bruise/bleed easily.   Psychiatric/Behavioral: Positive for memory loss and substance abuse. Negative for depression. The patient is nervous/anxious.         Physical Exam  Temp:  [35.9 °C (96.7 °F)-36.3 °C (97.3 °F)] 36.1 °C (97 °F)  Pulse:  [60-87] 71  Resp:  [16-26] 22  BP: (124-147)/() 139/85  SpO2:  [91 %-100 %] 96 %    Physical Exam  Vitals signs and nursing note reviewed.   Constitutional:       General: She is sleeping.      Appearance: She is well-developed. She is not diaphoretic.      Interventions: She is sedated.   HENT:      Head: Normocephalic and atraumatic.      Nose: Nose normal.   Eyes:      Conjunctiva/sclera: Conjunctivae normal.      Pupils: Pupils are equal, round, and reactive to light.   Neck:      Musculoskeletal: Normal range of motion and neck supple.      Thyroid: No thyromegaly.      Vascular: No JVD.   Cardiovascular:      Rate and Rhythm: Normal rate and regular rhythm.      Heart sounds: Normal heart sounds. No friction rub. No gallop.    Pulmonary:      Effort: Pulmonary effort is normal.      Breath sounds: Normal breath sounds. No wheezing or rales.   Abdominal:       General: Bowel sounds are normal. There is no distension.      Palpations: Abdomen is soft. There is no mass.      Tenderness: There is no abdominal tenderness. There is no guarding or rebound.   Musculoskeletal: Normal range of motion.         General: No tenderness.   Lymphadenopathy:      Cervical: No cervical adenopathy.   Skin:     General: Skin is warm and dry.   Neurological:      Mental Status: She is oriented to person, place, and time. She is lethargic.      Cranial Nerves: No cranial nerve deficit.   Psychiatric:         Mood and Affect: Mood is anxious and depressed. Affect is labile.         Behavior: Behavior is slowed.         Cognition and Memory: Cognition is impaired.         Fluids    Intake/Output Summary (Last 24 hours) at 2/3/2021 1309  Last data filed at 2/3/2021 1000  Gross per 24 hour   Intake 540 ml   Output 700 ml   Net -160 ml       Laboratory  Recent Labs     02/01/21  0420 02/02/21  0500 02/03/21  0350   WBC 7.8 7.2 7.9   RBC 4.22 4.06* 4.29   HEMOGLOBIN 13.8 13.6 14.1   HEMATOCRIT 41.5 39.9 42.6   MCV 98.3* 98.3* 99.3*   MCH 32.7 33.5* 32.9   MCHC 33.3* 34.1 33.1*   RDW 47.2 46.8 47.0   PLATELETCT 258 205 242   MPV 9.4 9.4 9.9     Recent Labs     02/01/21  0420 02/02/21  0500 02/03/21  0350   SODIUM 139 135 134*   POTASSIUM 3.9 4.3 3.9   CHLORIDE 104 103 100   CO2 23 23 24   GLUCOSE 111* 110* 86   BUN 13 13 16   CREATININE 0.62 0.50 0.62   CALCIUM 9.1 8.6 9.6                   Imaging  DX-CHEST-PORTABLE (1 VIEW)   Final Result         1.  Probable mild pulmonary edema   2.  Cardiomegaly           Assessment/Plan  * Alcohol dependence (HCC)- (present on admission)  Assessment & Plan   alcohol withdrawal with severe features initially, now improved  -Thiamine, folate, multivitamin  -Chlordiazepoxide, discontinue  -CIWA protocol, continue, monitor  -Clonidine 0.1mg bid PRN for SBP >160  Neurontin continue  -Fall/seizure/aspiration precautions      PAF (paroxysmal atrial fibrillation)  (HCC)- (present on admission)  Assessment & Plan  -Continue rate control with metoprolol 100mg BID and digoxin 125mcg daily  -Digoxin level 0.4 on 1/29/21  -Clonidine as above  -Continue apixaban 5mg BID  -Patient refused repeat echo     Syncope- (present on admission)  Assessment & Plan  Most likely due to intoxication and vasovagal. Device interrogated with no periods of pauses. Initial BAL 200s. Last drink 1/28 evening prior to admission.  -Continue to monitor on telemetry  -TSH 0.020, Free T4 1.79      Alcohol withdrawal syndrome, with delirium (HCC)- (present on admission)  Assessment & Plan  S/p transfer to ICU for closer monitoring, required Dex only short period of time      Depression- (present on admission)  Assessment & Plan  Significant grief due to loss of 2 daughters and .   -Quetiapine discontinued as per psychiatry recommendation, gabapentin regimen started  -Home venlafaxine 75mg increased to 150 mg daily as per psych recommendation    Gait disorder- (present on admission)  Assessment & Plan  Follow-up physical therapy evaluation    Suicidal ideation- (present on admission)  Assessment & Plan  Seen by psychiatry, the patient had significant risk of self-harm in light of her lack of ability to care for self  Accommodations to place the patient under 24/7 supervision, skilled nursing referral     Plan  Continue with medical regimen for alcohol withdrawal, wean as tolerated  Antidepressive/psychiatric treatment as per psychiatry team  Monitor electrolytes, replace as indicated  Increase activity level  Assess for discharge needs, skilled nursing facility referral  See orders  Medically complex high risk  VTE prophylaxis: Lovenox  I have performed a physical exam and reviewed and updated ROS and Plan today . In review of yesterday's note , there are no changes except as documented above.        Please note that this dictation was created using voice recognition software. I have made every  reasonable attempt to correct obvious errors, but I expect that there are errors of grammar and possibly context that I did not discover before finalizing the note.    This patient was seen under COVID 19 pandemic disaster response conditions.  During a disaster, the provisions of care is subject to the Crisis Standard of Care

## 2021-02-03 NOTE — DISCHARGE PLANNING
Anticipated Disposition:  SNF VS Home     Action:   -> Chart review completed. Pt is less somnolent today, is conversant. Per attending physician SNF referral is needed.       1- This CM spoke with Pt at bedside, she is having her lunch, we discussed discharge plan, she refused to go to a SNF, voiced she wants to go home.              ~ This CM voalted attending physician, 1/26/21 PT indicates no PT needs, and Pt refused to go to a SNF. This CM requested repeat PT/OT. Per attending physician,  It's more for cog/mental than physical , cog evaluation and OT ordered.             ~ This CM also called Reilly wild to give updates, we discussed discharge plan, he agrees to send Pt to a SNF prior home.           Barriers to Discharge:   Medical clearance  Obtain SNF choices       Plan:  Please follow up with attending physician for medical clearance.   Please follow up with Pt to discuss discharge plan once OT and cog evaluation is done.     Addendum:       2:50 pm       Per attending physician and psychiatry physician, Pt is not safe to discharge home and live alone, requires 24/7 supervision, therefore SNF placement is needed.        ~ This CM contacted Ulices Wild again to further discuss SNF choices. Left VM for him to call back.        3:27 pm            Ulices Son called this CM back, he agrees with the DC plan and gave this CM permission to send referrals to all local SNFs,  He requested a copy of the choice form.                 ~ this CM emailed the SNF choice form. ( [email or web address])

## 2021-02-04 LAB
ANION GAP SERPL CALC-SCNC: 9 MMOL/L (ref 7–16)
BUN SERPL-MCNC: 12 MG/DL (ref 8–22)
CALCIUM SERPL-MCNC: 9.3 MG/DL (ref 8.5–10.5)
CHLORIDE SERPL-SCNC: 104 MMOL/L (ref 96–112)
CO2 SERPL-SCNC: 23 MMOL/L (ref 20–33)
CREAT SERPL-MCNC: 0.43 MG/DL (ref 0.5–1.4)
ERYTHROCYTE [DISTWIDTH] IN BLOOD BY AUTOMATED COUNT: 45.6 FL (ref 35.9–50)
GLUCOSE SERPL-MCNC: 101 MG/DL (ref 65–99)
HCT VFR BLD AUTO: 39.8 % (ref 37–47)
HGB BLD-MCNC: 13.1 G/DL (ref 12–16)
MAGNESIUM SERPL-MCNC: 1.8 MG/DL (ref 1.5–2.5)
MCH RBC QN AUTO: 32.3 PG (ref 27–33)
MCHC RBC AUTO-ENTMCNC: 32.9 G/DL (ref 33.6–35)
MCV RBC AUTO: 98 FL (ref 81.4–97.8)
PHOSPHATE SERPL-MCNC: 3.2 MG/DL (ref 2.5–4.5)
PLATELET # BLD AUTO: 203 K/UL (ref 164–446)
PMV BLD AUTO: 10.1 FL (ref 9–12.9)
POTASSIUM SERPL-SCNC: 3.8 MMOL/L (ref 3.6–5.5)
RBC # BLD AUTO: 4.06 M/UL (ref 4.2–5.4)
SODIUM SERPL-SCNC: 136 MMOL/L (ref 135–145)
WBC # BLD AUTO: 7.1 K/UL (ref 4.8–10.8)

## 2021-02-04 PROCEDURE — 700102 HCHG RX REV CODE 250 W/ 637 OVERRIDE(OP): Performed by: STUDENT IN AN ORGANIZED HEALTH CARE EDUCATION/TRAINING PROGRAM

## 2021-02-04 PROCEDURE — 84100 ASSAY OF PHOSPHORUS: CPT

## 2021-02-04 PROCEDURE — 700102 HCHG RX REV CODE 250 W/ 637 OVERRIDE(OP): Performed by: HOSPITALIST

## 2021-02-04 PROCEDURE — 92523 SPEECH SOUND LANG COMPREHEN: CPT

## 2021-02-04 PROCEDURE — A9270 NON-COVERED ITEM OR SERVICE: HCPCS | Performed by: PSYCHIATRY & NEUROLOGY

## 2021-02-04 PROCEDURE — A9270 NON-COVERED ITEM OR SERVICE: HCPCS | Performed by: STUDENT IN AN ORGANIZED HEALTH CARE EDUCATION/TRAINING PROGRAM

## 2021-02-04 PROCEDURE — 700102 HCHG RX REV CODE 250 W/ 637 OVERRIDE(OP): Performed by: PSYCHIATRY & NEUROLOGY

## 2021-02-04 PROCEDURE — A9270 NON-COVERED ITEM OR SERVICE: HCPCS | Performed by: INTERNAL MEDICINE

## 2021-02-04 PROCEDURE — 770004 HCHG ROOM/CARE - ONCOLOGY PRIVATE *

## 2021-02-04 PROCEDURE — A9270 NON-COVERED ITEM OR SERVICE: HCPCS | Performed by: HOSPITALIST

## 2021-02-04 PROCEDURE — 700102 HCHG RX REV CODE 250 W/ 637 OVERRIDE(OP): Performed by: INTERNAL MEDICINE

## 2021-02-04 PROCEDURE — 99233 SBSQ HOSP IP/OBS HIGH 50: CPT | Performed by: INTERNAL MEDICINE

## 2021-02-04 PROCEDURE — 80048 BASIC METABOLIC PNL TOTAL CA: CPT

## 2021-02-04 PROCEDURE — 83735 ASSAY OF MAGNESIUM: CPT

## 2021-02-04 PROCEDURE — 85027 COMPLETE CBC AUTOMATED: CPT

## 2021-02-04 PROCEDURE — 36415 COLL VENOUS BLD VENIPUNCTURE: CPT

## 2021-02-04 RX ORDER — POTASSIUM CHLORIDE 20 MEQ/1
20 TABLET, EXTENDED RELEASE ORAL ONCE
Status: COMPLETED | OUTPATIENT
Start: 2021-02-04 | End: 2021-02-04

## 2021-02-04 RX ORDER — AMLODIPINE BESYLATE 5 MG/1
5 TABLET ORAL
Status: DISCONTINUED | OUTPATIENT
Start: 2021-02-04 | End: 2021-02-09 | Stop reason: HOSPADM

## 2021-02-04 RX ADMIN — METOPROLOL TARTRATE 100 MG: 50 TABLET, FILM COATED ORAL at 05:38

## 2021-02-04 RX ADMIN — DIGOXIN 125 MCG: 125 TABLET ORAL at 18:40

## 2021-02-04 RX ADMIN — AMLODIPINE BESYLATE 5 MG: 5 TABLET ORAL at 09:07

## 2021-02-04 RX ADMIN — LORAZEPAM 0.5 MG: 0.5 TABLET ORAL at 20:45

## 2021-02-04 RX ADMIN — THERA TABS 1 TABLET: TAB at 05:38

## 2021-02-04 RX ADMIN — VENLAFAXINE HYDROCHLORIDE 150 MG: 75 CAPSULE, EXTENDED RELEASE ORAL at 05:36

## 2021-02-04 RX ADMIN — APIXABAN 5 MG: 5 TABLET, FILM COATED ORAL at 18:40

## 2021-02-04 RX ADMIN — METOPROLOL TARTRATE 100 MG: 50 TABLET, FILM COATED ORAL at 18:40

## 2021-02-04 RX ADMIN — Medication 400 MG: at 09:07

## 2021-02-04 RX ADMIN — POTASSIUM CHLORIDE 20 MEQ: 1500 TABLET, EXTENDED RELEASE ORAL at 09:07

## 2021-02-04 RX ADMIN — FOLIC ACID 1 MG: 1 TABLET ORAL at 05:37

## 2021-02-04 RX ADMIN — LEVOTHYROXINE SODIUM 100 MCG: 0.1 TABLET ORAL at 05:37

## 2021-02-04 RX ADMIN — APIXABAN 5 MG: 5 TABLET, FILM COATED ORAL at 05:37

## 2021-02-04 RX ADMIN — Medication 100 MG: at 05:37

## 2021-02-04 RX ADMIN — GABAPENTIN 200 MG: 100 CAPSULE ORAL at 20:37

## 2021-02-04 ASSESSMENT — ENCOUNTER SYMPTOMS
MEMORY LOSS: 1
DEPRESSION: 0
NERVOUS/ANXIOUS: 1
FALLS: 0
PALPITATIONS: 0
SORE THROAT: 0
HEARTBURN: 0
CHILLS: 0
NAUSEA: 0
SHORTNESS OF BREATH: 0
FEVER: 0
BLURRED VISION: 0
HEADACHES: 0
VOMITING: 0
WEAKNESS: 0
DIZZINESS: 0
COUGH: 0

## 2021-02-04 ASSESSMENT — LIFESTYLE VARIABLES
HEADACHE, FULLNESS IN HEAD: NOT PRESENT
SUBSTANCE_ABUSE: 1
ANXIETY: MILDLY ANXIOUS
AGITATION: SOMEWHAT MORE THAN NORMAL ACTIVITY
NAUSEA AND VOMITING: NO NAUSEA AND NO VOMITING
VISUAL DISTURBANCES: NOT PRESENT
AUDITORY DISTURBANCES: NOT PRESENT
TREMOR: NO TREMOR
PAROXYSMAL SWEATS: NO SWEAT VISIBLE
ORIENTATION AND CLOUDING OF SENSORIUM: ORIENTED AND CAN DO SERIAL ADDITIONS
TOTAL SCORE: 2

## 2021-02-04 ASSESSMENT — PAIN DESCRIPTION - PAIN TYPE: TYPE: ACUTE PAIN

## 2021-02-04 NOTE — CARE PLAN
Problem: Safety  Goal: Will remain free from falls  2/4/2021 0349 by PARAM SanchezN.  Outcome: PROGRESSING SLOWER THAN EXPECTED  Note: Patient impulsive, and getting out of bed without calling. Bed locked in lowest position. Bed alarm on and sounding. Hourly rounding in place.   2/3/2021 7083 by Lashawn Arriola R.N.  Outcome: PROGRESSING SLOWER THAN EXPECTED  Note: Patient educated on risk for falls, and fall precautions. Bed alarm on and soun     Problem: Knowledge Deficit  Goal: Knowledge of disease process/condition, treatment plan, diagnostic tests, and medications will improve  Outcome: PROGRESSING SLOWER THAN EXPECTED  Intervention: Explain information regarding disease process/condition, treatment plan, diagnostic tests, and medications and document in education  Note: POC discussed with patient. Patient attempted to leave AMA once during the shift. Stating she doesn't need to be here.

## 2021-02-04 NOTE — PROGRESS NOTES
Hospital Medicine Daily Progress Note    Date of Service  2021    Chief Complaint  75 y.o. female admitted 2021 with alcohol intoxication    Hospital Course  Ms. Jeanie Hutchison is a 75 y.o. female with a history of alcoholism, delirium tremens, atrial fibrillation who presented on 2021 with alcohol intoxication.  Initially admitted to the floor but required transfer to the ICU for escalating sedation requirements.  The patient apparently was drinking 2 pints of vodka daily, had to attend her 's  1 week prior to admission.  Placed on legal hold, psychiatry following      Interval Problem Update  Patient was seen and examined at bedside.  I have personally reviewed vitals, labs, and imaging.    .  Afebrile.  Tachypnea has improved.  Episodes of hypertension.  On room air.  Replete magnesium, potassium.  Patient is alert and oriented.  She has a very flat affect and responsive only.  Denies pain, fever, chills, chest pain, shortness of breath.  PT/OT did recommend SNF placement more so for cognitive needs.  SLP cognition eval pending.  She does not member why she came to the hospital or her brother.  No further signs of tremors or hallucinations.    Consultants/Specialty  Psychiatry  Intensivist    Code Status  Full Code    Disposition  TBD, likely skilled nursing, ECF    Review of Systems  Review of Systems   Constitutional: Negative for chills and fever.   HENT: Negative for congestion and sore throat.    Eyes: Negative for blurred vision.   Respiratory: Negative for cough and shortness of breath.    Cardiovascular: Negative for chest pain, palpitations and leg swelling.   Gastrointestinal: Negative for heartburn, nausea and vomiting.   Genitourinary: Negative for dysuria and frequency.   Musculoskeletal: Negative for falls.   Skin: Negative for rash.   Neurological: Negative for dizziness, weakness and headaches.   Psychiatric/Behavioral: Positive for memory loss and substance  abuse. Negative for depression and suicidal ideas. The patient is nervous/anxious.         Physical Exam  Temp:  [36.2 °C (97.1 °F)-37.2 °C (98.9 °F)] 37.2 °C (98.9 °F)  Pulse:  [64-71] 66  Resp:  [12-22] 18  BP: (135-166)/(74-96) 140/76  SpO2:  [91 %-97 %] 97 %    Physical Exam  Vitals signs and nursing note reviewed.   Constitutional:       General: She is not in acute distress.     Appearance: Normal appearance. She is well-developed and normal weight.      Interventions: She is sedated.   HENT:      Head: Normocephalic and atraumatic.      Right Ear: External ear normal.      Left Ear: External ear normal.      Nose: Nose normal.      Mouth/Throat:      Mouth: Mucous membranes are moist.      Pharynx: Oropharynx is clear.   Eyes:      Extraocular Movements: Extraocular movements intact.      Conjunctiva/sclera: Conjunctivae normal.   Neck:      Musculoskeletal: Normal range of motion.      Thyroid: No thyromegaly.      Vascular: No JVD.   Cardiovascular:      Rate and Rhythm: Normal rate and regular rhythm.      Pulses: Normal pulses.      Heart sounds: Normal heart sounds. No murmur. No friction rub. No gallop.    Pulmonary:      Effort: Pulmonary effort is normal. No respiratory distress.      Breath sounds: Normal breath sounds. No wheezing or rales.   Abdominal:      General: Abdomen is flat. Bowel sounds are normal. There is no distension.      Palpations: Abdomen is soft. There is no mass.      Tenderness: There is no abdominal tenderness. There is no guarding.   Musculoskeletal: Normal range of motion.   Skin:     General: Skin is warm and dry.   Neurological:      General: No focal deficit present.      Mental Status: She is alert and oriented to person, place, and time. Mental status is at baseline.      Cranial Nerves: No cranial nerve deficit.   Psychiatric:         Mood and Affect: Mood is anxious and depressed. Affect is labile.         Behavior: Behavior is slowed.         Cognition and Memory:  Cognition is impaired.         Fluids    Intake/Output Summary (Last 24 hours) at 2/4/2021 0817  Last data filed at 2/3/2021 1100  Gross per 24 hour   Intake 300 ml   Output 175 ml   Net 125 ml       Laboratory  Recent Labs     02/02/21  0500 02/03/21  0350 02/04/21  0004   WBC 7.2 7.9 7.1   RBC 4.06* 4.29 4.06*   HEMOGLOBIN 13.6 14.1 13.1   HEMATOCRIT 39.9 42.6 39.8   MCV 98.3* 99.3* 98.0*   MCH 33.5* 32.9 32.3   MCHC 34.1 33.1* 32.9*   RDW 46.8 47.0 45.6   PLATELETCT 205 242 203   MPV 9.4 9.9 10.1     Recent Labs     02/02/21  0500 02/03/21  0350 02/04/21  0004   SODIUM 135 134* 136   POTASSIUM 4.3 3.9 3.8   CHLORIDE 103 100 104   CO2 23 24 23   GLUCOSE 110* 86 101*   BUN 13 16 12   CREATININE 0.50 0.62 0.43*   CALCIUM 8.6 9.6 9.3                   Imaging  DX-CHEST-PORTABLE (1 VIEW)   Final Result         1.  Probable mild pulmonary edema   2.  Cardiomegaly           Assessment/Plan  * Alcohol dependence (HCC)- (present on admission)  Assessment & Plan   alcohol withdrawal with severe features initially, now improved  -Thiamine, folate, multivitamin  Completed [facility] protocol  -Clonidine 0.1mg bid PRN for SBP >160  Neurontin continue  -Fall/seizure/aspiration precautions  Alcohol cessation counseling    PAF (paroxysmal atrial fibrillation) (HCC)- (present on admission)  Assessment & Plan  -Continue rate control with metoprolol and digoxin   -Digoxin level 0.4 on 1/29/21  -Clonidine PRN  -Continue apixaban 5mg BID  -Patient refused repeat echo     Syncope- (present on admission)  Assessment & Plan  Most likely due to intoxication and vasovagal. Device interrogated with no periods of pauses. Initial BAL 200s. Last drink 1/28 evening prior to admission.  -TSH 0.020, Free T4 1.79    Alcohol withdrawal syndrome, with delirium (HCC)- (present on admission)  Assessment & Plan  S/p transfer to ICU for closer monitoring, required Dex only short period of time  No further signs of alcohol withdrawal    Depression- (present  on admission)  Assessment & Plan  Significant grief due to loss of 2 daughters and .   -Quetiapine discontinued as per psychiatry recommendation, gabapentin regimen started  -Home venlafaxine 75mg increased to 150 mg daily as per psych recommendation    Gait disorder- (present on admission)  Assessment & Plan  Follow-up physical therapy evaluation    Suicidal ideation- (present on admission)  Assessment & Plan  Seen by psychiatry, the patient had significant risk of self-harm in light of her lack of ability to care for self  Accommodations to place the patient under 24/7 supervision, skilled nursing referral  Has been cleared from legal hold     VTE prophylaxis: Apixaban    Please note that this dictation was created using voice recognition software. I have made every reasonable attempt to correct obvious errors, but I expect that there are errors of grammar and possibly context that I did not discover before finalizing the note.    This patient was seen under COVID 19 pandemic disaster response conditions.  During a disaster, the provisions of care is subject to the Crisis Standard of Care

## 2021-02-04 NOTE — DISCHARGE PLANNING
"Anticipated Disposition: SNF VS Home V Group Home     Action: Chart reviewed Per last discharge planning note \"1/26/21 PT indicates no PT needs, and Pt refused to go to a SNF. This CM requested repeat PT/OT. Per attending physician,  It's more for cog/mental than physical , cog evaluation and OT ordered.\"      Patient discussed during IDT rounds. Patient pending OT recommendations, if skilled OT need is indicated, CCA will follow up with pending SNFs. If patient is cleared from OT perspective, defer to cog evaluation. Patient is unable to obtain long term placement at SNF. If deemed unable to make own decisions will need to discuss guardianship with [facility] and/or group home placement.         Barriers to Discharge:  Medical clearance    Plan: Follow up with OT recommendations and cog eval.     "

## 2021-02-04 NOTE — THERAPY
"Speech Language Pathology   Initial Assessment     Patient Name: Jeanie Hutchison  AGE:  75 y.o., SEX:  female  Medical Record #: [id number]  Today's Date: 2021     Precautions  Precautions: Fall Risk    Assessment  Patient is 75 y.o. female admitted  with syncope and EtOH intoxication. Cognitive evaluation ordered d/t concerns for memory impairment and needing d/c supervision upon d/c. PMHx of alcoholism, hx of DTs.     Patient seen this date for cognitive-linguistic evaluation. Patient sleeping, but rousing to verbal and tactile cues and pleasant during evaluation, but noted to have very flat affect and appearing depressed. Patient was administered the Cognistat with a combination of other non-standard assessments.    Patient was oriented to self, , and month, with confusion to place, reason, year, and age. Throughout evaluation, patient was noted to trail off mid-sentence and demonstrate alinear thought process at times. Patient stated \"I ran....\" over and over when asked the reason for her admit, but was unable to complete sentence. Patient had overall delayed responses to questions, repeated commands back to this clinician when asked to follow them, and perseverated on previous questions or commands. Patient presented with minimal to severe deficits across most domains tested (please see objective chart below for more details). Patient was unable to finish testing d/t reported mental fatigue and frustration when asked, stating \"I can't believe I can't do this\", which does demonstrate some insight into current deficits.     At this time, recommend patient have 24/7 supervision upon discharge to ensure safety and sound decision-making. SLP to follow and will attempt to complete further testing as patient is able and agreeable.     Plan  Recommend Speech Therapy 3 times per week until therapy goals are met for the following treatments:  Cognitive-Linguistic Training and Patient / Family / Caregiver " Education.    Discharge Recommendations: Recommend post-acute placement for additional speech therapy services prior to discharge home     Objective     02/04/21 1435   Precautions   Precautions Fall Risk   Vitals   O2 Delivery Device None - Room Air   Verbal Expression   Vocal Quality Clear   Verbal Output Automatic Within Functional Limits (6-7)   Verbal Output: Phrases Within Functional Limits (6-7)   Verbal Output Conversation Minimal (4)   Verbal Output Functional Minimal (4)   Repetition: Single Words Within Functional Limits (6-7)   Repetition: Phrases Within Functional Limits (6-7)   Repetition: Sentences Minimal (4)   Naming Minimal (4)   Auditory Comprehension   Identifies Objects Minimal (4)   Follows One Unit Commands Within Functional Limits (6-7)   Follows Two Unit Commands Moderate (3)   Follows Three Unit Commands Severe (2)   Understands Simple, Structured Conversation  Within Functional Limits (6-7)   Reading Comprehension   Reading Comprehension (WDL)   (Not tested d/t fatigue )   Written Expression   Written Expression (WDL)   (Not tested d/t fatigue )   Cognitive-Linguistic   Level of Consciousness Confused   Orientation Level Not Oriented to Age;Not Oriented to Year;Not Oriented to Place;Not Oriented to Reason   Sustained Attention Moderate (3)   Short Term Memory Severe (2)   Long Term Memory / Reminiscing Within Functional Limits (6-7)   Simple Reasoning / Problem Solving Minimal (4)   Complex Reasoning  / Problem Solving Not Tested   Safety Awareness Moderate (3)   Insight into Deficits Moderate (3)   Auditory Math Severe (2)   Medication Management  Not Tested   Social / Pragmatic Communication   Social / Pragmatic Communication   (Flat affect; appearing depressed )   Short Term Goals   Short Term Goal # 1 Patient will be oriented in all spheres in 3/4 attempts with moderate verbal cues from SLP.    Short Term Goal # 2 Patient will recall 2/4 items from STM after 5 minutes with categorical  cue.    Short Term Goal # 3 Patient will complete further testing in various areas in 2/3 attempts.    Anticipated Discharge Needs   Discharge Recommendations Recommend post-acute placement for additional speech therapy services prior to discharge home

## 2021-02-05 LAB
ANION GAP SERPL CALC-SCNC: 11 MMOL/L (ref 7–16)
BASOPHILS # BLD AUTO: 0.7 % (ref 0–1.8)
BASOPHILS # BLD: 0.05 K/UL (ref 0–0.12)
BUN SERPL-MCNC: 12 MG/DL (ref 8–22)
CALCIUM SERPL-MCNC: 9.4 MG/DL (ref 8.5–10.5)
CHLORIDE SERPL-SCNC: 105 MMOL/L (ref 96–112)
CO2 SERPL-SCNC: 23 MMOL/L (ref 20–33)
CREAT SERPL-MCNC: 0.47 MG/DL (ref 0.5–1.4)
EOSINOPHIL # BLD AUTO: 0.19 K/UL (ref 0–0.51)
EOSINOPHIL NFR BLD: 2.8 % (ref 0–6.9)
ERYTHROCYTE [DISTWIDTH] IN BLOOD BY AUTOMATED COUNT: 46.5 FL (ref 35.9–50)
GLUCOSE SERPL-MCNC: 97 MG/DL (ref 65–99)
HCT VFR BLD AUTO: 41 % (ref 37–47)
HGB BLD-MCNC: 13.9 G/DL (ref 12–16)
IMM GRANULOCYTES # BLD AUTO: 0.04 K/UL (ref 0–0.11)
IMM GRANULOCYTES NFR BLD AUTO: 0.6 % (ref 0–0.9)
LYMPHOCYTES # BLD AUTO: 2.39 K/UL (ref 1–4.8)
LYMPHOCYTES NFR BLD: 34.7 % (ref 22–41)
MAGNESIUM SERPL-MCNC: 1.9 MG/DL (ref 1.5–2.5)
MCH RBC QN AUTO: 33.5 PG (ref 27–33)
MCHC RBC AUTO-ENTMCNC: 33.9 G/DL (ref 33.6–35)
MCV RBC AUTO: 98.8 FL (ref 81.4–97.8)
MONOCYTES # BLD AUTO: 0.74 K/UL (ref 0–0.85)
MONOCYTES NFR BLD AUTO: 10.7 % (ref 0–13.4)
NEUTROPHILS # BLD AUTO: 3.48 K/UL (ref 2–7.15)
NEUTROPHILS NFR BLD: 50.5 % (ref 44–72)
NRBC # BLD AUTO: 0 K/UL
NRBC BLD-RTO: 0 /100 WBC
PHOSPHATE SERPL-MCNC: 3.8 MG/DL (ref 2.5–4.5)
PLATELET # BLD AUTO: 184 K/UL (ref 164–446)
PMV BLD AUTO: 9.8 FL (ref 9–12.9)
POTASSIUM SERPL-SCNC: 4 MMOL/L (ref 3.6–5.5)
RBC # BLD AUTO: 4.15 M/UL (ref 4.2–5.4)
SODIUM SERPL-SCNC: 139 MMOL/L (ref 135–145)
WBC # BLD AUTO: 6.9 K/UL (ref 4.8–10.8)

## 2021-02-05 PROCEDURE — 85025 COMPLETE CBC W/AUTO DIFF WBC: CPT

## 2021-02-05 PROCEDURE — A9270 NON-COVERED ITEM OR SERVICE: HCPCS | Performed by: PSYCHIATRY & NEUROLOGY

## 2021-02-05 PROCEDURE — A9270 NON-COVERED ITEM OR SERVICE: HCPCS | Performed by: STUDENT IN AN ORGANIZED HEALTH CARE EDUCATION/TRAINING PROGRAM

## 2021-02-05 PROCEDURE — 700102 HCHG RX REV CODE 250 W/ 637 OVERRIDE(OP): Performed by: HOSPITALIST

## 2021-02-05 PROCEDURE — 99232 SBSQ HOSP IP/OBS MODERATE 35: CPT | Performed by: INTERNAL MEDICINE

## 2021-02-05 PROCEDURE — 700102 HCHG RX REV CODE 250 W/ 637 OVERRIDE(OP): Performed by: STUDENT IN AN ORGANIZED HEALTH CARE EDUCATION/TRAINING PROGRAM

## 2021-02-05 PROCEDURE — 80048 BASIC METABOLIC PNL TOTAL CA: CPT

## 2021-02-05 PROCEDURE — 700102 HCHG RX REV CODE 250 W/ 637 OVERRIDE(OP): Performed by: PSYCHIATRY & NEUROLOGY

## 2021-02-05 PROCEDURE — A9270 NON-COVERED ITEM OR SERVICE: HCPCS | Performed by: INTERNAL MEDICINE

## 2021-02-05 PROCEDURE — 700102 HCHG RX REV CODE 250 W/ 637 OVERRIDE(OP): Performed by: INTERNAL MEDICINE

## 2021-02-05 PROCEDURE — A9270 NON-COVERED ITEM OR SERVICE: HCPCS | Performed by: HOSPITALIST

## 2021-02-05 PROCEDURE — 770004 HCHG ROOM/CARE - ONCOLOGY PRIVATE *

## 2021-02-05 PROCEDURE — 84100 ASSAY OF PHOSPHORUS: CPT

## 2021-02-05 PROCEDURE — 36415 COLL VENOUS BLD VENIPUNCTURE: CPT

## 2021-02-05 PROCEDURE — 83735 ASSAY OF MAGNESIUM: CPT

## 2021-02-05 PROCEDURE — 97165 OT EVAL LOW COMPLEX 30 MIN: CPT

## 2021-02-05 RX ORDER — HYDROXYZINE HYDROCHLORIDE 25 MG/1
25 TABLET, FILM COATED ORAL 3 TIMES DAILY PRN
Status: DISCONTINUED | OUTPATIENT
Start: 2021-02-05 | End: 2021-02-07

## 2021-02-05 RX ORDER — CHOLECALCIFEROL (VITAMIN D3) 125 MCG
5 CAPSULE ORAL NIGHTLY
Status: DISCONTINUED | OUTPATIENT
Start: 2021-02-06 | End: 2021-02-07

## 2021-02-05 RX ORDER — GAUZE BANDAGE 2" X 2"
100 BANDAGE TOPICAL DAILY
Status: DISCONTINUED | OUTPATIENT
Start: 2021-02-05 | End: 2021-02-09 | Stop reason: HOSPADM

## 2021-02-05 RX ADMIN — METOPROLOL TARTRATE 100 MG: 50 TABLET, FILM COATED ORAL at 17:28

## 2021-02-05 RX ADMIN — METOPROLOL TARTRATE 100 MG: 50 TABLET, FILM COATED ORAL at 06:52

## 2021-02-05 RX ADMIN — GABAPENTIN 200 MG: 100 CAPSULE ORAL at 21:24

## 2021-02-05 RX ADMIN — APIXABAN 5 MG: 5 TABLET, FILM COATED ORAL at 17:28

## 2021-02-05 RX ADMIN — Medication 100 MG: at 06:51

## 2021-02-05 RX ADMIN — DIGOXIN 125 MCG: 125 TABLET ORAL at 17:28

## 2021-02-05 RX ADMIN — VENLAFAXINE HYDROCHLORIDE 150 MG: 75 CAPSULE, EXTENDED RELEASE ORAL at 06:51

## 2021-02-05 RX ADMIN — LEVOTHYROXINE SODIUM 100 MCG: 0.1 TABLET ORAL at 06:51

## 2021-02-05 RX ADMIN — DOCUSATE SODIUM 50 MG AND SENNOSIDES 8.6 MG 2 TABLET: 8.6; 5 TABLET, FILM COATED ORAL at 17:28

## 2021-02-05 RX ADMIN — APIXABAN 5 MG: 5 TABLET, FILM COATED ORAL at 06:53

## 2021-02-05 RX ADMIN — HYDROXYZINE HYDROCHLORIDE 25 MG: 25 TABLET, FILM COATED ORAL at 21:25

## 2021-02-05 RX ADMIN — ACETAMINOPHEN 650 MG: 325 TABLET ORAL at 01:35

## 2021-02-05 RX ADMIN — FOLIC ACID 1 MG: 1 TABLET ORAL at 06:51

## 2021-02-05 RX ADMIN — Medication 400 MG: at 11:44

## 2021-02-05 RX ADMIN — THIAMINE HCL TAB 100 MG 100 MG: 100 TAB at 17:28

## 2021-02-05 RX ADMIN — AMLODIPINE BESYLATE 5 MG: 5 TABLET ORAL at 06:53

## 2021-02-05 RX ADMIN — THERA TABS 1 TABLET: TAB at 06:53

## 2021-02-05 RX ADMIN — LORAZEPAM 0.5 MG: 0.5 TABLET ORAL at 01:35

## 2021-02-05 ASSESSMENT — ENCOUNTER SYMPTOMS
HEADACHES: 0
HEARTBURN: 0
CHILLS: 0
NAUSEA: 0
FEVER: 0
NERVOUS/ANXIOUS: 1
DEPRESSION: 1
SHORTNESS OF BREATH: 0
VOMITING: 0
SORE THROAT: 0
FALLS: 0
WEAKNESS: 0
BLURRED VISION: 0
PALPITATIONS: 0
MEMORY LOSS: 1
DIZZINESS: 0
COUGH: 0

## 2021-02-05 ASSESSMENT — LIFESTYLE VARIABLES
VISUAL DISTURBANCES: NOT PRESENT
TREMOR: NO TREMOR
ORIENTATION AND CLOUDING OF SENSORIUM: ORIENTED AND CAN DO SERIAL ADDITIONS
AUDITORY DISTURBANCES: NOT PRESENT
TOTAL SCORE: 4
HEADACHE, FULLNESS IN HEAD: NOT PRESENT
SUBSTANCE_ABUSE: 1
AGITATION: NORMAL ACTIVITY
NAUSEA AND VOMITING: NO NAUSEA AND NO VOMITING
PAROXYSMAL SWEATS: NO SWEAT VISIBLE
ANXIETY: MODERATELY ANXIOUS OR GUARDED, SO ANXIETY IS INFERRED

## 2021-02-05 ASSESSMENT — COGNITIVE AND FUNCTIONAL STATUS - GENERAL
SUGGESTED CMS G CODE MODIFIER DAILY ACTIVITY: CH
DAILY ACTIVITIY SCORE: 24

## 2021-02-05 ASSESSMENT — ACTIVITIES OF DAILY LIVING (ADL): TOILETING: INDEPENDENT

## 2021-02-05 NOTE — PROGRESS NOTES
Received report and assumed care of patient at 0700. AOx3, disoriented to situation. CIWA protocol in place. Right PIV SL. Patient up to chair w/OT who recommends home health. Patient denies pain at this time, states all needs met.     POC discussed with patient, all questions and concerns addressed; Bed locked and in lowest position; Alarms active and sounding; Call light and personal belongings within reach; Hourly rounding in place.

## 2021-02-05 NOTE — THERAPY
"Occupational Therapy   Initial Evaluation     Patient Name: Jeanie Hutchison  Age:  75 y.o., Sex:  female  Medical Record #: [id number]  Today's Date: 2/5/2021     Precautions  Precautions: Fall Risk    Assessment  Patient is 75 y.o. female who presents to acute w/ alcohol intoxication. Pt had been drinking 2 pints of vodka daily prior to admit. Pt appears close to functional baseline performing BADLs at SPV level. Pt will require assist w/ IADLs such as meal prep, transportation, and cleaning at time of DC. No further Acute OT needs noted at this time.    Plan    Recommend Occupational Therapy Eval only    DC Equipment Recommendations: (P) None  Discharge Recommendations: (P) Recommend home health for continued occupational therapy services     Subjective    \"My hair is normally short and blond\"     Objective       02/05/21 0909   Charge Group   OT Evaluation OT Evaluation Low   Initial Contact Note    Initial Contact Note Order Received and Verified, Occupational Therapy Evaluation in Progress with Full Report to Follow.   Prior Living Situation   Prior Services Home-Independent   Housing / Facility 2 Story House   Equipment Owned Front-Wheel Walker   Lives with - Patient's Self Care Capacity Alone and Unable to Care For Self   Comments Pt reports son lives close by, able to assist PRN. Pt appears unable to perform IADLs w/out assist    Prior Level of ADL Function   Self Feeding Independent   Grooming / Hygiene Independent   Bathing Independent   Dressing Independent   Toileting Independent   Prior Level of IADL Function   Occupation (Pre-Hospital Vocational) Retired Due To Age   Precautions   Precautions Fall Risk   Vitals   O2 Delivery Device None - Room Air   Pain 0 - 10 Group   Therapist Pain Assessment Post Activity Pain Same as Prior to Activity;Nurse Notified  (no c/o pain during session)   Cognition    Cognition / Consciousness X   Speech/ Communication Delayed Responses   Level of Consciousness Alert "   Comments pleasent and cooperative, flat affect, increased processing time   Active ROM Upper Body   Active ROM Upper Body  WDL   Strength Upper Body   Upper Body Strength  WDL   Coordination Upper Body   Coordination WDL   Balance Assessment   Sitting Balance (Static) Good   Sitting Balance (Dynamic) Good   Standing Balance (Static) Fair +   Standing Balance (Dynamic) Fair   Weight Shift Sitting Good   Weight Shift Standing Fair   Comments no AD   Bed Mobility    Supine to Sit Supervised   Sit to Supine Supervised   Scooting Supervised   ADL Assessment   Grooming Supervision;Standing   Upper Body Dressing Supervision   Lower Body Dressing Supervision  (underpants and socks)   Toileting Supervision   How much help from another person does the patient currently need...   Putting on and taking off regular lower body clothing? 4   Bathing (including washing, rinsing, and drying)? 4   Toileting, which includes using a toilet, bedpan, or urinal? 4   Putting on and taking off regular upper body clothing? 4   Taking care of personal grooming such as brushing teeth? 4   Eating meals? 4   6 Clicks Daily Activity Score 24   Functional Mobility   Sit to Stand Supervised   Bed, Chair, Wheelchair Transfer Supervised   Mobility household distance no AD   Edema / Skin Assessment   Edema / Skin  Not Assessed   Activity Tolerance   Sitting in Chair 10+ min (up post)   Sitting Edge of Bed 8 min    Standing 8 min   Anticipated Discharge Equipment and Recommendations   DC Equipment Recommendations None   Discharge Recommendations Recommend home health for continued occupational therapy services   Interdisciplinary Plan of Care Collaboration   IDT Collaboration with  Nursing   Patient Position at End of Therapy Seated;Chair Alarm On;Call Light within Reach;Tray Table within Reach;Phone within Reach   Collaboration Comments report given   Session Information   Date / Session Number  2/5, 1x only

## 2021-02-05 NOTE — PROGRESS NOTES
[facility] Medicine Daily Progress Note    Date of Service  2021    Chief Complaint  75 y.o. female admitted 2021 with alcohol intoxication    Hospital Course  Ms. Jeanie Hutchison is a 75 y.o. female with a history of alcoholism, delirium tremens, atrial fibrillation who presented on 2021 with alcohol intoxication.  Initially admitted to the floor but required transfer to the ICU for escalating sedation requirements.  The patient apparently was drinking 2 pints of vodka daily, had to attend her 's  1 week prior to admission.  Placed on legal hold, psychiatry following. Patient was taken off legal hold however did require 24/7 supervision after cognitive evaluation.      Interval Problem Update  Patient was seen and examined at bedside.  I have personally reviewed vitals, labs, and imaging.    .  Afebrile.  Tachypnea has improved.  Episodes of hypertension.  On room air.  Replete magnesium, potassium.  Patient is alert and oriented.  She has a very flat affect and responsive only.  Denies pain, fever, chills, chest pain, shortness of breath.  PT/OT did recommend SNF placement more so for cognitive needs.  SLP cognition eval pending.  She does not member why she came to the hospital or her brother.  No further signs of tremors or hallucinations.  .  Afebrile.  Stable vitals.  On room air. Denies fevers, chills, chest pains, shortness of breath. Patient is now alert and oriented x3. She does respond a little bit faster but still flat affect. Still not sure why she is here in the hospital. Would like to go home. She states she feels like she can take care of herself at home. She states she has a lot of things to take care of at home, but cannot expound on this. Counseled her that psychiatry and speech cog eval did not feel that she had the mental decision-making capacity to to care for self and recommended 24/7 care.    Consultants/Specialty  Psychiatry  Intensivist    Code  Status  Full Code    Disposition  SNF    Review of Systems  Review of Systems   Constitutional: Negative for chills and fever.   HENT: Negative for congestion and sore throat.    Eyes: Negative for blurred vision.   Respiratory: Negative for cough and shortness of breath.    Cardiovascular: Negative for chest pain, palpitations and leg swelling.   Gastrointestinal: Negative for heartburn, nausea and vomiting.   Genitourinary: Negative for dysuria, frequency and urgency.   Musculoskeletal: Negative for falls.   Skin: Negative for rash.   Neurological: Negative for dizziness, weakness and headaches.   Psychiatric/Behavioral: Positive for depression, memory loss and substance abuse. Negative for suicidal ideas. The patient is nervous/anxious.         Physical Exam  Temp:  [36.1 °C (97 °F)-37.2 °C (98.9 °F)] 37 °C (98.6 °F)  Pulse:  [60-69] 67  Resp:  [16-18] 17  BP: (110-146)/(72-98) 110/72  SpO2:  [93 %-98 %] 93 %    Physical Exam  Vitals signs and nursing note reviewed.   Constitutional:       General: She is not in acute distress.     Appearance: Normal appearance. She is well-developed and normal weight.      Interventions: She is sedated.   HENT:      Head: Normocephalic and atraumatic.      Right Ear: External ear normal.      Left Ear: External ear normal.      Nose: Nose normal.      Mouth/Throat:      Mouth: Mucous membranes are moist.      Pharynx: Oropharynx is clear.   Eyes:      Extraocular Movements: Extraocular movements intact.      Conjunctiva/sclera: Conjunctivae normal.   Neck:      Musculoskeletal: Normal range of motion.      Thyroid: No thyromegaly.      Vascular: No JVD.   Cardiovascular:      Rate and Rhythm: Normal rate and regular rhythm.      Pulses: Normal pulses.      Heart sounds: Normal heart sounds. No murmur. No friction rub. No gallop.    Pulmonary:      Effort: Pulmonary effort is normal. No respiratory distress.      Breath sounds: Normal breath sounds. No wheezing or rales.    Abdominal:      General: Abdomen is flat. Bowel sounds are normal. There is no distension.      Palpations: Abdomen is soft. There is no mass.      Tenderness: There is no abdominal tenderness. There is no guarding.   Musculoskeletal: Normal range of motion.   Skin:     General: Skin is warm and dry.   Neurological:      General: No focal deficit present.      Mental Status: She is alert and oriented to person, place, and time. Mental status is at baseline.      Cranial Nerves: No cranial nerve deficit.   Psychiatric:         Mood and Affect: Mood is anxious and depressed. Affect is labile.         Behavior: Behavior is slowed.         Cognition and Memory: Cognition is impaired.         Fluids    Intake/Output Summary (Last 24 hours) at 2/5/2021 0947  Last data filed at 2/4/2021 1842  Gross per 24 hour   Intake 880 ml   Output --   Net 880 ml       Laboratory  Recent Labs     02/03/21  0350 02/04/21  0004 02/05/21  0021   WBC 7.9 7.1 6.9   RBC 4.29 4.06* 4.15*   HEMOGLOBIN 14.1 13.1 13.9   HEMATOCRIT 42.6 39.8 41.0   MCV 99.3* 98.0* 98.8*   MCH 32.9 32.3 33.5*   MCHC 33.1* 32.9* 33.9   RDW 47.0 45.6 46.5   PLATELETCT 242 203 184   MPV 9.9 10.1 9.8     Recent Labs     02/03/21  0350 02/04/21  0004 02/05/21  0021   SODIUM 134* 136 139   POTASSIUM 3.9 3.8 4.0   CHLORIDE 100 104 105   CO2 24 23 23   GLUCOSE 86 101* 97   BUN 16 12 12   CREATININE 0.62 0.43* 0.47*   CALCIUM 9.6 9.3 9.4                   Imaging  DX-CHEST-PORTABLE (1 VIEW)   Final Result         1.  Probable mild pulmonary edema   2.  Cardiomegaly           Assessment/Plan  * Alcohol dependence (HCC)- (present on admission)  Assessment & Plan   alcohol withdrawal with severe features initially, now improved  -Thiamine, folate, multivitamin  Completed [facility] protocol  -Clonidine 0.1mg bid PRN for SBP >160  Neurontin continue  -Fall/seizure/aspiration precautions  Alcohol cessation counseling    PAF (paroxysmal atrial fibrillation) (HCC)- (present on  admission)  Assessment & Plan  -Continue rate control with metoprolol and digoxin   -Digoxin level 0.4 on 1/29/21  -Clonidine PRN  -Continue apixaban 5mg BID  -Patient refused repeat echo     Syncope- (present on admission)  Assessment & Plan  Most likely due to intoxication and vasovagal. Device interrogated with no periods of pauses. Initial BAL 200s. Last drink 1/28 evening prior to admission.  -TSH 0.020, Free T4 1.79    Alcohol withdrawal syndrome, with delirium (HCC)- (present on admission)  Assessment & Plan  S/p transfer to ICU for closer monitoring, required Dex only short period of time  No further signs of alcohol withdrawal    Depression- (present on admission)  Assessment & Plan  Significant grief due to loss of 2 daughters and .   -Quetiapine discontinued as per psychiatry recommendation, gabapentin regimen started  -Home venlafaxine 75mg increased to 150 mg daily as per psych recommendation    Patient cleared from legal hold. Limited medical capacity and decision making, impaired memory. After cognitive evaluation recommend 24/7 supervision.    Gait disorder- (present on admission)  Assessment & Plan  Follow-up physical therapy evaluation    Suicidal ideation- (present on admission)  Assessment & Plan  Seen by psychiatry, the patient had significant risk of self-harm in light of her lack of ability to care for self  Accommodations to place the patient under 24/7 supervision, skilled nursing referral  Has been cleared from legal hold     VTE prophylaxis: Apixaban    Please note that this dictation was created using voice recognition software. I have made every reasonable attempt to correct obvious errors, but I expect that there are errors of grammar and possibly context that I did not discover before finalizing the note.    This patient was seen under COVID 19 pandemic disaster response conditions.  During a disaster, the provisions of care is subject to the Crisis Standard of Care

## 2021-02-05 NOTE — CARE PLAN
Problem: Venous Thromboembolism (VTW)/Deep Vein Thrombosis (DVT) Prevention:  Goal: Patient will participate in Venous Thrombosis (VTE)/Deep Vein Thrombosis (DVT)Prevention Measures  Outcome: PROGRESSING AS EXPECTED  Note: Patient compliant w/apixaban and occasionally ambulatory.      Problem: Pain Management  Goal: Pain level will decrease to patient's comfort goal  Outcome: PROGRESSING AS EXPECTED  Note: Patient denies pain.

## 2021-02-05 NOTE — CARE PLAN
Problem: Psychosocial Needs:  Goal: Level of anxiety will decrease  Outcome: PROGRESSING AS EXPECTED     Problem: Discharge Barriers/Planning  Goal: Patient's continuum of care needs will be met  Outcome: PROGRESSING AS EXPECTED     Problem: Safety  Goal: Will remain free from falls  Outcome: PROGRESSING AS EXPECTED

## 2021-02-05 NOTE — CONSULTS
PSYCHIATRY CONSULT TEAM NOTE: Consult received for psychotherapy. Due to increased demand for psychotherapy and limited psychotherapy providers, patient will be placed on a waitlist and seen when able. Please use IP Psychiatry consult for all other requests and make sure to include a detailed question.     Thank you.

## 2021-02-05 NOTE — CARE PLAN
Pt disoriented to time, place, and situation. Pt very forgetful; spoke with son on phone several times today and did not recall this a couple hours later. ST performed cog eval today. POC discussed with patient; all questions and concerns addressed.  Pt denies pain. Tolerating regular diet, denies nausea. Skin CDI. Urinating without issue. BMx1 today. Pt free from falls. Turning self in bed. Ambulating in room and hallway SBA, steady on feet. Bed alarm on d/t impulsive, call light within reach, bed in lowest locked position, hourly rounding in place.     Problem: Safety  Goal: Will remain free from injury  Outcome: PROGRESSING AS EXPECTED  Goal: Will remain free from falls  Outcome: PROGRESSING AS EXPECTED     Problem: Infection  Goal: Will remain free from infection  Outcome: PROGRESSING AS EXPECTED     Problem: Venous Thromboembolism (VTW)/Deep Vein Thrombosis (DVT) Prevention:  Goal: Patient will participate in Venous Thrombosis (VTE)/Deep Vein Thrombosis (DVT)Prevention Measures  Outcome: PROGRESSING AS EXPECTED     Problem: Bowel/Gastric:  Goal: Normal bowel function is maintained or improved  Outcome: PROGRESSING AS EXPECTED  Goal: Will not experience complications related to bowel motility  Outcome: PROGRESSING AS EXPECTED     Problem: Knowledge Deficit  Goal: Knowledge of disease process/condition, treatment plan, diagnostic tests, and medications will improve  Outcome: PROGRESSING AS EXPECTED  Goal: Knowledge of the prescribed therapeutic regimen will improve  Outcome: PROGRESSING AS EXPECTED     Problem: Discharge Barriers/Planning  Goal: Patient's continuum of care needs will be met  Outcome: PROGRESSING AS EXPECTED     Problem: Respiratory:  Goal: Respiratory status will improve  Outcome: PROGRESSING AS EXPECTED     Problem: Pain Management  Goal: Pain level will decrease to patient's comfort goal  Outcome: PROGRESSING AS EXPECTED     Problem: Psychosocial Needs:  Goal: Level of anxiety will  decrease  Outcome: PROGRESSING AS EXPECTED     Problem: Mobility  Goal: Risk for activity intolerance will decrease  Outcome: PROGRESSING AS EXPECTED

## 2021-02-05 NOTE — DISCHARGE PLANNING
Anticipated Discharge Disposition: Pending.     Action: Pt discussed in IDT rounds. Pt pending cog eval at this time. May need placement and/or SNF.     Barriers to Discharge: Cog eval, medical clearance, d/c dispo/needs.    Plan: Continue to follow and assist.

## 2021-02-06 LAB
ANION GAP SERPL CALC-SCNC: 9 MMOL/L (ref 7–16)
BASOPHILS # BLD AUTO: 0.7 % (ref 0–1.8)
BASOPHILS # BLD: 0.04 K/UL (ref 0–0.12)
BUN SERPL-MCNC: 14 MG/DL (ref 8–22)
CALCIUM SERPL-MCNC: 9.4 MG/DL (ref 8.5–10.5)
CHLORIDE SERPL-SCNC: 103 MMOL/L (ref 96–112)
CO2 SERPL-SCNC: 24 MMOL/L (ref 20–33)
CREAT SERPL-MCNC: 0.64 MG/DL (ref 0.5–1.4)
EOSINOPHIL # BLD AUTO: 0.19 K/UL (ref 0–0.51)
EOSINOPHIL NFR BLD: 3.1 % (ref 0–6.9)
ERYTHROCYTE [DISTWIDTH] IN BLOOD BY AUTOMATED COUNT: 45.5 FL (ref 35.9–50)
GLUCOSE SERPL-MCNC: 100 MG/DL (ref 65–99)
HCT VFR BLD AUTO: 40.2 % (ref 37–47)
HGB BLD-MCNC: 13.3 G/DL (ref 12–16)
IMM GRANULOCYTES # BLD AUTO: 0.04 K/UL (ref 0–0.11)
IMM GRANULOCYTES NFR BLD AUTO: 0.7 % (ref 0–0.9)
LYMPHOCYTES # BLD AUTO: 2.19 K/UL (ref 1–4.8)
LYMPHOCYTES NFR BLD: 36.1 % (ref 22–41)
MAGNESIUM SERPL-MCNC: 2 MG/DL (ref 1.5–2.5)
MCH RBC QN AUTO: 32.4 PG (ref 27–33)
MCHC RBC AUTO-ENTMCNC: 33.1 G/DL (ref 33.6–35)
MCV RBC AUTO: 97.8 FL (ref 81.4–97.8)
MONOCYTES # BLD AUTO: 0.76 K/UL (ref 0–0.85)
MONOCYTES NFR BLD AUTO: 12.5 % (ref 0–13.4)
NEUTROPHILS # BLD AUTO: 2.85 K/UL (ref 2–7.15)
NEUTROPHILS NFR BLD: 46.9 % (ref 44–72)
NRBC # BLD AUTO: 0 K/UL
NRBC BLD-RTO: 0 /100 WBC
PHOSPHATE SERPL-MCNC: 3.6 MG/DL (ref 2.5–4.5)
PLATELET # BLD AUTO: 190 K/UL (ref 164–446)
PMV BLD AUTO: 10.2 FL (ref 9–12.9)
POTASSIUM SERPL-SCNC: 3.6 MMOL/L (ref 3.6–5.5)
RBC # BLD AUTO: 4.11 M/UL (ref 4.2–5.4)
SODIUM SERPL-SCNC: 136 MMOL/L (ref 135–145)
WBC # BLD AUTO: 6.1 K/UL (ref 4.8–10.8)

## 2021-02-06 PROCEDURE — 700102 HCHG RX REV CODE 250 W/ 637 OVERRIDE(OP): Performed by: INTERNAL MEDICINE

## 2021-02-06 PROCEDURE — 770004 HCHG ROOM/CARE - ONCOLOGY PRIVATE *

## 2021-02-06 PROCEDURE — A9270 NON-COVERED ITEM OR SERVICE: HCPCS | Performed by: INTERNAL MEDICINE

## 2021-02-06 PROCEDURE — 83735 ASSAY OF MAGNESIUM: CPT

## 2021-02-06 PROCEDURE — 700102 HCHG RX REV CODE 250 W/ 637 OVERRIDE(OP): Performed by: PSYCHIATRY & NEUROLOGY

## 2021-02-06 PROCEDURE — A9270 NON-COVERED ITEM OR SERVICE: HCPCS | Performed by: STUDENT IN AN ORGANIZED HEALTH CARE EDUCATION/TRAINING PROGRAM

## 2021-02-06 PROCEDURE — 36415 COLL VENOUS BLD VENIPUNCTURE: CPT

## 2021-02-06 PROCEDURE — 80048 BASIC METABOLIC PNL TOTAL CA: CPT

## 2021-02-06 PROCEDURE — 99232 SBSQ HOSP IP/OBS MODERATE 35: CPT | Performed by: INTERNAL MEDICINE

## 2021-02-06 PROCEDURE — 85025 COMPLETE CBC W/AUTO DIFF WBC: CPT

## 2021-02-06 PROCEDURE — A9270 NON-COVERED ITEM OR SERVICE: HCPCS | Performed by: PSYCHIATRY & NEUROLOGY

## 2021-02-06 PROCEDURE — 84100 ASSAY OF PHOSPHORUS: CPT

## 2021-02-06 PROCEDURE — 700102 HCHG RX REV CODE 250 W/ 637 OVERRIDE(OP): Performed by: STUDENT IN AN ORGANIZED HEALTH CARE EDUCATION/TRAINING PROGRAM

## 2021-02-06 RX ORDER — TRAZODONE HYDROCHLORIDE 100 MG/1
50 TABLET ORAL
Status: DISCONTINUED | OUTPATIENT
Start: 2021-02-06 | End: 2021-02-07

## 2021-02-06 RX ORDER — POTASSIUM CHLORIDE 20 MEQ/1
20 TABLET, EXTENDED RELEASE ORAL ONCE
Status: COMPLETED | OUTPATIENT
Start: 2021-02-06 | End: 2021-02-06

## 2021-02-06 RX ADMIN — APIXABAN 5 MG: 5 TABLET, FILM COATED ORAL at 06:05

## 2021-02-06 RX ADMIN — HYDROXYZINE HYDROCHLORIDE 25 MG: 25 TABLET, FILM COATED ORAL at 20:21

## 2021-02-06 RX ADMIN — AMLODIPINE BESYLATE 5 MG: 5 TABLET ORAL at 06:05

## 2021-02-06 RX ADMIN — THIAMINE HCL TAB 100 MG 100 MG: 100 TAB at 06:05

## 2021-02-06 RX ADMIN — POTASSIUM CHLORIDE 20 MEQ: 1500 TABLET, EXTENDED RELEASE ORAL at 15:52

## 2021-02-06 RX ADMIN — LEVOTHYROXINE SODIUM 100 MCG: 0.1 TABLET ORAL at 06:05

## 2021-02-06 RX ADMIN — METOPROLOL TARTRATE 100 MG: 50 TABLET, FILM COATED ORAL at 06:05

## 2021-02-06 RX ADMIN — APIXABAN 5 MG: 5 TABLET, FILM COATED ORAL at 18:59

## 2021-02-06 RX ADMIN — METOPROLOL TARTRATE 100 MG: 50 TABLET, FILM COATED ORAL at 18:59

## 2021-02-06 RX ADMIN — DIGOXIN 125 MCG: 125 TABLET ORAL at 18:59

## 2021-02-06 RX ADMIN — VENLAFAXINE HYDROCHLORIDE 150 MG: 75 CAPSULE, EXTENDED RELEASE ORAL at 06:05

## 2021-02-06 RX ADMIN — GABAPENTIN 200 MG: 100 CAPSULE ORAL at 20:16

## 2021-02-06 ASSESSMENT — ENCOUNTER SYMPTOMS
VOMITING: 0
DEPRESSION: 1
DIZZINESS: 0
SHORTNESS OF BREATH: 0
INSOMNIA: 1
FEVER: 0
NAUSEA: 0
PALPITATIONS: 0
COUGH: 0
FALLS: 0
HEARTBURN: 0
BLURRED VISION: 0
SORE THROAT: 0
MEMORY LOSS: 1
NERVOUS/ANXIOUS: 1
HEADACHES: 0
CHILLS: 0
WEAKNESS: 0

## 2021-02-06 ASSESSMENT — LIFESTYLE VARIABLES: SUBSTANCE_ABUSE: 1

## 2021-02-06 ASSESSMENT — PAIN DESCRIPTION - PAIN TYPE: TYPE: ACUTE PAIN

## 2021-02-06 NOTE — PROGRESS NOTES
[facility] Medicine Daily Progress Note    Date of Service  2021    Chief Complaint  75 y.o. female admitted 2021 with alcohol intoxication    Hospital Course  Ms. Jeanie Hutchison is a 75 y.o. female with a history of alcoholism, delirium tremens, atrial fibrillation who presented on 2021 with alcohol intoxication.  Initially admitted to the floor but required transfer to the ICU for escalating sedation requirements.  The patient apparently was drinking 2 pints of vodka daily, had to attend her 's  1 week prior to admission.  Placed on legal hold, psychiatry following. Patient was taken off legal hold however did require 24/7 supervision after cognitive evaluation.      Interval Problem Update  Patient was seen and examined at bedside.  I have personally reviewed vitals, labs, and imaging.    2.  Afebrile.  Tachypnea has improved.  Episodes of hypertension.  On room air.  Replete magnesium, potassium.  Patient is alert and oriented.  She has a very flat affect and responsive only.  Denies pain, fever, chills, chest pain, shortness of breath.  PT/OT did recommend SNF placement more so for cognitive needs.  SLP cognition eval pending.  She does not member why she came to the hospital or her brother.  No further signs of tremors or hallucinations.  .  Afebrile.  Stable vitals.  On room air. Denies fevers, chills, chest pains, shortness of breath. Patient is now alert and oriented x3. She does respond a little bit faster but still flat affect. Still not sure why she is here in the hospital. Would like to go home. She states she feels like she can take care of herself at home. She states she has a lot of things to take care of at home, but cannot expound on this. Counseled her that psychiatry and speech cog eval did not feel that she had the mental decision-making capacity to to care for self and recommended 24/7 care.  2.  Afebrile.  Episode of bradycardia this morning.  On room air.   Denies fevers, chills, chest pains, shortness of breath.  She does report trouble sleeping.    Consultants/Specialty  Psychiatry  Intensivist    Code Status  Full Code    Disposition  SNF    Review of Systems  Review of Systems   Constitutional: Negative for chills and fever.   HENT: Negative for congestion and sore throat.    Eyes: Negative for blurred vision.   Respiratory: Negative for cough and shortness of breath.    Cardiovascular: Negative for chest pain, palpitations and leg swelling.   Gastrointestinal: Negative for heartburn, nausea and vomiting.   Genitourinary: Negative for dysuria, frequency and urgency.   Musculoskeletal: Negative for falls.   Skin: Negative for rash.   Neurological: Negative for dizziness, weakness and headaches.   Psychiatric/Behavioral: Positive for depression, memory loss and substance abuse. Negative for suicidal ideas. The patient is nervous/anxious and has insomnia.         Physical Exam  Temp:  [36.2 °C (97.2 °F)-37.4 °C (99.4 °F)] 37.4 °C (99.4 °F)  Pulse:  [59-72] 59  Resp:  [16-17] 16  BP: (110-153)/(66-84) 110/66  SpO2:  [94 %-98 %] 97 %    Physical Exam  Vitals signs and nursing note reviewed.   Constitutional:       General: She is not in acute distress.     Appearance: Normal appearance. She is well-developed and normal weight.      Interventions: She is sedated.   HENT:      Head: Normocephalic and atraumatic.      Right Ear: External ear normal.      Left Ear: External ear normal.      Nose: Nose normal.      Mouth/Throat:      Mouth: Mucous membranes are moist.      Pharynx: Oropharynx is clear.   Eyes:      Extraocular Movements: Extraocular movements intact.      Conjunctiva/sclera: Conjunctivae normal.   Neck:      Musculoskeletal: Normal range of motion.      Thyroid: No thyromegaly.      Vascular: No JVD.   Cardiovascular:      Rate and Rhythm: Normal rate and regular rhythm.      Pulses: Normal pulses.      Heart sounds: Normal heart sounds. No murmur. No  friction rub. No gallop.    Pulmonary:      Effort: Pulmonary effort is normal. No respiratory distress.      Breath sounds: Normal breath sounds. No wheezing or rales.   Abdominal:      General: Abdomen is flat. Bowel sounds are normal. There is no distension.      Palpations: Abdomen is soft. There is no mass.      Tenderness: There is no abdominal tenderness. There is no guarding.   Musculoskeletal: Normal range of motion.   Skin:     General: Skin is warm and dry.   Neurological:      General: No focal deficit present.      Mental Status: She is alert and oriented to person, place, and time. Mental status is at baseline.      Cranial Nerves: No cranial nerve deficit.   Psychiatric:         Mood and Affect: Mood is anxious and depressed. Affect is labile.         Behavior: Behavior is slowed.         Cognition and Memory: Cognition is impaired.         Fluids  No intake or output data in the 24 hours ending 02/06/21 1421    Laboratory  Recent Labs     02/04/21  0004 02/05/21  0021 02/06/21  0017   WBC 7.1 6.9 6.1   RBC 4.06* 4.15* 4.11*   HEMOGLOBIN 13.1 13.9 13.3   HEMATOCRIT 39.8 41.0 40.2   MCV 98.0* 98.8* 97.8   MCH 32.3 33.5* 32.4   MCHC 32.9* 33.9 33.1*   RDW 45.6 46.5 45.5   PLATELETCT 203 184 190   MPV 10.1 9.8 10.2     Recent Labs     02/04/21  0004 02/05/21  0021 02/06/21  0017   SODIUM 136 139 136   POTASSIUM 3.8 4.0 3.6   CHLORIDE 104 105 103   CO2 23 23 24   GLUCOSE 101* 97 100*   BUN 12 12 14   CREATININE 0.43* 0.47* 0.64   CALCIUM 9.3 9.4 9.4                   Imaging  DX-CHEST-PORTABLE (1 VIEW)   Final Result         1.  Probable mild pulmonary edema   2.  Cardiomegaly           Assessment/Plan  * Alcohol dependence (HCC)- (present on admission)  Assessment & Plan   alcohol withdrawal with severe features initially, now improved  -Thiamine, folate, multivitamin  Completed [facility] protocol  -Clonidine 0.1mg bid PRN for SBP >160  Neurontin continue  -Fall/seizure/aspiration precautions  Alcohol  cessation counseling    PAF (paroxysmal atrial fibrillation) (HCC)- (present on admission)  Assessment & Plan  -Continue rate control with metoprolol and digoxin   -Digoxin level 0.4 on 1/29/21  -Clonidine PRN  -Continue apixaban 5mg BID  -Patient refused repeat echo     Syncope- (present on admission)  Assessment & Plan  Most likely due to intoxication and vasovagal. Device interrogated with no periods of pauses. Initial BAL 200s. Last drink 1/28 evening prior to admission.  -TSH 0.020, Free T4 1.79    Alcohol withdrawal syndrome, with delirium (HCC)- (present on admission)  Assessment & Plan  S/p transfer to ICU for closer monitoring, required Dex only short period of time  No further signs of alcohol withdrawal    Depression- (present on admission)  Assessment & Plan  Significant grief due to loss of 2 daughters and .   -Quetiapine discontinued as per psychiatry recommendation, gabapentin regimen started  -Home venlafaxine 75mg increased to 150 mg daily as per psych recommendation    Patient cleared from legal hold. Limited medical capacity and decision making, impaired memory. After cognitive evaluation recommend 24/7 supervision.    Gait disorder- (present on admission)  Assessment & Plan  Follow-up physical therapy evaluation    Suicidal ideation- (present on admission)  Assessment & Plan  Seen by psychiatry, the patient had significant risk of self-harm in light of her lack of ability to care for self  Accommodations to place the patient under 24/7 supervision, skilled nursing referral  Has been cleared from legal hold     VTE prophylaxis: Apixaban    Please note that this dictation was created using voice recognition software. I have made every reasonable attempt to correct obvious errors, but I expect that there are errors of grammar and possibly context that I did not discover before finalizing the note.    This patient was seen under COVID 19 pandemic disaster response conditions.  During a  disaster, the provisions of care is subject to the Crisis Standard of Care

## 2021-02-06 NOTE — PROGRESS NOTES
Received report and assumed care of patient (pt) at change of shift. Pt is A&Ox3; disoriented to time. Resting comfortably at shift report. Safety precautions in place and all needs met at this time.

## 2021-02-06 NOTE — PROGRESS NOTES
Assumed care of Pt, She is alert and oriented. Discussed POC with charge RN at bedside. Bed is locked in lowest position and call light/ belongings are within reach.

## 2021-02-07 LAB
ANION GAP SERPL CALC-SCNC: 8 MMOL/L (ref 7–16)
BASOPHILS # BLD AUTO: 0.6 % (ref 0–1.8)
BASOPHILS # BLD: 0.04 K/UL (ref 0–0.12)
BUN SERPL-MCNC: 13 MG/DL (ref 8–22)
CALCIUM SERPL-MCNC: 9.4 MG/DL (ref 8.5–10.5)
CHLORIDE SERPL-SCNC: 107 MMOL/L (ref 96–112)
CO2 SERPL-SCNC: 22 MMOL/L (ref 20–33)
CREAT SERPL-MCNC: 0.5 MG/DL (ref 0.5–1.4)
EOSINOPHIL # BLD AUTO: 0.21 K/UL (ref 0–0.51)
EOSINOPHIL NFR BLD: 3.2 % (ref 0–6.9)
ERYTHROCYTE [DISTWIDTH] IN BLOOD BY AUTOMATED COUNT: 45.9 FL (ref 35.9–50)
GLUCOSE SERPL-MCNC: 83 MG/DL (ref 65–99)
HCT VFR BLD AUTO: 40.1 % (ref 37–47)
HGB BLD-MCNC: 13.4 G/DL (ref 12–16)
IMM GRANULOCYTES # BLD AUTO: 0.04 K/UL (ref 0–0.11)
IMM GRANULOCYTES NFR BLD AUTO: 0.6 % (ref 0–0.9)
LYMPHOCYTES # BLD AUTO: 2.6 K/UL (ref 1–4.8)
LYMPHOCYTES NFR BLD: 40 % (ref 22–41)
MAGNESIUM SERPL-MCNC: 2 MG/DL (ref 1.5–2.5)
MCH RBC QN AUTO: 32.6 PG (ref 27–33)
MCHC RBC AUTO-ENTMCNC: 33.4 G/DL (ref 33.6–35)
MCV RBC AUTO: 97.6 FL (ref 81.4–97.8)
MONOCYTES # BLD AUTO: 0.81 K/UL (ref 0–0.85)
MONOCYTES NFR BLD AUTO: 12.5 % (ref 0–13.4)
NEUTROPHILS # BLD AUTO: 2.8 K/UL (ref 2–7.15)
NEUTROPHILS NFR BLD: 43.1 % (ref 44–72)
NRBC # BLD AUTO: 0 K/UL
NRBC BLD-RTO: 0 /100 WBC
PHOSPHATE SERPL-MCNC: 3.9 MG/DL (ref 2.5–4.5)
PLATELET # BLD AUTO: 186 K/UL (ref 164–446)
PMV BLD AUTO: 10.4 FL (ref 9–12.9)
POTASSIUM SERPL-SCNC: 3.9 MMOL/L (ref 3.6–5.5)
RBC # BLD AUTO: 4.11 M/UL (ref 4.2–5.4)
SODIUM SERPL-SCNC: 137 MMOL/L (ref 135–145)
WBC # BLD AUTO: 6.5 K/UL (ref 4.8–10.8)

## 2021-02-07 PROCEDURE — A9270 NON-COVERED ITEM OR SERVICE: HCPCS | Performed by: INTERNAL MEDICINE

## 2021-02-07 PROCEDURE — 85025 COMPLETE CBC W/AUTO DIFF WBC: CPT

## 2021-02-07 PROCEDURE — 99231 SBSQ HOSP IP/OBS SF/LOW 25: CPT | Performed by: PSYCHIATRY & NEUROLOGY

## 2021-02-07 PROCEDURE — 90836 PSYTX W PT W E/M 45 MIN: CPT | Performed by: PSYCHIATRY & NEUROLOGY

## 2021-02-07 PROCEDURE — 700102 HCHG RX REV CODE 250 W/ 637 OVERRIDE(OP): Performed by: INTERNAL MEDICINE

## 2021-02-07 PROCEDURE — 80048 BASIC METABOLIC PNL TOTAL CA: CPT

## 2021-02-07 PROCEDURE — 99232 SBSQ HOSP IP/OBS MODERATE 35: CPT | Performed by: INTERNAL MEDICINE

## 2021-02-07 PROCEDURE — A9270 NON-COVERED ITEM OR SERVICE: HCPCS | Performed by: PSYCHIATRY & NEUROLOGY

## 2021-02-07 PROCEDURE — 770004 HCHG ROOM/CARE - ONCOLOGY PRIVATE *

## 2021-02-07 PROCEDURE — 83735 ASSAY OF MAGNESIUM: CPT

## 2021-02-07 PROCEDURE — 84100 ASSAY OF PHOSPHORUS: CPT

## 2021-02-07 PROCEDURE — 700102 HCHG RX REV CODE 250 W/ 637 OVERRIDE(OP): Performed by: PSYCHIATRY & NEUROLOGY

## 2021-02-07 PROCEDURE — A9270 NON-COVERED ITEM OR SERVICE: HCPCS | Performed by: STUDENT IN AN ORGANIZED HEALTH CARE EDUCATION/TRAINING PROGRAM

## 2021-02-07 PROCEDURE — 36415 COLL VENOUS BLD VENIPUNCTURE: CPT

## 2021-02-07 PROCEDURE — 700102 HCHG RX REV CODE 250 W/ 637 OVERRIDE(OP): Performed by: STUDENT IN AN ORGANIZED HEALTH CARE EDUCATION/TRAINING PROGRAM

## 2021-02-07 RX ORDER — QUETIAPINE FUMARATE 200 MG/1
200 TABLET, FILM COATED ORAL NIGHTLY
Status: DISCONTINUED | OUTPATIENT
Start: 2021-02-07 | End: 2021-02-09 | Stop reason: HOSPADM

## 2021-02-07 RX ORDER — HYDROXYZINE HYDROCHLORIDE 25 MG/1
25 TABLET, FILM COATED ORAL
Status: DISCONTINUED | OUTPATIENT
Start: 2021-02-07 | End: 2021-02-09 | Stop reason: HOSPADM

## 2021-02-07 RX ADMIN — APIXABAN 5 MG: 5 TABLET, FILM COATED ORAL at 16:57

## 2021-02-07 RX ADMIN — LEVOTHYROXINE SODIUM 100 MCG: 0.1 TABLET ORAL at 05:56

## 2021-02-07 RX ADMIN — QUETIAPINE FUMARATE 200 MG: 100 TABLET ORAL at 21:09

## 2021-02-07 RX ADMIN — DIGOXIN 125 MCG: 125 TABLET ORAL at 16:58

## 2021-02-07 RX ADMIN — METOPROLOL TARTRATE 100 MG: 50 TABLET, FILM COATED ORAL at 05:53

## 2021-02-07 RX ADMIN — AMLODIPINE BESYLATE 5 MG: 5 TABLET ORAL at 05:53

## 2021-02-07 RX ADMIN — THIAMINE HCL TAB 100 MG 100 MG: 100 TAB at 05:54

## 2021-02-07 RX ADMIN — HYDROXYZINE HYDROCHLORIDE 25 MG: 25 TABLET, FILM COATED ORAL at 21:09

## 2021-02-07 RX ADMIN — VENLAFAXINE HYDROCHLORIDE 150 MG: 75 CAPSULE, EXTENDED RELEASE ORAL at 05:52

## 2021-02-07 RX ADMIN — APIXABAN 5 MG: 5 TABLET, FILM COATED ORAL at 05:53

## 2021-02-07 RX ADMIN — METOPROLOL TARTRATE 100 MG: 50 TABLET, FILM COATED ORAL at 16:57

## 2021-02-07 RX ADMIN — GABAPENTIN 200 MG: 100 CAPSULE ORAL at 21:09

## 2021-02-07 ASSESSMENT — COGNITIVE AND FUNCTIONAL STATUS - GENERAL
WALKING IN HOSPITAL ROOM: A LITTLE
SUGGESTED CMS G CODE MODIFIER DAILY ACTIVITY: CH
CLIMB 3 TO 5 STEPS WITH RAILING: A LITTLE
DAILY ACTIVITIY SCORE: 24
SUGGESTED CMS G CODE MODIFIER MOBILITY: CJ
MOBILITY SCORE: 22

## 2021-02-07 ASSESSMENT — ENCOUNTER SYMPTOMS
NAUSEA: 0
HEADACHES: 0
BLURRED VISION: 0
FALLS: 0
HEARTBURN: 0
VOMITING: 0
NERVOUS/ANXIOUS: 1
DEPRESSION: 1
INSOMNIA: 1
DIZZINESS: 0
MEMORY LOSS: 1
WEAKNESS: 0
PALPITATIONS: 0
SORE THROAT: 0
COUGH: 0
FEVER: 0
CHILLS: 0
SHORTNESS OF BREATH: 0

## 2021-02-07 ASSESSMENT — LIFESTYLE VARIABLES: SUBSTANCE_ABUSE: 1

## 2021-02-07 NOTE — PROGRESS NOTES
Handoff report received. Assumed patient care. PT is reclined in bed. AAOX4. POC discussed with PT and all questions addressed. Safety precaution in place. Call light and person belongings within reach. Educated to call for assistance if needed.

## 2021-02-07 NOTE — PROGRESS NOTES
Problem: Safety  Goal: Will remain free from injury  Outcome: PROGRESSING AS EXPECTED     Problem: Venous Thromboembolism (VTW)/Deep Vein Thrombosis (DVT) Prevention:  Goal: Patient will participate in Venous Thrombosis (VTE)/Deep Vein Thrombosis (DVT)Prevention Measures  Outcome: PROGRESSING AS EXPECTED     Problem: Bowel/Gastric:  Goal: Normal bowel function is maintained or improved  Outcome: PROGRESSING AS EXPECTED     Problem: Knowledge Deficit  Goal: Knowledge of disease process/condition, treatment plan, diagnostic tests, and medications will improve  Outcome: PROGRESSING AS EXPECTED

## 2021-02-07 NOTE — PROGRESS NOTES
[facility] Medicine Daily Progress Note    Date of Service  2021    Chief Complaint  75 y.o. female admitted 2021 with alcohol intoxication    Hospital Course  Ms. Jeanie Hutchison is a 75 y.o. female with a history of alcoholism, delirium tremens, atrial fibrillation who presented on 2021 with alcohol intoxication.  Initially admitted to the floor but required transfer to the ICU for escalating sedation requirements.  The patient apparently was drinking 2 pints of vodka daily, had to attend her 's  1 week prior to admission.  Placed on legal hold, psychiatry following. Patient was taken off legal hold however did require 24/7 supervision after cognitive evaluation.      Interval Problem Update  Patient was seen and examined at bedside.  I have personally reviewed vitals, labs, and imaging.    2.  Afebrile.  Tachypnea has improved.  Episodes of hypertension.  On room air.  Replete magnesium, potassium.  Patient is alert and oriented.  She has a very flat affect and responsive only.  Denies pain, fever, chills, chest pain, shortness of breath.  PT/OT did recommend SNF placement more so for cognitive needs.  SLP cognition eval pending.  She does not member why she came to the hospital or her brother.  No further signs of tremors or hallucinations.  2.  Afebrile.  Stable vitals.  On room air. Denies fevers, chills, chest pains, shortness of breath. Patient is now alert and oriented x3. She does respond a little bit faster but still flat affect. Still not sure why she is here in the hospital. Would like to go home. She states she feels like she can take care of herself at home. She states she has a lot of things to take care of at home, but cannot expound on this. Counseled her that psychiatry and speech cog eval did not feel that she had the mental decision-making capacity to to care for self and recommended 24/7 care.  2.  Afebrile.  Episode of bradycardia this morning.  On room air.   Denies fevers, chills, chest pains, shortness of breath.  She does report trouble sleeping.  2/7.  Afebrile.  Stable vitals.  On room air.  Denies fever, chills, chest pain, shortness of breath.  Reports energy is improved.  Still responding slowly with flat affect but this is improved.  Requests Atarax for sleep.    Consultants/Specialty  Psychiatry  Intensivist    Code Status  Full Code    Disposition  SNF    Review of Systems  Review of Systems   Constitutional: Negative for chills and fever.   HENT: Negative for congestion and sore throat.    Eyes: Negative for blurred vision.   Respiratory: Negative for cough and shortness of breath.    Cardiovascular: Negative for chest pain, palpitations and leg swelling.   Gastrointestinal: Negative for heartburn, nausea and vomiting.   Genitourinary: Negative for dysuria, frequency and urgency.   Musculoskeletal: Negative for falls.   Skin: Negative for rash.   Neurological: Negative for dizziness, weakness and headaches.   Psychiatric/Behavioral: Positive for depression, memory loss and substance abuse. Negative for suicidal ideas. The patient is nervous/anxious and has insomnia.         Physical Exam  Temp:  [36.4 °C (97.6 °F)-37.3 °C (99.2 °F)] 36.6 °C (97.9 °F)  Pulse:  [60-74] 61  Resp:  [16-18] 16  BP: (128-138)/(58-98) 133/98  SpO2:  [94 %-97 %] 95 %    Physical Exam  Vitals signs and nursing note reviewed.   Constitutional:       General: She is not in acute distress.     Appearance: Normal appearance. She is well-developed and normal weight.      Interventions: She is sedated.   HENT:      Head: Normocephalic and atraumatic.      Right Ear: External ear normal.      Left Ear: External ear normal.      Nose: Nose normal.      Mouth/Throat:      Mouth: Mucous membranes are moist.      Pharynx: Oropharynx is clear.   Eyes:      Extraocular Movements: Extraocular movements intact.      Conjunctiva/sclera: Conjunctivae normal.   Neck:      Musculoskeletal: Normal range  of motion.      Thyroid: No thyromegaly.      Vascular: No JVD.   Cardiovascular:      Rate and Rhythm: Normal rate and regular rhythm.      Pulses: Normal pulses.      Heart sounds: Normal heart sounds. No murmur. No friction rub. No gallop.    Pulmonary:      Effort: Pulmonary effort is normal. No respiratory distress.      Breath sounds: Normal breath sounds. No wheezing or rales.   Abdominal:      General: Abdomen is flat. Bowel sounds are normal. There is no distension.      Palpations: Abdomen is soft. There is no mass.      Tenderness: There is no abdominal tenderness. There is no guarding.   Musculoskeletal: Normal range of motion.   Skin:     General: Skin is warm and dry.   Neurological:      General: No focal deficit present.      Mental Status: She is alert and oriented to person, place, and time. Mental status is at baseline.      Cranial Nerves: No cranial nerve deficit.   Psychiatric:         Mood and Affect: Mood is anxious and depressed. Affect is labile.         Behavior: Behavior is slowed.         Cognition and Memory: Cognition is impaired.         Fluids    Intake/Output Summary (Last 24 hours) at 2/7/2021 1054  Last data filed at 2/7/2021 0920  Gross per 24 hour   Intake 350 ml   Output --   Net 350 ml       Laboratory  Recent Labs     02/05/21  0021 02/06/21  0017 02/07/21  0038   WBC 6.9 6.1 6.5   RBC 4.15* 4.11* 4.11*   HEMOGLOBIN 13.9 13.3 13.4   HEMATOCRIT 41.0 40.2 40.1   MCV 98.8* 97.8 97.6   MCH 33.5* 32.4 32.6   MCHC 33.9 33.1* 33.4*   RDW 46.5 45.5 45.9   PLATELETCT 184 190 186   MPV 9.8 10.2 10.4     Recent Labs     02/05/21  0021 02/06/21  0017 02/07/21  0038   SODIUM 139 136 137   POTASSIUM 4.0 3.6 3.9   CHLORIDE 105 103 107   CO2 23 24 22   GLUCOSE 97 100* 83   BUN 12 14 13   CREATININE 0.47* 0.64 0.50   CALCIUM 9.4 9.4 9.4                   Imaging  DX-CHEST-PORTABLE (1 VIEW)   Final Result         1.  Probable mild pulmonary edema   2.  Cardiomegaly           Assessment/Plan  *  Alcohol dependence (HCC)- (present on admission)  Assessment & Plan   alcohol withdrawal with severe features initially, now improved  -Thiamine, folate, multivitamin  Completed CIWA protocol  -Clonidine 0.1mg bid PRN for SBP >160  Neurontin continue  -Fall/seizure/aspiration precautions  Alcohol cessation counseling    PAF (paroxysmal atrial fibrillation) (HCC)- (present on admission)  Assessment & Plan  -Continue rate control with metoprolol and digoxin   -Digoxin level 0.4 on 1/29/21  -Clonidine PRN  -Continue apixaban 5mg BID  -Patient refused repeat echo     Syncope- (present on admission)  Assessment & Plan  Most likely due to intoxication and vasovagal. Device interrogated with no periods of pauses. Initial BAL 200s. Last drink 1/28 evening prior to admission.  -TSH 0.020, Free T4 1.79    Alcohol withdrawal syndrome, with delirium (HCC)- (present on admission)  Assessment & Plan  S/p transfer to ICU for closer monitoring, required Dex only short period of time  No further signs of alcohol withdrawal    Depression- (present on admission)  Assessment & Plan  Significant grief due to loss of 2 daughters and .   -Quetiapine discontinued as per psychiatry recommendation, gabapentin regimen started  -Home venlafaxine 75mg increased to 150 mg daily as per psych recommendation    Patient cleared from legal hold. Limited medical capacity and decision making, impaired memory. After cognitive evaluation recommend 24/7 supervision.    Gait disorder- (present on admission)  Assessment & Plan  Follow-up physical therapy evaluation    Suicidal ideation- (present on admission)  Assessment & Plan  Seen by psychiatry, the patient had significant risk of self-harm in light of her lack of ability to care for self  Accommodations to place the patient under 24/7 supervision, skilled nursing referral  Has been cleared from legal hold     VTE prophylaxis: Apixaban    Please note that this dictation was created using voice  recognition software. I have made every reasonable attempt to correct obvious errors, but I expect that there are errors of grammar and possibly context that I did not discover before finalizing the note.    This patient was seen under COVID 19 pandemic disaster response conditions.  During a disaster, the provisions of care is subject to the Crisis Standard of Care

## 2021-02-07 NOTE — PROGRESS NOTES
Patient (pt) lying in bed watching TV on her cell phone during assessment and throughout the day. Pt calm and cooperative. Pt reports no pain and ambulated four rounds through the hallway. No questions, concerns, or signs of distress at this time. Bed is in lowest, locked position, call light and belongings are within reach. Pt does not call for assistance and bed alarm is on.  All other needs met.

## 2021-02-07 NOTE — CONSULTS
"PSYCHIATRIC FOLLOW-UP:(established)  *Reason for admission:    Acute intoxication on 2 pints of vodka daily. Syncopal episode. 's  one week ago.          *Legal Hold Status: not applicable : discontinued by LCSW               *HPI: pt looks much better, brighter, clearer. Her mood is a \"1 or 2\" (good side) as long as \"I don't think about certain things\" like the loss of her 2 dts and  (2 weeks ago though he had been in an assisted living for many months). She has had one covid shot and is hoping to get the second so that she might be able to be around grandchildren (advised mask nonetheless).     She still has trouble sleeping though she thinks the ativan is very helpful. Advised against it on dc because of the alcohol. Risks discussed.     Pt says son upset because of her drinking. He and she have agreed she will return to [facility] for their inpt alcohol rehab which worked for her in the past. They have not set any clear date and have not contacted the facility yet. Says son has told he will take away keys for driving if she relapses. She reports she has supportive friends as well. Denies hopelessness.     Psychotherapy face to face 43 min on coping strategies, triggers for relapse on alcohol, helping pt to formulate more specific plans for not drinking, for talking to neighbors who found her intoxicated, etc. Also grief counseling.  Pt has a therapist from one year ago: strongly encouraged her to get back involved as the better she knows herself the less vulnerable she will be to relapse. She agrees. .     *Psychiatric Examination:   Vitals:   Vitals:    21 1724 21 0432 21 0900   BP: 138/87 134/69 128/58 133/98   Pulse: 64 74 60 61   Resp: 16 18 16 16   Temp: 37.3 °C (99.2 °F) 36.4 °C (97.6 °F) 37.1 °C (98.7 °F) 36.6 °C (97.9 °F)   TempSrc: Temporal Temporal Temporal Temporal   SpO2: 94% 97% 96% 95%   Weight:       Height:         General Appearance:  " intermittent eye contact  Abnormal Movements: none   Gait and Posture: not observed  Speech: within normal limits  Thought Process: normal rate  Associations:   linear  Abnormal or Psychotic Thoughts: none  Judgement and Insight: improved  Orientation: grossly intact  Recent and Remote Memory: grossly intact  Attention Span and Concentration: intact  Language:spontaneous and fluent  Fund of Knowledge: not tested  Mood and Affect: as noted. not labile  SI/HI:  suicidal - no and homicidal - no         *ASSESSMENT/RECOMENDATIONS:  1. Depressive disoder unspc with anxiety: improved  -  effexor xr 150 mg.   -gabapentin 200 mg hs only   -added seroquel at hs for sleep  -concern over ativan if discharged on it.     2. Alcohol use disorder  -severe  -discharge plan for rehab as noted     3. Bereavement            4. Medical:  -hypothyroidism  -syncope  -p afib, pacemaker  -gait disorder        Legal hold: not applicable  Observation status: routine  Privileges (while on legal hold): no restrictions      *Will Follow

## 2021-02-07 NOTE — PROGRESS NOTES
Pt alert and oriented x 4. Denies pain or nausea. Up with SBA and tolerates well. Denies pain or nausea. Good PO intake. Resting comfortably at this time. BA on and sounding. Call light within reach. Hourly rounding in place.

## 2021-02-08 LAB
ANION GAP SERPL CALC-SCNC: 10 MMOL/L (ref 7–16)
BASOPHILS # BLD AUTO: 0.9 % (ref 0–1.8)
BASOPHILS # BLD: 0.06 K/UL (ref 0–0.12)
BUN SERPL-MCNC: 15 MG/DL (ref 8–22)
CALCIUM SERPL-MCNC: 9.5 MG/DL (ref 8.5–10.5)
CHLORIDE SERPL-SCNC: 104 MMOL/L (ref 96–112)
CO2 SERPL-SCNC: 23 MMOL/L (ref 20–33)
CREAT SERPL-MCNC: 0.64 MG/DL (ref 0.5–1.4)
EOSINOPHIL # BLD AUTO: 0.21 K/UL (ref 0–0.51)
EOSINOPHIL NFR BLD: 3.2 % (ref 0–6.9)
ERYTHROCYTE [DISTWIDTH] IN BLOOD BY AUTOMATED COUNT: 45.9 FL (ref 35.9–50)
GLUCOSE SERPL-MCNC: 93 MG/DL (ref 65–99)
HCT VFR BLD AUTO: 40 % (ref 37–47)
HGB BLD-MCNC: 13.5 G/DL (ref 12–16)
IMM GRANULOCYTES # BLD AUTO: 0.04 K/UL (ref 0–0.11)
IMM GRANULOCYTES NFR BLD AUTO: 0.6 % (ref 0–0.9)
LYMPHOCYTES # BLD AUTO: 2.53 K/UL (ref 1–4.8)
LYMPHOCYTES NFR BLD: 38.6 % (ref 22–41)
MAGNESIUM SERPL-MCNC: 1.9 MG/DL (ref 1.5–2.5)
MCH RBC QN AUTO: 33.2 PG (ref 27–33)
MCHC RBC AUTO-ENTMCNC: 33.8 G/DL (ref 33.6–35)
MCV RBC AUTO: 98.3 FL (ref 81.4–97.8)
MONOCYTES # BLD AUTO: 0.86 K/UL (ref 0–0.85)
MONOCYTES NFR BLD AUTO: 13.1 % (ref 0–13.4)
NEUTROPHILS # BLD AUTO: 2.86 K/UL (ref 2–7.15)
NEUTROPHILS NFR BLD: 43.6 % (ref 44–72)
NRBC # BLD AUTO: 0 K/UL
NRBC BLD-RTO: 0 /100 WBC
PHOSPHATE SERPL-MCNC: 4.5 MG/DL (ref 2.5–4.5)
PLATELET # BLD AUTO: 177 K/UL (ref 164–446)
PMV BLD AUTO: 10.1 FL (ref 9–12.9)
POTASSIUM SERPL-SCNC: 3.6 MMOL/L (ref 3.6–5.5)
RBC # BLD AUTO: 4.07 M/UL (ref 4.2–5.4)
SODIUM SERPL-SCNC: 137 MMOL/L (ref 135–145)
WBC # BLD AUTO: 6.6 K/UL (ref 4.8–10.8)

## 2021-02-08 PROCEDURE — 84100 ASSAY OF PHOSPHORUS: CPT

## 2021-02-08 PROCEDURE — 700102 HCHG RX REV CODE 250 W/ 637 OVERRIDE(OP): Performed by: INTERNAL MEDICINE

## 2021-02-08 PROCEDURE — A9270 NON-COVERED ITEM OR SERVICE: HCPCS | Performed by: STUDENT IN AN ORGANIZED HEALTH CARE EDUCATION/TRAINING PROGRAM

## 2021-02-08 PROCEDURE — 80048 BASIC METABOLIC PNL TOTAL CA: CPT

## 2021-02-08 PROCEDURE — 83735 ASSAY OF MAGNESIUM: CPT

## 2021-02-08 PROCEDURE — 85025 COMPLETE CBC W/AUTO DIFF WBC: CPT

## 2021-02-08 PROCEDURE — A9270 NON-COVERED ITEM OR SERVICE: HCPCS | Performed by: INTERNAL MEDICINE

## 2021-02-08 PROCEDURE — 770004 HCHG ROOM/CARE - ONCOLOGY PRIVATE *

## 2021-02-08 PROCEDURE — 99232 SBSQ HOSP IP/OBS MODERATE 35: CPT | Performed by: INTERNAL MEDICINE

## 2021-02-08 PROCEDURE — 700102 HCHG RX REV CODE 250 W/ 637 OVERRIDE(OP): Performed by: STUDENT IN AN ORGANIZED HEALTH CARE EDUCATION/TRAINING PROGRAM

## 2021-02-08 PROCEDURE — A9270 NON-COVERED ITEM OR SERVICE: HCPCS | Performed by: PSYCHIATRY & NEUROLOGY

## 2021-02-08 PROCEDURE — 700102 HCHG RX REV CODE 250 W/ 637 OVERRIDE(OP): Performed by: PSYCHIATRY & NEUROLOGY

## 2021-02-08 RX ORDER — POTASSIUM CHLORIDE 20 MEQ/1
40 TABLET, EXTENDED RELEASE ORAL ONCE
Status: COMPLETED | OUTPATIENT
Start: 2021-02-08 | End: 2021-02-08

## 2021-02-08 RX ADMIN — DOCUSATE SODIUM 50 MG AND SENNOSIDES 8.6 MG 2 TABLET: 8.6; 5 TABLET, FILM COATED ORAL at 05:32

## 2021-02-08 RX ADMIN — THIAMINE HCL TAB 100 MG 100 MG: 100 TAB at 05:33

## 2021-02-08 RX ADMIN — HYDROXYZINE HYDROCHLORIDE 25 MG: 25 TABLET, FILM COATED ORAL at 21:36

## 2021-02-08 RX ADMIN — METOPROLOL TARTRATE 100 MG: 50 TABLET, FILM COATED ORAL at 05:33

## 2021-02-08 RX ADMIN — DIGOXIN 125 MCG: 125 TABLET ORAL at 16:51

## 2021-02-08 RX ADMIN — APIXABAN 5 MG: 5 TABLET, FILM COATED ORAL at 05:33

## 2021-02-08 RX ADMIN — VENLAFAXINE HYDROCHLORIDE 150 MG: 75 CAPSULE, EXTENDED RELEASE ORAL at 05:32

## 2021-02-08 RX ADMIN — LEVOTHYROXINE SODIUM 100 MCG: 0.1 TABLET ORAL at 05:32

## 2021-02-08 RX ADMIN — POTASSIUM CHLORIDE 40 MEQ: 1500 TABLET, EXTENDED RELEASE ORAL at 11:00

## 2021-02-08 RX ADMIN — GABAPENTIN 200 MG: 100 CAPSULE ORAL at 21:36

## 2021-02-08 RX ADMIN — QUETIAPINE FUMARATE 200 MG: 100 TABLET ORAL at 21:36

## 2021-02-08 RX ADMIN — APIXABAN 5 MG: 5 TABLET, FILM COATED ORAL at 16:51

## 2021-02-08 RX ADMIN — METOPROLOL TARTRATE 100 MG: 50 TABLET, FILM COATED ORAL at 16:56

## 2021-02-08 RX ADMIN — AMLODIPINE BESYLATE 5 MG: 5 TABLET ORAL at 05:33

## 2021-02-08 ASSESSMENT — ENCOUNTER SYMPTOMS
MEMORY LOSS: 1
FEVER: 0
VOMITING: 0
COUGH: 0
SORE THROAT: 0
SHORTNESS OF BREATH: 0
HEADACHES: 0
WEAKNESS: 0
HEARTBURN: 0
BLURRED VISION: 0
NERVOUS/ANXIOUS: 1
CHILLS: 0
DIZZINESS: 0
PALPITATIONS: 0
FALLS: 0
NAUSEA: 0
DEPRESSION: 1
INSOMNIA: 1

## 2021-02-08 ASSESSMENT — LIFESTYLE VARIABLES: SUBSTANCE_ABUSE: 1

## 2021-02-08 ASSESSMENT — PAIN DESCRIPTION - PAIN TYPE: TYPE: ACUTE PAIN;CHRONIC PAIN

## 2021-02-08 NOTE — PROGRESS NOTES
Assumed care of pt @0700. Bedside report received. Pt AOX 4. Pt denies pain, nausea and SOB. PIV patent SL. Last Bm today. Pt up with standby assist. Fall precaution in place. POC discussed with pt, all questions answered at this time. Pt makes needs known, call light within reach, hourly rounding in place.

## 2021-02-08 NOTE — CARE PLAN
Problem: Safety  Goal: Will remain free from injury  Outcome: PROGRESSING AS EXPECTED  Intervention: Provide assistance with mobility  Note: Pt up with standby assist. Steady. Ambulates to the bathroom and around the room. Bed alarm on.      Problem: Infection  Goal: Will remain free from infection  Outcome: PROGRESSING AS EXPECTED  Intervention: Give CDC handouts for infection prevention (infection prevention/hand washing, disease specific, and device specific) and document in Education  Note: Pt afebrile. Educated about standard precautions.

## 2021-02-08 NOTE — PROGRESS NOTES
[facility] Medicine Daily Progress Note    Date of Service  2021    Chief Complaint  75 y.o. female admitted 2021 with alcohol intoxication    Hospital Course  Ms. Jeanie Hutchison is a 75 y.o. female with a history of alcoholism, delirium tremens, atrial fibrillation who presented on 2021 with alcohol intoxication.  Initially admitted to the floor but required transfer to the ICU for escalating sedation requirements.  The patient apparently was drinking 2 pints of vodka daily, had to attend her 's  1 week prior to admission.  Placed on legal hold, psychiatry following. Patient was taken off legal hold however did require 24/7 supervision after cognitive evaluation.      Interval Problem Update  Patient was seen and examined at bedside.  I have personally reviewed vitals, labs, and imaging.    2.  Afebrile.  Tachypnea has improved.  Episodes of hypertension.  On room air.  Replete magnesium, potassium.  Patient is alert and oriented.  She has a very flat affect and responsive only.  Denies pain, fever, chills, chest pain, shortness of breath.  PT/OT did recommend SNF placement more so for cognitive needs.  SLP cognition eval pending.  She does not member why she came to the hospital or her brother.  No further signs of tremors or hallucinations.  2.  Afebrile.  Stable vitals.  On room air. Denies fevers, chills, chest pains, shortness of breath. Patient is now alert and oriented x3. She does respond a little bit faster but still flat affect. Still not sure why she is here in the hospital. Would like to go home. She states she feels like she can take care of herself at home. She states she has a lot of things to take care of at home, but cannot expound on this. Counseled her that psychiatry and speech cog eval did not feel that she had the mental decision-making capacity to to care for self and recommended 24/7 care.  2.  Afebrile.  Episode of bradycardia this morning.  On room air.   Denies fevers, chills, chest pains, shortness of breath.  She does report trouble sleeping.  2/7.  Afebrile.  Stable vitals.  On room air.  Denies fever, chills, chest pain, shortness of breath.  Reports energy is improved.  Still responding slowly with flat affect but this is improved.  Requests Atarax for sleep.  2/8.  Afebrile.  Stable vitals.  On room air.  Denies fevers, chills, chest pains, shortness of breath.  Patient reports she slept much better last night.  Evaluated by psychiatry yesterday and still recommends placement.  I did discuss this with her son will plan to get her into alcohol rehab but they did cannot take her until late this month.  Patient does not want to go to placement.  Her mentation is somewhat improved and she responds better.  Pending repeat cog eval.      Consultants/Specialty  Psychiatry  Intensivist    Code Status  Full Code    Disposition  SNF    Review of Systems  Review of Systems   Constitutional: Negative for chills and fever.   HENT: Negative for congestion and sore throat.    Eyes: Negative for blurred vision.   Respiratory: Negative for cough and shortness of breath.    Cardiovascular: Negative for chest pain, palpitations and leg swelling.   Gastrointestinal: Negative for heartburn, nausea and vomiting.   Genitourinary: Negative for dysuria, frequency and urgency.   Musculoskeletal: Negative for falls.   Skin: Negative for rash.   Neurological: Negative for dizziness, weakness and headaches.   Psychiatric/Behavioral: Positive for depression, memory loss and substance abuse. Negative for suicidal ideas. The patient is nervous/anxious and has insomnia.         Physical Exam  Temp:  [36.3 °C (97.4 °F)-36.7 °C (98.1 °F)] 36.3 °C (97.4 °F)  Pulse:  [61-68] 68  Resp:  [16-18] 18  BP: (132-141)/(68-98) 134/88  SpO2:  [94 %-95 %] 95 %    Physical Exam  Vitals signs and nursing note reviewed.   Constitutional:       General: She is not in acute distress.     Appearance: Normal  appearance. She is well-developed and normal weight.      Interventions: She is sedated.   HENT:      Head: Normocephalic and atraumatic.      Right Ear: External ear normal.      Left Ear: External ear normal.      Nose: Nose normal.      Mouth/Throat:      Mouth: Mucous membranes are moist.      Pharynx: Oropharynx is clear.   Eyes:      Extraocular Movements: Extraocular movements intact.      Conjunctiva/sclera: Conjunctivae normal.   Neck:      Musculoskeletal: Normal range of motion.      Thyroid: No thyromegaly.      Vascular: No JVD.   Cardiovascular:      Rate and Rhythm: Normal rate and regular rhythm.      Pulses: Normal pulses.      Heart sounds: Normal heart sounds. No murmur. No friction rub. No gallop.    Pulmonary:      Effort: Pulmonary effort is normal. No respiratory distress.      Breath sounds: Normal breath sounds. No wheezing or rales.   Abdominal:      General: Abdomen is flat. Bowel sounds are normal. There is no distension.      Palpations: Abdomen is soft. There is no mass.      Tenderness: There is no abdominal tenderness. There is no guarding.   Musculoskeletal: Normal range of motion.   Skin:     General: Skin is warm and dry.   Neurological:      General: No focal deficit present.      Mental Status: She is alert and oriented to person, place, and time. Mental status is at baseline.      Cranial Nerves: No cranial nerve deficit.   Psychiatric:         Mood and Affect: Mood is anxious and depressed. Affect is labile.         Behavior: Behavior is slowed.         Cognition and Memory: Cognition is impaired.         Fluids    Intake/Output Summary (Last 24 hours) at 2/8/2021 0812  Last data filed at 2/7/2021 1115  Gross per 24 hour   Intake 590 ml   Output --   Net 590 ml       Laboratory  Recent Labs     02/06/21  0017 02/07/21  0038 02/08/21  0042   WBC 6.1 6.5 6.6   RBC 4.11* 4.11* 4.07*   HEMOGLOBIN 13.3 13.4 13.5   HEMATOCRIT 40.2 40.1 40.0   MCV 97.8 97.6 98.3*   MCH 32.4 32.6 33.2*    MCHC 33.1* 33.4* 33.8   RDW 45.5 45.9 45.9   PLATELETCT 190 186 177   MPV 10.2 10.4 10.1     Recent Labs     02/06/21  0017 02/07/21  0038 02/08/21  0042   SODIUM 136 137 137   POTASSIUM 3.6 3.9 3.6   CHLORIDE 103 107 104   CO2 24 22 23   GLUCOSE 100* 83 93   BUN 14 13 15   CREATININE 0.64 0.50 0.64   CALCIUM 9.4 9.4 9.5                   Imaging  DX-CHEST-PORTABLE (1 VIEW)   Final Result         1.  Probable mild pulmonary edema   2.  Cardiomegaly           Assessment/Plan  * Alcohol dependence (HCC)- (present on admission)  Assessment & Plan   alcohol withdrawal with severe features initially, now improved  -Thiamine, folate, multivitamin  Completed CIWA protocol  -Clonidine 0.1mg bid PRN for SBP >160  Neurontin continue  -Fall/seizure/aspiration precautions  Alcohol cessation counseling    PAF (paroxysmal atrial fibrillation) (HCC)- (present on admission)  Assessment & Plan  -Continue rate control with metoprolol and digoxin   -Digoxin level 0.4 on 1/29/21  -Clonidine PRN  -Continue apixaban 5mg BID  -Patient refused repeat echo     Syncope- (present on admission)  Assessment & Plan  Most likely due to intoxication and vasovagal. Device interrogated with no periods of pauses. Initial BAL 200s. Last drink 1/28 evening prior to admission.  -TSH 0.020, Free T4 1.79    Alcohol withdrawal syndrome, with delirium (HCC)- (present on admission)  Assessment & Plan  S/p transfer to ICU for closer monitoring, required Dex only short period of time  No further signs of alcohol withdrawal    Depression- (present on admission)  Assessment & Plan  Significant grief due to loss of 2 daughters and .   -Quetiapine discontinued as per psychiatry recommendation, gabapentin regimen started  -Home venlafaxine 75mg increased to 150 mg daily as per psych recommendation    Patient cleared from legal hold. Limited medical capacity and decision making, impaired memory. After cognitive evaluation recommend 24/7 supervision.    Gait  disorder- (present on admission)  Assessment & Plan  Follow-up physical therapy evaluation    Suicidal ideation- (present on admission)  Assessment & Plan  Seen by psychiatry, the patient had significant risk of self-harm in light of her lack of ability to care for self  Accommodations to place the patient under 24/7 supervision, skilled nursing referral  Has been cleared from legal hold     VTE prophylaxis: Apixaban    Please note that this dictation was created using voice recognition software. I have made every reasonable attempt to correct obvious errors, but I expect that there are errors of grammar and possibly context that I did not discover before finalizing the note.    This patient was seen under COVID 19 pandemic disaster response conditions.  During a disaster, the provisions of care is subject to the Crisis Standard of Care

## 2021-02-08 NOTE — DISCHARGE PLANNING
Anticipated Discharge Disposition: TBD.     Action: Pt discussed in IDT rounds. Pending repeat cog eval to determine if pt has capacity to make her own decisions. Therapies recommending 24/7 supervision.     Barriers to Discharge: Cog eval, placement, recommendations.     Plan: SW/CM will continue to follow and assist as needed/requested.

## 2021-02-09 VITALS
TEMPERATURE: 98.1 F | HEIGHT: 66 IN | RESPIRATION RATE: 18 BRPM | SYSTOLIC BLOOD PRESSURE: 105 MMHG | BODY MASS INDEX: 23.1 KG/M2 | DIASTOLIC BLOOD PRESSURE: 66 MMHG | OXYGEN SATURATION: 94 % | WEIGHT: 143.74 LBS | HEART RATE: 61 BPM

## 2021-02-09 PROBLEM — R53.81 DEBILITY: Status: RESOLVED | Noted: 2020-02-06 | Resolved: 2021-02-09

## 2021-02-09 PROBLEM — R45.851 SUICIDAL IDEATION: Status: RESOLVED | Noted: 2021-01-28 | Resolved: 2021-02-09

## 2021-02-09 PROBLEM — F10.931 ALCOHOL WITHDRAWAL SYNDROME, WITH DELIRIUM (HCC): Status: RESOLVED | Noted: 2021-01-31 | Resolved: 2021-02-09

## 2021-02-09 PROBLEM — F10.10 ALCOHOL ABUSE: Status: RESOLVED | Noted: 2019-04-25 | Resolved: 2021-02-09

## 2021-02-09 LAB
ANION GAP SERPL CALC-SCNC: 13 MMOL/L (ref 7–16)
BASOPHILS # BLD AUTO: 0.7 % (ref 0–1.8)
BASOPHILS # BLD: 0.04 K/UL (ref 0–0.12)
BUN SERPL-MCNC: 18 MG/DL (ref 8–22)
CALCIUM SERPL-MCNC: 9.4 MG/DL (ref 8.5–10.5)
CHLORIDE SERPL-SCNC: 106 MMOL/L (ref 96–112)
CO2 SERPL-SCNC: 19 MMOL/L (ref 20–33)
CREAT SERPL-MCNC: 0.52 MG/DL (ref 0.5–1.4)
EOSINOPHIL # BLD AUTO: 0.19 K/UL (ref 0–0.51)
EOSINOPHIL NFR BLD: 3.1 % (ref 0–6.9)
ERYTHROCYTE [DISTWIDTH] IN BLOOD BY AUTOMATED COUNT: 46.8 FL (ref 35.9–50)
GLUCOSE SERPL-MCNC: 105 MG/DL (ref 65–99)
HCT VFR BLD AUTO: 42.4 % (ref 37–47)
HGB BLD-MCNC: 13.7 G/DL (ref 12–16)
IMM GRANULOCYTES # BLD AUTO: 0.02 K/UL (ref 0–0.11)
IMM GRANULOCYTES NFR BLD AUTO: 0.3 % (ref 0–0.9)
LYMPHOCYTES # BLD AUTO: 2.33 K/UL (ref 1–4.8)
LYMPHOCYTES NFR BLD: 37.9 % (ref 22–41)
MAGNESIUM SERPL-MCNC: 1.9 MG/DL (ref 1.5–2.5)
MCH RBC QN AUTO: 32.2 PG (ref 27–33)
MCHC RBC AUTO-ENTMCNC: 32.3 G/DL (ref 33.6–35)
MCV RBC AUTO: 99.8 FL (ref 81.4–97.8)
MONOCYTES # BLD AUTO: 0.76 K/UL (ref 0–0.85)
MONOCYTES NFR BLD AUTO: 12.4 % (ref 0–13.4)
NEUTROPHILS # BLD AUTO: 2.8 K/UL (ref 2–7.15)
NEUTROPHILS NFR BLD: 45.6 % (ref 44–72)
NRBC # BLD AUTO: 0 K/UL
NRBC BLD-RTO: 0 /100 WBC
PHOSPHATE SERPL-MCNC: 4.1 MG/DL (ref 2.5–4.5)
PLATELET # BLD AUTO: 183 K/UL (ref 164–446)
PMV BLD AUTO: 10.2 FL (ref 9–12.9)
POTASSIUM SERPL-SCNC: 4.1 MMOL/L (ref 3.6–5.5)
RBC # BLD AUTO: 4.25 M/UL (ref 4.2–5.4)
SODIUM SERPL-SCNC: 138 MMOL/L (ref 135–145)
WBC # BLD AUTO: 6.1 K/UL (ref 4.8–10.8)

## 2021-02-09 PROCEDURE — 83735 ASSAY OF MAGNESIUM: CPT

## 2021-02-09 PROCEDURE — 99239 HOSP IP/OBS DSCHRG MGMT >30: CPT | Performed by: INTERNAL MEDICINE

## 2021-02-09 PROCEDURE — 700102 HCHG RX REV CODE 250 W/ 637 OVERRIDE(OP): Performed by: INTERNAL MEDICINE

## 2021-02-09 PROCEDURE — A9270 NON-COVERED ITEM OR SERVICE: HCPCS | Performed by: STUDENT IN AN ORGANIZED HEALTH CARE EDUCATION/TRAINING PROGRAM

## 2021-02-09 PROCEDURE — 92507 TX SP LANG VOICE COMM INDIV: CPT

## 2021-02-09 PROCEDURE — 700102 HCHG RX REV CODE 250 W/ 637 OVERRIDE(OP): Performed by: STUDENT IN AN ORGANIZED HEALTH CARE EDUCATION/TRAINING PROGRAM

## 2021-02-09 PROCEDURE — 85025 COMPLETE CBC W/AUTO DIFF WBC: CPT

## 2021-02-09 PROCEDURE — A9270 NON-COVERED ITEM OR SERVICE: HCPCS | Performed by: INTERNAL MEDICINE

## 2021-02-09 PROCEDURE — 84100 ASSAY OF PHOSPHORUS: CPT

## 2021-02-09 PROCEDURE — 80048 BASIC METABOLIC PNL TOTAL CA: CPT

## 2021-02-09 PROCEDURE — A9270 NON-COVERED ITEM OR SERVICE: HCPCS | Performed by: PSYCHIATRY & NEUROLOGY

## 2021-02-09 PROCEDURE — 700102 HCHG RX REV CODE 250 W/ 637 OVERRIDE(OP): Performed by: PSYCHIATRY & NEUROLOGY

## 2021-02-09 RX ORDER — AMLODIPINE BESYLATE 5 MG/1
5 TABLET ORAL DAILY
Qty: 30 TAB | Refills: 0 | Status: SHIPPED
Start: 2021-02-10 | End: 2021-11-30

## 2021-02-09 RX ORDER — VENLAFAXINE HYDROCHLORIDE 150 MG/1
150 CAPSULE, EXTENDED RELEASE ORAL DAILY
Qty: 30 CAP | Refills: 3 | Status: SHIPPED | OUTPATIENT
Start: 2021-02-10 | End: 2023-05-23

## 2021-02-09 RX ORDER — QUETIAPINE FUMARATE 200 MG/1
200 TABLET, FILM COATED ORAL
Qty: 60 TAB | Refills: 3 | Status: SHIPPED | OUTPATIENT
Start: 2021-02-09 | End: 2023-05-23

## 2021-02-09 RX ORDER — HYDROXYZINE HYDROCHLORIDE 25 MG/1
25 TABLET, FILM COATED ORAL
Qty: 30 TAB | Refills: 0 | Status: SHIPPED | OUTPATIENT
Start: 2021-02-09 | End: 2021-02-24 | Stop reason: SDUPTHER

## 2021-02-09 RX ADMIN — THIAMINE HCL TAB 100 MG 100 MG: 100 TAB at 05:36

## 2021-02-09 RX ADMIN — APIXABAN 5 MG: 5 TABLET, FILM COATED ORAL at 05:36

## 2021-02-09 RX ADMIN — AMLODIPINE BESYLATE 5 MG: 5 TABLET ORAL at 05:36

## 2021-02-09 RX ADMIN — LEVOTHYROXINE SODIUM 100 MCG: 0.1 TABLET ORAL at 05:36

## 2021-02-09 RX ADMIN — METOPROLOL TARTRATE 100 MG: 50 TABLET, FILM COATED ORAL at 05:35

## 2021-02-09 RX ADMIN — VENLAFAXINE HYDROCHLORIDE 150 MG: 75 CAPSULE, EXTENDED RELEASE ORAL at 05:35

## 2021-02-09 ASSESSMENT — PAIN DESCRIPTION - PAIN TYPE: TYPE: ACUTE PAIN

## 2021-02-09 NOTE — DISCHARGE INSTRUCTIONS
Discharge Instructions per Frank Fowler M.D.  Please discontinue mirtazapine, sertraline  Continue new medication regime as per psychology recommendations  Follow up with counseling and psychiatry as outpatient   Continue hydroxyzine at night only   Continue new dose for venlafaxine and Seroquel   Avoid alcohol and sedative completely        DIET: healthy     ACTIVITY: as tolerated     DIAGNOSIS: Alcohol intoxication, delirium, suicidal ideation    Return to ER if worsening mentation, chest pain, shortness of breath, severe headache, unusual bleeding, fever, abdominal pain, persistent diarrhea, suicidal ideation, overdose          Discharge Instructions    Discharged to home by car with relative. Discharged via walking, hospital escort: Yes.  Special equipment needed: Not Applicable    Be sure to schedule a follow-up appointment with your primary care doctor or any specialists as instructed.     Discharge Plan:   Diet Plan: Discussed  Activity Level: Discussed  Confirmed Follow up Appointment: Patient to Call and Schedule Appointment  Confirmed Symptoms Management: Discussed  Medication Reconciliation Updated: Yes  Influenza Vaccine Indication: Not indicated: Previously immunized this influenza season and > 8 years of age    I understand that a diet low in cholesterol, fat, and sodium is recommended for good health. Unless I have been given specific instructions below for another diet, I accept this instruction as my diet prescription.   Other diet: regular    Special Instructions: None    · Is patient discharged on Warfarin / Coumadin?   No     Depression / Suicide Risk    As you are discharged from this [facility] facility, it is important to learn how to keep safe from harming yourself.    Recognize the warning signs:  · Abrupt changes in personality, positive or negative- including increase in energy   · Giving away possessions  · Change in eating patterns- significant weight changes-  positive or  negative  · Change in sleeping patterns- unable to sleep or sleeping all the time   · Unwillingness or inability to communicate  · Depression  · Unusual sadness, discouragement and loneliness  · Talk of wanting to die  · Neglect of personal appearance   · Rebelliousness- reckless behavior  · Withdrawal from people/activities they love  · Confusion- inability to concentrate     If you or a loved one observes any of these behaviors or has concerns about self-harm, here's what you can do:  · Talk about it- your feelings and reasons for harming yourself  · Remove any means that you might use to hurt yourself (examples: pills, rope, extension cords, firearm)  · Get professional help from the community (Mental Health, Substance Abuse, psychological counseling)  · Do not be alone:Call your Safe Contact- someone whom you trust who will be there for you.  · Call your local CRISIS HOTLINE 563-3705 or 825-556-2330  · Call your local Children's Mobile Crisis Response Team [place] (462) 146-9867 or www.Process Relations  · Call the toll free National Suicide Prevention Hotlines   · National Suicide Prevention Lifeline 263-971-CHYI (2121)  · National Hope Line Network 800-SUICIDE (357-4201)

## 2021-02-09 NOTE — DISCHARGE PLANNING
Anticipated Discharge Disposition: Home    Action: CM notified by bedside RN pt has been cleared for discharge; cog eval completed and pt able to discharge home without 24/7 care. Per RN, pt's son lives nearby and can check on her. Pt will contact son for ride home.     Barriers to Discharge: None    Plan: Discharge home per MD order.

## 2021-02-09 NOTE — DISCHARGE PLANNING
Agency/Facility Name: [facility]  Spoke To: [place]  Outcome: O2 will be delivered in 30 minutes.

## 2021-02-09 NOTE — CARE PLAN
Problem: Safety  Goal: Will remain free from injury  Outcome: PROGRESSING AS EXPECTED  Note: Bed alarm in place to notify staff of patient rising, patient mostly steady on her feet.      Problem: Pain Management  Goal: Pain level will decrease to patient's comfort goal  Outcome: PROGRESSING AS EXPECTED

## 2021-02-09 NOTE — PROGRESS NOTES
A&Ox4; Up with SBA for dizzy spells/HX falls. Patient calls appropriately. Denies pain, nausea, vomiting or diarrhea. POC discussed and all questions answered. Patient is eager to go home; pending cognitive clearance. Hourly rounding in place.

## 2021-02-09 NOTE — CARE PLAN
Problem: Safety  Goal: Will remain free from falls  Outcome: PROGRESSING AS EXPECTED  Note: Provided FWW for dizziness; education provided     Problem: Knowledge Deficit  Goal: Knowledge of disease process/condition, treatment plan, diagnostic tests, and medications will improve  Outcome: PROGRESSING AS EXPECTED     Problem: Discharge Barriers/Planning  Goal: Patient's continuum of care needs will be met  Outcome: PROGRESSING AS EXPECTED

## 2021-02-09 NOTE — THERAPY
"Speech Language Pathology  Daily Treatment     Patient Name: Jeanie Hutchison  Age:  75 y.o., Sex:  female  Medical Record #: [id number]  Today's Date: 2/9/2021     Precautions  Precautions: Fall Risk    Assessment  Patient seen this date for repeat cognitive-linguistic evaluation, pending d/c home. Patient awake, alert, and oriented in all spheres. Patient appears much-improved compared to previous assessment with SLP and answers were timely and accurate. Patient demonstrated insight into deficits, stating \"I was in such a fog. I was not thinking clearly. I know I didn't do well before.\" Patient was administered a combination of non-standard assessments. Patient presented with slight deficits in short-term memory and minimal deficits in medication management. Patient performed WNL across all other domains tested. Patient reports that she feels at baseline cognition.     At this time, recommend patient be provided with supervision for higher-level cognitive deficits, such as medication management, which she reports her son assists with. Patient does not appear to require any further acute or post-acute SLP services unless any deficits worsen. SLP will not actively follow 2/2 goals met. Please re-consult SLP if needed.     Plan  Discharge secondary to goals met.    Discharge Recommendations: SLP evaluation completed.  Patient is not being actively followed for therapy services at this time, however may be seen if requested by attending provider for 1 more visit within 30 days to address any discharge needs or if the patient has a change in status.       Objective     02/09/21 1209   Vitals   O2 Delivery Device None - Room Air   Verbal Expression   Verbal Expression / Aphasia Eval (WDL) WDL   Vocal Quality Clear   Verbal Output Automatic Within Functional Limits (6-7)   Verbal Output: Phrases Within Functional Limits (6-7)   Verbal Output Conversation Within Functional Limits (6-7)   Verbal Output Functional Within " Functional Limits (6-7)   Repetition: Single Words Within Functional Limits (6-7)   Repetition: Phrases Within Functional Limits (6-7)   Repetition: Sentences Within Functional Limits (6-7)   Naming Within Functional Limits (6-7)   Auditory Comprehension   Yes / No Questions: Personal Information Within Functional Limits (6-7)   Yes / No Questions: General Information Within Functional Limits (6-7)   Yes / No Questions: Abstract Within Functional Limits (6-7)   Identifies Objects Within Functional Limits (6-7)   Identifies Pictures Within Functional Limits (6-7)   Identifies Body Parts Within Functional Limits (6-7)   Follows One Unit Commands Within Functional Limits (6-7)   Follows Two Unit Commands Within Functional Limits (6-7)   Follows Three Unit Commands Within Functional Limits (6-7)   Understands Paragraph Within Functional Limits (6-7)   Understands Simple, Structured Conversation  Within Functional Limits (6-7)   Reading Comprehension   Reading Words Within Functional Limits (6-7)   Reading Phrases Within Functional Limits (6-7)   Reading Sentences Within Functional Limits (6-7)   Reading Short Paragraphs  Within Functional Limits (6-7)   Following Written Direction Within Functional Limits (6-7)   Written Expression   Functional Writing: Name Within Functional Limits (6-7)   Functional Writing Dictation: Word Within Functional Limits (6-7)   Functional Writing to Dictation: Sentence Within Functional Limits (6-7)   Formulates: Sentence Within Functional Limits (6-7)   Formulates: Multi Sentence Within Functional Limits (6-7)   Overall Legibility Within Functional Limits (6-7)   Cognitive-Linguistic   Level of Consciousness Alert   Orientation Level Oriented x 4   Sustained Attention Within Functional Limits (6-7)   Short Term Memory Supervision (5)   Long Term Memory / Reminiscing Within Functional Limits (6-7)   Simple Reasoning / Problem Solving Within Functional Limits (6-7)   Complex Reasoning  /  Problem Solving Within Functional Limits (6-7)   [place] Reasoning Supervision (5)   Abstract Reasoning Supervision (5)   Safety Awareness Within Functional Limits (6-7)   Insight into Deficits Within Functional Limits (6-7)   Auditory Math Within Functional Limits (6-7)   Medication Management  Minimal (4)   Clock Drawing Within Functional Limits   Social / Pragmatic Communication   Social / Pragmatic Communication WDL   Short Term Goals   Short Term Goal # 1 Patient will be oriented in all spheres in 3/4 attempts with moderate verbal cues from SLP.    Goal Outcome # 1 Goal met   Short Term Goal # 2 Patient will recall 2/4 items from STM after 5 minutes with categorical cue.    Goal Outcome # 2  Goal met   Short Term Goal # 3 Patient will complete further testing in various areas in 2/3 attempts.    Goal Outcome  # 3 Goal met   Anticipated Discharge Needs   Discharge Recommendations Anticipate that the patient will have no further speech therapy needs after discharge from the hospital

## 2021-02-09 NOTE — DISCHARGE SUMMARY
Discharge Summary    CHIEF COMPLAINT ON ADMISSION  Chief Complaint   Patient presents with   • Syncope     during ems  pt had 10 second syncopal episode; ems assisted to ground, no injuries   • ETOH Intoxication     pt states she is currently intoxicated and drinks       Reason for Admission  ems     Admission Date  1/28/2021    CODE STATUS  Full Code    HPI & HOSPITAL COURSE  75 y.o. female with a history of alcoholism, delirium tremens, atrial fibrillation who presented on 1/28/2021 with alcohol intoxication.  Initially admitted to the floor but required transfer to the ICU for escalating sedation requirements.  The patient apparently was drinking 2 pints of vodka daily, due to her recent sudden lost of her . She was placed on legal hold, psychiatry was consulted. Patient was taken off legal hold however did require 24/7 supervision after cognitive evaluation. Medications adjusted per psychiatry team. Her mentation greatly improved. She did not require supervision. I did personally advised on alcohol cessation and I did discuss discharge plan with her son. He is concern for her mother rehab is not going to be right away due to income problems at this time. However patient states that she is interested to look forward about her grand kids and she is motivated for alcohol absence. Cognitive eval done again 2/9 and she was cleared to continue care at home.     Therefore, she is discharged in guarded and stable condition to home with close outpatient follow-up.    The patient met 2-midnight criteria for an inpatient stay at the time of discharge.    Discharge Date  02/09/2021    FOLLOW UP ITEMS POST DISCHARGE  None     DISCHARGE DIAGNOSES  Principal Problem:    Alcohol dependence (HCC) POA: Yes  Active Problems:    PAF (paroxysmal atrial fibrillation) ([facility]) POA: Yes    Gait disorder POA: Yes  Resolved Problems:    Syncope POA: Yes    Alcohol withdrawal syndrome, with delirium ([facility]) POA: Yes    Suicidal  ideation POA: Yes      FOLLOW UP  Future Appointments   Date Time Provider Department Center   6/29/2021  1:15 PM PACER CHECK-[facility] B 2 [facility] None     Jane Armstrong M.D.  [address] [address]  [place] [address]  [phone]    In 2 weeks        MEDICATIONS ON DISCHARGE     Medication List      START taking these medications      Instructions   amLODIPine 5 MG Tabs  Start taking on: February 10, 2021  Commonly known as: NORVASC   Take 1 Tab by mouth every day.  Dose: 5 mg     hydrOXYzine HCl 25 MG Tabs  Commonly known as: ATARAX   Take 1 Tab by mouth every bedtime.  Dose: 25 mg        CHANGE how you take these medications      Instructions   QUEtiapine 200 MG Tabs  What changed:   · medication strength  · how much to take  · how to take this  · when to take this  · additional instructions  · Another medication with the same name was removed. Continue taking this medication, and follow the directions you see here.  Commonly known as: Seroquel   Take 1 Tab by mouth every bedtime.  Dose: 200 mg     venlafaxine 150 MG extended-release capsule  Start taking on: February 10, 2021  What changed:   · medication strength  · how much to take  Commonly known as: EFFEXOR-XR   Take 1 Cap by mouth every day.  Dose: 150 mg        CONTINUE taking these medications      Instructions   digoxin 125 MCG Tabs  Commonly known as: LANOXIN   Take 1 Tab by mouth every day at 6 PM.  Dose: 125 mcg     Eliquis 5mg Tabs  Generic drug: apixaban   Take 1 Tab by mouth 2 Times a Day.  Dose: 5 mg     folic acid 1 MG Tabs  Commonly known as: FOLVITE   Take 1 Tab by mouth every day.  Dose: 1 mg     levothyroxine 125 MCG Tabs  Commonly known as: SYNTHROID   Take 1 Tab by mouth Every morning on an empty stomach.  Dose: 125 mcg     magnesium oxide 400 MG Tabs tablet  Commonly known as: MAG-OX   Take 400 mg by mouth every day.  Dose: 400 mg     metoprolol tartrate 100 MG Tabs  Commonly known as: LOPRESSOR   Take 1 Tab by mouth 2 Times a  Day.  Dose: 100 mg     multivitamin Tabs   Take 1 Tab by mouth every day.  Dose: 1 Tab     omeprazole 20 MG delayed-release capsule  Commonly known as: PRILOSEC   Take 1 Cap by mouth every day.  Dose: 20 mg     thiamine 50 MG Tabs  Commonly known as: vitamin B-1   Take 100 mg by mouth every day.  Dose: 100 mg        STOP taking these medications    hydrOXYzine pamoate 50 MG Caps  Commonly known as: VISTARIL     mirtazapine 15 MG Tabs  Commonly known as: Remeron     potassium chloride SA 20 MEQ Tbcr  Commonly known as: Kdur     sertraline 50 MG Tabs  Commonly known as: Zoloft     sulfamethoxazole-trimethoprim 800-160 MG tablet  Commonly known as: BACTRIM DS     tizanidine 4 MG Tabs  Commonly known as: ZANAFLEX     zolpidem 5 MG Tabs  Commonly known as: Ambien            Allergies  No Known Allergies    DIET  Orders Placed This Encounter   Procedures   • Diet Order Diet: Cardiac     Standing Status:   Standing     Number of Occurrences:   1     Order Specific Question:   Diet:     Answer:   Cardiac [6]       ACTIVITY  As tolerated.  Weight bearing as tolerated    CONSULTATIONS  Psychiatry   Critical care     PROCEDURES  None     LABORATORY  Lab Results   Component Value Date    SODIUM 138 02/09/2021    POTASSIUM 4.1 02/09/2021    CHLORIDE 106 02/09/2021    CO2 19 (L) 02/09/2021    GLUCOSE 105 (H) 02/09/2021    BUN 18 02/09/2021    CREATININE 0.52 02/09/2021        Lab Results   Component Value Date    WBC 6.1 02/09/2021    HEMOGLOBIN 13.7 02/09/2021    HEMATOCRIT 42.4 02/09/2021    PLATELETCT 183 02/09/2021        Total time of the discharge process exceeds 35 minutes.

## 2021-02-18 ENCOUNTER — OFFICE VISIT (OUTPATIENT)
Dept: MEDICAL GROUP | Facility: MEDICAL CENTER | Age: 76
End: 2021-02-18
Payer: MEDICARE

## 2021-02-18 ENCOUNTER — APPOINTMENT (OUTPATIENT)
Dept: MEDICAL GROUP | Facility: MEDICAL CENTER | Age: 76
End: 2021-02-18
Payer: MEDICARE

## 2021-02-18 VITALS
BODY MASS INDEX: 24.38 KG/M2 | HEIGHT: 66 IN | SYSTOLIC BLOOD PRESSURE: 100 MMHG | TEMPERATURE: 98.9 F | OXYGEN SATURATION: 93 % | HEART RATE: 80 BPM | WEIGHT: 151.68 LBS | DIASTOLIC BLOOD PRESSURE: 60 MMHG | RESPIRATION RATE: 20 BRPM

## 2021-02-18 DIAGNOSIS — F32.1 CURRENT MODERATE EPISODE OF MAJOR DEPRESSIVE DISORDER WITHOUT PRIOR EPISODE (HCC): ICD-10-CM

## 2021-02-18 DIAGNOSIS — I48.0 PAF (PAROXYSMAL ATRIAL FIBRILLATION) (HCC): ICD-10-CM

## 2021-02-18 DIAGNOSIS — F10.10 ALCOHOL ABUSE: ICD-10-CM

## 2021-02-18 DIAGNOSIS — Z09 HOSPITAL DISCHARGE FOLLOW-UP: ICD-10-CM

## 2021-02-18 PROCEDURE — 99214 OFFICE O/P EST MOD 30 MIN: CPT | Performed by: FAMILY MEDICINE

## 2021-02-18 ASSESSMENT — FIBROSIS 4 INDEX: FIB4 SCORE: 2.29

## 2021-02-18 NOTE — PROGRESS NOTES
CC: Hospital discharge follow-up    HPI:   Jeanie presents today for post hospitalization follow-up    Patient admitted to [facility] on 1/28/2021, patient with a history of alcoholism, delirium tremens, atrial fibrillation who presented on 1/28/2021 with alcohol intoxication.  Initially admitted to the floor but required transfer to the ICU for escalating sedation requirements.  The patient apparently was drinking 2 pints of vodka daily, due to her recent sudden lost of her . She was placed on legal hold, psychiatry was consulted. Patient was taken off legal hold however did require 24/7 supervision after cognitive evaluation. Medications adjusted per psychiatry team. Her mentation greatly improved. Was advised on alcohol cessation . Her son  is concerned that for her mother rehab is not going to be right away due to income problems at this time. However patient states that she is interested to look forward about her grand kids and she is motivated for alcohol absence.  Patient was discharged in stable condition on 2/9/2021.    Came in today for follow-up, patient seems to be motivated, this time she came alone without her son.  Looking forward for joining rehab.  Her mood has been fluctuating, however Effexor and Seroquel has been helping.  Discussed with patient the importance of seeing a psychiatrist and therapist in a regular basis, she stated that she already has a psychiatrist and therapist that she has not been seeing them in a regular basis.  Currently denies any suicidal ideation.  She has been off of alcohol for about 10 days.  Currently no sign of alcohol withdrawal.    Patient Active Problem List    Diagnosis Date Noted   • Alcohol dependence ([facility]) 01/27/2020   • PAF (paroxysmal atrial fibrillation) ([facility]) 09/10/2014   • Macrocytic anemia 02/06/2020   • Current moderate episode of major depressive disorder without prior episode ([facility]) 01/15/2020   • Gait disorder 01/29/2021   • CLEO  (obstructive sleep apnea) 01/21/2021   • Non-smoker 01/21/2021   • Other insomnia 01/21/2021   • History of ventricular tachycardia 12/29/2020   • AICD discharge 12/29/2020   • Recurrent falls 08/21/2019   • Mixed hyperlipidemia 03/11/2018   • Moderate single current episode of major depressive disorder (HCC) 12/08/2017       Current Outpatient Medications   Medication Sig Dispense Refill   • QUEtiapine (SEROQUEL) 200 MG Tab Take 1 Tab by mouth every bedtime. 60 Tab 3   • venlafaxine XR (EFFEXOR-XR) 150 MG extended-release capsule Take 1 Cap by mouth every day. 30 Cap 3   • amLODIPine (NORVASC) 5 MG Tab Take 1 Tab by mouth every day. 30 Tab 0   • hydrOXYzine HCl (ATARAX) 25 MG Tab Take 1 Tab by mouth every bedtime. 30 Tab 0   • magnesium oxide (MAG-OX) 400 MG Tab tablet Take 400 mg by mouth every day.     • thiamine (VITAMIN B-1) 50 MG Tab Take 100 mg by mouth every day.     • metoprolol (LOPRESSOR) 100 MG Tab Take 1 Tab by mouth 2 Times a Day. 180 Tab 1   • ELIQUIS 5 MG Tab Take 1 Tab by mouth 2 Times a Day. 180 Tab 2   • levothyroxine (SYNTHROID) 125 MCG Tab Take 1 Tab by mouth Every morning on an empty stomach. 90 Tab 3   • digoxin (LANOXIN) 125 MCG Tab Take 1 Tab by mouth every day at 6 PM. 30 Tab    • multivitamin (THERAGRAN) Tab Take 1 Tab by mouth every day. 30 Tab    • folic acid (FOLVITE) 1 MG Tab Take 1 Tab by mouth every day. (Patient not taking: Reported on 1/29/2021) 90 Tab 3   • omeprazole (PRILOSEC) 20 MG delayed-release capsule Take 1 Cap by mouth every day. (Patient not taking: Reported on 1/28/2021) 30 Cap      No current facility-administered medications for this visit.         Allergies as of 02/18/2021   • (No Known Allergies)        ROS: Denies any chest pain, Shortness of breath, Changes bowel or bladder, Lower extremity edema.    Physical Exam:  /60 (BP Location: Right arm, Patient Position: Sitting, BP Cuff Size: Adult)   Pulse 80   Temp 37.2 °C (98.9 °F) (Temporal)   Resp 20   Ht  "1.676 m (5' 6\")   Wt 68.8 kg (151 lb 10.8 oz)   SpO2 93%   BMI 24.48 kg/m²   Gen.: Well-developed, well-nourished, no apparent distress,pleasant and cooperative with the examination  Skin:  Warm and dry with good turgor. No rashes or suspicious lesions in visible areas  HEENT:Sinuses nontender with palpation, TMs clear, nares patent with pink mucosa and clear rhinorrhea,no septal deviation ,polyps or lesions. lips without lesions, oropharynx clear.  Neck: Trachea midline,no masses or adenopathy. No JVD.  Cor: Regular rate and rhythm without murmur, gallop or rub.  Lungs: Respirations unlabored.Clear to auscultation with equal breath sounds bilaterally. No wheezes, rhonchi.  Extremities: No cyanosis, clubbing or edema.      Assessment and Plan.   75 y.o. female     1. Hospital discharge follow-up  Hospital discharge summary reviewed.    2. Alcohol abuse  Patient has been off of alcohol for the past 10 days.  Patient will join rehab facility soon to help her continue to be sober.    3. Current moderate episode of major depressive disorder without prior episode (HCC)  Fluctuating, recently gets worse after the death of her , however denies any suicidal ideation.  Continue on Effexor 50 mg daily, and quetiapine 200 mg daily.  Continue follow-up with his psychiatrist    4. PAF (paroxysmal atrial fibrillation) (HCC)  Stable.  No RVR.  Continue on metoprolol, digoxin, and warfarin               "

## 2021-02-23 RX ORDER — DIGOXIN 125 MCG
125 TABLET ORAL DAILY
Qty: 30 TABLET | Refills: 3 | Status: SHIPPED | OUTPATIENT
Start: 2021-02-23 | End: 2021-08-02 | Stop reason: SDUPTHER

## 2021-02-24 RX ORDER — HYDROXYZINE HYDROCHLORIDE 25 MG/1
25 TABLET, FILM COATED ORAL
Qty: 30 TABLET | Refills: 0 | Status: SHIPPED | OUTPATIENT
Start: 2021-02-24 | End: 2023-05-23

## 2021-02-25 ENCOUNTER — TELEPHONE (OUTPATIENT)
Dept: MEDICAL GROUP | Facility: MEDICAL CENTER | Age: 76
End: 2021-02-25

## 2021-02-25 NOTE — TELEPHONE ENCOUNTER
Hydroxyzine use over-the-counter medication, that is why it is not covered by insurance.  All the alternate medications are the same.

## 2021-06-22 DIAGNOSIS — F51.05 INSOMNIA DUE TO OTHER MENTAL DISORDER: ICD-10-CM

## 2021-06-22 DIAGNOSIS — F99 INSOMNIA DUE TO OTHER MENTAL DISORDER: ICD-10-CM

## 2021-06-22 RX ORDER — MIRTAZAPINE 15 MG/1
15 TABLET, FILM COATED ORAL
Qty: 30 TABLET | Refills: 3 | Status: SHIPPED | OUTPATIENT
Start: 2021-06-22 | End: 2021-10-20 | Stop reason: SDUPTHER

## 2021-08-02 RX ORDER — DIGOXIN 125 MCG
125 TABLET ORAL DAILY
Qty: 30 TABLET | Refills: 3 | Status: SHIPPED | OUTPATIENT
Start: 2021-08-02 | End: 2021-11-30 | Stop reason: SDUPTHER

## 2021-09-01 ENCOUNTER — HOSPITAL ENCOUNTER (OUTPATIENT)
Dept: LAB | Facility: MEDICAL CENTER | Age: 76
End: 2021-09-01
Attending: NURSE PRACTITIONER
Payer: MEDICARE

## 2021-09-01 LAB
ALBUMIN SERPL BCP-MCNC: 4.6 G/DL (ref 3.2–4.9)
ALBUMIN/GLOB SERPL: 1.7 G/DL
ALP SERPL-CCNC: 77 U/L (ref 30–99)
ALT SERPL-CCNC: 50 U/L (ref 2–50)
ANION GAP SERPL CALC-SCNC: 20 MMOL/L (ref 7–16)
AST SERPL-CCNC: 60 U/L (ref 12–45)
BASOPHILS # BLD AUTO: 1.2 % (ref 0–1.8)
BASOPHILS # BLD: 0.07 K/UL (ref 0–0.12)
BILIRUB SERPL-MCNC: 1.1 MG/DL (ref 0.1–1.5)
BUN SERPL-MCNC: 20 MG/DL (ref 8–22)
CALCIUM SERPL-MCNC: 9.8 MG/DL (ref 8.5–10.5)
CHLORIDE SERPL-SCNC: 103 MMOL/L (ref 96–112)
CHOLEST SERPL-MCNC: 305 MG/DL (ref 100–199)
CO2 SERPL-SCNC: 21 MMOL/L (ref 20–33)
CREAT SERPL-MCNC: 0.53 MG/DL (ref 0.5–1.4)
EOSINOPHIL # BLD AUTO: 0.1 K/UL (ref 0–0.51)
EOSINOPHIL NFR BLD: 1.7 % (ref 0–6.9)
ERYTHROCYTE [DISTWIDTH] IN BLOOD BY AUTOMATED COUNT: 50 FL (ref 35.9–50)
EST. AVERAGE GLUCOSE BLD GHB EST-MCNC: 100 MG/DL
FASTING STATUS PATIENT QL REPORTED: NORMAL
GLOBULIN SER CALC-MCNC: 2.7 G/DL (ref 1.9–3.5)
GLUCOSE SERPL-MCNC: 98 MG/DL (ref 65–99)
HBA1C MFR BLD: 5.1 % (ref 4–5.6)
HCT VFR BLD AUTO: 46.4 % (ref 37–47)
HDLC SERPL-MCNC: 68 MG/DL
HGB BLD-MCNC: 15.7 G/DL (ref 12–16)
IMM GRANULOCYTES # BLD AUTO: 0.05 K/UL (ref 0–0.11)
IMM GRANULOCYTES NFR BLD AUTO: 0.9 % (ref 0–0.9)
LDLC SERPL CALC-MCNC: 186 MG/DL
LYMPHOCYTES # BLD AUTO: 1.79 K/UL (ref 1–4.8)
LYMPHOCYTES NFR BLD: 30.8 % (ref 22–41)
MCH RBC QN AUTO: 34.8 PG (ref 27–33)
MCHC RBC AUTO-ENTMCNC: 33.8 G/DL (ref 33.6–35)
MCV RBC AUTO: 102.9 FL (ref 81.4–97.8)
MONOCYTES # BLD AUTO: 0.64 K/UL (ref 0–0.85)
MONOCYTES NFR BLD AUTO: 11 % (ref 0–13.4)
NEUTROPHILS # BLD AUTO: 3.16 K/UL (ref 2–7.15)
NEUTROPHILS NFR BLD: 54.4 % (ref 44–72)
NRBC # BLD AUTO: 0 K/UL
NRBC BLD-RTO: 0 /100 WBC
PLATELET # BLD AUTO: 125 K/UL (ref 164–446)
PMV BLD AUTO: 10.9 FL (ref 9–12.9)
POTASSIUM SERPL-SCNC: 4 MMOL/L (ref 3.6–5.5)
PROT SERPL-MCNC: 7.3 G/DL (ref 6–8.2)
RBC # BLD AUTO: 4.51 M/UL (ref 4.2–5.4)
SODIUM SERPL-SCNC: 144 MMOL/L (ref 135–145)
T4 FREE SERPL-MCNC: 1 NG/DL (ref 0.93–1.7)
TRIGL SERPL-MCNC: 254 MG/DL (ref 0–149)
TSH SERPL DL<=0.005 MIU/L-ACNC: 8.43 UIU/ML (ref 0.38–5.33)
WBC # BLD AUTO: 5.8 K/UL (ref 4.8–10.8)

## 2021-09-01 PROCEDURE — 80053 COMPREHEN METABOLIC PANEL: CPT

## 2021-09-01 PROCEDURE — 83036 HEMOGLOBIN GLYCOSYLATED A1C: CPT | Mod: GA

## 2021-09-01 PROCEDURE — 84443 ASSAY THYROID STIM HORMONE: CPT

## 2021-09-01 PROCEDURE — 84439 ASSAY OF FREE THYROXINE: CPT

## 2021-09-01 PROCEDURE — 36415 COLL VENOUS BLD VENIPUNCTURE: CPT

## 2021-09-01 PROCEDURE — 80061 LIPID PANEL: CPT | Mod: GA

## 2021-09-01 PROCEDURE — 85025 COMPLETE CBC W/AUTO DIFF WBC: CPT

## 2021-09-07 RX ORDER — APIXABAN 5 MG/1
5 TABLET, FILM COATED ORAL 2 TIMES DAILY
Qty: 180 TABLET | Refills: 2 | Status: SHIPPED | OUTPATIENT
Start: 2021-09-07 | End: 2021-09-07 | Stop reason: SDUPTHER

## 2021-09-07 RX ORDER — APIXABAN 5 MG/1
5 TABLET, FILM COATED ORAL 2 TIMES DAILY
Qty: 180 TABLET | Refills: 2 | Status: SHIPPED | OUTPATIENT
Start: 2021-09-07 | End: 2021-11-30 | Stop reason: SDUPTHER

## 2021-09-13 RX ORDER — METOPROLOL TARTRATE 100 MG/1
100 TABLET ORAL 2 TIMES DAILY
Qty: 180 TABLET | Refills: 1 | Status: SHIPPED | OUTPATIENT
Start: 2021-09-13 | End: 2021-11-30 | Stop reason: SDUPTHER

## 2021-09-13 RX ORDER — METOPROLOL TARTRATE 100 MG/1
100 TABLET ORAL 2 TIMES DAILY
Qty: 180 TABLET | Refills: 1 | Status: SHIPPED | OUTPATIENT
Start: 2021-09-13 | End: 2021-09-13 | Stop reason: SDUPTHER

## 2021-10-20 DIAGNOSIS — F51.05 INSOMNIA DUE TO OTHER MENTAL DISORDER: ICD-10-CM

## 2021-10-20 DIAGNOSIS — F99 INSOMNIA DUE TO OTHER MENTAL DISORDER: ICD-10-CM

## 2021-10-20 RX ORDER — MIRTAZAPINE 15 MG/1
15 TABLET, FILM COATED ORAL
Qty: 30 TABLET | Refills: 3 | Status: SHIPPED | OUTPATIENT
Start: 2021-10-20 | End: 2023-05-23

## 2021-11-30 ENCOUNTER — OFFICE VISIT (OUTPATIENT)
Dept: CARDIOLOGY | Facility: MEDICAL CENTER | Age: 76
End: 2021-11-30
Payer: MEDICARE

## 2021-11-30 ENCOUNTER — NON-PROVIDER VISIT (OUTPATIENT)
Dept: CARDIOLOGY | Facility: MEDICAL CENTER | Age: 76
End: 2021-11-30
Payer: MEDICARE

## 2021-11-30 VITALS
HEART RATE: 74 BPM | BODY MASS INDEX: 26.52 KG/M2 | SYSTOLIC BLOOD PRESSURE: 102 MMHG | HEIGHT: 66 IN | DIASTOLIC BLOOD PRESSURE: 64 MMHG | WEIGHT: 165 LBS | RESPIRATION RATE: 14 BRPM | OXYGEN SATURATION: 94 %

## 2021-11-30 VITALS — BODY MASS INDEX: 26.63 KG/M2 | SYSTOLIC BLOOD PRESSURE: 102 MMHG | DIASTOLIC BLOOD PRESSURE: 64 MMHG | HEIGHT: 66 IN

## 2021-11-30 DIAGNOSIS — E78.2 MIXED HYPERLIPIDEMIA: ICD-10-CM

## 2021-11-30 DIAGNOSIS — I10 ESSENTIAL HYPERTENSION: ICD-10-CM

## 2021-11-30 DIAGNOSIS — I48.91 ATRIAL FIBRILLATION, UNSPECIFIED TYPE (HCC): ICD-10-CM

## 2021-11-30 DIAGNOSIS — E03.9 HYPOTHYROIDISM, UNSPECIFIED TYPE: ICD-10-CM

## 2021-11-30 DIAGNOSIS — Z86.79 HISTORY OF VENTRICULAR TACHYCARDIA: ICD-10-CM

## 2021-11-30 DIAGNOSIS — Z45.02 AICD DISCHARGE: ICD-10-CM

## 2021-11-30 DIAGNOSIS — Z95.810 AICD (AUTOMATIC CARDIOVERTER/DEFIBRILLATOR) PRESENT: ICD-10-CM

## 2021-11-30 DIAGNOSIS — F10.239 ALCOHOL DEPENDENCE WITH WITHDRAWAL WITH COMPLICATION (HCC): ICD-10-CM

## 2021-11-30 DIAGNOSIS — I47.20 VENTRICULAR TACHYCARDIA (HCC): ICD-10-CM

## 2021-11-30 PROCEDURE — 93283 PRGRMG EVAL IMPLANTABLE DFB: CPT | Performed by: NURSE PRACTITIONER

## 2021-11-30 PROCEDURE — 99214 OFFICE O/P EST MOD 30 MIN: CPT | Mod: 25 | Performed by: NURSE PRACTITIONER

## 2021-11-30 RX ORDER — METOPROLOL TARTRATE 100 MG/1
100 TABLET ORAL 2 TIMES DAILY
Qty: 180 TABLET | Refills: 1 | Status: SHIPPED | OUTPATIENT
Start: 2021-11-30 | End: 2023-05-23

## 2021-11-30 RX ORDER — ROSUVASTATIN CALCIUM 20 MG/1
TABLET, COATED ORAL
COMMUNITY
Start: 2021-10-13 | End: 2021-11-30 | Stop reason: SDUPTHER

## 2021-11-30 RX ORDER — DIGOXIN 125 MCG
125 TABLET ORAL DAILY
Qty: 90 TABLET | Refills: 1 | Status: SHIPPED | OUTPATIENT
Start: 2021-11-30 | End: 2022-04-01 | Stop reason: SDUPTHER

## 2021-11-30 RX ORDER — ROSUVASTATIN CALCIUM 20 MG/1
20 TABLET, COATED ORAL DAILY
Qty: 90 TABLET | Refills: 1 | Status: SHIPPED | OUTPATIENT
Start: 2021-11-30 | End: 2023-05-23

## 2021-11-30 RX ORDER — MAGNESIUM OXIDE 400 MG/1
400 TABLET ORAL DAILY
Qty: 90 TABLET | Refills: 1 | Status: SHIPPED | OUTPATIENT
Start: 2021-11-30 | End: 2023-05-23

## 2021-11-30 RX ORDER — APIXABAN 5 MG/1
5 TABLET, FILM COATED ORAL 2 TIMES DAILY
Qty: 180 TABLET | Refills: 2 | Status: SHIPPED | OUTPATIENT
Start: 2021-11-30 | End: 2023-05-23

## 2021-11-30 RX ORDER — LEVOTHYROXINE SODIUM 0.05 MG/1
50 TABLET ORAL
Qty: 90 TABLET | Refills: 1 | Status: SHIPPED | OUTPATIENT
Start: 2021-11-30 | End: 2023-05-23

## 2021-11-30 RX ORDER — LEVOTHYROXINE SODIUM 0.05 MG/1
TABLET ORAL
COMMUNITY
Start: 2021-10-13 | End: 2021-11-30 | Stop reason: SDUPTHER

## 2021-11-30 ASSESSMENT — ENCOUNTER SYMPTOMS
COUGH: 0
HEADACHES: 0
PND: 0
FEVER: 0
CHILLS: 0
LOSS OF CONSCIOUSNESS: 0
SHORTNESS OF BREATH: 0
BRUISES/BLEEDS EASILY: 0
PALPITATIONS: 1
DEPRESSION: 1
NAUSEA: 0
DIZZINESS: 0
ORTHOPNEA: 0
ABDOMINAL PAIN: 0
NERVOUS/ANXIOUS: 1
MYALGIAS: 0

## 2021-11-30 ASSESSMENT — FIBROSIS 4 INDEX: FIB4 SCORE: 5.02

## 2021-12-01 NOTE — PROGRESS NOTES
Chief Complaint   Patient presents with   • Follow-Up   • AICD Check/Dysfunction   • Ventricular Tachycardia   • Atrial Fibrillation   • Anticoagulation   • Hyperlipidemia   • Hypothyroidism       Subjective     Jeanie Hutchison is a 76 y.o. female who presents today for follow-up of device shocks.    Jeanie is a 76 year old female with history of VT status post AICD implanted in 2020 by Dr. Baires, paroxysmal atrial fibrillation, anticoagulation, hyperlipidemia, hypothyroidism, and depression/anxiety and alcohol abuse, last seen by Dr. Will in 2020. She is also followed by [facility].    She is here today, with a good friend, who states she had two shocks last week. She was busy with Thanksgiving, and received 2 shocks. She has relapsed into drinking again, and is feeling very depressed. She will go going to Behavioral Health after today's appointment about inquiring about inpatient rehab. She says her  got Covid this year, and , and she wasn't able to say goodbye to him. She also has two daughters who  (one of melanoma and the other of ARDS), and this has been very hard for her.  She has not been very compliant with her medications.    No overt chest pain, pressure or discomfort; some palpitations, but can't give a clear history. No dizziness or syncope; no LE edema. No shortness of breath, orthopnea or PND. No suicidal or homicidal ideation. Her friend concurs this.    Past Medical History:   Diagnosis Date   • Alcoholism (HCC)    • Anticoagulated    • Apnea, sleep    • Chronic pain     r/t pelvic fracture   • Hypothyroidism    • Insomnia     Trouble going to Sleep    • Paroxysmal atrial fibrillation (HCC)    • Psychiatric disorder     history of depression and anxiety    • Snoring    • Ventricular tachycardia (HCC)      Past Surgical History:   Procedure Laterality Date   • CLAVICLE ORIF  2014    Performed by iWl[facility] Gonzalez M.D. at [facility]   •  BREAST BIOPSY      bilateral neg breast bxs   • OTHER ORTHOPEDIC SURGERY      pelvis fracture. 10 years ago    • TONSILLECTOMY       Family History   Problem Relation Age of Onset   • Diabetes Mother    • Anxiety disorder Mother    • Depression Mother    • Hyperlipidemia Father      Social History     Socioeconomic History   • Marital status:      Spouse name: Not on file   • Number of children: Not on file   • Years of education: Not on file   • Highest education level: Not on file   Occupational History   • Not on file   Tobacco Use   • Smoking status: Never Smoker   • Smokeless tobacco: Never Used   Vaping Use   • Vaping Use: Never used   Substance and Sexual Activity   • Alcohol use: Yes     Alcohol/week: 8.4 - 12.6 oz     Types: 14 - 21 Standard drinks or equivalent per week     Comment: everyday (last 2 months 2-3 everyday)   • Drug use: Not Currently     Types: Oral     Comment: takes friends tranqualizers    • Sexual activity: Not Currently   Other Topics Concern   • Not on file   Social History Narrative   • Not on file     Social Determinants of Health     Financial Resource Strain:    • Difficulty of Paying Living Expenses: Not on file   Food Insecurity:    • Worried About Running Out of Food in the Last Year: Not on file   • Ran Out of Food in the Last Year: Not on file   Transportation Needs:    • Lack of Transportation (Medical): Not on file   • Lack of Transportation (Non-Medical): Not on file   Physical Activity:    • Days of Exercise per Week: Not on file   • Minutes of Exercise per Session: Not on file   Stress:    • Feeling of Stress : Not on file   Social Connections:    • Frequency of Communication with Friends and Family: Not on file   • Frequency of Social Gatherings with Friends and Family: Not on file   • Attends Anabaptist Services: Not on file   • Active Member of Clubs or Organizations: Not on file   • Attends Club or Organization Meetings: Not on file   • Marital Status: Not on  file   Intimate Partner Violence:    • Fear of Current or Ex-Partner: Not on file   • Emotionally Abused: Not on file   • Physically Abused: Not on file   • Sexually Abused: Not on file   Housing Stability:    • Unable to Pay for Housing in the Last Year: Not on file   • Number of Places Lived in the Last Year: Not on file   • Unstable Housing in the Last Year: Not on file     Allergies   Allergen Reactions   • Sulfa Drugs      Outpatient Encounter Medications as of 11/30/2021   Medication Sig Dispense Refill   • ELIQUIS 5 MG Tab Take 1 Tablet by mouth 2 times a day. 180 Tablet 2   • magnesium oxide (MAG-OX) 400 MG Tab tablet Take 1 Tablet by mouth every day. 90 Tablet 1   • digoxin (LANOXIN) 125 MCG Tab Take 1 Tablet by mouth every day at 6 PM. 90 Tablet 1   • rosuvastatin (CRESTOR) 20 MG Tab Take 1 Tablet by mouth every day. 90 Tablet 1   • metoprolol tartrate (LOPRESSOR) 100 MG Tab Take 1 Tablet by mouth 2 times a day. 180 Tablet 1   • levothyroxine (SYNTHROID) 50 MCG Tab Take 1 Tablet by mouth every morning on an empty stomach. 90 Tablet 1   • mirtazapine (REMERON) 15 MG Tab Take 1 Tablet by mouth at bedtime. 30 Tablet 3   • hydrOXYzine HCl (ATARAX) 25 MG Tab Take 1 tablet by mouth every bedtime. 30 tablet 0   • QUEtiapine (SEROQUEL) 200 MG Tab Take 1 Tab by mouth every bedtime. 60 Tab 3   • venlafaxine XR (EFFEXOR-XR) 150 MG extended-release capsule Take 1 Cap by mouth every day. 30 Cap 3   • thiamine (VITAMIN B-1) 50 MG Tab Take 100 mg by mouth every day.     • folic acid (FOLVITE) 1 MG Tab Take 1 Tab by mouth every day. 90 Tab 3   • multivitamin (THERAGRAN) Tab Take 1 Tab by mouth every day. 30 Tab    • omeprazole (PRILOSEC) 20 MG delayed-release capsule Take 1 Cap by mouth every day. 30 Cap    • [DISCONTINUED] rosuvastatin (CRESTOR) 20 MG Tab      • [DISCONTINUED] levothyroxine (SYNTHROID) 50 MCG Tab  (Patient not taking: Reported on 11/30/2021)     • [DISCONTINUED] metoprolol tartrate (LOPRESSOR) 100 MG Tab  "Take 1 Tablet by mouth 2 times a day. 180 Tablet 1   • [DISCONTINUED] ELIQUIS 5 MG Tab Take 1 Tablet by mouth 2 times a day. 180 Tablet 2   • [DISCONTINUED] digoxin (LANOXIN) 125 MCG Tab Take 1 tablet by mouth every day at 6 PM. (Patient not taking: Reported on 11/30/2021) 30 tablet 3   • [DISCONTINUED] amLODIPine (NORVASC) 5 MG Tab Take 1 Tab by mouth every day. 30 Tab 0   • [DISCONTINUED] magnesium oxide (MAG-OX) 400 MG Tab tablet Take 400 mg by mouth every day.     • levothyroxine (SYNTHROID) 125 MCG Tab Take 1 Tab by mouth Every morning on an empty stomach. (Patient not taking: Reported on 11/30/2021) 90 Tab 3     No facility-administered encounter medications on file as of 11/30/2021.     Review of Systems   Constitutional: Positive for malaise/fatigue. Negative for chills and fever.   HENT: Negative for congestion.    Respiratory: Negative for cough and shortness of breath.    Cardiovascular: Positive for palpitations. Negative for chest pain, orthopnea, leg swelling and PND.   Gastrointestinal: Negative for abdominal pain and nausea.   Musculoskeletal: Negative for myalgias.   Skin: Negative for rash.   Neurological: Negative for dizziness, loss of consciousness and headaches.   Endo/Heme/Allergies: Does not bruise/bleed easily.   Psychiatric/Behavioral: Positive for depression. The patient is nervous/anxious.               Objective     /64 (BP Location: Left arm, Patient Position: Sitting, BP Cuff Size: Adult)   Pulse 74   Resp 14   Ht 1.676 m (5' 6\")   Wt 74.8 kg (165 lb)   SpO2 94%   BMI 26.63 kg/m²     Physical Exam  Constitutional:       Appearance: She is well-developed.   HENT:      Head: Normocephalic.   Neck:      Vascular: No JVD.   Cardiovascular:      Rate and Rhythm: Normal rate and regular rhythm.      Heart sounds: Normal heart sounds.      Comments: AICD in left chest wall.  Pulmonary:      Effort: Pulmonary effort is normal. No respiratory distress.      Breath sounds: Normal " breath sounds. No wheezing or rales.   Abdominal:      General: Bowel sounds are normal. There is no distension.      Palpations: Abdomen is soft.      Tenderness: There is no abdominal tenderness.   Musculoskeletal:         General: Normal range of motion.      Cervical back: Normal range of motion and neck supple.   Skin:     General: Skin is warm and dry.      Findings: No rash.   Neurological:      Mental Status: She is alert and oriented to person, place, and time.     AICD interrogation today reveals normal function. Two 35J shocks on 11/24/2021 for VT, successful. 55 episodes of AFib (24.1% of total time). No changes are made today.     CONCLUSIONS OF ECHOCARDIOGRAM OF 1/21/2020:  Technically limited and difficult to interpret while in atrial   fibrillation with rapid ventricular rate.  Left ventricular size and function probably normal. Right ventricular   size and function probably normal.  Normal pericardium without effusion.  Ascending aorta diameter is 2.8  cm, which is normal.     Compared to the images of the study done 5/17/14 - no significant   Changes.    Lab Results   Component Value Date/Time    SODIUM 144 09/01/2021 10:08 AM    POTASSIUM 4.0 09/01/2021 10:08 AM    CHLORIDE 103 09/01/2021 10:08 AM    CO2 21 09/01/2021 10:08 AM    GLUCOSE 98 09/01/2021 10:08 AM    BUN 20 09/01/2021 10:08 AM    CREATININE 0.53 09/01/2021 10:08 AM     Lab Results   Component Value Date/Time    WBC 5.8 09/01/2021 10:08 AM    RBC 4.51 09/01/2021 10:08 AM    HEMOGLOBIN 15.7 09/01/2021 10:08 AM    HEMATOCRIT 46.4 09/01/2021 10:08 AM    .9 (H) 09/01/2021 10:08 AM    MCH 34.8 (H) 09/01/2021 10:08 AM    MCHC 33.8 09/01/2021 10:08 AM    MPV 10.9 09/01/2021 10:08 AM        Assessment & Plan     1. AICD (automatic cardioverter/defibrillator) present     2. Ventricular tachycardia (HCC)  digoxin (LANOXIN) 125 MCG Tab    metoprolol tartrate (LOPRESSOR) 100 MG Tab   3. Mixed hyperlipidemia  rosuvastatin (CRESTOR) 20 MG Tab    4. Atrial fibrillation, unspecified type (HCC)  ELIQUIS 5 MG Tab    magnesium oxide (MAG-OX) 400 MG Tab tablet   5. Essential hypertension     6. Hypothyroidism, unspecified type  levothyroxine (SYNTHROID) 50 MCG Tab   7. Alcohol dependence with withdrawal with complication (HCC)         Medical Decision Making: Today's Assessment/Status/Plan:        1. History of VT, status post AICD, with two recent shocks last week. She has not been on her Digoxin, and isn't always compliant with her medications. Stressed the importance of Metoprolol. These are renewed. We also had a long talk about alcohol use, and the importance of staying sober. She will be going to Behavioral Health after this appointment.    2. Hyperlipidemia, treated with Crestor.    3. Paroxysmal atrial fibrillation, evidence on AICD. She is on Toprol and Digoxin.    4. Hypertension, treated and stable.    5. Hypothyroidism, unsure of exact dose. Recent TSH was elevated. Stressed importance of taking this medication regularly.    6. Alcohol abuse, with recent relapse. This is the highest priority right now, and needs to be addressed. She will be going to Behavioral Health today after this appointment.    Her cardiac medications are renewed today. We will determine plan of action after consult with Behavioral Health.

## 2022-04-01 DIAGNOSIS — I47.20 VENTRICULAR TACHYCARDIA (HCC): ICD-10-CM

## 2022-04-01 RX ORDER — DIGOXIN 125 MCG
125 TABLET ORAL DAILY
Qty: 90 TABLET | Refills: 1 | Status: SHIPPED | OUTPATIENT
Start: 2022-04-01 | End: 2023-04-12 | Stop reason: SDUPTHER

## 2022-04-28 ENCOUNTER — TELEPHONE (OUTPATIENT)
Dept: CARDIOLOGY | Facility: MEDICAL CENTER | Age: 77
End: 2022-04-28

## 2022-04-28 NOTE — TELEPHONE ENCOUNTER
Hello,      Pt called in and stated that she received a [employer] package in the mail and is not sure why. Is someone able to call the pt regarding this please.       Best contact for pt:  PH:[phone]      Thank you,  Anna VIRGEN

## 2022-10-17 NOTE — PROGRESS NOTES
Pt transferred to R102 w/ rapid team and transport zoll. Report given to bedside RN. Monitor room and charge notified.    16

## 2022-11-23 ENCOUNTER — HOSPITAL ENCOUNTER (OUTPATIENT)
Dept: LAB | Facility: MEDICAL CENTER | Age: 77
End: 2022-11-23
Attending: NURSE PRACTITIONER
Payer: MEDICARE

## 2022-11-23 LAB
25(OH)D3 SERPL-MCNC: 35 NG/ML (ref 30–100)
ALBUMIN SERPL BCP-MCNC: 4.1 G/DL (ref 3.2–4.9)
ALBUMIN/GLOB SERPL: 1.3 G/DL
ALP SERPL-CCNC: 96 U/L (ref 30–99)
ALT SERPL-CCNC: 24 U/L (ref 2–50)
ANION GAP SERPL CALC-SCNC: 16 MMOL/L (ref 7–16)
AST SERPL-CCNC: 27 U/L (ref 12–45)
BASOPHILS # BLD AUTO: 0.9 % (ref 0–1.8)
BASOPHILS # BLD: 0.07 K/UL (ref 0–0.12)
BILIRUB SERPL-MCNC: 1.7 MG/DL (ref 0.1–1.5)
BUN SERPL-MCNC: 10 MG/DL (ref 8–22)
CALCIUM SERPL-MCNC: 10.1 MG/DL (ref 8.5–10.5)
CHLORIDE SERPL-SCNC: 103 MMOL/L (ref 96–112)
CHOLEST SERPL-MCNC: 193 MG/DL (ref 100–199)
CO2 SERPL-SCNC: 23 MMOL/L (ref 20–33)
CREAT SERPL-MCNC: 0.94 MG/DL (ref 0.5–1.4)
EOSINOPHIL # BLD AUTO: 0.07 K/UL (ref 0–0.51)
EOSINOPHIL NFR BLD: 0.9 % (ref 0–6.9)
ERYTHROCYTE [DISTWIDTH] IN BLOOD BY AUTOMATED COUNT: 46.4 FL (ref 35.9–50)
FASTING STATUS PATIENT QL REPORTED: NORMAL
FOLATE SERPL-MCNC: 15.3 NG/ML
GFR SERPLBLD CREATININE-BSD FMLA CKD-EPI: 63 ML/MIN/1.73 M 2
GLOBULIN SER CALC-MCNC: 3.2 G/DL (ref 1.9–3.5)
GLUCOSE SERPL-MCNC: 71 MG/DL (ref 65–99)
HCT VFR BLD AUTO: 45.2 % (ref 37–47)
HDLC SERPL-MCNC: 75 MG/DL
HGB BLD-MCNC: 15.2 G/DL (ref 12–16)
LDLC SERPL CALC-MCNC: 84 MG/DL
LYMPHOCYTES # BLD AUTO: 1.39 K/UL (ref 1–4.8)
LYMPHOCYTES NFR BLD: 18.1 % (ref 22–41)
MANUAL DIFF BLD: ABNORMAL
MCH RBC QN AUTO: 32.5 PG (ref 27–33)
MCHC RBC AUTO-ENTMCNC: 33.6 G/DL (ref 33.6–35)
MCV RBC AUTO: 96.6 FL (ref 81.4–97.8)
MONOCYTES # BLD AUTO: 0.26 K/UL (ref 0–0.85)
MONOCYTES NFR BLD AUTO: 3.4 % (ref 0–13.4)
NEUTROPHILS # BLD AUTO: 5.91 K/UL (ref 2–7.15)
NEUTROPHILS NFR BLD: 76.7 % (ref 44–72)
PLATELET # BLD AUTO: 139 K/UL (ref 164–446)
PMV BLD AUTO: 10.7 FL (ref 9–12.9)
POTASSIUM SERPL-SCNC: 3.6 MMOL/L (ref 3.6–5.5)
PROT SERPL-MCNC: 7.3 G/DL (ref 6–8.2)
RBC # BLD AUTO: 4.68 M/UL (ref 4.2–5.4)
SODIUM SERPL-SCNC: 142 MMOL/L (ref 135–145)
TRIGL SERPL-MCNC: 168 MG/DL (ref 0–149)
TSH SERPL DL<=0.005 MIU/L-ACNC: 3.48 UIU/ML (ref 0.38–5.33)
VIT B12 SERPL-MCNC: 479 PG/ML (ref 211–911)
WBC # BLD AUTO: 7.7 K/UL (ref 4.8–10.8)

## 2022-11-23 PROCEDURE — 80053 COMPREHEN METABOLIC PANEL: CPT

## 2022-11-23 PROCEDURE — 82607 VITAMIN B-12: CPT

## 2022-11-23 PROCEDURE — 82746 ASSAY OF FOLIC ACID SERUM: CPT

## 2022-11-23 PROCEDURE — 82306 VITAMIN D 25 HYDROXY: CPT

## 2022-11-23 PROCEDURE — 85027 COMPLETE CBC AUTOMATED: CPT

## 2022-11-23 PROCEDURE — 85007 BL SMEAR W/DIFF WBC COUNT: CPT

## 2022-11-23 PROCEDURE — 36415 COLL VENOUS BLD VENIPUNCTURE: CPT

## 2022-11-23 PROCEDURE — 80061 LIPID PANEL: CPT

## 2022-11-23 PROCEDURE — 84443 ASSAY THYROID STIM HORMONE: CPT

## 2023-04-12 DIAGNOSIS — I47.20 VENTRICULAR TACHYCARDIA (HCC): ICD-10-CM

## 2023-04-12 RX ORDER — DIGOXIN 125 MCG
125 TABLET ORAL DAILY
Qty: 90 TABLET | Refills: 0 | Status: SHIPPED
Start: 2023-04-12 | End: 2023-05-23

## 2023-04-12 NOTE — TELEPHONE ENCOUNTER
Is the patient due for a refill? Yes    Was the patient seen the past year? No    Date of last office visit: 11/30/2021    Does the patient have an upcoming appointment?  No   If yes, When?     Provider to refill:AB    Does the patients insurance require a 100 day supply?  No

## 2023-05-23 ENCOUNTER — APPOINTMENT (OUTPATIENT)
Dept: RADIOLOGY | Facility: MEDICAL CENTER | Age: 78
DRG: 308 | End: 2023-05-23
Attending: EMERGENCY MEDICINE
Payer: MEDICARE

## 2023-05-23 ENCOUNTER — HOSPITAL ENCOUNTER (INPATIENT)
Facility: MEDICAL CENTER | Age: 78
LOS: 7 days | DRG: 308 | End: 2023-05-30
Attending: EMERGENCY MEDICINE | Admitting: HOSPITALIST
Payer: MEDICARE

## 2023-05-23 DIAGNOSIS — E83.42 HYPOMAGNESEMIA: ICD-10-CM

## 2023-05-23 DIAGNOSIS — F10.930 ALCOHOL WITHDRAWAL SYNDROME WITHOUT COMPLICATION (HCC): ICD-10-CM

## 2023-05-23 DIAGNOSIS — F10.932 ALCOHOL WITHDRAWAL SYNDROME WITH PERCEPTUAL DISTURBANCE (HCC): ICD-10-CM

## 2023-05-23 DIAGNOSIS — I48.91 ATRIAL FIBRILLATION WITH RAPID VENTRICULAR RESPONSE (HCC): ICD-10-CM

## 2023-05-23 DIAGNOSIS — I10 BENIGN ESSENTIAL HTN: ICD-10-CM

## 2023-05-23 DIAGNOSIS — F34.1 PERSISTENT DEPRESSIVE DISORDER: ICD-10-CM

## 2023-05-23 DIAGNOSIS — G47.09 OTHER INSOMNIA: ICD-10-CM

## 2023-05-23 DIAGNOSIS — F33.1 MODERATE EPISODE OF RECURRENT MAJOR DEPRESSIVE DISORDER (HCC): ICD-10-CM

## 2023-05-23 DIAGNOSIS — I48.91 ATRIAL FIBRILLATION WITH RVR (HCC): ICD-10-CM

## 2023-05-23 DIAGNOSIS — E03.9 ACQUIRED HYPOTHYROIDISM: ICD-10-CM

## 2023-05-23 LAB
ALBUMIN SERPL BCP-MCNC: 3.3 G/DL (ref 3.2–4.9)
ALBUMIN/GLOB SERPL: 1.3 G/DL
ALP SERPL-CCNC: 94 U/L (ref 30–99)
ALT SERPL-CCNC: 47 U/L (ref 2–50)
ANION GAP SERPL CALC-SCNC: 21 MMOL/L (ref 7–16)
APTT PPP: 29.1 SEC (ref 24.7–36)
AST SERPL-CCNC: 79 U/L (ref 12–45)
BASOPHILS # BLD AUTO: 1.1 % (ref 0–1.8)
BASOPHILS # BLD: 0.06 K/UL (ref 0–0.12)
BILIRUB SERPL-MCNC: 1.1 MG/DL (ref 0.1–1.5)
BUN SERPL-MCNC: 16 MG/DL (ref 8–22)
CALCIUM ALBUM COR SERPL-MCNC: 8.4 MG/DL (ref 8.5–10.5)
CALCIUM SERPL-MCNC: 7.8 MG/DL (ref 8.5–10.5)
CHLORIDE SERPL-SCNC: 107 MMOL/L (ref 96–112)
CO2 SERPL-SCNC: 16 MMOL/L (ref 20–33)
CREAT SERPL-MCNC: 0.61 MG/DL (ref 0.5–1.4)
DIGOXIN SERPL-MCNC: <0.3 NG/ML (ref 0.8–2)
EOSINOPHIL # BLD AUTO: 0.04 K/UL (ref 0–0.51)
EOSINOPHIL NFR BLD: 0.7 % (ref 0–6.9)
ERYTHROCYTE [DISTWIDTH] IN BLOOD BY AUTOMATED COUNT: 60.2 FL (ref 35.9–50)
ETHANOL BLD-MCNC: 274.7 MG/DL
GFR SERPLBLD CREATININE-BSD FMLA CKD-EPI: 93 ML/MIN/1.73 M 2
GLOBULIN SER CALC-MCNC: 2.5 G/DL (ref 1.9–3.5)
GLUCOSE SERPL-MCNC: 74 MG/DL (ref 65–99)
HCT VFR BLD AUTO: 41.4 % (ref 37–47)
HGB BLD-MCNC: 14.3 G/DL (ref 12–16)
IMM GRANULOCYTES # BLD AUTO: 0.02 K/UL (ref 0–0.11)
IMM GRANULOCYTES NFR BLD AUTO: 0.4 % (ref 0–0.9)
INR PPP: 1.45 (ref 0.87–1.13)
LYMPHOCYTES # BLD AUTO: 2.32 K/UL (ref 1–4.8)
LYMPHOCYTES NFR BLD: 41.4 % (ref 22–41)
MAGNESIUM SERPL-MCNC: 1.9 MG/DL (ref 1.5–2.5)
MCH RBC QN AUTO: 37.2 PG (ref 27–33)
MCHC RBC AUTO-ENTMCNC: 34.5 G/DL (ref 32.2–35.5)
MCV RBC AUTO: 107.8 FL (ref 81.4–97.8)
MONOCYTES # BLD AUTO: 0.79 K/UL (ref 0–0.85)
MONOCYTES NFR BLD AUTO: 14.1 % (ref 0–13.4)
NEUTROPHILS # BLD AUTO: 2.37 K/UL (ref 1.82–7.42)
NEUTROPHILS NFR BLD: 42.3 % (ref 44–72)
NRBC # BLD AUTO: 0 K/UL
NRBC BLD-RTO: 0 /100 WBC (ref 0–0.2)
PLATELET # BLD AUTO: 114 K/UL (ref 164–446)
PMV BLD AUTO: 9.9 FL (ref 9–12.9)
POTASSIUM SERPL-SCNC: 3.6 MMOL/L (ref 3.6–5.5)
PROT SERPL-MCNC: 5.8 G/DL (ref 6–8.2)
PROTHROMBIN TIME: 17.4 SEC (ref 12–14.6)
RBC # BLD AUTO: 3.84 M/UL (ref 4.2–5.4)
SODIUM SERPL-SCNC: 144 MMOL/L (ref 135–145)
TROPONIN T SERPL-MCNC: 10 NG/L (ref 6–19)
TROPONIN T SERPL-MCNC: 8 NG/L (ref 6–19)
TROPONIN T SERPL-MCNC: 9 NG/L (ref 6–19)
WBC # BLD AUTO: 5.6 K/UL (ref 4.8–10.8)

## 2023-05-23 PROCEDURE — 85025 COMPLETE CBC W/AUTO DIFF WBC: CPT

## 2023-05-23 PROCEDURE — 99223 1ST HOSP IP/OBS HIGH 75: CPT | Mod: AI | Performed by: HOSPITALIST

## 2023-05-23 PROCEDURE — 700102 HCHG RX REV CODE 250 W/ 637 OVERRIDE(OP): Performed by: HOSPITALIST

## 2023-05-23 PROCEDURE — 36415 COLL VENOUS BLD VENIPUNCTURE: CPT

## 2023-05-23 PROCEDURE — 96365 THER/PROPH/DIAG IV INF INIT: CPT

## 2023-05-23 PROCEDURE — 700101 HCHG RX REV CODE 250: Performed by: HOSPITALIST

## 2023-05-23 PROCEDURE — HZ2ZZZZ DETOXIFICATION SERVICES FOR SUBSTANCE ABUSE TREATMENT: ICD-10-PCS | Performed by: HOSPITALIST

## 2023-05-23 PROCEDURE — 80053 COMPREHEN METABOLIC PANEL: CPT

## 2023-05-23 PROCEDURE — 71045 X-RAY EXAM CHEST 1 VIEW: CPT

## 2023-05-23 PROCEDURE — 700105 HCHG RX REV CODE 258: Performed by: HOSPITALIST

## 2023-05-23 PROCEDURE — 80162 ASSAY OF DIGOXIN TOTAL: CPT

## 2023-05-23 PROCEDURE — 700111 HCHG RX REV CODE 636 W/ 250 OVERRIDE (IP): Performed by: EMERGENCY MEDICINE

## 2023-05-23 PROCEDURE — A9270 NON-COVERED ITEM OR SERVICE: HCPCS | Performed by: HOSPITALIST

## 2023-05-23 PROCEDURE — 84484 ASSAY OF TROPONIN QUANT: CPT | Mod: 91

## 2023-05-23 PROCEDURE — 85730 THROMBOPLASTIN TIME PARTIAL: CPT

## 2023-05-23 PROCEDURE — 770020 HCHG ROOM/CARE - TELE (206)

## 2023-05-23 PROCEDURE — 700101 HCHG RX REV CODE 250

## 2023-05-23 PROCEDURE — 93005 ELECTROCARDIOGRAM TRACING: CPT

## 2023-05-23 PROCEDURE — 96375 TX/PRO/DX INJ NEW DRUG ADDON: CPT

## 2023-05-23 PROCEDURE — 99285 EMERGENCY DEPT VISIT HI MDM: CPT

## 2023-05-23 PROCEDURE — 83735 ASSAY OF MAGNESIUM: CPT

## 2023-05-23 PROCEDURE — 85610 PROTHROMBIN TIME: CPT

## 2023-05-23 PROCEDURE — 82077 ASSAY SPEC XCP UR&BREATH IA: CPT

## 2023-05-23 RX ORDER — BISACODYL 10 MG
10 SUPPOSITORY, RECTAL RECTAL
Status: DISCONTINUED | OUTPATIENT
Start: 2023-05-23 | End: 2023-05-30 | Stop reason: HOSPADM

## 2023-05-23 RX ORDER — ESCITALOPRAM OXALATE 10 MG/1
10 TABLET ORAL DAILY
Status: DISCONTINUED | OUTPATIENT
Start: 2023-05-24 | End: 2023-05-25

## 2023-05-23 RX ORDER — LORAZEPAM 2 MG/1
4 TABLET ORAL
Status: DISCONTINUED | OUTPATIENT
Start: 2023-05-23 | End: 2023-05-28

## 2023-05-23 RX ORDER — METOPROLOL SUCCINATE 100 MG/1
100 TABLET, EXTENDED RELEASE ORAL DAILY
Status: DISCONTINUED | OUTPATIENT
Start: 2023-05-24 | End: 2023-05-24

## 2023-05-23 RX ORDER — ONDANSETRON 2 MG/ML
4 INJECTION INTRAMUSCULAR; INTRAVENOUS EVERY 4 HOURS PRN
Status: DISCONTINUED | OUTPATIENT
Start: 2023-05-23 | End: 2023-05-30 | Stop reason: HOSPADM

## 2023-05-23 RX ORDER — TRAZODONE HYDROCHLORIDE 50 MG/1
50 TABLET ORAL
Status: DISCONTINUED | OUTPATIENT
Start: 2023-05-23 | End: 2023-05-30 | Stop reason: HOSPADM

## 2023-05-23 RX ORDER — DIGOXIN 125 MCG
125 TABLET ORAL DAILY
Status: ON HOLD | COMMUNITY
End: 2023-05-27 | Stop reason: SDUPTHER

## 2023-05-23 RX ORDER — FOLIC ACID 1 MG/1
1 TABLET ORAL DAILY
Status: COMPLETED | OUTPATIENT
Start: 2023-05-24 | End: 2023-05-27

## 2023-05-23 RX ORDER — LORAZEPAM 2 MG/ML
1 INJECTION INTRAMUSCULAR
Status: DISCONTINUED | OUTPATIENT
Start: 2023-05-23 | End: 2023-05-28

## 2023-05-23 RX ORDER — METOPROLOL SUCCINATE 100 MG/1
100 TABLET, EXTENDED RELEASE ORAL DAILY
Status: ON HOLD | COMMUNITY
End: 2023-05-27

## 2023-05-23 RX ORDER — LORAZEPAM 1 MG/1
1 TABLET ORAL EVERY 4 HOURS PRN
Status: DISCONTINUED | OUTPATIENT
Start: 2023-05-23 | End: 2023-05-28

## 2023-05-23 RX ORDER — ONDANSETRON 4 MG/1
4 TABLET, ORALLY DISINTEGRATING ORAL EVERY 4 HOURS PRN
Status: DISCONTINUED | OUTPATIENT
Start: 2023-05-23 | End: 2023-05-30 | Stop reason: HOSPADM

## 2023-05-23 RX ORDER — VENLAFAXINE HYDROCHLORIDE 75 MG/1
150 CAPSULE, EXTENDED RELEASE ORAL DAILY
Status: DISCONTINUED | OUTPATIENT
Start: 2023-05-24 | End: 2023-05-30

## 2023-05-23 RX ORDER — LORAZEPAM 2 MG/ML
1.5 INJECTION INTRAMUSCULAR
Status: DISCONTINUED | OUTPATIENT
Start: 2023-05-23 | End: 2023-05-28

## 2023-05-23 RX ORDER — ACETAMINOPHEN 325 MG/1
650 TABLET ORAL EVERY 6 HOURS PRN
Status: DISCONTINUED | OUTPATIENT
Start: 2023-05-23 | End: 2023-05-30 | Stop reason: HOSPADM

## 2023-05-23 RX ORDER — LEVOTHYROXINE SODIUM 0.05 MG/1
50 TABLET ORAL
Status: DISCONTINUED | OUTPATIENT
Start: 2023-05-24 | End: 2023-05-30 | Stop reason: HOSPADM

## 2023-05-23 RX ORDER — GAUZE BANDAGE 2" X 2"
100 BANDAGE TOPICAL DAILY
Status: COMPLETED | OUTPATIENT
Start: 2023-05-24 | End: 2023-05-27

## 2023-05-23 RX ORDER — ESCITALOPRAM OXALATE 10 MG/1
10 TABLET ORAL DAILY
Status: ON HOLD | COMMUNITY
End: 2023-05-27 | Stop reason: SDUPTHER

## 2023-05-23 RX ORDER — LORAZEPAM 2 MG/ML
0.5 INJECTION INTRAMUSCULAR EVERY 4 HOURS PRN
Status: DISCONTINUED | OUTPATIENT
Start: 2023-05-23 | End: 2023-05-28

## 2023-05-23 RX ORDER — LORAZEPAM 2 MG/ML
2 INJECTION INTRAMUSCULAR
Status: DISCONTINUED | OUTPATIENT
Start: 2023-05-23 | End: 2023-05-28

## 2023-05-23 RX ORDER — VENLAFAXINE HYDROCHLORIDE 150 MG/1
150 CAPSULE, EXTENDED RELEASE ORAL DAILY
Status: ON HOLD | COMMUNITY
End: 2023-05-27

## 2023-05-23 RX ORDER — AMOXICILLIN 250 MG
2 CAPSULE ORAL 2 TIMES DAILY
Status: DISCONTINUED | OUTPATIENT
Start: 2023-05-24 | End: 2023-05-30 | Stop reason: HOSPADM

## 2023-05-23 RX ORDER — POLYETHYLENE GLYCOL 3350 17 G/17G
1 POWDER, FOR SOLUTION ORAL
Status: DISCONTINUED | OUTPATIENT
Start: 2023-05-23 | End: 2023-05-30 | Stop reason: HOSPADM

## 2023-05-23 RX ORDER — METOPROLOL TARTRATE 1 MG/ML
5 INJECTION, SOLUTION INTRAVENOUS
Status: COMPLETED | OUTPATIENT
Start: 2023-05-23 | End: 2023-05-24

## 2023-05-23 RX ORDER — LEVOTHYROXINE SODIUM 0.05 MG/1
50 TABLET ORAL
Status: ON HOLD | COMMUNITY
End: 2023-05-27 | Stop reason: SDUPTHER

## 2023-05-23 RX ORDER — LORAZEPAM 0.5 MG/1
0.5 TABLET ORAL EVERY 4 HOURS PRN
Status: DISCONTINUED | OUTPATIENT
Start: 2023-05-23 | End: 2023-05-28

## 2023-05-23 RX ORDER — SODIUM CHLORIDE, SODIUM LACTATE, POTASSIUM CHLORIDE, CALCIUM CHLORIDE 600; 310; 30; 20 MG/100ML; MG/100ML; MG/100ML; MG/100ML
INJECTION, SOLUTION INTRAVENOUS CONTINUOUS
Status: DISCONTINUED | OUTPATIENT
Start: 2023-05-23 | End: 2023-05-24

## 2023-05-23 RX ORDER — LORAZEPAM 2 MG/ML
1 INJECTION INTRAMUSCULAR ONCE
Status: COMPLETED | OUTPATIENT
Start: 2023-05-23 | End: 2023-05-23

## 2023-05-23 RX ORDER — DIGOXIN 125 MCG
125 TABLET ORAL DAILY
Status: DISCONTINUED | OUTPATIENT
Start: 2023-05-23 | End: 2023-05-24

## 2023-05-23 RX ORDER — LABETALOL HYDROCHLORIDE 5 MG/ML
10 INJECTION, SOLUTION INTRAVENOUS EVERY 4 HOURS PRN
Status: DISCONTINUED | OUTPATIENT
Start: 2023-05-23 | End: 2023-05-27

## 2023-05-23 RX ORDER — LORAZEPAM 2 MG/1
2 TABLET ORAL
Status: DISCONTINUED | OUTPATIENT
Start: 2023-05-23 | End: 2023-05-28

## 2023-05-23 RX ADMIN — SODIUM CHLORIDE, POTASSIUM CHLORIDE, SODIUM LACTATE AND CALCIUM CHLORIDE: 600; 310; 30; 20 INJECTION, SOLUTION INTRAVENOUS at 20:51

## 2023-05-23 RX ADMIN — METOPROLOL TARTRATE 5 MG: 5 INJECTION INTRAVENOUS at 20:44

## 2023-05-23 RX ADMIN — THIAMINE HYDROCHLORIDE: 100 INJECTION, SOLUTION INTRAMUSCULAR; INTRAVENOUS at 18:32

## 2023-05-23 RX ADMIN — LORAZEPAM 1 MG: 2 INJECTION INTRAMUSCULAR; INTRAVENOUS at 19:00

## 2023-05-23 RX ADMIN — DIGOXIN 125 MCG: 0.12 TABLET ORAL at 21:47

## 2023-05-23 RX ADMIN — APIXABAN 5 MG: 5 TABLET, FILM COATED ORAL at 21:47

## 2023-05-23 RX ADMIN — LORAZEPAM 0.5 MG: 0.5 TABLET ORAL at 23:19

## 2023-05-23 ASSESSMENT — ENCOUNTER SYMPTOMS
POLYDIPSIA: 0
FALLS: 0
VOMITING: 0
SPUTUM PRODUCTION: 0
BRUISES/BLEEDS EASILY: 0
FEVER: 0
NECK PAIN: 0
FLANK PAIN: 0
MYALGIAS: 0
EYE PAIN: 0
HEMOPTYSIS: 0
HEADACHES: 0
SORE THROAT: 0
SINUS PAIN: 0
DIAPHORESIS: 0
TREMORS: 0
ORTHOPNEA: 0
DIZZINESS: 0
DEPRESSION: 1
HALLUCINATIONS: 0
BLURRED VISION: 0
WHEEZING: 0
SHORTNESS OF BREATH: 1
PALPITATIONS: 1
BLOOD IN STOOL: 0
WEAKNESS: 0
NAUSEA: 0
TINGLING: 0
STRIDOR: 0
DOUBLE VISION: 0
CONSTIPATION: 0
CLAUDICATION: 0
DIARRHEA: 0
HEARTBURN: 0
PHOTOPHOBIA: 0
PND: 0
BACK PAIN: 0
ABDOMINAL PAIN: 0
CHILLS: 0
COUGH: 0

## 2023-05-23 ASSESSMENT — PATIENT HEALTH QUESTIONNAIRE - PHQ9
4. FEELING TIRED OR HAVING LITTLE ENERGY: SEVERAL DAYS
9. THOUGHTS THAT YOU WOULD BE BETTER OFF DEAD, OR OF HURTING YOURSELF: NOT AT ALL
5. POOR APPETITE OR OVEREATING: NOT AT ALL
6. FEELING BAD ABOUT YOURSELF - OR THAT YOU ARE A FAILURE OR HAVE LET YOURSELF OR YOUR FAMILY DOWN: SEVERAL DAYS
8. MOVING OR SPEAKING SO SLOWLY THAT OTHER PEOPLE COULD HAVE NOTICED. OR THE OPPOSITE, BEING SO FIGETY OR RESTLESS THAT YOU HAVE BEEN MOVING AROUND A LOT MORE THAN USUAL: NOT AT ALL
SUM OF ALL RESPONSES TO PHQ QUESTIONS 1-9: 5
2. FEELING DOWN, DEPRESSED, IRRITABLE, OR HOPELESS: SEVERAL DAYS
SUM OF ALL RESPONSES TO PHQ9 QUESTIONS 1 AND 2: 2
3. TROUBLE FALLING OR STAYING ASLEEP OR SLEEPING TOO MUCH: SEVERAL DAYS
7. TROUBLE CONCENTRATING ON THINGS, SUCH AS READING THE NEWSPAPER OR WATCHING TELEVISION: NOT AT ALL
1. LITTLE INTEREST OR PLEASURE IN DOING THINGS: SEVERAL DAYS

## 2023-05-23 ASSESSMENT — COGNITIVE AND FUNCTIONAL STATUS - GENERAL
CLIMB 3 TO 5 STEPS WITH RAILING: A LITTLE
DAILY ACTIVITIY SCORE: 24
SUGGESTED CMS G CODE MODIFIER MOBILITY: CJ
SUGGESTED CMS G CODE MODIFIER DAILY ACTIVITY: CH
STANDING UP FROM CHAIR USING ARMS: A LITTLE
WALKING IN HOSPITAL ROOM: A LITTLE
MOBILITY SCORE: 21

## 2023-05-23 ASSESSMENT — LIFESTYLE VARIABLES
TOTAL SCORE: 5
TREMOR: NO TREMOR
AUDITORY DISTURBANCES: NOT PRESENT
ON A TYPICAL DAY WHEN YOU DRINK ALCOHOL HOW MANY DRINKS DO YOU HAVE: 4
SUBSTANCE_ABUSE: 0
ANXIETY: *
PAROXYSMAL SWEATS: BARELY PERCEPTIBLE SWEATING. PALMS MOIST
TOTAL SCORE: 4
NAUSEA AND VOMITING: MILD NAUSEA WITH NO VOMITING
HOW MANY TIMES IN THE PAST YEAR HAVE YOU HAD 5 OR MORE DRINKS IN A DAY: 10
TOTAL SCORE: 4
TOTAL SCORE: 4
HAVE PEOPLE ANNOYED YOU BY CRITICIZING YOUR DRINKING: YES
ALCOHOL_USE: YES
HEADACHE, FULLNESS IN HEAD: NOT PRESENT
HAVE YOU EVER FELT YOU SHOULD CUT DOWN ON YOUR DRINKING: YES
EVER FELT BAD OR GUILTY ABOUT YOUR DRINKING: YES
DOES PATIENT WANT TO STOP DRINKING: CANNOT ASSESS
AGITATION: SOMEWHAT MORE THAN NORMAL ACTIVITY
EVER HAD A DRINK FIRST THING IN THE MORNING TO STEADY YOUR NERVES TO GET RID OF A HANGOVER: YES
AVERAGE NUMBER OF DAYS PER WEEK YOU HAVE A DRINK CONTAINING ALCOHOL: 7
VISUAL DISTURBANCES: NOT PRESENT
ORIENTATION AND CLOUDING OF SENSORIUM: ORIENTED AND CAN DO SERIAL ADDITIONS
CONSUMPTION TOTAL: POSITIVE

## 2023-05-23 ASSESSMENT — PAIN DESCRIPTION - PAIN TYPE
TYPE: ACUTE PAIN
TYPE: ACUTE PAIN

## 2023-05-23 ASSESSMENT — FIBROSIS 4 INDEX: FIB4 SCORE: 2.97

## 2023-05-24 PROBLEM — F10.932 ALCOHOL WITHDRAWAL SYNDROME WITH PERCEPTUAL DISTURBANCE (HCC): Status: ACTIVE | Noted: 2023-05-24

## 2023-05-24 PROBLEM — Z65.8 PSYCHOSOCIAL STRESSORS: Status: ACTIVE | Noted: 2023-05-24

## 2023-05-24 LAB
ALBUMIN SERPL BCP-MCNC: 3.1 G/DL (ref 3.2–4.9)
ALBUMIN/GLOB SERPL: 1.3 G/DL
ALP SERPL-CCNC: 89 U/L (ref 30–99)
ALT SERPL-CCNC: 47 U/L (ref 2–50)
ANION GAP SERPL CALC-SCNC: 16 MMOL/L (ref 7–16)
AST SERPL-CCNC: 69 U/L (ref 12–45)
BASOPHILS # BLD AUTO: 1.2 % (ref 0–1.8)
BASOPHILS # BLD: 0.05 K/UL (ref 0–0.12)
BILIRUB SERPL-MCNC: 1.2 MG/DL (ref 0.1–1.5)
BUN SERPL-MCNC: 13 MG/DL (ref 8–22)
CALCIUM ALBUM COR SERPL-MCNC: 9 MG/DL (ref 8.5–10.5)
CALCIUM SERPL-MCNC: 8.3 MG/DL (ref 8.5–10.5)
CHLORIDE SERPL-SCNC: 107 MMOL/L (ref 96–112)
CO2 SERPL-SCNC: 18 MMOL/L (ref 20–33)
CREAT SERPL-MCNC: 0.45 MG/DL (ref 0.5–1.4)
EKG IMPRESSION: NORMAL
EOSINOPHIL # BLD AUTO: 0.04 K/UL (ref 0–0.51)
EOSINOPHIL NFR BLD: 1 % (ref 0–6.9)
ERYTHROCYTE [DISTWIDTH] IN BLOOD BY AUTOMATED COUNT: 59.8 FL (ref 35.9–50)
GFR SERPLBLD CREATININE-BSD FMLA CKD-EPI: 100 ML/MIN/1.73 M 2
GLOBULIN SER CALC-MCNC: 2.4 G/DL (ref 1.9–3.5)
GLUCOSE SERPL-MCNC: 104 MG/DL (ref 65–99)
HCT VFR BLD AUTO: 39.6 % (ref 37–47)
HGB BLD-MCNC: 13.5 G/DL (ref 12–16)
IMM GRANULOCYTES # BLD AUTO: 0.02 K/UL (ref 0–0.11)
IMM GRANULOCYTES NFR BLD AUTO: 0.5 % (ref 0–0.9)
LYMPHOCYTES # BLD AUTO: 1.12 K/UL (ref 1–4.8)
LYMPHOCYTES NFR BLD: 27.5 % (ref 22–41)
MAGNESIUM SERPL-MCNC: 1.7 MG/DL (ref 1.5–2.5)
MCH RBC QN AUTO: 36.8 PG (ref 27–33)
MCHC RBC AUTO-ENTMCNC: 34.1 G/DL (ref 32.2–35.5)
MCV RBC AUTO: 107.9 FL (ref 81.4–97.8)
MONOCYTES # BLD AUTO: 0.73 K/UL (ref 0–0.85)
MONOCYTES NFR BLD AUTO: 17.9 % (ref 0–13.4)
NEUTROPHILS # BLD AUTO: 2.12 K/UL (ref 1.82–7.42)
NEUTROPHILS NFR BLD: 51.9 % (ref 44–72)
NRBC # BLD AUTO: 0 K/UL
NRBC BLD-RTO: 0 /100 WBC (ref 0–0.2)
NT-PROBNP SERPL IA-MCNC: 321 PG/ML (ref 0–125)
PHOSPHATE SERPL-MCNC: 2.3 MG/DL (ref 2.5–4.5)
PLATELET # BLD AUTO: 110 K/UL (ref 164–446)
PLATELETS.RETICULATED NFR BLD AUTO: 3 % (ref 0.6–13.1)
PMV BLD AUTO: 10 FL (ref 9–12.9)
POTASSIUM SERPL-SCNC: 4 MMOL/L (ref 3.6–5.5)
PROT SERPL-MCNC: 5.5 G/DL (ref 6–8.2)
RBC # BLD AUTO: 3.67 M/UL (ref 4.2–5.4)
SODIUM SERPL-SCNC: 141 MMOL/L (ref 135–145)
TROPONIN T SERPL-MCNC: 9 NG/L (ref 6–19)
WBC # BLD AUTO: 4.1 K/UL (ref 4.8–10.8)

## 2023-05-24 PROCEDURE — 700102 HCHG RX REV CODE 250 W/ 637 OVERRIDE(OP): Performed by: HOSPITALIST

## 2023-05-24 PROCEDURE — 700102 HCHG RX REV CODE 250 W/ 637 OVERRIDE(OP): Performed by: STUDENT IN AN ORGANIZED HEALTH CARE EDUCATION/TRAINING PROGRAM

## 2023-05-24 PROCEDURE — 700105 HCHG RX REV CODE 258: Performed by: HOSPITALIST

## 2023-05-24 PROCEDURE — 99291 CRITICAL CARE FIRST HOUR: CPT | Performed by: STUDENT IN AN ORGANIZED HEALTH CARE EDUCATION/TRAINING PROGRAM

## 2023-05-24 PROCEDURE — 83735 ASSAY OF MAGNESIUM: CPT

## 2023-05-24 PROCEDURE — A9270 NON-COVERED ITEM OR SERVICE: HCPCS | Performed by: HOSPITALIST

## 2023-05-24 PROCEDURE — A9270 NON-COVERED ITEM OR SERVICE: HCPCS | Performed by: STUDENT IN AN ORGANIZED HEALTH CARE EDUCATION/TRAINING PROGRAM

## 2023-05-24 PROCEDURE — 83880 ASSAY OF NATRIURETIC PEPTIDE: CPT

## 2023-05-24 PROCEDURE — 85025 COMPLETE CBC W/AUTO DIFF WBC: CPT

## 2023-05-24 PROCEDURE — 36415 COLL VENOUS BLD VENIPUNCTURE: CPT

## 2023-05-24 PROCEDURE — 94760 N-INVAS EAR/PLS OXIMETRY 1: CPT

## 2023-05-24 PROCEDURE — 700111 HCHG RX REV CODE 636 W/ 250 OVERRIDE (IP): Performed by: STUDENT IN AN ORGANIZED HEALTH CARE EDUCATION/TRAINING PROGRAM

## 2023-05-24 PROCEDURE — 85055 RETICULATED PLATELET ASSAY: CPT

## 2023-05-24 PROCEDURE — 700101 HCHG RX REV CODE 250: Performed by: STUDENT IN AN ORGANIZED HEALTH CARE EDUCATION/TRAINING PROGRAM

## 2023-05-24 PROCEDURE — 84484 ASSAY OF TROPONIN QUANT: CPT

## 2023-05-24 PROCEDURE — 700101 HCHG RX REV CODE 250: Performed by: HOSPITALIST

## 2023-05-24 PROCEDURE — 84100 ASSAY OF PHOSPHORUS: CPT

## 2023-05-24 PROCEDURE — 80053 COMPREHEN METABOLIC PANEL: CPT

## 2023-05-24 PROCEDURE — 770020 HCHG ROOM/CARE - TELE (206)

## 2023-05-24 PROCEDURE — 700111 HCHG RX REV CODE 636 W/ 250 OVERRIDE (IP): Performed by: HOSPITALIST

## 2023-05-24 RX ORDER — VALPROIC ACID 250 MG/1
250 CAPSULE, LIQUID FILLED ORAL EVERY 8 HOURS
Status: DISCONTINUED | OUTPATIENT
Start: 2023-05-24 | End: 2023-05-25

## 2023-05-24 RX ORDER — METOPROLOL TARTRATE 1 MG/ML
5 INJECTION, SOLUTION INTRAVENOUS
Status: DISCONTINUED | OUTPATIENT
Start: 2023-05-24 | End: 2023-05-27

## 2023-05-24 RX ORDER — METOPROLOL TARTRATE 1 MG/ML
5 INJECTION, SOLUTION INTRAVENOUS ONCE
Status: COMPLETED | OUTPATIENT
Start: 2023-05-24 | End: 2023-05-24

## 2023-05-24 RX ORDER — GABAPENTIN 100 MG/1
200 CAPSULE ORAL 3 TIMES DAILY
Status: DISCONTINUED | OUTPATIENT
Start: 2023-05-24 | End: 2023-05-26

## 2023-05-24 RX ORDER — LANOLIN ALCOHOL/MO/W.PET/CERES
400 CREAM (GRAM) TOPICAL DAILY
Status: DISCONTINUED | OUTPATIENT
Start: 2023-05-24 | End: 2023-05-30 | Stop reason: HOSPADM

## 2023-05-24 RX ORDER — CARVEDILOL 3.12 MG/1
3.12 TABLET ORAL 2 TIMES DAILY WITH MEALS
Status: DISCONTINUED | OUTPATIENT
Start: 2023-05-24 | End: 2023-05-26

## 2023-05-24 RX ORDER — MAGNESIUM SULFATE HEPTAHYDRATE 40 MG/ML
4 INJECTION, SOLUTION INTRAVENOUS ONCE
Status: COMPLETED | OUTPATIENT
Start: 2023-05-24 | End: 2023-05-24

## 2023-05-24 RX ORDER — DIGOXIN 0.25 MG/ML
250 INJECTION INTRAMUSCULAR; INTRAVENOUS 2 TIMES DAILY
Status: DISCONTINUED | OUTPATIENT
Start: 2023-05-24 | End: 2023-05-24

## 2023-05-24 RX ORDER — DIGOXIN 125 MCG
125 TABLET ORAL DAILY
Status: DISCONTINUED | OUTPATIENT
Start: 2023-05-25 | End: 2023-05-30 | Stop reason: HOSPADM

## 2023-05-24 RX ORDER — DIGOXIN 0.25 MG/ML
125 INJECTION INTRAMUSCULAR; INTRAVENOUS EVERY 8 HOURS
Status: COMPLETED | OUTPATIENT
Start: 2023-05-24 | End: 2023-05-25

## 2023-05-24 RX ORDER — POTASSIUM CHLORIDE 20 MEQ/1
40 TABLET, EXTENDED RELEASE ORAL ONCE
Status: COMPLETED | OUTPATIENT
Start: 2023-05-24 | End: 2023-05-24

## 2023-05-24 RX ADMIN — DIBASIC SODIUM PHOSPHATE, MONOBASIC POTASSIUM PHOSPHATE AND MONOBASIC SODIUM PHOSPHATE 250 MG: 852; 155; 130 TABLET ORAL at 13:06

## 2023-05-24 RX ADMIN — LORAZEPAM 0.5 MG: 2 INJECTION INTRAMUSCULAR; INTRAVENOUS at 23:48

## 2023-05-24 RX ADMIN — METOPROLOL SUCCINATE 100 MG: 100 TABLET, EXTENDED RELEASE ORAL at 05:25

## 2023-05-24 RX ADMIN — DIGOXIN 250 MCG: 0.25 INJECTION INTRAMUSCULAR; INTRAVENOUS at 11:06

## 2023-05-24 RX ADMIN — DIBASIC SODIUM PHOSPHATE, MONOBASIC POTASSIUM PHOSPHATE AND MONOBASIC SODIUM PHOSPHATE 250 MG: 852; 155; 130 TABLET ORAL at 23:49

## 2023-05-24 RX ADMIN — MAGNESIUM SULFATE HEPTAHYDRATE 4 G: 4 INJECTION, SOLUTION INTRAVENOUS at 09:42

## 2023-05-24 RX ADMIN — CARVEDILOL 3.12 MG: 3.12 TABLET, FILM COATED ORAL at 17:54

## 2023-05-24 RX ADMIN — CARVEDILOL 3.12 MG: 3.12 TABLET, FILM COATED ORAL at 11:02

## 2023-05-24 RX ADMIN — ESCITALOPRAM OXALATE 10 MG: 10 TABLET ORAL at 05:25

## 2023-05-24 RX ADMIN — METOPROLOL TARTRATE 5 MG: 1 INJECTION, SOLUTION INTRAVENOUS at 09:25

## 2023-05-24 RX ADMIN — GABAPENTIN 200 MG: 100 CAPSULE ORAL at 17:54

## 2023-05-24 RX ADMIN — DIBASIC SODIUM PHOSPHATE, MONOBASIC POTASSIUM PHOSPHATE AND MONOBASIC SODIUM PHOSPHATE 250 MG: 852; 155; 130 TABLET ORAL at 17:54

## 2023-05-24 RX ADMIN — TRAZODONE HYDROCHLORIDE 50 MG: 50 TABLET ORAL at 20:33

## 2023-05-24 RX ADMIN — METOPROLOL TARTRATE 5 MG: 5 INJECTION INTRAVENOUS at 02:58

## 2023-05-24 RX ADMIN — DOCUSATE SODIUM 50 MG AND SENNOSIDES 8.6 MG 2 TABLET: 8.6; 5 TABLET, FILM COATED ORAL at 05:24

## 2023-05-24 RX ADMIN — THERA TABS 1 TABLET: TAB at 05:27

## 2023-05-24 RX ADMIN — VALPROIC ACID 250 MG: 250 CAPSULE, LIQUID FILLED ORAL at 14:48

## 2023-05-24 RX ADMIN — LORAZEPAM 0.5 MG: 2 INJECTION INTRAMUSCULAR; INTRAVENOUS at 00:42

## 2023-05-24 RX ADMIN — LORAZEPAM 2 MG: 2 TABLET ORAL at 07:30

## 2023-05-24 RX ADMIN — LEVOTHYROXINE SODIUM 50 MCG: 0.05 TABLET ORAL at 05:24

## 2023-05-24 RX ADMIN — FOLIC ACID 1 MG: 1 TABLET ORAL at 05:25

## 2023-05-24 RX ADMIN — GABAPENTIN 200 MG: 100 CAPSULE ORAL at 14:48

## 2023-05-24 RX ADMIN — Medication 100 MG: at 05:23

## 2023-05-24 RX ADMIN — LORAZEPAM 1 MG: 1 TABLET ORAL at 09:47

## 2023-05-24 RX ADMIN — DIGOXIN 125 MCG: 0.25 INJECTION INTRAMUSCULAR; INTRAVENOUS at 18:31

## 2023-05-24 RX ADMIN — METOPROLOL TARTRATE 5 MG: 5 INJECTION INTRAVENOUS at 08:46

## 2023-05-24 RX ADMIN — POTASSIUM CHLORIDE 40 MEQ: 1500 TABLET, EXTENDED RELEASE ORAL at 09:25

## 2023-05-24 RX ADMIN — VALPROIC ACID 250 MG: 250 CAPSULE, LIQUID FILLED ORAL at 20:33

## 2023-05-24 RX ADMIN — Medication 400 MG: at 14:47

## 2023-05-24 RX ADMIN — APIXABAN 5 MG: 5 TABLET, FILM COATED ORAL at 05:25

## 2023-05-24 RX ADMIN — SODIUM CHLORIDE, POTASSIUM CHLORIDE, SODIUM LACTATE AND CALCIUM CHLORIDE: 600; 310; 30; 20 INJECTION, SOLUTION INTRAVENOUS at 07:32

## 2023-05-24 RX ADMIN — TRAZODONE HYDROCHLORIDE 50 MG: 50 TABLET ORAL at 01:25

## 2023-05-24 RX ADMIN — LORAZEPAM 0.5 MG: 0.5 TABLET ORAL at 13:06

## 2023-05-24 RX ADMIN — LORAZEPAM 0.5 MG: 0.5 TABLET ORAL at 05:23

## 2023-05-24 RX ADMIN — VENLAFAXINE HYDROCHLORIDE 150 MG: 75 CAPSULE, EXTENDED RELEASE ORAL at 05:23

## 2023-05-24 RX ADMIN — APIXABAN 5 MG: 5 TABLET, FILM COATED ORAL at 17:54

## 2023-05-24 RX ADMIN — LORAZEPAM 1 MG: 1 TABLET ORAL at 17:54

## 2023-05-24 ASSESSMENT — LIFESTYLE VARIABLES
PAROXYSMAL SWEATS: BARELY PERCEPTIBLE SWEATING. PALMS MOIST
VISUAL DISTURBANCES: MILD SENSITIVITY
ORIENTATION AND CLOUDING OF SENSORIUM: ORIENTED AND CAN DO SERIAL ADDITIONS
ANXIETY: MILDLY ANXIOUS
AGITATION: NORMAL ACTIVITY
VISUAL DISTURBANCES: MILD SENSITIVITY
TOTAL SCORE: VERY MILD ITCHING, PINS AND NEEDLES SENSATION, BURNING OR NUMBNESS
ANXIETY: MILDLY ANXIOUS
TOTAL SCORE: 8
TOTAL SCORE: 6
HEADACHE, FULLNESS IN HEAD: NOT PRESENT
TREMOR: *
TOTAL SCORE: MILD ITCHING, PINS AND NEEDLES SENSATION, BURNING OR NUMBNESS
AUDITORY DISTURBANCES: NOT PRESENT
ORIENTATION AND CLOUDING OF SENSORIUM: ORIENTED AND CAN DO SERIAL ADDITIONS
ANXIETY: MILDLY ANXIOUS
TOTAL SCORE: 11
HEADACHE, FULLNESS IN HEAD: NOT PRESENT
NAUSEA AND VOMITING: MILD NAUSEA WITH NO VOMITING
VISUAL DISTURBANCES: MODERATE SENSITIVITY
AGITATION: NORMAL ACTIVITY
AUDITORY DISTURBANCES: NOT PRESENT
ANXIETY: *
AGITATION: NORMAL ACTIVITY
TOTAL SCORE: VERY MILD ITCHING, PINS AND NEEDLES SENSATION, BURNING OR NUMBNESS
HEADACHE, FULLNESS IN HEAD: NOT PRESENT
NAUSEA AND VOMITING: NO NAUSEA AND NO VOMITING
TOTAL SCORE: VERY MILD ITCHING, PINS AND NEEDLES SENSATION, BURNING OR NUMBNESS
PAROXYSMAL SWEATS: NO SWEAT VISIBLE
AUDITORY DISTURBANCES: VERY MILD HARSHNESS OR ABILITY TO FRIGHTEN
NAUSEA AND VOMITING: MILD NAUSEA WITH NO VOMITING
TOTAL SCORE: VERY MILD ITCHING, PINS AND NEEDLES SENSATION, BURNING OR NUMBNESS
TREMOR: *
AUDITORY DISTURBANCES: NOT PRESENT
NAUSEA AND VOMITING: MILD NAUSEA WITH NO VOMITING
PAROXYSMAL SWEATS: NO SWEAT VISIBLE
HEADACHE, FULLNESS IN HEAD: NOT PRESENT
ORIENTATION AND CLOUDING OF SENSORIUM: ORIENTED AND CAN DO SERIAL ADDITIONS
TOTAL SCORE: 5
PAROXYSMAL SWEATS: NO SWEAT VISIBLE
TREMOR: TREMOR NOT VISIBLE BUT CAN BE FELT, FINGERTIP TO FINGERTIP
TOTAL SCORE: 8
AGITATION: NORMAL ACTIVITY
AGITATION: NORMAL ACTIVITY
AUDITORY DISTURBANCES: NOT PRESENT
HEADACHE, FULLNESS IN HEAD: NOT PRESENT
TREMOR: *
TREMOR: MODERATE TREMOR WITH ARMS EXTENDED
PAROXYSMAL SWEATS: BARELY PERCEPTIBLE SWEATING. PALMS MOIST
VISUAL DISTURBANCES: VERY MILD SENSITIVITY
ORIENTATION AND CLOUDING OF SENSORIUM: ORIENTED AND CAN DO SERIAL ADDITIONS
PAROXYSMAL SWEATS: NO SWEAT VISIBLE
AGITATION: NORMAL ACTIVITY
TREMOR: MODERATE TREMOR WITH ARMS EXTENDED
NAUSEA AND VOMITING: MILD NAUSEA WITH NO VOMITING
PAROXYSMAL SWEATS: BARELY PERCEPTIBLE SWEATING. PALMS MOIST
TOTAL SCORE: 9
ORIENTATION AND CLOUDING OF SENSORIUM: ORIENTED AND CAN DO SERIAL ADDITIONS
TREMOR: *
HEADACHE, FULLNESS IN HEAD: NOT PRESENT
ORIENTATION AND CLOUDING OF SENSORIUM: ORIENTED AND CAN DO SERIAL ADDITIONS
HEADACHE, FULLNESS IN HEAD: NOT PRESENT
TOTAL SCORE: VERY MILD ITCHING, PINS AND NEEDLES SENSATION, BURNING OR NUMBNESS
AGITATION: NORMAL ACTIVITY
AUDITORY DISTURBANCES: VERY MILD HARSHNESS OR ABILITY TO FRIGHTEN
ANXIETY: MILDLY ANXIOUS
AUDITORY DISTURBANCES: NOT PRESENT
VISUAL DISTURBANCES: MILD SENSITIVITY
VISUAL DISTURBANCES: VERY MILD SENSITIVITY
NAUSEA AND VOMITING: MILD NAUSEA WITH NO VOMITING
ANXIETY: NO ANXIETY (AT EASE)
TOTAL SCORE: 9
ANXIETY: MILDLY ANXIOUS
ORIENTATION AND CLOUDING OF SENSORIUM: ORIENTED AND CAN DO SERIAL ADDITIONS
NAUSEA AND VOMITING: MILD NAUSEA WITH NO VOMITING
VISUAL DISTURBANCES: NOT PRESENT

## 2023-05-24 ASSESSMENT — ENCOUNTER SYMPTOMS
DIZZINESS: 0
DEPRESSION: 1
VOMITING: 0
CHILLS: 0
INSOMNIA: 1
FEVER: 0
NAUSEA: 0
NERVOUS/ANXIOUS: 1
PALPITATIONS: 0
HALLUCINATIONS: 1
MYALGIAS: 0
HEADACHES: 0
ABDOMINAL PAIN: 0
DIARRHEA: 1
SHORTNESS OF BREATH: 0
COUGH: 0

## 2023-05-24 ASSESSMENT — CHA2DS2 SCORE
AGE 65 TO 74: NO
SEX: FEMALE
CHF OR LEFT VENTRICULAR DYSFUNCTION: NO
HYPERTENSION: YES
CHA2DS2 VASC SCORE: 4
DIABETES: NO
VASCULAR DISEASE: NO
PRIOR STROKE OR TIA OR THROMBOEMBOLISM: NO
AGE 75 OR GREATER: YES

## 2023-05-24 ASSESSMENT — COGNITIVE AND FUNCTIONAL STATUS - GENERAL
PERSONAL GROOMING: A LITTLE
CLIMB 3 TO 5 STEPS WITH RAILING: A LOT
TOILETING: A LITTLE
WALKING IN HOSPITAL ROOM: A LITTLE
MOVING TO AND FROM BED TO CHAIR: A LITTLE
SUGGESTED CMS G CODE MODIFIER MOBILITY: CK
TURNING FROM BACK TO SIDE WHILE IN FLAT BAD: A LITTLE
HELP NEEDED FOR BATHING: A LITTLE
STANDING UP FROM CHAIR USING ARMS: A LITTLE
DAILY ACTIVITIY SCORE: 19
MOVING FROM LYING ON BACK TO SITTING ON SIDE OF FLAT BED: A LITTLE
SUGGESTED CMS G CODE MODIFIER DAILY ACTIVITY: CK
MOBILITY SCORE: 17
DRESSING REGULAR LOWER BODY CLOTHING: A LITTLE
EATING MEALS: A LITTLE

## 2023-05-24 ASSESSMENT — PAIN DESCRIPTION - PAIN TYPE
TYPE: ACUTE PAIN
TYPE: ACUTE PAIN

## 2023-05-24 ASSESSMENT — FIBROSIS 4 INDEX: FIB4 SCORE: 6.86

## 2023-05-24 NOTE — ED TRIAGE NOTES
"Chief Complaint   Patient presents with    Alcohol Intoxication     Son called 911 because he found patient \"not taking care of herself, just drinking vodka and nothing else.\"    Depression     Patient reports her alcoholism stems from depression r/t loss of children and . Denies overt SI, stating, \"I don't want to kill myself. I just don't care anymore.\"     ED Triage Vitals [05/23/23 1742]   Enc Vitals Group      Blood Pressure  (!) 137/94      Pulse (!) 130      Respiration 18      Temperature 36.7 °C (98 °F)      Temp src Oral      Pulse Oximetry 93 %      Weight 68 kg (150 lb)      Height 1.676 m (5' 6\")     Patient in Afib with rate of 130-140.  Reports she hasn't taken her meds in days and has only been drinking vodka for 3-4 days.  EMS placed IV and gave 500ml. FSBS = 76 per EMS.  "

## 2023-05-24 NOTE — PROGRESS NOTES
Hospital Medicine Daily Progress Note    Date of Service  5/24/2023    Chief Complaint  Jeanie Hutchison is a 77 y.o. female admitted 5/23/2023 with atrial fibrillation with RVR and alcohol withdrawal with shortness of breath.    Hospital Course  Jeanie Hutchison is a 77 y.o. female who presented 5/23/2023 with shortness of breath and depression.  This is a pleasant woman with a history of depression, alcohol dependence, status post AICD with history of ventricular tachycardia.  Patient reported drinking on a daily basis due to significant stressors in her life with her family including daughter who had passed.  She reported not being compliant with her home medication.  She reported drinking vodka on a daily basis.  She was placed on a legal hold on her last admission in 2021.  She has previously been admitted to inpatient psychiatry in the past.  She currently denied any suicidal ideations.    In the emergency room.  Patient's EKG showed atrial fibrillation with heart rate of 155.  She was admitted for further management of her atrial fibrillation with RVR as well as alcohol withdrawal.  She was resumed on her home metoprolol as well as digoxin.    Interval Problem Update  5/24: Patient continued to have sustained heart rate in the 150s, for which I have gave the patient metoprolol IV pushes 5 mg x 2 with additional digoxin loading.  I changed her metoprolol to carvedilol 3.125 mg twice a day due to her hypertension with blood pressure up to 150/100.  Patient continues on telemetry monitoring.  Patient's CIWA scores went up to 11 requiring oral and IV Ativan administration.  She is currently on 2 LPM oxygen by nasal cannula.      I have discussed this patient's plan of care and discharge plan at IDT rounds today with Case Management, Nursing, Nursing leadership, and other members of the IDT team.    Consultants/Specialty      Code Status  Full Code    Disposition  The patient is not medically cleared for  discharge to home or a post-acute facility.  Anticipate discharge to: home with close outpatient follow-up    I have placed the appropriate orders for post-discharge needs.    Review of Systems  Review of Systems   Constitutional:  Positive for malaise/fatigue. Negative for chills and fever.   Respiratory:  Negative for cough and shortness of breath.    Cardiovascular:  Negative for chest pain and palpitations.   Gastrointestinal:  Positive for diarrhea. Negative for abdominal pain, nausea and vomiting.   Genitourinary:  Negative for dysuria and hematuria.   Musculoskeletal:  Negative for joint pain and myalgias.   Neurological:  Negative for dizziness and headaches.   Psychiatric/Behavioral:  Positive for depression and hallucinations. Negative for suicidal ideas. The patient is nervous/anxious and has insomnia.         Physical Exam  Temp:  [36.2 °C (97.2 °F)-36.6 °C (97.9 °F)] 36.3 °C (97.3 °F)  Pulse:  [] 110  Resp:  [14-20] 16  BP: (104-170)/() 119/87  SpO2:  [91 %-95 %] 93 %    Physical Exam  Vitals and nursing note reviewed.   Constitutional:       Appearance: She is normal weight. She is ill-appearing. She is not diaphoretic.      Interventions: Nasal cannula in place.   HENT:      Head: Normocephalic and atraumatic.      Mouth/Throat:      Mouth: Mucous membranes are moist.      Pharynx: Oropharynx is clear. No oropharyngeal exudate.   Eyes:      General:         Right eye: No discharge.         Left eye: No discharge.      Conjunctiva/sclera: Conjunctivae normal.      Pupils: Pupils are equal, round, and reactive to light.   Cardiovascular:      Rate and Rhythm: Normal rate and regular rhythm.      Pulses: Normal pulses.      Heart sounds: Normal heart sounds. No murmur heard.  Pulmonary:      Effort: Pulmonary effort is normal. No respiratory distress.      Breath sounds: Normal breath sounds.   Abdominal:      General: Abdomen is flat. Bowel sounds are normal. There is no distension.       Palpations: Abdomen is soft.      Tenderness: There is no abdominal tenderness.   Musculoskeletal:      Cervical back: Neck supple. No tenderness.      Right lower leg: No edema.      Left lower leg: No edema.   Skin:     General: Skin is dry.      Coloration: Skin is pale.   Neurological:      Mental Status: She is oriented to person, place, and time. She is lethargic.      Motor: No weakness.         Fluids    Intake/Output Summary (Last 24 hours) at 5/24/2023 1835  Last data filed at 5/24/2023 1600  Gross per 24 hour   Intake 1720 ml   Output 250 ml   Net 1470 ml       Laboratory  Recent Labs     05/23/23 1745 05/24/23  0248   WBC 5.6 4.1*   RBC 3.84* 3.67*   HEMOGLOBIN 14.3 13.5   HEMATOCRIT 41.4 39.6   .8* 107.9*   MCH 37.2* 36.8*   MCHC 34.5 34.1   RDW 60.2* 59.8*   PLATELETCT 114* 110*   MPV 9.9 10.0     Recent Labs     05/23/23 1745 05/24/23 0248   SODIUM 144 141   POTASSIUM 3.6 4.0   CHLORIDE 107 107   CO2 16* 18*   GLUCOSE 74 104*   BUN 16 13   CREATININE 0.61 0.45*   CALCIUM 7.8* 8.3*     Recent Labs     05/23/23 1745   APTT 29.1   INR 1.45*               Imaging  DX-CHEST-PORTABLE (1 VIEW)   Final Result      No acute cardiopulmonary abnormality.           Assessment/Plan  * Atrial fibrillation with RVR (HCC)- (present on admission)  Assessment & Plan  As per presentation with persistent RVR.    Received IV metoprolol 5 mg pushes x2 this morning for sustained heart rate in the 150s.  I have changed patient's metoprolol to carvedilol 3.125 mg twice daily due to her hypertension that has been uncontrolled.  History of medication noncompliance.  I discussed the case with the clinical pharmacist about digoxin loading.  Resuming at 125 mcg every 8 hours.  Continue apixaban 5 mg twice daily  Continue IV metoprolol pushes 5 mg every 10 minutes as needed for sustained heart rate greater than 110    Alcohol withdrawal syndrome with perceptual disturbance (HCC)- (present on admission)  Assessment &  Plan  Patient reports hallucinating.  CIWA scores of 11 requiring IV and oral Ativan.    Continue CIWA protocol  Low-dose gabapentin and valproic acid as per [facility] protocol    Psychosocial stressors- (present on admission)  Assessment & Plan  Patient reports death of her daughters recently.    Psychotherapy and patient requested.    AICD (automatic cardioverter/defibrillator) present- (present on admission)  Assessment & Plan  As per history placed in July 2020 due to history of ventricular tachycardia.  [employer] Primo placed by Dr. Baires.    History of ventricular tachycardia- (present on admission)  Assessment & Plan  As per history. S/p AICD.    Hypomagnesemia- (present on admission)  Assessment & Plan  Magnesium down to 1.7 today.  I have ordered magnesium sulfate 4 g IV for goal greater than 2.    Alcohol dependence (HCC)- (present on admission)  Assessment & Plan  CIWA scores increasing to 11 requiring oral and IV Ativan.    Multivitamin supplementation  Continuous telemetry monitoring  Close monitoring of electrolytes  Magnesium goal greater than 2 potassium greater than 4  Aspiration and seizure precautions  I have started the patient on low-dose gabapentin and valproic acid as per [place] protocol  Counseled patient on quitting alcohol.    Moderate single current episode of major depressive disorder (HCC)- (present on admission)  Assessment & Plan  Continue home medications  Which include escitalopram and venlafaxine   Continue trazodone 50 mg at bedtime    Patient has requested psychotherapy.  I have placed a consultation request for inpatient psychotherapy.        VTE prophylaxis: SCDs/TEDs and therapeutic anticoagulation with apixaban    I have performed a physical exam and reviewed and updated ROS and Plan today (5/24/2023). In review of yesterday's note (5/23/2023), there are no changes except as documented above.      Patient is critically ill.   The patient continues to have: Atrial  fibrillation with rapid ventricular response  The vital organ system that is affected is the: Cardiovascular  If untreated there is a high chance of deterioration into: Fulminant heart failure, cardiovascular collapse, and eventually death.   The critical care that I am providing today is: Extensive data review and frequent monitoring of patient's vital signs and communication with the bedside nurse.  I given patient's metoprolol 5 mg IV pushes x2 and started the patient on digoxin load.  I have changed patient's metoprolol to carvedilol due to her hypertension.  The critical that has been undertaken is medically complex.   There has been no overlap in critical care time.   Critical Care Time not including procedures: 38 minutes

## 2023-05-24 NOTE — PROGRESS NOTES
Received report from previous shift RN, assumed care of patient. Patient is A&Ox4, vital signs are stable, on 2L o2 . Patient denies pain at this time, no signs of distress or discomfort. Sitting up in bed for breakfast. Call light and belongings within reach. Bed in lowest/locked position. Hourly rounding in place. Bed alarm on. Seizure precautions in place.

## 2023-05-24 NOTE — ED NOTES
Bedside report from Benito HOOVER.   Pt resting on gurney, monitoring in place, wall oxygen in use, and detox bag infusing.

## 2023-05-24 NOTE — ED NOTES
Pt transported to floor with ACLS RN x2 connected to Zoll and oxygen. All belongings and chart transported with pt.

## 2023-05-24 NOTE — ED PROVIDER NOTES
"ED Provider Note    CHIEF COMPLAINT  Chief Complaint   Patient presents with    Alcohol Intoxication     Son called 911 because he found patient \"not taking care of herself, just drinking vodka and nothing else.\"    Depression     Patient reports her alcoholism stems from depression r/t loss of children and . Denies overt SI, stating, \"I don't want to kill myself. I just don't care anymore.\"       EXTERNAL RECORDS REVIEWED  Inpatient Notes discharge summary 2/9/2021 after syncopal episode.  Alcohol intoxication.  History of alcoholism, DVT, atrial fibrillation who presented with alcohol intoxication.  Transferred to ICU for escalating sedation requirements.  Was apparently drinking 2 pints of vodka daily due to the sudden loss of her .  Was on a legal hold, psychiatry was consulted.  Taken off a legal hold after cognitive evaluation.  Medications adjusted by psychiatry team.  Cognitive eval done again on 2/9 she was cleared to continue home.    HPI/ROS  LIMITATION TO HISTORY   Select: : None  OUTSIDE HISTORIAN(S):  None    Jeanie Hutchison is a 77 y.o. female who presents to the emergency department by ambulance from home for alcohol abuse.  Patient believes she is \"going into DTs.\"  Denies history of any alcohol related seizure or hallucination but states she has been drinking 1 pint of vodka daily, mostly in the evenings \"just to sleep.\"  Patient states she was sober for approximately 5 to 6 weeks after rehab stent last month, but started drinking again about a week month ago.  Patient states she is depressed, inconsistently compliant with home medications including antidepressants as well as digoxin for known atrial fibrillation.  Patient denies suicidal ideation or plan to harm herself at this time.  She would like psychiatry consult and help with her depression.    Denies chest pain, shortness of breath, palpitations.  Denies syncope.  Denies vomiting.    PAST MEDICAL HISTORY   has a past " "medical history of Alcoholism (HCC), Anticoagulated, Apnea, sleep, Chronic pain, Hypothyroidism, Insomnia, Paroxysmal atrial fibrillation (HCC), Psychiatric disorder, Snoring, and Ventricular tachycardia (HCC).    SURGICAL HISTORY   has a past surgical history that includes breast biopsy; other orthopedic surgery; orif, fracture, clavicle (5/21/2014); and tonsillectomy.    FAMILY HISTORY  Family History   Problem Relation Age of Onset    Diabetes Mother     Anxiety disorder Mother     Depression Mother     Hyperlipidemia Father        SOCIAL HISTORY  Social History     Tobacco Use    Smoking status: Never    Smokeless tobacco: Never   Vaping Use    Vaping Use: Never used   Substance and Sexual Activity    Alcohol use: Yes     Alcohol/week: 8.4 - 12.6 oz     Types: 14 - 21 Standard drinks or equivalent per week     Comment: everyday (last 2 months 2-3 everyday)    Drug use: Not Currently     Types: Oral     Comment: takes friends tranqualizers     Sexual activity: Not Currently       CURRENT MEDICATIONS  Home Medications       Reviewed by Chito Mccullough R.N. (Registered Nurse) on 05/23/23 at 2029  Med List Status: Complete     Medication Last Dose Status   apixaban (ELIQUIS) 5mg Tab > 4  days Active   digoxin (LANOXIN) 125 MCG Tab > 4 days Active   escitalopram (LEXAPRO) 10 MG Tab > 4 days Active   levothyroxine (SYNTHROID) 50 MCG Tab > 4 days Active   metoprolol SR (TOPROL XL) 100 MG TABLET SR 24 HR > 4 days Active   venlafaxine (EFFEXOR-XR) 150 MG extended-release capsule > 4 days Active                    ALLERGIES  Allergies   Allergen Reactions    Sulfa Drugs Rash             PHYSICAL EXAM  VITAL SIGNS: /86   Pulse (!) 115   Temp 36.5 °C (97.7 °F) (Temporal)   Resp 16   Ht 1.676 m (5' 6\")   Wt 68 kg (150 lb)   SpO2 94%   BMI 24.21 kg/m²    Pulse ox interpretation: I interpret this pulse ox as normal.  Constitutional: Alert.  Tearful.  HENT: Normocephalic, atraumatic. Bilateral external ears normal, " Nose normal.  Dry mucous membranes.    Eyes: Pupils are equal and reactive, Conjunctiva normal.   Neck: Normal range of motion, Supple  Lymphatic: No lymphadenopathy noted.   Cardiovascular: Regular rate and rhythm, no murmurs. Distal pulses intact.   Thorax & Lungs: Normal breath sounds.  No wheezing/rales/ronchi. No increased work of breathing  Abdomen: Soft, non-distended, non-tender to palpation.   Skin: Warm, Dry, No erythema, No rash.   Musculoskeletal: Good range of motion in all major joints.  Neurologic: Alert and orient x4.  Speech clear and cohesive.  Moves 4 extremities spontaneously.  Psychiatric: Tearful depressed affect.  Cooperative.    DIAGNOSTIC STUDIES / PROCEDURES    LABS  Results for orders placed or performed during the hospital encounter of 05/23/23   CBC with Differential   Result Value Ref Range    WBC 5.6 4.8 - 10.8 K/uL    RBC 3.84 (L) 4.20 - 5.40 M/uL    Hemoglobin 14.3 12.0 - 16.0 g/dL    Hematocrit 41.4 37.0 - 47.0 %    .8 (H) 81.4 - 97.8 fL    MCH 37.2 (H) 27.0 - 33.0 pg    MCHC 34.5 32.2 - 35.5 g/dL    RDW 60.2 (H) 35.9 - 50.0 fL    Platelet Count 114 (L) 164 - 446 K/uL    MPV 9.9 9.0 - 12.9 fL    Neutrophils-Polys 42.30 (L) 44.00 - 72.00 %    Lymphocytes 41.40 (H) 22.00 - 41.00 %    Monocytes 14.10 (H) 0.00 - 13.40 %    Eosinophils 0.70 0.00 - 6.90 %    Basophils 1.10 0.00 - 1.80 %    Immature Granulocytes 0.40 0.00 - 0.90 %    Nucleated RBC 0.00 0.00 - 0.20 /100 WBC    Neutrophils (Absolute) 2.37 1.82 - 7.42 K/uL    Lymphs (Absolute) 2.32 1.00 - 4.80 K/uL    Monos (Absolute) 0.79 0.00 - 0.85 K/uL    Eos (Absolute) 0.04 0.00 - 0.51 K/uL    Baso (Absolute) 0.06 0.00 - 0.12 K/uL    Immature Granulocytes (abs) 0.02 0.00 - 0.11 K/uL    NRBC (Absolute) 0.00 K/uL   Complete Metabolic Panel (CMP)   Result Value Ref Range    Sodium 144 135 - 145 mmol/L    Potassium 3.6 3.6 - 5.5 mmol/L    Chloride 107 96 - 112 mmol/L    Co2 16 (L) 20 - 33 mmol/L    Anion Gap 21.0 (H) 7.0 - 16.0     Glucose 74 65 - 99 mg/dL    Bun 16 8 - 22 mg/dL    Creatinine 0.61 0.50 - 1.40 mg/dL    Calcium 7.8 (L) 8.5 - 10.5 mg/dL    AST(SGOT) 79 (H) 12 - 45 U/L    ALT(SGPT) 47 2 - 50 U/L    Alkaline Phosphatase 94 30 - 99 U/L    Total Bilirubin 1.1 0.1 - 1.5 mg/dL    Albumin 3.3 3.2 - 4.9 g/dL    Total Protein 5.8 (L) 6.0 - 8.2 g/dL    Globulin 2.5 1.9 - 3.5 g/dL    A-G Ratio 1.3 g/dL   Troponins NOW   Result Value Ref Range    Troponin T 10 6 - 19 ng/L   Troponins in two (2) hours   Result Value Ref Range    Troponin T 8 6 - 19 ng/L   DIAGNOSTIC ALCOHOL   Result Value Ref Range    Diagnostic Alcohol 274.7 (H) <10.1 mg/dL   DIGOXIN   Result Value Ref Range    Digoxin <0.3 (L) 0.8 - 2.0 ng/mL   PT/INR   Result Value Ref Range    PT 17.4 (H) 12.0 - 14.6 sec    INR 1.45 (H) 0.87 - 1.13   PTT   Result Value Ref Range    APTT 29.1 24.7 - 36.0 sec   CORRECTED CALCIUM   Result Value Ref Range    Correct Calcium 8.4 (L) 8.5 - 10.5 mg/dL   ESTIMATED GFR   Result Value Ref Range    GFR (CKD-EPI) 93 >60 mL/min/1.73 m 2   MAGNESIUM   Result Value Ref Range    Magnesium 1.9 1.5 - 2.5 mg/dL   TROPONIN   Result Value Ref Range    Troponin T 9 6 - 19 ng/L   EKG   Result Value Ref Range    Report       [facility] Emergency Dept.    Test Date:  2023  Pt Name:    MARISELA DURHAM              Department: ER  MRN:        [id number]                      Room:       T816  Gender:     Female                       Technician: [id number]  :        [id number]                   Requested By:JIM SCHNEIDER  Order #:    [id number]                    Reading MD: JIM SCHNEIDER, DO    Measurements  Intervals                                Axis  Rate:       155                          P:  IA:                                      QRS:        -35  QRSD:       124                          T:          69  QT:         328  QTc:        527    Interpretive Statements  Wide-QRS tachycardia  Nonspecific intraventricular conduction  "delay  Baseline wander in lead(s) I,II,aVR  Compared to ECG 01/28/2021 20:13:26  Intraventricular conduction delay now present  Atrial fibrillation no longer present  Ventricular premature complex(es) no longer present  Myocardial infarct finding no longer  present  Electronically Signed On 5- 1:10:59 PDT by JIM SCHNEIDER,        RADIOLOGY  I have independently interpreted the diagnostic imaging associated with this visit and am waiting the final reading from the radiologist.   Chest x-ray: Normal lung fields, no consolidation or effusion    Radiologist interpretation:   DX-CHEST-PORTABLE (1 VIEW)   Final Result      No acute cardiopulmonary abnormality.          COURSE & MEDICAL DECISION MAKING    ED Observation Status? No; Patient does not meet criteria for ED Observation.     INITIAL ASSESSMENT, COURSE AND PLAN  Care Narrative:   Seen evaluated bedside.  Depressed, tearful but denies suicidal ideation or plan to harm herself.  Describes longtime depression for which she self medicates with alcohol.  Despite rehab last month she has been drinking 1 pint of vodka daily again for 2 to 4 weeks.  Concerned she is going into \"DTs,\" but denies history of alcohol withdrawal seizure or hallucination.  Poor diet at home.  Found to be in A-fib with RVR, history of the same but has been noncompliant with medications lately.    Physical exam is unremarkable.  She is in A-fib with RVR at rate 140s, without hypotension or hypoxia, increased work of breathing.  Add detox bag, 1 mg Ativan and reassess.    Patient denies any chest pain.  EKG without ischemia.  Continuous telemetry without ectopy.  Unlikely ACS.    Heart rate slowly improving, patient is clinically dehydrated, confirmed with CO2 of 16 on labs.  Continue IV fluid hydration, Ativan for mild alcohol withdrawal.  Digoxin level is negligible.  We will allow medicine to redose when indicated.  She will be hospitalized for further evaluation and treatment.  " Recommend also during this time, psychiatry evaluation for worsening depression, substance abuse.    ADDITIONAL PROBLEM LIST    DISPOSITION AND DISCUSSIONS  I have discussed management of the patient with the following physicians and CIARAN's:    Hospitalist is aware of the patient agreeable to consultation.      FINAL DIAGNOSIS  1. Atrial fibrillation with rapid ventricular response (HCC)    2. Alcohol withdrawal syndrome without complication (HCC)           Electronically signed by: Trixie Mendez D.O., 5/24/2023 1:01 AM

## 2023-05-24 NOTE — ASSESSMENT & PLAN NOTE
As per presentation with persistent RVR.  Rate controlled.    Continue digoxin carvedilol and Eliquis

## 2023-05-24 NOTE — CARE PLAN
The patient is Watcher - Medium risk of patient condition declining or worsening    Shift Goals  Clinical Goals: monitot tele and VS  Patient Goals: sleep    Progress made toward(s) clinical / shift goals:      Problem: Knowledge Deficit - Standard  Goal: Patient and family/care givers will demonstrate understanding of plan of care, disease process/condition, diagnostic tests and medications  Outcome: Progressing  Note: Pt educated on POC and disease processes. Pt verbalized understanding of medication regimen and interventions.      Problem: Optimal Care for Alcohol Withdrawal  Goal: Optimal Care for the alcohol withdrawal patient  Outcome: Progressing  Note: Alcohol screening performed on admission. CIWA flowsheet utilized. Fluid and electrolyte balance monitored. Pt pulse ox. Monitored throughout the shift.

## 2023-05-24 NOTE — PROGRESS NOTES
4 Eyes Skin Assessment Completed by SNEHA Dale and SNEHA Bull.    Head Scab, Scratch, and Redness  Ears WDL  Nose WDL  Mouth WDL  Neck WDL  Breast/Chest Scar  Shoulder Blades Redness and Blanching  Spine Redness and Blanching  (R) Arm/Elbow/Hand WDL  (L) Arm/Elbow/Hand WDL  Abdomen Scar  Groin Redness and Blanching  Scrotum/Coccyx/Buttocks Redness and Blanching  (R) Leg WDL  (L) Leg WDL  (R) Heel/Foot/Toe dry  (L) Heel/Foot/Toe dry          Devices In Places Tele Box, Pulse Ox, and Nasal Cannula      Interventions In Place Gray Ear Foams, NC W/Ear Foams, Pillows, and Low Air Loss Mattress    Possible Skin Injury No    Pictures Uploaded Into Epic N/A  Wound Consult Placed N/A  RN Wound Prevention Protocol Ordered No

## 2023-05-24 NOTE — ASSESSMENT & PLAN NOTE
Patient counseled on cessation  Ask case management to provide her with resources about alcoholism treatment  If no withdrawal symptoms at this time

## 2023-05-24 NOTE — ED NOTES
Med rec updated  per  interview with pt and prescription bottles at bedside. Pt reports that she has not taken any medications > 4 days.     Home pharmacy   [facility]   [phone]

## 2023-05-24 NOTE — H&P
"Hospital Medicine History & Physical Note    Date of Service  5/23/2023    Primary Care Physician  KIMBERLY Benitez    Consultants      Specialist Names:     Code Status  Full Code    Chief Complaint  Chief Complaint   Patient presents with    Alcohol Intoxication     Son called 911 because he found patient \"not taking care of herself, just drinking vodka and nothing else.\"    Depression     Patient reports her alcoholism stems from depression r/t loss of children and . Denies overt SI, stating, \"I don't want to kill myself. I just don't care anymore.\"       History of Presenting Illness  Jeanie Hutchison is a 77 y.o. female who presented 5/23/2023 with past medical history of depression, alcoholism, history of AICD, history of V. tach who presents to the hospital for shortness of breath and depression.  Patient states that she drinks alcohol every night and is not compliant with her home medications.  She drinks vodka daily.  In the past she was placed on a legal hold on her last admission 2021.  Patient has been admitted to inpatient psych in the past.  Patient denies any chest pain, lightheadedness, nausea, vomiting, diarrhea, fevers.  Patient denies any suicidal ideations.    Chest x-ray interpreted by me found no acute pulmonary process  EKG interpreted by me found A-fib with a rate of 155    I discussed the plan of care with patient.    Review of Systems  Review of Systems   Constitutional:  Negative for chills, diaphoresis, fever and malaise/fatigue.   HENT:  Negative for congestion, ear discharge, ear pain, hearing loss, nosebleeds, sinus pain, sore throat and tinnitus.    Eyes:  Negative for blurred vision, double vision, photophobia and pain.   Respiratory:  Positive for shortness of breath. Negative for cough, hemoptysis, sputum production, wheezing and stridor.    Cardiovascular:  Positive for palpitations. Negative for chest pain, orthopnea, claudication, leg swelling and PND. "   Gastrointestinal:  Negative for abdominal pain, blood in stool, constipation, diarrhea, heartburn, melena, nausea and vomiting.   Genitourinary:  Negative for dysuria, flank pain, frequency, hematuria and urgency.   Musculoskeletal:  Negative for back pain, falls, joint pain, myalgias and neck pain.   Skin:  Negative for itching and rash.   Neurological:  Negative for dizziness, tingling, tremors, weakness and headaches.   Endo/Heme/Allergies:  Negative for environmental allergies and polydipsia. Does not bruise/bleed easily.   Psychiatric/Behavioral:  Positive for depression. Negative for hallucinations, substance abuse and suicidal ideas.        Past Medical History   has a past medical history of Alcoholism ([facility]), Anticoagulated, Apnea, sleep, Chronic pain, Hypothyroidism, Insomnia, Paroxysmal atrial fibrillation ([facility]), Psychiatric disorder, Snoring, and Ventricular tachycardia ([facility]).    Surgical History   has a past surgical history that includes breast biopsy; other orthopedic surgery; orif, fracture, clavicle (5/21/2014); and tonsillectomy.     Family History  family history includes Anxiety disorder in her mother; Depression in her mother; Diabetes in her mother; Hyperlipidemia in her father.   Family history reviewed with patient. There is no family history that is pertinent to the chief complaint.     Social History   reports that she has never smoked. She has never used smokeless tobacco. She reports current alcohol use of about 8.4 - 12.6 oz of alcohol per week. She reports that she does not currently use drugs after having used the following drugs: Oral.    Allergies  Allergies   Allergen Reactions    Sulfa Drugs Rash             Medications  Prior to Admission Medications   Prescriptions Last Dose Informant Patient Reported? Taking?   apixaban (ELIQUIS) 5mg Tab > 4  days at unknown Patient, Rx Bottle (For Med Information) Yes Yes   Sig: Take 5 mg by mouth 2 times a day.   digoxin (LANOXIN) 125 MCG Tab  > 4 days at unknown Patient, Rx Bottle (For Med Information) Yes Yes   Sig: Take 125 mcg by mouth every day.   escitalopram (LEXAPRO) 10 MG Tab > 4 days at unknown Patient, Rx Bottle (For Med Information) Yes Yes   Sig: Take 10 mg by mouth every day.   levothyroxine (SYNTHROID) 50 MCG Tab > 4 days at unknown Patient, Rx Bottle (For Med Information) Yes Yes   Sig: Take 50 mcg by mouth every morning on an empty stomach.   metoprolol SR (TOPROL XL) 100 MG TABLET SR 24 HR > 4 days at unknown Patient, Rx Bottle (For Med Information) Yes Yes   Sig: Take 100 mg by mouth every day.   venlafaxine (EFFEXOR-XR) 150 MG extended-release capsule > 4 days at unknown Patient, Rx Bottle (For Med Information) Yes Yes   Sig: Take 150 mg by mouth every day.      Facility-Administered Medications: None       Physical Exam  Temp:  [36.5 °C (97.7 °F)-36.7 °C (98 °F)] 36.5 °C (97.7 °F)  Pulse:  [127-147] 138  Resp:  [14-20] 14  BP: (104-155)/(51-94) 107/79  SpO2:  [92 %-93 %] 92 %  Blood Pressure : 132/84   Temperature: 36.7 °C (98 °F)   Pulse: (!) 143   Respiration: 14   Pulse Oximetry: 93 %       Physical Exam  Vitals and nursing note reviewed.   Constitutional:       General: She is not in acute distress.     Appearance: Normal appearance. She is not ill-appearing, toxic-appearing or diaphoretic.   HENT:      Head: Normocephalic and atraumatic.      Nose: No congestion or rhinorrhea.      Mouth/Throat:      Pharynx: No oropharyngeal exudate or posterior oropharyngeal erythema.   Eyes:      General: No scleral icterus.  Neck:      Vascular: No carotid bruit or JVD.   Cardiovascular:      Rate and Rhythm: Normal rate and regular rhythm.      Pulses: Normal pulses.      Heart sounds: Normal heart sounds. No murmur heard.     No friction rub. No gallop.   Pulmonary:      Effort: Pulmonary effort is normal. No respiratory distress.      Breath sounds: No stridor. No wheezing, rhonchi or rales.   Abdominal:      General: Abdomen is flat.  There is no distension.      Palpations: There is no mass.      Tenderness: There is no abdominal tenderness. There is no left CVA tenderness, guarding or rebound.      Hernia: No hernia is present.   Musculoskeletal:         General: No swelling. Normal range of motion.      Cervical back: No rigidity. No muscular tenderness.      Right lower leg: No edema.      Left lower leg: No edema.   Lymphadenopathy:      Cervical: No cervical adenopathy.   Skin:     General: Skin is warm and dry.      Capillary Refill: Capillary refill takes more than 3 seconds.      Coloration: Skin is not jaundiced or pale.      Findings: No bruising or erythema.   Neurological:      Mental Status: She is alert.         Laboratory:  Recent Labs     05/23/23  1745   WBC 5.6   RBC 3.84*   HEMOGLOBIN 14.3   HEMATOCRIT 41.4   .8*   MCH 37.2*   MCHC 34.5   RDW 60.2*   PLATELETCT 114*   MPV 9.9     Recent Labs     05/23/23  1745   SODIUM 144   POTASSIUM 3.6   CHLORIDE 107   CO2 16*   GLUCOSE 74   BUN 16   CREATININE 0.61   CALCIUM 7.8*     Recent Labs     05/23/23  1745   ALTSGPT 47   ASTSGOT 79*   ALKPHOSPHAT 94   TBILIRUBIN 1.1   GLUCOSE 74     Recent Labs     05/23/23  1745   APTT 29.1   INR 1.45*     No results for input(s): NTPROBNP in the last 72 hours.      Recent Labs     05/23/23  1745   TROPONINT 10       Imaging:  DX-CHEST-PORTABLE (1 VIEW)   Final Result      No acute cardiopulmonary abnormality.          X-Ray:  I have personally reviewed the images and compared with prior images.  EKG:  I have personally reviewed the images and compared with prior images.    Assessment/Plan:  Justification for Admission Status  I anticipate this patient will require at least two midnights for appropriate medical management, necessitating inpatient admission because A-fib with RVR    Patient will need a Telemetry bed on MEDICAL service .  The need is secondary to A-fib with RVR.    * A-fib (HCC)- (present on admission)  Assessment &  Plan  With RVR  Likely due to medical noncompliance  Restart home medications including metoprolol and digoxin  IV as needed metoprolol has been ordered  Monitor on telemetry  Trend troponins  Continue home Eliquis    Alcohol dependence (HCC)- (present on admission)  Assessment & Plan  Patient will be admitted to the telemetry unit with close cardiac monitoring on CIWA protocol  Started on rally bag, multivitamin, thiamine and folate  Replete electrolytes  Patient has been counseled on alcohol cessation and will be provided with information for outpatient detox facilities      Moderate single current episode of major depressive disorder (HCC)- (present on admission)  Assessment & Plan  Continue home medications  Consider psych eval        VTE prophylaxis: therapeutic anticoagulation with Eliquis

## 2023-05-24 NOTE — PROGRESS NOTES
Assumed care of patient and received report from RN. Tele monitor in place and patient in afib. VS stable. A&Ox4. Pain 0/10. POC discussed with patient and verbalized understanding. Call light in reach. Fall precautions in place. Bed locked in lowest position.

## 2023-05-24 NOTE — CARE PLAN
The patient is Watcher - Medium risk of patient condition declining or worsening    Shift Goals  Clinical Goals: HR and BP will remain stable  Patient Goals: Rest    Progress made toward(s) clinical / shift goals:        Problem: Optimal Care for Alcohol Withdrawal  Goal: Optimal Care for the alcohol withdrawal patient  Outcome: Progressing  Note: CIWA protocol in place and patient being medicated per MAR. Patient receiving electrolyte replacements. Patient has pulse-ox in place.     Problem: Seizure Precautions  Goal: Implementation of seizure precautions  Outcome: Progressing  Note: Bed rails are padded and suction is bedside. Pulse ox in place and fall protocol in place.

## 2023-05-25 LAB
ALBUMIN SERPL BCP-MCNC: 3 G/DL (ref 3.2–4.9)
ALBUMIN/GLOB SERPL: 1.4 G/DL
ALP SERPL-CCNC: 88 U/L (ref 30–99)
ALT SERPL-CCNC: 41 U/L (ref 2–50)
ANION GAP SERPL CALC-SCNC: 10 MMOL/L (ref 7–16)
AST SERPL-CCNC: 50 U/L (ref 12–45)
BASOPHILS # BLD AUTO: 1 % (ref 0–1.8)
BASOPHILS # BLD: 0.04 K/UL (ref 0–0.12)
BILIRUB SERPL-MCNC: 2.4 MG/DL (ref 0.1–1.5)
BUN SERPL-MCNC: 9 MG/DL (ref 8–22)
CALCIUM ALBUM COR SERPL-MCNC: 8.9 MG/DL (ref 8.5–10.5)
CALCIUM SERPL-MCNC: 8.1 MG/DL (ref 8.5–10.5)
CHLORIDE SERPL-SCNC: 107 MMOL/L (ref 96–112)
CO2 SERPL-SCNC: 22 MMOL/L (ref 20–33)
CREAT SERPL-MCNC: 0.42 MG/DL (ref 0.5–1.4)
DIGOXIN SERPL-MCNC: 0.9 NG/ML (ref 0.8–2)
EOSINOPHIL # BLD AUTO: 0.07 K/UL (ref 0–0.51)
EOSINOPHIL NFR BLD: 1.7 % (ref 0–6.9)
ERYTHROCYTE [DISTWIDTH] IN BLOOD BY AUTOMATED COUNT: 57.4 FL (ref 35.9–50)
GFR SERPLBLD CREATININE-BSD FMLA CKD-EPI: 101 ML/MIN/1.73 M 2
GLOBULIN SER CALC-MCNC: 2.2 G/DL (ref 1.9–3.5)
GLUCOSE SERPL-MCNC: 116 MG/DL (ref 65–99)
HCT VFR BLD AUTO: 39 % (ref 37–47)
HGB BLD-MCNC: 13.3 G/DL (ref 12–16)
IMM GRANULOCYTES # BLD AUTO: 0.01 K/UL (ref 0–0.11)
IMM GRANULOCYTES NFR BLD AUTO: 0.2 % (ref 0–0.9)
LYMPHOCYTES # BLD AUTO: 1.12 K/UL (ref 1–4.8)
LYMPHOCYTES NFR BLD: 27.7 % (ref 22–41)
MAGNESIUM SERPL-MCNC: 2 MG/DL (ref 1.5–2.5)
MCH RBC QN AUTO: 36.9 PG (ref 27–33)
MCHC RBC AUTO-ENTMCNC: 34.1 G/DL (ref 32.2–35.5)
MCV RBC AUTO: 108.3 FL (ref 81.4–97.8)
MONOCYTES # BLD AUTO: 0.55 K/UL (ref 0–0.85)
MONOCYTES NFR BLD AUTO: 13.6 % (ref 0–13.4)
NEUTROPHILS # BLD AUTO: 2.25 K/UL (ref 1.82–7.42)
NEUTROPHILS NFR BLD: 55.8 % (ref 44–72)
NRBC # BLD AUTO: 0 K/UL
NRBC BLD-RTO: 0 /100 WBC (ref 0–0.2)
PLATELET # BLD AUTO: 96 K/UL (ref 164–446)
PLATELETS.RETICULATED NFR BLD AUTO: 5.2 % (ref 0.6–13.1)
PMV BLD AUTO: 9.9 FL (ref 9–12.9)
POTASSIUM SERPL-SCNC: 4 MMOL/L (ref 3.6–5.5)
PROT SERPL-MCNC: 5.2 G/DL (ref 6–8.2)
RBC # BLD AUTO: 3.6 M/UL (ref 4.2–5.4)
SODIUM SERPL-SCNC: 139 MMOL/L (ref 135–145)
T4 FREE SERPL-MCNC: 1.12 NG/DL (ref 0.93–1.7)
TSH SERPL DL<=0.005 MIU/L-ACNC: 4.12 UIU/ML (ref 0.38–5.33)
WBC # BLD AUTO: 4 K/UL (ref 4.8–10.8)

## 2023-05-25 PROCEDURE — 770020 HCHG ROOM/CARE - TELE (206)

## 2023-05-25 PROCEDURE — 99233 SBSQ HOSP IP/OBS HIGH 50: CPT | Performed by: STUDENT IN AN ORGANIZED HEALTH CARE EDUCATION/TRAINING PROGRAM

## 2023-05-25 PROCEDURE — 85025 COMPLETE CBC W/AUTO DIFF WBC: CPT

## 2023-05-25 PROCEDURE — 700102 HCHG RX REV CODE 250 W/ 637 OVERRIDE(OP): Performed by: HOSPITALIST

## 2023-05-25 PROCEDURE — 36415 COLL VENOUS BLD VENIPUNCTURE: CPT

## 2023-05-25 PROCEDURE — 85055 RETICULATED PLATELET ASSAY: CPT

## 2023-05-25 PROCEDURE — 700111 HCHG RX REV CODE 636 W/ 250 OVERRIDE (IP): Performed by: HOSPITALIST

## 2023-05-25 PROCEDURE — 84443 ASSAY THYROID STIM HORMONE: CPT

## 2023-05-25 PROCEDURE — 90837 PSYTX W PT 60 MINUTES: CPT | Performed by: SOCIAL WORKER

## 2023-05-25 PROCEDURE — A9270 NON-COVERED ITEM OR SERVICE: HCPCS | Performed by: STUDENT IN AN ORGANIZED HEALTH CARE EDUCATION/TRAINING PROGRAM

## 2023-05-25 PROCEDURE — 80053 COMPREHEN METABOLIC PANEL: CPT

## 2023-05-25 PROCEDURE — 84439 ASSAY OF FREE THYROXINE: CPT

## 2023-05-25 PROCEDURE — 97166 OT EVAL MOD COMPLEX 45 MIN: CPT

## 2023-05-25 PROCEDURE — 83735 ASSAY OF MAGNESIUM: CPT

## 2023-05-25 PROCEDURE — 80162 ASSAY OF DIGOXIN TOTAL: CPT

## 2023-05-25 PROCEDURE — 700102 HCHG RX REV CODE 250 W/ 637 OVERRIDE(OP): Performed by: STUDENT IN AN ORGANIZED HEALTH CARE EDUCATION/TRAINING PROGRAM

## 2023-05-25 PROCEDURE — 700111 HCHG RX REV CODE 636 W/ 250 OVERRIDE (IP): Performed by: STUDENT IN AN ORGANIZED HEALTH CARE EDUCATION/TRAINING PROGRAM

## 2023-05-25 PROCEDURE — 97162 PT EVAL MOD COMPLEX 30 MIN: CPT

## 2023-05-25 PROCEDURE — A9270 NON-COVERED ITEM OR SERVICE: HCPCS | Performed by: HOSPITALIST

## 2023-05-25 RX ORDER — POTASSIUM CHLORIDE 20 MEQ/1
40 TABLET, EXTENDED RELEASE ORAL ONCE
Status: COMPLETED | OUTPATIENT
Start: 2023-05-25 | End: 2023-05-25

## 2023-05-25 RX ADMIN — LORAZEPAM 1 MG: 1 TABLET ORAL at 06:10

## 2023-05-25 RX ADMIN — GABAPENTIN 200 MG: 100 CAPSULE ORAL at 11:20

## 2023-05-25 RX ADMIN — APIXABAN 5 MG: 5 TABLET, FILM COATED ORAL at 06:10

## 2023-05-25 RX ADMIN — APIXABAN 5 MG: 5 TABLET, FILM COATED ORAL at 16:52

## 2023-05-25 RX ADMIN — TRAZODONE HYDROCHLORIDE 50 MG: 50 TABLET ORAL at 21:55

## 2023-05-25 RX ADMIN — VALPROIC ACID 250 MG: 250 CAPSULE, LIQUID FILLED ORAL at 06:10

## 2023-05-25 RX ADMIN — CARVEDILOL 3.12 MG: 3.12 TABLET, FILM COATED ORAL at 08:18

## 2023-05-25 RX ADMIN — Medication 400 MG: at 06:10

## 2023-05-25 RX ADMIN — Medication 100 MG: at 06:09

## 2023-05-25 RX ADMIN — LORAZEPAM 1 MG: 2 INJECTION INTRAMUSCULAR; INTRAVENOUS at 03:34

## 2023-05-25 RX ADMIN — DIBASIC SODIUM PHOSPHATE, MONOBASIC POTASSIUM PHOSPHATE AND MONOBASIC SODIUM PHOSPHATE 250 MG: 852; 155; 130 TABLET ORAL at 06:09

## 2023-05-25 RX ADMIN — POTASSIUM CHLORIDE 40 MEQ: 1500 TABLET, EXTENDED RELEASE ORAL at 08:18

## 2023-05-25 RX ADMIN — GABAPENTIN 200 MG: 100 CAPSULE ORAL at 16:51

## 2023-05-25 RX ADMIN — CARVEDILOL 3.12 MG: 3.12 TABLET, FILM COATED ORAL at 16:52

## 2023-05-25 RX ADMIN — DIGOXIN 125 MCG: 0.12 TABLET ORAL at 21:55

## 2023-05-25 RX ADMIN — FOLIC ACID 1 MG: 1 TABLET ORAL at 06:09

## 2023-05-25 RX ADMIN — DIGOXIN 125 MCG: 0.25 INJECTION INTRAMUSCULAR; INTRAVENOUS at 03:33

## 2023-05-25 RX ADMIN — GABAPENTIN 200 MG: 100 CAPSULE ORAL at 06:10

## 2023-05-25 RX ADMIN — VENLAFAXINE HYDROCHLORIDE 150 MG: 75 CAPSULE, EXTENDED RELEASE ORAL at 08:19

## 2023-05-25 RX ADMIN — ESCITALOPRAM OXALATE 10 MG: 10 TABLET ORAL at 06:09

## 2023-05-25 RX ADMIN — LEVOTHYROXINE SODIUM 50 MCG: 0.05 TABLET ORAL at 06:10

## 2023-05-25 RX ADMIN — THERA TABS 1 TABLET: TAB at 06:09

## 2023-05-25 ASSESSMENT — COGNITIVE AND FUNCTIONAL STATUS - GENERAL
WALKING IN HOSPITAL ROOM: A LITTLE
MOBILITY SCORE: 21
CLIMB 3 TO 5 STEPS WITH RAILING: A LITTLE
DRESSING REGULAR LOWER BODY CLOTHING: A LITTLE
HELP NEEDED FOR BATHING: A LITTLE
SUGGESTED CMS G CODE MODIFIER DAILY ACTIVITY: CK
DRESSING REGULAR UPPER BODY CLOTHING: A LITTLE
PERSONAL GROOMING: A LITTLE
TOILETING: A LITTLE
DAILY ACTIVITIY SCORE: 19
SUGGESTED CMS G CODE MODIFIER MOBILITY: CJ
STANDING UP FROM CHAIR USING ARMS: A LITTLE

## 2023-05-25 ASSESSMENT — LIFESTYLE VARIABLES
HEADACHE, FULLNESS IN HEAD: NOT PRESENT
AGITATION: NORMAL ACTIVITY
AUDITORY DISTURBANCES: NOT PRESENT
TOTAL SCORE: 3
ORIENTATION AND CLOUDING OF SENSORIUM: ORIENTED AND CAN DO SERIAL ADDITIONS
AUDITORY DISTURBANCES: NOT PRESENT
TREMOR: *
ANXIETY: MILDLY ANXIOUS
HEADACHE, FULLNESS IN HEAD: NOT PRESENT
ORIENTATION AND CLOUDING OF SENSORIUM: ORIENTED AND CAN DO SERIAL ADDITIONS
TREMOR: TREMOR NOT VISIBLE BUT CAN BE FELT, FINGERTIP TO FINGERTIP
VISUAL DISTURBANCES: NOT PRESENT
VISUAL DISTURBANCES: NOT PRESENT
NAUSEA AND VOMITING: NO NAUSEA AND NO VOMITING
ANXIETY: *
ORIENTATION AND CLOUDING OF SENSORIUM: ORIENTED AND CAN DO SERIAL ADDITIONS
NAUSEA AND VOMITING: MILD NAUSEA WITH NO VOMITING
NAUSEA AND VOMITING: NO NAUSEA AND NO VOMITING
PAROXYSMAL SWEATS: BARELY PERCEPTIBLE SWEATING. PALMS MOIST
TREMOR: TREMOR NOT VISIBLE BUT CAN BE FELT, FINGERTIP TO FINGERTIP
ANXIETY: MILDLY ANXIOUS
AGITATION: NORMAL ACTIVITY
TOTAL SCORE: VERY MILD ITCHING, PINS AND NEEDLES SENSATION, BURNING OR NUMBNESS
AUDITORY DISTURBANCES: NOT PRESENT
TOTAL SCORE: 14
TREMOR: TREMOR NOT VISIBLE BUT CAN BE FELT, FINGERTIP TO FINGERTIP
HEADACHE, FULLNESS IN HEAD: NOT PRESENT
TOTAL SCORE: 3
TREMOR: *
VISUAL DISTURBANCES: MILD SENSITIVITY
ANXIETY: MILDLY ANXIOUS
AGITATION: NORMAL ACTIVITY
AGITATION: NORMAL ACTIVITY
TOTAL SCORE: 3
AGITATION: NORMAL ACTIVITY
HEADACHE, FULLNESS IN HEAD: NOT PRESENT
PAROXYSMAL SWEATS: BARELY PERCEPTIBLE SWEATING. PALMS MOIST
PAROXYSMAL SWEATS: BARELY PERCEPTIBLE SWEATING. PALMS MOIST
TOTAL SCORE: 3
ORIENTATION AND CLOUDING OF SENSORIUM: ORIENTED AND CAN DO SERIAL ADDITIONS
VISUAL DISTURBANCES: NOT PRESENT
NAUSEA AND VOMITING: NO NAUSEA AND NO VOMITING
NAUSEA AND VOMITING: MILD NAUSEA WITH NO VOMITING
VISUAL DISTURBANCES: NOT PRESENT
NAUSEA AND VOMITING: NO NAUSEA AND NO VOMITING
VISUAL DISTURBANCES: VERY MILD SENSITIVITY
ANXIETY: MILDLY ANXIOUS
AUDITORY DISTURBANCES: NOT PRESENT
PAROXYSMAL SWEATS: BARELY PERCEPTIBLE SWEATING. PALMS MOIST
AUDITORY DISTURBANCES: NOT PRESENT
TOTAL SCORE: 8
HEADACHE, FULLNESS IN HEAD: NOT PRESENT
HEADACHE, FULLNESS IN HEAD: NOT PRESENT
ORIENTATION AND CLOUDING OF SENSORIUM: DISORIENTED FOR PLACE AND / OR PERSON
ANXIETY: MILDLY ANXIOUS
PAROXYSMAL SWEATS: BARELY PERCEPTIBLE SWEATING. PALMS MOIST
AUDITORY DISTURBANCES: NOT PRESENT
AGITATION: NORMAL ACTIVITY
TREMOR: TREMOR NOT VISIBLE BUT CAN BE FELT, FINGERTIP TO FINGERTIP
PAROXYSMAL SWEATS: BARELY PERCEPTIBLE SWEATING. PALMS MOIST
ORIENTATION AND CLOUDING OF SENSORIUM: CANNOT DO SERIAL ADDITIONS OR IS UNCERTAIN ABOUT DATE

## 2023-05-25 ASSESSMENT — ENCOUNTER SYMPTOMS
VOMITING: 0
SHORTNESS OF BREATH: 0
NAUSEA: 0
HALLUCINATIONS: 1
MYALGIAS: 0
DEPRESSION: 1
NERVOUS/ANXIOUS: 1
CHILLS: 0
PALPITATIONS: 0
HEADACHES: 0
ABDOMINAL PAIN: 0
DIZZINESS: 0
COUGH: 0
DIARRHEA: 1
INSOMNIA: 1
FEVER: 0

## 2023-05-25 ASSESSMENT — GAIT ASSESSMENTS
GAIT LEVEL OF ASSIST: MINIMAL ASSIST
ASSISTIVE DEVICE: FRONT WHEEL WALKER
DISTANCE (FEET): 4

## 2023-05-25 ASSESSMENT — PAIN DESCRIPTION - PAIN TYPE: TYPE: ACUTE PAIN

## 2023-05-25 ASSESSMENT — ACTIVITIES OF DAILY LIVING (ADL): TOILETING: INDEPENDENT

## 2023-05-25 ASSESSMENT — FIBROSIS 4 INDEX: FIB4 SCORE: 6.1

## 2023-05-25 NOTE — PROGRESS NOTES
Monitor Summary:  Rhythm: Afib  Rate: 103-138  Ectopy: (R) PVC  Measurement:  -/.08/-

## 2023-05-25 NOTE — HOSPITAL COURSE
Jeanie Hutchison is a 77 y.o. female who presented 5/23/2023 with shortness of breath and depression.  This is a pleasant woman with a history of depression, alcohol dependence, status post AICD with history of ventricular tachycardia.  Patient reported drinking on a daily basis due to significant stressors in her life with her family including daughter who had passed.  She reported not being compliant with her home medication.  She reported drinking vodka on a daily basis.  She was placed on a legal hold on her last admission in 2021.  She has previously been admitted to inpatient psychiatry in the past.  She currently denied any suicidal ideations.    In the emergency room.  Patient's EKG showed atrial fibrillation with heart rate of 155.  She was admitted for further management of her atrial fibrillation with RVR as well as alcohol withdrawal.  She was resumed on her home metoprolol as well as digoxin.

## 2023-05-25 NOTE — PROGRESS NOTES
Hospital Medicine Daily Progress Note    Date of Service  5/25/2023    Chief Complaint  Jeanie Hutchison is a 77 y.o. female admitted 5/23/2023 with atrial fibrillation with RVR and alcohol withdrawal with shortness of breath.    Hospital Course  Jeanie Hutchison is a 77 y.o. female who presented 5/23/2023 with shortness of breath and depression.  This is a pleasant woman with a history of depression, alcohol dependence, status post AICD with history of ventricular tachycardia.  Patient reported drinking on a daily basis due to significant stressors in her life with her family including daughter who had passed.  She reported not being compliant with her home medication.  She reported drinking vodka on a daily basis.  She was placed on a legal hold on her last admission in 2021.  She has previously been admitted to inpatient psychiatry in the past.  She currently denied any suicidal ideations.    In the emergency room.  Patient's EKG showed atrial fibrillation with heart rate of 155.  She was admitted for further management of her atrial fibrillation with RVR as well as alcohol withdrawal.  She was resumed on her home metoprolol as well as digoxin.    Interval Problem Update  5/24: Patient continued to have sustained heart rate in the 150s, for which I have gave the patient metoprolol IV pushes 5 mg x 2 with additional digoxin loading.  I changed her metoprolol to carvedilol 3.125 mg twice a day due to her hypertension with blood pressure up to 150/100.  Patient continues on telemetry monitoring.  Patient's CIWA scores went up to 11 requiring oral and IV Ativan administration.  She is currently on 2 LPM oxygen by nasal cannula.    5/25: No significant events overnight.  Heart rate controlled in the 60s to 100s.  Continuing to have alcohol withdrawal CIWA scores of 8-14 requiring Ativan.  Digoxin is being resumed.  Patient continues on continuous telemetry monitoring for arrhythmias, atrial fibrillation with  RVR.    I have discussed this patient's plan of care and discharge plan at IDT rounds today with Case Management, Nursing, Nursing leadership, and other members of the IDT team.    Consultants/Specialty      Code Status  Full Code    Disposition  The patient is not medically cleared for discharge to home or a post-acute facility.  Anticipate discharge to: home with close outpatient follow-up    I have placed the appropriate orders for post-discharge needs.    Review of Systems  Review of Systems   Constitutional:  Positive for malaise/fatigue. Negative for chills and fever.   Respiratory:  Negative for cough and shortness of breath.    Cardiovascular:  Negative for chest pain and palpitations.   Gastrointestinal:  Positive for diarrhea. Negative for abdominal pain, nausea and vomiting.   Genitourinary:  Negative for dysuria and hematuria.   Musculoskeletal:  Negative for joint pain and myalgias.   Neurological:  Negative for dizziness and headaches.   Psychiatric/Behavioral:  Positive for depression and hallucinations. Negative for suicidal ideas. The patient is nervous/anxious and has insomnia.         Physical Exam  Temp:  [35.9 °C (96.6 °F)-36.7 °C (98.1 °F)] 36.5 °C (97.7 °F)  Pulse:  [] 105  Resp:  [16-20] 20  BP: ()/(66-90) 109/75  SpO2:  [90 %-96 %] 90 %    Physical Exam  Vitals and nursing note reviewed.   Constitutional:       Appearance: She is normal weight. She is ill-appearing. She is not diaphoretic.      Interventions: Nasal cannula in place.   HENT:      Head: Normocephalic and atraumatic.      Mouth/Throat:      Mouth: Mucous membranes are moist.      Pharynx: Oropharynx is clear. No oropharyngeal exudate.   Eyes:      General:         Right eye: No discharge.         Left eye: No discharge.      Conjunctiva/sclera: Conjunctivae normal.      Pupils: Pupils are equal, round, and reactive to light.   Cardiovascular:      Rate and Rhythm: Normal rate and regular rhythm.      Pulses: Normal  pulses.      Heart sounds: Normal heart sounds. No murmur heard.  Pulmonary:      Effort: Pulmonary effort is normal. No respiratory distress.      Breath sounds: Normal breath sounds.   Abdominal:      General: Abdomen is flat. Bowel sounds are normal. There is no distension.      Palpations: Abdomen is soft.      Tenderness: There is no abdominal tenderness.   Musculoskeletal:      Cervical back: Neck supple. No tenderness.      Right lower leg: No edema.      Left lower leg: No edema.   Skin:     General: Skin is dry.      Coloration: Skin is pale.   Neurological:      Mental Status: She is oriented to person, place, and time. She is lethargic.      Motor: No weakness.   Psychiatric:         Attention and Perception: Attention normal.         Mood and Affect: Mood is depressed. Affect is blunt and flat.         Speech: Speech normal.         Behavior: Behavior normal. Behavior is cooperative.         Thought Content: Thought content normal.         Cognition and Memory: Cognition normal.         Fluids    Intake/Output Summary (Last 24 hours) at 5/25/2023 1231  Last data filed at 5/25/2023 1200  Gross per 24 hour   Intake 340 ml   Output 550 ml   Net -210 ml         Laboratory  Recent Labs     05/23/23 1745 05/24/23 0248 05/25/23  0638   WBC 5.6 4.1* 4.0*   RBC 3.84* 3.67* 3.60*   HEMOGLOBIN 14.3 13.5 13.3   HEMATOCRIT 41.4 39.6 39.0   .8* 107.9* 108.3*   MCH 37.2* 36.8* 36.9*   MCHC 34.5 34.1 34.1   RDW 60.2* 59.8* 57.4*   PLATELETCT 114* 110* 96*   MPV 9.9 10.0 9.9       Recent Labs     05/23/23 1745 05/24/23 0248 05/25/23  0638   SODIUM 144 141 139   POTASSIUM 3.6 4.0 4.0   CHLORIDE 107 107 107   CO2 16* 18* 22   GLUCOSE 74 104* 116*   BUN 16 13 9   CREATININE 0.61 0.45* 0.42*   CALCIUM 7.8* 8.3* 8.1*       Recent Labs     05/23/23 1745   APTT 29.1   INR 1.45*                 Imaging  DX-CHEST-PORTABLE (1 VIEW)   Final Result      No acute cardiopulmonary abnormality.             Assessment/Plan  *  Atrial fibrillation with RVR (HCC)- (present on admission)  Assessment & Plan  As per presentation with persistent RVR.    Received IV metoprolol 5 mg pushes x2 this morning for sustained heart rate in the 150s.  I have changed patient's metoprolol to carvedilol 3.125 mg twice daily due to her hypertension that has been uncontrolled.  History of medication noncompliance.    Digoxin level is 0.9, therapeutic.    Decrease digoxin to 125 mcg by mouth daily.     Continue apixaban 5 mg twice daily  Continue IV metoprolol pushes 5 mg every 10 minutes as needed for sustained heart rate greater than 110    Alcohol withdrawal syndrome with perceptual disturbance (HCC)- (present on admission)  Assessment & Plan  Patient reports hallucinating.  CIWA scores of 11 requiring IV and oral Ativan.    Continue CIWA protocol  Low-dose gabapentin and valproic acid as per [facility] protocol    Psychosocial stressors- (present on admission)  Assessment & Plan  Patient reports death of her daughters and  recently.    Psychotherapy and patient requested.    AICD (automatic cardioverter/defibrillator) present- (present on admission)  Assessment & Plan  As per history placed in July 2020 due to history of ventricular tachycardia.  Medtronic Primo placed by Dr. Baires.    History of ventricular tachycardia- (present on admission)  Assessment & Plan  As per history. S/p AICD.    Hypomagnesemia- (present on admission)  Assessment & Plan  Magnesium down to 1.7 today.  I have ordered magnesium sulfate 4 g IV for goal greater than 2.    Alcohol dependence (HCC)- (present on admission)  Assessment & Plan  CIWA scores increasing to 11 requiring oral and IV Ativan.    Multivitamin supplementation  Continuous telemetry monitoring  Close monitoring of electrolytes  Magnesium goal greater than 2 potassium greater than 4  Aspiration and seizure precautions  Continue low-dose gabapentin and valproic acid as per [facility] protocol       Moderate  single current episode of major depressive disorder (HCC)- (present on admission)  Assessment & Plan  Continue home medications  Which include escitalopram and venlafaxine   Continue trazodone 50 mg at bedtime    Patient has requested psychotherapy. Consulted. Pending.        VTE prophylaxis: SCDs/TEDs and therapeutic anticoagulation with apixaban    I have performed a physical exam and reviewed and updated ROS and Plan today (5/25/2023). In review of yesterday's note (5/24/2023), there are no changes except as documented above.      Patient is medically complex and at high risk of deterioration and death.

## 2023-05-25 NOTE — PROGRESS NOTES
Bedside report received. Per night RN pt's mentation waxed and waned with confusion and hallucinations. Upon assessment this AM Patient A&O x 4. flat affect. A. Fib on telemetry monitor. VS'S. Currently requiring 2 L via NC. CIWA score ranged 8 -14 overnight with a total of 2.5 mg's of Ativan given. Pt denies any pain at this time. POC discussed with patient. Pt verbalizes understanding. Call light and belongings with in reach. Bed locked and in lowest position, alarm and fall precautions in place.

## 2023-05-25 NOTE — THERAPY
Occupational Therapy   Initial Evaluation     Patient Name: Jeanie Hutchison  Age:  77 y.o., Sex:  female  Medical Record #: [id number]  Today's Date: 5/25/2023     Precautions: Fall Risk  Comments: etoh wd    Assessment    Patient is 77 y.o. female admitted with afib RVR, SOB, and alcohol withdrawal. Pt presents below her self-reported baseline of independence, pt indicates she lives alone with [id number] social support and manages ADLs/IADLs independently. Pt currently limited by decreased activity tolerance, tremulous BUEs, decreased attention and direction following. At this time anticipate need for post-acute placement upon DC however will follow/assess progress and update DC recommendations as appropriate.     Plan    Occupational Therapy Initial Treatment Plan   Treatment Interventions: Self Care / Activities of Daily Living, Adaptive Equipment, Therapeutic Exercises, Therapeutic Activity  Treatment Frequency: 3 Times per Week  Duration: Until Therapy Goals Met    DC Equipment Recommendations: Unable to determine at this time  Discharge Recommendations: Recommend post-acute placement for additional occupational therapy services prior to discharge home     Objective       05/25/23 0950   Prior Living Situation   Prior Services None   Housing / Facility 2 Story House   Steps Into Home 2   Steps In Home 12   Bathroom Set up Walk In Shower;Grab Bars;Shower Chair  (upstairs)   Equipment Owned None   Lives with - Patient's Self Care Capacity Alone and Able to Care For Self   Comments pt reports little to no social support, report family is unhappy with her for lifestyle choices   Prior Level of ADL Function   Self Feeding Independent   Grooming / Hygiene Independent   Bathing Independent   Dressing Independent   Toileting Independent   Prior Level of IADL Function   Medication Management Independent   Laundry Independent   Kitchen Mobility Independent   Finances Independent   Home Management Independent    Shopping Independent   Prior Level Of Mobility Independent With Device in Community   Retired RN, was a clinical instructor for [employer]   Precautions   Precautions Fall Risk   Comments etoh wd   Pain 0 - 10 Group   Therapist Pain Assessment Post Activity Pain Same as Prior to Activity;Nurse Notified;0   Cognition    Level of Consciousness Alert   Comments poor direction following and sequencing   Strength Upper Body   Upper Body Strength  X   Gross Strength Generalized Weakness, Equal Bilaterally.    Balance Assessment   Sitting Balance (Static) Fair   Sitting Balance (Dynamic) Fair   Standing Balance (Static) Fair -   Standing Balance (Dynamic) Poor +   Weight Shift Sitting Fair   Weight Shift Standing Fair   Comments fww   Bed Mobility    Supine to Sit Supervised   Sit to Supine Supervised   ADL Assessment   Eating Supervision   Grooming Seated;Minimal Assist  (hair brush, oral care)   Upper Body Dressing Minimal Assist   Lower Body Dressing Minimal Assist   Comments declined toileting   How much help from another person does the patient currently need...   Putting on and taking off regular lower body clothing? 3   Bathing (including washing, rinsing, and drying)? 3   Toileting, which includes using a toilet, bedpan, or urinal? 3   Putting on and taking off regular upper body clothing? 3   Taking care of personal grooming such as brushing teeth? 3   Eating meals? 4   6 Clicks Daily Activity Score 19   Functional Mobility   Sit to Stand Minimal Assist   Mobility sit>stand   Activity Tolerance   Sitting in Chair declined 2/2 fatigue   Sitting Edge of Bed 12min   Standing 3min total   Short Term Goals   Short Term Goal # 1 pt will complete functional transfer with SPV   Short Term Goal # 2 pt will complete toileting ADL at SPV level   Short Term Goal # 3 pt will tolerate >5min standing grooming with SPV   Education Group   Education Provided Role of Occupational Therapist;Activities of Daily Living    Role of Occupational Therapist Patient Response Patient;Acceptance;Explanation;Verbal Demonstration;Action Demonstration   ADL Patient Response Patient;Acceptance;Explanation;Verbal Demonstration;Action Demonstration   Occupational Therapy Initial Treatment Plan    Treatment Interventions Self Care / Activities of Daily Living;Adaptive Equipment;Therapeutic Exercises;Therapeutic Activity   Treatment Frequency 3 Times per Week   Duration Until Therapy Goals Met   Problem List   Problem List Decreased Active Daily Living Skills;Decreased Homemaking Skills;Decreased Functional Mobility;Decreased Activity Tolerance;Safety Awareness Deficits / Cognition;Impaired Postural Control / Balance;Impaired Cognitive Function;Decreased Upper Extremity Strength Right;Decreased Upper Extremity Strength Left   Anticipated Discharge Equipment and Recommendations   DC Equipment Recommendations Unable to determine at this time   Discharge Recommendations Recommend post-acute placement for additional occupational therapy services prior to discharge home

## 2023-05-25 NOTE — CARE PLAN
The patient is Watcher - Medium risk of patient condition declining or worsening    Shift Goals  Clinical Goals: HR and BP will remain stable  Patient Goals: Rest    Progress made toward(s) clinical / shift goals:      Problem: Optimal Care for Alcohol Withdrawal  Goal: Optimal Care for the alcohol withdrawal patient  Outcome: Progressing  Note: CIWA protocol in place. Pt currently on q4 CIWA with the most recent score of 9. Ativan given per MAR.      Problem: Seizure Precautions  Goal: Implementation of seizure precautions  Outcome: Progressing  Note: Rails padded, suction set up at bedside. Bed alarm on and plugged in.      Problem: Risk for Aspiration  Goal: Patient's risk for aspiration will be absent or decrease  Outcome: Progressing  Note: No signs of aspiration at this time, suction set up at bedside.        Patient is not progressing towards the following goals:

## 2023-05-25 NOTE — ASSESSMENT & PLAN NOTE
Patient reports death of her daughters and  recently.    Discussed with case management to provide her with resources for grief support and ordered psychiatry and psychotherapy referral on discharge

## 2023-05-25 NOTE — PROGRESS NOTES
Assumed care of patient, bedside report received from Juana HOOVER. Updated on POC, call light within reach and fall precautions in place. Bed locked and in lowest position. Patient instructed to call for assistance before getting out of bed. All questions answered, no other needs at this time. [facility] protocol in place.

## 2023-05-25 NOTE — THERAPY
Physical Therapy   Initial Evaluation     Patient Name: Jeanie Hutchison  Age:  77 y.o., Sex:  female  Medical Record #: [id number]  Today's Date: 5/25/2023     Precautions  Precautions: Fall Risk    Assessment  Patient is 77 y.o. female admitted for ETOH/detox.  Hx of depression, ETOH, AICD.  She lives alone in a multi story house, where she was indep w/o AD.  Today, she is rec'd in bed, lethargic, but agreeable to work w/ PT.  She is able to move to/from the eob w/o assist and w/o use of bed features.  She is able to stand w/ sba and ambulate a short distance w/ min assist and multiple verbal cues for correct/safe use of the fww.  Post acute placement would be recommended based upon her mobility today, however I anticipate that she will improve significantly over the next several days and be able to d/c back home  Plan    Physical Therapy Initial Treatment Plan   Treatment Plan : Gait Training, Stair Training, Therapeutic Activities, Neuro Re-Education / Balance  Treatment Frequency: 3 Times per Week  Duration: Until Therapy Goals Met    DC Equipment Recommendations: Unable to determine at this time  Discharge Recommendations: Recommend post-acute placement for additional physical therapy services prior to discharge home         Objective       05/25/23 1001   Prior Living Situation   Housing / Facility 2 Story House   Steps Into Home 2   Steps In Home 12   Equipment Owned None   Lives with - Patient's Self Care Capacity Alone and Able to Care For Self   Prior Level of Functional Mobility   Bed Mobility Independent   Transfer Status Independent   Ambulation Independent   Assistive Devices Used None   Stairs Independent   Cognition    Level of Consciousness Alert   Strength Lower Body   Comments grossly wnl   Balance Assessment   Sitting Balance (Static) Fair   Sitting Balance (Dynamic) Fair   Standing Balance (Static) Fair -   Standing Balance (Dynamic) Poor +   Weight Shift Sitting Fair   Weight Shift Standing  Fair   Comments w/ fww   Bed Mobility    Supine to Sit Supervised   Sit to Supine Supervised   Gait Analysis   Gait Level Of Assist Minimal Assist   Assistive Device Front Wheel Walker   Distance (Feet) 4   Deviation   (does not remain centered behind fww)   Functional Mobility   Sit to Stand Standby Assist   Bed, Chair, Wheelchair Transfer Refused   Short Term Goals    Short Term Goal # 1 Pt to move sit to/from stand w/ spv in 6 visits to improve fxl indep   Short Term Goal # 2 Pt to ambulate 150 ft w/ fww and spv in 6 visits to improve fxl indep   Short Term Goal # 3 Pt to move up/down a flight of stairs w/ spv in 6 visits to access her home   Physical Therapy Initial Treatment Plan    Treatment Plan  Gait Training;Stair Training;Therapeutic Activities;Neuro Re-Education / Balance   Treatment Frequency 3 Times per Week   Duration Until Therapy Goals Met   Problem List    Problems Impaired Transfers;Impaired Ambulation;Decreased Activity Tolerance;Safety Awareness Deficits / Cognition   Anticipated Discharge Equipment and Recommendations   DC Equipment Recommendations Unable to determine at this time   Discharge Recommendations Recommend post-acute placement for additional physical therapy services prior to discharge home

## 2023-05-25 NOTE — ASSESSMENT & PLAN NOTE
As per history placed in July 2020 due to history of ventricular tachycardia.  Medtronic Primo placed by Dr. Baires.

## 2023-05-25 NOTE — ASSESSMENT & PLAN NOTE
Patient is no longer hallucinating.  She has completed her alcohol withdrawal.  She is off of gabapentin and valproic acid.

## 2023-05-26 LAB
ALBUMIN SERPL BCP-MCNC: 3 G/DL (ref 3.2–4.9)
ALBUMIN/GLOB SERPL: 1.3 G/DL
ALP SERPL-CCNC: 102 U/L (ref 30–99)
ALT SERPL-CCNC: 32 U/L (ref 2–50)
ANION GAP SERPL CALC-SCNC: 8 MMOL/L (ref 7–16)
AST SERPL-CCNC: 37 U/L (ref 12–45)
BASOPHILS # BLD AUTO: 0.9 % (ref 0–1.8)
BASOPHILS # BLD: 0.04 K/UL (ref 0–0.12)
BILIRUB SERPL-MCNC: 1.7 MG/DL (ref 0.1–1.5)
BUN SERPL-MCNC: 8 MG/DL (ref 8–22)
CALCIUM ALBUM COR SERPL-MCNC: 9.4 MG/DL (ref 8.5–10.5)
CALCIUM SERPL-MCNC: 8.6 MG/DL (ref 8.5–10.5)
CHLORIDE SERPL-SCNC: 107 MMOL/L (ref 96–112)
CO2 SERPL-SCNC: 23 MMOL/L (ref 20–33)
CREAT SERPL-MCNC: 0.4 MG/DL (ref 0.5–1.4)
EOSINOPHIL # BLD AUTO: 0.09 K/UL (ref 0–0.51)
EOSINOPHIL NFR BLD: 2 % (ref 0–6.9)
ERYTHROCYTE [DISTWIDTH] IN BLOOD BY AUTOMATED COUNT: 57.2 FL (ref 35.9–50)
GFR SERPLBLD CREATININE-BSD FMLA CKD-EPI: 103 ML/MIN/1.73 M 2
GLOBULIN SER CALC-MCNC: 2.4 G/DL (ref 1.9–3.5)
GLUCOSE SERPL-MCNC: 108 MG/DL (ref 65–99)
HCT VFR BLD AUTO: 39 % (ref 37–47)
HGB BLD-MCNC: 13.3 G/DL (ref 12–16)
IMM GRANULOCYTES # BLD AUTO: 0.03 K/UL (ref 0–0.11)
IMM GRANULOCYTES NFR BLD AUTO: 0.7 % (ref 0–0.9)
LYMPHOCYTES # BLD AUTO: 1.55 K/UL (ref 1–4.8)
LYMPHOCYTES NFR BLD: 34.6 % (ref 22–41)
MAGNESIUM SERPL-MCNC: 1.8 MG/DL (ref 1.5–2.5)
MCH RBC QN AUTO: 36.9 PG (ref 27–33)
MCHC RBC AUTO-ENTMCNC: 34.1 G/DL (ref 32.2–35.5)
MCV RBC AUTO: 108.3 FL (ref 81.4–97.8)
MONOCYTES # BLD AUTO: 0.56 K/UL (ref 0–0.85)
MONOCYTES NFR BLD AUTO: 12.5 % (ref 0–13.4)
NEUTROPHILS # BLD AUTO: 2.21 K/UL (ref 1.82–7.42)
NEUTROPHILS NFR BLD: 49.3 % (ref 44–72)
NRBC # BLD AUTO: 0 K/UL
NRBC BLD-RTO: 0 /100 WBC (ref 0–0.2)
PHOSPHATE SERPL-MCNC: 3 MG/DL (ref 2.5–4.5)
PLATELET # BLD AUTO: 91 K/UL (ref 164–446)
PLATELETS.RETICULATED NFR BLD AUTO: 5.4 % (ref 0.6–13.1)
PMV BLD AUTO: 10.8 FL (ref 9–12.9)
POTASSIUM SERPL-SCNC: 4.1 MMOL/L (ref 3.6–5.5)
PROT SERPL-MCNC: 5.4 G/DL (ref 6–8.2)
RBC # BLD AUTO: 3.6 M/UL (ref 4.2–5.4)
SODIUM SERPL-SCNC: 138 MMOL/L (ref 135–145)
WBC # BLD AUTO: 4.5 K/UL (ref 4.8–10.8)

## 2023-05-26 PROCEDURE — A9270 NON-COVERED ITEM OR SERVICE: HCPCS | Performed by: STUDENT IN AN ORGANIZED HEALTH CARE EDUCATION/TRAINING PROGRAM

## 2023-05-26 PROCEDURE — 80053 COMPREHEN METABOLIC PANEL: CPT

## 2023-05-26 PROCEDURE — 83735 ASSAY OF MAGNESIUM: CPT

## 2023-05-26 PROCEDURE — 700102 HCHG RX REV CODE 250 W/ 637 OVERRIDE(OP): Performed by: HOSPITALIST

## 2023-05-26 PROCEDURE — A9270 NON-COVERED ITEM OR SERVICE: HCPCS | Performed by: HOSPITALIST

## 2023-05-26 PROCEDURE — 85025 COMPLETE CBC W/AUTO DIFF WBC: CPT

## 2023-05-26 PROCEDURE — 99233 SBSQ HOSP IP/OBS HIGH 50: CPT | Performed by: STUDENT IN AN ORGANIZED HEALTH CARE EDUCATION/TRAINING PROGRAM

## 2023-05-26 PROCEDURE — 85055 RETICULATED PLATELET ASSAY: CPT

## 2023-05-26 PROCEDURE — 84100 ASSAY OF PHOSPHORUS: CPT

## 2023-05-26 PROCEDURE — 700102 HCHG RX REV CODE 250 W/ 637 OVERRIDE(OP): Performed by: STUDENT IN AN ORGANIZED HEALTH CARE EDUCATION/TRAINING PROGRAM

## 2023-05-26 PROCEDURE — 36415 COLL VENOUS BLD VENIPUNCTURE: CPT

## 2023-05-26 PROCEDURE — 700111 HCHG RX REV CODE 636 W/ 250 OVERRIDE (IP): Performed by: STUDENT IN AN ORGANIZED HEALTH CARE EDUCATION/TRAINING PROGRAM

## 2023-05-26 PROCEDURE — 770020 HCHG ROOM/CARE - TELE (206)

## 2023-05-26 RX ORDER — POTASSIUM CHLORIDE 20 MEQ/1
20 TABLET, EXTENDED RELEASE ORAL ONCE
Status: COMPLETED | OUTPATIENT
Start: 2023-05-26 | End: 2023-05-26

## 2023-05-26 RX ORDER — MAGNESIUM SULFATE HEPTAHYDRATE 40 MG/ML
2 INJECTION, SOLUTION INTRAVENOUS ONCE
Status: COMPLETED | OUTPATIENT
Start: 2023-05-26 | End: 2023-05-26

## 2023-05-26 RX ORDER — CARVEDILOL 6.25 MG/1
6.25 TABLET ORAL 2 TIMES DAILY WITH MEALS
Status: DISCONTINUED | OUTPATIENT
Start: 2023-05-26 | End: 2023-05-30 | Stop reason: HOSPADM

## 2023-05-26 RX ORDER — FUROSEMIDE 10 MG/ML
20 INJECTION INTRAMUSCULAR; INTRAVENOUS
Status: DISCONTINUED | OUTPATIENT
Start: 2023-05-26 | End: 2023-05-27

## 2023-05-26 RX ADMIN — GABAPENTIN 200 MG: 100 CAPSULE ORAL at 11:46

## 2023-05-26 RX ADMIN — APIXABAN 5 MG: 5 TABLET, FILM COATED ORAL at 16:46

## 2023-05-26 RX ADMIN — THERA TABS 1 TABLET: TAB at 05:10

## 2023-05-26 RX ADMIN — Medication 400 MG: at 05:10

## 2023-05-26 RX ADMIN — CARVEDILOL 6.25 MG: 6.25 TABLET, FILM COATED ORAL at 08:51

## 2023-05-26 RX ADMIN — FUROSEMIDE 20 MG: 10 INJECTION, SOLUTION INTRAVENOUS at 16:46

## 2023-05-26 RX ADMIN — VENLAFAXINE HYDROCHLORIDE 150 MG: 75 CAPSULE, EXTENDED RELEASE ORAL at 05:23

## 2023-05-26 RX ADMIN — CARVEDILOL 6.25 MG: 6.25 TABLET, FILM COATED ORAL at 16:46

## 2023-05-26 RX ADMIN — LORAZEPAM 0.5 MG: 0.5 TABLET ORAL at 05:09

## 2023-05-26 RX ADMIN — DIGOXIN 125 MCG: 0.12 TABLET ORAL at 16:46

## 2023-05-26 RX ADMIN — TRAZODONE HYDROCHLORIDE 50 MG: 50 TABLET ORAL at 21:20

## 2023-05-26 RX ADMIN — APIXABAN 5 MG: 5 TABLET, FILM COATED ORAL at 05:10

## 2023-05-26 RX ADMIN — DOCUSATE SODIUM 50 MG AND SENNOSIDES 8.6 MG 2 TABLET: 8.6; 5 TABLET, FILM COATED ORAL at 16:46

## 2023-05-26 RX ADMIN — LORAZEPAM 0.5 MG: 0.5 TABLET ORAL at 21:21

## 2023-05-26 RX ADMIN — ACETAMINOPHEN 650 MG: 325 TABLET, FILM COATED ORAL at 03:18

## 2023-05-26 RX ADMIN — LEVOTHYROXINE SODIUM 50 MCG: 0.05 TABLET ORAL at 05:10

## 2023-05-26 RX ADMIN — FOLIC ACID 1 MG: 1 TABLET ORAL at 05:10

## 2023-05-26 RX ADMIN — POTASSIUM CHLORIDE 20 MEQ: 1500 TABLET, EXTENDED RELEASE ORAL at 16:46

## 2023-05-26 RX ADMIN — ACETAMINOPHEN 650 MG: 325 TABLET, FILM COATED ORAL at 11:46

## 2023-05-26 RX ADMIN — GABAPENTIN 200 MG: 100 CAPSULE ORAL at 05:10

## 2023-05-26 RX ADMIN — ACETAMINOPHEN 650 MG: 325 TABLET, FILM COATED ORAL at 19:17

## 2023-05-26 RX ADMIN — MAGNESIUM SULFATE HEPTAHYDRATE 2 G: 2 INJECTION, SOLUTION INTRAVENOUS at 16:47

## 2023-05-26 RX ADMIN — Medication 100 MG: at 05:10

## 2023-05-26 ASSESSMENT — LIFESTYLE VARIABLES
AGITATION: NORMAL ACTIVITY
TREMOR: NO TREMOR
ORIENTATION AND CLOUDING OF SENSORIUM: ORIENTED AND CAN DO SERIAL ADDITIONS
HEADACHE, FULLNESS IN HEAD: NOT PRESENT
HEADACHE, FULLNESS IN HEAD: NOT PRESENT
TOTAL SCORE: 0
AUDITORY DISTURBANCES: NOT PRESENT
ORIENTATION AND CLOUDING OF SENSORIUM: ORIENTED AND CAN DO SERIAL ADDITIONS
ORIENTATION AND CLOUDING OF SENSORIUM: ORIENTED AND CAN DO SERIAL ADDITIONS
ANXIETY: NO ANXIETY (AT EASE)
ORIENTATION AND CLOUDING OF SENSORIUM: ORIENTED AND CAN DO SERIAL ADDITIONS
TOTAL SCORE: 0
ANXIETY: *
NAUSEA AND VOMITING: NO NAUSEA AND NO VOMITING
AUDITORY DISTURBANCES: NOT PRESENT
AUDITORY DISTURBANCES: NOT PRESENT
TREMOR: NO TREMOR
AGITATION: NORMAL ACTIVITY
TREMOR: NO TREMOR
TREMOR: TREMOR NOT VISIBLE BUT CAN BE FELT, FINGERTIP TO FINGERTIP
AUDITORY DISTURBANCES: NOT PRESENT
VISUAL DISTURBANCES: NOT PRESENT
HEADACHE, FULLNESS IN HEAD: NOT PRESENT
VISUAL DISTURBANCES: MILD SENSITIVITY
PAROXYSMAL SWEATS: NO SWEAT VISIBLE
PAROXYSMAL SWEATS: NO SWEAT VISIBLE
HEADACHE, FULLNESS IN HEAD: NOT PRESENT
AUDITORY DISTURBANCES: NOT PRESENT
NAUSEA AND VOMITING: NO NAUSEA AND NO VOMITING
VISUAL DISTURBANCES: NOT PRESENT
TOTAL SCORE: 4
ANXIETY: NO ANXIETY (AT EASE)
PAROXYSMAL SWEATS: NO SWEAT VISIBLE
TREMOR: NO TREMOR
ORIENTATION AND CLOUDING OF SENSORIUM: ORIENTED AND CAN DO SERIAL ADDITIONS
AGITATION: NORMAL ACTIVITY
NAUSEA AND VOMITING: NO NAUSEA AND NO VOMITING
TOTAL SCORE: 0
VISUAL DISTURBANCES: VERY MILD SENSITIVITY
PAROXYSMAL SWEATS: NO SWEAT VISIBLE
VISUAL DISTURBANCES: NOT PRESENT
HEADACHE, FULLNESS IN HEAD: NOT PRESENT
TOTAL SCORE: 1
NAUSEA AND VOMITING: NO NAUSEA AND NO VOMITING
AGITATION: NORMAL ACTIVITY
AGITATION: NORMAL ACTIVITY
PAROXYSMAL SWEATS: NO SWEAT VISIBLE
NAUSEA AND VOMITING: NO NAUSEA AND NO VOMITING
TREMOR: NO TREMOR
PAROXYSMAL SWEATS: NO SWEAT VISIBLE
AGITATION: NORMAL ACTIVITY
ANXIETY: MILDLY ANXIOUS
TOTAL SCORE: 0
TOTAL SCORE: 5
HEADACHE, FULLNESS IN HEAD: NOT PRESENT
AGITATION: NORMAL ACTIVITY
AUDITORY DISTURBANCES: NOT PRESENT
ANXIETY: MILDLY ANXIOUS
ANXIETY: NO ANXIETY (AT EASE)
NAUSEA AND VOMITING: NO NAUSEA AND NO VOMITING
NAUSEA AND VOMITING: NO NAUSEA AND NO VOMITING
TREMOR: TREMOR NOT VISIBLE BUT CAN BE FELT, FINGERTIP TO FINGERTIP
ANXIETY: NO ANXIETY (AT EASE)
PAROXYSMAL SWEATS: NO SWEAT VISIBLE
VISUAL DISTURBANCES: NOT PRESENT
AUDITORY DISTURBANCES: NOT PRESENT
ORIENTATION AND CLOUDING OF SENSORIUM: ORIENTED AND CAN DO SERIAL ADDITIONS
VISUAL DISTURBANCES: NOT PRESENT
ORIENTATION AND CLOUDING OF SENSORIUM: ORIENTED AND CAN DO SERIAL ADDITIONS
HEADACHE, FULLNESS IN HEAD: NOT PRESENT

## 2023-05-26 ASSESSMENT — PATIENT HEALTH QUESTIONNAIRE - PHQ9
SUM OF ALL RESPONSES TO PHQ QUESTIONS 1-9: 5
1. LITTLE INTEREST OR PLEASURE IN DOING THINGS: SEVERAL DAYS
5. POOR APPETITE OR OVEREATING: NOT AT ALL
2. FEELING DOWN, DEPRESSED, IRRITABLE, OR HOPELESS: SEVERAL DAYS
6. FEELING BAD ABOUT YOURSELF - OR THAT YOU ARE A FAILURE OR HAVE LET YOURSELF OR YOUR FAMILY DOWN: SEVERAL DAYS
SUM OF ALL RESPONSES TO PHQ9 QUESTIONS 1 AND 2: 2
4. FEELING TIRED OR HAVING LITTLE ENERGY: SEVERAL DAYS
8. MOVING OR SPEAKING SO SLOWLY THAT OTHER PEOPLE COULD HAVE NOTICED. OR THE OPPOSITE, BEING SO FIGETY OR RESTLESS THAT YOU HAVE BEEN MOVING AROUND A LOT MORE THAN USUAL: NOT AT ALL
7. TROUBLE CONCENTRATING ON THINGS, SUCH AS READING THE NEWSPAPER OR WATCHING TELEVISION: NOT AT ALL
9. THOUGHTS THAT YOU WOULD BE BETTER OFF DEAD, OR OF HURTING YOURSELF: NOT AT ALL
3. TROUBLE FALLING OR STAYING ASLEEP OR SLEEPING TOO MUCH: SEVERAL DAYS

## 2023-05-26 ASSESSMENT — ENCOUNTER SYMPTOMS
COUGH: 0
INSOMNIA: 0
PALPITATIONS: 0
ABDOMINAL PAIN: 0
HALLUCINATIONS: 0
NERVOUS/ANXIOUS: 0
DIARRHEA: 0
DIZZINESS: 0
FEVER: 0
NAUSEA: 0
VOMITING: 0
MYALGIAS: 0
DEPRESSION: 1
CHILLS: 0
HEADACHES: 0
SHORTNESS OF BREATH: 0

## 2023-05-26 ASSESSMENT — FIBROSIS 4 INDEX: FIB4 SCORE: 5.39

## 2023-05-26 NOTE — PROGRESS NOTES
Hospital Medicine Daily Progress Note    Date of Service  5/26/2023    Chief Complaint  Jeanie Hutchison is a 77 y.o. female admitted 5/23/2023 with atrial fibrillation with RVR and alcohol withdrawal with shortness of breath.    Hospital Course  Jeanie Hutchison is a 77 y.o. female who presented 5/23/2023 with shortness of breath and depression.  This is a pleasant woman with a history of depression, alcohol dependence, status post AICD with history of ventricular tachycardia.  Patient reported drinking on a daily basis due to significant stressors in her life with her family including daughter who had passed.  She reported not being compliant with her home medication.  She reported drinking vodka on a daily basis.  She was placed on a legal hold on her last admission in 2021.  She has previously been admitted to inpatient psychiatry in the past.  She currently denied any suicidal ideations.    In the emergency room.  Patient's EKG showed atrial fibrillation with heart rate of 155.  She was admitted for further management of her atrial fibrillation with RVR as well as alcohol withdrawal.  She was resumed on her home metoprolol as well as digoxin.    Interval Problem Update  5/24: Patient continued to have sustained heart rate in the 150s, for which I have gave the patient metoprolol IV pushes 5 mg x 2 with additional digoxin loading.  I changed her metoprolol to carvedilol 3.125 mg twice a day due to her hypertension with blood pressure up to 150/100.  Patient continues on telemetry monitoring.  Patient's CIWA scores went up to 11 requiring oral and IV Ativan administration.  She is currently on 2 LPM oxygen by nasal cannula.    5/25: No significant events overnight.  Heart rate controlled in the 60s to 100s.  Continuing to have alcohol withdrawal CIWA scores of 8-14 requiring Ativan.  Digoxin is being resumed.  Patient continues on continuous telemetry monitoring for arrhythmias, atrial fibrillation with  RVR.    5/26: No significant events overnight.  Patient seen and examined at the telemetry floor.  She was very lethargic this morning.  Currently on 2 LPM oxygen via nasal cannula.  She is alert and oriented x3.  She is no longer hallucinating.  I discontinued patient's gabapentin.  CIWA scores of up to 5.  Continues to require Ativan.    I have discussed this patient's plan of care and discharge plan at IDT rounds today with Case Management, Nursing, Nursing leadership, and other members of the IDT team.    Consultants/Specialty      Code Status  Full Code    Disposition  The patient is not medically cleared for discharge to home or a post-acute facility.  Anticipate discharge to: home with close outpatient follow-up    I have placed the appropriate orders for post-discharge needs.    Review of Systems  Review of Systems   Constitutional:  Positive for malaise/fatigue. Negative for chills and fever.   Respiratory:  Negative for cough and shortness of breath.    Cardiovascular:  Negative for chest pain and palpitations.   Gastrointestinal:  Negative for abdominal pain, diarrhea, nausea and vomiting.   Genitourinary:  Negative for dysuria and hematuria.   Musculoskeletal:  Negative for joint pain and myalgias.   Neurological:  Negative for dizziness and headaches.   Psychiatric/Behavioral:  Positive for depression. Negative for hallucinations and suicidal ideas. The patient is not nervous/anxious and does not have insomnia.         Physical Exam  Temp:  [36.4 °C (97.5 °F)-37.1 °C (98.8 °F)] 36.9 °C (98.4 °F)  Pulse:  [] 87  Resp:  [16-19] 16  BP: ()/() 110/85  SpO2:  [93 %-95 %] 93 %    Physical Exam  Vitals and nursing note reviewed.   Constitutional:       Appearance: She is normal weight. She is ill-appearing. She is not diaphoretic.      Interventions: Nasal cannula in place.   HENT:      Head: Normocephalic and atraumatic.      Mouth/Throat:      Mouth: Mucous membranes are moist.       Pharynx: Oropharynx is clear. No oropharyngeal exudate.   Eyes:      General:         Right eye: No discharge.         Left eye: No discharge.      Conjunctiva/sclera: Conjunctivae normal.      Pupils: Pupils are equal, round, and reactive to light.   Cardiovascular:      Rate and Rhythm: Normal rate and regular rhythm.      Pulses: Normal pulses.      Heart sounds: Normal heart sounds. No murmur heard.  Pulmonary:      Effort: Pulmonary effort is normal. No respiratory distress.      Breath sounds: Normal breath sounds.   Abdominal:      General: Abdomen is flat. Bowel sounds are normal. There is no distension.      Palpations: Abdomen is soft.      Tenderness: There is no abdominal tenderness.   Musculoskeletal:      Cervical back: Neck supple. No tenderness.      Right lower leg: No edema.      Left lower leg: No edema.   Skin:     General: Skin is dry.      Coloration: Skin is pale.   Neurological:      Mental Status: She is oriented to person, place, and time. She is lethargic.      Motor: No weakness.   Psychiatric:         Attention and Perception: Attention normal.         Mood and Affect: Mood is depressed. Affect is blunt and flat.         Speech: Speech normal.         Behavior: Behavior normal. Behavior is cooperative.         Thought Content: Thought content normal.         Cognition and Memory: Cognition normal.         Fluids    Intake/Output Summary (Last 24 hours) at 5/26/2023 1601  Last data filed at 5/26/2023 0900  Gross per 24 hour   Intake 480 ml   Output 800 ml   Net -320 ml       Laboratory  Recent Labs     05/24/23 0248 05/25/23 0638 05/26/23  0015   WBC 4.1* 4.0* 4.5*   RBC 3.67* 3.60* 3.60*   HEMOGLOBIN 13.5 13.3 13.3   HEMATOCRIT 39.6 39.0 39.0   .9* 108.3* 108.3*   MCH 36.8* 36.9* 36.9*   MCHC 34.1 34.1 34.1   RDW 59.8* 57.4* 57.2*   PLATELETCT 110* 96* 91*   MPV 10.0 9.9 10.8     Recent Labs     05/24/23 0248 05/25/23 0638 05/26/23  0015   SODIUM 141 139 138   POTASSIUM 4.0  4.0 4.1   CHLORIDE 107 107 107   CO2 18* 22 23   GLUCOSE 104* 116* 108*   BUN 13 9 8   CREATININE 0.45* 0.42* 0.40*   CALCIUM 8.3* 8.1* 8.6     Recent Labs     05/23/23  1745   APTT 29.1   INR 1.45*               Imaging  DX-CHEST-PORTABLE (1 VIEW)   Final Result      No acute cardiopulmonary abnormality.             Assessment/Plan  * Atrial fibrillation with RVR (HCC)- (present on admission)  Assessment & Plan  As per presentation with persistent RVR.    Received IV metoprolol 5 mg pushes x2 this morning for sustained heart rate in the 150s.  I have changed patient's metoprolol to carvedilol 3.125 mg twice daily due to her hypertension that has been uncontrolled.  History of medication noncompliance.    Digoxin level is 0.9, therapeutic.  Patient is currently rate controlled.    Continue digoxin to 125 mcg by mouth daily.     Continue apixaban 5 mg twice daily  Continue IV metoprolol pushes 5 mg every 10 minutes as needed for sustained heart rate greater than 110    Alcohol withdrawal syndrome with perceptual disturbance (HCC)- (present on admission)  Assessment & Plan  Patient reports hallucinating.  CIWA scores of 11 requiring IV and oral Ativan.  Very lethargic today.    Continue CIWA protocol  Discontinue low-dose gabapentin and valproic acid as per [facility] protocol    Psychosocial stressors- (present on admission)  Assessment & Plan  Patient reports death of her daughters and  recently.    Psychotherapy and patient requested.    AICD (automatic cardioverter/defibrillator) present- (present on admission)  Assessment & Plan  As per history placed in July 2020 due to history of ventricular tachycardia.  Medtronic Primo placed by Dr. Baires.    History of ventricular tachycardia- (present on admission)  Assessment & Plan  As per history. S/p AICD.    Acute respiratory failure with hypoxia (HCC)- (present on admission)  Assessment & Plan  Patient is requiring 2 LPM oxygen by nasal cannula.    I am starting  low-dose furosemide 20 mg IV twice daily    Hypomagnesemia- (present on admission)  Assessment & Plan  Magnesium down to 1.8 today.  I have ordered magnesium sulfate 2 g IV for goal greater than 2.    Continue magnesium oxide daily    Alcohol dependence (HCC)- (present on admission)  Assessment & Plan  CIWA scores decreasing to 5 requiring oral Ativan.    Multivitamin supplementation  Continuous telemetry monitoring  Close monitoring of electrolytes  Magnesium goal greater than 2 potassium greater than 4  Aspiration and seizure precautions  Discontinue low-dose gabapentin and valproic acid as per [facility] protocol       Moderate single current episode of major depressive disorder (HCC)- (present on admission)  Assessment & Plan  Continue home medications  Which include escitalopram and venlafaxine   Continue trazodone 50 mg at bedtime    Patient has requested psychotherapy.   Patient is requesting someone to talk to.  However, inpatient psychotherapy is not available at this time.  Case management has offered outpatient resources.        VTE prophylaxis: SCDs/TEDs and therapeutic anticoagulation with apixaban    I have performed a physical exam and reviewed and updated ROS and Plan today (5/26/2023). In review of yesterday's note (5/25/2023), there are no changes except as documented above.      Patient is medically complex and at high risk of deterioration and death.

## 2023-05-26 NOTE — DISCHARGE PLANNING
Case Management Discharge Planning    Admission Date: 5/23/2023  GMLOS: 2.3  ALOS: 3    6-Clicks ADL Score: 19  6-Clicks Mobility Score: 21      Anticipated Discharge Dispo: Discharge Disposition: Discharged to home/self care (01)  Discharge Address: [address] [place] [id number]    DME Needed: No    Action(s) Taken: Updated Provider/Nurse on Discharge Plan, Patient Conference, and DC Assessment Complete (See below)    Escalations Completed: None    Medically Clear: No    Next Steps: Pt possibly up for DC 5/27. No needs identified at this time. CM provided her with EtOH resources, grief counseling resources and several personal therapy groups that accept Medicare as well.     Barriers to Discharge: Medical clearance    Is the patient up for discharge tomorrow: Yes    Is transport arranged for discharge disposition: No

## 2023-05-26 NOTE — DISCHARGE PLANNING
"In the case of an emergency, pt's legal NOK is her son Reilly [phone].     RN NEENA met with Mehnaz at bedside and obtained the information used in this assessment. She verified accuracy of facesheet; patient lives in a 2 story home with 3 grandkids- 19, 14, and 11 y/o.  Prior to current hospitalization, pt was independent with ADLS/IADLS. Pt drives and is able to attend necessary MD appointments. Mehnaz's PCP is ZEINAB Kitchen and prefers to use "[facility]" pharmacy. Patient has no financial concerns. Pt has limited support from her family. Pt endorses alcohol use beginning in her late 60s and endorse severe grief following the death of her daughters and .     Mehnaz shared the grief of the loss of her daughters and Chantelle and Lizeth from melanoma and ARDS, respectively. This was a couple years ago, but she still struggles heavily with this loss. CM discussed options for alcohol cessation and grief counseling. She states she's tried AA but not had much success in the group setting. This RN encouraged one on one therapy/counseling for her grief which is the trigger to her drinking. She tells this RN that her family is \"mad at me\" and she appears very motivated to get help. This RN placed calls to several therapy groups to find one who is accepting Medicare, has openings, and can help with grief/alcohol abuse. Provided Mehnaz with the numbers for 3 different groups, as well as a list of grief resources. She was appreciative of this and the time talking with this RN.    Care Transition Team Assessment    Information Source  Orientation Level: Oriented X4  Information Given By: Patient  Informant's Name: Mehnaz  Who is responsible for making decisions for patient? : Patient    Readmission Evaluation  Is this a readmission?: No    Elopement Risk  Legal Hold: No  Ambulatory or Self Mobile in Wheelchair: Yes  Disoriented: No  Psychiatric Symptoms: None  History of Wandering: No  Elopement this Admit: " No  Vocalizing Wanting to Leave: No  Displays Behaviors, Body Language Wanting to Leave: No-Not at Risk for Elopement  Elopement Risk: Not at Risk for Elopement    Interdisciplinary Discharge Planning  Does Admitting Nurse Feel This Could be a Complex Discharge?: No  Primary Care Physician: ZEINAB Kitchen  Lives with - Patient's Self Care Capacity: Related Adult (Grandkids ages 19, 14, 12)  Patient or legal guardian wants to designate a caregiver: No  Support Systems: Family Member(s), Friends / Neighbors  Housing / Facility: [address]  Do You Take your Prescribed Medications Regularly: Yes  Able to Return to Previous ADL's: Yes  Mobility Issues: No  Prior Services: None  Assistance Needed: No  Durable Medical Equipment: Not Applicable    Discharge Preparedness  What is your plan after discharge?: Home with help  What are your discharge supports?: Child (grandkids)  Prior Functional Level: Ambulatory, Drives Self, Independent with Activities of Daily Living, Independent with Medication Management  Difficulity with ADLs: None  Difficulity with IADLs: None    Functional Assesment  Prior Functional Level: Ambulatory, Drives Self, Independent with Activities of Daily Living, Independent with Medication Management    Finances  Financial Barriers to Discharge: No  Prescription Coverage: Yes    Vision / Hearing Impairment  Vision Impairment : Yes  Right Eye Vision: Wears Glasses  Left Eye Vision: Wears Glasses  Hearing Impairment : No    Values / Beliefs / Concerns  Values / Beliefs Concerns : No    Advance Directive  Advance Directive?: None  Advance Directive offered?: AD Booklet refused    Domestic Abuse  Have you ever been the victim of abuse or violence?: No  Physical Abuse or Sexual Abuse: No  Verbal Abuse or Emotional Abuse: No  Possible Abuse/Neglect Reported to:: Not Applicable    Psychological Assessment  History of Substance Abuse: Alcohol  History of Psychiatric Problems: No    Discharge Risks or  Barriers  Discharge risks or barriers?: Substance abuse  Patient risk factors: Recent loss    Anticipated Discharge Information  Discharge Disposition: Discharged to home/self care (01)  Discharge Address: [address] [place] [address] [place] [address]

## 2023-05-26 NOTE — CARE PLAN
The patient is Stable - Low risk of patient condition declining or worsening    Shift Goals  Clinical Goals: safety, monitor CIWA  Patient Goals: rest    Progress made toward(s) clinical / shift goals:      Problem: Optimal Care for Alcohol Withdrawal  Goal: Optimal Care for the alcohol withdrawal patient  Outcome: Progressing  Note: Pt on q4 CIWA at this time, no ativan given during day shift. Most recent CIWA of 3. Vitamins given per  MAR, behavioral health consult ordered.      Problem: Seizure Precautions  Goal: Implementation of seizure precautions  Outcome: Progressing     Problem: Fall Risk  Goal: Patient will remain free from falls  Outcome: Progressing  Note: Fall precautions in place. Pt occasionally requires reorientation during NOC hours and reinforcement to use call light. Bed alarm plugged in.        Patient is not progressing towards the following goals:

## 2023-05-26 NOTE — PROGRESS NOTES
Assumed care of patient, bedside report received from Germain HOOVER. Updated on POC, call light within reach and fall precautions in place. Bed locked and in lowest position. Patient instructed to call for assistance before getting out of bed. All questions answered, no other needs at this time.

## 2023-05-27 ENCOUNTER — APPOINTMENT (OUTPATIENT)
Dept: RADIOLOGY | Facility: MEDICAL CENTER | Age: 78
DRG: 308 | End: 2023-05-27
Attending: STUDENT IN AN ORGANIZED HEALTH CARE EDUCATION/TRAINING PROGRAM
Payer: MEDICARE

## 2023-05-27 PROBLEM — J96.01 ACUTE RESPIRATORY FAILURE WITH HYPOXIA (HCC): Status: RESOLVED | Noted: 2020-01-31 | Resolved: 2023-05-27

## 2023-05-27 PROBLEM — I48.91 ATRIAL FIBRILLATION WITH RVR (HCC): Status: RESOLVED | Noted: 2023-05-23 | Resolved: 2023-05-27

## 2023-05-27 PROBLEM — D69.6 THROMBOCYTOPENIA (HCC): Status: ACTIVE | Noted: 2023-05-27

## 2023-05-27 PROBLEM — F10.932 ALCOHOL WITHDRAWAL SYNDROME WITH PERCEPTUAL DISTURBANCE (HCC): Status: RESOLVED | Noted: 2023-05-24 | Resolved: 2023-05-27

## 2023-05-27 PROBLEM — F43.81 COMPLEX GRIEF DISORDER LASTING LONGER THAN 12 MONTHS: Status: ACTIVE | Noted: 2023-05-27

## 2023-05-27 LAB
ALBUMIN SERPL BCP-MCNC: 3.1 G/DL (ref 3.2–4.9)
ALBUMIN/GLOB SERPL: 1.2 G/DL
ALP SERPL-CCNC: 97 U/L (ref 30–99)
ALT SERPL-CCNC: 29 U/L (ref 2–50)
ANION GAP SERPL CALC-SCNC: 11 MMOL/L (ref 7–16)
AST SERPL-CCNC: 32 U/L (ref 12–45)
BASOPHILS # BLD AUTO: 0.6 % (ref 0–1.8)
BASOPHILS # BLD: 0.03 K/UL (ref 0–0.12)
BILIRUB SERPL-MCNC: 1 MG/DL (ref 0.1–1.5)
BUN SERPL-MCNC: 11 MG/DL (ref 8–22)
CALCIUM ALBUM COR SERPL-MCNC: 9.5 MG/DL (ref 8.5–10.5)
CALCIUM SERPL-MCNC: 8.8 MG/DL (ref 8.5–10.5)
CHLORIDE SERPL-SCNC: 107 MMOL/L (ref 96–112)
CO2 SERPL-SCNC: 23 MMOL/L (ref 20–33)
CREAT SERPL-MCNC: 0.53 MG/DL (ref 0.5–1.4)
EOSINOPHIL # BLD AUTO: 0.07 K/UL (ref 0–0.51)
EOSINOPHIL NFR BLD: 1.5 % (ref 0–6.9)
ERYTHROCYTE [DISTWIDTH] IN BLOOD BY AUTOMATED COUNT: 58 FL (ref 35.9–50)
GFR SERPLBLD CREATININE-BSD FMLA CKD-EPI: 96 ML/MIN/1.73 M 2
GLOBULIN SER CALC-MCNC: 2.5 G/DL (ref 1.9–3.5)
GLUCOSE SERPL-MCNC: 96 MG/DL (ref 65–99)
HCT VFR BLD AUTO: 39.7 % (ref 37–47)
HGB BLD-MCNC: 13.5 G/DL (ref 12–16)
IMM GRANULOCYTES # BLD AUTO: 0.04 K/UL (ref 0–0.11)
IMM GRANULOCYTES NFR BLD AUTO: 0.9 % (ref 0–0.9)
LYMPHOCYTES # BLD AUTO: 1.39 K/UL (ref 1–4.8)
LYMPHOCYTES NFR BLD: 29.7 % (ref 22–41)
MAGNESIUM SERPL-MCNC: 2 MG/DL (ref 1.5–2.5)
MCH RBC QN AUTO: 37.7 PG (ref 27–33)
MCHC RBC AUTO-ENTMCNC: 34 G/DL (ref 32.2–35.5)
MCV RBC AUTO: 110.9 FL (ref 81.4–97.8)
MONOCYTES # BLD AUTO: 0.68 K/UL (ref 0–0.85)
MONOCYTES NFR BLD AUTO: 14.5 % (ref 0–13.4)
NEUTROPHILS # BLD AUTO: 2.47 K/UL (ref 1.82–7.42)
NEUTROPHILS NFR BLD: 52.8 % (ref 44–72)
NRBC # BLD AUTO: 0 K/UL
NRBC BLD-RTO: 0 /100 WBC (ref 0–0.2)
PLATELET # BLD AUTO: 110 K/UL (ref 164–446)
PLATELETS.RETICULATED NFR BLD AUTO: 5.6 % (ref 0.6–13.1)
PMV BLD AUTO: 10.9 FL (ref 9–12.9)
POTASSIUM SERPL-SCNC: 3.9 MMOL/L (ref 3.6–5.5)
PROT SERPL-MCNC: 5.6 G/DL (ref 6–8.2)
RBC # BLD AUTO: 3.58 M/UL (ref 4.2–5.4)
SODIUM SERPL-SCNC: 141 MMOL/L (ref 135–145)
WBC # BLD AUTO: 4.7 K/UL (ref 4.8–10.8)

## 2023-05-27 PROCEDURE — 700102 HCHG RX REV CODE 250 W/ 637 OVERRIDE(OP): Performed by: STUDENT IN AN ORGANIZED HEALTH CARE EDUCATION/TRAINING PROGRAM

## 2023-05-27 PROCEDURE — 99232 SBSQ HOSP IP/OBS MODERATE 35: CPT | Performed by: STUDENT IN AN ORGANIZED HEALTH CARE EDUCATION/TRAINING PROGRAM

## 2023-05-27 PROCEDURE — 85025 COMPLETE CBC W/AUTO DIFF WBC: CPT

## 2023-05-27 PROCEDURE — A9270 NON-COVERED ITEM OR SERVICE: HCPCS | Performed by: STUDENT IN AN ORGANIZED HEALTH CARE EDUCATION/TRAINING PROGRAM

## 2023-05-27 PROCEDURE — A9270 NON-COVERED ITEM OR SERVICE: HCPCS | Performed by: HOSPITALIST

## 2023-05-27 PROCEDURE — 36415 COLL VENOUS BLD VENIPUNCTURE: CPT

## 2023-05-27 PROCEDURE — 76705 ECHO EXAM OF ABDOMEN: CPT

## 2023-05-27 PROCEDURE — 85055 RETICULATED PLATELET ASSAY: CPT

## 2023-05-27 PROCEDURE — 80053 COMPREHEN METABOLIC PANEL: CPT

## 2023-05-27 PROCEDURE — 770001 HCHG ROOM/CARE - MED/SURG/GYN PRIV*

## 2023-05-27 PROCEDURE — 700102 HCHG RX REV CODE 250 W/ 637 OVERRIDE(OP): Performed by: HOSPITALIST

## 2023-05-27 PROCEDURE — 700111 HCHG RX REV CODE 636 W/ 250 OVERRIDE (IP): Performed by: STUDENT IN AN ORGANIZED HEALTH CARE EDUCATION/TRAINING PROGRAM

## 2023-05-27 PROCEDURE — 83735 ASSAY OF MAGNESIUM: CPT

## 2023-05-27 RX ORDER — LANOLIN ALCOHOL/MO/W.PET/CERES
400 CREAM (GRAM) TOPICAL DAILY
Qty: 30 TABLET | Refills: 1 | Status: SHIPPED | OUTPATIENT
Start: 2023-05-28 | End: 2023-11-30

## 2023-05-27 RX ORDER — ESCITALOPRAM OXALATE 10 MG/1
10 TABLET ORAL DAILY
Qty: 30 TABLET | Refills: 1 | Status: SHIPPED | OUTPATIENT
Start: 2023-05-27 | End: 2023-11-29

## 2023-05-27 RX ORDER — DIGOXIN 125 MCG
125 TABLET ORAL DAILY
Qty: 30 TABLET | Refills: 1 | Status: ON HOLD | OUTPATIENT
Start: 2023-05-27 | End: 2024-01-23

## 2023-05-27 RX ORDER — FUROSEMIDE 20 MG/1
20 TABLET ORAL
Status: DISCONTINUED | OUTPATIENT
Start: 2023-05-28 | End: 2023-05-29

## 2023-05-27 RX ORDER — LEVOTHYROXINE SODIUM 0.05 MG/1
50 TABLET ORAL
Qty: 30 TABLET | Refills: 1 | Status: ON HOLD | OUTPATIENT
Start: 2023-05-27 | End: 2024-01-23

## 2023-05-27 RX ORDER — POTASSIUM CHLORIDE 20 MEQ/1
40 TABLET, EXTENDED RELEASE ORAL ONCE
Status: COMPLETED | OUTPATIENT
Start: 2023-05-27 | End: 2023-05-27

## 2023-05-27 RX ORDER — METOPROLOL TARTRATE 1 MG/ML
5 INJECTION, SOLUTION INTRAVENOUS
Status: DISCONTINUED | OUTPATIENT
Start: 2023-05-27 | End: 2023-05-28

## 2023-05-27 RX ORDER — TRAZODONE HYDROCHLORIDE 50 MG/1
50 TABLET ORAL
Qty: 30 TABLET | Refills: 1 | Status: SHIPPED | OUTPATIENT
Start: 2023-05-27 | End: 2023-11-30

## 2023-05-27 RX ORDER — CARVEDILOL 6.25 MG/1
6.25 TABLET ORAL 2 TIMES DAILY WITH MEALS
Qty: 60 TABLET | Refills: 1 | Status: SHIPPED | OUTPATIENT
Start: 2023-05-27 | End: 2023-05-29 | Stop reason: SDUPTHER

## 2023-05-27 RX ADMIN — DOCUSATE SODIUM 50 MG AND SENNOSIDES 8.6 MG 2 TABLET: 8.6; 5 TABLET, FILM COATED ORAL at 16:52

## 2023-05-27 RX ADMIN — DIGOXIN 125 MCG: 0.12 TABLET ORAL at 16:52

## 2023-05-27 RX ADMIN — VENLAFAXINE HYDROCHLORIDE 150 MG: 75 CAPSULE, EXTENDED RELEASE ORAL at 04:47

## 2023-05-27 RX ADMIN — APIXABAN 5 MG: 5 TABLET, FILM COATED ORAL at 16:51

## 2023-05-27 RX ADMIN — DOCUSATE SODIUM 50 MG AND SENNOSIDES 8.6 MG 2 TABLET: 8.6; 5 TABLET, FILM COATED ORAL at 04:48

## 2023-05-27 RX ADMIN — Medication 100 MG: at 04:47

## 2023-05-27 RX ADMIN — CARVEDILOL 6.25 MG: 6.25 TABLET, FILM COATED ORAL at 09:37

## 2023-05-27 RX ADMIN — Medication 400 MG: at 04:47

## 2023-05-27 RX ADMIN — LORAZEPAM 2 MG: 2 TABLET ORAL at 21:03

## 2023-05-27 RX ADMIN — THERA TABS 1 TABLET: TAB at 04:47

## 2023-05-27 RX ADMIN — TRAZODONE HYDROCHLORIDE 50 MG: 50 TABLET ORAL at 19:55

## 2023-05-27 RX ADMIN — POTASSIUM CHLORIDE 40 MEQ: 1500 TABLET, EXTENDED RELEASE ORAL at 09:37

## 2023-05-27 RX ADMIN — LEVOTHYROXINE SODIUM 50 MCG: 0.05 TABLET ORAL at 04:47

## 2023-05-27 RX ADMIN — FOLIC ACID 1 MG: 1 TABLET ORAL at 04:47

## 2023-05-27 RX ADMIN — APIXABAN 5 MG: 5 TABLET, FILM COATED ORAL at 04:46

## 2023-05-27 RX ADMIN — FUROSEMIDE 20 MG: 10 INJECTION, SOLUTION INTRAVENOUS at 04:47

## 2023-05-27 RX ADMIN — CARVEDILOL 6.25 MG: 6.25 TABLET, FILM COATED ORAL at 16:51

## 2023-05-27 ASSESSMENT — ENCOUNTER SYMPTOMS
DEPRESSION: 1
NAUSEA: 0
FEVER: 0
SHORTNESS OF BREATH: 0
ABDOMINAL PAIN: 0
CHILLS: 0
DIARRHEA: 0
HEADACHES: 0
COUGH: 0
WEAKNESS: 1
HALLUCINATIONS: 0
INSOMNIA: 0
NERVOUS/ANXIOUS: 0
MYALGIAS: 0
PALPITATIONS: 0
DIZZINESS: 0
VOMITING: 0

## 2023-05-27 ASSESSMENT — LIFESTYLE VARIABLES
NAUSEA AND VOMITING: NO NAUSEA AND NO VOMITING
HEADACHE, FULLNESS IN HEAD: VERY MILD
VISUAL DISTURBANCES: NOT PRESENT
PAROXYSMAL SWEATS: BARELY PERCEPTIBLE SWEATING. PALMS MOIST
TOTAL SCORE: VERY MILD ITCHING, PINS AND NEEDLES SENSATION, BURNING OR NUMBNESS
HEADACHE, FULLNESS IN HEAD: VERY MILD
AGITATION: NORMAL ACTIVITY
TOTAL SCORE: 11
AUDITORY DISTURBANCES: MODERATE HARSHNESS OR ABILITY TO FRIGHTEN
AUDITORY DISTURBANCES: NOT PRESENT
ANXIETY: NO ANXIETY (AT EASE)
NAUSEA AND VOMITING: NO NAUSEA AND NO VOMITING
PAROXYSMAL SWEATS: NO SWEAT VISIBLE
VISUAL DISTURBANCES: VERY MILD SENSITIVITY
TREMOR: *
ORIENTATION AND CLOUDING OF SENSORIUM: ORIENTED AND CAN DO SERIAL ADDITIONS
TOTAL SCORE: 4
ANXIETY: *
ORIENTATION AND CLOUDING OF SENSORIUM: ORIENTED AND CAN DO SERIAL ADDITIONS
TREMOR: *
AGITATION: NORMAL ACTIVITY

## 2023-05-27 ASSESSMENT — FIBROSIS 4 INDEX: FIB4 SCORE: 4.05

## 2023-05-27 ASSESSMENT — PAIN DESCRIPTION - PAIN TYPE: TYPE: ACUTE PAIN

## 2023-05-27 NOTE — DISCHARGE PLANNING
Received Choice form at 1204  Agency/Facility Name: [facility]   Referral sent per Choice form @ [phone]

## 2023-05-27 NOTE — ASSESSMENT & PLAN NOTE
Patient reports having lost daughters and  couple of years ago.  She is requesting grief counseling.  Unfortunately grief counseling services are not available at this time.    Case management has provided the patient with outpatient counseling resources.

## 2023-05-27 NOTE — DISCHARGE SUMMARY
"Discharge Summary    CHIEF COMPLAINT ON ADMISSION  Chief Complaint   Patient presents with    Alcohol Intoxication     Son called 911 because he found patient \"not taking care of herself, just drinking vodka and nothing else.\"    Depression     Patient reports her alcoholism stems from depression r/t loss of children and . Denies overt SI, stating, \"I don't want to kill myself. I just don't care anymore.\"       Reason for Admission  Atrial fibrillation with rapid ventricular response in setting of alcohol withdrawal    Admission Date  5/23/2023    CODE STATUS  Full Code    HPI & HOSPITAL COURSE  Jeanie Hutchison is a 77 y.o. female who presented 5/23/2023 with shortness of breath and depression.  This is a pleasant woman with a history of depression, alcohol dependence, status post AICD with history of ventricular tachycardia.  Patient reported drinking on a daily basis due to significant stressors in her life with her family including daughter who had passed.  She reported not being compliant with her home medication.  She reported drinking vodka on a daily basis.  She was placed on a legal hold on her last admission in 2021.  She has previously been admitted to inpatient psychiatry in the past on multiple occasions.  She currently denied any suicidal ideations.    In the emergency room.  Patient's EKG showed atrial fibrillation with heart rate of 155.  She was admitted for further management of her atrial fibrillation with RVR as well as alcohol withdrawal.  She was resumed on her home metoprolol as well as digoxin.  Patient stated that she stopped taking her medications due to her severe grief of losing her daughters as well as  a couple of years ago.  Patient CIWA scores escalated up to 11 requiring oral and IV lorazepam and administration.  She became hypoxic requiring 2 LPM oxygen via nasal cannula.  She had intermittent atrial fibrillation with RVR with sustained heart rate in the 150s for " which the patient was given metoprolol IV pushes and was loaded with digoxin.  Her metoprolol was changed to carvedilol due to persistent hypertension up to 150/100.    Patient's heart rate became very well controlled on carvedilol and maintenance digoxin dosing.  She completed her alcohol withdrawal with CIWA scores of 0-1.  However, patient continued to adamantly request a person to talk to in regards to her grief surrounding the death of her daughters and  several years ago.  Unfortunately, the inpatient psychotherapist was not available.  Other grief counseling services were also not available within the hospital.  Patient offered to meet with the  but the patient declined stating that she was not Yazidi.    Therefore, she is discharged in good and stable condition to home with organized home healthcare and close outpatient follow-up.    The patient met 2-midnight criteria for an inpatient stay at the time of discharge.    Discharge Date  5/27/2023    FOLLOW UP ITEMS POST DISCHARGE  -Follow-up with PCP in 3 to 5 days.  -Follow-up with community grief counseling services.    DISCHARGE DIAGNOSES  Principal Problem (Resolved):    Atrial fibrillation with RVR (HCC) (POA: Yes)  Active Problems:    Alcohol dependence (HCC) (POA: Yes)    Moderate single current episode of major depressive disorder (HCC) (POA: Yes)    Hypomagnesemia (POA: Yes)    History of ventricular tachycardia (POA: Yes)    AICD (automatic cardioverter/defibrillator) present (POA: Yes)      Overview: July 2020: Medtronic Primo MRI  [id number] implanted by Dr. Baires.    Psychosocial stressors (POA: Yes)    Complex grief disorder lasting longer than 12 months (POA: Yes)    Thrombocytopenia (HCC) (POA: Yes)  Resolved Problems:    Alcohol withdrawal syndrome with perceptual disturbance (HCC) (POA: Yes)    Acute respiratory failure with hypoxia (HCC) (POA: Yes)      FOLLOW UP  No future appointments.  Perla Kitchen, F.N.P.  [id number] [place] [address]  [phone]    Follow up in 3 day(s)        MEDICATIONS ON DISCHARGE     Medication List        Start taking these medications        Instructions   carvedilol 6.25 MG Tabs  Commonly known as: COREG   Take 1 Tablet by mouth 2 times a day with meals.  Dose: 6.25 mg     magnesium oxide 400 (240 Mg) MG Tabs  Start taking on: May 28, 2023   Take 1 Tablet by mouth every day.  Dose: 400 mg     traZODone 50 MG Tabs  Commonly known as: DESYREL   Take 1 Tablet by mouth at bedtime as needed for Sleep.  Dose: 50 mg            Continue taking these medications        Instructions   apixaban 5mg Tabs  Commonly known as: Eliquis   Take 1 Tablet by mouth 2 times a day.  Dose: 5 mg     digoxin 125 MCG Tabs  Commonly known as: LANOXIN   Take 1 Tablet by mouth every day.  Dose: 125 mcg     escitalopram 10 MG Tabs  Commonly known as: Lexapro   Take 1 Tablet by mouth every day.  Dose: 10 mg     levothyroxine 50 MCG Tabs  Commonly known as: SYNTHROID   Take 1 Tablet by mouth every morning on an empty stomach.  Dose: 50 mcg            Stop taking these medications      metoprolol  MG Tb24  Commonly known as: TOPROL XL     venlafaxine 150 MG extended-release capsule  Commonly known as: EFFEXOR-XR              Allergies  Allergies   Allergen Reactions    Sulfa Drugs Rash             DIET  Orders Placed This Encounter   Procedures    Diet Order Diet: Regular     Standing Status:   Standing     Number of Occurrences:   1     Order Specific Question:   Diet:     Answer:   Regular [1]       ACTIVITY  As tolerated.  Weight bearing as tolerated    CONSULTATIONS  None    PROCEDURES  None    LABORATORY  Lab Results   Component Value Date    SODIUM 141 05/27/2023    POTASSIUM 3.9 05/27/2023    CHLORIDE 107 05/27/2023    CO2 23 05/27/2023    GLUCOSE 96 05/27/2023    BUN 11 05/27/2023    CREATININE 0.53 05/27/2023        Lab Results   Component Value Date    WBC 4.7 (L) 05/27/2023    HEMOGLOBIN 13.5 05/27/2023     HEMATOCRIT 39.7 05/27/2023    PLATELETCT 110 (L) 05/27/2023        Total time of the discharge process exceeds 37 minutes.   sitting

## 2023-05-27 NOTE — CARE PLAN
The patient is Stable - Low risk of patient condition declining or worsening    Shift Goals  Clinical Goals: monitor CIWA, HR  Patient Goals: rest    Progress made toward(s) clinical / shift goals:  Pt HR better controlled    Patient is not progressing towards the following goals: pt still feeling weak    Problem: Knowledge Deficit - Standard  Goal: Patient and family/care givers will demonstrate understanding of plan of care, disease process/condition, diagnostic tests and medications  Outcome: Progressing     Problem: Optimal Care for Alcohol Withdrawal  Goal: Optimal Care for the alcohol withdrawal patient  Outcome: Progressing     Problem: Seizure Precautions  Goal: Implementation of seizure precautions  Outcome: Progressing     Problem: Lifestyle Changes  Goal: Patient's ability to identify lifestyle changes and available resources to help reduce recurrence of condition will improve  Outcome: Progressing     Problem: Psychosocial  Goal: Patient's level of anxiety will decrease  Outcome: Progressing  Goal: Spiritual and cultural needs incorporated into hospitalization  Outcome: Progressing     Problem: Risk for Aspiration  Goal: Patient's risk for aspiration will be absent or decrease  Outcome: Progressing     Problem: Fall Risk  Goal: Patient will remain free from falls  Outcome: Progressing     Problem: Skin Integrity  Goal: Skin integrity is maintained or improved  Outcome: Progressing     Problem: Pain - Standard  Goal: Alleviation of pain or a reduction in pain to the patient’s comfort goal  Outcome: Progressing

## 2023-05-27 NOTE — PROGRESS NOTES
Hospital Medicine Daily Progress Note    Date of Service  5/27/2023    Chief Complaint  Jeanie Hutchison is a 77 y.o. female admitted 5/23/2023 with atrial fibrillation with RVR and alcohol withdrawal with shortness of breath.    Hospital Course  Jeanie Hutchison is a 77 y.o. female who presented 5/23/2023 with shortness of breath and depression.  This is a pleasant woman with a history of depression, alcohol dependence, status post AICD with history of ventricular tachycardia.  Patient reported drinking on a daily basis due to significant stressors in her life with her family including daughter who had passed.  She reported not being compliant with her home medication.  She reported drinking vodka on a daily basis.  She was placed on a legal hold on her last admission in 2021.  She has previously been admitted to inpatient psychiatry in the past.  She currently denied any suicidal ideations.    In the emergency room.  Patient's EKG showed atrial fibrillation with heart rate of 155.  She was admitted for further management of her atrial fibrillation with RVR as well as alcohol withdrawal.  She was resumed on her home metoprolol as well as digoxin.    Interval Problem Update  5/24: Patient continued to have sustained heart rate in the 150s, for which I have gave the patient metoprolol IV pushes 5 mg x 2 with additional digoxin loading.  I changed her metoprolol to carvedilol 3.125 mg twice a day due to her hypertension with blood pressure up to 150/100.  Patient continues on telemetry monitoring.  Patient's CIWA scores went up to 11 requiring oral and IV Ativan administration.  She is currently on 2 LPM oxygen by nasal cannula.    5/25: No significant events overnight.  Heart rate controlled in the 60s to 100s.  Continuing to have alcohol withdrawal CIWA scores of 8-14 requiring Ativan.  Digoxin is being resumed.  Patient continues on continuous telemetry monitoring for arrhythmias, atrial fibrillation with  RVR.    5/26: No significant events overnight.  Patient seen and examined at the telemetry floor.  She was very lethargic this morning.  Currently on 2 LPM oxygen via nasal cannula.  She is alert and oriented x3.  She is no longer hallucinating.  I discontinued patient's gabapentin.  CIWA scores of up to 5.  Continues to require Ativan.    5/27: Patient was seen and examined under the telemetry floor.  She has completed her alcohol withdrawal.  CIWA protocol has been discontinued due to scores of 0-1.  She is alert and oriented x3.  She is hemodynamically stable with good heart rate control in the 60s to 100s.  She was weaned off oxygen and is currently on room air.  PT and OT recommending postacute facility.  However, the patient is declining skilled nursing facility or inpatient rehabilitation.  I have discharged the patient to home with home health, but the patient is appealing the discharge.  Patient is now medical status.  Telemetry monitoring has been discontinued.    I have discussed this patient's plan of care and discharge plan at IDT rounds today with Case Management, Nursing, Nursing leadership, and other members of the IDT team.    Consultants/Specialty      Code Status  Full Code    Disposition  The patient is medically cleared for discharge to home or a post-acute facility.  Anticipate discharge to: home with organized home healthcare and close outpatient follow-up    I have placed the appropriate orders for post-discharge needs.    Review of Systems  Review of Systems   Constitutional:  Positive for malaise/fatigue. Negative for chills and fever.   Respiratory:  Negative for cough and shortness of breath.    Cardiovascular:  Negative for chest pain and palpitations.   Gastrointestinal:  Negative for abdominal pain, diarrhea, nausea and vomiting.   Genitourinary:  Negative for dysuria and hematuria.   Musculoskeletal:  Negative for joint pain and myalgias.   Neurological:  Positive for weakness.  Negative for dizziness and headaches.   Psychiatric/Behavioral:  Positive for depression. Negative for hallucinations and suicidal ideas. The patient is not nervous/anxious and does not have insomnia.         Physical Exam  Temp:  [36.1 °C (97 °F)-37.1 °C (98.8 °F)] 36.6 °C (97.9 °F)  Pulse:  [] 108  Resp:  [16-18] 18  BP: (101-143)/(66-98) 129/93  SpO2:  [94 %-99 %] 94 %    Physical Exam  Vitals and nursing note reviewed.   Constitutional:       Appearance: Normal appearance. She is normal weight. She is not ill-appearing or diaphoretic.      Interventions: Nasal cannula in place.   HENT:      Head: Normocephalic and atraumatic.      Mouth/Throat:      Mouth: Mucous membranes are moist.      Pharynx: Oropharynx is clear. No oropharyngeal exudate.   Eyes:      General:         Right eye: No discharge.         Left eye: No discharge.      Conjunctiva/sclera: Conjunctivae normal.      Pupils: Pupils are equal, round, and reactive to light.   Cardiovascular:      Rate and Rhythm: Normal rate and regular rhythm.      Pulses: Normal pulses.      Heart sounds: Normal heart sounds. No murmur heard.  Pulmonary:      Effort: Pulmonary effort is normal. No respiratory distress.      Breath sounds: Normal breath sounds.   Abdominal:      General: Abdomen is flat. Bowel sounds are normal. There is no distension.      Palpations: Abdomen is soft.      Tenderness: There is no abdominal tenderness.   Musculoskeletal:      Cervical back: Neck supple. No tenderness.      Right lower leg: No edema.      Left lower leg: No edema.   Skin:     General: Skin is dry.      Coloration: Skin is pale.   Neurological:      Mental Status: She is oriented to person, place, and time. She is lethargic.      Motor: No weakness.   Psychiatric:         Attention and Perception: Attention normal.         Mood and Affect: Mood is depressed. Affect is blunt and flat.         Speech: Speech normal.         Behavior: Behavior normal. Behavior is  cooperative.         Thought Content: Thought content normal.         Cognition and Memory: Cognition normal.         Fluids    Intake/Output Summary (Last 24 hours) at 5/27/2023 1629  Last data filed at 5/27/2023 0733  Gross per 24 hour   Intake 250 ml   Output 750 ml   Net -500 ml       Laboratory  Recent Labs     05/25/23  0638 05/26/23  0015 05/27/23  0102   WBC 4.0* 4.5* 4.7*   RBC 3.60* 3.60* 3.58*   HEMOGLOBIN 13.3 13.3 13.5   HEMATOCRIT 39.0 39.0 39.7   .3* 108.3* 110.9*   MCH 36.9* 36.9* 37.7*   MCHC 34.1 34.1 34.0   RDW 57.4* 57.2* 58.0*   PLATELETCT 96* 91* 110*   MPV 9.9 10.8 10.9     Recent Labs     05/25/23  0638 05/26/23  0015 05/27/23  0102   SODIUM 139 138 141   POTASSIUM 4.0 4.1 3.9   CHLORIDE 107 107 107   CO2 22 23 23   GLUCOSE 116* 108* 96   BUN 9 8 11   CREATININE 0.42* 0.40* 0.53   CALCIUM 8.1* 8.6 8.8                     Imaging  DX-CHEST-PORTABLE (1 VIEW)   Final Result      No acute cardiopulmonary abnormality.             Assessment/Plan  * Atrial fibrillation with RVR (HCC)- (present on admission)  Assessment & Plan  As per presentation with persistent RVR.    Received IV metoprolol 5 mg pushes x2 this morning for sustained heart rate in the 150s.  I have changed patient's metoprolol to carvedilol 3.125 mg twice daily due to her hypertension that has been uncontrolled.  History of medication noncompliance.    Digoxin level is 0.9, therapeutic.  Patient is currently rate controlled.    Continue digoxin to 125 mcg by mouth daily.     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  She has completed her alcohol withdrawal.  She is off of gabapentin and valproic acid.    Patient has been referred to Alcoholics Anonymous.    Alcohol dependence  (HCC)- (present on admission)  Assessment & Plan  CIWA scoring has been discontinued as per protocol.  She has completed her alcohol withdrawal.  She is requesting someone to talk to for grief counseling rather than going to a treatment center.    She is off of gabapentin and valproic acid.    Multivitamin supplementation  Discontinue telemetry monitoring  Close monitoring of electrolytes  Magnesium goal greater than 2 potassium greater than 4       Thrombocytopenia (HCC)- (present on admission)  Assessment & Plan  Mild.  In setting of alcoholism.  Improving.  Continue to monitor.    Complex grief disorder lasting longer than 12 months- (present on admission)  Assessment & Plan  Patient reports having lost daughters and  couple of years ago.  She is requesting grief counseling.  Unfortunately grief counseling services are not available at this time.    Case management has provided the patient with outpatient counseling resources.    Psychosocial stressors- (present on admission)  Assessment & Plan  Patient reports death of her daughters and  recently.    Psychotherapy and patient requested.    AICD (automatic cardioverter/defibrillator) present- (present on admission)  Assessment & Plan  As per history placed in July 2020 due to history of ventricular tachycardia.  Medtronic Primo placed by Dr. Baires.    History of ventricular tachycardia- (present on admission)  Assessment & Plan  As per history. S/p AICD.    Acute respiratory failure with hypoxia ([facility])- (present on admission)  Assessment & Plan  Patient is requiring 2 LPM oxygen nasal count.  She has been able to wean off to room air with IV diuresis with furosemide.    Change IV furosemide to 20 mg by mouth daily.    Hypomagnesemia- (present on admission)  Assessment & Plan  Magnesium down to 1.8 today.  I have ordered magnesium sulfate 2 g IV for goal greater than 2.    Continue magnesium oxide daily    Moderate single current episode of major  depressive disorder (HCC)- (present on admission)  Assessment & Plan  Continue home medications  Which include escitalopram and venlafaxine   Continue trazodone 50 mg at bedtime    Patient has requested psychotherapy.   Patient is requesting someone to talk to.  However, inpatient psychotherapy is not available at this time.  Case management has offered outpatient resources.        VTE prophylaxis: SCDs/TEDs and therapeutic anticoagulation with apixaban    I have performed a physical exam and reviewed and updated ROS and Plan today (5/27/2023). In review of yesterday's note (5/26/2023), there are no changes except as documented above.   5

## 2023-05-27 NOTE — DISCHARGE PLANNING
"Case Management Discharge Planning    Admission Date: 5/23/2023  GMLOS: 2.3  ALOS: 4    6-Clicks ADL Score: 19  6-Clicks Mobility Score: 21      Anticipated Discharge Dispo: Discharge Disposition: Discharged to home/self care (01)  Discharge Address: [address] [id number]    DME Needed: No    Action(s) Taken: LSW met with pt at bedside to discuss DC plan as she is medically clear today. Pt stated she will never do inpatient therapy again, she would rather DC home, however she won't leave until she \"talks with someone\". RN also at bedside who clarified that pt is talking about speaking with a counselor. LSW confirmed with pt that she has been given counseling resources by RN NEENA Alba. LSW reiterated to pt that this is something that needs to be addressed outpatient rather than during this hospitalization. LSW informed pt she will need to call to schedule an outpatient counseling session. Pt reported she is \"too tired\" to do so herself. LSW assisted pt with calling [facility] and left them a voicemail with pt's callback number. LSW spoke with pt about HHC, for which pt is agreeable. Choice obtained and faxed to [facility].    LSW notified by MD and RN that pt is now agreeable with SNF. Met with pt at bedside again for SNF choice. Pt again stated she will absolutely not go to SNF or any inpatient rehab. Second IMM given to pt. Pt requested LSW remain at bedside while she call to appeal the DC. Pt informed [facility] the reason for the appeal is that she just feels so sick and \"can barely walk\".    Again spoke with pt about post-acute placement, however she continued to refuse. LSW faxed choice form for blanket SNFs for continuum of care should pt change her mind.    Escalations Completed: None    Medically Clear: Yes    Next Steps: Care coordination will f/u with SNF vs HHC referrals and [facility] regarding hospital appeal    Barriers to Discharge: Pt refusing SNF, appealed DC    Is the patient up for discharge tomorrow: " No

## 2023-05-28 PROBLEM — F33.1 MODERATE EPISODE OF RECURRENT MAJOR DEPRESSIVE DISORDER (HCC): Status: ACTIVE | Noted: 2023-05-28

## 2023-05-28 PROBLEM — Z75.8 DISCHARGE PLANNING ISSUES: Status: ACTIVE | Noted: 2023-05-28

## 2023-05-28 PROBLEM — Z02.9 DISCHARGE PLANNING ISSUES: Status: ACTIVE | Noted: 2023-05-28

## 2023-05-28 PROCEDURE — 700102 HCHG RX REV CODE 250 W/ 637 OVERRIDE(OP): Performed by: STUDENT IN AN ORGANIZED HEALTH CARE EDUCATION/TRAINING PROGRAM

## 2023-05-28 PROCEDURE — A9270 NON-COVERED ITEM OR SERVICE: HCPCS | Performed by: STUDENT IN AN ORGANIZED HEALTH CARE EDUCATION/TRAINING PROGRAM

## 2023-05-28 PROCEDURE — 700102 HCHG RX REV CODE 250 W/ 637 OVERRIDE(OP): Performed by: NURSE PRACTITIONER

## 2023-05-28 PROCEDURE — 700102 HCHG RX REV CODE 250 W/ 637 OVERRIDE(OP): Performed by: HOSPITALIST

## 2023-05-28 PROCEDURE — 99232 SBSQ HOSP IP/OBS MODERATE 35: CPT | Performed by: STUDENT IN AN ORGANIZED HEALTH CARE EDUCATION/TRAINING PROGRAM

## 2023-05-28 PROCEDURE — 770006 HCHG ROOM/CARE - MED/SURG/GYN SEMI*

## 2023-05-28 PROCEDURE — A9270 NON-COVERED ITEM OR SERVICE: HCPCS | Performed by: HOSPITALIST

## 2023-05-28 PROCEDURE — A9270 NON-COVERED ITEM OR SERVICE: HCPCS | Performed by: NURSE PRACTITIONER

## 2023-05-28 RX ORDER — FUROSEMIDE 20 MG/1
20 TABLET ORAL DAILY
Qty: 30 TABLET | Refills: 0 | Status: SHIPPED | OUTPATIENT
Start: 2023-05-29 | End: 2023-05-29

## 2023-05-28 RX ORDER — POTASSIUM CHLORIDE 20 MEQ/1
40 TABLET, EXTENDED RELEASE ORAL ONCE
Status: COMPLETED | OUTPATIENT
Start: 2023-05-28 | End: 2023-05-28

## 2023-05-28 RX ORDER — LORAZEPAM 1 MG/1
1 TABLET ORAL EVERY 4 HOURS PRN
Status: DISCONTINUED | OUTPATIENT
Start: 2023-05-28 | End: 2023-05-30 | Stop reason: HOSPADM

## 2023-05-28 RX ADMIN — CARVEDILOL 6.25 MG: 6.25 TABLET, FILM COATED ORAL at 17:53

## 2023-05-28 RX ADMIN — LORAZEPAM 1 MG: 1 TABLET ORAL at 21:57

## 2023-05-28 RX ADMIN — LEVOTHYROXINE SODIUM 50 MCG: 0.05 TABLET ORAL at 05:22

## 2023-05-28 RX ADMIN — LORAZEPAM 1 MG: 1 TABLET ORAL at 03:16

## 2023-05-28 RX ADMIN — FUROSEMIDE 20 MG: 20 TABLET ORAL at 05:23

## 2023-05-28 RX ADMIN — VENLAFAXINE HYDROCHLORIDE 150 MG: 75 CAPSULE, EXTENDED RELEASE ORAL at 05:25

## 2023-05-28 RX ADMIN — CARVEDILOL 6.25 MG: 6.25 TABLET, FILM COATED ORAL at 09:18

## 2023-05-28 RX ADMIN — POTASSIUM CHLORIDE 40 MEQ: 1500 TABLET, EXTENDED RELEASE ORAL at 09:17

## 2023-05-28 RX ADMIN — DOCUSATE SODIUM 50 MG AND SENNOSIDES 8.6 MG 2 TABLET: 8.6; 5 TABLET, FILM COATED ORAL at 17:52

## 2023-05-28 RX ADMIN — Medication 400 MG: at 05:22

## 2023-05-28 RX ADMIN — APIXABAN 5 MG: 5 TABLET, FILM COATED ORAL at 17:53

## 2023-05-28 RX ADMIN — ACETAMINOPHEN 650 MG: 325 TABLET, FILM COATED ORAL at 15:12

## 2023-05-28 RX ADMIN — DIGOXIN 125 MCG: 0.12 TABLET ORAL at 17:52

## 2023-05-28 RX ADMIN — APIXABAN 5 MG: 5 TABLET, FILM COATED ORAL at 05:22

## 2023-05-28 RX ADMIN — TRAZODONE HYDROCHLORIDE 50 MG: 50 TABLET ORAL at 21:41

## 2023-05-28 ASSESSMENT — LIFESTYLE VARIABLES
AUDITORY DISTURBANCES: NOT PRESENT
ORIENTATION AND CLOUDING OF SENSORIUM: ORIENTED AND CAN DO SERIAL ADDITIONS
TREMOR: *
VISUAL DISTURBANCES: MILD SENSITIVITY
TOTAL SCORE: 2
ANXIETY: NO ANXIETY (AT EASE)
VISUAL DISTURBANCES: NOT PRESENT
TREMOR: NO TREMOR
NAUSEA AND VOMITING: NO NAUSEA AND NO VOMITING
TREMOR: *
NAUSEA AND VOMITING: NO NAUSEA AND NO VOMITING
ANXIETY: NO ANXIETY (AT EASE)
TOTAL SCORE: 10
TREMOR: NO TREMOR
NAUSEA AND VOMITING: NO NAUSEA AND NO VOMITING
PAROXYSMAL SWEATS: NO SWEAT VISIBLE
HEADACHE, FULLNESS IN HEAD: NOT PRESENT
VISUAL DISTURBANCES: NOT PRESENT
PAROXYSMAL SWEATS: BARELY PERCEPTIBLE SWEATING. PALMS MOIST
VISUAL DISTURBANCES: NOT PRESENT
AGITATION: NORMAL ACTIVITY
HEADACHE, FULLNESS IN HEAD: NOT PRESENT
TOTAL SCORE: MILD ITCHING, PINS AND NEEDLES SENSATION, BURNING OR NUMBNESS
AUDITORY DISTURBANCES: NOT PRESENT
ORIENTATION AND CLOUDING OF SENSORIUM: ORIENTED AND CAN DO SERIAL ADDITIONS
AUDITORY DISTURBANCES: NOT PRESENT
AGITATION: NORMAL ACTIVITY
TOTAL SCORE: 2
PAROXYSMAL SWEATS: NO SWEAT VISIBLE
PAROXYSMAL SWEATS: NO SWEAT VISIBLE
HEADACHE, FULLNESS IN HEAD: VERY MILD
ANXIETY: *
AUDITORY DISTURBANCES: NOT PRESENT
TOTAL SCORE: 0
AGITATION: NORMAL ACTIVITY
HEADACHE, FULLNESS IN HEAD: NOT PRESENT
NAUSEA AND VOMITING: NO NAUSEA AND NO VOMITING
ORIENTATION AND CLOUDING OF SENSORIUM: ORIENTED AND CAN DO SERIAL ADDITIONS
AGITATION: NORMAL ACTIVITY
ORIENTATION AND CLOUDING OF SENSORIUM: ORIENTED AND CAN DO SERIAL ADDITIONS
ANXIETY: *

## 2023-05-28 ASSESSMENT — ENCOUNTER SYMPTOMS
NERVOUS/ANXIOUS: 1
DIARRHEA: 0
NAUSEA: 0
DIZZINESS: 0
DEPRESSION: 1
SHORTNESS OF BREATH: 0
COUGH: 0
ABDOMINAL PAIN: 0
CHILLS: 0
FEVER: 0
WEAKNESS: 1
PALPITATIONS: 0
INSOMNIA: 0
HALLUCINATIONS: 0
VOMITING: 0
HEADACHES: 0
MYALGIAS: 0

## 2023-05-28 ASSESSMENT — PATIENT HEALTH QUESTIONNAIRE - PHQ9
8. MOVING OR SPEAKING SO SLOWLY THAT OTHER PEOPLE COULD HAVE NOTICED. OR THE OPPOSITE, BEING SO FIGETY OR RESTLESS THAT YOU HAVE BEEN MOVING AROUND A LOT MORE THAN USUAL: NOT AT ALL
2. FEELING DOWN, DEPRESSED, IRRITABLE, OR HOPELESS: SEVERAL DAYS
SUM OF ALL RESPONSES TO PHQ9 QUESTIONS 1 AND 2: 2
9. THOUGHTS THAT YOU WOULD BE BETTER OFF DEAD, OR OF HURTING YOURSELF: NOT AT ALL
SUM OF ALL RESPONSES TO PHQ QUESTIONS 1-9: 5
4. FEELING TIRED OR HAVING LITTLE ENERGY: SEVERAL DAYS
6. FEELING BAD ABOUT YOURSELF - OR THAT YOU ARE A FAILURE OR HAVE LET YOURSELF OR YOUR FAMILY DOWN: SEVERAL DAYS
5. POOR APPETITE OR OVEREATING: NOT AT ALL
1. LITTLE INTEREST OR PLEASURE IN DOING THINGS: SEVERAL DAYS
7. TROUBLE CONCENTRATING ON THINGS, SUCH AS READING THE NEWSPAPER OR WATCHING TELEVISION: NOT AT ALL
3. TROUBLE FALLING OR STAYING ASLEEP OR SLEEPING TOO MUCH: SEVERAL DAYS

## 2023-05-28 ASSESSMENT — PAIN DESCRIPTION - PAIN TYPE: TYPE: ACUTE PAIN

## 2023-05-28 NOTE — CARE PLAN
The patient is Stable - Low risk of patient condition declining or worsening    Shift Goals  Clinical Goals: Pt's CIWA will remain below 12 this shift  Patient Goals: talk to a member of the psych team in person  Family Goals: DANICA    Progress made toward(s) clinical / shift goals: Seizure precautions in place, Pt's highest CIWA this shift is 11, pt's skin integrity is at baseline from admission, pt receiving ativan per CIWA protocol    Patient is not progressing towards the following goals:      Problem: Seizure Precautions  Goal: Implementation of seizure precautions  Outcome: Progressing     Problem: Skin Integrity  Goal: Skin integrity is maintained or improved  Outcome: Progressing

## 2023-05-28 NOTE — PROGRESS NOTES
Hospital Medicine Daily Progress Note    Date of Service  5/28/2023    Chief Complaint  Jeanie Hutchison is a 77 y.o. female admitted 5/23/2023 with atrial fibrillation with RVR and alcohol withdrawal with shortness of breath.    Hospital Course  Jeanie Hutchison is a 77 y.o. female who presented 5/23/2023 with shortness of breath and depression.  This is a pleasant woman with a history of depression, alcohol dependence, status post AICD with history of ventricular tachycardia.  Patient reported drinking on a daily basis due to significant stressors in her life with her family including daughter who had passed.  She reported not being compliant with her home medication.  She reported drinking vodka on a daily basis.  She was placed on a legal hold on her last admission in 2021.  She has previously been admitted to inpatient psychiatry in the past.  She currently denied any suicidal ideations.    In the emergency room.  Patient's EKG showed atrial fibrillation with heart rate of 155.  She was admitted for further management of her atrial fibrillation with RVR as well as alcohol withdrawal.  She was resumed on her home metoprolol as well as digoxin.    Interval Problem Update  5/24: Patient continued to have sustained heart rate in the 150s, for which I have gave the patient metoprolol IV pushes 5 mg x 2 with additional digoxin loading.  I changed her metoprolol to carvedilol 3.125 mg twice a day due to her hypertension with blood pressure up to 150/100.  Patient continues on telemetry monitoring.  Patient's CIWA scores went up to 11 requiring oral and IV Ativan administration.  She is currently on 2 LPM oxygen by nasal cannula.    5/25: No significant events overnight.  Heart rate controlled in the 60s to 100s.  Continuing to have alcohol withdrawal CIWA scores of 8-14 requiring Ativan.  Digoxin is being resumed.  Patient continues on continuous telemetry monitoring for arrhythmias, atrial fibrillation with  RVR.    5/26: No significant events overnight.  Patient seen and examined at the telemetry floor.  She was very lethargic this morning.  Currently on 2 LPM oxygen via nasal cannula.  She is alert and oriented x3.  She is no longer hallucinating.  I discontinued patient's gabapentin.  CIWA scores of up to 5.  Continues to require Ativan.    5/27: Patient was seen and examined under the telemetry floor.  She has completed her alcohol withdrawal.  CIWA protocol has been discontinued due to scores of 0-1.  She is alert and oriented x3.  She is hemodynamically stable with good heart rate control in the 60s to 100s.  She was weaned off oxygen and is currently on room air.  PT and OT recommending postacute facility.  However, the patient is declining skilled nursing facility or inpatient rehabilitation.  I have discharged the patient to home with home health, but the patient is appealing the discharge.  Patient is now medical status.  Telemetry monitoring has been discontinued.    5/28: Patient was seen and examined in the telemetry floor.  She is currently medical status awaiting a medical bed.  She reports feeling shaky this morning.  However, no further signs of alcohol withdrawal.  Continues to report significant depression but denies any suicidal ideation.  She is pending her appeal for discharge.  She has requested a psychiatry consultation, which I have placed for medication management.  She is tolerating her diet.      I have discussed this patient's plan of care and discharge plan at IDT rounds today with Case Management, Nursing, Nursing leadership, and other members of the IDT team.    Consultants/Specialty      Code Status  Full Code    Disposition  The patient is not medically cleared for discharge to home or a post-acute facility.  Anticipate discharge to: home with organized home healthcare and close outpatient follow-up    I have placed the appropriate orders for post-discharge needs.    Review of  Systems  Review of Systems   Constitutional:  Positive for malaise/fatigue. Negative for chills and fever.   Respiratory:  Negative for cough and shortness of breath.    Cardiovascular:  Negative for chest pain and palpitations.   Gastrointestinal:  Negative for abdominal pain, diarrhea, nausea and vomiting.   Genitourinary:  Negative for dysuria and hematuria.   Musculoskeletal:  Negative for joint pain and myalgias.   Neurological:  Positive for weakness. Negative for dizziness and headaches.   Psychiatric/Behavioral:  Positive for depression. Negative for hallucinations and suicidal ideas. The patient is nervous/anxious. The patient does not have insomnia.         Physical Exam  Temp:  [36.2 °C (97.2 °F)-36.6 °C (97.9 °F)] 36.2 °C (97.2 °F)  Pulse:  [] 76  Resp:  [18] 18  BP: (129-143)/() 137/99  SpO2:  [92 %-95 %] 95 %    Physical Exam  Vitals and nursing note reviewed.   Constitutional:       Appearance: Normal appearance. She is normal weight. She is not ill-appearing or diaphoretic.      Interventions: Nasal cannula in place.   HENT:      Head: Normocephalic and atraumatic.      Mouth/Throat:      Mouth: Mucous membranes are moist.      Pharynx: Oropharynx is clear. No oropharyngeal exudate.   Eyes:      General:         Right eye: No discharge.         Left eye: No discharge.      Conjunctiva/sclera: Conjunctivae normal.      Pupils: Pupils are equal, round, and reactive to light.   Cardiovascular:      Rate and Rhythm: Normal rate and regular rhythm.      Pulses: Normal pulses.      Heart sounds: Normal heart sounds. No murmur heard.  Pulmonary:      Effort: Pulmonary effort is normal. No respiratory distress.      Breath sounds: Normal breath sounds.   Abdominal:      General: Abdomen is flat. Bowel sounds are normal. There is no distension.      Palpations: Abdomen is soft.      Tenderness: There is no abdominal tenderness.   Musculoskeletal:      Cervical back: Neck supple. No tenderness.       Right lower leg: No edema.      Left lower leg: No edema.   Skin:     General: Skin is dry.      Coloration: Skin is pale.   Neurological:      Mental Status: She is oriented to person, place, and time. She is lethargic.      Motor: Weakness (diffuse in all four extremities) present.   Psychiatric:         Attention and Perception: Attention normal.         Mood and Affect: Mood is depressed. Affect is blunt and flat.         Speech: Speech normal.         Behavior: Behavior normal. Behavior is cooperative.         Thought Content: Thought content normal.         Cognition and Memory: Cognition normal.         Fluids    Intake/Output Summary (Last 24 hours) at 5/28/2023 1420  Last data filed at 5/28/2023 1400  Gross per 24 hour   Intake 360 ml   Output 950 ml   Net -590 ml       Laboratory  Recent Labs     05/26/23  0015 05/27/23  0102   WBC 4.5* 4.7*   RBC 3.60* 3.58*   HEMOGLOBIN 13.3 13.5   HEMATOCRIT 39.0 39.7   .3* 110.9*   MCH 36.9* 37.7*   MCHC 34.1 34.0   RDW 57.2* 58.0*   PLATELETCT 91* 110*   MPV 10.8 10.9     Recent Labs     05/26/23  0015 05/27/23  0102   SODIUM 138 141   POTASSIUM 4.1 3.9   CHLORIDE 107 107   CO2 23 23   GLUCOSE 108* 96   BUN 8 11   CREATININE 0.40* 0.53   CALCIUM 8.6 8.8                     Imaging  US-RUQ   Final Result      1.  Gallbladder is not identified consistent with prior cholecystectomy.      2.  Exam is otherwise unremarkable.      DX-CHEST-PORTABLE (1 VIEW)   Final Result      No acute cardiopulmonary abnormality.             Assessment/Plan  * Atrial fibrillation with RVR (HCC)- (present on admission)  Assessment & Plan  As per presentation with persistent RVR.    Received IV metoprolol 5 mg pushes x2 this morning for sustained heart rate in the 150s.  I have changed patient's metoprolol to carvedilol 3.125 mg twice daily due to her hypertension that has been uncontrolled.  History of medication noncompliance.    Digoxin level is 0.9, therapeutic.  Patient is  currently rate controlled.    Continue digoxin to 125 mcg by mouth daily.     Continue apixaban 5 mg twice daily  Continue carvedilol 6.25 mg by mouth twice daily    Patient is awaiting bed on the medical floor she is hemodynamically stable.  Rate is well controlled.    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  She has completed her alcohol withdrawal.  She is off of gabapentin and valproic acid.    Patient has been referred to Alcoholics Anonymous.    Alcohol dependence (HCC)- (present on admission)  Assessment & Plan  CIWA scoring has been discontinued as per protocol.  She has completed her alcohol withdrawal.  She is requesting someone to talk to for grief counseling rather than going to a treatment center.    She is off of gabapentin and valproic acid.  Patient has discontinued telemetry monitoring.  She has completed her alcohol withdrawal.  She is awaiting medical bed.    Patient has appealed her discharge    Continue thiamine, folic acid, multivitamin supplementation       Discharge planning issues- (present on admission)  Assessment & Plan  Patient is declining skilled nursing facility.  She has appealed her discharge to home with home health stating that she wants psychotherapy before she leaves the hospital.  Unfortunately, psychotherapist is not available at this time.    Patient's discharge appeal is pending.    Moderate episode of recurrent major depressive disorder (HCC)- (present on admission)  Assessment & Plan  Patient is currently on venlafaxine XR.  She continues have severe depression related to her losses of loved ones a few years ago.  She currently denies any suicidal ideation.    Unfortunately, psychotherapy is not available in the hospital although she is requesting this.  She has requested psychiatry consultation for medication adjustment.    I placed a consultation request for psychiatry  for medication management for patient's depression.    Thrombocytopenia (HCC)- (present on admission)  Assessment & Plan  Mild.  In setting of alcoholism.  Improving.  Continue to monitor.    Complex grief disorder lasting longer than 12 months- (present on admission)  Assessment & Plan  Patient reports having lost daughters and  couple of years ago.  She is requesting grief counseling.  Unfortunately grief counseling services are not available at this time.    Case management has provided the patient with outpatient counseling resources.    Psychosocial stressors- (present on admission)  Assessment & Plan  Patient reports death of her daughters and  recently.    Psychotherapy and patient requested.    AICD (automatic cardioverter/defibrillator) present- (present on admission)  Assessment & Plan  As per history placed in July 2020 due to history of ventricular tachycardia.  [employer] Primo placed by Dr. Baires.    History of ventricular tachycardia- (present on admission)  Assessment & Plan  As per history. S/p AICD.    Acute respiratory failure with hypoxia (HCC)- (present on admission)  Assessment & Plan  Patient is requiring 2 LPM oxygen nasal count.  She has been able to wean off to room air with IV diuresis with furosemide.    Change IV furosemide to 20 mg by mouth daily.    Hypomagnesemia- (present on admission)  Assessment & Plan  Magnesium down to 1.8 today.  I have ordered magnesium sulfate 2 g IV for goal greater than 2.    Continue magnesium oxide daily        VTE prophylaxis: SCDs/TEDs and therapeutic anticoagulation with apixaban    I have performed a physical exam and reviewed and updated ROS and Plan today (5/28/2023). In review of yesterday's note (5/27/2023), there are no changes except as documented above.

## 2023-05-28 NOTE — PROGRESS NOTES
Bedside report received from SNEHA Parker. Pt on RA at this time. Patient A&O x 4. Pt does not complain of pain at this time. POC discussed with patient. Patient verbalizes understanding. Call light and belongings within reach. Bed locked in lowest position, alarm and fall precautions in place.

## 2023-05-28 NOTE — ASSESSMENT & PLAN NOTE
Patient is declining skilled nursing facility.  She has appealed her discharge to home with home health stating that she wants psychotherapy before she leaves the hospital.  Unfortunately, psychotherapist is not available at this time.    Patient's discharge appeal is pending.

## 2023-05-28 NOTE — CARE PLAN
The patient is Stable - Low risk of patient condition declining or worsening    Shift Goals  Clinical Goals: Rest  Patient Goals: Talk to Psych   Family Goals: DANICA    Progress made toward(s) clinical / shift goals:  orders placed for Psych.    Patient is not progressing towards the following goals:

## 2023-05-29 PROBLEM — Z02.9 DISCHARGE PLANNING ISSUES: Status: RESOLVED | Noted: 2023-05-28 | Resolved: 2023-05-29

## 2023-05-29 PROBLEM — Z75.8 DISCHARGE PLANNING ISSUES: Status: RESOLVED | Noted: 2023-05-28 | Resolved: 2023-05-29

## 2023-05-29 PROBLEM — I10 HTN (HYPERTENSION): Status: ACTIVE | Noted: 2023-05-29

## 2023-05-29 PROCEDURE — 700102 HCHG RX REV CODE 250 W/ 637 OVERRIDE(OP): Performed by: NURSE PRACTITIONER

## 2023-05-29 PROCEDURE — 770006 HCHG ROOM/CARE - MED/SURG/GYN SEMI*

## 2023-05-29 PROCEDURE — A9270 NON-COVERED ITEM OR SERVICE: HCPCS | Performed by: NURSE PRACTITIONER

## 2023-05-29 PROCEDURE — 700102 HCHG RX REV CODE 250 W/ 637 OVERRIDE(OP): Performed by: HOSPITALIST

## 2023-05-29 PROCEDURE — 99232 SBSQ HOSP IP/OBS MODERATE 35: CPT | Performed by: HOSPITALIST

## 2023-05-29 PROCEDURE — A9270 NON-COVERED ITEM OR SERVICE: HCPCS | Performed by: HOSPITALIST

## 2023-05-29 PROCEDURE — A9270 NON-COVERED ITEM OR SERVICE: HCPCS | Performed by: STUDENT IN AN ORGANIZED HEALTH CARE EDUCATION/TRAINING PROGRAM

## 2023-05-29 PROCEDURE — 700102 HCHG RX REV CODE 250 W/ 637 OVERRIDE(OP): Performed by: STUDENT IN AN ORGANIZED HEALTH CARE EDUCATION/TRAINING PROGRAM

## 2023-05-29 RX ORDER — CARVEDILOL 12.5 MG/1
12.5 TABLET ORAL 2 TIMES DAILY WITH MEALS
Qty: 60 TABLET | Refills: 0 | Status: SHIPPED | OUTPATIENT
Start: 2023-05-29 | End: 2023-11-29

## 2023-05-29 RX ADMIN — CARVEDILOL 6.25 MG: 6.25 TABLET, FILM COATED ORAL at 18:13

## 2023-05-29 RX ADMIN — FUROSEMIDE 20 MG: 20 TABLET ORAL at 04:36

## 2023-05-29 RX ADMIN — VENLAFAXINE HYDROCHLORIDE 150 MG: 75 CAPSULE, EXTENDED RELEASE ORAL at 04:35

## 2023-05-29 RX ADMIN — TRAZODONE HYDROCHLORIDE 50 MG: 50 TABLET ORAL at 20:01

## 2023-05-29 RX ADMIN — APIXABAN 5 MG: 5 TABLET, FILM COATED ORAL at 18:13

## 2023-05-29 RX ADMIN — LORAZEPAM 1 MG: 1 TABLET ORAL at 20:01

## 2023-05-29 RX ADMIN — CARVEDILOL 6.25 MG: 6.25 TABLET, FILM COATED ORAL at 07:54

## 2023-05-29 RX ADMIN — DIGOXIN 125 MCG: 0.12 TABLET ORAL at 18:13

## 2023-05-29 RX ADMIN — LEVOTHYROXINE SODIUM 50 MCG: 0.05 TABLET ORAL at 04:35

## 2023-05-29 RX ADMIN — APIXABAN 5 MG: 5 TABLET, FILM COATED ORAL at 04:35

## 2023-05-29 RX ADMIN — LORAZEPAM 1 MG: 1 TABLET ORAL at 02:11

## 2023-05-29 RX ADMIN — Medication 400 MG: at 04:35

## 2023-05-29 ASSESSMENT — LIFESTYLE VARIABLES: SUBSTANCE_ABUSE: 1

## 2023-05-29 ASSESSMENT — PAIN DESCRIPTION - PAIN TYPE
TYPE: ACUTE PAIN

## 2023-05-29 ASSESSMENT — ENCOUNTER SYMPTOMS: DEPRESSION: 1

## 2023-05-29 NOTE — DISCHARGE PLANNING
Care Transition Team Discharge Planning    Anticipated Discharge Information  Discharge Disposition: Discharged to home/self care (01)  Discharge Address: [address] [place] [address], [place] [address]              Discharge Plan:  Choice presented to patient and she states no choice. Referral sent to [facility].

## 2023-05-29 NOTE — ASSESSMENT & PLAN NOTE
Pressure not at goal with elevated diastolic blood pressure  I have increased carvedilol monitor blood pressure adjust accordingly

## 2023-05-29 NOTE — PROGRESS NOTES
Received report and assumed care of patient at change of shift. Patient is A&Ox4, on RA, and denies pain at this time. Patient assessment completed, bed in lowest position, and call light and personal belongings are within reach. Patient expressed no further needs at this time.

## 2023-05-29 NOTE — PROGRESS NOTES
Assumed pt care. Pt awake without any signs of distress, call light within reach. No needs at this time.

## 2023-05-29 NOTE — CARE PLAN
The patient is Stable - Low risk of patient condition declining or worsening    Shift Goals  Clinical Goals: psych consult, appealed DC, home with HH, pt declined SNF  Patient Goals: See psych  Family Goals: DANICA    Progress made toward(s) clinical / shift goals:  Rest    Problem: Knowledge Deficit - Standard  Goal: Patient and family/care givers will demonstrate understanding of plan of care, disease process/condition, diagnostic tests and medications  5/29/2023 1526 by Heide Aragon R.N.  Outcome: Progressing  Note: POC: pt appealed her DC, MD notified today that the appeal was denied. Pt made aware and states she is unable to care for self if she was to DC home. CM notified pt now agreeable to SNF.        Patient is not progressing towards the following goals:      Problem: Psychosocial  Goal: Patient's level of anxiety will decrease  5/29/2023 1526 by Heide Aragon, R.ANKIT.  Outcome: Not Progressing  Note: Pt requesting in person psychotherapy, MD notified pt that this is not a service that [facility] currently has. Psych consult placed. Pt given community resources.

## 2023-05-29 NOTE — PROGRESS NOTES
[facility] Medicine Daily Progress Note    Date of Service  5/29/2023    Chief Complaint  Jeanie Hutchison is a 77 y.o. female admitted 5/23/2023 with atrial fibrillation with RVR and alcohol withdrawal with shortness of breath.    Hospital Course  Jeanie Hutchison is a 77 y.o. female who presented 5/23/2023 with shortness of breath and depression.  This is a pleasant woman with a history of depression, alcohol dependence, status post AICD with history of ventricular tachycardia.  Patient reported drinking on a daily basis due to significant stressors in her life with her family including daughter who had passed.  She reported not being compliant with her home medication.  She reported drinking vodka on a daily basis.  She was placed on a legal hold on her last admission in 2021.  She has previously been admitted to inpatient psychiatry in the past.  She currently denied any suicidal ideations.    In the emergency room.  Patient's EKG showed atrial fibrillation with heart rate of 155.  She was admitted for further management of her atrial fibrillation with RVR as well as alcohol withdrawal.  She was resumed on her home metoprolol as well as digoxin.    5/24: Patient continued to have sustained heart rate in the 150s, for which I have gave the patient metoprolol IV pushes 5 mg x 2 with additional digoxin loading.  I changed her metoprolol to carvedilol 3.125 mg twice a day due to her hypertension with blood pressure up to 150/100.  Patient continues on telemetry monitoring.  Patient's CIWA scores went up to 11 requiring oral and IV Ativan administration.  She is currently on 2 LPM oxygen by nasal cannula.    5/25: No significant events overnight.  Heart rate controlled in the 60s to 100s.  Continuing to have alcohol withdrawal CIWA scores of 8-14 requiring Ativan.  Digoxin is being resumed.  Patient continues on continuous telemetry monitoring for arrhythmias, atrial fibrillation with RVR.    5/26: No significant  events overnight.  Patient seen and examined at the telemetry floor.  She was very lethargic this morning.  Currently on 2 LPM oxygen via nasal cannula.  She is alert and oriented x3.  She is no longer hallucinating.  I discontinued patient's gabapentin.  CIWA scores of up to 5.  Continues to require Ativan.    5/27: Patient was seen and examined under the telemetry floor.  She has completed her alcohol withdrawal.  CIWA protocol has been discontinued due to scores of 0-1.  She is alert and oriented x3.  She is hemodynamically stable with good heart rate control in the 60s to 100s.  She was weaned off oxygen and is currently on room air.  PT and OT recommending postacute facility.  However, the patient is declining skilled nursing facility or inpatient rehabilitation.  I have discharged the patient to home with home health, but the patient is appealing the discharge.  Patient is now medical status.  Telemetry monitoring has been discontinued.    5/28: Patient was seen and examined in the telemetry floor.  She is currently medical status awaiting a medical bed.  She reports feeling shaky this morning.  However, no further signs of alcohol withdrawal.  Continues to report significant depression but denies any suicidal ideation.  She is pending her appeal for discharge.  She has requested a psychiatry consultation, which I have placed for medication management.  She is tolerating her diet.      Interval Problem Update    Appealed her discharge yesterday  Patient reports that she feels depressed and that is why she has been drinking  Would like psychotherapy explained to her that this is not available at this time in the inpatient setting.  Psychiatry consultation pending  SBP 1 22-1 36  Reports that she lives by herself and will requires some assistance with house cleaning initially declining SNF however changed her mind and now is agreeable to go to skilled nursing facility  Discussed with case management   Complains  of lightheadedness with standing    I have discussed this patient's plan of care and discharge plan at IDT rounds today with Case Management, Nursing, Nursing leadership, and other members of the IDT team.    Consultants/Specialty      Code Status  Full Code    Disposition  The patient is medically cleared for discharge to home or a post-acute facility.  Anticipate discharge to: skilled nursing facility    I have placed the appropriate orders for post-discharge needs.    Review of Systems  Review of Systems   Constitutional:  Positive for malaise/fatigue.   Cardiovascular:  Negative for chest pain.   Psychiatric/Behavioral:  Positive for depression and substance abuse. Negative for suicidal ideas.    All other systems reviewed and are negative.       Physical Exam  Temp:  [36.1 °C (96.9 °F)-36.5 °C (97.7 °F)] 36.2 °C (97.2 °F)  Pulse:  [77-96] 96  Resp:  [18-19] 19  BP: (127-143)/() 136/106  SpO2:  [90 %-97 %] 92 %    Physical Exam  Vitals and nursing note reviewed.   Constitutional:       General: She is not in acute distress.  HENT:      Head: Normocephalic and atraumatic.      Nose: Nose normal. No rhinorrhea.      Mouth/Throat:      Pharynx: No oropharyngeal exudate or posterior oropharyngeal erythema.   Eyes:      General:         Right eye: No discharge.         Left eye: No discharge.   Cardiovascular:      Rate and Rhythm: Normal rate. Rhythm irregular.      Heart sounds: Normal heart sounds. No murmur heard.     No friction rub. No gallop.   Pulmonary:      Effort: Pulmonary effort is normal. No respiratory distress.      Breath sounds: Normal breath sounds. No stridor. No wheezing, rhonchi or rales.   Chest:      Chest wall: No tenderness.   Abdominal:      General: Bowel sounds are normal. There is no distension.      Palpations: Abdomen is soft. There is no mass.      Tenderness: There is no abdominal tenderness. There is no rebound.   Musculoskeletal:         General: No swelling or tenderness.       Cervical back: Neck supple. No rigidity.   Skin:     General: Skin is warm and dry.      Coloration: Skin is pale. Skin is not cyanotic or jaundiced.      Nails: There is no clubbing.   Neurological:      General: No focal deficit present.      Mental Status: She is alert and oriented to person, place, and time.      Cranial Nerves: No cranial nerve deficit.      Motor: No weakness.   Psychiatric:      Comments: Flat affect         Fluids    Intake/Output Summary (Last 24 hours) at 5/29/2023 1418  Last data filed at 5/29/2023 1300  Gross per 24 hour   Intake 240 ml   Output --   Net 240 ml         Laboratory  Recent Labs     05/27/23  0102   WBC 4.7*   RBC 3.58*   HEMOGLOBIN 13.5   HEMATOCRIT 39.7   .9*   MCH 37.7*   MCHC 34.0   RDW 58.0*   PLATELETCT 110*   MPV 10.9       Recent Labs     05/27/23  0102   SODIUM 141   POTASSIUM 3.9   CHLORIDE 107   CO2 23   GLUCOSE 96   BUN 11   CREATININE 0.53   CALCIUM 8.8                       Imaging  US-RUQ   Final Result      1.  Gallbladder is not identified consistent with prior cholecystectomy.      2.  Exam is otherwise unremarkable.      DX-CHEST-PORTABLE (1 VIEW)   Final Result      No acute cardiopulmonary abnormality.             Assessment/Plan  * Atrial fibrillation with RVR (HCC)- (present on admission)  Assessment & Plan  As per presentation with persistent RVR.  Rate controlled.    Continue digoxin carvedilol and Eliquis    HTN (hypertension)  Assessment & Plan  Pressure not at goal with elevated diastolic blood pressure  I have increased carvedilol monitor blood pressure adjust accordingly      Moderate episode of recurrent major depressive disorder (HCC)- (present on admission)  Assessment & Plan  Currently on Effexor and trazodone    Psychiatry and psychotherapy referrals placed    Thrombocytopenia (HCC)- (present on admission)  Assessment & Plan  Mild.  In setting of alcoholism.  Improving.  Continue to monitor.    Complex grief disorder lasting  longer than 12 months- (present on admission)  Assessment & Plan  Patient reports having lost daughters and  couple of years ago.  She is requesting grief counseling.  Unfortunately grief counseling services are not available at this time.    Case management has provided the patient with outpatient counseling resources.    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  She has completed her alcohol withdrawal.  She is off of gabapentin and valproic acid.         Psychosocial stressors- (present on admission)  Assessment & Plan  Patient reports death of her daughters and  recently.    Discussed with case management to provide her with resources for grief support and ordered psychiatry and psychotherapy referral on discharge    AICD (automatic cardioverter/defibrillator) present- (present on admission)  Assessment & Plan  As per history placed in July 2020 due to history of ventricular tachycardia.  Medtronic Primo placed by Dr. Baires.    History of ventricular tachycardia- (present on admission)  Assessment & Plan  As per history. S/p AICD.    Acute respiratory failure with hypoxia (HCC)- (present on admission)  Assessment & Plan  Resolved with diuresis given lightheadedness I will stop Lasix and monitor      Hypomagnesemia- (present on admission)  Assessment & Plan  Repleted    Alcohol dependence (HCC)- (present on admission)  Assessment & Plan  Patient counseled on cessation  Ask case management to provide her with resources about alcoholism treatment  If no withdrawal symptoms at this time           VTE prophylaxis: SCDs/TEDs and therapeutic anticoagulation with apixaban    I have performed a physical exam and reviewed and updated ROS and Plan today (5/29/2023). In review of yesterday's note (5/28/2023), there are no changes except as documented above.

## 2023-05-29 NOTE — DISCHARGE SUMMARY
"Discharge Summary    CHIEF COMPLAINT ON ADMISSION  Chief Complaint   Patient presents with    Alcohol Intoxication     Son called 911 because he found patient \"not taking care of herself, just drinking vodka and nothing else.\"    Depression     Patient reports her alcoholism stems from depression r/t loss of children and . Denies overt SI, stating, \"I don't want to kill myself. I just don't care anymore.\"       Reason for Admission  ems     Admission Date  5/23/2023    CODE STATUS  Full Code    HPI & HOSPITAL COURSE    Jeanie Hutchison is a 77 y.o. female who presented 5/23/2023 with shortness of breath and depression.  This is a pleasant woman with a history of depression, alcohol dependence, status post AICD with history of ventricular tachycardia.  Patient reported drinking on a daily basis due to significant stressors in her life with her family including daughter who had passed.  She reported not being compliant with her home medication.  She reported drinking vodka on a daily basis.  She was placed on a legal hold on her last admission in 2021.  She has previously been admitted to inpatient psychiatry in the past.  She currently denied any suicidal ideations.    The patient's alcohol withdrawal resolved her atrial fibrillation was rate controlled.  Her metoprolol was switched to carvedilol for better blood pressure control and her dose is being increased today.  She was evaluated by PT OT with postacute placement recommendation however the patient declined SNF referral.  On 527 she was evaluated by Dr. Daly and found to be medically clear for discharge however the patient appealed her discharge.  He was notified by case management that the patient's medical appeal has been denied.  On evaluation this morning the patient is alert and oriented x4 she remains in A-fib but is rate controlled.  She is currently on room air.  She continues to decline going to skilled nursing facility.  She reports feeling " depressed but denies suicidal ideations or intent. she is medically stable for discharge from inpatient setting.  She is requesting psychotherapy and assistance with home care I asked case management to provide her with resources on hiring caregivers to assist with home care and also ordered referral for psychiatry and psychotherapy as outpatient.    Patient now agreeable for snf transfer for additional assistance.  Seen by psychiatry, recommend increasing Effexor to 225 mg p.o. daily.  Dose adjusted.  Cleared for discharge to skilled facility.    Therefore, she is discharged in good and stable condition to home with organized home healthcare and close outpatient follow-up.    The patient met 2-midnight criteria for an inpatient stay at the time of discharge.    Discharge Date  5/29/2023    FOLLOW UP ITEMS POST DISCHARGE  Follow-up with PCP and cardiology  Outpatient referral for psychiatry and psychotherapy      DISCHARGE DIAGNOSES  Principal Problem:    Atrial fibrillation with RVR (HCC) (POA: Yes)  Active Problems:    Alcohol dependence (HCC) (POA: Yes)    Hypomagnesemia (POA: Yes)    Acute respiratory failure with hypoxia (HCC) (POA: Yes)    History of ventricular tachycardia (POA: Yes)    AICD (automatic cardioverter/defibrillator) present (POA: Yes)      Overview: July 2020: Medtronic Primo MRI  [id number] implanted by Dr. Baires.    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  Perla Kitchen, F.N.P.  [address] [place] [address]  [phone]    Follow up in 3 day(s)      [facility]  [address]  [place] [address]  [phone]          MEDICATIONS ON DISCHARGE     Medication List        START taking these medications        Instructions   carvedilol 12.5 MG Tabs  Commonly known as: COREG   Take 1 Tablet by mouth 2 times a day with meals.  Dose: 12.5 mg     magnesium oxide 400 (240 Mg) MG Tabs   Take 1 Tablet by mouth every day.  Dose: 400 mg     traZODone 50 MG Tabs  Commonly known as: DESYREL   Take 1 Tablet by mouth at bedtime as needed for Sleep.  Dose: 50 mg            CHANGE how you take these medications        Instructions   venlafaxine XR 75 MG Cp24  Start taking on: May 31, 2023  What changed:   medication strength  how much to take  Commonly known as: EFFEXOR XR   Take 3 Capsules by mouth every day.  Dose: 225 mg            CONTINUE taking these medications        Instructions   apixaban 5mg Tabs  Commonly known as: Eliquis   Take 1 Tablet by mouth 2 times a day.  Dose: 5 mg     digoxin 125 MCG Tabs  Commonly known as: LANOXIN   Take 1 Tablet by mouth every day.  Dose: 125 mcg     escitalopram 10 MG Tabs  Commonly known as: Lexapro   Take 1 Tablet by mouth every day.  Dose: 10 mg     levothyroxine 50 MCG Tabs  Commonly known as: SYNTHROID   Take 1 Tablet by mouth every morning on an empty stomach.  Dose: 50 mcg            STOP taking these medications      metoprolol  MG Tb24  Commonly known as: TOPROL XL              Allergies  Allergies   Allergen Reactions    Sulfa Drugs Rash             DIET  Orders Placed This Encounter   Procedures    Diet Order Diet: Regular     Standing Status:   Standing     Number of Occurrences:   1     Order Specific Question:   Diet:     Answer:   Regular [1]       ACTIVITY  As tolerated.  Weight bearing as tolerated        LABORATORY  Lab Results   Component Value Date    SODIUM 141 05/27/2023    POTASSIUM 3.9 05/27/2023    CHLORIDE 107 05/27/2023    CO2 23 05/27/2023    GLUCOSE 96 05/27/2023    BUN 11 05/27/2023    CREATININE 0.53 05/27/2023        Lab  Results   Component Value Date    WBC 4.7 (L) 05/27/2023    HEMOGLOBIN 13.5 05/27/2023    HEMATOCRIT 39.7 05/27/2023    PLATELETCT 110 (L) 05/27/2023        Total time of the discharge process exceeds 40 minutes.

## 2023-05-29 NOTE — DISCHARGE PLANNING
Received choice form @: [id number]  Agency/Facility name: [facility]  Sent referral per choice form @: [id number]

## 2023-05-29 NOTE — FACE TO FACE
Face to Face Supporting Documentation - Home Health    The encounter with this patient was in whole or in part the primary reason for home health admission.    Date of encounter:   Patient:                    MRN:                       YOB: 2021  Jeanie Hutchison  [id number]  1945     Home health to see patient for:  Skilled Nursing care for assessment, interventions & education, Medical social work consult, Physical Therapy evaluation and treatment, and Occupational therapy evaluation and treatment    Skilled need for:  Exacerbation of Chronic Disease State A-fib with RVR alcohol withdrawal    Skilled nursing interventions to include:  Med management and monitoring    Homebound status evidenced by:  Needs the assistance of another person in order to leave the home. Leaving home requires a considerable and taxing effort. There is a normal inability to leave the home.    Community Physician to provide follow up care: UVALDO BenitezN.P.     Optional Interventions? No      I certify the face to face encounter for this home health care referral meets the CMS requirements and the encounter/clinical assessment with the patient was, in whole, or in part, for the medical condition(s) listed above, which is the primary reason for home health care. Based on my clinical findings: the service(s) are medically necessary, support the need for home health care, and the homebound criteria are met.  I certify that this patient has had a face to face encounter by myself.  Richard Dickinson M.D. - [place]: [phone]

## 2023-05-29 NOTE — DISCHARGE PLANNING
Care Transition Team Discharge Planning    Anticipated Discharge Information  Discharge Disposition: Discharged to home/self care (01)  Discharge Address: [address] [place] [id number]              Discharge Plan:  Patient has decided that she wants to go to SNF. CM called and left a message at [facility] for bed availability with no answer. CM left a message and will continue to follow.

## 2023-05-30 VITALS
HEIGHT: 66 IN | WEIGHT: 161.16 LBS | TEMPERATURE: 98.1 F | BODY MASS INDEX: 25.9 KG/M2 | HEART RATE: 92 BPM | DIASTOLIC BLOOD PRESSURE: 88 MMHG | SYSTOLIC BLOOD PRESSURE: 133 MMHG | OXYGEN SATURATION: 91 % | RESPIRATION RATE: 16 BRPM

## 2023-05-30 PROCEDURE — 700102 HCHG RX REV CODE 250 W/ 637 OVERRIDE(OP): Performed by: HOSPITALIST

## 2023-05-30 PROCEDURE — 99222 1ST HOSP IP/OBS MODERATE 55: CPT | Performed by: PSYCHIATRY & NEUROLOGY

## 2023-05-30 PROCEDURE — 90832 PSYTX W PT 30 MINUTES: CPT | Performed by: SOCIAL WORKER

## 2023-05-30 PROCEDURE — A9270 NON-COVERED ITEM OR SERVICE: HCPCS | Performed by: NURSE PRACTITIONER

## 2023-05-30 PROCEDURE — A9270 NON-COVERED ITEM OR SERVICE: HCPCS | Performed by: HOSPITALIST

## 2023-05-30 PROCEDURE — 700102 HCHG RX REV CODE 250 W/ 637 OVERRIDE(OP): Performed by: NURSE PRACTITIONER

## 2023-05-30 PROCEDURE — 700102 HCHG RX REV CODE 250 W/ 637 OVERRIDE(OP): Performed by: STUDENT IN AN ORGANIZED HEALTH CARE EDUCATION/TRAINING PROGRAM

## 2023-05-30 PROCEDURE — A9270 NON-COVERED ITEM OR SERVICE: HCPCS | Performed by: STUDENT IN AN ORGANIZED HEALTH CARE EDUCATION/TRAINING PROGRAM

## 2023-05-30 RX ORDER — VENLAFAXINE HYDROCHLORIDE 75 MG/1
225 CAPSULE, EXTENDED RELEASE ORAL DAILY
Status: DISCONTINUED | OUTPATIENT
Start: 2023-05-31 | End: 2023-05-30 | Stop reason: HOSPADM

## 2023-05-30 RX ORDER — VENLAFAXINE HYDROCHLORIDE 75 MG/1
225 CAPSULE, EXTENDED RELEASE ORAL DAILY
Qty: 90 CAPSULE | Refills: 3 | Status: SHIPPED | OUTPATIENT
Start: 2023-05-31 | End: 2023-11-30

## 2023-05-30 RX ADMIN — VENLAFAXINE HYDROCHLORIDE 150 MG: 75 CAPSULE, EXTENDED RELEASE ORAL at 05:08

## 2023-05-30 RX ADMIN — LORAZEPAM 1 MG: 1 TABLET ORAL at 01:26

## 2023-05-30 RX ADMIN — Medication 400 MG: at 05:08

## 2023-05-30 RX ADMIN — LEVOTHYROXINE SODIUM 50 MCG: 0.05 TABLET ORAL at 05:08

## 2023-05-30 RX ADMIN — CARVEDILOL 6.25 MG: 6.25 TABLET, FILM COATED ORAL at 08:34

## 2023-05-30 RX ADMIN — APIXABAN 5 MG: 5 TABLET, FILM COATED ORAL at 05:08

## 2023-05-30 ASSESSMENT — PATIENT HEALTH QUESTIONNAIRE - PHQ9
5. POOR APPETITE OR OVEREATING: NOT AT ALL
2. FEELING DOWN, DEPRESSED, IRRITABLE, OR HOPELESS: NOT AT ALL
SUM OF ALL RESPONSES TO PHQ9 QUESTIONS 1 AND 2: 0
9. THOUGHTS THAT YOU WOULD BE BETTER OFF DEAD, OR OF HURTING YOURSELF: NOT AT ALL
3. TROUBLE FALLING OR STAYING ASLEEP OR SLEEPING TOO MUCH: SEVERAL DAYS
8. MOVING OR SPEAKING SO SLOWLY THAT OTHER PEOPLE COULD HAVE NOTICED. OR THE OPPOSITE, BEING SO FIGETY OR RESTLESS THAT YOU HAVE BEEN MOVING AROUND A LOT MORE THAN USUAL: NOT AT ALL
1. LITTLE INTEREST OR PLEASURE IN DOING THINGS: NOT AT ALL
6. FEELING BAD ABOUT YOURSELF - OR THAT YOU ARE A FAILURE OR HAVE LET YOURSELF OR YOUR FAMILY DOWN: SEVERAL DAYS
SUM OF ALL RESPONSES TO PHQ QUESTIONS 1-9: 3
4. FEELING TIRED OR HAVING LITTLE ENERGY: SEVERAL DAYS
7. TROUBLE CONCENTRATING ON THINGS, SUCH AS READING THE NEWSPAPER OR WATCHING TELEVISION: NOT AT ALL

## 2023-05-30 ASSESSMENT — PAIN DESCRIPTION - PAIN TYPE
TYPE: ACUTE PAIN

## 2023-05-30 NOTE — CONSULTS
"PSYCHIATRIC INTAKE EVALUATION(new)   Reason for admission: A-fib with RVR   Reason for consult: \"Patient is requesting adjustments to her antidepressant medications.  Can pleated alcohol withdrawal.  Patient has been informed of psychotherapy is not available in the hospital at this time although she is requesting this.\"  Requesting Provider: Juliano Daly M.D.       Legal Hold Status: not on an L2K     Chart reviewed.         *HPI:        is a 77 year old female who presented to the hospital via EMS secondary to alcohol dependence and was admitted for a fib with RVR.     She reports that she recently was at an inpatient alcohol rehabilitation facility in [place]. This was her 4th reab stay and reports that she consistently felt humiliated during her hospitalization making it difficult to get the benefit she was hoping for. After finishing this hospitalization approximately one months ago, she reports quickly \"going downhill into a black hole\". She reports symptoms of depression including, feeling like a failure, little interest in doing anything other than laying in bed, difficulty concentrating, not wanting to eat resulting in a 20lbs weight loss over the last month and difficulty falling asleep for which she drinks a pint of vodka to assist with sleep. She reports falling asleep around 4-5am and sleeping until 4pm over the last 2 weeks. She reports her depression and alcohol use began after her  of 50 years, and her two daughters passed away secondary to medical diagnosis. We discussed starting naltrexone or other alternative for alcohol cravings, but she declined. She was highly encouraged to participate in IOP, rehab and AA, however she did not appears very enthusiastic. She requested a titration on effexor.     PSYCHIATRIC REVIEW OF SYSTEMS:       MOOD SYMPTOMS:   Pertinent positives - sad     Pertinent negatives - no euphoric mood and no mood changes       ANXIETY:   Pertinent negatives - no " excessive worry and no panic     SLEEP:   Pertinent positives - difficulty falling asleep, hypersomnia, excessive daytime sleepiness and restless sleep     Pertinent negatives - no disturbing dreams     PSYCHOMOTOR/ENERGY:   Pertinent positives - decreased energy     Pertinent negatives - energy level not high and no psychomotor agitation     EATING:   Pertinent positives: decreased appetite and recent weight loss     Pertinent negatives: no extreme fear of weight gain     COGNITIVE:   Pertinent positives: concentration moderately impaired, maintains focus and hopelessness     BEHAVIOR:   Pertinent negatives - no emotional outbursts and no increased goal-directed activity     PSYCHOSIS:   Pertinent negatives - auditory hallucinations, visual hallucinations and delusions   SOCIAL:   Pertinent negatives - no misbehaving with peers, does not report a sense of detachment from others and no history of withdrawal symptoms     SENSORY:             PHQ9     Little interest or pleasure in doing things: 2   Feeling down, depressed or hopeless: 3   Trouble falling or staying asleep or sleeping too much: 3   Feeling tired or having little energy: 3   Poor appetite or overeatin   Feeling bad about yourself-or that you are a failure or have let yourself or your family down: 3   Trouble concentrating on things, such as reading the newspaper or watching television: 1   Moving or speaking so slowly that other people could have noticed. Or the opposite-being so figety or restless that you have been moving around a lot more than usual: 0   Thoughts that you would be better off dead, or of hurting yourself: 1   Total: 18     Interpretation of PHQ-9 Total Score   Score Severity   1-4 No Depression   5-9 Mild Depression   10-14 Moderate Depression   15-19 Moderately Severe Depression   20-27 Severe Depression     Medical ROS (as pertinent):       Review of Systems   Constitutional:  Positive for decreased appetite and weight loss.  "Negative for diaphoresis and fever.   HENT:  Negative for congestion and sore throat.     Eyes:  Negative for blurred vision.   Respiratory:  Negative for cough and shortness of breath.     Cardiovascular:  Negative for chest pain, palpitations and orthopnea.   Gastrointestinal:  Negative for abdominal pain, constipation, nausea and vomiting.   Genitourinary:  Negative for dysuria and urgency.   Musculoskeletal:  Negative for myalgias.   Skin:  Negative for rash.   Neurological:  Positive for excessive daytime sleepiness. Negative for tingling and headaches.            *Psychiatric Examination:   Vitals: Temp: 98.1  Pulse: 92  RR: 16  BP: 133/88  SpO2: 91% on RA   General Appearance: Generally well kempt, short hair, red eyes, laying in bed   Abnormal Movements: None   Gait and Posture: Normal, resting in bed   Speech: shaky voice at times, soft volume, slow rate, normal tone   Thought processes: linear with appropriate responses   Associations: no loose associations   Abnormal or Psychotic Thoughts: No AVH, no paranoia or delusions elicited  Judgement and Insight: moderate/moderate   Orientation: oriented to person, place, and time   Recent and Remote Memory: intact   Attention Span and Concentration: poor concentration,   Language: fluent English   Fund of Knowledge: not tested   Mood and Affect: \"bad\" affect is sad   SI/HI: no SI/HI       Past Medical History:   Past Medical History:   Diagnosis Date   · Alcoholism (HCC)   · Anticoagulated   · Apnea, sleep   · Chronic pain   r/t pelvic fracture   · Hypothyroidism   · Insomnia   Trouble going to Sleep   · Paroxysmal atrial fibrillation (HCC)   · Psychiatric disorder   history of depression and anxiety   · Snoring   · Ventricular tachycardia (HCC)         Past Psychiatric History:   Previous Diagnosis: depression, prolonged grief   Current meds: venlafaxine   Previous med trials: paroxetine, fluoxitine, duloxetine, sertraline, aripiprazole   Hospitalizations: four " "hospitalizations for alcohol use disorder (2 in [place], at least one in [place])   Suicide attempts/SIB: None/Never attempted suicide   Outpatient services: Has seen a psychiatrist in the past, has not met with a psychologist. She reports doing family counseling over 40 years ago when she found out her  was having an affair   Access to guns: No firearms in the house   Abuse/trauma hx: Emotional trauma with husbands extra-marital affairs and the passing of her loved ones. Didn't disclose any physical or sexual trauma.   Legal hx: N/A     Family Hx:   Patient has a younger brother and a younger sister. Both are still alive and have no psych history as far as she knows.     Social Hx:   Patient reports that she had a \"wonderful childhood.\" She was born and raised in [place], [place]. She grew up in a good neighborhood and had lots of friends. She met her  in  and they movied out to [place] together. She worked as a nurse. She was  to her  for 50 years before he passed. He was an alcoholic and did have extra-marital affairs which they saw a therapist for. More recently she has been the main support system and has had her 3 grand kids from her late daughters living at home with her as well as her late daughters ex-. Since being admitted to the hospital she believes her grand kids are staying with their other grandma. She used to find jane in being social with friends, swimming and gardening which she no longer does. She still has a living son who checks in on her but she states hasn't been returning her calls since she was admitted as he believes in \"pulling yourself up by the bootstraps\" and is frustrated with her continued alcohol use and depression. She states she used to have other close friends here but states she has pushed them away as of lately and hasn't talked to them in over a month.   Patient reports that she is not Judaism.     Substances:   Drugs: None   Alcohol:  " Approximately one pint of vodka a day since her second daughter passed.   Nicotine:   None- never used.     Current Medications:   Current Facility-Administered Medications   Medication Dose Route Frequency Provider Last Rate Last Admin   · LORazepam (ATIVAN) tablet 1 mg 1 mg Oral Q4HRS PRN ANGLE Mao 1 mg at 05/30/23 0126   · carvedilol (COREG) tablet 6.25 mg 6.25 mg Oral BID WITH MEALS Juliano Daly M.D. 6.25 mg at 05/30/23 0834   · digoxin (LANOXIN) tablet 125 mcg 125 mcg Oral DAILY AT 1800 Juliano Daly M.D. 125 mcg at 05/29/23 1813   · magnesium oxide tablet 400 mg 400 mg Oral DAILY Juliano Daly M.D. 400 mg at 05/30/23 0508   · senna-docusate (PERICOLACE or SENOKOT S) 8.6-50 MG per tablet 2 Tablet 2 Tablet Oral BID Will Ramos M.D. 2 Tablet at 05/28/23 1752   And   · polyethylene glycol/lytes (MIRALAX) PACKET 1 Packet 1 Packet Oral QDAY PRN Will Ramos M.D.   And   · magnesium hydroxide (MILK OF MAGNESIA) suspension 30 mL 30 mL Oral QDAY PRN Will Ramos M.D.   And   · bisacodyl (DULCOLAX) suppository 10 mg 10 mg Rectal QDAY PRN Will Ramos M.D.   · acetaminophen (Tylenol) tablet 650 mg 650 mg Oral Q6HRS PRN Will Ramos M.D. 650 mg at 05/28/23 1512   · ondansetron (ZOFRAN) syringe/vial injection 4 mg 4 mg Intravenous Q4HRS PRN Will Ramos M.D.   · ondansetron (ZOFRAN ODT) dispertab 4 mg 4 mg Oral Q4HRS PRN Will Ramos M.D.   · apixaban (ELIQUIS) tablet 5 mg 5 mg Oral BID Will Ramos M.D. 5 mg at 05/30/23 0508   · levothyroxine (SYNTHROID) tablet 50 mcg 50 mcg Oral AM ES Will Ramos M.D. 50 mcg at 05/30/23 0508   · venlafaxine XR (EFFEXOR XR) capsule 150 mg 150 mg Oral DAILY Will Ramos M.D. 150 mg at 05/30/23 0508   · traZODone (DESYREL) tablet 50 mg 50 mg Oral QHS Will Ramos M.D. 50 mg at 05/29/23 2001         Allergies:   Sulfa drugs       Labs personally reviewed:   MCV- 110   Platelets- 101   Alcohol on admission was 274.7   Chest CT demonstrated no acute cardiopulmonary  abnormalities   CMP- largely normal today       EKG:   Results for orders placed or performed during the hospital encounter of 23   EKG   Result Value Ref Range   Report   [facility]     Test Date:  2023   Pt Name:    JEANIE DURHAM              Department: ER   MRN:        [id number]                      Room:       T816   Gender:     Female                       Technician: [id number]   :        1945                   Requested By:JIM SCHNEIDER   Order #:    [id number]                    Reading MD: JIM SCHNEIDER DO     Measurements   Intervals                                Axis   Rate:       155                          P:   ID:                                      QRS:        -35   QRSD:       124                          T:          69   QT:         328   QTc:        527     Interpretive Statements   Wide-QRS tachycardia   Nonspecific intraventricular conduction delay   Baseline wander in lead(s) I,II,aVR   Compared to ECG 2021 20:13:26   Intraventricular conduction delay now present   Atrial fibrillation no longer present   Ventricular premature complex(es) no longer present   Myocardial infarct finding no longer  present   Electronically Signed On 2023 1:10:59 PDT by JIM SCHNEIDER DO         Brain Imaging: none since    EEG:  none found in chart review           Assessment:   Ms. Jeanie Durham is a 77 year old female with a past medical history of depression, prolonged grief and alcohol use disorder who was admitted for atrial fibrillation with RVR and alcohol withdrawal.   Patient meets criteria for major depressive disorder with symptoms of depressed mood, loss of interest, difficulty concentrating, lack of appetite, difficulty sleeping and feelings of guilt. PHQ9 was 18 today. Patient is not suicidal as she reports having no plan or intent.. She had tried multiple inpatient rehabilitation programs, AA and has tried multiple  medications in the past for her depression. It is likely that her depression is significantly worsened by her daily alcohol use. She reports interest in having a consistent therapist/psychologist locally as her previous therapist here, whom she last saw over 20 years ago, has since retired. Pt was highly encouraged to achieve sobriety to improve her mental health.       Dx: Major depression disorder, moderately severe   Alcohol use disorder, severe  Substance induced mood disorder      Medical: Completed alcohol withdrawal, off CIWA.         Plan:   · Legal hold: not on a legal hold   · Currently taking venlafaxine 150mg. Recommend increasing dose to 225mg PO Q daily.   · Encouraged patient to follow up with making an appointment with a psychologist to follow with regularly for talk therapy as an adjunct to medications.     · Old records ordered/reviewed/summarized   . Pt was encouraged to consider Rehab, IOP and AA meetings  . Case discussed with Dr Sanchez  · Psychiatry will signing off     Thank you for the consult.

## 2023-05-30 NOTE — PROGRESS NOTES
Assumed care of patient 0700. Received Report from [facility] nurse. Patient A&O 4, on RA, Reporting a pain level of 2. Call light within reach, belongings within reach, Fall precautions in place, bed in lowest position. Patient does not have any other needs at this time. Hourly rounding.    POC was discussed with patient. All questions were answered. Patient verbalized understanding.

## 2023-05-30 NOTE — PROGRESS NOTES
Discharge Instructions    Discharged to [facility] by medical transportation with self. Discharged via ambulance, hospital escort: Yes.  Special equipment needed: Not Applicable    Be sure to schedule a follow-up appointment with your primary care doctor or any specialists as instructed.     Discharge Plan:   Diet Plan: Discussed  Activity Level: Discussed  Confirmed Follow up Appointment: Patient to Call and Schedule Appointment  Confirmed Symptoms Management: Discussed  Medication Reconciliation Updated: Yes    I understand that a diet low in cholesterol, fat, and sodium is recommended for good health. Unless I have been given specific instructions below for another diet, I accept this instruction as my diet prescription.   Other diet: As tolerated    Special Instructions: None    -Is this patient being discharged with medication to prevent blood clots?  No    Is patient discharged on Warfarin / Coumadin?   No       Report called to Hailey HOOVER at [facility] all questions answered and addressed. Will await for transportation to facility at  time.

## 2023-05-30 NOTE — CARE PLAN
The patient is Stable - Low risk of patient condition declining or worsening    Shift Goals  Clinical Goals: Safety  Patient Goals: Anxiety medication, comfort  Family Goals: DANICA    Progress made toward(s) clinical / shift goals:  Patient had a bed alarm in place and remained free from falls. Patient received PRN medication for anxiety as needed throughout the night. Patient slept comfortably in bed throughout the night.       Problem: Knowledge Deficit - Standard  Goal: Patient and family/care givers will demonstrate understanding of plan of care, disease process/condition, diagnostic tests and medications  Outcome: Progressing     Problem: Fall Risk  Goal: Patient will remain free from falls  Outcome: Progressing     Problem: Pain - Standard  Goal: Alleviation of pain or a reduction in pain to the patient’s comfort goal  Outcome: Progressing       Patient is not progressing towards the following goals:

## 2023-05-30 NOTE — DISCHARGE PLANNING
Spoke To: Yanira  Agency/Facility Name: [facility]  Plan or Request: bed available     2641- Spoke To: Yanira  Agency/Facility Name: [facility]  Plan or Request: transport today at 1300 with [facility]

## 2023-05-30 NOTE — CARE PLAN
Problem: Fall Risk  Goal: Patient will remain free from falls  Outcome: Progressing     Problem: Skin Integrity  Goal: Skin integrity is maintained or improved  Outcome: Progressing       The patient is Stable - Low risk of patient condition declining or worsening    Shift Goals  Clinical Goals: Monitor labs, VS, heart rate and episodes, comfort and safety  Patient Goals: Comfort, rest and anxiety  Family Goals: N/A    Progress made toward(s) clinical / shift goals:  Fall risk in place and bed alarm on, hourly rounding. Skin integrity monitored and maintained.     Patient is not progressing towards the following goals:

## 2023-05-30 NOTE — CONSULTS
"[facility]    INPATIENT ASSESSMENT    Name: Jeanie Hutchison  MRN: [id number]  : 1945  Age: 77 y.o.  Date of assessment: 2023  PCP: KIMBERLY Benitez  Persons in attendance: Patient    Length of Intervention: 60 minutes    Late Entry    CHIEF COMPLAINT/PRESENTING ISSUE (as stated by Patient): Jeanie Hutchison is a 77 year old female seen lying on hospital bed, sleeping but able to be aroused, and willing to engage in the interview process.  Patient's speech was slow, very soft and often difficult to hear. Patient at times appeared to have word finding challenges.  Patient reports being  to a orthodontist for over 40 years and reared three children together. Patient reports tow of her daughters passed, in addition to her . Patient was tearful throughout the interview as she recalled her significant losses.   Patient states, \"I adored my \", whom she reports had affairs throughout the marriage. Patient reports tolerating the affairs because her  was \"wonderful and kind to her and a wonderful father\".   Patient further states, \"we lived well and were financially comfortable since her  was an orthodontist\". Patient states, \"my  had bad friends who would encourage him to get involved with women and do certain things\". Patient appears to have strong feelings regarding her husbands friends, while holding her  in high regard.  In addition, patient loss two daughters and for a while was caring f or three grandchildren, but now does not have to physical strength to provide for their care. Patient states, \"I have loss everything\". Patient has a remaining son however, patients states, \"he is finished with me\".  Patient reports having a \"drinking problem\" for the past 6 years. Although patient denies active suicidal ideations, patient states, \"I just stopped caring\".    Psychotherapy session summary  Clinician provided patient with encouragement " "and support. Patient is significantly depressed without active suicidal ideations, however, patient has very low motivation to provide for her self care and does not appear future focused. Patient states, \"I don't even care anymore\". Although she denies suicidal thoughts, patient does not present with the emotional energy needed to fully engage in her care. Patient's depression appears debilitating.  Discussed with patient ways to develop increased desire to care for self. Patient is interested and willing to engage in therapy with the hope of processing her significant pain and losses. Patient also needs assistance with alcohol use disorder.  Will continue to follow.  Chief Complaint   Patient presents with    Alcohol Intoxication     Son called 911 because he found patient \"not taking care of herself, just drinking vodka and nothing else.\"    Depression     Patient reports her alcoholism stems from depression r/t loss of children and . Denies overt SI, stating, \"I don't want to kill myself. I just don't care anymore.\"        CURRENT LIVING SITUATION/SOCIAL SUPPORT: Resides alone.    BEHAVIORAL HEALTH TREATMENT HISTORY  Does patient/parent report a history of prior behavioral health treatment for patient?   No:    SAFETY ASSESSMENT - SELF  Does patient acknowledge current or past symptoms of dangerousness to self? no  Does parent/significant other report patient has current or past symptoms of dangerousness to self? N\A  Does presenting problem suggest symptoms of dangerousness to self? No    SAFETY ASSESSMENT - OTHERS  Does patient acknowledge current or past symptoms of aggressive behavior or risk to others? no  Does parent/significant other report patient has current or past symptoms of aggressive behavior or risk to others?  N\A  Does presenting problem suggest symptoms of dangerousness to others? No    Crisis Safety Plan completed and copy given to patient? no    ABUSE/NEGLECT SCREENING  Does patient " "report feeling “unsafe” in his/her home, or afraid of anyone?  no  Does patient report any history of physical, sexual, or emotional abuse?  no  Does parent or significant other report any of the above? N\A  Is there evidence of neglect by self?  yes-disheveled in appearance, does not provide her own self care per patient report  Is there evidence of neglect by a caregiver? no  Does the patient/parent report any history of CPS/APS/police involvement related to suspected abuse/neglect or domestic violence? no  Based on the information provided during the current assessment, is a mandated report of suspected abuse/neglect being made?  No    SUBSTANCE USE SCREENING  Yes:  Tay all substances used in the past 30 days:      Last Use Amount   [x]   Alcohol     []   Marijuana     []   Heroin     []   Prescription Opioids  (used without prescription, for    recreation, or in excess of prescribed amount)     []   Other Prescription  (used without prescription, for    recreation, or in excess of prescribed amount)     []   Cocaine      []   Methamphetamine     []   \"\" drugs (ectasy, MDMA)     []   Other substances        UDS results: not assessed  Diagnostic alcohol results: 274.7 upon admission    What consequences does the patient associate with any of the above substance use and or addictive behaviors? Health problems:     Risk factors for detox (check all that apply):  []  Seizures   []  Diaphoretic (sweating)   []  Tremors   []  Hallucinations   []  Increased blood pressure   []  Decreased blood pressure   []  Other   []  None      [] Patient education on risk factors for detoxification and instructed to return to ER as needed.      MENTAL STATUS              Participation: Limited verbal participation  Grooming: Disheveled  Orientation: Drowsy/Somnolent  Behavior: Calm  Eye contact: Limited  Mood: Depressed  Affect: Flat  Thought process: Circumstantial  Thought content: Within normal limits  Speech: " Soft  Perception: Within normal limits  Memory:  Recent:  Adequate  Insight: Limited  Judgment:  Limited  Other:    Collateral information:   Source:   Significant other present in person:    Significant other by telephone   [facility]    [facility] Nursing Staff   [facility] Medical Record-reviewed   Other:      Unable to complete full assessment due to:   Acute intoxication   Patient declined to participate/engage   Patient verbally unresponsive   Significant cognitive deficits   Significant perceptual distortions or behavioral disorganization   Other:             CLINICAL IMPRESSIONS:  Primary: Major Depressive Disorder without psychotic features  Secondary:  Prolonged complicated grief                                       IDENTIFIED NEEDS/PLAN:  [Trigger DISPOSITION list for any items marked]      Imminent safety risk - self  Imminent safety risk - others     Acute substance withdrawal   Psychosis/Impaired reality testing    x Mood/anxiety  x Substance use/Addictive behavior    x Maladaptive behavior   Parent/child conflict     Family/Couples conflict   Biomedical     Housing   Financial      Legal  Occupational/Educational     Domestic violence   Other:     Recommendations and Observation Level:  Case Management-please provide referrals for outpatient psychotherapy prior to discharge.    Legal Hold: n/a        Livia Vaughn, Ph.D., [facility]  5/25/2023

## 2023-05-30 NOTE — DISCHARGE PLANNING
Care Transition Team Final Discharge Disposition    Actual Discharge Information  Discharge Disposition: Discharged to home/self care (01)      Pt to discharge at [facility].     [place] packet completed.    Awaiting callback from [facility] regarding transport time.     9:30- Received a call from [facility] and confirmed transport time at 1300. Charge nurse, bedside nurse MD informed.    No CM needs identified.

## 2023-05-31 ENCOUNTER — TELEPHONE (OUTPATIENT)
Dept: HEALTH INFORMATION MANAGEMENT | Facility: OTHER | Age: 78
End: 2023-05-31

## 2023-05-31 NOTE — CONSULTS
"Brief Behavioral Health Care Note       Name: Jeanie Hutchison  MRN: [id number]  : 1945  Age: 77 y.o.  Date of assessment: 2023  PCP: KIMBERLY Benitez  Persons in attendance: Patient     Length of Intervention: 30 minutes    Psychotherapy session summary  Patient reports being discharged today. Patient states she has agreed to a SNF, due to her inability to care for herself at this time. Clinician provided patient with encouragement and support. Patient continues to present with significant depressed mood without active suicidal ideations. Patient was tearful throughout the session. Patient reports, \"I have to try to get stronger\". This is an improvement than previous session in which patient presented with low motivation for self  care. Patient's depression appears debilitating. Patient would benefit from continued outpatient psychotherapy and alcohol use disorder treatment.    Signing off    Thank you,    Livia Vaughn, Ph.D., [facility]  "

## 2023-07-11 ENCOUNTER — TELEPHONE (OUTPATIENT)
Dept: HEALTH INFORMATION MANAGEMENT | Facility: OTHER | Age: 78
End: 2023-07-11
Payer: MEDICARE

## 2023-08-09 NOTE — PROGRESS NOTES
12 hour chart check   8/9/2023  Jerman Henderson DO, saw and evaluated the patient. I have discussed the patient with the resident/non-physician practitioner and agree with the resident's/non-physician practitioner's findings, Plan of Care, and MDM as documented in the resident's/non-physician practitioner's note, except where noted. All available labs and Radiology studies were reviewed. I was present for key portions of any procedure(s) performed by the resident/non-physician practitioner and I was immediately available to provide assistance. At this point I agree with the current assessment done in the Emergency Department. I have conducted an independent evaluation of this patient a history and physical is as follows:    72-year-old female presents for complaint of right-sided headache with face and neck pain for the past 3 days. Patient has a history of cervical radiculopathy which previously caused paresthesias and numbness in her upper extremities, right worse than left. She has had headaches similar to this in the past but has never seen neurology and has not had imaging of her brain. She has not been formally diagnosed with migraines. She usually takes OTC medications with improvement but this time there was no improvement. The headache was insidious in onset, not thunderclap. Headache is throbbing and intermittently severe and associated with photophobia. Her facial pain is at the angle of the jaw but has no associated dental complaint. Her neck pain is right paraspinal from the base of her skull to her shoulder. No recent trauma or over exertion. No h/o essential hypertension. No known end-organ damage. She has not previously seen neurology. ROS: Denies f/c, midline neck or back pain, CP, SOB, abdominal pain, v/d. 12 system ROS o/w negative.     PE: Mild distress, appears uncomfortable, alert; MMM, no posterior oropharyngeal exudate, edema or erythema; no nuchal rigidity, she has point tenderness over the mid point of the right nuchal ridge; HRR; lungs CTA, POx 97% on RA (nl); abdomen s/nt/nd, nl BS; (-) LE edema; skin p/w/d; CN II-XII GI/NF, oriented, FROM extremities x4 with intact strength and sensation. MDM/DDx: HA - tension headache with irritation of the greater occipital nerve, migraine, less likely but at risk for ICH, edema. A/P: Will treat symptoms with greater occipital nerve block first, if no relief will provide migraine cocktail, reevaluate for further w/u or disposition.     ED Course         Critical Care Time  Procedures

## 2023-11-25 NOTE — TELEPHONE ENCOUNTER
[phone]  Patient is asking for a rx called rl I told her you might need a visit. She does not want to come in    2 - Slight disability. Able to lookafter own affairs without assistance, but unable to carry out all previous activities

## 2023-11-29 ENCOUNTER — HOSPITAL ENCOUNTER (INPATIENT)
Facility: MEDICAL CENTER | Age: 78
LOS: 13 days | DRG: 897 | End: 2023-12-13
Attending: EMERGENCY MEDICINE | Admitting: STUDENT IN AN ORGANIZED HEALTH CARE EDUCATION/TRAINING PROGRAM
Payer: MEDICARE

## 2023-11-29 DIAGNOSIS — I10 PRIMARY HYPERTENSION: ICD-10-CM

## 2023-11-29 DIAGNOSIS — F32.9 MAJOR DEPRESSIVE DISORDER WITH CURRENT ACTIVE EPISODE, UNSPECIFIED DEPRESSION EPISODE SEVERITY, UNSPECIFIED WHETHER RECURRENT: ICD-10-CM

## 2023-11-29 DIAGNOSIS — F32.A DEPRESSION, UNSPECIFIED DEPRESSION TYPE: ICD-10-CM

## 2023-11-29 DIAGNOSIS — F10.232 ALCOHOL DEPENDENCE WITH WITHDRAWAL WITH PERCEPTUAL DISTURBANCE (HCC): ICD-10-CM

## 2023-11-29 DIAGNOSIS — F10.920 ALCOHOLIC INTOXICATION WITHOUT COMPLICATION (HCC): ICD-10-CM

## 2023-11-29 DIAGNOSIS — I48.20 CHRONIC ATRIAL FIBRILLATION WITH RVR (HCC): ICD-10-CM

## 2023-11-29 LAB
AMPHET UR QL SCN: NEGATIVE
ANION GAP SERPL CALC-SCNC: 18 MMOL/L (ref 7–16)
BARBITURATES UR QL SCN: NEGATIVE
BASOPHILS # BLD AUTO: 1.2 % (ref 0–1.8)
BASOPHILS # BLD: 0.06 K/UL (ref 0–0.12)
BENZODIAZ UR QL SCN: NEGATIVE
BUN SERPL-MCNC: 11 MG/DL (ref 8–22)
BZE UR QL SCN: NEGATIVE
CALCIUM SERPL-MCNC: 9.1 MG/DL (ref 8.5–10.5)
CANNABINOIDS UR QL SCN: NEGATIVE
CHLORIDE SERPL-SCNC: 101 MMOL/L (ref 96–112)
CO2 SERPL-SCNC: 24 MMOL/L (ref 20–33)
CREAT SERPL-MCNC: 0.45 MG/DL (ref 0.5–1.4)
EKG IMPRESSION: NORMAL
EOSINOPHIL # BLD AUTO: 0.05 K/UL (ref 0–0.51)
EOSINOPHIL NFR BLD: 1 % (ref 0–6.9)
ERYTHROCYTE [DISTWIDTH] IN BLOOD BY AUTOMATED COUNT: 57.1 FL (ref 35.9–50)
FENTANYL UR QL: NEGATIVE
GFR SERPLBLD CREATININE-BSD FMLA CKD-EPI: 99 ML/MIN/1.73 M 2
GLUCOSE SERPL-MCNC: 72 MG/DL (ref 65–99)
HCT VFR BLD AUTO: 44.9 % (ref 37–47)
HGB BLD-MCNC: 15.3 G/DL (ref 12–16)
IMM GRANULOCYTES # BLD AUTO: 0.01 K/UL (ref 0–0.11)
IMM GRANULOCYTES NFR BLD AUTO: 0.2 % (ref 0–0.9)
LYMPHOCYTES # BLD AUTO: 2.25 K/UL (ref 1–4.8)
LYMPHOCYTES NFR BLD: 45.5 % (ref 22–41)
MCH RBC QN AUTO: 33.4 PG (ref 27–33)
MCHC RBC AUTO-ENTMCNC: 34.1 G/DL (ref 32.2–35.5)
MCV RBC AUTO: 98 FL (ref 81.4–97.8)
METHADONE UR QL SCN: NEGATIVE
MONOCYTES # BLD AUTO: 0.44 K/UL (ref 0–0.85)
MONOCYTES NFR BLD AUTO: 8.9 % (ref 0–13.4)
NEUTROPHILS # BLD AUTO: 2.13 K/UL (ref 1.82–7.42)
NEUTROPHILS NFR BLD: 43.2 % (ref 44–72)
NRBC # BLD AUTO: 0 K/UL
NRBC BLD-RTO: 0 /100 WBC (ref 0–0.2)
OPIATES UR QL SCN: NEGATIVE
OXYCODONE UR QL SCN: NEGATIVE
PCP UR QL SCN: NEGATIVE
PLATELET # BLD AUTO: 97 K/UL (ref 164–446)
PLATELETS.RETICULATED NFR BLD AUTO: 2.1 % (ref 0.6–13.1)
PMV BLD AUTO: 9.2 FL (ref 9–12.9)
POC BREATHALIZER: 0.18 PERCENT (ref 0–0.01)
POC BREATHALIZER: 0.18 PERCENT (ref 0–0.01)
POC BREATHALIZER: 0.24 PERCENT (ref 0–0.01)
POTASSIUM SERPL-SCNC: 4.6 MMOL/L (ref 3.6–5.5)
PROPOXYPH UR QL SCN: NEGATIVE
RBC # BLD AUTO: 4.58 M/UL (ref 4.2–5.4)
SODIUM SERPL-SCNC: 143 MMOL/L (ref 135–145)
WBC # BLD AUTO: 4.9 K/UL (ref 4.8–10.8)

## 2023-11-29 PROCEDURE — A9270 NON-COVERED ITEM OR SERVICE: HCPCS | Performed by: EMERGENCY MEDICINE

## 2023-11-29 PROCEDURE — 700101 HCHG RX REV CODE 250

## 2023-11-29 PROCEDURE — 84100 ASSAY OF PHOSPHORUS: CPT

## 2023-11-29 PROCEDURE — 700102 HCHG RX REV CODE 250 W/ 637 OVERRIDE(OP): Performed by: EMERGENCY MEDICINE

## 2023-11-29 PROCEDURE — 80048 BASIC METABOLIC PNL TOTAL CA: CPT

## 2023-11-29 PROCEDURE — 93005 ELECTROCARDIOGRAM TRACING: CPT

## 2023-11-29 PROCEDURE — 36415 COLL VENOUS BLD VENIPUNCTURE: CPT

## 2023-11-29 PROCEDURE — 80307 DRUG TEST PRSMV CHEM ANLYZR: CPT

## 2023-11-29 PROCEDURE — 700102 HCHG RX REV CODE 250 W/ 637 OVERRIDE(OP): Performed by: STUDENT IN AN ORGANIZED HEALTH CARE EDUCATION/TRAINING PROGRAM

## 2023-11-29 PROCEDURE — 83735 ASSAY OF MAGNESIUM: CPT

## 2023-11-29 PROCEDURE — 85025 COMPLETE CBC W/AUTO DIFF WBC: CPT

## 2023-11-29 PROCEDURE — 700111 HCHG RX REV CODE 636 W/ 250 OVERRIDE (IP): Performed by: STUDENT IN AN ORGANIZED HEALTH CARE EDUCATION/TRAINING PROGRAM

## 2023-11-29 PROCEDURE — 700101 HCHG RX REV CODE 250: Performed by: STUDENT IN AN ORGANIZED HEALTH CARE EDUCATION/TRAINING PROGRAM

## 2023-11-29 PROCEDURE — 85055 RETICULATED PLATELET ASSAY: CPT

## 2023-11-29 PROCEDURE — 96365 THER/PROPH/DIAG IV INF INIT: CPT

## 2023-11-29 PROCEDURE — 302970 POC BREATHALIZER: Performed by: EMERGENCY MEDICINE

## 2023-11-29 PROCEDURE — 96375 TX/PRO/DX INJ NEW DRUG ADDON: CPT

## 2023-11-29 PROCEDURE — 99285 EMERGENCY DEPT VISIT HI MDM: CPT

## 2023-11-29 PROCEDURE — A9270 NON-COVERED ITEM OR SERVICE: HCPCS | Performed by: STUDENT IN AN ORGANIZED HEALTH CARE EDUCATION/TRAINING PROGRAM

## 2023-11-29 PROCEDURE — 96366 THER/PROPH/DIAG IV INF ADDON: CPT

## 2023-11-29 RX ORDER — GAUZE BANDAGE 2" X 2"
100 BANDAGE TOPICAL DAILY
Status: DISPENSED | OUTPATIENT
Start: 2023-11-30 | End: 2023-12-04

## 2023-11-29 RX ORDER — METOPROLOL TARTRATE 1 MG/ML
5 INJECTION, SOLUTION INTRAVENOUS ONCE
Status: COMPLETED | OUTPATIENT
Start: 2023-11-29 | End: 2023-11-29

## 2023-11-29 RX ORDER — METOPROLOL SUCCINATE 100 MG/1
100 TABLET, EXTENDED RELEASE ORAL
Status: DISCONTINUED | OUTPATIENT
Start: 2023-11-30 | End: 2023-12-14 | Stop reason: HOSPADM

## 2023-11-29 RX ORDER — FOLIC ACID 1 MG/1
1 TABLET ORAL DAILY
Status: DISPENSED | OUTPATIENT
Start: 2023-11-30 | End: 2023-12-04

## 2023-11-29 RX ORDER — ONDANSETRON 4 MG/1
4 TABLET, ORALLY DISINTEGRATING ORAL ONCE
Status: COMPLETED | OUTPATIENT
Start: 2023-11-29 | End: 2023-11-29

## 2023-11-29 RX ORDER — DIGOXIN 125 MCG
125 TABLET ORAL DAILY
Status: DISCONTINUED | OUTPATIENT
Start: 2023-11-29 | End: 2023-12-14 | Stop reason: HOSPADM

## 2023-11-29 RX ORDER — CHLORDIAZEPOXIDE HYDROCHLORIDE 25 MG/1
25 CAPSULE, GELATIN COATED ORAL ONCE
Status: COMPLETED | OUTPATIENT
Start: 2023-11-29 | End: 2023-11-29

## 2023-11-29 RX ADMIN — APIXABAN 5 MG: 5 TABLET, FILM COATED ORAL at 23:29

## 2023-11-29 RX ADMIN — CHLORDIAZEPOXIDE HYDROCHLORIDE 25 MG: 25 CAPSULE ORAL at 22:40

## 2023-11-29 RX ADMIN — ONDANSETRON 4 MG: 4 TABLET, ORALLY DISINTEGRATING ORAL at 22:29

## 2023-11-29 RX ADMIN — DIGOXIN 125 MCG: 0.25 TABLET ORAL at 23:24

## 2023-11-29 RX ADMIN — THIAMINE HYDROCHLORIDE: 100 INJECTION, SOLUTION INTRAMUSCULAR; INTRAVENOUS at 16:00

## 2023-11-29 RX ADMIN — METOPROLOL TARTRATE 5 MG: 5 INJECTION INTRAVENOUS at 23:24

## 2023-11-29 ASSESSMENT — FIBROSIS 4 INDEX: FIB4 SCORE: 5.21

## 2023-11-29 ASSESSMENT — CHA2DS2 SCORE
DIABETES: NO
VASCULAR DISEASE: NO
AGE 75 OR GREATER: YES
HYPERTENSION: YES
SEX: FEMALE
CHA2DS2 VASC SCORE: 4
AGE 65 TO 74: NO
CHF OR LEFT VENTRICULAR DYSFUNCTION: NO
PRIOR STROKE OR TIA OR THROMBOEMBOLISM: NO

## 2023-11-29 NOTE — ED PROVIDER NOTES
ER Provider Note    Scribed for Dr. Howard Ortiz M.D. by Nancy Houston. 11/29/2023  3:33 PM    Primary Care Provider: KIMBERLY Benitez    CHIEF COMPLAINT  Chief Complaint   Patient presents with    Alcohol Intoxication     Pt states she has been drinking about a pint of alcohol a day in attempt to numb her grief after losing both of her daughters and     Depression       EXTERNAL RECORDS REVIEWED  Inpatient Notes Patient seen for A-fib with rapid ventricular rate and admitted for this back in May.    HPI/ROS  LIMITATION TO HISTORY   Select: Intoxication    Jeanie Hutchison is a 78 y.o. female with who presents to the ED for alcohol intoxication. Patient has been drink a pint of vodka a day and mixes it with Trazodone to sleep. Patient has issues sleeping without that combination. Both of her daughter passed away from unfortunate causes and she is drinking to numb the grief. She has been to [facility] and [facility] several times for alcohol intoxication. She has a history of paroxsymal atrial fibrillation and has a pace maker and a defibrillation implanted into her left chest. Patient added that it shocked her three weeks ago. Patient's son is still alive and has been trying to get the patient to rehabilitation. Patient arrived to the ED via EMS but she is unsure who called the ambualcne. She went to rehabilitation for an uncertain amount of time. Patient has not been seen by [facility] but is currently expressing interesting in going to get sober. She is currently slightly shaking but denies history of seizures with alcohol withdrawal. She is currently slurring her words. Patient has ideations of wanting to sleep and not wake up as she feels useless but expresses no active ideations.     PAST MEDICAL HISTORY  Past Medical History:   Diagnosis Date    Alcoholism (HCC)     Anticoagulated     Apnea, sleep     Chronic pain     r/t pelvic fracture    Hypothyroidism     Insomnia      Trouble going to Sleep     Paroxysmal atrial fibrillation (HCC)     Psychiatric disorder     history of depression and anxiety     Snoring     Ventricular tachycardia (HCC)        SURGICAL HISTORY  Past Surgical History:   Procedure Laterality Date    ORIF, FRACTURE, CLAVICLE  5/21/2014    Performed by Wilfred Gonzalez M.D. at SURGERY [facility] ORS    BREAST BIOPSY      bilateral neg breast bxs    OTHER ORTHOPEDIC SURGERY      pelvis fracture. 10 years ago     TONSILLECTOMY         FAMILY HISTORY  Family History   Problem Relation Age of Onset    Diabetes Mother     Anxiety disorder Mother     Depression Mother     Hyperlipidemia Father        SOCIAL HISTORY   reports that she has never smoked. She has never used smokeless tobacco. She reports current alcohol use of about 8.4 - 12.6 oz of alcohol per week. She reports that she does not currently use drugs after having used the following drugs: Oral.    CURRENT MEDICATIONS  Previous Medications    APIXABAN (ELIQUIS) 5MG TAB    Take 1 Tablet by mouth 2 times a day.    CARVEDILOL (COREG) 12.5 MG TAB    Take 1 Tablet by mouth 2 times a day with meals.    DIGOXIN (LANOXIN) 125 MCG TAB    Take 1 Tablet by mouth every day.    ESCITALOPRAM (LEXAPRO) 10 MG TAB    Take 1 Tablet by mouth every day.    LEVOTHYROXINE (SYNTHROID) 50 MCG TAB    Take 1 Tablet by mouth every morning on an empty stomach.    MAGNESIUM OXIDE 400 (240 MG) MG TAB    Take 1 Tablet by mouth every day.    TRAZODONE (DESYREL) 50 MG TAB    Take 1 Tablet by mouth at bedtime as needed for Sleep.    VENLAFAXINE XR (EFFEXOR XR) 75 MG CAPSULE SR 24 HR    Take 3 Capsules by mouth every day.       ALLERGIES  Sulfa drugs    PHYSICAL EXAM  BP (!) 155/89   Pulse 94   Temp 36.5 °C (97.7 °F) (Temporal)   Resp 16   Wt 73 kg (161 lb)   SpO2 98%   BMI 25.99 kg/m²   Constitutional: Alert in no apparent distress, No acute medical problems, Dehydrated appearance.  HENT: No signs of trauma, Bilateral external ears  normal, Nose normal.   Eyes: Pupils are equal and reactive, Conjunctiva normal, Non-icteric.   Neck: Normal range of motion, No tenderness, Supple, No stridor.   Lymphatic: No lymphadenopathy noted.   Cardiovascular: Regular rate and rhythm, no murmurs.   Thorax & Lungs: Normal breath sounds, No respiratory distress, No wheezing, No chest tenderness.   Abdomen: Bowel sounds normal, Soft, No tenderness, No masses, No pulsatile masses. No peritoneal signs.  Skin: Warm, Dry, No erythema, No rash.   Back: No bony tenderness, No CVA tenderness.   Extremities: Intact distal pulses, No edema, No tenderness, No cyanosis.  Musculoskeletal: Good range of motion in all major joints. No tenderness to palpation or major deformities noted.   Neurologic: Alert , Normal motor function, Normal sensory function, No focal deficits noted, Slurring speech.   Psychiatric: Affect normal, Judgment normal, Depressed.     DIAGNOSTIC STUDIES & PROCEDURES    Labs:   Labs Reviewed   CBC WITH DIFFERENTIAL - Abnormal; Notable for the following components:       Result Value    MCV 98.0 (*)     MCH 33.4 (*)     RDW 57.1 (*)     Platelet Count 97 (*)     Neutrophils-Polys 43.20 (*)     Lymphocytes 45.50 (*)     All other components within normal limits   BASIC METABOLIC PANEL - Abnormal; Notable for the following components:    Creatinine 0.45 (*)     Anion Gap 18.0 (*)     All other components within normal limits   POC BREATHALIZER - Abnormal; Notable for the following components:    POC Breathalizer 0.24 (*)     All other components within normal limits   POC BREATHALIZER - Abnormal; Notable for the following components:    POC Breathalizer 0.18 (*)     All other components within normal limits   URINE DRUG SCREEN   IMMATURE PLT FRACTION   ESTIMATED GFR    All labs reviewed by me.    COURSE & MEDICAL DECISION MAKING    ED Observation Status? Yes; I am placing the patient in to an observation status due to a diagnostic uncertainty as well as  therapeutic intensity. Patient placed in observation status at 3:33 PM, 11/29/2023.     Observation plan is as follows: Monitor for symptom management and diagnostic results     Upon Reevaluation, the patient's condition has: Stayed the same and she continues to be in observation.  She is still very depressed.  She will be seen by the alert team.    INITIAL ASSESSMENT AND PLAN  Care Narrative:       3:33 PM - Patient seen and evaluated at bedside by resident. Patient is a 78 year old female who presents to the ED for alcohol intoxication. Patient reports drinking one pint of vodka a day to numb the grief of losing both her daughters. Today, she expresses interest in going to [facility] to get sober. Discussed plan of care, including a diagnostic work up with labs and fluids. Patient agrees to plan of care. Patient will be treated with Folvite 1 mg tablet, Multivitamin 1 tablet, Rally Bag, Thiamine 100 mg tablet for her symptoms. Ordered POC Breathalizer, Basic Metabolic Panel, CBC w/ Diff and Urine Drug Screen to evaluate.    7:10 PM Patient reevaluated at bedside. Patient is resting.     7:52 PM Patient reevaluated at bedside. Patient resting. Discussed resulted studies.    HYDRATION: Based on the patient's presentation of Abnormal Fluid Loss the patient was given IV fluids. IV Hydration was used because oral hydration was not adequate alone. Upon recheck following hydration, the patient was improved.    ADDITIONAL PROBLEM LIST AND DISPOSITION  Patient with significant alcohol use now with some significant sadness and passive suicidal thoughts and depression.  We will sure she has no significant electrolyte derangements.  We will assure that she is well-hydrated.  We will also continue to observe her for improved mental status.                   DISPOSITION AND DISCUSSIONS  I have discussed management of the patient with the following physicians and CIARAN's: none    Discussion of management with other  QHP or appropriate source(s): Spoke to alert team who has not been able to see the patient at this time given her intoxication.        Barriers to care at this time, including but not limited to:  none .     Decision tools and prescription drugs considered including, but not limited to:  The patient was continually evaluated in terms of CIWA protocol and she does not require any intervention.  Librium was given to prevent withdrawal .    The patient will be signed out to my oncoming partner Dr. Loving who will appreciate the Jeremy from the alert team.    FINAL IMPRESSION   1. Alcoholic intoxication without complication (HCC)    2. Depression, unspecified depression type         I, Nancy Houston (Jo), am scribing for, and in the presence of, Howard Ortiz M.D..    Electronically signed by: Nancy Houston (Jo), 11/29/2023    IHoward M.D. personally performed the services described in this documentation, as scribed by Nancy Houston in my presence, and it is both accurate and complete.    The note accurately reflects work and decisions made by me.  Howard Ortiz M.D.  11/29/2023  9:19 PM

## 2023-11-29 NOTE — ED TRIAGE NOTES
Pt to rm green [facility] .  Chief Complaint   Patient presents with    Alcohol Intoxication     Pt states she has been drinking about a pint of alcohol a day in attempt to numb her grief after losing both of her daughters and     Depression

## 2023-11-30 ENCOUNTER — APPOINTMENT (OUTPATIENT)
Dept: RADIOLOGY | Facility: MEDICAL CENTER | Age: 78
DRG: 897 | End: 2023-11-30
Attending: STUDENT IN AN ORGANIZED HEALTH CARE EDUCATION/TRAINING PROGRAM
Payer: MEDICARE

## 2023-11-30 ENCOUNTER — APPOINTMENT (OUTPATIENT)
Dept: CARDIOLOGY | Facility: MEDICAL CENTER | Age: 78
DRG: 897 | End: 2023-11-30
Payer: MEDICARE

## 2023-11-30 PROBLEM — F32.9 MAJOR DEPRESSIVE DISORDER WITH CURRENT ACTIVE EPISODE: Status: ACTIVE | Noted: 2023-05-28

## 2023-11-30 PROBLEM — E03.9 HYPOTHYROIDISM: Status: ACTIVE | Noted: 2023-11-30

## 2023-11-30 PROBLEM — I48.91 ATRIAL FIBRILLATION (HCC): Status: ACTIVE | Noted: 2023-11-30

## 2023-11-30 LAB
ALBUMIN SERPL BCP-MCNC: 3.6 G/DL (ref 3.2–4.9)
ALBUMIN/GLOB SERPL: 1.3 G/DL
ALP SERPL-CCNC: 84 U/L (ref 30–99)
ALT SERPL-CCNC: 13 U/L (ref 2–50)
ANION GAP SERPL CALC-SCNC: 14 MMOL/L (ref 7–16)
AST SERPL-CCNC: 31 U/L (ref 12–45)
BILIRUB SERPL-MCNC: 1.9 MG/DL (ref 0.1–1.5)
BUN SERPL-MCNC: 10 MG/DL (ref 8–22)
CALCIUM ALBUM COR SERPL-MCNC: 8.6 MG/DL (ref 8.5–10.5)
CALCIUM SERPL-MCNC: 8.3 MG/DL (ref 8.5–10.5)
CHLORIDE SERPL-SCNC: 103 MMOL/L (ref 96–112)
CO2 SERPL-SCNC: 24 MMOL/L (ref 20–33)
CREAT SERPL-MCNC: 0.4 MG/DL (ref 0.5–1.4)
DIGOXIN SERPL-MCNC: 0.4 NG/ML (ref 0.8–2)
GFR SERPLBLD CREATININE-BSD FMLA CKD-EPI: 102 ML/MIN/1.73 M 2
GLOBULIN SER CALC-MCNC: 2.8 G/DL (ref 1.9–3.5)
GLUCOSE BLD STRIP.AUTO-MCNC: 98 MG/DL (ref 65–99)
GLUCOSE SERPL-MCNC: 91 MG/DL (ref 65–99)
MAGNESIUM SERPL-MCNC: 1.8 MG/DL (ref 1.5–2.5)
PHOSPHATE SERPL-MCNC: 3.7 MG/DL (ref 2.5–4.5)
POC BREATHALIZER: 0.09 PERCENT (ref 0–0.01)
POTASSIUM SERPL-SCNC: 4.2 MMOL/L (ref 3.6–5.5)
PROT SERPL-MCNC: 6.4 G/DL (ref 6–8.2)
SODIUM SERPL-SCNC: 141 MMOL/L (ref 135–145)

## 2023-11-30 PROCEDURE — 700102 HCHG RX REV CODE 250 W/ 637 OVERRIDE(OP): Performed by: NURSE PRACTITIONER

## 2023-11-30 PROCEDURE — 700111 HCHG RX REV CODE 636 W/ 250 OVERRIDE (IP): Performed by: STUDENT IN AN ORGANIZED HEALTH CARE EDUCATION/TRAINING PROGRAM

## 2023-11-30 PROCEDURE — 700101 HCHG RX REV CODE 250: Performed by: STUDENT IN AN ORGANIZED HEALTH CARE EDUCATION/TRAINING PROGRAM

## 2023-11-30 PROCEDURE — A9270 NON-COVERED ITEM OR SERVICE: HCPCS | Performed by: NURSE PRACTITIONER

## 2023-11-30 PROCEDURE — 80162 ASSAY OF DIGOXIN TOTAL: CPT

## 2023-11-30 PROCEDURE — 700105 HCHG RX REV CODE 258: Performed by: NURSE PRACTITIONER

## 2023-11-30 PROCEDURE — 82962 GLUCOSE BLOOD TEST: CPT

## 2023-11-30 PROCEDURE — 700102 HCHG RX REV CODE 250 W/ 637 OVERRIDE(OP)

## 2023-11-30 PROCEDURE — HZ2ZZZZ DETOXIFICATION SERVICES FOR SUBSTANCE ABUSE TREATMENT: ICD-10-PCS

## 2023-11-30 PROCEDURE — 80053 COMPREHEN METABOLIC PANEL: CPT

## 2023-11-30 PROCEDURE — 302970 POC BREATHALIZER: Performed by: STUDENT IN AN ORGANIZED HEALTH CARE EDUCATION/TRAINING PROGRAM

## 2023-11-30 PROCEDURE — 700102 HCHG RX REV CODE 250 W/ 637 OVERRIDE(OP): Performed by: STUDENT IN AN ORGANIZED HEALTH CARE EDUCATION/TRAINING PROGRAM

## 2023-11-30 PROCEDURE — 99223 1ST HOSP IP/OBS HIGH 75: CPT | Mod: AI,GC | Performed by: STUDENT IN AN ORGANIZED HEALTH CARE EDUCATION/TRAINING PROGRAM

## 2023-11-30 PROCEDURE — 700105 HCHG RX REV CODE 258: Performed by: STUDENT IN AN ORGANIZED HEALTH CARE EDUCATION/TRAINING PROGRAM

## 2023-11-30 PROCEDURE — 71045 X-RAY EXAM CHEST 1 VIEW: CPT

## 2023-11-30 PROCEDURE — A9270 NON-COVERED ITEM OR SERVICE: HCPCS

## 2023-11-30 PROCEDURE — 700101 HCHG RX REV CODE 250

## 2023-11-30 PROCEDURE — 99222 1ST HOSP IP/OBS MODERATE 55: CPT | Mod: GC | Performed by: STUDENT IN AN ORGANIZED HEALTH CARE EDUCATION/TRAINING PROGRAM

## 2023-11-30 PROCEDURE — 770000 HCHG ROOM/CARE - INTERMEDIATE ICU *

## 2023-11-30 PROCEDURE — 96376 TX/PRO/DX INJ SAME DRUG ADON: CPT

## 2023-11-30 PROCEDURE — 96375 TX/PRO/DX INJ NEW DRUG ADDON: CPT

## 2023-11-30 PROCEDURE — 700111 HCHG RX REV CODE 636 W/ 250 OVERRIDE (IP): Performed by: NURSE PRACTITIONER

## 2023-11-30 PROCEDURE — 700111 HCHG RX REV CODE 636 W/ 250 OVERRIDE (IP)

## 2023-11-30 PROCEDURE — A9270 NON-COVERED ITEM OR SERVICE: HCPCS | Performed by: STUDENT IN AN ORGANIZED HEALTH CARE EDUCATION/TRAINING PROGRAM

## 2023-11-30 RX ORDER — LABETALOL HYDROCHLORIDE 5 MG/ML
10 INJECTION, SOLUTION INTRAVENOUS
Status: DISCONTINUED | OUTPATIENT
Start: 2023-11-30 | End: 2023-12-14 | Stop reason: HOSPADM

## 2023-11-30 RX ORDER — METOPROLOL TARTRATE 1 MG/ML
5 INJECTION, SOLUTION INTRAVENOUS
Status: COMPLETED | OUTPATIENT
Start: 2023-11-30 | End: 2023-11-30

## 2023-11-30 RX ORDER — VENLAFAXINE HYDROCHLORIDE 75 MG/1
225 CAPSULE, EXTENDED RELEASE ORAL DAILY
Status: DISCONTINUED | OUTPATIENT
Start: 2023-11-30 | End: 2023-12-14 | Stop reason: HOSPADM

## 2023-11-30 RX ORDER — FOLIC ACID 1 MG/1
1 TABLET ORAL DAILY
Status: DISCONTINUED | OUTPATIENT
Start: 2023-11-30 | End: 2023-11-30

## 2023-11-30 RX ORDER — LORAZEPAM 2 MG/1
2 TABLET ORAL
Status: DISCONTINUED | OUTPATIENT
Start: 2023-11-30 | End: 2023-11-30

## 2023-11-30 RX ORDER — AMOXICILLIN 250 MG
2 CAPSULE ORAL 2 TIMES DAILY
Status: DISCONTINUED | OUTPATIENT
Start: 2023-11-30 | End: 2023-12-14 | Stop reason: HOSPADM

## 2023-11-30 RX ORDER — LORAZEPAM 2 MG/1
4 TABLET ORAL
Status: DISCONTINUED | OUTPATIENT
Start: 2023-11-30 | End: 2023-12-08

## 2023-11-30 RX ORDER — DEXMEDETOMIDINE HYDROCHLORIDE 4 UG/ML
.1-1.5 INJECTION, SOLUTION INTRAVENOUS CONTINUOUS
Status: DISCONTINUED | OUTPATIENT
Start: 2023-11-30 | End: 2023-12-02

## 2023-11-30 RX ORDER — CHLORDIAZEPOXIDE HYDROCHLORIDE 25 MG/1
50 CAPSULE, GELATIN COATED ORAL EVERY 6 HOURS
Status: DISCONTINUED | OUTPATIENT
Start: 2023-11-30 | End: 2023-12-03

## 2023-11-30 RX ORDER — LORAZEPAM 1 MG/1
1 TABLET ORAL EVERY 4 HOURS PRN
Status: DISCONTINUED | OUTPATIENT
Start: 2023-11-30 | End: 2023-12-08

## 2023-11-30 RX ORDER — LORAZEPAM 2 MG/ML
1.5 INJECTION INTRAMUSCULAR
Status: DISCONTINUED | OUTPATIENT
Start: 2023-11-30 | End: 2023-12-08

## 2023-11-30 RX ORDER — LORAZEPAM 0.5 MG/1
0.5 TABLET ORAL EVERY 4 HOURS PRN
Status: DISCONTINUED | OUTPATIENT
Start: 2023-11-30 | End: 2023-12-08

## 2023-11-30 RX ORDER — LORAZEPAM 2 MG/ML
1 INJECTION INTRAMUSCULAR
Status: DISCONTINUED | OUTPATIENT
Start: 2023-11-30 | End: 2023-12-08

## 2023-11-30 RX ORDER — LORAZEPAM 2 MG/ML
1 INJECTION INTRAMUSCULAR
Status: DISCONTINUED | OUTPATIENT
Start: 2023-11-30 | End: 2023-11-30

## 2023-11-30 RX ORDER — GAUZE BANDAGE 2" X 2"
100 BANDAGE TOPICAL DAILY
Status: DISCONTINUED | OUTPATIENT
Start: 2023-11-30 | End: 2023-11-30

## 2023-11-30 RX ORDER — METOPROLOL TARTRATE 1 MG/ML
5 INJECTION, SOLUTION INTRAVENOUS
Status: DISCONTINUED | OUTPATIENT
Start: 2023-11-30 | End: 2023-12-14 | Stop reason: HOSPADM

## 2023-11-30 RX ORDER — LORAZEPAM 2 MG/1
2 TABLET ORAL
Status: DISCONTINUED | OUTPATIENT
Start: 2023-11-30 | End: 2023-12-08

## 2023-11-30 RX ORDER — DIGOXIN 0.25 MG/ML
125 INJECTION INTRAMUSCULAR; INTRAVENOUS ONCE
Status: COMPLETED | OUTPATIENT
Start: 2023-11-30 | End: 2023-11-30

## 2023-11-30 RX ORDER — LORAZEPAM 2 MG/ML
2 INJECTION INTRAMUSCULAR ONCE
Status: COMPLETED | OUTPATIENT
Start: 2023-11-30 | End: 2023-11-30

## 2023-11-30 RX ORDER — TRAZODONE HYDROCHLORIDE 100 MG/1
100 TABLET ORAL NIGHTLY
Status: ON HOLD | COMMUNITY
End: 2023-12-13

## 2023-11-30 RX ORDER — VENLAFAXINE HYDROCHLORIDE 150 MG/1
150 CAPSULE, EXTENDED RELEASE ORAL DAILY
Status: ON HOLD | COMMUNITY
End: 2023-12-13

## 2023-11-30 RX ORDER — LORAZEPAM 2 MG/ML
0.5 INJECTION INTRAMUSCULAR EVERY 4 HOURS PRN
Status: DISCONTINUED | OUTPATIENT
Start: 2023-11-30 | End: 2023-12-08

## 2023-11-30 RX ORDER — SODIUM CHLORIDE, SODIUM LACTATE, POTASSIUM CHLORIDE, CALCIUM CHLORIDE 600; 310; 30; 20 MG/100ML; MG/100ML; MG/100ML; MG/100ML
INJECTION, SOLUTION INTRAVENOUS CONTINUOUS
Status: DISCONTINUED | OUTPATIENT
Start: 2023-11-30 | End: 2023-12-06

## 2023-11-30 RX ORDER — LABETALOL 100 MG/1
200 TABLET, FILM COATED ORAL EVERY 6 HOURS PRN
Status: DISCONTINUED | OUTPATIENT
Start: 2023-11-30 | End: 2023-12-14 | Stop reason: HOSPADM

## 2023-11-30 RX ORDER — LORAZEPAM 2 MG/ML
0.5 INJECTION INTRAMUSCULAR EVERY 4 HOURS PRN
Status: DISCONTINUED | OUTPATIENT
Start: 2023-11-30 | End: 2023-11-30

## 2023-11-30 RX ORDER — LORAZEPAM 1 MG/1
1 TABLET ORAL EVERY 4 HOURS PRN
Status: DISCONTINUED | OUTPATIENT
Start: 2023-11-30 | End: 2023-11-30

## 2023-11-30 RX ORDER — FOLIC ACID 1 MG/1
1 TABLET ORAL DAILY
Status: ON HOLD | COMMUNITY
End: 2024-01-23

## 2023-11-30 RX ORDER — LORAZEPAM 2 MG/1
4 TABLET ORAL
Status: DISCONTINUED | OUTPATIENT
Start: 2023-11-30 | End: 2023-11-30

## 2023-11-30 RX ORDER — LORAZEPAM 2 MG/ML
1.5 INJECTION INTRAMUSCULAR
Status: DISCONTINUED | OUTPATIENT
Start: 2023-11-30 | End: 2023-11-30

## 2023-11-30 RX ORDER — CHLORDIAZEPOXIDE HYDROCHLORIDE 25 MG/1
75 CAPSULE, GELATIN COATED ORAL EVERY 6 HOURS
Status: CANCELLED | OUTPATIENT
Start: 2023-11-30

## 2023-11-30 RX ORDER — LORAZEPAM 1 MG/1
0.5 TABLET ORAL EVERY 4 HOURS PRN
Status: DISCONTINUED | OUTPATIENT
Start: 2023-11-30 | End: 2023-11-30

## 2023-11-30 RX ORDER — ESCITALOPRAM OXALATE 10 MG/1
10 TABLET ORAL DAILY
Status: ON HOLD | COMMUNITY
End: 2023-12-13

## 2023-11-30 RX ORDER — LORAZEPAM 2 MG/ML
2 INJECTION INTRAMUSCULAR
Status: DISCONTINUED | OUTPATIENT
Start: 2023-11-30 | End: 2023-12-08

## 2023-11-30 RX ORDER — CHLORDIAZEPOXIDE HYDROCHLORIDE 25 MG/1
50 CAPSULE, GELATIN COATED ORAL EVERY 6 HOURS
Status: DISCONTINUED | OUTPATIENT
Start: 2023-11-30 | End: 2023-11-30

## 2023-11-30 RX ORDER — LORAZEPAM 2 MG/ML
2 INJECTION INTRAMUSCULAR
Status: DISCONTINUED | OUTPATIENT
Start: 2023-11-30 | End: 2023-11-30

## 2023-11-30 RX ORDER — BISACODYL 10 MG
10 SUPPOSITORY, RECTAL RECTAL
Status: DISCONTINUED | OUTPATIENT
Start: 2023-11-30 | End: 2023-12-14 | Stop reason: HOSPADM

## 2023-11-30 RX ORDER — POLYETHYLENE GLYCOL 3350 17 G/17G
1 POWDER, FOR SOLUTION ORAL
Status: DISCONTINUED | OUTPATIENT
Start: 2023-11-30 | End: 2023-12-14 | Stop reason: HOSPADM

## 2023-11-30 RX ORDER — LEVOTHYROXINE SODIUM 0.05 MG/1
50 TABLET ORAL
Status: DISCONTINUED | OUTPATIENT
Start: 2023-11-30 | End: 2023-12-14 | Stop reason: HOSPADM

## 2023-11-30 RX ADMIN — SODIUM CHLORIDE, POTASSIUM CHLORIDE, SODIUM LACTATE AND CALCIUM CHLORIDE: 600; 310; 30; 20 INJECTION, SOLUTION INTRAVENOUS at 08:46

## 2023-11-30 RX ADMIN — APIXABAN 5 MG: 5 TABLET, FILM COATED ORAL at 05:58

## 2023-11-30 RX ADMIN — LORAZEPAM 2 MG: 2 INJECTION INTRAMUSCULAR; INTRAVENOUS at 17:42

## 2023-11-30 RX ADMIN — LORAZEPAM 2 MG: 2 INJECTION, SOLUTION INTRAMUSCULAR; INTRAVENOUS at 00:35

## 2023-11-30 RX ADMIN — LORAZEPAM 1.5 MG: 2 INJECTION INTRAMUSCULAR; INTRAVENOUS at 09:13

## 2023-11-30 RX ADMIN — METOPROLOL TARTRATE 5 MG: 5 INJECTION INTRAVENOUS at 02:53

## 2023-11-30 RX ADMIN — DEXMEDETOMIDINE HYDROCHLORIDE 0.2 MCG/KG/HR: 100 INJECTION, SOLUTION INTRAVENOUS at 20:39

## 2023-11-30 RX ADMIN — CHLORDIAZEPOXIDE HYDROCHLORIDE 50 MG: 25 CAPSULE ORAL at 06:25

## 2023-11-30 RX ADMIN — LORAZEPAM 2 MG: 2 INJECTION INTRAMUSCULAR; INTRAVENOUS at 18:46

## 2023-11-30 RX ADMIN — THERA TABS 1 TABLET: TAB at 15:35

## 2023-11-30 RX ADMIN — DIGOXIN 125 MCG: 0.25 INJECTION INTRAMUSCULAR; INTRAVENOUS at 07:01

## 2023-11-30 RX ADMIN — CHLORDIAZEPOXIDE HYDROCHLORIDE 50 MG: 25 CAPSULE ORAL at 23:15

## 2023-11-30 RX ADMIN — CHLORDIAZEPOXIDE HYDROCHLORIDE 50 MG: 25 CAPSULE ORAL at 15:34

## 2023-11-30 RX ADMIN — METOPROLOL TARTRATE 5 MG: 5 INJECTION INTRAVENOUS at 02:45

## 2023-11-30 RX ADMIN — METOPROLOL TARTRATE 5 MG: 5 INJECTION INTRAVENOUS at 02:40

## 2023-11-30 RX ADMIN — DOCUSATE SODIUM 50 MG AND SENNOSIDES 8.6 MG 2 TABLET: 8.6; 5 TABLET, FILM COATED ORAL at 05:59

## 2023-11-30 RX ADMIN — Medication 100 MG: at 15:35

## 2023-11-30 RX ADMIN — METOPROLOL SUCCINATE 100 MG: 25 TABLET, FILM COATED, EXTENDED RELEASE ORAL at 05:58

## 2023-11-30 RX ADMIN — LORAZEPAM 1 MG: 2 INJECTION, SOLUTION INTRAMUSCULAR; INTRAVENOUS at 03:57

## 2023-11-30 RX ADMIN — DIGOXIN 125 MCG: 0.25 TABLET ORAL at 08:49

## 2023-11-30 RX ADMIN — LORAZEPAM 1 MG: 1 TABLET ORAL at 02:29

## 2023-11-30 RX ADMIN — LORAZEPAM 2 MG: 2 INJECTION INTRAMUSCULAR; INTRAVENOUS at 17:27

## 2023-11-30 RX ADMIN — SODIUM CHLORIDE, POTASSIUM CHLORIDE, SODIUM LACTATE AND CALCIUM CHLORIDE 1000 ML: 600; 310; 30; 20 INJECTION, SOLUTION INTRAVENOUS at 13:26

## 2023-11-30 RX ADMIN — LORAZEPAM 1.5 MG: 2 INJECTION INTRAMUSCULAR; INTRAVENOUS at 16:41

## 2023-11-30 RX ADMIN — LORAZEPAM 3 MG: 2 TABLET ORAL at 13:14

## 2023-11-30 RX ADMIN — FOLIC ACID 1 MG: 1 TABLET ORAL at 15:35

## 2023-11-30 RX ADMIN — APIXABAN 5 MG: 5 TABLET, FILM COATED ORAL at 16:40

## 2023-11-30 RX ADMIN — LORAZEPAM 1.5 MG: 2 INJECTION, SOLUTION INTRAMUSCULAR; INTRAVENOUS at 07:52

## 2023-11-30 RX ADMIN — VENLAFAXINE HYDROCHLORIDE 225 MG: 75 CAPSULE, EXTENDED RELEASE ORAL at 05:59

## 2023-11-30 RX ADMIN — LEVOTHYROXINE SODIUM 50 MCG: 50 TABLET ORAL at 05:58

## 2023-11-30 RX ADMIN — LORAZEPAM 1 MG: 2 INJECTION, SOLUTION INTRAMUSCULAR; INTRAVENOUS at 06:25

## 2023-11-30 ASSESSMENT — LIFESTYLE VARIABLES
PAROXYSMAL SWEATS: NO SWEAT VISIBLE
AUDITORY DISTURBANCES: MODERATELY SEVERE HALLUCINATIONS
TREMOR: MODERATE TREMOR WITH ARMS EXTENDED
VISUAL DISTURBANCES: MODERATELY SEVERE HALLUCINATIONS
ANXIETY: *
ORIENTATION AND CLOUDING OF SENSORIUM: CANNOT DO SERIAL ADDITIONS OR IS UNCERTAIN ABOUT DATE
ORIENTATION AND CLOUDING OF SENSORIUM: CANNOT DO SERIAL ADDITIONS OR IS UNCERTAIN ABOUT DATE
TREMOR: TREMOR NOT VISIBLE BUT CAN BE FELT, FINGERTIP TO FINGERTIP
VISUAL DISTURBANCES: VERY MILD SENSITIVITY
TOTAL SCORE: 3
TREMOR: MODERATE TREMOR WITH ARMS EXTENDED
NAUSEA AND VOMITING: NO NAUSEA AND NO VOMITING
AUDITORY DISTURBANCES: VERY MILD HARSHNESS OR ABILITY TO FRIGHTEN
HEADACHE, FULLNESS IN HEAD: VERY MILD
PAROXYSMAL SWEATS: NO SWEAT VISIBLE
ANXIETY: NO ANXIETY (AT EASE)
AUDITORY DISTURBANCES: MILD HARSHNESS OR ABILITY TO FRIGHTEN
NAUSEA AND VOMITING: NO NAUSEA AND NO VOMITING
VISUAL DISTURBANCES: MODERATELY SEVERE HALLUCINATIONS
AGITATION: NORMAL ACTIVITY
AVERAGE NUMBER OF DAYS PER WEEK YOU HAVE A DRINK CONTAINING ALCOHOL: 7
TOTAL SCORE: 9
AUDITORY DISTURBANCES: MILD HARSHNESS OR ABILITY TO FRIGHTEN
TOTAL SCORE: 18
PAROXYSMAL SWEATS: *
TREMOR: *
TREMOR: MODERATE TREMOR WITH ARMS EXTENDED
VISUAL DISTURBANCES: NOT PRESENT
TOTAL SCORE: 2
VISUAL DISTURBANCES: MODERATELY SEVERE HALLUCINATIONS
ANXIETY: MILDLY ANXIOUS
TOTAL SCORE: MODERATELY SEVERE HALLUCINATIONS
ANXIETY: MILDLY ANXIOUS
ANXIETY: MILDLY ANXIOUS
NAUSEA AND VOMITING: NO NAUSEA AND NO VOMITING
HEADACHE, FULLNESS IN HEAD: NOT PRESENT
NAUSEA AND VOMITING: *
ORIENTATION AND CLOUDING OF SENSORIUM: CANNOT DO SERIAL ADDITIONS OR IS UNCERTAIN ABOUT DATE
TOTAL SCORE: 26
VISUAL DISTURBANCES: MODERATE SENSITIVITY
AGITATION: *
ANXIETY: *
HEADACHE, FULLNESS IN HEAD: MILD
AUDITORY DISTURBANCES: VERY MILD HARSHNESS OR ABILITY TO FRIGHTEN
TOTAL SCORE: 20
VISUAL DISTURBANCES: NOT PRESENT
NAUSEA AND VOMITING: NO NAUSEA AND NO VOMITING
ORIENTATION AND CLOUDING OF SENSORIUM: CANNOT DO SERIAL ADDITIONS OR IS UNCERTAIN ABOUT DATE
AGITATION: *
VISUAL DISTURBANCES: MODERATE SENSITIVITY
ORIENTATION AND CLOUDING OF SENSORIUM: ORIENTED AND CAN DO SERIAL ADDITIONS
DOES PATIENT WANT TO STOP DRINKING: NO
HEADACHE, FULLNESS IN HEAD: VERY MILD
ANXIETY: MILDLY ANXIOUS
EVER FELT BAD OR GUILTY ABOUT YOUR DRINKING: YES
TACTILE DISTURBANCES: MILD ITCHING, PINS AND NEEDLES SENSATION, BURNING OR NUMBNESS
VISUAL DISTURBANCES: MODERATELY SEVERE HALLUCINATIONS
TOTAL SCORE: 16
PAROXYSMAL SWEATS: NO SWEAT VISIBLE
NAUSEA AND VOMITING: NO NAUSEA AND NO VOMITING
AUDITORY DISTURBANCES: NOT PRESENT
TOTAL SCORE: 32
AGITATION: SOMEWHAT MORE THAN NORMAL ACTIVITY
PAROXYSMAL SWEATS: NO SWEAT VISIBLE
NAUSEA AND VOMITING: MILD NAUSEA WITH NO VOMITING
TREMOR: MODERATE TREMOR WITH ARMS EXTENDED
TREMOR: *
ORIENTATION AND CLOUDING OF SENSORIUM: ORIENTED AND CAN DO SERIAL ADDITIONS
ANXIETY: MODERATELY ANXIOUS OR GUARDED, SO ANXIETY IS INFERRED
TOTAL SCORE: MODERATE ITCHING, PINS AND NEEDLES SENSATION, BURNING OR NUMBNESS
TREMOR: MODERATE TREMOR WITH ARMS EXTENDED
AUDITORY DISTURBANCES: NOT PRESENT
NAUSEA AND VOMITING: NO NAUSEA AND NO VOMITING
NAUSEA AND VOMITING: MILD NAUSEA WITH NO VOMITING
ORIENTATION AND CLOUDING OF SENSORIUM: DATE DISORIENTATION BY MORE THAN TWO CALENDAR DAYS
AGITATION: NORMAL ACTIVITY
HAVE YOU EVER FELT YOU SHOULD CUT DOWN ON YOUR DRINKING: YES
TOTAL SCORE: 4
ANXIETY: *
PAROXYSMAL SWEATS: NO SWEAT VISIBLE
ANXIETY: MODERATELY ANXIOUS OR GUARDED, SO ANXIETY IS INFERRED
ORIENTATION AND CLOUDING OF SENSORIUM: ORIENTED AND CAN DO SERIAL ADDITIONS
HEADACHE, FULLNESS IN HEAD: NOT PRESENT
VISUAL DISTURBANCES: SEVERE HALLUCINATIONS
VISUAL DISTURBANCES: MODERATE SENSITIVITY
TREMOR: *
AGITATION: *
PAROXYSMAL SWEATS: NO SWEAT VISIBLE
HEADACHE, FULLNESS IN HEAD: NOT PRESENT
PAROXYSMAL SWEATS: *
AGITATION: NORMAL ACTIVITY
AUDITORY DISTURBANCES: MODERATELY SEVERE HALLUCINATIONS
ORIENTATION AND CLOUDING OF SENSORIUM: ORIENTED AND CAN DO SERIAL ADDITIONS
VISUAL DISTURBANCES: MODERATE SENSITIVITY
EVER HAD A DRINK FIRST THING IN THE MORNING TO STEADY YOUR NERVES TO GET RID OF A HANGOVER: YES
PAROXYSMAL SWEATS: NO SWEAT VISIBLE
TOTAL SCORE: 12
ORIENTATION AND CLOUDING OF SENSORIUM: CANNOT DO SERIAL ADDITIONS OR IS UNCERTAIN ABOUT DATE
ORIENTATION AND CLOUDING OF SENSORIUM: CANNOT DO SERIAL ADDITIONS OR IS UNCERTAIN ABOUT DATE
AUDITORY DISTURBANCES: NOT PRESENT
AUDITORY DISTURBANCES: NOT PRESENT
PAROXYSMAL SWEATS: NO SWEAT VISIBLE
TOTAL SCORE: 7
TOTAL SCORE: 4
TOTAL SCORE: 18
ORIENTATION AND CLOUDING OF SENSORIUM: CANNOT DO SERIAL ADDITIONS OR IS UNCERTAIN ABOUT DATE
PAROXYSMAL SWEATS: BARELY PERCEPTIBLE SWEATING. PALMS MOIST
SUBSTANCE_ABUSE: 1
NAUSEA AND VOMITING: NO NAUSEA AND NO VOMITING
HEADACHE, FULLNESS IN HEAD: NOT PRESENT
TOTAL SCORE: 24
PAROXYSMAL SWEATS: BARELY PERCEPTIBLE SWEATING. PALMS MOIST
HEADACHE, FULLNESS IN HEAD: NOT PRESENT
ANXIETY: NO ANXIETY (AT EASE)
ANXIETY: *
TREMOR: TREMOR NOT VISIBLE BUT CAN BE FELT, FINGERTIP TO FINGERTIP
TOTAL SCORE: 12
ORIENTATION AND CLOUDING OF SENSORIUM: DATE DISORIENTATION BY NO MORE THAN TWO CALENDAR DAYS
AGITATION: SOMEWHAT MORE THAN NORMAL ACTIVITY
HEADACHE, FULLNESS IN HEAD: NOT PRESENT
VISUAL DISTURBANCES: MODERATE SENSITIVITY
DO YOU DRINK ALCOHOL: YES
AGITATION: SOMEWHAT MORE THAN NORMAL ACTIVITY
NAUSEA AND VOMITING: MILD NAUSEA WITH NO VOMITING
PAROXYSMAL SWEATS: BARELY PERCEPTIBLE SWEATING. PALMS MOIST
AGITATION: MODERATELY FIDGETY AND RESTLESS
VISUAL DISTURBANCES: MODERATELY SEVERE HALLUCINATIONS
PAROXYSMAL SWEATS: NO SWEAT VISIBLE
HEADACHE, FULLNESS IN HEAD: NOT PRESENT
NAUSEA AND VOMITING: NO NAUSEA AND NO VOMITING
TREMOR: TREMOR NOT VISIBLE BUT CAN BE FELT, FINGERTIP TO FINGERTIP
VISUAL DISTURBANCES: NOT PRESENT
AGITATION: *
HEADACHE, FULLNESS IN HEAD: NOT PRESENT
AGITATION: *
TOTAL SCORE: 21
ANXIETY: NO ANXIETY (AT EASE)
TREMOR: *
AGITATION: NORMAL ACTIVITY
HOW MANY TIMES IN THE PAST YEAR HAVE YOU HAD 5 OR MORE DRINKS IN A DAY: 0
PAROXYSMAL SWEATS: *
NAUSEA AND VOMITING: NO NAUSEA AND NO VOMITING
TOTAL SCORE: 4
AUDITORY DISTURBANCES: MILD HARSHNESS OR ABILITY TO FRIGHTEN
TREMOR: MODERATE TREMOR WITH ARMS EXTENDED
NAUSEA AND VOMITING: MILD NAUSEA WITH NO VOMITING
NAUSEA AND VOMITING: NO NAUSEA AND NO VOMITING
HEADACHE, FULLNESS IN HEAD: NOT PRESENT
TREMOR: MODERATE TREMOR WITH ARMS EXTENDED
AGITATION: *
HEADACHE, FULLNESS IN HEAD: NOT PRESENT
NAUSEA AND VOMITING: NO NAUSEA AND NO VOMITING
TOTAL SCORE: VERY MILD ITCHING, PINS AND NEEDLES SENSATION, BURNING OR NUMBNESS
ANXIETY: MILDLY ANXIOUS
HEADACHE, FULLNESS IN HEAD: NOT PRESENT
PAROXYSMAL SWEATS: NO SWEAT VISIBLE
ANXIETY: NO ANXIETY (AT EASE)
ANXIETY: *
VISUAL DISTURBANCES: NOT PRESENT
PAROXYSMAL SWEATS: *
AUDITORY DISTURBANCES: SEVERE HALLUCINATIONS
TOTAL SCORE: MODERATELY SEVERE HALLUCINATIONS
TOTAL SCORE: 31
TOTAL SCORE: 10
PAROXYSMAL SWEATS: BARELY PERCEPTIBLE SWEATING. PALMS MOIST
ANXIETY: MILDLY ANXIOUS
TOTAL SCORE: MODERATE ITCHING, PINS AND NEEDLES SENSATION, BURNING OR NUMBNESS
AGITATION: SOMEWHAT MORE THAN NORMAL ACTIVITY
VISUAL DISTURBANCES: SEVERE HALLUCINATIONS
HEADACHE, FULLNESS IN HEAD: NOT PRESENT
HEADACHE, FULLNESS IN HEAD: NOT PRESENT
AUDITORY DISTURBANCES: NOT PRESENT
VISUAL DISTURBANCES: NOT PRESENT
TREMOR: MODERATE TREMOR WITH ARMS EXTENDED
ORIENTATION AND CLOUDING OF SENSORIUM: ORIENTED AND CAN DO SERIAL ADDITIONS
AUDITORY DISTURBANCES: NOT PRESENT
TOTAL SCORE: 8
AUDITORY DISTURBANCES: NOT PRESENT
TOTAL SCORE: 9
CONSUMPTION TOTAL: POSITIVE
ORIENTATION AND CLOUDING OF SENSORIUM: CANNOT DO SERIAL ADDITIONS OR IS UNCERTAIN ABOUT DATE
TACTILE DISTURBANCES: MODERATE ITCHING, PINS AND NEEDLES SENSATION, BURNING OR NUMBNESS
AGITATION: MODERATELY FIDGETY AND RESTLESS
AUDITORY DISTURBANCES: VERY MILD HARSHNESS OR ABILITY TO FRIGHTEN
AGITATION: *
ORIENTATION AND CLOUDING OF SENSORIUM: DATE DISORIENTATION BY MORE THAN TWO CALENDAR DAYS
ON A TYPICAL DAY WHEN YOU DRINK ALCOHOL HOW MANY DRINKS DO YOU HAVE: 1
HAVE PEOPLE ANNOYED YOU BY CRITICIZING YOUR DRINKING: YES
ORIENTATION AND CLOUDING OF SENSORIUM: ORIENTED AND CAN DO SERIAL ADDITIONS
ANXIETY: *
HEADACHE, FULLNESS IN HEAD: MILD
TREMOR: *
ORIENTATION AND CLOUDING OF SENSORIUM: DATE DISORIENTATION BY MORE THAN TWO CALENDAR DAYS
TREMOR: *
AGITATION: NORMAL ACTIVITY
TREMOR: *
AUDITORY DISTURBANCES: MODERATELY SEVERE HALLUCINATIONS
NAUSEA AND VOMITING: NO NAUSEA AND NO VOMITING
NAUSEA AND VOMITING: NO NAUSEA AND NO VOMITING
HEADACHE, FULLNESS IN HEAD: MILD
AUDITORY DISTURBANCES: NOT PRESENT

## 2023-11-30 ASSESSMENT — COGNITIVE AND FUNCTIONAL STATUS - GENERAL
WALKING IN HOSPITAL ROOM: TOTAL
EATING MEALS: A LOT
MOVING FROM LYING ON BACK TO SITTING ON SIDE OF FLAT BED: A LOT
CLIMB 3 TO 5 STEPS WITH RAILING: TOTAL
MOBILITY SCORE: 11
MOVING TO AND FROM BED TO CHAIR: A LITTLE
DRESSING REGULAR UPPER BODY CLOTHING: A LOT
TURNING FROM BACK TO SIDE WHILE IN FLAT BAD: A LITTLE
PERSONAL GROOMING: A LITTLE
DAILY ACTIVITIY SCORE: 12
DRESSING REGULAR LOWER BODY CLOTHING: A LOT
TOILETING: TOTAL
HELP NEEDED FOR BATHING: A LOT
STANDING UP FROM CHAIR USING ARMS: TOTAL
SUGGESTED CMS G CODE MODIFIER MOBILITY: CL
SUGGESTED CMS G CODE MODIFIER DAILY ACTIVITY: CL

## 2023-11-30 ASSESSMENT — ENCOUNTER SYMPTOMS
ABDOMINAL PAIN: 1
BACK PAIN: 0
EYES NEGATIVE: 1
BLURRED VISION: 0
SPUTUM PRODUCTION: 0
HALLUCINATIONS: 1
ABDOMINAL PAIN: 0
NERVOUS/ANXIOUS: 0
VOMITING: 0
HEADACHES: 0
MYALGIAS: 1
CONSTIPATION: 0
HEARTBURN: 1
SENSORY CHANGE: 1
TREMORS: 1
NAUSEA: 0
FEVER: 0
INSOMNIA: 0
NAUSEA: 1
MYALGIAS: 0
SHORTNESS OF BREATH: 0
HEADACHES: 1
CHILLS: 0
WEIGHT LOSS: 0
HEARTBURN: 0
RESPIRATORY NEGATIVE: 1
DIARRHEA: 0
PALPITATIONS: 1
CHILLS: 1
DEPRESSION: 1
WEAKNESS: 0
COUGH: 0
DIAPHORESIS: 1
SEIZURES: 0
NERVOUS/ANXIOUS: 1
INSOMNIA: 1
EYE REDNESS: 0
DIZZINESS: 0
SORE THROAT: 0
LOSS OF CONSCIOUSNESS: 0
CARDIOVASCULAR NEGATIVE: 1
PALPITATIONS: 0

## 2023-11-30 ASSESSMENT — FIBROSIS 4 INDEX: FIB4 SCORE: 6.74

## 2023-11-30 ASSESSMENT — PAIN DESCRIPTION - PAIN TYPE: TYPE: ACUTE PAIN

## 2023-11-30 NOTE — ED NOTES
Two staff assist to bedside commode. Positive bowel movement. Assisted back into bed. Placed back on monitor. Call light within reach.

## 2023-11-30 NOTE — CONSULTS
"PSYCHIATRIC INTAKE EVALUATION(new)  Reason for admission: ETOH withdrawal and A-fib with RVR  Reason for consult: Major Depressive Disorder  Requesting Provider: Dr. Borerro  Legal Hold Status: On hold for inability to care for self 2/2 depression  Chart reviewed.         HPI:    Jeanie Hutchison is a 78-year-old female with a past medical history of major depressive disorder and alcohol abuse who is being admitted for alcohol withdrawal and A-fib with RVR.  Psychiatry has been asked to consult given her extensive history of major depressive disorder contributing to her substance abuse and inability to care for self.    The patient reports that she has attempted 4 other alcohol withdrawals inpatient.  This is her fifth.  She states that she drinks about a pint of alcohol a day and that this started around 10 years ago when her   and worsened after her 2 daughters .  She reports a long history of depression and recently reports worsening of symptoms of depression.  She states that she has not been able to eat anything besides drinking alcohol, little interest in doing anything other than laying in bed, difficulty concentrating, and insomnia.  She reports that she has been able to go to the grocery store and do her grocery shopping but otherwise has not been taking care of things around her house, like cleaning the house and basic grooming, and relies on her son Vince for support. This morning she reports visual hallucinations.  She states that she is a cartoon like drawings on the wall.  Denies any auditory hallucinations. She state she has been taking Effexor for depression through she could not recall dose. She could states she has been inconsistently taking her other medications.    She denies any active suicidal ideation or HI but does report passive thoughts that she would be better off dead or \"not here\".    The patient is somnolent during our interview.  Frequently falling " "asleep.      Psychiatric ROS:  MOOD:  Pertinent positives - sad. No active SI. + passive suicidal thoughts  Pertinent negatives - no euphoric mood    ANXIETY:   Pertinent negatives - no excessive worry and no panic     SLEEP:   Pertinent positives - difficulty falling asleep, hypersomnia, excessive daytime sleepiness and restless sleep  PSYCHOMOTOR/ENERGY:   Pertinent positives - Decreased energy     Pertinent negatives - no psychomotor agitation     EATING:   Pertinent positives: Not eating. Only drinking ETOH  Pertinent negatives: no extreme fear of weight gain     COGNITIVE:   Pertinent positives: concentration impaired, Waxing and waning cognition. Somnolent     BEHAVIOR:   Pertinent negatives - no emotional outbursts and no increased goal-directed activity     PSYCHOSIS:   Pertinent positives - Visual hallucinations this morning. None ongoing.  Pertinent negatives - auditory hallucinations NO delusions  SOCIAL:   Pertinent positives - loves grand kids.   Pertinent negatives - No detachment      Medical ROS (as pertinent):     Review of Systems   Constitutional:  Positive for malaise/fatigue. Negative for weight loss.   Cardiovascular:  Positive for palpitations.   Neurological:  Positive for sensory change. Negative for dizziness, seizures, loss of consciousness and headaches.        Visual hallucinations this AM   Psychiatric/Behavioral:  Positive for depression, hallucinations and substance abuse. The patient has insomnia. The patient is not nervous/anxious.         No SI. Passive thoughts of \"wishing I was dead\"           *Psychiatric Examination:  /72   Pulse (!) 126   Temp 36.5 °C (97.7 °F) (Temporal)   Resp 17   Ht 1.676 m (5' 5.98\")   Wt 73 kg (161 lb)   SpO2 96%      General Appearance: Somnolent. Appears stated age  Abnormal Movements: None  Gait and Posture: Slouching in bed, Gait not assessed  Speech: Slow, shaky voice at times, soft volume  Thought processes:Mostly linear, occasionally " tangential  thoughts, not perseverative  Associations: No loose associations  Abnormal or Psychotic Thoughts:  + visual hallucinations this AM, No AH. no paranoia or delusions elicited  Judgement and Insight: moderate/moderate  Orientation: Oriented to person place and time  Recent and Remote Memory: Intact, difficult   Attention Span and Concentration: Poor, waxing and waning attention span  Language: English  Fund of Knowledge:Not tested  Mood and Affect: Sad. Flat.  SI/HI: NO SI/HI    Past Medical History:   Past Medical History:   Diagnosis Date    Alcoholism (HCC)     Anticoagulated     Apnea, sleep     Chronic pain     r/t pelvic fracture    Hypothyroidism     Insomnia     Trouble going to Sleep     Paroxysmal atrial fibrillation (HCC)     Psychiatric disorder     history of depression and anxiety     Snoring     Ventricular tachycardia (HCC)         Past Psychiatric History:  Previous Diagnosis:Major depressive disorder, Alcohol abuse, insomnia, hypothyroid  Current meds: Venlafaxine, trazodone, synthroid  Previous med trials:paroxetine, fluoxitine, duloxetine, sertraline, aripiprazole   Hospitalizations: 5th hospitalization  Suicide attempts/SIB: None  Outpatient services: Has seen psych in past. Possibly has done therapy but unclear as patient contradicted self twice during questioning.  Access to guns: Denies  Abuse/trauma hx: Late  was alcoholic and had extra-marital affairs. No physical or sexual trauma elicited.   Legal hx: NA    Family Hx:   Late  alcoholic and  of melanoma.  Two daughters  of medical causes. Unable to elicit specific cause.    Social Hx:   Reports living with son Vince and grand kids. Chart review suggests otherwise. Will discuss with Son.    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 6 months ago had strained and distant relationship with Son.    Substances:  Drugs: Denies.  Alcohol:  1 pint a day  Nicotine:   Denies    Current  Medications:  Current Facility-Administered Medications   Medication Dose Route Frequency Provider Last Rate Last Admin    senna-docusate (Pericolace Or Senokot S) 8.6-50 MG per tablet 2 Tablet  2 Tablet Oral BID Antoni Shankar M.D.   2 Tablet at 11/30/23 0559    And    polyethylene glycol/lytes (Miralax) Packet 1 Packet  1 Packet Oral QDAY PRN Antoni Shankar M.D.        And    magnesium hydroxide (Milk Of Magnesia) suspension 30 mL  30 mL Oral QDAY PRN Antoni Shankar M.D.        And    bisacodyl (Dulcolax) suppository 10 mg  10 mg Rectal QDAY PRN Antoni Shankar M.D.        chlordiazePOXIDE (Librium) capsule 50 mg  50 mg Oral Q6HRS Antoni Shankar M.D.   50 mg at 11/30/23 0625    levothyroxine (Synthroid) tablet 50 mcg  50 mcg Oral AM ES Antoni Shankar M.D.   50 mcg at 11/30/23 0558    venlafaxine XR (Effexor XR) capsule 225 mg  225 mg Oral DAILY Antoni Shankar M.D.   225 mg at 11/30/23 0559    LORazepam (Ativan) tablet 0.5 mg  0.5 mg Oral Q4HRS PRN Geraldene LUCIEN Dorsey, A.P.R.N.        LORazepam (Ativan) tablet 1 mg  1 mg Oral Q4HRS PRN Geraldene LUCIEN SolanolecaLaineuno, A.P.R.N.        Or    LORazepam (Ativan) injection 0.5 mg  0.5 mg Intravenous Q4HRS PRN Geraldene R RusslecaLaineuno, A.P.R.N.        LORazepam (Ativan) tablet 2 mg  2 mg Oral Q2HRS PRN Geraldene R Russleca-Llaguno, A.P.R.N.        Or    LORazepam (Ativan) injection 1 mg  1 mg Intravenous Q2HRS PRN Geraldene R Russleca-Hairuno, A.P.R.N.        LORazepam (Ativan) tablet 3 mg  3 mg Oral Q HOUR PRN Geraldene R Ralleca-Llaguno, A.P.R.N.        Or    LORazepam (Ativan) injection 1.5 mg  1.5 mg Intravenous Q HOUR PRN Geraldene R Ralleca-Llaguno, A.P.R.N.   1.5 mg at 11/30/23 0913    LORazepam (Ativan) tablet 4 mg  4 mg Oral Q15 MIN PRN Geraldene R Ralleca-Llaguno, A.P.R.N.        Or    LORazepam (Ativan) injection 2 mg  2 mg Intravenous Q15 MIN PRN Geraldene R Russleca-Llaguno, A.P.R.N.        labetalol (Normodyne/Trandate) injection 10  mg  10 mg Intravenous Q HOUR PRN Geraldene R Russleca-Llaguno, A.P.R.N.        Or    labetalol (Normodyne) tablet 200 mg  200 mg Oral Q6HRS PRN Geraldene R Ralleca-Llaguno, A.P.R.N.        lactated ringers infusion   Intravenous Continuous Geraldene R Russleca-Llaguno, A.P.R.N. 100 mL/hr at 11/30/23 0846 New Bag at 11/30/23 0846    thiamine (Vitamin B-1) tablet 100 mg  100 mg Oral DAILY Mildred Nova D.O.        And    folic acid (Folvite) tablet 1 mg  1 mg Oral DAILY Mildred Nova D.O.        And    multivitamin tablet 1 Tablet  1 Tablet Oral DAILY CORINNA Stovall.O.        apixaban (Eliquis) tablet 5 mg  5 mg Oral BID Reilly Loving M.D.   5 mg at 11/30/23 0558    digoxin (Lanoxin) tablet 125 mcg  125 mcg Oral DAILY Reilly Loving M.D.   125 mcg at 11/30/23 0849    metoprolol SR (Toprol XL) tablet 100 mg  100 mg Oral Q DAY Reilly Loving M.D.   100 mg at 11/30/23 0558     Current Outpatient Medications   Medication Sig Dispense Refill    traZODone (DESYREL) 100 MG Tab Take 100 mg by mouth every evening.      venlafaxine (EFFEXOR XR) 150 MG extended-release capsule Take 150 mg by mouth every day.      Metoprolol Succinate 100 MG Capsule ER 24 Hour Sprinkle Take 100 mg by mouth every day.      escitalopram (LEXAPRO) 10 MG Tab Take 10 mg by mouth every day.      folic acid (FOLVITE) 1 MG Tab Take 1 mg by mouth every day.      digoxin (LANOXIN) 125 MCG Tab Take 1 Tablet by mouth every day. 30 Tablet 1    apixaban (ELIQUIS) 5mg Tab Take 1 Tablet by mouth 2 times a day. 60 Tablet 1    levothyroxine (SYNTHROID) 50 MCG Tab Take 1 Tablet by mouth every morning on an empty stomach. 30 Tablet 1        Allergies:  Sulfa drugs       Labs personally reviewed:   Recent Results (from the past 72 hour(s))   POC BREATHALIZER    Collection Time: 11/29/23  3:23 PM   Result Value Ref Range    POC Breathalizer 0.24 (A) 0.00 - 0.01 Percent   CBC WITH DIFFERENTIAL    Collection Time: 11/29/23  4:00 PM    Result Value Ref Range    WBC 4.9 4.8 - 10.8 K/uL    RBC 4.58 4.20 - 5.40 M/uL    Hemoglobin 15.3 12.0 - 16.0 g/dL    Hematocrit 44.9 37.0 - 47.0 %    MCV 98.0 (H) 81.4 - 97.8 fL    MCH 33.4 (H) 27.0 - 33.0 pg    MCHC 34.1 32.2 - 35.5 g/dL    RDW 57.1 (H) 35.9 - 50.0 fL    Platelet Count 97 (L) 164 - 446 K/uL    MPV 9.2 9.0 - 12.9 fL    Neutrophils-Polys 43.20 (L) 44.00 - 72.00 %    Lymphocytes 45.50 (H) 22.00 - 41.00 %    Monocytes 8.90 0.00 - 13.40 %    Eosinophils 1.00 0.00 - 6.90 %    Basophils 1.20 0.00 - 1.80 %    Immature Granulocytes 0.20 0.00 - 0.90 %    Nucleated RBC 0.00 0.00 - 0.20 /100 WBC    Neutrophils (Absolute) 2.13 1.82 - 7.42 K/uL    Lymphs (Absolute) 2.25 1.00 - 4.80 K/uL    Monos (Absolute) 0.44 0.00 - 0.85 K/uL    Eos (Absolute) 0.05 0.00 - 0.51 K/uL    Baso (Absolute) 0.06 0.00 - 0.12 K/uL    Immature Granulocytes (abs) 0.01 0.00 - 0.11 K/uL    NRBC (Absolute) 0.00 K/uL   BASIC METABOLIC PANEL    Collection Time: 11/29/23  4:00 PM   Result Value Ref Range    Sodium 143 135 - 145 mmol/L    Potassium 4.6 3.6 - 5.5 mmol/L    Chloride 101 96 - 112 mmol/L    Co2 24 20 - 33 mmol/L    Glucose 72 65 - 99 mg/dL    Bun 11 8 - 22 mg/dL    Creatinine 0.45 (L) 0.50 - 1.40 mg/dL    Calcium 9.1 8.5 - 10.5 mg/dL    Anion Gap 18.0 (H) 7.0 - 16.0   IMMATURE PLT FRACTION    Collection Time: 11/29/23  4:00 PM   Result Value Ref Range    Imm. Plt Fraction 2.1 0.6 - 13.1 %   ESTIMATED GFR    Collection Time: 11/29/23  4:00 PM   Result Value Ref Range    GFR (CKD-EPI) 99 >60 mL/min/1.73 m 2   Magnesium    Collection Time: 11/29/23  4:00 PM   Result Value Ref Range    Magnesium 1.8 1.5 - 2.5 mg/dL   Phosphorus    Collection Time: 11/29/23  4:00 PM   Result Value Ref Range    Phosphorus 3.7 2.5 - 4.5 mg/dL   URINE DRUG SCREEN    Collection Time: 11/29/23  5:55 PM   Result Value Ref Range    Amphetamines Urine Negative Negative    Barbiturates Negative Negative    Benzodiazepines Negative Negative    Cocaine  Metabolite Negative Negative    Fentanyl, Urine Negative Negative    Methadone Negative Negative    Opiates Negative Negative    Oxycodone Negative Negative    Phencyclidine -Pcp Negative Negative    Propoxyphene Negative Negative    Cannabinoid Metab Negative Negative   POC BREATHALIZER    Collection Time: 23  5:58 PM   Result Value Ref Range    POC Breathalizer 0.18 (A) 0.00 - 0.01 Percent   POC BREATHALIZER    Collection Time: 23  9:14 PM   Result Value Ref Range    POC Breathalizer 0.176 (A) 0.00 - 0.01 Percent   EKG    Collection Time: 23 11:14 PM   Result Value Ref Range    Report       [facility] Emergency Dept[facility]    Test Date:  2023  Pt Name:    MARISELA DURHAM              Department: ER  MRN:        [id number]                      Room:       [facility]  Gender:     Female                       Technician: [id number]  :        [id number]                   Requested By:ISRAEL VANG  Order #:    [id number]                    Reading MD:    Measurements  Intervals                                Axis  Rate:       151                          P:          0  ME:         0                            QRS:        -68  QRSD:       86                           T:          28  QT:         300  QTc:        476    Interpretive Statements  Atrial fibrillation with rapid V-rate  Left anterior fascicular block  Abnormal R-wave progression, late transition  Compared to ECG 2023 18:02:44  Left anterior fascicular block now present  Intraventricular conduction delay no longer present     POC BREATHALIZER    Collection Time: 23 12:19 AM   Result Value Ref Range    POC Breathalizer 0.089 (A) 0.00 - 0.01 Percent   Comp Metabolic Panel (CMP)    Collection Time: 23  5:04 AM   Result Value Ref Range    Sodium 141 135 - 145 mmol/L    Potassium 4.2 3.6 - 5.5 mmol/L    Chloride 103 96 - 112 mmol/L    Co2 24 20 - 33 mmol/L    Anion Gap 14.0 7.0 - 16.0    Glucose 91 65 - 99 mg/dL     Bun 10 8 - 22 mg/dL    Creatinine 0.40 (L) 0.50 - 1.40 mg/dL    Calcium 8.3 (L) 8.5 - 10.5 mg/dL    Correct Calcium 8.6 8.5 - 10.5 mg/dL    AST(SGOT) 31 12 - 45 U/L    ALT(SGPT) 13 2 - 50 U/L    Alkaline Phosphatase 84 30 - 99 U/L    Total Bilirubin 1.9 (H) 0.1 - 1.5 mg/dL    Albumin 3.6 3.2 - 4.9 g/dL    Total Protein 6.4 6.0 - 8.2 g/dL    Globulin 2.8 1.9 - 3.5 g/dL    A-G Ratio 1.3 g/dL   ESTIMATED GFR    Collection Time: 11/30/23  5:04 AM   Result Value Ref Range    GFR (CKD-EPI) 102 >60 mL/min/1.73 m 2   DIGOXIN    Collection Time: 11/30/23  5:09 AM   Result Value Ref Range    Digoxin 0.4 (L) 0.8 - 2.0 ng/mL           EKG: A-fib with RVR, Rate 151. QTC of 476  Brain Imaging: NA  EEG:  NA         Assessment:    Jeanie Hutchison is a 78-year-old female with a past history of alcohol abuse and major depressive disorder admitted for alcohol withdrawal and A-fib with RVR.     The patient has a history of multiple hospitalizations for alcohol withdrawal.  This picture is complicated by the patient's inability to care for self at home due to her major depressive disorder.  Patient does still undergoing significant grief since the death of her daughters (unclear timeline) and the death of her  10 years ago.  Given her inability to care for self at home due to her depressive disorder will place legal hold for now.      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  Alcohol use disorder, severe-->currently in withdrawal    Medical:  A-fib RVR      Plan:    Legal hold: initiated on 11/30/23 due to Inability to care for self 2/2 to major depressive disorder  Psychotropic medications  Continue home medications ( venlafaxine 225 mg) Will defer adjusting psychiatric medications until patient is no longer in acute withdrawal    Please transfer pt to inpatient psychiatric hospital when medically cleared and bed is available    Labs: advise repeat TSH  EKG: see above, reviewed  Old records  ordered/reviewed/summarized  Collateral obtained: Son Juve called with patient permission, no answer.  Will call again.   Discussed the case with: Judy Goodman     Psychiatry will continue to follow      Thank you for the consult.     Sitter: 1:1 given psychiatric hold  Phone/ipad: Allowed with sitter supervision  Visitors: Allowed, son only  Personal belongings:phone and ipad only

## 2023-11-30 NOTE — ED NOTES
Pt medicated per CIWA score. Pt is A&Ox4, reporting auditory and visual hallucinations. Tremors noted to hands. Pt able to hold conversation appropriately. On monitor, call light within reach, on 1.5 L Nc while resting in bed.

## 2023-11-30 NOTE — H&P
[facility] Internal Medicine History & Physical Note    Date of Service  11/30/2023    Attending: Collin Borrero M.d.  Resident: Dr. Shankar    Primary Care Physician  GRACIELA Benitez.    Consultants  None at this time.     Code Status  Full Code    Chief Complaint  Chief Complaint   Patient presents with    Alcohol Intoxication     Pt states she has been drinking about a pint of alcohol a day in attempt to numb her grief after losing both of her daughters and     Depression       History of Presenting Illness (HPI):   Jeanie Hutchison is a 78 y.o. female with PMH of Atrial fibrillation, Pacemaker in situ, Hypothyroidism, Depression, EtOH use disorder.     Presents to ED for the purpose of EtOH detoxification. Patient notes she has been drinking 1 pint per day of Vodka for past 2 years and in past week increased volume. She was suggested to pursue detoxification by his son which is why she is here in the hospital. Notes she is currently having visual hallucinations (cartoon-like images), feels anxious, mild hand tremors, and no other specific symptoms. Denies suicidal or homicidal ideations. No prior h.o withdrawal seizures or need for ICU admission d/t EtOH withdrawal.     ED work-up, evaluation:  - Resting comfortably in bed, no acute distress. She is very pleasant.   - No electrolyte imbalances.   - EKG shows Afib w/RVR. Has received Metoprolol pushes, 5 mg IV, x3.   - Received Lorazepam 2 mg IV, Librium 25 mg PO.   - Received rally bag IV.     Will be admitted to Telemetry d/t Afib RVR and EtOH withdrawal.     I discussed the plan of care with patient and Dr. Borrero .    Review of Systems  Review of Systems   Constitutional:  Negative for chills, fever, malaise/fatigue and weight loss.   HENT:  Negative for congestion and sore throat.    Eyes:  Negative for blurred vision and redness.   Respiratory:  Negative for cough, sputum production and shortness of breath.    Cardiovascular:  Negative  for chest pain and palpitations.   Gastrointestinal:  Negative for abdominal pain, constipation, diarrhea, heartburn, nausea and vomiting.   Genitourinary:  Negative for dysuria.   Musculoskeletal:  Negative for back pain, joint pain and myalgias.   Skin:  Negative for rash.   Neurological:  Positive for tremors (Mild, both hands). Negative for dizziness, loss of consciousness, weakness and headaches.   Endo/Heme/Allergies:  Negative for environmental allergies.   Psychiatric/Behavioral:  Positive for depression, hallucinations (Visual; cartoon-like figures) and substance abuse (EtOH, minimun 1 pint of vodka daily). Negative for suicidal ideas. The patient is nervous/anxious. The patient does not have insomnia.        Past Medical History   has a past medical history of Alcoholism (HCC), Anticoagulated, Apnea, sleep, Chronic pain, Hypothyroidism, Insomnia, Paroxysmal atrial fibrillation (HCC), Psychiatric disorder, Snoring, and Ventricular tachycardia (HCC).    Surgical History   has a past surgical history that includes breast biopsy; other orthopedic surgery; orif, fracture, clavicle (5/21/2014); and tonsillectomy.     Family History  family history includes Anxiety disorder in her mother; Depression in her mother; Diabetes in her mother; Hyperlipidemia in her father.   Family history reviewed with patient.     Social History  Tobacco: Denies  Alcohol: Minimum 1 pint of vodka per day for past 2 years  Recreational drugs (illegal or prescription): Denies  Employment: None  Living Situation: Home, w/grandson   Recent Travel: Denies  Primary Care Provider:   Other (stressors, spirituality, exposures):     Allergies  Allergies   Allergen Reactions    Sulfa Drugs Rash             Medications  Prior to Admission Medications   Prescriptions Last Dose Informant Patient Reported? Taking?   apixaban (ELIQUIS) 5mg Tab 11/28/2023 at [facility] Patient No No   Sig: Take 1 Tablet by mouth 2 times a day.   digoxin (LANOXIN) 125 MCG Tab  UNK at UNK Patient No No   Sig: Take 1 Tablet by mouth every day.   levothyroxine (SYNTHROID) 50 MCG Tab UNK at UNK Patient No No   Sig: Take 1 Tablet by mouth every morning on an empty stomach.   magnesium oxide 400 (240 Mg) MG Tab UNK at UNK Patient No No   Sig: Take 1 Tablet by mouth every day.   traZODone (DESYREL) 50 MG Tab 11/28/2023 at PM Patient No No   Sig: Take 1 Tablet by mouth at bedtime as needed for Sleep.   venlafaxine XR (EFFEXOR XR) 75 MG CAPSULE SR 24 HR UNK at UNK Patient No No   Sig: Take 3 Capsules by mouth every day.      Facility-Administered Medications: None       Physical Exam  Temp:  [36.5 °C (97.7 °F)] 36.5 °C (97.7 °F)  Pulse:  [] 130  Resp:  [13-24] 24  BP: (125-158)/(74-95) 153/92  SpO2:  [91 %-98 %] 92 %  Blood Pressure : (!) 153/92   Temperature: 36.5 °C (97.7 °F)   Pulse: (!) 130   Respiration: (!) 24   Pulse Oximetry: 92 %       Physical Exam  Constitutional:       General: She is not in acute distress.     Appearance: Normal appearance. She is not ill-appearing.   HENT:      Head: Normocephalic.      Nose: Nose normal. No congestion.      Mouth/Throat:      Mouth: Mucous membranes are moist.      Pharynx: Oropharynx is clear.   Eyes:      General: No scleral icterus.     Pupils: Pupils are equal, round, and reactive to light.   Neck:      Vascular: No carotid bruit.   Cardiovascular:      Rate and Rhythm: Tachycardia present. Rhythm irregular.      Pulses: Normal pulses.      Heart sounds: Normal heart sounds.   Pulmonary:      Effort: Pulmonary effort is normal. No respiratory distress.      Breath sounds: Normal breath sounds.   Chest:      Chest wall: No tenderness.   Abdominal:      General: There is no distension.      Palpations: Abdomen is soft.      Tenderness: There is no abdominal tenderness.   Musculoskeletal:         General: No swelling or tenderness.      Cervical back: No rigidity or tenderness.      Right lower leg: No edema.      Left lower leg: No edema.  "  Skin:     General: Skin is warm.      Capillary Refill: Capillary refill takes less than 2 seconds.      Coloration: Skin is not jaundiced.   Neurological:      Comments: Alert and oriented to person, time, and place  Follows commands  Speech is fluent  Pupils reactive to light and accomodation  Occular movements preserved  Facial symmetry preserved  Tongue is midline  Able to tolerate antigravity bilaterally, upper and lower extremities  No pronator drift  Sensation preserved throughout   DTR preserved, 2+ throughout   Psychiatric:      Comments: Anxious, depressed         Laboratory:  Recent Labs     11/29/23  1600   WBC 4.9   RBC 4.58   HEMOGLOBIN 15.3   HEMATOCRIT 44.9   MCV 98.0*   MCH 33.4*   MCHC 34.1   RDW 57.1*   PLATELETCT 97*   MPV 9.2     Recent Labs     11/29/23  1600   SODIUM 143   POTASSIUM 4.6   CHLORIDE 101   CO2 24   GLUCOSE 72   BUN 11   CREATININE 0.45*   CALCIUM 9.1     Recent Labs     11/29/23  1600   GLUCOSE 72         No results for input(s): \"NTPROBNP\" in the last 72 hours.      No results for input(s): \"TROPONINT\" in the last 72 hours.    Imaging:  DX-CHEST-LIMITED (1 VIEW)   Final Result         1.  Right basilar atelectasis, no focal infiltrate      EC-ECHOCARDIOGRAM COMPLETE W/O CONT    (Results Pending)       X-Ray:  I have personally reviewed the images and compared with prior images.  EKG:  I have personally reviewed the images and compared with prior images.    Assessment/Plan:  Problem Representation:   78 y.o. female with PMH of Atrial fibrillation, Pacemaker in situ, Hypothyroidism, Depression, EtOH use disorder.    I anticipate this patient will require at least two midnights for appropriate medical management, necessitating inpatient admission because Atrial fibrillation, EtOH withdrawal.    Patient will need a Telemetry bed on MEDICAL service .  The need is secondary to Atrial fibrillation, EtOH withdrawal.    * Atrial fibrillation (HCC)- (present on admission)  Assessment & " Plan  Known diagnosis.  Outpatient regimen: Digoxin 125 mcg daily. Unable to determine if compliant.   Last echocardiogram (01/2020): no structural or valvular abnormalities. No LVEF calculated as she was on Afib. Prior echo in 2014 LVEF 65-70% and no abnormalities.   TSH (05/2023): 4.120  Per prior cardiology note on file (11/2021) she was recommended to be on Metoprolol XL and Digoxin.   EKG in ED: Afib + RVR. Received 1 time dose of Metoprolol 5 mg IV push.   On evaluation -150's. Patient does not have active CV symptoms.   Metoprolol  mg indicated to be started in AM.     Plan:  - Continue home dose digoxin.   - Continue with addition of Metoprolol  mg daily.   - Metoprolol 5 mg IV push STAT and PRN.   - Echocardiogram in AM, order placed.   - Digoxin level.   - Consider Digoxin loading/IV if levels non therapeutic and not improving w/Metoprolol pushes and initiation of PO Metoprolol.     Alcohol withdrawal (HCC)- (present on admission)  Assessment & Plan  Patient has h/o drinking 1 pint per day for past 2 years. In past week she has increased this volume. At this time presenting to the ED for detox per her son's request. Last drink night prior to presenting to ED.   Has tremors, is anxious, is having visual hallucinations.   Likely adding to Afib + RVR.   Received rally bag IV, Lorazepam 2 mg IV, Librium 25 mg PO in ED.     Plan:  - Librium 50 mg PO q  6hrs.   - [facility] protocol w/Lorazepam.   - Thiamine 100 mg PO daily.   - Folic acid 1 mg, Multivitamin PO daily.     Hypothyroidism  Assessment & Plan  Known history. Last TSH (05/2023): 4.120  Outpatient: Levothyroxine 50 mcg daily.     Plan:  - Continue home dose of Levothyroxine.     Major depressive disorder with current active episode- (present on admission)  Assessment & Plan  Known diagnosis in setting of unfortunate loss of her 2 daughters and  in the past 3 years. Patient denies active suicidal or homicidal ideations. No auditory  hallucinations. Notes visual hallucinations that most likely related to EtOH withdrawal.   Outpatient treatment: Venlafaxine 225 mg daily.   Has not been able to establish with Psychiatry outpatient.     Plan:  - Consult psychiatry in AM.     AICD (automatic cardioverter/defibrillator) present- (present on admission)  Assessment & Plan  Placed July 2020 d/t AICD implant for polymorphic VT, long QT, and atrial fibrillation.   On digoxin outpatient.         VTE prophylaxis: SCDs/TEDs and therapeutic anticoagulation with Apixaban

## 2023-11-30 NOTE — ED NOTES
Pt medicated per MAR. TV turned on per pt request. Pt is A&Ox4, tremors noted, pt c/o auditory and visual hallucinations at this time.

## 2023-11-30 NOTE — ED NOTES
Patient remains comfortable on gurney, no identifiable needs at this time. Equal chest rise and fall bilaterally, pt connected to cardiac monitor. Pending librium administration. Safety measures in place, call light within reach.

## 2023-11-30 NOTE — ASSESSMENT & PLAN NOTE
Patient has h/o drinking 1 pint per day for past 2 years. In past week she has increased this volume. At this time presenting to the ED for detox per her son's request. Last drink night prior to presenting to ED.   Has tremors, is anxious, is having visual hallucinations.   Likely adding to Afib + RVR.   Received rally bag IV, Lorazepam 2 mg IV, Librium 25 mg PO in ED.     Plan:  - Librium 50 mg PO q  6hrs.   - [facility] protocol w/Lorazepam.   - Thiamine 100 mg PO daily.   - Folic acid 1 mg, Multivitamin PO daily.

## 2023-11-30 NOTE — PROGRESS NOTES
Legal hold established. Paperwork filled out by Psych team and placed in patient chart. SI precautions taken: 1:1 sitter, room safety check done and meals changed to accommodate legal concerns. RN will continue to monitor and provide comfort to pt. CIWA scoring continued.   MD stated ok to leave pt in hospital gown, use phone/ipad, and have regular items in room.

## 2023-11-30 NOTE — ED NOTES
Pt medicated per MAR. Patient remains comfortable on gurney, no identifiable needs at this time. Equal chest rise and fall bilaterally, pt connected to cardiac monitor. Pending observation status. Safety measures in place, call light within reach.

## 2023-11-30 NOTE — ED NOTES
Pt used call light to use restroom. Pt assisted to bathroom with 1 person assist, pt unsteady on her feet but able to walk to bathroom. Pt still experiencing visual hallucinations but able to answer orientation questions appropriately.   Blood drawn from IV and sent to lab. Pt back on cardiac monitoring, on 2 L NC. Even and unlabored respirations with equal chest rise and fall.

## 2023-11-30 NOTE — ED NOTES
Pt medicated per MAR. Pt able to swallow fluids without difficulty.   Admitting MD notified of pt heart rate.

## 2023-11-30 NOTE — ASSESSMENT & PLAN NOTE
"Patient has h/o drinking 1 pint per day for past 2 years. In past week she has increased   She developed delirium tremens and was transferred to the [facility] for initiation and titration of an IV Precedex drip which was turned off 12/2.  Status post IV rally bag   Decrease the scheduled Librium from 50 mg PO q  6hrs to 25 mg TID in order to mitigate seizures   Thiamine 100 mg PO daily, folic acid 1 mg, Multivitamin PO daily.   Initiate CIWA protocol.\"    CIWA protocol, detox bag with vitamins  On a legal hold, Psychiatry following  History of Afib s/p AICD on anticoag. Rate stable. Continue ELiquis and digoxin. August echo EF 55%  Stable HR but can be elevated if withdrawing. Contine BB    No further signs of alcohol withdrawal.    "

## 2023-11-30 NOTE — ED NOTES
Reached out to admitting team to speak about concern for patient going to tele floor, CIWA is 18 and HR is 146,     APRN states she will include HR and BP control

## 2023-11-30 NOTE — ASSESSMENT & PLAN NOTE
-Implanted approx 3 years ago with cardiology  -Placed July 2020 d/t AICD implant for polymorphic VT, long QT, and atrial fibrillation.   On digoxin outpatient.

## 2023-11-30 NOTE — ED NOTES
~Med rec updated and complete per patient home pharmacy     ~Patient home pharmacy reports patient has not filled Eliquis since April 2023

## 2023-11-30 NOTE — ED NOTES
I had assumed care of pt from [facility]. Noted pt needs higher level of monitoring than [facility] unit can provide.   Pt  actively hallucinating, continues to be in a fib RVR with rate between 130-150.   Charge nurse notified, pt moved to red 5.   Report to Saysravanthi HOOVER.   On 2L O2 via NC.

## 2023-11-30 NOTE — ED PROVIDER NOTES
"ED Provider Note    VITAL SIGNS: BP (!) 145/102   Pulse (!) 135   Temp 36.5 °C (97.7 °F) (Temporal)   Resp 20   Ht 1.676 m (5' 5.98\")   Wt 73 kg (161 lb)   SpO2 95%   BMI 26.00 kg/m²     Patient was signed to be by my colleague Dr. Ortiz.  Nursing staff called to the bedside for tachycardia.  Patient is in atrial fibrillation with RVR.  Patient reports she did not take any of her medicines today.  Patient will be given her home medicine.  Patient without any ischemic disease in EKG. patient is conscious at this time but still mildly intoxicated.  Disposition pending alert team evaluation.  If patient's heart rate does not normalize she will likely require admission.    Electronically signed by: Reilly Loving M.D., 11/29/2023 11:21 PM    Patient remains tachycardic, started becoming tremulous, anxious, visual hallucinations.  Patient reports that these are symptoms that she typically gets when she is withdrawing from alcohol.  Patient given a dose of benzodiazepines and admitted to the hospital medicine service.    This dictation has been created using voice recognition software. I am continuously working with the software to minimize the number of voice recognition errors and I have made every attempt to manually correct the errors within my dictation. However errors  related to this voice recognition software may still exist and should be interpreted within the appropriate context.     Electronically signed by: Reilly Loving M.D., 11/30/2023 6:15 AM    CRITICAL CARE  The very real possibilty of a deterioration of this patient's condition required the highest level of my preparedness for sudden, emergent intervention.  I provided critical care services, which included medication orders, frequent reevaluations of the patient's condition and response to treatment, ordering and reviewing test results, and discussing the case with various consultants.  The critical care time associated with the " care of the patient was 35 minutes. Review chart for interventions. This time is exclusive of any other billable procedures.         FINAL DIAGNOSIS  1. Alcoholic intoxication without complication (HCC)    2. Depression, unspecified depression type    3. Chronic atrial fibrillation with RVR (HCC)           Electronically signed by: Reilly Loving M.D., 11/29/2023 11:19 PM

## 2023-11-30 NOTE — ED NOTES
Changed linen and brief for patient, patient had a bowel movement and significant amount of urine. Pt given new linen and more warm blankets

## 2023-11-30 NOTE — PROGRESS NOTES
Hospital Medicine Daily Progress Note    Date of Service  11/30/2023    Chief Complaint  Jeanie Hutchison is a 78 y.o. female admitted 11/29/2023 with alcohol intoxication and Afib with RVR    Hospital Course  Ms. Jeanie Hutchison is a 78 y.o. female with PMHx of Atrial fibrillation, AICD, Hypothyroidism, Depression, ETOH use disorder who was admitted on 11/30 for alcohol intoxication and Afib with RVR.     Patient notes she has been drinking 1 pint per day of Vodka for past 2 years and in past week increased volume. She was suggested to pursue detoxification by his son which is why she is here in the hospital. Notes she is currently having visual hallucinations (cartoon-like images), feels anxious, mild hand tremors, and no other specific symptoms. Denies suicidal or homicidal ideations. No prior h.o withdrawal seizures or need for ICU admission d/t ETOH withdrawal.      In ER, patient was resting comfortably in bed, no acute distress. She is very pleasant. No electrolyte imbalances. EKG shows Afib w/RVR. Has received Metoprolol pushes, 5 mg IV, x3. Resumed all her home medications. Patient also received Lorazepam 2 mg IV, Librium 25 mg PO and  rally bag IV. Patient placed on CIWA protocol for alcohol withdrawal management. Patient admitted to hospital medicine for management of care.             Interval Problem Update  -Patient seen and examined.  Noted mild tremors, anxiety, and patient still having hallucinations.  CIWA score currently 18 on patient.  Patient lucid and alert and oriented and enough to communicate and discuss plan of care.  Will continue to monitor and manage alcohol withdrawal symptoms along with A-fib with RVR.  -Plan of care: Restart all home medications; high concerns that patient has not been compliant with all A-fib medication at home; continue CIWA protocol;  restart all home medications  -Disposition: Patient currently inpatient status as she is anticipated to stay 2-3 midnights  for management of alcohol intoxication and A-fib with RVR  -Lab work: Reviewed; expected  -VSS at this time    I have discussed this patient's plan of care and discharge plan at IDT rounds today with Case Management, Nursing, Nursing leadership, and other members of the IDT team.    Consultants/Specialty  NONE    Code Status  Full Code    Disposition  The patient is not medically cleared for discharge to home or a post-acute facility.  Anticipate discharge to: home with close outpatient follow-up    I have placed the appropriate orders for post-discharge needs.    Review of Systems  Review of Systems   Constitutional:  Positive for chills, diaphoresis and malaise/fatigue. Negative for fever.   HENT: Negative.     Eyes: Negative.    Respiratory: Negative.     Cardiovascular: Negative.    Gastrointestinal:  Positive for abdominal pain, heartburn and nausea.   Genitourinary: Negative.    Musculoskeletal:  Positive for myalgias.   Skin: Negative.    Neurological:  Positive for tremors and headaches.   Endo/Heme/Allergies: Negative.    Psychiatric/Behavioral:  Positive for hallucinations and substance abuse. The patient is nervous/anxious.         Physical Exam  Temp:  [36.5 °C (97.7 °F)] 36.5 °C (97.7 °F)  Pulse:  [] 140  Resp:  [13-26] 19  BP: (123-165)/() 165/101  SpO2:  [91 %-98 %] 91 %    Physical Exam  Vitals and nursing note reviewed.   Constitutional:       Appearance: She is ill-appearing and diaphoretic.   HENT:      Head: Normocephalic.      Nose: Nose normal.      Mouth/Throat:      Mouth: Mucous membranes are moist.      Pharynx: Oropharynx is clear.   Eyes:      Pupils: Pupils are equal, round, and reactive to light.   Cardiovascular:      Rate and Rhythm: Normal rate and regular rhythm.   Pulmonary:      Effort: Pulmonary effort is normal.      Breath sounds: Normal breath sounds.   Abdominal:      General: Bowel sounds are normal.      Palpations: Abdomen is soft.   Musculoskeletal:          General: Tenderness present.      Cervical back: Normal range of motion and neck supple.   Skin:     General: Skin is warm.      Capillary Refill: Capillary refill takes 2 to 3 seconds.      Coloration: Skin is pale.   Neurological:      Mental Status: She is alert. Mental status is at baseline.      Motor: Weakness present.         Fluids  No intake or output data in the 24 hours ending 11/30/23 0856    Laboratory  Recent Labs     11/29/23  1600   WBC 4.9   RBC 4.58   HEMOGLOBIN 15.3   HEMATOCRIT 44.9   MCV 98.0*   MCH 33.4*   MCHC 34.1   RDW 57.1*   PLATELETCT 97*   MPV 9.2     Recent Labs     11/29/23  1600 11/30/23  0504   SODIUM 143 141   POTASSIUM 4.6 4.2   CHLORIDE 101 103   CO2 24 24   GLUCOSE 72 91   BUN 11 10   CREATININE 0.45* 0.40*   CALCIUM 9.1 8.3*                   Imaging  DX-CHEST-LIMITED (1 VIEW)   Final Result         1.  Right basilar atelectasis, no focal infiltrate      EC-ECHOCARDIOGRAM COMPLETE W/O CONT    (Results Pending)        Assessment/Plan  Hypothyroidism  Assessment & Plan  Known history. Last TSH (05/2023): 4.120  Outpatient: Levothyroxine 50 mcg daily.      Plan:  - Continue home dose of Levothyroxine.     Major depressive disorder with current active episode- (present on admission)  Assessment & Plan  Known diagnosis in setting of unfortunate loss of her 2 daughters and  in the past 3 years. Patient denies active suicidal or homicidal ideations. No auditory hallucinations. Notes visual hallucinations that most likely related to EtOH withdrawal.   Outpatient treatment: Venlafaxine 225 mg daily.   Has not been able to establish with Psychiatry outpatient.      Plan:  - Consult psychiatry in AM.     Atrial fibrillation with RVR (HCC)- (present on admission)  Assessment & Plan  Known diagnosis.  Outpatient regimen: Digoxin 125 mcg daily. Unable to determine if compliant.   Last echocardiogram (01/2020): no structural or valvular abnormalities. No LVEF calculated as she was on  Afib. Prior echo in 2014 LVEF 65-70% and no abnormalities.   TSH (05/2023): 4.120  Per prior cardiology note on file (11/2021) she was recommended to be on Metoprolol XL and Digoxin.   EKG in ED: Afib + RVR. Received 1 time dose of Metoprolol 5 mg IV push.   On evaluation -150's. Patient does not have active CV symptoms.   Metoprolol  mg indicated to be started in AM.   Recent ablation with Dr. Lara.      Plan:  - Continue home dose digoxin.   - Continue with addition of Metoprolol  mg daily.   - Metoprolol 5 mg IV push STAT and PRN.   - Echocardiogram in AM, order placed.   - Digoxin level.   - Consider Digoxin loading/IV if levels non therapeutic and not improving w/Metoprolol pushes and initiation of PO Metoprolol.   -Added PRN labetalol for increase HR at >140bpm  -Restart Eliquis      AICD (automatic cardioverter/defibrillator) present- (present on admission)  Assessment & Plan  -Implanted approx 3 years ago with cardiology  -Placed July 2020 d/t AICD implant for polymorphic VT, long QT, and atrial fibrillation.   On digoxin outpatient.        Alcohol withdrawal (HCC)- (present on admission)  Assessment & Plan  Patient has h/o drinking 1 pint per day for past 2 years. In past week she has increased this volume. At this time presenting to the ED for detox per her son's request. Last drink night prior to presenting to ED.   Has tremors, is anxious, is having visual hallucinations.   Likely adding to Afib + RVR.   Received rally bag IV, Lorazepam 2 mg IV, Librium 25 mg PO in ED.      Plan:  - Librium 50 mg PO q  6hrs.   - CIWA protocol w/Lorazepam.   - Thiamine 100 mg PO daily.   - Folic acid 1 mg, Multivitamin PO daily.   - Start IVF - lactated ringers         VTE prophylaxis:    therapeutic anticoagulation with eliquis 5 mg BID      I have performed a physical exam and reviewed and updated ROS and Plan today (11/30/2023). In review of yesterday's note (11/29/2023), there are no changes except as  documented above.      Please note that this dictation was created using voice recognition software. I have made every reasonable attempt to correct obvious errors, but there may be errors of grammar and possibly content that I did not discover before finalizing the note.    Electronically signed by:  Dr. ADELA Dorsey, DNP, APRN, FNP-C  Hospitalist Services  [facility]  [phone]  [email or web address]  11/30/23                 1022

## 2023-11-30 NOTE — ED NOTES
Pt resting in bed with eyes closed with even and unlabored respirations. On cardiac monitoring, 1.5 L NC, call light within reach.

## 2023-11-30 NOTE — ED NOTES
Attempted ambulation test, pt unable to ambulate on own or with x1 assist. Pending commode to bedside. Pt aware of own limitations, AxO4. No bed alarm indicated at this time.

## 2023-11-30 NOTE — ASSESSMENT & PLAN NOTE
Known diagnosis in setting of unfortunate loss of her 2 daughters and  in the past 3 years. Patient denies active suicidal or homicidal ideations. No auditory hallucinations. Notes visual hallucinations that most likely related to EtOH withdrawal.   Outpatient treatment: Venlafaxine 225 mg daily.   Has not been able to establish with Psychiatry outpatient.     Plan:  - Consult psychiatry in AM.

## 2023-11-30 NOTE — ED NOTES
Med Rec partial complete per patient   Allergies reviewed  Antibiotics in the past 30 days:no  Anticoagulant in past 14 days:yes  Anticoagulant:Eliquis 5 mg Last dose:11/28/23  Pharmacy patient utilizes:[address]    Pt states she takes vitamins but unable to verify names or strengths    Pt states she takes metoprolol but unable to verify strength    Pt was unable to recall when she last took most her medications    Pt not sure of all medication names she takes    [facility] currently closed for the day

## 2023-11-30 NOTE — ASSESSMENT & PLAN NOTE
Placed July 2020 d/t AICD implant for polymorphic VT, long QT, and atrial fibrillation.   On digoxin outpatient.

## 2023-11-30 NOTE — ASSESSMENT & PLAN NOTE
Chronic diagnosis.  She is on Digoxin 125 mcg daily, Toprol 100 mg daily, and Eliquis 5 mg BID outpatient all of which have been restarted  Last echocardiogram (01/2020): no structural or valvular abnormalities. She does not have any evidence of heart failure thus an echo is not indicated.  TSH (05/2023): 4.120  Continue anticoagulation with Eliquis.  Currently heart rates under control.

## 2023-11-30 NOTE — ASSESSMENT & PLAN NOTE
Known diagnosis in setting of unfortunate loss of her 2 daughters and  in the past 3 years. Patient denies active suicidal or homicidal ideations. No auditory hallucinations. Notes visual hallucinations that most likely related to EtOH withdrawal.   Outpatient treatment: Venlafaxine 225 mg daily.   QTc is 476. She has requested restarting home trazodone 100 mg nightly. Given the risk of serotonin syndrome, start trazodone 50 mg qHS.  Has not been able to establish with Psychiatry outpatient.    psychiatry consulted   Sitter at bedside  She may benefit from inpatient psych upon discharge.  I discussed plan of care with psychiatry.

## 2023-11-30 NOTE — ASSESSMENT & PLAN NOTE
Known diagnosis.  Outpatient regimen: Digoxin 125 mcg daily. Unable to determine if compliant.   Last echocardiogram (01/2020): no structural or valvular abnormalities. No LVEF calculated as she was on Afib. Prior echo in 2014 LVEF 65-70% and no abnormalities.   TSH (05/2023): 4.120  Per prior cardiology note on file (11/2021) she was recommended to be on Metoprolol XL and Digoxin.   EKG in ED: Afib + RVR. Received 1 time dose of Metoprolol 5 mg IV push.   On evaluation -150's. Patient does not have active CV symptoms.   Metoprolol  mg indicated to be started in AM.     Plan:  - Continue home dose digoxin.   - Continue with addition of Metoprolol  mg daily.   - Metoprolol 5 mg IV push STAT and PRN.   - Echocardiogram in AM, order placed.   - Digoxin level.   - Consider Digoxin loading/IV if levels non therapeutic and not improving w/Metoprolol pushes and initiation of PO Metoprolol.

## 2023-11-30 NOTE — ED NOTES
"Bedside report received from off going RN Virginia, assumed care of patient.  POC discussed with patient. All needs addressed at this time. Pt states to this RN \"I want to keep drinking to the point where I die\". Alert team RN, Robb, updated.    Fall risk interventions in place: Move the patient closer to the nurse's station, Patient's personal possessions are with in their safe reach, Place socks on patient, Place fall risk sign on patient's door, Keep floor surfaces clean and dry, and Accompanied to restroom (all applicable per Driver Fall risk assessment)   Continuous monitoring: Cardiac Leads, Pulse Ox, or Blood Pressure  IVF/IV medications: Not Applicable   Oxygen: Room Air  Bedside sitter: Not Applicable   Isolation: Not Applicable  "

## 2023-11-30 NOTE — ASSESSMENT & PLAN NOTE
Known history. Last TSH (05/2023): 4.120  Outpatient: Levothyroxine 50 mcg daily.     Plan:  - Continue home dose of Levothyroxine.

## 2023-11-30 NOTE — ED NOTES
Pt up to commode with heavy x1 assist. Pt urinated, [facility] Pt returned back to [facility] and connected to cardiac monitoring.

## 2023-11-30 NOTE — ED NOTES
Bedside report received from off going RN: Christina, assumed care of patient.  POC discussed with patient. Call light within reach, all needs addressed at this time.       Fall risk interventions in place: Place socks on patient and Keep floor surfaces clean and dry (all applicable per [place] Fall risk assessment)   Continuous monitoring: Cardiac Leads, Pulse Ox, or Blood Pressure  IVF/IV medications: Not Applicable   Oxygen: How many liters 1.5L  Bedside sitter: Not Applicable   Isolation: Not Applicable

## 2023-11-30 NOTE — HOSPITAL COURSE
Ms. Jeanie Hutchison is a 78 y.o. female with PMHx of Atrial fibrillation, AICD, Hypothyroidism, Depression, ETOH use disorder who was admitted on 11/30 for alcohol intoxication and Afib with RVR.     Patient notes she has been drinking 1 pint per day of Vodka for past 2 years and in past week increased volume. She was suggested to pursue detoxification by his son which is why she is here in the hospital. Notes she is currently having visual hallucinations (cartoon-like images), feels anxious, mild hand tremors, and no other specific symptoms. Denies suicidal or homicidal ideations. No prior h.o withdrawal seizures or need for ICU admission d/t ETOH withdrawal.      In ER, patient was resting comfortably in bed, no acute distress. She is very pleasant. No electrolyte imbalances. EKG shows Afib w/RVR. Has received Metoprolol pushes, 5 mg IV, x3. Resumed all her home medications. Patient also received Lorazepam 2 mg IV, Librium 25 mg PO and  rally bag IV. Patient placed on [facility] protocol for alcohol withdrawal management. Patient admitted to hospital medicine for management of care.

## 2023-11-30 NOTE — ED NOTES
"Patient hyperventilating in room stating \"I'm going to die\". ERP at bedside, new orders received. Pending dispense from pharmacy.   "

## 2023-11-30 NOTE — ED NOTES
Bedside report given to SNEHA Royal. Pt placed on 2L NC. Pt remains in Afib. Breathalyzer result 0.089. Call light within reach, care relinquished.

## 2023-12-01 LAB
ANION GAP SERPL CALC-SCNC: 8 MMOL/L (ref 7–16)
BUN SERPL-MCNC: 8 MG/DL (ref 8–22)
CALCIUM SERPL-MCNC: 8.2 MG/DL (ref 8.5–10.5)
CHLORIDE SERPL-SCNC: 102 MMOL/L (ref 96–112)
CO2 SERPL-SCNC: 28 MMOL/L (ref 20–33)
CREAT SERPL-MCNC: 0.29 MG/DL (ref 0.5–1.4)
ERYTHROCYTE [DISTWIDTH] IN BLOOD BY AUTOMATED COUNT: 52 FL (ref 35.9–50)
GFR SERPLBLD CREATININE-BSD FMLA CKD-EPI: 111 ML/MIN/1.73 M 2
GLUCOSE SERPL-MCNC: 103 MG/DL (ref 65–99)
HCT VFR BLD AUTO: 39.7 % (ref 37–47)
HGB BLD-MCNC: 13.5 G/DL (ref 12–16)
MCH RBC QN AUTO: 33.4 PG (ref 27–33)
MCHC RBC AUTO-ENTMCNC: 34 G/DL (ref 32.2–35.5)
MCV RBC AUTO: 98.3 FL (ref 81.4–97.8)
PLATELET # BLD AUTO: 56 K/UL (ref 164–446)
PLATELETS.RETICULATED NFR BLD AUTO: 4.1 % (ref 0.6–13.1)
PMV BLD AUTO: 10.3 FL (ref 9–12.9)
POTASSIUM SERPL-SCNC: 3.8 MMOL/L (ref 3.6–5.5)
RBC # BLD AUTO: 4.04 M/UL (ref 4.2–5.4)
SODIUM SERPL-SCNC: 138 MMOL/L (ref 135–145)
WBC # BLD AUTO: 4.6 K/UL (ref 4.8–10.8)

## 2023-12-01 PROCEDURE — 80048 BASIC METABOLIC PNL TOTAL CA: CPT

## 2023-12-01 PROCEDURE — 700105 HCHG RX REV CODE 258: Performed by: STUDENT IN AN ORGANIZED HEALTH CARE EDUCATION/TRAINING PROGRAM

## 2023-12-01 PROCEDURE — 85027 COMPLETE CBC AUTOMATED: CPT

## 2023-12-01 PROCEDURE — 99291 CRITICAL CARE FIRST HOUR: CPT | Performed by: HOSPITALIST

## 2023-12-01 PROCEDURE — 700102 HCHG RX REV CODE 250 W/ 637 OVERRIDE(OP): Performed by: STUDENT IN AN ORGANIZED HEALTH CARE EDUCATION/TRAINING PROGRAM

## 2023-12-01 PROCEDURE — A9270 NON-COVERED ITEM OR SERVICE: HCPCS

## 2023-12-01 PROCEDURE — 770000 HCHG ROOM/CARE - INTERMEDIATE ICU *

## 2023-12-01 PROCEDURE — 700102 HCHG RX REV CODE 250 W/ 637 OVERRIDE(OP)

## 2023-12-01 PROCEDURE — 97166 OT EVAL MOD COMPLEX 45 MIN: CPT

## 2023-12-01 PROCEDURE — 97163 PT EVAL HIGH COMPLEX 45 MIN: CPT

## 2023-12-01 PROCEDURE — 85055 RETICULATED PLATELET ASSAY: CPT

## 2023-12-01 PROCEDURE — 700101 HCHG RX REV CODE 250: Performed by: STUDENT IN AN ORGANIZED HEALTH CARE EDUCATION/TRAINING PROGRAM

## 2023-12-01 PROCEDURE — 700111 HCHG RX REV CODE 636 W/ 250 OVERRIDE (IP): Performed by: NURSE PRACTITIONER

## 2023-12-01 PROCEDURE — A9270 NON-COVERED ITEM OR SERVICE: HCPCS | Performed by: STUDENT IN AN ORGANIZED HEALTH CARE EDUCATION/TRAINING PROGRAM

## 2023-12-01 RX ADMIN — DIGOXIN 125 MCG: 0.25 TABLET ORAL at 06:38

## 2023-12-01 RX ADMIN — CHLORDIAZEPOXIDE HYDROCHLORIDE 50 MG: 25 CAPSULE ORAL at 17:49

## 2023-12-01 RX ADMIN — METOPROLOL SUCCINATE 100 MG: 25 TABLET, FILM COATED, EXTENDED RELEASE ORAL at 06:35

## 2023-12-01 RX ADMIN — Medication 100 MG: at 06:36

## 2023-12-01 RX ADMIN — THERA TABS 1 TABLET: TAB at 06:36

## 2023-12-01 RX ADMIN — APIXABAN 5 MG: 5 TABLET, FILM COATED ORAL at 06:36

## 2023-12-01 RX ADMIN — CHLORDIAZEPOXIDE HYDROCHLORIDE 50 MG: 25 CAPSULE ORAL at 11:00

## 2023-12-01 RX ADMIN — APIXABAN 5 MG: 5 TABLET, FILM COATED ORAL at 17:49

## 2023-12-01 RX ADMIN — FOLIC ACID 1 MG: 1 TABLET ORAL at 06:36

## 2023-12-01 RX ADMIN — LEVOTHYROXINE SODIUM 50 MCG: 50 TABLET ORAL at 06:36

## 2023-12-01 RX ADMIN — DEXMEDETOMIDINE HYDROCHLORIDE 1 MCG/KG/HR: 100 INJECTION, SOLUTION INTRAVENOUS at 23:19

## 2023-12-01 RX ADMIN — DOCUSATE SODIUM 50 MG AND SENNOSIDES 8.6 MG 2 TABLET: 8.6; 5 TABLET, FILM COATED ORAL at 06:35

## 2023-12-01 RX ADMIN — VENLAFAXINE HYDROCHLORIDE 225 MG: 75 CAPSULE, EXTENDED RELEASE ORAL at 06:00

## 2023-12-01 RX ADMIN — LORAZEPAM 1 MG: 2 INJECTION INTRAMUSCULAR; INTRAVENOUS at 19:30

## 2023-12-01 RX ADMIN — DOCUSATE SODIUM 50 MG AND SENNOSIDES 8.6 MG 2 TABLET: 8.6; 5 TABLET, FILM COATED ORAL at 17:49

## 2023-12-01 RX ADMIN — CHLORDIAZEPOXIDE HYDROCHLORIDE 50 MG: 25 CAPSULE ORAL at 06:03

## 2023-12-01 ASSESSMENT — COGNITIVE AND FUNCTIONAL STATUS - GENERAL
SUGGESTED CMS G CODE MODIFIER MOBILITY: CM
MOVING FROM LYING ON BACK TO SITTING ON SIDE OF FLAT BED: UNABLE
DAILY ACTIVITIY SCORE: 9
DRESSING REGULAR LOWER BODY CLOTHING: A LOT
HELP NEEDED FOR BATHING: TOTAL
HELP NEEDED FOR BATHING: TOTAL
TOILETING: TOTAL
MOBILITY SCORE: 7
SUGGESTED CMS G CODE MODIFIER DAILY ACTIVITY: CM
MOBILITY SCORE: 7
MOVING FROM LYING ON BACK TO SITTING ON SIDE OF FLAT BED: UNABLE
STANDING UP FROM CHAIR USING ARMS: TOTAL
DRESSING REGULAR UPPER BODY CLOTHING: TOTAL
DRESSING REGULAR LOWER BODY CLOTHING: TOTAL
EATING MEALS: A LOT
TURNING FROM BACK TO SIDE WHILE IN FLAT BAD: A LOT
CLIMB 3 TO 5 STEPS WITH RAILING: TOTAL
SUGGESTED CMS G CODE MODIFIER DAILY ACTIVITY: CL
MOVING TO AND FROM BED TO CHAIR: UNABLE
PERSONAL GROOMING: A LOT
WALKING IN HOSPITAL ROOM: TOTAL
TOILETING: TOTAL
STANDING UP FROM CHAIR USING ARMS: TOTAL
MOVING TO AND FROM BED TO CHAIR: UNABLE
DAILY ACTIVITIY SCORE: 8
WALKING IN HOSPITAL ROOM: TOTAL
EATING MEALS: A LOT
PERSONAL GROOMING: A LOT
DRESSING REGULAR UPPER BODY CLOTHING: TOTAL
TURNING FROM BACK TO SIDE WHILE IN FLAT BAD: A LOT
CLIMB 3 TO 5 STEPS WITH RAILING: TOTAL
SUGGESTED CMS G CODE MODIFIER MOBILITY: CM

## 2023-12-01 ASSESSMENT — PAIN DESCRIPTION - PAIN TYPE
TYPE: ACUTE PAIN

## 2023-12-01 ASSESSMENT — LIFESTYLE VARIABLES
NAUSEA AND VOMITING: NO NAUSEA AND NO VOMITING
PAROXYSMAL SWEATS: NO SWEAT VISIBLE
NAUSEA AND VOMITING: NO NAUSEA AND NO VOMITING
VISUAL DISTURBANCES: NOT PRESENT
ORIENTATION AND CLOUDING OF SENSORIUM: DISORIENTED FOR PLACE AND / OR PERSON
HEADACHE, FULLNESS IN HEAD: NOT PRESENT
AUDITORY DISTURBANCES: NOT PRESENT
TOTAL SCORE: 5
TREMOR: NO TREMOR
AGITATION: NORMAL ACTIVITY
VISUAL DISTURBANCES: NOT PRESENT
HEADACHE, FULLNESS IN HEAD: NOT PRESENT
AGITATION: NORMAL ACTIVITY
NAUSEA AND VOMITING: NO NAUSEA AND NO VOMITING
ANXIETY: NO ANXIETY (AT EASE)
HEADACHE, FULLNESS IN HEAD: NOT PRESENT
ANXIETY: NO ANXIETY (AT EASE)
AGITATION: SOMEWHAT MORE THAN NORMAL ACTIVITY
ORIENTATION AND CLOUDING OF SENSORIUM: ORIENTED AND CAN DO SERIAL ADDITIONS
ANXIETY: NO ANXIETY (AT EASE)
TOTAL SCORE: 1
VISUAL DISTURBANCES: NOT PRESENT
PAROXYSMAL SWEATS: NO SWEAT VISIBLE
ANXIETY: MODERATELY ANXIOUS OR GUARDED, SO ANXIETY IS INFERRED
HEADACHE, FULLNESS IN HEAD: NOT PRESENT
NAUSEA AND VOMITING: NO NAUSEA AND NO VOMITING
AUDITORY DISTURBANCES: NOT PRESENT
AUDITORY DISTURBANCES: NOT PRESENT
PAROXYSMAL SWEATS: NO SWEAT VISIBLE
TREMOR: TREMOR NOT VISIBLE BUT CAN BE FELT, FINGERTIP TO FINGERTIP
ORIENTATION AND CLOUDING OF SENSORIUM: DISORIENTED FOR PLACE AND / OR PERSON
TREMOR: MODERATE TREMOR WITH ARMS EXTENDED
AGITATION: MODERATELY FIDGETY AND RESTLESS
TOTAL SCORE: 13
TOTAL SCORE: 8
ORIENTATION AND CLOUDING OF SENSORIUM: DISORIENTED FOR PLACE AND / OR PERSON
TREMOR: NO TREMOR
AUDITORY DISTURBANCES: NOT PRESENT
VISUAL DISTURBANCES: NOT PRESENT
PAROXYSMAL SWEATS: BARELY PERCEPTIBLE SWEATING. PALMS MOIST

## 2023-12-01 ASSESSMENT — ACTIVITIES OF DAILY LIVING (ADL): TOILETING: INDEPENDENT

## 2023-12-01 ASSESSMENT — FIBROSIS 4 INDEX: FIB4 SCORE: 11.67

## 2023-12-01 ASSESSMENT — GAIT ASSESSMENTS: GAIT LEVEL OF ASSIST: UNABLE TO PARTICIPATE

## 2023-12-01 NOTE — CARE PLAN
The patient is Unstable - High likelihood or risk of patient condition declining or worsening    Shift Goals  Clinical Goals: ETOH withdrawal protical in place c CIWA, Pt will not have any injury nor fall this shift.  Patient Goals: DANICA  Family Goals: DANICA    Progress made toward(s) clinical / shift goals:     Problem: Knowledge Deficit - Standard  Goal: Patient and family/care givers will demonstrate understanding of plan of care, disease process/condition, diagnostic tests and medications  Outcome: Not Met     Problem: Lifestyle Changes  Goal: Patient's ability to identify lifestyle changes and available resources to help reduce recurrence of condition will improve  Outcome: Not Met     Problem: Psychosocial  Goal: Spiritual and cultural needs incorporated into hospitalization  Outcome: Not Met     Problem: Psychosocial  Goal: Patient's level of anxiety will decrease  Outcome: Progressing     Problem: Optimal Care for Alcohol Withdrawal  Goal: Optimal Care for the alcohol withdrawal patient  Outcome: Met     Problem: Seizure Precautions  Goal: Implementation of seizure precautions  Outcome: Met     Problem: Risk for Aspiration  Goal: Patient's risk for aspiration will be absent or decrease  Outcome: Met     Problem: Skin Integrity  Goal: Skin integrity is maintained or improved  Outcome: Met     Problem: Fall Risk  Goal: Patient will remain free from falls  Outcome: Met     Problem: Pain - Standard  Goal: Alleviation of pain or a reduction in pain to the patient’s comfort goal  Outcome: Met       Patient is not progressing towards the following goals:      Problem: Knowledge Deficit - Standard  Goal: Patient and family/care givers will demonstrate understanding of plan of care, disease process/condition, diagnostic tests and medications  Outcome: Not Met     Problem: Lifestyle Changes  Goal: Patient's ability to identify lifestyle changes and available resources to help reduce recurrence of condition will  improve  Outcome: Not Met     Problem: Psychosocial  Goal: Spiritual and cultural needs incorporated into hospitalization  Outcome: Not Met

## 2023-12-01 NOTE — THERAPY
Occupational Therapy   Initial Evaluation     Patient Name: Jeanie Hutchison  Age:  78 y.o., Sex:  female  Medical Record #: [id number]  Today's Date: 12/1/2023     Precautions  Precautions: Fall Risk, Swallow Precautions  Comments: Sz precautions; Etoh withdrawl    Assessment  Patient is 78 y.o. female with a diagnosis of Etoh withdrawal with a-fib with RVR, AICD, hallucinations and tremors.  Additional factors influencing patient status / progress: Pt has recently experienced loss of 2 children and spouse. Per chart was Bib son who was concerned about withdrawal symptoms.  At current level pt will need post acute OT placement but chances are that as withdrawal symptoms resolve, pt may be able to go home with family assist. Level of family assist available, however, is unknown at this time. Pt can benefit from acute OT to increase independence in ADLS prior to dc.     Plan    Occupational Therapy Initial Treatment Plan   Treatment Interventions: Self Care / Activities of Daily Living, Therapeutic Activity, Neuro Re-Education / Balance  Treatment Frequency: 3 Times per Week  Duration: Until Therapy Goals Met    DC Equipment Recommendations: Unable to determine at this time  Discharge Recommendations: Recommend post-acute placement for additional occupational therapy services prior to discharge home        12/01/23 1351   Prior Living Situation   Prior Services Unable To Determine At This Time   Housing / Facility 2 Story House   Steps Into Home 2   Steps In Home 0   Bathroom Set up Bathtub / Shower Combination   Equipment Owned None   Lives with - Patient's Self Care Capacity Adult Children   Comments Pt confused with changing stories.   Prior Level of ADL Function   Self Feeding Independent   Grooming / Hygiene Independent   Bathing Independent   Dressing Independent   Toileting Independent   Prior Level of IADL Function   Medication Management Independent   Laundry Independent   Kitchen Mobility Independent    Finances Independent   Home Management Independent   Shopping Independent   Prior Level Of Mobility Independent Without Device in Community   Driving / Transportation Relatives / Others Provide Transportation   History of Falls   History of Falls Yes   Date of Last Fall   (unable to recall)   Precautions   Precautions Fall Risk;Swallow Precautions   Comments Sz precautions; Etoh withdrawl   Pain   Pain Scales 0 to 10 Scale    Intervention Ambulation / Increased Activity   Pain 0 - 10 Group   Therapist Pain Assessment Post Activity Pain Same as Prior to Activity;Nurse Notified;0   Cognition    Cognition / Consciousness X   Speech/ Communication Delayed Responses;Slurred;Incomprehensible Words   Orientation Level Not Oriented to Age;Not Oriented to Address;Not Oriented to Year;Not Oriented to Month;Not Oriented to Day;Not Oriented to Time;Not Oriented to Place;Not Oriented to Reason   Level of Consciousness Responds to voice   Ability To Follow Commands Unable to Follow 1 Step Commands   Safety Awareness Impaired;Impulsive   New Learning Impaired   Attention Impaired   Sequencing Impaired   Initiation Impaired   Comments Very confused; mildly agitated   Passive ROM Upper Body   Passive ROM Upper Body WDL   Active ROM Upper Body   Active ROM Upper Body  WDL   Comments moving all 4 limbs in majority of normal range   Strength Upper Body   Upper Body Strength  WDL   Sensation Upper Body   Comments unable to test   Upper Body Muscle Tone   Upper Body Muscle Tone  WDL   Neurological Concerns   Neurological Concerns No   Coordination Upper Body   Coordination X   Comments coordination impaired due to cognition   Balance Assessment   Sitting Balance (Static) Poor -   Sitting Balance (Dynamic) Poor -   Standing Balance (Static) Trace   Standing Balance (Dynamic) Trace   Weight Shift Sitting Poor   Weight Shift Standing Absent   Comments EOB with support and standing for side step up bed with max a   Bed Mobility    Supine  to Sit Maximal Assist   Sit to Supine Maximal Assist   Scooting Maximal Assist   Rolling Maximal Assist to Rt.;Maximum Assist to Lt.   Comments rolling to change linens; not following commands/confused   ADL Assessment   Grooming Maximal Assist   Lower Body Dressing Maximal Assist   Toileting Total Assist   Comments incontinent   Functional Mobility   Sit to Stand Maximal Assist   Bed, Chair, Wheelchair Transfer Maximal Assist   Transfer Method Stand Pivot  (posterior lean in sitting and standing)   Comments HHA   Activity Tolerance   Sitting Edge of Bed 7 min   Standing 10 sec x 3   Patient / Family Goals   Patient / Family Goal #1 None stated   Short Term Goals   Short Term Goal # 1 xfer to chair or BSC with min a   Short Term Goal # 2 Seated grooming with supervision   Occupational Therapy Initial Treatment Plan    Treatment Interventions Self Care / Activities of Daily Living;Therapeutic Activity;Neuro Re-Education / Balance   Treatment Frequency 3 Times per Week   Duration Until Therapy Goals Met   Problem List   Problem List Decreased Active Daily Living Skills;Decreased Homemaking Skills;Decreased Activity Tolerance;Safety Awareness Deficits / Cognition;Impaired Postural Control / Balance   Anticipated Discharge Equipment and Recommendations   DC Equipment Recommendations Unable to determine at this time   Discharge Recommendations Recommend post-acute placement for additional occupational therapy services prior to discharge home

## 2023-12-01 NOTE — PROGRESS NOTES
Md notified of continual high CIWA scores and high doses of Ativan. Rn expressed concern for needing a higher level of care.

## 2023-12-01 NOTE — PROGRESS NOTES
4 Eyes Skin Assessment Completed by SNEHA Rush and SNEHA Ugalde    Skin assessment is primarily focused on high risk bony prominences. Pay special attention to skin beneath and around medical devices, high risk bony prominences, skin to skin areas and areas where the patient lacks sensation to feel pain and areas where the patient previously had breakdown.     HIGH RISK PRESSURE POINTS -  Sacrum/Coccyx Pink and Blanching  Right ischial tuberosity (Sit Bones) Pink   Left Ischial Tuberosity (Sit Bones) Pink  Right Heel Pink and Blanching  Left Heel Pink and Blanching    HEAD & NECK DEVICES:  Occipital Region WDL  Right Ear WDL  Left Ear WDL  Chin WDL  Neck WDL  Sternum WDL  NA, Intact with no device in use    RESPIRATORY DEVICES:  Lips WDL  Tongue WDL  Right Cheek WDL  Left Cheek WDL  Bridge of Nose WDL  Nasal Cannula    FEEDING DEVICES:  Nasal Septum WDL  NA    TORSO DEVICES:  Scapula Pink  Spine Snook  Back Pink  Abdomen Pink, left breast scab  Right Flank Pink, bruise   Left Flank Snook  Tele Box    LINES, BP MONITORING DEVICES IN USE:  Right Elbow, Forearm, Wrist, Hand WDL  Left Elbow, Forearm, Wrist, Hand WDL  Peripheral IV, BP Cuff, and Pulse Ox    ORTHOPEDIC DEVICES:  Right Hip Bruising  Left Hip WDL  Right Thigh WDL  Left Thigh WDL  Right Leg WDL  Left Leg WDL  Right Achilles WDL  Left Achilles WDL  Right Foot Pink and blanching  Left Foot Pink and Blanching  NA    MISCELLANEOUS:  Axilla WDL  Groin WDL  Perineum WDL  Buttocks/Jenelle-rectal Pink and Blanching  Purewick    PROTOCOL INTERVENTIONS:   Standard/Trauma Bed Already in place  Q2 turns with pillows Already in place  Silicone Nasal Cannula tubing Already in place    WOUND PHOTOS:   N/A no wounds identified    WOUND CONSULT:   N/A, no advanced wound care needs identified

## 2023-12-01 NOTE — CARE PLAN
The patient is Watcher - Medium risk of patient condition declining or worsening    Shift Goals  Clinical Goals: CIWA, safety  Patient Goals: safety  Family Goals: pari    Progress made toward(s) clinical / shift goals:        Problem: Knowledge Deficit - Standard  Goal: Patient and family/care givers will demonstrate understanding of plan of care, disease process/condition, diagnostic tests and medications  Outcome: Progressing     Problem: Optimal Care for Alcohol Withdrawal  Goal: Optimal Care for the alcohol withdrawal patient  Outcome: Progressing     Problem: Seizure Precautions  Goal: Implementation of seizure precautions  Outcome: Progressing     Problem: Lifestyle Changes  Goal: Patient's ability to identify lifestyle changes and available resources to help reduce recurrence of condition will improve  Outcome: Progressing     Problem: Psychosocial  Goal: Patient's level of anxiety will decrease  Outcome: Progressing  Goal: Spiritual and cultural needs incorporated into hospitalization  Outcome: Progressing     Problem: Risk for Aspiration  Goal: Patient's risk for aspiration will be absent or decrease  Outcome: Progressing     Problem: Skin Integrity  Goal: Skin integrity is maintained or improved  Outcome: Progressing     Problem: Fall Risk  Goal: Patient will remain free from falls  Outcome: Progressing       Patient is not progressing towards the following goals:  Safety awareness

## 2023-12-01 NOTE — PROGRESS NOTES
MD rounded on pt. MD agrees for a higher level of care need. Charge RN made aware.  1:1 sitter still at bedside. Safety maintained.    1806: Pt scored 21 on CIWA but is sleeping and has 14 RR. RN to hold dosing and will reassess.

## 2023-12-01 NOTE — PROGRESS NOTES
Hospital Medicine Note    Date of Service  11/30/2023    Chief Complaint  Jeanie Hutchison is a 78 y.o. female admitted 11/29/2023 with ETOH withdrawals and A-fib with RVR    Hospital Course  Ms. Jeanie Hutchison is a 78 y.o. female with PMHx of Atrial fibrillation, AICD, Hypothyroidism, Depression, ETOH use disorder who was admitted on 11/30 for alcohol intoxication and Afib with RVR.     Patient notes she has been drinking 1 pint per day of Vodka for past 2 years and in past week increased volume. She was suggested to pursue detoxification by his son which is why she is here in the hospital. Notes she is currently having visual hallucinations (cartoon-like images), feels anxious, mild hand tremors, and no other specific symptoms. Denies suicidal or homicidal ideations. No prior h.o withdrawal seizures or need for ICU admission d/t ETOH withdrawal.      In ER, patient was resting comfortably in bed, no acute distress. She is very pleasant. No electrolyte imbalances. EKG shows Afib w/RVR. Has received Metoprolol pushes, 5 mg IV, x3. Resumed all her home medications. Patient also received Lorazepam 2 mg IV, Librium 25 mg PO and  rally bag IV. Patient placed on [facility] protocol for alcohol withdrawal management. Patient admitted to hospital medicine for management of care.             Interval Problem Update  I evaluated patient for transfer to [facility], Dr. Bernardo Enriquez approved.  - patient not able to interact during interview. She appeared calm, seizure precautions in place.  - as per bedside RN, pt swallowing is questionable, dinner was held.    I have discussed this patient's plan of care and discharge plan at IDT rounds today with Case Management, Nursing, Nursing leadership, and other members of the IDT team.      Assessment/Plan      Major depressive disorder with current active episode- (present on admission)  Assessment & Plan  Known diagnosis in setting of unfortunate loss of her 2 daughters and  in  the past 3 years. Patient denies active suicidal or homicidal ideations. No auditory hallucinations. Notes visual hallucinations that most likely related to EtOH withdrawal.   Outpatient treatment: Venlafaxine 225 mg daily.   Has not been able to establish with Psychiatry outpatient.      Plan:  - psychiatry wrote a consult note, 1:1 sitter, legal hold.    Atrial fibrillation with RVR (HCC)- (present on admission)  Assessment & Plan  Last echocardiogram (01/2020): no structural or valvular abnormalities. No LVEF calculated as she was on Afib. Prior echo in 2014 LVEF 65-70% and no abnormalities.   TSH (05/2023): 4.120  Per prior cardiology note on file (11/2021) she was recommended to be on Metoprolol XL and Digoxin.   Recent ablation with Dr. Lara.    Continue:  Metoprolol  mg indicated to be started in AM.   Digoxin level low 0.4, IV load given 7AM 125mcg.  Added back Metoprolol 5 mg IV push PRN.   Continue Eliquis    AICD (automatic cardioverter/defibrillator) present- (present on admission)      Alcohol withdrawal (HCC)- (present on admission)  Assessment & Plan  Patient has h/o drinking 1 pint per day for past 2 years. In past week she has increased this volume. At this time presenting to the ED for detox per her son's request. Last drink night prior to presenting to ED.   Has tremors, is anxious, is having visual hallucinations.   Likely adding to Afib + RVR.   Received rally bag IV, Lorazepam 2 mg IV, Librium 25 mg PO in ED.    Continue CIWA protocol, transferring to [facility] for closer monitoring.

## 2023-12-01 NOTE — CARE PLAN
The patient is Watcher - Medium risk of patient condition declining or worsening    Shift Goals  Clinical Goals: ETOH withdrawal protical in place c CIWA, Pt will not have any injury nor fall this shift.  Patient Goals: DANICA  Family Goals: DANICA    Progress made toward(s) clinical / shift goals:    Problem: Knowledge Deficit - Standard  Goal: Patient and family/care givers will demonstrate understanding of plan of care, disease process/condition, diagnostic tests and medications  Description: Target End Date:  1-3 days or as soon as patient condition allows    Document in Patient Education    1.  Patient and family/caregiver oriented to unit, equipment, visitation policy and means for communicating concern  2.  Complete/review Learning Assessment  3.  Assess knowledge level of disease process/condition, treatment plan, diagnostic tests and medications  4.  Explain disease process/condition, treatment plan, diagnostic tests and medications  Outcome: Progressing     Problem: Lifestyle Changes  Goal: Patient's ability to identify lifestyle changes and available resources to help reduce recurrence of condition will improve  Description: Target End Date:  1 to 3 days    1.  Discuss recommended lifestyle changes  2.  Identify available resources and support systems  3.  Consider referral to substance abuse program  Outcome: Progressing     Problem: Psychosocial  Goal: Patient's level of anxiety will decrease  Description: Target End Date:  1-3 days or as soon as patient condition allows    1.  Collaborate with patient and family/caregiver to identify triggers and develop strategies to cope with anxiety  2.  Implement stimuli reduction, calming techniques  3.  Pharmacologic management per provider order  4.  Encourage patient/family/care giver participation  5.  Collaborate with interdisciplinary team including Psychologist or Behavioral Health Team as needed  Outcome: Progressing       Patient is not progressing towards the  following goals:

## 2023-12-01 NOTE — PROGRESS NOTES
Pt arrived in AFIB RVR and active ETOH withdrawal.  Pt is a new admin from lower level of care and brought to IMCU d/t elevated CIWA and requiring closer monitoring.  At this time PRECEDEX gtt is running at 0.4mcg/gk/hr and stable.  CIWA at 1930 7, at 2330 10, at 0330 8.      SBP: 69-165mmHg  HR:  BPM  SpO2: 91-99%

## 2023-12-01 NOTE — PROGRESS NOTES
Monitor summary  A fib 112-120  R PVCs  --/.06/--

## 2023-12-01 NOTE — PROGRESS NOTES
Pt 1:1 sitter was required elsewhere for more critical needs of the hospital.  This RN, CN and house supervisor discussed orders from PSYCH and overall Pt status.  Once PRECEDEX gtt is started, increased to therapeutic rate, redirectable and Pt is safe the 1:1 sitter will be reassigned.  This RN felt comfortable with current interventions of; bed alarm, pressure alarm, q15 V/S PRECEDEX gtt started c titration for effect and TELE monitor in place that 1:1 sitter can leave.  House supervisor cleared 1:1 sitter from bedside and reassigned at this time.    POC is ongoing and Pt is safe.

## 2023-12-01 NOTE — PROGRESS NOTES
Pulmonary and Critical Care Medicine Progress Note    I had the pleasure of being asked to see this lady to assess her for transfer to the IMCU for worsening alcohol withdrawal delirium.  She has significant benzodiazepine requirements with an escalating CIWA score.  She may be safely transferred to the IMCU.    Howard Rayo MD  Pulmonary and Critical Care Medicine

## 2023-12-01 NOTE — THERAPY
Physical Therapy   Initial Evaluation     Patient Name: Jeanie Hutchison  Age:  78 y.o., Sex:  female  Medical Record #: [id number]  Today's Date: 12/1/2023     Precautions  Precautions: (P) Fall Risk;Swallow Precautions;Other (See Comments)  Comments: (P) Seizure precautions    Assessment  Patient is 78 y.o. female who was admitted on 11/30 for alcohol intoxication and Afib with RVR.   Currently been managed for alcohol withdrawal  PMH: Atrial fibrillation, AICD, Hypothyroidism, Depression, ETOH use disorder     Patient seen for PT evaluation. Patient in bed, agreeable for the session. Limited functional mobility due to fluctuating level of alertness. Appears to be weaker than her baseline mobility. Will benefit from PT services to address mobility and recommend post acute placement at this time.     Plan    Physical Therapy Initial Treatment Plan   Treatment Plan : (P) Bed Mobility, Gait Training, Neuro Re-Education / Balance, Stair Training, Therapeutic Activities, Therapeutic Exercise  Treatment Frequency: (P) 4 Times per Week  Duration: (P) Until Therapy Goals Met    DC Equipment Recommendations: (P) Unable to determine at this time  Discharge Recommendations: (P) Recommend post-acute placement for additional physical therapy services prior to discharge home     Objective     12/01/23 1112   Charge Group   PT Evaluation PT Evaluation High   Total Time Spent   PT Evaluation Time Spent (Mins) 22   Initial Contact Note    Initial Contact Note Order Received and Verified, Physical Therapy Evaluation in Progress with Full Report to Follow.   Precautions   Precautions Fall Risk;Swallow Precautions;Other (See Comments)   Comments Seizure precautions   Vitals   Pulse (!) 102   Patient BP Position Sitting  (Edge of bed)   Blood Pressure  133/81   Pulse Oximetry 97 %   O2 (LPM) 1   O2 Delivery Device Nasal Cannula   Pain   Pain Scales 0 to 10 Scale    Intervention Declines   Prior Living Situation   Prior Services Unable  To Determine At This Time   Housing / Facility 2 Story House   Steps Into Home 0   Steps In Home   (Flight of steps with rails to basement)   Equipment Owned None   Lives with - Patient's Self Care Capacity Adult Children  (Grand son)   Comments Patient may not be a reliable historian at this time, may have to confirm the above information in subsequent session/s as able.   Prior Level of Functional Mobility   Bed Mobility Independent   Transfer Status Independent   Ambulation Independent   Ambulation Distance Community   Assistive Devices Used Unable to Determine At This Time   Stairs Independent   Cognition    Speech/ Communication Delayed Responses   Orientation Level Not Oriented to Time;Not Oriented to Place;Not Oriented to Reason   Level of Consciousness Responds to voice   Ability To Follow Commands 1 Step   Comments Fluctuating level of alertness, responding appropriately, but mostly lethargic   Passive ROM Lower Body   Passive ROM Lower Body WDL   Active ROM Lower Body    Active ROM Lower Body  WDL   Strength Lower Body   Lower Body Strength  X   Comments Grossly BLE 3/5   Other Treatments   Other Treatments Provided Patient c/o low back pain upon returning back to [facility]; patient was assisted to her L sidelying and support with appropriate pillows   Balance Assessment   Sitting Balance (Static) Fair -   Sitting Balance (Dynamic) Poor +   Weight Shift Sitting Poor   Comments Seated EOB   Bed Mobility    Supine to Sit Moderate Assist   Sit to Supine Moderate Assist   Scooting Moderate Assist   Rolling Contact Guard Assist   Comments HOB raised, cues for sequencing, hand placement, LE placement; Required total assist to scoot up in bed   Gait Analysis   Gait Level Of Assist Unable to Participate   Comments Due to level of alertness   Functional Mobility   Sit to Stand Unable to Participate   Bed, Chair, Wheelchair Transfer Unable to Participate   Comments Due to level of alertness   How much difficulty  does the patient currently have...   Turning over in bed (including adjusting bedclothes, sheets and blankets)? 2   Sitting down on and standing up from a chair with arms (e.g., wheelchair, bedside commode, etc.) 1   Moving from lying on back to sitting on the side of the bed? 1   How much help from another person does the patient currently need...   Moving to and from a bed to a chair (including a wheelchair)? 1   Need to walk in a hospital room? 1   Climbing 3-5 steps with a railing? 1   6 clicks Mobility Score 7   Activity Tolerance   Sitting Edge of Bed < 7 minutes   Patient / Family Goals    Patient / Family Goal #1 None stated   Short Term Goals    Short Term Goal # 1 Patient will perform supine-sit with HOB flat with supervision in 6 visits   Short Term Goal # 2 Patient will perform sit-stand and chair transfers with LRAD with supervision in 6 visits   Short Term Goal # 3 Patient will ambulate > 50 feet with LRAD with supervision in 6 visits   Short Term Goal # 4 Patient will negotiate 10 steps with rails with supervision in 6 visits   Education Group   Education Provided Role of Physical Therapist   Role of Physical Therapist Patient Response Patient;Explanation;Verbal Demonstration;Reinforcement Needed;No Learning Evidence   Physical Therapy Initial Treatment Plan    Treatment Plan  Bed Mobility;Gait Training;Neuro Re-Education / Balance;Stair Training;Therapeutic Activities;Therapeutic Exercise   Treatment Frequency 4 Times per Week   Duration Until Therapy Goals Met   Problem List    Problems Impaired Bed Mobility;Impaired Transfers;Impaired Ambulation;Functional Strength Deficit;Impaired Balance;Decreased Activity Tolerance   Anticipated Discharge Equipment and Recommendations   DC Equipment Recommendations Unable to determine at this time   Discharge Recommendations Recommend post-acute placement for additional physical therapy services prior to discharge home   Interdisciplinary Plan of Care  Collaboration   IDT Collaboration with  Nursing   Patient Position at End of Therapy In Bed;Bed Alarm On;Call Light within Reach;Tray Table within Reach  (L sidelying for comfort)   Session Information   Date / Session Number  12/1-1(1/4, 12/7)

## 2023-12-01 NOTE — PROGRESS NOTES
Hospital Medicine Daily Progress Note    Date of Service  12/1/2023    Chief Complaint  Jeanie Hutchison is a 78 y.o. female admitted 11/29/2023 with alcohol intoxication.    Hospital Course  Ms. Jeanie Hutchison is a 78 y.o. female with PMHx of Atrial fibrillation, AICD, Hypothyroidism, Depression, ETOH use disorder who was admitted on 11/30 for alcohol intoxication and Afib with RVR.     Patient notes she has been drinking 1 pint per day of Vodka for past 2 years and in past week increased volume. She was suggested to pursue detoxification by his son which is why she is here in the hospital. Notes she is currently having visual hallucinations (cartoon-like images), feels anxious, mild hand tremors, and no other specific symptoms. Denies suicidal or homicidal ideations. No prior h.o withdrawal seizures or need for ICU admission d/t ETOH withdrawal.      In ER, patient was resting comfortably in bed, no acute distress. She is very pleasant. No electrolyte imbalances. EKG shows Afib w/RVR. Has received Metoprolol pushes, 5 mg IV, x3. Resumed all her home medications. Patient also received Lorazepam 2 mg IV, Librium 25 mg PO and  rally bag IV. Patient placed on [facility] protocol for alcohol withdrawal management. Patient admitted to hospital medicine for management of care.           She required transfer to the IMCU due to escalating detox symptoms requiring an IV Precedex drip.  Interval Problem Update  12/1: Ms. Hutchison was evaluated and examined in the IMCU. She is requiring titration of an IV Precedex drip due to severe alcohol detox. She is tolerating oral meds with prompting.     I have discussed this patient's plan of care and discharge plan at IDT rounds today with Case Management, Nursing, Nursing leadership, and other members of the IDT team.    Consultants/Specialty  psychiatry    Code Status  Full Code    Disposition  The patient is not medically cleared for discharge to home or a post-acute  facility.      I have placed the appropriate orders for post-discharge needs.    Review of Systems  Review of Systems   Unable to perform ROS: Mental acuity        Physical Exam  Temp:  [36.5 °C (97.7 °F)-36.7 °C (98.1 °F)] 36.7 °C (98.1 °F)  Pulse:  [] 102  Resp:  [12-67] 19  BP: ()/() 107/69  SpO2:  [91 %-99 %] 94 %    Physical Exam  Vitals and nursing note reviewed.   Constitutional:       Appearance: She is ill-appearing and toxic-appearing.   HENT:      Mouth/Throat:      Mouth: Mucous membranes are dry.   Cardiovascular:      Rate and Rhythm: Tachycardia present. Rhythm irregular.   Pulmonary:      Effort: Pulmonary effort is normal.      Breath sounds: Normal breath sounds.      Comments: 1 liter nasal cannula oxygen  Abdominal:      General: There is no distension.      Tenderness: There is no abdominal tenderness.   Musculoskeletal:      Cervical back: Neck supple.   Neurological:      Comments: Somnolent  She is oriented to person and place         Fluids    Intake/Output Summary (Last 24 hours) at 12/1/2023 0713  Last data filed at 12/1/2023 0457  Gross per 24 hour   Intake 30.12 ml   Output --   Net 30.12 ml       Laboratory  Recent Labs     11/29/23  1600 12/01/23  0520   WBC 4.9 4.6*   RBC 4.58 4.04*   HEMOGLOBIN 15.3 13.5   HEMATOCRIT 44.9 39.7   MCV 98.0* 98.3*   MCH 33.4* 33.4*   MCHC 34.1 34.0   RDW 57.1* 52.0*   PLATELETCT 97* 56*   MPV 9.2 10.3     Recent Labs     11/29/23  1600 11/30/23  0504 12/01/23  0520   SODIUM 143 141 138   POTASSIUM 4.6 4.2 3.8   CHLORIDE 101 103 102   CO2 24 24 28   GLUCOSE 72 91 103*   BUN 11 10 8   CREATININE 0.45* 0.40* 0.29*   CALCIUM 9.1 8.3* 8.2*                   Imaging  DX-CHEST-LIMITED (1 VIEW)   Final Result         1.  Right basilar atelectasis, no focal infiltrate           Assessment/Plan  * Alcohol withdrawal (HCC)- (present on admission)  Assessment & Plan  Patient has h/o drinking 1 pint per day for past 2 years. In past week she has  increased   She developed delirium tremens and has requited transfer to the IMCU  Status post IV rally bag    start oral Librium 50 mg PO q  6hrs in order to mitigate seizures and IV ativan if she cannot tolerate oral intake.  Thiamine 100 mg PO daily, folic acid 1 mg, Multivitamin PO daily.   IV precedex drip with titration in the IMCU.    Atrial fibrillation with RVR (HCC)- (present on admission)  Assessment & Plan  Chronic diagnosis.  She is on Digoxin 125 mcg daily, Toprol 100 mg daily, and Eliquis 5 mg BID outpatient all of which have been restarted  Last echocardiogram (01/2020): no structural or valvular abnormalities. She does not have any evidence of heart failure thus an echo is not indicated.  TSH (05/2023): 4.120  She developed rapid ventricular response likely due to alcohol withdrawal and has required IV metoprolol and transfer to the [facility].         Hypothyroidism- (present on admission)  Assessment & Plan  Known history. Last TSH (05/2023): 4.120  Outpatient: Levothyroxine 50 mcg daily.       Major depressive disorder with current active episode- (present on admission)  Assessment & Plan  Known diagnosis in setting of unfortunate loss of her 2 daughters and  in the past 3 years. Patient denies active suicidal or homicidal ideations. No auditory hallucinations. Notes visual hallucinations that most likely related to EtOH withdrawal.   Outpatient treatment: Venlafaxine 225 mg daily.   Has not been able to establish with Psychiatry outpatient.    psychiatry consulted     AICD (automatic cardioverter/defibrillator) present- (present on admission)  Assessment & Plan  -Implanted approx 3 years ago with cardiology  -Placed July 2020 d/t AICD implant for polymorphic VT, long QT, and atrial fibrillation.   On digoxin outpatient.             VTE prophylaxis:    therapeutic anticoagulation with eliquis 5 mg BID      I have performed a physical exam and reviewed and updated ROS and Plan today (12/1/2023).  In review of yesterday's note (11/30/2023), there are no changes except as documented above.    Ms. Hutchison is critically ill. 36 minutes of critical care time were spent with patient, nursing, pharmacy, and in specific management of active titration of an IV precedex drip in the IMCU. Please refer to orders.

## 2023-12-02 PROCEDURE — A9270 NON-COVERED ITEM OR SERVICE: HCPCS | Performed by: STUDENT IN AN ORGANIZED HEALTH CARE EDUCATION/TRAINING PROGRAM

## 2023-12-02 PROCEDURE — 770000 HCHG ROOM/CARE - INTERMEDIATE ICU *

## 2023-12-02 PROCEDURE — A9270 NON-COVERED ITEM OR SERVICE: HCPCS | Performed by: NURSE PRACTITIONER

## 2023-12-02 PROCEDURE — A9270 NON-COVERED ITEM OR SERVICE: HCPCS

## 2023-12-02 PROCEDURE — A9270 NON-COVERED ITEM OR SERVICE: HCPCS | Performed by: HOSPITALIST

## 2023-12-02 PROCEDURE — 700102 HCHG RX REV CODE 250 W/ 637 OVERRIDE(OP): Performed by: NURSE PRACTITIONER

## 2023-12-02 PROCEDURE — 700102 HCHG RX REV CODE 250 W/ 637 OVERRIDE(OP)

## 2023-12-02 PROCEDURE — 700102 HCHG RX REV CODE 250 W/ 637 OVERRIDE(OP): Performed by: HOSPITALIST

## 2023-12-02 PROCEDURE — 700111 HCHG RX REV CODE 636 W/ 250 OVERRIDE (IP): Performed by: NURSE PRACTITIONER

## 2023-12-02 PROCEDURE — 700105 HCHG RX REV CODE 258: Performed by: NURSE PRACTITIONER

## 2023-12-02 PROCEDURE — 700102 HCHG RX REV CODE 250 W/ 637 OVERRIDE(OP): Performed by: STUDENT IN AN ORGANIZED HEALTH CARE EDUCATION/TRAINING PROGRAM

## 2023-12-02 PROCEDURE — 99291 CRITICAL CARE FIRST HOUR: CPT | Performed by: HOSPITALIST

## 2023-12-02 RX ORDER — ACETAMINOPHEN 325 MG/1
650 TABLET ORAL EVERY 4 HOURS PRN
Status: DISCONTINUED | OUTPATIENT
Start: 2023-12-02 | End: 2023-12-14 | Stop reason: HOSPADM

## 2023-12-02 RX ORDER — ACETAMINOPHEN 325 MG/1
650 TABLET ORAL ONCE
Status: COMPLETED | OUTPATIENT
Start: 2023-12-02 | End: 2023-12-02

## 2023-12-02 RX ADMIN — CHLORDIAZEPOXIDE HYDROCHLORIDE 50 MG: 25 CAPSULE ORAL at 22:49

## 2023-12-02 RX ADMIN — APIXABAN 5 MG: 5 TABLET, FILM COATED ORAL at 17:07

## 2023-12-02 RX ADMIN — SODIUM CHLORIDE, POTASSIUM CHLORIDE, SODIUM LACTATE AND CALCIUM CHLORIDE: 600; 310; 30; 20 INJECTION, SOLUTION INTRAVENOUS at 08:18

## 2023-12-02 RX ADMIN — LORAZEPAM 1 MG: 1 TABLET ORAL at 20:25

## 2023-12-02 RX ADMIN — ACETAMINOPHEN 650 MG: 325 TABLET, FILM COATED ORAL at 15:04

## 2023-12-02 RX ADMIN — CHLORDIAZEPOXIDE HYDROCHLORIDE 50 MG: 25 CAPSULE ORAL at 10:02

## 2023-12-02 RX ADMIN — DOCUSATE SODIUM 50 MG AND SENNOSIDES 8.6 MG 2 TABLET: 8.6; 5 TABLET, FILM COATED ORAL at 17:07

## 2023-12-02 RX ADMIN — ACETAMINOPHEN 650 MG: 325 TABLET, FILM COATED ORAL at 20:25

## 2023-12-02 RX ADMIN — ACETAMINOPHEN 650 MG: 325 TABLET, FILM COATED ORAL at 23:21

## 2023-12-02 RX ADMIN — LORAZEPAM 0.5 MG: 2 INJECTION INTRAMUSCULAR; INTRAVENOUS at 12:03

## 2023-12-02 RX ADMIN — CHLORDIAZEPOXIDE HYDROCHLORIDE 50 MG: 25 CAPSULE ORAL at 17:07

## 2023-12-02 RX ADMIN — LORAZEPAM 1 MG: 1 TABLET ORAL at 16:03

## 2023-12-02 ASSESSMENT — LIFESTYLE VARIABLES
AGITATION: SOMEWHAT MORE THAN NORMAL ACTIVITY
ORIENTATION AND CLOUDING OF SENSORIUM: DATE DISORIENTATION BY MORE THAN TWO CALENDAR DAYS
TOTAL SCORE: 8
ANXIETY: MILDLY ANXIOUS
HEADACHE, FULLNESS IN HEAD: VERY MILD
NAUSEA AND VOMITING: NO NAUSEA AND NO VOMITING
AUDITORY DISTURBANCES: NOT PRESENT
ANXIETY: NO ANXIETY (AT EASE)
VISUAL DISTURBANCES: NOT PRESENT
TOTAL SCORE: 10
TOTAL SCORE: 6
TREMOR: TREMOR NOT VISIBLE BUT CAN BE FELT, FINGERTIP TO FINGERTIP
VISUAL DISTURBANCES: NOT PRESENT
ANXIETY: NO ANXIETY (AT EASE)
AUDITORY DISTURBANCES: NOT PRESENT
TOTAL SCORE: 5
TREMOR: NO TREMOR
AGITATION: SOMEWHAT MORE THAN NORMAL ACTIVITY
AGITATION: NORMAL ACTIVITY
AUDITORY DISTURBANCES: NOT PRESENT
HEADACHE, FULLNESS IN HEAD: MILD
TREMOR: TREMOR NOT VISIBLE BUT CAN BE FELT, FINGERTIP TO FINGERTIP
HEADACHE, FULLNESS IN HEAD: NOT PRESENT
ORIENTATION AND CLOUDING OF SENSORIUM: DISORIENTED FOR PLACE AND / OR PERSON
HEADACHE, FULLNESS IN HEAD: VERY MILD
PAROXYSMAL SWEATS: BARELY PERCEPTIBLE SWEATING. PALMS MOIST
VISUAL DISTURBANCES: NOT PRESENT
ORIENTATION AND CLOUDING OF SENSORIUM: DATE DISORIENTATION BY MORE THAN TWO CALENDAR DAYS
AGITATION: SOMEWHAT MORE THAN NORMAL ACTIVITY
AGITATION: NORMAL ACTIVITY
NAUSEA AND VOMITING: NO NAUSEA AND NO VOMITING
PAROXYSMAL SWEATS: BARELY PERCEPTIBLE SWEATING. PALMS MOIST
ANXIETY: MILDLY ANXIOUS
ANXIETY: *
PAROXYSMAL SWEATS: BARELY PERCEPTIBLE SWEATING. PALMS MOIST
AUDITORY DISTURBANCES: NOT PRESENT
NAUSEA AND VOMITING: NO NAUSEA AND NO VOMITING
TREMOR: TREMOR NOT VISIBLE BUT CAN BE FELT, FINGERTIP TO FINGERTIP
PAROXYSMAL SWEATS: BARELY PERCEPTIBLE SWEATING. PALMS MOIST
TREMOR: TREMOR NOT VISIBLE BUT CAN BE FELT, FINGERTIP TO FINGERTIP
PAROXYSMAL SWEATS: BARELY PERCEPTIBLE SWEATING. PALMS MOIST
AUDITORY DISTURBANCES: NOT PRESENT
VISUAL DISTURBANCES: NOT PRESENT
HEADACHE, FULLNESS IN HEAD: NOT PRESENT
NAUSEA AND VOMITING: NO NAUSEA AND NO VOMITING
ORIENTATION AND CLOUDING OF SENSORIUM: DATE DISORIENTATION BY MORE THAN TWO CALENDAR DAYS
VISUAL DISTURBANCES: NOT PRESENT
ORIENTATION AND CLOUDING OF SENSORIUM: DISORIENTED FOR PLACE AND / OR PERSON
TOTAL SCORE: 8
NAUSEA AND VOMITING: NO NAUSEA AND NO VOMITING

## 2023-12-02 ASSESSMENT — PAIN DESCRIPTION - PAIN TYPE
TYPE: ACUTE PAIN

## 2023-12-02 NOTE — PROGRESS NOTES
Hospital Medicine Daily Progress Note    Date of Service  12/2/2023    Chief Complaint  Jeanie Hutchison is a 78 y.o. female admitted 11/29/2023 with alcohol intoxication.    Hospital Course  Ms. Jeanie Hutchison is a 78 y.o. female with PMHx of Atrial fibrillation, AICD, Hypothyroidism, Depression, ETOH use disorder who was admitted on 11/30 for alcohol intoxication and Afib with RVR.     Patient notes she has been drinking 1 pint per day of Vodka for past 2 years and in past week increased volume. She was suggested to pursue detoxification by his son which is why she is here in the hospital. Notes she is currently having visual hallucinations (cartoon-like images), feels anxious, mild hand tremors, and no other specific symptoms. Denies suicidal or homicidal ideations. No prior h.o withdrawal seizures or need for ICU admission d/t ETOH withdrawal.      In ER, patient was resting comfortably in bed, no acute distress. She is very pleasant. No electrolyte imbalances. EKG shows Afib w/RVR. Has received Metoprolol pushes, 5 mg IV, x3. Resumed all her home medications. Patient also received Lorazepam 2 mg IV, Librium 25 mg PO and  rally bag IV. Patient placed on [facility] protocol for alcohol withdrawal management. Patient admitted to hospital medicine for management of care.           She required transfer to the IMCU due to escalating detox symptoms requiring an IV Precedex drip.  Interval Problem Update  12/1: Ms. Hutchison was evaluated and examined in the IMCU. She is requiring titration of an IV Precedex drip due to severe alcohol detox. She is tolerating oral meds with prompting.   12/2: Ms. Hutchison was seen in the IMCU. She has required an IV precedex drip with scheduled Librium. A sitter is at bedside. She has been in afib. IV fluids running. She is a non-historian this morning.     I have discussed this patient's plan of care and discharge plan at IDT rounds today with Case Management, Nursing, Nursing  leadership, and other members of the IDT team.    Consultants/Specialty  psychiatry    Code Status  Full Code    Disposition  The patient is not medically cleared for discharge to home or a post-acute facility.  Anticipate discharge to: a psychiatric hospital    I have placed the appropriate orders for post-discharge needs.    Review of Systems  Review of Systems   Unable to perform ROS: Mental acuity        Physical Exam  Temp:  [37 °C (98.6 °F)] 37 °C (98.6 °F)  Pulse:  [] 75  Resp:  [32-68] 67  BP: (101-158)/() 101/47  SpO2:  [83 %-98 %] 96 %    Physical Exam  Vitals and nursing note reviewed.   Constitutional:       Appearance: She is ill-appearing and toxic-appearing.   HENT:      Mouth/Throat:      Mouth: Mucous membranes are dry.   Cardiovascular:      Rate and Rhythm: Tachycardia present. Rhythm irregular.   Pulmonary:      Effort: Pulmonary effort is normal.      Breath sounds: Normal breath sounds. No rales.      Comments: 1 liter nasal cannula oxygen  Abdominal:      General: There is no distension.      Palpations: Abdomen is soft.      Tenderness: There is no abdominal tenderness.   Musculoskeletal:      Cervical back: Neck supple.      Right lower leg: No edema.      Left lower leg: No edema.   Neurological:      Comments: Somnolent, minimally verbal  She is oriented to person only         Fluids    Intake/Output Summary (Last 24 hours) at 12/2/2023 0724  Last data filed at 12/2/2023 0719  Gross per 24 hour   Intake 133.23 ml   Output 300 ml   Net -166.77 ml         Laboratory  Recent Labs     11/29/23  1600 12/01/23  0520   WBC 4.9 4.6*   RBC 4.58 4.04*   HEMOGLOBIN 15.3 13.5   HEMATOCRIT 44.9 39.7   MCV 98.0* 98.3*   MCH 33.4* 33.4*   MCHC 34.1 34.0   RDW 57.1* 52.0*   PLATELETCT 97* 56*   MPV 9.2 10.3       Recent Labs     11/29/23  1600 11/30/23  0504 12/01/23  0520   SODIUM 143 141 138   POTASSIUM 4.6 4.2 3.8   CHLORIDE 101 103 102   CO2 24 24 28   GLUCOSE 72 91 103*   BUN 11 10 8    CREATININE 0.45* 0.40* 0.29*   CALCIUM 9.1 8.3* 8.2*                     Imaging  DX-CHEST-LIMITED (1 VIEW)   Final Result         1.  Right basilar atelectasis, no focal infiltrate           Assessment/Plan  * Alcohol withdrawal (HCC)- (present on admission)  Assessment & Plan  Patient has h/o drinking 1 pint per day for past 2 years. In past week she has increased   She developed delirium tremens and was transferred to the [facility] for initiation and titration of an IV Precedex drip  Status post IV rally bag    start oral Librium 50 mg PO q  6hrs in order to mitigate seizures and IV ativan if she cannot tolerate oral intake.  Thiamine 100 mg PO daily, folic acid 1 mg, Multivitamin PO daily.       Atrial fibrillation with RVR (HCC)- (present on admission)  Assessment & Plan  Chronic diagnosis.  She is on Digoxin 125 mcg daily, Toprol 100 mg daily, and Eliquis 5 mg BID outpatient all of which have been restarted  Last echocardiogram (01/2020): no structural or valvular abnormalities. She does not have any evidence of heart failure thus an echo is not indicated.  TSH (05/2023): 4.120  She developed rapid ventricular response likely due to alcohol withdrawal and has required IV metoprolol and transfer to the [facility].         Hypothyroidism- (present on admission)  Assessment & Plan  Known history. Last TSH (05/2023): 4.120  Outpatient: Levothyroxine 50 mcg daily.       Major depressive disorder with current active episode- (present on admission)  Assessment & Plan  Known diagnosis in setting of unfortunate loss of her 2 daughters and  in the past 3 years. Patient denies active suicidal or homicidal ideations. No auditory hallucinations. Notes visual hallucinations that most likely related to EtOH withdrawal.   Outpatient treatment: Venlafaxine 225 mg daily.   Has not been able to establish with Psychiatry outpatient.    psychiatry consulted     AICD (automatic cardioverter/defibrillator) present- (present on  admission)  Assessment & Plan  -Implanted approx 3 years ago with cardiology  -Placed July 2020 d/t AICD implant for polymorphic VT, long QT, and atrial fibrillation.   On digoxin outpatient.             VTE prophylaxis:    therapeutic anticoagulation with eliquis 5 mg BID      I have performed a physical exam and reviewed and updated ROS and Plan today (12/2/2023). In review of yesterday's note (12/1/2023), there are no changes except as documented above.    Ms. Hutchison is critically ill. 34 minutes of critical care time were spent with patient, nursing, pharmacy, and in specific management of active titration of an IV Precedex drip in the IMCU. Please see orders.

## 2023-12-02 NOTE — CARE PLAN
The patient is Watcher - Medium risk of patient condition declining or worsening    Shift Goals  Clinical Goals: CIWA, safety  Patient Goals: DANICA  Family Goals: DANICA    Progress made toward(s) clinical / shift goals:    Problem: Knowledge Deficit - Standard  Goal: Patient and family/care givers will demonstrate understanding of plan of care, disease process/condition, diagnostic tests and medications  Outcome: Progressing     Problem: Lifestyle Changes  Goal: Patient's ability to identify lifestyle changes and available resources to help reduce recurrence of condition will improve  Outcome: Progressing     Problem: Psychosocial  Goal: Patient's level of anxiety will decrease  Outcome: Progressing     Problem: Optimal Care for Alcohol Withdrawal  Goal: Optimal Care for the alcohol withdrawal patient  Outcome: Progressing     Problem: Seizure Precautions  Goal: Implementation of seizure precautions  Outcome: Progressing     Problem: Pain - Standard  Goal: Alleviation of pain or a reduction in pain to the patient’s comfort goal  Outcome: Progressing       Patient is not progressing towards the following goals:

## 2023-12-02 NOTE — PROGRESS NOTES
Assumed care of pt at 1900. Pt was confused and not directablle, not able to follow commands. Precedex drip restarted and titrated per orders. Telesitter replaced with 1:1 sitter per orders. Pt has been calm and not complaining of pain.    12 Hour Monitor Summary  Afib 90-110s  Occasional PVCs

## 2023-12-02 NOTE — CARE PLAN
The patient is Watcher - Medium risk of patient condition declining or worsening    Shift Goals  Clinical Goals: CIWA  Patient Goals: DANICA  Family Goals: DANICA    Progress made toward(s) clinical / shift goals:    Problem: Pain - Standard  Goal: Alleviation of pain or a reduction in pain to the patient’s comfort goal  Outcome: Progressing, pt has no complaints of pain.      Problem: Optimal Care for Alcohol Withdrawal  Goal: Optimal Care for the alcohol withdrawal patient  Outcome: Progressing, pt currently being treated for ETOH.      Problem: Seizure Precautions  Goal: Implementation of seizure precautions  Outcome: Progressing, pt has seizure precautions in place.        Patient is not progressing towards the following goals:

## 2023-12-02 NOTE — CONSULTS
"  [facility] PSYCHIATRIC FOLLOW-UP:(established)  *Reason for admission:  admitted on 11/30 for alcohol intoxication and Afib with RVR. Hx of depression.  *Legal Hold Status: on legal hold for inability to care for self             S:  off precedex but on librium and very somnolent. Says she does not feel well and that her back is hurting and that this is new. Denies any pain on urination or abdominal pain.     Mentioned grand mal. When asked she noted \"Im really confused\" then told me that her grandmother and grandfather had a brain aneurysm. Asked is she'd had any studies of her brain, she doesn't think so.    Denies SI, denies hallucinations right now.    O: Medical ROS (as pertinent):                      *Psychiatric Examination:   Vitals:   Vitals:    12/02/23 0600 12/02/23 0800 12/02/23 1000 12/02/23 1200   BP: 101/47 121/82 124/66 130/83   Pulse: 75 74 77 84   Resp: (!) 67 20 20 20   Temp:  36.3 °C (97.3 °F)  36.3 °C (97.4 °F)   TempSrc:  Temporal  Temporal   SpO2: 96% 98% 91% 93%   Weight:       Height:         General Appearance:  poor eye contact, trying to cooperate  Abnormal Movements: none   Gait and Posture: not observed  Speech: soft and slowed  Thought Process: slow rate  Associations:    confused, at times non sequitur  Abnormal or Psychotic Thoughts: confused  Judgement and Insight: impaired   Orientation:  self and [facility]  Recent and Remote Memory: impaired  Attention Span and Concentration: distracted  Language:fluent  Fund of Knowledge: not tested  Mood and Affect:  confused and not feeling well  SI/HI:  suicidal - no        Current Medications:  Scheduled Medications   Medication Dose Frequency    senna-docusate  2 Tablet BID    chlordiazePOXIDE  50 mg Q6HRS    levothyroxine  50 mcg AM ES    venlafaxine XR  225 mg DAILY    thiamine  100 mg DAILY    And    folic acid  1 mg DAILY    And    multivitamin  1 Tablet DAILY    apixaban  5 mg BID    digoxin  125 mcg DAILY    metoprolol " SR  100 mg Q DAY        *ASSESSMENT/RECOMENDATIONS:  1. Major Depression recurent, severe, without psychosis  2  Alcohol use disorder severe  3  Alcohol withdrawal  delirum     Medical:   -a fib with RVR  -hypothyroidism  -AICD      Legal hold: on legal hold  Sitter: 1:1 given psychiatric hold  Phone/ipad: Allowed with sitter supervision  Visitors: Allowed, son only  Personal belongings:phone and ipad only     Discussed/voalted: JOSEPHINE Redd MD     Medication and Other Recommendations: final orders as per Tx Tm  No changes     Will continue to follow with you.        Discharge recommendations: inpt psych per primary psych team      If released from [facility]: Discharge Instructions:  -Reviewed safety plan: 911, ER, PCM, MHC, Suicide crisis line  -Please assist with outpatient Psychiatric/substance use follow up appointments at discharge once medically cleared.

## 2023-12-03 PROCEDURE — A9270 NON-COVERED ITEM OR SERVICE: HCPCS

## 2023-12-03 PROCEDURE — 700102 HCHG RX REV CODE 250 W/ 637 OVERRIDE(OP): Performed by: HOSPITALIST

## 2023-12-03 PROCEDURE — 700102 HCHG RX REV CODE 250 W/ 637 OVERRIDE(OP): Performed by: STUDENT IN AN ORGANIZED HEALTH CARE EDUCATION/TRAINING PROGRAM

## 2023-12-03 PROCEDURE — A9270 NON-COVERED ITEM OR SERVICE: HCPCS | Performed by: STUDENT IN AN ORGANIZED HEALTH CARE EDUCATION/TRAINING PROGRAM

## 2023-12-03 PROCEDURE — A9270 NON-COVERED ITEM OR SERVICE: HCPCS | Performed by: HOSPITALIST

## 2023-12-03 PROCEDURE — 99233 SBSQ HOSP IP/OBS HIGH 50: CPT | Performed by: HOSPITALIST

## 2023-12-03 PROCEDURE — 770020 HCHG ROOM/CARE - TELE (206)

## 2023-12-03 PROCEDURE — 700102 HCHG RX REV CODE 250 W/ 637 OVERRIDE(OP): Performed by: NURSE PRACTITIONER

## 2023-12-03 PROCEDURE — 700111 HCHG RX REV CODE 636 W/ 250 OVERRIDE (IP): Performed by: NURSE PRACTITIONER

## 2023-12-03 PROCEDURE — 700102 HCHG RX REV CODE 250 W/ 637 OVERRIDE(OP)

## 2023-12-03 PROCEDURE — A9270 NON-COVERED ITEM OR SERVICE: HCPCS | Performed by: NURSE PRACTITIONER

## 2023-12-03 RX ORDER — TRAZODONE HYDROCHLORIDE 50 MG/1
50 TABLET ORAL
Status: DISCONTINUED | OUTPATIENT
Start: 2023-12-03 | End: 2023-12-05

## 2023-12-03 RX ORDER — CHLORDIAZEPOXIDE HYDROCHLORIDE 25 MG/1
25 CAPSULE, GELATIN COATED ORAL EVERY 8 HOURS
Status: COMPLETED | OUTPATIENT
Start: 2023-12-03 | End: 2023-12-06

## 2023-12-03 RX ADMIN — CHLORDIAZEPOXIDE HYDROCHLORIDE 50 MG: 25 CAPSULE ORAL at 05:04

## 2023-12-03 RX ADMIN — LABETALOL HYDROCHLORIDE 10 MG: 5 INJECTION, SOLUTION INTRAVENOUS at 05:03

## 2023-12-03 RX ADMIN — APIXABAN 5 MG: 5 TABLET, FILM COATED ORAL at 05:04

## 2023-12-03 RX ADMIN — LORAZEPAM 0.5 MG: 0.5 TABLET ORAL at 08:03

## 2023-12-03 RX ADMIN — LEVOTHYROXINE SODIUM 50 MCG: 50 TABLET ORAL at 05:04

## 2023-12-03 RX ADMIN — LORAZEPAM 1 MG: 1 TABLET ORAL at 02:47

## 2023-12-03 RX ADMIN — DIGOXIN 125 MCG: 0.25 TABLET ORAL at 05:04

## 2023-12-03 RX ADMIN — METOPROLOL SUCCINATE 100 MG: 25 TABLET, FILM COATED, EXTENDED RELEASE ORAL at 06:57

## 2023-12-03 RX ADMIN — LORAZEPAM 0.5 MG: 0.5 TABLET ORAL at 12:29

## 2023-12-03 RX ADMIN — CHLORDIAZEPOXIDE HYDROCHLORIDE 25 MG: 25 CAPSULE ORAL at 20:41

## 2023-12-03 RX ADMIN — LABETALOL HYDROCHLORIDE 10 MG: 5 INJECTION, SOLUTION INTRAVENOUS at 16:00

## 2023-12-03 RX ADMIN — LORAZEPAM 0.5 MG: 0.5 TABLET ORAL at 20:41

## 2023-12-03 RX ADMIN — Medication 100 MG: at 05:04

## 2023-12-03 RX ADMIN — FOLIC ACID 1 MG: 1 TABLET ORAL at 05:04

## 2023-12-03 RX ADMIN — TRAZODONE HYDROCHLORIDE 50 MG: 50 TABLET ORAL at 20:41

## 2023-12-03 RX ADMIN — THERA TABS 1 TABLET: TAB at 05:04

## 2023-12-03 RX ADMIN — APIXABAN 5 MG: 5 TABLET, FILM COATED ORAL at 16:59

## 2023-12-03 RX ADMIN — DOCUSATE SODIUM 50 MG AND SENNOSIDES 8.6 MG 2 TABLET: 8.6; 5 TABLET, FILM COATED ORAL at 16:59

## 2023-12-03 RX ADMIN — CHLORDIAZEPOXIDE HYDROCHLORIDE 25 MG: 25 CAPSULE ORAL at 13:20

## 2023-12-03 RX ADMIN — VENLAFAXINE HYDROCHLORIDE 225 MG: 75 CAPSULE, EXTENDED RELEASE ORAL at 05:07

## 2023-12-03 RX ADMIN — LORAZEPAM 1 MG: 1 TABLET ORAL at 16:59

## 2023-12-03 ASSESSMENT — LIFESTYLE VARIABLES
NAUSEA AND VOMITING: NO NAUSEA AND NO VOMITING
PAROXYSMAL SWEATS: NO SWEAT VISIBLE
ANXIETY: MILDLY ANXIOUS
TREMOR: TREMOR NOT VISIBLE BUT CAN BE FELT, FINGERTIP TO FINGERTIP
AGITATION: SOMEWHAT MORE THAN NORMAL ACTIVITY
VISUAL DISTURBANCES: NOT PRESENT
HEADACHE, FULLNESS IN HEAD: NOT PRESENT
TOTAL SCORE: 7
HEADACHE, FULLNESS IN HEAD: VERY MILD
AUDITORY DISTURBANCES: NOT PRESENT
PAROXYSMAL SWEATS: NO SWEAT VISIBLE
NAUSEA AND VOMITING: NO NAUSEA AND NO VOMITING
ORIENTATION AND CLOUDING OF SENSORIUM: CANNOT DO SERIAL ADDITIONS OR IS UNCERTAIN ABOUT DATE
ORIENTATION AND CLOUDING OF SENSORIUM: DATE DISORIENTATION BY MORE THAN TWO CALENDAR DAYS
AGITATION: SOMEWHAT MORE THAN NORMAL ACTIVITY
AGITATION: SOMEWHAT MORE THAN NORMAL ACTIVITY
NAUSEA AND VOMITING: NO NAUSEA AND NO VOMITING
AGITATION: NORMAL ACTIVITY
AGITATION: NORMAL ACTIVITY
ORIENTATION AND CLOUDING OF SENSORIUM: DATE DISORIENTATION BY NO MORE THAN TWO CALENDAR DAYS
PAROXYSMAL SWEATS: NO SWEAT VISIBLE
NAUSEA AND VOMITING: NO NAUSEA AND NO VOMITING
PAROXYSMAL SWEATS: NO SWEAT VISIBLE
TOTAL SCORE: 4
TREMOR: NO TREMOR
NAUSEA AND VOMITING: NO NAUSEA AND NO VOMITING
AUDITORY DISTURBANCES: NOT PRESENT
HEADACHE, FULLNESS IN HEAD: NOT PRESENT
ORIENTATION AND CLOUDING OF SENSORIUM: DATE DISORIENTATION BY MORE THAN TWO CALENDAR DAYS
TREMOR: *
PAROXYSMAL SWEATS: NO SWEAT VISIBLE
TOTAL SCORE: 4
AUDITORY DISTURBANCES: NOT PRESENT
VISUAL DISTURBANCES: NOT PRESENT
VISUAL DISTURBANCES: NOT PRESENT
NAUSEA AND VOMITING: NO NAUSEA AND NO VOMITING
VISUAL DISTURBANCES: NOT PRESENT
ANXIETY: *
TOTAL SCORE: 10
TREMOR: *
ANXIETY: *
TOTAL SCORE: 10
AGITATION: MODERATELY FIDGETY AND RESTLESS
HEADACHE, FULLNESS IN HEAD: NOT PRESENT
HEADACHE, FULLNESS IN HEAD: NOT PRESENT
VISUAL DISTURBANCES: NOT PRESENT
AUDITORY DISTURBANCES: NOT PRESENT
HEADACHE, FULLNESS IN HEAD: NOT PRESENT
VISUAL DISTURBANCES: NOT PRESENT
TREMOR: TREMOR NOT VISIBLE BUT CAN BE FELT, FINGERTIP TO FINGERTIP
AUDITORY DISTURBANCES: NOT PRESENT
NAUSEA AND VOMITING: NO NAUSEA AND NO VOMITING
ANXIETY: NO ANXIETY (AT EASE)
ANXIETY: *
PAROXYSMAL SWEATS: BARELY PERCEPTIBLE SWEATING. PALMS MOIST
ANXIETY: NO ANXIETY (AT EASE)
PAROXYSMAL SWEATS: NO SWEAT VISIBLE
TREMOR: TREMOR NOT VISIBLE BUT CAN BE FELT, FINGERTIP TO FINGERTIP
ANXIETY: MODERATELY ANXIOUS OR GUARDED, SO ANXIETY IS INFERRED
VISUAL DISTURBANCES: NOT PRESENT
ORIENTATION AND CLOUDING OF SENSORIUM: CANNOT DO SERIAL ADDITIONS OR IS UNCERTAIN ABOUT DATE
AUDITORY DISTURBANCES: NOT PRESENT
TOTAL SCORE: 7
HEADACHE, FULLNESS IN HEAD: VERY MILD
TOTAL SCORE: 5
AUDITORY DISTURBANCES: NOT PRESENT
TREMOR: TREMOR NOT VISIBLE BUT CAN BE FELT, FINGERTIP TO FINGERTIP
ORIENTATION AND CLOUDING OF SENSORIUM: CANNOT DO SERIAL ADDITIONS OR IS UNCERTAIN ABOUT DATE
AGITATION: *
ORIENTATION AND CLOUDING OF SENSORIUM: DISORIENTED FOR PLACE AND / OR PERSON

## 2023-12-03 ASSESSMENT — PAIN DESCRIPTION - PAIN TYPE
TYPE: ACUTE PAIN

## 2023-12-03 ASSESSMENT — FIBROSIS 4 INDEX: FIB4 SCORE: 11.67

## 2023-12-03 NOTE — PROGRESS NOTES
4 Eyes Skin Assessment Completed by SNEHA Arias and SNEHA Henao.    Head WDL  Ears Redness and Blanching  Nose WDL  Mouth WDL  Neck WDL  Breast/Chest WDL  Shoulder Blades WDL  Spine WDL  (R) Arm/Elbow/Hand Redness, Blanching, and Discoloration  (L) Arm/Elbow/Hand WDL  Abdomen WDL  Groin WDL  Scrotum/Coccyx/Buttocks WDL  (R) Leg WDL  (L) Leg WDL  (R) Heel/Foot/Toe WDL  (L) Heel/Foot/Toe WDL          Devices In Places Tele Box, Blood Pressure Cuff, and Pulse Ox      Interventions In Place Pillows    Possible Skin Injury No    Pictures Uploaded Into Epic Yes  Wound Consult Placed N/A  RN Wound Prevention Protocol Ordered No

## 2023-12-03 NOTE — PROGRESS NOTES
Report called to Earlene HOOVER For transfer to [place] [id number]. Patient belongings gathered and ready for transfer.

## 2023-12-03 NOTE — CARE PLAN
The patient is Watcher - Medium risk of patient condition declining or worsening    Shift Goals  Clinical Goals: CIWA, reorient  Patient Goals: Rest  Family Goals: DANICA    Progress made toward(s) clinical / shift goals:    Problem: Pain - Standard  Goal: Alleviation of pain or a reduction in pain to the patient’s comfort goal  Outcome: Progressing, pt has minimal complaints of pain with current treatment plan.     Problem: Optimal Care for Alcohol Withdrawal  Goal: Optimal Care for the alcohol withdrawal patient  Outcome: Progressing, pt currently being treated under CIWA protocol.      Problem: Seizure Precautions  Goal: Implementation of seizure precautions  Outcome: Progressing, pt has seizure precautions in place.       Patient is not progressing towards the following goals:

## 2023-12-03 NOTE — PROGRESS NOTES
Hospital Medicine Daily Progress Note    Date of Service  12/3/2023    Chief Complaint  Jeanie Hutchison is a 78 y.o. female admitted 11/29/2023 with alcohol intoxication.    Hospital Course  Ms. Jeanie Hutchison is a 78 y.o. female with PMHx of Atrial fibrillation, AICD, Hypothyroidism, Depression, ETOH use disorder who was admitted on 11/30 for alcohol intoxication and Afib with RVR.     Patient notes she has been drinking 1 pint per day of Vodka for past 2 years and in past week increased volume. She was suggested to pursue detoxification by his son which is why she is here in the hospital. Notes she is currently having visual hallucinations (cartoon-like images), feels anxious, mild hand tremors, and no other specific symptoms. Denies suicidal or homicidal ideations. No prior h.o withdrawal seizures or need for ICU admission d/t ETOH withdrawal.      In ER, patient was resting comfortably in bed, no acute distress. She is very pleasant. No electrolyte imbalances. EKG shows Afib w/RVR. Has received Metoprolol pushes, 5 mg IV, x3. Resumed all her home medications. Patient also received Lorazepam 2 mg IV, Librium 25 mg PO and  rally bag IV. Patient placed on [facility] protocol for alcohol withdrawal management. Patient admitted to hospital medicine for management of care.           She required transfer to the IMCU due to escalating detox symptoms requiring an IV Precedex drip.  Interval Problem Update  12/1: Ms. Hutchison was evaluated and examined in the IMCU. She is requiring titration of an IV Precedex drip due to severe alcohol detox. She is tolerating oral meds with prompting.   12/2: Ms. Hutchison was seen in the IMCU. She has required an IV precedex drip with scheduled Librium. A sitter is at bedside. She has been in afib. IV fluids running. She is a non-historian this morning.   12/3: Ms. Hutchison was evaluated in the IMCU. The Precedex drip has been turned off. She has been on IV fluids due to poor oral intake.  The scheduled librium dose will be decreased from 50 mg qid to 25 mg TID. She has been in afib rate low 100's. [facility] protocol for IV ativan as needed.   I have discussed this patient's plan of care and discharge plan at IDT rounds today with Case Management, Nursing, Nursing leadership, and other members of the IDT team.    Consultants/Specialty  psychiatry    Code Status  Full Code    Disposition  The patient is not medically cleared for discharge to home or a post-acute facility.  Anticipate discharge to: a psychiatric hospital    I have placed the appropriate orders for post-discharge needs.    Review of Systems  Review of Systems   Unable to perform ROS: Mental acuity        Physical Exam  Temp:  [36.3 °C (97.3 °F)-36.4 °C (97.5 °F)] 36.3 °C (97.4 °F)  Pulse:  [] 111  Resp:  [20-88] 68  BP: (121-170)/() 144/89  SpO2:  [89 %-98 %] 89 %    Physical Exam  Vitals and nursing note reviewed.   Constitutional:       Appearance: She is ill-appearing. She is not toxic-appearing.   HENT:      Mouth/Throat:      Mouth: Mucous membranes are dry.   Cardiovascular:      Rate and Rhythm: Tachycardia present. Rhythm irregular.   Pulmonary:      Effort: Pulmonary effort is normal.      Breath sounds: Normal breath sounds. No rales.      Comments: 1 liter nasal cannula oxygen  Abdominal:      General: There is no distension.      Palpations: Abdomen is soft.      Tenderness: There is no abdominal tenderness.   Musculoskeletal:      Cervical back: Neck supple.      Right lower leg: No edema.      Left lower leg: No edema.   Neurological:      Comments: Somnolent, she is a poor historian but more awake today     Psychiatric:      Comments: Compliant with exam         Fluids    Intake/Output Summary (Last 24 hours) at 12/3/2023 0719  Last data filed at 12/3/2023 0347  Gross per 24 hour   Intake 5108.86 ml   Output 400 ml   Net 4708.86 ml         Laboratory  Recent Labs     12/01/23  0520   WBC 4.6*   RBC 4.04*    HEMOGLOBIN 13.5   HEMATOCRIT 39.7   MCV 98.3*   MCH 33.4*   MCHC 34.0   RDW 52.0*   PLATELETCT 56*   MPV 10.3       Recent Labs     12/01/23  0520   SODIUM 138   POTASSIUM 3.8   CHLORIDE 102   CO2 28   GLUCOSE 103*   BUN 8   CREATININE 0.29*   CALCIUM 8.2*                     Imaging  DX-CHEST-LIMITED (1 VIEW)   Final Result         1.  Right basilar atelectasis, no focal infiltrate           Assessment/Plan  * Alcohol withdrawal (HCC)- (present on admission)  Assessment & Plan  Patient has h/o drinking 1 pint per day for past 2 years. In past week she has increased   She developed delirium tremens and was transferred to the [facility] for initiation and titration of an IV Precedex drip which was turned off 12/2.  Status post IV rally bag   Decrease the scheduled Librium from 50 mg PO q  6hrs to 25 mg TID in order to mitigate seizures   Thiamine 100 mg PO daily, folic acid 1 mg, Multivitamin PO daily.   Initiate CIWA protocol.      Major depressive disorder with current active episode- (present on admission)  Assessment & Plan  Known diagnosis in setting of unfortunate loss of her 2 daughters and  in the past 3 years. Patient denies active suicidal or homicidal ideations. No auditory hallucinations. Notes visual hallucinations that most likely related to EtOH withdrawal.   Outpatient treatment: Venlafaxine 225 mg daily.   QTc is 476. She has requested restarting home trazodone 100 mg nightly. Given the risk of serotonin syndrome, start trazodone 50 mg qHS.  Has not been able to establish with Psychiatry outpatient.    psychiatry consulted   Sitter at bedside  She may benefit from inpatient psych upon discharge.    Atrial fibrillation with RVR (HCC)- (present on admission)  Assessment & Plan  Chronic diagnosis.  She is on Digoxin 125 mcg daily, Toprol 100 mg daily, and Eliquis 5 mg BID outpatient all of which have been restarted  Last echocardiogram (01/2020): no structural or valvular abnormalities. She does not  have any evidence of heart failure thus an echo is not indicated.  TSH (05/2023): 4.120  She developed rapid ventricular response likely due to alcohol withdrawal and has required IV metoprolol and transfer to the [facility].         Hypothyroidism- (present on admission)  Assessment & Plan  Known history. Last TSH (05/2023): 4.120  Outpatient: Levothyroxine 50 mcg daily.       AICD (automatic cardioverter/defibrillator) present- (present on admission)  Assessment & Plan  -Implanted approx 3 years ago with cardiology  -Placed July 2020 d/t AICD implant for polymorphic VT, long QT, and atrial fibrillation.   On digoxin outpatient.             VTE prophylaxis:    therapeutic anticoagulation with eliquis 5 mg BID      I have performed a physical exam and reviewed and updated ROS and Plan today (12/3/2023). In review of yesterday's note (12/2/2023), there are no changes except as documented above.

## 2023-12-03 NOTE — PROGRESS NOTES
Received report from previous RN, assumed care of patient. Patient is A&Ox1 to self only, vital signs are stable, on room air. Patient denies pain at this time, no signs of distress or discomfort. Sitting up in bed. Safety sitter at bedside. Call light and belongings within reach. Bed in lowest/locked position. Hourly rounding in place.

## 2023-12-03 NOTE — PROGRESS NOTES
Pt to T830 with ACLS RN on portable monitor. Legal hold paperwork and all belongings sent with pt including cell phone. Monitors notified.

## 2023-12-03 NOTE — CONSULTS
"  [facility] PSYCHIATRIC FOLLOW-UP:(established)  *Reason for admission:  admitted on  for alcohol intoxication and Afib with RVR. Hx of depression.  *Legal Hold Status: on legal hold for inability to care for self            S:  more alert but very confused. Does not know the date or where she is even when clues given. But she does say she has been very depressed, one dtr  of melanoma, the other of liver disease and her  also  but unable to get any details surronding these events, when they occurred. She has a living son who has \"ARDS\". Again no information.     Stated she was hungry and was given jelow. Able to eat but tremulous.     O: Medical ROS (as pertinent):                      *Psychiatric Examination:   Vitals:   Vitals:    23 0500 23 0533 23 0800 23 1010   BP: (!) 165/116 (!) 144/89 (!) 136/91    Pulse: (!) 119 (!) 111 (!) 104    Resp: (!) 56 (!) 68 14    Temp:    36.7 °C (98.1 °F)   TempSrc:    Temporal   SpO2: 91% 89% 94%    Weight:       Height:         General Appearance:better eye contact, trying to cooperate  Abnormal Movements: none   Gait and Posture: not observed  Speech: soft and slowed  Thought Process: slow rate  Associations:    confused, at times non sequitur  Abnormal or Psychotic Thoughts: confused  Judgement and Insight: impaired   Orientation:  self and [facility]  Recent and Remote Memory: impaired  Attention Span and Concentration: difficulty focusing  Language:fluent  Fund of Knowledge: not tested  Mood and Affect:  confused and depressed  SI/HI:  suicidal unable to assess        Current Medications:  Scheduled Medications   Medication Dose Frequency    traZODone  50 mg QHS    chlordiazePOXIDE  25 mg Q8HRS    senna-docusate  2 Tablet BID    levothyroxine  50 mcg AM ES    venlafaxine XR  225 mg DAILY    thiamine  100 mg DAILY    And    folic acid  1 mg DAILY    And    multivitamin  1 Tablet DAILY    apixaban  5 mg BID    digoxin "  125 mcg DAILY    metoprolol SR  100 mg Q DAY        *ASSESSMENT/RECOMENDATIONS:  1. Major Depression recurent, severe, without psychosis  2  Alcohol use disorder severe  3  Alcohol withdrawal  delirum     Medical:   -a fib with RVR  -hypothyroidism  -AICD      Legal hold: on legal hold  Sitter: 1:1 given psychiatric hold  Phone/ipad: Allowed with sitter supervision  Visitors: Allowed, son only  Personal belongings:phone and ipad only     Discussed/voalted: JOSEPHINE Redd MD     Medication and Other Recommendations: final orders as per Tx Tm  No changes     Will continue to follow with you.        Discharge recommendations: inpt psych per primary psych team      If released from [facility]: Discharge Instructions:  -Reviewed safety plan: 911, ER, PCM, MHC, Suicide crisis line  -Please assist with outpatient Psychiatric/substance use follow up appointments at discharge once medically

## 2023-12-03 NOTE — CARE PLAN
The patient is Watcher - Medium risk of patient condition declining or worsening    Shift Goals  Clinical Goals:  (Safety, CIWA)  Patient Goals: DANICA  Family Goals: DANICA    Progress made toward(s) clinical / shift goals:    Problem: Knowledge Deficit - Standard  Goal: Patient and family/care givers will demonstrate understanding of plan of care, disease process/condition, diagnostic tests and medications  Outcome: Progressing     Problem: Lifestyle Changes  Goal: Patient's ability to identify lifestyle changes and available resources to help reduce recurrence of condition will improve  Outcome: Progressing     Problem: Psychosocial  Goal: Patient's level of anxiety will decrease  Outcome: Progressing  Goal: Spiritual and cultural needs incorporated into hospitalization  Outcome: Progressing     Problem: Optimal Care for Alcohol Withdrawal  Goal: Optimal Care for the alcohol withdrawal patient  Outcome: Progressing     Problem: Seizure Precautions  Goal: Implementation of seizure precautions  Outcome: Progressing     Problem: Pain - Standard  Goal: Alleviation of pain or a reduction in pain to the patient’s comfort goal  Outcome: Progressing       Patient is not progressing towards the following goals:

## 2023-12-04 PROCEDURE — 97530 THERAPEUTIC ACTIVITIES: CPT

## 2023-12-04 PROCEDURE — A9270 NON-COVERED ITEM OR SERVICE: HCPCS | Performed by: INTERNAL MEDICINE

## 2023-12-04 PROCEDURE — 700102 HCHG RX REV CODE 250 W/ 637 OVERRIDE(OP): Performed by: NURSE PRACTITIONER

## 2023-12-04 PROCEDURE — 700102 HCHG RX REV CODE 250 W/ 637 OVERRIDE(OP)

## 2023-12-04 PROCEDURE — 770020 HCHG ROOM/CARE - TELE (206)

## 2023-12-04 PROCEDURE — A9270 NON-COVERED ITEM OR SERVICE: HCPCS | Performed by: HOSPITALIST

## 2023-12-04 PROCEDURE — 700102 HCHG RX REV CODE 250 W/ 637 OVERRIDE(OP): Performed by: INTERNAL MEDICINE

## 2023-12-04 PROCEDURE — A9270 NON-COVERED ITEM OR SERVICE: HCPCS | Performed by: NURSE PRACTITIONER

## 2023-12-04 PROCEDURE — 700102 HCHG RX REV CODE 250 W/ 637 OVERRIDE(OP): Performed by: HOSPITALIST

## 2023-12-04 PROCEDURE — A9270 NON-COVERED ITEM OR SERVICE: HCPCS

## 2023-12-04 PROCEDURE — 700102 HCHG RX REV CODE 250 W/ 637 OVERRIDE(OP): Performed by: STUDENT IN AN ORGANIZED HEALTH CARE EDUCATION/TRAINING PROGRAM

## 2023-12-04 PROCEDURE — 700105 HCHG RX REV CODE 258: Performed by: NURSE PRACTITIONER

## 2023-12-04 PROCEDURE — A9270 NON-COVERED ITEM OR SERVICE: HCPCS | Performed by: STUDENT IN AN ORGANIZED HEALTH CARE EDUCATION/TRAINING PROGRAM

## 2023-12-04 PROCEDURE — 99233 SBSQ HOSP IP/OBS HIGH 50: CPT | Performed by: INTERNAL MEDICINE

## 2023-12-04 PROCEDURE — 93005 ELECTROCARDIOGRAM TRACING: CPT | Performed by: INTERNAL MEDICINE

## 2023-12-04 RX ADMIN — CHLORDIAZEPOXIDE HYDROCHLORIDE 25 MG: 25 CAPSULE ORAL at 21:38

## 2023-12-04 RX ADMIN — APIXABAN 5 MG: 5 TABLET, FILM COATED ORAL at 16:35

## 2023-12-04 RX ADMIN — LORAZEPAM 1 MG: 1 TABLET ORAL at 01:12

## 2023-12-04 RX ADMIN — DIGOXIN 125 MCG: 0.25 TABLET ORAL at 06:00

## 2023-12-04 RX ADMIN — LORAZEPAM 0.5 MG: 0.5 TABLET ORAL at 21:58

## 2023-12-04 RX ADMIN — METOPROLOL SUCCINATE 100 MG: 25 TABLET, FILM COATED, EXTENDED RELEASE ORAL at 05:59

## 2023-12-04 RX ADMIN — CHLORDIAZEPOXIDE HYDROCHLORIDE 25 MG: 25 CAPSULE ORAL at 14:25

## 2023-12-04 RX ADMIN — DOCUSATE SODIUM 50 MG AND SENNOSIDES 8.6 MG 2 TABLET: 8.6; 5 TABLET, FILM COATED ORAL at 16:36

## 2023-12-04 RX ADMIN — APIXABAN 5 MG: 5 TABLET, FILM COATED ORAL at 05:59

## 2023-12-04 RX ADMIN — CHLORDIAZEPOXIDE HYDROCHLORIDE 25 MG: 25 CAPSULE ORAL at 06:00

## 2023-12-04 RX ADMIN — SODIUM CHLORIDE, POTASSIUM CHLORIDE, SODIUM LACTATE AND CALCIUM CHLORIDE: 600; 310; 30; 20 INJECTION, SOLUTION INTRAVENOUS at 14:25

## 2023-12-04 RX ADMIN — TRAZODONE HYDROCHLORIDE 50 MG: 50 TABLET ORAL at 21:38

## 2023-12-04 RX ADMIN — VENLAFAXINE HYDROCHLORIDE 225 MG: 75 CAPSULE, EXTENDED RELEASE ORAL at 05:59

## 2023-12-04 RX ADMIN — LEVOTHYROXINE SODIUM 50 MCG: 50 TABLET ORAL at 06:00

## 2023-12-04 RX ADMIN — LABETALOL HYDROCHLORIDE 10 MG: 5 INJECTION, SOLUTION INTRAVENOUS at 08:54

## 2023-12-04 ASSESSMENT — FIBROSIS 4 INDEX: FIB4 SCORE: 11.67

## 2023-12-04 ASSESSMENT — LIFESTYLE VARIABLES
ORIENTATION AND CLOUDING OF SENSORIUM: DATE DISORIENTATION BY NO MORE THAN TWO CALENDAR DAYS
ANXIETY: MILDLY ANXIOUS
AGITATION: NORMAL ACTIVITY
AGITATION: MODERATELY FIDGETY AND RESTLESS
AUDITORY DISTURBANCES: NOT PRESENT
TREMOR: TREMOR NOT VISIBLE BUT CAN BE FELT, FINGERTIP TO FINGERTIP
VISUAL DISTURBANCES: NOT PRESENT
PAROXYSMAL SWEATS: BARELY PERCEPTIBLE SWEATING. PALMS MOIST
AUDITORY DISTURBANCES: NOT PRESENT
AGITATION: NORMAL ACTIVITY
TREMOR: TREMOR NOT VISIBLE BUT CAN BE FELT, FINGERTIP TO FINGERTIP
PAROXYSMAL SWEATS: NO SWEAT VISIBLE
NAUSEA AND VOMITING: NO NAUSEA AND NO VOMITING
AGITATION: NORMAL ACTIVITY
NAUSEA AND VOMITING: NO NAUSEA AND NO VOMITING
TOTAL SCORE: 4
VISUAL DISTURBANCES: NOT PRESENT
PAROXYSMAL SWEATS: NO SWEAT VISIBLE
NAUSEA AND VOMITING: NO NAUSEA AND NO VOMITING
TREMOR: *
NAUSEA AND VOMITING: NO NAUSEA AND NO VOMITING
ORIENTATION AND CLOUDING OF SENSORIUM: DATE DISORIENTATION BY MORE THAN TWO CALENDAR DAYS
TOTAL SCORE: 4
TREMOR: TREMOR NOT VISIBLE BUT CAN BE FELT, FINGERTIP TO FINGERTIP
ANXIETY: NO ANXIETY (AT EASE)
PAROXYSMAL SWEATS: NO SWEAT VISIBLE
TOTAL SCORE: 10
ANXIETY: NO ANXIETY (AT EASE)
ORIENTATION AND CLOUDING OF SENSORIUM: DISORIENTED FOR PLACE AND / OR PERSON
ORIENTATION AND CLOUDING OF SENSORIUM: DATE DISORIENTATION BY MORE THAN TWO CALENDAR DAYS
TOTAL SCORE: 4
TREMOR: NO TREMOR
TOTAL SCORE: 7
HEADACHE, FULLNESS IN HEAD: NOT PRESENT
AGITATION: NORMAL ACTIVITY
ANXIETY: NO ANXIETY (AT EASE)
ANXIETY: NO ANXIETY (AT EASE)
VISUAL DISTURBANCES: NOT PRESENT
NAUSEA AND VOMITING: NO NAUSEA AND NO VOMITING
HEADACHE, FULLNESS IN HEAD: NOT PRESENT
AGITATION: NORMAL ACTIVITY
TREMOR: *
ORIENTATION AND CLOUDING OF SENSORIUM: DATE DISORIENTATION BY NO MORE THAN TWO CALENDAR DAYS
AUDITORY DISTURBANCES: NOT PRESENT
HEADACHE, FULLNESS IN HEAD: NOT PRESENT
AUDITORY DISTURBANCES: NOT PRESENT
HEADACHE, FULLNESS IN HEAD: NOT PRESENT
TOTAL SCORE: 3
VISUAL DISTURBANCES: NOT PRESENT
NAUSEA AND VOMITING: NO NAUSEA AND NO VOMITING
AUDITORY DISTURBANCES: NOT PRESENT
ANXIETY: MILDLY ANXIOUS
PAROXYSMAL SWEATS: NO SWEAT VISIBLE
ORIENTATION AND CLOUDING OF SENSORIUM: DATE DISORIENTATION BY MORE THAN TWO CALENDAR DAYS
VISUAL DISTURBANCES: NOT PRESENT
PAROXYSMAL SWEATS: NO SWEAT VISIBLE
VISUAL DISTURBANCES: NOT PRESENT
AUDITORY DISTURBANCES: NOT PRESENT

## 2023-12-04 ASSESSMENT — COGNITIVE AND FUNCTIONAL STATUS - GENERAL
MOBILITY SCORE: 9
WALKING IN HOSPITAL ROOM: A LOT
MOVING TO AND FROM BED TO CHAIR: UNABLE
CLIMB 3 TO 5 STEPS WITH RAILING: TOTAL
TURNING FROM BACK TO SIDE WHILE IN FLAT BAD: A LOT
SUGGESTED CMS G CODE MODIFIER MOBILITY: CM
STANDING UP FROM CHAIR USING ARMS: A LOT
MOVING FROM LYING ON BACK TO SITTING ON SIDE OF FLAT BED: UNABLE

## 2023-12-04 ASSESSMENT — GAIT ASSESSMENTS
ASSISTIVE DEVICE: FRONT WHEEL WALKER
GAIT LEVEL OF ASSIST: MODERATE ASSIST
DISTANCE (FEET): 3

## 2023-12-04 ASSESSMENT — PAIN DESCRIPTION - PAIN TYPE
TYPE: ACUTE PAIN
TYPE: ACUTE PAIN

## 2023-12-04 NOTE — THERAPY
Physical Therapy   Daily Treatment     Patient Name: Jeanie Hutchison  Age:  78 y.o., Sex:  female  Medical Record #: [id number]  Today's Date: 12/4/2023     Precautions  Precautions: Fall Risk;Swallow Precautions  Comments: Sz precautions; ETOH withdrawl    Assessment    Pt agreeable to PT tx session with encouragement.  She mobilized with grossly mod A for upright mobility as detailed below.  Pt continues to be limited by impulsivity, decreased activity tolerance, safety awareness, strength, balance, and sequencing.  Pt would benefit from sitting up in chair for all meals to prevent further deconditioning.  Will continue to follow.   Plan    Treatment Plan Status: Continue Current Treatment Plan  Type of Treatment: Bed Mobility, Gait Training, Neuro Re-Education / Balance, Stair Training, Therapeutic Activities, Therapeutic Exercise  Treatment Frequency: 4 Times per Week  Treatment Duration: Until Therapy Goals Met    DC Equipment Recommendations: Unable to determine at this time  Discharge Recommendations: Recommend post-acute placement for additional physical therapy services prior to discharge home     Objective     12/04/23 1218   Precautions   Precautions Fall Risk;Swallow Precautions   Comments Sz precautions; ETOH withdrawl   Pain 0 - 10 Group   Therapist Pain Assessment Post Activity Pain Same as Prior to Activity;Nurse Notified   Cognition    Cognition / Consciousness X   Speech/ Communication Delayed Responses   Level of Consciousness Alert   Ability To Follow Commands (Intermittently able to follow 1 step)   Safety Awareness Impaired;Impulsive   New Learning Impaired   Attention Impaired   Sequencing Impaired   Initiation Impaired   Comments Minimal verbal engagement, confused. Mildly perseverative on sitter seeing a certain scene in the movie on TV   Balance   Sitting Balance (Static) Poor +   Sitting Balance (Dynamic) Poor -   Standing Balance (Static) Poor   Standing Balance (Dynamic) Poor -   Weight  Shift Sitting Poor   Weight Shift Standing Poor   Skilled Intervention Verbal Cuing;Tactile Cuing;Facilitation   Bed Mobility    Supine to Sit Minimal Assist   Sit to Supine Standby Assist   Skilled Intervention Verbal Cuing;Tactile Cuing;Facilitation   Comments impulsively returned to supine at bottom of bed when cued to scoot toward head of bed   Gait Analysis   Gait Level Of Assist Moderate Assist   Assistive Device Front Wheel Walker   Distance (Feet) 3   Deviation (Shuffled, inconsistent steps; unsteady. Impulsively would turn backwards and sit back down on EOB)   Weight Bearing Status No restrictions   Skilled Intervention Verbal Cuing;Tactile Cuing;Facilitation   Functional Mobility   Sit to Stand Minimal Assist   Bed, Chair, Wheelchair Transfer Moderate Assist   Mobility STS x 4-5   Skilled Intervention Verbal Cuing;Tactile Cuing;Facilitation   Comments difficulty sequencing side steps along EOB, would move hips but not feet   Activity Tolerance   Sitting Edge of Bed 2-3 min   Standing ~2 min total   Short Term Goals    Short Term Goal # 1 Patient will perform supine-sit with HOB flat with supervision in 6 visits   Goal Outcome # 1 goal not met   Short Term Goal # 2 Patient will perform sit-stand and chair transfers with LRAD with supervision in 6 visits   Goal Outcome # 2 Goal not met   Short Term Goal # 3 Patient will ambulate > 50 feet with LRAD with supervision in 6 visits   Goal Outcome # 3 Goal not met   Short Term Goal # 4 Patient will negotiate 10 steps with rails with supervision in 6 visits   Goal Outcome # 4 Goal not met   Physical Therapy Treatment Plan   Physical Therapy Treatment Plan Continue Current Treatment Plan

## 2023-12-04 NOTE — CARE PLAN
The patient is Stable - Low risk of patient condition declining or worsening    Shift Goals  Clinical Goals: Pt will not score a CIWA higher than 12 this shift  Patient Goals: rest  Family Goals: DANICA    Progress made toward(s) clinical / shift goals:  All seizure precautions in place, 1-1 sitter in place, legal hold in place for inability to care for self, CIWA has remained below 12 this shift, pt does not complain of pain at this time, pt has remained free from falls on this admit    Patient is not progressing towards the following goals:      Problem: Optimal Care for Alcohol Withdrawal  Goal: Optimal Care for the alcohol withdrawal patient  Outcome: Progressing     Problem: Seizure Precautions  Goal: Implementation of seizure precautions  Outcome: Progressing     Problem: Pain - Standard  Goal: Alleviation of pain or a reduction in pain to the patient’s comfort goal  Outcome: Progressing

## 2023-12-04 NOTE — CARE PLAN
The patient is Stable - Low risk of patient condition declining or worsening    Shift Goals  Clinical Goals: Monitor and treat ETOH withdrawal  Patient Goals: rest  Family Goals: DANICA    Progress made toward(s) clinical / shift goals:   Problem: Optimal Care for Alcohol Withdrawal  Goal: Optimal Care for the alcohol withdrawal patient  Outcome: Progressing  Note: Pt medicated per MAR for ETOH withdrawal.       Patient is not progressing towards the following goals:

## 2023-12-04 NOTE — DISCHARGE PLANNING
Care Transition Team Assessment    CM reviewed chart and participated in IDT rounds. CM met with patient at bedside, there is a 1:1 sitter present.  She is sitting in bed, makes eye contact and answers questions while trying to process question.  Her eyes are very red.  CM called her son Ulices and he states that she is IADLs at home until she has had enough to drink that she is too weak to function.  He states usually it takes about a month or 6 weeks to get to this.  She does drive however, he has a breatholyzer on the car just as if she had a DUI.  She does not use O2 or any DME in the home.      Information Source  Orientation Level: Oriented to person, Disoriented to place, Disoriented to time, Disoriented to situation  Information Given By: Patient, Relative  Informant's Name: Ulices  Who is responsible for making decisions for patient? : Patient    Readmission Evaluation  Is this a readmission?: No    Elopement Risk  Legal Hold: Elopement Risk  Ambulatory or Self Mobile in Wheelchair: No-Not an Elopement Risk  Time of Legal Hold: 1235  Date of Legal Hold: 11/30/23  Elopement Risk: Not at Risk for Elopement  Wanderguard On: Unavailable  Personal Belongings: Hospital Clothing Only  Environmental Precautions: Sharp or Dangerous Items Removed    Interdisciplinary Discharge Planning  Lives with - Patient's Self Care Capacity: Adult Children  Housing / Facility: [address]  Prior Services: Unable To Determine At This Time    Discharge Preparedness  What is your plan after discharge?: Home with help  What are your discharge supports?: Other (comment) (grandson lives with her and drinks as well)  Prior Functional Level: Independent with Activities of Daily Living  Difficulity with ADLs: None  Difficulity with IADLs: None    Functional Assesment  Prior Functional Level: Independent with Activities of Daily Living    Finances  Financial Barriers to Discharge: No    Vision / Hearing Impairment  Right Eye Vision: Wears  Glasses  Left Eye Vision: Wears Glasses              Domestic Abuse  Have you ever been the victim of abuse or violence?: No    Psychological Assessment  History of Substance Abuse: Alcohol  Date Last Used - Alcohol: 11/30/23  Substance Abuse Comments: She will drink continuously over a month or so until she is too weak to stand or function    Discharge Risks or Barriers  Discharge risks or barriers?: Substance abuse, Non-adherence to medication or treatment  Patient risk factors: Noncompliance, Readmission, Substance abuse, Vulnerable adult    Anticipated Discharge Information  Discharge Disposition: D/T to psych hosp or distinct part unit (65)

## 2023-12-04 NOTE — PROGRESS NOTES
Received report from previous shift RN, assumed care of patient. Patient is A&Ox1, elevated BP, gave prns, vitals now stable. On room air. Patient denies pain at this time, no signs of distress or discomfort. Sitting up in bed for breakfast. Safety sitter at bedside. Call light and belongings within reach. Bed in lowest/locked position. Hourly rounding in place.    1019: notified by monitors pt going in and out of afib/aflutter; HR 80s-90s. MD Bellamy notified. EKG ordered.

## 2023-12-04 NOTE — PROGRESS NOTES
Bedside report received from SNEHA Arias. 1-1 sitter in place. Pt on RA. Patient A&O x 1, to self only. Pt does not complain of pain at this time. POC discussed with patient. Patient verbalizes understanding. Call light and belongings within reach. Bed locked in lowest position, alarm and fall precautions in place.

## 2023-12-04 NOTE — CONSULTS
"  [facility] PSYCHIATRIC FOLLOW-UP:(established)  *Reason for admission:  admitted on 11/30 for alcohol intoxication and Afib with RVR. Hx of depression.  *Legal Hold Status: on legal hold for inability to care for self     S:  she is more alert, able to give location and month and almost gave year, \"024\". She doesn't know how long she has been on effexor, if it works or not. Admits she is sleeping a lot. Not SI but not wanting to be a part of living either. Comments son is angry with her drinking. Says she would like to be in therapy. Still low energy and has trouble gathering thoughts and expressing them.    O: Medical ROS (as pertinent):                      *Psychiatric Examination:   Vitals:   Vitals:    12/03/23 2303 12/04/23 0418 12/04/23 0839 12/04/23 0854   BP: (!) 146/96 (!) 140/98 (!) 169/118 (!) 169/118   Pulse: (!) 109 78 82    Resp: 18 16 16    Temp: 36.5 °C (97.7 °F) 36.7 °C (98.1 °F) 36.3 °C (97.4 °F)    TempSrc: Temporal Temporal Temporal    SpO2: 100% 93% 91%    Weight:  70.1 kg (154 lb 8.7 oz)     Height:         General Appearance:better eye contact, able to cooperate partially  Abnormal Movements: none   Gait and Posture: not observed  Speech: soft and slowed  Thought Process: slow rate  Associations:   better organized, more linear  Abnormal or Psychotic Thoughts:much less confused  Judgement and Insight: impaired but improving  Orientation: almost x 4  Recent and Remote Memory: grossly intact  Attention Span and Concentration: difficulty focusing but can with effort  Language:fluent  Fund of Knowledge: not tested  Mood and Affect:    depressed  SI/HI:  not actively SI but not wanting to live either     Current Medications:  Scheduled Medications   Medication Dose Frequency    traZODone  50 mg QHS    chlordiazePOXIDE  25 mg Q8HRS    senna-docusate  2 Tablet BID    levothyroxine  50 mcg AM ES    venlafaxine XR  225 mg DAILY    apixaban  5 mg BID    digoxin  125 mcg DAILY    " metoprolol SR  100 mg Q DAY          *ASSESSMENT/RECOMENDATIONS:  1. Major Depression recurent, severe, without psychosis  2  Alcohol use disorder severe  3  Alcohol withdrawal encephalopathy improving     Medical:   -a fib with RVR  -hypothyroidism  -AICD     Legal hold: extended  Observation status:   Sitter: 1:1 given psychiatric hold  Phone/ipad: Allowed with sitter supervision  Visitors: Allowed, son only  Personal belongings:phone and ipad only       Medication and Other Recommendations: final orders as per Tx Tm  No changes but if lack of energy and motivation become a future problem, stimulants or modafinil may be an option.    Will continue to follow with you.  Dr Fields will be the psychiatrist.    Discharge recommendations: inpt psych    If released from [facility]: Discharge Instructions:  -Reviewed safety plan: 911, ER, PCM, MHC, Suicide crisis line  -Please assist with outpatient Psychiatric/substance use follow up appointments at discharge once medically cleared.

## 2023-12-04 NOTE — PROGRESS NOTES
Hospital Medicine Daily Progress Note    Date of Service  12/4/2023    Chief Complaint  Jeanie Hutchison is a 78 y.o. female admitted 11/29/2023 with alcohol intoxication.    Hospital Course  Ms. Jeanie Hutchison is a 78 y.o. female with PMHx of Atrial fibrillation, AICD, Hypothyroidism, Depression, ETOH use disorder who was admitted on 11/30 for alcohol intoxication and Afib with RVR.     Patient notes she has been drinking 1 pint per day of Vodka for past 2 years and in past week increased volume. She was suggested to pursue detoxification by his son which is why she is here in the hospital. Notes she is currently having visual hallucinations (cartoon-like images), feels anxious, mild hand tremors, and no other specific symptoms. Denies suicidal or homicidal ideations. No prior h.o withdrawal seizures or need for ICU admission d/t ETOH withdrawal.      In ER, patient was resting comfortably in bed, no acute distress. She is very pleasant. No electrolyte imbalances. EKG shows Afib w/RVR. Has received Metoprolol pushes, 5 mg IV, x3. Resumed all her home medications. Patient also received Lorazepam 2 mg IV, Librium 25 mg PO and  rally bag IV. Patient placed on [facility] protocol for alcohol withdrawal management. Patient admitted to hospital medicine for management of care.           She required transfer to the IMCU due to escalating detox symptoms requiring an IV Precedex drip.  Interval Problem Update  12/1: Ms. Hutchison was evaluated and examined in the IMCU. She is requiring titration of an IV Precedex drip due to severe alcohol detox. She is tolerating oral meds with prompting.   12/2: Ms. Hutchison was seen in the IMCU. She has required an IV precedex drip with scheduled Librium. A sitter is at bedside. She has been in afib. IV fluids running. She is a non-historian this morning.   12/3: Ms. Hutchison was evaluated in the IMCU. The Precedex drip has been turned off. She has been on IV fluids due to poor oral intake.  "The scheduled librium dose will be decreased from 50 mg qid to 25 mg TID. She has been in afib rate low 100's. [facility] protocol for IV ativan as needed.\"    12/4. I reviewed the chart along with vitals, labs, imaging, test (both pending and resulted) and recommendations from specialists and interdisciplinary team.  One to one sitter. She has poor insight but currently pleasant   COntinue CIWA protocol  Psychiatry following her and she has a one to one sitter. On legal hold that is extended.    Vitals:    12/04/23 1220   BP: (!) 144/97   Pulse: 69   Resp: 16   Temp: 36.6 °C (97.9 °F)   SpO2: 97%     HR stable. Continue Eliquis for Afib  I have discussed this patient's plan of care and discharge plan at IDT rounds today with Case Management, Nursing, Nursing leadership, and other members of the IDT team.    Consultants/Specialty  psychiatry    Code Status  Full Code    Disposition  Medically Cleared  I have placed the appropriate orders for post-discharge needs.    Review of Systems  Review of Systems   Unable to perform ROS: Mental acuity        Physical Exam  Temp:  [36.5 °C (97.7 °F)-37 °C (98.6 °F)] 36.7 °C (98.1 °F)  Pulse:  [] 78  Resp:  [16-20] 16  BP: (140-156)/() 140/98  SpO2:  [91 %-100 %] 93 %    Physical Exam  Vitals and nursing note reviewed.   Constitutional:       Appearance: She is ill-appearing. She is not toxic-appearing.   HENT:      Mouth/Throat:      Mouth: Mucous membranes are dry.   Cardiovascular:      Rate and Rhythm: Tachycardia present. Rhythm irregular.   Pulmonary:      Effort: Pulmonary effort is normal.      Breath sounds: Normal breath sounds. No rales.      Comments: 1 liter nasal cannula oxygen  Abdominal:      General: There is no distension.      Palpations: Abdomen is soft.      Tenderness: There is no abdominal tenderness.   Musculoskeletal:      Cervical back: Neck supple.      Right lower leg: No edema.      Left lower leg: No edema.   Neurological:      Comments: Poor " "cognition but awake and can converse     Psychiatric:      Comments: Compliant with exam         Fluids    Intake/Output Summary (Last 24 hours) at 12/4/2023 0814  Last data filed at 12/3/2023 2300  Gross per 24 hour   Intake 640 ml   Output 500 ml   Net 140 ml         Laboratory                            Imaging  DX-CHEST-LIMITED (1 VIEW)   Final Result         1.  Right basilar atelectasis, no focal infiltrate           Assessment/Plan  * Alcohol withdrawal, legal hold, Afib w/ RVR/anticoag ([facility])- (present on admission)  Assessment & Plan  Patient has h/o drinking 1 pint per day for past 2 years. In past week she has increased   She developed delirium tremens and was transferred to the [facility] for initiation and titration of an IV Precedex drip which was turned off 12/2.  Status post IV rally bag   Decrease the scheduled Librium from 50 mg PO q  6hrs to 25 mg TID in order to mitigate seizures   Thiamine 100 mg PO daily, folic acid 1 mg, Multivitamin PO daily.   Initiate CIWA protocol.\"    CIWA protocol, detox bag with vitamins  On a legal hold, Psychiatry following  History of Afib s/p AICD on anticoag. Rate stable. Continue ELiquis and digoxin. August echo EF 55%  Stable HR but can be elevated if withdrawing. Contine BB      Hypothyroidism- (present on admission)  Assessment & Plan  Known history. Last TSH (05/2023): 4.120  Outpatient: Levothyroxine 50 mcg daily.       Major depressive disorder with current active episode- (present on admission)  Assessment & Plan  Known diagnosis in setting of unfortunate loss of her 2 daughters and  in the past 3 years. Patient denies active suicidal or homicidal ideations. No auditory hallucinations. Notes visual hallucinations that most likely related to EtOH withdrawal.   Outpatient treatment: Venlafaxine 225 mg daily.   QTc is 476. She has requested restarting home trazodone 100 mg nightly. Given the risk of serotonin syndrome, start trazodone 50 mg qHS.  Has not been " "able to establish with Psychiatry outpatient.    psychiatry consulted   Sitter at bedside  She may benefit from inpatient psych upon discharge.    Atrial fibrillation with RVR (HCC)- (present on admission)  Assessment & Plan  Chronic diagnosis.  She is on Digoxin 125 mcg daily, Toprol 100 mg daily, and Eliquis 5 mg BID outpatient all of which have been restarted  Last echocardiogram (01/2020): no structural or valvular abnormalities. She does not have any evidence of heart failure thus an echo is not indicated.  TSH (05/2023): 4.120  She developed rapid ventricular response likely due to alcohol withdrawal and has required IV metoprolol and transfer to the [facility].\"    Vitals:    12/04/23 1220   BP: (!) 144/97   Pulse: 69   Resp: 16   Temp: 36.6 °C (97.9 °F)   SpO2: 97%     Stable HR         AICD (automatic cardioverter/defibrillator) present- (present on admission)  Assessment & Plan  -Implanted approx 3 years ago with cardiology  -Placed July 2020 d/t AICD implant for polymorphic VT, long QT, and atrial fibrillation.   On digoxin outpatient.             VTE prophylaxis: Eliquis  I have performed a physical exam and reviewed and updated ROS and Plan today (12/4/2023). In review of yesterday's note (12/3/2023), there are no changes except as documented above.      "

## 2023-12-05 LAB
ALBUMIN SERPL BCP-MCNC: 3.2 G/DL (ref 3.2–4.9)
BUN SERPL-MCNC: 3 MG/DL (ref 8–22)
CALCIUM ALBUM COR SERPL-MCNC: 9.7 MG/DL (ref 8.5–10.5)
CALCIUM SERPL-MCNC: 9.1 MG/DL (ref 8.5–10.5)
CHLORIDE SERPL-SCNC: 104 MMOL/L (ref 96–112)
CO2 SERPL-SCNC: 22 MMOL/L (ref 20–33)
CREAT SERPL-MCNC: 0.43 MG/DL (ref 0.5–1.4)
EKG IMPRESSION: NORMAL
ERYTHROCYTE [DISTWIDTH] IN BLOOD BY AUTOMATED COUNT: 52.1 FL (ref 35.9–50)
GFR SERPLBLD CREATININE-BSD FMLA CKD-EPI: 101 ML/MIN/1.73 M 2
GLUCOSE SERPL-MCNC: 93 MG/DL (ref 65–99)
HCT VFR BLD AUTO: 40.7 % (ref 37–47)
HGB BLD-MCNC: 14 G/DL (ref 12–16)
MCH RBC QN AUTO: 33.8 PG (ref 27–33)
MCHC RBC AUTO-ENTMCNC: 34.4 G/DL (ref 32.2–35.5)
MCV RBC AUTO: 98.3 FL (ref 81.4–97.8)
PHOSPHATE SERPL-MCNC: 2.9 MG/DL (ref 2.5–4.5)
PLATELET # BLD AUTO: 86 K/UL (ref 164–446)
PLATELETS.RETICULATED NFR BLD AUTO: 4.2 % (ref 0.6–13.1)
PMV BLD AUTO: 9.5 FL (ref 9–12.9)
POTASSIUM SERPL-SCNC: 3.1 MMOL/L (ref 3.6–5.5)
RBC # BLD AUTO: 4.14 M/UL (ref 4.2–5.4)
SODIUM SERPL-SCNC: 139 MMOL/L (ref 135–145)
WBC # BLD AUTO: 5.4 K/UL (ref 4.8–10.8)

## 2023-12-05 PROCEDURE — A9270 NON-COVERED ITEM OR SERVICE: HCPCS

## 2023-12-05 PROCEDURE — A9270 NON-COVERED ITEM OR SERVICE: HCPCS | Performed by: INTERNAL MEDICINE

## 2023-12-05 PROCEDURE — 93010 ELECTROCARDIOGRAM REPORT: CPT | Performed by: INTERNAL MEDICINE

## 2023-12-05 PROCEDURE — A9270 NON-COVERED ITEM OR SERVICE: HCPCS | Performed by: STUDENT IN AN ORGANIZED HEALTH CARE EDUCATION/TRAINING PROGRAM

## 2023-12-05 PROCEDURE — 700102 HCHG RX REV CODE 250 W/ 637 OVERRIDE(OP): Performed by: INTERNAL MEDICINE

## 2023-12-05 PROCEDURE — 700102 HCHG RX REV CODE 250 W/ 637 OVERRIDE(OP): Performed by: HOSPITALIST

## 2023-12-05 PROCEDURE — 700105 HCHG RX REV CODE 258: Performed by: NURSE PRACTITIONER

## 2023-12-05 PROCEDURE — 700102 HCHG RX REV CODE 250 W/ 637 OVERRIDE(OP): Mod: JZ | Performed by: INTERNAL MEDICINE

## 2023-12-05 PROCEDURE — 85027 COMPLETE CBC AUTOMATED: CPT

## 2023-12-05 PROCEDURE — 99232 SBSQ HOSP IP/OBS MODERATE 35: CPT | Performed by: INTERNAL MEDICINE

## 2023-12-05 PROCEDURE — 36415 COLL VENOUS BLD VENIPUNCTURE: CPT

## 2023-12-05 PROCEDURE — A9270 NON-COVERED ITEM OR SERVICE: HCPCS | Performed by: HOSPITALIST

## 2023-12-05 PROCEDURE — 97602 WOUND(S) CARE NON-SELECTIVE: CPT

## 2023-12-05 PROCEDURE — 80069 RENAL FUNCTION PANEL: CPT

## 2023-12-05 PROCEDURE — 700102 HCHG RX REV CODE 250 W/ 637 OVERRIDE(OP): Performed by: STUDENT IN AN ORGANIZED HEALTH CARE EDUCATION/TRAINING PROGRAM

## 2023-12-05 PROCEDURE — 700102 HCHG RX REV CODE 250 W/ 637 OVERRIDE(OP)

## 2023-12-05 PROCEDURE — 770020 HCHG ROOM/CARE - TELE (206)

## 2023-12-05 PROCEDURE — 85055 RETICULATED PLATELET ASSAY: CPT

## 2023-12-05 PROCEDURE — A9270 NON-COVERED ITEM OR SERVICE: HCPCS | Mod: JZ | Performed by: INTERNAL MEDICINE

## 2023-12-05 PROCEDURE — 99231 SBSQ HOSP IP/OBS SF/LOW 25: CPT | Mod: GC | Performed by: STUDENT IN AN ORGANIZED HEALTH CARE EDUCATION/TRAINING PROGRAM

## 2023-12-05 RX ORDER — POTASSIUM CHLORIDE 20 MEQ/1
40 TABLET, EXTENDED RELEASE ORAL ONCE
Status: COMPLETED | OUTPATIENT
Start: 2023-12-05 | End: 2023-12-05

## 2023-12-05 RX ORDER — TRAZODONE HYDROCHLORIDE 50 MG/1
50 TABLET ORAL
Status: DISCONTINUED | OUTPATIENT
Start: 2023-12-05 | End: 2023-12-07

## 2023-12-05 RX ORDER — AMLODIPINE BESYLATE 5 MG/1
5 TABLET ORAL
Status: DISCONTINUED | OUTPATIENT
Start: 2023-12-05 | End: 2023-12-14 | Stop reason: HOSPADM

## 2023-12-05 RX ADMIN — SODIUM CHLORIDE, POTASSIUM CHLORIDE, SODIUM LACTATE AND CALCIUM CHLORIDE: 600; 310; 30; 20 INJECTION, SOLUTION INTRAVENOUS at 20:16

## 2023-12-05 RX ADMIN — CHLORDIAZEPOXIDE HYDROCHLORIDE 25 MG: 25 CAPSULE ORAL at 13:54

## 2023-12-05 RX ADMIN — SODIUM CHLORIDE, POTASSIUM CHLORIDE, SODIUM LACTATE AND CALCIUM CHLORIDE: 600; 310; 30; 20 INJECTION, SOLUTION INTRAVENOUS at 00:45

## 2023-12-05 RX ADMIN — AMLODIPINE BESYLATE 5 MG: 5 TABLET ORAL at 13:55

## 2023-12-05 RX ADMIN — CHLORDIAZEPOXIDE HYDROCHLORIDE 25 MG: 25 CAPSULE ORAL at 21:33

## 2023-12-05 RX ADMIN — LABETALOL HYDROCHLORIDE 10 MG: 5 INJECTION, SOLUTION INTRAVENOUS at 15:32

## 2023-12-05 RX ADMIN — CHLORDIAZEPOXIDE HYDROCHLORIDE 25 MG: 25 CAPSULE ORAL at 04:34

## 2023-12-05 RX ADMIN — VENLAFAXINE HYDROCHLORIDE 225 MG: 75 CAPSULE, EXTENDED RELEASE ORAL at 04:33

## 2023-12-05 RX ADMIN — POTASSIUM CHLORIDE 40 MEQ: 1500 TABLET, EXTENDED RELEASE ORAL at 13:55

## 2023-12-05 RX ADMIN — DOCUSATE SODIUM 50 MG AND SENNOSIDES 8.6 MG 2 TABLET: 8.6; 5 TABLET, FILM COATED ORAL at 18:20

## 2023-12-05 RX ADMIN — METOPROLOL SUCCINATE 100 MG: 25 TABLET, FILM COATED, EXTENDED RELEASE ORAL at 04:34

## 2023-12-05 RX ADMIN — LABETALOL HYDROCHLORIDE 10 MG: 5 INJECTION, SOLUTION INTRAVENOUS at 08:20

## 2023-12-05 RX ADMIN — LEVOTHYROXINE SODIUM 50 MCG: 50 TABLET ORAL at 04:34

## 2023-12-05 RX ADMIN — ACETAMINOPHEN 650 MG: 325 TABLET, FILM COATED ORAL at 20:14

## 2023-12-05 RX ADMIN — SODIUM CHLORIDE, POTASSIUM CHLORIDE, SODIUM LACTATE AND CALCIUM CHLORIDE: 600; 310; 30; 20 INJECTION, SOLUTION INTRAVENOUS at 11:01

## 2023-12-05 RX ADMIN — DIGOXIN 125 MCG: 0.25 TABLET ORAL at 04:34

## 2023-12-05 RX ADMIN — APIXABAN 5 MG: 5 TABLET, FILM COATED ORAL at 04:34

## 2023-12-05 RX ADMIN — APIXABAN 5 MG: 5 TABLET, FILM COATED ORAL at 18:20

## 2023-12-05 ASSESSMENT — LIFESTYLE VARIABLES
NAUSEA AND VOMITING: NO NAUSEA AND NO VOMITING
NAUSEA AND VOMITING: NO NAUSEA AND NO VOMITING
TOTAL SCORE: 1
TREMOR: NO TREMOR
ORIENTATION AND CLOUDING OF SENSORIUM: CANNOT DO SERIAL ADDITIONS OR IS UNCERTAIN ABOUT DATE
TREMOR: NO TREMOR
HEADACHE, FULLNESS IN HEAD: NOT PRESENT
TREMOR: NO TREMOR
AGITATION: NORMAL ACTIVITY
HEADACHE, FULLNESS IN HEAD: NOT PRESENT
ANXIETY: MILDLY ANXIOUS
ANXIETY: MILDLY ANXIOUS
PAROXYSMAL SWEATS: NO SWEAT VISIBLE
ANXIETY: NO ANXIETY (AT EASE)
ORIENTATION AND CLOUDING OF SENSORIUM: ORIENTED AND CAN DO SERIAL ADDITIONS
VISUAL DISTURBANCES: NOT PRESENT
TOTAL SCORE: 2
TREMOR: NO TREMOR
NAUSEA AND VOMITING: NO NAUSEA AND NO VOMITING
TOTAL SCORE: 1
AUDITORY DISTURBANCES: NOT PRESENT
PAROXYSMAL SWEATS: NO SWEAT VISIBLE
AGITATION: NORMAL ACTIVITY
AUDITORY DISTURBANCES: NOT PRESENT
VISUAL DISTURBANCES: NOT PRESENT
ORIENTATION AND CLOUDING OF SENSORIUM: ORIENTED AND CAN DO SERIAL ADDITIONS
ANXIETY: MILDLY ANXIOUS
AGITATION: NORMAL ACTIVITY
AUDITORY DISTURBANCES: NOT PRESENT
ANXIETY: NO ANXIETY (AT EASE)
PAROXYSMAL SWEATS: NO SWEAT VISIBLE
TREMOR: NO TREMOR
ORIENTATION AND CLOUDING OF SENSORIUM: DATE DISORIENTATION BY MORE THAN TWO CALENDAR DAYS
VISUAL DISTURBANCES: NOT PRESENT
ORIENTATION AND CLOUDING OF SENSORIUM: DATE DISORIENTATION BY MORE THAN TWO CALENDAR DAYS
PAROXYSMAL SWEATS: NO SWEAT VISIBLE
AUDITORY DISTURBANCES: NOT PRESENT
VISUAL DISTURBANCES: NOT PRESENT
NAUSEA AND VOMITING: NO NAUSEA AND NO VOMITING
TOTAL SCORE: 4
TOTAL SCORE: 4
HEADACHE, FULLNESS IN HEAD: NOT PRESENT
VISUAL DISTURBANCES: NOT PRESENT
AUDITORY DISTURBANCES: NOT PRESENT
AGITATION: SOMEWHAT MORE THAN NORMAL ACTIVITY
PAROXYSMAL SWEATS: NO SWEAT VISIBLE
AGITATION: SOMEWHAT MORE THAN NORMAL ACTIVITY
NAUSEA AND VOMITING: NO NAUSEA AND NO VOMITING
HEADACHE, FULLNESS IN HEAD: NOT PRESENT
HEADACHE, FULLNESS IN HEAD: NOT PRESENT

## 2023-12-05 ASSESSMENT — FIBROSIS 4 INDEX: FIB4 SCORE: 11.67

## 2023-12-05 ASSESSMENT — PAIN DESCRIPTION - PAIN TYPE: TYPE: ACUTE PAIN

## 2023-12-05 NOTE — PROGRESS NOTES
Hospital Medicine Daily Progress Note    Date of Service  12/5/2023    Chief Complaint  Jeanie Hutchison is a 78 y.o. female admitted 11/29/2023 with alcohol intoxication.    Hospital Course  Ms. Jeanie Hutchison is a 78 y.o. female with PMHx of Atrial fibrillation, AICD, Hypothyroidism, Depression, ETOH use disorder who was admitted on 11/30 for alcohol intoxication and Afib with RVR.     Patient notes she has been drinking 1 pint per day of Vodka for past 2 years and in past week increased volume. She was suggested to pursue detoxification by his son which is why she is here in the hospital. Notes she is currently having visual hallucinations (cartoon-like images), feels anxious, mild hand tremors, and no other specific symptoms. Denies suicidal or homicidal ideations. No prior h.o withdrawal seizures or need for ICU admission d/t ETOH withdrawal.      In ER, patient was resting comfortably in bed, no acute distress. She is very pleasant. No electrolyte imbalances. EKG shows Afib w/RVR. Has received Metoprolol pushes, 5 mg IV, x3. Resumed all her home medications. Patient also received Lorazepam 2 mg IV, Librium 25 mg PO and  rally bag IV. Patient placed on [facility] protocol for alcohol withdrawal management. Patient admitted to hospital medicine for management of care.           She required transfer to the IMCU due to escalating detox symptoms requiring an IV Precedex drip.  Interval Problem Update  12/1: Ms. Hutchison was evaluated and examined in the IMCU. She is requiring titration of an IV Precedex drip due to severe alcohol detox. She is tolerating oral meds with prompting.   12/2: Ms. Hutchison was seen in the IMCU. She has required an IV precedex drip with scheduled Librium. A sitter is at bedside. She has been in afib. IV fluids running. She is a non-historian this morning.   12/3: Ms. Hutchison was evaluated in the IMCU. The Precedex drip has been turned off. She has been on IV fluids due to poor oral intake.  "The scheduled librium dose will be decreased from 50 mg qid to 25 mg TID. She has been in afib rate low 100's. [facility] protocol for IV ativan as needed.\"    12/4. I reviewed the chart along with vitals, labs, imaging, test (both pending and resulted) and recommendations from specialists and interdisciplinary team.  One to one sitter. She has poor insight but currently pleasant   COntinue CIWA protocol  Psychiatry following her and she has a one to one sitter. On legal hold that is extended.  HR stable. Continue Eliquis for Afib  12/5; Patient seen and examined, afebrile, no nausea or vomiting. HTN not well controlled with SBP in the 150 and DBP in the 100. Starting patient on amlodipine   Psychiatry following on legal hold appreciate rec.   I have discussed this patient's plan of care and discharge plan at IDT rounds today with Case Management, Nursing, Nursing leadership, and other members of the IDT team.    Consultants/Specialty  psychiatry    Code Status  Full Code    Disposition  The patient is not medically cleared for discharge to home or a post-acute facility.      I have placed the appropriate orders for post-discharge needs.    Review of Systems  Review of Systems   Unable to perform ROS: Mental acuity        Physical Exam  Temp:  [36.2 °C (97.2 °F)-37 °C (98.6 °F)] 36.5 °C (97.7 °F)  Pulse:  [60-86] 83  Resp:  [16-18] 16  BP: (141-174)/() 144/87  SpO2:  [92 %-96 %] 96 %    Physical Exam  Vitals and nursing note reviewed.   Constitutional:       Appearance: She is ill-appearing. She is not toxic-appearing.   HENT:      Mouth/Throat:      Mouth: Mucous membranes are dry.   Eyes:      General: No scleral icterus.  Cardiovascular:      Rate and Rhythm: Tachycardia present. Rhythm irregular.   Pulmonary:      Effort: Pulmonary effort is normal.      Breath sounds: Normal breath sounds. No rales.      Comments: 1 liter nasal cannula oxygen  Abdominal:      General: There is no distension.      Palpations: " "Abdomen is soft.      Tenderness: There is no abdominal tenderness.   Musculoskeletal:      Cervical back: Neck supple.      Right lower leg: No edema.      Left lower leg: No edema.   Neurological:      Comments: Poor cognition but awake and can converse     Psychiatric:      Comments: Compliant with exam         Fluids    Intake/Output Summary (Last 24 hours) at 12/5/2023 1240  Last data filed at 12/4/2023 2022  Gross per 24 hour   Intake 240 ml   Output --   Net 240 ml       Laboratory  Recent Labs     12/05/23  0714   WBC 5.4   RBC 4.14*   HEMOGLOBIN 14.0   HEMATOCRIT 40.7   MCV 98.3*   MCH 33.8*   MCHC 34.4   RDW 52.1*   PLATELETCT 86*   MPV 9.5     Recent Labs     12/05/23  0714   SODIUM 139   POTASSIUM 3.1*   CHLORIDE 104   CO2 22   GLUCOSE 93   BUN 3*   CREATININE 0.43*   CALCIUM 9.1                   Imaging  DX-CHEST-LIMITED (1 VIEW)   Final Result         1.  Right basilar atelectasis, no focal infiltrate           Assessment/Plan  * Alcohol withdrawal, legal hold, Afib w/ RVR/anticoag ([facility])- (present on admission)  Assessment & Plan  Patient has h/o drinking 1 pint per day for past 2 years. In past week she has increased   She developed delirium tremens and was transferred to the [facility] for initiation and titration of an IV Precedex drip which was turned off 12/2.  Status post IV rally bag   Decrease the scheduled Librium from 50 mg PO q  6hrs to 25 mg TID in order to mitigate seizures   Thiamine 100 mg PO daily, folic acid 1 mg, Multivitamin PO daily.   Initiate CIWA protocol.\"    CIWA protocol, detox bag with vitamins  On a legal hold, Psychiatry following  History of Afib s/p AICD on anticoag. Rate stable. Continue ELiquis and digoxin. August echo EF 55%  Stable HR but can be elevated if withdrawing. Contine BB      Hypothyroidism- (present on admission)  Assessment & Plan  Known history. Last TSH (05/2023): 4.120  Outpatient: Levothyroxine 50 mcg daily.       Major depressive disorder with current " "active episode- (present on admission)  Assessment & Plan  Known diagnosis in setting of unfortunate loss of her 2 daughters and  in the past 3 years. Patient denies active suicidal or homicidal ideations. No auditory hallucinations. Notes visual hallucinations that most likely related to EtOH withdrawal.   Outpatient treatment: Venlafaxine 225 mg daily.   QTc is 476. She has requested restarting home trazodone 100 mg nightly. Given the risk of serotonin syndrome, start trazodone 50 mg qHS.  Has not been able to establish with Psychiatry outpatient.    psychiatry consulted   Sitter at bedside  She may benefit from inpatient psych upon discharge.    Atrial fibrillation with RVR (HCC)- (present on admission)  Assessment & Plan  Chronic diagnosis.  She is on Digoxin 125 mcg daily, Toprol 100 mg daily, and Eliquis 5 mg BID outpatient all of which have been restarted  Last echocardiogram (01/2020): no structural or valvular abnormalities. She does not have any evidence of heart failure thus an echo is not indicated.  TSH (05/2023): 4.120  She developed rapid ventricular response likely due to alcohol withdrawal and has required IV metoprolol and transfer to the [facility].\"  Now HR is controlled     AICD (automatic cardioverter/defibrillator) present- (present on admission)  Assessment & Plan  -Implanted approx 3 years ago with cardiology  -Placed July 2020 d/t AICD implant for polymorphic VT, long QT, and atrial fibrillation.   On digoxin outpatient.             VTE prophylaxis: Eliquis  I have performed a physical exam and reviewed and updated ROS and Plan today (12/5/2023). In review of yesterday's note (12/4/2023), there are no changes except as documented above.      "

## 2023-12-05 NOTE — DISCHARGE PLANNING
Legal Hold    Referral: Legal Hold Court     Intervention: Pt presented for legal hold meeting with  via video conferencing.  advised pt will meet with court MD's via telemedicine monitor to contest the legal hold.      Plan: Pt will present to telemedicine mental health to meet with court physicians 12/6. Will call bedside RN once time has been determined.

## 2023-12-05 NOTE — PROGRESS NOTES
Monitor Summary  Rhythm: Afib/Aflutter, I/O paced  Rate:   Ectopy: PVC, Couplet  - / .08 / .36

## 2023-12-05 NOTE — PROGRESS NOTES
Assumed care of patient at bedside report from day shift RN. Updated on POC. Patient currently A & O x 3, pt is disoriented to year; Pt declines pain at this time; pt is Afib on monitor; 1:1 sitter in place, report given to sitter; Call light within reach. Whiteboard updated. Fall precautions in place, bed alarm on. Bed locked and in lowest position. All questions answered. No other needs indicated at this time.

## 2023-12-05 NOTE — CONSULTS
"PSYCHIATRIC FOLLOW-UP: (established)  Reason for admission:  etoh intoxication   Legal Hold Status: on legal hold for inability to care for self           Chart reviewed.         HPI:   Per chart no acute events overnight. Interview challenged by lethargy and difficulty completing sentences or responding directly to questions. Initially reports she feels \"much better\" but is struggles to elaborate. Perseverates on interest \"one on one talk therapy\" but again struggles to find words to elaborate beyond this statement, difficult to redirect to questions on other topics. Denies SI, reports she has \"never\" had SI. Appears confused when asked about her medications, again reiterates interest in talk therapy. Denies other questions or concerns.    Medical ROS (as pertinent):     ROS  Unable to assess      Psychiatric Examination:  Vitals:   Vitals:    12/05/23 1126   BP: (!) 144/87   Pulse: 83   Resp: 16   Temp: 36.5 °C (97.7 °F)   SpO2: 96%        General Appearance: Appears state age, disheveled, no acute distress  Behavior:    -Cooperative, slowed movements   -No tremors noted   -No posture or gait abnormalities appreciated  Speech: Quiet, slow rate, sentences often trail off  Language: English  Thought processes: Slowed, tangential   Thought content: Focused on talk therapy. No overt evidence of SI/HI/AVH. Not observed responding to internal stimuli. No evidence of delusions or paranoia  Mood: \"much better\" as stated by patient  Affect: Restricted, incongruent to stated mood  Judgement and Insight: Limited/Limited  Cognition:    -Grossly A&O to interview   -Attention & concentration impaired to interview   -Immediate/delayed memory unable to be assessed   -Fund of knowledge unable to assess      New PAST MEDICAL/PSYCH/FAMILY/SOCIAL(as reported by patient):       None      EKG:   Results for orders placed or performed during the hospital encounter of 11/29/23   EKG   Result Value Ref Range    Report       [facility] Emergency Dept.    Test Date:  2023  Pt Name:    MARISELA DURHAM              Department: ER  MRN:        [id number]                      Room:       GR 35  Gender:     Female                       Technician: [id number]  :        1945                   Requested By:REILLY VANG  Order #:    [id number]                    Reading MD:    Measurements  Intervals                                Axis  Rate:       151                          P:          0  NV:         0                            QRS:        -68  QRSD:       86                           T:          28  QT:         300  QTc:        476    Interpretive Statements  Atrial fibrillation with rapid V-rate  Left anterior fascicular block  Abnormal R-wave progression, late transition  Compared to ECG 2023 18:02:44  Left anterior fascicular block now present  Intraventricular conduction delay no longer present     EKG   Result Value Ref Range    Report       [facility]    Test Date:  2023  Pt Name:    MARISELA DURHAM              Department: 183  MRN:        [id number]                      Room:       T830  Gender:     Female                       Technician: OSCAR  :        1945                   Requested By:ARVIND TREJO  Order #:    [id number]                    Reading MD: Reilly Reese MD    Measurements  Intervals                                Axis  Rate:       69                           P:          0  NV:         0                            QRS:        -53  QRSD:       95                           T:          0  QT:         429  QTc:        460    Interpretive Statements  Atrial fibrillation  Left axis deviation  Nonspecific repol abnormality, diffuse leads  ST elevation, consider inferior injury  ST depression V1-V3, suggest recording posterior leads  Compared to ECG 2023 23:14:59  NO SIGNIFICANT CHANGES  Electronically Signed On 2023 00:27:13 PST by Reilly Reese  MD          Labs personally reviewed:   Recent Results (from the past 24 hour(s))   CBC WITHOUT DIFFERENTIAL    Collection Time: 12/05/23  7:14 AM   Result Value Ref Range    WBC 5.4 4.8 - 10.8 K/uL    RBC 4.14 (L) 4.20 - 5.40 M/uL    Hemoglobin 14.0 12.0 - 16.0 g/dL    Hematocrit 40.7 37.0 - 47.0 %    MCV 98.3 (H) 81.4 - 97.8 fL    MCH 33.8 (H) 27.0 - 33.0 pg    MCHC 34.4 32.2 - 35.5 g/dL    RDW 52.1 (H) 35.9 - 50.0 fL    Platelet Count 86 (L) 164 - 446 K/uL    MPV 9.5 9.0 - 12.9 fL   Renal Function Panel    Collection Time: 12/05/23  7:14 AM   Result Value Ref Range    Sodium 139 135 - 145 mmol/L    Potassium 3.1 (L) 3.6 - 5.5 mmol/L    Chloride 104 96 - 112 mmol/L    Co2 22 20 - 33 mmol/L    Glucose 93 65 - 99 mg/dL    Creatinine 0.43 (L) 0.50 - 1.40 mg/dL    Bun 3 (L) 8 - 22 mg/dL    Calcium 9.1 8.5 - 10.5 mg/dL    Correct Calcium 9.7 8.5 - 10.5 mg/dL    Phosphorus 2.9 2.5 - 4.5 mg/dL    Albumin 3.2 3.2 - 4.9 g/dL   IMMATURE PLT FRACTION    Collection Time: 12/05/23  7:14 AM   Result Value Ref Range    Imm. Plt Fraction 4.2 0.6 - 13.1 %   ESTIMATED GFR    Collection Time: 12/05/23  7:14 AM   Result Value Ref Range    GFR (CKD-EPI) 101 >60 mL/min/1.73 m 2         Assessment:  Difficult to assess today due to current impaired cognition as noted above. No evidence of acute concern. Will continue current regimen, consider talk therapy per her request as cognition improves, however at the moment it is not clear that she would be able to meaningfully engage due to impaired cognition. Otherwise no changes.    Dx:  #Encephalopathy  -multifactorial due to etoh w/d, scheduled librium?  -cannot r/o underlying neurocognitive disorder  #Hx of alcohol use disorder, severe  #Hx of MDD, recurrent, severe without psychosis    Medical :  Per primary team      Plan:  1- Legal hold: previously extended  2- Psychotropic medications   Continue effexor 225mg daily for mood   Change trazodone to 50mg QHS PRN for sleep  3- Labs, EKG  reviewed  4- Please transfer pt to inpatient psychiatric hospital when medically cleared and bed is available  5- Discussed the case with: Dr. Fields  6- Psychiatry will follow up    Thank you for the consult.     Sitter: 1:1 given psychiatric hold  Phone/ipad: Allowed with sitter supervision  Visitors: Allowed, son only  Personal belongings: phone and ipad only

## 2023-12-05 NOTE — CARE PLAN
The patient is Watcher - Medium risk of patient condition declining or worsening    Shift Goals  Clinical Goals: CIWA, safety  Patient Goals: Sleep  Family Goals: DANICA    Progress made toward(s) clinical / shift goals:      Problem: Knowledge Deficit - Standard  Goal: Patient and family/care givers will demonstrate understanding of plan of care, disease process/condition, diagnostic tests and medications  Outcome: Progressing  Note: Patient updated on plan of care. All questions answered. Patient verbalized understanding. No further questions at this time.       Problem: Pain - Standard  Goal: Alleviation of pain or a reduction in pain to the patient’s comfort goal  Outcome: Progressing  Note: Pain rating scale and pain goals discussed with patient        Problem: Seizure Precautions  Goal: Implementation of seizure precautions  Outcome: Progressing  Note: Seizure precautions in place     Problem: Risk for Aspiration  Goal: Patient's risk for aspiration will be absent or decrease  Outcome: Progressing  Note: Aspiration precautions in place       Patient is not progressing towards the following goals:

## 2023-12-05 NOTE — PROGRESS NOTES
Bedside report received from night RN, pt care assumed, assessment completed. Pt is A&O3, pt on legal hold with 1:1 sitter in place with active order, generalized pain, afib on the monitor. Updated on POC, questions answered. Bed in lowest, locked position, treaded socks on, call light and belongings within reach.

## 2023-12-06 LAB
ANION GAP SERPL CALC-SCNC: 11 MMOL/L (ref 7–16)
BUN SERPL-MCNC: 4 MG/DL (ref 8–22)
CALCIUM SERPL-MCNC: 9.6 MG/DL (ref 8.5–10.5)
CHLORIDE SERPL-SCNC: 105 MMOL/L (ref 96–112)
CO2 SERPL-SCNC: 23 MMOL/L (ref 20–33)
CREAT SERPL-MCNC: 0.37 MG/DL (ref 0.5–1.4)
ERYTHROCYTE [DISTWIDTH] IN BLOOD BY AUTOMATED COUNT: 51.6 FL (ref 35.9–50)
GFR SERPLBLD CREATININE-BSD FMLA CKD-EPI: 104 ML/MIN/1.73 M 2
GLUCOSE SERPL-MCNC: 113 MG/DL (ref 65–99)
HCT VFR BLD AUTO: 41.5 % (ref 37–47)
HGB BLD-MCNC: 14.3 G/DL (ref 12–16)
MCH RBC QN AUTO: 33.6 PG (ref 27–33)
MCHC RBC AUTO-ENTMCNC: 34.5 G/DL (ref 32.2–35.5)
MCV RBC AUTO: 97.4 FL (ref 81.4–97.8)
PLATELET # BLD AUTO: 93 K/UL (ref 164–446)
PLATELETS.RETICULATED NFR BLD AUTO: 3.7 % (ref 0.6–13.1)
PMV BLD AUTO: 9.6 FL (ref 9–12.9)
POTASSIUM SERPL-SCNC: 3.6 MMOL/L (ref 3.6–5.5)
RBC # BLD AUTO: 4.26 M/UL (ref 4.2–5.4)
SODIUM SERPL-SCNC: 139 MMOL/L (ref 135–145)
WBC # BLD AUTO: 5.1 K/UL (ref 4.8–10.8)

## 2023-12-06 PROCEDURE — 700102 HCHG RX REV CODE 250 W/ 637 OVERRIDE(OP): Performed by: INTERNAL MEDICINE

## 2023-12-06 PROCEDURE — 700102 HCHG RX REV CODE 250 W/ 637 OVERRIDE(OP)

## 2023-12-06 PROCEDURE — 97535 SELF CARE MNGMENT TRAINING: CPT

## 2023-12-06 PROCEDURE — A9270 NON-COVERED ITEM OR SERVICE: HCPCS | Performed by: NURSE PRACTITIONER

## 2023-12-06 PROCEDURE — 36415 COLL VENOUS BLD VENIPUNCTURE: CPT

## 2023-12-06 PROCEDURE — 85055 RETICULATED PLATELET ASSAY: CPT

## 2023-12-06 PROCEDURE — 99232 SBSQ HOSP IP/OBS MODERATE 35: CPT | Performed by: INTERNAL MEDICINE

## 2023-12-06 PROCEDURE — A9270 NON-COVERED ITEM OR SERVICE: HCPCS | Performed by: STUDENT IN AN ORGANIZED HEALTH CARE EDUCATION/TRAINING PROGRAM

## 2023-12-06 PROCEDURE — A9270 NON-COVERED ITEM OR SERVICE: HCPCS | Performed by: INTERNAL MEDICINE

## 2023-12-06 PROCEDURE — 700105 HCHG RX REV CODE 258: Performed by: NURSE PRACTITIONER

## 2023-12-06 PROCEDURE — 85027 COMPLETE CBC AUTOMATED: CPT

## 2023-12-06 PROCEDURE — 700102 HCHG RX REV CODE 250 W/ 637 OVERRIDE(OP): Performed by: NURSE PRACTITIONER

## 2023-12-06 PROCEDURE — 700102 HCHG RX REV CODE 250 W/ 637 OVERRIDE(OP): Performed by: STUDENT IN AN ORGANIZED HEALTH CARE EDUCATION/TRAINING PROGRAM

## 2023-12-06 PROCEDURE — A9270 NON-COVERED ITEM OR SERVICE: HCPCS

## 2023-12-06 PROCEDURE — 80048 BASIC METABOLIC PNL TOTAL CA: CPT

## 2023-12-06 PROCEDURE — 770001 HCHG ROOM/CARE - MED/SURG/GYN PRIV*

## 2023-12-06 RX ADMIN — VENLAFAXINE HYDROCHLORIDE 225 MG: 75 CAPSULE, EXTENDED RELEASE ORAL at 04:32

## 2023-12-06 RX ADMIN — APIXABAN 5 MG: 5 TABLET, FILM COATED ORAL at 17:45

## 2023-12-06 RX ADMIN — SODIUM CHLORIDE, POTASSIUM CHLORIDE, SODIUM LACTATE AND CALCIUM CHLORIDE: 600; 310; 30; 20 INJECTION, SOLUTION INTRAVENOUS at 05:56

## 2023-12-06 RX ADMIN — CHLORDIAZEPOXIDE HYDROCHLORIDE 25 MG: 25 CAPSULE ORAL at 04:32

## 2023-12-06 RX ADMIN — LEVOTHYROXINE SODIUM 50 MCG: 50 TABLET ORAL at 04:32

## 2023-12-06 RX ADMIN — DIGOXIN 125 MCG: 0.25 TABLET ORAL at 04:32

## 2023-12-06 RX ADMIN — LORAZEPAM 0.5 MG: 0.5 TABLET ORAL at 20:22

## 2023-12-06 RX ADMIN — APIXABAN 5 MG: 5 TABLET, FILM COATED ORAL at 04:32

## 2023-12-06 RX ADMIN — DOCUSATE SODIUM 50 MG AND SENNOSIDES 8.6 MG 2 TABLET: 8.6; 5 TABLET, FILM COATED ORAL at 04:32

## 2023-12-06 RX ADMIN — METOPROLOL SUCCINATE 100 MG: 25 TABLET, FILM COATED, EXTENDED RELEASE ORAL at 04:32

## 2023-12-06 RX ADMIN — DOCUSATE SODIUM 50 MG AND SENNOSIDES 8.6 MG 2 TABLET: 8.6; 5 TABLET, FILM COATED ORAL at 17:45

## 2023-12-06 RX ADMIN — AMLODIPINE BESYLATE 5 MG: 5 TABLET ORAL at 04:32

## 2023-12-06 ASSESSMENT — COGNITIVE AND FUNCTIONAL STATUS - GENERAL
PERSONAL GROOMING: A LOT
DAILY ACTIVITIY SCORE: 12
DRESSING REGULAR LOWER BODY CLOTHING: A LOT
TOILETING: A LOT
EATING MEALS: A LOT
DRESSING REGULAR UPPER BODY CLOTHING: A LOT
SUGGESTED CMS G CODE MODIFIER DAILY ACTIVITY: CL
HELP NEEDED FOR BATHING: A LOT

## 2023-12-06 ASSESSMENT — LIFESTYLE VARIABLES
HEADACHE, FULLNESS IN HEAD: NOT PRESENT
ORIENTATION AND CLOUDING OF SENSORIUM: DISORIENTED FOR PLACE AND / OR PERSON
ORIENTATION AND CLOUDING OF SENSORIUM: CANNOT DO SERIAL ADDITIONS OR IS UNCERTAIN ABOUT DATE
AGITATION: NORMAL ACTIVITY
VISUAL DISTURBANCES: NOT PRESENT
ORIENTATION AND CLOUDING OF SENSORIUM: CANNOT DO SERIAL ADDITIONS OR IS UNCERTAIN ABOUT DATE
TREMOR: NO TREMOR
ORIENTATION AND CLOUDING OF SENSORIUM: ORIENTED AND CAN DO SERIAL ADDITIONS
TOTAL SCORE: 0
VISUAL DISTURBANCES: NOT PRESENT
PAROXYSMAL SWEATS: NO SWEAT VISIBLE
AGITATION: NORMAL ACTIVITY
HEADACHE, FULLNESS IN HEAD: NOT PRESENT
VISUAL DISTURBANCES: NOT PRESENT
PAROXYSMAL SWEATS: NO SWEAT VISIBLE
VISUAL DISTURBANCES: NOT PRESENT
HEADACHE, FULLNESS IN HEAD: VERY MILD
ANXIETY: MILDLY ANXIOUS
AGITATION: NORMAL ACTIVITY
NAUSEA AND VOMITING: NO NAUSEA AND NO VOMITING
NAUSEA AND VOMITING: NO NAUSEA AND NO VOMITING
AGITATION: NORMAL ACTIVITY
TOTAL SCORE: 2
AUDITORY DISTURBANCES: NOT PRESENT
ANXIETY: NO ANXIETY (AT EASE)
TREMOR: NO TREMOR
TOTAL SCORE: 2
AUDITORY DISTURBANCES: NOT PRESENT
AUDITORY DISTURBANCES: NOT PRESENT
NAUSEA AND VOMITING: NO NAUSEA AND NO VOMITING
ANXIETY: NO ANXIETY (AT EASE)
TREMOR: NO TREMOR
AUDITORY DISTURBANCES: NOT PRESENT
AGITATION: NORMAL ACTIVITY
VISUAL DISTURBANCES: NOT PRESENT
ANXIETY: MILDLY ANXIOUS
TOTAL SCORE: 2
AGITATION: SOMEWHAT MORE THAN NORMAL ACTIVITY
ORIENTATION AND CLOUDING OF SENSORIUM: CANNOT DO SERIAL ADDITIONS OR IS UNCERTAIN ABOUT DATE
TREMOR: NO TREMOR
NAUSEA AND VOMITING: NO NAUSEA AND NO VOMITING
VISUAL DISTURBANCES: NOT PRESENT
TREMOR: NO TREMOR
TREMOR: NO TREMOR
TOTAL SCORE: 6
ANXIETY: NO ANXIETY (AT EASE)
AUDITORY DISTURBANCES: NOT PRESENT
PAROXYSMAL SWEATS: NO SWEAT VISIBLE
PAROXYSMAL SWEATS: NO SWEAT VISIBLE
TOTAL SCORE: 1
AUDITORY DISTURBANCES: NOT PRESENT
NAUSEA AND VOMITING: NO NAUSEA AND NO VOMITING
HEADACHE, FULLNESS IN HEAD: NOT PRESENT
HEADACHE, FULLNESS IN HEAD: NOT PRESENT
PAROXYSMAL SWEATS: NO SWEAT VISIBLE
NAUSEA AND VOMITING: NO NAUSEA AND NO VOMITING
ANXIETY: NO ANXIETY (AT EASE)
PAROXYSMAL SWEATS: NO SWEAT VISIBLE
TREMOR: NO TREMOR
AGITATION: NORMAL ACTIVITY
HEADACHE, FULLNESS IN HEAD: VERY MILD
HEADACHE, FULLNESS IN HEAD: NOT PRESENT
VISUAL DISTURBANCES: NOT PRESENT
AUDITORY DISTURBANCES: NOT PRESENT
TOTAL SCORE: 4
ANXIETY: NO ANXIETY (AT EASE)
PAROXYSMAL SWEATS: NO SWEAT VISIBLE
NAUSEA AND VOMITING: NO NAUSEA AND NO VOMITING
ORIENTATION AND CLOUDING OF SENSORIUM: DISORIENTED FOR PLACE AND / OR PERSON
ORIENTATION AND CLOUDING OF SENSORIUM: CANNOT DO SERIAL ADDITIONS OR IS UNCERTAIN ABOUT DATE

## 2023-12-06 ASSESSMENT — FIBROSIS 4 INDEX: FIB4 SCORE: 7.6

## 2023-12-06 ASSESSMENT — PAIN DESCRIPTION - PAIN TYPE: TYPE: ACUTE PAIN

## 2023-12-06 NOTE — PROGRESS NOTES
Assumed care of patient at bedside report from day shift RN. Updated on POC. Patient currently A & O x 2, PT is disoriented to situation and time; PT has 2/10 head pain at this time; PT is afib on monitor; 1:1 sitter in place; Bed alarm on. Whiteboard updated. Fall precautions in place. Bed locked and in lowest position. All questions answered. No other needs indicated at this time.     What Is The Reason For Today's Visit?: History of Non-Melanoma Skin Cancer How Many Skin Cancers Have You Had?: more than one When Was Your Last Cancer Diagnosed?: 2023

## 2023-12-06 NOTE — PROGRESS NOTES
Hospital Medicine Daily Progress Note    Date of Service  12/6/2023    Chief Complaint  Jeanie Hutchison is a 78 y.o. female admitted 11/29/2023 with alcohol intoxication.    Hospital Course  Ms. Jeanie Hutchison is a 78 y.o. female with PMHx of Atrial fibrillation, AICD, Hypothyroidism, Depression, ETOH use disorder who was admitted on 11/30 for alcohol intoxication and Afib with RVR.     Patient notes she has been drinking 1 pint per day of Vodka for past 2 years and in past week increased volume. She was suggested to pursue detoxification by his son which is why she is here in the hospital. Notes she is currently having visual hallucinations (cartoon-like images), feels anxious, mild hand tremors, and no other specific symptoms. Denies suicidal or homicidal ideations. No prior h.o withdrawal seizures or need for ICU admission d/t ETOH withdrawal.      In ER, patient was resting comfortably in bed, no acute distress. She is very pleasant. No electrolyte imbalances. EKG shows Afib w/RVR. Has received Metoprolol pushes, 5 mg IV, x3. Resumed all her home medications. Patient also received Lorazepam 2 mg IV, Librium 25 mg PO and  rally bag IV. Patient placed on [facility] protocol for alcohol withdrawal management. Patient admitted to hospital medicine for management of care.           She required transfer to the IMCU due to escalating detox symptoms requiring an IV Precedex drip.  Interval Problem Update  12/1: Ms. Hutchison was evaluated and examined in the IMCU. She is requiring titration of an IV Precedex drip due to severe alcohol detox. She is tolerating oral meds with prompting.   12/2: Ms. Hutchison was seen in the IMCU. She has required an IV precedex drip with scheduled Librium. A sitter is at bedside. She has been in afib. IV fluids running. She is a non-historian this morning.   12/3: Ms. Hutchison was evaluated in the IMCU. The Precedex drip has been turned off. She has been on IV fluids due to poor oral intake.  "The scheduled librium dose will be decreased from 50 mg qid to 25 mg TID. She has been in afib rate low 100's. [facility] protocol for IV ativan as needed.\"    12/4. I reviewed the chart along with vitals, labs, imaging, test (both pending and resulted) and recommendations from specialists and interdisciplinary team.  One to one sitter. She has poor insight but currently pleasant   COntinue CIWA protocol  Psychiatry following her and she has a one to one sitter. On legal hold that is extended.  HR stable. Continue Eliquis for Afib  12/5; Patient seen and examined, afebrile, no nausea or vomiting. HTN not well controlled with SBP in the 150 and DBP in the 100. Starting patient on amlodipine   Psychiatry following on legal hold appreciate rec.   12/6: Patient resting in bed, afebrile,no nausea or vomiting, BP better today  Psychiatry following appreciate rec. On legal hold    I have discussed this patient's plan of care and discharge plan at IDT rounds today with Case Management, Nursing, Nursing leadership, and other members of the IDT team.    Consultants/Specialty  psychiatry    Code Status  Full Code    Disposition  The patient is medically cleared for discharge to home or a post-acute facility.    I have placed the appropriate orders for post-discharge needs.    Review of Systems  Review of Systems   Unable to perform ROS: Mental acuity        Physical Exam  Temp:  [36.3 °C (97.3 °F)-36.9 °C (98.4 °F)] 36.4 °C (97.6 °F)  Pulse:  [67-99] 94  Resp:  [17-18] 17  BP: (127-150)/() 138/83  SpO2:  [92 %-93 %] 93 %    Physical Exam  Vitals and nursing note reviewed.   Constitutional:       Appearance: She is ill-appearing. She is not toxic-appearing.   HENT:      Mouth/Throat:      Mouth: Mucous membranes are dry.   Eyes:      General: No scleral icterus.  Cardiovascular:      Rate and Rhythm: Tachycardia present. Rhythm irregular.   Pulmonary:      Effort: Pulmonary effort is normal.      Breath sounds: Normal breath " "sounds. No rales.      Comments: 1 liter nasal cannula oxygen  Abdominal:      General: There is no distension.      Palpations: Abdomen is soft.      Tenderness: There is no abdominal tenderness.   Musculoskeletal:      Cervical back: Neck supple.      Right lower leg: No edema.      Left lower leg: No edema.   Neurological:      Comments: Poor cognition but awake and can converse     Psychiatric:      Comments: Compliant with exam       Fluids  No intake or output data in the 24 hours ending 12/06/23 1319      Laboratory  Recent Labs     12/05/23  0714 12/06/23  0301   WBC 5.4 5.1   RBC 4.14* 4.26   HEMOGLOBIN 14.0 14.3   HEMATOCRIT 40.7 41.5   MCV 98.3* 97.4   MCH 33.8* 33.6*   MCHC 34.4 34.5   RDW 52.1* 51.6*   PLATELETCT 86* 93*   MPV 9.5 9.6       Recent Labs     12/05/23  0714 12/06/23  0449   SODIUM 139 139   POTASSIUM 3.1* 3.6   CHLORIDE 104 105   CO2 22 23   GLUCOSE 93 113*   BUN 3* 4*   CREATININE 0.43* 0.37*   CALCIUM 9.1 9.6                     Imaging  DX-CHEST-LIMITED (1 VIEW)   Final Result         1.  Right basilar atelectasis, no focal infiltrate           Assessment/Plan  * Alcohol withdrawal, legal hold, Afib w/ RVR/anticoag ([facility])- (present on admission)  Assessment & Plan  Patient has h/o drinking 1 pint per day for past 2 years. In past week she has increased   She developed delirium tremens and was transferred to the IMCU for initiation and titration of an IV Precedex drip which was turned off 12/2.  Status post IV rally bag   Decrease the scheduled Librium from 50 mg PO q  6hrs to 25 mg TID in order to mitigate seizures   Thiamine 100 mg PO daily, folic acid 1 mg, Multivitamin PO daily.   Initiate CIWA protocol.\"    CIWA protocol, detox bag with vitamins  On a legal hold, Psychiatry following  History of Afib s/p AICD on anticoag. Rate stable. Continue ELiquis and digoxin. August echo EF 55%  Stable HR but can be elevated if withdrawing. Contine BB      Hypothyroidism- (present on " "admission)  Assessment & Plan  Known history. Last TSH (05/2023): 4.120  Outpatient: Levothyroxine 50 mcg daily.       Major depressive disorder with current active episode- (present on admission)  Assessment & Plan  Known diagnosis in setting of unfortunate loss of her 2 daughters and  in the past 3 years. Patient denies active suicidal or homicidal ideations. No auditory hallucinations. Notes visual hallucinations that most likely related to EtOH withdrawal.   Outpatient treatment: Venlafaxine 225 mg daily.   QTc is 476. She has requested restarting home trazodone 100 mg nightly. Given the risk of serotonin syndrome, start trazodone 50 mg qHS.  Has not been able to establish with Psychiatry outpatient.    psychiatry consulted   Sitter at bedside  She may benefit from inpatient psych upon discharge.    Atrial fibrillation with RVR (HCC)- (present on admission)  Assessment & Plan  Chronic diagnosis.  She is on Digoxin 125 mcg daily, Toprol 100 mg daily, and Eliquis 5 mg BID outpatient all of which have been restarted  Last echocardiogram (01/2020): no structural or valvular abnormalities. She does not have any evidence of heart failure thus an echo is not indicated.  TSH (05/2023): 4.120  She developed rapid ventricular response likely due to alcohol withdrawal and has required IV metoprolol and transfer to the [facility].\"  Now HR is controlled     AICD (automatic cardioverter/defibrillator) present- (present on admission)  Assessment & Plan  -Implanted approx 3 years ago with cardiology  -Placed July 2020 d/t AICD implant for polymorphic VT, long QT, and atrial fibrillation.   On digoxin outpatient.             VTE prophylaxis: Eliquis  I have performed a physical exam and reviewed and updated ROS and Plan today (12/6/2023). In review of yesterday's note (12/5/2023), there are no changes except as documented above.      "

## 2023-12-06 NOTE — CARE PLAN
The patient is Stable - Low risk of patient condition declining or worsening    Shift Goals  Clinical Goals: Safety, CIWA  Patient Goals: Sleep  Family Goals: DANICA    Progress made toward(s) clinical / shift goals:      Problem: Knowledge Deficit - Standard  Goal: Patient and family/care givers will demonstrate understanding of plan of care, disease process/condition, diagnostic tests and medications  Outcome: Progressing  Note: Patient updated on plan of care. All questions answered. Patient verbalized understanding. No further questions at this time.       Problem: Pain - Standard  Goal: Alleviation of pain or a reduction in pain to the patient’s comfort goal  Outcome: Progressing  Note: Pain rating scale and pain goals discussed with patient          Patient is not progressing towards the following goals:

## 2023-12-06 NOTE — DISCHARGE PLANNING
Spoke to  Dhaval regarding patient's meeting with the court doctors today. Patient has been scheduled to meet with court doctors via telemedicine in the [address] room at 1105. Notified Charge SNEHA Langford and bedside SNEHA Robbins of upcoming meeting this morning.

## 2023-12-06 NOTE — CARE PLAN
The patient is Watcher - Medium risk of patient condition declining or worsening    Shift Goals  Clinical Goals: safety, ciwa  Patient Goals: comfort  Family Goals: pari    Progress made toward(s) clinical / shift goals:    Problem: Psychosocial  Goal: Patient's level of anxiety will decrease  Outcome: Progressing  Note: Pt states she feels better today, mild anxiety     Problem: Pain - Standard  Goal: Alleviation of pain or a reduction in pain to the patient’s comfort goal  Outcome: Progressing  Note: Assess and monitor for pain. Provide pharmacological and non-pharmacological interventions as appropriate. Re-evaluate and continue to monitor.        Problem: Optimal Care for Alcohol Withdrawal  Goal: Optimal Care for the alcohol withdrawal patient  Outcome: Progressing  Note: CIWA scores done Q4 hours with scores of 1       Patient is not progressing towards the following goals:

## 2023-12-06 NOTE — THERAPY
"Occupational Therapy  Daily Treatment     Patient Name: Jeanie Hutchison  Age:  78 y.o., Sex:  female  Medical Record #: [id number]  Today's Date: 12/6/2023       Precautions: Fall Risk, Swallow Precautions  Comments: Sz precautions; ETOH withdrawl    Assessment  Pt was seen for OT tx, pt has generalized delay w/attention, sequencing and motor planning as well as generalized weakness impacting ADL's and mobility. Pt was able to participate in seated ADL's and given up coming court date today facilitated txf to transport WC at end of session. RN aware.     Plan  Treatment Plan Status: Continue Current Treatment Plan  Type of Treatment: Self Care / Activities of Daily Living, Therapeutic Activity, Neuro Re-Education / Balance  Treatment Frequency: 3 Times per Week  Treatment Duration: Until Therapy Goals Met    DC Equipment Recommendations: Unable to determine at this time  Discharge Recommendations: Recommend post-acute placement for additional occupational therapy services prior to discharge home    Subjective    \"Will I ever get better\"      Objective       12/06/23 1026   Treatment Charges   Charges Yes   OT Self Care / ADL (Units) 2   Total Time Spent   OT Time Spent Yes   OT Self Care / ADL (Minutes) 35   OT Total Time Spent (Calculated) 35    Services   Is patient using  services for this encounter? No   Precautions   Precautions Fall Risk;Swallow Precautions   Comments Sz precautions; ETOH withdrawl   Pain 0 - 10 Group   Location Generalized   Therapist Pain Assessment During Activity;Nurse Notified  (not rated)   Cognition    Cognition / Consciousness X   Speech/ Communication Delayed Responses   Orientation Level Not Oriented to Age;Not Oriented to Address;Not Oriented to Year;Not Oriented to Month;Not Oriented to Day;Not Oriented to Time;Not Oriented to Place;Not Oriented to Reason   Level of Consciousness Alert   Ability To Follow Commands 1 Step   Safety Awareness Impaired;Impulsive "   New Learning Impaired   Attention Impaired   Sequencing Impaired   Initiation Impaired   Comments Awake but very slow to respond needs extra time and simple cues to process, able to follow commands but needs cues to follow through on tasks   Passive ROM Upper Body   Passive ROM Upper Body WDL   Active ROM Upper Body   Active ROM Upper Body  X   Comments WFL just w/delay   Strength Upper Body   Upper Body Strength  X   Gross Strength Generalized Weakness, Equal Bilaterally.    Other Treatments   Other Treatments Provided Focused on seated ADL's and functional txfs   Balance   Sitting Balance (Static) Fair   Sitting Balance (Dynamic) Fair -   Standing Balance (Static) Poor   Standing Balance (Dynamic) Poor -   Weight Shift Sitting Poor   Weight Shift Standing Poor   Skilled Intervention Verbal Cuing;Tactile Cuing;Facilitation;Compensatory Strategies   Bed Mobility    Supine to Sit Minimal Assist   Sit to Supine Minimal Assist   Skilled Intervention Verbal Cuing;Tactile Cuing   Comments frequently trying to lay back down   Activities of Daily Living   Grooming Moderate Assist;Seated   Upper Body Dressing Maximal Assist   Lower Body Dressing Maximal Assist   Toileting Maximal Assist   Skilled Intervention Verbal Cuing;Tactile Cuing;Facilitation;Compensatory Strategies   Comments pt participating in seated grooming (oral care, face washing) w/min A, but hair care was max and needed cues to seqence and complete tasks, changed gown, don/doff socks and new brief, pivot to and from chair>bed>bsc>bed>transport chair   How much help from another person does the patient currently need...   6 Clicks Daily Activity Score 12   Functional Mobility   Sit to Stand Moderate Assist   Bed, Chair, Wheelchair Transfer Moderate Assist   Toilet Transfers Moderate Assist   Mobility EOB>Chair>EOB>BSC>EOB>transport chair   Skilled Intervention Verbal Cuing;Tactile Cuing;Facilitation;Compensatory Strategies   Visual Perception   Visual  "Perception  Not Tested   Activity Tolerance   Comments appears to get overwhelmed and fatigues quickly   Patient / Family Goals   Patient / Family Goal #1 \"will I ever get better\"   Short Term Goals   Short Term Goal # 1 pt will complete txf to BSC w/min A   Goal Outcome # 1 Progressing as expected   Short Term Goal # 2 pt will complete grooming seated w/spv   Goal Outcome # 2 Progressing as expected   Education Group   Role of Occupational Therapist Patient Response Patient;Acceptance;Explanation;No Learning Evidence   Occupational Therapy Treatment Plan    O.T. Treatment Plan Continue Current Treatment Plan   Anticipated Discharge Equipment and Recommendations   DC Equipment Recommendations Unable to determine at this time   Discharge Recommendations Recommend post-acute placement for additional occupational therapy services prior to discharge home   Interdisciplinary Plan of Care Collaboration   IDT Collaboration with  Nursing   Patient Position at End of Therapy Seated;Call Light within Reach;Tray Table within Reach   Collaboration Comments sitter present and RN close by   Session Information   Date / Session Number  12/6 #2 (2/3, 12/8)         "

## 2023-12-06 NOTE — DISCHARGE PLANNING
Legal Hold    Referral: Legal Hold Court     Intervention: Pt met with court MD's via telemedicine monitor to contest the legal hold.     Court MD's did not release pt from legal hold. Pt given option to speak to [place] 12/7 to try and get released from legal hold. Pt waived her right to a court hearing, pt agrees she requires further treatment. Once court order is received will scan copy into pt's chart.     Pt awaiting transfer to psychiatric facility.

## 2023-12-06 NOTE — WOUND TEAM
[facility]  Inpatient Services  Wound and Skin Care Brief Evaluation    Admission Date: 11/29/2023     Last order of IP CONSULT TO WOUND CARE was found on 12/5/2023 from Hospital Encounter on 11/29/2023     HPI, PMH, SH: Reviewed    Chief Complaint   Patient presents with    Alcohol Intoxication     Pt states she has been drinking about a pint of alcohol a day in attempt to numb her grief after losing both of her daughters and     Depression     Diagnosis: Atrial fibrillation (HCC) [I48.91]    Unit where seen by Wound Team: T830/00     Wound consult placed regarding Bilateral heels. Chart and images reviewed. This discussed with bedside RN Kathleen. This clinician in to assess patient. Patient pleasant and agreeable. Bilateral heels and sacrum assessed. Non-selectively debrided with Normal Saline.     No pressure injuries or advanced wound care needs identified. Wound consult completed. No further follow up unless indicated and consulted.          PREVENTATIVE INTERVENTIONS:    Q shift Albert - performed per nursing policy  Q shift pressure point assessments - performed per nursing policy    Surface/Positioning  Standard/trauma mattress - Currently in Place    Offloading/Redistribution  Heel offloading dressing (Silicone dressing) - Currently in Place      Containment/Moisture Prevention    Diapered - discussed with bedside RN

## 2023-12-07 LAB — AMMONIA PLAS-SCNC: 12 UMOL/L (ref 11–45)

## 2023-12-07 PROCEDURE — 99231 SBSQ HOSP IP/OBS SF/LOW 25: CPT | Performed by: STUDENT IN AN ORGANIZED HEALTH CARE EDUCATION/TRAINING PROGRAM

## 2023-12-07 PROCEDURE — 700102 HCHG RX REV CODE 250 W/ 637 OVERRIDE(OP): Performed by: STUDENT IN AN ORGANIZED HEALTH CARE EDUCATION/TRAINING PROGRAM

## 2023-12-07 PROCEDURE — 700102 HCHG RX REV CODE 250 W/ 637 OVERRIDE(OP): Performed by: NURSE PRACTITIONER

## 2023-12-07 PROCEDURE — 99232 SBSQ HOSP IP/OBS MODERATE 35: CPT | Performed by: INTERNAL MEDICINE

## 2023-12-07 PROCEDURE — 700102 HCHG RX REV CODE 250 W/ 637 OVERRIDE(OP)

## 2023-12-07 PROCEDURE — 82140 ASSAY OF AMMONIA: CPT

## 2023-12-07 PROCEDURE — 36415 COLL VENOUS BLD VENIPUNCTURE: CPT

## 2023-12-07 PROCEDURE — 700102 HCHG RX REV CODE 250 W/ 637 OVERRIDE(OP): Performed by: INTERNAL MEDICINE

## 2023-12-07 PROCEDURE — A9270 NON-COVERED ITEM OR SERVICE: HCPCS | Performed by: INTERNAL MEDICINE

## 2023-12-07 PROCEDURE — A9270 NON-COVERED ITEM OR SERVICE: HCPCS | Performed by: STUDENT IN AN ORGANIZED HEALTH CARE EDUCATION/TRAINING PROGRAM

## 2023-12-07 PROCEDURE — A9270 NON-COVERED ITEM OR SERVICE: HCPCS

## 2023-12-07 PROCEDURE — A9270 NON-COVERED ITEM OR SERVICE: HCPCS | Performed by: NURSE PRACTITIONER

## 2023-12-07 PROCEDURE — 97530 THERAPEUTIC ACTIVITIES: CPT

## 2023-12-07 PROCEDURE — 770001 HCHG ROOM/CARE - MED/SURG/GYN PRIV*

## 2023-12-07 RX ORDER — GAUZE BANDAGE 2" X 2"
100 BANDAGE TOPICAL DAILY
Status: DISCONTINUED | OUTPATIENT
Start: 2023-12-07 | End: 2023-12-14 | Stop reason: HOSPADM

## 2023-12-07 RX ORDER — CHOLECALCIFEROL (VITAMIN D3) 125 MCG
5 CAPSULE ORAL NIGHTLY PRN
Status: DISCONTINUED | OUTPATIENT
Start: 2023-12-07 | End: 2023-12-08

## 2023-12-07 RX ADMIN — METOPROLOL SUCCINATE 100 MG: 25 TABLET, FILM COATED, EXTENDED RELEASE ORAL at 05:06

## 2023-12-07 RX ADMIN — DIGOXIN 125 MCG: 0.25 TABLET ORAL at 05:06

## 2023-12-07 RX ADMIN — LABETALOL HYDROCHLORIDE 10 MG: 5 INJECTION, SOLUTION INTRAVENOUS at 03:36

## 2023-12-07 RX ADMIN — APIXABAN 5 MG: 5 TABLET, FILM COATED ORAL at 05:06

## 2023-12-07 RX ADMIN — APIXABAN 5 MG: 5 TABLET, FILM COATED ORAL at 17:12

## 2023-12-07 RX ADMIN — AMLODIPINE BESYLATE 5 MG: 5 TABLET ORAL at 05:06

## 2023-12-07 RX ADMIN — LORAZEPAM 1 MG: 1 TABLET ORAL at 22:12

## 2023-12-07 RX ADMIN — VENLAFAXINE HYDROCHLORIDE 225 MG: 75 CAPSULE, EXTENDED RELEASE ORAL at 05:06

## 2023-12-07 RX ADMIN — LEVOTHYROXINE SODIUM 50 MCG: 50 TABLET ORAL at 05:06

## 2023-12-07 RX ADMIN — DOCUSATE SODIUM 50 MG AND SENNOSIDES 8.6 MG 2 TABLET: 8.6; 5 TABLET, FILM COATED ORAL at 17:12

## 2023-12-07 RX ADMIN — Medication 100 MG: at 14:15

## 2023-12-07 ASSESSMENT — COGNITIVE AND FUNCTIONAL STATUS - GENERAL
MOVING FROM LYING ON BACK TO SITTING ON SIDE OF FLAT BED: UNABLE
TURNING FROM BACK TO SIDE WHILE IN FLAT BAD: A LITTLE
SUGGESTED CMS G CODE MODIFIER MOBILITY: CL
CLIMB 3 TO 5 STEPS WITH RAILING: TOTAL
MOBILITY SCORE: 13
WALKING IN HOSPITAL ROOM: A LITTLE
STANDING UP FROM CHAIR USING ARMS: A LITTLE
MOVING TO AND FROM BED TO CHAIR: A LOT

## 2023-12-07 ASSESSMENT — LIFESTYLE VARIABLES
TOTAL SCORE: 9
PAROXYSMAL SWEATS: NO SWEAT VISIBLE
PAROXYSMAL SWEATS: NO SWEAT VISIBLE
VISUAL DISTURBANCES: NOT PRESENT
ORIENTATION AND CLOUDING OF SENSORIUM: DISORIENTED FOR PLACE AND / OR PERSON
ORIENTATION AND CLOUDING OF SENSORIUM: DISORIENTED FOR PLACE AND / OR PERSON
PAROXYSMAL SWEATS: NO SWEAT VISIBLE
AGITATION: NORMAL ACTIVITY
ORIENTATION AND CLOUDING OF SENSORIUM: DISORIENTED FOR PLACE AND / OR PERSON
VISUAL DISTURBANCES: NOT PRESENT
HEADACHE, FULLNESS IN HEAD: NOT PRESENT
ANXIETY: NO ANXIETY (AT EASE)
ANXIETY: MODERATELY ANXIOUS OR GUARDED, SO ANXIETY IS INFERRED
NAUSEA AND VOMITING: NO NAUSEA AND NO VOMITING
TREMOR: NO TREMOR
AUDITORY DISTURBANCES: NOT PRESENT
VISUAL DISTURBANCES: NOT PRESENT
AUDITORY DISTURBANCES: NOT PRESENT
NAUSEA AND VOMITING: NO NAUSEA AND NO VOMITING
TREMOR: TREMOR NOT VISIBLE BUT CAN BE FELT, FINGERTIP TO FINGERTIP
ANXIETY: NO ANXIETY (AT EASE)
PAROXYSMAL SWEATS: NO SWEAT VISIBLE
HEADACHE, FULLNESS IN HEAD: NOT PRESENT
AUDITORY DISTURBANCES: NOT PRESENT
HEADACHE, FULLNESS IN HEAD: NOT PRESENT
ANXIETY: NO ANXIETY (AT EASE)
AGITATION: SOMEWHAT MORE THAN NORMAL ACTIVITY
NAUSEA AND VOMITING: NO NAUSEA AND NO VOMITING
TREMOR: TREMOR NOT VISIBLE BUT CAN BE FELT, FINGERTIP TO FINGERTIP
PAROXYSMAL SWEATS: NO SWEAT VISIBLE
VISUAL DISTURBANCES: NOT PRESENT
ORIENTATION AND CLOUDING OF SENSORIUM: DISORIENTED FOR PLACE AND / OR PERSON
HEADACHE, FULLNESS IN HEAD: NOT PRESENT
HEADACHE, FULLNESS IN HEAD: NOT PRESENT
AGITATION: NORMAL ACTIVITY
HEADACHE, FULLNESS IN HEAD: NOT PRESENT
NAUSEA AND VOMITING: NO NAUSEA AND NO VOMITING
ORIENTATION AND CLOUDING OF SENSORIUM: DISORIENTED FOR PLACE AND / OR PERSON
AGITATION: NORMAL ACTIVITY
TREMOR: TREMOR NOT VISIBLE BUT CAN BE FELT, FINGERTIP TO FINGERTIP
TOTAL SCORE: 5
VISUAL DISTURBANCES: NOT PRESENT
ANXIETY: NO ANXIETY (AT EASE)
AUDITORY DISTURBANCES: NOT PRESENT
AGITATION: NORMAL ACTIVITY
NAUSEA AND VOMITING: NO NAUSEA AND NO VOMITING
NAUSEA AND VOMITING: NO NAUSEA AND NO VOMITING
TREMOR: NO TREMOR
TOTAL SCORE: 4
VISUAL DISTURBANCES: NOT PRESENT
AUDITORY DISTURBANCES: NOT PRESENT
ANXIETY: NO ANXIETY (AT EASE)
PAROXYSMAL SWEATS: NO SWEAT VISIBLE
TOTAL SCORE: 5
AGITATION: NORMAL ACTIVITY
TREMOR: NO TREMOR
TOTAL SCORE: 4
TOTAL SCORE: 5
ORIENTATION AND CLOUDING OF SENSORIUM: DISORIENTED FOR PLACE AND / OR PERSON
AUDITORY DISTURBANCES: NOT PRESENT

## 2023-12-07 ASSESSMENT — PATIENT HEALTH QUESTIONNAIRE - PHQ9
1. LITTLE INTEREST OR PLEASURE IN DOING THINGS: NEARLY EVERY DAY
SUM OF ALL RESPONSES TO PHQ9 QUESTIONS 1 AND 2: 6
2. FEELING DOWN, DEPRESSED, IRRITABLE, OR HOPELESS: NEARLY EVERY DAY

## 2023-12-07 ASSESSMENT — GAIT ASSESSMENTS
GAIT LEVEL OF ASSIST: MINIMAL ASSIST
DISTANCE (FEET): 6
ASSISTIVE DEVICE: FRONT WHEEL WALKER

## 2023-12-07 ASSESSMENT — PAIN DESCRIPTION - PAIN TYPE: TYPE: ACUTE PAIN

## 2023-12-07 ASSESSMENT — FIBROSIS 4 INDEX: FIB4 SCORE: 7.03

## 2023-12-07 NOTE — DISCHARGE PLANNING
Received order granting petition for court ordered involuntary admission from the court committing pt to any accepting mental health facility for up to 6 months(6/7/2024), scanned copy into pt's chart.

## 2023-12-07 NOTE — PROGRESS NOTES
Assessment completed.  Pt A&Ox1 (self)  Pt complains of 1/10 pain, declines interventions at this time. 1:1 sitter at bedside. POC discussed; communication board updated.    Bed in locked, lowest position.  Call light and belongings within reach.  Needs met.

## 2023-12-07 NOTE — CARE PLAN
"    Problem: Knowledge Deficit - Standard  Goal: Patient and family/care givers will demonstrate understanding of plan of care, disease process/condition, diagnostic tests and medications  Outcome: Progressing  Note: Remains alert and oriented to self     Problem: Psychosocial  Goal: Patient's level of anxiety will decrease  Outcome: Progressing  Goal: Spiritual and cultural needs incorporated into hospitalization  Outcome: Progressing     Problem: Pain - Standard  Goal: Alleviation of pain or a reduction in pain to the patient’s comfort goal  Outcome: Progressing     Problem: Optimal Care for Alcohol Withdrawal  Goal: Optimal Care for the alcohol withdrawal patient  Outcome: Progressing     Problem: Seizure Precautions  Goal: Implementation of seizure precautions  Outcome: Progressing     Problem: Risk for Aspiration  Goal: Patient's risk for aspiration will be absent or decrease  Outcome: Progressing    The patient is Watcher - Medium risk of patient condition declining or worsening    Shift Goals  Clinical Goals: Monitor VS; safety from fall  Patient Goals: get well  Family Goals: na    Progress made toward(s) clinical / shift goals:   Remains free from fall at this time, participated with nursing intervention and plan of care, bed alarm kept on and sitter at bedside,/88   Pulse 77   Temp 36.4 °C (97.5 °F) (Temporal)   Resp 17   Ht 1.676 m (5' 5.98\")   Wt 68 kg (149 lb 14.6 oz)   SpO2 93%       Patient is not progressing towards the following goals:      Problem: Lifestyle Changes  Goal: Patient's ability to identify lifestyle changes and available resources to help reduce recurrence of condition will improve  Outcome: Not Progressing     Problem: Neuro Status  Goal: Neuro status will remain stable or improve  Outcome: Not Progressing     "

## 2023-12-07 NOTE — PROGRESS NOTES
Hospital Medicine Daily Progress Note    Date of Service  12/7/2023    Chief Complaint  Jaenie Hutchison is a 78 y.o. female admitted 11/29/2023 with alcohol intoxication.    Hospital Course  Ms. Jeanie Hutchison is a 78 y.o. female with PMHx of Atrial fibrillation, AICD, Hypothyroidism, Depression, ETOH use disorder who was admitted on 11/30 for alcohol intoxication and Afib with RVR.     Patient notes she has been drinking 1 pint per day of Vodka for past 2 years and in past week increased volume. She was suggested to pursue detoxification by his son which is why she is here in the hospital. Notes she is currently having visual hallucinations (cartoon-like images), feels anxious, mild hand tremors, and no other specific symptoms. Denies suicidal or homicidal ideations. No prior h.o withdrawal seizures or need for ICU admission d/t ETOH withdrawal.      In ER, patient was resting comfortably in bed, no acute distress. She is very pleasant. No electrolyte imbalances. EKG shows Afib w/RVR. Has received Metoprolol pushes, 5 mg IV, x3. Resumed all her home medications. Patient also received Lorazepam 2 mg IV, Librium 25 mg PO and  rally bag IV. Patient placed on [facility] protocol for alcohol withdrawal management. Patient admitted to hospital medicine for management of care.           She required transfer to the IMCU due to escalating detox symptoms requiring an IV Precedex drip.  Interval Problem Update  12/1: Ms. Hutchison was evaluated and examined in the IMCU. She is requiring titration of an IV Precedex drip due to severe alcohol detox. She is tolerating oral meds with prompting.   12/2: Ms. Hutchison was seen in the IMCU. She has required an IV precedex drip with scheduled Librium. A sitter is at bedside. She has been in afib. IV fluids running. She is a non-historian this morning.   12/3: Ms. Hutchison was evaluated in the IMCU. The Precedex drip has been turned off. She has been on IV fluids due to poor oral intake.  "The scheduled librium dose will be decreased from 50 mg qid to 25 mg TID. She has been in afib rate low 100's. [facility] protocol for IV ativan as needed.\"    12/4. I reviewed the chart along with vitals, labs, imaging, test (both pending and resulted) and recommendations from specialists and interdisciplinary team.  One to one sitter. She has poor insight but currently pleasant   COntinue CIWA protocol  Psychiatry following her and she has a one to one sitter. On legal hold that is extended.  HR stable. Continue Eliquis for Afib  12/5; Patient seen and examined, afebrile, no nausea or vomiting. HTN not well controlled with SBP in the 150 and DBP in the 100. Starting patient on amlodipine   Psychiatry following on legal hold appreciate rec.   12/6: Patient resting in bed, afebrile,no nausea or vomiting, BP better today  Psychiatry following appreciate rec. On legal hold  12/7: Patient seen and examined, afebrile, no acute events overnight   Still confused, I will check a UA and ammonia level   Psychiatry following on legal hold appreciate rec   I have discussed this patient's plan of care and discharge plan at IDT rounds today with Case Management, Nursing, Nursing leadership, and other members of the IDT team.    Consultants/Specialty  psychiatry    Code Status  Full Code    Disposition  Medically Cleared  I have placed the appropriate orders for post-discharge needs.    Review of Systems  Review of Systems   Unable to perform ROS: Mental acuity        Physical Exam  Temp:  [36.3 °C (97.3 °F)-37 °C (98.6 °F)] 36.4 °C (97.5 °F)  Pulse:  [70-98] 77  Resp:  [17-18] 17  BP: (124-168)/() 125/88  SpO2:  [93 %-96 %] 93 %    Physical Exam  Vitals and nursing note reviewed.   Constitutional:       Appearance: She is ill-appearing. She is not toxic-appearing.   HENT:      Mouth/Throat:      Mouth: Mucous membranes are dry.   Eyes:      General: No scleral icterus.  Cardiovascular:      Rate and Rhythm: Tachycardia present. " "Rhythm irregular.   Pulmonary:      Effort: Pulmonary effort is normal.      Breath sounds: Normal breath sounds. No rales.      Comments: 1 liter nasal cannula oxygen  Abdominal:      General: There is no distension.      Palpations: Abdomen is soft.      Tenderness: There is no abdominal tenderness.   Musculoskeletal:      Cervical back: Neck supple.      Right lower leg: No edema.      Left lower leg: No edema.   Neurological:      Comments: Poor cognition but awake and can converse     Psychiatric:      Comments: Compliant with exam         Fluids    Intake/Output Summary (Last 24 hours) at 12/7/2023 1343  Last data filed at 12/7/2023 0916  Gross per 24 hour   Intake 776 ml   Output 200 ml   Net 576 ml       Laboratory  Recent Labs     12/05/23  0714 12/06/23  0301   WBC 5.4 5.1   RBC 4.14* 4.26   HEMOGLOBIN 14.0 14.3   HEMATOCRIT 40.7 41.5   MCV 98.3* 97.4   MCH 33.8* 33.6*   MCHC 34.4 34.5   RDW 52.1* 51.6*   PLATELETCT 86* 93*   MPV 9.5 9.6     Recent Labs     12/05/23  0714 12/06/23  0449   SODIUM 139 139   POTASSIUM 3.1* 3.6   CHLORIDE 104 105   CO2 22 23   GLUCOSE 93 113*   BUN 3* 4*   CREATININE 0.43* 0.37*   CALCIUM 9.1 9.6                   Imaging  DX-CHEST-LIMITED (1 VIEW)   Final Result         1.  Right basilar atelectasis, no focal infiltrate           Assessment/Plan  * Alcohol withdrawal, legal hold, Afib w/ RVR/anticoag (HCC)- (present on admission)  Assessment & Plan  Patient has h/o drinking 1 pint per day for past 2 years. In past week she has increased   She developed delirium tremens and was transferred to the [facility] for initiation and titration of an IV Precedex drip which was turned off 12/2.  Status post IV rally bag   Decrease the scheduled Librium from 50 mg PO q  6hrs to 25 mg TID in order to mitigate seizures   Thiamine 100 mg PO daily, folic acid 1 mg, Multivitamin PO daily.   Initiate CIWA protocol.\"    CIWA protocol, detox bag with vitamins  On a legal hold, Psychiatry " "following  History of Afib s/p AICD on anticoag. Rate stable. Continue ELiquis and digoxin. August echo EF 55%  Stable HR but can be elevated if withdrawing. Contine BB      Hypothyroidism- (present on admission)  Assessment & Plan  Known history. Last TSH (05/2023): 4.120  Outpatient: Levothyroxine 50 mcg daily.       Major depressive disorder with current active episode- (present on admission)  Assessment & Plan  Known diagnosis in setting of unfortunate loss of her 2 daughters and  in the past 3 years. Patient denies active suicidal or homicidal ideations. No auditory hallucinations. Notes visual hallucinations that most likely related to EtOH withdrawal.   Outpatient treatment: Venlafaxine 225 mg daily.   QTc is 476. She has requested restarting home trazodone 100 mg nightly. Given the risk of serotonin syndrome, start trazodone 50 mg qHS.  Has not been able to establish with Psychiatry outpatient.    psychiatry consulted   Sitter at bedside  She may benefit from inpatient psych upon discharge.    Atrial fibrillation with RVR (HCC)- (present on admission)  Assessment & Plan  Chronic diagnosis.  She is on Digoxin 125 mcg daily, Toprol 100 mg daily, and Eliquis 5 mg BID outpatient all of which have been restarted  Last echocardiogram (01/2020): no structural or valvular abnormalities. She does not have any evidence of heart failure thus an echo is not indicated.  TSH (05/2023): 4.120  She developed rapid ventricular response likely due to alcohol withdrawal and has required IV metoprolol and transfer to the [facility].\"  Now HR is controlled     AICD (automatic cardioverter/defibrillator) present- (present on admission)  Assessment & Plan  -Implanted approx 3 years ago with cardiology  -Placed July 2020 d/t AICD implant for polymorphic VT, long QT, and atrial fibrillation.   On digoxin outpatient.             VTE prophylaxis: Eliquis  I have performed a physical exam and reviewed and updated ROS and Plan today " (12/7/2023). In review of yesterday's note (12/6/2023), there are no changes except as documented above.

## 2023-12-07 NOTE — CONSULTS
"PSYCHIATRIC FOLLOW-UP:(established)      Reason for admission: Afib, alcohol withdrawal  Reason for consult: depression,   Legal hold: extended (for failure to thrive due to MDD)       Chart reviewed.         *HPI:          Discussed with RN, no events overnight. Pt continues to have difficulty with verbal expression and significantly slowed cognition making conversation very difficult. Responses are severely delayed or absent.     Pt orientation is slightly improved today as she is able to give the correct location and name of hospital where she was only able to give her name yesterday. However she is still disoriented to situation and time. She reports her mood as 'bad' When asked to elaborate she sated \"I'm still very sad.' Pt then mentioned this was due to her daughter passing from melanoma and her  passing 2 years ago but she was unable to verbalize any more information. Pt reports good appetite and sleep. Pt denies SI/HI/AVH. She is able to stay awake thorough the interview.  She does have difficulty following even 1 step commands (attempted to do FTN testing and she had great difficulty completing this task). She needs to be prompted step by step to complete a 2 step command. Cam ICU still positive.     Medical ROS (as pertinent):     Review of Systems   Gastrointestinal:  Negative for diarrhea and nausea.   Neurological:  Negative for headaches.         *Psychiatric Examination:   General Appearance: NAD, appears stated age, disheveled   Behavior: Slowed motor movements, intermittently and inappropriately quietly laughs to herself, tearful when discussing family deaths   Speech: Slowed rate, low volume, incomplete sentences at times , latency in speech noted  Thought processes: tangential, often forgets what she is trying to say mid sentence  Abnormal or Psychotic Thoughts: Denies SI/HI/AVH, Not observed to be responding to internal stimuli   Judgement and Insight: Poor/Poor   Orientation: AOx2 (self " "and location)   Recent and Remote Memory: Unable ot assess   Attention Span and Concentration: Poor, Unable to consistently respond to questions, Frequently breaking eye contact to stare at hands   Language: English   Mood and Affect: \"sad\" affect congruent as pt tearful when discussing recent deaths in family     Labs  Lab Results   Component Value Date/Time    SODIUM 139 12/06/2023 04:49 AM    POTASSIUM 3.6 12/06/2023 04:49 AM    CHLORIDE 105 12/06/2023 04:49 AM    CO2 23 12/06/2023 04:49 AM    GLUCOSE 113 (H) 12/06/2023 04:49 AM    BUN 4 (L) 12/06/2023 04:49 AM    CREATININE 0.37 (L) 12/06/2023 04:49 AM    BUNCREATRAT 21.7 (H) 08/31/2023 03:29 AM       Lab Results   Component Value Date/Time    WBC 5.1 12/06/2023 03:01 AM    RBC 4.26 12/06/2023 03:01 AM    HEMOGLOBIN 14.3 12/06/2023 03:01 AM    HEMATOCRIT 41.5 12/06/2023 03:01 AM    MCV 97.4 12/06/2023 03:01 AM    MCH 33.6 (H) 12/06/2023 03:01 AM    MCHC 34.5 12/06/2023 03:01 AM    MPV 9.6 12/06/2023 03:01 AM    NEUTSPOLYS 43.20 (L) 11/29/2023 04:00 PM    LYMPHOCYTES 45.50 (H) 11/29/2023 04:00 PM    MONOCYTES 8.90 11/29/2023 04:00 PM    EOSINOPHILS 1.00 11/29/2023 04:00 PM    BASOPHILS 1.20 11/29/2023 04:00 PM             Allergies   Allergen Reactions    Sulfa Drugs Rash             Current Facility-Administered Medications:     amLODIPine (Norvasc) tablet 5 mg, 5 mg, Oral, Q DAY, Penny Fowler M.D., 5 mg at 12/07/23 0506    traZODone (Desyrel) tablet 50 mg, 50 mg, Oral, QHS PRN, Bridgette Jaquez D.O.    acetaminophen (Tylenol) tablet 650 mg, 650 mg, Oral, Q4HRS PRN, Devon Redd M.D., 650 mg at 12/05/23 2014    senna-docusate (Pericolace Or Senokot S) 8.6-50 MG per tablet 2 Tablet, 2 Tablet, Oral, BID, 2 Tablet at 12/06/23 1745 **AND** polyethylene glycol/lytes (Miralax) Packet 1 Packet, 1 Packet, Oral, QDAY PRN **AND** magnesium hydroxide (Milk Of Magnesia) suspension 30 mL, 30 mL, Oral, QDAY PRN **AND** bisacodyl (Dulcolax) suppository 10 mg, 10 mg, " Rectal, QDAY PRN, Antoni Shankar M.D.    levothyroxine (Synthroid) tablet 50 mcg, 50 mcg, Oral, AM ES, Antoni Shankar M.D., 50 mcg at 12/07/23 0506    venlafaxine XR (Effexor XR) capsule 225 mg, 225 mg, Oral, DAILY, Antoni Shankar M.D., 225 mg at 12/07/23 0506    LORazepam (Ativan) tablet 0.5 mg, 0.5 mg, Oral, Q4HRS PRN, Malu Beavers-Hairuncuco, A.P.R.N., 0.5 mg at 12/06/23 2022    LORazepam (Ativan) tablet 1 mg, 1 mg, Oral, Q4HRS PRN, 1 mg at 12/04/23 0112 **OR** LORazepam (Ativan) injection 0.5 mg, 0.5 mg, Intravenous, Q4HRS PRN, Geraldene R Ralleca-Llaguno, A.P.R.N., 0.5 mg at 12/02/23 1203    LORazepam (Ativan) tablet 2 mg, 2 mg, Oral, Q2HRS PRN **OR** LORazepam (Ativan) injection 1 mg, 1 mg, Intravenous, Q2HRS PRN, Geraldene R Ralleca-Llaguno, A.P.R.N., 1 mg at 12/01/23 1930    LORazepam (Ativan) tablet 3 mg, 3 mg, Oral, Q HOUR PRN, 3 mg at 11/30/23 1314 **OR** LORazepam (Ativan) injection 1.5 mg, 1.5 mg, Intravenous, Q HOUR PRN, Geraldene R Ralleca-Llaguno, A.P.R.N., 1.5 mg at 11/30/23 1641    LORazepam (Ativan) tablet 4 mg, 4 mg, Oral, Q15 MIN PRN **OR** LORazepam (Ativan) injection 2 mg, 2 mg, Intravenous, Q15 MIN PRN, Geraldene R Ralleca-Llaguno, A.P.R.N., 2 mg at 11/30/23 1846    labetalol (Normodyne/Trandate) injection 10 mg, 10 mg, Intravenous, Q HOUR PRN, 10 mg at 12/07/23 0336 **OR** labetalol (Normodyne) tablet 200 mg, 200 mg, Oral, Q6HRS PRN, ANGLE Landis    Metoprolol Tartrate (Lopressor) injection 5 mg, 5 mg, Intravenous, Q5 MIN PRN, Guru Plata M.D.    apixaban (Eliquis) tablet 5 mg, 5 mg, Oral, BID, Reilly Loving M.D., 5 mg at 12/07/23 0506    digoxin (Lanoxin) tablet 125 mcg, 125 mcg, Oral, DAILY, Reilly Loving M.D., 125 mcg at 12/07/23 0506    metoprolol SR (Toprol XL) tablet 100 mg, 100 mg, Oral, Q DAY, Reilly Loving M.D., 100 mg at 12/07/23 0506        Assessment:   Pt continues to demonstrate significant neurocognitive  dysfunction limiting ability to meaningfully participate in basic conversation. Psychomotor activity slowed. Suspect primarily d/t hypoactive delirium from EtOH withdrawal though alternative causes are being considered (see plan).     Dx:   #Encephalopathy   #Hx Alcohol use disorder , severe now In remission in a controlled environment  #Hx of MDD     Medical:   Per medical team     Plan:   · Legal hold: Extended   · Psychotropic medications   o Continue Effexor 225mg for depression  o Change Trazodone to melatonin 5 mg QHS pRN for insomnia, limit sedating medications as possible due to hypoactive delirious state  · Please transfer pt to inpatient psychiatric hospital when medically cleared and bed is available   · Discussed the case with: Dr. Fowler (via volate)  · Psychiatry will follow up     Delirium recommendations    Consider a speech and nutrition evaluation for adequate oral intake and hydration  Please provide the patient with orientation materials  (clock, white board with room number and date). Keep room well lit in the daytime  Consider a physical therapy consult for daily mobilization  Avoid nighttime medications tests and labdraws as much as possible to ensure adequate rest.    Consider the following additional labs/tests: B1 level, B12 level, UA    Sitter: 1:1   Phone: With sitter supervision   Visitors: Son only   Personal belongings: Phone and ipad

## 2023-12-07 NOTE — PROGRESS NOTES
Assumed care of patient, received bedside report from Rosendo HOOVER. Patient is A&O X 1, disoriented to place, time, and situation. Pain 0/10, on RA, patient is medical. Fall precautions in place. Bed alarm on, bed locked and in lowest position. Report given to 1:1 sitter, STOP sign completed.

## 2023-12-07 NOTE — DIETARY
"Nutrition services: Day 7 of admit.  Jeanie Hutchison is a 78 y.o. female with admitting DX of Atrial fibrillation (HCC) [I48.91]     Consult received for poor PO intake on RD weekly patient screening. Per chart notes, pt is A&O x 1; \"confused, forgetful, fatigue, depressed.\" Likely not appropriate for interview.    Assessment:  Height: 167.6 cm (5' 5.98\")  Weight: 68 kg (149 lb 14.6 oz)  Body mass index is 24.21 kg/m²., BMI classification: Normal  Diet/Intake: Cardiac; 2gm sodium, no sharps (paperware), no straws:     Evaluation:   PO intake per ADLs was 50-75% 12/3-12/5; since 12/6, documented intake has been <25-50%  Pt here for EtOH withdrawal/legal hold; other dx of \"major depressive disorder with current active episode.\"  Labs (12/6): glu 113, BUN 4, crea 0.37  MAR: eliquis, synthroid, pericolace (12/6)  +BM 12/7, type 6 - note pt had no BM between 12/3 and 12/6, which coincides w/ decline in PO intake. PO intake may improve now that she is having regular BMs.    Malnutrition Risk: No criteria met.     Recommendations/Plan:  Trial Ensure Plus TID w/ meals to bolster PO intake due to recent decline in meal intake.   Encourage intake of meals and supplements >/=50%.  Document intake of all PO as % taken in ADL's to provide interdisciplinary communication across all shifts.   Monitor weight.  Nutrition rep will continue to see patient for ongoing meal and snack preferences.     RD following.  "

## 2023-12-07 NOTE — THERAPY
Physical Therapy   Daily Treatment     Patient Name: Jeanie Hutchison  Age:  78 y.o., Sex:  female  Medical Record #: [id number]  Today's Date: 12/7/2023     Precautions  Precautions: Fall Risk;Swallow Precautions    Assessment    Pt agreeable to PT tx session, continues to be limited by deficits in balance, strength, coordination, activity tolerance, safety awareness/cognition.  Pt improved with mobility this session, ambulating from EOB <> couch under window with short, shuffled steps and frequent stopping.  Educated & guided pt through LE exercises as detailed below with frequent cues to continue task & quickly c/o fatigue.  Noted pt frequently rubbing L eye & eyelid red; RN notified & aware.  Pt would benefit from daily mobility with nursing staff including sitting up in chair for meals and mobilizing to toilet PRN.  Will continue to follow.     Plan    Treatment Plan Status: Continue Current Treatment Plan  Type of Treatment: Bed Mobility, Gait Training, Neuro Re-Education / Balance, Stair Training, Therapeutic Activities, Therapeutic Exercise  Treatment Frequency: 4 Times per Week  Treatment Duration: Until Therapy Goals Met    DC Equipment Recommendations: Unable to determine at this time  Discharge Recommendations: Recommend post-acute placement for additional physical therapy services prior to discharge home     Objective     12/07/23 1351   Precautions   Precautions Fall Risk;Swallow Precautions   Pain 0 - 10 Group   Therapist Pain Assessment Post Activity Pain Same as Prior to Activity;Nurse Notified;0   Cognition    Cognition / Consciousness X   Speech/ Communication Delayed Responses (minimal verbal engagement)   Level of Consciousness Alert   Ability To Follow Commands 1 Step   Comments slow to respond, increased time for simple cues   Sitting Lower Body Exercises   Sitting Lower Body Exercises Yes   Bicep Curls 1 set of 10   Hip Abduction 1 set of 10   Hip Adduction 1 set of 10   Long Arc Quad 1 set  of 15;Bilateral   Marching Reciprocal;1 set of 10   Balance   Sitting Balance (Static) Fair   Sitting Balance (Dynamic) Fair -   Standing Balance (Static) Fair -   Standing Balance (Dynamic) Poor +   Weight Shift Sitting Fair   Weight Shift Standing Poor   Skilled Intervention Verbal Cuing;Tactile Cuing;Facilitation   Bed Mobility    Supine to Sit Standby Assist   Sit to Supine Minimal Assist (for LEs)   Skilled Intervention Verbal Cuing;Facilitation   Gait Analysis   Gait Level Of Assist Minimal Assist   Assistive Device Front Wheel Walker   Distance (Feet) 6   # of Times Distance was Traveled 2   Deviation (shuffled, inconsistent steps with frequent pauses)   Weight Bearing Status No restrictions   Skilled Intervention Verbal Cuing;Tactile Cuing   Functional Mobility   Sit to Stand Minimal Assist   Bed, Chair, Wheelchair Transfer Minimal Assist   Transfer Method Stand Step   Mobility supine > EOB > amb to window > back to bed   Skilled Intervention Verbal Cuing;Tactile Cuing   Activity Tolerance   Sitting Edge of Bed 6 min   Standing 4 min   Short Term Goals    Short Term Goal # 1 Patient will perform supine-sit with HOB flat with supervision in 6 visits   Goal Outcome # 1 goal not met   Short Term Goal # 2 Patient will perform sit-stand and chair transfers with LRAD with supervision in 6 visits   Goal Outcome # 2 Goal not met   Short Term Goal # 3 Patient will ambulate > 50 feet with LRAD with supervision in 6 visits   Goal Outcome # 3 Goal not met   Short Term Goal # 4 Patient will negotiate 10 steps with rails with supervision in 6 visits   Goal Outcome # 4 Goal not met

## 2023-12-07 NOTE — CARE PLAN
The patient is Watcher - Medium risk of patient condition declining or worsening    Shift Goals  Clinical Goals: safety, CIWA  Patient Goals: DANICA  Family Goals: na    Progress made toward(s) clinical / shift goals:    Problem: Optimal Care for Alcohol Withdrawal  Goal: Optimal Care for the alcohol withdrawal patient  Outcome: Progressing  Note: Patient's CIWA scores are less than 10, often not requiring medication.        Patient is not progressing towards the following goals:      Problem: Neuro Status  Goal: Neuro status will remain stable or improve  Outcome: Not Progressing  Note: Patient remains AxO 1, unable to answer basic questions or follow most commands.

## 2023-12-07 NOTE — CARE PLAN
The patient is Stable - Low risk of patient condition declining or worsening    Shift Goals  Clinical Goals: safety, CIWA  Patient Goals: slee  Family Goals: DANICA    Progress made toward(s) clinical / shift goals:    Problem: Knowledge Deficit - Standard  Goal: Patient and family/care givers will demonstrate understanding of plan of care, disease process/condition, diagnostic tests and medications  Outcome: Progressing     Problem: Lifestyle Changes  Goal: Patient's ability to identify lifestyle changes and available resources to help reduce recurrence of condition will improve  Outcome: Progressing     Problem: Psychosocial  Goal: Patient's level of anxiety will decrease  Outcome: Progressing     Problem: Pain - Standard  Goal: Alleviation of pain or a reduction in pain to the patient’s comfort goal  Outcome: Progressing

## 2023-12-07 NOTE — PROGRESS NOTES
Bedside report received from night RN, pt care assumed, assessment completed. Pt is A&O1, pain 0, not on the monitor. Updated on POC, questions answered. Bed in lowest, locked position, treaded socks on, call light and belongings within reach.

## 2023-12-08 LAB
APPEARANCE UR: ABNORMAL
BACTERIA #/AREA URNS HPF: ABNORMAL /HPF
BILIRUB UR QL STRIP.AUTO: ABNORMAL
CAOX CRY #/AREA URNS HPF: ABNORMAL /HPF
COLOR UR: ABNORMAL
EPI CELLS #/AREA URNS HPF: ABNORMAL /HPF
GLUCOSE UR STRIP.AUTO-MCNC: NEGATIVE MG/DL
HYALINE CASTS #/AREA URNS LPF: ABNORMAL /LPF
KETONES UR STRIP.AUTO-MCNC: ABNORMAL MG/DL
LEUKOCYTE ESTERASE UR QL STRIP.AUTO: ABNORMAL
MICRO URNS: ABNORMAL
NITRITE UR QL STRIP.AUTO: POSITIVE
PH UR STRIP.AUTO: 5.5 [PH] (ref 5–8)
PROT UR QL STRIP: NEGATIVE MG/DL
RBC # URNS HPF: ABNORMAL /HPF
RBC UR QL AUTO: NEGATIVE
SP GR UR STRIP.AUTO: 1.02
UROBILINOGEN UR STRIP.AUTO-MCNC: 0.2 MG/DL
VIT B12 SERPL-MCNC: 601 PG/ML (ref 211–911)
WBC #/AREA URNS HPF: ABNORMAL /HPF
YEAST BUDDING URNS QL: PRESENT /HPF

## 2023-12-08 PROCEDURE — 700102 HCHG RX REV CODE 250 W/ 637 OVERRIDE(OP): Performed by: STUDENT IN AN ORGANIZED HEALTH CARE EDUCATION/TRAINING PROGRAM

## 2023-12-08 PROCEDURE — 770001 HCHG ROOM/CARE - MED/SURG/GYN PRIV*

## 2023-12-08 PROCEDURE — A9270 NON-COVERED ITEM OR SERVICE: HCPCS

## 2023-12-08 PROCEDURE — 81001 URINALYSIS AUTO W/SCOPE: CPT

## 2023-12-08 PROCEDURE — 700102 HCHG RX REV CODE 250 W/ 637 OVERRIDE(OP): Performed by: INTERNAL MEDICINE

## 2023-12-08 PROCEDURE — 36415 COLL VENOUS BLD VENIPUNCTURE: CPT

## 2023-12-08 PROCEDURE — A9270 NON-COVERED ITEM OR SERVICE: HCPCS | Performed by: STUDENT IN AN ORGANIZED HEALTH CARE EDUCATION/TRAINING PROGRAM

## 2023-12-08 PROCEDURE — 82607 VITAMIN B-12: CPT

## 2023-12-08 PROCEDURE — 99231 SBSQ HOSP IP/OBS SF/LOW 25: CPT | Performed by: STUDENT IN AN ORGANIZED HEALTH CARE EDUCATION/TRAINING PROGRAM

## 2023-12-08 PROCEDURE — 99233 SBSQ HOSP IP/OBS HIGH 50: CPT | Performed by: INTERNAL MEDICINE

## 2023-12-08 PROCEDURE — A9270 NON-COVERED ITEM OR SERVICE: HCPCS | Performed by: INTERNAL MEDICINE

## 2023-12-08 PROCEDURE — 700102 HCHG RX REV CODE 250 W/ 637 OVERRIDE(OP)

## 2023-12-08 RX ORDER — MIRTAZAPINE 7.5 MG/1
7.5 TABLET, FILM COATED ORAL
Status: DISCONTINUED | OUTPATIENT
Start: 2023-12-08 | End: 2023-12-11

## 2023-12-08 RX ORDER — QUETIAPINE FUMARATE 25 MG/1
12.5 TABLET, FILM COATED ORAL 2 TIMES DAILY PRN
Status: DISCONTINUED | OUTPATIENT
Start: 2023-12-08 | End: 2023-12-13

## 2023-12-08 RX ORDER — MIRTAZAPINE 15 MG/1
7.5 TABLET, FILM COATED ORAL
Status: DISCONTINUED | OUTPATIENT
Start: 2023-12-08 | End: 2023-12-08

## 2023-12-08 RX ADMIN — VENLAFAXINE HYDROCHLORIDE 225 MG: 75 CAPSULE, EXTENDED RELEASE ORAL at 04:22

## 2023-12-08 RX ADMIN — DOCUSATE SODIUM 50 MG AND SENNOSIDES 8.6 MG 2 TABLET: 8.6; 5 TABLET, FILM COATED ORAL at 04:22

## 2023-12-08 RX ADMIN — APIXABAN 5 MG: 5 TABLET, FILM COATED ORAL at 04:22

## 2023-12-08 RX ADMIN — METOPROLOL SUCCINATE 100 MG: 25 TABLET, FILM COATED, EXTENDED RELEASE ORAL at 04:22

## 2023-12-08 RX ADMIN — APIXABAN 5 MG: 5 TABLET, FILM COATED ORAL at 17:33

## 2023-12-08 RX ADMIN — AMLODIPINE BESYLATE 5 MG: 5 TABLET ORAL at 04:22

## 2023-12-08 RX ADMIN — MIRTAZAPINE 7.5 MG: 15 TABLET, FILM COATED ORAL at 20:31

## 2023-12-08 RX ADMIN — DOCUSATE SODIUM 50 MG AND SENNOSIDES 8.6 MG 2 TABLET: 8.6; 5 TABLET, FILM COATED ORAL at 17:32

## 2023-12-08 RX ADMIN — DIGOXIN 125 MCG: 0.25 TABLET ORAL at 04:22

## 2023-12-08 RX ADMIN — LEVOTHYROXINE SODIUM 50 MCG: 50 TABLET ORAL at 04:22

## 2023-12-08 RX ADMIN — Medication 100 MG: at 04:22

## 2023-12-08 ASSESSMENT — LIFESTYLE VARIABLES
HEADACHE, FULLNESS IN HEAD: NOT PRESENT
NAUSEA AND VOMITING: NO NAUSEA AND NO VOMITING
TOTAL SCORE: 4
ANXIETY: NO ANXIETY (AT EASE)
NAUSEA AND VOMITING: NO NAUSEA AND NO VOMITING
TOTAL SCORE: 4
AGITATION: NORMAL ACTIVITY
ORIENTATION AND CLOUDING OF SENSORIUM: DISORIENTED FOR PLACE AND / OR PERSON
AGITATION: NORMAL ACTIVITY
VISUAL DISTURBANCES: NOT PRESENT
TREMOR: NO TREMOR
ANXIETY: NO ANXIETY (AT EASE)
AUDITORY DISTURBANCES: NOT PRESENT
TREMOR: NO TREMOR
AUDITORY DISTURBANCES: NOT PRESENT
HEADACHE, FULLNESS IN HEAD: NOT PRESENT
ORIENTATION AND CLOUDING OF SENSORIUM: DISORIENTED FOR PLACE AND / OR PERSON
PAROXYSMAL SWEATS: NO SWEAT VISIBLE
PAROXYSMAL SWEATS: NO SWEAT VISIBLE
VISUAL DISTURBANCES: NOT PRESENT

## 2023-12-08 ASSESSMENT — PATIENT HEALTH QUESTIONNAIRE - PHQ9
SUM OF ALL RESPONSES TO PHQ9 QUESTIONS 1 AND 2: 6
1. LITTLE INTEREST OR PLEASURE IN DOING THINGS: NEARLY EVERY DAY
3. TROUBLE FALLING OR STAYING ASLEEP OR SLEEPING TOO MUCH: NEARLY EVERY DAY
5. POOR APPETITE OR OVEREATING: SEVERAL DAYS
SUM OF ALL RESPONSES TO PHQ QUESTIONS 1-9: 23
8. MOVING OR SPEAKING SO SLOWLY THAT OTHER PEOPLE COULD HAVE NOTICED. OR THE OPPOSITE, BEING SO FIGETY OR RESTLESS THAT YOU HAVE BEEN MOVING AROUND A LOT MORE THAN USUAL: NEARLY EVERY DAY
4. FEELING TIRED OR HAVING LITTLE ENERGY: NEARLY EVERY DAY
7. TROUBLE CONCENTRATING ON THINGS, SUCH AS READING THE NEWSPAPER OR WATCHING TELEVISION: NEARLY EVERY DAY
6. FEELING BAD ABOUT YOURSELF - OR THAT YOU ARE A FAILURE OR HAVE LET YOURSELF OR YOUR FAMILY DOWN: NEARLY EVERY DAY
2. FEELING DOWN, DEPRESSED, IRRITABLE, OR HOPELESS: NEARLY EVERY DAY
9. THOUGHTS THAT YOU WOULD BE BETTER OFF DEAD, OR OF HURTING YOURSELF: SEVERAL DAYS

## 2023-12-08 ASSESSMENT — PAIN DESCRIPTION - PAIN TYPE: TYPE: ACUTE PAIN

## 2023-12-08 NOTE — PROGRESS NOTES
Hospital Medicine Daily Progress Note    Date of Service  12/8/2023    Chief Complaint  Jeanie Hutchison is a 78 y.o. female admitted 11/29/2023 with alcohol intoxication.    Hospital Course  Ms. Jeanie Hutchison is a 78 y.o. female with PMHx of Atrial fibrillation, AICD, Hypothyroidism, Depression, ETOH use disorder who was admitted on 11/30 for alcohol intoxication and Afib with RVR.     Patient notes she has been drinking 1 pint per day of Vodka for past 2 years and in past week increased volume. She was suggested to pursue detoxification by his son which is why she is here in the hospital. Notes she is currently having visual hallucinations (cartoon-like images), feels anxious, mild hand tremors, and no other specific symptoms. Denies suicidal or homicidal ideations. No prior h.o withdrawal seizures or need for ICU admission d/t ETOH withdrawal.      In ER, patient was resting comfortably in bed, no acute distress. She is very pleasant. No electrolyte imbalances. EKG shows Afib w/RVR. Has received Metoprolol pushes, 5 mg IV, x3. Resumed all her home medications. Patient also received Lorazepam 2 mg IV, Librium 25 mg PO and  rally bag IV. Patient placed on [facility] protocol for alcohol withdrawal management. Patient admitted to hospital medicine for management of care.           She required transfer to the IMCU due to escalating detox symptoms requiring an IV Precedex drip.  Interval Problem Update  12/1: Ms. Hutchison was evaluated and examined in the IMCU. She is requiring titration of an IV Precedex drip due to severe alcohol detox. She is tolerating oral meds with prompting.   12/2: Ms. Hutchison was seen in the IMCU. She has required an IV precedex drip with scheduled Librium. A sitter is at bedside. She has been in afib. IV fluids running. She is a non-historian this morning.   12/3: Ms. Hutchison was evaluated in the IMCU. The Precedex drip has been turned off. She has been on IV fluids due to poor oral intake.  "The scheduled librium dose will be decreased from 50 mg qid to 25 mg TID. She has been in afib rate low 100's. CIWA protocol for IV ativan as needed.\"    12/4. I reviewed the chart along with vitals, labs, imaging, test (both pending and resulted) and recommendations from specialists and interdisciplinary team.  One to one sitter. She has poor insight but currently pleasant   COntinue CIWA protocol  Psychiatry following her and she has a one to one sitter. On legal hold that is extended.  HR stable. Continue Eliquis for Afib  12/5; Patient seen and examined, afebrile, no nausea or vomiting. HTN not well controlled with SBP in the 150 and DBP in the 100. Starting patient on amlodipine   Psychiatry following on legal hold appreciate rec.   12/6: Patient resting in bed, afebrile,no nausea or vomiting, BP better today  Psychiatry following appreciate rec. On legal hold  12/7: Patient seen and examined, afebrile, no acute events overnight   Still confused, I will check a UA and ammonia level   Psychiatry following on legal hold appreciate rec   12/8: Patient seen and examined AOX1,  Ammonia level  not elevated, started on thiamine,  Also discussed case with psychiatry d/cing CIWA protocol. Starting on Seroquel 12.5 BID PRN,also starting Remeron as pr psych rec.  UA still pending     I have discussed this patient's plan of care and discharge plan at IDT rounds today with Case Management, Nursing, Nursing leadership, and other members of the IDT team.    Consultants/Specialty  psychiatry    Code Status  Full Code    Disposition  The patient is not medically cleared for discharge to home or a post-acute facility.      I have placed the appropriate orders for post-discharge needs.    Review of Systems  Review of Systems   Unable to perform ROS: Mental acuity        Physical Exam  Temp:  [36.3 °C (97.3 °F)-37 °C (98.6 °F)] 36.3 °C (97.3 °F)  Pulse:  [75-87] 77  Resp:  [16-18] 16  BP: (106-144)/() 106/79  SpO2:  [93 %-95 " %] 93 %    Physical Exam  Vitals and nursing note reviewed.   Constitutional:       Appearance: She is ill-appearing. She is not toxic-appearing.   HENT:      Mouth/Throat:      Mouth: Mucous membranes are dry.   Eyes:      General: No scleral icterus.  Cardiovascular:      Rate and Rhythm: Tachycardia present. Rhythm irregular.   Pulmonary:      Effort: Pulmonary effort is normal.      Breath sounds: Normal breath sounds. No rales.      Comments: 1 liter nasal cannula oxygen  Abdominal:      General: There is no distension.      Palpations: Abdomen is soft.      Tenderness: There is no abdominal tenderness.   Musculoskeletal:      Cervical back: Neck supple.      Right lower leg: No edema.      Left lower leg: No edema.   Neurological:      Comments: Poor cognition but awake and can converse     Psychiatric:      Comments: Compliant with exam         Fluids    Intake/Output Summary (Last 24 hours) at 12/8/2023 1327  Last data filed at 12/8/2023 0800  Gross per 24 hour   Intake 960 ml   Output --   Net 960 ml         Laboratory  Recent Labs     12/06/23  0301   WBC 5.1   RBC 4.26   HEMOGLOBIN 14.3   HEMATOCRIT 41.5   MCV 97.4   MCH 33.6*   MCHC 34.5   RDW 51.6*   PLATELETCT 93*   MPV 9.6       Recent Labs     12/06/23  0449   SODIUM 139   POTASSIUM 3.6   CHLORIDE 105   CO2 23   GLUCOSE 113*   BUN 4*   CREATININE 0.37*   CALCIUM 9.6                     Imaging  DX-CHEST-LIMITED (1 VIEW)   Final Result         1.  Right basilar atelectasis, no focal infiltrate           Assessment/Plan  * Alcohol withdrawal, legal hold, Afib w/ RVR/anticoag (HCC)- (present on admission)  Assessment & Plan  Patient has h/o drinking 1 pint per day for past 2 years. In past week she has increased   She developed delirium tremens and was transferred to the [facility] for initiation and titration of an IV Precedex drip which was turned off 12/2.  Status post IV rally bag   Decrease the scheduled Librium from 50 mg PO q  6hrs to 25 mg TID in  "order to mitigate seizures   Thiamine 100 mg PO daily, folic acid 1 mg, Multivitamin PO daily.   Initiate CIWA protocol.\"    CIWA protocol, detox bag with vitamins  On a legal hold, Psychiatry following  History of Afib s/p AICD on anticoag. Rate stable. Continue ELiquis and digoxin. August echo EF 55%  Stable HR but can be elevated if withdrawing. Contine BB      Hypothyroidism- (present on admission)  Assessment & Plan  Known history. Last TSH (05/2023): 4.120  Outpatient: Levothyroxine 50 mcg daily.       Major depressive disorder with current active episode- (present on admission)  Assessment & Plan  Known diagnosis in setting of unfortunate loss of her 2 daughters and  in the past 3 years. Patient denies active suicidal or homicidal ideations. No auditory hallucinations. Notes visual hallucinations that most likely related to EtOH withdrawal.   Outpatient treatment: Venlafaxine 225 mg daily.   QTc is 476. She has requested restarting home trazodone 100 mg nightly. Given the risk of serotonin syndrome, start trazodone 50 mg qHS.  Has not been able to establish with Psychiatry outpatient.    psychiatry consulted   Sitter at bedside  She may benefit from inpatient psych upon discharge.    Atrial fibrillation with RVR (HCC)- (present on admission)  Assessment & Plan  Chronic diagnosis.  She is on Digoxin 125 mcg daily, Toprol 100 mg daily, and Eliquis 5 mg BID outpatient all of which have been restarted  Last echocardiogram (01/2020): no structural or valvular abnormalities. She does not have any evidence of heart failure thus an echo is not indicated.  TSH (05/2023): 4.120  She developed rapid ventricular response likely due to alcohol withdrawal and has required IV metoprolol and transfer to the [facility].\"  Now HR is controlled     AICD (automatic cardioverter/defibrillator) present- (present on admission)  Assessment & Plan  -Implanted approx 3 years ago with cardiology  -Placed July 2020 d/t AICD implant for " polymorphic VT, long QT, and atrial fibrillation.   On digoxin outpatient.             VTE prophylaxis: Eliquis    Greater than 51 minutes spent prepping to see patient (e.g. review of tests) obtaining and/or reviewing separately obtained history. Performing a medically appropriate examination and/ evaluation.  Counseling and educating the patient/family/caregiver.  Ordering medications, tests, or procedures.  Referring and communicating with other health care professionals.  Documenting clinical information in EPIC.  Independently interpreting results and communicating results to patient/family/caregiver.  Care coordination.      I have performed a physical exam and reviewed and updated ROS and Plan today (12/8/2023). In review of yesterday's note (12/7/2023), there are no changes except as documented above.

## 2023-12-08 NOTE — CONSULTS
"PSYCHIATRIC FOLLOW-UP:(established)     Reason for admission: Afib, alcohol withdrawal  Reason for consult: depression,   Legal hold: extended (for failure to thrive due to MDD)           Chart reviewed.         *HPI:          No events overnight per chart. Interview continues to be very limited d/t continued difficulty with verbal expression and significantly slowed cognition. Pt only able to answer one orientation question (location) today after being asked multiple times and with much delay. Pt reports her mood as \"not good\" but not able to elaborate. States that her sleep was \"good.\" Sitter at bedside reports pt ate about 50% of breakfast. Pt was able to follow one step commands with verbal cuing and physical assist to sit EOB, stand and take a few steps.   She was then unable to consistently follow directions to visually track a finger or perform finger to nose testing.  Reviewed nursing notes. Patient did receive 1 mg of ativan yesterday per [facility] protocol. I reviewed score for that time period, it appears the score was mostly for anxiety and restlessness. Scores over the last 24 hours prior to this administration were less than 8.       Spoke with patient's son:  The patient did not have noticeable symptoms of depression until her older daughter passed. She has gone though detox from alcohol numerous times. Last was three weeks before. This is the longest amount of time that she has been confused after alcohol withdrawal.    Son states that pt lived with nephew who is in college and that pt \"basically lived alone\" because the nephew was home so infrequently. During periods of sobriety pt was completely independent with her ADL's and independent with IADL' s with the exception of finances. Son took over finances a few years ago when his father was diagnosed with dementia and passed away because pt was overwhelmed. Pt was otherwise independent for driving, shopping, cooking, cleaning and taking medications. Son " "states that when the pt begins drinking she stops eating, stops taking her meds and no longer cares for herself all together. When the pt is sober, son notes no difficulty with speech or word finding. He did say that she had some mild difficulty with memory the last few years that he states \"would be normal for someone of her age\" as she would do things like \"tell me the same thing an hour later.\" Son stated she did this \"often\" but was unable to quantify or elaborate. Ulices stated that he is open for calls and happy to help if there is any other information needed.     Medical ROS (as pertinent):     Review of Systems   Unable to perform ROS: Mental status change       *Psychiatric Examination:   Vitals:    12/08/23 0715   BP: 106/79   Pulse: 77   Resp: 16   Temp: 36.3 °C (97.3 °F)   SpO2: 93%       General Appearance: NAD, appears stated age, disheveled, appears dazed   Behavior: Slowed motor movements, intermittently attempts to lie down during tasks.   Speech: Slowed rate, low volume, incomplete sentences at times , latency in speech noted   Thought processes: tangential, needs redirection  Abnormal or Psychotic Thoughts: Unable to verbalize denial of SI/HI/AVH, but not observed to be responding to internal stimuli or making suicidal/homicidal statements   Judgement and Insight: Poor/Poor   Orientation: AOx1 (location)   Recent and Remote Memory: Unable ot assess   Attention Span and Concentration: Poor, Unable to consistently respond to questions, Frequently breaking eye contact to stare at hands or cover face with hands   Language: English   Mood: \"not good\"   Affect: Flat  Neuro: no tics, tremors or dyskinesias. No dysmetria noted on FTN testing. Gait does appear unsteady, patient needs two person assist to stand and walks with small, shuffling steps, leaning backwards while walking, needs assistance to straighten posture    Labs  Ammonia level-12 (WNL)    Allergies   Allergen Reactions    Sulfa Drugs Rash    "          Current Facility-Administered Medications:     QUEtiapine (SEROquel) tablet 12.5 mg, 12.5 mg, Oral, BID PRN, Penny Fowler M.D.    mirtazapine (Remeron) tablet 7.5 mg, 7.5 mg, Oral, QHS, Penny Fowler M.D.    thiamine (Vitamin B-1) tablet 100 mg, 100 mg, Oral, DAILY, Penny Fowler M.D., 100 mg at 12/08/23 0422    melatonin tablet 5 mg, 5 mg, Oral, HS PRN, Bridgette Jaquez D.O.    amLODIPine (Norvasc) tablet 5 mg, 5 mg, Oral, Q DAY, Penny Fowler M.D., 5 mg at 12/08/23 0422    acetaminophen (Tylenol) tablet 650 mg, 650 mg, Oral, Q4HRS PRN, Devon Redd M.D., 650 mg at 12/05/23 2014    senna-docusate (Pericolace Or Senokot S) 8.6-50 MG per tablet 2 Tablet, 2 Tablet, Oral, BID, 2 Tablet at 12/08/23 0422 **AND** polyethylene glycol/lytes (Miralax) Packet 1 Packet, 1 Packet, Oral, QDAY PRN **AND** magnesium hydroxide (Milk Of Magnesia) suspension 30 mL, 30 mL, Oral, QDAY PRN **AND** bisacodyl (Dulcolax) suppository 10 mg, 10 mg, Rectal, QDAY PRN, Antoni Shankar M.D.    levothyroxine (Synthroid) tablet 50 mcg, 50 mcg, Oral, AM ES, Antoni Shankar M.D., 50 mcg at 12/08/23 0422    venlafaxine XR (Effexor XR) capsule 225 mg, 225 mg, Oral, DAILY, Antoni Shankar M.D., 225 mg at 12/08/23 0422    labetalol (Normodyne/Trandate) injection 10 mg, 10 mg, Intravenous, Q HOUR PRN, 10 mg at 12/07/23 0336 **OR** labetalol (Normodyne) tablet 200 mg, 200 mg, Oral, Q6HRS PRN, ANGLE Landis    Metoprolol Tartrate (Lopressor) injection 5 mg, 5 mg, Intravenous, Q5 MIN PRN, Guru Plata M.D.    apixaban (Eliquis) tablet 5 mg, 5 mg, Oral, BID, Reilly Loving M.D., 5 mg at 12/08/23 0422    digoxin (Lanoxin) tablet 125 mcg, 125 mcg, Oral, DAILY, Reilly Loving M.D., 125 mcg at 12/08/23 0422    metoprolol SR (Toprol XL) tablet 100 mg, 100 mg, Oral, Q DAY, Reilly Loving M.D., 100 mg at 12/08/23 0422    Assessment:   Significant neurocognitive dysfunction continues, limiting ability  to meaningfully participate in basic conversation. Suspect primarily d/t hypoactive delirium from EtOH withdrawal. Pt still receiving nightly dose of ativan per CIWA which could be contributing to overall picture. Awaiting UA results.      Dx:   #Encephalopathy   #Hx Alcohol use disorder , severe now In remission in a controlled environment   #Hx of MDD     Medical:   Per medical team       Plan:   · Legal hold: Extended   · Psychotropic medications   o Continue Effexor 225mg for depression   o Start mirtazepine 7.5 mg QHS PRN for insomnia, may also help with mood  O start Seroquel 12.5 mg BID PRN for agitation, stop CIWA protocol    Delirium recommendations    Please provide the patient with orientation materials  (clock, white board with room number and date). Keep room well lit in the daytime  Avoid nighttime medications tests and labdraws as much as possible to ensure adequate rest.        · Please transfer pt to inpatient psychiatric hospital when medically cleared (I.e. patient no longer delirious) and bed is available   · Discussed the case with: Dr. Fowler  · Psychiatry will follow up        Sitter: 1:1   Phone: With sitter supervision   Visitors: Son only   Personal belongings: Phone and ipad

## 2023-12-08 NOTE — CARE PLAN
"The patient is Watcher - Medium risk of patient condition declining or worsening    Shift Goals  Clinical Goals: Monitor VS ans safety from fall  Patient Goals: ambulate  Family Goals: updates    Progress made toward(s) clinical / shift goals:  Remains aox1, performed bed bath to promote hygiene and comfort, sitter remains at bedside, free from fall at this time, cooperative,/79   Pulse 77   Temp 36.3 °C (97.3 °F) (Temporal)   Resp 16   Ht 1.676 m (5' 5.98\")   Wt 68 kg (149 lb 14.6 oz)   SpO2 93%       Patient is not progressing towards the following goals:      Problem: Lifestyle Changes  Goal: Patient's ability to identify lifestyle changes and available resources to help reduce recurrence of condition will improve  Outcome: Not Progressing  Note: Ciwa discontinued      Problem: Neuro Status  Goal: Neuro status will remain stable or improve  Outcome: Not Progressing     Problem: Depression  Goal: Patient and family/caregiver will verbalize accurate information about at least two of the possible causes of depression, three-four of the signs and symptoms of depression  Outcome: Not Progressing     "

## 2023-12-08 NOTE — CARE PLAN
The patient is Stable - Low risk of patient condition declining or worsening    Shift Goals  Clinical Goals: safety, monitor mentation  Patient Goals: get up  Family Goals: na    Progress made toward(s) clinical / shift goals:    Problem: Neuro Status  Goal: Neuro status will remain stable or improve  Outcome: Progressing  Note: Patient was more alert, able to ask to stand up and use the commode. Patient able to stand with 1 person assist.        Patient is not progressing towards the following goals:

## 2023-12-09 LAB
ANION GAP SERPL CALC-SCNC: 11 MMOL/L (ref 7–16)
BUN SERPL-MCNC: 11 MG/DL (ref 8–22)
CALCIUM SERPL-MCNC: 9.7 MG/DL (ref 8.5–10.5)
CHLORIDE SERPL-SCNC: 106 MMOL/L (ref 96–112)
CO2 SERPL-SCNC: 27 MMOL/L (ref 20–33)
CREAT SERPL-MCNC: 0.49 MG/DL (ref 0.5–1.4)
ERYTHROCYTE [DISTWIDTH] IN BLOOD BY AUTOMATED COUNT: 51.4 FL (ref 35.9–50)
GFR SERPLBLD CREATININE-BSD FMLA CKD-EPI: 97 ML/MIN/1.73 M 2
GLUCOSE SERPL-MCNC: 85 MG/DL (ref 65–99)
HCT VFR BLD AUTO: 47.7 % (ref 37–47)
HGB BLD-MCNC: 16 G/DL (ref 12–16)
MCH RBC QN AUTO: 32.8 PG (ref 27–33)
MCHC RBC AUTO-ENTMCNC: 33.5 G/DL (ref 32.2–35.5)
MCV RBC AUTO: 97.7 FL (ref 81.4–97.8)
PLATELET # BLD AUTO: 185 K/UL (ref 164–446)
PMV BLD AUTO: 9.5 FL (ref 9–12.9)
POTASSIUM SERPL-SCNC: 3.6 MMOL/L (ref 3.6–5.5)
RBC # BLD AUTO: 4.88 M/UL (ref 4.2–5.4)
SODIUM SERPL-SCNC: 144 MMOL/L (ref 135–145)
WBC # BLD AUTO: 6.3 K/UL (ref 4.8–10.8)

## 2023-12-09 PROCEDURE — 80048 BASIC METABOLIC PNL TOTAL CA: CPT

## 2023-12-09 PROCEDURE — 700102 HCHG RX REV CODE 250 W/ 637 OVERRIDE(OP): Performed by: STUDENT IN AN ORGANIZED HEALTH CARE EDUCATION/TRAINING PROGRAM

## 2023-12-09 PROCEDURE — 700102 HCHG RX REV CODE 250 W/ 637 OVERRIDE(OP): Performed by: HOSPITALIST

## 2023-12-09 PROCEDURE — A9270 NON-COVERED ITEM OR SERVICE: HCPCS | Performed by: STUDENT IN AN ORGANIZED HEALTH CARE EDUCATION/TRAINING PROGRAM

## 2023-12-09 PROCEDURE — 36415 COLL VENOUS BLD VENIPUNCTURE: CPT

## 2023-12-09 PROCEDURE — 770001 HCHG ROOM/CARE - MED/SURG/GYN PRIV*

## 2023-12-09 PROCEDURE — A9270 NON-COVERED ITEM OR SERVICE: HCPCS

## 2023-12-09 PROCEDURE — 700111 HCHG RX REV CODE 636 W/ 250 OVERRIDE (IP): Performed by: INTERNAL MEDICINE

## 2023-12-09 PROCEDURE — 700102 HCHG RX REV CODE 250 W/ 637 OVERRIDE(OP)

## 2023-12-09 PROCEDURE — A9270 NON-COVERED ITEM OR SERVICE: HCPCS | Performed by: INTERNAL MEDICINE

## 2023-12-09 PROCEDURE — 85027 COMPLETE CBC AUTOMATED: CPT

## 2023-12-09 PROCEDURE — A9270 NON-COVERED ITEM OR SERVICE: HCPCS | Performed by: HOSPITALIST

## 2023-12-09 PROCEDURE — 99232 SBSQ HOSP IP/OBS MODERATE 35: CPT | Performed by: INTERNAL MEDICINE

## 2023-12-09 PROCEDURE — 700102 HCHG RX REV CODE 250 W/ 637 OVERRIDE(OP): Performed by: INTERNAL MEDICINE

## 2023-12-09 RX ORDER — TRAMADOL HYDROCHLORIDE 50 MG/1
50 TABLET ORAL ONCE
Status: COMPLETED | OUTPATIENT
Start: 2023-12-10 | End: 2023-12-10

## 2023-12-09 RX ADMIN — VENLAFAXINE HYDROCHLORIDE 225 MG: 75 CAPSULE, EXTENDED RELEASE ORAL at 07:36

## 2023-12-09 RX ADMIN — CEFTRIAXONE SODIUM 1000 MG: 10 INJECTION, POWDER, FOR SOLUTION INTRAVENOUS at 13:09

## 2023-12-09 RX ADMIN — DIGOXIN 125 MCG: 0.25 TABLET ORAL at 07:36

## 2023-12-09 RX ADMIN — APIXABAN 5 MG: 5 TABLET, FILM COATED ORAL at 07:36

## 2023-12-09 RX ADMIN — LEVOTHYROXINE SODIUM 50 MCG: 50 TABLET ORAL at 07:36

## 2023-12-09 RX ADMIN — METOPROLOL SUCCINATE 100 MG: 25 TABLET, FILM COATED, EXTENDED RELEASE ORAL at 07:36

## 2023-12-09 RX ADMIN — APIXABAN 5 MG: 5 TABLET, FILM COATED ORAL at 17:28

## 2023-12-09 RX ADMIN — Medication 100 MG: at 07:36

## 2023-12-09 RX ADMIN — AMLODIPINE BESYLATE 5 MG: 5 TABLET ORAL at 07:36

## 2023-12-09 RX ADMIN — MIRTAZAPINE 7.5 MG: 15 TABLET, FILM COATED ORAL at 21:19

## 2023-12-09 RX ADMIN — ACETAMINOPHEN 650 MG: 325 TABLET, FILM COATED ORAL at 21:19

## 2023-12-09 RX ADMIN — DOCUSATE SODIUM 50 MG AND SENNOSIDES 8.6 MG 2 TABLET: 8.6; 5 TABLET, FILM COATED ORAL at 17:28

## 2023-12-09 RX ADMIN — DOCUSATE SODIUM 50 MG AND SENNOSIDES 8.6 MG 2 TABLET: 8.6; 5 TABLET, FILM COATED ORAL at 07:36

## 2023-12-09 ASSESSMENT — PAIN DESCRIPTION - PAIN TYPE
TYPE: ACUTE PAIN
TYPE: ACUTE PAIN

## 2023-12-09 ASSESSMENT — FIBROSIS 4 INDEX: FIB4 SCORE: 7.03

## 2023-12-09 NOTE — CARE PLAN
The patient is Watcher - Medium risk of patient condition declining or worsening    Shift Goals  Clinical Goals: safety, re-orient, education, promote self care  Patient Goals: sleep  Family Goals: updates    Progress made toward(s) clinical / shift goals:    Problem: Optimal Care for Alcohol Withdrawal  Goal: Optimal Care for the alcohol withdrawal patient  Outcome: Met       Patient is not progressing towards the following goals:      Problem: Knowledge Deficit - Standard  Goal: Patient and family/care givers will demonstrate understanding of plan of care, disease process/condition, diagnostic tests and medications  Outcome: Not Progressing  Note: Pt A&Ox1-only oriented to      Problem: Neuro Status  Goal: Neuro status will remain stable or improve  Outcome: Not Progressing  Flowsheets (Taken 2023 469)  Level of Consciousness: Alert  Note: Pt is A&Ox1

## 2023-12-09 NOTE — PROGRESS NOTES
Hospital Medicine Daily Progress Note    Date of Service  12/9/2023    Chief Complaint  Jeanie Hutchison is a 78 y.o. female admitted 11/29/2023 with alcohol intoxication.    Hospital Course  Ms. Jeanie Hutchison is a 78 y.o. female with PMHx of Atrial fibrillation, AICD, Hypothyroidism, Depression, ETOH use disorder who was admitted on 11/30 for alcohol intoxication and Afib with RVR.     Patient notes she has been drinking 1 pint per day of Vodka for past 2 years and in past week increased volume. She was suggested to pursue detoxification by his son which is why she is here in the hospital. Notes she is currently having visual hallucinations (cartoon-like images), feels anxious, mild hand tremors, and no other specific symptoms. Denies suicidal or homicidal ideations. No prior h.o withdrawal seizures or need for ICU admission d/t ETOH withdrawal.      In ER, patient was resting comfortably in bed, no acute distress. She is very pleasant. No electrolyte imbalances. EKG shows Afib w/RVR. Has received Metoprolol pushes, 5 mg IV, x3. Resumed all her home medications. Patient also received Lorazepam 2 mg IV, Librium 25 mg PO and  rally bag IV. Patient placed on [facility] protocol for alcohol withdrawal management. Patient admitted to hospital medicine for management of care.           She required transfer to the IMCU due to escalating detox symptoms requiring an IV Precedex drip.  Interval Problem Update  12/1: Ms. Hutchison was evaluated and examined in the IMCU. She is requiring titration of an IV Precedex drip due to severe alcohol detox. She is tolerating oral meds with prompting.   12/2: Ms. Hutchison was seen in the IMCU. She has required an IV precedex drip with scheduled Librium. A sitter is at bedside. She has been in afib. IV fluids running. She is a non-historian this morning.   12/3: Ms. Hutchison was evaluated in the IMCU. The Precedex drip has been turned off. She has been on IV fluids due to poor oral intake.  "The scheduled librium dose will be decreased from 50 mg qid to 25 mg TID. She has been in afib rate low 100's. [facility] protocol for IV ativan as needed.\"    12/4. I reviewed the chart along with vitals, labs, imaging, test (both pending and resulted) and recommendations from specialists and interdisciplinary team.  One to one sitter. She has poor insight but currently pleasant   COntinue CIWA protocol  Psychiatry following her and she has a one to one sitter. On legal hold that is extended.  HR stable. Continue Eliquis for Afib  12/5; Patient seen and examined, afebrile, no nausea or vomiting. HTN not well controlled with SBP in the 150 and DBP in the 100. Starting patient on amlodipine   Psychiatry following on legal hold appreciate rec.   12/6: Patient resting in bed, afebrile,no nausea or vomiting, BP better today  Psychiatry following appreciate rec. On legal hold  12/7: Patient seen and examined, afebrile, no acute events overnight   Still confused, I will check a UA and ammonia level   Psychiatry following on legal hold appreciate rec   12/8: Patient seen and examined AOX1,  Ammonia level  not elevated, started on thiamine,  Also discussed case with psychiatry d/cing CIWA protocol. Starting on Seroquel 12.5 BID PRN,also starting Remeron as pr psych rec.  UA still pending   12/9: Patient seen and examined, afebrile, still AOX1, UA positive for possible infection will start a 3 day course of IV ceftriaxone   On legal hold psychiatry is following appreciate rec.   I have discussed this patient's plan of care and discharge plan at IDT rounds today with Case Management, Nursing, Nursing leadership, and other members of the IDT team.    Consultants/Specialty  psychiatry    Code Status  Full Code    Disposition  The patient is not medically cleared for discharge to home or a post-acute facility.      I have placed the appropriate orders for post-discharge needs.    Review of Systems  Review of Systems   Unable to " perform ROS: Mental acuity        Physical Exam  Temp:  [36.4 °C (97.5 °F)-36.7 °C (98.1 °F)] 36.6 °C (97.9 °F)  Pulse:  [70-77] 76  Resp:  [14-16] 16  BP: (114-137)/() 137/91  SpO2:  [91 %-94 %] 91 %    Physical Exam  Vitals and nursing note reviewed.   Constitutional:       Appearance: She is ill-appearing. She is not toxic-appearing.   HENT:      Mouth/Throat:      Mouth: Mucous membranes are dry.   Eyes:      General: No scleral icterus.  Cardiovascular:      Rate and Rhythm: Tachycardia present. Rhythm irregular.   Pulmonary:      Effort: Pulmonary effort is normal.      Breath sounds: Normal breath sounds. No rales.      Comments: 1 liter nasal cannula oxygen  Abdominal:      General: There is no distension.      Palpations: Abdomen is soft.      Tenderness: There is no abdominal tenderness. There is no guarding or rebound.   Musculoskeletal:      Cervical back: Neck supple.      Right lower leg: No edema.      Left lower leg: No edema.   Neurological:      Comments: Poor cognition but awake and can converse     Psychiatric:      Comments: Compliant with exam         Fluids    Intake/Output Summary (Last 24 hours) at 12/9/2023 1241  Last data filed at 12/9/2023 0800  Gross per 24 hour   Intake 1640 ml   Output 250 ml   Net 1390 ml       Laboratory  Recent Labs     12/09/23  0719   WBC 6.3   RBC 4.88   HEMOGLOBIN 16.0   HEMATOCRIT 47.7*   MCV 97.7   MCH 32.8   MCHC 33.5   RDW 51.4*   PLATELETCT 185   MPV 9.5     Recent Labs     12/09/23  0719   SODIUM 144   POTASSIUM 3.6   CHLORIDE 106   CO2 27   GLUCOSE 85   BUN 11   CREATININE 0.49*   CALCIUM 9.7                   Imaging  DX-CHEST-LIMITED (1 VIEW)   Final Result         1.  Right basilar atelectasis, no focal infiltrate           Assessment/Plan  * Alcohol withdrawal, legal hold, Afib w/ RVR/anticoag (HCC)- (present on admission)  Assessment & Plan  Patient has h/o drinking 1 pint per day for past 2 years. In past week she has increased   She developed  "delirium tremens and was transferred to the [facility] for initiation and titration of an IV Precedex drip which was turned off 12/2.  Status post IV rally bag   Decrease the scheduled Librium from 50 mg PO q  6hrs to 25 mg TID in order to mitigate seizures   Thiamine 100 mg PO daily, folic acid 1 mg, Multivitamin PO daily.   Initiate CIWA protocol.\"    CIWA protocol, detox bag with vitamins  On a legal hold, Psychiatry following  History of Afib s/p AICD on anticoag. Rate stable. Continue ELiquis and digoxin. August echo EF 55%  Stable HR but can be elevated if withdrawing. Contine BB      Hypothyroidism- (present on admission)  Assessment & Plan  Known history. Last TSH (05/2023): 4.120  Outpatient: Levothyroxine 50 mcg daily.       Major depressive disorder with current active episode- (present on admission)  Assessment & Plan  Known diagnosis in setting of unfortunate loss of her 2 daughters and  in the past 3 years. Patient denies active suicidal or homicidal ideations. No auditory hallucinations. Notes visual hallucinations that most likely related to EtOH withdrawal.   Outpatient treatment: Venlafaxine 225 mg daily.   QTc is 476. She has requested restarting home trazodone 100 mg nightly. Given the risk of serotonin syndrome, start trazodone 50 mg qHS.  Has not been able to establish with Psychiatry outpatient.    psychiatry consulted   Sitter at bedside  She may benefit from inpatient psych upon discharge.    Atrial fibrillation with RVR (HCC)- (present on admission)  Assessment & Plan  Chronic diagnosis.  She is on Digoxin 125 mcg daily, Toprol 100 mg daily, and Eliquis 5 mg BID outpatient all of which have been restarted  Last echocardiogram (01/2020): no structural or valvular abnormalities. She does not have any evidence of heart failure thus an echo is not indicated.  TSH (05/2023): 4.120  She developed rapid ventricular response likely due to alcohol withdrawal and has required IV metoprolol and " "transfer to the [facility].\"  Now HR is controlled     AICD (automatic cardioverter/defibrillator) present- (present on admission)  Assessment & Plan  -Implanted approx 3 years ago with cardiology  -Placed July 2020 d/t AICD implant for polymorphic VT, long QT, and atrial fibrillation.   On digoxin outpatient.             VTE prophylaxis: Eliquis    I have performed a physical exam and reviewed and updated ROS and Plan today (12/9/2023). In review of yesterday's note (12/8/2023), there are no changes except as documented above.      "

## 2023-12-09 NOTE — PROGRESS NOTES
Assumed care of pt. Bedside report received from RN. Pt was updated on plan of care. Aox1, oriented to  only, pt unable to answer what her last name is. . Pt pain level 0/10. PT is medical. Call light, phone and personal belongings in reach. Bed strip alarm on and working properly, bed in lowest position, and locked. Pt has a 1:1 sitter in room for legal hold reason.

## 2023-12-09 NOTE — CARE PLAN
"  Problem: Knowledge Deficit - Standard  Goal: Patient and family/care givers will demonstrate understanding of plan of care, disease process/condition, diagnostic tests and medications  Outcome: Progressing  Note: Participating in care and activity but needs reinforcement      Problem: Psychosocial  Goal: Patient's level of anxiety will decrease  Outcome: Progressing  Goal: Spiritual and cultural needs incorporated into hospitalization  Outcome: Progressing     Problem: Pain - Standard  Goal: Alleviation of pain or a reduction in pain to the patient’s comfort goal  Outcome: Progressing     Problem: Seizure Precautions  Goal: Implementation of seizure precautions  Outcome: Progressing     Problem: Risk for Aspiration  Goal: Patient's risk for aspiration will be absent or decrease  Outcome: Progressing     Problem: Neuro Status  Goal: Neuro status will remain stable or improve  Outcome: Progressing     Problem: Depression  Goal: Patient and family/caregiver will verbalize accurate information about at least two of the possible causes of depression, three-four of the signs and symptoms of depression  Outcome: Progressing  Note: Patient was smiling during interaction this morning, put her on a wheelchair and brought her to the T8 Lounge   The patient is Watcher - Medium risk of patient condition declining or worsening    Shift Goals  Clinical Goals: Monitor VS; safety ambulation  Patient Goals: rest and comfort  Family Goals: updates    Progress made toward(s) clinical / shift goals:   Still remains a high fall risk, bed alarm kept on and sitter at bedside,BP (!) 137/91   Pulse 76   Temp 36.6 °C (97.9 °F) (Temporal)   Resp 16   Ht 1.676 m (5' 5.98\")   Wt 67.7 kg (149 lb 4 oz)   SpO2 91%         Problem: Lifestyle Changes  Goal: Patient's ability to identify lifestyle changes and available resources to help reduce recurrence of condition will improve  Outcome: Not Progressing     "

## 2023-12-09 NOTE — PROGRESS NOTES
MD order to limit nighttime disturbances RN ensured minimal disturbances limited to pt needing to use restroom. Morning med pass pt is still sleeping at this time. RN will recheck later and provide medications when pt is more awake.

## 2023-12-10 LAB
ANION GAP SERPL CALC-SCNC: 12 MMOL/L (ref 7–16)
BUN SERPL-MCNC: 12 MG/DL (ref 8–22)
CALCIUM SERPL-MCNC: 9.3 MG/DL (ref 8.5–10.5)
CHLORIDE SERPL-SCNC: 104 MMOL/L (ref 96–112)
CO2 SERPL-SCNC: 25 MMOL/L (ref 20–33)
CREAT SERPL-MCNC: 0.56 MG/DL (ref 0.5–1.4)
ERYTHROCYTE [DISTWIDTH] IN BLOOD BY AUTOMATED COUNT: 51.3 FL (ref 35.9–50)
GFR SERPLBLD CREATININE-BSD FMLA CKD-EPI: 94 ML/MIN/1.73 M 2
GLUCOSE SERPL-MCNC: 80 MG/DL (ref 65–99)
HCT VFR BLD AUTO: 46.9 % (ref 37–47)
HGB BLD-MCNC: 15.8 G/DL (ref 12–16)
MCH RBC QN AUTO: 32.7 PG (ref 27–33)
MCHC RBC AUTO-ENTMCNC: 33.7 G/DL (ref 32.2–35.5)
MCV RBC AUTO: 97.1 FL (ref 81.4–97.8)
PLATELET # BLD AUTO: 220 K/UL (ref 164–446)
PMV BLD AUTO: 9.8 FL (ref 9–12.9)
POTASSIUM SERPL-SCNC: 3.6 MMOL/L (ref 3.6–5.5)
RBC # BLD AUTO: 4.83 M/UL (ref 4.2–5.4)
SODIUM SERPL-SCNC: 141 MMOL/L (ref 135–145)
WBC # BLD AUTO: 5.8 K/UL (ref 4.8–10.8)

## 2023-12-10 PROCEDURE — 700102 HCHG RX REV CODE 250 W/ 637 OVERRIDE(OP): Performed by: STUDENT IN AN ORGANIZED HEALTH CARE EDUCATION/TRAINING PROGRAM

## 2023-12-10 PROCEDURE — 80048 BASIC METABOLIC PNL TOTAL CA: CPT

## 2023-12-10 PROCEDURE — 36415 COLL VENOUS BLD VENIPUNCTURE: CPT

## 2023-12-10 PROCEDURE — 85027 COMPLETE CBC AUTOMATED: CPT

## 2023-12-10 PROCEDURE — A9270 NON-COVERED ITEM OR SERVICE: HCPCS

## 2023-12-10 PROCEDURE — 700102 HCHG RX REV CODE 250 W/ 637 OVERRIDE(OP): Performed by: HOSPITALIST

## 2023-12-10 PROCEDURE — A9270 NON-COVERED ITEM OR SERVICE: HCPCS | Performed by: INTERNAL MEDICINE

## 2023-12-10 PROCEDURE — 700102 HCHG RX REV CODE 250 W/ 637 OVERRIDE(OP): Performed by: INTERNAL MEDICINE

## 2023-12-10 PROCEDURE — A9270 NON-COVERED ITEM OR SERVICE: HCPCS | Performed by: STUDENT IN AN ORGANIZED HEALTH CARE EDUCATION/TRAINING PROGRAM

## 2023-12-10 PROCEDURE — 770001 HCHG ROOM/CARE - MED/SURG/GYN PRIV*

## 2023-12-10 PROCEDURE — 700101 HCHG RX REV CODE 250: Performed by: INTERNAL MEDICINE

## 2023-12-10 PROCEDURE — 99232 SBSQ HOSP IP/OBS MODERATE 35: CPT | Performed by: INTERNAL MEDICINE

## 2023-12-10 PROCEDURE — A9270 NON-COVERED ITEM OR SERVICE: HCPCS | Performed by: HOSPITALIST

## 2023-12-10 PROCEDURE — 700102 HCHG RX REV CODE 250 W/ 637 OVERRIDE(OP)

## 2023-12-10 PROCEDURE — 700111 HCHG RX REV CODE 636 W/ 250 OVERRIDE (IP): Performed by: INTERNAL MEDICINE

## 2023-12-10 RX ADMIN — LEVOTHYROXINE SODIUM 50 MCG: 50 TABLET ORAL at 09:21

## 2023-12-10 RX ADMIN — DOCUSATE SODIUM 50 MG AND SENNOSIDES 8.6 MG 2 TABLET: 8.6; 5 TABLET, FILM COATED ORAL at 17:32

## 2023-12-10 RX ADMIN — ACETAMINOPHEN 650 MG: 325 TABLET, FILM COATED ORAL at 20:09

## 2023-12-10 RX ADMIN — DIGOXIN 125 MCG: 0.25 TABLET ORAL at 09:21

## 2023-12-10 RX ADMIN — APIXABAN 5 MG: 5 TABLET, FILM COATED ORAL at 09:22

## 2023-12-10 RX ADMIN — AMLODIPINE BESYLATE 5 MG: 5 TABLET ORAL at 09:22

## 2023-12-10 RX ADMIN — VENLAFAXINE HYDROCHLORIDE 225 MG: 75 CAPSULE, EXTENDED RELEASE ORAL at 09:22

## 2023-12-10 RX ADMIN — APIXABAN 5 MG: 5 TABLET, FILM COATED ORAL at 17:32

## 2023-12-10 RX ADMIN — CEFTRIAXONE SODIUM 1000 MG: 10 INJECTION, POWDER, FOR SOLUTION INTRAVENOUS at 09:22

## 2023-12-10 RX ADMIN — TRAMADOL HYDROCHLORIDE 50 MG: 50 TABLET ORAL at 00:03

## 2023-12-10 RX ADMIN — Medication 100 MG: at 09:22

## 2023-12-10 RX ADMIN — DOCUSATE SODIUM 50 MG AND SENNOSIDES 8.6 MG 2 TABLET: 8.6; 5 TABLET, FILM COATED ORAL at 09:21

## 2023-12-10 RX ADMIN — MIRTAZAPINE 7.5 MG: 15 TABLET, FILM COATED ORAL at 20:09

## 2023-12-10 RX ADMIN — METOPROLOL SUCCINATE 100 MG: 25 TABLET, FILM COATED, EXTENDED RELEASE ORAL at 09:21

## 2023-12-10 ASSESSMENT — FIBROSIS 4 INDEX: FIB4 SCORE: 2.97

## 2023-12-10 ASSESSMENT — PAIN DESCRIPTION - PAIN TYPE: TYPE: ACUTE PAIN

## 2023-12-10 NOTE — PROGRESS NOTES
Assumed care of pt. Bedside report received from RN. Pt was updated on plan of care. Aox2, oriented to name and , location she states hospital, disoriented to time and situation. Pt pain level 0/10. Pt is medical, no telemetry.  Call light, phone and personal belongings in reach. Bed strip alarm on and working properly, bed in lowest position, and locked. PT has 1:1 sitter in room for legal hold.

## 2023-12-10 NOTE — CARE PLAN
The patient is Watcher - Medium risk of patient condition declining or worsening    Shift Goals  Clinical Goals: safety, monitor mental status, promote self care, monitor labs and VS  Patient Goals: sleep  Family Goals: updates    Progress made toward(s) clinical / shift goals:    Problem: Knowledge Deficit - Standard  Goal: Patient and family/care givers will demonstrate understanding of plan of care, disease process/condition, diagnostic tests and medications  Outcome: Progressing  Note: Pt orientation level has increased. A&Ox2     Problem: Pain - Standard  Goal: Alleviation of pain or a reduction in pain to the patient’s comfort goal  Outcome: Progressing  Flowsheets  Taken 12/9/2023 2119 by Leighann Hay RMilesNMiles  Pain Rating Scale (NPRS): 3  Taken 12/1/2023 0203 by Bhavik Lombardi R.N.  Critical-Care Pain Observation Score: 0     Problem: Neuro Status  Goal: Neuro status will remain stable or improve  Outcome: Progressing  Flowsheets (Taken 12/8/2023 2314)  Level of Consciousness: Alert       Patient is not progressing towards the following goals:

## 2023-12-10 NOTE — CARE PLAN
"  Problem: Knowledge Deficit - Standard  Goal: Patient and family/care givers will demonstrate understanding of plan of care, disease process/condition, diagnostic tests and medications  Outcome: Progressing  Note: Participated  in her care today, she smiles more often and receptive to care, needs reinforcement     Problem: Psychosocial  Goal: Patient's level of anxiety will decrease  Outcome: Progressing  Goal: Spiritual and cultural needs incorporated into hospitalization  Outcome: Progressing     Problem: Pain - Standard  Goal: Alleviation of pain or a reduction in pain to the patient’s comfort goal  Outcome: Progressing     Problem: Seizure Precautions  Goal: Implementation of seizure precautions  Outcome: Progressing     Problem: Risk for Aspiration  Goal: Patient's risk for aspiration will be absent or decrease  Outcome: Progressing     Problem: Neuro Status  Goal: Neuro status will remain stable or improve  Outcome: Progressing  Note: Patient is now aox2 which is an improvement     Problem: Depression  Goal: Patient and family/caregiver will verbalize accurate information about at least two of the possible causes of depression, three-four of the signs and symptoms of depression  Outcome: Progressing   The patient is Watcher - Medium risk of patient condition declining or worsening    Shift Goals  Clinical Goals: Monitor VS; safety from fall; rest and comfort  Patient Goals: rest  Family Goals: updates    Progress made toward(s) clinical / shift goals:  Patient remains a high fall risk, bed alarm always kept on and sitter at bedside, got her out of bed into a wheelchair and had her out of her room into the hallway, remains free from fall,/85   Pulse 65   Temp 37 °C (98.6 °F) (Temporal)   Resp 17   Ht 1.676 m (5' 5.98\")   Wt 67.7 kg (149 lb 4 oz)   SpO2 94%       Patient is not progressing towards the following goals:      Problem: Lifestyle Changes  Goal: Patient's ability to identify lifestyle " changes and available resources to help reduce recurrence of condition will improve  Outcome: Not Progressing

## 2023-12-10 NOTE — PROGRESS NOTES
Hospital Medicine Daily Progress Note    Date of Service  12/10/2023    Chief Complaint  Jeanie Hutchison is a 78 y.o. female admitted 11/29/2023 with alcohol intoxication.    Hospital Course  Ms. Jeanie Hutchison is a 78 y.o. female with PMHx of Atrial fibrillation, AICD, Hypothyroidism, Depression, ETOH use disorder who was admitted on 11/30 for alcohol intoxication and Afib with RVR.     Patient notes she has been drinking 1 pint per day of Vodka for past 2 years and in past week increased volume. She was suggested to pursue detoxification by his son which is why she is here in the hospital. Notes she is currently having visual hallucinations (cartoon-like images), feels anxious, mild hand tremors, and no other specific symptoms. Denies suicidal or homicidal ideations. No prior h.o withdrawal seizures or need for ICU admission d/t ETOH withdrawal.      In ER, patient was resting comfortably in bed, no acute distress. She is very pleasant. No electrolyte imbalances. EKG shows Afib w/RVR. Has received Metoprolol pushes, 5 mg IV, x3. Resumed all her home medications. Patient also received Lorazepam 2 mg IV, Librium 25 mg PO and  rally bag IV. Patient placed on [facility] protocol for alcohol withdrawal management. Patient admitted to hospital medicine for management of care.           She required transfer to the IMCU due to escalating detox symptoms requiring an IV Precedex drip.  Interval Problem Update  12/1: Ms. Hutchison was evaluated and examined in the IMCU. She is requiring titration of an IV Precedex drip due to severe alcohol detox. She is tolerating oral meds with prompting.   12/2: Ms. Hutchison was seen in the IMCU. She has required an IV precedex drip with scheduled Librium. A sitter is at bedside. She has been in afib. IV fluids running. She is a non-historian this morning.   12/3: Ms. Hutchison was evaluated in the IMCU. The Precedex drip has been turned off. She has been on IV fluids due to poor oral intake.  "The scheduled librium dose will be decreased from 50 mg qid to 25 mg TID. She has been in afib rate low 100's. [facility] protocol for IV ativan as needed.\"    12/4. I reviewed the chart along with vitals, labs, imaging, test (both pending and resulted) and recommendations from specialists and interdisciplinary team.  One to one sitter. She has poor insight but currently pleasant   COntinue CIWA protocol  Psychiatry following her and she has a one to one sitter. On legal hold that is extended.  HR stable. Continue Eliquis for Afib  12/5; Patient seen and examined, afebrile, no nausea or vomiting. HTN not well controlled with SBP in the 150 and DBP in the 100. Starting patient on amlodipine   Psychiatry following on legal hold appreciate rec.   12/6: Patient resting in bed, afebrile,no nausea or vomiting, BP better today  Psychiatry following appreciate rec. On legal hold  12/7: Patient seen and examined, afebrile, no acute events overnight   Still confused, I will check a UA and ammonia level   Psychiatry following on legal hold appreciate rec   12/8: Patient seen and examined AOX1,  Ammonia level  not elevated, started on thiamine,  Also discussed case with psychiatry d/cing CIWA protocol. Starting on Seroquel 12.5 BID PRN,also starting Remeron as pr psych rec.  UA still pending   12/9: Patient seen and examined, afebrile, still AOX1, UA positive for possible infection will start a 3 day course of IV ceftriaxone   On legal hold psychiatry is following appreciate rec.   12/10: Patient resting in bed AOX2, cont on IV abx for her UTI  On legal hol psychiatry following appreciate rec.   I have discussed this patient's plan of care and discharge plan at IDT rounds today with Case Management, Nursing, Nursing leadership, and other members of the IDT team.    Consultants/Specialty  psychiatry    Code Status  Full Code    Disposition  The patient is not medically cleared for discharge to home or a post-acute facility.      I " have placed the appropriate orders for post-discharge needs.    Review of Systems  Review of Systems   Unable to perform ROS: Mental acuity        Physical Exam  Temp:  [36.3 °C (97.3 °F)-37 °C (98.6 °F)] 37 °C (98.6 °F)  Pulse:  [65-70] 65  Resp:  [17-20] 17  BP: (120-138)/(84-92) 138/85  SpO2:  [92 %-96 %] 94 %    Physical Exam  Vitals and nursing note reviewed.   Constitutional:       Appearance: She is ill-appearing. She is not toxic-appearing.   HENT:      Mouth/Throat:      Mouth: Mucous membranes are dry.   Eyes:      General: No scleral icterus.  Cardiovascular:      Rate and Rhythm: Tachycardia present. Rhythm irregular.   Pulmonary:      Effort: Pulmonary effort is normal.      Breath sounds: Normal breath sounds. No rales.      Comments: 1 liter nasal cannula oxygen  Abdominal:      General: There is no distension.      Palpations: Abdomen is soft.      Tenderness: There is no abdominal tenderness. There is no guarding or rebound.   Musculoskeletal:      Cervical back: Neck supple.      Right lower leg: No edema.      Left lower leg: No edema.   Neurological:      Comments: Poor cognition but awake and can converse     Psychiatric:      Comments: Compliant with exam         Fluids    Intake/Output Summary (Last 24 hours) at 12/10/2023 1350  Last data filed at 12/10/2023 0900  Gross per 24 hour   Intake 700 ml   Output 300 ml   Net 400 ml       Laboratory  Recent Labs     12/09/23  0719 12/10/23  0742   WBC 6.3 5.8   RBC 4.88 4.83   HEMOGLOBIN 16.0 15.8   HEMATOCRIT 47.7* 46.9   MCV 97.7 97.1   MCH 32.8 32.7   MCHC 33.5 33.7   RDW 51.4* 51.3*   PLATELETCT 185 220   MPV 9.5 9.8     Recent Labs     12/09/23  0719 12/10/23  0742   SODIUM 144 141   POTASSIUM 3.6 3.6   CHLORIDE 106 104   CO2 27 25   GLUCOSE 85 80   BUN 11 12   CREATININE 0.49* 0.56   CALCIUM 9.7 9.3                   Imaging  DX-CHEST-LIMITED (1 VIEW)   Final Result         1.  Right basilar atelectasis, no focal infiltrate        "    Assessment/Plan  * Alcohol withdrawal, legal hold, Afib w/ RVR/anticoag (HCC)- (present on admission)  Assessment & Plan  Patient has h/o drinking 1 pint per day for past 2 years. In past week she has increased   She developed delirium tremens and was transferred to the [facility] for initiation and titration of an IV Precedex drip which was turned off 12/2.  Status post IV rally bag   Decrease the scheduled Librium from 50 mg PO q  6hrs to 25 mg TID in order to mitigate seizures   Thiamine 100 mg PO daily, folic acid 1 mg, Multivitamin PO daily.   Initiate CIWA protocol.\"    CIWA protocol, detox bag with vitamins  On a legal hold, Psychiatry following  History of Afib s/p AICD on anticoag. Rate stable. Continue ELiquis and digoxin. August echo EF 55%  Stable HR but can be elevated if withdrawing. Contine BB      Hypothyroidism- (present on admission)  Assessment & Plan  Known history. Last TSH (05/2023): 4.120  Outpatient: Levothyroxine 50 mcg daily.       Major depressive disorder with current active episode- (present on admission)  Assessment & Plan  Known diagnosis in setting of unfortunate loss of her 2 daughters and  in the past 3 years. Patient denies active suicidal or homicidal ideations. No auditory hallucinations. Notes visual hallucinations that most likely related to EtOH withdrawal.   Outpatient treatment: Venlafaxine 225 mg daily.   QTc is 476. She has requested restarting home trazodone 100 mg nightly. Given the risk of serotonin syndrome, start trazodone 50 mg qHS.  Has not been able to establish with Psychiatry outpatient.    psychiatry consulted   Sitter at bedside  She may benefit from inpatient psych upon discharge.    Atrial fibrillation with RVR (HCC)- (present on admission)  Assessment & Plan  Chronic diagnosis.  She is on Digoxin 125 mcg daily, Toprol 100 mg daily, and Eliquis 5 mg BID outpatient all of which have been restarted  Last echocardiogram (01/2020): no structural or valvular " "abnormalities. She does not have any evidence of heart failure thus an echo is not indicated.  TSH (05/2023): 4.120  She developed rapid ventricular response likely due to alcohol withdrawal and has required IV metoprolol and transfer to the [facility].\"  Now HR is controlled     AICD (automatic cardioverter/defibrillator) present- (present on admission)  Assessment & Plan  -Implanted approx 3 years ago with cardiology  -Placed July 2020 d/t AICD implant for polymorphic VT, long QT, and atrial fibrillation.   On digoxin outpatient.             VTE prophylaxis: Eliquis    I have performed a physical exam and reviewed and updated ROS and Plan today (12/10/2023). In review of yesterday's note (12/9/2023), there are no changes except as documented above.      "

## 2023-12-11 PROCEDURE — 97116 GAIT TRAINING THERAPY: CPT

## 2023-12-11 PROCEDURE — 97110 THERAPEUTIC EXERCISES: CPT

## 2023-12-11 PROCEDURE — A9270 NON-COVERED ITEM OR SERVICE: HCPCS | Performed by: INTERNAL MEDICINE

## 2023-12-11 PROCEDURE — 770001 HCHG ROOM/CARE - MED/SURG/GYN PRIV*

## 2023-12-11 PROCEDURE — 700102 HCHG RX REV CODE 250 W/ 637 OVERRIDE(OP): Performed by: STUDENT IN AN ORGANIZED HEALTH CARE EDUCATION/TRAINING PROGRAM

## 2023-12-11 PROCEDURE — A9270 NON-COVERED ITEM OR SERVICE: HCPCS | Performed by: STUDENT IN AN ORGANIZED HEALTH CARE EDUCATION/TRAINING PROGRAM

## 2023-12-11 PROCEDURE — 700102 HCHG RX REV CODE 250 W/ 637 OVERRIDE(OP): Performed by: INTERNAL MEDICINE

## 2023-12-11 PROCEDURE — 700111 HCHG RX REV CODE 636 W/ 250 OVERRIDE (IP): Performed by: INTERNAL MEDICINE

## 2023-12-11 PROCEDURE — 700101 HCHG RX REV CODE 250: Performed by: INTERNAL MEDICINE

## 2023-12-11 PROCEDURE — 99232 SBSQ HOSP IP/OBS MODERATE 35: CPT | Performed by: INTERNAL MEDICINE

## 2023-12-11 PROCEDURE — 700102 HCHG RX REV CODE 250 W/ 637 OVERRIDE(OP)

## 2023-12-11 PROCEDURE — A9270 NON-COVERED ITEM OR SERVICE: HCPCS

## 2023-12-11 PROCEDURE — 99231 SBSQ HOSP IP/OBS SF/LOW 25: CPT | Mod: GC | Performed by: STUDENT IN AN ORGANIZED HEALTH CARE EDUCATION/TRAINING PROGRAM

## 2023-12-11 RX ORDER — MIRTAZAPINE 15 MG/1
15 TABLET, FILM COATED ORAL
Status: DISCONTINUED | OUTPATIENT
Start: 2023-12-11 | End: 2023-12-14 | Stop reason: HOSPADM

## 2023-12-11 RX ADMIN — APIXABAN 5 MG: 5 TABLET, FILM COATED ORAL at 05:17

## 2023-12-11 RX ADMIN — DIGOXIN 125 MCG: 0.25 TABLET ORAL at 05:17

## 2023-12-11 RX ADMIN — VENLAFAXINE HYDROCHLORIDE 225 MG: 75 CAPSULE, EXTENDED RELEASE ORAL at 05:13

## 2023-12-11 RX ADMIN — AMLODIPINE BESYLATE 5 MG: 5 TABLET ORAL at 05:14

## 2023-12-11 RX ADMIN — LEVOTHYROXINE SODIUM 50 MCG: 50 TABLET ORAL at 05:14

## 2023-12-11 RX ADMIN — DOCUSATE SODIUM 50 MG AND SENNOSIDES 8.6 MG 2 TABLET: 8.6; 5 TABLET, FILM COATED ORAL at 17:51

## 2023-12-11 RX ADMIN — METOPROLOL SUCCINATE 100 MG: 25 TABLET, FILM COATED, EXTENDED RELEASE ORAL at 05:16

## 2023-12-11 RX ADMIN — APIXABAN 5 MG: 5 TABLET, FILM COATED ORAL at 17:51

## 2023-12-11 RX ADMIN — Medication 100 MG: at 05:16

## 2023-12-11 RX ADMIN — MIRTAZAPINE 15 MG: 15 TABLET, FILM COATED ORAL at 21:05

## 2023-12-11 RX ADMIN — CEFTRIAXONE SODIUM 1000 MG: 10 INJECTION, POWDER, FOR SOLUTION INTRAVENOUS at 05:13

## 2023-12-11 ASSESSMENT — GAIT ASSESSMENTS
ASSISTIVE DEVICE: FRONT WHEEL WALKER
GAIT LEVEL OF ASSIST: CONTACT GUARD ASSIST
DISTANCE (FEET): 75
DEVIATION: SHUFFLED GAIT

## 2023-12-11 ASSESSMENT — PAIN DESCRIPTION - PAIN TYPE: TYPE: ACUTE PAIN

## 2023-12-11 ASSESSMENT — ENCOUNTER SYMPTOMS
GASTROINTESTINAL NEGATIVE: 1
CARDIOVASCULAR NEGATIVE: 1
DOUBLE VISION: 1
RESPIRATORY NEGATIVE: 1

## 2023-12-11 ASSESSMENT — COGNITIVE AND FUNCTIONAL STATUS - GENERAL
CLIMB 3 TO 5 STEPS WITH RAILING: A LOT
MOVING TO AND FROM BED TO CHAIR: A LOT
MOVING FROM LYING ON BACK TO SITTING ON SIDE OF FLAT BED: A LITTLE
WALKING IN HOSPITAL ROOM: A LITTLE
TURNING FROM BACK TO SIDE WHILE IN FLAT BAD: A LITTLE
SUGGESTED CMS G CODE MODIFIER MOBILITY: CK
STANDING UP FROM CHAIR USING ARMS: A LITTLE
MOBILITY SCORE: 16

## 2023-12-11 ASSESSMENT — FIBROSIS 4 INDEX: FIB4 SCORE: 2.97

## 2023-12-11 NOTE — CARE PLAN
Problem: Knowledge Deficit - Standard  Goal: Patient and family/care givers will demonstrate understanding of plan of care, disease process/condition, diagnostic tests and medications  12/11/2023 1459 by Gerber Sanders R.N.  Outcome: Progressing  12/11/2023 1459 by Gerber Sanders R.N.  Outcome: Progressing     Problem: Lifestyle Changes  Goal: Patient's ability to identify lifestyle changes and available resources to help reduce recurrence of condition will improve  12/11/2023 1459 by Gerber Sanders R.N.  Outcome: Progressing  12/11/2023 1459 by Gerber Sanders R.N.  Outcome: Progressing     Problem: Psychosocial  Goal: Patient's level of anxiety will decrease  12/11/2023 1459 by Gerber Sanders R.N.  Outcome: Progressing  12/11/2023 1459 by Gerber Sanders R.N.  Outcome: Progressing  Goal: Spiritual and cultural needs incorporated into hospitalization  12/11/2023 1459 by Gerber Sanders R.N.  Outcome: Progressing  12/11/2023 1459 by Gerber Sanders R.N.  Outcome: Progressing     Problem: Pain - Standard  Goal: Alleviation of pain or a reduction in pain to the patient’s comfort goal  12/11/2023 1459 by Gerber Sanders R.N.  Outcome: Progressing  12/11/2023 1459 by Gerber Sanders R.N.  Outcome: Progressing     Problem: Seizure Precautions  Goal: Implementation of seizure precautions  12/11/2023 1459 by Gerber Sanders R.N.  Outcome: Progressing  12/11/2023 1459 by Gerber Sanders R.N.  Outcome: Progressing     Problem: Risk for Aspiration  Goal: Patient's risk for aspiration will be absent or decrease  12/11/2023 1459 by Gerber Sanders R.N.  Outcome: Progressing  12/11/2023 1459 by Gerber Sanders R.N.  Outcome: Progressing     Problem: Neuro Status  Goal: Neuro status will remain stable or improve  12/11/2023 1459 by Gerber Sanders R.N.  Outcome: Progressing  12/11/2023 1459 by Gerber Sanders R.N.  Outcome: Progressing     Problem: Depression  Goal: Patient and  "family/caregiver will verbalize accurate information about at least two of the possible causes of depression, three-four of the signs and symptoms of depression  12/11/2023 1459 by Gerber Sanders, R.N.  Outcome: Progressing  12/11/2023 1459 by Gerber Sanders R.N.  Outcome: Progressing   The patient is Stable - Low risk of patient condition declining or worsening    Shift Goals  Clinical Goals: Monitor VS; safety ambulation  Patient Goals: talked to the doctor  Family Goals: DANICA    Progress made toward(s) clinical / shift goals:   Patient mentation iproved to aox 3, receptive to care, walked in the hallway with one person assist and with an aid of a front wheel walker, gait was wide based and staggering was noted,BP (!) 142/88   Pulse 76   Temp 36.3 °C (97.3 °F) (Temporal)   Resp 18   Ht 1.676 m (5' 5.98\")   Wt 65.9 kg (145 lb 4.5 oz)   SpO2 90%  call light within reach and bed alarm kept on            "

## 2023-12-11 NOTE — PROGRESS NOTES
Hospital Medicine Daily Progress Note    Date of Service  12/11/2023    Chief Complaint  Jeanie Hutchison is a 78 y.o. female admitted 11/29/2023 with alcohol intoxication.    Hospital Course  Ms. Jeanie Hutchison is a 78 y.o. female with PMHx of Atrial fibrillation, AICD, Hypothyroidism, Depression, ETOH use disorder who was admitted on 11/30 for alcohol intoxication and Afib with RVR.     Patient notes she has been drinking 1 pint per day of Vodka for past 2 years and in past week increased volume. She was suggested to pursue detoxification by his son which is why she is here in the hospital. Notes she is currently having visual hallucinations (cartoon-like images), feels anxious, mild hand tremors, and no other specific symptoms. Denies suicidal or homicidal ideations. No prior h.o withdrawal seizures or need for ICU admission d/t ETOH withdrawal.      In ER, patient was resting comfortably in bed, no acute distress. She is very pleasant. No electrolyte imbalances. EKG shows Afib w/RVR. Has received Metoprolol pushes, 5 mg IV, x3. Resumed all her home medications. Patient also received Lorazepam 2 mg IV, Librium 25 mg PO and  rally bag IV. Patient placed on [facility] protocol for alcohol withdrawal management. Patient admitted to hospital medicine for management of care.           She required transfer to the IMCU due to escalating detox symptoms requiring an IV Precedex drip.  Interval Problem Update  12/1: Ms. Hutchison was evaluated and examined in the IMCU. She is requiring titration of an IV Precedex drip due to severe alcohol detox. She is tolerating oral meds with prompting.   12/2: Ms. Hutchison was seen in the IMCU. She has required an IV precedex drip with scheduled Librium. A sitter is at bedside. She has been in afib. IV fluids running. She is a non-historian this morning.   12/3: Ms. Hutchison was evaluated in the IMCU. The Precedex drip has been turned off. She has been on IV fluids due to poor oral intake.  "The scheduled librium dose will be decreased from 50 mg qid to 25 mg TID. She has been in afib rate low 100's. [facility] protocol for IV ativan as needed.\"    12/4. I reviewed the chart along with vitals, labs, imaging, test (both pending and resulted) and recommendations from specialists and interdisciplinary team.  One to one sitter. She has poor insight but currently pleasant   COntinue CIWA protocol  Psychiatry following her and she has a one to one sitter. On legal hold that is extended.  HR stable. Continue Eliquis for Afib  12/5; Patient seen and examined, afebrile, no nausea or vomiting. HTN not well controlled with SBP in the 150 and DBP in the 100. Starting patient on amlodipine   Psychiatry following on legal hold appreciate rec.   12/6: Patient resting in bed, afebrile,no nausea or vomiting, BP better today  Psychiatry following appreciate rec. On legal hold  12/7: Patient seen and examined, afebrile, no acute events overnight   Still confused, I will check a UA and ammonia level   Psychiatry following on legal hold appreciate rec   12/8: Patient seen and examined AOX1,  Ammonia level  not elevated, started on thiamine,  Also discussed case with psychiatry d/cing CIWA protocol. Starting on Seroquel 12.5 BID PRN,also starting Remeron as pr psych rec.  UA still pending   12/9: Patient seen and examined, afebrile, still AOX1, UA positive for possible infection will start a 3 day course of IV ceftriaxone   On legal hold psychiatry is following appreciate rec.   12/10: Patient resting in bed AOX2, cont on IV abx for her UTI  On legal hol psychiatry following appreciate rec.   Patient seen and examined, finished abx for her UTI,  On legal hold psychiatry following appreciate rec.     I have discussed this patient's plan of care and discharge plan at IDT rounds today with Case Management, Nursing, Nursing leadership, and other members of the IDT team.    Consultants/Specialty  psychiatry    Code Status  Full " Code    Disposition  The patient is not medically cleared for discharge to home or a post-acute facility.      I have placed the appropriate orders for post-discharge needs.    Review of Systems  Review of Systems   Unable to perform ROS: Mental acuity        Physical Exam  Temp:  [36.3 °C (97.3 °F)-36.8 °C (98.2 °F)] 36.3 °C (97.3 °F)  Pulse:  [63-77] 76  Resp:  [16-18] 18  BP: (111-142)/(80-97) 142/88  SpO2:  [90 %-96 %] 90 %    Physical Exam  Vitals and nursing note reviewed.   Constitutional:       Appearance: She is ill-appearing. She is not toxic-appearing.   HENT:      Mouth/Throat:      Mouth: Mucous membranes are dry.   Eyes:      General: No scleral icterus.  Cardiovascular:      Rate and Rhythm: Tachycardia present. Rhythm irregular.   Pulmonary:      Effort: Pulmonary effort is normal.      Breath sounds: Normal breath sounds. No rales.      Comments: 1 liter nasal cannula oxygen  Abdominal:      General: There is no distension.      Palpations: Abdomen is soft.      Tenderness: There is no abdominal tenderness. There is no guarding or rebound.   Musculoskeletal:      Cervical back: Neck supple.      Right lower leg: No edema.      Left lower leg: No edema.   Neurological:      Comments: Poor cognition but awake and can converse     Psychiatric:      Comments: Compliant with exam         Fluids    Intake/Output Summary (Last 24 hours) at 12/11/2023 1355  Last data filed at 12/11/2023 0900  Gross per 24 hour   Intake 240 ml   Output 500 ml   Net -260 ml         Laboratory  Recent Labs     12/09/23  0719 12/10/23  0742   WBC 6.3 5.8   RBC 4.88 4.83   HEMOGLOBIN 16.0 15.8   HEMATOCRIT 47.7* 46.9   MCV 97.7 97.1   MCH 32.8 32.7   MCHC 33.5 33.7   RDW 51.4* 51.3*   PLATELETCT 185 220   MPV 9.5 9.8       Recent Labs     12/09/23  0719 12/10/23  0742   SODIUM 144 141   POTASSIUM 3.6 3.6   CHLORIDE 106 104   CO2 27 25   GLUCOSE 85 80   BUN 11 12   CREATININE 0.49* 0.56   CALCIUM 9.7 9.3                  "    Imaging  DX-CHEST-LIMITED (1 VIEW)   Final Result         1.  Right basilar atelectasis, no focal infiltrate           Assessment/Plan  * Alcohol withdrawal, legal hold, Afib w/ RVR/anticoag (HCC)- (present on admission)  Assessment & Plan  Patient has h/o drinking 1 pint per day for past 2 years. In past week she has increased   She developed delirium tremens and was transferred to the [facility] for initiation and titration of an IV Precedex drip which was turned off 12/2.  Status post IV rally bag   Decrease the scheduled Librium from 50 mg PO q  6hrs to 25 mg TID in order to mitigate seizures   Thiamine 100 mg PO daily, folic acid 1 mg, Multivitamin PO daily.   Initiate CIWA protocol.\"    CIWA protocol, detox bag with vitamins  On a legal hold, Psychiatry following  History of Afib s/p AICD on anticoag. Rate stable. Continue ELiquis and digoxin. August echo EF 55%  Stable HR but can be elevated if withdrawing. Contine BB      Hypothyroidism- (present on admission)  Assessment & Plan  Known history. Last TSH (05/2023): 4.120  Outpatient: Levothyroxine 50 mcg daily.       Major depressive disorder with current active episode- (present on admission)  Assessment & Plan  Known diagnosis in setting of unfortunate loss of her 2 daughters and  in the past 3 years. Patient denies active suicidal or homicidal ideations. No auditory hallucinations. Notes visual hallucinations that most likely related to EtOH withdrawal.   Outpatient treatment: Venlafaxine 225 mg daily.   QTc is 476. She has requested restarting home trazodone 100 mg nightly. Given the risk of serotonin syndrome, start trazodone 50 mg qHS.  Has not been able to establish with Psychiatry outpatient.    psychiatry consulted   Sitter at bedside  She may benefit from inpatient psych upon discharge.    Atrial fibrillation with RVR (HCC)- (present on admission)  Assessment & Plan  Chronic diagnosis.  She is on Digoxin 125 mcg daily, Toprol 100 mg daily, and " "Eliquis 5 mg BID outpatient all of which have been restarted  Last echocardiogram (01/2020): no structural or valvular abnormalities. She does not have any evidence of heart failure thus an echo is not indicated.  TSH (05/2023): 4.120  She developed rapid ventricular response likely due to alcohol withdrawal and has required IV metoprolol and transfer to the [facility].\"  Now HR is controlled     AICD (automatic cardioverter/defibrillator) present- (present on admission)  Assessment & Plan  -Implanted approx 3 years ago with cardiology  -Placed July 2020 d/t AICD implant for polymorphic VT, long QT, and atrial fibrillation.   On digoxin outpatient.             VTE prophylaxis: Eliquis    I have performed a physical exam and reviewed and updated ROS and Plan today (12/11/2023). In review of yesterday's note (12/10/2023), there are no changes except as documented above.      "

## 2023-12-11 NOTE — THERAPY
Physical Therapy   Daily Treatment     Patient Name: Jeanie Hutchison  Age:  78 y.o., Sex:  female  Medical Record #: [id number]  Today's Date: 12/11/2023     Precautions  Precautions: Fall Risk;Swallow Precautions    Assessment    Pt agreeable to PT tx session.  Pt able to progress functional mobility, ambulated ~75 ft in hallway with FWW and CGA-min A with frequent cues for positioning.  Guided pt through LE exercises as detailed below for LE strengthening.  Educated pt to sit up in chair for all meals and continue ambulating daily with nursing staff to prevent further deconditioning.  Will continue to follow.     Plan    Treatment Plan Status: Continue Current Treatment Plan  Type of Treatment: Bed Mobility, Gait Training, Neuro Re-Education / Balance, Stair Training, Therapeutic Activities, Therapeutic Exercise  Treatment Frequency: 4 Times per Week  Treatment Duration: Until Therapy Goals Met    DC Equipment Recommendations: Unable to determine at this time  Discharge Recommendations: Recommend post-acute placement for additional physical therapy services prior to discharge home    Objective     12/11/23 1014   Precautions   Precautions Fall Risk;Swallow Precautions   Pain 0 - 10 Group   Therapist Pain Assessment Post Activity Pain Same as Prior to Activity;Nurse Notified;0   Cognition    Cognition / Consciousness X   Speech/ Communication Delayed Responses   Level of Consciousness Alert   Comments Pleasant & cooperative, slow to respond. Intermittently required repeat cues for tasks   Supine Lower Body Exercise   Supine Lower Body Exercises Yes   Bridges Two Legged;1 set of 10   Short Arc Quad 1 set of 10;Bilateral   Ankle Pumps Reciprocal;1 set of 10   Sitting Lower Body Exercises   Sitting Lower Body Exercises Yes   Long Arc Quad 1 set of 10;Bilateral   Marching Reciprocal;1 set of 10   Balance   Sitting Balance (Static) Fair   Sitting Balance (Dynamic) Fair   Standing Balance (Static) Fair -   Standing  Balance (Dynamic) Fair -   Weight Shift Sitting Fair   Weight Shift Standing Fair   Skilled Intervention Verbal Cuing;Tactile Cuing   Bed Mobility    Supine to Sit Standby Assist   Sit to Supine Standby Assist   Skilled Intervention Verbal Cuing   Gait Analysis   Gait Level Of Assist Contact Guard Assist (min A with turns)   Assistive Device Front Wheel Walker   Distance (Feet) 75   # of Times Distance was Traveled 1   Deviation Shuffled Gait (flexed posture, too far behind FWW)   Weight Bearing Status No restrictions   Skilled Intervention Verbal Cuing;Tactile Cuing   Functional Mobility   Sit to Stand Contact Guard Assist   Bed, Chair, Wheelchair Transfer Contact Guard Assist   Transfer Method Stand Step   Mobility bed mobility, ambulation in hallway   Skilled Intervention Verbal Cuing;Tactile Cuing   Short Term Goals    Short Term Goal # 1 Patient will perform supine-sit with HOB flat with supervision in 6 visits   Goal Outcome # 1 goal not met   Short Term Goal # 2 Patient will perform sit-stand and chair transfers with LRAD with supervision in 6 visits   Goal Outcome # 2 Goal not met   Short Term Goal # 3 Patient will ambulate > 50 feet with LRAD with supervision in 6 visits   Goal Outcome # 3 Goal not met   Short Term Goal # 4 Patient will negotiate 10 steps with rails with supervision in 6 visits   Goal Outcome # 4 Goal not met   Physical Therapy Treatment Plan   Physical Therapy Treatment Plan Continue Current Treatment Plan

## 2023-12-11 NOTE — PROGRESS NOTES
Bedside report received from night RN, pt care assumed, assessment completed. Pt is A&O2, pain 0, not on the monitor. Updated on POC, questions answered. Bed in lowest, locked position, treaded socks on, call light and belongings within reach.

## 2023-12-11 NOTE — CONSULTS
"PSYCHIATRIC FOLLOW-UP:(established)   Reason for admission: Afib, alcohol withdrawal  Reason for consult: depression,   Legal hold: extended (for failure to thrive due to MDD)           Chart reviewed.    Discussed with nursing, patient able to ambulate better although still wide-based and staggering, appetite improved, affect brighter.  *HPI:        Patient seen bedside this morning, endorses mood as \"still sad\".  Patient endorses adequate sleep sleep and appetite, feels rested this morning.  Her anxiety is at her baseline which she describes as \" I am always worried\".  She is motivated to engage in psychotherapy but has been unable to find an available psychotherapist.  Patient is interested in psychotherapy to address grief after death of her daughters.  She attributes her excessive drinking to grief.  She continues to endorse passive SI, denies plans or intent.  She denies HI, AVH or paranoia.    CAM ICU negative on all criteria    Medical ROS (as pertinent):     Review of Systems   HENT: Negative.     Eyes:  Positive for double vision (onset few months ago).   Respiratory: Negative.     Cardiovascular: Negative.    Gastrointestinal: Negative.            *Psychiatric Examination:  Vitals: BP (!) 142/88   Pulse 76   Temp 36.3 °C (97.3 °F) (Temporal)   Resp 18   Ht 1.676 m (5' 5.98\")   Wt 65.9 kg (145 lb 4.5 oz)   SpO2 90%   BMI 23.46 kg/m²    General Appearance: 78-year-old female, appears stated age, no apparent distress, cooperative and pleasant  Abnormal Movements: Bradykinesia present, no tremors or psychomotor agitation  Gait and Posture: Gait not evaluated  Speech: Slow rate, soft tone, minimal latency to respond noted.  No slurring or stuttering  Thought processes: Linear, organized, reality based  Associations: No loose associations  Abnormal or Psychotic Thoughts: No internal preoccupation noted, denies AVH, paranoia, delusions  Judgement and Insight: Fair/fair  Orientation: Oriented to person, " "place, time, situation  Recent and Remote Memory: Intact per interview  Attention Span and Concentration: Intact per interview  Language: Fluent in English  Fund of Knowledge: Not formally tested but appears appropriate for education and age  Mood and Affect:\" Still sad\", dysphoric, normal range, congruent stated mood  SI/HI: Endorses passive SI, denies active SI or HI      *EKG: QTc 460 on 12/4  *Imaging: None   EEG:  None     *Labs personally reviewed:   Lab Results   Component Value Date/Time    SODIUM 141 12/10/2023 07:42 AM    POTASSIUM 3.6 12/10/2023 07:42 AM    CHLORIDE 104 12/10/2023 07:42 AM    CO2 25 12/10/2023 07:42 AM    GLUCOSE 80 12/10/2023 07:42 AM    BUN 12 12/10/2023 07:42 AM    CREATININE 0.56 12/10/2023 07:42 AM    BUNCREATRAT 21.7 (H) 08/31/2023 03:29 AM      Lab Results   Component Value Date/Time    WBC 5.8 12/10/2023 07:42 AM    RBC 4.83 12/10/2023 07:42 AM    HEMOGLOBIN 15.8 12/10/2023 07:42 AM    HEMATOCRIT 46.9 12/10/2023 07:42 AM    MCV 97.1 12/10/2023 07:42 AM    MCH 32.7 12/10/2023 07:42 AM    MCHC 33.7 12/10/2023 07:42 AM    MPV 9.8 12/10/2023 07:42 AM    NEUTSPOLYS 43.20 (L) 11/29/2023 04:00 PM    LYMPHOCYTES 45.50 (H) 11/29/2023 04:00 PM    MONOCYTES 8.90 11/29/2023 04:00 PM    EOSINOPHILS 1.00 11/29/2023 04:00 PM    BASOPHILS 1.20 11/29/2023 04:00 PM          Current medications:  Current Facility-Administered Medications   Medication Dose Route Frequency Provider Last Rate Last Admin    QUEtiapine (SEROquel) tablet 12.5 mg  12.5 mg Oral BID PRN Penny Fowler M.D.        mirtazapine (Remeron) tablet 7.5 mg  7.5 mg Oral QHS PRN Bridgette Jaquez D.O.   7.5 mg at 12/10/23 2009    thiamine (Vitamin B-1) tablet 100 mg  100 mg Oral DAILY Penny Fowler M.D.   100 mg at 12/11/23 0516    amLODIPine (Norvasc) tablet 5 mg  5 mg Oral Q DAY Penny Fowler M.D.   5 mg at 12/11/23 0514    acetaminophen (Tylenol) tablet 650 mg  650 mg Oral Q4HRS PRN Devon Redd M.D.   650 mg at 12/10/23 2009    " senna-docusate (Pericolace Or Senokot S) 8.6-50 MG per tablet 2 Tablet  2 Tablet Oral BID Antoni Shankar M.D.   2 Tablet at 12/10/23 1732    And    polyethylene glycol/lytes (Miralax) Packet 1 Packet  1 Packet Oral QDAY PRN Antoni Shankar M.D.        And    magnesium hydroxide (Milk Of Magnesia) suspension 30 mL  30 mL Oral QDAY PRN Antoni Shankar M.D.        And    bisacodyl (Dulcolax) suppository 10 mg  10 mg Rectal QDAY PRN Antoni Shankar M.D.        levothyroxine (Synthroid) tablet 50 mcg  50 mcg Oral AM ES Antoni Shankar M.D.   50 mcg at 12/11/23 0514    venlafaxine XR (Effexor XR) capsule 225 mg  225 mg Oral DAILY Antoni Shankar M.D.   225 mg at 12/11/23 0513    labetalol (Normodyne/Trandate) injection 10 mg  10 mg Intravenous Q HOUR PRN Malu Dorsey, A.P.R.N.   10 mg at 12/07/23 0336    Or    labetalol (Normodyne) tablet 200 mg  200 mg Oral Q6HRS PRN Malu Dorsey, A.P.R.N.        Metoprolol Tartrate (Lopressor) injection 5 mg  5 mg Intravenous Q5 MIN PRN Guru Plata M.D.        apixaban (Eliquis) tablet 5 mg  5 mg Oral BID Reilly Loving M.D.   5 mg at 12/11/23 0517    digoxin (Lanoxin) tablet 125 mcg  125 mcg Oral DAILY Reilly Loving M.D.   125 mcg at 12/11/23 0517    metoprolol SR (Toprol XL) tablet 100 mg  100 mg Oral Q DAY Reilly Loving M.D.   100 mg at 12/11/23 0516       Assessment:  Neurocognitive function improving with maintaining sobriety from alcohol.  Today patient presents fully oriented, CAM ICU performed and negative, alcohol withdrawal completed.  She presents dysphoric endorsing passive SI related to unprocessed grief after passing of her daughters and trauma from reportedly witnessing their death.  She endorsed motivation finding outpatient psychotherapist but so far has been unsuccessful  as nobody has returned her calls. She may benefit from increasing Remeron to 15 mg qHs for appetite and sleep as well as it  can help mood at higher dosing. 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 Fields, Dr. Fowler (via [employer])  Psychiatry will follow up      Thank you for the consult.       Sitter: 1:1   Phone: With sitter supervision   Visitors: Son only   Personal belongings: Phone and ipad     This note was created using voice recognition software (Dragon). The accuracy of the dictation is limited by the abilities of the software. I have reviewed the note prior to signing. However, error related to voice recognition software and /or scribes may still exist and should be interpreted within the appropriate context.

## 2023-12-11 NOTE — DIETARY
Nutrition Update:    Day 11 of admit.  Jeanie Hutchison is a 78 y.o. female being followed to optimize nutrition.    Current Diet: Cardiac, 2 gram Sodium.  Ensure supplements TID    Problem: Nutritional:  Goal: Achieve adequate nutritional intake  Description: Patient will consume 50% of meals  Outcome: Met    Met with her at bedside.  She stated she is eating at least 50% of most meals.  She no longer wants the Ensure supplements.  Of note, wt loss noted since admit based on stand up scale weights.  Patient stated she thought she had gained weight and was unhappy about that.  She stated he UBW was ~145#.  Current weight is 145#.  Admit wt of 161# may have been on a different scale.    Dietary staff available for ongoing meal snack and supplements preferences.  RD following.

## 2023-12-11 NOTE — CARE PLAN
The patient is Stable - Low risk of patient condition declining or worsening    Shift Goals  Clinical Goals: Monitor Vitals  Patient Goals: Rest  Family Goals: DANICA    Progress made toward(s) clinical / shift goals:       Problem: Knowledge Deficit - Standard  Goal: Patient and family/care givers will demonstrate understanding of plan of care, disease process/condition, diagnostic tests and medications  Outcome: Progressing     Problem: Psychosocial  Goal: Patient's level of anxiety will decrease  Outcome: Progressing          JOINT PAIN

## 2023-12-12 LAB
ANION GAP SERPL CALC-SCNC: 13 MMOL/L (ref 7–16)
BUN SERPL-MCNC: 10 MG/DL (ref 8–22)
CALCIUM SERPL-MCNC: 9 MG/DL (ref 8.5–10.5)
CHLORIDE SERPL-SCNC: 105 MMOL/L (ref 96–112)
CO2 SERPL-SCNC: 24 MMOL/L (ref 20–33)
CREAT SERPL-MCNC: 0.48 MG/DL (ref 0.5–1.4)
ERYTHROCYTE [DISTWIDTH] IN BLOOD BY AUTOMATED COUNT: 48.4 FL (ref 35.9–50)
GFR SERPLBLD CREATININE-BSD FMLA CKD-EPI: 98 ML/MIN/1.73 M 2
GLUCOSE SERPL-MCNC: 87 MG/DL (ref 65–99)
HCT VFR BLD AUTO: 47.2 % (ref 37–47)
HGB BLD-MCNC: 16.1 G/DL (ref 12–16)
MCH RBC QN AUTO: 32.7 PG (ref 27–33)
MCHC RBC AUTO-ENTMCNC: 34.1 G/DL (ref 32.2–35.5)
MCV RBC AUTO: 95.9 FL (ref 81.4–97.8)
PLATELET # BLD AUTO: 260 K/UL (ref 164–446)
PMV BLD AUTO: 9.2 FL (ref 9–12.9)
POTASSIUM SERPL-SCNC: 4.1 MMOL/L (ref 3.6–5.5)
RBC # BLD AUTO: 4.92 M/UL (ref 4.2–5.4)
SODIUM SERPL-SCNC: 142 MMOL/L (ref 135–145)
WBC # BLD AUTO: 6.8 K/UL (ref 4.8–10.8)

## 2023-12-12 PROCEDURE — 700102 HCHG RX REV CODE 250 W/ 637 OVERRIDE(OP): Performed by: STUDENT IN AN ORGANIZED HEALTH CARE EDUCATION/TRAINING PROGRAM

## 2023-12-12 PROCEDURE — 99232 SBSQ HOSP IP/OBS MODERATE 35: CPT | Mod: GC | Performed by: STUDENT IN AN ORGANIZED HEALTH CARE EDUCATION/TRAINING PROGRAM

## 2023-12-12 PROCEDURE — A9270 NON-COVERED ITEM OR SERVICE: HCPCS

## 2023-12-12 PROCEDURE — 700102 HCHG RX REV CODE 250 W/ 637 OVERRIDE(OP)

## 2023-12-12 PROCEDURE — 99232 SBSQ HOSP IP/OBS MODERATE 35: CPT | Performed by: INTERNAL MEDICINE

## 2023-12-12 PROCEDURE — 85027 COMPLETE CBC AUTOMATED: CPT

## 2023-12-12 PROCEDURE — A9270 NON-COVERED ITEM OR SERVICE: HCPCS | Performed by: STUDENT IN AN ORGANIZED HEALTH CARE EDUCATION/TRAINING PROGRAM

## 2023-12-12 PROCEDURE — 770001 HCHG ROOM/CARE - MED/SURG/GYN PRIV*

## 2023-12-12 PROCEDURE — 97535 SELF CARE MNGMENT TRAINING: CPT | Mod: CO

## 2023-12-12 PROCEDURE — 36415 COLL VENOUS BLD VENIPUNCTURE: CPT

## 2023-12-12 PROCEDURE — 80048 BASIC METABOLIC PNL TOTAL CA: CPT

## 2023-12-12 PROCEDURE — 700102 HCHG RX REV CODE 250 W/ 637 OVERRIDE(OP): Performed by: INTERNAL MEDICINE

## 2023-12-12 PROCEDURE — A9270 NON-COVERED ITEM OR SERVICE: HCPCS | Performed by: INTERNAL MEDICINE

## 2023-12-12 RX ADMIN — METOPROLOL SUCCINATE 100 MG: 25 TABLET, FILM COATED, EXTENDED RELEASE ORAL at 06:08

## 2023-12-12 RX ADMIN — MIRTAZAPINE 15 MG: 15 TABLET, FILM COATED ORAL at 20:19

## 2023-12-12 RX ADMIN — DIGOXIN 125 MCG: 0.25 TABLET ORAL at 06:08

## 2023-12-12 RX ADMIN — LEVOTHYROXINE SODIUM 50 MCG: 50 TABLET ORAL at 06:08

## 2023-12-12 RX ADMIN — APIXABAN 5 MG: 5 TABLET, FILM COATED ORAL at 06:08

## 2023-12-12 RX ADMIN — VENLAFAXINE HYDROCHLORIDE 225 MG: 75 CAPSULE, EXTENDED RELEASE ORAL at 06:09

## 2023-12-12 RX ADMIN — Medication 100 MG: at 06:08

## 2023-12-12 RX ADMIN — APIXABAN 5 MG: 5 TABLET, FILM COATED ORAL at 19:16

## 2023-12-12 RX ADMIN — DOCUSATE SODIUM 50 MG AND SENNOSIDES 8.6 MG 2 TABLET: 8.6; 5 TABLET, FILM COATED ORAL at 19:16

## 2023-12-12 RX ADMIN — AMLODIPINE BESYLATE 5 MG: 5 TABLET ORAL at 06:08

## 2023-12-12 ASSESSMENT — COGNITIVE AND FUNCTIONAL STATUS - GENERAL
DRESSING REGULAR UPPER BODY CLOTHING: A LITTLE
DAILY ACTIVITIY SCORE: 16
SUGGESTED CMS G CODE MODIFIER DAILY ACTIVITY: CK
TOILETING: A LOT
DRESSING REGULAR LOWER BODY CLOTHING: A LITTLE
HELP NEEDED FOR BATHING: A LOT
PERSONAL GROOMING: A LITTLE
EATING MEALS: A LITTLE

## 2023-12-12 ASSESSMENT — ENCOUNTER SYMPTOMS
GASTROINTESTINAL NEGATIVE: 1
CARDIOVASCULAR NEGATIVE: 1
RESPIRATORY NEGATIVE: 1
TREMORS: 1

## 2023-12-12 ASSESSMENT — GAIT ASSESSMENTS: DISTANCE (FEET): 8

## 2023-12-12 ASSESSMENT — FIBROSIS 4 INDEX: FIB4 SCORE: 2.97

## 2023-12-12 ASSESSMENT — PAIN DESCRIPTION - PAIN TYPE: TYPE: ACUTE PAIN

## 2023-12-12 NOTE — CARE PLAN
The patient is Stable - Low risk of patient condition declining or worsening    Shift Goals  Clinical Goals: A&O, safety  Patient Goals: rest  Family Goals: NA    Progress made toward(s) clinical / shift goals:    Problem: Knowledge Deficit - Standard  Goal: Patient and family/care givers will demonstrate understanding of plan of care, disease process/condition, diagnostic tests and medications  Outcome: Progressing     Problem: Psychosocial  Goal: Patient's level of anxiety will decrease  Outcome: Progressing     Problem: Seizure Precautions  Goal: Implementation of seizure precautions  Outcome: Progressing     Problem: Neuro Status  Goal: Neuro status will remain stable or improve  Outcome: Progressing       Patient is not progressing towards the following goals:

## 2023-12-12 NOTE — PROGRESS NOTES
Assumed care of patient from NOC RN. Patient is sleeping in bed with sitter at bedside. Currently on legal hold. All needs are met at this time.

## 2023-12-12 NOTE — DISCHARGE PLANNING
Alert Team Note:    Inpt. Psych referrals to be sent out once a note is put in that  reflects medical clearance.

## 2023-12-12 NOTE — DISCHARGE PLANNING
Case Management Discharge Planning    Admission Date: 11/29/2023  GMLOS: 3.4  ALOS: 12    6-Clicks ADL Score: 16  6-Clicks Mobility Score: 16  PT and/or OT Eval ordered: Yes  Post-acute Referrals Ordered: Yes  Post-acute Choice Obtained: Yes  Has referral(s) been sent to post-acute provider:  Yes      Anticipated Discharge Dispo: Discharge Disposition: D/T to psych hosp or distinct part unit (65)    DME Needed: No    Action(s) Taken:   Pt to DC to inpatient psych facility once medically cleared. PT/OT are recommending post acute placement.     Escalations Completed: None    Medically Clear: No    Next Steps: LMSW to follow for DC plan in case of any changes.    Barriers to Discharge: Medical clearance and Pending Placement    Is the patient up for discharge tomorrow: No

## 2023-12-12 NOTE — PROGRESS NOTES
Patient arrived via transport with 1:1 sitter and all belongings. Patient is A&Ox3-4. No complaints of pain. Plan of care discussed with the patient, all concerns addressed. Call light is within reach and patient educated on fall precautions, verbalized and demonstrated understanding. Bed in lowest and locked position. Hourly rounding in place.

## 2023-12-12 NOTE — CONSULTS
"PSYCHIATRIC FOLLOW-UP:(established)   Reason for admission: Afib, alcohol withdrawal  Reason for consult: depression,   Legal hold: extended (for failure to thrive due to MDD)          Chart reviewed.         *HPI:          Patient seen bedside this morning, endorses \"ok\" mood although affect is significantly dysphoric.  She endorses adequate sleep and appetite, has not noticed any side effects after increasing Remeron last night.  She continues to feel \"shaky on the inside\".  She expressed desire to \"get back to normal\" to be able to return to life activities.  At the same time, she is unable to verbalize anything to look forward to.  More attempts to assess reasons to live and whether patient is future oriented were made and patient frequently replied \" I do not know\".  She appropriately responded to orientation questions but is more questions asked she asked for interview to be terminated saying \" I do not feel like answering any more\".    Medical ROS (as pertinent):     Review of Systems   Respiratory: Negative.     Cardiovascular: Negative.    Gastrointestinal: Negative.    Neurological:  Positive for tremors.           *Psychiatric Examination:  Vitals: BP (!) 145/103 Comment: nurse aware  Pulse 64   Temp 36.2 °C (97.2 °F) (Temporal)   Resp 16   Ht 1.676 m (5' 5.98\")   Wt 63.7 kg (140 lb 6.9 oz)   SpO2 93%   BMI 22.68 kg/m²    General Appearance: 78-year-old female, appears stated age, no apparent distress, cooperative and pleasant  Abnormal Movements: Bradykinesia present, no tremors or psychomotor agitation  Gait and Posture: Gait not evaluated  Speech: Slow rate, soft tone, minimal latency to respond noted.  No slurring or stuttering  Thought processes: Linear, organized, reality based  Associations: No loose associations  Abnormal or Psychotic Thoughts: No internal preoccupation noted, denies AVH, paranoia, delusions  Judgement and Insight: Fair/fair  Orientation: Oriented to person, place, time, " "situation  Recent and Remote Memory: Intact per interview  Attention Span and Concentration: Intact per interview  Language: Fluent in English  Fund of Knowledge: Not formally tested but appears appropriate for education and age  Mood and Affect:\"it's ok\", dysphoric, constricted range, not congruent stated mood  SI/HI: denies SI or HI      *EKG: QTc 460 on 12/4  *Imaging: None   EEG:  None     *Labs personally reviewed:   Recent Results (from the past 24 hour(s))   CBC WITHOUT DIFFERENTIAL    Collection Time: 12/12/23  9:13 AM   Result Value Ref Range    WBC 6.8 4.8 - 10.8 K/uL    RBC 4.92 4.20 - 5.40 M/uL    Hemoglobin 16.1 (H) 12.0 - 16.0 g/dL    Hematocrit 47.2 (H) 37.0 - 47.0 %    MCV 95.9 81.4 - 97.8 fL    MCH 32.7 27.0 - 33.0 pg    MCHC 34.1 32.2 - 35.5 g/dL    RDW 48.4 35.9 - 50.0 fL    Platelet Count 260 164 - 446 K/uL    MPV 9.2 9.0 - 12.9 fL   Basic Metabolic Panel    Collection Time: 12/12/23  9:13 AM   Result Value Ref Range    Sodium 142 135 - 145 mmol/L    Potassium 4.1 3.6 - 5.5 mmol/L    Chloride 105 96 - 112 mmol/L    Co2 24 20 - 33 mmol/L    Glucose 87 65 - 99 mg/dL    Bun 10 8 - 22 mg/dL    Creatinine 0.48 (L) 0.50 - 1.40 mg/dL    Calcium 9.0 8.5 - 10.5 mg/dL    Anion Gap 13.0 7.0 - 16.0   ESTIMATED GFR    Collection Time: 12/12/23  9:13 AM   Result Value Ref Range    GFR (CKD-EPI) 98 >60 mL/min/1.73 m 2       Current medications:  Current Facility-Administered Medications   Medication Dose Route Frequency Provider Last Rate Last Admin    mirtazapine (Remeron) tablet 15 mg  15 mg Oral QHS Bridgette Jaquez D.O.   15 mg at 12/11/23 2105    QUEtiapine (SEROquel) tablet 12.5 mg  12.5 mg Oral BID PRN Penny Fowler M.D.        thiamine (Vitamin B-1) tablet 100 mg  100 mg Oral DAILY Penny Fowler M.D.   100 mg at 12/12/23 0608    amLODIPine (Norvasc) tablet 5 mg  5 mg Oral Q DAY Penny Fowler M.D.   5 mg at 12/12/23 0608    acetaminophen (Tylenol) tablet 650 mg  650 mg Oral Q4HRS PRN Devon Redd M.D.   " 650 mg at 12/10/23 2009    senna-docusate (Pericolace Or Senokot S) 8.6-50 MG per tablet 2 Tablet  2 Tablet Oral BID Antoni Shankar M.D.   2 Tablet at 12/11/23 1751    And    polyethylene glycol/lytes (Miralax) Packet 1 Packet  1 Packet Oral QDAY PRN Antoni Shankar M.D.        And    magnesium hydroxide (Milk Of Magnesia) suspension 30 mL  30 mL Oral QDAY PRN Antoni Shankar M.D.        And    bisacodyl (Dulcolax) suppository 10 mg  10 mg Rectal QDAY PRN Antoni Shankar M.D.        levothyroxine (Synthroid) tablet 50 mcg  50 mcg Oral AM ES Antoni Shankar M.D.   50 mcg at 12/12/23 0608    venlafaxine XR (Effexor XR) capsule 225 mg  225 mg Oral DAILY Antoni Shankar M.D.   225 mg at 12/12/23 0609    labetalol (Normodyne/Trandate) injection 10 mg  10 mg Intravenous Q HOUR PRN Malu Dorsey, A.P.R.N.   10 mg at 12/07/23 0336    Or    labetalol (Normodyne) tablet 200 mg  200 mg Oral Q6HRS PRN Malu Dorsey, A.P.R.N.        Metoprolol Tartrate (Lopressor) injection 5 mg  5 mg Intravenous Q5 MIN PRN Guru Plata M.D.        apixaban (Eliquis) tablet 5 mg  5 mg Oral BID Reilly Loving M.D.   5 mg at 12/12/23 0608    digoxin (Lanoxin) tablet 125 mcg  125 mcg Oral DAILY Reilly Loving M.D.   125 mcg at 12/12/23 0608    metoprolol SR (Toprol XL) tablet 100 mg  100 mg Oral Q DAY Reilly Loving M.D.   100 mg at 12/12/23 0608       Assessment:  Patient presents significantly dysphoric.  She endorses being motivated to participate in psychiatric care but has difficulty with participation likely secondary to her depressive state.  She is reporting adequate sleep and appetite, no medication side effects or agitation noted.  While she is denying suicidal thoughts today, worsened dysphoria today and affect not being congruent to stated mood warrant continuing legal hold.  She will benefit from transferring to a psychiatric facility after being medically cleared and  if bed is available.    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  Please transfer pt to inpatient psychiatric hospital when medically cleared and bed is available  Discussed the case with: Dr. Fields, Dr. Cruz (via [facility])  Psychiatry will follow up        Thank you for the consult.         Sitter: 1:1   Phone: With sitter supervision   Visitors: Son only   Personal belongings: Phone and ipad        This note was created using voice recognition software (Dragon). The accuracy of the dictation is limited by the abilities of the software. I have reviewed the note prior to signing. However, error related to voice recognition software and /or scribes may still exist and should be interpreted within the appropriate context.

## 2023-12-12 NOTE — PROGRESS NOTES
Hospital Medicine Daily Progress Note    Date of Service  12/12/2023    Chief Complaint  Jeanie Hutchison is a 78 y.o. female admitted 11/29/2023 with alcohol intoxication.    Hospital Course  As per Dr. Fowler's note    Ms. Jeanie Hutchison is a 78 y.o. female with PMHx of Atrial fibrillation, AICD, Hypothyroidism, Depression, ETOH use disorder who was admitted on 11/30 for alcohol intoxication and Afib with RVR.     Patient notes she has been drinking 1 pint per day of Vodka for past 2 years and in past week increased volume. She was suggested to pursue detoxification by his son which is why she is here in the hospital. Notes she is currently having visual hallucinations (cartoon-like images), feels anxious, mild hand tremors, and no other specific symptoms. Denies suicidal or homicidal ideations. No prior h.o withdrawal seizures or need for ICU admission d/t ETOH withdrawal.      In ER, patient was resting comfortably in bed, no acute distress. She is very pleasant. No electrolyte imbalances. EKG shows Afib w/RVR. Has received Metoprolol pushes, 5 mg IV, x3. Resumed all her home medications. Patient also received Lorazepam 2 mg IV, Librium 25 mg PO and  rally bag IV. Patient placed on [facility] protocol for alcohol withdrawal management. Patient admitted to hospital medicine for management of care.           She required transfer to the IMCU due to escalating detox symptoms requiring an IV Precedex drip.    12/1: Ms. Hutchison was evaluated and examined in the IMCU. She is requiring titration of an IV Precedex drip due to severe alcohol detox. She is tolerating oral meds with prompting.   12/2: Ms. Hutchison was seen in the IMCU. She has required an IV precedex drip with scheduled Librium. A sitter is at bedside. She has been in afib. IV fluids running. She is a non-historian this morning.   12/3: Ms. Hutchison was evaluated in the IMCU. The Precedex drip has been turned off. She has been on IV fluids due to poor oral  "intake. The scheduled librium dose will be decreased from 50 mg qid to 25 mg TID. She has been in afib rate low 100's. [facility] protocol for IV ativan as needed.\"    12/4. I reviewed the chart along with vitals, labs, imaging, test (both pending and resulted) and recommendations from specialists and interdisciplinary team.  One to one sitter. She has poor insight but currently pleasant   COntinue CIWA protocol  Psychiatry following her and she has a one to one sitter. On legal hold that is extended.  HR stable. Continue Eliquis for Afib  12/5; Patient seen and examined, afebrile, no nausea or vomiting. HTN not well controlled with SBP in the 150 and DBP in the 100. Starting patient on amlodipine   Psychiatry following on legal hold appreciate rec.   12/6: Patient resting in bed, afebrile,no nausea or vomiting, BP better today  Psychiatry following appreciate rec. On legal hold  12/7: Patient seen and examined, afebrile, no acute events overnight   Still confused, I will check a UA and ammonia level   Psychiatry following on legal hold appreciate rec   12/8: Patient seen and examined AOX1,  Ammonia level  not elevated, started on thiamine,  Also discussed case with psychiatry d/cing CIWA protocol. Starting on Seroquel 12.5 BID PRN,also starting Remeron as pr psych rec.  UA still pending   12/9: Patient seen and examined, afebrile, still AOX1, UA positive for possible infection will start a 3 day course of IV ceftriaxone   On legal hold psychiatry is following appreciate rec.   12/10: Patient resting in bed AOX2, cont on IV abx for her UTI  On legal hol psychiatry following appreciate rec.   Patient seen and examined, finished abx for her UTI,  On legal hold psychiatry following appreciate rec.     Interval Problem Update    12/12/23  I evaluated and examined her at the bedside.  She requested that if she can drink some water and juice.  Patient got water and juice while I was with her.  I discussed plan of care with " psychiatric team regarding patient current medical condition and plan of care.  Discussed plan of care with case management regarding patient discharge plan.  I discussed plan of care with bedside RN.    I have discussed this patient's plan of care and discharge plan at IDT rounds today with Case Management, Nursing, Nursing leadership, and other members of the IDT team.    Consultants/Specialty  psychiatry    Code Status  Full Code    Disposition  The patient is medically cleared for discharge to home or a post-acute facility.  Anticipate discharge to: a psychiatric hospital    I have placed the appropriate orders for post-discharge needs.    Review of Systems  Review of Systems   Unable to perform ROS: Mental acuity        Physical Exam  Temp:  [36.2 °C (97.2 °F)-36.6 °C (97.8 °F)] 36.2 °C (97.2 °F)  Pulse:  [64-82] 64  Resp:  [16-18] 16  BP: (110-145)/() 145/103  SpO2:  [92 %-94 %] 93 %    Physical Exam  Vitals reviewed.   Constitutional:       General: She is not in acute distress.     Appearance: Normal appearance. She is ill-appearing.   HENT:      Head: Normocephalic and atraumatic.      Nose: No congestion.   Eyes:      General:         Right eye: No discharge.         Left eye: No discharge.      Pupils: Pupils are equal, round, and reactive to light.   Cardiovascular:      Rate and Rhythm: Normal rate and regular rhythm.      Pulses: Normal pulses.      Heart sounds: Normal heart sounds. No murmur heard.  Pulmonary:      Effort: Pulmonary effort is normal. No respiratory distress.      Breath sounds: Normal breath sounds. No stridor.   Abdominal:      General: Bowel sounds are normal. There is no distension.      Palpations: Abdomen is soft.      Tenderness: There is no abdominal tenderness.   Musculoskeletal:         General: No swelling or tenderness. Normal range of motion.      Cervical back: Normal range of motion. No rigidity.   Skin:     General: Skin is warm.      Capillary Refill: Capillary  "refill takes less than 2 seconds.      Coloration: Skin is not jaundiced or pale.      Findings: No bruising.   Neurological:      General: No focal deficit present.      Mental Status: She is alert.      Cranial Nerves: No cranial nerve deficit.      Comments: AAO x 2-3   Psychiatric:         Mood and Affect: Mood normal.         Behavior: Behavior normal.         Cognition and Memory: Cognition is impaired.         Fluids  No intake or output data in the 24 hours ending 12/12/23 1530      Laboratory  Recent Labs     12/10/23  0742 12/12/23  0913   WBC 5.8 6.8   RBC 4.83 4.92   HEMOGLOBIN 15.8 16.1*   HEMATOCRIT 46.9 47.2*   MCV 97.1 95.9   MCH 32.7 32.7   MCHC 33.7 34.1   RDW 51.3* 48.4   PLATELETCT 220 260   MPV 9.8 9.2       Recent Labs     12/10/23  0742 12/12/23  0913   SODIUM 141 142   POTASSIUM 3.6 4.1   CHLORIDE 104 105   CO2 25 24   GLUCOSE 80 87   BUN 12 10   CREATININE 0.56 0.48*   CALCIUM 9.3 9.0                     Imaging  DX-CHEST-LIMITED (1 VIEW)   Final Result         1.  Right basilar atelectasis, no focal infiltrate           Assessment/Plan  * Alcohol withdrawal, legal hold, Afib w/ RVR/anticoag (HCC)- (present on admission)  Assessment & Plan  Patient has h/o drinking 1 pint per day for past 2 years. In past week she has increased   She developed delirium tremens and was transferred to the [facility] for initiation and titration of an IV Precedex drip which was turned off 12/2.  Status post IV rally bag   Decrease the scheduled Librium from 50 mg PO q  6hrs to 25 mg TID in order to mitigate seizures   Thiamine 100 mg PO daily, folic acid 1 mg, Multivitamin PO daily.   Initiate CIWA protocol.\"    CIWA protocol, detox bag with vitamins  On a legal hold, Psychiatry following  History of Afib s/p AICD on anticoag. Rate stable. Continue ELiquis and digoxin. August echo EF 55%  Stable HR but can be elevated if withdrawing. Contine BB    No further signs of alcohol withdrawal.      Hypothyroidism- (present " on admission)  Assessment & Plan  Known history. Last TSH (05/2023): 4.120  Continue outpatient levothyroxine.      Major depressive disorder with current active episode- (present on admission)  Assessment & Plan  Known diagnosis in setting of unfortunate loss of her 2 daughters and  in the past 3 years. Patient denies active suicidal or homicidal ideations. No auditory hallucinations. Notes visual hallucinations that most likely related to EtOH withdrawal.   Outpatient treatment: Venlafaxine 225 mg daily.   QTc is 476. She has requested restarting home trazodone 100 mg nightly. Given the risk of serotonin syndrome, start trazodone 50 mg qHS.  Has not been able to establish with Psychiatry outpatient.    psychiatry consulted   Sitter at bedside  She may benefit from inpatient psych upon discharge.  I discussed plan of care with psychiatry.    Atrial fibrillation with RVR (HCC)- (present on admission)  Assessment & Plan  Chronic diagnosis.  She is on Digoxin 125 mcg daily, Toprol 100 mg daily, and Eliquis 5 mg BID outpatient all of which have been restarted  Last echocardiogram (01/2020): no structural or valvular abnormalities. She does not have any evidence of heart failure thus an echo is not indicated.  TSH (05/2023): 4.120  Continue anticoagulation with Eliquis.  Currently heart rates under control.    AICD (automatic cardioverter/defibrillator) present- (present on admission)  Assessment & Plan  -Implanted approx 3 years ago with cardiology  -Placed July 2020 d/t AICD implant for polymorphic VT, long QT, and atrial fibrillation.   On digoxin outpatient.            I discussed plan of care with psychiatry team regarding Ms. Hutchison current Gayathri condition and plan of care     I discussed plan of care during multidisciplinary rounds regarding patient's current medical condition and plan of care.      >36 minutes total time spent in records review, lab/radiology assessment, patient history and assessment,  and directing plan of care with high level medical decision making complexity. See orders.    VTE prophylaxis: Eliquis    I have performed a physical exam and reviewed and updated ROS and Plan today (12/12/2023). In review of yesterday's note (12/11/2023), there are no changes except as documented above.

## 2023-12-12 NOTE — PROGRESS NOTES
4 Eyes Skin Assessment Completed by SNEHA Warren and SNEHA Chatman.    Head WDL  Ears WDL  Nose WDL  Mouth WDL  Neck WDL  Breast/Chest Scar  Shoulder Blades WDL  Spine WDL  (R) Arm/Elbow/Hand WDL  (L) Arm/Elbow/Hand Redness and Blanching  Abdomen Scar  Groin WDL  Scrotum/Coccyx/Buttocks Redness and Blanching  (R) Leg WDL  (L) Leg Redness and Scab-upper thigh  (R) Heel/Foot/Toe Redness, Blanching, and Boggy  (L) Heel/Foot/Toe Redness, Blanching, and Boggy          Devices In Places None      Interventions In Place Heel Mepilex and Elbow Mepilex    Possible Skin Injury No    Pictures Uploaded Into Epic N/A  Wound Consult Placed N/A  RN Wound Prevention Protocol Ordered No

## 2023-12-13 VITALS
WEIGHT: 140.43 LBS | SYSTOLIC BLOOD PRESSURE: 138 MMHG | OXYGEN SATURATION: 93 % | HEIGHT: 66 IN | RESPIRATION RATE: 18 BRPM | HEART RATE: 77 BPM | TEMPERATURE: 97.8 F | DIASTOLIC BLOOD PRESSURE: 82 MMHG | BODY MASS INDEX: 22.57 KG/M2

## 2023-12-13 PROBLEM — I48.91 ATRIAL FIBRILLATION WITH RVR (HCC): Status: RESOLVED | Noted: 2023-05-23 | Resolved: 2023-12-13

## 2023-12-13 PROBLEM — F10.939 ALCOHOL WITHDRAWAL (HCC): Status: RESOLVED | Noted: 2020-01-27 | Resolved: 2023-12-13

## 2023-12-13 LAB
FLUAV RNA SPEC QL NAA+PROBE: NEGATIVE
FLUBV RNA SPEC QL NAA+PROBE: NEGATIVE
RSV RNA SPEC QL NAA+PROBE: NEGATIVE
SARS-COV-2 RNA RESP QL NAA+PROBE: NOTDETECTED
SPECIMEN SOURCE: NORMAL

## 2023-12-13 PROCEDURE — 99239 HOSP IP/OBS DSCHRG MGMT >30: CPT | Performed by: INTERNAL MEDICINE

## 2023-12-13 PROCEDURE — 97116 GAIT TRAINING THERAPY: CPT | Mod: CQ

## 2023-12-13 PROCEDURE — 700102 HCHG RX REV CODE 250 W/ 637 OVERRIDE(OP): Performed by: STUDENT IN AN ORGANIZED HEALTH CARE EDUCATION/TRAINING PROGRAM

## 2023-12-13 PROCEDURE — A9270 NON-COVERED ITEM OR SERVICE: HCPCS | Performed by: INTERNAL MEDICINE

## 2023-12-13 PROCEDURE — 700102 HCHG RX REV CODE 250 W/ 637 OVERRIDE(OP)

## 2023-12-13 PROCEDURE — 99231 SBSQ HOSP IP/OBS SF/LOW 25: CPT | Performed by: STUDENT IN AN ORGANIZED HEALTH CARE EDUCATION/TRAINING PROGRAM

## 2023-12-13 PROCEDURE — A9270 NON-COVERED ITEM OR SERVICE: HCPCS

## 2023-12-13 PROCEDURE — C9803 HOPD COVID-19 SPEC COLLECT: HCPCS | Performed by: INTERNAL MEDICINE

## 2023-12-13 PROCEDURE — A9270 NON-COVERED ITEM OR SERVICE: HCPCS | Performed by: STUDENT IN AN ORGANIZED HEALTH CARE EDUCATION/TRAINING PROGRAM

## 2023-12-13 PROCEDURE — 700102 HCHG RX REV CODE 250 W/ 637 OVERRIDE(OP): Performed by: INTERNAL MEDICINE

## 2023-12-13 PROCEDURE — 97530 THERAPEUTIC ACTIVITIES: CPT | Mod: CQ

## 2023-12-13 PROCEDURE — 0241U HCHG SARS-COV-2 COVID-19 NFCT DS RESP RNA 4 TRGT MIC: CPT

## 2023-12-13 RX ORDER — LANOLIN ALCOHOL/MO/W.PET/CERES
100 CREAM (GRAM) TOPICAL DAILY
Qty: 30 TABLET | Status: ON HOLD
Start: 2023-12-14 | End: 2024-01-23

## 2023-12-13 RX ORDER — VENLAFAXINE HYDROCHLORIDE 75 MG/1
225 CAPSULE, EXTENDED RELEASE ORAL DAILY
Qty: 30 CAPSULE | Refills: 3 | Status: ON HOLD
Start: 2023-12-14 | End: 2024-01-23

## 2023-12-13 RX ORDER — MIRTAZAPINE 15 MG/1
15 TABLET, FILM COATED ORAL
Qty: 30 TABLET | Status: ON HOLD
Start: 2023-12-13 | End: 2024-01-23

## 2023-12-13 RX ORDER — AMLODIPINE BESYLATE 5 MG/1
5 TABLET ORAL DAILY
Qty: 30 TABLET | Status: ON HOLD
Start: 2023-12-14 | End: 2024-01-23

## 2023-12-13 RX ADMIN — DIGOXIN 125 MCG: 0.25 TABLET ORAL at 05:35

## 2023-12-13 RX ADMIN — Medication 100 MG: at 05:35

## 2023-12-13 RX ADMIN — APIXABAN 5 MG: 5 TABLET, FILM COATED ORAL at 05:35

## 2023-12-13 RX ADMIN — METOPROLOL SUCCINATE 100 MG: 25 TABLET, FILM COATED, EXTENDED RELEASE ORAL at 05:35

## 2023-12-13 RX ADMIN — MIRTAZAPINE 15 MG: 15 TABLET, FILM COATED ORAL at 19:59

## 2023-12-13 RX ADMIN — LEVOTHYROXINE SODIUM 50 MCG: 50 TABLET ORAL at 05:35

## 2023-12-13 RX ADMIN — AMLODIPINE BESYLATE 5 MG: 5 TABLET ORAL at 05:35

## 2023-12-13 RX ADMIN — VENLAFAXINE HYDROCHLORIDE 225 MG: 75 CAPSULE, EXTENDED RELEASE ORAL at 05:35

## 2023-12-13 RX ADMIN — APIXABAN 5 MG: 5 TABLET, FILM COATED ORAL at 18:24

## 2023-12-13 ASSESSMENT — COGNITIVE AND FUNCTIONAL STATUS - GENERAL
MOBILITY SCORE: 21
STANDING UP FROM CHAIR USING ARMS: A LITTLE
SUGGESTED CMS G CODE MODIFIER MOBILITY: CJ
WALKING IN HOSPITAL ROOM: A LITTLE
CLIMB 3 TO 5 STEPS WITH RAILING: A LITTLE

## 2023-12-13 ASSESSMENT — PAIN DESCRIPTION - PAIN TYPE: TYPE: ACUTE PAIN

## 2023-12-13 ASSESSMENT — GAIT ASSESSMENTS
ASSISTIVE DEVICE: FRONT WHEEL WALKER
GAIT LEVEL OF ASSIST: SUPERVISED

## 2023-12-13 NOTE — DISCHARGE PLANNING
Alert Team Note:    Pt has been declined from [facility] and [facility] due to being on a six month commitment. Submitted updated referral to [facility] via [facility].

## 2023-12-13 NOTE — CARE PLAN
Problem: Knowledge Deficit - Standard  Goal: Patient and family/care givers will demonstrate understanding of plan of care, disease process/condition, diagnostic tests and medications  Outcome: Progressing     Problem: Lifestyle Changes  Goal: Patient's ability to identify lifestyle changes and available resources to help reduce recurrence of condition will improve  Outcome: Progressing     Problem: Psychosocial  Goal: Patient's level of anxiety will decrease  Outcome: Progressing  Goal: Spiritual and cultural needs incorporated into hospitalization  Outcome: Progressing     Problem: Pain - Standard  Goal: Alleviation of pain or a reduction in pain to the patient’s comfort goal  Outcome: Progressing     Problem: Seizure Precautions  Goal: Implementation of seizure precautions  Outcome: Progressing     Problem: Risk for Aspiration  Goal: Patient's risk for aspiration will be absent or decrease  Outcome: Progressing     Problem: Neuro Status  Goal: Neuro status will remain stable or improve  Outcome: Progressing     Problem: Depression  Goal: Patient and family/caregiver will verbalize accurate information about at least two of the possible causes of depression, three-four of the signs and symptoms of depression  Outcome: Progressing   The patient is Stable - Low risk of patient condition declining or worsening    Shift Goals  Clinical Goals: Safety  Patient Goals: Sleep  Family Goals: NA    Progress made toward(s) clinical / shift goals:  pt progressing toward goals    Patient is not progressing towards the following goals:

## 2023-12-13 NOTE — DISCHARGE PLANNING
[facility] ALERT TEAM DISCHARGE PLANNING NOTE    Date:  12/12/23  Patient Name:  Jeanie Hutchison - 78 y.o. - Discharge Planning  MRN:  [id number]   YOB: 1945  ADMISSION DATE:  11/29/2023     Writer forwarded referral packet for inpatient psychiatric care to the following community providers:  [facility], [facility], [facility], [facility]    Items included in the referral packet:   __x___Face Sheet   __x___Pages 1 and 2 of completed legal hold   __x___Alert Team/Psych Assessment   __x___H&P   __x___UDS   __x___Blood Alcohol   __x___Vital signs   __n/a___Pregnancy Test (if applicable)   __x___Medications List   _____Covid Screen

## 2023-12-13 NOTE — PROGRESS NOTES
Patient had a sitter through the day and did well overall with the exception of not having control of the TV remote.

## 2023-12-13 NOTE — CARE PLAN
The patient is Stable - Low risk of patient condition declining or worsening    Shift Goals  Clinical Goals: Safety  Patient Goals: Sleep  Family Goals: NA    Progress made toward(s) clinical / shift goals:    Problem: Pain - Standard  Goal: Alleviation of pain or a reduction in pain to the patient’s comfort goal  Outcome: Progressing     Problem: Seizure Precautions  Goal: Implementation of seizure precautions  Outcome: Progressing       Patient is not progressing towards the following goals:      Problem: Knowledge Deficit - Standard  Goal: Patient and family/care givers will demonstrate understanding of plan of care, disease process/condition, diagnostic tests and medications  Outcome: Not Progressing     Problem: Lifestyle Changes  Goal: Patient's ability to identify lifestyle changes and available resources to help reduce recurrence of condition will improve  Outcome: Not Progressing     Problem: Psychosocial  Goal: Patient's level of anxiety will decrease  Outcome: Not Progressing  Goal: Spiritual and cultural needs incorporated into hospitalization  Outcome: Not Progressing

## 2023-12-13 NOTE — DISCHARGE PLANNING
Alert Team Note:    Contacted by [place], spoke to Gabriella. Per Gabriella, her supervisor has staffed this pt with Dr. GARCIA, as they have had this pt before. Dr. GARCIA is willing to accept this pt PENDING a negative Covid test,  infection control form, and skin assessment note.     Requested Covid test and skin note from SNEHA Uribe, and infection control form from Alert Team SNEHA Machado.

## 2023-12-13 NOTE — CONSULTS
"PSYCHIATRIC FOLLOW-UP:(established)   Reason for admission: Afib, alcohol withdrawal  Reason for consult: depression,   Legal hold: extended (for failure to thrive due to MDD)          Chart reviewed.          *HPI:            The patient reports mood is still depressed. She feels like she is still not sure how she is going to manage outside of the hospital. She does state that Remeron hs been helpful for sleep. She slept all night without issues. She does not feel too groggy or oversedated from remeron. She reports appetite is \"ok, depends on what they bring.\". Denies N/V or abdominal pain.  She reports energy is better, was able to walk with PT twice around the unit.    Therapy performed with patient. Went over the pleasurable activities patient circled as coping skills for depression to avoid drinking alcohol. Discussed ways patient could implement these activities. Discussed ways she could reconnect with her son and her friends.     Reviewed nursing and staff notes. No behavioral problems noted. Pt did work with OT and they recommend post acute placement at discharge. Per PT notes on 12/11 patient is able to ambulate with a 4ww.      Medical ROS (as pertinent):     See HPI, dry eyes which are a chronic problems for patient, she refused refresh tears. No other physical complaints     *Psychiatric Examination:  Vitals:    12/13/23 0800   BP: (!) 143/86   Pulse: 63   Resp: 16   Temp: 36.6 °C (97.9 °F)   SpO2: 94%       General Appearance: 78-year-old female, appears stated age, no apparent distress, cooperative and pleasant  Abnormal Movements: Bradykinesia present, no tremors or psychomotor agitation  Gait and Posture: Gait not evaluated  Speech: Slow rate, soft tone but no latency.  No slurring or stuttering  Thought processes: Linear, organized, reality based  Associations: No loose associations  Abnormal or Psychotic Thoughts: No internal preoccupation noted, denies AVH, paranoia, delusions  Judgement and " "Insight: Fair/fair  Orientation: Oriented to person, place, time, situation  Recent and Remote Memory: Intact per interview  Attention Span and Concentration: Intact per interview  Language: Fluent in English  Fund of Knowledge: Not formally tested but appears appropriate for education and age  Mood and Affect:\"depressed\", dysphoric but does smile occasionally  SI/HI: denies SI or HI                            *EKG: QTc 460 on 12/4  *Imaging: None   EEG:  None     *Labs personally reviewed:   No new labs today     Allergies   Allergen Reactions    Sulfa Drugs Rash                Current medications:    Current Facility-Administered Medications:     mirtazapine (Remeron) tablet 15 mg, 15 mg, Oral, QHS, Bridgette Jaquez D.SAUNDRA, 15 mg at 12/12/23 2019    thiamine (Vitamin B-1) tablet 100 mg, 100 mg, Oral, DAILY, Penny Fowler M.D., 100 mg at 12/13/23 0535    amLODIPine (Norvasc) tablet 5 mg, 5 mg, Oral, Q DAY, Penny Fowler M.D., 5 mg at 12/13/23 0535    acetaminophen (Tylenol) tablet 650 mg, 650 mg, Oral, Q4HRS PRN, Devon Redd M.D., 650 mg at 12/10/23 2009    senna-docusate (Pericolace Or Senokot S) 8.6-50 MG per tablet 2 Tablet, 2 Tablet, Oral, BID, 2 Tablet at 12/12/23 1916 **AND** polyethylene glycol/lytes (Miralax) Packet 1 Packet, 1 Packet, Oral, QDAY PRN **AND** magnesium hydroxide (Milk Of Magnesia) suspension 30 mL, 30 mL, Oral, QDAY PRN **AND** bisacodyl (Dulcolax) suppository 10 mg, 10 mg, Rectal, QDAY PRN, Antoni Shankar M.D.    levothyroxine (Synthroid) tablet 50 mcg, 50 mcg, Oral, AM ES, Antoni Shankar M.D., 50 mcg at 12/13/23 0535    venlafaxine XR (Effexor XR) capsule 225 mg, 225 mg, Oral, DAILY, Antoni Shankar M.D., 225 mg at 12/13/23 0535    labetalol (Normodyne/Trandate) injection 10 mg, 10 mg, Intravenous, Q HOUR PRN, 10 mg at 12/07/23 0336 **OR** labetalol (Normodyne) tablet 200 mg, 200 mg, Oral, Q6HRS PRN, Malu Dorsey A.P.R.N.    Metoprolol Tartrate (Lopressor) " injection 5 mg, 5 mg, Intravenous, Q5 MIN PRN, Guru Plata M.D.    apixaban (Eliquis) tablet 5 mg, 5 mg, Oral, BID, Reilly Loving M.D., 5 mg at 12/13/23 0535    digoxin (Lanoxin) tablet 125 mcg, 125 mcg, Oral, DAILY, Reilly Loving M.D., 125 mcg at 12/13/23 0535    metoprolol SR (Toprol XL) tablet 100 mg, 100 mg, Oral, Q DAY, Reilly Loving M.D., 100 mg at 12/13/23 0535        Assessment:  Patient presents significantly dysphoric.  She endorses being motivated to participate in psychiatric care but has difficulty with participation likely secondary to her depressive state.  She is reporting adequate sleep and appetite, no medication side effects or agitation noted.  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         Sitter: 1:1   Phone: With sitter supervision   Visitors: Son only   Personal belongings: Phone and ipad

## 2023-12-13 NOTE — PROGRESS NOTES
Pt had an overall good day but did have an episode of being upset that she could not control her tv. She did not want staff to control the TV. I explained to Pt this cannot be changed but we are happy to help her with her stations.

## 2023-12-13 NOTE — DISCHARGE PLANNING
Alert Team Note:    Contacted [facility], spoke to Singh. Pt has been declined due to medical complexity because of tremors, AFIB, and having an IV.

## 2023-12-14 NOTE — DISCHARGE SUMMARY
Discharge Summary    CHIEF COMPLAINT ON ADMISSION  Chief Complaint   Patient presents with    Alcohol Intoxication     Pt states she has been drinking about a pint of alcohol a day in attempt to numb her grief after losing both of her daughters and     Depression       Reason for Admission  EMS    Admission Date  11/29/2023     CODE STATUS  Full Code    HPI & HOSPITAL COURSE    Ms. Jeanie Hutchison is a 78 y.o. female with PMHx of Atrial fibrillation, AICD, Hypothyroidism, Depression, ETOH use disorder who was admitted on 11/30 for alcohol intoxication and Afib with RVR.     Patient notes she has been drinking 1 pint per day of Vodka for past 2 years and in past week increased volume. She was suggested to pursue detoxification by his son which is why she is here in the hospital. Notes she is currently having visual hallucinations (cartoon-like images), feels anxious, mild hand tremors, and no other specific symptoms. Denies suicidal or homicidal ideations. No prior h.o withdrawal seizures or need for ICU admission d/t ETOH withdrawal.      In ER, patient was resting comfortably in bed, no acute distress. She is very pleasant. No electrolyte imbalances. EKG shows Afib w/RVR. Has received Metoprolol pushes, 5 mg IV, x3. Resumed all her home medications. Patient also received Lorazepam 2 mg IV, Librium 25 mg PO and  rally bag IV. Patient placed on CIWA protocol for alcohol withdrawal management. Patient admitted to hospital medicine for management of care.    Patient was placed on CIWA protocol with Ativan and she received detox back and vitamins.  Patient has been receiving digoxin and Eliquis for anticoagulation for her atrial fibrillation with rapid ventricular response.  She also has AICD in place.    Psychiatry evaluated her and patient was placed on medical hold.    Today I evaluated and examined her at the bedside.  She was eating her lunch and she denies any acute complaints.  Discussed plan of care  with her.  Patient is going to be discharged to [facility] with legal hold.             Therefore, she is discharged in fair and stable condition to a short-term general hosptial for inpatient care.    The patient met 2-midnight criteria for an inpatient stay at the time of discharge.    Discharge date    12/13/23      FOLLOW UP ITEMS POST DISCHARGE  Psychiatry  Primary care provider    DISCHARGE DIAGNOSES  Principal Problem:    Alcohol withdrawal, legal hold, Afib w/ RVR/anticoag (HCC) (POA: Yes)  Active Problems:    AICD (automatic cardioverter/defibrillator) present (POA: Yes)      Overview: July 2020: [employer] Primo MRI  [id number] implanted by Dr. Baires.    Atrial fibrillation with RVR (HCC) (POA: Yes)    Major depressive disorder with current active episode (POA: Yes)    Atrial fibrillation (HCC) (POA: Yes)    Hypothyroidism (POA: Yes)  Resolved Problems:    * No resolved hospital problems. *      FOLLOW UP  No future appointments.  No follow-up provider specified.    MEDICATIONS ON DISCHARGE     Medication List        ASK your doctor about these medications        Instructions   apixaban 5mg Tabs  Commonly known as: Eliquis   Take 1 Tablet by mouth 2 times a day.  Dose: 5 mg     digoxin 125 MCG Tabs  Commonly known as: Lanoxin   Take 1 Tablet by mouth every day.  Dose: 125 mcg     Effexor  MG extended-release capsule  Generic drug: venlafaxine  Ask about: Which instructions should I use?   Take 150 mg by mouth every day.  Dose: 150 mg     escitalopram 10 MG Tabs  Commonly known as: Lexapro  Ask about: Which instructions should I use?   Take 10 mg by mouth every day.  Dose: 10 mg     folic acid 1 MG Tabs  Commonly known as: Folvite   Take 1 mg by mouth every day.  Dose: 1 mg     levothyroxine 50 MCG Tabs  Commonly known as: Synthroid   Take 1 Tablet by mouth every morning on an empty stomach.  Dose: 50 mcg     Metoprolol Succinate 100 MG Cs24   Take 100 mg by mouth every day.  Dose: 100 mg      traZODone 100 MG Tabs  Commonly known as: Desyrel  Ask about: Which instructions should I use?   Take 100 mg by mouth every evening.  Dose: 100 mg              Allergies  Allergies   Allergen Reactions    Sulfa Drugs Rash             DIET  Orders Placed This Encounter   Procedures    Diet Order Diet: Cardiac; Second Modifier: (optional): 2 Gram Sodium; Tray Modifications (optional): No Sharps (Paperware), No Straws     Standing Status:   Standing     Number of Occurrences:   1     Order Specific Question:   Diet:     Answer:   Cardiac [6]     Order Specific Question:   Second Modifier: (optional)     Answer:   2 Gram Sodium [7]     Order Specific Question:   Tray Modifications (optional)     Answer:   No Sharps (Paperware)     Order Specific Question:   Tray Modifications (optional)     Answer:   No Straws       ACTIVITY  As tolerated.  Weight bearing as tolerated    LINES, DRAINS, AND WOUNDS  This is an automated list. Peripheral IVs will be removed prior to discharge.  Peripheral IV 12/03/23 20 G Anterior;Right Forearm (Active)   Site Assessment Clean;Dry;Intact 12/12/23 2000   Dressing Type Transparent 12/12/23 2000   Line Status Flushed;Scrubbed the hub prior to access;Saline locked 12/12/23 2000   Dressing Status Clean;Dry;Intact 12/12/23 2000   Dressing Intervention N/A 12/11/23 2000   Dressing Change Due 12/16/23 12/12/23 0900   Infiltration Grading ([facility], [facility]) 0 12/12/23 2000   Phlebitis Scale ([facility] Only) 0 12/12/23 2000          Peripheral IV 12/03/23 20 G Anterior;Right Forearm (Active)   Site Assessment Clean;Dry;Intact 12/12/23 2000   Dressing Type Transparent 12/12/23 2000   Line Status Flushed;Scrubbed the hub prior to access;Saline locked 12/12/23 2000   Dressing Status Clean;Dry;Intact 12/12/23 2000   Dressing Intervention N/A 12/11/23 2000   Dressing Change Due 12/16/23 12/12/23 0900   Infiltration Grading ([facility], [facility]) 0 12/12/23 2000   Phlebitis Scale ([facility] Only) 0 12/12/23 2000                MENTAL STATUS ON TRANSFER  Level of Consciousness: Alert          CONSULTATIONS  Psychiatry    PROCEDURES  None    LABORATORY  Lab Results   Component Value Date    SODIUM 142 12/12/2023    POTASSIUM 4.1 12/12/2023    CHLORIDE 105 12/12/2023    CO2 24 12/12/2023    GLUCOSE 87 12/12/2023    BUN 10 12/12/2023    CREATININE 0.48 (L) 12/12/2023        Lab Results   Component Value Date    WBC 6.8 12/12/2023    HEMOGLOBIN 16.1 (H) 12/12/2023    HEMATOCRIT 47.2 (H) 12/12/2023    PLATELETCT 260 12/12/2023      DX-CHEST-LIMITED (1 VIEW)   Final Result         1.  Right basilar atelectasis, no focal infiltrate            Total time of the discharge process exceeds 32 minutes.

## 2023-12-14 NOTE — DISCHARGE PLANNING
Case Management Discharge Planning    Admission Date: 11/29/2023  GMLOS: 3.4  ALOS: 13    6-Clicks ADL Score: 16  6-Clicks Mobility Score: 16  PT and/or OT Eval ordered: Yes  Post-acute Referrals Ordered: Yes  Post-acute Choice Obtained: Yes  Has referral(s) been sent to post-acute provider:  Yes      Anticipated Discharge Dispo: Discharge Disposition: D/T to psych hosp or distinct part unit (65)    DME Needed: No    Action(s) Taken: Per the Alert Team: Pt will DC tonight to [place]. LMSW to complete COBRA packet to send with patient that must have her Legal Hold information with it.     Addendum @ 1624: LMSW placed completed COBRA packet and Legal Hold paperwork with Charge RN for when the pt DC.    Escalations Completed: None    Medically Clear: Yes    Next Steps: LMSW to follow for DC.     Barriers to Discharge: Transportation    Is the patient up for discharge tomorrow: No

## 2023-12-14 NOTE — PROGRESS NOTES
Patient skin is intact. Blanchable erythema present on elbows, heels and sacrum. No open wounds noted.

## 2023-12-14 NOTE — THERAPY
Physical Therapy   Daily Treatment     Patient Name: Jeanie Hutchison  Age:  78 y.o., Sex:  female  Medical Record #: [id number]  Today's Date: 12/13/2023     Precautions  Precautions: (P) Fall Risk    Assessment    Pt willing to participate w/PT, fernanda CASTILLO. The pt is functioning @ sup->Mod indep w/bed mobility, functional transfers w/FWW, and amb around nrsg units w/FWW. The pt's gait slow but steady and manages FWW wo/cueing.   PT will see pt 1 more time for stairs.     Plan    Treatment Plan Status: (P) Continue Current Treatment Plan  Type of Treatment: Bed Mobility, Gait Training, Neuro Re-Education / Balance, Stair Training, Therapeutic Activities, Therapeutic Exercise  Treatment Frequency: 4 Times per Week  Treatment Duration: Until Therapy Goals Met    DC Equipment Recommendations: (P) Unable to determine at this time  Discharge Recommendations: (P) Recommend home health for continued physical therapy services     Objective       12/13/23 1649   Charge Group   PT Gait Training (Units) 1   PT Therapeutic Activities (Units) 1   Total Time Spent   PT Gait Training Time Spent (Mins) 15   PT Therapeutic Activities Time Spent (Mins) 8   Precautions   Precautions Fall Risk   Pain 0 - 10 Group   Pain Rating Scale (NPRS) 0   Balance   Sitting Balance (Static) Normal   Sitting Balance (Dynamic) Good   Standing Balance (Static) Good   Standing Balance (Dynamic) Fair +   Weight Shift Sitting Good   Weight Shift Standing Fair   Comments standing w/FWW   Bed Mobility    Supine to Sit Modified Independent   Sit to Supine Modified Independent   Scooting Supervised   Comments HOB flat   Gait Analysis   Gait Level Of Assist Supervised   Assistive Device Front Wheel Walker   Distance (Feet)   (hallway distances > 500')   Comments Pt has been amb w/nrsg during the day. The pt amb around nrsg units w/slow gait speed. Encouraged pt to cont to use the FWW until she gains she builids her activity tolerance.   Functional Mobility    Sit to Stand Supervised   Bed, Chair, Wheelchair Transfer Supervised   Comments w/FWW   How much difficulty does the patient currently have...   Turning over in bed (including adjusting bedclothes, sheets and blankets)? 4   Sitting down on and standing up from a chair with arms (e.g., wheelchair, bedside commode, etc.) 4   Moving from lying on back to sitting on the side of the bed? 4   How much help from another person does the patient currently need...   Moving to and from a bed to a chair (including a wheelchair)? 3   Need to walk in a hospital room? 3   Climbing 3-5 steps with a railing? 3   6 clicks Mobility Score 21   Short Term Goals    Short Term Goal # 1 Patient will perform supine-sit with HOB flat with supervision in 6 visits   Goal Outcome # 1 Goal met   Short Term Goal # 2 Patient will perform sit-stand and chair transfers with LRAD with supervision in 6 visits   Goal Outcome # 2 Goal met   Short Term Goal # 3 Patient will ambulate > 50 feet with LRAD with supervision in 6 visits   Goal Outcome # 3 Goal met   Short Term Goal # 4 Patient will negotiate 10 steps with rails with supervision in 6 visits   Goal Outcome # 4 Goal not met   Education Group   Role of Physical Therapist Patient Response Patient;Acceptance;Explanation;Action Demonstration   Physical Therapy Treatment Plan   Physical Therapy Treatment Plan Continue Current Treatment Plan   Anticipated Discharge Equipment and Recommendations   DC Equipment Recommendations Unable to determine at this time   Discharge Recommendations Recommend home health for continued physical therapy services   Interdisciplinary Plan of Care Collaboration   IDT Collaboration with  Nursing   Patient Position at End of Therapy Edge of Bed;Call Light within Reach;Tray Table within Reach;Phone within Reach  (sitter)   Collaboration Comments Nrsg notified   Session Information   Date / Session Number  12/13--5 (2/4, 12/14) PTA/1   Supervising Physical Therapist (PTA  Treatments Only)   Supervising Physical Therapist Cassidy Vazquez

## 2023-12-14 NOTE — DISCHARGE PLANNING
Legal Hold Transfer     Referral: Legal hold transfer to [facility]     Intervention: Informed by Cristel at [facility] that pt has been accepted.     Pt's accepting physcian is Dr. Gillespie     Transport arranged through [place] at [facility]     The pt will be picked up at 2200      Notified Bedside SNEHA Uribe and Dr. Nash Cruz of the departure time as well as accepting facility.      Transfer packet and COBRA created with original legal hold and placed on chart by RADHA Melo.      Plan: Pt will be transferring to [facility] via [facility] at 2200

## 2023-12-14 NOTE — CARE PLAN
The patient is Stable - Low risk of patient condition declining or worsening    Shift Goals  Clinical Goals: patient will remain safe this shift  Patient Goals: to go for a walk  Family Goals: DANICA    Progress made toward(s) clinical / shift goals:  Patient remain safe, free from falls or self injury. 1:1 sitter present with patient through the shift  Patient was able to go for two walks during the day      Problem: Self Care  Goal: Patient will have the ability to perform ADLs independently or with assistance (bathe, groom, dress, toilet and feed) this shift  12/13/2023 2054 by Jojo Garvey R.N.  Outcome: Progressing   with assistance patient is able to perform patient appears to have no motivation to do this for her self.        Patient is not progressing towards the following goals:    Problem: Depression  Goal: Patient and family/caregiver will verbalize accurate information about at least two of the possible causes of depression, three-four of the signs and symptoms of depression this shift  Outcome: Not Progressing  Not identified by this nurse

## 2023-12-14 NOTE — DISCHARGE PLANNING
Alert Team Note:    Faxed negative Covid test and RN skin assessment note to [facility]   Followed about infection control form with Alert Team SNEHA Machado.

## 2024-01-22 ENCOUNTER — HOSPITAL ENCOUNTER (INPATIENT)
Facility: MEDICAL CENTER | Age: 79
LOS: 8 days | DRG: 896 | End: 2024-01-30
Attending: EMERGENCY MEDICINE | Admitting: HOSPITALIST
Payer: MEDICARE

## 2024-01-22 ENCOUNTER — APPOINTMENT (OUTPATIENT)
Dept: CARDIOLOGY | Facility: MEDICAL CENTER | Age: 79
DRG: 896 | End: 2024-01-22
Attending: HOSPITALIST
Payer: MEDICARE

## 2024-01-22 DIAGNOSIS — R53.81 DEBILITY: ICD-10-CM

## 2024-01-22 DIAGNOSIS — I48.91 ATRIAL FIBRILLATION, UNSPECIFIED TYPE (HCC): ICD-10-CM

## 2024-01-22 DIAGNOSIS — E03.9 ACQUIRED HYPOTHYROIDISM: ICD-10-CM

## 2024-01-22 DIAGNOSIS — F43.81 COMPLEX GRIEF DISORDER LASTING LONGER THAN 12 MONTHS: ICD-10-CM

## 2024-01-22 DIAGNOSIS — F10.939 ALCOHOL WITHDRAWAL SYNDROME WITH COMPLICATION (HCC): ICD-10-CM

## 2024-01-22 DIAGNOSIS — I48.91 ATRIAL FIBRILLATION WITH RVR (HCC): ICD-10-CM

## 2024-01-22 DIAGNOSIS — F10.921 ALCOHOL INTOXICATION WITH DELIRIUM (HCC): ICD-10-CM

## 2024-01-22 DIAGNOSIS — F10.10 ETOH ABUSE: ICD-10-CM

## 2024-01-22 DIAGNOSIS — F32.9 MAJOR DEPRESSIVE DISORDER WITH CURRENT ACTIVE EPISODE, UNSPECIFIED DEPRESSION EPISODE SEVERITY, UNSPECIFIED WHETHER RECURRENT: ICD-10-CM

## 2024-01-22 DIAGNOSIS — I48.91 ATRIAL FIBRILLATION WITH RAPID VENTRICULAR RESPONSE (HCC): ICD-10-CM

## 2024-01-22 PROBLEM — D53.9 MACROCYTIC ANEMIA: Status: RESOLVED | Noted: 2020-02-06 | Resolved: 2024-01-22

## 2024-01-22 LAB
ALBUMIN SERPL BCP-MCNC: 3.8 G/DL (ref 3.2–4.9)
ALBUMIN/GLOB SERPL: 1.4 G/DL
ALP SERPL-CCNC: 92 U/L (ref 30–99)
ALT SERPL-CCNC: 17 U/L (ref 2–50)
ANION GAP SERPL CALC-SCNC: 20 MMOL/L (ref 7–16)
AST SERPL-CCNC: 33 U/L (ref 12–45)
BASOPHILS # BLD AUTO: 1.1 % (ref 0–1.8)
BASOPHILS # BLD: 0.06 K/UL (ref 0–0.12)
BILIRUB SERPL-MCNC: 1.9 MG/DL (ref 0.1–1.5)
BUN SERPL-MCNC: 11 MG/DL (ref 8–22)
CALCIUM ALBUM COR SERPL-MCNC: 9 MG/DL (ref 8.5–10.5)
CALCIUM SERPL-MCNC: 8.8 MG/DL (ref 8.5–10.5)
CHLORIDE SERPL-SCNC: 104 MMOL/L (ref 96–112)
CO2 SERPL-SCNC: 20 MMOL/L (ref 20–33)
COMMENT 1642: NORMAL
CREAT SERPL-MCNC: 0.57 MG/DL (ref 0.5–1.4)
DIGOXIN SERPL-MCNC: <0.3 NG/ML (ref 0.8–2)
EKG IMPRESSION: NORMAL
EOSINOPHIL # BLD AUTO: 0.06 K/UL (ref 0–0.51)
EOSINOPHIL NFR BLD: 1.1 % (ref 0–6.9)
ERYTHROCYTE [DISTWIDTH] IN BLOOD BY AUTOMATED COUNT: 53.4 FL (ref 35.9–50)
ETHANOL BLD-MCNC: 266.6 MG/DL
GFR SERPLBLD CREATININE-BSD FMLA CKD-EPI: 94 ML/MIN/1.73 M 2
GLOBULIN SER CALC-MCNC: 2.8 G/DL (ref 1.9–3.5)
GLUCOSE SERPL-MCNC: 73 MG/DL (ref 65–99)
HCT VFR BLD AUTO: 43.6 % (ref 37–47)
HGB BLD-MCNC: 15.1 G/DL (ref 12–16)
IMM GRANULOCYTES # BLD AUTO: 0.02 K/UL (ref 0–0.11)
IMM GRANULOCYTES NFR BLD AUTO: 0.4 % (ref 0–0.9)
LIPASE SERPL-CCNC: 33 U/L (ref 11–82)
LYMPHOCYTES # BLD AUTO: 2.52 K/UL (ref 1–4.8)
LYMPHOCYTES NFR BLD: 44.5 % (ref 22–41)
MCH RBC QN AUTO: 33 PG (ref 27–33)
MCHC RBC AUTO-ENTMCNC: 34.6 G/DL (ref 32.2–35.5)
MCV RBC AUTO: 95.4 FL (ref 81.4–97.8)
MONOCYTES # BLD AUTO: 0.62 K/UL (ref 0–0.85)
MONOCYTES NFR BLD AUTO: 11 % (ref 0–13.4)
MORPHOLOGY BLD-IMP: NORMAL
NEUTROPHILS # BLD AUTO: 2.38 K/UL (ref 1.82–7.42)
NEUTROPHILS NFR BLD: 41.9 % (ref 44–72)
NRBC # BLD AUTO: 0 K/UL
NRBC BLD-RTO: 0 /100 WBC (ref 0–0.2)
PLATELET # BLD AUTO: 73 K/UL (ref 164–446)
PLATELET BLD QL SMEAR: NORMAL
PLATELETS.RETICULATED NFR BLD AUTO: 3.9 % (ref 0.6–13.1)
PMV BLD AUTO: 9.5 FL (ref 9–12.9)
POTASSIUM SERPL-SCNC: 4.3 MMOL/L (ref 3.6–5.5)
PROT SERPL-MCNC: 6.6 G/DL (ref 6–8.2)
RBC # BLD AUTO: 4.57 M/UL (ref 4.2–5.4)
RBC BLD AUTO: NORMAL
SODIUM SERPL-SCNC: 144 MMOL/L (ref 135–145)
T4 FREE SERPL-MCNC: 1.25 NG/DL (ref 0.93–1.7)
TSH SERPL DL<=0.005 MIU/L-ACNC: 6.32 UIU/ML (ref 0.38–5.33)
WBC # BLD AUTO: 5.7 K/UL (ref 4.8–10.8)

## 2024-01-22 PROCEDURE — A9270 NON-COVERED ITEM OR SERVICE: HCPCS | Performed by: EMERGENCY MEDICINE

## 2024-01-22 PROCEDURE — 96366 THER/PROPH/DIAG IV INF ADDON: CPT

## 2024-01-22 PROCEDURE — 96375 TX/PRO/DX INJ NEW DRUG ADDON: CPT

## 2024-01-22 PROCEDURE — 80162 ASSAY OF DIGOXIN TOTAL: CPT

## 2024-01-22 PROCEDURE — HZ2ZZZZ DETOXIFICATION SERVICES FOR SUBSTANCE ABUSE TREATMENT: ICD-10-PCS | Performed by: HOSPITALIST

## 2024-01-22 PROCEDURE — 700111 HCHG RX REV CODE 636 W/ 250 OVERRIDE (IP): Performed by: HOSPITALIST

## 2024-01-22 PROCEDURE — 93005 ELECTROCARDIOGRAM TRACING: CPT | Performed by: EMERGENCY MEDICINE

## 2024-01-22 PROCEDURE — 84443 ASSAY THYROID STIM HORMONE: CPT

## 2024-01-22 PROCEDURE — 93005 ELECTROCARDIOGRAM TRACING: CPT | Performed by: HOSPITALIST

## 2024-01-22 PROCEDURE — 700101 HCHG RX REV CODE 250: Performed by: HOSPITALIST

## 2024-01-22 PROCEDURE — 700102 HCHG RX REV CODE 250 W/ 637 OVERRIDE(OP): Performed by: EMERGENCY MEDICINE

## 2024-01-22 PROCEDURE — 700105 HCHG RX REV CODE 258: Performed by: HOSPITALIST

## 2024-01-22 PROCEDURE — 85055 RETICULATED PLATELET ASSAY: CPT

## 2024-01-22 PROCEDURE — 700111 HCHG RX REV CODE 636 W/ 250 OVERRIDE (IP): Performed by: EMERGENCY MEDICINE

## 2024-01-22 PROCEDURE — 99223 1ST HOSP IP/OBS HIGH 75: CPT | Mod: AI | Performed by: HOSPITALIST

## 2024-01-22 PROCEDURE — 99285 EMERGENCY DEPT VISIT HI MDM: CPT

## 2024-01-22 PROCEDURE — 770020 HCHG ROOM/CARE - TELE (206)

## 2024-01-22 PROCEDURE — 85025 COMPLETE CBC W/AUTO DIFF WBC: CPT

## 2024-01-22 PROCEDURE — 36415 COLL VENOUS BLD VENIPUNCTURE: CPT

## 2024-01-22 PROCEDURE — 82077 ASSAY SPEC XCP UR&BREATH IA: CPT

## 2024-01-22 PROCEDURE — 83690 ASSAY OF LIPASE: CPT

## 2024-01-22 PROCEDURE — 84439 ASSAY OF FREE THYROXINE: CPT

## 2024-01-22 PROCEDURE — 96365 THER/PROPH/DIAG IV INF INIT: CPT

## 2024-01-22 PROCEDURE — 700102 HCHG RX REV CODE 250 W/ 637 OVERRIDE(OP): Performed by: HOSPITALIST

## 2024-01-22 PROCEDURE — A9270 NON-COVERED ITEM OR SERVICE: HCPCS | Performed by: HOSPITALIST

## 2024-01-22 PROCEDURE — 80053 COMPREHEN METABOLIC PANEL: CPT

## 2024-01-22 PROCEDURE — 700105 HCHG RX REV CODE 258: Performed by: EMERGENCY MEDICINE

## 2024-01-22 RX ORDER — MIRTAZAPINE 15 MG/1
15 TABLET, FILM COATED ORAL
Status: DISCONTINUED | OUTPATIENT
Start: 2024-01-22 | End: 2024-01-30 | Stop reason: HOSPADM

## 2024-01-22 RX ORDER — LORAZEPAM 2 MG/ML
1 INJECTION INTRAMUSCULAR ONCE
Status: COMPLETED | OUTPATIENT
Start: 2024-01-22 | End: 2024-01-22

## 2024-01-22 RX ORDER — SODIUM CHLORIDE 9 MG/ML
1000 INJECTION, SOLUTION INTRAVENOUS ONCE
Status: COMPLETED | OUTPATIENT
Start: 2024-01-22 | End: 2024-01-22

## 2024-01-22 RX ORDER — ESCITALOPRAM OXALATE 10 MG/1
10 TABLET ORAL DAILY
Status: DISCONTINUED | OUTPATIENT
Start: 2024-01-23 | End: 2024-01-23

## 2024-01-22 RX ORDER — ONDANSETRON 2 MG/ML
4 INJECTION INTRAMUSCULAR; INTRAVENOUS EVERY 4 HOURS PRN
Status: DISCONTINUED | OUTPATIENT
Start: 2024-01-22 | End: 2024-01-30 | Stop reason: HOSPADM

## 2024-01-22 RX ORDER — LEVOTHYROXINE SODIUM 0.05 MG/1
50 TABLET ORAL
Status: DISCONTINUED | OUTPATIENT
Start: 2024-01-23 | End: 2024-01-30 | Stop reason: HOSPADM

## 2024-01-22 RX ORDER — ONDANSETRON 4 MG/1
4 TABLET, ORALLY DISINTEGRATING ORAL EVERY 4 HOURS PRN
Status: DISCONTINUED | OUTPATIENT
Start: 2024-01-22 | End: 2024-01-30 | Stop reason: HOSPADM

## 2024-01-22 RX ORDER — VENLAFAXINE HYDROCHLORIDE 75 MG/1
225 CAPSULE, EXTENDED RELEASE ORAL DAILY
Status: DISCONTINUED | OUTPATIENT
Start: 2024-01-23 | End: 2024-01-22

## 2024-01-22 RX ORDER — METOPROLOL SUCCINATE 25 MG/1
100 TABLET, EXTENDED RELEASE ORAL
Status: DISCONTINUED | OUTPATIENT
Start: 2024-01-23 | End: 2024-01-23

## 2024-01-22 RX ORDER — BISACODYL 10 MG
10 SUPPOSITORY, RECTAL RECTAL
Status: DISCONTINUED | OUTPATIENT
Start: 2024-01-22 | End: 2024-01-30 | Stop reason: HOSPADM

## 2024-01-22 RX ORDER — LORAZEPAM 2 MG/ML
0.5 INJECTION INTRAMUSCULAR EVERY 4 HOURS PRN
Status: DISCONTINUED | OUTPATIENT
Start: 2024-01-22 | End: 2024-01-26

## 2024-01-22 RX ORDER — FOLIC ACID 1 MG/1
1 TABLET ORAL DAILY
Status: COMPLETED | OUTPATIENT
Start: 2024-01-23 | End: 2024-01-26

## 2024-01-22 RX ORDER — LANOLIN ALCOHOL/MO/W.PET/CERES
400 CREAM (GRAM) TOPICAL 2 TIMES DAILY
Status: DISCONTINUED | OUTPATIENT
Start: 2024-01-23 | End: 2024-01-30 | Stop reason: HOSPADM

## 2024-01-22 RX ORDER — LORAZEPAM 2 MG/ML
1 INJECTION INTRAMUSCULAR
Status: DISCONTINUED | OUTPATIENT
Start: 2024-01-22 | End: 2024-01-26

## 2024-01-22 RX ORDER — CHLORDIAZEPOXIDE HYDROCHLORIDE 25 MG/1
25 CAPSULE, GELATIN COATED ORAL EVERY 6 HOURS
Status: DISCONTINUED | OUTPATIENT
Start: 2024-01-23 | End: 2024-01-25

## 2024-01-22 RX ORDER — LORAZEPAM 1 MG/1
0.5 TABLET ORAL EVERY 4 HOURS PRN
Status: DISCONTINUED | OUTPATIENT
Start: 2024-01-22 | End: 2024-01-26

## 2024-01-22 RX ORDER — LORAZEPAM 1 MG/1
1 TABLET ORAL EVERY 4 HOURS PRN
Status: DISCONTINUED | OUTPATIENT
Start: 2024-01-22 | End: 2024-01-26

## 2024-01-22 RX ORDER — LORAZEPAM 2 MG/ML
1.5 INJECTION INTRAMUSCULAR
Status: DISCONTINUED | OUTPATIENT
Start: 2024-01-22 | End: 2024-01-26

## 2024-01-22 RX ORDER — OXYCODONE HYDROCHLORIDE 5 MG/1
5 TABLET ORAL
Status: DISCONTINUED | OUTPATIENT
Start: 2024-01-22 | End: 2024-01-26

## 2024-01-22 RX ORDER — ACETAMINOPHEN 325 MG/1
650 TABLET ORAL EVERY 6 HOURS PRN
Status: DISCONTINUED | OUTPATIENT
Start: 2024-01-22 | End: 2024-01-30 | Stop reason: HOSPADM

## 2024-01-22 RX ORDER — GAUZE BANDAGE 2" X 2"
100 BANDAGE TOPICAL DAILY
Status: COMPLETED | OUTPATIENT
Start: 2024-01-23 | End: 2024-01-26

## 2024-01-22 RX ORDER — HYDRALAZINE HYDROCHLORIDE 20 MG/ML
10 INJECTION INTRAMUSCULAR; INTRAVENOUS EVERY 4 HOURS PRN
Status: DISCONTINUED | OUTPATIENT
Start: 2024-01-22 | End: 2024-01-26

## 2024-01-22 RX ORDER — OXYCODONE HYDROCHLORIDE 5 MG/1
2.5 TABLET ORAL
Status: DISCONTINUED | OUTPATIENT
Start: 2024-01-22 | End: 2024-01-26

## 2024-01-22 RX ORDER — POLYETHYLENE GLYCOL 3350 17 G/17G
1 POWDER, FOR SOLUTION ORAL
Status: DISCONTINUED | OUTPATIENT
Start: 2024-01-22 | End: 2024-01-30 | Stop reason: HOSPADM

## 2024-01-22 RX ORDER — VENLAFAXINE HYDROCHLORIDE 75 MG/1
150 CAPSULE, EXTENDED RELEASE ORAL
Status: DISCONTINUED | OUTPATIENT
Start: 2024-01-23 | End: 2024-01-26

## 2024-01-22 RX ORDER — AMOXICILLIN 250 MG
2 CAPSULE ORAL 2 TIMES DAILY
Status: DISCONTINUED | OUTPATIENT
Start: 2024-01-23 | End: 2024-01-30 | Stop reason: HOSPADM

## 2024-01-22 RX ORDER — DIGOXIN 125 MCG
125 TABLET ORAL DAILY
Status: DISCONTINUED | OUTPATIENT
Start: 2024-01-23 | End: 2024-01-24

## 2024-01-22 RX ORDER — GAUZE BANDAGE 2" X 2"
100 BANDAGE TOPICAL ONCE
Status: COMPLETED | OUTPATIENT
Start: 2024-01-22 | End: 2024-01-22

## 2024-01-22 RX ORDER — SODIUM CHLORIDE 9 MG/ML
INJECTION, SOLUTION INTRAVENOUS CONTINUOUS
Status: DISCONTINUED | OUTPATIENT
Start: 2024-01-22 | End: 2024-01-23

## 2024-01-22 RX ORDER — FOLIC ACID 1 MG/1
1 TABLET ORAL DAILY
Status: DISCONTINUED | OUTPATIENT
Start: 2024-01-22 | End: 2024-01-22

## 2024-01-22 RX ORDER — METOPROLOL TARTRATE 1 MG/ML
5 INJECTION, SOLUTION INTRAVENOUS
Status: COMPLETED | OUTPATIENT
Start: 2024-01-22 | End: 2024-01-23

## 2024-01-22 RX ORDER — AMLODIPINE BESYLATE 5 MG/1
5 TABLET ORAL DAILY
Status: DISCONTINUED | OUTPATIENT
Start: 2024-01-23 | End: 2024-01-30 | Stop reason: HOSPADM

## 2024-01-22 RX ORDER — LORAZEPAM 2 MG/ML
2 INJECTION INTRAMUSCULAR
Status: DISCONTINUED | OUTPATIENT
Start: 2024-01-22 | End: 2024-01-26

## 2024-01-22 RX ORDER — MORPHINE SULFATE 4 MG/ML
2 INJECTION INTRAVENOUS
Status: DISCONTINUED | OUTPATIENT
Start: 2024-01-22 | End: 2024-01-26

## 2024-01-22 RX ORDER — LORAZEPAM 2 MG/1
2 TABLET ORAL
Status: DISCONTINUED | OUTPATIENT
Start: 2024-01-22 | End: 2024-01-26

## 2024-01-22 RX ORDER — LORAZEPAM 2 MG/1
4 TABLET ORAL
Status: DISCONTINUED | OUTPATIENT
Start: 2024-01-22 | End: 2024-01-26

## 2024-01-22 RX ORDER — MAGNESIUM SULFATE HEPTAHYDRATE 40 MG/ML
2 INJECTION, SOLUTION INTRAVENOUS ONCE
Status: COMPLETED | OUTPATIENT
Start: 2024-01-22 | End: 2024-01-22

## 2024-01-22 RX ORDER — CHLORDIAZEPOXIDE HYDROCHLORIDE 25 MG/1
50 CAPSULE, GELATIN COATED ORAL EVERY 6 HOURS
Status: COMPLETED | OUTPATIENT
Start: 2024-01-22 | End: 2024-01-23

## 2024-01-22 RX ADMIN — SODIUM CHLORIDE: 9 INJECTION, SOLUTION INTRAVENOUS at 23:02

## 2024-01-22 RX ADMIN — Medication 100 MG: at 14:40

## 2024-01-22 RX ADMIN — FOLIC ACID 1 MG: 1 TABLET ORAL at 14:42

## 2024-01-22 RX ADMIN — APIXABAN 5 MG: 5 TABLET, FILM COATED ORAL at 20:19

## 2024-01-22 RX ADMIN — CHLORDIAZEPOXIDE HYDROCHLORIDE 50 MG: 25 CAPSULE ORAL at 23:13

## 2024-01-22 RX ADMIN — CHLORDIAZEPOXIDE HYDROCHLORIDE 50 MG: 25 CAPSULE ORAL at 18:25

## 2024-01-22 RX ADMIN — LORAZEPAM 1 MG: 2 INJECTION INTRAMUSCULAR; INTRAVENOUS at 14:37

## 2024-01-22 RX ADMIN — METOPROLOL TARTRATE 25 MG: 25 TABLET, FILM COATED ORAL at 16:28

## 2024-01-22 RX ADMIN — MAGNESIUM SULFATE HEPTAHYDRATE 2 G: 2 INJECTION, SOLUTION INTRAVENOUS at 19:05

## 2024-01-22 RX ADMIN — THIAMINE HYDROCHLORIDE: 100 INJECTION, SOLUTION INTRAMUSCULAR; INTRAVENOUS at 18:29

## 2024-01-22 RX ADMIN — THERA TABS 1 TABLET: TAB at 14:41

## 2024-01-22 RX ADMIN — LORAZEPAM 2 MG: 2 TABLET ORAL at 18:24

## 2024-01-22 RX ADMIN — SODIUM CHLORIDE 1000 ML: 9 INJECTION, SOLUTION INTRAVENOUS at 14:39

## 2024-01-22 RX ADMIN — MIRTAZAPINE 15 MG: 15 TABLET, FILM COATED ORAL at 20:19

## 2024-01-22 ASSESSMENT — ENCOUNTER SYMPTOMS
ORTHOPNEA: 0
SINUS PAIN: 0
FOCAL WEAKNESS: 0
DOUBLE VISION: 0
BRUISES/BLEEDS EASILY: 0
EYE REDNESS: 0
PALPITATIONS: 1
BLOOD IN STOOL: 0
SHORTNESS OF BREATH: 0
POLYDIPSIA: 0
MYALGIAS: 1
SENSORY CHANGE: 0
HEARTBURN: 1
INSOMNIA: 1
BACK PAIN: 1
FLANK PAIN: 0
ABDOMINAL PAIN: 1
PHOTOPHOBIA: 0
EYE DISCHARGE: 0
DIARRHEA: 1
HEADACHES: 1
TINGLING: 0
RESPIRATORY NEGATIVE: 1
VOMITING: 1
SEIZURES: 0
EYES NEGATIVE: 1
CONSTIPATION: 1
CHILLS: 0
FALLS: 0
DIAPHORESIS: 0
WHEEZING: 0
STRIDOR: 0
SPUTUM PRODUCTION: 0
FEVER: 0
NERVOUS/ANXIOUS: 1
COUGH: 0
DIZZINESS: 0
NAUSEA: 1
DEPRESSION: 1
PND: 0
CONSTITUTIONAL NEGATIVE: 1

## 2024-01-22 ASSESSMENT — FIBROSIS 4 INDEX
FIB4 SCORE: 2.51
FIB4 SCORE: 8.33

## 2024-01-22 ASSESSMENT — LIFESTYLE VARIABLES
TOTAL SCORE: 6
ANXIETY: MILDLY ANXIOUS
AGITATION: NORMAL ACTIVITY
AUDITORY DISTURBANCES: NOT PRESENT
ANXIETY: MODERATELY ANXIOUS OR GUARDED, SO ANXIETY IS INFERRED
ALCOHOL_USE: YES
HAVE PEOPLE ANNOYED YOU BY CRITICIZING YOUR DRINKING: NO
CONSUMPTION TOTAL: POSITIVE
HAVE YOU EVER FELT YOU SHOULD CUT DOWN ON YOUR DRINKING: YES
AGITATION: NORMAL ACTIVITY
HEADACHE, FULLNESS IN HEAD: NOT PRESENT
ON A TYPICAL DAY WHEN YOU DRINK ALCOHOL HOW MANY DRINKS DO YOU HAVE: 7
AGITATION: NORMAL ACTIVITY
PAROXYSMAL SWEATS: BARELY PERCEPTIBLE SWEATING. PALMS MOIST
SUBSTANCE_ABUSE: 1
TOTAL SCORE: 3
TREMOR: MODERATE TREMOR WITH ARMS EXTENDED
VISUAL DISTURBANCES: NOT PRESENT
AUDITORY DISTURBANCES: VERY MILD HARSHNESS OR ABILITY TO FRIGHTEN
TOTAL SCORE: 3
PAROXYSMAL SWEATS: NO SWEAT VISIBLE
AUDITORY DISTURBANCES: NOT PRESENT
TACTILE DISTURBANCES: VERY MILD ITCHING, PINS AND NEEDLES SENSATION, BURNING OR NUMBNESS
ORIENTATION AND CLOUDING OF SENSORIUM: ORIENTED AND CAN DO SERIAL ADDITIONS
EVER HAD A DRINK FIRST THING IN THE MORNING TO STEADY YOUR NERVES TO GET RID OF A HANGOVER: YES
ORIENTATION AND CLOUDING OF SENSORIUM: ORIENTED AND CAN DO SERIAL ADDITIONS
EVER FELT BAD OR GUILTY ABOUT YOUR DRINKING: YES
NAUSEA AND VOMITING: MILD NAUSEA WITH NO VOMITING
DOES PATIENT WANT TO STOP DRINKING: YES
TOTAL SCORE: 12
NAUSEA AND VOMITING: MILD NAUSEA WITH NO VOMITING
TREMOR: *
TOTAL SCORE: 6
PAROXYSMAL SWEATS: NO SWEAT VISIBLE
TOTAL SCORE: 3
AVERAGE NUMBER OF DAYS PER WEEK YOU HAVE A DRINK CONTAINING ALCOHOL: 7
HEADACHE, FULLNESS IN HEAD: NOT PRESENT
TOTAL SCORE: MILD ITCHING, PINS AND NEEDLES SENSATION, BURNING OR NUMBNESS
VISUAL DISTURBANCES: MILD SENSITIVITY
HOW MANY TIMES IN THE PAST YEAR HAVE YOU HAD 5 OR MORE DRINKS IN A DAY: 365
TREMOR: *
ANXIETY: NO ANXIETY (AT EASE)
DOES PATIENT WANT TO TALK TO SOMEONE ABOUT QUITTING: YES
NAUSEA AND VOMITING: NO NAUSEA AND NO VOMITING
TACTILE DISTURBANCES: VERY MILD ITCHING, PINS AND NEEDLES SENSATION, BURNING OR NUMBNESS
HEADACHE, FULLNESS IN HEAD: NOT PRESENT
ORIENTATION AND CLOUDING OF SENSORIUM: ORIENTED AND CAN DO SERIAL ADDITIONS
VISUAL DISTURBANCES: VERY MILD SENSITIVITY

## 2024-01-22 ASSESSMENT — CHA2DS2 SCORE
AGE 65 TO 74: NO
CHA2DS2 VASC SCORE: 4
HYPERTENSION: YES
PRIOR STROKE OR TIA OR THROMBOEMBOLISM: NO
CHF OR LEFT VENTRICULAR DYSFUNCTION: NO
DIABETES: NO
VASCULAR DISEASE: NO
SEX: FEMALE
AGE 75 OR GREATER: YES

## 2024-01-22 ASSESSMENT — PATIENT HEALTH QUESTIONNAIRE - PHQ9
SUM OF ALL RESPONSES TO PHQ9 QUESTIONS 1 AND 2: 0
1. LITTLE INTEREST OR PLEASURE IN DOING THINGS: NOT AT ALL
2. FEELING DOWN, DEPRESSED, IRRITABLE, OR HOPELESS: NOT AT ALL

## 2024-01-22 ASSESSMENT — PAIN DESCRIPTION - PAIN TYPE: TYPE: ACUTE PAIN

## 2024-01-22 ASSESSMENT — VISUAL ACUITY: OU: 1

## 2024-01-22 NOTE — ED TRIAGE NOTES
"Chief Complaint   Patient presents with    Depression     Pts son called EMS because pt has been increasingly depressed over the past few years. Pt recently lost 2 daughters and her . Denies SI/HI but reports that she recently stopped taking all of her medications because she is \"done caring for herself\"     Pt BIB EMS for above. Pt AOx4, GCS15.   Hx of afib. Pt has stopped taking her metoprolol and blood thinner.   /80   Pulse (!) 121   Temp 36.2 °C (97.2 °F) (Temporal)   Resp 20   Ht 1.676 m (5' 6\")   Wt 63.5 kg (140 lb)   SpO2 96%   BMI 22.60 kg/m²     "

## 2024-01-22 NOTE — ED PROVIDER NOTES
"ER Provider Note    Scribed for Dale García M.D. by Duy Matta. 1/22/2024   2:07 PM    Primary Care Provider: GRACIELA Benitez.    CHIEF COMPLAINT  Chief Complaint   Patient presents with    Depression     Pts son called EMS because pt has been increasingly depressed over the past few years. Pt recently lost 2 daughters and her . Denies SI/HI but reports that she recently stopped taking all of her medications because she is \"done caring for herself\"     EXTERNAL RECORDS REVIEWED  Outpatient Notes Patient has history of depression, chronic pain, alcoholism, anxiety, atrial fibrillation and on Eliquis. She was last seen on November 29, 2023, for alcohol intoxication and depression. She stayed in hospital for 2 weeks and then was transferred to [place] on legal hold.     HPI/ROS  LIMITATION TO HISTORY   Select: Poor Historian, patient cannot recall all recent events    OUTSIDE HISTORIAN(S):  None    Jeanie Hutchison is a 78 y.o. female with a history of depression and alcoholism who presents to the ED via EMS for evaluation of depression onset a few days ago. The patient states her son called the ambulance today because she was in \"bad shape\". While in the ED the patient states she feels like she is \"dying.\"  When asked to be more specific about why she feels like she is dying, she notes she believes she is withdrawing from alcohol. She admits to heavily consuming alcohol for the last 2 years and believes her last drink was this morning, but is unsure. She reports associated symptoms of nausea, vomiting, anxiety, and is \"shaky\". This is why she believes she is withdrawing. She also reports feeling increasingly depressed and not \"caring\" about her existence anymore, but did not confirm any plan of self harm. She also reports feeling dehydrated. She denies any chest pain or shortness of breath, or recent known falls or head injuries. Patient reports to having pacemaker/ defibrillator for " atrial fibrillation. She has not taken her Eliquis or other medications for approximately 2 days.    PAST MEDICAL HISTORY  Past Medical History:   Diagnosis Date    Alcoholism (HCC)     Anticoagulated     Apnea, sleep     Chronic pain     r/t pelvic fracture    Hypothyroidism     Insomnia     Trouble going to Sleep     Paroxysmal atrial fibrillation (HCC)     Psychiatric disorder     history of depression and anxiety     Snoring     Ventricular tachycardia (HCC)        SURGICAL HISTORY  Past Surgical History:   Procedure Laterality Date    ORIF, FRACTURE, CLAVICLE  5/21/2014    Performed by Wilfred Gonzalez M.D. at SUR[facility]ERY [facility]    BREAST BIOPSY      bilateral neg breast bxs    OTHER ORTHOPEDIC SURGERY      pelvis fracture. 10 years ago     TONSILLECTOMY         FAMILY HISTORY  Family History   Problem Relation Age of Onset    Diabetes Mother     Anxiety disorder Mother     Depression Mother     Hyperlipidemia Father        SOCIAL HISTORY   reports that she has never smoked. She has never used smokeless tobacco. She reports current alcohol use of about 8.4 - 12.6 oz of alcohol per week. She reports that she does not currently use drugs after having used the following drugs: Oral.    CURRENT MEDICATIONS  Previous Medications    AMLODIPINE (NORVASC) 5 MG TAB    Take 1 Tablet by mouth every day.    APIXABAN (ELIQUIS) 5MG TAB    Take 1 Tablet by mouth 2 times a day.    DIGOXIN (LANOXIN) 125 MCG TAB    Take 1 Tablet by mouth every day.    FOLIC ACID (FOLVITE) 1 MG TAB    Take 1 mg by mouth every day.    LEVOTHYROXINE (SYNTHROID) 50 MCG TAB    Take 1 Tablet by mouth every morning on an empty stomach.    METOPROLOL SUCCINATE 100 MG CAPSULE ER 24 HOUR SPRINKLE    Take 100 mg by mouth every day.    MIRTAZAPINE (REMERON) 15 MG TAB    Take 1 Tablet by mouth at bedtime.    THIAMINE (THIAMINE) 100 MG TABLET    Take 1 Tablet by mouth every day.    VENLAFAXINE XR (EFFEXOR XR) 75 MG CAPSULE SR 24 HR    Take 3 Capsules  "by mouth every day.       ALLERGIES  Allergies   Allergen Reactions    Sulfa Drugs Rash              PHYSICAL EXAM  /80   Pulse (!) 121   Temp 36.2 °C (97.2 °F) (Temporal)   Resp 20   Ht 1.676 m (5' 6\")   Wt 63.5 kg (140 lb)   SpO2 96%   BMI 22.60 kg/m²    Constitutional: Well developed, Well nourished, moderate distress,  Tearful  HENT: Normocephalic, Atraumatic, dry mucus membranes  Eyes: PERRLA, EOMI, Conjunctiva normal, No discharge.   Neck: No tenderness, Supple, No stridor.   Cardiovascular: Irregularly irregular Tachycardia  Thorax & Lungs: Clear to auscultation bilaterally, Hyperventilation  Abdomen: Soft, No tenderness, No masses.   Skin: Warm, Dry, No rash.  Old ecchymosis to left second MCP.   Musculoskeletal: No major deformities noted.  Neurologic: Awake, alert. Moves all extremities spontaneously.  Psychiatric: Affect normal, Judgment normal, Anxious        DIAGNOSTIC STUDIES    Labs:   Results for orders placed or performed during the hospital encounter of 01/22/24   Blood Alcohol   Result Value Ref Range    Diagnostic Alcohol 266.6 (H) <10.1 mg/dL   CBC WITH DIFFERENTIAL   Result Value Ref Range    WBC 5.7 4.8 - 10.8 K/uL    RBC 4.57 4.20 - 5.40 M/uL    Hemoglobin 15.1 12.0 - 16.0 g/dL    Hematocrit 43.6 37.0 - 47.0 %    MCV 95.4 81.4 - 97.8 fL    MCH 33.0 27.0 - 33.0 pg    MCHC 34.6 32.2 - 35.5 g/dL    RDW 53.4 (H) 35.9 - 50.0 fL    Platelet Count 73 (L) 164 - 446 K/uL    MPV 9.5 9.0 - 12.9 fL    Neutrophils-Polys 41.90 (L) 44.00 - 72.00 %    Lymphocytes 44.50 (H) 22.00 - 41.00 %    Monocytes 11.00 0.00 - 13.40 %    Eosinophils 1.10 0.00 - 6.90 %    Basophils 1.10 0.00 - 1.80 %    Immature Granulocytes 0.40 0.00 - 0.90 %    Nucleated RBC 0.00 0.00 - 0.20 /100 WBC    Neutrophils (Absolute) 2.38 1.82 - 7.42 K/uL    Lymphs (Absolute) 2.52 1.00 - 4.80 K/uL    Monos (Absolute) 0.62 0.00 - 0.85 K/uL    Eos (Absolute) 0.06 0.00 - 0.51 K/uL    Baso (Absolute) 0.06 0.00 - 0.12 K/uL    Immature " Granulocytes (abs) 0.02 0.00 - 0.11 K/uL    NRBC (Absolute) 0.00 K/uL   COMP METABOLIC PANEL   Result Value Ref Range    Sodium 144 135 - 145 mmol/L    Potassium 4.3 3.6 - 5.5 mmol/L    Chloride 104 96 - 112 mmol/L    Co2 20 20 - 33 mmol/L    Anion Gap 20.0 (H) 7.0 - 16.0    Glucose 73 65 - 99 mg/dL    Bun 11 8 - 22 mg/dL    Creatinine 0.57 0.50 - 1.40 mg/dL    Calcium 8.8 8.5 - 10.5 mg/dL    Correct Calcium 9.0 8.5 - 10.5 mg/dL    AST(SGOT) 33 12 - 45 U/L    ALT(SGPT) 17 2 - 50 U/L    Alkaline Phosphatase 92 30 - 99 U/L    Total Bilirubin 1.9 (H) 0.1 - 1.5 mg/dL    Albumin 3.8 3.2 - 4.9 g/dL    Total Protein 6.6 6.0 - 8.2 g/dL    Globulin 2.8 1.9 - 3.5 g/dL    A-G Ratio 1.4 g/dL   LIPASE   Result Value Ref Range    Lipase 33 11 - 82 U/L   ESTIMATED GFR   Result Value Ref Range    GFR (CKD-EPI) 94 >60 mL/min/1.73 m 2   PLATELET ESTIMATE   Result Value Ref Range    Plt Estimation Decreased    MORPHOLOGY   Result Value Ref Range    RBC Morphology Normal    PERIPHERAL SMEAR REVIEW   Result Value Ref Range    Peripheral Smear Review see below    IMMATURE PLT FRACTION   Result Value Ref Range    Imm. Plt Fraction 3.9 0.6 - 13.1 %   DIFFERENTIAL COMMENT   Result Value Ref Range    Comments-Diff see below    DIGOXIN   Result Value Ref Range    Digoxin <0.3 (L) 0.8 - 2.0 ng/mL   TSH   Result Value Ref Range    TSH 6.320 (H) 0.380 - 5.330 uIU/mL   FREE THYROXINE   Result Value Ref Range    Free T-4 1.25 0.93 - 1.70 ng/dL   EKG   Result Value Ref Range    Report       [facility] Emergency Dept.    Test Date:  2024  Pt Name:    MARISELA DURHAM              Department: ER  MRN:        [id number]                      Room:       [facility]  Gender:     Female                       Technician: HRR  :        [id number]                   Requested By:ER TRIAGE PROTOCOL  Order #:    [id number]                    Reading MD: JAIRO MARES MD    Measurements  Intervals                                 Axis  Rate:       137                          P:          0  NE:         0                            QRS:        -60  QRSD:       73                           T:          61  QT:         327  QTc:        494    Interpretive Statements  Atrial fibrillation  Left anterior fascicular block  Anteroseptal infarct, age indeterminate  Lateral leads are also involved  Compared to ECG 12/04/2023 10:56:24  Left anterior fascicular block now present  Left-axis deviation no longer present  Early repolarization no longer present  ST (T wave) deviat ion no longer present  Myocardial infarct finding still present  Electronically Signed On 01- 17:42:28 PST by JAIRO MARES MD              COURSE & MEDICAL DECISION MAKING     ED Observation Status? No; Patient does not meet criteria for ED Observation.     INITIAL ASSESSMENT, COURSE AND PLAN  Differential diagnoses include but not limited to: depression, anxiety, alcohol intoxication, vs alcohol withdrawal.    Care Narrative: Patient with depression and alcohol intoxication, she states that today she is withdrawing from alcohol.  Patient stopped her medicine 2 days ago.  The patient is in A-fib with RVR give the patient some metoprolol, fluids, Ativan without any improvement of the patient's heart rate, the patient is still feeling like she is withdrawing even though her blood alcohol is elevated.  Discussed case with the hospitalist for hospitalization.    2:07 PM - Patient was evaluated at bedside. Ordered for Lipase, CMP, CBC w diff, Urine drug screen, Blood alcohol, Free thyroxine, and TSH to evaluate. The patient will be medicated with multivitamin tablet 1 tablet, Folvite tablet 1 mg, Vitamin b-1 tablet 100 mg, Ativan injection 1 mg , and NS infusion 1,000 mL for her symptoms. Patient verbalizes understanding and support with my plan of care.     4:20 PM - Ordered for Digoxin for further evaluation. The patient will be medicated with Lopressor tablet 25 mg.      5:42 PM - I reevaluated the patient at bedside. The patient is still tachycardic and somewhat tremulous. I discussed the patient's diagnostic study results. I informed the patient of my plan to admit today given the patient's current presentation and diagnostic study results. Patient verbalizes understanding and support with my plan for admission.      5:57 PM - I discussed the patient's case and the above findings with Dr. Medley (Hospitalist) who agrees to evaluate the patient for hospitalization.     HYDRATION: Based on the patient's presentation of Dehydration the patient was given IV fluids. IV Hydration was used because oral hydration was not adequate alone. Upon recheck following hydration, the patient was improved.     DISPOSITION AND DISCUSSIONS    I have discussed management of the patient with the following physicians and CIARAN's:  Dr. Medley (Hospitalist)    Discussion of management with other [facility] or appropriate source(s): None     DISPOSITION:  Patient will be hospitalized by Dr. Medley (Hospitalist) in guarded condition.    FINAL DIAGNOSIS  1. ETOH abuse    2. Alcohol intoxication with delirium (HCC)    3. Atrial fibrillation with rapid ventricular response (HCC)    4. Alcohol withdrawal syndrome with complication (HCC)    5. Atrial fibrillation, unspecified type (HCC)       Duy TEIXEIRA (Jo), am scribing for, and in the presence of, Dale García M.D..    Electronically signed by: Duy Matta (Jo), 1/22/2024    Dale TEIXEIRA M.D. personally performed the services described in this documentation, as scribed by Duy Matta in my presence, and it is both accurate and complete.      The note accurately reflects work and decisions made by me.  Dale García M.D.  1/22/2024  10:14 PM

## 2024-01-23 ENCOUNTER — APPOINTMENT (OUTPATIENT)
Dept: CARDIOLOGY | Facility: MEDICAL CENTER | Age: 79
DRG: 896 | End: 2024-01-23
Attending: HOSPITALIST
Payer: MEDICARE

## 2024-01-23 LAB
AMPHET UR QL SCN: NEGATIVE
ANION GAP SERPL CALC-SCNC: 13 MMOL/L (ref 7–16)
BARBITURATES UR QL SCN: NEGATIVE
BENZODIAZ UR QL SCN: POSITIVE
BUN SERPL-MCNC: 9 MG/DL (ref 8–22)
BZE UR QL SCN: NEGATIVE
CALCIUM SERPL-MCNC: 7.7 MG/DL (ref 8.5–10.5)
CANNABINOIDS UR QL SCN: NEGATIVE
CHLORIDE SERPL-SCNC: 104 MMOL/L (ref 96–112)
CO2 SERPL-SCNC: 21 MMOL/L (ref 20–33)
CREAT SERPL-MCNC: 0.53 MG/DL (ref 0.5–1.4)
EKG IMPRESSION: NORMAL
ERYTHROCYTE [DISTWIDTH] IN BLOOD BY AUTOMATED COUNT: 51.9 FL (ref 35.9–50)
FENTANYL UR QL: NEGATIVE
FOLATE SERPL-MCNC: 25.3 NG/ML
GFR SERPLBLD CREATININE-BSD FMLA CKD-EPI: 96 ML/MIN/1.73 M 2
GLUCOSE SERPL-MCNC: 138 MG/DL (ref 65–99)
HCT VFR BLD AUTO: 39.3 % (ref 37–47)
HGB BLD-MCNC: 13.4 G/DL (ref 12–16)
LV EJECT FRACT  99904: 50
LV EJECT FRACT MOD 2C 99903: 53.72
LV EJECT FRACT MOD 4C 99902: 51.14
LV EJECT FRACT MOD BP 99901: 50.26
MAGNESIUM SERPL-MCNC: 1.9 MG/DL (ref 1.5–2.5)
MCH RBC QN AUTO: 33 PG (ref 27–33)
MCHC RBC AUTO-ENTMCNC: 34.1 G/DL (ref 32.2–35.5)
MCV RBC AUTO: 96.8 FL (ref 81.4–97.8)
METHADONE UR QL SCN: NEGATIVE
OPIATES UR QL SCN: NEGATIVE
OXYCODONE UR QL SCN: NEGATIVE
PCP UR QL SCN: NEGATIVE
PHOSPHATE SERPL-MCNC: 1.5 MG/DL (ref 2.5–4.5)
PLATELET # BLD AUTO: 63 K/UL (ref 164–446)
PLATELETS.RETICULATED NFR BLD AUTO: 4 % (ref 0.6–13.1)
PMV BLD AUTO: 10.1 FL (ref 9–12.9)
POTASSIUM SERPL-SCNC: 3.7 MMOL/L (ref 3.6–5.5)
PROPOXYPH UR QL SCN: NEGATIVE
RBC # BLD AUTO: 4.06 M/UL (ref 4.2–5.4)
SODIUM SERPL-SCNC: 138 MMOL/L (ref 135–145)
VIT B12 SERPL-MCNC: 484 PG/ML (ref 211–911)
WBC # BLD AUTO: 5.3 K/UL (ref 4.8–10.8)

## 2024-01-23 PROCEDURE — 83735 ASSAY OF MAGNESIUM: CPT

## 2024-01-23 PROCEDURE — 84156 ASSAY OF PROTEIN URINE: CPT

## 2024-01-23 PROCEDURE — 80307 DRUG TEST PRSMV CHEM ANLYZR: CPT

## 2024-01-23 PROCEDURE — 700102 HCHG RX REV CODE 250 W/ 637 OVERRIDE(OP)

## 2024-01-23 PROCEDURE — 36415 COLL VENOUS BLD VENIPUNCTURE: CPT

## 2024-01-23 PROCEDURE — 93306 TTE W/DOPPLER COMPLETE: CPT

## 2024-01-23 PROCEDURE — 700111 HCHG RX REV CODE 636 W/ 250 OVERRIDE (IP)

## 2024-01-23 PROCEDURE — 93010 ELECTROCARDIOGRAM REPORT: CPT | Performed by: INTERNAL MEDICINE

## 2024-01-23 PROCEDURE — 770020 HCHG ROOM/CARE - TELE (206)

## 2024-01-23 PROCEDURE — A9270 NON-COVERED ITEM OR SERVICE: HCPCS | Performed by: HOSPITALIST

## 2024-01-23 PROCEDURE — 86335 IMMUNFIX E-PHORSIS/URINE/CSF: CPT

## 2024-01-23 PROCEDURE — 84166 PROTEIN E-PHORESIS/URINE/CSF: CPT

## 2024-01-23 PROCEDURE — A9270 NON-COVERED ITEM OR SERVICE: HCPCS | Performed by: STUDENT IN AN ORGANIZED HEALTH CARE EDUCATION/TRAINING PROGRAM

## 2024-01-23 PROCEDURE — 82607 VITAMIN B-12: CPT

## 2024-01-23 PROCEDURE — 82746 ASSAY OF FOLIC ACID SERUM: CPT

## 2024-01-23 PROCEDURE — 84100 ASSAY OF PHOSPHORUS: CPT

## 2024-01-23 PROCEDURE — 99233 SBSQ HOSP IP/OBS HIGH 50: CPT | Mod: GC | Performed by: HOSPITALIST

## 2024-01-23 PROCEDURE — 700102 HCHG RX REV CODE 250 W/ 637 OVERRIDE(OP): Performed by: STUDENT IN AN ORGANIZED HEALTH CARE EDUCATION/TRAINING PROGRAM

## 2024-01-23 PROCEDURE — 700111 HCHG RX REV CODE 636 W/ 250 OVERRIDE (IP): Mod: JZ

## 2024-01-23 PROCEDURE — 85055 RETICULATED PLATELET ASSAY: CPT

## 2024-01-23 PROCEDURE — 700101 HCHG RX REV CODE 250: Performed by: HOSPITALIST

## 2024-01-23 PROCEDURE — 93306 TTE W/DOPPLER COMPLETE: CPT | Mod: 26 | Performed by: INTERNAL MEDICINE

## 2024-01-23 PROCEDURE — 85027 COMPLETE CBC AUTOMATED: CPT

## 2024-01-23 PROCEDURE — 700102 HCHG RX REV CODE 250 W/ 637 OVERRIDE(OP): Performed by: HOSPITALIST

## 2024-01-23 PROCEDURE — 700111 HCHG RX REV CODE 636 W/ 250 OVERRIDE (IP): Performed by: HOSPITALIST

## 2024-01-23 PROCEDURE — A9270 NON-COVERED ITEM OR SERVICE: HCPCS

## 2024-01-23 PROCEDURE — 80048 BASIC METABOLIC PNL TOTAL CA: CPT

## 2024-01-23 PROCEDURE — 99222 1ST HOSP IP/OBS MODERATE 55: CPT | Performed by: STUDENT IN AN ORGANIZED HEALTH CARE EDUCATION/TRAINING PROGRAM

## 2024-01-23 RX ORDER — DIGOXIN 0.25 MG/ML
250 INJECTION INTRAMUSCULAR; INTRAVENOUS ONCE
Status: COMPLETED | OUTPATIENT
Start: 2024-01-23 | End: 2024-01-23

## 2024-01-23 RX ORDER — VENLAFAXINE HYDROCHLORIDE 150 MG/1
150 CAPSULE, EXTENDED RELEASE ORAL DAILY
Status: ON HOLD | COMMUNITY
Start: 2023-12-21 | End: 2024-01-29

## 2024-01-23 RX ORDER — DILTIAZEM HYDROCHLORIDE 5 MG/ML
10 INJECTION INTRAVENOUS
Status: COMPLETED | OUTPATIENT
Start: 2024-01-23 | End: 2024-01-23

## 2024-01-23 RX ORDER — TRAZODONE HYDROCHLORIDE 100 MG/1
100 TABLET ORAL
Status: ON HOLD | COMMUNITY
End: 2024-01-29

## 2024-01-23 RX ORDER — ESCITALOPRAM OXALATE 10 MG/1
10 TABLET ORAL DAILY
Status: ON HOLD | COMMUNITY
Start: 2023-12-21 | End: 2024-01-29

## 2024-01-23 RX ORDER — CHOLECALCIFEROL (VITAMIN D3) 125 MCG
5 CAPSULE ORAL NIGHTLY
Status: DISCONTINUED | OUTPATIENT
Start: 2024-01-23 | End: 2024-01-25

## 2024-01-23 RX ORDER — TRAZODONE HYDROCHLORIDE 100 MG/1
100 TABLET ORAL
Status: DISCONTINUED | OUTPATIENT
Start: 2024-01-23 | End: 2024-01-23

## 2024-01-23 RX ORDER — CLONIDINE HYDROCHLORIDE 0.1 MG/1
0.1 TABLET ORAL 2 TIMES DAILY PRN
Status: DISCONTINUED | OUTPATIENT
Start: 2024-01-24 | End: 2024-01-26

## 2024-01-23 RX ORDER — METOPROLOL SUCCINATE 25 MG/1
100 TABLET, EXTENDED RELEASE ORAL
Status: DISCONTINUED | OUTPATIENT
Start: 2024-01-24 | End: 2024-01-30 | Stop reason: HOSPADM

## 2024-01-23 RX ADMIN — Medication 100 MG: at 05:18

## 2024-01-23 RX ADMIN — Medication 400 MG: at 05:20

## 2024-01-23 RX ADMIN — DOCUSATE SODIUM 50 MG AND SENNOSIDES 8.6 MG 2 TABLET: 8.6; 5 TABLET, FILM COATED ORAL at 05:16

## 2024-01-23 RX ADMIN — LORAZEPAM 1 MG: 2 INJECTION INTRAMUSCULAR; INTRAVENOUS at 16:15

## 2024-01-23 RX ADMIN — Medication 400 MG: at 17:53

## 2024-01-23 RX ADMIN — LORAZEPAM 2 MG: 2 TABLET ORAL at 17:53

## 2024-01-23 RX ADMIN — DIGOXIN 125 MCG: 0.12 TABLET ORAL at 05:16

## 2024-01-23 RX ADMIN — DIBASIC SODIUM PHOSPHATE, MONOBASIC POTASSIUM PHOSPHATE AND MONOBASIC SODIUM PHOSPHATE 250 MG: 852; 155; 130 TABLET ORAL at 12:27

## 2024-01-23 RX ADMIN — LORAZEPAM 0.5 MG: 1 TABLET ORAL at 00:10

## 2024-01-23 RX ADMIN — LEVOTHYROXINE SODIUM 50 MCG: 0.05 TABLET ORAL at 05:16

## 2024-01-23 RX ADMIN — DIGOXIN 250 MCG: 0.25 INJECTION INTRAMUSCULAR; INTRAVENOUS at 17:54

## 2024-01-23 RX ADMIN — DILTIAZEM HYDROCHLORIDE 10 MG: 5 INJECTION INTRAVENOUS at 03:10

## 2024-01-23 RX ADMIN — THERA TABS 1 TABLET: TAB at 05:16

## 2024-01-23 RX ADMIN — APIXABAN 5 MG: 5 TABLET, FILM COATED ORAL at 05:17

## 2024-01-23 RX ADMIN — METOPROLOL TARTRATE 5 MG: 5 INJECTION INTRAVENOUS at 01:26

## 2024-01-23 RX ADMIN — METOPROLOL TARTRATE 5 MG: 5 INJECTION INTRAVENOUS at 00:10

## 2024-01-23 RX ADMIN — LORAZEPAM 3 MG: 2 TABLET ORAL at 14:57

## 2024-01-23 RX ADMIN — CHLORDIAZEPOXIDE HYDROCHLORIDE 25 MG: 25 CAPSULE ORAL at 17:53

## 2024-01-23 RX ADMIN — DIGOXIN 250 MCG: 0.25 INJECTION INTRAMUSCULAR; INTRAVENOUS at 12:30

## 2024-01-23 RX ADMIN — MIRTAZAPINE 15 MG: 15 TABLET, FILM COATED ORAL at 20:16

## 2024-01-23 RX ADMIN — LORAZEPAM 1 MG: 2 INJECTION INTRAMUSCULAR; INTRAVENOUS at 20:16

## 2024-01-23 RX ADMIN — AMLODIPINE BESYLATE 5 MG: 5 TABLET ORAL at 05:17

## 2024-01-23 RX ADMIN — CHLORDIAZEPOXIDE HYDROCHLORIDE 50 MG: 25 CAPSULE ORAL at 05:16

## 2024-01-23 RX ADMIN — LORAZEPAM 3 MG: 2 TABLET ORAL at 08:38

## 2024-01-23 RX ADMIN — VENLAFAXINE HYDROCHLORIDE 150 MG: 75 CAPSULE, EXTENDED RELEASE ORAL at 08:38

## 2024-01-23 RX ADMIN — APIXABAN 5 MG: 5 TABLET, FILM COATED ORAL at 17:53

## 2024-01-23 RX ADMIN — ESCITALOPRAM OXALATE 10 MG: 10 TABLET ORAL at 05:17

## 2024-01-23 RX ADMIN — Medication 5 MG: at 22:19

## 2024-01-23 RX ADMIN — FOLIC ACID 1 MG: 1 TABLET ORAL at 05:16

## 2024-01-23 RX ADMIN — LORAZEPAM 3 MG: 2 TABLET ORAL at 09:51

## 2024-01-23 RX ADMIN — DIBASIC SODIUM PHOSPHATE, MONOBASIC POTASSIUM PHOSPHATE AND MONOBASIC SODIUM PHOSPHATE 250 MG: 852; 155; 130 TABLET ORAL at 23:53

## 2024-01-23 RX ADMIN — LORAZEPAM 0.5 MG: 1 TABLET ORAL at 22:19

## 2024-01-23 RX ADMIN — METOPROLOL TARTRATE 5 MG: 5 INJECTION INTRAVENOUS at 00:32

## 2024-01-23 RX ADMIN — DIBASIC SODIUM PHOSPHATE, MONOBASIC POTASSIUM PHOSPHATE AND MONOBASIC SODIUM PHOSPHATE 250 MG: 852; 155; 130 TABLET ORAL at 17:53

## 2024-01-23 RX ADMIN — LORAZEPAM 3 MG: 2 TABLET ORAL at 13:58

## 2024-01-23 RX ADMIN — CHLORDIAZEPOXIDE HYDROCHLORIDE 25 MG: 25 CAPSULE ORAL at 23:53

## 2024-01-23 RX ADMIN — LORAZEPAM 2 MG: 2 TABLET ORAL at 12:27

## 2024-01-23 RX ADMIN — METOPROLOL SUCCINATE 100 MG: 25 TABLET, EXTENDED RELEASE ORAL at 05:18

## 2024-01-23 RX ADMIN — CHLORDIAZEPOXIDE HYDROCHLORIDE 50 MG: 25 CAPSULE ORAL at 12:27

## 2024-01-23 RX ADMIN — LORAZEPAM 0.5 MG: 1 TABLET ORAL at 05:17

## 2024-01-23 RX ADMIN — DIBASIC SODIUM PHOSPHATE, MONOBASIC POTASSIUM PHOSPHATE AND MONOBASIC SODIUM PHOSPHATE 250 MG: 852; 155; 130 TABLET ORAL at 08:38

## 2024-01-23 ASSESSMENT — LIFESTYLE VARIABLES
HEADACHE, FULLNESS IN HEAD: MILD
ANXIETY: *
NAUSEA AND VOMITING: NO NAUSEA AND NO VOMITING
AGITATION: *
AUDITORY DISTURBANCES: NOT PRESENT
ORIENTATION AND CLOUDING OF SENSORIUM: DISORIENTED FOR PLACE AND / OR PERSON
TREMOR: *
TOTAL SCORE: MILD ITCHING, PINS AND NEEDLES SENSATION, BURNING OR NUMBNESS
TOTAL SCORE: VERY MILD ITCHING, PINS AND NEEDLES SENSATION, BURNING OR NUMBNESS
PAROXYSMAL SWEATS: NO SWEAT VISIBLE
PAROXYSMAL SWEATS: NO SWEAT VISIBLE
ANXIETY: MODERATELY ANXIOUS OR GUARDED, SO ANXIETY IS INFERRED
ORIENTATION AND CLOUDING OF SENSORIUM: ORIENTED AND CAN DO SERIAL ADDITIONS
NAUSEA AND VOMITING: NO NAUSEA AND NO VOMITING
TOTAL SCORE: 6
AGITATION: *
AGITATION: *
TREMOR: *
PAROXYSMAL SWEATS: NO SWEAT VISIBLE
VISUAL DISTURBANCES: MODERATELY SEVERE HALLUCINATIONS
HEADACHE, FULLNESS IN HEAD: NOT PRESENT
TREMOR: MODERATE TREMOR WITH ARMS EXTENDED
TOTAL SCORE: 14
AGITATION: *
HEADACHE, FULLNESS IN HEAD: MILD
VISUAL DISTURBANCES: MODERATELY SEVERE HALLUCINATIONS
PAROXYSMAL SWEATS: BARELY PERCEPTIBLE SWEATING. PALMS MOIST
AUDITORY DISTURBANCES: NOT PRESENT
AGITATION: *
TOTAL SCORE: 14
AUDITORY DISTURBANCES: NOT PRESENT
ORIENTATION AND CLOUDING OF SENSORIUM: DISORIENTED FOR PLACE AND / OR PERSON
PAROXYSMAL SWEATS: NO SWEAT VISIBLE
TREMOR: MODERATE TREMOR WITH ARMS EXTENDED
NAUSEA AND VOMITING: NO NAUSEA AND NO VOMITING
VISUAL DISTURBANCES: NOT PRESENT
ANXIETY: *
PAROXYSMAL SWEATS: NO SWEAT VISIBLE
HEADACHE, FULLNESS IN HEAD: NOT PRESENT
ORIENTATION AND CLOUDING OF SENSORIUM: CANNOT DO SERIAL ADDITIONS OR IS UNCERTAIN ABOUT DATE
NAUSEA AND VOMITING: NO NAUSEA AND NO VOMITING
NAUSEA AND VOMITING: MILD NAUSEA WITH NO VOMITING
ANXIETY: MODERATELY ANXIOUS OR GUARDED, SO ANXIETY IS INFERRED
TREMOR: *
PAROXYSMAL SWEATS: NO SWEAT VISIBLE
AUDITORY DISTURBANCES: NOT PRESENT
AGITATION: NORMAL ACTIVITY
TOTAL SCORE: 17
TREMOR: *
TREMOR: *
ANXIETY: *
ANXIETY: MILDLY ANXIOUS
HEADACHE, FULLNESS IN HEAD: NOT PRESENT
PAROXYSMAL SWEATS: BARELY PERCEPTIBLE SWEATING. PALMS MOIST
NAUSEA AND VOMITING: NO NAUSEA AND NO VOMITING
PAROXYSMAL SWEATS: NO SWEAT VISIBLE
AUDITORY DISTURBANCES: NOT PRESENT
ORIENTATION AND CLOUDING OF SENSORIUM: ORIENTED AND CAN DO SERIAL ADDITIONS
VISUAL DISTURBANCES: NOT PRESENT
AGITATION: *
ANXIETY: MILDLY ANXIOUS
TREMOR: MODERATE TREMOR WITH ARMS EXTENDED
AUDITORY DISTURBANCES: NOT PRESENT
AUDITORY DISTURBANCES: NOT PRESENT
HEADACHE, FULLNESS IN HEAD: NOT PRESENT
TOTAL SCORE: 18
ANXIETY: MILDLY ANXIOUS
VISUAL DISTURBANCES: MODERATELY SEVERE HALLUCINATIONS
AGITATION: *
HEADACHE, FULLNESS IN HEAD: NOT PRESENT
TOTAL SCORE: 11
HEADACHE, FULLNESS IN HEAD: MODERATE
NAUSEA AND VOMITING: *
AGITATION: SOMEWHAT MORE THAN NORMAL ACTIVITY
TOTAL SCORE: 19
PAROXYSMAL SWEATS: NO SWEAT VISIBLE
TOTAL SCORE: 4
ANXIETY: MODERATELY ANXIOUS OR GUARDED, SO ANXIETY IS INFERRED
VISUAL DISTURBANCES: NOT PRESENT
ORIENTATION AND CLOUDING OF SENSORIUM: DISORIENTED FOR PLACE AND / OR PERSON
PAROXYSMAL SWEATS: NO SWEAT VISIBLE
AUDITORY DISTURBANCES: NOT PRESENT
ANXIETY: MODERATELY ANXIOUS OR GUARDED, SO ANXIETY IS INFERRED
VISUAL DISTURBANCES: MODERATELY SEVERE HALLUCINATIONS
HEADACHE, FULLNESS IN HEAD: NOT PRESENT
NAUSEA AND VOMITING: NO NAUSEA AND NO VOMITING
TREMOR: *
AUDITORY DISTURBANCES: NOT PRESENT
ORIENTATION AND CLOUDING OF SENSORIUM: DISORIENTED FOR PLACE AND / OR PERSON
AGITATION: *
ORIENTATION AND CLOUDING OF SENSORIUM: DISORIENTED FOR PLACE AND / OR PERSON
ORIENTATION AND CLOUDING OF SENSORIUM: ORIENTED AND CAN DO SERIAL ADDITIONS
AUDITORY DISTURBANCES: NOT PRESENT
TOTAL SCORE: 14
TOTAL SCORE: 5
VISUAL DISTURBANCES: NOT PRESENT
ANXIETY: MODERATELY ANXIOUS OR GUARDED, SO ANXIETY IS INFERRED
TREMOR: MODERATE TREMOR WITH ARMS EXTENDED
HEADACHE, FULLNESS IN HEAD: NOT PRESENT
VISUAL DISTURBANCES: NOT PRESENT
NAUSEA AND VOMITING: NO NAUSEA AND NO VOMITING
NAUSEA AND VOMITING: NO NAUSEA AND NO VOMITING
TREMOR: *
HEADACHE, FULLNESS IN HEAD: NOT PRESENT
NAUSEA AND VOMITING: *
ORIENTATION AND CLOUDING OF SENSORIUM: DISORIENTED FOR PLACE AND / OR PERSON
TOTAL SCORE: VERY MILD ITCHING, PINS AND NEEDLES SENSATION, BURNING OR NUMBNESS
AGITATION: NORMAL ACTIVITY
ORIENTATION AND CLOUDING OF SENSORIUM: CANNOT DO SERIAL ADDITIONS OR IS UNCERTAIN ABOUT DATE
TOTAL SCORE: 17
VISUAL DISTURBANCES: NOT PRESENT
AUDITORY DISTURBANCES: NOT PRESENT
VISUAL DISTURBANCES: NOT PRESENT

## 2024-01-23 ASSESSMENT — COGNITIVE AND FUNCTIONAL STATUS - GENERAL
CLIMB 3 TO 5 STEPS WITH RAILING: A LOT
DRESSING REGULAR UPPER BODY CLOTHING: A LITTLE
DRESSING REGULAR LOWER BODY CLOTHING: A LITTLE
EATING MEALS: A LITTLE
PERSONAL GROOMING: A LITTLE
TURNING FROM BACK TO SIDE WHILE IN FLAT BAD: A LITTLE
SUGGESTED CMS G CODE MODIFIER MOBILITY: CL
TOILETING: A LOT
MOVING FROM LYING ON BACK TO SITTING ON SIDE OF FLAT BED: A LOT
WALKING IN HOSPITAL ROOM: A LOT
SUGGESTED CMS G CODE MODIFIER DAILY ACTIVITY: CK
MOBILITY SCORE: 14
HELP NEEDED FOR BATHING: A LOT
MOVING TO AND FROM BED TO CHAIR: A LITTLE
STANDING UP FROM CHAIR USING ARMS: A LOT
DAILY ACTIVITIY SCORE: 16

## 2024-01-23 ASSESSMENT — ENCOUNTER SYMPTOMS
DEPRESSION: 1
CONSTITUTIONAL NEGATIVE: 1
NERVOUS/ANXIOUS: 1
MUSCULOSKELETAL NEGATIVE: 1
TREMORS: 1
EYES NEGATIVE: 1
GASTROINTESTINAL NEGATIVE: 1
RESPIRATORY NEGATIVE: 1
CARDIOVASCULAR NEGATIVE: 1

## 2024-01-23 ASSESSMENT — PAIN DESCRIPTION - PAIN TYPE
TYPE: ACUTE PAIN
TYPE: ACUTE PAIN

## 2024-01-23 NOTE — HOSPITAL COURSE
78 year-old female patient with a past medical history of alcoholism, chronic pain, hypothyroidism, paroxysmal atrial fibrillation, who presented to the ER for worsening shakes and cognition. Patient drinks roughly 1 pint of vodka every day. Patient was previously a nonalcoholic, however in the last 2 years, death of multiple family members including , 2 daughters, led to initiation of alcohol consumption. She was found fit for admission on 1/22/24 and started on CIWA protocol, Librium, thiamine, folate, multivitamins, seizure protocols, follow protocol. Patient also has a history of atrial fibrillation status post management with metoprolol, digoxin, Eliquis, however does not use medication at home for management. Patient's home regimen for management of atrial fibrillation restarted, digoxin loaded and titrated accordingly and rate has been controlled. Also increasing patient's metoprolol dose for management of hypertension as well as atrial fibrillation. Patient also presenting with thrombocytopenia, likely associated with alcohol use disorder. Monitor with daily labs. Patient also to be referred for outpatient psychology, psychiatry, for management of complex grief disorder, MDD.

## 2024-01-23 NOTE — ASSESSMENT & PLAN NOTE
Patient mentions significant alcohol intake daily for 1-2 years.  Previously nonalcoholic, however after death of , 2 daughters, pt began drinking to cope with symptoms of depressed mood.  Pt mentions alcohol helps her sleep and drinks 1 pint of alcohol daily  Discontinue CIWA and Librium  Continue thiamine, folate, multivitamins  Patient more alert today, Aox4

## 2024-01-23 NOTE — ASSESSMENT & PLAN NOTE
Thrombocytopenia, likely due to alcohol use disorder.  Currently not bleeding  Currently no transfusion indicated  Uptrending platelet counts every day since admission.  Monitor with daily CBCs

## 2024-01-23 NOTE — HOSPITAL COURSE
77 y/o female patient with a past medical history of alcoholism, chronic pain, hypothyroidism, paroxysmal atrial fibrillation, presenting to the ER for worsening shakes and cognition.  Patient drinks roughly 1 pint of vodka every day.  Patient was previously a nonalcoholic, however in the last 2 years, death of multiple family members including , 2 daughters, led to initiation of alcohol consumption.  Patient mentions drinking roughly 1 pint vodka daily.    Patient is admitted, management began with CIWA protocol, Librium, thiamine, folate, multivitamins, seizure protocols, follow protocol.    Patient also has a history of atrial fibrillation status post management with metoprolol, digoxin, Eliquis, however does not use medication at home for management.  Patient's home regimen for management of atrial fibrillation restarted, digoxin loaded as needed.  Also increasing patient's metoprolol dose for management of hypertension as well as atrial fibrillation.    Patient also presenting with thrombocytopenia, likely associated with alcohol use disorder.  Monitor with daily labs.    Patient also to be referred for outpatient psychology, psychiatry, for management of complex grief disorder, MDD.

## 2024-01-23 NOTE — ASSESSMENT & PLAN NOTE
Optimize blood pressure management keep stop blood pressure less than 140 diastolic under 90  Continue Norvasc 5 mg daily, metoprolol  Monitor electrolytes with daily BMP

## 2024-01-23 NOTE — ED NOTES
Unable to complete med rec at this time. Per pt, she stopped meds approx 1 week ago.   Per pt, son Ulices [phone] would be able to assist in obtaining med list- left VM. Pharmacy is closed for the day.

## 2024-01-23 NOTE — ASSESSMENT & PLAN NOTE
History of sleep apnea currently does not take care of herself and does not utilize any assistive oxygen  Unclear outpatient CPAP settings, hold off on CPAP at this time

## 2024-01-23 NOTE — ASSESSMENT & PLAN NOTE
Patient has had a [employer] pacemaker/defibrillator implanted by Dr. Baires  She has not followed up with them in probably a couple years according to her  This was placed in 2020

## 2024-01-23 NOTE — ASSESSMENT & PLAN NOTE
Patient denies suicidal ideation, thoughts of self harm.  Psychiatry following; pending recs.  Patient currently alert and oriented x 4.

## 2024-01-23 NOTE — PROGRESS NOTES
NOC HOSPITALIST CROSS COVER    Notified by RN regarding ongoing afib with RVR, despite three doses of IV lopressor.      Vitals:    01/23/24 0312   BP: (!) 138/100   Pulse: (!) 115   Resp: 18   Temp: 37.4 °C (99.3 °F)   SpO2: 95%          Plan:  #Afib with RVR  -Cardizem 10 mg IV once for sustained HR > 120, hold for SBP < 100        -----------------------------------------------------------------------------------------------------------    Electronically signed by:  Debra Steele, DNP, APRN, AGANANIP-BC  Hospitalist Services

## 2024-01-23 NOTE — PROGRESS NOTES
4 Eyes Skin Assessment Completed by SNEHA Brady and SNEHA Fernandez.    Head WDL  Ears WDL  Nose WDL  Mouth WDL  Neck WDL  Breast/Chest Bruising  Shoulder Blades WDL  Spine WDL  (R) Arm/Elbow/Hand Redness, blanching  (L) Arm/Elbow/Hand Redness, blanching  Abdomen WDL  Groin WDL  Scrotum/Coccyx/Buttocks WDL  (R) Leg WDL  (L) Leg WDL  (R) Heel/Foot/Toe WDL  (L) Heel/Foot/Toe WDL          Devices In Places Tele Box, Blood Pressure Cuff, and Pulse Ox, nasal cannula      Interventions In Place Gray Ear Foams and Waffle Overlay    Possible Skin Injury No    Pictures Uploaded Into Epic N/A  Wound Consult Placed N/A  RN Wound Prevention Protocol Ordered No

## 2024-01-23 NOTE — H&P
"Hospital Medicine History & Physical Note    Date of Service  1/22/2024    Primary Care Physician  GRACIELA Benitez.    Consultants  None    Specialist Names: None    Code Status  Full Code    Chief Complaint  Chief Complaint   Patient presents with    Depression     Pts son called EMS because pt has been increasingly depressed over the past few years. Pt recently lost 2 daughters and her . Denies SI/HI but reports that she recently stopped taking all of her medications because she is \"done caring for herself\"       History of Presenting Illness  Jeanie Hutchison is a 78 y.o. female who presented 1/22/2024 with worsening shakes and alcohol consumption.  Patient reports that she drinks about 1 pint of vodka per day.  In spite of drinking not much she still cannot sleep and she still has shakes.  Patient is very high risk at this point severity of seizures.  We will put her on seizure prophylaxis.  She will also be given CIWA protocol and Librium.  Will at this point monitor electrolytes and correct them as needed.  We will monitor for worsening delirium.  I am going to start her on thiamine folic acid multivitamin.  I have at this point discussed with her in detail the cause of her problem and it appears that about 3 years ago her  passed away and then both of her daughters passed away within very short amount of time from each other.  Since she lost 3 family members and she only has her son left since then she has become extremely depressed and could not sleep so she started drinking even though she was a lifelong nondrinker.  The patient's drinking at this point has led to severe alcohol abuse and she is up to 1 pint of vodka per day.  Patient will need at this point alcohol withdrawal management with CIWA protocol, Librium and she will need long-term psychiatric management for her severe depression with grief.  Since currently the patient is in atrial fibrillation with rapid ventricular " response will need to resume her medications.  She has been noncompliant with her medications when she is drinking and she was supposed to be on digoxin as well as metoprolol and on anticoagulation with Eliquis.  She does have an AICD in place and that has apparently not fired..    I discussed the plan of care with patient, bedside RN, pharmacy, and emergency room physician Dr. Collin García .    Review of Systems  Review of Systems   Constitutional: Negative.  Negative for chills, diaphoresis and fever.   HENT: Negative.  Negative for hearing loss, nosebleeds and sinus pain.    Eyes: Negative.  Negative for double vision, photophobia, discharge and redness.   Respiratory: Negative.  Negative for cough, sputum production, shortness of breath, wheezing and stridor.    Cardiovascular:  Positive for chest pain and palpitations. Negative for orthopnea, leg swelling and PND.   Gastrointestinal:  Positive for abdominal pain, constipation, diarrhea, heartburn, nausea and vomiting. Negative for blood in stool and melena.   Genitourinary: Negative.  Negative for dysuria, flank pain and frequency.   Musculoskeletal:  Positive for back pain and myalgias. Negative for falls.   Skin: Negative.  Negative for itching.   Neurological:  Positive for headaches. Negative for dizziness, tingling, sensory change, focal weakness and seizures.   Endo/Heme/Allergies: Negative.  Negative for polydipsia. Does not bruise/bleed easily.   Psychiatric/Behavioral:  Positive for depression and substance abuse (Drinks about 1 pint of vodka per day). Negative for suicidal ideas. The patient is nervous/anxious and has insomnia.    All other systems reviewed and are negative.      Past Medical History   has a past medical history of Alcoholism (HCC), Anticoagulated, Apnea, sleep, Chronic pain, Hypothyroidism, Insomnia, Paroxysmal atrial fibrillation (HCC), Psychiatric disorder, Snoring, and Ventricular tachycardia (HCC).    Surgical History   has a  past surgical history that includes breast biopsy; other orthopedic surgery; orif, fracture, clavicle (5/21/2014); and tonsillectomy.     Family History  family history includes Anxiety disorder in her mother; Depression in her mother; Diabetes in her mother; Hyperlipidemia in her father.   Family history reviewed with patient. There is no family history that is pertinent to the chief complaint.     Social History   reports that she has never smoked. She has never used smokeless tobacco. She reports current alcohol use of about 8.4 - 12.6 oz of alcohol per week. She reports that she does not currently use drugs after having used the following drugs: Oral.    Allergies  Allergies   Allergen Reactions    Sulfa Drugs Rash             Medications  Prior to Admission Medications   Prescriptions Last Dose Informant Patient Reported? Taking?   Metoprolol Succinate 100 MG Capsule ER 24 Hour Sprinkle  Patient's Home Pharmacy Yes No   Sig: Take 100 mg by mouth every day.   amLODIPine (NORVASC) 5 MG Tab   No No   Sig: Take 1 Tablet by mouth every day.   apixaban (ELIQUIS) 5mg Tab  Patient, Patient's Home Pharmacy No No   Sig: Take 1 Tablet by mouth 2 times a day.   digoxin (LANOXIN) 125 MCG Tab  Patient, Patient's Home Pharmacy No No   Sig: Take 1 Tablet by mouth every day.   folic acid (FOLVITE) 1 MG Tab  Patient's Home Pharmacy Yes No   Sig: Take 1 mg by mouth every day.   levothyroxine (SYNTHROID) 50 MCG Tab  Patient, Patient's Home Pharmacy No No   Sig: Take 1 Tablet by mouth every morning on an empty stomach.   mirtazapine (REMERON) 15 MG Tab   No No   Sig: Take 1 Tablet by mouth at bedtime.   thiamine (THIAMINE) 100 MG tablet   No No   Sig: Take 1 Tablet by mouth every day.   venlafaxine XR (EFFEXOR XR) 75 MG CAPSULE SR 24 HR   No No   Sig: Take 3 Capsules by mouth every day.      Facility-Administered Medications: None       Physical Exam  Temp:  [36.2 °C (97.2 °F)] 36.2 °C (97.2 °F)  Pulse:  [] 130  Resp:   [16-21] 16  BP: (115-157)/(74-89) 140/89  SpO2:  [88 %-96 %] 92 %  Blood Pressure : (!) 140/89   Temperature: 36.2 °C (97.2 °F)   Pulse: (!) 130   Respiration: 16   Pulse Oximetry: 92 %       Physical Exam  Vitals and nursing note reviewed. Exam conducted with a chaperone present.   Constitutional:       General: She is awake.      Appearance: Normal appearance. She is well-developed and well-groomed. She is obese. She is toxic-appearing and diaphoretic.   HENT:      Head: Normocephalic and atraumatic.      Jaw: There is normal jaw occlusion. No trismus.      Salivary Glands: Right salivary gland is not tender. Left salivary gland is not tender.      Right Ear: External ear normal.      Left Ear: External ear normal.      Nose: Nose normal.      Mouth/Throat:      Mouth: Mucous membranes are dry.      Pharynx: Oropharynx is clear.   Eyes:      General: Lids are normal. Vision grossly intact.      Extraocular Movements: Extraocular movements intact.      Conjunctiva/sclera: Conjunctivae normal.      Right eye: Right conjunctiva is not injected. No exudate.     Left eye: Left conjunctiva is not injected. No exudate.     Pupils: Pupils are equal, round, and reactive to light.   Neck:      Thyroid: No thyroid mass.      Vascular: No hepatojugular reflux or JVD.      Trachea: No abnormal tracheal secretions or tracheal deviation.   Cardiovascular:      Rate and Rhythm: Tachycardia present. Rhythm irregular. Occasional Extrasystoles are present.     Pulses: Normal pulses.      Heart sounds: Murmur heard.      No friction rub.   Pulmonary:      Effort: Pulmonary effort is normal.      Breath sounds: Examination of the right-lower field reveals decreased breath sounds. Examination of the left-lower field reveals decreased breath sounds. Decreased breath sounds present. No wheezing or rhonchi.   Abdominal:      General: Abdomen is flat. There is distension.      Palpations: Abdomen is soft.      Tenderness: There is  abdominal tenderness. There is no right CVA tenderness or left CVA tenderness.      Hernia: No hernia is present.   Musculoskeletal:      Cervical back: Full passive range of motion without pain, normal range of motion and neck supple. No rigidity. No muscular tenderness.      Lumbar back: Tenderness present. Decreased range of motion.      Right lower leg: No edema.      Left lower leg: No edema.   Lymphadenopathy:      Head:      Right side of head: No submental adenopathy.      Left side of head: No submental adenopathy.      Cervical:      Right cervical: No superficial cervical adenopathy.     Left cervical: No superficial cervical adenopathy.      Upper Body:      Right upper body: No supraclavicular adenopathy.      Left upper body: No supraclavicular adenopathy.   Skin:     General: Skin is warm.      Capillary Refill: Capillary refill takes less than 2 seconds.      Coloration: Skin is not cyanotic or pale.      Findings: No abrasion or bruising.   Neurological:      General: No focal deficit present.      Mental Status: She is alert and oriented to person, place, and time. Mental status is at baseline.      GCS: GCS eye subscore is 4. GCS verbal subscore is 5. GCS motor subscore is 6.      Cranial Nerves: No cranial nerve deficit.      Sensory: No sensory deficit.      Motor: Tremor and atrophy present.      Coordination: Impaired rapid alternating movements.      Gait: Gait abnormal.      Deep Tendon Reflexes:      Reflex Scores:       Tricep reflexes are 2+ on the right side and 2+ on the left side.       Bicep reflexes are 2+ on the right side and 2+ on the left side.       Brachioradialis reflexes are 2+ on the right side and 2+ on the left side.       Patellar reflexes are 2+ on the right side and 2+ on the left side.       Achilles reflexes are 2+ on the right side and 2+ on the left side.  Psychiatric:         Attention and Perception: Perception normal. She is inattentive.         Mood and Affect:  "Mood is depressed. Affect is tearful.         Speech: Speech is delayed.         Behavior: Behavior is slowed and withdrawn. Behavior is cooperative.         Thought Content: Thought content normal. Thought content is not paranoid or delusional.         Cognition and Memory: Cognition and memory normal.         Judgment: Judgment is impulsive.         Laboratory:  Recent Labs     01/22/24  1428   WBC 5.7   RBC 4.57   HEMOGLOBIN 15.1   HEMATOCRIT 43.6   MCV 95.4   MCH 33.0   MCHC 34.6   RDW 53.4*   PLATELETCT 73*   MPV 9.5     Recent Labs     01/22/24  1428   SODIUM 144   POTASSIUM 4.3   CHLORIDE 104   CO2 20   GLUCOSE 73   BUN 11   CREATININE 0.57   CALCIUM 8.8     Recent Labs     01/22/24  1428   ALTSGPT 17   ASTSGOT 33   ALKPHOSPHAT 92   TBILIRUBIN 1.9*   LIPASE 33   GLUCOSE 73         No results for input(s): \"NTPROBNP\" in the last 72 hours.      No results for input(s): \"TROPONINT\" in the last 72 hours.    Imaging:  EC-ECHOCARDIOGRAM COMPLETE W/O CONT    (Results Pending)       X-Ray:  I have personally reviewed the images and compared with prior images.  EKG:  I have personally reviewed the images and compared with prior images.    Assessment/Plan:  Justification for Admission Status  I anticipate this patient will require at least two midnights for appropriate medical management, necessitating inpatient admission because patient has active alcohol intoxication and will be undergoing active alcohol withdrawals with worsening delirium tremens.  Because of the severity of how much she drinks and how aggressive the withdrawals will be she will require at least 48 hours of inpatient management to get her through alcohol withdrawals and stabilize her cardiac status and heart rate.    Patient will need a Telemetry bed on MEDICAL service .  The need is secondary to atrial fibrillation with rapid ventricular response and ongoing alcohol withdrawal management.    * Alcohol withdrawal syndrome with complication (HCC)- " (present on admission)  Assessment & Plan  Patient drinks about a pint of vodka per day.  Currently she is intoxicated her alcohol level is elevated at 266.6 mg/dL  Patient tells me that she drinks in order to be able to sleep and cope with her depression  Patient apparently lost her , her daughter and her second daughter all in matter of 6 months to illnesses.  This is about 3 years ago and since then she has not been able to get her act together.  Patient has been admitted to [facility] multiple times for the psychiatric curtis and attempt to correct the situation unsuccessfully.  Patient at this point will be placed on CIWA protocol  Initiate Librium  Initiate thiamine folic acid multivitamin  Magnesium and potassium replacement protocol  Patient tells me that she never drank in her life until she lost her family.  She does have a son left who is actually angry at her for abusing herself this way.    Atrial fibrillation with rapid ventricular response (HCC)- (present on admission)  Assessment & Plan  Patient currently in atrial fibrillation with rapid ventricular response due to the fact that she has not been taking her medications  She does have an AICD in place  The patient was supposed to be on beta-blockers and blood thinners with Eliquis.  Patient is supposed to be on digoxin  Obtain a digoxin level  Resume medications and optimize rate control  Telemetric monitoring  Echocardiogram    HTN (hypertension)- (present on admission)  Assessment & Plan  Optimize blood pressure management keep stop blood pressure less than 140 diastolic under 90  Continue Norvasc 5 mg daily, hydralazine as needed  Metoprolol  mg daily    Thrombocytopenia (HCC)- (present on admission)  Assessment & Plan  Thrombocytopenia secondary to the bone marrow suppression from alcohol  Her most recent platelet count is 73  Currently not bleeding  Currently no transfusion indicated    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  She apparently lost her  and 2 daughters within a span of 6 months  She has not been able to move forward since then  Patient may need psychiatric evaluation and help.    Hypomagnesemia- (present on admission)  Assessment & Plan  Check a magnesium level and replace magnesium as needed    Hypothyroidism- (present on admission)  Assessment & Plan  Continue with Synthroid supplementation  Check a TSH level, most recently she was 4.1208 months ago      Major depressive disorder with current active episode- (present on admission)  Assessment & Plan  Patient continues to have major depressive difficulties without suicidality  She is on Effexor XR  She continues to drink and abuse alcohol  She is most of the time noncompliant with medications  Most likely patient will need psychiatric evaluation and management once she is no longer intoxicated and stabilized from alcohol withdrawal    AICD (automatic cardioverter/defibrillator) present- (present on admission)  Assessment & Plan  Patient has had a [employer] pacemaker/defibrillator implanted by Dr. Baires  She has not followed up with them in probably a couple years according to her  This was placed in 2020    CLEO (obstructive sleep apnea)- (present on admission)  Assessment & Plan  History of sleep apnea currently does not take care of herself and does not utilize any assistive oxygen.    Mixed hyperlipidemia- (present on admission)  Assessment & Plan  Check a fasting lipid panel  Currently not on a statin  Doubtful that with the amount of alcohol she drinks she has high cholesterol        VTE prophylaxis: therapeutic anticoagulation with Eliquis

## 2024-01-23 NOTE — PROGRESS NOTES
"Patient appears anxious at bedside and expresses her wishes to \"go home\" because she \"feels better.\" MD notified and patient was educated on the risk of seizure activity with her active alcohol withdrawal. Patient was agreeable after education to stay through the morning so that she may have a face to face with the doctors and receive her morning medications.   "

## 2024-01-23 NOTE — PROGRESS NOTES
[facility] Internal Medicine Daily Progress Note    Date of Service  1/23/2024    R Team: R IM Green Team   Attending: BENITO Mckinney M.d.  Senior Resident: Dr. York  Intern:  Dr. Loera  Contact Number: [phone]    Chief Complaint  Jeanie Hutchison is a 78 y.o. female admitted 1/22/2024 with worsening shakes, tremors, anxiety, recent alcohol abuse.    Hospital Course  79 y/o female patient with a past medical history of alcoholism, chronic pain, hypothyroidism, paroxysmal atrial fibrillation, presenting to the ER for worsening shakes and cognition. Patient drinks roughly 1 pint of vodka every day. Patient was previously a nonalcoholic, however in the last 2 years, death of multiple family members including , 2 daughters, led to initiation of alcohol consumption. Patient mentions drinking roughly 1 pint vodka daily. Patient is admitted, management began with CIWA protocol, Librium, thiamine, folate, multivitamins, seizure protocols, follow protocol. Patient also has a history of atrial fibrillation status post management with metoprolol, digoxin, Eliquis, however does not use medication at home for management. Patient's home regimen for management of atrial fibrillation restarted, digoxin loaded as needed. Also increasing patient's metoprolol dose for management of hypertension as well as atrial fibrillation. Patient also presenting with thrombocytopenia, likely associated with alcohol use disorder. Monitor with daily labs. Patient also to be referred for outpatient psychology, psychiatry, for management of complex grief disorder, MDD.    Interval Problem Update  NAEO  - Overnight patient's pulses were tachycardic, ranging from 115 to 120 bpm.  - Patient on 1L of oxygen saturating at 93%.  -Labs revealed thrombocytopenia, hypophosphatemia, low digoxin levels  -Echocardiogram is pending, in light of new findings of atrial fibrillation  - Patient to be kept on CIWA protocol, Librium  - Adjusting  patient's hypertensive management medication regimen  - No other complaints/concerns overnight  - Denies suicidal thoughts, thoughts of self-harm    I have discussed this patient's plan of care and discharge plan at IDT rounds today with Case Management, Nursing, Nursing leadership, and other members of the IDT team.    Consultants/Specialty  None    Code Status  Full Code    Disposition  The patient is not medically cleared for discharge to home or a post-acute facility.  Anticipate discharge to: home with close outpatient follow-up    I have placed the appropriate orders for post-discharge needs.    Review of Systems  Review of Systems   Constitutional: Negative.    HENT: Negative.     Eyes: Negative.    Respiratory: Negative.     Cardiovascular: Negative.    Gastrointestinal: Negative.    Genitourinary: Negative.    Musculoskeletal: Negative.    Skin: Negative.    Neurological:  Positive for tremors.   Endo/Heme/Allergies: Negative.    Psychiatric/Behavioral:  Positive for depression. The patient is nervous/anxious.         Physical Exam  Temp:  [36.8 °C (98.2 °F)-37.4 °C (99.3 °F)] 36.9 °C (98.4 °F)  Pulse:  [] 124  Resp:  [16-20] 20  BP: (111-157)/() 136/98  SpO2:  [88 %-97 %] 92 %    Physical Exam  Constitutional:       Appearance: Normal appearance. She is normal weight.   HENT:      Right Ear: External ear normal.      Left Ear: External ear normal.      Nose: Nose normal.      Mouth/Throat:      Mouth: Mucous membranes are moist.   Eyes:      Extraocular Movements: Extraocular movements intact.      Conjunctiva/sclera: Conjunctivae normal.      Pupils: Pupils are equal, round, and reactive to light.   Cardiovascular:      Rate and Rhythm: Tachycardia present. Rhythm irregular.      Pulses: Normal pulses.      Heart sounds: Normal heart sounds.   Pulmonary:      Effort: Pulmonary effort is normal.      Breath sounds: Normal breath sounds.   Abdominal:      General: Bowel sounds are normal.       "Palpations: Abdomen is soft.   Musculoskeletal:         General: Normal range of motion.      Cervical back: Normal range of motion.      Right lower leg: No edema.      Left lower leg: No edema.   Skin:     General: Skin is warm.   Neurological:      General: No focal deficit present.      Mental Status: She is alert and oriented to person, place, and time. Mental status is at baseline.   Psychiatric:         Behavior: Behavior normal.         Thought Content: Thought content normal.         Judgment: Judgment normal.         Fluids    Intake/Output Summary (Last 24 hours) at 1/23/2024 1505  Last data filed at 1/23/2024 1200  Gross per 24 hour   Intake 1720 ml   Output 0 ml   Net 1720 ml       Laboratory  Recent Labs     01/22/24  1428 01/23/24  0208   WBC 5.7 5.3   RBC 4.57 4.06*   HEMOGLOBIN 15.1 13.4   HEMATOCRIT 43.6 39.3   MCV 95.4 96.8   MCH 33.0 33.0   MCHC 34.6 34.1   RDW 53.4* 51.9*   PLATELETCT 73* 63*   MPV 9.5 10.1     Recent Labs     01/22/24  1428 01/23/24  0208   SODIUM 144 138   POTASSIUM 4.3 3.7   CHLORIDE 104 104   CO2 20 21   GLUCOSE 73 138*   BUN 11 9   CREATININE 0.57 0.53   CALCIUM 8.8 7.7*                   Imaging  EC-ECHOCARDIOGRAM COMPLETE W/O CONT   Final Result           Assessment/Plan  Problem Representation:    * Alcohol withdrawal syndrome with complication (HCC)- (present on admission)  Assessment & Plan  Patient mentions significant alcohol intake daily for 1-2 years.  Previously nonalcoholic, however after death of , 2 daughters, pt began drinking to cope with symptoms of depressed mood.  -Pt mentions alcohol helps her sleep  -Drinks 1 pint of alcohol daily  -Has tried reaching out to psychiatrist, psychologist in the past for help with use disorder, however has been \"turned away\"  -CIWA  -Thiamine  -Folate  -Librium 50 mg TID      Atrial fibrillation with rapid ventricular response (HCC)- (present on admission)  Assessment & Plan  Patient currently in atrial fibrillation " with rapid ventricular response due to the fact that she has not been taking her medications  She does have an AICD in place  -Start metoprolol  mg daily  -Digoxin 250 mcg  -Eliquis 5 mg BID for anticoagulation.    Major depressive disorder with current active episode- (present on admission)  Assessment & Plan  Patient denies suicidal ideation, thoughts of self harm.  She is on Effexor XR  She continues to drink and abuse alcohol  She is most of the time noncompliant with medications  Most likely patient will need psychiatric evaluation + psychology referral outpatient for management.    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  She apparently lost her  and 2 daughters within a span of 6 months  She has not been able to move forward since then  Patient may need psychiatric evaluation and help.    Hypothyroidism- (present on admission)  Assessment & Plan  Continue with Synthroid supplementation  TSH 6.32 H, however T4 1.25 N.  -Continue to monitor.      HTN (hypertension)- (present on admission)  Assessment & Plan  Optimize blood pressure management keep stop blood pressure less than 140 diastolic under 90  Continue Norvasc 5 mg daily, hydralazine as needed  Metoprolol  daily  Start clonidine 0.1 mg  w/ holding parameters  Monitor RFTs, electrolytes w/ daily labs.    Thrombocytopenia (HCC)- (present on admission)  Assessment & Plan  Thrombocytopenia, likely due to alcohol use disorder.  Her most recent platelet count is 63  Currently not bleeding  Currently no transfusion indicated    AICD (automatic cardioverter/defibrillator) present- (present on admission)  Assessment & Plan  Patient has had a [employer] pacemaker/defibrillator implanted by Dr. Baires  She has not followed up with them in probably a couple years according to her  This was placed in 2020    CLEO (obstructive  sleep apnea)- (present on admission)  Assessment & Plan  History of sleep apnea currently does not take care of herself and does not utilize any assistive oxygen.    Hypomagnesemia- (present on admission)  Assessment & Plan  Check a magnesium level and replace magnesium as needed    Mixed hyperlipidemia- (present on admission)  Assessment & Plan  Fasting lipid panel revealed hypertriglyceridemia, low HDL  Currently not on a statin  Doubtful that with the amount of alcohol she drinks she has high cholesterol.  Patient to f/u outpatient with primary care for optimized dyslipidemia management.         VTE prophylaxis: therapeutic anticoagulation with eliquis    I have performed a physical exam and reviewed and updated ROS and Plan today (1/23/2024). In review of yesterday's note (1/22/2024), there are no changes except as documented above.

## 2024-01-23 NOTE — CARE PLAN
The patient is Watcher - Medium risk of patient condition declining or worsening    Shift Goals  Clinical Goals: CIWA monitoring, monitor HR/VS/labs, safety  Patient Goals: unable to answer  Family Goals: NA    Progress made toward(s) clinical / shift goals:      Problem: Seizure Precautions  Goal: Implementation of seizure precautions  Description: Target End Date:  Prior to discharge or change in level of care    1.  Padded side rails up at all times  2.  Suction equipment and oxygen delivery system at bedside  3.  Continuous pulse oximeter in use  4.  Implement fall precautions, bed alarm on, bed in lowest position  5.  IV access (per order)  6.  Provide low stimulus environment, avoid exposure to triggers  7.  Instruct patient to use call light/seizure button if having warning signs of impending seizure  Outcome: Progressing       Patient is not progressing towards the following goals:      Problem: Fall Risk  Goal: Patient will remain free from falls  Description: Target End Date:  Prior to discharge or change in level of care    Document interventions on the [place] Fall Risk Assessment    1.  Assess for fall risk factors  2.  Implement fall precautions  Outcome: Not Progressing     Problem: Knowledge Deficit - Standard  Goal: Patient and family/care givers will demonstrate understanding of plan of care, disease process/condition, diagnostic tests and medications  Description: Target End Date:  1-3 days or as soon as patient condition allows    Document in Patient Education    1.  Patient and family/caregiver oriented to unit, equipment, visitation policy and means for communicating concern  2.  Complete/review Learning Assessment  3.  Assess knowledge level of disease process/condition, treatment plan, diagnostic tests and medications  4.  Explain disease process/condition, treatment plan, diagnostic tests and medications  Outcome: Not Progressing     Problem: Lifestyle Changes  Goal: Patient's ability  to identify lifestyle changes and available resources to help reduce recurrence of condition will improve  Description: Target End Date:  1 to 3 days    1.  Discuss recommended lifestyle changes  2.  Identify available resources and support systems  3.  Consider referral to substance abuse program  Outcome: Not Progressing

## 2024-01-23 NOTE — ASSESSMENT & PLAN NOTE
Patient currently in atrial fibrillation.  She does have an AICD in place.  Continue metoprolol 100 mg daily, digoxin 125 mcg daily, apixaban 5 mg twice daily  Monitor digoxin level and avoid toxicity

## 2024-01-23 NOTE — DISCHARGE PLANNING
"Care Transition Team Assessment    LSW attempted to meet with pt at bedside to complete assessment. Pt re-oriented to situation/place, and able to provide limited information. Pt stated she lives in a two story house that has one step to enter. Pt stated her son and grandson stay with her off and on. Pt is retired and reported her son assists her with managing her finances.    LSW attempted to call pt's son to complete assessment, and left  with callback request. Per chart review, pt is independent with all ADLs and IADLs at baseline. However, pt has a history of etoh and requires assistance after an extended period of drinking. Pt drives independently, however \"has a breathalyzer on the car just as if she had a DUI\". Pt does not use any DME at baseline. Pt has been [facility] May of [facility]. Pt has a history of depression and complex grief disorder following the death of her two daughters and  . She was most recently on a legal hold and discharged to [facility] on 12/13/23.    [facility] # [id number]    Information Source  Orientation Level: Oriented to person, Oriented to time, Disoriented to place, Disoriented to situation  Information Given By: Patient  Informant's Name: Jeanie Hutchison  Who is responsible for making decisions for patient? : Patient    Readmission Evaluation  Is this a readmission?: No    Elopement Risk  Legal Hold: No  Ambulatory or Self Mobile in Wheelchair: No-Not an Elopement Risk  Disoriented: Place-At Risk for Elopement, Situation-At Risk for Elopement  Psychiatric Symptoms: Impulsivity-at Risk for Elopement  History of Wandering: No  Elopement this Admit: No  Vocalizing Wanting to Leave: No  Displays Behaviors, Body Language Wanting to Leave: No-Not at Risk for Elopement  Elopement Risk: Not at Risk for Elopement  Wanderguard On: Unavailable  Personal Belongings: Hospital Clothing Only    Interdisciplinary Discharge Planning  Lives with - Patient's Self " Care Capacity: Unable To Determine At This Time  Patient or legal guardian wants to designate a caregiver: No  Support Systems: Family Member(s)  Housing / Facility: Unable To Determine At This Time    Discharge Preparedness  What is your plan after discharge?: Skilled nursing facility  What are your discharge supports?: Child, Other (comment) (grandchildren)  Prior Functional Level: Ambulatory, Drives Self, Independent with Activities of Daily Living, Independent with Medication Management  Difficulity with ADLs: None  Difficulity with IADLs: None    Functional Assesment  Prior Functional Level: Ambulatory, Drives Self, Independent with Activities of Daily Living, Independent with Medication Management    Finances  Financial Barriers to Discharge: No  Prescription Coverage: Yes    Vision / Hearing Impairment  Vision Impairment : Yes  Right Eye Vision: Impaired, Wears Glasses  Left Eye Vision: Impaired, Wears Glasses  Hearing Impairment : No    Advance Directive  Advance Directive?: None    Domestic Abuse  Have you ever been the victim of abuse or violence?: No  Physical Abuse or Sexual Abuse: No  Verbal Abuse or Emotional Abuse: No  Possible Abuse/Neglect Reported to:: Not Applicable    Psychological Assessment  History of Substance Abuse: Alcohol  History of Psychiatric Problems: Yes  Non-compliant with Treatment: No  Newly Diagnosed Illness: No    Discharge Risks or Barriers  Discharge risks or barriers?: Substance abuse, Mental health  Patient risk factors: Vulnerable adult, Substance abuse, Recent loss, Mental health    Anticipated Discharge Information  Discharge Disposition: D/T to SNF with Medicare cert in anticipation of skilled care (03)

## 2024-01-23 NOTE — PROGRESS NOTES
Med rec  complete per Armando. PT does not use [facility] mail order or [facility].  I was only able to verify Effexor-XR, Trazodone, and Lexapro.  Unable to Verify, Eliquis, Amlodipine,Digoxin, Folic Acid, Thiamine, Remeron, Metoprolol, Or Levothyroxine.

## 2024-01-23 NOTE — ASSESSMENT & PLAN NOTE
TSH mildly elevated, unsure if the patient was adherent to home Synthroid dose   Continue home Synthroid

## 2024-01-23 NOTE — ASSESSMENT & PLAN NOTE
Fasting lipid panel revealed hypertriglyceridemia, low HDL  Currently not on a statin  Patient to f/u outpatient with primary care for optimized dyslipidemia management.

## 2024-01-23 NOTE — CARE PLAN
"The patient is Watcher - Medium risk of patient condition declining or worsening    Shift Goals  Clinical Goals: Seizure prevention, CIWA scoring, Ativan prn, safety  Patient Goals: ambulate as tolerated with assistance, medication compliance, rest  Family Goals: DANICA    Progress made toward(s) clinical / shift goals:    Problem: Knowledge Deficit - Standard  Goal: Patient and family/care givers will demonstrate understanding of plan of care, disease process/condition, diagnostic tests and medications  Outcome: Progressing  Note: Patient expressed wishes to go home due to \"feeling better.\" She was subsequently educated on the potential risks and dangers of leaving the hospital treatment, including a high potential for seizure activity. Patient ultimately agreed to stay through the morning.      Problem: Seizure Precautions  Goal: Implementation of seizure precautions  Outcome: Progressing  Note: Seizure precautions were implemented at bedside and, with the help of scheduled/prn anti seizure medications, the patient remained free of seizures throughout the shift.      "

## 2024-01-23 NOTE — ASSESSMENT & PLAN NOTE
Patient's grief at this point has been lasting for over 3 years.  She apparently lost her  and 2 daughters within a span of 6 months  She has not been able to move forward since then  Psychiatry following, making recommendations in medical management  Per psychiatry recommendations, increased Effexor to 225 mg, continue Remeron at dose  Per psychiatry, patient does not have the capacity to leave AGAINST MEDICAL ADVICE at this time, if any acute changes happened then to contact Dr. Anna Marie King over the weekend (1/27-1/28) otherwise Dr. Jaquez will continue to follow on 1/29

## 2024-01-23 NOTE — ED NOTES
Bedside report received from off going RN/tech: Rosa Maria HOOVER, assumed care of patient.  POC discussed with patient. Call light within reach, all needs addressed at this time.       Fall risk interventions in place: Move the patient closer to the nurse's station, Patient's personal possessions are with in their safe reach, Place socks on patient, Place fall risk sign on patient's door, Give patient urinal if applicable, Keep floor surfaces clean and dry, and Accompanied to restroom (all applicable per [place] Fall risk assessment)   Continuous monitoring: Cardiac Leads, Pulse Ox, or Blood Pressure  IVF/IV medications: Infusion per MAR (List Med(s)) Rally Bag and  Magnesium  Oxygen: How many liters 2L  Bedside sitter: Not Applicable   Isolation: Not Applicable

## 2024-01-24 PROBLEM — E83.39 HYPOPHOSPHATEMIA: Status: ACTIVE | Noted: 2024-01-24

## 2024-01-24 LAB
ANION GAP SERPL CALC-SCNC: 14 MMOL/L (ref 7–16)
BUN SERPL-MCNC: 7 MG/DL (ref 8–22)
CALCIUM SERPL-MCNC: 8.6 MG/DL (ref 8.5–10.5)
CHLORIDE SERPL-SCNC: 98 MMOL/L (ref 96–112)
CO2 SERPL-SCNC: 20 MMOL/L (ref 20–33)
CREAT SERPL-MCNC: 0.28 MG/DL (ref 0.5–1.4)
DIGOXIN SERPL-MCNC: 0.9 NG/ML (ref 0.8–2)
ERYTHROCYTE [DISTWIDTH] IN BLOOD BY AUTOMATED COUNT: 49.7 FL (ref 35.9–50)
GFR SERPLBLD CREATININE-BSD FMLA CKD-EPI: 111 ML/MIN/1.73 M 2
GLUCOSE SERPL-MCNC: 120 MG/DL (ref 65–99)
HCT VFR BLD AUTO: 46.5 % (ref 37–47)
HGB BLD-MCNC: 16.5 G/DL (ref 12–16)
MAGNESIUM SERPL-MCNC: 1.6 MG/DL (ref 1.5–2.5)
MCH RBC QN AUTO: 33.2 PG (ref 27–33)
MCHC RBC AUTO-ENTMCNC: 35.3 G/DL (ref 32.2–35.5)
MCV RBC AUTO: 94.1 FL (ref 81.4–97.8)
PHOSPHATE SERPL-MCNC: 3.1 MG/DL (ref 2.5–4.5)
PLATELET # BLD AUTO: 61 K/UL (ref 164–446)
PLATELETS.RETICULATED NFR BLD AUTO: 7.4 % (ref 0.6–13.1)
PMV BLD AUTO: 11.2 FL (ref 9–12.9)
POTASSIUM SERPL-SCNC: 3.3 MMOL/L (ref 3.6–5.5)
POTASSIUM SERPL-SCNC: 4.1 MMOL/L (ref 3.6–5.5)
RBC # BLD AUTO: 4.94 M/UL (ref 4.2–5.4)
SODIUM SERPL-SCNC: 132 MMOL/L (ref 135–145)
WBC # BLD AUTO: 6.6 K/UL (ref 4.8–10.8)

## 2024-01-24 PROCEDURE — 85055 RETICULATED PLATELET ASSAY: CPT

## 2024-01-24 PROCEDURE — 700111 HCHG RX REV CODE 636 W/ 250 OVERRIDE (IP): Mod: JZ

## 2024-01-24 PROCEDURE — A9270 NON-COVERED ITEM OR SERVICE: HCPCS | Performed by: HOSPITALIST

## 2024-01-24 PROCEDURE — A9270 NON-COVERED ITEM OR SERVICE: HCPCS

## 2024-01-24 PROCEDURE — 770020 HCHG ROOM/CARE - TELE (206)

## 2024-01-24 PROCEDURE — 700102 HCHG RX REV CODE 250 W/ 637 OVERRIDE(OP): Performed by: HOSPITALIST

## 2024-01-24 PROCEDURE — 700102 HCHG RX REV CODE 250 W/ 637 OVERRIDE(OP): Performed by: STUDENT IN AN ORGANIZED HEALTH CARE EDUCATION/TRAINING PROGRAM

## 2024-01-24 PROCEDURE — A9270 NON-COVERED ITEM OR SERVICE: HCPCS | Performed by: STUDENT IN AN ORGANIZED HEALTH CARE EDUCATION/TRAINING PROGRAM

## 2024-01-24 PROCEDURE — 700102 HCHG RX REV CODE 250 W/ 637 OVERRIDE(OP)

## 2024-01-24 PROCEDURE — 84100 ASSAY OF PHOSPHORUS: CPT

## 2024-01-24 PROCEDURE — 80048 BASIC METABOLIC PNL TOTAL CA: CPT

## 2024-01-24 PROCEDURE — 97162 PT EVAL MOD COMPLEX 30 MIN: CPT

## 2024-01-24 PROCEDURE — 80162 ASSAY OF DIGOXIN TOTAL: CPT

## 2024-01-24 PROCEDURE — 84132 ASSAY OF SERUM POTASSIUM: CPT

## 2024-01-24 PROCEDURE — 83735 ASSAY OF MAGNESIUM: CPT

## 2024-01-24 PROCEDURE — 85027 COMPLETE CBC AUTOMATED: CPT

## 2024-01-24 PROCEDURE — 36415 COLL VENOUS BLD VENIPUNCTURE: CPT

## 2024-01-24 PROCEDURE — 99233 SBSQ HOSP IP/OBS HIGH 50: CPT | Mod: GC | Performed by: HOSPITALIST

## 2024-01-24 PROCEDURE — 51798 US URINE CAPACITY MEASURE: CPT

## 2024-01-24 RX ORDER — DIGOXIN 125 MCG
250 TABLET ORAL DAILY
Status: DISCONTINUED | OUTPATIENT
Start: 2024-01-25 | End: 2024-01-27

## 2024-01-24 RX ORDER — MAGNESIUM SULFATE HEPTAHYDRATE 40 MG/ML
2 INJECTION, SOLUTION INTRAVENOUS ONCE
Status: COMPLETED | OUTPATIENT
Start: 2024-01-24 | End: 2024-01-24

## 2024-01-24 RX ORDER — POTASSIUM CHLORIDE 20 MEQ/1
20 TABLET, EXTENDED RELEASE ORAL ONCE
Status: COMPLETED | OUTPATIENT
Start: 2024-01-24 | End: 2024-01-24

## 2024-01-24 RX ORDER — POTASSIUM CHLORIDE 20 MEQ/1
40 TABLET, EXTENDED RELEASE ORAL ONCE
Status: COMPLETED | OUTPATIENT
Start: 2024-01-24 | End: 2024-01-24

## 2024-01-24 RX ORDER — DIGOXIN 125 MCG
125 TABLET ORAL ONCE
Status: COMPLETED | OUTPATIENT
Start: 2024-01-24 | End: 2024-01-24

## 2024-01-24 RX ADMIN — METOPROLOL SUCCINATE 100 MG: 25 TABLET, EXTENDED RELEASE ORAL at 06:22

## 2024-01-24 RX ADMIN — DOCUSATE SODIUM 50 MG AND SENNOSIDES 8.6 MG 2 TABLET: 8.6; 5 TABLET, FILM COATED ORAL at 17:14

## 2024-01-24 RX ADMIN — LORAZEPAM 0.5 MG: 1 TABLET ORAL at 02:14

## 2024-01-24 RX ADMIN — CHLORDIAZEPOXIDE HYDROCHLORIDE 25 MG: 25 CAPSULE ORAL at 17:14

## 2024-01-24 RX ADMIN — POTASSIUM CHLORIDE 40 MEQ: 20 TABLET, EXTENDED RELEASE ORAL at 08:26

## 2024-01-24 RX ADMIN — APIXABAN 5 MG: 5 TABLET, FILM COATED ORAL at 04:37

## 2024-01-24 RX ADMIN — DOCUSATE SODIUM 50 MG AND SENNOSIDES 8.6 MG 2 TABLET: 8.6; 5 TABLET, FILM COATED ORAL at 04:37

## 2024-01-24 RX ADMIN — VENLAFAXINE HYDROCHLORIDE 150 MG: 75 CAPSULE, EXTENDED RELEASE ORAL at 08:25

## 2024-01-24 RX ADMIN — THERA TABS 1 TABLET: TAB at 04:37

## 2024-01-24 RX ADMIN — CHLORDIAZEPOXIDE HYDROCHLORIDE 25 MG: 25 CAPSULE ORAL at 12:00

## 2024-01-24 RX ADMIN — LORAZEPAM 0.5 MG: 1 TABLET ORAL at 06:22

## 2024-01-24 RX ADMIN — APIXABAN 5 MG: 5 TABLET, FILM COATED ORAL at 17:14

## 2024-01-24 RX ADMIN — Medication 400 MG: at 04:37

## 2024-01-24 RX ADMIN — AMLODIPINE BESYLATE 5 MG: 5 TABLET ORAL at 04:37

## 2024-01-24 RX ADMIN — MIRTAZAPINE 15 MG: 15 TABLET, FILM COATED ORAL at 21:16

## 2024-01-24 RX ADMIN — CHLORDIAZEPOXIDE HYDROCHLORIDE 25 MG: 25 CAPSULE ORAL at 04:36

## 2024-01-24 RX ADMIN — FOLIC ACID 1 MG: 1 TABLET ORAL at 04:37

## 2024-01-24 RX ADMIN — LEVOTHYROXINE SODIUM 50 MCG: 0.05 TABLET ORAL at 04:37

## 2024-01-24 RX ADMIN — Medication 400 MG: at 17:14

## 2024-01-24 RX ADMIN — Medication 100 MG: at 04:37

## 2024-01-24 RX ADMIN — MAGNESIUM SULFATE HEPTAHYDRATE 2 G: 2 INJECTION, SOLUTION INTRAVENOUS at 11:23

## 2024-01-24 RX ADMIN — Medication 5 MG: at 21:17

## 2024-01-24 RX ADMIN — POTASSIUM CHLORIDE 20 MEQ: 20 TABLET, EXTENDED RELEASE ORAL at 13:42

## 2024-01-24 RX ADMIN — DIGOXIN 125 MCG: 0.12 TABLET ORAL at 04:36

## 2024-01-24 RX ADMIN — DIGOXIN 125 MCG: 0.12 TABLET ORAL at 12:00

## 2024-01-24 ASSESSMENT — LIFESTYLE VARIABLES
PAROXYSMAL SWEATS: NO SWEAT VISIBLE
HEADACHE, FULLNESS IN HEAD: NOT PRESENT
TREMOR: TREMOR NOT VISIBLE BUT CAN BE FELT, FINGERTIP TO FINGERTIP
HEADACHE, FULLNESS IN HEAD: NOT PRESENT
ANXIETY: NO ANXIETY (AT EASE)
NAUSEA AND VOMITING: NO NAUSEA AND NO VOMITING
TOTAL SCORE: VERY MILD ITCHING, PINS AND NEEDLES SENSATION, BURNING OR NUMBNESS
NAUSEA AND VOMITING: NO NAUSEA AND NO VOMITING
ORIENTATION AND CLOUDING OF SENSORIUM: CANNOT DO SERIAL ADDITIONS OR IS UNCERTAIN ABOUT DATE
TREMOR: NO TREMOR
TREMOR: NO TREMOR
ORIENTATION AND CLOUDING OF SENSORIUM: CANNOT DO SERIAL ADDITIONS OR IS UNCERTAIN ABOUT DATE
TOTAL SCORE: 3
AGITATION: SOMEWHAT MORE THAN NORMAL ACTIVITY
AGITATION: NORMAL ACTIVITY
TREMOR: TREMOR NOT VISIBLE BUT CAN BE FELT, FINGERTIP TO FINGERTIP
HEADACHE, FULLNESS IN HEAD: NOT PRESENT
AGITATION: *
HEADACHE, FULLNESS IN HEAD: NOT PRESENT
ANXIETY: MILDLY ANXIOUS
PAROXYSMAL SWEATS: NO SWEAT VISIBLE
VISUAL DISTURBANCES: NOT PRESENT
ORIENTATION AND CLOUDING OF SENSORIUM: ORIENTED AND CAN DO SERIAL ADDITIONS
NAUSEA AND VOMITING: NO NAUSEA AND NO VOMITING
ANXIETY: MILDLY ANXIOUS
PAROXYSMAL SWEATS: NO SWEAT VISIBLE
VISUAL DISTURBANCES: NOT PRESENT
AUDITORY DISTURBANCES: NOT PRESENT
NAUSEA AND VOMITING: NO NAUSEA AND NO VOMITING
AGITATION: *
HEADACHE, FULLNESS IN HEAD: NOT PRESENT
ANXIETY: *
AUDITORY DISTURBANCES: VERY MILD HARSHNESS OR ABILITY TO FRIGHTEN
AUDITORY DISTURBANCES: NOT PRESENT
PAROXYSMAL SWEATS: NO SWEAT VISIBLE
TREMOR: *
TREMOR: *
AGITATION: NORMAL ACTIVITY
AUDITORY DISTURBANCES: NOT PRESENT
ANXIETY: MILDLY ANXIOUS
TOTAL SCORE: 7
PAROXYSMAL SWEATS: NO SWEAT VISIBLE
TOTAL SCORE: 5
NAUSEA AND VOMITING: NO NAUSEA AND NO VOMITING
ORIENTATION AND CLOUDING OF SENSORIUM: CANNOT DO SERIAL ADDITIONS OR IS UNCERTAIN ABOUT DATE
ORIENTATION AND CLOUDING OF SENSORIUM: CANNOT DO SERIAL ADDITIONS OR IS UNCERTAIN ABOUT DATE
AUDITORY DISTURBANCES: VERY MILD HARSHNESS OR ABILITY TO FRIGHTEN
VISUAL DISTURBANCES: NOT PRESENT
HEADACHE, FULLNESS IN HEAD: NOT PRESENT
ORIENTATION AND CLOUDING OF SENSORIUM: CANNOT DO SERIAL ADDITIONS OR IS UNCERTAIN ABOUT DATE
VISUAL DISTURBANCES: NOT PRESENT
NAUSEA AND VOMITING: NO NAUSEA AND NO VOMITING
TOTAL SCORE: 5
TOTAL SCORE: 3
AUDITORY DISTURBANCES: VERY MILD HARSHNESS OR ABILITY TO FRIGHTEN
AGITATION: NORMAL ACTIVITY
PAROXYSMAL SWEATS: NO SWEAT VISIBLE
VISUAL DISTURBANCES: NOT PRESENT
ANXIETY: MILDLY ANXIOUS
VISUAL DISTURBANCES: NOT PRESENT
TOTAL SCORE: 3

## 2024-01-24 ASSESSMENT — COGNITIVE AND FUNCTIONAL STATUS - GENERAL
MOVING TO AND FROM BED TO CHAIR: UNABLE
STANDING UP FROM CHAIR USING ARMS: A LOT
MOBILITY SCORE: 8
CLIMB 3 TO 5 STEPS WITH RAILING: TOTAL
SUGGESTED CMS G CODE MODIFIER MOBILITY: CM
TURNING FROM BACK TO SIDE WHILE IN FLAT BAD: UNABLE
MOVING FROM LYING ON BACK TO SITTING ON SIDE OF FLAT BED: UNABLE
WALKING IN HOSPITAL ROOM: A LOT

## 2024-01-24 ASSESSMENT — ENCOUNTER SYMPTOMS
EYES NEGATIVE: 1
CONSTITUTIONAL NEGATIVE: 1
CARDIOVASCULAR NEGATIVE: 1
PSYCHIATRIC NEGATIVE: 1
MUSCULOSKELETAL NEGATIVE: 1
GASTROINTESTINAL NEGATIVE: 1
NEUROLOGICAL NEGATIVE: 1
RESPIRATORY NEGATIVE: 1

## 2024-01-24 ASSESSMENT — FIBROSIS 4 INDEX: FIB4 SCORE: 9.97

## 2024-01-24 ASSESSMENT — PAIN DESCRIPTION - PAIN TYPE
TYPE: ACUTE PAIN

## 2024-01-24 ASSESSMENT — GAIT ASSESSMENTS: GAIT LEVEL OF ASSIST: UNABLE TO PARTICIPATE

## 2024-01-24 NOTE — CARE PLAN
The patient is Stable - Low risk of patient condition declining or worsening    Shift Goals  Clinical Goals: Monitor labs/vitals, CIWA assessment  Patient Goals: DANICA  Family Goals: DANICA    Progress made toward(s) clinical / shift goals:        Problem: Fall Risk  Goal: Patient will remain free from falls  Outcome: Progressing     Problem: Knowledge Deficit - Standard  Goal: Patient and family/care givers will demonstrate understanding of plan of care, disease process/condition, diagnostic tests and medications  Outcome: Progressing     Problem: Optimal Care for Alcohol Withdrawal  Goal: Optimal Care for the alcohol withdrawal patient  Outcome: Progressing     Pt's vitals stable, SaO2 91-93% on RA while awake, but drops to 86% while sleeping. 1L NC applied while pt sleeping, with sats increasing to 92-94%. CIWA assessment completed per order, with pt appearing more lethargic/drowsy today. Pt arouses to verbal or painful stimuli, and answers some questions appropriately at times, but is confused on place, time, and situation. No pain indicated by pt. No s/s of respiratory distress, or aspiration. Pt remains in A Fib on tele, HR controlled. Potassium replaced today with PO Kdur, as well as IV Magnesium replacement given, pt tolerated well. Education provided on all medications and treatments provided, with pt aware but unable to verbalize understanding. Personal items and call bell left within reach of pt. Bed alarm and tele sitter in place for pt safety. Plan of care is onoing at this time.

## 2024-01-24 NOTE — CONSULTS
"PSYCHIATRIC CONSULTATION:  *Reason for admission: alcohol detox and depression  *Reason for consult: depression  *Requesting Physician: Meng Mckinney MD       Legal status: not on hold     *Chief Complaint: Patient is unable to state chief complaint.  Per chart review, \"depression and alcoholism\"     *HPI:   Jeanie Hutchison is a 78 y.o. female w/ PMH of atrial fibrillation with RVR on chronic anticoagulation, hypothyroidism, CLEO, depression and anxiety who presented to the ED via EMS on 1/22/23 for reported alcohol detox and evaluation of depression.  Notably, patient was recently hospitalized and discharged in December for alcohol detox and depression.  Patient is on CIWA protocol and has a telesitter in the room.  Nursing reports that patient has been confused and restless most of the day.     At the time of the initial psychiatric interview, Jeanie is oriented to self and day, but not to date, circumstance, or location.  She states that she is \"at home.\"  She seems to have a high degree of cognitive slowing, thought blocking, and her responses to all questions are very delayed.  She will often say words that don't exist and mumble under her breath.  She speaks in a soft tone, and her answers often do not relate to the question asked.  There are times during the interview in which she was more alert and seemed to be answering questions more appropriately.  Note has been made of these responses below.  However, she is a poor historian at the present moment.  CAM-ICU screen is (+).     Pt is aware that she was recently hospitalized for detox, but states that she does not know where she was during this admission (she was at [facility]).  She states that her last known alcohol use was \"yesterday morning\" which is confirmed by a diagnostic alcohol levels of 266.6 on 1/22.  She states that she began drinking again as soon as she left the hospital last time and has not had a day of sobriety since.   " "  *Psychiatric Review of Systems:  ROS:  Mood: \"anxious\"  Sleep: did not respond  Appetite: \"yah\"  Energy: \"no\"  SI/HI: \"no\"  Hallucinations: believes that she is at her house, attempts to show interviewer picture of her  hanging on the wall  Bipolar: did not respond  Trauma: did not respond     *Medical Review of Systems: as reported by pt. All systems reviewed. Only those found to be + are noted below. All others are negative.      *Past Medical Hx:       Patient Active Problem List   Diagnosis    PAF (paroxysmal atrial fibrillation) (HCC)    Mixed hyperlipidemia    Recurrent falls    Alcohol dependence (HCC)    Hypomagnesemia    Acute respiratory failure with hypoxia (HCC)    History of ventricular tachycardia    AICD discharge    CLEO (obstructive sleep apnea)    Non-smoker    Other insomnia    Gait disorder    AICD (automatic cardioverter/defibrillator) present    Psychosocial stressors    Alcohol withdrawal syndrome with perceptual disturbance (HCC)    Complex grief disorder lasting longer than 12 months    Thrombocytopenia (HCC)    Major depressive disorder with current active episode    HTN (hypertension)    Atrial fibrillation with rapid ventricular response (HCC)    Hypothyroidism    Alcohol withdrawal syndrome with complication (HCC)      *Family Medical/Psychiatric Hx:  Pt is unaware of any hx of medical, psychiatric diagnoses, or substance use in her family.    10 years ago, and both her daughters .  Has a living son who lives in [place].  Also has grandchildren.     *Past Psychiatric Hx:  IP treatment: has been hospitalized \"4 times\" for alcohol detox  OP treatment: past outpatient psychiatry, none currently  Meds tried: \"Lexapro.\"  It appears per chart review that patient is/has also taken mirtazapine and venlafaxine, paroxetine, fluoxitine, duloxetine, sertraline, aripiprazole   Suicide attempts: denies  Trauma: death of her . No nightmares, Per chairt review of previous " "admission, Late  was alcoholic and had extra-marital affairs. No physical or sexual trauma elicited.     *Social Hx:  Living situation: lives alone, but her grandson who is 20 is \"in and out\"  Childhood: unable to assess, may have grown up in [place]  Education/Employment: nurse/professor  Relationships: son \"get's angry\" when he finds out she is drinking  Hobbies/Activity level: intact ADLs.  Requires assistance with some IADLs.  States that she can \"cook myself sandwiches,\" does her own shopping, but doesn't pay her own bills.  Unclear who helps her with this.  Legal Hx: denies  Access to guns: denies     *Drug/Alcohol/Tobacco Hx:   Denies tobacco and illicit substance use.  Alcohol use as per HPI, pt told admitting physician she was drinking 1 pt vodka daily     *Psychiatric (Physical) Examination: observed phenomenon:      Vitals:     01/23/24 1230   BP: (!) 136/98   Pulse: (!) 124   Resp: 20   Temp: 36.9 °C (98.4 °F)   SpO2: 92%      General: Awake but confused.  In no acute distress  Patient Appearance: Appropriate for situation, fairly groomed, wearing hospital gown  Behavior: Appropriate Behavior but responses are inappropriate to the questions asked, Calm, Cooperative  Speech: Delayed, slow rate and rhythm, decreased volume and speaks softly  Mood Comments: \"anxious\"  Affect: appears euthymic  Thought Content: Disorganized, No suicidal ideation, No thoughts of self harm,  No homicidal ideation, Contracts for safety, Feels life is worth living  Thought Process: Disorganized, exhibits thought blocking, tangential  Psychosis: Active hallucinations of other people in the room, No delusions  Insight: Poor insight  Judgment: poor judgment  Cognition: Some short term memory was impaired, lack of Concentration.  Oriented only to self and day of the week.  Believes that she is \"at home\" and then \"in [employer]\" (school of nursing)  Language: Is unable to repeat phrases and name objects.  Fund of Knowledge: " "Below expected for age and level of education  Neuro: mild tremors noted bilaterally, no tics, dyskinesias. no dystonias.  Unable to assess dysmetria due to patient confusion. Gait was not assessed.     CAM-ICU screen positive  Fish in sea \"Yes\"  Hammer a nail \"That would be hard\"  Stones float \"Some can\"  1lb>2lb \"I can't really think about that\"  SAVEAHEART failed, raised hand for all letters  Did respond correctly to verbal commands to show fingers on a specific hand.        Allergies: sulfa drugs   Medications (currently prescribed at [facility]):    Current Facility-Administered Medications:     phosphorus (K-Phos-Neutral) per tablet 250 mg, 250 mg, Oral, Q6HRS, Jeovanny Loera M.D., 250 mg at 01/23/24 1753    [START ON 1/24/2024] cloNIDine (Catapres) tablet 0.1 mg, 0.1 mg, Oral, BID PRN, Layton York M.D.    [START ON 1/24/2024] metoprolol SR (Toprol XL) tablet 100 mg, 100 mg, Oral, Q DAY, Jeovanny Loera M.D.    melatonin tablet 5 mg, 5 mg, Oral, Nightly, Celena Louis M.D.    amLODIPine (Norvasc) tablet 5 mg, 5 mg, Oral, DAILY, Kerrie Medley M.D., 5 mg at 01/23/24 0517    apixaban (Eliquis) tablet 5 mg, 5 mg, Oral, BID, Kerrie Medley M.D., 5 mg at 01/23/24 1753    digoxin (Lanoxin) tablet 125 mcg, 125 mcg, Oral, DAILY, Kerrie Medley M.D., 125 mcg at 01/23/24 0516    levothyroxine (Synthroid) tablet 50 mcg, 50 mcg, Oral, AM ES, Kerrie Medley M.D., 50 mcg at 01/23/24 0516    mirtazapine (Remeron) tablet 15 mg, 15 mg, Oral, QHS, Bridgette Jaquez D.SAUNDRA, 15 mg at 01/23/24 2016    senna-docusate (Pericolace Or Senokot S) 8.6-50 MG per tablet 2 Tablet, 2 Tablet, Oral, BID, 2 Tablet at 01/23/24 0516 **AND** polyethylene glycol/lytes (Miralax) Packet 1 Packet, 1 Packet, Oral, QDAY PRN **AND** magnesium hydroxide (Milk Of Magnesia) suspension 30 mL, 30 mL, Oral, QDAY PRN **AND** bisacodyl (Dulcolax) suppository 10 mg, 10 mg, Rectal, QDAY PRN, Kerrie Medley M.D.    acetaminophen (Tylenol) " tablet 650 mg, 650 mg, Oral, Q6HRS PRN, Kerrie Medley M.D.    Notify provider if pain remains uncontrolled, , , CONTINUOUS **AND** Use the Numeric Rating Scale (NRS), Mendez-Baker Faces (WBF), or FLACC on regular floors and Critical-Care Pain Observation Tool (CPOT) on ICUs/Trauma to assess pain, , , CONTINUOUS **AND** Pulse Ox, , , CONTINUOUS **AND** Pharmacy Consult Request ...Pain Management Review 1 Each, 1 Each, Other, PHARMACY TO DOSE **AND** If patient difficult to arouse and/or has respiratory depression (respiratory rate of 10 or less), stop any opiates that are currently infusing and call a Rapid Response., , , CONTINUOUS, Kerrie Medley M.D.    oxyCODONE immediate-release (Roxicodone) tablet 2.5 mg, 2.5 mg, Oral, Q3HRS PRN **OR** oxyCODONE immediate-release (Roxicodone) tablet 5 mg, 5 mg, Oral, Q3HRS PRN **OR** morphine 4 MG/ML injection 2 mg, 2 mg, Intravenous, Q3HRS PRN, Kerrie Medley M.D.    hydrALAZINE (Apresoline) injection 10 mg, 10 mg, Intravenous, Q4HRS PRN, Kerrie Medley M.D.    ondansetron (Zofran) syringe/vial injection 4 mg, 4 mg, Intravenous, Q4HRS PRN, Kerrie Medley M.D.    ondansetron (Zofran ODT) dispertab 4 mg, 4 mg, Oral, Q4HRS PRN, Kerrie Medley M.D.    LORazepam (Ativan) tablet 0.5 mg, 0.5 mg, Oral, Q4HRS PRN, Kerrie Medley M.D., 0.5 mg at 01/23/24 0517    LORazepam (Ativan) tablet 1 mg, 1 mg, Oral, Q4HRS PRN **OR** LORazepam (Ativan) injection 0.5 mg, 0.5 mg, Intravenous, Q4HRS PRN, Kerrie Medley M.D.    LORazepam (Ativan) tablet 2 mg, 2 mg, Oral, Q2HRS PRN, 2 mg at 01/23/24 1753 **OR** LORazepam (Ativan) injection 1 mg, 1 mg, Intravenous, Q2HRS PRN, Kerrie Medley M.D., 1 mg at 01/23/24 2016    LORazepam (Ativan) tablet 3 mg, 3 mg, Oral, Q HOUR PRN, 3 mg at 01/23/24 1457 **OR** LORazepam (Ativan) injection 1.5 mg, 1.5 mg, Intravenous, Q HOUR PRN, Kerrie Medley M.D.    LORazepam (Ativan) tablet 4 mg, 4 mg, Oral, Q15 MIN PRN **OR** LORazepam (Ativan) injection 2 mg, 2 mg,  Intravenous, Q15 MIN PRN, Kerrie Medley M.D.    [COMPLETED] chlordiazePOXIDE (Librium) capsule 50 mg, 50 mg, Oral, Q6HRS, 50 mg at 01/23/24 1227 **FOLLOWED BY** chlordiazePOXIDE (Librium) capsule 25 mg, 25 mg, Oral, Q6HRS, Kerrie Medley M.D., 25 mg at 01/23/24 1753    [COMPLETED] magnesium sulfate IVPB premix 2 g, 2 g, Intravenous, Once, Stopped at 01/22/24 2115 **AND** magnesium oxide tablet 400 mg, 400 mg, Oral, BID, Kerrie Medley M.D., 400 mg at 01/23/24 1753    [COMPLETED] Rally Bag IV (D5LR 1000 mL + Thiamine 100 mg + Folic Acid 1 mg + Magnesium Sulfate 1 g) infusion, , Intravenous, Once, Last Rate: 250 mL/hr at 01/22/24 1829, New Bag at 01/22/24 1829 **AND** thiamine (Vitamin B-1) tablet 100 mg, 100 mg, Oral, DAILY, 100 mg at 01/23/24 0518 **AND** multivitamin tablet 1 Tablet, 1 Tablet, Oral, DAILY, 1 Tablet at 01/23/24 0516 **AND** folic acid (Folvite) tablet 1 mg, 1 mg, Oral, DAILY, Kerrie Medley M.D., 1 mg at 01/23/24 0516    venlafaxine XR (Effexor XR) capsule 150 mg, 150 mg, Oral, QDAY with Breakfast, Kerrie Medley M.D., 150 mg at 01/23/24 0838    *Labs:        Lab Results   Component Value Date/Time     WBC 5.3 01/23/2024 02:08 AM     RBC 4.06 (L) 01/23/2024 02:08 AM     HEMOGLOBIN 13.4 01/23/2024 02:08 AM     HEMATOCRIT 39.3 01/23/2024 02:08 AM     MCV 96.8 01/23/2024 02:08 AM     MCH 33.0 01/23/2024 02:08 AM     MCHC 34.1 01/23/2024 02:08 AM     MPV 10.1 01/23/2024 02:08 AM     NEUTSPOLYS 41.90 (L) 01/22/2024 02:28 PM     LYMPHOCYTES 44.50 (H) 01/22/2024 02:28 PM     MONOCYTES 11.00 01/22/2024 02:28 PM     EOSINOPHILS 1.10 01/22/2024 02:28 PM     BASOPHILS 1.10 01/22/2024 02:28 PM            Lab Results   Component Value Date/Time     SODIUM 138 01/23/2024 02:08 AM     POTASSIUM 3.7 01/23/2024 02:08 AM     CHLORIDE 104 01/23/2024 02:08 AM     CO2 21 01/23/2024 02:08 AM     GLUCOSE 138 (H) 01/23/2024 02:08 AM     BUN 9 01/23/2024 02:08 AM     CREATININE 0.53 01/23/2024 02:08 AM     BUNCREATRAT 21.7  "(H) 08/31/2023 03:29 AM          Recent Labs     01/22/24  1428   ASTSGOT 33   ALTSGPT 17   TBILIRUBIN 1.9*   GLOBULIN 2.8      EKG Interpretation (1/22/24):  QTc: 457  Atrial fibrillation   Left anterior fascicular block     TSH elevated at 6.320 with normal T4 1.25.     Most recent B12 12/08/23 601 wnl  Most recent VitD 11/23/22 35 wnl     No EEG on file.  No recent CXR.     *ASSESSMENT:   Jeanie Hutchison is a 78 y.o. female w/ PMH of atrial fibrillation with RVR on chronic anticoagulation, hypothyroidism, CLEO, depression and anxiety who presented to the ED via EMS on 1/22/23 for reported alcohol detox and evaluation of depression.  Notably, patient was recently hospitalized and discharged in December for alcohol detox and depression.  Patient is on CIWA protocol and has a telesitter in the room, but is very confused during the interview due to withdrawal.     Alcohol Use Disorder, severe  2.  Delirium, related to alcohol withdrawal  -patient admits to drinking heavily each day, with no period of sobriety since her last admission in December, stated on admission she was drinking 1 pt vodka  -states that her last drink was \"yesterday morning\"  -she has become increasingly more confused over the course of her hospital stay  -has a known history of severe withdrawal symptoms  -currently on CIWA protocol,     3. Hx of Depression and Anxiety  -patient is known to the psychiatry team  -currently on multiple antidepressants and anxiolytics, no signs of serotonin syndrome on physical exam  -does not appear that pt has been taking her medications over the last few days per ED note     *Plan/Further Workup:   Legal status: will need to reassess the need for legal hold when patient is no longer delirious. Recommend patient no be discharged AMA until an assessment for legal hold can be completed as there is concern that patient is a danger to herself due to depression but legal hold cannot be initiated while patient is " still delirious     Medical management per medical team     *Medication Managment:  -discontinue Lexapro to prevent risk of serotonin syndrome with concurrent SNRi use  -CIWA protocol per primary team  -continue Remeron PO 15mg QD at night  for sleep, appetite and depression, may hold for sedation  -Continue Effexor  mg QD for depression, starting lower dose as patient may have been off this medication and is currently hypertensive( likely due to withdrawal). Will likely re-increase when delirium and or hypertension is resoling.     *Labs and Imaging Reviewed:  as above     *Ordered Old Records/Obtain Collateral: patient is known to the psychiatry team from prior admission     *Discussed with another provider: Dr. Loera        Will follow  Thank you for the consult.

## 2024-01-24 NOTE — CARE PLAN
The patient is Stable - Low risk of patient condition declining or worsening    Shift Goals  Clinical Goals: CIWA, monitor vitals, rest  Patient Goals: Sleep  Family Goals: DANICA        Problem: Fall Risk  Goal: Patient will remain free from falls  Outcome: Progressing  Note: 1. Assess for fall risk factors   2. Implement fall precautions      Progress made toward(s) clinical / shift goals:  treaded slipper socks on patient, bed alarm in use, tele sitter. Patient is gaining understanding about why she cannot get out of bed without assistance at this time       Problem: Optimal Care for Alcohol Withdrawal  Goal: Optimal Care for the alcohol withdrawal patient  Outcome: Progressing  Note: 1. Alcohol history screening done on admission   2. CIWA-AR score assessment (includes assessment of nausea/vomiting, tremor, sweats, anxiety, agitation, tactile, visual and auditory disturbances, headache and orientation/sensorium). Document on CIWA flowsheet.   3. Follow CIWA-AR score protocol   4. Frequent reorientation   5. Monitor for fluid and electrolyte imbalance.   6. Assess for respiratory depression due to sedation (pulse ox)   7. Consider thiamine, multivitamins, folic acid and magnesium sulfate per provider order   8. Collaborate with Social Workers/Case Management   9. Collaborate with mental health      Progress made toward(s) clinical / shift goals: CIWA protocol being carried out; prn ativan in use     Patient is not progressing towards the following goals: NA

## 2024-01-24 NOTE — DISCHARGE PLANNING
"LSW spoke with pt's son, Ulices, who verified the information on the face sheet. Ulices reported that pt has not been to a PCP in 1-2 years. She has had HHC set up in the past, however they will not do visits while she's intoxicated. Ulices reported that pt currently only lives with her grandson, however they have \"minimal interaction\". Pt is independent with every thing except for managing her finances, which Ulices helps with. Pt has a walker, but does not use any DME at baseline. Ulices reported pt has ACP documents and he has \"conditional POA\" for both financial and medical decision making. Ulices inquired about GREGORY for pt. Informed Ulices this would be an out of pocket cost for pt that pt/family would need to arrange. Ulices voiced understanding and stated he would just like help convincing pt to agree to an GREGORY.  "

## 2024-01-24 NOTE — THERAPY
Physical Therapy   Initial Evaluation     Patient Name: Jeanie Hutchison  Age:  78 y.o., Sex:  female  Medical Record #: [id number]  Today's Date: 1/24/2024     Precautions  Precautions: Fall Risk;Other (See Comments) (seizure)    Assessment  Patient is 78 y.o. female with a diagnosis of alcohol withdrawal syndrome w/ complication, A fib w/ rapid ventricular response, thrombocytopenia, automatic cardioverter/defibrillator, and mixed hyperlipidemia.  Additional factors influencing patient status / progress include HTN, complex grief disorder, hypomagnesemia, hypothyroidism, depression, and obstructive sleep apnea.    Pt was agreeable to the evaluation session detailed below. Pt required initiation of STS but participated in movement. Pt was slightly unsteady in standing. Pt was very lethargic during session that significantly affected her participation.  Discharge recommendations are unable to be determined at this time due to patient's decreased alertness during the session. PT expects pt will be more participatory following ETOH  protocol. Will continue to follow during admit.    Plan    Physical Therapy Initial Treatment Plan   Treatment Plan : Bed Mobility, Gait Training, Neuro Re-Education / Balance, Therapeutic Activities, Therapeutic Exercise, Stair Training  Treatment Frequency: 3 Times per Week (Will increase frequency once patient able to participate)  Duration: Until Therapy Goals Met    DC Equipment Recommendations: Unable to determine at this time  Discharge Recommendations: Other - (Unable to appropriately determine at this time, will continue to assess in subsequent session)      Objective     01/24/24 0929   Precautions   Precautions Fall Risk;Other (See Comments)  (seizure)   Vitals   Pulse (!) 110   Patient BP Position Sitting   Blood Pressure  (!) 137/94   Pulse Oximetry 89 %   O2 Delivery Device None - Room Air   Vitals Comments Pt was not wearing nasal cannula when PT entered the room. Nasal  cannula was replaced at the end of the session at 1L of O2.   Pain   Pain Scales 0 to 10 Scale    Intervention Declines   Pain 0 - 10 Group   Therapist Pain Assessment Post Activity Pain Same as Prior to Activity   Cognition    Cognition / Consciousness X   Speech/ Communication Delayed Responses   Orientation Level Not Oriented to Time;Not Oriented to Reason   Level of Consciousness Responds to voice   Ability To Follow Commands 1 Step   Safety Awareness Impaired   New Learning Impaired   Attention Impaired   Sequencing Impaired   Initiation Impaired   Comments slow to respond; requiring repeated cues   Passive ROM Lower Body   Passive ROM Lower Body WDL   Active ROM Lower Body    Active ROM Lower Body  WDL   Strength Lower Body   Lower Body Strength  X   Comments Strength assessed in supine, grossly BLE 3/5; will complete sitting assessment at future visit for more accurate strength assessment.   Sensation Lower Body   Lower Extremity Sensation   Not Tested   Comments Due to level of alertness   Balance   Sitting Balance (Static) Fair   Sitting Balance (Dynamic) Fair -   Standing Balance (Static) Fair -   Standing Balance (Dynamic) Poor +   Weight Shift Sitting Fair   Weight Shift Standing Poor   Bed Mobility    Supine to Sit Maximal Assist   Sit to Supine Maximal Assist   Scooting Maximal Assist   Rolling Maximal Assist to Rt.   Comments Verbal cues to push through UE on bed, pt insisted on pulling on PT's hands with no resulting scooting movement   Gait Analysis   Gait Level Of Assist Unable to Participate   Comments Due to level of alertness   Functional Mobility   Sit to Stand Moderate Assist   Mobility EOB only   Comments w/ HHA x2; STS x1 from EOB   How much difficulty does the patient currently have...   Turning over in bed (including adjusting bedclothes, sheets and blankets)? 1   Sitting down on and standing up from a chair with arms (e.g., wheelchair, bedside commode, etc.) 1   Moving from lying on back  to sitting on the side of the bed? 1   How much help from another person does the patient currently need...   Moving to and from a bed to a chair (including a wheelchair)? 2   Need to walk in a hospital room? 2   Climbing 3-5 steps with a railing? 1   6 clicks Mobility Score 8   Activity Tolerance   Sitting Edge of Bed >15 minutes   Standing >1 minute   Patient / Family Goals    Patient / Family Goal #1 None stated   Short Term Goals    Short Term Goal # 1 Patient will perform supine-sit with HOB flat with supervision in 6 visits   Short Term Goal # 2 Patient will perform sit-stand and chair transfers with LRAD with supervision in 6 visits   Short Term Goal # 3 Patient will ambulate > 50 feet with LRAD with supervision in 6 visits   Short Term Goal # 4 Patient will negotiate few steps with supervision in 6 visits for community mobility   Education Group   Education Provided Role of Physical Therapist   Role of Physical Therapist Patient Response Patient;Acceptance;Explanation;Verbal Demonstration   Physical Therapy Treatment Plan   Treatment Plan  Bed Mobility;Gait Training;Neuro Re-Education / Balance;Therapeutic Activities;Therapeutic Exercise;Stair Training   Treatment Frequency 3 Times per Week  (Will increase frequency once patient able to participate)   Duration Until Therapy Goals Met   Anticipated Discharge Equipment and Recommendations   DC Equipment Recommendations Unable to determine at this time   Discharge Recommendations Other -  (Unable to appropriately determine at this time, will continue to assess in subsequent session)   Interdisciplinary Plan of Care Collaboration   IDT Collaboration with  Nursing   Patient Position at End of Therapy In Bed;Bed Alarm On;Call Light within Reach;Tray Table within Reach;Phone within Reach   Collaboration Comments RN notified   Session Information   Date / Session Number  1/24- 1(1/3, 1/30)

## 2024-01-24 NOTE — PROGRESS NOTES
Monitor summary:     Rhythm : Afib  Rate :   Ectopy : PVCs    PA=NA  QRS=.08  QT=NA        Per telemetry strip summary from monitor room.

## 2024-01-24 NOTE — PROGRESS NOTES
Monitor summary:     Rhythm : Afib  Rate : 115-137  Ectopy : PVCs    OK=.-  QRS=.06  QT=.-        Per telemetry strip summary from monitor room.

## 2024-01-25 PROBLEM — D58.2 ELEVATED HEMOGLOBIN (HCC): Status: ACTIVE | Noted: 2024-01-25

## 2024-01-25 LAB
ANION GAP SERPL CALC-SCNC: 14 MMOL/L (ref 7–16)
ANION GAP SERPL CALC-SCNC: 14 MMOL/L (ref 7–16)
BUN SERPL-MCNC: 13 MG/DL (ref 8–22)
BUN SERPL-MCNC: 16 MG/DL (ref 8–22)
CALCIUM SERPL-MCNC: 9 MG/DL (ref 8.5–10.5)
CALCIUM SERPL-MCNC: 9.2 MG/DL (ref 8.5–10.5)
CHLORIDE SERPL-SCNC: 100 MMOL/L (ref 96–112)
CHLORIDE SERPL-SCNC: 101 MMOL/L (ref 96–112)
CO2 SERPL-SCNC: 18 MMOL/L (ref 20–33)
CO2 SERPL-SCNC: 21 MMOL/L (ref 20–33)
CREAT SERPL-MCNC: 0.36 MG/DL (ref 0.5–1.4)
CREAT SERPL-MCNC: 0.58 MG/DL (ref 0.5–1.4)
DIGOXIN SERPL-MCNC: 1.2 NG/ML (ref 0.8–2)
ERYTHROCYTE [DISTWIDTH] IN BLOOD BY AUTOMATED COUNT: 51 FL (ref 35.9–50)
GFR SERPLBLD CREATININE-BSD FMLA CKD-EPI: 105 ML/MIN/1.73 M 2
GFR SERPLBLD CREATININE-BSD FMLA CKD-EPI: 93 ML/MIN/1.73 M 2
GLUCOSE SERPL-MCNC: 100 MG/DL (ref 65–99)
GLUCOSE SERPL-MCNC: 122 MG/DL (ref 65–99)
HCT VFR BLD AUTO: 48.6 % (ref 37–47)
HGB BLD-MCNC: 16.5 G/DL (ref 12–16)
MAGNESIUM SERPL-MCNC: 1.9 MG/DL (ref 1.5–2.5)
MCH RBC QN AUTO: 32.9 PG (ref 27–33)
MCHC RBC AUTO-ENTMCNC: 34 G/DL (ref 32.2–35.5)
MCV RBC AUTO: 97 FL (ref 81.4–97.8)
PHOSPHATE SERPL-MCNC: 3.3 MG/DL (ref 2.5–4.5)
PLATELET # BLD AUTO: 71 K/UL (ref 164–446)
PLATELETS.RETICULATED NFR BLD AUTO: 9.3 % (ref 0.6–13.1)
PMV BLD AUTO: 11.4 FL (ref 9–12.9)
POTASSIUM SERPL-SCNC: 4 MMOL/L (ref 3.6–5.5)
POTASSIUM SERPL-SCNC: 4.2 MMOL/L (ref 3.6–5.5)
RBC # BLD AUTO: 5.01 M/UL (ref 4.2–5.4)
SODIUM SERPL-SCNC: 133 MMOL/L (ref 135–145)
SODIUM SERPL-SCNC: 135 MMOL/L (ref 135–145)
WBC # BLD AUTO: 6.6 K/UL (ref 4.8–10.8)

## 2024-01-25 PROCEDURE — 80162 ASSAY OF DIGOXIN TOTAL: CPT

## 2024-01-25 PROCEDURE — 770020 HCHG ROOM/CARE - TELE (206)

## 2024-01-25 PROCEDURE — 700102 HCHG RX REV CODE 250 W/ 637 OVERRIDE(OP)

## 2024-01-25 PROCEDURE — 83735 ASSAY OF MAGNESIUM: CPT

## 2024-01-25 PROCEDURE — A9270 NON-COVERED ITEM OR SERVICE: HCPCS | Performed by: HOSPITALIST

## 2024-01-25 PROCEDURE — 700105 HCHG RX REV CODE 258

## 2024-01-25 PROCEDURE — 85055 RETICULATED PLATELET ASSAY: CPT

## 2024-01-25 PROCEDURE — 700102 HCHG RX REV CODE 250 W/ 637 OVERRIDE(OP): Performed by: HOSPITALIST

## 2024-01-25 PROCEDURE — A9270 NON-COVERED ITEM OR SERVICE: HCPCS

## 2024-01-25 PROCEDURE — 80048 BASIC METABOLIC PNL TOTAL CA: CPT

## 2024-01-25 PROCEDURE — 700102 HCHG RX REV CODE 250 W/ 637 OVERRIDE(OP): Performed by: STUDENT IN AN ORGANIZED HEALTH CARE EDUCATION/TRAINING PROGRAM

## 2024-01-25 PROCEDURE — 51798 US URINE CAPACITY MEASURE: CPT

## 2024-01-25 PROCEDURE — A9270 NON-COVERED ITEM OR SERVICE: HCPCS | Performed by: STUDENT IN AN ORGANIZED HEALTH CARE EDUCATION/TRAINING PROGRAM

## 2024-01-25 PROCEDURE — 84100 ASSAY OF PHOSPHORUS: CPT

## 2024-01-25 PROCEDURE — 36415 COLL VENOUS BLD VENIPUNCTURE: CPT

## 2024-01-25 PROCEDURE — 99232 SBSQ HOSP IP/OBS MODERATE 35: CPT | Mod: GC | Performed by: HOSPITALIST

## 2024-01-25 PROCEDURE — 85027 COMPLETE CBC AUTOMATED: CPT

## 2024-01-25 RX ORDER — CHOLECALCIFEROL (VITAMIN D3) 125 MCG
5 CAPSULE ORAL NIGHTLY PRN
Status: DISCONTINUED | OUTPATIENT
Start: 2024-01-25 | End: 2024-01-30 | Stop reason: HOSPADM

## 2024-01-25 RX ORDER — CHLORDIAZEPOXIDE HYDROCHLORIDE 25 MG/1
25 CAPSULE, GELATIN COATED ORAL 2 TIMES DAILY
Status: DISCONTINUED | OUTPATIENT
Start: 2024-01-25 | End: 2024-01-25

## 2024-01-25 RX ORDER — SODIUM CHLORIDE, SODIUM LACTATE, POTASSIUM CHLORIDE, AND CALCIUM CHLORIDE .6; .31; .03; .02 G/100ML; G/100ML; G/100ML; G/100ML
1000 INJECTION, SOLUTION INTRAVENOUS ONCE
Status: COMPLETED | OUTPATIENT
Start: 2024-01-25 | End: 2024-01-25

## 2024-01-25 RX ADMIN — LEVOTHYROXINE SODIUM 50 MCG: 0.05 TABLET ORAL at 04:20

## 2024-01-25 RX ADMIN — THERA TABS 1 TABLET: TAB at 06:20

## 2024-01-25 RX ADMIN — METOPROLOL SUCCINATE 100 MG: 25 TABLET, EXTENDED RELEASE ORAL at 04:20

## 2024-01-25 RX ADMIN — Medication 400 MG: at 22:26

## 2024-01-25 RX ADMIN — VENLAFAXINE HYDROCHLORIDE 150 MG: 75 CAPSULE, EXTENDED RELEASE ORAL at 07:28

## 2024-01-25 RX ADMIN — Medication 400 MG: at 04:20

## 2024-01-25 RX ADMIN — DIGOXIN 250 MCG: 0.12 TABLET ORAL at 06:20

## 2024-01-25 RX ADMIN — FOLIC ACID 1 MG: 1 TABLET ORAL at 04:20

## 2024-01-25 RX ADMIN — CHLORDIAZEPOXIDE HYDROCHLORIDE 25 MG: 25 CAPSULE ORAL at 04:21

## 2024-01-25 RX ADMIN — CHLORDIAZEPOXIDE HYDROCHLORIDE 25 MG: 25 CAPSULE ORAL at 00:08

## 2024-01-25 RX ADMIN — APIXABAN 5 MG: 5 TABLET, FILM COATED ORAL at 04:20

## 2024-01-25 RX ADMIN — MIRTAZAPINE 15 MG: 15 TABLET, FILM COATED ORAL at 22:26

## 2024-01-25 RX ADMIN — SODIUM CHLORIDE, POTASSIUM CHLORIDE, SODIUM LACTATE AND CALCIUM CHLORIDE 1000 ML: 600; 310; 30; 20 INJECTION, SOLUTION INTRAVENOUS at 16:38

## 2024-01-25 RX ADMIN — AMLODIPINE BESYLATE 5 MG: 5 TABLET ORAL at 04:20

## 2024-01-25 RX ADMIN — DOCUSATE SODIUM 50 MG AND SENNOSIDES 8.6 MG 2 TABLET: 8.6; 5 TABLET, FILM COATED ORAL at 04:19

## 2024-01-25 RX ADMIN — Medication 100 MG: at 06:20

## 2024-01-25 RX ADMIN — LORAZEPAM 1 MG: 1 TABLET ORAL at 04:20

## 2024-01-25 RX ADMIN — APIXABAN 5 MG: 5 TABLET, FILM COATED ORAL at 18:03

## 2024-01-25 ASSESSMENT — LIFESTYLE VARIABLES
TOTAL SCORE: 2
ANXIETY: NO ANXIETY (AT EASE)
NAUSEA AND VOMITING: NO NAUSEA AND NO VOMITING
PAROXYSMAL SWEATS: NO SWEAT VISIBLE
AGITATION: SOMEWHAT MORE THAN NORMAL ACTIVITY
ORIENTATION AND CLOUDING OF SENSORIUM: CANNOT DO SERIAL ADDITIONS OR IS UNCERTAIN ABOUT DATE
HEADACHE, FULLNESS IN HEAD: NOT PRESENT
AUDITORY DISTURBANCES: NOT PRESENT
AUDITORY DISTURBANCES: NOT PRESENT
TREMOR: NO TREMOR
TOTAL SCORE: 3
ORIENTATION AND CLOUDING OF SENSORIUM: CANNOT DO SERIAL ADDITIONS OR IS UNCERTAIN ABOUT DATE
HEADACHE, FULLNESS IN HEAD: VERY MILD
PAROXYSMAL SWEATS: NO SWEAT VISIBLE
NAUSEA AND VOMITING: NO NAUSEA AND NO VOMITING
VISUAL DISTURBANCES: NOT PRESENT
PAROXYSMAL SWEATS: NO SWEAT VISIBLE
TOTAL SCORE: 3
AGITATION: NORMAL ACTIVITY
AUDITORY DISTURBANCES: VERY MILD HARSHNESS OR ABILITY TO FRIGHTEN
VISUAL DISTURBANCES: NOT PRESENT
VISUAL DISTURBANCES: NOT PRESENT
ANXIETY: NO ANXIETY (AT EASE)
HEADACHE, FULLNESS IN HEAD: NOT PRESENT
AGITATION: NORMAL ACTIVITY
ORIENTATION AND CLOUDING OF SENSORIUM: CANNOT DO SERIAL ADDITIONS OR IS UNCERTAIN ABOUT DATE
AUDITORY DISTURBANCES: VERY MILD HARSHNESS OR ABILITY TO FRIGHTEN
AUDITORY DISTURBANCES: NOT PRESENT
HEADACHE, FULLNESS IN HEAD: NOT PRESENT
ORIENTATION AND CLOUDING OF SENSORIUM: DATE DISORIENTATION BY NO MORE THAN TWO CALENDAR DAYS
ORIENTATION AND CLOUDING OF SENSORIUM: CANNOT DO SERIAL ADDITIONS OR IS UNCERTAIN ABOUT DATE
TREMOR: NO TREMOR
TOTAL SCORE: 3
TOTAL SCORE: 8
PAROXYSMAL SWEATS: BARELY PERCEPTIBLE SWEATING. PALMS MOIST
VISUAL DISTURBANCES: NOT PRESENT
VISUAL DISTURBANCES: NOT PRESENT
AGITATION: SOMEWHAT MORE THAN NORMAL ACTIVITY
NAUSEA AND VOMITING: NO NAUSEA AND NO VOMITING
ANXIETY: MILDLY ANXIOUS
TREMOR: TREMOR NOT VISIBLE BUT CAN BE FELT, FINGERTIP TO FINGERTIP
ANXIETY: MODERATELY ANXIOUS OR GUARDED, SO ANXIETY IS INFERRED
HEADACHE, FULLNESS IN HEAD: NOT PRESENT
AGITATION: NORMAL ACTIVITY
TREMOR: NO TREMOR
NAUSEA AND VOMITING: NO NAUSEA AND NO VOMITING
PAROXYSMAL SWEATS: NO SWEAT VISIBLE
NAUSEA AND VOMITING: NO NAUSEA AND NO VOMITING
TREMOR: TREMOR NOT VISIBLE BUT CAN BE FELT, FINGERTIP TO FINGERTIP
ANXIETY: NO ANXIETY (AT EASE)

## 2024-01-25 ASSESSMENT — ENCOUNTER SYMPTOMS
EYES NEGATIVE: 1
RESPIRATORY NEGATIVE: 1
NEUROLOGICAL NEGATIVE: 1
MUSCULOSKELETAL NEGATIVE: 1
PSYCHIATRIC NEGATIVE: 1
GASTROINTESTINAL NEGATIVE: 1
CARDIOVASCULAR NEGATIVE: 1
CONSTITUTIONAL NEGATIVE: 1

## 2024-01-25 ASSESSMENT — FIBROSIS 4 INDEX: FIB4 SCORE: 9.97

## 2024-01-25 ASSESSMENT — PAIN DESCRIPTION - PAIN TYPE: TYPE: ACUTE PAIN

## 2024-01-25 NOTE — PROGRESS NOTES
[facility] Internal Medicine Daily Progress Note    Date of Service  1/24/2024    R Team: R IM Green Team   Attending: BENITO Mckinney M.d.  Senior Resident: Dr. York  Intern:  Dr. Loera  Contact Number: [phone]    Chief Complaint  Jeanie Hutchison is a 78 y.o. female admitted 1/22/2024 with worsening shakes, tremors, anxiety, recent alcohol abuse.     Hospital Course  79 y/o female patient with a past medical history of alcoholism, chronic pain, hypothyroidism, paroxysmal atrial fibrillation, presenting to the ER for worsening shakes and cognition. Patient drinks roughly 1 pint of vodka every day. Patient was previously a nonalcoholic, however in the last 2 years, death of multiple family members including , 2 daughters, led to initiation of alcohol consumption. Patient mentions drinking roughly 1 pint vodka daily. Patient is admitted, management began with CIWA protocol, Librium, thiamine, folate, multivitamins, seizure protocols, follow protocol. Patient also has a history of atrial fibrillation status post management with metoprolol, digoxin, Eliquis, however does not use medication at home for management. Patient's home regimen for management of atrial fibrillation restarted, digoxin loaded as needed. Also increasing patient's metoprolol dose for management of hypertension as well as atrial fibrillation. Patient also presenting with thrombocytopenia, likely associated with alcohol use disorder. Monitor with daily labs. Patient also to be referred for outpatient psychology, psychiatry, for management of complex grief disorder, MDD.    Interval Problem Update  NAEO.  -PT tried leaving AMA yesterday, but staff members were able to discuss matters with her to help her stay.  -Pt Aox2 this morning  -Denies suicidal ideation/ thoughts of self harm.  -Patient's tachycardia improved overnight from 130s to low 100s with initiation of digoxin  -Maintain 95% sats on RA  -No other  complaints/concerns.  -Psych following, recommended medication changes for management of psychiatric issues.  -Pt to be placed on legal hold if attempting to leave AMA again.    I have discussed this patient's plan of care and discharge plan at IDT rounds today with Case Management, Nursing, Nursing leadership, and other members of the IDT team.    Consultants/Specialty  psychiatry    Code Status  Full Code    Disposition  The patient is not medically cleared for discharge to home or a post-acute facility.  Anticipate discharge to: home with close outpatient follow-up    I have placed the appropriate orders for post-discharge needs.    Review of Systems  ROS poorly taken due to patient being mild-moderately confused.  Review of Systems   Constitutional: Negative.    HENT: Negative.     Eyes: Negative.    Respiratory: Negative.     Cardiovascular: Negative.    Gastrointestinal: Negative.    Genitourinary: Negative.    Musculoskeletal: Negative.    Skin: Negative.    Neurological: Negative.    Endo/Heme/Allergies: Negative.    Psychiatric/Behavioral: Negative.          Physical Exam  Temp:  [36.2 °C (97.2 °F)-36.7 °C (98.1 °F)] 36.7 °C (98.1 °F)  Pulse:  [] 78  Resp:  [18-20] 18  BP: (112-146)/() 112/83  SpO2:  [89 %-96 %] 93 %    Physical Exam  Constitutional:       Appearance: She is normal weight. She is ill-appearing.   HENT:      Right Ear: External ear normal.      Left Ear: External ear normal.      Nose: Nose normal.      Mouth/Throat:      Mouth: Mucous membranes are moist.   Eyes:      Extraocular Movements: Extraocular movements intact.      Conjunctiva/sclera: Conjunctivae normal.      Pupils: Pupils are equal, round, and reactive to light.   Cardiovascular:      Rate and Rhythm: Normal rate and regular rhythm.      Pulses: Normal pulses.      Heart sounds: Normal heart sounds.   Pulmonary:      Effort: Pulmonary effort is normal.      Breath sounds: Normal breath sounds.   Abdominal:       "General: Bowel sounds are normal.      Palpations: Abdomen is soft.   Musculoskeletal:         General: Normal range of motion.      Cervical back: Normal range of motion.   Skin:     General: Skin is warm.   Neurological:      General: No focal deficit present.      Mental Status: She is disoriented.         Fluids    Intake/Output Summary (Last 24 hours) at 1/24/2024 1629  Last data filed at 1/24/2024 1615  Gross per 24 hour   Intake 970 ml   Output 600 ml   Net 370 ml       Laboratory  Recent Labs     01/22/24  1428 01/23/24  0208 01/24/24  0153   WBC 5.7 5.3 6.6   RBC 4.57 4.06* 4.94   HEMOGLOBIN 15.1 13.4 16.5*   HEMATOCRIT 43.6 39.3 46.5   MCV 95.4 96.8 94.1   MCH 33.0 33.0 33.2*   MCHC 34.6 34.1 35.3   RDW 53.4* 51.9* 49.7   PLATELETCT 73* 63* 61*   MPV 9.5 10.1 11.2     Recent Labs     01/22/24  1428 01/23/24  0208 01/24/24  0418 01/24/24  1414   SODIUM 144 138 132*  --    POTASSIUM 4.3 3.7 3.3* 4.1   CHLORIDE 104 104 98  --    CO2 20 21 20  --    GLUCOSE 73 138* 120*  --    BUN 11 9 7*  --    CREATININE 0.57 0.53 0.28*  --    CALCIUM 8.8 7.7* 8.6  --                    Imaging  EC-ECHOCARDIOGRAM COMPLETE W/O CONT   Final Result           Assessment/Plan  Problem Representation:    * Alcohol withdrawal syndrome with complication (HCC)- (present on admission)  Assessment & Plan  Patient mentions significant alcohol intake daily for 1-2 years.  Previously nonalcoholic, however after death of , 2 daughters, pt began drinking to cope with symptoms of depressed mood.  -Pt mentions alcohol helps her sleep  -Drinks 1 pint of alcohol daily  -Has tried reaching out to psychiatrist, psychologist in the past for help with use disorder, however has been \"turned away\"  -CIWA  -Thiamine  -Folate  -Librium 50 mg TID      Atrial fibrillation with rapid ventricular response (HCC)- (present on admission)  Assessment & Plan  Patient currently in atrial fibrillation with rapid ventricular response due to the fact that " she has not been taking her medications  She does have an AICD in place  -Tachycardia significantly improved; low 100s, upon digoxin loading.  -Continue metoprolol 100 mg PO daily  -Digoxin 250 mcg daily for rate control   -Eliquis 5 mg BID for anticoagulation  -Monitor daily digoxin levels.      Major depressive disorder with current active episode- (present on admission)  Assessment & Plan  Patient denies suicidal ideation, thoughts of self harm.  She is on Effexor XR  She continues to drink and abuse alcohol  She is most of the time noncompliant with medications  Most likely patient will need psychiatric evaluation + psychology referral outpatient for management.    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  She apparently lost her  and 2 daughters within a span of 6 months  She has not been able to move forward since then  Patient may need psychiatric evaluation and help.    Hypophosphatemia  Assessment & Plan  PO4 on initial presentation 1.5 L.  -Repleted w/ Kphos 250 mg Q6H  -Reevaluated to be 3.1.  -Monitor daily, evaluate for refeeding syndrome.    Hypothyroidism- (present on admission)  Assessment & Plan  Continue with Synthroid supplementation  TSH 6.32 H, however T4 1.25 N.  -Continue to monitor.      HTN (hypertension)- (present on admission)  Assessment & Plan  Optimize blood pressure management keep stop blood pressure less than 140 diastolic under 90  Continue Norvasc 5 mg daily, hydralazine as needed  Metoprolol  daily  Start clonidine 0.1 mg  w/ holding parameters  Monitor RFTs, electrolytes w/ daily labs.    Thrombocytopenia (HCC)- (present on admission)  Assessment & Plan  Thrombocytopenia, likely due to alcohol use disorder.  Her most recent platelet count is 63  Currently not bleeding  Currently no transfusion indicated    AICD (automatic  cardioverter/defibrillator) present- (present on admission)  Assessment & Plan  Patient has had a [employer] pacemaker/defibrillator implanted by Dr. Baires  She has not followed up with them in probably a couple years according to her  This was placed in 2020    CLEO (obstructive sleep apnea)- (present on admission)  Assessment & Plan  History of sleep apnea currently does not take care of herself and does not utilize any assistive oxygen.    Hypomagnesemia- (present on admission)  Assessment & Plan  Check a magnesium level and replace magnesium as needed    Hypokalemia- (present on admission)  Assessment & Plan  Pt's K+ levels 3.3 today.  -Repleted w/ PO K+  -Monitor w/ daily BMPs  -Repeat Mag checks, replete as needed.    Mixed hyperlipidemia- (present on admission)  Assessment & Plan  Fasting lipid panel revealed hypertriglyceridemia, low HDL  Currently not on a statin  Doubtful that with the amount of alcohol she drinks she has high cholesterol.  Patient to f/u outpatient with primary care for optimized dyslipidemia management.         VTE prophylaxis: enoxaparin ppx    I have performed a physical exam and reviewed and updated ROS and Plan today (1/24/2024). In review of yesterday's note (1/23/2024), there are no changes except as documented above.

## 2024-01-25 NOTE — CARE PLAN
The patient is Stable - Low risk of patient condition declining or worsening    Shift Goals  Clinical Goals: CIWA, monitor vitals  Patient Goals: Rest  Family Goals: DANICA      Problem: Fall Risk  Goal: Patient will remain free from falls  Outcome: Progressing  Note: 1. Assess for fall risk factors   2. Implement fall precautions        Progress made toward(s) clinical / shift goals: treaded slipper socks on patient, three bed rails up, tele sitter in use, and fall education provided       Problem: Optimal Care for Alcohol Withdrawal  Goal: Optimal Care for the alcohol withdrawal patient  Outcome: Progressing  Note: 1. Alcohol history screening done on admission   2. CIWA-AR score assessment (includes assessment of nausea/vomiting, tremor, sweats, anxiety, agitation, tactile, visual and auditory disturbances, headache and orientation/sensorium). Document on CIWA flowsheet.   3. Follow CIWA-AR score protocol   4. Frequent reorientation   5. Monitor for fluid and electrolyte imbalance.   6. Assess for respiratory depression due to sedation (pulse ox)   7. Consider thiamine, multivitamins, folic acid and magnesium sulfate per provider order   8. Collaborate with Social Workers/Case Management   9. Collaborate with mental health      Progress made toward(s) clinical / shift goals: CIWA protocol, PRN ativan and scheduled librium in use, patient is being reorienting, CIWA score has been decreasing    Patient is not progressing towards the following goals: NA

## 2024-01-25 NOTE — PROGRESS NOTES
Monitor summary:     Rhythm : Afib  Rate : 83-99  Ectopy : PVCs    NM=.NA  QRS=.07  QT=.NA        Per telemetry strip summary from monitor room.

## 2024-01-25 NOTE — ASSESSMENT & PLAN NOTE
Likely secondary to alcohol, early refeeding syndrome.  Now resolved after supplementation and patient is now eating

## 2024-01-25 NOTE — ASSESSMENT & PLAN NOTE
RESOLVED    Pt has hx elevated hemoglobin over admission  Likely from alcohol use disorder  B12, folate wnl  Monitoring for daily improvements  Phlebotomy if required

## 2024-01-25 NOTE — PROGRESS NOTES
[facility] Internal Medicine Daily Progress Note    Date of Service  1/25/2024    R Team: R IM Green Team   Attending: BENITO Mckinney M.d.  Senior Resident: Dr. York  Intern:  Dr. Loera  Contact Number: [phone]    Chief Complaint  Jeanie Hutchison is a 78 y.o. female admitted 1/22/2024 with worsening shakes, tremors, anxiety, recent alcohol abuse.     Hospital Course  77 y/o female patient with a past medical history of alcoholism, chronic pain, hypothyroidism, paroxysmal atrial fibrillation, presenting to the ER for worsening shakes and cognition. Patient drinks roughly 1 pint of vodka every day. Patient was previously a nonalcoholic, however in the last 2 years, death of multiple family members including , 2 daughters, led to initiation of alcohol consumption. Patient mentions drinking roughly 1 pint vodka daily. Patient is admitted, management began with CIWA protocol, Librium, thiamine, folate, multivitamins, seizure protocols, follow protocol. Patient also has a history of atrial fibrillation status post management with metoprolol, digoxin, Eliquis, however does not use medication at home for management. Patient's home regimen for management of atrial fibrillation restarted, digoxin loaded as needed. Also increasing patient's metoprolol dose for management of hypertension as well as atrial fibrillation. Patient also presenting with thrombocytopenia, likely associated with alcohol use disorder. Monitor with daily labs. Patient also to be referred for outpatient psychology, psychiatry, for management of complex grief disorder, MDD.    Interval Problem Update  NAEO  -Rate controlled, HR 70-99s overnight.  -Digoxin levels wnl, good cardiac response to medication  -No complaints/concerns overnight  -Pt continues to be relatively disoriented upon awakening. Likely due to sedation w/ ativan + librium  -Psychiatry evaluated patient; monitoring psychiatric medication + adjusting. Following recs. Pt to  be placed on legal hold if leaving AMA due to continued disorientation.    I have discussed this patient's plan of care and discharge plan at IDT rounds today with Case Management, Nursing, Nursing leadership, and other members of the IDT team.    Consultants/Specialty  psychiatry    Code Status  Full Code    Disposition  The patient is not medically cleared for discharge to home or a post-acute facility.  Anticipate discharge to: skilled nursing facility    I have placed the appropriate orders for post-discharge needs.    Review of Systems  Difficult to perform ROS due to abnormal orientation of patient.  Review of Systems   Constitutional: Negative.    HENT: Negative.     Eyes: Negative.    Respiratory: Negative.     Cardiovascular: Negative.    Gastrointestinal: Negative.    Genitourinary: Negative.    Musculoskeletal: Negative.    Skin: Negative.    Neurological: Negative.    Endo/Heme/Allergies: Negative.    Psychiatric/Behavioral: Negative.          Physical Exam  Temp:  [36.1 °C (97 °F)-36.9 °C (98.4 °F)] 36.9 °C (98.4 °F)  Pulse:  [77-93] 77  Resp:  [16-18] 17  BP: (112-139)/(73-96) 139/96  SpO2:  [91 %-93 %] 91 %    Physical Exam  Constitutional:       Appearance: She is normal weight. She is toxic-appearing.   HENT:      Right Ear: External ear normal.      Left Ear: External ear normal.      Nose: Nose normal.      Mouth/Throat:      Mouth: Mucous membranes are moist.   Eyes:      Extraocular Movements: Extraocular movements intact.      Conjunctiva/sclera: Conjunctivae normal.      Pupils: Pupils are equal, round, and reactive to light.   Cardiovascular:      Rate and Rhythm: Normal rate and regular rhythm.      Pulses: Normal pulses.      Heart sounds: Normal heart sounds.   Pulmonary:      Effort: Pulmonary effort is normal.      Breath sounds: Normal breath sounds.   Abdominal:      General: Bowel sounds are normal.      Palpations: Abdomen is soft.   Musculoskeletal:         General: Normal range of  "motion.      Cervical back: Normal range of motion.   Skin:     General: Skin is warm.   Neurological:      General: No focal deficit present.      Mental Status: She is disoriented.         Fluids    Intake/Output Summary (Last 24 hours) at 1/25/2024 1204  Last data filed at 1/25/2024 0935  Gross per 24 hour   Intake 610 ml   Output 1050 ml   Net -440 ml       Laboratory  Recent Labs     01/23/24  0208 01/24/24  0153 01/25/24  0511   WBC 5.3 6.6 6.6   RBC 4.06* 4.94 5.01   HEMOGLOBIN 13.4 16.5* 16.5*   HEMATOCRIT 39.3 46.5 48.6*   MCV 96.8 94.1 97.0   MCH 33.0 33.2* 32.9   MCHC 34.1 35.3 34.0   RDW 51.9* 49.7 51.0*   PLATELETCT 63* 61* 71*   MPV 10.1 11.2 11.4     Recent Labs     01/23/24  0208 01/24/24  0418 01/24/24  1414 01/25/24  0318   SODIUM 138 132*  --  133*   POTASSIUM 3.7 3.3* 4.1 4.2   CHLORIDE 104 98  --  101   CO2 21 20  --  18*   GLUCOSE 138* 120*  --  100*   BUN 9 7*  --  13   CREATININE 0.53 0.28*  --  0.36*   CALCIUM 7.7* 8.6  --  9.2                   Imaging  EC-ECHOCARDIOGRAM COMPLETE W/O CONT   Final Result           Assessment/Plan  Problem Representation:    * Alcohol withdrawal syndrome with complication (HCC)- (present on admission)  Assessment & Plan  Patient mentions significant alcohol intake daily for 1-2 years.  Previously nonalcoholic, however after death of , 2 daughters, pt began drinking to cope with symptoms of depressed mood.  -Pt mentions alcohol helps her sleep  -Drinks 1 pint of alcohol daily  -Has tried reaching out to psychiatrist, psychologist in the past for help with use disorder, however has been \"turned away\"  -CIWA  -Thiamine  -Folate  -Due to significant sedation apparent upon awakening, librium dose changed to 25 mg BID PO for alcohol withdrawal management    Elevated hemoglobin (HCC)  Assessment & Plan  Pt has hx elevated hemoglobin over admission  -Likely from alcohol use disorder  -B12, folate wnl  -Monitoring for daily improvements  -Consider phlebotomy if " required.    Atrial fibrillation with rapid ventricular response (HCC)- (present on admission)  Assessment & Plan  Patient currently in atrial fibrillation with rapid ventricular response due to the fact that she has not been taking her medications  She does have an AICD in place  -Tachycardia significantly improved; 70s-90s  -Continue metoprolol 100 mg PO daily  -Digoxin 250 mcg daily for rate control   -Eliquis 5 mg BID for anticoagulation  -Monitor daily digoxin levels.      Major depressive disorder with current active episode- (present on admission)  Assessment & Plan  Patient denies suicidal ideation, thoughts of self harm.  Psychiatry following; pending recs once patient is alert and oriented.    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  She apparently lost her  and 2 daughters within a span of 6 months  She has not been able to move forward since then  Psychiatry following, making recommendations in medical management  -Continue low dose effexor  -Continue mirtazapine Qbedtime for sleep  -Hold other serotonergic agents for prevention of serotonin syndrome.    Hypophosphatemia  Assessment & Plan  PO4 on initial presentation 1.5 L.  -Repleted w/ Kphos 250 mg Q6H  -Reevaluated to be 3.1.  -Monitor daily, evaluate for refeeding syndrome.    Hypothyroidism- (present on admission)  Assessment & Plan  Continue with Synthroid supplementation  TSH 6.32 H, however T4 1.25 N.  -Continue to monitor.      HTN (hypertension)- (present on admission)  Assessment & Plan  Optimize blood pressure management keep stop blood pressure less than 140 diastolic under 90  Continue Norvasc 5 mg daily, hydralazine as needed  Metoprolol  daily  Start clonidine 0.1 mg  w/ holding parameters  Monitor RFTs, electrolytes w/ daily labs.    Thrombocytopenia (HCC)- (present on admission)  Assessment &  Plan  Thrombocytopenia, likely due to alcohol use disorder.  Her most recent platelet count is 63  Currently not bleeding  Currently no transfusion indicated    AICD (automatic cardioverter/defibrillator) present- (present on admission)  Assessment & Plan  Patient has had a [employer] pacemaker/defibrillator implanted by Dr. Baires  She has not followed up with them in probably a couple years according to her  This was placed in 2020    CLEO (obstructive sleep apnea)- (present on admission)  Assessment & Plan  History of sleep apnea currently does not take care of herself and does not utilize any assistive oxygen.    Hypomagnesemia- (present on admission)  Assessment & Plan  Check a magnesium level and replace magnesium as needed    Hypokalemia- (present on admission)  Assessment & Plan  Pt's K+ levels 3.3 today.  -Repleted w/ PO K+  -Monitor w/ daily BMPs  -Repeat Mag checks, replete as needed.    Mixed hyperlipidemia- (present on admission)  Assessment & Plan  Fasting lipid panel revealed hypertriglyceridemia, low HDL  Currently not on a statin  Doubtful that with the amount of alcohol she drinks she has high cholesterol.  Patient to f/u outpatient with primary care for optimized dyslipidemia management.         VTE prophylaxis: enoxaparin ppx    I have performed a physical exam and reviewed and updated ROS and Plan today (1/25/2024). In review of yesterday's note (1/24/2024), there are no changes except as documented above.

## 2024-01-26 LAB
ANION GAP SERPL CALC-SCNC: 10 MMOL/L (ref 7–16)
BUN SERPL-MCNC: 17 MG/DL (ref 8–22)
CALCIUM SERPL-MCNC: 9.4 MG/DL (ref 8.5–10.5)
CHLORIDE SERPL-SCNC: 103 MMOL/L (ref 96–112)
CO2 SERPL-SCNC: 23 MMOL/L (ref 20–33)
CREAT SERPL-MCNC: 0.51 MG/DL (ref 0.5–1.4)
ERYTHROCYTE [DISTWIDTH] IN BLOOD BY AUTOMATED COUNT: 51.8 FL (ref 35.9–50)
GFR SERPLBLD CREATININE-BSD FMLA CKD-EPI: 96 ML/MIN/1.73 M 2
GLUCOSE SERPL-MCNC: 93 MG/DL (ref 65–99)
HCT VFR BLD AUTO: 43.4 % (ref 37–47)
HGB BLD-MCNC: 14.6 G/DL (ref 12–16)
MCH RBC QN AUTO: 32.9 PG (ref 27–33)
MCHC RBC AUTO-ENTMCNC: 33.6 G/DL (ref 32.2–35.5)
MCV RBC AUTO: 97.7 FL (ref 81.4–97.8)
PLATELET # BLD AUTO: 84 K/UL (ref 164–446)
PLATELETS.RETICULATED NFR BLD AUTO: 9.3 % (ref 0.6–13.1)
PMV BLD AUTO: 11.4 FL (ref 9–12.9)
POTASSIUM SERPL-SCNC: 4.3 MMOL/L (ref 3.6–5.5)
RBC # BLD AUTO: 4.44 M/UL (ref 4.2–5.4)
SODIUM SERPL-SCNC: 136 MMOL/L (ref 135–145)
WBC # BLD AUTO: 6.7 K/UL (ref 4.8–10.8)

## 2024-01-26 PROCEDURE — A9270 NON-COVERED ITEM OR SERVICE: HCPCS | Performed by: HOSPITALIST

## 2024-01-26 PROCEDURE — 85055 RETICULATED PLATELET ASSAY: CPT

## 2024-01-26 PROCEDURE — 97166 OT EVAL MOD COMPLEX 45 MIN: CPT

## 2024-01-26 PROCEDURE — 700102 HCHG RX REV CODE 250 W/ 637 OVERRIDE(OP): Performed by: STUDENT IN AN ORGANIZED HEALTH CARE EDUCATION/TRAINING PROGRAM

## 2024-01-26 PROCEDURE — 80048 BASIC METABOLIC PNL TOTAL CA: CPT

## 2024-01-26 PROCEDURE — 700102 HCHG RX REV CODE 250 W/ 637 OVERRIDE(OP): Performed by: HOSPITALIST

## 2024-01-26 PROCEDURE — 36415 COLL VENOUS BLD VENIPUNCTURE: CPT

## 2024-01-26 PROCEDURE — 700102 HCHG RX REV CODE 250 W/ 637 OVERRIDE(OP)

## 2024-01-26 PROCEDURE — 85027 COMPLETE CBC AUTOMATED: CPT

## 2024-01-26 PROCEDURE — 770020 HCHG ROOM/CARE - TELE (206)

## 2024-01-26 PROCEDURE — A9270 NON-COVERED ITEM OR SERVICE: HCPCS

## 2024-01-26 PROCEDURE — 99232 SBSQ HOSP IP/OBS MODERATE 35: CPT | Mod: GC | Performed by: HOSPITALIST

## 2024-01-26 PROCEDURE — 99232 SBSQ HOSP IP/OBS MODERATE 35: CPT | Performed by: STUDENT IN AN ORGANIZED HEALTH CARE EDUCATION/TRAINING PROGRAM

## 2024-01-26 PROCEDURE — A9270 NON-COVERED ITEM OR SERVICE: HCPCS | Performed by: STUDENT IN AN ORGANIZED HEALTH CARE EDUCATION/TRAINING PROGRAM

## 2024-01-26 RX ORDER — VENLAFAXINE HYDROCHLORIDE 75 MG/1
225 CAPSULE, EXTENDED RELEASE ORAL
Status: DISCONTINUED | OUTPATIENT
Start: 2024-01-27 | End: 2024-01-30 | Stop reason: HOSPADM

## 2024-01-26 RX ADMIN — ACETAMINOPHEN 650 MG: 325 TABLET, FILM COATED ORAL at 02:05

## 2024-01-26 RX ADMIN — APIXABAN 5 MG: 5 TABLET, FILM COATED ORAL at 05:10

## 2024-01-26 RX ADMIN — Medication 400 MG: at 05:10

## 2024-01-26 RX ADMIN — FOLIC ACID 1 MG: 1 TABLET ORAL at 05:11

## 2024-01-26 RX ADMIN — MIRTAZAPINE 15 MG: 15 TABLET, FILM COATED ORAL at 22:02

## 2024-01-26 RX ADMIN — LEVOTHYROXINE SODIUM 50 MCG: 0.05 TABLET ORAL at 05:10

## 2024-01-26 RX ADMIN — METOPROLOL SUCCINATE 100 MG: 25 TABLET, EXTENDED RELEASE ORAL at 05:10

## 2024-01-26 RX ADMIN — LORAZEPAM 0.5 MG: 1 TABLET ORAL at 02:05

## 2024-01-26 RX ADMIN — VENLAFAXINE HYDROCHLORIDE 150 MG: 75 CAPSULE, EXTENDED RELEASE ORAL at 10:12

## 2024-01-26 RX ADMIN — Medication 100 MG: at 05:10

## 2024-01-26 RX ADMIN — AMLODIPINE BESYLATE 5 MG: 5 TABLET ORAL at 05:10

## 2024-01-26 RX ADMIN — THERA TABS 1 TABLET: TAB at 05:10

## 2024-01-26 RX ADMIN — DIGOXIN 250 MCG: 0.12 TABLET ORAL at 05:11

## 2024-01-26 RX ADMIN — DOCUSATE SODIUM 50 MG AND SENNOSIDES 8.6 MG 2 TABLET: 8.6; 5 TABLET, FILM COATED ORAL at 18:16

## 2024-01-26 RX ADMIN — Medication 400 MG: at 18:16

## 2024-01-26 RX ADMIN — DOCUSATE SODIUM 50 MG AND SENNOSIDES 8.6 MG 2 TABLET: 8.6; 5 TABLET, FILM COATED ORAL at 05:10

## 2024-01-26 RX ADMIN — APIXABAN 5 MG: 5 TABLET, FILM COATED ORAL at 18:16

## 2024-01-26 ASSESSMENT — ENCOUNTER SYMPTOMS
HEADACHES: 0
CONSTIPATION: 0
NAUSEA: 0
PALPITATIONS: 0
BLURRED VISION: 0
HALLUCINATIONS: 0
TINGLING: 0
DIARRHEA: 0
WEAKNESS: 0
SORE THROAT: 0
DOUBLE VISION: 0
COUGH: 0
VOMITING: 0
ABDOMINAL PAIN: 0
SHORTNESS OF BREATH: 0
DIZZINESS: 0
CHILLS: 0
BACK PAIN: 0
FEVER: 0

## 2024-01-26 ASSESSMENT — LIFESTYLE VARIABLES
ANXIETY: *
HEADACHE, FULLNESS IN HEAD: NOT PRESENT
HEADACHE, FULLNESS IN HEAD: MILD
ORIENTATION AND CLOUDING OF SENSORIUM: DATE DISORIENTATION BY NO MORE THAN TWO CALENDAR DAYS
NAUSEA AND VOMITING: NO NAUSEA AND NO VOMITING
AUDITORY DISTURBANCES: NOT PRESENT
TOTAL SCORE: 7
TOTAL SCORE: 2
TREMOR: NO TREMOR
AGITATION: NORMAL ACTIVITY
ANXIETY: NO ANXIETY (AT EASE)
TREMOR: NO TREMOR
PAROXYSMAL SWEATS: NO SWEAT VISIBLE
AUDITORY DISTURBANCES: NOT PRESENT
VISUAL DISTURBANCES: NOT PRESENT
ORIENTATION AND CLOUDING OF SENSORIUM: DATE DISORIENTATION BY NO MORE THAN TWO CALENDAR DAYS
NAUSEA AND VOMITING: NO NAUSEA AND NO VOMITING
AGITATION: SOMEWHAT MORE THAN NORMAL ACTIVITY
PAROXYSMAL SWEATS: NO SWEAT VISIBLE
VISUAL DISTURBANCES: NOT PRESENT

## 2024-01-26 ASSESSMENT — COGNITIVE AND FUNCTIONAL STATUS - GENERAL
DRESSING REGULAR UPPER BODY CLOTHING: A LITTLE
DAILY ACTIVITIY SCORE: 15
DRESSING REGULAR LOWER BODY CLOTHING: A LOT
TOILETING: A LOT
HELP NEEDED FOR BATHING: A LOT
SUGGESTED CMS G CODE MODIFIER DAILY ACTIVITY: CK
EATING MEALS: A LITTLE
PERSONAL GROOMING: A LITTLE

## 2024-01-26 ASSESSMENT — FIBROSIS 4 INDEX: FIB4 SCORE: 8.57

## 2024-01-26 ASSESSMENT — PAIN DESCRIPTION - PAIN TYPE
TYPE: ACUTE PAIN
TYPE: ACUTE PAIN

## 2024-01-26 ASSESSMENT — ACTIVITIES OF DAILY LIVING (ADL): TOILETING: INDEPENDENT

## 2024-01-26 NOTE — DOCUMENTATION QUERY
"                                                                         [facility]                                                                       Query Response Note      PATIENT:               MARISELA DURHAM  ACCT #:                  [id number]  MRN:                     [id number]  :                      1945  ADMIT DATE:       2024 1:38 PM  DISCH DATE:          RESPONDING  PROVIDER #:        [id number]           QUERY TEXT:    In your clinical judgment, please clarify the disorientation further.       Note: If you agree with a diagnosis listed above, please remember to include it in your concurrent daily documentation and onto the Discharge Summary.        The patient's clinical indicators include:  78 year old female admitted with alcohol withdrawal.     Leonid \"patient is still delirious\"  \" very confused during the interview due to withdrawal.\"  \"Delirium, related to alcohol withdrawal\"     Evaristo/Faye \"Remains confused and lethargic.\"  \"placed on legal hold if leaving AMA due to continued disorientation.\"     Alcohol 266    Risk factors: alcoholism,  withdrawal  Treatment: labs, ECHO, ativan/librium, CIWA    If you have any questions, please contact:  Bobbi Zaman RN CDI [facility]  [email or web address]  Bobbi Zaman [address]  Options provided:   -- Toxic encephalopathy   -- Toxic encephalopathy due to alcohol level of 266 and alcohol withdrawal   -- Other explanation, (please specify the other explanation)      Query created by: Bobbi Zaman on 2024 12:01 PM    RESPONSE TEXT:    Toxic encephalopathy due to alcohol level of 266 and alcohol withdrawal          Electronically signed by:  JOSEPHINE DIAS MD 2024 12:05 PM              "

## 2024-01-26 NOTE — PROGRESS NOTES
[facility] Internal Medicine Daily Progress Note    Date of Service  1/26/2024    R Team: R IM Green Team   Attending: BENITO Mckinney M.d.  Senior Resident: Dr. York  Intern:  Dr. Loera  Contact Number: [phone]    Chief Complaint  Jeanie Hutchison is a 78 y.o. female admitted 1/22/2024 with worsening shakes, tremors, anxiety, recent alcohol abuse.     Hospital Course  77 y/o female patient with a past medical history of alcoholism, chronic pain, hypothyroidism, paroxysmal atrial fibrillation, presenting to the ER for worsening shakes and cognition. Patient drinks roughly 1 pint of vodka every day. Patient was previously a nonalcoholic, however in the last 2 years, death of multiple family members including , 2 daughters, led to initiation of alcohol consumption. Patient mentions drinking roughly 1 pint vodka daily. Patient is admitted, management began with CIWA protocol, Librium, thiamine, folate, multivitamins, seizure protocols, follow protocol. Patient also has a history of atrial fibrillation status post management with metoprolol, digoxin, Eliquis, however does not use medication at home for management. Patient's home regimen for management of atrial fibrillation restarted, digoxin loaded as needed. Also increasing patient's metoprolol dose for management of hypertension as well as atrial fibrillation. Patient also presenting with thrombocytopenia, likely associated with alcohol use disorder. Monitor with daily labs. Patient also to be referred for outpatient psychology, psychiatry, for management of complex grief disorder, MDD.    Interval Problem Update  -No acute events overnight.  -Librium was discontinued overnight, CIWA was discontinued this morning, patient is becoming more alert and oriented.  -Psychiatry to see the patient again today    I have discussed this patient's plan of care and discharge plan at IDT rounds today with Case Management, Nursing, Nursing leadership, and other  members of the IDT team.    Consultants/Specialty  psychiatry    Code Status  Full Code    Disposition  The patient is not medically cleared for discharge to home or a post-acute facility.      I have placed the appropriate orders for post-discharge needs.    Review of Systems  Difficult to perform ROS due to abnormal orientation of patient.  Review of Systems   Constitutional:  Negative for chills, fever and malaise/fatigue.   HENT:  Negative for sore throat.    Eyes:  Negative for blurred vision and double vision.   Respiratory:  Negative for cough and shortness of breath.    Cardiovascular:  Negative for chest pain, palpitations and leg swelling.   Gastrointestinal:  Negative for abdominal pain, constipation, diarrhea, nausea and vomiting.   Genitourinary:  Negative for dysuria.   Musculoskeletal:  Negative for back pain.   Skin:  Negative for rash.   Neurological:  Negative for dizziness, tingling, weakness and headaches.   Psychiatric/Behavioral:  Negative for hallucinations.         Physical Exam  Temp:  [36.2 °C (97.2 °F)-37 °C (98.6 °F)] 36.3 °C (97.3 °F)  Pulse:  [61-71] 61  Resp:  [18] 18  BP: ()/(67-94) 107/76  SpO2:  [90 %-95 %] 90 %    Physical Exam  Constitutional:       Appearance: She is normal weight. She is not ill-appearing, toxic-appearing or diaphoretic.   HENT:      Right Ear: External ear normal.      Left Ear: External ear normal.      Nose: Nose normal.      Mouth/Throat:      Mouth: Mucous membranes are moist.   Eyes:      Extraocular Movements: Extraocular movements intact.      Conjunctiva/sclera: Conjunctivae normal.      Pupils: Pupils are equal, round, and reactive to light.   Cardiovascular:      Rate and Rhythm: Normal rate and regular rhythm.      Pulses: Normal pulses.      Heart sounds: Normal heart sounds.   Pulmonary:      Breath sounds: Normal breath sounds. No wheezing or rales.   Abdominal:      General: Bowel sounds are normal. There is no distension.      Palpations:  "Abdomen is soft.      Tenderness: There is no abdominal tenderness.   Musculoskeletal:         General: Normal range of motion.      Cervical back: Normal range of motion.   Skin:     General: Skin is warm.      Capillary Refill: Capillary refill takes 2 to 3 seconds.   Neurological:      General: No focal deficit present.      Mental Status: She is disoriented.   Psychiatric:         Attention and Perception: She is inattentive. She does not perceive auditory or visual hallucinations.         Mood and Affect: Affect is flat.       Fluids    Intake/Output Summary (Last 24 hours) at 1/26/2024 1634  Last data filed at 1/26/2024 0420  Gross per 24 hour   Intake --   Output 800 ml   Net -800 ml       Laboratory  Recent Labs     01/24/24  0153 01/25/24  0511 01/26/24  0440   WBC 6.6 6.6 6.7   RBC 4.94 5.01 4.44   HEMOGLOBIN 16.5* 16.5* 14.6   HEMATOCRIT 46.5 48.6* 43.4   MCV 94.1 97.0 97.7   MCH 33.2* 32.9 32.9   MCHC 35.3 34.0 33.6   RDW 49.7 51.0* 51.8*   PLATELETCT 61* 71* 84*   MPV 11.2 11.4 11.4     Recent Labs     01/25/24  0318 01/25/24  1423 01/26/24  0440   SODIUM 133* 135 136   POTASSIUM 4.2 4.0 4.3   CHLORIDE 101 100 103   CO2 18* 21 23   GLUCOSE 100* 122* 93   BUN 13 16 17   CREATININE 0.36* 0.58 0.51   CALCIUM 9.2 9.0 9.4                   Imaging  EC-ECHOCARDIOGRAM COMPLETE W/O CONT   Final Result           Assessment/Plan  Problem Representation:    * Alcohol withdrawal syndrome with complication (HCC)- (present on admission)  Assessment & Plan  Patient mentions significant alcohol intake daily for 1-2 years.  Previously nonalcoholic, however after death of , 2 daughters, pt began drinking to cope with symptoms of depressed mood  Pt mentions alcohol helps her sleep and drinks 1 pint of alcohol daily  Has tried reaching out to psychiatrist, psychologist in the past for help with use disorder, however has been \"turned away\"  Discontinue CIWA and Librium  Continue thiamine, folate, " multivitamins    Atrial fibrillation with rapid ventricular response (HCC)- (present on admission)  Assessment & Plan  Patient currently in atrial fibrillation with rapid ventricular response due to the fact that she has not been taking her medications  She does have an AICD in place  Continue metoprolol 100 mg daily, digoxin 250 mcg daily, apixaban 5 mg twice daily    Thrombocytopenia (HCC)- (present on admission)  Assessment & Plan  Thrombocytopenia, likely due to alcohol use disorder.  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  She apparently lost her  and 2 daughters within a span of 6 months  She has not been able to move forward since then  Psychiatry following, making recommendations in medical management  Per psychiatry recommendations, increased Effexor to 225 mg, continue Remeron at dose  Per psychiatry, patient does not have the capacity to leave AGAINST MEDICAL ADVICE at this time, if any acute changes happened then to contact Dr. Anna Marie King over the weekend (1/27-1/28) otherwise Dr. Jaquez will continue to follow on 1/29    Elevated hemoglobin (HCC)  Assessment & Plan  Pt has hx elevated hemoglobin over admission  Likely from alcohol use disorder  B12, folate wnl  Monitoring for daily improvements  Phlebotomy if required    HTN (hypertension)- (present on admission)  Assessment & Plan  Optimize blood pressure management keep stop blood pressure less than 140 diastolic under 90  Continue Norvasc 5 mg daily, metoprolol  Monitor electrolytes with daily BMP    Hypothyroidism- (present on admission)  Assessment & Plan  TSH mildly elevated, unsure if the patient was adherent to home Synthroid dose   Continue home Synthroid    Major depressive disorder with current active episode- (present on  admission)  Assessment & Plan  Patient denies suicidal ideation, thoughts of self harm.  Psychiatry following; pending recs once patient is alert and oriented.    Hypophosphatemia  Assessment & Plan  Likely secondary to alcohol, early refeeding syndrome.  Now resolved after supplementation and patient is now eating    AICD (automatic cardioverter/defibrillator) present- (present on admission)  Assessment & Plan  Patient has had a [employer] pacemaker/defibrillator implanted by Dr. Baires  She has not followed up with them in probably a couple years according to her  This was placed in 2020    CLEO (obstructive sleep apnea)- (present on admission)  Assessment & Plan  History of sleep apnea currently does not take care of herself and does not utilize any assistive oxygen  Unclear outpatient CPAP settings, hold off on CPAP at this time    Hypomagnesemia- (present on admission)  Assessment & Plan  Check a magnesium level and replace magnesium as needed    Hypokalemia- (present on admission)  Assessment & Plan  Continue to monitor and replete as indicated    Mixed hyperlipidemia- (present on admission)  Assessment & Plan  Fasting lipid panel revealed hypertriglyceridemia, low HDL  Currently not on a statin  Patient to f/u outpatient with primary care for optimized dyslipidemia management.         Code: Full  Diet: Regular  DVT Px: Apixaban  Dispo: Continue inpatient stay    I have performed a physical exam and reviewed and updated ROS and Plan today (1/26/2024). In review of yesterday's note (1/25/2024), there are no changes except as documented above.

## 2024-01-26 NOTE — DISCHARGE PLANNING
Case Management Discharge Planning    Admission Date: 1/22/2024  GMLOS: 3.4  ALOS: 4    6-Clicks ADL Score: 16  6-Clicks Mobility Score: 8  PT and/or OT Eval ordered: Yes  Post-acute Referrals Ordered: No  Post-acute Choice Obtained: No  Has referral(s) been sent to post-acute provider:  Yes      Anticipated Discharge Dispo: Discharge Disposition: D/T to SNF with Medicare cert in anticipation of skilled care (03)    DME Needed: No    Action(s) Taken: Pt has been accepted at [facility]. Still currently pending PT/OT recommendations and recommendations from psychiatry.     Escalations Completed: None    Medically Clear: Yes    Next Steps: Care coordination will f/u with psych and PT/OT recs    Barriers to Discharge: Pending Placement    Is the patient up for discharge tomorrow: Yes    Is transport arranged for discharge disposition: No

## 2024-01-26 NOTE — CARE PLAN
The patient is Stable - Low risk of patient condition declining or worsening    Shift Goals  Clinical Goals: CIWA, safety, get up to commode  Patient Goals: Sleep, ativan  Family Goals: DANICA    Progress made toward(s) clinical / shift goals:      Problem: Knowledge Deficit - Standard  Goal: Patient and family/care givers will demonstrate understanding of plan of care, disease process/condition, diagnostic tests and medications  Description: Target End Date:  1-3 days or as soon as patient condition allows    Document in Patient Education    1.  Patient and family/caregiver oriented to unit, equipment, visitation policy and means for communicating concern  2.  Complete/review Learning Assessment  3.  Assess knowledge level of disease process/condition, treatment plan, diagnostic tests and medications  4.  Explain disease process/condition, treatment plan, diagnostic tests and medications  Outcome: Progressing     Problem: Optimal Care for Alcohol Withdrawal  Goal: Optimal Care for the alcohol withdrawal patient  Description: Target End Date:  1 to 3 days    1.  Alcohol history screening done on admission  2.  CIWA-AR score assessment (includes assessment of nausea/vomiting, tremor, sweats, anxiety, agitation, tactile, visual and auditory disturbances, headache and orientation/sensorium).  Document on CIWA flowsheet.  3.  Follow CIWA-AR score protocol  4.  Frequent reorientation  5.  Monitor for fluid and electrolyte imbalance.  6.  Assess for respiratory depression due to sedation (pulse ox)  7.  Consider thiamine, multivitamins, folic acid and magnesium sulfate per provider order  8.  Collaborate with Social Workers/Case Management  9.  Collaborate with mental health  Outcome: Progressing

## 2024-01-26 NOTE — THERAPY
Occupational Therapy   Initial Evaluation     Patient Name: Jeanie Hutchison  Age:  78 y.o., Sex:  female  Medical Record #: [id number]  Today's Date: 1/26/2024     Precautions  Precautions: Fall Risk, Other (See Comments) (seizure)    Assessment    Patient is 78 y.o. female admitted for worsening shakes, tremors, anxiety, recent ETOH abuse, drinks roughly 1 pint of vodka daily. Pt unable to provide history/PLOF due to mentation per EMR pt independent with ADLs/IADLs, driving living in a H with caitlyn. Pt required Dulce for bed mobility and seated balance for seated ADLs, maxA for lower body ADLs. Will continue to see for skilled therapy while admitted as well as recommend post-acute placement.     Plan    Occupational Therapy Initial Treatment Plan   Treatment Interventions: (P) Self Care / Activities of Daily Living, Adaptive Equipment, Manual Therapy Techniques, Neuro Re-Education / Balance, Therapeutic Exercises, Therapeutic Activity  Treatment Frequency: (P) 3 Times per Week  Duration: (P) Until Therapy Goals Met    DC Equipment Recommendations: (P) Unable to determine at this time  Discharge Recommendations: (P) Recommend post-acute placement for additional occupational therapy services prior to discharge home     Objective       01/26/24 1124   Prior Living Situation   Prior Services None   Housing / Facility 1 Story House   Steps Into Home 2   Steps In Home 0   Bathroom Set up Bathtub / Shower Combination   Equipment Owned None   Lives with - Patient's Self Care Capacity Other (Comments)  (grandson)   Comments all information retreived from EMR   Prior Level of ADL Function   Self Feeding Independent   Grooming / Hygiene Independent   Bathing Independent   Dressing Independent   Toileting Independent   Comments all information retreived from EMR   Prior Level of IADL Function   Medication Management Independent   Laundry Independent   Kitchen Mobility Independent   Finances Independent   Home Management  Independent   Shopping Independent   Prior Level Of Mobility Independent Without Device in Community   Driving / Transportation Driving Independent   Comments all information retreived from EMR   Precautions   Precautions Fall Risk;Other (See Comments)  (seizure)   Pain 0 - 10 Group   Therapist Pain Assessment Post Activity Pain Same as Prior to Activity;Nurse Notified   Cognition    Cognition / Consciousness X   Speech/ Communication Delayed Responses   Orientation Level Not Oriented to Time;Not Oriented to Reason   Level of Consciousness Responds to voice   Ability To Follow Commands 1 Step   Safety Awareness Impaired   New Learning Impaired   Attention Impaired   Sequencing Impaired   Initiation Impaired   Active ROM Upper Body   Active ROM Upper Body  X   Comments limited by weakness   Strength Upper Body   Upper Body Strength  X   Gross Strength Generalized Weakness, Equal Bilaterally.    Balance Assessment   Sitting Balance (Static) Fair   Sitting Balance (Dynamic) Fair -   Standing Balance (Static) Fair -   Standing Balance (Dynamic) Poor +   Weight Shift Sitting Fair   Weight Shift Standing Poor   Comments w/ HHA   Bed Mobility    Supine to Sit Minimal Assist   Sit to Supine Minimal Assist   Scooting Minimal Assist   Rolling Minimal Assist to Rt.   ADL Assessment   Grooming Supervision;Seated   Upper Body Dressing Minimal Assist   Lower Body Dressing Maximal Assist   Toileting   (NT-refused need)   How much help from another person does the patient currently need...   Putting on and taking off regular lower body clothing? 2   Bathing (including washing, rinsing, and drying)? 2   Toileting, which includes using a toilet, bedpan, or urinal? 2   Putting on and taking off regular upper body clothing? 3   Taking care of personal grooming such as brushing teeth? 3   Eating meals? 3   6 Clicks Daily Activity Score 15   Functional Mobility   Sit to Stand Moderate Assist   Transfer Method Stand Step   Mobility bed  mobility, STS at EOB, side steps at EOB   Comments w/ HHA   Activity Tolerance   Sitting Edge of Bed 10 min   Standing 2 min   Short Term Goals   Short Term Goal # 1 Pt will complete ADL transfers with supervision   Short Term Goal # 2 Pt will complete LB dressing with supervision   Short Term Goal # 3 Pt will complete toileting with supervision   Education Group   Education Provided Role of Occupational Therapist   Role of Occupational Therapist Patient Response Patient;Acceptance;Explanation;No Learning Evidence   Occupational Therapy Initial Treatment Plan    Treatment Interventions Self Care / Activities of Daily Living;Adaptive Equipment;Manual Therapy Techniques;Neuro Re-Education / Balance;Therapeutic Exercises;Therapeutic Activity   Treatment Frequency 3 Times per Week   Duration Until Therapy Goals Met   Problem List   Problem List Decreased Active Daily Living Skills;Decreased Homemaking Skills;Decreased Functional Mobility;Decreased Activity Tolerance;Safety Awareness Deficits / Cognition;Impaired Postural Control / Balance;Impaired Cognitive Function;Decreased Upper Extremity Strength Left;Decreased Upper Extremity Strength Right   Anticipated Discharge Equipment and Recommendations   DC Equipment Recommendations Unable to determine at this time   Discharge Recommendations Recommend post-acute placement for additional occupational therapy services prior to discharge home   Interdisciplinary Plan of Care Collaboration   IDT Collaboration with  Nursing   Patient Position at End of Therapy In Bed;Bed Alarm On;Call Light within Reach;Tray Table within Reach;Phone within Reach;Other (Comments)  (telesitter)   Collaboration Comments RN updated

## 2024-01-26 NOTE — CARE PLAN
The patient is Stable - Low risk of patient condition declining or worsening    Shift Goals  Clinical Goals: Monitor labs/vitals, CIWA assessment, pt safety  Patient Goals: DANICA  Family Goals: DANICA    Progress made toward(s) clinical / shift goals:        Problem: Fall Risk  Goal: Patient will remain free from falls  Outcome: Progressing     Problem: Knowledge Deficit - Standard  Goal: Patient and family/care givers will demonstrate understanding of plan of care, disease process/condition, diagnostic tests and medications  Outcome: Progressing     Problem: Optimal Care for Alcohol Withdrawal  Goal: Optimal Care for the alcohol withdrawal patient  Outcome: Progressing     At approx 1624 this writer was made aware of pt's blood pressure being 78/50 in the right arm. When reassessed in the left arm it was 90/60. Pt lethargic, able to tell this writer she was at [facility], but fell back to sleep immediately. Pt placed in trendelenberg position, with blood pressure coming up to 100/66. Dr. York notified, and an LR bolus initiated. Blood pressure normalized after bolus given, and pt able to stay awake longer and answer basic questions. Orders placed by Dr. York to discontinue Librium at this time.     No s/s of seizure activity. CIWA assessment completed per order, with pt scoring a 3 throughout the day. Pt is alert to person, confusion still noted on time, place, and situation, although pt has moments of lucidity and will respond appropriately to most questions and commands given. Pt noted to retain urine this morning, with >610 noted on bladder scan. Pt unable to void when assisted up to BSC X 2. Education provided on all medications and treatments provided, with pt being aware but unable to verbalize understanding. Bed alarm and tele sitter in place for pt safety. Plan of care is ongoing at this time.

## 2024-01-27 LAB
ANION GAP SERPL CALC-SCNC: 10 MMOL/L (ref 7–16)
BASOPHILS # BLD AUTO: 0.7 % (ref 0–1.8)
BASOPHILS # BLD: 0.04 K/UL (ref 0–0.12)
BUN SERPL-MCNC: 15 MG/DL (ref 8–22)
CALCIUM SERPL-MCNC: 9.7 MG/DL (ref 8.5–10.5)
CHLORIDE SERPL-SCNC: 104 MMOL/L (ref 96–112)
CO2 SERPL-SCNC: 26 MMOL/L (ref 20–33)
CREAT SERPL-MCNC: 0.63 MG/DL (ref 0.5–1.4)
DIGOXIN SERPL-MCNC: 1 NG/ML (ref 0.8–2)
DIGOXIN SERPL-MCNC: 1.9 NG/ML (ref 0.8–2)
EOSINOPHIL # BLD AUTO: 0.08 K/UL (ref 0–0.51)
EOSINOPHIL NFR BLD: 1.4 % (ref 0–6.9)
ERYTHROCYTE [DISTWIDTH] IN BLOOD BY AUTOMATED COUNT: 52.2 FL (ref 35.9–50)
GFR SERPLBLD CREATININE-BSD FMLA CKD-EPI: 92 ML/MIN/1.73 M 2
GLUCOSE SERPL-MCNC: 95 MG/DL (ref 65–99)
HCT VFR BLD AUTO: 46.1 % (ref 37–47)
HGB BLD-MCNC: 15.6 G/DL (ref 12–16)
IMM GRANULOCYTES # BLD AUTO: 0.06 K/UL (ref 0–0.11)
IMM GRANULOCYTES NFR BLD AUTO: 1 % (ref 0–0.9)
LYMPHOCYTES # BLD AUTO: 1.18 K/UL (ref 1–4.8)
LYMPHOCYTES NFR BLD: 20.6 % (ref 22–41)
MAGNESIUM SERPL-MCNC: 1.9 MG/DL (ref 1.5–2.5)
MCH RBC QN AUTO: 33.1 PG (ref 27–33)
MCHC RBC AUTO-ENTMCNC: 33.8 G/DL (ref 32.2–35.5)
MCV RBC AUTO: 97.7 FL (ref 81.4–97.8)
MONOCYTES # BLD AUTO: 0.45 K/UL (ref 0–0.85)
MONOCYTES NFR BLD AUTO: 7.9 % (ref 0–13.4)
NEUTROPHILS # BLD AUTO: 3.91 K/UL (ref 1.82–7.42)
NEUTROPHILS NFR BLD: 68.4 % (ref 44–72)
NRBC # BLD AUTO: 0 K/UL
NRBC BLD-RTO: 0 /100 WBC (ref 0–0.2)
PHOSPHATE SERPL-MCNC: 4 MG/DL (ref 2.5–4.5)
PLATELET # BLD AUTO: 107 K/UL (ref 164–446)
PLATELETS.RETICULATED NFR BLD AUTO: 6.4 % (ref 0.6–13.1)
PMV BLD AUTO: 10.4 FL (ref 9–12.9)
POTASSIUM SERPL-SCNC: 4.1 MMOL/L (ref 3.6–5.5)
RBC # BLD AUTO: 4.72 M/UL (ref 4.2–5.4)
SODIUM SERPL-SCNC: 140 MMOL/L (ref 135–145)
WBC # BLD AUTO: 5.7 K/UL (ref 4.8–10.8)

## 2024-01-27 PROCEDURE — 770020 HCHG ROOM/CARE - TELE (206)

## 2024-01-27 PROCEDURE — A9270 NON-COVERED ITEM OR SERVICE: HCPCS | Performed by: STUDENT IN AN ORGANIZED HEALTH CARE EDUCATION/TRAINING PROGRAM

## 2024-01-27 PROCEDURE — 700102 HCHG RX REV CODE 250 W/ 637 OVERRIDE(OP): Performed by: STUDENT IN AN ORGANIZED HEALTH CARE EDUCATION/TRAINING PROGRAM

## 2024-01-27 PROCEDURE — 83735 ASSAY OF MAGNESIUM: CPT

## 2024-01-27 PROCEDURE — 85055 RETICULATED PLATELET ASSAY: CPT

## 2024-01-27 PROCEDURE — A9270 NON-COVERED ITEM OR SERVICE: HCPCS | Performed by: HOSPITALIST

## 2024-01-27 PROCEDURE — A9270 NON-COVERED ITEM OR SERVICE: HCPCS

## 2024-01-27 PROCEDURE — 85025 COMPLETE CBC W/AUTO DIFF WBC: CPT

## 2024-01-27 PROCEDURE — 99232 SBSQ HOSP IP/OBS MODERATE 35: CPT | Mod: GC | Performed by: HOSPITALIST

## 2024-01-27 PROCEDURE — 700102 HCHG RX REV CODE 250 W/ 637 OVERRIDE(OP): Performed by: HOSPITALIST

## 2024-01-27 PROCEDURE — 700102 HCHG RX REV CODE 250 W/ 637 OVERRIDE(OP)

## 2024-01-27 PROCEDURE — 80162 ASSAY OF DIGOXIN TOTAL: CPT

## 2024-01-27 PROCEDURE — 84100 ASSAY OF PHOSPHORUS: CPT

## 2024-01-27 PROCEDURE — 36415 COLL VENOUS BLD VENIPUNCTURE: CPT

## 2024-01-27 PROCEDURE — 80048 BASIC METABOLIC PNL TOTAL CA: CPT

## 2024-01-27 RX ORDER — DIGOXIN 125 MCG
125 TABLET ORAL DAILY
Status: DISCONTINUED | OUTPATIENT
Start: 2024-01-28 | End: 2024-01-30 | Stop reason: HOSPADM

## 2024-01-27 RX ADMIN — LEVOTHYROXINE SODIUM 50 MCG: 0.05 TABLET ORAL at 04:09

## 2024-01-27 RX ADMIN — Medication 400 MG: at 18:39

## 2024-01-27 RX ADMIN — APIXABAN 5 MG: 5 TABLET, FILM COATED ORAL at 04:09

## 2024-01-27 RX ADMIN — AMLODIPINE BESYLATE 5 MG: 5 TABLET ORAL at 04:09

## 2024-01-27 RX ADMIN — APIXABAN 5 MG: 5 TABLET, FILM COATED ORAL at 18:39

## 2024-01-27 RX ADMIN — DOCUSATE SODIUM 50 MG AND SENNOSIDES 8.6 MG 2 TABLET: 8.6; 5 TABLET, FILM COATED ORAL at 18:40

## 2024-01-27 RX ADMIN — DIGOXIN 250 MCG: 0.12 TABLET ORAL at 04:09

## 2024-01-27 RX ADMIN — Medication 400 MG: at 04:09

## 2024-01-27 RX ADMIN — METOPROLOL SUCCINATE 100 MG: 25 TABLET, EXTENDED RELEASE ORAL at 04:09

## 2024-01-27 RX ADMIN — VENLAFAXINE HYDROCHLORIDE 225 MG: 75 CAPSULE, EXTENDED RELEASE ORAL at 10:19

## 2024-01-27 RX ADMIN — DOCUSATE SODIUM 50 MG AND SENNOSIDES 8.6 MG 2 TABLET: 8.6; 5 TABLET, FILM COATED ORAL at 04:10

## 2024-01-27 RX ADMIN — MIRTAZAPINE 15 MG: 15 TABLET, FILM COATED ORAL at 20:35

## 2024-01-27 ASSESSMENT — FIBROSIS 4 INDEX: FIB4 SCORE: 7.24

## 2024-01-27 ASSESSMENT — COGNITIVE AND FUNCTIONAL STATUS - GENERAL
MOBILITY SCORE: 12
SUGGESTED CMS G CODE MODIFIER MOBILITY: CL
MOVING TO AND FROM BED TO CHAIR: A LITTLE
WALKING IN HOSPITAL ROOM: A LOT
CLIMB 3 TO 5 STEPS WITH RAILING: TOTAL
TURNING FROM BACK TO SIDE WHILE IN FLAT BAD: A LOT
STANDING UP FROM CHAIR USING ARMS: A LOT
MOVING FROM LYING ON BACK TO SITTING ON SIDE OF FLAT BED: A LOT

## 2024-01-27 ASSESSMENT — ENCOUNTER SYMPTOMS
RESPIRATORY NEGATIVE: 1
NEUROLOGICAL NEGATIVE: 1
EYES NEGATIVE: 1
CONSTITUTIONAL NEGATIVE: 1
MUSCULOSKELETAL NEGATIVE: 1
PSYCHIATRIC NEGATIVE: 1
GASTROINTESTINAL NEGATIVE: 1
CARDIOVASCULAR NEGATIVE: 1

## 2024-01-27 ASSESSMENT — PAIN DESCRIPTION - PAIN TYPE: TYPE: ACUTE PAIN

## 2024-01-27 NOTE — CARE PLAN
The patient is Stable - Low risk of patient condition declining or worsening    Shift Goals  Clinical Goals: safety, eat dinner, Q4 neuro checks  Patient Goals: sleep  Family Goals: pari    Progress made toward(s) clinical / shift goals:    Problem: Fall Risk  Goal: Patient will remain free from falls  Outcome: Progressing  Note: Fall precautions in place, pt calls for assistance, pt remains free from falls this shift      Problem: Skin Integrity  Goal: Skin integrity is maintained or improved  Outcome: Progressing  Note: Pt turns self in bed, no new skin injury or breakdown       Patient is not progressing towards the following goals:

## 2024-01-27 NOTE — CONSULTS
"PSYCHIATRIC Progress Note  Attending: Bridgette Jaquez     *Reason for admission: alcohol detox and depression  *Reason for consult: depression  *Requesting Physician: Meng Mckinney MD        Legal status: not on hold     Interval Events:  The patient appears less sleepy today. She is oriented to month, year, place and think she is here for \"alcoholism.\" She states \"Seomthing is wrong with me\" though she cannot state what is wrong. She is distractible. While attempting to eat she pushes the fork in her food as if to take a bite then hesitates and attempts to open other containers of food. CAM ICU screen still positive for delirium. She was unable to complete a clock draw (got confused as to what number would be after the number 6 and contour of numbers was incorrect). She denies SI/HI. She does not express delusions. She does not appear to respond to internal stimuli    Nursing staff reports that patient is intermittent oriented x3 or x4. She does become easily confused    *Medical Review of Systems: unable to complete due to mental status     *Psychiatric (Physical) Examination: observed phenomenon:  Vitals:    01/26/24 1558   BP: 107/76   Pulse: 61   Resp: 18   Temp: 36.3 °C (97.3 °F)   SpO2: 90%       General: Awake and alert,  In no acute distress.  Patient Appearance: Appropriate for situation, wearing hospital gown, disheveled  Behavior: easily distracted, calm and cooperative  Speech: Delayed, slow rate and rhythm, decreased volume   Mood: unable to state, just states \"something is wrong\"  Affect: flat  Thought Content: Disorganized, No suicidal ideation, No thoughts of self harm. No delusions  Thought Process: tangential, needs redirection  Psychosis: does not appear to respond to internal stimuli, No delusions  Insight: Poor insight  Judgment: poor judgment  Cognition: recent memory impaired (does not recall how long she has been in the hospital), poor Concentration.  Oriented to self, month/year, " Verified Results  PAP WITH HIGH RISK HPV REFLEX 2017 12:01AM Dash Susy   [2017 3:59PM LYNNE CUEVAS]  Your test results are normal.     Test Name Result Flag Reference   PAP WITH HIGH RISK HPV REFLEX (Report) O    Name: Dee Alvarado         MRN:   [id number]   : 1990           Visit#: [id number]                Gynecologic Cytology Consultation Report      Client:  [facility]      Date Specimen Collected: 06/15/17      Accession #: [id number]   Date Specimen Received: 17      Requisition   #:[id number]   Date Reported:      2017 15:42  [facility]      ______________________________________________________________________________   Cytologic Interpretation :      Negative for intraepithelial lesion or malignancy. Satisfactory for evaluation. Presence of endocervical/transformation zone   component. AUDREY Moore(ASCP)   ** Electronic Signature (SK) 2017  15:42 **      Educational note: The Pap test is a screening test with a well-recognized   false negative rate. The best means available to lower the false negative   rate and to detect early cervical lesions is a Pap test at regular intervals. All ThinPrep Paps will be reviewed with the aid of the ThinPrep Imaging   System, unless otherwise specified.       ______________________________________________________________________________   Clinical Information:   Menstrual Hx: Dysmenorrhea   Other Clinical Conditions:Pap source: Endocervical   REASON FOR COLLECTION::LOW RISK - ENCOUNTER FOR SCREENING FOR MALIGNANT   NEOPLASM OF CERVIX Z12.4   SOURCE::ENDOCERVICAL      Specimen(s) Submitted:    PAP with HPV Reflex (ThinPrep only)      ICD Codes:    Z00.00 N94.6      Fee Codes:    A: [id number]      Performing Lab Location (Unless otherwise specified):   [place] [id number] "place and situation.  Language: Is unable to repeat phrases and name objects.  Fund of Knowledge: Unable to assess  Neuro: mild tremors noted bilaterally, no tics, dyskinesias. no dystonias.  Unable to assess dysmetria due to patient confusion. Gait was not assessed.     CAM-ICU screen positive  -Pt was unable to raise her hand for the letter \"A\" when a phrase was spelled out  -She was unable to correctly answer \"can you hammer a nail with a hammer\" or is one pound greater than two pounds    Clock draw-only numbers 1-6, contour incorrect     Allergies: sulfa drugs   Medications (currently prescribed at [facility]):       Current Facility-Administered Medications:     melatonin tablet 5 mg, 5 mg, Oral, HS PRN, Layton York M.D.    digoxin (Lanoxin) tablet 250 mcg, 250 mcg, Oral, DAILY, T. Meng Mckinney M.D., 250 mcg at 01/26/24 0511    cloNIDine (Catapres) tablet 0.1 mg, 0.1 mg, Oral, BID PRN, Layton York M.D.    metoprolol SR (Toprol XL) tablet 100 mg, 100 mg, Oral, Q DAY, Jeovanny Loera M.D., 100 mg at 01/26/24 0510    amLODIPine (Norvasc) tablet 5 mg, 5 mg, Oral, DAILY, Kerrie Medley M.D., 5 mg at 01/26/24 0510    apixaban (Eliquis) tablet 5 mg, 5 mg, Oral, BID, Kerrie Medley M.D., 5 mg at 01/26/24 0510    levothyroxine (Synthroid) tablet 50 mcg, 50 mcg, Oral, AM ES, Kerrie Medley M.D., 50 mcg at 01/26/24 0510    mirtazapine (Remeron) tablet 15 mg, 15 mg, Oral, QHS, Bridgette Jaquez D.O., 15 mg at 01/25/24 2226    senna-docusate (Pericolace Or Senokot S) 8.6-50 MG per tablet 2 Tablet, 2 Tablet, Oral, BID, 2 Tablet at 01/26/24 0510 **AND** polyethylene glycol/lytes (Miralax) Packet 1 Packet, 1 Packet, Oral, QDAY PRN **AND** magnesium hydroxide (Milk Of Magnesia) suspension 30 mL, 30 mL, Oral, QDAY PRN **AND** bisacodyl (Dulcolax) suppository 10 mg, 10 mg, Rectal, QDAY PRN, Kerrie Medley M.D.    acetaminophen (Tylenol) tablet 650 mg, 650 mg, Oral, Q6HRS PRN, Kerrie Medley M.D., 650 mg at 01/26/24 " CHLAMYDIA / GC BY NUCLEIC ACID AMPLIFICATION [id number] 12:01AM Jay [place]   [Jun 20, 2017 3:59PM LYNNE CUEVAS]  Your test results are normal.     Test Name Result Flag Reference   SOURCE ENDOCERVIX     CHLAMYDIA TRACHOMATIS BY NUCLEIC ACID AMPLIFICATION NEGATIVE  NEGATIVE   NEISSERIA GONORRHOEAE BY NUCLEIC ACID AMPLIFICATION NEGATIVE  NEGATIVE 0205    Notify provider if pain remains uncontrolled, , , CONTINUOUS **AND** Use the Numeric Rating Scale (NRS), Mednez-Baker Faces (WBF), or FLACC on regular floors and Critical-Care Pain Observation Tool (CPOT) on ICUs/Trauma to assess pain, , , CONTINUOUS **AND** Pulse Ox, , , CONTINUOUS **AND** Pharmacy Consult Request ...Pain Management Review 1 Each, 1 Each, Other, PHARMACY TO DOSE **AND** If patient difficult to arouse and/or has respiratory depression (respiratory rate of 10 or less), stop any opiates that are currently infusing and call a Rapid Response., , , CONTINUOUS, Kerrie Medley M.D.    hydrALAZINE (Apresoline) injection 10 mg, 10 mg, Intravenous, Q4HRS PRN, Kerrie Medley M.D.    ondansetron (Zofran) syringe/vial injection 4 mg, 4 mg, Intravenous, Q4HRS PRN, Kerrie Medley M.D.    ondansetron (Zofran ODT) dispertab 4 mg, 4 mg, Oral, Q4HRS PRN, Kerrie Medley M.D.    [COMPLETED] magnesium sulfate IVPB premix 2 g, 2 g, Intravenous, Once, Stopped at 01/22/24 2115 **AND** magnesium oxide tablet 400 mg, 400 mg, Oral, BID, Kerrie Medley M.D., 400 mg at 01/26/24 0510    venlafaxine XR (Effexor XR) capsule 150 mg, 150 mg, Oral, QDAY with Breakfast, Kerrie Medley M.D., 150 mg at 01/26/24 1012    *Labs:  Lab Results   Component Value Date/Time    SODIUM 136 01/26/2024 04:40 AM    POTASSIUM 4.3 01/26/2024 04:40 AM    CHLORIDE 103 01/26/2024 04:40 AM    CO2 23 01/26/2024 04:40 AM    GLUCOSE 93 01/26/2024 04:40 AM    BUN 17 01/26/2024 04:40 AM    CREATININE 0.51 01/26/2024 04:40 AM    BUNCREATRAT 21.7 (H) 08/31/2023 03:29 AM      Lab Results   Component Value Date/Time    WBC 6.7 01/26/2024 04:40 AM    RBC 4.44 01/26/2024 04:40 AM    HEMOGLOBIN 14.6 01/26/2024 04:40 AM    HEMATOCRIT 43.4 01/26/2024 04:40 AM    MCV 97.7 01/26/2024 04:40 AM    MCH 32.9 01/26/2024 04:40 AM    MCHC 33.6 01/26/2024 04:40 AM    MPV 11.4 01/26/2024 04:40 AM    NEUTSPOLYS 41.90 (L) 01/22/2024 02:28 PM    LYMPHOCYTES 44.50 (H) 01/22/2024  "02:28 PM    MONOCYTES 11.00 01/22/2024 02:28 PM    EOSINOPHILS 1.10 01/22/2024 02:28 PM    BASOPHILS 1.10 01/22/2024 02:28 PM   Plt-84,000     EKG Interpretation (1/22/24):  QTc: 457  Atrial fibrillation   Left anterior fascicular block     TSH elevated at 6.320 with normal T4 1.25.     Most recent B12 12/08/23 601 wnl  Most recent VitD 11/23/22 35 wnl     No EEG on file.  No recent CXR.     *ASSESSMENT:   Jeanie Hutchison is a 78 y.o. female w/ PMH of atrial fibrillation with RVR on chronic anticoagulation, hypothyroidism, CLEO, depression and anxiety who presented to the ED via EMS on 1/22/23 for reported alcohol detox and evaluation of depression.  Notably, patient was recently hospitalized and discharged in December for alcohol detox and depression.  Patient is on CIWA protocol and has a telesitter in the room, but is very confused during the interview due to withdrawal.     Alcohol Use Disorder, severe  2.  Delirium, related to alcohol withdrawal  -patient admitted to drinking heavily each day, with no period of sobriety since her last admission in December, stated on admission she was drinking 1 pt vodka  -On admission, she stated that her last drink was \"yesterday morning\"  -she has become increasingly more confused over the course of her hospital stay  -has a known history of severe withdrawal symptoms  -NO longer on CIWA protocol, she is more alert but still confused     3. Hx of Depression and Anxiety  -patient is known to the psychiatry team  -currently on effexor XR and remeron for depression  -Per ED note, pt was likely not adherent to medications prior to admission   -unable to assess mood fully due to delirium    *Plan/Further Workup:   Legal status: will need to reassess the need for legal hold when patient is no longer delirious. Recommend patient no be discharged AMA until an assessment for legal hold can be completed as there is concern that patient is a danger to herself due to depression but " legal hold cannot be initiated while patient is still delirious     Medical management per medical team     *Medication Managment:  -continue Remeron PO 15mg QD at night  for sleep, appetite and depression, may hold for sedation  -May increase Effexor PO back to home dose of 225 mg QD for depression as BP has stabilized     *Labs and Imaging Reviewed:  as above     *Ordered Old Records/Obtain Collateral: patient is known to the psychiatry team from prior admission     *Discussed with another provider: Dr. York (via voalte)     Will continue to follow, this psychiatrist will return Monday to follow on patient. If reassessment is needed over the weekend please send a message to Weekend on call, Dr. Anna Marie King

## 2024-01-28 PROCEDURE — 700105 HCHG RX REV CODE 258

## 2024-01-28 PROCEDURE — A9270 NON-COVERED ITEM OR SERVICE: HCPCS | Performed by: HOSPITALIST

## 2024-01-28 PROCEDURE — A9270 NON-COVERED ITEM OR SERVICE: HCPCS | Performed by: STUDENT IN AN ORGANIZED HEALTH CARE EDUCATION/TRAINING PROGRAM

## 2024-01-28 PROCEDURE — A9270 NON-COVERED ITEM OR SERVICE: HCPCS

## 2024-01-28 PROCEDURE — 700102 HCHG RX REV CODE 250 W/ 637 OVERRIDE(OP)

## 2024-01-28 PROCEDURE — 700102 HCHG RX REV CODE 250 W/ 637 OVERRIDE(OP): Performed by: HOSPITALIST

## 2024-01-28 PROCEDURE — 99232 SBSQ HOSP IP/OBS MODERATE 35: CPT | Mod: GC | Performed by: HOSPITALIST

## 2024-01-28 PROCEDURE — 770020 HCHG ROOM/CARE - TELE (206)

## 2024-01-28 PROCEDURE — 700102 HCHG RX REV CODE 250 W/ 637 OVERRIDE(OP): Performed by: STUDENT IN AN ORGANIZED HEALTH CARE EDUCATION/TRAINING PROGRAM

## 2024-01-28 RX ORDER — SODIUM CHLORIDE, SODIUM LACTATE, POTASSIUM CHLORIDE, AND CALCIUM CHLORIDE .6; .31; .03; .02 G/100ML; G/100ML; G/100ML; G/100ML
500 INJECTION, SOLUTION INTRAVENOUS ONCE
Status: COMPLETED | OUTPATIENT
Start: 2024-01-28 | End: 2024-01-28

## 2024-01-28 RX ADMIN — SODIUM CHLORIDE, POTASSIUM CHLORIDE, SODIUM LACTATE AND CALCIUM CHLORIDE 500 ML: 600; 310; 30; 20 INJECTION, SOLUTION INTRAVENOUS at 14:36

## 2024-01-28 RX ADMIN — METOPROLOL SUCCINATE 100 MG: 25 TABLET, EXTENDED RELEASE ORAL at 05:23

## 2024-01-28 RX ADMIN — VENLAFAXINE HYDROCHLORIDE 225 MG: 75 CAPSULE, EXTENDED RELEASE ORAL at 09:58

## 2024-01-28 RX ADMIN — DIGOXIN 125 MCG: 0.12 TABLET ORAL at 09:58

## 2024-01-28 RX ADMIN — DOCUSATE SODIUM 50 MG AND SENNOSIDES 8.6 MG 2 TABLET: 8.6; 5 TABLET, FILM COATED ORAL at 05:23

## 2024-01-28 RX ADMIN — LEVOTHYROXINE SODIUM 50 MCG: 0.05 TABLET ORAL at 05:23

## 2024-01-28 RX ADMIN — Medication 400 MG: at 17:57

## 2024-01-28 RX ADMIN — APIXABAN 5 MG: 5 TABLET, FILM COATED ORAL at 17:56

## 2024-01-28 RX ADMIN — AMLODIPINE BESYLATE 5 MG: 5 TABLET ORAL at 05:23

## 2024-01-28 RX ADMIN — DOCUSATE SODIUM 50 MG AND SENNOSIDES 8.6 MG 2 TABLET: 8.6; 5 TABLET, FILM COATED ORAL at 17:57

## 2024-01-28 RX ADMIN — Medication 400 MG: at 05:24

## 2024-01-28 RX ADMIN — APIXABAN 5 MG: 5 TABLET, FILM COATED ORAL at 05:23

## 2024-01-28 RX ADMIN — MIRTAZAPINE 15 MG: 15 TABLET, FILM COATED ORAL at 21:29

## 2024-01-28 ASSESSMENT — ENCOUNTER SYMPTOMS
EYES NEGATIVE: 1
CARDIOVASCULAR NEGATIVE: 1
RESPIRATORY NEGATIVE: 1
PSYCHIATRIC NEGATIVE: 1
GASTROINTESTINAL NEGATIVE: 1
MUSCULOSKELETAL NEGATIVE: 1
NEUROLOGICAL NEGATIVE: 1

## 2024-01-28 ASSESSMENT — FIBROSIS 4 INDEX: FIB4 SCORE: 5.68

## 2024-01-28 ASSESSMENT — PAIN DESCRIPTION - PAIN TYPE: TYPE: ACUTE PAIN

## 2024-01-28 NOTE — CARE PLAN
The patient is Stable - Low risk of patient condition declining or worsening    Shift Goals  Clinical Goals: safety, Q4 neuro, eat dinner  Patient Goals: rest, feel better, go home  Family Goals: pari    Progress made toward(s) clinical / shift goals:    Problem: Fall Risk  Goal: Patient will remain free from falls  Outcome: Progressing  Note: Fall precautions in place. Pt remains free from falls this shift     Problem: Knowledge Deficit - Standard  Goal: Patient and family/care givers will demonstrate understanding of plan of care, disease process/condition, diagnostic tests and medications  Outcome: Progressing  Note: Reoriented pt to room and situation multiple times this shift. Poc discussed with pt, all questions answered at this time       Patient is not progressing towards the following goals:

## 2024-01-28 NOTE — PROGRESS NOTES
[facility] Internal Medicine Daily Progress Note    Date of Service  1/28/2024    [facility] Team: R IM Green Team   Attending: BENITO Mckinney M.d.  Senior Resident: Dr. York  Intern:  Dr. Loera  Contact Number: [phone]    Chief Complaint  Jeanie Hutchison is a 78 y.o. female admitted 1/22/2024 with worsening shakes, tremors, anxiety, recent alcohol abuse.     Hospital Course  79 y/o female patient with a past medical history of alcoholism, chronic pain, hypothyroidism, paroxysmal atrial fibrillation, presenting to the ER for worsening shakes and cognition. Patient drinks roughly 1 pint of vodka every day. Patient was previously a nonalcoholic, however in the last 2 years, death of multiple family members including , 2 daughters, led to initiation of alcohol consumption. Patient mentions drinking roughly 1 pint vodka daily. Patient is admitted, management began with CIWA protocol, Librium, thiamine, folate, multivitamins, seizure protocols, follow protocol. Patient also has a history of atrial fibrillation status post management with metoprolol, digoxin, Eliquis, however does not use medication at home for management. Patient's home regimen for management of atrial fibrillation restarted, digoxin loaded as needed. Also increasing patient's metoprolol dose for management of hypertension as well as atrial fibrillation. Patient also presenting with thrombocytopenia, likely associated with alcohol use disorder. Monitor with daily labs. Patient also to be referred for outpatient psychology, psychiatry, for management of complex grief disorder, MDD.    Interval Problem Update  No acute events overnight.  -Vital stable, patient on room air saturating 90 to 98%  -On examination, patient mildly confused, AO x 2, patient alert to name, date.  -Patient denies current symptoms, complaints/concerns  -Currently not on CIWA protocol, Librium due to excessive sedation.  However, threshold low for reinitiation of CIWA  protocol  -Patient pending psychiatry evaluation, psychiatry waiting for improvement in confusion, prior to psychiatry evaluation for recommendations and management of complex grief, MDD.  This is likely to evaluate him under    I have discussed this patient's plan of care and discharge plan at IDT rounds today with Case Management, Nursing, Nursing leadership, and other members of the IDT team.    Consultants/Specialty  psychiatry    Code Status  Full Code    Disposition  The patient is not medically cleared for discharge to home or a post-acute facility.  Anticipate discharge to: skilled nursing facility    I have placed the appropriate orders for post-discharge needs.    Review of Systems  Difficult to complete due to continued confusion.  Review of Systems   Constitutional: Negative.    HENT: Negative.     Eyes: Negative.    Respiratory: Negative.     Cardiovascular: Negative.    Gastrointestinal: Negative.    Genitourinary: Negative.    Musculoskeletal: Negative.    Skin: Negative.    Neurological: Negative.    Endo/Heme/Allergies: Negative.    Psychiatric/Behavioral: Negative.          Physical Exam  Temp:  [36.1 °C (97 °F)-37 °C (98.6 °F)] 37 °C (98.6 °F)  Pulse:  [62-95] 95  Resp:  [18-20] 18  BP: (102-138)/(56-97) 125/94  SpO2:  [91 %-100 %] 100 %    Physical Exam  Constitutional:       Appearance: She is normal weight. She is ill-appearing.   HENT:      Right Ear: External ear normal.      Left Ear: External ear normal.      Nose: Nose normal.      Mouth/Throat:      Mouth: Mucous membranes are moist.   Eyes:      Extraocular Movements: Extraocular movements intact.      Conjunctiva/sclera: Conjunctivae normal.      Pupils: Pupils are equal, round, and reactive to light.   Cardiovascular:      Rate and Rhythm: Normal rate and regular rhythm.      Pulses: Normal pulses.      Heart sounds: Normal heart sounds.   Pulmonary:      Effort: Pulmonary effort is normal.      Breath sounds: Normal breath sounds.  "  Abdominal:      General: Bowel sounds are normal.      Palpations: Abdomen is soft.   Musculoskeletal:         General: Normal range of motion.      Cervical back: Normal range of motion.   Skin:     General: Skin is warm.   Neurological:      General: No focal deficit present.      Mental Status: She is alert and oriented to person, place, and time. Mental status is at baseline.         Fluids    Intake/Output Summary (Last 24 hours) at 1/28/2024 0654  Last data filed at 1/28/2024 0409  Gross per 24 hour   Intake 1240 ml   Output 0 ml   Net 1240 ml       Laboratory  Recent Labs     01/26/24  0440 01/27/24  0715   WBC 6.7 5.7   RBC 4.44 4.72   HEMOGLOBIN 14.6 15.6   HEMATOCRIT 43.4 46.1   MCV 97.7 97.7   MCH 32.9 33.1*   MCHC 33.6 33.8   RDW 51.8* 52.2*   PLATELETCT 84* 107*   MPV 11.4 10.4     Recent Labs     01/25/24  1423 01/26/24  0440 01/27/24  0715   SODIUM 135 136 140   POTASSIUM 4.0 4.3 4.1   CHLORIDE 100 103 104   CO2 21 23 26   GLUCOSE 122* 93 95   BUN 16 17 15   CREATININE 0.58 0.51 0.63   CALCIUM 9.0 9.4 9.7                   Imaging  EC-ECHOCARDIOGRAM COMPLETE W/O CONT   Final Result           Assessment/Plan  Problem Representation:    * Alcohol withdrawal syndrome with complication (HCC)- (present on admission)  Assessment & Plan  Patient mentions significant alcohol intake daily for 1-2 years.  Previously nonalcoholic, however after death of , 2 daughters, pt began drinking to cope with symptoms of depressed mood  Pt mentions alcohol helps her sleep and drinks 1 pint of alcohol daily  Has tried reaching out to psychiatrist, psychologist in the past for help with use disorder, however has been \"turned away\"  Discontinue CIWA and Librium  Continue thiamine, folate, multivitamins  Patient more alert today, Aox2/3.    Elevated hemoglobin (HCC)  Assessment & Plan  Pt has hx elevated hemoglobin over admission  Likely from alcohol use disorder  B12, folate wnl  Monitoring for daily " improvements  Phlebotomy if required    Atrial fibrillation with rapid ventricular response (HCC)- (present on admission)  Assessment & Plan  Patient currently in atrial fibrillation with rapid ventricular response due to the fact that she has not been taking her medications  She does have an AICD in place  Continue metoprolol 100 mg daily, digoxin 125 mcg daily, apixaban 5 mg twice daily    Major depressive disorder with current active episode- (present on admission)  Assessment & Plan  Patient denies suicidal ideation, thoughts of self harm.  Psychiatry following; pending recs once patient is alert and oriented.    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  She apparently lost her  and 2 daughters within a span of 6 months  She has not been able to move forward since then  Psychiatry following, making recommendations in medical management  Per psychiatry recommendations, increased Effexor to 225 mg, continue Remeron at dose  Per psychiatry, patient does not have the capacity to leave AGAINST MEDICAL ADVICE at this time, if any acute changes happened then to contact Dr. Anna Marie King over the weekend (1/27-1/28) otherwise Dr. Jaquez will continue to follow on 1/29    Hypophosphatemia  Assessment & Plan  Likely secondary to alcohol, early refeeding syndrome.  Now resolved after supplementation and patient is now eating    Hypothyroidism- (present on admission)  Assessment & Plan  TSH mildly elevated, unsure if the patient was adherent to home Synthroid dose   Continue home Synthroid    HTN (hypertension)- (present on admission)  Assessment & Plan  Optimize blood pressure management keep stop blood pressure less than 140 diastolic under 90  Continue Norvasc 5 mg daily, metoprolol  Monitor electrolytes with daily BMP    Thrombocytopenia (HCC)- (present on admission)  Assessment &  Plan  Thrombocytopenia, likely due to alcohol use disorder.  Currently not bleeding  Currently no transfusion indicated  Uptrending platelet counts, 84 today.  Monitor with daily CBCs.    AICD (automatic cardioverter/defibrillator) present- (present on admission)  Assessment & Plan  Patient has had a [employer] pacemaker/defibrillator implanted by Dr. Baires  She has not followed up with them in probably a couple years according to her  This was placed in 2020    CLEO (obstructive sleep apnea)- (present on admission)  Assessment & Plan  History of sleep apnea currently does not take care of herself and does not utilize any assistive oxygen  Unclear outpatient CPAP settings, hold off on CPAP at this time    Hypomagnesemia- (present on admission)  Assessment & Plan  Check a magnesium level and replace magnesium as needed    Hypokalemia- (present on admission)  Assessment & Plan  Continue to monitor and replete as indicated    Mixed hyperlipidemia- (present on admission)  Assessment & Plan  Fasting lipid panel revealed hypertriglyceridemia, low HDL  Currently not on a statin  Patient to f/u outpatient with primary care for optimized dyslipidemia management.         VTE prophylaxis: therapeutic anticoagulation with apixaban    I have performed a physical exam and reviewed and updated ROS and Plan today (1/28/2024). In review of yesterday's note (1/27/2024), there are no changes except as documented above.

## 2024-01-28 NOTE — PROGRESS NOTES
[facility] Internal Medicine Daily Progress Note    Date of Service  1/28/2024    R Team: R IM Green Team   Attending: BENITO Mckinney M.d.  Senior Resident: Dr. York  Intern:  Dr. Loera    Contact Number: [phone]    Chief Complaint  Jeanie Hutchison is a 78 y.o. female admitted 1/22/2024 with worsening shakes, tremors, anxiety, recent alcohol abuse.      Hospital Course  77 y/o female patient with a past medical history of alcoholism, chronic pain, hypothyroidism, paroxysmal atrial fibrillation, presenting to the ER for worsening shakes and cognition. Patient drinks roughly 1 pint of vodka every day. Patient was previously a nonalcoholic, however in the last 2 years, death of multiple family members including , 2 daughters, led to initiation of alcohol consumption. Patient mentions drinking roughly 1 pint vodka daily. Patient is admitted, management began with CIWA protocol, Librium, thiamine, folate, multivitamins, seizure protocols, follow protocol. Patient also has a history of atrial fibrillation status post management with metoprolol, digoxin, Eliquis, however does not use medication at home for management. Patient's home regimen for management of atrial fibrillation restarted, digoxin loaded as needed. Also increasing patient's metoprolol dose for management of hypertension as well as atrial fibrillation. Patient also presenting with thrombocytopenia, likely associated with alcohol use disorder. Monitor with daily labs. Patient also to be referred for outpatient psychology, psychiatry, for management of complex grief disorder, MDD.    Interval Problem Update  No acute events overnight.  -Patient is alert and oriented to person place time and event, however mildly slow to responding  -Psychiatry to evaluate patient tomorrow, pending recommendations for medical management and psychiatry illnesses.  -Patient also to continue following outpatient psychiatry, referral to psychology  -Holding  blood work tomorrow, patient complains of bruising, pain at site of blood draws  -Patient denies complaints or concerns today  -Pending repeat PT evaluation, OT recommends postacute placement    I have discussed this patient's plan of care and discharge plan at IDT rounds today with Case Management, Nursing, Nursing leadership, and other members of the IDT team.    Consultants/Specialty  psychiatry    Code Status  Full Code    Disposition  The patient is not medically cleared for discharge to home or a post-acute facility.  Anticipate discharge to: skilled nursing facility    I have placed the appropriate orders for post-discharge needs.    Review of Systems  Review of Systems   Constitutional:  Positive for malaise/fatigue.   HENT: Negative.     Eyes: Negative.    Respiratory: Negative.     Cardiovascular: Negative.    Gastrointestinal: Negative.    Genitourinary: Negative.    Musculoskeletal: Negative.    Skin: Negative.    Neurological: Negative.    Endo/Heme/Allergies: Negative.    Psychiatric/Behavioral: Negative.          Physical Exam  Temp:  [36.3 °C (97.3 °F)-37 °C (98.6 °F)] 36.3 °C (97.3 °F)  Pulse:  [62-95] 64  Resp:  [18] 18  BP: ()/(56-94) 96/64  SpO2:  [91 %-100 %] 93 %    Physical Exam  Constitutional:       Appearance: Normal appearance. She is normal weight.   HENT:      Right Ear: External ear normal.      Left Ear: External ear normal.      Nose: Nose normal.      Mouth/Throat:      Mouth: Mucous membranes are moist.   Eyes:      Extraocular Movements: Extraocular movements intact.      Conjunctiva/sclera: Conjunctivae normal.      Pupils: Pupils are equal, round, and reactive to light.   Cardiovascular:      Rate and Rhythm: Normal rate and regular rhythm.      Pulses: Normal pulses.      Heart sounds: Normal heart sounds.   Pulmonary:      Effort: Pulmonary effort is normal.      Breath sounds: Normal breath sounds.   Abdominal:      General: Bowel sounds are normal.      Palpations:  Abdomen is soft.   Musculoskeletal:         General: Normal range of motion.      Cervical back: Normal range of motion.   Skin:     General: Skin is warm.   Neurological:      General: No focal deficit present.      Mental Status: She is alert and oriented to person, place, and time. Mental status is at baseline.   Psychiatric:         Mood and Affect: Mood normal.         Behavior: Behavior normal.         Thought Content: Thought content normal.         Judgment: Judgment normal.         Fluids    Intake/Output Summary (Last 24 hours) at 1/28/2024 1414  Last data filed at 1/28/2024 1200  Gross per 24 hour   Intake 1620 ml   Output 400 ml   Net 1220 ml       Laboratory  Recent Labs     01/26/24  0440 01/27/24  0715   WBC 6.7 5.7   RBC 4.44 4.72   HEMOGLOBIN 14.6 15.6   HEMATOCRIT 43.4 46.1   MCV 97.7 97.7   MCH 32.9 33.1*   MCHC 33.6 33.8   RDW 51.8* 52.2*   PLATELETCT 84* 107*   MPV 11.4 10.4     Recent Labs     01/25/24  1423 01/26/24  0440 01/27/24  0715   SODIUM 135 136 140   POTASSIUM 4.0 4.3 4.1   CHLORIDE 100 103 104   CO2 21 23 26   GLUCOSE 122* 93 95   BUN 16 17 15   CREATININE 0.58 0.51 0.63   CALCIUM 9.0 9.4 9.7                   Imaging  EC-ECHOCARDIOGRAM COMPLETE W/O CONT   Final Result           Assessment/Plan  Problem Representation:    * Alcohol withdrawal syndrome with complication (HCC)- (present on admission)  Assessment & Plan  Patient mentions significant alcohol intake daily for 1-2 years.  Previously nonalcoholic, however after death of , 2 daughters, pt began drinking to cope with symptoms of depressed mood.  Pt mentions alcohol helps her sleep and drinks 1 pint of alcohol daily  Discontinue CIWA and Librium  Continue thiamine, folate, multivitamins  Patient more alert today, Aox4    Elevated hemoglobin (HCC)  Assessment & Plan  RESOLVED    Pt has hx elevated hemoglobin over admission  Likely from alcohol use disorder  B12, folate wnl  Monitoring for daily improvements  Phlebotomy  if required    Atrial fibrillation with rapid ventricular response (HCC)- (present on admission)  Assessment & Plan  Patient currently in atrial fibrillation.  She does have an AICD in place.  Continue metoprolol 100 mg daily, digoxin 125 mcg daily, apixaban 5 mg twice daily    Major depressive disorder with current active episode- (present on admission)  Assessment & Plan  Patient denies suicidal ideation, thoughts of self harm.  Psychiatry following; pending recs.  Patient currently alert and oriented x 4.    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  She apparently lost her  and 2 daughters within a span of 6 months  She has not been able to move forward since then  Psychiatry following, making recommendations in medical management  Per psychiatry recommendations, increased Effexor to 225 mg, continue Remeron at dose  Per psychiatry, patient does not have the capacity to leave AGAINST MEDICAL ADVICE at this time, if any acute changes happened then to contact Dr. Anna Marie King over the weekend (1/27-1/28) otherwise Dr. Jaquez will continue to follow on 1/29    Hypophosphatemia  Assessment & Plan  Likely secondary to alcohol, early refeeding syndrome.  Now resolved after supplementation and patient is now eating    Hypothyroidism- (present on admission)  Assessment & Plan  TSH mildly elevated, unsure if the patient was adherent to home Synthroid dose   Continue home Synthroid    HTN (hypertension)- (present on admission)  Assessment & Plan  Optimize blood pressure management keep stop blood pressure less than 140 diastolic under 90  Continue Norvasc 5 mg daily, metoprolol  Monitor electrolytes with daily BMP    Thrombocytopenia (HCC)- (present on admission)  Assessment & Plan  Thrombocytopenia, likely due to alcohol use disorder.  Currently not bleeding  Currently no transfusion  indicated  Uptrending platelet counts every day since admission.  Monitor with daily CBCs    AICD (automatic cardioverter/defibrillator) present- (present on admission)  Assessment & Plan  Patient has had a Medtronic pacemaker/defibrillator implanted by Dr. Baires  She has not followed up with them in probably a couple years according to her  This was placed in 2020    CLEO (obstructive sleep apnea)- (present on admission)  Assessment & Plan  History of sleep apnea currently does not take care of herself and does not utilize any assistive oxygen  Unclear outpatient CPAP settings, hold off on CPAP at this time    Hypomagnesemia- (present on admission)  Assessment & Plan  Check a magnesium level and replace magnesium as needed    Hypokalemia- (present on admission)  Assessment & Plan  Continue to monitor and replete as indicated    Mixed hyperlipidemia- (present on admission)  Assessment & Plan  Fasting lipid panel revealed hypertriglyceridemia, low HDL  Currently not on a statin  Patient to f/u outpatient with primary care for optimized dyslipidemia management.         VTE prophylaxis: therapeutic anticoagulation with apixaban    I have performed a physical exam and reviewed and updated ROS and Plan today (1/28/2024). In review of yesterday's note (1/27/2024), there are no changes except as documented above.           Cephalic Cephalic

## 2024-01-29 LAB
ALBUMIN 24H MFR UR ELPH: 100 %
ALPHA1 GLOB 24H MFR UR ELPH: 0 %
ALPHA2 GLOB 24H MFR UR ELPH: 0 %
B-GLOBULIN 24H MFR UR ELPH: 0 %
COLLECT DURATION TIME SPEC: NORMAL HRS
EER MONOCLONAL PROTEIN STUDY, 24 HOUR U Q5964: NORMAL
GAMMA GLOB 24H MFR UR ELPH: 0 %
INTERPRETATION UR IFE-IMP: NORMAL
M PROTEIN 24H MFR UR ELPH: 0 %
M PROTEIN 24H UR ELPH-MRATE: NORMAL MG/24 HRS
PROT 24H UR-MRATE: NORMAL MG/D (ref 40–150)
PROT UR-MCNC: 4 MG/DL
SPECIMEN VOL ?TM UR: NORMAL ML

## 2024-01-29 PROCEDURE — 700102 HCHG RX REV CODE 250 W/ 637 OVERRIDE(OP): Performed by: HOSPITALIST

## 2024-01-29 PROCEDURE — A9270 NON-COVERED ITEM OR SERVICE: HCPCS | Performed by: HOSPITALIST

## 2024-01-29 PROCEDURE — 99231 SBSQ HOSP IP/OBS SF/LOW 25: CPT | Performed by: STUDENT IN AN ORGANIZED HEALTH CARE EDUCATION/TRAINING PROGRAM

## 2024-01-29 PROCEDURE — 700102 HCHG RX REV CODE 250 W/ 637 OVERRIDE(OP)

## 2024-01-29 PROCEDURE — 99232 SBSQ HOSP IP/OBS MODERATE 35: CPT | Mod: GC | Performed by: HOSPITALIST

## 2024-01-29 PROCEDURE — 770020 HCHG ROOM/CARE - TELE (206)

## 2024-01-29 PROCEDURE — A9270 NON-COVERED ITEM OR SERVICE: HCPCS

## 2024-01-29 PROCEDURE — 97116 GAIT TRAINING THERAPY: CPT

## 2024-01-29 PROCEDURE — 97530 THERAPEUTIC ACTIVITIES: CPT

## 2024-01-29 PROCEDURE — 700102 HCHG RX REV CODE 250 W/ 637 OVERRIDE(OP): Performed by: STUDENT IN AN ORGANIZED HEALTH CARE EDUCATION/TRAINING PROGRAM

## 2024-01-29 PROCEDURE — A9270 NON-COVERED ITEM OR SERVICE: HCPCS | Performed by: STUDENT IN AN ORGANIZED HEALTH CARE EDUCATION/TRAINING PROGRAM

## 2024-01-29 RX ORDER — DIGOXIN 125 MCG
125 TABLET ORAL DAILY
Qty: 30 TABLET | Refills: 1 | Status: SHIPPED
Start: 2024-01-29

## 2024-01-29 RX ORDER — M-VIT,TX,IRON,MINS/CALC/FOLIC 27MG-0.4MG
1 TABLET ORAL DAILY
Qty: 30 TABLET | Refills: 11 | Status: SHIPPED
Start: 2024-01-29

## 2024-01-29 RX ORDER — MIRTAZAPINE 15 MG/1
15 TABLET, FILM COATED ORAL
Qty: 30 TABLET | Status: SHIPPED
Start: 2024-01-29

## 2024-01-29 RX ORDER — METOPROLOL SUCCINATE 100 MG/1
100 TABLET, EXTENDED RELEASE ORAL DAILY
Qty: 30 TABLET | Status: SHIPPED
Start: 2024-01-30

## 2024-01-29 RX ORDER — VENLAFAXINE HYDROCHLORIDE 75 MG/1
225 CAPSULE, EXTENDED RELEASE ORAL
Qty: 30 CAPSULE | Refills: 3 | Status: SHIPPED
Start: 2024-01-30

## 2024-01-29 RX ORDER — LEVOTHYROXINE SODIUM 0.05 MG/1
50 TABLET ORAL
Qty: 30 TABLET | Refills: 1 | Status: SHIPPED
Start: 2024-01-29

## 2024-01-29 RX ORDER — LANOLIN ALCOHOL/MO/W.PET/CERES
400 CREAM (GRAM) TOPICAL 2 TIMES DAILY
Qty: 30 TABLET | Status: SHIPPED
Start: 2024-01-29

## 2024-01-29 RX ORDER — LANOLIN ALCOHOL/MO/W.PET/CERES
100 CREAM (GRAM) TOPICAL DAILY
Qty: 30 TABLET | Refills: 0 | Status: SHIPPED
Start: 2024-01-29

## 2024-01-29 RX ORDER — FOLIC ACID 1 MG/1
1 TABLET ORAL DAILY
Qty: 30 TABLET | Status: SHIPPED
Start: 2024-01-29

## 2024-01-29 RX ORDER — AMLODIPINE BESYLATE 5 MG/1
5 TABLET ORAL DAILY
Qty: 30 TABLET | Status: SHIPPED
Start: 2024-01-30

## 2024-01-29 RX ADMIN — METOPROLOL SUCCINATE 100 MG: 25 TABLET, EXTENDED RELEASE ORAL at 04:21

## 2024-01-29 RX ADMIN — DIGOXIN 125 MCG: 0.12 TABLET ORAL at 04:21

## 2024-01-29 RX ADMIN — Medication 400 MG: at 04:21

## 2024-01-29 RX ADMIN — LEVOTHYROXINE SODIUM 50 MCG: 0.05 TABLET ORAL at 04:21

## 2024-01-29 RX ADMIN — AMLODIPINE BESYLATE 5 MG: 5 TABLET ORAL at 04:21

## 2024-01-29 RX ADMIN — MIRTAZAPINE 15 MG: 15 TABLET, FILM COATED ORAL at 21:45

## 2024-01-29 RX ADMIN — Medication 400 MG: at 17:02

## 2024-01-29 RX ADMIN — APIXABAN 5 MG: 5 TABLET, FILM COATED ORAL at 04:21

## 2024-01-29 RX ADMIN — VENLAFAXINE HYDROCHLORIDE 225 MG: 75 CAPSULE, EXTENDED RELEASE ORAL at 07:55

## 2024-01-29 RX ADMIN — APIXABAN 5 MG: 5 TABLET, FILM COATED ORAL at 17:02

## 2024-01-29 ASSESSMENT — ENCOUNTER SYMPTOMS
MUSCULOSKELETAL NEGATIVE: 1
GASTROINTESTINAL NEGATIVE: 1
RESPIRATORY NEGATIVE: 1
CARDIOVASCULAR NEGATIVE: 1
NERVOUS/ANXIOUS: 1
HALLUCINATIONS: 0
DEPRESSION: 0
NEUROLOGICAL NEGATIVE: 1
MEMORY LOSS: 0
EYES NEGATIVE: 1

## 2024-01-29 ASSESSMENT — COGNITIVE AND FUNCTIONAL STATUS - GENERAL
WALKING IN HOSPITAL ROOM: A LITTLE
SUGGESTED CMS G CODE MODIFIER MOBILITY: CK
MOBILITY SCORE: 18
TURNING FROM BACK TO SIDE WHILE IN FLAT BAD: A LITTLE
MOVING TO AND FROM BED TO CHAIR: A LITTLE
MOVING FROM LYING ON BACK TO SITTING ON SIDE OF FLAT BED: A LITTLE
CLIMB 3 TO 5 STEPS WITH RAILING: A LITTLE
STANDING UP FROM CHAIR USING ARMS: A LITTLE

## 2024-01-29 ASSESSMENT — FIBROSIS 4 INDEX: FIB4 SCORE: 5.68

## 2024-01-29 ASSESSMENT — PAIN DESCRIPTION - PAIN TYPE
TYPE: ACUTE PAIN
TYPE: ACUTE PAIN

## 2024-01-29 ASSESSMENT — GAIT ASSESSMENTS
DISTANCE (FEET): 80
GAIT LEVEL OF ASSIST: CONTACT GUARD ASSIST
DEVIATION: DECREASED BASE OF SUPPORT;BRADYKINETIC;SHUFFLED GAIT;DECREASED HEEL STRIKE
ASSISTIVE DEVICE: FRONT WHEEL WALKER;NONE

## 2024-01-29 NOTE — DISCHARGE PLANNING
@ 13:00 DPA faxed over to [employer] Appeal from pt , scanned in  [employer] Fax cover sheet / request .

## 2024-01-29 NOTE — CARE PLAN
"The patient is Stable - Low risk of patient condition declining or worsening    Shift Goals  Clinical Goals: Monitor labs/vitals, mentation, and pt safety  Patient Goals: Just to feel stronger and go for a walk today  Family Goals: DANICA    Progress made toward(s) clinical / shift goals:        Problem: Fall Risk  Goal: Patient will remain free from falls  Outcome: Progressing     Problem: Knowledge Deficit - Standard  Goal: Patient and family/care givers will demonstrate understanding of plan of care, disease process/condition, diagnostic tests and medications  Outcome: Progressing     Problem: Optimal Care for Alcohol Withdrawal  Goal: Optimal Care for the alcohol withdrawal patient  Outcome: Progressing     Problem: Psychosocial  Goal: Patient's level of anxiety will decrease  Outcome: Progressing     Pt's vitals stable, no c/o pain noted. SaO2 >92% on RA, no c/o CP or SOB. Pt alert to person, place, and situation. Confusion still noted on time frames. Reorientation provided, with good effect noted. Pt to be discharged to [facility] today, but indicated to this writer that she was not comfortable with that plan due to \"not having anything set up with her son\", and \"not having clothes\". The pt was tearful and upset. Emotional support provided. Her son was contacted by Alexandra with [facility], and plans for her clothing to be brought to the hospital in the morning was established, as well as her discharge being discussed with the son. The pt appealed her discharge, and plans for discharge were postponed until tomorrow or Wednesday. PT/OT ambulated pt today in hallway, and back to bed. Pt tolerated well. Education provided on all medications and treatments provided, with verbal understanding given. Personal items and call bell left within reach of pt. Pt utilizes call bell appropriately when in need of assistance. Bed alarm in place for pt safety. Plan of care is ongoing at this time for discharge planning.    "

## 2024-01-29 NOTE — DISCHARGE PLANNING
PT transport with [facility] cancelled due to PT appealing discharge. [facility] notified and PT is now in will call. Care team notified.   SLC

## 2024-01-29 NOTE — DISCHARGE SUMMARY
"[facility] Internal Medicine Discharge Summary    Attending: BENITO Mckinney M.d.  Senior Resident: Dr. Nix  Intern:  Dr. Sheridan  Contact Number: [phone]    CHIEF COMPLAINT ON ADMISSION  Chief Complaint   Patient presents with    Depression     Pts son called EMS because pt has been increasingly depressed over the past few years. Pt recently lost 2 daughters and her . Denies SI/HI but reports that she recently stopped taking all of her medications because she is \"done caring for herself\"       Reason for Admission  ems     Admission Date  1/22/2024    Discharge Date  01/30/24      CODE STATUS  Full Code    HPI & HOSPITAL COURSE  Jeanie Hutchison is a 79 y/o female patient with a past medical history of alcoholism, chronic pain, hypothyroidism, paroxysmal atrial fibrillation, AICD in place, CLEO presenting to the ER for worsening shakes and cognition. Patient drinks roughly 1 pint of vodka every day. Patient was previously a nonalcoholic, however in the last 2 years, death of multiple family members including , 2 daughters, led to initiation of alcohol consumption.  She was admitted for alcohol withdrawal management with CIWA protocol, Librium, thiamine, folate, multivitamins and course was complicated by delirium which gradually improved. Patient also has a history of atrial fibrillation status post management with metoprolol, digoxin, Eliquis, however does not use her medication at home for management and she developed afib with RVR. Patient's home regimen for management of atrial fibrillation was restarted, and she was digoxin loaded with improvement in her rates. Her home metoprolol dose was increased for HTN as well as afib management as well. In light of her complex grief disorder and MDD leading to alcohol use, psychiatry was consulted and her Effexor dose was increased, Remeron was started, and she was referred for outpatient psychology with plans for continued outpatient psychiatry for " management; inpatient psychiatry was deemed unnecessary. PT and OT evaluated the patient and recommended post-acute placement for further therapy. Of note she was hypomagnesemic during admission and started on magnesium repletion for concern of refeeding syndrome.    Therefore, she is discharged in fair and stable condition to skilled nursing facility.    The patient met 2-midnight criteria for an inpatient stay at the time of discharge.        Physical Exam on Day of Discharge  Physical Exam  Constitutional:       Appearance: Normal appearance. She is normal weight.   HENT:      Right Ear: External ear normal.      Left Ear: External ear normal.      Nose: Nose normal.      Mouth/Throat:      Mouth: Mucous membranes are moist.   Eyes:      Extraocular Movements: Extraocular movements intact.      Conjunctiva/sclera: Conjunctivae normal.      Pupils: Pupils are equal, round, and reactive to light.   Cardiovascular:      Rate and Rhythm: Normal rate and regular rhythm.      Pulses: Normal pulses.      Heart sounds: Normal heart sounds.   Pulmonary:      Effort: Pulmonary effort is normal.      Breath sounds: Normal breath sounds.   Abdominal:      General: Bowel sounds are normal.      Palpations: Abdomen is soft.   Musculoskeletal:         General: Normal range of motion.      Cervical back: Normal range of motion.   Skin:     General: Skin is warm.   Neurological:      General: No focal deficit present.      Mental Status: She is alert and oriented to person, place, and time. Mental status is at baseline.   Psychiatric:         Mood and Affect: Mood normal.         Behavior: Behavior normal.         Thought Content: Thought content normal.         Judgment: Judgment normal.         FOLLOW UP ITEMS POST DISCHARGE  Afib with RVR- recommend checking digoxin level 1/30 to ensure not supratherapeutic. Continue inpatient meds as below  Debility- PT and OT per SNF  Complex grief and MDD- continue inpatient meds  as below  with outpatient follow up with psychology and psychiatry   Alcohol use- continue thiamine, folate, multivitamin. Continued cessation discussions   Hypomagnesemia- recommend checking magnesium level 1/30 and adjusting magnesium accordingly    DISCHARGE DIAGNOSES  Principal Problem:    Alcohol withdrawal syndrome with complication (HCC) (POA: Yes)  Active Problems:    Mixed hyperlipidemia (POA: Yes)    Hypokalemia (POA: Yes)    Hypomagnesemia (POA: Yes)    CLEO (obstructive sleep apnea) (POA: Yes)    AICD (automatic cardioverter/defibrillator) present (POA: Yes)      Overview: July 2020: Medtronic Primo MRI  [id number] implanted by Dr. Baires.    Complex grief disorder lasting longer than 12 months (POA: Yes)    Thrombocytopenia (HCC) (POA: Yes)    Major depressive disorder with current active episode (POA: Yes)    HTN (hypertension) (POA: Yes)    Atrial fibrillation with rapid ventricular response (HCC) (POA: Yes)    Hypothyroidism (POA: Yes)    Hypophosphatemia (POA: Unknown)    Elevated hemoglobin (HCC) (POA: Unknown)  Resolved Problems:    Macrocytic anemia (POA: Yes)      FOLLOW UP  No future appointments.  No follow-up provider specified.    MEDICATIONS ON DISCHARGE     Medication List        START taking these medications        Instructions   folic acid 1 MG Tabs  Commonly known as: Folvite   Take 1 Tablet by mouth every day.  Dose: 1 mg     magnesium oxide 400 (240 Mg) MG Tabs   Take 1 Tablet by mouth 2 times a day.  Dose: 400 mg     metoprolol  MG Tb24  Start taking on: January 30, 2024  Commonly known as: Toprol XL  Replaces: Metoprolol Succinate 100 MG Cs24   Take 1 Tablet by mouth every day.  Dose: 100 mg     therapeutic multivitamin-minerals Tabs   Take 1 Tablet by mouth every day.  Dose: 1 Tablet     thiamine 100 MG tablet  Commonly known as: Thiamine   Take 1 Tablet by mouth every day.  Dose: 100 mg            CHANGE how you take these medications        Instructions   venlafaxine XR 75 MG  Cp24  Start taking on: January 30, 2024  What changed:   medication strength  how much to take  when to take this  Commonly known as: Effexor XR   Take 3 Capsules by mouth every morning with breakfast.  Dose: 225 mg            CONTINUE taking these medications        Instructions   amLODIPine 5 MG Tabs  Start taking on: January 30, 2024  Commonly known as: Norvasc   Take 1 Tablet by mouth every day.  Dose: 5 mg     apixaban 5mg Tabs  Commonly known as: Eliquis   Take 1 Tablet by mouth 2 times a day.  Dose: 5 mg     digoxin 125 MCG Tabs  Commonly known as: Lanoxin   Take 1 Tablet by mouth every day.  Dose: 125 mcg     levothyroxine 50 MCG Tabs  Commonly known as: Synthroid   Take 1 Tablet by mouth every morning on an empty stomach.  Dose: 50 mcg     mirtazapine 15 MG Tabs  Commonly known as: Remeron   Take 1 Tablet by mouth at bedtime.  Dose: 15 mg            STOP taking these medications      escitalopram 10 MG Tabs  Commonly known as: Lexapro     Metoprolol Succinate 100 MG Cs24  Replaced by: metoprolol  MG Tb24     traZODone 100 MG Tabs  Commonly known as: Desyrel              Allergies  Allergies   Allergen Reactions    Sulfa Drugs Rash             DIET  Orders Placed This Encounter   Procedures    Diet Order Diet: Cardiac     Standing Status:   Standing     Number of Occurrences:   1     Order Specific Question:   Diet:     Answer:   Cardiac [6]       ACTIVITY  As tolerated and directed by skilled nursing.      CONSULTATIONS  Psychiatry     PROCEDURES  None    LABORATORY  Lab Results   Component Value Date    SODIUM 140 01/27/2024    POTASSIUM 4.1 01/27/2024    CHLORIDE 104 01/27/2024    CO2 26 01/27/2024    GLUCOSE 95 01/27/2024    BUN 15 01/27/2024    CREATININE 0.63 01/27/2024        Lab Results   Component Value Date    WBC 5.7 01/27/2024    HEMOGLOBIN 15.6 01/27/2024    HEMATOCRIT 46.1 01/27/2024    PLATELETCT 107 (L) 01/27/2024        Total time of the discharge process: 50 minutes.

## 2024-01-29 NOTE — PROGRESS NOTES
[facility] Internal Medicine Daily Progress Note    Date of Service  1/29/2024    [facility] Team: R IM Green Team   Attending: BENITO Mckinney M.d.  Senior Resident: Dr. Nix  Intern:  Dr. Sheridan  Contact Number: [phone]    Chief Complaint  Jeanie Hutchison is a 78 y.o. female admitted 1/22/2024 with worsening shakes, tremors, anxiety, recent alcohol abuse.       Hospital Course  79 y/o female patient with a past medical history of alcoholism, chronic pain, hypothyroidism, paroxysmal atrial fibrillation, presenting to the ER for worsening shakes and cognition. Patient drinks roughly 1 pint of vodka every day. Patient was previously a nonalcoholic, however in the last 2 years, death of multiple family members including , 2 daughters, led to initiation of alcohol consumption. Patient mentions drinking roughly 1 pint vodka daily. Patient is admitted, management began with CIWA protocol, Librium, thiamine, folate, multivitamins, seizure protocols, follow protocol. Patient also has a history of atrial fibrillation status post management with metoprolol, digoxin, Eliquis, however does not use medication at home for management. Patient's home regimen for management of atrial fibrillation restarted, digoxin loaded as needed. Also increasing patient's metoprolol dose for management of hypertension as well as atrial fibrillation. Patient also presenting with thrombocytopenia, likely associated with alcohol use disorder. Monitor with daily labs. Patient also to be referred for outpatient psychology, psychiatry, for management of complex grief disorder, MDD.    Interval Problem Update  -NAEO  -Psych met with patient and found no indication for legal hold.  -Patient with improved mood without SI and no withdrawal symptoms overnight. Denies N/V/F/C. Has had frequent BM nonbloody but occasionally with pain from straining.   -Approved for discharge per  but patient not cooperative with plan for discharge due to concern  for her belongings and not feeling ready to leave.     I have discussed this patient's plan of care and discharge plan at IDT rounds today with Case Management, Nursing, Nursing leadership, and other members of the IDT team.    Consultants/Specialty  psychiatry    Code Status  Full Code    Disposition  The patient is medically cleared for discharge to home or a post-acute facility.      I have placed the appropriate orders for post-discharge needs.    Review of Systems  Review of Systems   Constitutional:  Positive for malaise/fatigue.   HENT: Negative.     Eyes: Negative.    Respiratory: Negative.     Cardiovascular: Negative.    Gastrointestinal: Negative.    Genitourinary: Negative.    Musculoskeletal: Negative.    Skin: Negative.    Neurological: Negative.    Endo/Heme/Allergies: Negative.    Psychiatric/Behavioral:  Negative for depression, hallucinations, memory loss and suicidal ideas. The patient is nervous/anxious.         Physical Exam  Temp:  [36.3 °C (97.3 °F)-36.8 °C (98.3 °F)] 36.8 °C (98.3 °F)  Pulse:  [61-75] 64  Resp:  [16-18] 17  BP: ()/(57-86) 102/68  SpO2:  [93 %-100 %] 97 %    Physical Exam  Constitutional:       Appearance: Normal appearance. She is normal weight.   HENT:      Right Ear: External ear normal.      Left Ear: External ear normal.      Nose: Nose normal.      Mouth/Throat:      Mouth: Mucous membranes are moist.   Eyes:      Extraocular Movements: Extraocular movements intact.      Conjunctiva/sclera: Conjunctivae normal.      Pupils: Pupils are equal, round, and reactive to light.   Cardiovascular:      Rate and Rhythm: Normal rate and regular rhythm.      Pulses: Normal pulses.      Heart sounds: Normal heart sounds.   Pulmonary:      Effort: Pulmonary effort is normal.      Breath sounds: Normal breath sounds.   Abdominal:      General: Bowel sounds are normal.      Palpations: Abdomen is soft.   Musculoskeletal:         General: Normal range of motion.      Cervical back:  Normal range of motion.   Skin:     General: Skin is warm.   Neurological:      General: No focal deficit present.      Mental Status: She is alert and oriented to person, place, and time. Mental status is at baseline.   Psychiatric:         Mood and Affect: Mood normal.         Behavior: Behavior normal.         Thought Content: Thought content normal.         Judgment: Judgment normal.         Fluids    Intake/Output Summary (Last 24 hours) at 1/29/2024 1556  Last data filed at 1/29/2024 1000  Gross per 24 hour   Intake 878 ml   Output 950 ml   Net -72 ml       Laboratory  Recent Labs     01/27/24  0715   WBC 5.7   RBC 4.72   HEMOGLOBIN 15.6   HEMATOCRIT 46.1   MCV 97.7   MCH 33.1*   MCHC 33.8   RDW 52.2*   PLATELETCT 107*   MPV 10.4     Recent Labs     01/27/24  0715   SODIUM 140   POTASSIUM 4.1   CHLORIDE 104   CO2 26   GLUCOSE 95   BUN 15   CREATININE 0.63   CALCIUM 9.7                   Imaging  EC-ECHOCARDIOGRAM COMPLETE W/O CONT   Final Result           Assessment/Plan  Problem Representation:    * Alcohol withdrawal syndrome with complication (HCC)- (present on admission)  Assessment & Plan  Patient mentions significant alcohol intake daily for 1-2 years.  Previously nonalcoholic, however after death of , 2 daughters, pt began drinking to cope with symptoms of depressed mood.  Pt mentions alcohol helps her sleep and drinks 1 pint of alcohol daily  Discontinue CIWA and Librium  Continue thiamine, folate, multivitamins  Patient more alert today, Aox4    Atrial fibrillation with rapid ventricular response (HCC)- (present on admission)  Assessment & Plan  Patient currently in atrial fibrillation.  She does have an AICD in place.  Continue metoprolol 100 mg daily, digoxin 125 mcg daily, apixaban 5 mg twice daily  Monitor digoxin level and avoid toxicity    Elevated hemoglobin (HCC)  Assessment & Plan  RESOLVED    Pt has hx elevated hemoglobin over admission  Likely from alcohol use disorder  B12, folate  wnl  Monitoring for daily improvements  Phlebotomy if required    Hypophosphatemia  Assessment & Plan  Likely secondary to alcohol, early refeeding syndrome.  Now resolved after supplementation and patient is now eating    Hypothyroidism- (present on admission)  Assessment & Plan  TSH mildly elevated, unsure if the patient was adherent to home Synthroid dose   Continue home Synthroid    HTN (hypertension)- (present on admission)  Assessment & Plan  Optimize blood pressure management keep stop blood pressure less than 140 diastolic under 90  Continue Norvasc 5 mg daily, metoprolol  Monitor electrolytes with daily BMP    Major depressive disorder with current active episode- (present on admission)  Assessment & Plan  Patient denies suicidal ideation, thoughts of self harm.  Psychiatry following; pending recs.  Patient currently alert and oriented x 4.    Thrombocytopenia (HCC)- (present on admission)  Assessment & Plan  Thrombocytopenia, likely due to alcohol use disorder.  Currently not bleeding  Currently no transfusion indicated  Uptrending platelet counts every day since admission.  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  She apparently lost her  and 2 daughters within a span of 6 months  She has not been able to move forward since then  Psychiatry following, making recommendations in medical management  Per psychiatry recommendations, increased Effexor to 225 mg, continue Remeron at dose  Per psychiatry, patient does not have the capacity to leave AGAINST MEDICAL ADVICE at this time, if any acute changes happened then to contact Dr. Anna Marie King over the weekend (1/27-1/28) otherwise Dr. Jaquez will continue to follow on 1/29    AICD (automatic cardioverter/defibrillator) present- (present on admission)  Assessment & Plan  Patient has had a  [employer] pacemaker/defibrillator implanted by Dr. Baires  She has not followed up with them in probably a couple years according to her  This was placed in 2020    CLEO (obstructive sleep apnea)- (present on admission)  Assessment & Plan  History of sleep apnea currently does not take care of herself and does not utilize any assistive oxygen  Unclear outpatient CPAP settings, hold off on CPAP at this time    Hypomagnesemia- (present on admission)  Assessment & Plan  Check a magnesium level and replace magnesium as needed    Hypokalemia- (present on admission)  Assessment & Plan  Continue to monitor and replete as indicated    Mixed hyperlipidemia- (present on admission)  Assessment & Plan  Fasting lipid panel revealed hypertriglyceridemia, low HDL  Currently not on a statin  Patient to f/u outpatient with primary care for optimized dyslipidemia management.         VTE prophylaxis: therapeutic anticoagulation with apixaban    I have performed a physical exam and reviewed and updated ROS and Plan today (1/29/2024). In review of yesterday's note (1/28/2024), there are no changes except as documented above.

## 2024-01-29 NOTE — THERAPY
Physical Therapy   Daily Treatment     Patient Name: Jeanie Hutchison  Age:  78 y.o., Sex:  female  Medical Record #: [id number]  Today's Date: 1/29/2024     Precautions  Precautions: Fall Risk;Other (See Comments) (seizure)  Comments: ETOH withdrawl    Assessment  Pt was agreeable to the treatment session detailed below. Pt was very bradykinetic during standing tasks including ambulation and stair navigation. Pt does not feel confident in standing tasks w/o FWW due to unsteadiness and utilized UE for additional support. Pt appeared fatigued following navigation of 8 steps and will benefit from improved LE strength and endurance. Pt prefers to return home, however, pt would benefit from post-acute physical therapy services to increased overall functional mobility and return to baseline function. Will continue to follow during admit.       Plan    Treatment Plan Status: Continue Current Treatment Plan  Type of Treatment: Gait Training, Neuro Re-Education / Balance, Therapeutic Activities, Therapeutic Exercise, Stair Training, Bed Mobility  Treatment Frequency: 4 Times per Week  Treatment Duration: Until Therapy Goals Met    DC Equipment Recommendations: Unable to determine at this time (pt may require FWW for stability, but is expected to return to baseline without the need for an AD)  Discharge Recommendations: Recommend post-acute placement for additional physical therapy services prior to discharge home (Patient prefers to return home and would think about going to rehab/SNF at this time)     Objective     01/29/24 0854   Precautions   Precautions Fall Risk;Other (See Comments)  (seizure)   Comments ETOH withdrawl   Vitals   Pulse 69   Patient BP Position Sitting   Blood Pressure  107/70   Pulse Oximetry 96 %   O2 Delivery Device None - Room Air   Vitals Comments Pt reported mild lightheadedness following supine>sitting transition that subsided after ~2 minutes   Pain   Pain Scales 0 to 10 Scale    Intervention  Declines   Pain 0 - 10 Group   Therapist Pain Assessment Post Activity Pain Same as Prior to Activity   Cognition    Cognition / Consciousness X   Speech/ Communication Delayed Responses   Attention Impaired   Comments occassionally slow to respond   Other Treatments   Other Treatments Provided Discussion w/ pt regarding d/c options to ensure pt safety. Discussed about prior living situation & PLOF, Pt states her son lives nearby and she lives with her grandson in a 2-story house (15 steps w/ R railing) w/ 1 step to enter with no railing. Pt does not own an AD and reports no falls.   Balance   Sitting Balance (Static) Fair   Sitting Balance (Dynamic) Fair -   Standing Balance (Static) Fair -   Standing Balance (Dynamic) Fair -   Weight Shift Sitting Good   Weight Shift Standing Fair   Skilled Intervention Postural Facilitation;Verbal Cuing   Comments standing w/ FWW; verbal cues provided to correctly use FWW including hand placement   Bed Mobility    Supine to Sit Supervised   Scooting Supervised   Rolling Supervised   Skilled Intervention Verbal Cuing   Comments HOB flat   Gait Analysis   Gait Level Of Assist Contact Guard Assist   Assistive Device Front Wheel Walker;None   Distance (Feet) 80  (60ft w/ FWW; 20ft w/ no AD; sitting rest in between)   # of Times Distance was Traveled 1   Deviation Decreased Base Of Support;Bradykinetic;Shuffled Gait;Decreased Heel Strike   # of Stairs Climbed 8   Level of Assist with Stairs Contact Guard Assist   Skilled Intervention Facilitation;Postural Facilitation;Verbal Cuing   Comments Verbal cues to correctly use FWW including hand placement and maintaining an upright trunk posture. Pt used UE on bed and wall for stability during ambulation w/ no AD, pt reports feeling unsteady in standing w/o AD support. During stair negotiation, cues for appropriate placement of foot to avoid loss of balance, use of B/L rails. Cues for pacing due to fatigue.   Functional Mobility   Sit to  Stand Contact Guard Assist   Bed, Chair, Wheelchair Transfer Contact Guard Assist   Transfer Method Stand Step   Mobility bed>hallway ambulation>stair navigation>hallway ambulation>chair   Skilled Intervention Verbal Cuing;Sequencing;Facilitation   Comments STS x1 from EOB w/ FWW, STS x1 from chair w/o AD   How much difficulty does the patient currently have...   Turning over in bed (including adjusting bedclothes, sheets and blankets)? 3   Sitting down on and standing up from a chair with arms (e.g., wheelchair, bedside commode, etc.) 3   Moving from lying on back to sitting on the side of the bed? 3   How much help from another person does the patient currently need...   Moving to and from a bed to a chair (including a wheelchair)? 3   Need to walk in a hospital room? 3   Climbing 3-5 steps with a railing? 3   6 clicks Mobility Score 18   Activity Tolerance   Sitting in Chair post session   Sitting Edge of Bed >10 minutes   Standing >15 minutes   Patient / Family Goals    Patient / Family Goal #1 To return home   Goal #1 Outcome Progressing as expected   Short Term Goals    Short Term Goal # 1 Patient will independently perform supine-sit with HOB flat in 6 visits   Goal Outcome # 1 Progressing as expected   Short Term Goal # 2 Patient will perform sit-stand and chair transfers with LRAD with supervision in 6 visits   Goal Outcome # 2 Progressing as expected  (pt expected to return to baseline w/o an AD)   Short Term Goal # 3 Patient will ambulate > 50 feet with LRAD with supervision in 6 visits   Goal Outcome # 3 Progressing as expected  (pt expected to return to baseline w/o an AD)   Short Term Goal # 4 Patient will negotiate few steps with supervision in 6 visits for community mobility   Goal Outcome # 4 Progressing as expected   Education Group   Education Provided Role of Physical Therapist   Role of Physical Therapist Patient Response Patient;Acceptance;Explanation;Verbal Demonstration   Physical Therapy  Treatment Plan   Physical Therapy Treatment Plan Continue Current Treatment Plan   Treatment Plan  Gait Training;Neuro Re-Education / Balance;Therapeutic Activities;Therapeutic Exercise;Stair Training;Bed Mobility   Treatment Frequency 4 Times per Week   Duration Until Therapy Goals Met   Anticipated Discharge Equipment and Recommendations   DC Equipment Recommendations Unable to determine at this time  (pt may require FWW for stability, but is expected to return to baseline without the need for an AD)   Discharge Recommendations Recommend post-acute placement for additional physical therapy services prior to discharge home  (Patient prefers to return home and would think about going to rehab/SNF at this time)   Interdisciplinary Plan of Care Collaboration   IDT Collaboration with  Nursing   Patient Position at End of Therapy Seated;Chair Alarm On;Call Light within Reach;Tray Table within Reach;Phone within Reach   Collaboration Comments RN notified   Session Information   Date / Session Number  1/29- 2(2/3, 1/30)

## 2024-01-29 NOTE — CARE PLAN
The patient is Stable - Low risk of patient condition declining or worsening    Shift Goals  Clinical Goals: safety, neuro assessments, rest  Patient Goals: rest  Family Goals: DANICA    Progress made toward(s) clinical / shift goals:      Q4h neuro assessments conducted throughout this shift. No changes.     Problem: Fall Risk  Goal: Patient will remain free from falls  Outcome: Progressing  Note: Bed in lowest position. Bed alarm in place and on. Frame alarm also on to further promote safety. Patient room door open, and frequent rounding in place to promote patient safety.        Problem: Optimal Care for Alcohol Withdrawal  Goal: Optimal Care for the alcohol withdrawal patient  Outcome: Progressing     Problem: Seizure Precautions  Goal: Implementation of seizure precautions  Outcome: Progressing     Problem: Skin Integrity  Goal: Skin integrity is maintained or improved  Outcome: Progressing   Patient turning self in the bed.   Assisted to bedside commode for bathroom use.    Patient is not progressing towards the following goals:

## 2024-01-29 NOTE — DISCHARGE PLANNING
Case Management Discharge Planning    Admission Date: 1/22/2024  GMLOS: 5  ALOS: 7    6-Clicks ADL Score: 15  6-Clicks Mobility Score: 18  PT and/or OT Eval ordered: Yes  Post-acute Referrals Ordered: Yes  Post-acute Choice Obtained: Yes  Has referral(s) been sent to post-acute provider:  Yes      Anticipated Discharge Dispo: Discharge Disposition: D/T to SNF with Medicare cert in anticipation of skilled care (03)    DME Needed: No    Action(s) Taken: Per IDT rounds, pt is medically clear for SNF. LSW met with pt at bedside to obtain choice and deliver second IMM. Pt's first choice is [facility], second [facility]. Pt asked that her son be called to provide update and see if he's able to bring clothes.    LSW made phone call to pt's son and left  with callback request. LSW made phone call to [facility] who confirmed they're able to accept pt today and requested 1500 transportation time. Care team notified and DC summary requested by 1400. Transportation forms faxed to [employer].    Addendum @1314  Transportation confirmed for 1530, however after meeting with pt at bedside again pt has decided to appeal her hospital DC. Number for [facility] provided to pt and requested transportation be placed on will call. [facility] notified.    Addendum @1503  LSW received call back from [facility]. Provided update on anticipated DC to [facility]. [facility] stated he will try to bring clothes for pt prior to DC.    Escalations Completed: None    Medically Clear: Yes    Next Steps: Care coordination will f/u with [facility]    Barriers to Discharge: None    Is the patient up for discharge tomorrow: No

## 2024-01-29 NOTE — PROGRESS NOTES
Telemetry Monitor Reports    Rhythm: Afib  Ectopy: PVC, TRIP, TRIG  HX: PVC, BIG  HR Range: 61 - 69    *Per telemetry tech

## 2024-01-29 NOTE — DISCHARGE PLANNING
DC Transport Scheduled    Transport Company Scheduled:  [employer]  Spoke with Sabi at [facility] to schedule transport.    Scheduled Date: 1/29/2024  Scheduled Time: 1530    Destination: [facility] [address] [facility]     Notified care team of scheduled transport via [employer].     If there are any changes needed to the DC transportation scheduled, please contact [facility] at ext. [phone] between the hours of 8960-4097 Mon-Fri. If outside those hours, contact the ED Case Manager at ext. [phone].

## 2024-01-30 ENCOUNTER — TELEPHONE (OUTPATIENT)
Dept: BEHAVIORAL HEALTH | Facility: PSYCHIATRIC FACILITY | Age: 79
End: 2024-01-30
Payer: MEDICARE

## 2024-01-30 VITALS
SYSTOLIC BLOOD PRESSURE: 114 MMHG | DIASTOLIC BLOOD PRESSURE: 81 MMHG | BODY MASS INDEX: 24.2 KG/M2 | OXYGEN SATURATION: 93 % | TEMPERATURE: 97.9 F | RESPIRATION RATE: 16 BRPM | WEIGHT: 150.57 LBS | HEART RATE: 71 BPM | HEIGHT: 66 IN

## 2024-01-30 LAB — DIGOXIN SERPL-MCNC: 0.8 NG/ML (ref 0.8–2)

## 2024-01-30 PROCEDURE — 700102 HCHG RX REV CODE 250 W/ 637 OVERRIDE(OP)

## 2024-01-30 PROCEDURE — 700102 HCHG RX REV CODE 250 W/ 637 OVERRIDE(OP): Performed by: HOSPITALIST

## 2024-01-30 PROCEDURE — A9270 NON-COVERED ITEM OR SERVICE: HCPCS | Performed by: HOSPITALIST

## 2024-01-30 PROCEDURE — 36415 COLL VENOUS BLD VENIPUNCTURE: CPT

## 2024-01-30 PROCEDURE — 80162 ASSAY OF DIGOXIN TOTAL: CPT

## 2024-01-30 PROCEDURE — A9270 NON-COVERED ITEM OR SERVICE: HCPCS

## 2024-01-30 PROCEDURE — 99239 HOSP IP/OBS DSCHRG MGMT >30: CPT | Performed by: HOSPITALIST

## 2024-01-30 RX ADMIN — Medication 400 MG: at 04:50

## 2024-01-30 RX ADMIN — METOPROLOL SUCCINATE 100 MG: 25 TABLET, EXTENDED RELEASE ORAL at 04:50

## 2024-01-30 RX ADMIN — DIGOXIN 125 MCG: 0.12 TABLET ORAL at 04:50

## 2024-01-30 RX ADMIN — VENLAFAXINE HYDROCHLORIDE 225 MG: 75 CAPSULE, EXTENDED RELEASE ORAL at 07:47

## 2024-01-30 RX ADMIN — APIXABAN 5 MG: 5 TABLET, FILM COATED ORAL at 04:50

## 2024-01-30 RX ADMIN — LEVOTHYROXINE SODIUM 50 MCG: 0.05 TABLET ORAL at 04:51

## 2024-01-30 RX ADMIN — AMLODIPINE BESYLATE 5 MG: 5 TABLET ORAL at 04:51

## 2024-01-30 ASSESSMENT — PATIENT HEALTH QUESTIONNAIRE - PHQ9
7. TROUBLE CONCENTRATING ON THINGS, SUCH AS READING THE NEWSPAPER OR WATCHING TELEVISION: NEARLY EVERY DAY
3. TROUBLE FALLING OR STAYING ASLEEP OR SLEEPING TOO MUCH: NEARLY EVERY DAY
2. FEELING DOWN, DEPRESSED, IRRITABLE, OR HOPELESS: NOT AT ALL
1. LITTLE INTEREST OR PLEASURE IN DOING THINGS: NOT AT ALL
4. FEELING TIRED OR HAVING LITTLE ENERGY: NEARLY EVERY DAY
8. MOVING OR SPEAKING SO SLOWLY THAT OTHER PEOPLE COULD HAVE NOTICED. OR THE OPPOSITE, BEING SO FIGETY OR RESTLESS THAT YOU HAVE BEEN MOVING AROUND A LOT MORE THAN USUAL: NEARLY EVERY DAY
5. POOR APPETITE OR OVEREATING: SEVERAL DAYS
SUM OF ALL RESPONSES TO PHQ QUESTIONS 1-9: 17
9. THOUGHTS THAT YOU WOULD BE BETTER OFF DEAD, OR OF HURTING YOURSELF: SEVERAL DAYS
SUM OF ALL RESPONSES TO PHQ9 QUESTIONS 1 AND 2: 0
6. FEELING BAD ABOUT YOURSELF - OR THAT YOU ARE A FAILURE OR HAVE LET YOURSELF OR YOUR FAMILY DOWN: NEARLY EVERY DAY

## 2024-01-30 ASSESSMENT — FIBROSIS 4 INDEX: FIB4 SCORE: 5.68

## 2024-01-30 ASSESSMENT — PAIN DESCRIPTION - PAIN TYPE: TYPE: ACUTE PAIN

## 2024-01-30 NOTE — CARE PLAN
The patient is Stable - Low risk of patient condition declining or worsening    Shift Goals  Clinical Goals: Safety, q4 neuros  Patient Goals: Get some sleep  Family Goals: DANICA    Progress made toward(s) clinical / shift goals:    Problem: Fall Risk  Goal: Patient will remain free from falls  Outcome: Progressing     Problem: Knowledge Deficit - Standard  Goal: Patient and family/care givers will demonstrate understanding of plan of care, disease process/condition, diagnostic tests and medications  Outcome: Progressing     Problem: Psychosocial  Goal: Patient's level of anxiety will decrease  Outcome: Progressing     Problem: Skin Integrity  Goal: Skin integrity is maintained or improved  Outcome: Progressing       Patient is not progressing towards the following goals:

## 2024-01-30 NOTE — TELEPHONE ENCOUNTER
Spoke with patient and patient's son. Informed them that a referral has been made for Individual therapy through [facility] and that she should be getting a call about an appointment for behavioral therapy soon. Also provided my office phone number ([phone]) to call should she want to establish with a med provider or if she needs help following up with the therapy referral. Son is agreeable to be listed as a secondary contact on the referral to ensure information about her therapy appt goes through to patient. Both parties had no further questions or concerns.    Son mentioned he has patient's car keys so she does not have access to drive at this time

## 2024-01-30 NOTE — CONSULTS
"PSYCHIATRIC Progress Note-ESTABLISHED  Attending: Bridgette Jaquez     *Reason for admission: alcohol detox and depression  *Reason for consult: depression  *Requesting Physician: Meng Mckinney MD        Legal status: not on hold     Interval Events:  Patient appears subjectively better than last visit.  She is oriented to person, place, month, and situation, but seems to become easily confused.  She states that she feels \"good\" and \"more oriented\" compared to time of admission, but admits to feeling \"overwhelmed\" also.  She is still distractible and appears dysphoric and bradykinetic.  CAM-ICU is still positive (+); she was unable to maintain attention.  She was still unable to complete a clock draw today.  Her numbers went from 12, 1, 2...etc... 11, 12, and only covered the right half of the clock face.  They did not circumscribe the [place] edge, and she was unable to place the hands. She was perseverative throughout the course of the interview upon being able to talk 1 on 1 about the death of her family members, and became slightly defensive when alcohol detox became the topic of conversation.  She does not desire to participate in group therapies or to participate in counseling via zoom.  Patient maintains that she has good energy, has been sleeping well especially with Remeron, and \"does not remember\" whether she has good appetite.  She denies SI/HI. She does not express delusions. She does not appear to respond to internal stimuli.She did agree with individual therapy and allowed me to make a referral. She also agrees to speak with the consult psychotherapist tomorrow     Patient states \"I don't know if I could stop\" when asked whether she would be able to abstain from alcohol at home. She is still slightly confused and does not appreciate the consequences of returning home without first attending rehab and/or sober living.  She remains too weak currently to be discharged home, PT/OT recommending acute " "placement.     *Medical Review of Systems: unable to complete due to mental status     *Psychiatric (Physical) Examination: observed phenomenon:      Vitals:     01/29/24 0800   BP: 125/86   Pulse: 61   Resp: 16   Temp: 36.4 °C (97.5 °F)   SpO2: 95%      General: Awake and alert,  In no acute distress.  Patient Appearance: Appropriate for situation, wearing hospital gown, disheveled  Behavior: easily distracted, calm and cooperative  Speech: Delayed, slow rate and rhythm, decreased volume   Mood: \"good\" \"overwhelmed\"  Affect: flat  Thought Content: largely appropriate, No suicidal ideation, No thoughts of self harm. No delusions, has future oriented thinking  Thought Process:  perseverative on wanting 1;1 therapy, can be redirected to topic of conversation  Psychosis: does not appear to respond to internal stimuli, No delusions  Insight: limited insight due to mild confusion still present  Judgment: fair judgment, is willing to engage in psychiatric treatment  Cognition: recent memory impaired (does not recall the circumstances that led to her admission), distractible.  Oriented to person, place, time and situation.  Language: Is able to repeat phrases and name objects.  Fund of Knowledge: appears below expected for age  Neuro: mild tremors noted bilaterally, no tics, dyskinesias. no dystonia, mild bradykinesia. Gait was not assessed.     CAM-ICU screen positive  -Pt was unable to raise her hand for the letter \"A\" when a phrase was spelled out (raised her hands for all letter for the last five letter of the phrase)     Clock draw- numbers 12->1-12, contour incorrect (see interval hx)   **shared clock draw with IM team during rounds     Allergies: sulfa drugs   Medications (currently prescribed at [facility]):    Current Facility-Administered Medications:     digoxin (Lanoxin) tablet 125 mcg, 125 mcg, Oral, DAILY, BENITO Mckinney M.D., 125 mcg at 01/29/24 9381    venlafaxine XR (Effexor XR) capsule 225 mg, 225 mg, " Oral, QDAY with Breakfast, Layton York M.D., 225 mg at 01/29/24 0755    melatonin tablet 5 mg, 5 mg, Oral, HS PRN, Layton York M.D.    metoprolol SR (Toprol XL) tablet 100 mg, 100 mg, Oral, Q DAY, Jeovanny Loera M.D., 100 mg at 01/29/24 0421    amLODIPine (Norvasc) tablet 5 mg, 5 mg, Oral, DAILY, Kerrie Medley M.D., 5 mg at 01/29/24 0421    apixaban (Eliquis) tablet 5 mg, 5 mg, Oral, BID, Kerrie Medley M.D., 5 mg at 01/29/24 1702    levothyroxine (Synthroid) tablet 50 mcg, 50 mcg, Oral, AM ES, Kerrie Medley M.D., 50 mcg at 01/29/24 0421    mirtazapine (Remeron) tablet 15 mg, 15 mg, Oral, QHS, Bridgette Jaquez D.O., 15 mg at 01/28/24 2129    senna-docusate (Pericolace Or Senokot S) 8.6-50 MG per tablet 2 Tablet, 2 Tablet, Oral, BID, 2 Tablet at 01/28/24 1757 **AND** polyethylene glycol/lytes (Miralax) Packet 1 Packet, 1 Packet, Oral, QDAY PRN **AND** magnesium hydroxide (Milk Of Magnesia) suspension 30 mL, 30 mL, Oral, QDAY PRN **AND** bisacodyl (Dulcolax) suppository 10 mg, 10 mg, Rectal, QDAY PRN, Kerrie Medley M.D.    acetaminophen (Tylenol) tablet 650 mg, 650 mg, Oral, Q6HRS PRN, Kerrie Medley M.D., 650 mg at 01/26/24 0205    Notify provider if pain remains uncontrolled, , , CONTINUOUS **AND** Use the Numeric Rating Scale (NRS), Mendez-Baker Faces (WBF), or FLACC on regular floors and Critical-Care Pain Observation Tool (CPOT) on ICUs/Trauma to assess pain, , , CONTINUOUS **AND** Pulse Ox, , , CONTINUOUS **AND** Pharmacy Consult Request ...Pain Management Review 1 Each, 1 Each, Other, PHARMACY TO DOSE **AND** If patient difficult to arouse and/or has respiratory depression (respiratory rate of 10 or less), stop any opiates that are currently infusing and call a Rapid Response., , , CONTINUOUS, Kerrie Medley M.D.    ondansetron (Zofran) syringe/vial injection 4 mg, 4 mg, Intravenous, Q4HRS PRN, Kerrie Medley M.D.    ondansetron (Zofran ODT) dispertab 4 mg, 4 mg, Oral, Q4HRS PRN, Kerrie Medley,  M.D.    [COMPLETED] magnesium sulfate IVPB premix 2 g, 2 g, Intravenous, Once, Stopped at 01/22/24 2115 **AND** magnesium oxide tablet 400 mg, 400 mg, Oral, BID, Kerrie Medley M.D., 400 mg at 01/29/24 1702          *Labs:        Lab Results   Component Value Date/Time     WBC 5.7 01/27/2024 07:15 AM     RBC 4.72 01/27/2024 07:15 AM     HEMOGLOBIN 15.6 01/27/2024 07:15 AM     HEMATOCRIT 46.1 01/27/2024 07:15 AM     MCV 97.7 01/27/2024 07:15 AM     MCH 33.1 (H) 01/27/2024 07:15 AM     MCHC 33.8 01/27/2024 07:15 AM     MPV 10.4 01/27/2024 07:15 AM     NEUTSPOLYS 68.40 01/27/2024 07:15 AM     LYMPHOCYTES 20.60 (L) 01/27/2024 07:15 AM     MONOCYTES 7.90 01/27/2024 07:15 AM     EOSINOPHILS 1.40 01/27/2024 07:15 AM     BASOPHILS 0.70 01/27/2024 07:15 AM    plt-107,000           Lab Results   Component Value Date/Time     SODIUM 140 01/27/2024 07:15 AM     POTASSIUM 4.1 01/27/2024 07:15 AM     CHLORIDE 104 01/27/2024 07:15 AM     CO2 26 01/27/2024 07:15 AM     GLUCOSE 95 01/27/2024 07:15 AM     BUN 15 01/27/2024 07:15 AM     CREATININE 0.63 01/27/2024 07:15 AM     BUNCREATRAT 21.7 (H) 08/31/2023 03:29 AM          Recent Labs     01/22/24  1428   ASTSGOT 33   ALTSGPT 17   TBILIRUBIN 1.9*   GLOBULIN 2.8        EKG Interpretation (1/22/24):  QTc: 457  Atrial fibrillation   Left anterior fascicular block     TSH elevated at 6.320 with normal T4 1.25.     Most recent B12 12/08/23 601 wnl  Most recent VitD 11/23/22 35 wnl     No EEG on file.  No recent CXR.     *ASSESSMENT:   Jeanie Hutchison is a 78 y.o. female w/ PMH of atrial fibrillation with RVR on chronic anticoagulation, hypothyroidism, CLEO, depression and anxiety who presented to the ED via EMS on 1/22/23 for reported alcohol detox and evaluation of depression.  Notably, patient was recently hospitalized and discharged in December for alcohol detox and depression.  Patient is no longer on CIWA, but remains weak.  She remains slightly confused.     Alcohol Use  "Disorder, severe  2.  Delirium, related to alcohol withdrawal  -patient admitted to drinking heavily each day, with no period of sobriety since her last admission in December, stated on admission she was drinking 1 pt vodka  -On admission, she stated that her last drink was \"yesterday morning\"  -she became increasingly more confused over the first few days of her hospital stay, now improved  -has a known history of severe withdrawal symptoms  -NO longer on CIWA protocol, she is more alert but still confused (See CAM ICU and clock draw)     3. Hx of Depression and Anxiety  -patient is known to the psychiatry team  -currently on effexor XR and remeron for depression  -per ED note, pt was likely not adherent to medications prior to admission  -pt does endorse depressed mood felt to be due to prolonged grief over the loss of her family members. She endorses anhedonia and low energy. Denies SI and is willing to engage in individual therapy. She feels if she can process her grief again this will help her stop drinking.      *Plan/Further Workup:   Legal status:not on hold    **messaged IM team (Dr. Loera) that If this patient tries to leave AMA that a capacity assessment should be conducted (as patient is still confused) and we can reassess for need for legal hold (hold for depression interfering with ability to care for self). So far patient expressed doubt but not total disagreement with SNF placement and is willing to engage in psychiatric services so as of today's interview legal hold is not applicable. However this should be re-evaluated should the patient begin to refuse care     Medical management per medical team     *Medication Managment:  -continue Remeron PO 15mg QD at night  for sleep, appetite and depression, may hold for sedation  -continue Effexor  mg QD for depression as BP has stabilized     Psychotherapy consult placed    *Labs and Imaging Reviewed:  as above        *Discussed with another " provider: Dr. Mckinney/Dr. Jeovanny Loera     Will continue to follow.

## 2024-01-30 NOTE — DISCHARGE PLANNING
Case Management Discharge Planning    Admission Date: 1/22/2024  GMLOS: 5  ALOS: 8    6-Clicks ADL Score: 15  6-Clicks Mobility Score: 18  PT and/or OT Eval ordered: Yes  Post-acute Referrals Ordered: Yes  Post-acute Choice Obtained: Yes  Has referral(s) been sent to post-acute provider:  Yes      Anticipated Discharge Dispo: Discharge Disposition: D/T to SNF with Medicare cert in anticipation of skilled care (03)    DME Needed: No    Action(s) Taken: LSW met with pt at bedside to discuss DC. Pt feels better today and is agreeable with going to [facility] if they still have a bed available. LSW made phone call to [facility] and left VM with callback request.    Addendum @  [facility] requested 1100 transportation for pt. Care team notified and message sent to [employer].    Addendum @0395  [place] completed and given to RN. Xiang notified of confirmed transportation time of 1130.    Escalations Completed: None    Medically Clear: Yes    Next Steps: No CM needs identified at this time    Barriers to Discharge: none    Is the patient up for discharge tomorrow: No-anticipated to [facility] today at 1130

## 2024-01-30 NOTE — CONSULTS
Brief Behavioral Health Care Note    Consult order received for Behavioral Health Care Department, psychotherapy. Per medical record, patient is leaving this morning to a SNF for continued care.    Thank you,    Signing off    Livia Vaughn, Ph.D., [facility]

## 2024-01-30 NOTE — PROGRESS NOTES
Pt remained free of falls today. Educated pt on importance of using call light and risks and risk factors associated with fall prevention. Verified fall precautions and fall risk signage is in place. Bed in lowest locked position with call light and personal belongings within reach.

## 2024-01-30 NOTE — PROGRESS NOTES
Discharge orders in place. Assisted patient to get dressed and gather belongings. Tele monitor discontinued, monitor tech informed. Patient picked up  2 [facility] staff. Patient discharged per robertorjuaquin, awake, A&O x 4 and in stable condition.

## 2024-01-30 NOTE — DISCHARGE PLANNING
PT taken off of will call with [facility] and scheduled for transport at 1130 today going to [facility]. Care team notified.   SLC

## 2024-01-30 NOTE — DISCHARGE INSTRUCTIONS
Discharge Instructions    Discharged to other by medical transportation with escort. Discharged via ambulance, hospital escort: Yes.  Special equipment needed: N/A    Be sure to schedule a follow-up appointment with your primary care doctor or any specialists as instructed.     Discharge Plan:   Diet Plan: Discussed  Activity Level: Discussed  Confirmed Follow up Appointment: Patient to Call and Schedule Appointment  Confirmed Symptoms Management: Discussed  Medication Reconciliation Updated: Yes  Influenza Vaccine Indication: Not indicated: Previously immunized this influenza season and > 8 years of age    I understand that a diet low in cholesterol, fat, and sodium is recommended for good health. Unless I have been given specific instructions below for another diet, I accept this instruction as my diet prescription.       Special Instructions: None    -Is this patient being discharged with medication to prevent blood clots?  No    Is patient discharged on Warfarin / Coumadin?   No

## 2024-02-08 ENCOUNTER — TELEPHONE (OUTPATIENT)
Dept: HEALTH INFORMATION MANAGEMENT | Facility: OTHER | Age: 79
End: 2024-02-08
Payer: MEDICARE

## 2024-04-24 ENCOUNTER — APPOINTMENT (OUTPATIENT)
Dept: BEHAVIORAL HEALTH | Facility: CLINIC | Age: 79
End: 2024-04-24
Payer: MEDICARE

## 2024-05-03 ENCOUNTER — APPOINTMENT (OUTPATIENT)
Dept: RADIOLOGY | Facility: MEDICAL CENTER | Age: 79
DRG: 896 | End: 2024-05-03
Attending: STUDENT IN AN ORGANIZED HEALTH CARE EDUCATION/TRAINING PROGRAM
Payer: MEDICARE

## 2024-05-03 ENCOUNTER — HOSPITAL ENCOUNTER (INPATIENT)
Facility: MEDICAL CENTER | Age: 79
LOS: 6 days | DRG: 896 | End: 2024-05-10
Attending: STUDENT IN AN ORGANIZED HEALTH CARE EDUCATION/TRAINING PROGRAM | Admitting: STUDENT IN AN ORGANIZED HEALTH CARE EDUCATION/TRAINING PROGRAM
Payer: MEDICARE

## 2024-05-03 DIAGNOSIS — R09.02 HYPOXIA: ICD-10-CM

## 2024-05-03 DIAGNOSIS — F10.229 ALCOHOL INTOXICATION IN ACTIVE ALCOHOLIC WITH COMPLICATION (HCC): ICD-10-CM

## 2024-05-03 DIAGNOSIS — I48.19 PERSISTENT ATRIAL FIBRILLATION (HCC): ICD-10-CM

## 2024-05-03 DIAGNOSIS — E87.6 HYPOKALEMIA: ICD-10-CM

## 2024-05-03 DIAGNOSIS — F10.930 ALCOHOL WITHDRAWAL SYNDROME WITHOUT COMPLICATION (HCC): ICD-10-CM

## 2024-05-03 LAB
ALBUMIN SERPL BCP-MCNC: 3.7 G/DL (ref 3.2–4.9)
ALBUMIN/GLOB SERPL: 1.5 G/DL
ALP SERPL-CCNC: 88 U/L (ref 30–99)
ALT SERPL-CCNC: 22 U/L (ref 2–50)
ANION GAP SERPL CALC-SCNC: 18 MMOL/L (ref 7–16)
AST SERPL-CCNC: 44 U/L (ref 12–45)
BASOPHILS # BLD AUTO: 1.4 % (ref 0–1.8)
BASOPHILS # BLD: 0.07 K/UL (ref 0–0.12)
BILIRUB SERPL-MCNC: 1.6 MG/DL (ref 0.1–1.5)
BUN SERPL-MCNC: 10 MG/DL (ref 8–22)
CALCIUM ALBUM COR SERPL-MCNC: 8.4 MG/DL (ref 8.5–10.5)
CALCIUM SERPL-MCNC: 8.2 MG/DL (ref 8.5–10.5)
CHLORIDE SERPL-SCNC: 105 MMOL/L (ref 96–112)
CO2 SERPL-SCNC: 19 MMOL/L (ref 20–33)
CREAT SERPL-MCNC: 0.48 MG/DL (ref 0.5–1.4)
D DIMER PPP IA.FEU-MCNC: 0.89 UG/ML (FEU) (ref 0–0.5)
EKG IMPRESSION: NORMAL
EOSINOPHIL # BLD AUTO: 0.03 K/UL (ref 0–0.51)
EOSINOPHIL NFR BLD: 0.6 % (ref 0–6.9)
ERYTHROCYTE [DISTWIDTH] IN BLOOD BY AUTOMATED COUNT: 50.9 FL (ref 35.9–50)
ETHANOL BLD-MCNC: 349.6 MG/DL
FLUAV RNA SPEC QL NAA+PROBE: NEGATIVE
FLUBV RNA SPEC QL NAA+PROBE: NEGATIVE
GFR SERPLBLD CREATININE-BSD FMLA CKD-EPI: 98 ML/MIN/1.73 M 2
GLOBULIN SER CALC-MCNC: 2.5 G/DL (ref 1.9–3.5)
GLUCOSE SERPL-MCNC: 88 MG/DL (ref 65–99)
HCT VFR BLD AUTO: 43.1 % (ref 37–47)
HGB BLD-MCNC: 15.1 G/DL (ref 12–16)
IMM GRANULOCYTES # BLD AUTO: 0.01 K/UL (ref 0–0.11)
IMM GRANULOCYTES NFR BLD AUTO: 0.2 % (ref 0–0.9)
LYMPHOCYTES # BLD AUTO: 1.81 K/UL (ref 1–4.8)
LYMPHOCYTES NFR BLD: 37.1 % (ref 22–41)
MCH RBC QN AUTO: 33.5 PG (ref 27–33)
MCHC RBC AUTO-ENTMCNC: 35 G/DL (ref 32.2–35.5)
MCV RBC AUTO: 95.6 FL (ref 81.4–97.8)
MONOCYTES # BLD AUTO: 0.44 K/UL (ref 0–0.85)
MONOCYTES NFR BLD AUTO: 9 % (ref 0–13.4)
NEUTROPHILS # BLD AUTO: 2.52 K/UL (ref 1.82–7.42)
NEUTROPHILS NFR BLD: 51.7 % (ref 44–72)
NRBC # BLD AUTO: 0 K/UL
NRBC BLD-RTO: 0 /100 WBC (ref 0–0.2)
NT-PROBNP SERPL IA-MCNC: 364 PG/ML (ref 0–125)
PLATELET # BLD AUTO: 107 K/UL (ref 164–446)
PLATELETS.RETICULATED NFR BLD AUTO: 3.6 % (ref 0.6–13.1)
PMV BLD AUTO: 9.8 FL (ref 9–12.9)
POTASSIUM SERPL-SCNC: 3.4 MMOL/L (ref 3.6–5.5)
PROT SERPL-MCNC: 6.2 G/DL (ref 6–8.2)
RBC # BLD AUTO: 4.51 M/UL (ref 4.2–5.4)
RSV RNA SPEC QL NAA+PROBE: NEGATIVE
SARS-COV-2 RNA RESP QL NAA+PROBE: NOTDETECTED
SODIUM SERPL-SCNC: 142 MMOL/L (ref 135–145)
TROPONIN T SERPL-MCNC: <6 NG/L (ref 6–19)
TSH SERPL DL<=0.005 MIU/L-ACNC: 2.59 UIU/ML (ref 0.38–5.33)
WBC # BLD AUTO: 4.9 K/UL (ref 4.8–10.8)

## 2024-05-03 RX ORDER — POTASSIUM CHLORIDE 20 MEQ/1
40 TABLET, EXTENDED RELEASE ORAL ONCE
Status: COMPLETED | OUTPATIENT
Start: 2024-05-04 | End: 2024-05-03

## 2024-05-03 RX ORDER — GAUZE BANDAGE 2" X 2"
100 BANDAGE TOPICAL ONCE
Status: COMPLETED | OUTPATIENT
Start: 2024-05-03 | End: 2024-05-03

## 2024-05-03 RX ORDER — FOLIC ACID 1 MG/1
1 TABLET ORAL ONCE
Status: COMPLETED | OUTPATIENT
Start: 2024-05-03 | End: 2024-05-03

## 2024-05-03 RX ADMIN — FOLIC ACID 1 MG: 1 TABLET ORAL at 23:43

## 2024-05-03 RX ADMIN — POTASSIUM CHLORIDE 40 MEQ: 1500 TABLET, EXTENDED RELEASE ORAL at 23:59

## 2024-05-03 RX ADMIN — Medication 100 MG: at 23:43

## 2024-05-03 RX ADMIN — MAGNESIUM SULFATE HEPTAHYDRATE: 500 INJECTION, SOLUTION INTRAMUSCULAR; INTRAVENOUS at 23:57

## 2024-05-03 ASSESSMENT — LIFESTYLE VARIABLES
CONSUMPTION TOTAL: POSITIVE
HAVE YOU EVER FELT YOU SHOULD CUT DOWN ON YOUR DRINKING: YES
EVER FELT BAD OR GUILTY ABOUT YOUR DRINKING: YES
ON A TYPICAL DAY WHEN YOU DRINK ALCOHOL HOW MANY DRINKS DO YOU HAVE: 4
EVER HAD A DRINK FIRST THING IN THE MORNING TO STEADY YOUR NERVES TO GET RID OF A HANGOVER: NO
TOTAL SCORE: 3
DO YOU DRINK ALCOHOL: YES
HOW MANY TIMES IN THE PAST YEAR HAVE YOU HAD 5 OR MORE DRINKS IN A DAY: 365
TOTAL SCORE: 3
HAVE PEOPLE ANNOYED YOU BY CRITICIZING YOUR DRINKING: YES
TOTAL SCORE: 3
DOES PATIENT WANT TO STOP DRINKING: NO
AVERAGE NUMBER OF DAYS PER WEEK YOU HAVE A DRINK CONTAINING ALCOHOL: 7

## 2024-05-03 ASSESSMENT — FIBROSIS 4 INDEX: FIB4 SCORE: 5.68

## 2024-05-04 PROBLEM — E87.20 LACTIC ACIDOSIS: Status: ACTIVE | Noted: 2024-05-04

## 2024-05-04 PROBLEM — Z71.89 ACP (ADVANCE CARE PLANNING): Status: ACTIVE | Noted: 2024-05-04

## 2024-05-04 PROBLEM — F10.229 ALCOHOL INTOXICATION IN ACTIVE ALCOHOLIC WITH COMPLICATION (HCC): Status: ACTIVE | Noted: 2024-05-04

## 2024-05-04 PROBLEM — J18.9 CAP (COMMUNITY ACQUIRED PNEUMONIA): Status: ACTIVE | Noted: 2024-05-04

## 2024-05-04 LAB
ALBUMIN SERPL BCP-MCNC: 3.4 G/DL (ref 3.2–4.9)
BASOPHILS # BLD AUTO: 1.2 % (ref 0–1.8)
BASOPHILS # BLD: 0.06 K/UL (ref 0–0.12)
BUN SERPL-MCNC: 8 MG/DL (ref 8–22)
CALCIUM ALBUM COR SERPL-MCNC: 8.4 MG/DL (ref 8.5–10.5)
CALCIUM SERPL-MCNC: 7.9 MG/DL (ref 8.5–10.5)
CHLORIDE SERPL-SCNC: 105 MMOL/L (ref 96–112)
CO2 SERPL-SCNC: 22 MMOL/L (ref 20–33)
CREAT SERPL-MCNC: 0.36 MG/DL (ref 0.5–1.4)
CRP SERPL HS-MCNC: <0.3 MG/DL (ref 0–0.75)
DIGOXIN SERPL-MCNC: 0.4 NG/ML (ref 0.8–2)
EKG IMPRESSION: NORMAL
EOSINOPHIL # BLD AUTO: 0.1 K/UL (ref 0–0.51)
EOSINOPHIL NFR BLD: 2 % (ref 0–6.9)
ERYTHROCYTE [DISTWIDTH] IN BLOOD BY AUTOMATED COUNT: 51.5 FL (ref 35.9–50)
ERYTHROCYTE [SEDIMENTATION RATE] IN BLOOD BY WESTERGREN METHOD: 4 MM/HOUR (ref 0–25)
GFR SERPLBLD CREATININE-BSD FMLA CKD-EPI: 105 ML/MIN/1.73 M 2
GLUCOSE SERPL-MCNC: 124 MG/DL (ref 65–99)
HCT VFR BLD AUTO: 41.5 % (ref 37–47)
HGB BLD-MCNC: 14.2 G/DL (ref 12–16)
IMM GRANULOCYTES # BLD AUTO: 0.03 K/UL (ref 0–0.11)
IMM GRANULOCYTES NFR BLD AUTO: 0.6 % (ref 0–0.9)
INR PPP: 1.25 (ref 0.87–1.13)
LACTATE SERPL-SCNC: 3.2 MMOL/L (ref 0.5–2)
LACTATE SERPL-SCNC: 4.6 MMOL/L (ref 0.5–2)
LDH SERPL L TO P-CCNC: 179 U/L (ref 107–266)
LYMPHOCYTES # BLD AUTO: 1.75 K/UL (ref 1–4.8)
LYMPHOCYTES NFR BLD: 35.4 % (ref 22–41)
MAGNESIUM SERPL-MCNC: 1.8 MG/DL (ref 1.5–2.5)
MAGNESIUM SERPL-MCNC: 2 MG/DL (ref 1.5–2.5)
MCH RBC QN AUTO: 33.5 PG (ref 27–33)
MCHC RBC AUTO-ENTMCNC: 34.2 G/DL (ref 32.2–35.5)
MCV RBC AUTO: 97.9 FL (ref 81.4–97.8)
MONOCYTES # BLD AUTO: 0.51 K/UL (ref 0–0.85)
MONOCYTES NFR BLD AUTO: 10.3 % (ref 0–13.4)
NEUTROPHILS # BLD AUTO: 2.49 K/UL (ref 1.82–7.42)
NEUTROPHILS NFR BLD: 50.5 % (ref 44–72)
NRBC # BLD AUTO: 0 K/UL
NRBC BLD-RTO: 0 /100 WBC (ref 0–0.2)
PHOSPHATE SERPL-MCNC: 2.8 MG/DL (ref 2.5–4.5)
PHOSPHATE SERPL-MCNC: 3.1 MG/DL (ref 2.5–4.5)
PLATELET # BLD AUTO: 82 K/UL (ref 164–446)
PLATELETS.RETICULATED NFR BLD AUTO: 4.1 % (ref 0.6–13.1)
PMV BLD AUTO: 10.1 FL (ref 9–12.9)
POTASSIUM SERPL-SCNC: 3.8 MMOL/L (ref 3.6–5.5)
PROCALCITONIN SERPL-MCNC: <0.05 NG/ML
PROTHROMBIN TIME: 15.8 SEC (ref 12–14.6)
RBC # BLD AUTO: 4.24 M/UL (ref 4.2–5.4)
SODIUM SERPL-SCNC: 140 MMOL/L (ref 135–145)
WBC # BLD AUTO: 4.9 K/UL (ref 4.8–10.8)

## 2024-05-04 PROCEDURE — 99497 ADVNCD CARE PLAN 30 MIN: CPT | Performed by: STUDENT IN AN ORGANIZED HEALTH CARE EDUCATION/TRAINING PROGRAM

## 2024-05-04 PROCEDURE — 99223 1ST HOSP IP/OBS HIGH 75: CPT | Mod: 25,AI | Performed by: STUDENT IN AN ORGANIZED HEALTH CARE EDUCATION/TRAINING PROGRAM

## 2024-05-04 PROCEDURE — HZ2ZZZZ DETOXIFICATION SERVICES FOR SUBSTANCE ABUSE TREATMENT: ICD-10-PCS | Performed by: STUDENT IN AN ORGANIZED HEALTH CARE EDUCATION/TRAINING PROGRAM

## 2024-05-04 RX ORDER — LORAZEPAM 2 MG/ML
1.5 INJECTION INTRAMUSCULAR
Status: DISCONTINUED | OUTPATIENT
Start: 2024-05-04 | End: 2024-05-10

## 2024-05-04 RX ORDER — LORAZEPAM 2 MG/ML
1.5 INJECTION INTRAMUSCULAR
Status: DISCONTINUED | OUTPATIENT
Start: 2024-05-04 | End: 2024-05-04

## 2024-05-04 RX ORDER — ONDANSETRON 4 MG/1
4 TABLET, ORALLY DISINTEGRATING ORAL EVERY 4 HOURS PRN
Status: DISCONTINUED | OUTPATIENT
Start: 2024-05-04 | End: 2024-05-08

## 2024-05-04 RX ORDER — METOPROLOL SUCCINATE 50 MG/1
100 TABLET, EXTENDED RELEASE ORAL DAILY
Status: DISCONTINUED | OUTPATIENT
Start: 2024-05-04 | End: 2024-05-04

## 2024-05-04 RX ORDER — POLYETHYLENE GLYCOL 3350 17 G/17G
1 POWDER, FOR SOLUTION ORAL
Status: DISCONTINUED | OUTPATIENT
Start: 2024-05-04 | End: 2024-05-10 | Stop reason: HOSPADM

## 2024-05-04 RX ORDER — ESCITALOPRAM OXALATE 10 MG/1
10 TABLET ORAL DAILY
Status: DISCONTINUED | OUTPATIENT
Start: 2024-05-04 | End: 2024-05-10 | Stop reason: HOSPADM

## 2024-05-04 RX ORDER — MIRTAZAPINE 15 MG/1
15 TABLET, FILM COATED ORAL
COMMUNITY

## 2024-05-04 RX ORDER — AMOXICILLIN 250 MG
2 CAPSULE ORAL EVERY EVENING
Status: DISCONTINUED | OUTPATIENT
Start: 2024-05-04 | End: 2024-05-10 | Stop reason: HOSPADM

## 2024-05-04 RX ORDER — LABETALOL HYDROCHLORIDE 5 MG/ML
10 INJECTION, SOLUTION INTRAVENOUS EVERY 4 HOURS PRN
Status: DISCONTINUED | OUTPATIENT
Start: 2024-05-04 | End: 2024-05-10 | Stop reason: HOSPADM

## 2024-05-04 RX ORDER — DOXYCYCLINE 100 MG/1
100 TABLET ORAL EVERY 12 HOURS
Status: DISCONTINUED | OUTPATIENT
Start: 2024-05-04 | End: 2024-05-04

## 2024-05-04 RX ORDER — METOPROLOL TARTRATE 1 MG/ML
5 INJECTION, SOLUTION INTRAVENOUS
Status: DISCONTINUED | OUTPATIENT
Start: 2024-05-04 | End: 2024-05-10 | Stop reason: HOSPADM

## 2024-05-04 RX ORDER — DIGOXIN 125 MCG
125 TABLET ORAL DAILY
Status: DISCONTINUED | OUTPATIENT
Start: 2024-05-04 | End: 2024-05-10 | Stop reason: HOSPADM

## 2024-05-04 RX ORDER — TRAZODONE HYDROCHLORIDE 50 MG/1
50 TABLET ORAL NIGHTLY
COMMUNITY

## 2024-05-04 RX ORDER — LORAZEPAM 1 MG/1
0.5 TABLET ORAL EVERY 4 HOURS PRN
Status: DISCONTINUED | OUTPATIENT
Start: 2024-05-04 | End: 2024-05-04

## 2024-05-04 RX ORDER — ATORVASTATIN CALCIUM 40 MG/1
40 TABLET, FILM COATED ORAL EVERY EVENING
Status: DISCONTINUED | OUTPATIENT
Start: 2024-05-04 | End: 2024-05-10 | Stop reason: HOSPADM

## 2024-05-04 RX ORDER — LORAZEPAM 1 MG/1
1 TABLET ORAL EVERY 4 HOURS PRN
Status: DISCONTINUED | OUTPATIENT
Start: 2024-05-04 | End: 2024-05-10

## 2024-05-04 RX ORDER — CHLORDIAZEPOXIDE HYDROCHLORIDE 25 MG/1
25 CAPSULE, GELATIN COATED ORAL EVERY 6 HOURS
Status: COMPLETED | OUTPATIENT
Start: 2024-05-05 | End: 2024-05-06

## 2024-05-04 RX ORDER — SODIUM CHLORIDE, SODIUM LACTATE, POTASSIUM CHLORIDE, AND CALCIUM CHLORIDE .6; .31; .03; .02 G/100ML; G/100ML; G/100ML; G/100ML
500 INJECTION, SOLUTION INTRAVENOUS
Status: DISCONTINUED | OUTPATIENT
Start: 2024-05-04 | End: 2024-05-10 | Stop reason: HOSPADM

## 2024-05-04 RX ORDER — ESCITALOPRAM OXALATE 10 MG/1
10 TABLET ORAL DAILY
COMMUNITY

## 2024-05-04 RX ORDER — SODIUM CHLORIDE AND POTASSIUM CHLORIDE 300; 900 MG/100ML; MG/100ML
INJECTION, SOLUTION INTRAVENOUS CONTINUOUS
Status: DISCONTINUED | OUTPATIENT
Start: 2024-05-04 | End: 2024-05-04

## 2024-05-04 RX ORDER — ACETAMINOPHEN 325 MG/1
650 TABLET ORAL EVERY 6 HOURS PRN
Status: DISCONTINUED | OUTPATIENT
Start: 2024-05-04 | End: 2024-05-10 | Stop reason: HOSPADM

## 2024-05-04 RX ORDER — DIGOXIN 250 MCG
125 TABLET ORAL DAILY
Status: DISCONTINUED | OUTPATIENT
Start: 2024-05-04 | End: 2024-05-04

## 2024-05-04 RX ORDER — LORAZEPAM 0.5 MG/1
0.5 TABLET ORAL EVERY 4 HOURS PRN
Status: DISCONTINUED | OUTPATIENT
Start: 2024-05-04 | End: 2024-05-10

## 2024-05-04 RX ORDER — GAUZE BANDAGE 2" X 2"
100 BANDAGE TOPICAL DAILY
Status: COMPLETED | OUTPATIENT
Start: 2024-05-04 | End: 2024-05-07

## 2024-05-04 RX ORDER — CHLORDIAZEPOXIDE HYDROCHLORIDE 25 MG/1
50 CAPSULE, GELATIN COATED ORAL EVERY 6 HOURS
Status: COMPLETED | OUTPATIENT
Start: 2024-05-04 | End: 2024-05-04

## 2024-05-04 RX ORDER — LORAZEPAM 1 MG/1
1 TABLET ORAL EVERY 4 HOURS PRN
Status: DISCONTINUED | OUTPATIENT
Start: 2024-05-04 | End: 2024-05-04

## 2024-05-04 RX ORDER — LORAZEPAM 2 MG/1
4 TABLET ORAL
Status: DISCONTINUED | OUTPATIENT
Start: 2024-05-04 | End: 2024-05-04

## 2024-05-04 RX ORDER — MAGNESIUM SULFATE HEPTAHYDRATE 40 MG/ML
2 INJECTION, SOLUTION INTRAVENOUS ONCE
Status: COMPLETED | OUTPATIENT
Start: 2024-05-04 | End: 2024-05-04

## 2024-05-04 RX ORDER — FOLIC ACID 1 MG/1
1 TABLET ORAL DAILY
Status: COMPLETED | OUTPATIENT
Start: 2024-05-04 | End: 2024-05-07

## 2024-05-04 RX ORDER — LORAZEPAM 2 MG/1
2 TABLET ORAL
Status: DISCONTINUED | OUTPATIENT
Start: 2024-05-04 | End: 2024-05-04

## 2024-05-04 RX ORDER — VENLAFAXINE HYDROCHLORIDE 150 MG/1
150 CAPSULE, EXTENDED RELEASE ORAL DAILY
COMMUNITY

## 2024-05-04 RX ORDER — LORAZEPAM 2 MG/1
2 TABLET ORAL
Status: DISCONTINUED | OUTPATIENT
Start: 2024-05-04 | End: 2024-05-10

## 2024-05-04 RX ORDER — LORAZEPAM 2 MG/ML
0.5 INJECTION INTRAMUSCULAR EVERY 4 HOURS PRN
Status: DISCONTINUED | OUTPATIENT
Start: 2024-05-04 | End: 2024-05-04

## 2024-05-04 RX ORDER — LEVOTHYROXINE SODIUM 0.05 MG/1
50 TABLET ORAL
Status: DISCONTINUED | OUTPATIENT
Start: 2024-05-04 | End: 2024-05-10 | Stop reason: HOSPADM

## 2024-05-04 RX ORDER — AMLODIPINE BESYLATE 2.5 MG/1
2.5 TABLET ORAL DAILY
COMMUNITY

## 2024-05-04 RX ORDER — METOPROLOL TARTRATE 50 MG/1
50 TABLET, FILM COATED ORAL 2 TIMES DAILY
Status: DISCONTINUED | OUTPATIENT
Start: 2024-05-04 | End: 2024-05-09

## 2024-05-04 RX ORDER — IPRATROPIUM BROMIDE AND ALBUTEROL SULFATE 2.5; .5 MG/3ML; MG/3ML
3 SOLUTION RESPIRATORY (INHALATION)
Status: DISCONTINUED | OUTPATIENT
Start: 2024-05-04 | End: 2024-05-04

## 2024-05-04 RX ORDER — LORAZEPAM 2 MG/ML
1 INJECTION INTRAMUSCULAR
Status: DISCONTINUED | OUTPATIENT
Start: 2024-05-04 | End: 2024-05-10

## 2024-05-04 RX ORDER — SODIUM CHLORIDE, SODIUM LACTATE, POTASSIUM CHLORIDE, AND CALCIUM CHLORIDE .6; .31; .03; .02 G/100ML; G/100ML; G/100ML; G/100ML
500 INJECTION, SOLUTION INTRAVENOUS ONCE
Status: COMPLETED | OUTPATIENT
Start: 2024-05-04 | End: 2024-05-04

## 2024-05-04 RX ORDER — POTASSIUM CHLORIDE 20 MEQ/1
40 TABLET, EXTENDED RELEASE ORAL EVERY 4 HOURS
Status: COMPLETED | OUTPATIENT
Start: 2024-05-04 | End: 2024-05-04

## 2024-05-04 RX ORDER — ONDANSETRON 2 MG/ML
4 INJECTION INTRAMUSCULAR; INTRAVENOUS EVERY 4 HOURS PRN
Status: DISCONTINUED | OUTPATIENT
Start: 2024-05-04 | End: 2024-05-08

## 2024-05-04 RX ORDER — CEFTRIAXONE 2 G/1
2000 INJECTION, POWDER, FOR SOLUTION INTRAMUSCULAR; INTRAVENOUS ONCE
Status: COMPLETED | OUTPATIENT
Start: 2024-05-04 | End: 2024-05-04

## 2024-05-04 RX ORDER — SODIUM CHLORIDE, SODIUM LACTATE, POTASSIUM CHLORIDE, CALCIUM CHLORIDE 600; 310; 30; 20 MG/100ML; MG/100ML; MG/100ML; MG/100ML
INJECTION, SOLUTION INTRAVENOUS CONTINUOUS
Status: DISCONTINUED | OUTPATIENT
Start: 2024-05-04 | End: 2024-05-10 | Stop reason: HOSPADM

## 2024-05-04 RX ORDER — IPRATROPIUM BROMIDE AND ALBUTEROL SULFATE 2.5; .5 MG/3ML; MG/3ML
3 SOLUTION RESPIRATORY (INHALATION) ONCE
Status: DISCONTINUED | OUTPATIENT
Start: 2024-05-04 | End: 2024-05-04

## 2024-05-04 RX ORDER — AMLODIPINE BESYLATE 5 MG/1
2.5 TABLET ORAL DAILY
Status: DISCONTINUED | OUTPATIENT
Start: 2024-05-04 | End: 2024-05-10 | Stop reason: HOSPADM

## 2024-05-04 RX ORDER — LORAZEPAM 2 MG/ML
2 INJECTION INTRAMUSCULAR
Status: DISCONTINUED | OUTPATIENT
Start: 2024-05-04 | End: 2024-05-04

## 2024-05-04 RX ORDER — VENLAFAXINE HYDROCHLORIDE 75 MG/1
150 CAPSULE, EXTENDED RELEASE ORAL DAILY
Status: DISCONTINUED | OUTPATIENT
Start: 2024-05-04 | End: 2024-05-10 | Stop reason: HOSPADM

## 2024-05-04 RX ORDER — LORAZEPAM 2 MG/1
4 TABLET ORAL
Status: DISCONTINUED | OUTPATIENT
Start: 2024-05-04 | End: 2024-05-10

## 2024-05-04 RX ORDER — TRAZODONE HYDROCHLORIDE 50 MG/1
50 TABLET ORAL NIGHTLY
Status: DISCONTINUED | OUTPATIENT
Start: 2024-05-04 | End: 2024-05-10 | Stop reason: HOSPADM

## 2024-05-04 RX ORDER — AZITHROMYCIN 500 MG/5ML
500 INJECTION, POWDER, LYOPHILIZED, FOR SOLUTION INTRAVENOUS ONCE
Status: COMPLETED | OUTPATIENT
Start: 2024-05-04 | End: 2024-05-04

## 2024-05-04 RX ORDER — LORAZEPAM 2 MG/ML
0.5 INJECTION INTRAMUSCULAR EVERY 4 HOURS PRN
Status: DISCONTINUED | OUTPATIENT
Start: 2024-05-04 | End: 2024-05-10

## 2024-05-04 RX ORDER — LORAZEPAM 2 MG/ML
1 INJECTION INTRAMUSCULAR
Status: DISCONTINUED | OUTPATIENT
Start: 2024-05-04 | End: 2024-05-04

## 2024-05-04 RX ORDER — LORAZEPAM 2 MG/ML
2 INJECTION INTRAMUSCULAR
Status: DISCONTINUED | OUTPATIENT
Start: 2024-05-04 | End: 2024-05-10

## 2024-05-04 RX ADMIN — AZITHROMYCIN MONOHYDRATE 500 MG: 500 INJECTION, POWDER, LYOPHILIZED, FOR SOLUTION INTRAVENOUS at 01:32

## 2024-05-04 RX ADMIN — LEVOTHYROXINE SODIUM 50 MCG: 0.05 TABLET ORAL at 06:11

## 2024-05-04 RX ADMIN — APIXABAN 5 MG: 5 TABLET, FILM COATED ORAL at 06:12

## 2024-05-04 RX ADMIN — ATORVASTATIN CALCIUM 40 MG: 40 TABLET, FILM COATED ORAL at 17:06

## 2024-05-04 RX ADMIN — LORAZEPAM 2 MG: 2 TABLET ORAL at 04:51

## 2024-05-04 RX ADMIN — CHLORDIAZEPOXIDE HYDROCHLORIDE 50 MG: 25 CAPSULE ORAL at 11:23

## 2024-05-04 RX ADMIN — IOHEXOL 49 ML: 350 INJECTION, SOLUTION INTRAVENOUS at 00:45

## 2024-05-04 RX ADMIN — TRAZODONE HYDROCHLORIDE 50 MG: 50 TABLET ORAL at 21:00

## 2024-05-04 RX ADMIN — ONDANSETRON 4 MG: 2 INJECTION INTRAMUSCULAR; INTRAVENOUS at 02:34

## 2024-05-04 RX ADMIN — THERA TABS 1 TABLET: TAB at 06:11

## 2024-05-04 RX ADMIN — SODIUM CHLORIDE, POTASSIUM CHLORIDE, SODIUM LACTATE AND CALCIUM CHLORIDE 500 ML: 600; 310; 30; 20 INJECTION, SOLUTION INTRAVENOUS at 02:54

## 2024-05-04 RX ADMIN — VENLAFAXINE HYDROCHLORIDE 150 MG: 75 CAPSULE, EXTENDED RELEASE ORAL at 06:12

## 2024-05-04 RX ADMIN — LORAZEPAM 0.5 MG: 0.5 TABLET ORAL at 23:38

## 2024-05-04 RX ADMIN — APIXABAN 5 MG: 5 TABLET, FILM COATED ORAL at 17:06

## 2024-05-04 RX ADMIN — FAMOTIDINE 20 MG: 10 INJECTION, SOLUTION INTRAVENOUS at 17:06

## 2024-05-04 RX ADMIN — AMPICILLIN AND SULBACTAM 3 G: 1; 2 INJECTION, POWDER, FOR SOLUTION INTRAMUSCULAR; INTRAVENOUS at 02:39

## 2024-05-04 RX ADMIN — CHLORDIAZEPOXIDE HYDROCHLORIDE 25 MG: 25 CAPSULE ORAL at 23:38

## 2024-05-04 RX ADMIN — LORAZEPAM 2 MG: 2 TABLET ORAL at 11:23

## 2024-05-04 RX ADMIN — AMPICILLIN AND SULBACTAM 3 G: 1; 2 INJECTION, POWDER, FOR SOLUTION INTRAMUSCULAR; INTRAVENOUS at 06:35

## 2024-05-04 RX ADMIN — CEFTRIAXONE SODIUM 2000 MG: 2 INJECTION, POWDER, FOR SOLUTION INTRAMUSCULAR; INTRAVENOUS at 00:54

## 2024-05-04 RX ADMIN — LORAZEPAM 0.5 MG: 2 INJECTION INTRAMUSCULAR; INTRAVENOUS at 00:53

## 2024-05-04 RX ADMIN — METOPROLOL TARTRATE 50 MG: 50 TABLET, FILM COATED ORAL at 17:05

## 2024-05-04 RX ADMIN — DIGOXIN 125 MCG: 0.25 TABLET ORAL at 03:40

## 2024-05-04 RX ADMIN — DOXYCYCLINE 100 MG: 100 TABLET, FILM COATED ORAL at 06:11

## 2024-05-04 RX ADMIN — CHLORDIAZEPOXIDE HYDROCHLORIDE 50 MG: 25 CAPSULE ORAL at 17:06

## 2024-05-04 RX ADMIN — THIAMINE HYDROCHLORIDE 500 MG: 100 INJECTION, SOLUTION INTRAMUSCULAR; INTRAVENOUS at 02:54

## 2024-05-04 RX ADMIN — LORAZEPAM 2 MG: 2 TABLET ORAL at 17:05

## 2024-05-04 RX ADMIN — SODIUM CHLORIDE, POTASSIUM CHLORIDE, SODIUM LACTATE AND CALCIUM CHLORIDE 500 ML: 600; 310; 30; 20 INJECTION, SOLUTION INTRAVENOUS at 02:21

## 2024-05-04 RX ADMIN — POTASSIUM CHLORIDE 40 MEQ: 1500 TABLET, EXTENDED RELEASE ORAL at 02:46

## 2024-05-04 RX ADMIN — CHLORDIAZEPOXIDE HYDROCHLORIDE 50 MG: 25 CAPSULE ORAL at 01:26

## 2024-05-04 RX ADMIN — POTASSIUM CHLORIDE 40 MEQ: 1500 TABLET, EXTENDED RELEASE ORAL at 06:46

## 2024-05-04 RX ADMIN — CHLORDIAZEPOXIDE HYDROCHLORIDE 50 MG: 25 CAPSULE ORAL at 06:46

## 2024-05-04 RX ADMIN — Medication 100 MG: at 06:11

## 2024-05-04 RX ADMIN — TRAZODONE HYDROCHLORIDE 50 MG: 50 TABLET ORAL at 03:40

## 2024-05-04 RX ADMIN — MAGNESIUM SULFATE HEPTAHYDRATE 2 G: 2 INJECTION, SOLUTION INTRAVENOUS at 02:46

## 2024-05-04 RX ADMIN — LORAZEPAM 1 MG: 1 TABLET ORAL at 20:17

## 2024-05-04 RX ADMIN — FAMOTIDINE 20 MG: 10 INJECTION, SOLUTION INTRAVENOUS at 06:23

## 2024-05-04 RX ADMIN — ESCITALOPRAM OXALATE 10 MG: 10 TABLET ORAL at 06:11

## 2024-05-04 RX ADMIN — SENNOSIDES AND DOCUSATE SODIUM 2 TABLET: 50; 8.6 TABLET ORAL at 17:06

## 2024-05-04 RX ADMIN — LORAZEPAM 2 MG: 2 TABLET ORAL at 14:28

## 2024-05-04 RX ADMIN — METOPROLOL TARTRATE 50 MG: 50 TABLET, FILM COATED ORAL at 06:11

## 2024-05-04 RX ADMIN — FOLIC ACID 1 MG: 1 TABLET ORAL at 06:11

## 2024-05-04 ASSESSMENT — LIFESTYLE VARIABLES
TOTAL SCORE: MILD ITCHING, PINS AND NEEDLES SENSATION, BURNING OR NUMBNESS
NAUSEA AND VOMITING: NO NAUSEA AND NO VOMITING
ANXIETY: *
TREMOR: *
VISUAL DISTURBANCES: MODERATE SENSITIVITY
TOTAL SCORE: 14
AUDITORY DISTURBANCES: NOT PRESENT
AUDITORY DISTURBANCES: NOT PRESENT
NAUSEA AND VOMITING: *
HEADACHE, FULLNESS IN HEAD: NOT PRESENT
HEADACHE, FULLNESS IN HEAD: NOT PRESENT
VISUAL DISTURBANCES: VERY MILD SENSITIVITY
ORIENTATION AND CLOUDING OF SENSORIUM: CANNOT DO SERIAL ADDITIONS OR IS UNCERTAIN ABOUT DATE
PAROXYSMAL SWEATS: *
VISUAL DISTURBANCES: VERY MILD SENSITIVITY
TREMOR: TREMOR NOT VISIBLE BUT CAN BE FELT, FINGERTIP TO FINGERTIP
HEADACHE, FULLNESS IN HEAD: NOT PRESENT
TOTAL SCORE: 8
VISUAL DISTURBANCES: NOT PRESENT
PAROXYSMAL SWEATS: NO SWEAT VISIBLE
TREMOR: TREMOR NOT VISIBLE BUT CAN BE FELT, FINGERTIP TO FINGERTIP
AUDITORY DISTURBANCES: NOT PRESENT
AUDITORY DISTURBANCES: NOT PRESENT
ORIENTATION AND CLOUDING OF SENSORIUM: ORIENTED AND CAN DO SERIAL ADDITIONS
TOTAL SCORE: MILD ITCHING, PINS AND NEEDLES SENSATION, BURNING OR NUMBNESS
AUDITORY DISTURBANCES: NOT PRESENT
HEADACHE, FULLNESS IN HEAD: NOT PRESENT
ANXIETY: MODERATELY ANXIOUS OR GUARDED, SO ANXIETY IS INFERRED
ORIENTATION AND CLOUDING OF SENSORIUM: CANNOT DO SERIAL ADDITIONS OR IS UNCERTAIN ABOUT DATE
NAUSEA AND VOMITING: NO NAUSEA AND NO VOMITING
TREMOR: *
VISUAL DISTURBANCES: VERY MILD SENSITIVITY
AUDITORY DISTURBANCES: NOT PRESENT
TOTAL SCORE: 12
AGITATION: NORMAL ACTIVITY
AGITATION: NORMAL ACTIVITY
TOTAL SCORE: 8
PAROXYSMAL SWEATS: *
ORIENTATION AND CLOUDING OF SENSORIUM: CANNOT DO SERIAL ADDITIONS OR IS UNCERTAIN ABOUT DATE
AGITATION: NORMAL ACTIVITY
ORIENTATION AND CLOUDING OF SENSORIUM: ORIENTED AND CAN DO SERIAL ADDITIONS
AGITATION: NORMAL ACTIVITY
ANXIETY: *
ANXIETY: *
TOTAL SCORE: 12
TREMOR: *
PAROXYSMAL SWEATS: NO SWEAT VISIBLE
AGITATION: SOMEWHAT MORE THAN NORMAL ACTIVITY
HEADACHE, FULLNESS IN HEAD: NOT PRESENT
VISUAL DISTURBANCES: MODERATE SENSITIVITY
ORIENTATION AND CLOUDING OF SENSORIUM: ORIENTED AND CAN DO SERIAL ADDITIONS
AGITATION: NORMAL ACTIVITY
NAUSEA AND VOMITING: MILD NAUSEA WITH NO VOMITING
PAROXYSMAL SWEATS: *
PAROXYSMAL SWEATS: *
AUDITORY DISTURBANCES: NOT PRESENT
TREMOR: *
TREMOR: TREMOR NOT VISIBLE BUT CAN BE FELT, FINGERTIP TO FINGERTIP
NAUSEA AND VOMITING: NO NAUSEA AND NO VOMITING
PAROXYSMAL SWEATS: NO SWEAT VISIBLE
ORIENTATION AND CLOUDING OF SENSORIUM: CANNOT DO SERIAL ADDITIONS OR IS UNCERTAIN ABOUT DATE
NAUSEA AND VOMITING: NO NAUSEA AND NO VOMITING
TREMOR: *
AUDITORY DISTURBANCES: VERY MILD HARSHNESS OR ABILITY TO FRIGHTEN
HEADACHE, FULLNESS IN HEAD: NOT PRESENT
HEADACHE, FULLNESS IN HEAD: NOT PRESENT
ANXIETY: MILDLY ANXIOUS
ORIENTATION AND CLOUDING OF SENSORIUM: ORIENTED AND CAN DO SERIAL ADDITIONS
TOTAL SCORE: 3
VISUAL DISTURBANCES: NOT PRESENT
AGITATION: NORMAL ACTIVITY
TOTAL SCORE: 5
VISUAL DISTURBANCES: NOT PRESENT
ANXIETY: *
SUBSTANCE_ABUSE: 0
PAROXYSMAL SWEATS: *
NAUSEA AND VOMITING: NO NAUSEA AND NO VOMITING
AGITATION: NORMAL ACTIVITY
ANXIETY: *
ANXIETY: *
TOTAL SCORE: 11
HEADACHE, FULLNESS IN HEAD: NOT PRESENT
TOTAL SCORE: VERY MILD ITCHING, PINS AND NEEDLES SENSATION, BURNING OR NUMBNESS
NAUSEA AND VOMITING: NO NAUSEA AND NO VOMITING
TOTAL SCORE: MILD ITCHING, PINS AND NEEDLES SENSATION, BURNING OR NUMBNESS

## 2024-05-04 ASSESSMENT — ENCOUNTER SYMPTOMS
FEVER: 0
VOMITING: 0
SHORTNESS OF BREATH: 0
CHILLS: 0
FOCAL WEAKNESS: 0
BLURRED VISION: 0
HEADACHES: 0
MYALGIAS: 0
HEARTBURN: 1
BRUISES/BLEEDS EASILY: 0
DOUBLE VISION: 0
ABDOMINAL PAIN: 0
COUGH: 0
DEPRESSION: 0
PALPITATIONS: 1
WEAKNESS: 1
DIZZINESS: 0
NAUSEA: 1

## 2024-05-04 ASSESSMENT — CHA2DS2 SCORE
CHA2DS2 VASC SCORE: 4
HYPERTENSION: YES
VASCULAR DISEASE: NO
PRIOR STROKE OR TIA OR THROMBOEMBOLISM: NO
CHF OR LEFT VENTRICULAR DYSFUNCTION: NO
DIABETES: NO
AGE 75 OR GREATER: YES
SEX: FEMALE
AGE 65 TO 74: NO

## 2024-05-04 ASSESSMENT — FIBROSIS 4 INDEX: FIB4 SCORE: 6.66

## 2024-05-04 ASSESSMENT — PAIN DESCRIPTION - PAIN TYPE: TYPE: ACUTE PAIN

## 2024-05-04 NOTE — ED NOTES
Med rec patially complete per patient/dispense records  Allergies reviewed.   Patient unable to verify last doses   Outpatient antibiotics in the last 30 days? No   Anticoagulants taken in the last 14 days? Yes   Anticoagulant: Eliquis, Last dose: Unk.     Pharmacy patient utilizes: [facility], CPhT

## 2024-05-04 NOTE — PROGRESS NOTES
Patient seen and examined, afebrile, going through alcohol withdraws, cont on CIWA and also on librium   For yohana info please refer to H&P.

## 2024-05-04 NOTE — PROGRESS NOTES
Med rec updated per medication dispense history per [facility] ([phone]). Per [facility], patient also has a prescription on file from 9/2023 for rosuvastatin 20 mg with directions to take 1 tablet every day, however this was never dispensed.

## 2024-05-04 NOTE — ED NOTES
Checked on bed, connected to monitor,  with unlabored respirations. Vital signs is stable.   Denied any new complaints. No current needs identified.  Gurney in low position, side rail up for pt safety. Call light within reach.

## 2024-05-04 NOTE — ED NOTES
Notified to the provider that pt CIWA score is 8 and asking for medicine to help her sleep and prevent from withdrawing

## 2024-05-04 NOTE — H&P
"Hospital Medicine History & Physical Note    Date of Service  5/4/2024    Primary Care Physician  Perla Kitchen, UVALDONMilesP.    Consultants  None    Code Status  Full Code    Chief Complaint  Chief Complaint   Patient presents with    Alcohol Intoxication     Pt BIBA from home. Pt called friend saying that they had been in bed and not moved for a few days. Per pt this is correct. States they have been feeling \"not right\" pt not having SI/HI at this time. Pt admits to feeling down and depressed, requesting resources. Upon EMS arrival pt hypotensive in the 80s, given 200 ml NS in route. Bp came up to 125/81     Pt has hx a fib on eliquis        History of Presenting Illness  Jeanie Hutchison is a 78 y.o. female with history alcohol abuse, mood disorder, chronic A-fib s/p AICD on Eliquis, CLEO, hypothyroidism, chronic pain syndrome who presented 5/3/2024 with evaluation for alcohol intoxication.  Patient reported having staying in bed, drinking for at least the past 3 days.  She had similar hospitalization for alcohol intoxication, withdrawal and discharged 1/30/2024.  She reported binge drinking vodka daily, expressed depressed mood due to recent passing of family members (, daughter), difficulty coping.  In ER, found to have blood alcohol level of 350.  No acute findings on CTA.  CTA chest does not show PE, but noted to have scattered groundglass opacities.  She is requiring 2 L nasal cannula support.  Therefore admission requested by ERP.  Admitted to medicine service for further evaluation and treatment.    I discussed the plan of care with patient, bedside RN, and pharmacy.    Review of Systems  Review of Systems   Constitutional:  Positive for malaise/fatigue. Negative for chills and fever.   HENT:  Negative for hearing loss and tinnitus.    Eyes:  Negative for blurred vision and double vision.   Respiratory:  Negative for cough and shortness of breath.    Cardiovascular:  Positive for palpitations. " Negative for chest pain.   Gastrointestinal:  Positive for heartburn and nausea. Negative for abdominal pain and vomiting.   Genitourinary:  Negative for dysuria and urgency.   Musculoskeletal:  Negative for joint pain and myalgias.   Skin:  Negative for itching and rash.   Neurological:  Positive for weakness. Negative for dizziness, focal weakness and headaches.   Endo/Heme/Allergies:  Negative for environmental allergies. Does not bruise/bleed easily.   Psychiatric/Behavioral:  Negative for depression and substance abuse.    All other systems reviewed and are negative.      Past Medical History   has a past medical history of Alcoholism (HCC), Anticoagulated, Apnea, sleep, Chronic pain, Hypothyroidism, Insomnia, Paroxysmal atrial fibrillation (HCC), Psychiatric disorder, Snoring, and Ventricular tachycardia (HCC).    Surgical History   has a past surgical history that includes breast biopsy; other orthopedic surgery; orif, fracture, clavicle (5/21/2014); and tonsillectomy.     Family History  family history includes Anxiety disorder in her mother; Depression in her mother; Diabetes in her mother; Hyperlipidemia in her father.   Family history reviewed with patient. There is family history that is pertinent to the chief complaint.     Social History   reports that she has never smoked. She has never used smokeless tobacco. She reports current alcohol use of about 8.4 - 12.6 oz of alcohol per week. She reports that she does not currently use drugs after having used the following drugs: Oral.    Allergies  No Known Allergies    Medications  Prior to Admission Medications   Prescriptions Last Dose Informant Patient Reported? Taking?   amLODIPine (NORVASC) 5 MG Tab   No No   Sig: Take 1 Tablet by mouth every day.   apixaban (ELIQUIS) 5mg Tab   No No   Sig: Take 1 Tablet by mouth 2 times a day.   digoxin (LANOXIN) 125 MCG Tab   No No   Sig: Take 1 Tablet by mouth every day.   folic acid (FOLVITE) 1 MG Tab   No No    Sig: Take 1 Tablet by mouth every day.   levothyroxine (SYNTHROID) 50 MCG Tab   No No   Sig: Take 1 Tablet by mouth every morning on an empty stomach.   magnesium oxide 400 (240 Mg) MG Tab   No No   Sig: Take 1 Tablet by mouth 2 times a day.   metoprolol SR (TOPROL XL) 100 MG TABLET SR 24 HR   No No   Sig: Take 1 Tablet by mouth every day.   mirtazapine (REMERON) 15 MG Tab   No No   Sig: Take 1 Tablet by mouth at bedtime.   therapeutic multivitamin-minerals (THERAGRAN-M) Tab   No No   Sig: Take 1 Tablet by mouth every day.   thiamine (THIAMINE) 100 MG tablet   No No   Sig: Take 1 Tablet by mouth every day.   venlafaxine XR (EFFEXOR XR) 75 MG CAPSULE SR 24 HR   No No   Sig: Take 3 Capsules by mouth every morning with breakfast.      Facility-Administered Medications: None       Physical Exam  Temp:  [36.2 °C (97.1 °F)] 36.2 °C (97.1 °F)  Pulse:  [] 114  Resp:  [16-20] 20  BP: ()/(68-91) 121/83  SpO2:  [88 %-96 %] 94 %  Blood Pressure : 113/77   Temperature: 36.2 °C (97.1 °F)   Pulse: (!) 112   Respiration: 19   Pulse Oximetry: 92 %       Physical Exam  Vitals and nursing note reviewed.   Constitutional:       General: She is not in acute distress.  HENT:      Head: Normocephalic and atraumatic.      Nose: Nose normal.      Mouth/Throat:      Mouth: Mucous membranes are dry.      Pharynx: Oropharynx is clear.   Eyes:      General: No scleral icterus.     Extraocular Movements: Extraocular movements intact.   Cardiovascular:      Rate and Rhythm: Tachycardia present. Rhythm irregular.      Pulses: Normal pulses.      Heart sounds:      No friction rub.   Pulmonary:      Effort: No respiratory distress.      Breath sounds: No stridor. Wheezing and rales present.   Chest:      Chest wall: No tenderness.   Abdominal:      General: There is no distension.      Tenderness: There is no abdominal tenderness. There is no guarding or rebound.   Musculoskeletal:         General: Normal range of motion.       Cervical back: Neck supple. No tenderness.      Right lower leg: No edema.      Left lower leg: No edema.   Skin:     General: Skin is warm and dry.      Capillary Refill: Capillary refill takes less than 2 seconds.   Neurological:      General: No focal deficit present.      Mental Status: She is alert and oriented to person, place, and time.   Psychiatric:      Comments: Depressed mood  No SI/HI         Laboratory:  Recent Labs     05/03/24 2202   WBC 4.9   RBC 4.51   HEMOGLOBIN 15.1   HEMATOCRIT 43.1   MCV 95.6   MCH 33.5*   MCHC 35.0   RDW 50.9*   PLATELETCT 107*   MPV 9.8     Recent Labs     05/03/24 2202   SODIUM 142   POTASSIUM 3.4*   CHLORIDE 105   CO2 19*   GLUCOSE 88   BUN 10   CREATININE 0.48*   CALCIUM 8.2*     Recent Labs     05/03/24 2202   ALTSGPT 22   ASTSGOT 44   ALKPHOSPHAT 88   TBILIRUBIN 1.6*   GLUCOSE 88         Recent Labs     05/03/24 2202   NTPROBNP 364*         Recent Labs     05/03/24 2202   TROPONINT <6       Imaging:  CT-CTA CHEST PULMONARY ARTERY W/ RECONS   Final Result         1.  No pulmonary embolus appreciated.   2.  Scattered hazy groundglass pulmonary opacities suggesting edema or atypical infiltrates   3.  Small posterior gastric diverticulum   4.  Changes of hepatic steatosis      CT-HEAD W/O   Final Result         1.  No acute intracranial abnormality is identified, there are nonspecific white matter changes, commonly associated with small vessel ischemic disease.  Associated mild cerebral atrophy is noted.   2.  Bilateral nasal bone fractures, age indeterminant but appear likely chronic, correlate with exam   3.  Bilateral maxillary sinusitis changes   4.  Atherosclerosis.         DX-CHEST-PORTABLE (1 VIEW)   Final Result         1.  No acute cardiopulmonary disease.   2.  Cardiomegaly   3.  Atherosclerosis          X-Ray:  I have personally reviewed the images and compared with prior images.  EKG:  I have personally reviewed the images and compared with prior  images.    Assessment/Plan:  Justification for Admission Status  I anticipate this patient will require at least 2 midnights hospitalization, therefore appropriate for inpatient status.        * Alcohol intoxication in active alcoholic with complication (HCC)- (present on admission)  Assessment & Plan  Reported binge drinking, notably at least 1 L of vodka daily  Cessation counseling provided: 16 minutes  We discussed alcohol cessation to avoid prolonged hospitalization, recurrent hospitalization.  She is interested in overcoming alcohol dependency, however, sided depressed mood due to passing of family members.  I explained to patient that alcohol is not a good coping mechanism for depressed mood, advised to explore other alternative means.  Detox bag  Multivitamins  High-dose IV thiamine  Schedule Librium  CIWA protocol    Acute respiratory failure with hypoxia (HCC)- (present on admission)  Assessment & Plan  On 2L -- wean off as tolerated  Alcoholism, ?aspiration  Nebs, pulm toilet  negative COVID/influenza      Lactic acidosis  Assessment & Plan  Likely secondary to EtOH, liver disease  IVF  High-dose IV thiamine  Trend    CAP (community acquired pneumonia)  Assessment & Plan  CTA chest scattered groundglass opacities, hypoxia  Abx: Unasyn, doxycyline for now  -s/p ceftriaxone and azithromycin in ER  Follow cultures, de-escalate as appropriate    Hypothyroidism- (present on admission)  Assessment & Plan  Synthroid    HTN (hypertension)- (present on admission)  Assessment & Plan  Amlodipine, metoprolol    Major depressive disorder with current active episode- (present on admission)  Assessment & Plan  Continue Lexapro, Effexor  Will need psychiatry follow-up    Presence of automatic cardioverter/defibrillator (AICD)- (present on admission)  Assessment & Plan  Per history    Hypokalemia- (present on admission)  Assessment & Plan  Replace  Place Mg    Mixed hyperlipidemia- (present on admission)  Assessment &  Plan  Statin    PAF (paroxysmal atrial fibrillation) (HCC)- (present on admission)  Assessment & Plan  Continue Eliquis, digoxin, metoprolol    ACP (advance care planning)  Assessment & Plan  Goal care discussed with patient in ER.  She stated she is agreeable for noninvasive, as well as invasive/heroic life-sustaining measures-including CPR/defibrillation/intubation or mechanical ventilation if needed be.  I emphasized the need for alcohol abstinence, she became tearful, expressed understanding.  Diagnosis, prognosis, question and concern addressed.  Full CODE STATUS confirmed.  ACP: 16 minutes    CLEO (obstructive sleep apnea)- (present on admission)  Assessment & Plan  CPAP        VTE prophylaxis: therapeutic anticoagulation with Eliquis

## 2024-05-04 NOTE — ED PROVIDER NOTES
"ED Provider Note    CHIEF COMPLAINT  Chief Complaint   Patient presents with    Alcohol Intoxication     Pt BIBA from home. Pt called friend saying that they had been in bed and not moved for a few days. Per pt this is correct. States they have been feeling \"not right\" pt not having SI/HI at this time. Pt admits to feeling down and depressed, requesting resources. Upon EMS arrival pt hypotensive in the 80s, given 200 ml NS in route. Bp came up to 125/81     Pt has hx a fib on eliquis        EXTERNAL RECORDS REVIEWED  Inpatient Notes discharge summary from 1/30/2024 noted history of alcoholism, chronic pain, hypothyroidism, paroxysmal atrial fibrillation, AICD presented with worsening shakes and cognition treated for alcohol withdrawal    HPI/ROS  LIMITATION TO HISTORY   Select: : None  OUTSIDE HISTORIAN(S):  EMS report patient was hypotensive on scene    Jeanie Hutchison is a 78 y.o. female who presents with \"not feeling right\".  Patient says that for the past few days she has been essentially been in her bed.  Patient says she has not been taking her medications eating and drinking very little.  Patient says she has been drinking alcohol every day during this time.  Patient says today she felt like she was going to die.  Patient denies headache, chest pain, shortness of breath, vomiting, diarrhea, urinary changes.  Patient denies recent falls.    PAST MEDICAL HISTORY   has a past medical history of Alcoholism (HCC), Anticoagulated, Apnea, sleep, Chronic pain, Hypothyroidism, Insomnia, Paroxysmal atrial fibrillation (HCC), Psychiatric disorder, Snoring, and Ventricular tachycardia (HCC).    SURGICAL HISTORY   has a past surgical history that includes breast biopsy; other orthopedic surgery; orif, fracture, clavicle (5/21/2014); and tonsillectomy.    FAMILY HISTORY  Family History   Problem Relation Age of Onset    Diabetes Mother     Anxiety disorder Mother     Depression Mother     Hyperlipidemia Father  " "      SOCIAL HISTORY  Social History     Tobacco Use    Smoking status: Never    Smokeless tobacco: Never   Vaping Use    Vaping Use: Never used   Substance and Sexual Activity    Alcohol use: Yes     Alcohol/week: 8.4 - 12.6 oz     Types: 14 - 21 Standard drinks or equivalent per week     Comment: everyday (last 2 months 2-3 everyday)    Drug use: Not Currently     Types: Oral     Comment: takes friends tranqualizers     Sexual activity: Not Currently       CURRENT MEDICATIONS  Home Medications       Reviewed by Liz Rico R.N. (Registered Nurse) on 05/03/24 at 2151  Med List Status: Partial     Medication Last Dose Status   amLODIPine (NORVASC) 5 MG Tab  Active   apixaban (ELIQUIS) 5mg Tab  Active   digoxin (LANOXIN) 125 MCG Tab  Active   folic acid (FOLVITE) 1 MG Tab  Active   levothyroxine (SYNTHROID) 50 MCG Tab  Active   magnesium oxide 400 (240 Mg) MG Tab  Active   metoprolol SR (TOPROL XL) 100 MG TABLET SR 24 HR  Active   mirtazapine (REMERON) 15 MG Tab  Active   therapeutic multivitamin-minerals (THERAGRAN-M) Tab  Active   thiamine (THIAMINE) 100 MG tablet  Active   venlafaxine XR (EFFEXOR XR) 75 MG CAPSULE SR 24 HR  Active                    ALLERGIES  No Known Allergies    PHYSICAL EXAM  VITAL SIGNS: /83   Pulse (!) 114   Temp 36.2 °C (97.1 °F) (Temporal)   Resp 20   Ht 1.676 m (5' 6\")   Wt 68 kg (150 lb)   SpO2 94%   BMI 24.21 kg/m²    Constitutional: Chronically ill-appearing  HENT: No signs of trauma, Bilateral external ears normal, Nose normal.   Eyes: Pupils are equal and reactive, Conjunctiva normal, Non-icteric.   Neck: Normal range of motion, No tenderness, Supple, No stridor.   Cardiovascular: Regular rate and rhythm, no murmurs.   Thorax & Lungs: Normal breath sounds, No respiratory distress, No wheezing, No chest tenderness.   Abdomen: Bowel sounds normal, Soft, No tenderness, No masses, No pulsatile masses.   Skin: Warm, Dry, No erythema, No rash.   Back: No bony " tenderness, No CVA tenderness.   Extremities: Intact distal pulses, No edema, No tenderness, No cyanosis  Musculoskeletal: Good range of motion in all major joints. No tenderness to palpation or major deformities noted.   Neurologic: Alert , Normal motor function, Normal sensory function, No focal deficits noted.  5/5 strength in all 4 extremities, sensation intact to light touch throughout face and all 4 extremities, normal speech, no facial asymmetry, visual fields intact.      EKG/LABS  Labs Reviewed   CBC WITH DIFFERENTIAL - Abnormal; Notable for the following components:       Result Value    MCH 33.5 (*)     RDW 50.9 (*)     Platelet Count 107 (*)     All other components within normal limits   COMP METABOLIC PANEL - Abnormal; Notable for the following components:    Potassium 3.4 (*)     Co2 19 (*)     Anion Gap 18.0 (*)     Creatinine 0.48 (*)     Calcium 8.2 (*)     Correct Calcium 8.4 (*)     Total Bilirubin 1.6 (*)     All other components within normal limits   D-DIMER - Abnormal; Notable for the following components:    D-Dimer 0.89 (*)     All other components within normal limits   DIAGNOSTIC ALCOHOL - Abnormal; Notable for the following components:    Diagnostic Alcohol 349.6 (*)     All other components within normal limits   PROBRAIN NATRIURETIC PEPTIDE, NT - Abnormal; Notable for the following components:    NT-proBNP 364 (*)     All other components within normal limits   TROPONIN   TSH WITH REFLEX TO FT4   ESTIMATED GFR   IMMATURE PLT FRACTION   LACTIC ACID   PROTHROMBIN TIME   BLOOD CULTURE   BLOOD CULTURE   URINALYSIS   PROCALCITONIN   CRP QUANTITIVE (NON-CARDIAC)   SED RATE   URINE CULTURE(NEW)   CULTURE RESPIRATORY W/ GRM STN   DIGOXIN   LDH   MAGNESIUM   PHOSPHORUS   POCT COV-2, FLU A/B, RSV BY PCR   POC COV-2, FLU A/B, RSV BY PCR       I have independently interpreted this EKG    RADIOLOGY/PROCEDURES   I have independently interpreted the diagnostic imaging associated with this visit and  am waiting the final reading from the radiologist.   My preliminary interpretation is as follows: No pneumothorax    Radiologist interpretation:  CT-CTA CHEST PULMONARY ARTERY W/ RECONS   Final Result         1.  No pulmonary embolus appreciated.   2.  Scattered hazy groundglass pulmonary opacities suggesting edema or atypical infiltrates   3.  Small posterior gastric diverticulum   4.  Changes of hepatic steatosis      CT-HEAD W/O   Final Result         1.  No acute intracranial abnormality is identified, there are nonspecific white matter changes, commonly associated with small vessel ischemic disease.  Associated mild cerebral atrophy is noted.   2.  Bilateral nasal bone fractures, age indeterminant but appear likely chronic, correlate with exam   3.  Bilateral maxillary sinusitis changes   4.  Atherosclerosis.         DX-CHEST-PORTABLE (1 VIEW)   Final Result         1.  No acute cardiopulmonary disease.   2.  Cardiomegaly   3.  Atherosclerosis          COURSE & MEDICAL DECISION MAKING    ASSESSMENT, COURSE AND PLAN  Care Narrative: On arrival patient noted to be mildly tachycardic.  Patient has no signs of trauma has nonfocal neurologic exam.  Patient was noted to be hypoxic by EMS.  Given patient's immobility medication noncompliance consider PE will obtain D-dimer to risk stratify.  Obtain chest x-ray to assess for pneumonia, pulmonary edema.  Given patient's mild confusion and unclear history will obtain CT head to assess for intracranial hemorrhage.  Will obtain blood work to assess for endocrine derangement, hematologic or metabolic derangement.    D-dimer is resulted elevated will obtain CTA to assess for PE.  Chest x-ray without signs of pulmonary edema or pneumonia.    CTA negative for PE does show possible signs of atypical pneumonia.  Patient dosed with antibiotics.  Patient subsequently developed signs of mild alcohol withdrawal placed on CIWA scale.  Patient did appear dry and exam given rally bag.   Patient with persistent hypoxia.  Spoke with hospitalist regarding admission for ongoing management of alcohol withdrawal and hypoxia.  Patient noted to be tachycardic will treat with benzodiazepines and fluids closely reassess before initiating rate control medication.  Will replete electrolytes.            ADDITIONAL PROBLEMS MANAGED      DISPOSITION AND DISCUSSIONS  I have discussed management of the patient with the following physicians and CIARAN's: Hospitalist      FINAL DIAGNOSIS  1. Hypoxia    2. Alcohol withdrawal syndrome without complication (HCC)           Electronically signed by: Sin Callejas D.O., 5/3/2024 9:56 PM

## 2024-05-04 NOTE — PROGRESS NOTES
4 Eyes Skin Assessment Completed by SNEHA Ray and DAMEON Booker.    Head WDL  Ears WDL  Nose WDL  Mouth WDL  Neck WDL  Breast/Chest WDL  Shoulder Blades WDL  Spine WDL  (R) Arm/Elbow/Hand Redness, Blanching, and Bruising  (L) Arm/Elbow/Hand Redness, Blanching, and Bruising  Abdomen WDL, scar  Groin WDL  Scrotum/Coccyx/Buttocks WDL  (R) Leg WDL  (L) Leg WDL  (R) Heel/Foot/Toe WDL  (L) Heel/Foot/Toe WDL          Devices In Places Tele Box and Pulse Ox      Interventions In Place Gray Ear Foams    Possible Skin Injury No    Pictures Uploaded Into Epic N/A  Wound Consult Placed N/A  RN Wound Prevention Protocol Ordered No

## 2024-05-04 NOTE — ED NOTES
Admission report given to the assigned RN in  T709/01   for continuity of care and management.  Provided opportunity to asks questions.  Pt is connected to monitor  All pt belongings at bedside

## 2024-05-04 NOTE — PROGRESS NOTES
Received report from ED RN. Pt arrived to unit at 0430 via gurney escorted by telemetry ACT. Assessment complete. VSS. No signs of distress noted at this time. Tele monitor in place. Monitor room notified. No complaints of pain at this time. Fall precautions and appropriate signs in place. Pt oriented to unit routine, call light/phone system within reach. Personal belongings within reach. Pt educated regarding fall precautions. Bed alarm is on. Pt denies any further needs at this time.

## 2024-05-04 NOTE — ASSESSMENT & PLAN NOTE
CTA chest scattered groundglass opacities, hypoxia  Abx: Unasyn, doxycyline for now  -s/p ceftriaxone and azithromycin in ER  Follow cultures, de-escalate as appropriate

## 2024-05-04 NOTE — ED NOTES
Víctor from Lab called with critical result of lactic acid 4.6 at 0206. Critical lab result read back to Víctor.   Dr. Crowell notified of critical lab result at 0206.  Primary RN informed with readback

## 2024-05-04 NOTE — ED TRIAGE NOTES
".  Chief Complaint   Patient presents with    Alcohol Intoxication     Pt BIBA from home. Pt called friend saying that they had been in bed and not moved for a few days. Per pt this is correct. States they have been feeling \"not right\" pt not having SI/HI at this time. Pt admits to feeling down and depressed, requesting resources. Upon EMS arrival pt hypotensive in the 80s, given 200 ml NS in route. Bp came up to 125/81     Pt has hx a fib on eliquis      .BP 96/71   Pulse (!) 105   Temp 36.2 °C (97.1 °F) (Temporal)   Resp 18   Ht 1.676 m (5' 6\")   Wt 68 kg (150 lb)   SpO2 88%   BMI 24.21 kg/m²     "

## 2024-05-04 NOTE — ASSESSMENT & PLAN NOTE
Goal care discussed with patient in ER.  She stated she is agreeable for noninvasive, as well as invasive/heroic life-sustaining measures-including CPR/defibrillation/intubation or mechanical ventilation if needed be.  I emphasized the need for alcohol abstinence, she became tearful, expressed understanding.  Diagnosis, prognosis, question and concern addressed.  Full CODE STATUS confirmed.  ACP: 16 minutes

## 2024-05-05 LAB
ALBUMIN SERPL BCP-MCNC: 3.9 G/DL (ref 3.2–4.9)
BUN SERPL-MCNC: 4 MG/DL (ref 8–22)
CALCIUM ALBUM COR SERPL-MCNC: 8.9 MG/DL (ref 8.5–10.5)
CALCIUM SERPL-MCNC: 8.8 MG/DL (ref 8.5–10.5)
CHLORIDE SERPL-SCNC: 97 MMOL/L (ref 96–112)
CO2 SERPL-SCNC: 22 MMOL/L (ref 20–33)
CREAT SERPL-MCNC: 0.27 MG/DL (ref 0.5–1.4)
ERYTHROCYTE [DISTWIDTH] IN BLOOD BY AUTOMATED COUNT: 48.9 FL (ref 35.9–50)
GFR SERPLBLD CREATININE-BSD FMLA CKD-EPI: 112 ML/MIN/1.73 M 2
GLUCOSE SERPL-MCNC: 94 MG/DL (ref 65–99)
HCT VFR BLD AUTO: 46.7 % (ref 37–47)
HGB BLD-MCNC: 16.1 G/DL (ref 12–16)
MCH RBC QN AUTO: 33.8 PG (ref 27–33)
MCHC RBC AUTO-ENTMCNC: 34.5 G/DL (ref 32.2–35.5)
MCV RBC AUTO: 97.9 FL (ref 81.4–97.8)
PHOSPHATE SERPL-MCNC: 2.3 MG/DL (ref 2.5–4.5)
PLATELET # BLD AUTO: 79 K/UL (ref 164–446)
PLATELETS.RETICULATED NFR BLD AUTO: 6.4 % (ref 0.6–13.1)
PMV BLD AUTO: 10.4 FL (ref 9–12.9)
POTASSIUM SERPL-SCNC: 4.2 MMOL/L (ref 3.6–5.5)
RBC # BLD AUTO: 4.77 M/UL (ref 4.2–5.4)
SODIUM SERPL-SCNC: 133 MMOL/L (ref 135–145)
WBC # BLD AUTO: 7.8 K/UL (ref 4.8–10.8)

## 2024-05-05 PROCEDURE — 99233 SBSQ HOSP IP/OBS HIGH 50: CPT | Performed by: INTERNAL MEDICINE

## 2024-05-05 RX ADMIN — LORAZEPAM 2 MG: 2 TABLET ORAL at 17:12

## 2024-05-05 RX ADMIN — METOPROLOL TARTRATE 50 MG: 50 TABLET, FILM COATED ORAL at 17:12

## 2024-05-05 RX ADMIN — LORAZEPAM 2 MG: 2 TABLET ORAL at 11:37

## 2024-05-05 RX ADMIN — CHLORDIAZEPOXIDE HYDROCHLORIDE 25 MG: 25 CAPSULE ORAL at 11:37

## 2024-05-05 RX ADMIN — CHLORDIAZEPOXIDE HYDROCHLORIDE 25 MG: 25 CAPSULE ORAL at 23:46

## 2024-05-05 RX ADMIN — VENLAFAXINE HYDROCHLORIDE 150 MG: 75 CAPSULE, EXTENDED RELEASE ORAL at 05:10

## 2024-05-05 RX ADMIN — SODIUM CHLORIDE, POTASSIUM CHLORIDE, SODIUM LACTATE AND CALCIUM CHLORIDE: 600; 310; 30; 20 INJECTION, SOLUTION INTRAVENOUS at 17:13

## 2024-05-05 RX ADMIN — ATORVASTATIN CALCIUM 40 MG: 40 TABLET, FILM COATED ORAL at 17:13

## 2024-05-05 RX ADMIN — METOPROLOL TARTRATE 50 MG: 50 TABLET, FILM COATED ORAL at 05:11

## 2024-05-05 RX ADMIN — LORAZEPAM 2 MG: 2 TABLET ORAL at 07:24

## 2024-05-05 RX ADMIN — ESCITALOPRAM OXALATE 10 MG: 10 TABLET ORAL at 05:11

## 2024-05-05 RX ADMIN — THIAMINE HYDROCHLORIDE 500 MG: 100 INJECTION, SOLUTION INTRAMUSCULAR; INTRAVENOUS at 05:21

## 2024-05-05 RX ADMIN — FAMOTIDINE 20 MG: 10 INJECTION, SOLUTION INTRAVENOUS at 17:13

## 2024-05-05 RX ADMIN — LORAZEPAM 0.5 MG: 0.5 TABLET ORAL at 23:51

## 2024-05-05 RX ADMIN — LORAZEPAM 1 MG: 1 TABLET ORAL at 03:55

## 2024-05-05 RX ADMIN — SODIUM CHLORIDE, POTASSIUM CHLORIDE, SODIUM LACTATE AND CALCIUM CHLORIDE: 600; 310; 30; 20 INJECTION, SOLUTION INTRAVENOUS at 04:50

## 2024-05-05 RX ADMIN — FAMOTIDINE 20 MG: 10 INJECTION, SOLUTION INTRAVENOUS at 05:11

## 2024-05-05 RX ADMIN — TRAZODONE HYDROCHLORIDE 50 MG: 50 TABLET ORAL at 20:23

## 2024-05-05 RX ADMIN — CHLORDIAZEPOXIDE HYDROCHLORIDE 25 MG: 25 CAPSULE ORAL at 17:12

## 2024-05-05 RX ADMIN — LORAZEPAM 1 MG: 1 TABLET ORAL at 19:43

## 2024-05-05 RX ADMIN — APIXABAN 5 MG: 5 TABLET, FILM COATED ORAL at 05:11

## 2024-05-05 RX ADMIN — Medication 100 MG: at 05:10

## 2024-05-05 RX ADMIN — FOLIC ACID 1 MG: 1 TABLET ORAL at 05:11

## 2024-05-05 RX ADMIN — SENNOSIDES AND DOCUSATE SODIUM 2 TABLET: 50; 8.6 TABLET ORAL at 17:13

## 2024-05-05 RX ADMIN — THERA TABS 1 TABLET: TAB at 05:10

## 2024-05-05 RX ADMIN — DIGOXIN 125 MCG: 0.25 TABLET ORAL at 05:11

## 2024-05-05 RX ADMIN — LEVOTHYROXINE SODIUM 50 MCG: 0.05 TABLET ORAL at 05:10

## 2024-05-05 RX ADMIN — APIXABAN 5 MG: 5 TABLET, FILM COATED ORAL at 17:13

## 2024-05-05 RX ADMIN — CHLORDIAZEPOXIDE HYDROCHLORIDE 25 MG: 25 CAPSULE ORAL at 07:24

## 2024-05-05 ASSESSMENT — LIFESTYLE VARIABLES
AUDITORY DISTURBANCES: NOT PRESENT
NAUSEA AND VOMITING: NO NAUSEA AND NO VOMITING
TOTAL SCORE: 14
ANXIETY: *
TREMOR: TREMOR NOT VISIBLE BUT CAN BE FELT, FINGERTIP TO FINGERTIP
NAUSEA AND VOMITING: NO NAUSEA AND NO VOMITING
VISUAL DISTURBANCES: MODERATELY SEVERE HALLUCINATIONS
VISUAL DISTURBANCES: NOT PRESENT
ORIENTATION AND CLOUDING OF SENSORIUM: DISORIENTED FOR PLACE AND / OR PERSON
TOTAL SCORE: 8
VISUAL DISTURBANCES: MODERATELY SEVERE HALLUCINATIONS
ANXIETY: *
NAUSEA AND VOMITING: NO NAUSEA AND NO VOMITING
HEADACHE, FULLNESS IN HEAD: NOT PRESENT
PAROXYSMAL SWEATS: NO SWEAT VISIBLE
ANXIETY: NO ANXIETY (AT EASE)
SUBSTANCE_ABUSE: 0
TOTAL SCORE: 8
PAROXYSMAL SWEATS: *
ORIENTATION AND CLOUDING OF SENSORIUM: DATE DISORIENTATION BY NO MORE THAN TWO CALENDAR DAYS
AUDITORY DISTURBANCES: NOT PRESENT
HEADACHE, FULLNESS IN HEAD: NOT PRESENT
TREMOR: TREMOR NOT VISIBLE BUT CAN BE FELT, FINGERTIP TO FINGERTIP
NAUSEA AND VOMITING: NO NAUSEA AND NO VOMITING
ANXIETY: *
AGITATION: SOMEWHAT MORE THAN NORMAL ACTIVITY
ORIENTATION AND CLOUDING OF SENSORIUM: DATE DISORIENTATION BY NO MORE THAN TWO CALENDAR DAYS
TREMOR: TREMOR NOT VISIBLE BUT CAN BE FELT, FINGERTIP TO FINGERTIP
AUDITORY DISTURBANCES: NOT PRESENT
ORIENTATION AND CLOUDING OF SENSORIUM: DATE DISORIENTATION BY NO MORE THAN TWO CALENDAR DAYS
AGITATION: NORMAL ACTIVITY
TOTAL SCORE: 14
HEADACHE, FULLNESS IN HEAD: NOT PRESENT
TOTAL SCORE: 5
ORIENTATION AND CLOUDING OF SENSORIUM: DATE DISORIENTATION BY MORE THAN TWO CALENDAR DAYS
HEADACHE, FULLNESS IN HEAD: NOT PRESENT
AUDITORY DISTURBANCES: NOT PRESENT
TREMOR: *
HEADACHE, FULLNESS IN HEAD: NOT PRESENT
ANXIETY: MILDLY ANXIOUS
AUDITORY DISTURBANCES: NOT PRESENT
ORIENTATION AND CLOUDING OF SENSORIUM: DATE DISORIENTATION BY NO MORE THAN TWO CALENDAR DAYS
AUDITORY DISTURBANCES: NOT PRESENT
AGITATION: NORMAL ACTIVITY
PAROXYSMAL SWEATS: NO SWEAT VISIBLE
AGITATION: SOMEWHAT MORE THAN NORMAL ACTIVITY
TREMOR: *
HEADACHE, FULLNESS IN HEAD: NOT PRESENT
TOTAL SCORE: 6
AUDITORY DISTURBANCES: NOT PRESENT
AGITATION: NORMAL ACTIVITY
PAROXYSMAL SWEATS: BARELY PERCEPTIBLE SWEATING. PALMS MOIST
NAUSEA AND VOMITING: NO NAUSEA AND NO VOMITING
VISUAL DISTURBANCES: MODERATE SENSITIVITY
AGITATION: NORMAL ACTIVITY
NAUSEA AND VOMITING: NO NAUSEA AND NO VOMITING
NAUSEA AND VOMITING: NO NAUSEA AND NO VOMITING
ORIENTATION AND CLOUDING OF SENSORIUM: DISORIENTED FOR PLACE AND / OR PERSON
PAROXYSMAL SWEATS: NO SWEAT VISIBLE
VISUAL DISTURBANCES: NOT PRESENT
TREMOR: *
ANXIETY: *
PAROXYSMAL SWEATS: NO SWEAT VISIBLE
AGITATION: SOMEWHAT MORE THAN NORMAL ACTIVITY
VISUAL DISTURBANCES: MILD SENSITIVITY
TOTAL SCORE: 11
TREMOR: TREMOR NOT VISIBLE BUT CAN BE FELT, FINGERTIP TO FINGERTIP
VISUAL DISTURBANCES: VERY MILD SENSITIVITY
PAROXYSMAL SWEATS: *
HEADACHE, FULLNESS IN HEAD: NOT PRESENT
ANXIETY: MILDLY ANXIOUS

## 2024-05-05 ASSESSMENT — ENCOUNTER SYMPTOMS
ABDOMINAL PAIN: 0
VOMITING: 0
MYALGIAS: 0
BRUISES/BLEEDS EASILY: 0
COUGH: 0
HEARTBURN: 1
PALPITATIONS: 1
NAUSEA: 1
BLURRED VISION: 0
FEVER: 0
CHILLS: 0
HEADACHES: 0
WEAKNESS: 1
FOCAL WEAKNESS: 0
DOUBLE VISION: 0
SHORTNESS OF BREATH: 0
DIZZINESS: 0
DEPRESSION: 0

## 2024-05-05 NOTE — CARE PLAN
The patient is Watcher - Medium risk of patient condition declining or worsening    Shift Goals  Clinical Goals: CIWA, comfort  Patient Goals: rest    Progress made toward(s) clinical / shift goals:    Problem: Knowledge Deficit - Standard  Goal: Patient and family/care givers will demonstrate understanding of plan of care, disease process/condition, diagnostic tests and medications  5/4/2024 2148 by Rosalia Wise RMilesNMiles  Outcome: Progressing  Note: Pt updated on POC. No questions or concerns at this time.  5/4/2024 2148 by Rosalia Wise RMilesN.  Outcome: Progressing     Problem: Optimal Care for Alcohol Withdrawal  Goal: Optimal Care for the alcohol withdrawal patient  Outcome: Progressing  Note: Patient on CIWA protocol. Requiring PRN ativan regularly.        Patient is not progressing towards the following goals:

## 2024-05-05 NOTE — PROGRESS NOTES
Received bedside report from RN, pt care assumed, VSS, pt assessment complete. Pt AAOx4, with no complaint of pain at this time. No signs of acute distress noted at this time. Plan of care discussed with pt and verbalizes no questions. Pt denies any additional needs at this time. Bed locked/in lowest position, bed alarm on, pt educated on fall risk and verbalized understanding, call light within reach, hourly rounding initiated.

## 2024-05-05 NOTE — PROGRESS NOTES
Hospital Medicine Daily Progress Note    Date of Service  5/5/2024    Chief Complaint  Jeanie Hutchison is a 78 y.o. female admitted 5/3/2024 with alcohol intoxication     Hospital Course  This is a 78 y.o. female with history alcohol abuse, mood disorder, chronic A-fib s/p AICD on Eliquis, CLEO, hypothyroidism, chronic pain syndrome who presented 5/3/2024 with evaluation for alcohol intoxication.  Patient reported having staying in bed, drinking for at least the past 3 days.  She had similar hospitalization for alcohol intoxication, withdrawal and discharged 1/30/2024.  She reported binge drinking vodka daily, expressed depressed mood due to recent passing of family members (, daughter), difficulty coping.  In ER, found to have blood alcohol level of 350.  No acute findings on CTA.  CTA chest does not show PE, but noted to have scattered groundglass opacities.  She is requiring 2 L nasal cannula support   Patient admitted for further treatment     Interval Problem Update  Patient seen and examined, afebrile, still with high CIWA score cont on CIWA protocol and also on librium.  Close monitoring on tele for decompensation     I have discussed this patient's plan of care and discharge plan at IDT rounds today with Case Management, Nursing, Nursing leadership, and other members of the IDT team.    Consultants/Specialty  None     Code Status  Full Code    Disposition  The patient is not medically cleared for discharge to home or a post-acute facility.      I have placed the appropriate orders for post-discharge needs.    Review of Systems  Review of Systems   Constitutional:  Positive for malaise/fatigue. Negative for chills and fever.   HENT:  Negative for hearing loss and tinnitus.    Eyes:  Negative for blurred vision and double vision.   Respiratory:  Negative for cough and shortness of breath.    Cardiovascular:  Positive for palpitations. Negative for chest pain.   Gastrointestinal:  Positive for  heartburn and nausea. Negative for abdominal pain and vomiting.   Genitourinary:  Negative for dysuria and urgency.   Musculoskeletal:  Negative for joint pain and myalgias.   Skin:  Negative for itching and rash.   Neurological:  Positive for weakness. Negative for dizziness, focal weakness and headaches.   Endo/Heme/Allergies:  Negative for environmental allergies. Does not bruise/bleed easily.   Psychiatric/Behavioral:  Negative for depression and substance abuse.    All other systems reviewed and are negative.       Physical Exam  Temp:  [36.2 °C (97.2 °F)-36.9 °C (98.4 °F)] 36.9 °C (98.4 °F)  Pulse:  [] 113  Resp:  [18-24] 20  BP: (133-154)/() 148/112  SpO2:  [88 %-93 %] 88 %    Physical Exam  Vitals and nursing note reviewed.   Constitutional:       General: She is not in acute distress.  HENT:      Head: Normocephalic and atraumatic.      Nose: Nose normal.      Mouth/Throat:      Mouth: Mucous membranes are dry.      Pharynx: Oropharynx is clear.   Eyes:      General: No scleral icterus.     Extraocular Movements: Extraocular movements intact.   Cardiovascular:      Rate and Rhythm: Tachycardia present. Rhythm irregular.      Pulses: Normal pulses.      Heart sounds:      No friction rub.   Pulmonary:      Effort: No respiratory distress.      Breath sounds: No stridor. Wheezing and rales present.   Chest:      Chest wall: No tenderness.   Abdominal:      General: There is no distension.      Tenderness: There is no abdominal tenderness. There is no guarding or rebound.   Musculoskeletal:         General: Normal range of motion.      Cervical back: Neck supple. No tenderness.      Right lower leg: No edema.      Left lower leg: No edema.   Skin:     General: Skin is warm and dry.      Capillary Refill: Capillary refill takes less than 2 seconds.   Neurological:      General: No focal deficit present.      Mental Status: She is alert and oriented to person, place, and time.   Psychiatric:       Comments: Depressed mood  No SI/HI         Fluids    Intake/Output Summary (Last 24 hours) at 5/5/2024 1339  Last data filed at 5/5/2024 0731  Gross per 24 hour   Intake 490 ml   Output 1950 ml   Net -1460 ml       Laboratory  Recent Labs     05/03/24 2202 05/04/24  0708 05/05/24  0934   WBC 4.9 4.9 7.8   RBC 4.51 4.24 4.77   HEMOGLOBIN 15.1 14.2 16.1*   HEMATOCRIT 43.1 41.5 46.7   MCV 95.6 97.9* 97.9*   MCH 33.5* 33.5* 33.8*   MCHC 35.0 34.2 34.5   RDW 50.9* 51.5* 48.9   PLATELETCT 107* 82* 79*   MPV 9.8 10.1 10.4     Recent Labs     05/03/24 2202 05/04/24  0708 05/05/24  0934   SODIUM 142 140 133*   POTASSIUM 3.4* 3.8 4.2   CHLORIDE 105 105 97   CO2 19* 22 22   GLUCOSE 88 124* 94   BUN 10 8 4*   CREATININE 0.48* 0.36* 0.27*   CALCIUM 8.2* 7.9* 8.8     Recent Labs     05/04/24  0123   INR 1.25*               Imaging  CT-CTA CHEST PULMONARY ARTERY W/ RECONS   Final Result         1.  No pulmonary embolus appreciated.   2.  Scattered hazy groundglass pulmonary opacities suggesting edema or atypical infiltrates   3.  Small posterior gastric diverticulum   4.  Changes of hepatic steatosis      CT-HEAD W/O   Final Result         1.  No acute intracranial abnormality is identified, there are nonspecific white matter changes, commonly associated with small vessel ischemic disease.  Associated mild cerebral atrophy is noted.   2.  Bilateral nasal bone fractures, age indeterminant but appear likely chronic, correlate with exam   3.  Bilateral maxillary sinusitis changes   4.  Atherosclerosis.         DX-CHEST-PORTABLE (1 VIEW)   Final Result         1.  No acute cardiopulmonary disease.   2.  Cardiomegaly   3.  Atherosclerosis           Assessment/Plan  * Alcohol intoxication in active alcoholic with complication (HCC)- (present on admission)  Assessment & Plan  Reported binge drinking, notably at least 1 L of vodka daily  Cessation counseling provided: 16 minutes  Cont on CIWA protocol and also on Librium   Close monitor  on tele for decompensation     Lactic acidosis  Assessment & Plan  Likely secondary to EtOH, liver disease  IVF  High-dose IV thiamine  Trend    ACP (advance care planning)  Assessment & Plan  Goal care discussed with patient in ER.  She stated she is agreeable for noninvasive, as well as invasive/heroic life-sustaining measures-including CPR/defibrillation/intubation or mechanical ventilation if needed be.  I emphasized the need for alcohol abstinence, she became tearful, expressed understanding.  Diagnosis, prognosis, question and concern addressed.  Full CODE STATUS confirmed.  ACP: 16 minutes    CAP (community acquired pneumonia)  Assessment & Plan  CTA chest scattered groundglass opacities, hypoxia  Abx: Unasyn, doxycyline for now  -s/p ceftriaxone and azithromycin in ER  Follow cultures, de-escalate as appropriate    Hypothyroidism- (present on admission)  Assessment & Plan  Synthroid    HTN (hypertension)- (present on admission)  Assessment & Plan  Amlodipine, metoprolol    Major depressive disorder with current active episode- (present on admission)  Assessment & Plan  Continue Lexapro, Effexor  Will need psychiatry follow-up    Presence of automatic cardioverter/defibrillator (AICD)- (present on admission)  Assessment & Plan  Per history    CLEO (obstructive sleep apnea)- (present on admission)  Assessment & Plan  CPAP    Acute respiratory failure with hypoxia (HCC)- (present on admission)  Assessment & Plan  On 2L -- wean off as tolerated  Alcoholism, ?aspiration  Nebs, pulm toilet  negative COVID/influenza      Hypokalemia- (present on admission)  Assessment & Plan  Replace  Place Mg    Mixed hyperlipidemia- (present on admission)  Assessment & Plan  Statin    PAF (paroxysmal atrial fibrillation) (HCC)- (present on admission)  Assessment & Plan  Continue Eliquis, digoxin, metoprolol         VTE prophylaxis: eliquis    Greater than 51 minutes spent prepping to see patient (e.g. review of tests) obtaining  and/or reviewing separately obtained history. Performing a medically appropriate examination and/ evaluation.  Counseling and educating the patient/family/caregiver.  Ordering medications, tests, or procedures.  Referring and communicating with other health care professionals.  Documenting clinical information in EPIC.  Independently interpreting results and communicating results to patient/family/caregiver.  Care coordination.      I have performed a physical exam and reviewed and updated ROS and Plan today (5/5/2024). In review of yesterday's note (5/4/2024), there are no changes except as documented above.

## 2024-05-06 LAB
ALBUMIN SERPL BCP-MCNC: 4 G/DL (ref 3.2–4.9)
ANION GAP SERPL CALC-SCNC: 13 MMOL/L (ref 7–16)
BUN SERPL-MCNC: 7 MG/DL (ref 8–22)
CALCIUM ALBUM COR SERPL-MCNC: 9.6 MG/DL (ref 8.5–10.5)
CALCIUM SERPL-MCNC: 9.6 MG/DL (ref 8.5–10.5)
CHLORIDE SERPL-SCNC: 98 MMOL/L (ref 96–112)
CO2 SERPL-SCNC: 24 MMOL/L (ref 20–33)
CREAT SERPL-MCNC: 0.34 MG/DL (ref 0.5–1.4)
ERYTHROCYTE [DISTWIDTH] IN BLOOD BY AUTOMATED COUNT: 47.1 FL (ref 35.9–50)
GFR SERPLBLD CREATININE-BSD FMLA CKD-EPI: 106 ML/MIN/1.73 M 2
GLUCOSE SERPL-MCNC: 90 MG/DL (ref 65–99)
HCT VFR BLD AUTO: 49.3 % (ref 37–47)
HGB BLD-MCNC: 17 G/DL (ref 12–16)
MCH RBC QN AUTO: 33.4 PG (ref 27–33)
MCHC RBC AUTO-ENTMCNC: 34.5 G/DL (ref 32.2–35.5)
MCV RBC AUTO: 96.9 FL (ref 81.4–97.8)
PHOSPHATE SERPL-MCNC: 3.4 MG/DL (ref 2.5–4.5)
PLATELET # BLD AUTO: 74 K/UL (ref 164–446)
PLATELETS.RETICULATED NFR BLD AUTO: 7.7 % (ref 0.6–13.1)
PMV BLD AUTO: 10.7 FL (ref 9–12.9)
POTASSIUM SERPL-SCNC: 4.5 MMOL/L (ref 3.6–5.5)
RBC # BLD AUTO: 5.09 M/UL (ref 4.2–5.4)
SODIUM SERPL-SCNC: 135 MMOL/L (ref 135–145)
WBC # BLD AUTO: 7.1 K/UL (ref 4.8–10.8)

## 2024-05-06 PROCEDURE — 99233 SBSQ HOSP IP/OBS HIGH 50: CPT | Performed by: INTERNAL MEDICINE

## 2024-05-06 RX ADMIN — APIXABAN 5 MG: 5 TABLET, FILM COATED ORAL at 04:35

## 2024-05-06 RX ADMIN — LORAZEPAM 1 MG: 1 TABLET ORAL at 08:59

## 2024-05-06 RX ADMIN — FOLIC ACID 1 MG: 1 TABLET ORAL at 04:45

## 2024-05-06 RX ADMIN — THIAMINE HYDROCHLORIDE 500 MG: 100 INJECTION, SOLUTION INTRAMUSCULAR; INTRAVENOUS at 05:27

## 2024-05-06 RX ADMIN — FAMOTIDINE 20 MG: 10 INJECTION, SOLUTION INTRAVENOUS at 04:34

## 2024-05-06 RX ADMIN — METOPROLOL TARTRATE 50 MG: 50 TABLET, FILM COATED ORAL at 04:35

## 2024-05-06 RX ADMIN — CHLORDIAZEPOXIDE HYDROCHLORIDE 25 MG: 25 CAPSULE ORAL at 04:34

## 2024-05-06 RX ADMIN — LORAZEPAM 1 MG: 1 TABLET ORAL at 13:02

## 2024-05-06 RX ADMIN — LORAZEPAM 1 MG: 1 TABLET ORAL at 17:33

## 2024-05-06 RX ADMIN — VENLAFAXINE HYDROCHLORIDE 150 MG: 75 CAPSULE, EXTENDED RELEASE ORAL at 04:46

## 2024-05-06 RX ADMIN — TRAZODONE HYDROCHLORIDE 50 MG: 50 TABLET ORAL at 21:09

## 2024-05-06 RX ADMIN — FAMOTIDINE 20 MG: 10 INJECTION, SOLUTION INTRAVENOUS at 17:34

## 2024-05-06 RX ADMIN — THERA TABS 1 TABLET: TAB at 06:00

## 2024-05-06 RX ADMIN — APIXABAN 5 MG: 5 TABLET, FILM COATED ORAL at 17:33

## 2024-05-06 RX ADMIN — DIGOXIN 125 MCG: 0.25 TABLET ORAL at 04:35

## 2024-05-06 RX ADMIN — LORAZEPAM 1 MG: 1 TABLET ORAL at 21:09

## 2024-05-06 RX ADMIN — LABETALOL HYDROCHLORIDE 10 MG: 5 INJECTION INTRAVENOUS at 04:49

## 2024-05-06 RX ADMIN — CHLORDIAZEPOXIDE HYDROCHLORIDE 25 MG: 25 CAPSULE ORAL at 13:02

## 2024-05-06 RX ADMIN — LABETALOL HYDROCHLORIDE 10 MG: 5 INJECTION INTRAVENOUS at 17:34

## 2024-05-06 RX ADMIN — ATORVASTATIN CALCIUM 40 MG: 40 TABLET, FILM COATED ORAL at 17:33

## 2024-05-06 RX ADMIN — SODIUM CHLORIDE, POTASSIUM CHLORIDE, SODIUM LACTATE AND CALCIUM CHLORIDE: 600; 310; 30; 20 INJECTION, SOLUTION INTRAVENOUS at 05:08

## 2024-05-06 RX ADMIN — LORAZEPAM 1 MG: 1 TABLET ORAL at 04:31

## 2024-05-06 RX ADMIN — METOPROLOL TARTRATE 50 MG: 50 TABLET, FILM COATED ORAL at 17:33

## 2024-05-06 RX ADMIN — Medication 100 MG: at 04:47

## 2024-05-06 RX ADMIN — LEVOTHYROXINE SODIUM 50 MCG: 0.05 TABLET ORAL at 04:36

## 2024-05-06 RX ADMIN — ESCITALOPRAM OXALATE 10 MG: 10 TABLET ORAL at 04:46

## 2024-05-06 RX ADMIN — CHLORDIAZEPOXIDE HYDROCHLORIDE 25 MG: 25 CAPSULE ORAL at 17:33

## 2024-05-06 ASSESSMENT — COGNITIVE AND FUNCTIONAL STATUS - GENERAL
DRESSING REGULAR LOWER BODY CLOTHING: A LOT
SUGGESTED CMS G CODE MODIFIER DAILY ACTIVITY: CL
STANDING UP FROM CHAIR USING ARMS: A LOT
EATING MEALS: A LOT
MOBILITY SCORE: 12
HELP NEEDED FOR BATHING: A LOT
SUGGESTED CMS G CODE MODIFIER MOBILITY: CL
MOVING FROM LYING ON BACK TO SITTING ON SIDE OF FLAT BED: A LOT
DRESSING REGULAR UPPER BODY CLOTHING: A LOT
TOILETING: A LOT
MOVING TO AND FROM BED TO CHAIR: A LOT
WALKING IN HOSPITAL ROOM: A LOT
PERSONAL GROOMING: A LOT
CLIMB 3 TO 5 STEPS WITH RAILING: A LOT
DAILY ACTIVITIY SCORE: 12
TURNING FROM BACK TO SIDE WHILE IN FLAT BAD: A LOT

## 2024-05-06 ASSESSMENT — LIFESTYLE VARIABLES
VISUAL DISTURBANCES: NOT PRESENT
ORIENTATION AND CLOUDING OF SENSORIUM: DATE DISORIENTATION BY MORE THAN TWO CALENDAR DAYS
HEADACHE, FULLNESS IN HEAD: VERY MILD
TREMOR: TREMOR NOT VISIBLE BUT CAN BE FELT, FINGERTIP TO FINGERTIP
TOTAL SCORE: 10
AUDITORY DISTURBANCES: MILD HARSHNESS OR ABILITY TO FRIGHTEN
TREMOR: *
TREMOR: TREMOR NOT VISIBLE BUT CAN BE FELT, FINGERTIP TO FINGERTIP
AUDITORY DISTURBANCES: MODERATE HARSHNESS OR ABILITY TO FRIGHTEN
HEADACHE, FULLNESS IN HEAD: VERY MILD
TOTAL SCORE: 9
VISUAL DISTURBANCES: VERY MILD SENSITIVITY
TOTAL SCORE: 9
PAROXYSMAL SWEATS: NO SWEAT VISIBLE
NAUSEA AND VOMITING: NO NAUSEA AND NO VOMITING
ANXIETY: *
ORIENTATION AND CLOUDING OF SENSORIUM: DATE DISORIENTATION BY MORE THAN TWO CALENDAR DAYS
HEADACHE, FULLNESS IN HEAD: VERY MILD
ANXIETY: MILDLY ANXIOUS
AGITATION: SOMEWHAT MORE THAN NORMAL ACTIVITY
VISUAL DISTURBANCES: VERY MILD SENSITIVITY
PAROXYSMAL SWEATS: NO SWEAT VISIBLE
ORIENTATION AND CLOUDING OF SENSORIUM: DISORIENTED FOR PLACE AND / OR PERSON
VISUAL DISTURBANCES: NOT PRESENT
HEADACHE, FULLNESS IN HEAD: VERY MILD
ANXIETY: MILDLY ANXIOUS
HEADACHE, FULLNESS IN HEAD: NOT PRESENT
TOTAL SCORE: 8
ORIENTATION AND CLOUDING OF SENSORIUM: DATE DISORIENTATION BY MORE THAN TWO CALENDAR DAYS
PAROXYSMAL SWEATS: NO SWEAT VISIBLE
ANXIETY: NO ANXIETY (AT EASE)
VISUAL DISTURBANCES: NOT PRESENT
AUDITORY DISTURBANCES: NOT PRESENT
SUBSTANCE_ABUSE: 0
NAUSEA AND VOMITING: NO NAUSEA AND NO VOMITING
PAROXYSMAL SWEATS: NO SWEAT VISIBLE
NAUSEA AND VOMITING: NO NAUSEA AND NO VOMITING
ANXIETY: *
TREMOR: *
TOTAL SCORE: 10
AUDITORY DISTURBANCES: VERY MILD HARSHNESS OR ABILITY TO FRIGHTEN
PAROXYSMAL SWEATS: NO SWEAT VISIBLE
AGITATION: SOMEWHAT MORE THAN NORMAL ACTIVITY
AUDITORY DISTURBANCES: VERY MILD HARSHNESS OR ABILITY TO FRIGHTEN
AGITATION: SOMEWHAT MORE THAN NORMAL ACTIVITY
AGITATION: NORMAL ACTIVITY
AGITATION: MODERATELY FIDGETY AND RESTLESS
TREMOR: TREMOR NOT VISIBLE BUT CAN BE FELT, FINGERTIP TO FINGERTIP
ORIENTATION AND CLOUDING OF SENSORIUM: ORIENTED AND CAN DO SERIAL ADDITIONS

## 2024-05-06 ASSESSMENT — ENCOUNTER SYMPTOMS
BLURRED VISION: 0
DIZZINESS: 0
MYALGIAS: 0
COUGH: 0
SHORTNESS OF BREATH: 0
ABDOMINAL PAIN: 0
NAUSEA: 1
FOCAL WEAKNESS: 0
HEARTBURN: 1
CHILLS: 0
DOUBLE VISION: 0
HEADACHES: 0
VOMITING: 0
PALPITATIONS: 1
DEPRESSION: 0
WEAKNESS: 1
FEVER: 0
BRUISES/BLEEDS EASILY: 0

## 2024-05-06 ASSESSMENT — PATIENT HEALTH QUESTIONNAIRE - PHQ9
2. FEELING DOWN, DEPRESSED, IRRITABLE, OR HOPELESS: NOT AT ALL
SUM OF ALL RESPONSES TO PHQ9 QUESTIONS 1 AND 2: 0
1. LITTLE INTEREST OR PLEASURE IN DOING THINGS: NOT AT ALL

## 2024-05-06 NOTE — PROGRESS NOTES
Assumed care of PT A&O 2. Pt resting in bed with no signs of labored breathing. On RA. Tele monitor in place, cardiac rhythm being monitored. Call light within reach, bed in lowest position, upper bed rails up. Pt was updated on plan of care for the day.

## 2024-05-06 NOTE — PROGRESS NOTES
Hospital Medicine Daily Progress Note    Date of Service  5/6/2024    Chief Complaint  Jeanie Hutchison is a 78 y.o. female admitted 5/3/2024 with alcohol intoxication     Hospital Course  This is a 78 y.o. female with history alcohol abuse, mood disorder, chronic A-fib s/p AICD on Eliquis, CLEO, hypothyroidism, chronic pain syndrome who presented 5/3/2024 with evaluation for alcohol intoxication.  Patient reported having staying in bed, drinking for at least the past 3 days.  She had similar hospitalization for alcohol intoxication, withdrawal and discharged 1/30/2024.  She reported binge drinking vodka daily, expressed depressed mood due to recent passing of family members (, daughter), difficulty coping.  In ER, found to have blood alcohol level of 350.  No acute findings on CTA.  CTA chest does not show PE, but noted to have scattered groundglass opacities.  She is requiring 2 L nasal cannula support   Patient admitted for further treatment     Interval Problem Update  Patient seen and examined, afebrile, still with high CIWA score cont on CIWA protocol and also on librium.  Close monitoring on tele for decompensation   5/6: Patient resting in bed, afebrile, still going through alcohol withdraws. Cont on CIWA protocol and librium  Close monitoring on tele for decompensation     I have discussed this patient's plan of care and discharge plan at IDT rounds today with Case Management, Nursing, Nursing leadership, and other members of the IDT team.    Consultants/Specialty  None     Code Status  Full Code    Disposition  The patient is not medically cleared for discharge to home or a post-acute facility.      I have placed the appropriate orders for post-discharge needs.    Review of Systems  Review of Systems   Constitutional:  Positive for malaise/fatigue. Negative for chills and fever.   HENT:  Negative for hearing loss and tinnitus.    Eyes:  Negative for blurred vision and double vision.   Respiratory:   Negative for cough and shortness of breath.    Cardiovascular:  Positive for palpitations. Negative for chest pain.   Gastrointestinal:  Positive for heartburn and nausea. Negative for abdominal pain and vomiting.   Genitourinary:  Negative for dysuria and urgency.   Musculoskeletal:  Negative for joint pain and myalgias.   Skin:  Negative for itching and rash.   Neurological:  Positive for weakness. Negative for dizziness, focal weakness and headaches.   Endo/Heme/Allergies:  Negative for environmental allergies. Does not bruise/bleed easily.   Psychiatric/Behavioral:  Negative for depression and substance abuse.    All other systems reviewed and are negative.       Physical Exam  Temp:  [36.2 °C (97.2 °F)-36.6 °C (97.9 °F)] 36.4 °C (97.5 °F)  Pulse:  [] 98  Resp:  [16-20] 16  BP: (143-154)/(110-126) 144/113  SpO2:  [90 %-99 %] 92 %    Physical Exam  Vitals and nursing note reviewed.   Constitutional:       General: She is not in acute distress.  HENT:      Head: Normocephalic and atraumatic.      Nose: Nose normal.      Mouth/Throat:      Mouth: Mucous membranes are dry.      Pharynx: Oropharynx is clear.   Eyes:      General: No scleral icterus.     Extraocular Movements: Extraocular movements intact.   Cardiovascular:      Rate and Rhythm: Tachycardia present. Rhythm irregular.      Pulses: Normal pulses.      Heart sounds:      No friction rub.   Pulmonary:      Effort: No respiratory distress.      Breath sounds: No stridor. Wheezing and rales present.   Chest:      Chest wall: No tenderness.   Abdominal:      General: There is no distension.      Tenderness: There is no abdominal tenderness. There is no guarding or rebound.   Musculoskeletal:         General: Normal range of motion.      Cervical back: Neck supple. No tenderness.      Right lower leg: No edema.      Left lower leg: No edema.   Skin:     General: Skin is warm and dry.      Capillary Refill: Capillary refill takes less than 2 seconds.    Neurological:      General: No focal deficit present.      Mental Status: She is alert and oriented to person, place, and time.   Psychiatric:      Comments: Depressed mood  No SI/HI         Fluids    Intake/Output Summary (Last 24 hours) at 5/6/2024 1414  Last data filed at 5/5/2024 2000  Gross per 24 hour   Intake 100 ml   Output 900 ml   Net -800 ml       Laboratory  Recent Labs     05/04/24  0708 05/05/24  0934 05/06/24  0251   WBC 4.9 7.8 7.1   RBC 4.24 4.77 5.09   HEMOGLOBIN 14.2 16.1* 17.0*   HEMATOCRIT 41.5 46.7 49.3*   MCV 97.9* 97.9* 96.9   MCH 33.5* 33.8* 33.4*   MCHC 34.2 34.5 34.5   RDW 51.5* 48.9 47.1   PLATELETCT 82* 79* 74*   MPV 10.1 10.4 10.7     Recent Labs     05/04/24  0708 05/05/24  0934 05/06/24  0251   SODIUM 140 133* 135   POTASSIUM 3.8 4.2 4.5   CHLORIDE 105 97 98   CO2 22 22 24   GLUCOSE 124* 94 90   BUN 8 4* 7*   CREATININE 0.36* 0.27* 0.34*   CALCIUM 7.9* 8.8 9.6     Recent Labs     05/04/24  0123   INR 1.25*               Imaging  CT-CTA CHEST PULMONARY ARTERY W/ RECONS   Final Result         1.  No pulmonary embolus appreciated.   2.  Scattered hazy groundglass pulmonary opacities suggesting edema or atypical infiltrates   3.  Small posterior gastric diverticulum   4.  Changes of hepatic steatosis      CT-HEAD W/O   Final Result         1.  No acute intracranial abnormality is identified, there are nonspecific white matter changes, commonly associated with small vessel ischemic disease.  Associated mild cerebral atrophy is noted.   2.  Bilateral nasal bone fractures, age indeterminant but appear likely chronic, correlate with exam   3.  Bilateral maxillary sinusitis changes   4.  Atherosclerosis.         DX-CHEST-PORTABLE (1 VIEW)   Final Result         1.  No acute cardiopulmonary disease.   2.  Cardiomegaly   3.  Atherosclerosis           Assessment/Plan  * Alcohol intoxication in active alcoholic with complication (HCC)- (present on admission)  Assessment & Plan  Reported binge  drinking, notably at least 1 L of vodka daily  Cessation counseling provided: 16 minutes  Cont on CIWA protocol and also on Librium   Close monitor on tele for decompensation     Lactic acidosis  Assessment & Plan  Likely secondary to EtOH, liver disease  IVF  High-dose IV thiamine  Trend    ACP (advance care planning)  Assessment & Plan  Goal care discussed with patient in ER.  She stated she is agreeable for noninvasive, as well as invasive/heroic life-sustaining measures-including CPR/defibrillation/intubation or mechanical ventilation if needed be.  I emphasized the need for alcohol abstinence, she became tearful, expressed understanding.  Diagnosis, prognosis, question and concern addressed.  Full CODE STATUS confirmed.  ACP: 16 minutes    CAP (community acquired pneumonia)  Assessment & Plan  CTA chest scattered groundglass opacities, hypoxia  Abx: Unasyn, doxycyline for now  -s/p ceftriaxone and azithromycin in ER  Follow cultures, de-escalate as appropriate    Hypothyroidism- (present on admission)  Assessment & Plan  Synthroid    HTN (hypertension)- (present on admission)  Assessment & Plan  Amlodipine, metoprolol    Major depressive disorder with current active episode- (present on admission)  Assessment & Plan  Continue Lexapro, Effexor  Will need psychiatry follow-up    Presence of automatic cardioverter/defibrillator (AICD)- (present on admission)  Assessment & Plan  Per history    CLEO (obstructive sleep apnea)- (present on admission)  Assessment & Plan  CPAP    Acute respiratory failure with hypoxia (HCC)- (present on admission)  Assessment & Plan  On 2L -- wean off as tolerated  Alcoholism, ?aspiration  Nebs, pulm toilet  negative COVID/influenza      Hypokalemia- (present on admission)  Assessment & Plan  Replace  Place Mg    Mixed hyperlipidemia- (present on admission)  Assessment & Plan  Statin    PAF (paroxysmal atrial fibrillation) (HCC)- (present on admission)  Assessment & Plan  Continue  Eliquis, digoxin, metoprolol     Greater than 51 minutes spent prepping to see patient (e.g. review of tests) obtaining and/or reviewing separately obtained history. Performing a medically appropriate examination and/ evaluation.  Counseling and educating the patient/family/caregiver.  Ordering medications, tests, or procedures.  Referring and communicating with other health care professionals.  Documenting clinical information in EPIC.  Independently interpreting results and communicating results to patient/family/caregiver.  Care coordination.      VTE prophylaxis: eliquis    I have performed a physical exam and reviewed and updated ROS and Plan today (5/6/2024). In review of yesterday's note (5/5/2024), there are no changes except as documented above.

## 2024-05-06 NOTE — CARE PLAN
The patient is Watcher - Medium risk of patient condition declining or worsening    Shift Goals  Clinical Goals: CIWA, monitor VS  Patient Goals: Detox, comfort    Progress made toward(s) clinical / shift goals:    Problem: Knowledge Deficit - Standard  Goal: Patient and family/care givers will demonstrate understanding of plan of care, disease process/condition, diagnostic tests and medications  Outcome: Progressing  Note: Pt updated on POC. No questions or concerns.     Problem: Optimal Care for Alcohol Withdrawal  Goal: Optimal Care for the alcohol withdrawal patient  Outcome: Progressing  Note: Patient scoring on CIWA assessment q4h. Ativan given as per protocol.       Patient is not progressing towards the following goals:

## 2024-05-06 NOTE — PROGRESS NOTES
Received bedside report from RN, pt care assumed, VSS, pt assessment complete. Pt oriented to name and place only. Reoriented to time. Pt has no complaint of pain. No signs of acute distress noted at this time. Plan of care discussed with pt. Pt denies any additional needs at this time. Bed locked/in lowest position, bed alarm on, pt educated on fall risk and verbalized understanding, call light within reach, hourly rounding initiated.

## 2024-05-06 NOTE — DISCHARGE PLANNING
Attempted DCP assessment, pt too drowsy to participate. Called sonReilly and left  message requesting return call.

## 2024-05-07 LAB
ANION GAP SERPL CALC-SCNC: 18 MMOL/L (ref 7–16)
BUN SERPL-MCNC: 8 MG/DL (ref 8–22)
CALCIUM SERPL-MCNC: 9.6 MG/DL (ref 8.5–10.5)
CHLORIDE SERPL-SCNC: 99 MMOL/L (ref 96–112)
CO2 SERPL-SCNC: 22 MMOL/L (ref 20–33)
CREAT SERPL-MCNC: 0.34 MG/DL (ref 0.5–1.4)
ERYTHROCYTE [DISTWIDTH] IN BLOOD BY AUTOMATED COUNT: 49.1 FL (ref 35.9–50)
GFR SERPLBLD CREATININE-BSD FMLA CKD-EPI: 106 ML/MIN/1.73 M 2
GLUCOSE SERPL-MCNC: 81 MG/DL (ref 65–99)
HCT VFR BLD AUTO: 50.3 % (ref 37–47)
HGB BLD-MCNC: 16.8 G/DL (ref 12–16)
MCH RBC QN AUTO: 33.3 PG (ref 27–33)
MCHC RBC AUTO-ENTMCNC: 33.4 G/DL (ref 32.2–35.5)
MCV RBC AUTO: 99.6 FL (ref 81.4–97.8)
PLATELET # BLD AUTO: 80 K/UL (ref 164–446)
PLATELETS.RETICULATED NFR BLD AUTO: 7.7 % (ref 0.6–13.1)
PMV BLD AUTO: 10.7 FL (ref 9–12.9)
POTASSIUM SERPL-SCNC: 3.8 MMOL/L (ref 3.6–5.5)
RBC # BLD AUTO: 5.05 M/UL (ref 4.2–5.4)
SODIUM SERPL-SCNC: 139 MMOL/L (ref 135–145)
WBC # BLD AUTO: 6.6 K/UL (ref 4.8–10.8)

## 2024-05-07 PROCEDURE — 99233 SBSQ HOSP IP/OBS HIGH 50: CPT | Performed by: FAMILY MEDICINE

## 2024-05-07 RX ORDER — GAUZE BANDAGE 2" X 2"
100 BANDAGE TOPICAL DAILY
Status: DISCONTINUED | OUTPATIENT
Start: 2024-05-07 | End: 2024-05-10 | Stop reason: HOSPADM

## 2024-05-07 RX ORDER — FOLIC ACID 1 MG/1
1 TABLET ORAL DAILY
Status: DISCONTINUED | OUTPATIENT
Start: 2024-05-07 | End: 2024-05-10 | Stop reason: HOSPADM

## 2024-05-07 RX ADMIN — ATORVASTATIN CALCIUM 40 MG: 40 TABLET, FILM COATED ORAL at 17:51

## 2024-05-07 RX ADMIN — TRAZODONE HYDROCHLORIDE 50 MG: 50 TABLET ORAL at 20:37

## 2024-05-07 RX ADMIN — VENLAFAXINE HYDROCHLORIDE 150 MG: 75 CAPSULE, EXTENDED RELEASE ORAL at 04:26

## 2024-05-07 RX ADMIN — APIXABAN 5 MG: 5 TABLET, FILM COATED ORAL at 17:51

## 2024-05-07 RX ADMIN — LORAZEPAM 1 MG: 1 TABLET ORAL at 01:03

## 2024-05-07 RX ADMIN — ESCITALOPRAM OXALATE 10 MG: 10 TABLET ORAL at 04:26

## 2024-05-07 RX ADMIN — METOPROLOL TARTRATE 50 MG: 50 TABLET, FILM COATED ORAL at 04:26

## 2024-05-07 RX ADMIN — LORAZEPAM 1 MG: 1 TABLET ORAL at 09:01

## 2024-05-07 RX ADMIN — DIGOXIN 125 MCG: 0.25 TABLET ORAL at 04:27

## 2024-05-07 RX ADMIN — THERA TABS 1 TABLET: TAB at 04:26

## 2024-05-07 RX ADMIN — SODIUM CHLORIDE, POTASSIUM CHLORIDE, SODIUM LACTATE AND CALCIUM CHLORIDE: 600; 310; 30; 20 INJECTION, SOLUTION INTRAVENOUS at 04:13

## 2024-05-07 RX ADMIN — APIXABAN 5 MG: 5 TABLET, FILM COATED ORAL at 04:27

## 2024-05-07 RX ADMIN — LEVOTHYROXINE SODIUM 50 MCG: 0.05 TABLET ORAL at 04:26

## 2024-05-07 RX ADMIN — FAMOTIDINE 20 MG: 10 INJECTION, SOLUTION INTRAVENOUS at 04:25

## 2024-05-07 RX ADMIN — LORAZEPAM 2 MG: 2 TABLET ORAL at 19:04

## 2024-05-07 RX ADMIN — Medication 100 MG: at 04:27

## 2024-05-07 RX ADMIN — Medication 100 MG: at 14:32

## 2024-05-07 RX ADMIN — FOLIC ACID 1 MG: 1 TABLET ORAL at 04:27

## 2024-05-07 RX ADMIN — LABETALOL HYDROCHLORIDE 10 MG: 5 INJECTION INTRAVENOUS at 17:50

## 2024-05-07 RX ADMIN — FAMOTIDINE 20 MG: 10 INJECTION, SOLUTION INTRAVENOUS at 17:50

## 2024-05-07 RX ADMIN — SENNOSIDES AND DOCUSATE SODIUM 2 TABLET: 50; 8.6 TABLET ORAL at 17:50

## 2024-05-07 RX ADMIN — FOLIC ACID 1 MG: 1 TABLET ORAL at 14:32

## 2024-05-07 RX ADMIN — LORAZEPAM 1 MG: 1 TABLET ORAL at 05:03

## 2024-05-07 RX ADMIN — METOPROLOL TARTRATE 50 MG: 50 TABLET, FILM COATED ORAL at 17:51

## 2024-05-07 RX ADMIN — AMLODIPINE BESYLATE 2.5 MG: 5 TABLET ORAL at 04:26

## 2024-05-07 ASSESSMENT — LIFESTYLE VARIABLES
VISUAL DISTURBANCES: NOT PRESENT
TOTAL SCORE: 10
ANXIETY: *
HEADACHE, FULLNESS IN HEAD: VERY MILD
HEADACHE, FULLNESS IN HEAD: MILD
AUDITORY DISTURBANCES: MILD HARSHNESS OR ABILITY TO FRIGHTEN
NAUSEA AND VOMITING: NO NAUSEA AND NO VOMITING
AGITATION: NORMAL ACTIVITY
TOTAL SCORE: 8
VISUAL DISTURBANCES: NOT PRESENT
PAROXYSMAL SWEATS: NO SWEAT VISIBLE
PAROXYSMAL SWEATS: NO SWEAT VISIBLE
AUDITORY DISTURBANCES: NOT PRESENT
ORIENTATION AND CLOUDING OF SENSORIUM: DATE DISORIENTATION BY MORE THAN TWO CALENDAR DAYS
NAUSEA AND VOMITING: NO NAUSEA AND NO VOMITING
TOTAL SCORE: 8
PAROXYSMAL SWEATS: NO SWEAT VISIBLE
VISUAL DISTURBANCES: NOT PRESENT
NAUSEA AND VOMITING: NO NAUSEA AND NO VOMITING
VISUAL DISTURBANCES: NOT PRESENT
AGITATION: *
ANXIETY: MILDLY ANXIOUS
AGITATION: NORMAL ACTIVITY
HEADACHE, FULLNESS IN HEAD: NOT PRESENT
PAROXYSMAL SWEATS: NO SWEAT VISIBLE
AUDITORY DISTURBANCES: NOT PRESENT
NAUSEA AND VOMITING: NO NAUSEA AND NO VOMITING
VISUAL DISTURBANCES: NOT PRESENT
ANXIETY: NO ANXIETY (AT EASE)
AUDITORY DISTURBANCES: NOT PRESENT
NAUSEA AND VOMITING: NO NAUSEA AND NO VOMITING
ORIENTATION AND CLOUDING OF SENSORIUM: DATE DISORIENTATION BY MORE THAN TWO CALENDAR DAYS
TOTAL SCORE: 0
AGITATION: NORMAL ACTIVITY
ORIENTATION AND CLOUDING OF SENSORIUM: DATE DISORIENTATION BY MORE THAN TWO CALENDAR DAYS
TREMOR: *
TREMOR: NO TREMOR
AUDITORY DISTURBANCES: NOT PRESENT
TOTAL SCORE: 4
VISUAL DISTURBANCES: NOT PRESENT
AUDITORY DISTURBANCES: NOT PRESENT
ANXIETY: *
TOTAL SCORE: 13
ORIENTATION AND CLOUDING OF SENSORIUM: DATE DISORIENTATION BY MORE THAN TWO CALENDAR DAYS
HEADACHE, FULLNESS IN HEAD: NOT PRESENT
AGITATION: NORMAL ACTIVITY
TREMOR: *
PAROXYSMAL SWEATS: NO SWEAT VISIBLE
NAUSEA AND VOMITING: NO NAUSEA AND NO VOMITING
TREMOR: *
ANXIETY: *
AGITATION: NORMAL ACTIVITY
HEADACHE, FULLNESS IN HEAD: VERY MILD
PAROXYSMAL SWEATS: NO SWEAT VISIBLE
ORIENTATION AND CLOUDING OF SENSORIUM: ORIENTED AND CAN DO SERIAL ADDITIONS
TREMOR: *
ORIENTATION AND CLOUDING OF SENSORIUM: DATE DISORIENTATION BY MORE THAN TWO CALENDAR DAYS
ANXIETY: NO ANXIETY (AT EASE)
TREMOR: TREMOR NOT VISIBLE BUT CAN BE FELT, FINGERTIP TO FINGERTIP
HEADACHE, FULLNESS IN HEAD: NOT PRESENT

## 2024-05-07 ASSESSMENT — ENCOUNTER SYMPTOMS
ABDOMINAL PAIN: 0
DIAPHORESIS: 0
BACK PAIN: 0
NERVOUS/ANXIOUS: 1
PALPITATIONS: 0
VOMITING: 0
SENSORY CHANGE: 0
FOCAL WEAKNESS: 0
BLURRED VISION: 0
SORE THROAT: 0
DIARRHEA: 0
MYALGIAS: 0
WEAKNESS: 1
SPEECH CHANGE: 0
NECK PAIN: 0
NAUSEA: 1
WHEEZING: 0
HEARTBURN: 0
DIZZINESS: 1
HEADACHES: 0
FEVER: 0
SHORTNESS OF BREATH: 0
CHILLS: 0
FLANK PAIN: 0
COUGH: 1

## 2024-05-07 NOTE — PROGRESS NOTES
Hospital Medicine Daily Progress Note    Date of Service  5/7/2024    Chief Complaint  Jeanie Hutchison is a 78 y.o. female admitted 5/3/2024 with alcohol intoxication    Hospital Course  Admitted with alcohol intoxication, known history of alcohol abuse, started on CIWA protocol.  Patient also with known history of persistent atrial fibrillation, she was on anticoagulation with Eliquis.    Interval Problem Update  Alcohol - CIWA   A-fib - currently paced  Hypoxia - off O2   Hypertension - sbp 136-156    I have discussed this patient's plan of care and discharge plan at IDT rounds today with Case Management, Nursing, Nursing leadership, and other members of the IDT team.    Consultants/Specialty  None    Code Status  Full Code    Disposition  The patient is not medically cleared for discharge to home or a post-acute facility.  Anticipate discharge to: skilled nursing facility    I have placed the appropriate orders for post-discharge needs.    Review of Systems  Review of Systems   Constitutional:  Positive for malaise/fatigue. Negative for chills, diaphoresis and fever.   HENT:  Negative for congestion, hearing loss and sore throat.    Eyes:  Negative for blurred vision.   Respiratory:  Positive for cough. Negative for shortness of breath and wheezing.    Cardiovascular:  Negative for chest pain, palpitations and leg swelling.   Gastrointestinal:  Positive for nausea. Negative for abdominal pain, diarrhea, heartburn and vomiting.   Genitourinary:  Negative for dysuria, flank pain and hematuria.   Musculoskeletal:  Negative for back pain, joint pain, myalgias and neck pain.   Skin:  Negative for rash.   Neurological:  Positive for dizziness and weakness. Negative for sensory change, speech change, focal weakness and headaches.   Psychiatric/Behavioral:  The patient is nervous/anxious.         Physical Exam  Temp:  [36.2 °C (97.2 °F)-36.9 °C (98.4 °F)] 36.3 °C (97.3 °F)  Pulse:  [] 87  Resp:  [16-20] 20  BP:  (136-156)/() 140/103  SpO2:  [91 %-94 %] 93 %    Physical Exam  Vitals and nursing note reviewed.   Constitutional:       Comments: drowsy   HENT:      Head: Normocephalic and atraumatic.      Nose: No congestion.      Mouth/Throat:      Mouth: Mucous membranes are moist.   Eyes:      Conjunctiva/sclera: Conjunctivae normal.   Cardiovascular:      Rate and Rhythm: Normal rate and regular rhythm.   Pulmonary:      Effort: Pulmonary effort is normal.      Breath sounds: Normal breath sounds.   Abdominal:      General: There is no distension.      Tenderness: There is no abdominal tenderness. There is no guarding or rebound.   Musculoskeletal:      Cervical back: No tenderness.      Right lower leg: No edema.      Left lower leg: No edema.   Skin:     General: Skin is warm and dry.   Neurological:      General: No focal deficit present.      Mental Status: She is easily aroused.      Cranial Nerves: No cranial nerve deficit.      Comments: Oriented to person   Psychiatric:         Mood and Affect: Mood is anxious.         Speech: Speech is delayed and tangential.         Cognition and Memory: Cognition is impaired. She exhibits impaired recent memory.         Fluids    Intake/Output Summary (Last 24 hours) at 5/7/2024 1305  Last data filed at 5/7/2024 0000  Gross per 24 hour   Intake 120 ml   Output --   Net 120 ml       Laboratory  Recent Labs     05/05/24  0934 05/06/24  0251 05/07/24  0430   WBC 7.8 7.1 6.6   RBC 4.77 5.09 5.05   HEMOGLOBIN 16.1* 17.0* 16.8*   HEMATOCRIT 46.7 49.3* 50.3*   MCV 97.9* 96.9 99.6*   MCH 33.8* 33.4* 33.3*   MCHC 34.5 34.5 33.4   RDW 48.9 47.1 49.1   PLATELETCT 79* 74* 80*   MPV 10.4 10.7 10.7     Recent Labs     05/05/24  0934 05/06/24  0251 05/07/24  0430   SODIUM 133* 135 139   POTASSIUM 4.2 4.5 3.8   CHLORIDE 97 98 99   CO2 22 24 22   GLUCOSE 94 90 81   BUN 4* 7* 8   CREATININE 0.27* 0.34* 0.34*   CALCIUM 8.8 9.6 9.6                   Imaging  CT-CTA CHEST PULMONARY ARTERY W/  RECONS   Final Result         1.  No pulmonary embolus appreciated.   2.  Scattered hazy groundglass pulmonary opacities suggesting edema or atypical infiltrates   3.  Small posterior gastric diverticulum   4.  Changes of hepatic steatosis      CT-HEAD W/O   Final Result         1.  No acute intracranial abnormality is identified, there are nonspecific white matter changes, commonly associated with small vessel ischemic disease.  Associated mild cerebral atrophy is noted.   2.  Bilateral nasal bone fractures, age indeterminant but appear likely chronic, correlate with exam   3.  Bilateral maxillary sinusitis changes   4.  Atherosclerosis.         DX-CHEST-PORTABLE (1 VIEW)   Final Result         1.  No acute cardiopulmonary disease.   2.  Cardiomegaly   3.  Atherosclerosis           Assessment/Plan  * Alcohol intoxication in active alcoholic with complication (HCC)- (present on admission)  Assessment & Plan  CIWA protocol  Start Thiamine + Folic    Hypophosphatemia- (present on admission)  Assessment & Plan  Follow level    HTN (hypertension)- (present on admission)  Assessment & Plan  Amlodipine, Metoprolol    Thrombocytopenia (HCC)- (present on admission)  Assessment & Plan  Follow cbc    Presence of automatic cardioverter/defibrillator (AICD)- (present on admission)  Assessment & Plan  monitor    CLEO (obstructive sleep apnea)- (present on admission)  Assessment & Plan  CPAP    Acute respiratory failure with hypoxia (HCC)- (present on admission)  Assessment & Plan  RT protocol      Hypomagnesemia- (present on admission)  Assessment & Plan  Follow level    Hypokalemia- (present on admission)  Assessment & Plan  Follow cmp    Persistent atrial fibrillation (HCC)- (present on admission)  Assessment & Plan  Metoprolol, Digoxin, Eliquis    Hypothyroidism- (present on admission)  Assessment & Plan  Synthroid    Major depressive disorder with current active episode- (present on admission)  Assessment & Plan  Lexapro,  Effexor         VTE prophylaxis:    therapeutic anticoagulation with eliquis 5 mg BID      I have performed a physical exam and reviewed and updated ROS and Plan today (5/7/2024). In review of yesterday's note (5/6/2024), there are no changes except as documented above.

## 2024-05-07 NOTE — PROGRESS NOTES
Handoff report received from day shift nurse. Pt care assumed. Pt is currently resting in bed. POC discussed with Pt and Pt verbalizes no questions at this time. Pt is AAOx1, on RA on Tele monitoring, and VSS. Call light and belongings within reach, bed in lowest and locked position, and Pt educated on use of call light. Will continue to monitor.

## 2024-05-07 NOTE — CARE PLAN
Problem: Pain - Standard  Goal: Alleviation of pain or a reduction in pain to the patient’s comfort goal  Description: Target End Date:  Prior to discharge or change in level of care    Document on Vitals flowsheet    1.  Document pain using the appropriate pain scale per order or unit policy  2.  Educate and implement non-pharmacologic comfort measures (i.e. relaxation, distraction, massage, cold/heat therapy, etc.)  3.  Pain management medications as ordered  4.  Reassess pain after pain med administration per policy  5.  If opiods administered assess patient's response to pain medication is appropriate per POSS sedation scale  6.  Follow pain management plan developed in collaboration with patient and interdisciplinary team (including palliative care or pain specialists if applicable)  Outcome: Progressing     Problem: Knowledge Deficit - Standard  Goal: Patient and family/care givers will demonstrate understanding of plan of care, disease process/condition, diagnostic tests and medications  Description: Target End Date:  1-3 days or as soon as patient condition allows    Document in Patient Education    1.  Patient and family/caregiver oriented to unit, equipment, visitation policy and means for communicating concern  2.  Complete/review Learning Assessment  3.  Assess knowledge level of disease process/condition, treatment plan, diagnostic tests and medications  4.  Explain disease process/condition, treatment plan, diagnostic tests and medications  Outcome: Progressing     Problem: Ineffective Airway Clearance  Goal: Patient will maintain patent airway with clear/clearing breath sounds  Description: Target End Date:  Prior to discharge or change in level of care    1.   Position head midline with flexion appropriate for age and/or condition  2.   Assist patient to assume a position of comfort  3.   Assess and monitor breath sounds  4.   Encourage abdominal or pursed-lip breathing exercises  5.   Assist with  measures to improve effectiveness of cough effort  6.   Demonstrate effective coughing and deep-breathing techniques  7.   Assist patient to turn every 2 hours  8.   Encourage patient to ambulate as tolerated  9.   Keep environmental pollution to a minimum  10. Airway suctioning as needed  11. Collaborate with RT to administer medications/treatments per order  12. Promote systemic fluid hydration within cardiac tolerance  13. Provide warm or tepid liquids  Outcome: Progressing     Problem: Self Care  Goal: Patient will have the ability to perform ADLs independently or with assistance (bathe, groom, dress, toilet and feed)  Description: Target End Date:  Prior to discharge or change in level of care    Document on ADL flowsheet    1.  Assess the capability and level of deficiency to perform ADLs  2.  Encourage family/care giver involvement  3.  Provide assistive devices  4.  Consider PT/OT evaluations  5.  Maintain support, give positive feedback, encourage self-care allowing extra time and verbal cuing as needed  6.  Avoid doing something for patients they can do themselves, but provide assistance as needed  7.  Assist in anticipating/planning individual needs  8.  Collaborate with Case Management and  to meet discharge needs  Outcome: Progressing     Problem: Risk for Aspiration  Goal: Patient's risk for aspiration will be absent or decrease  Description: Target End Date:  Prior to discharge or change in level of care    1.   Complete dysphagia screening on admission  2.   NPO until dysphagia screening complete or medically cleared  3.   Collaborate with Speech Therapy, Clinical Dietitian and interdisciplinary team  4.   Implement aspiration precautions  5.   Assist patient up to chair for meals  6.   Elevate head of bed 90 degrees if patient is unable to get out of bed  7.   Encourage small bites  8.   Ensure foods/liquids are of appropriate consistency  9.   Assess for any signs/symptoms of  aspiration  10. Assess breath sounds and vital signs after oral intake  Outcome: Progressing     Problem: Seizure Precautions  Goal: Implementation of seizure precautions  Description: Target End Date:  Prior to discharge or change in level of care    1.  Padded side rails up at all times  2.  Suction equipment and oxygen delivery system at bedside  3.  Continuous pulse oximeter in use  4.  Implement fall precautions, bed alarm on, bed in lowest position  5.  IV access (per order)  6.  Provide low stimulus environment, avoid exposure to triggers  7.  Instruct patient to use call light/seizure button if having warning signs of impending seizure  Outcome: Progressing     Problem: Optimal Care for Alcohol Withdrawal  Goal: Optimal Care for the alcohol withdrawal patient  Description: Target End Date:  1 to 3 days    1.  Alcohol history screening done on admission  2.  CIWA-AR score assessment (includes assessment of nausea/vomiting, tremor, sweats, anxiety, agitation, tactile, visual and auditory disturbances, headache and orientation/sensorium).  Document on CIWA flowsheet.  3.  Follow CIWA-AR score protocol  4.  Frequent reorientation  5.  Monitor for fluid and electrolyte imbalance.  6.  Assess for respiratory depression due to sedation (pulse ox)  7.  Consider thiamine, multivitamins, folic acid and magnesium sulfate per provider order  8.  Collaborate with Social Workers/Case Management  9.  Collaborate with mental health  Outcome: Progressing     Problem: Lifestyle Changes  Goal: Patient's ability to identify lifestyle changes and available resources to help reduce recurrence of condition will improve  Description: Target End Date:  1 to 3 days    1.  Discuss recommended lifestyle changes  2.  Identify available resources and support systems  3.  Consider referral to substance abuse program  Outcome: Progressing   The patient is Stable - Low risk of patient condition declining or worsening    Shift  Goals  Clinical Goals: CIWA, monitor VS  Patient Goals: Detox, comfort    Progress made toward(s) clinical / shift goals:  VSS, safety    Patient is not progressing towards the following goals:

## 2024-05-07 NOTE — PROGRESS NOTES
Bedside report received, pt care assumed, tele box on. Pt lethargic, alert and oriented to self and place. Disoriented to situation and time. Pt denies any additional needs at this time. Bed in lowest position, bed alarm on pt educated on fall risk and verbalized understanding, call light within reach.

## 2024-05-07 NOTE — DISCHARGE PLANNING
Attempted DCP assessment with pt again today. Less sleepy than yesterday, but responses very delayed and not alert enough to participate in conversation. Called son Reilly and left VM message requesting return call.

## 2024-05-08 PROBLEM — R94.31 PROLONGED QT INTERVAL: Status: ACTIVE | Noted: 2024-05-08

## 2024-05-08 PROBLEM — G93.40 ACUTE ENCEPHALOPATHY: Status: ACTIVE | Noted: 2024-05-08

## 2024-05-08 LAB
ALBUMIN SERPL BCP-MCNC: 3.9 G/DL (ref 3.2–4.9)
ALBUMIN/GLOB SERPL: 1.3 G/DL
ALP SERPL-CCNC: 102 U/L (ref 30–99)
ALT SERPL-CCNC: 22 U/L (ref 2–50)
ANION GAP SERPL CALC-SCNC: 19 MMOL/L (ref 7–16)
AST SERPL-CCNC: 32 U/L (ref 12–45)
BILIRUB SERPL-MCNC: 1.5 MG/DL (ref 0.1–1.5)
BUN SERPL-MCNC: 7 MG/DL (ref 8–22)
CALCIUM ALBUM COR SERPL-MCNC: 9.6 MG/DL (ref 8.5–10.5)
CALCIUM SERPL-MCNC: 9.5 MG/DL (ref 8.5–10.5)
CHLORIDE SERPL-SCNC: 99 MMOL/L (ref 96–112)
CO2 SERPL-SCNC: 21 MMOL/L (ref 20–33)
CREAT SERPL-MCNC: 0.33 MG/DL (ref 0.5–1.4)
DIGOXIN SERPL-MCNC: 0.7 NG/ML (ref 0.8–2)
EKG IMPRESSION: NORMAL
ERYTHROCYTE [DISTWIDTH] IN BLOOD BY AUTOMATED COUNT: 47.7 FL (ref 35.9–50)
GFR SERPLBLD CREATININE-BSD FMLA CKD-EPI: 107 ML/MIN/1.73 M 2
GLOBULIN SER CALC-MCNC: 3.1 G/DL (ref 1.9–3.5)
GLUCOSE SERPL-MCNC: 78 MG/DL (ref 65–99)
HCT VFR BLD AUTO: 52.5 % (ref 37–47)
HGB BLD-MCNC: 17.8 G/DL (ref 12–16)
MAGNESIUM SERPL-MCNC: 1.6 MG/DL (ref 1.5–2.5)
MCH RBC QN AUTO: 33.2 PG (ref 27–33)
MCHC RBC AUTO-ENTMCNC: 33.9 G/DL (ref 32.2–35.5)
MCV RBC AUTO: 97.9 FL (ref 81.4–97.8)
PHOSPHATE SERPL-MCNC: 3.6 MG/DL (ref 2.5–4.5)
PLATELET # BLD AUTO: 82 K/UL (ref 164–446)
PLATELETS.RETICULATED NFR BLD AUTO: 7.7 % (ref 0.6–13.1)
PMV BLD AUTO: 10.6 FL (ref 9–12.9)
POTASSIUM SERPL-SCNC: 3.8 MMOL/L (ref 3.6–5.5)
PROT SERPL-MCNC: 7 G/DL (ref 6–8.2)
RBC # BLD AUTO: 5.36 M/UL (ref 4.2–5.4)
SODIUM SERPL-SCNC: 139 MMOL/L (ref 135–145)
WBC # BLD AUTO: 6.2 K/UL (ref 4.8–10.8)

## 2024-05-08 PROCEDURE — 93010 ELECTROCARDIOGRAM REPORT: CPT | Performed by: INTERNAL MEDICINE

## 2024-05-08 PROCEDURE — 99233 SBSQ HOSP IP/OBS HIGH 50: CPT | Performed by: FAMILY MEDICINE

## 2024-05-08 RX ORDER — MAGNESIUM SULFATE HEPTAHYDRATE 40 MG/ML
2 INJECTION, SOLUTION INTRAVENOUS ONCE
Qty: 50 ML | Refills: 0 | Status: COMPLETED | OUTPATIENT
Start: 2024-05-08 | End: 2024-05-08

## 2024-05-08 RX ADMIN — Medication 100 MG: at 04:26

## 2024-05-08 RX ADMIN — VENLAFAXINE HYDROCHLORIDE 150 MG: 75 CAPSULE, EXTENDED RELEASE ORAL at 04:26

## 2024-05-08 RX ADMIN — SENNOSIDES AND DOCUSATE SODIUM 2 TABLET: 50; 8.6 TABLET ORAL at 17:53

## 2024-05-08 RX ADMIN — FAMOTIDINE 20 MG: 10 INJECTION, SOLUTION INTRAVENOUS at 04:25

## 2024-05-08 RX ADMIN — MAGNESIUM SULFATE HEPTAHYDRATE 2 G: 2 INJECTION, SOLUTION INTRAVENOUS at 12:00

## 2024-05-08 RX ADMIN — APIXABAN 5 MG: 5 TABLET, FILM COATED ORAL at 04:26

## 2024-05-08 RX ADMIN — ESCITALOPRAM OXALATE 10 MG: 10 TABLET ORAL at 04:25

## 2024-05-08 RX ADMIN — APIXABAN 5 MG: 5 TABLET, FILM COATED ORAL at 17:53

## 2024-05-08 RX ADMIN — FOLIC ACID 1 MG: 1 TABLET ORAL at 04:26

## 2024-05-08 RX ADMIN — LORAZEPAM 0.5 MG: 0.5 TABLET ORAL at 07:37

## 2024-05-08 RX ADMIN — ATORVASTATIN CALCIUM 40 MG: 40 TABLET, FILM COATED ORAL at 17:53

## 2024-05-08 RX ADMIN — SODIUM CHLORIDE, POTASSIUM CHLORIDE, SODIUM LACTATE AND CALCIUM CHLORIDE: 600; 310; 30; 20 INJECTION, SOLUTION INTRAVENOUS at 04:24

## 2024-05-08 RX ADMIN — METOPROLOL TARTRATE 50 MG: 50 TABLET, FILM COATED ORAL at 04:25

## 2024-05-08 RX ADMIN — DIGOXIN 125 MCG: 0.25 TABLET ORAL at 04:26

## 2024-05-08 RX ADMIN — LEVOTHYROXINE SODIUM 50 MCG: 0.05 TABLET ORAL at 04:25

## 2024-05-08 RX ADMIN — AMLODIPINE BESYLATE 2.5 MG: 5 TABLET ORAL at 04:25

## 2024-05-08 RX ADMIN — TRAZODONE HYDROCHLORIDE 50 MG: 50 TABLET ORAL at 21:04

## 2024-05-08 ASSESSMENT — ENCOUNTER SYMPTOMS
FOCAL WEAKNESS: 0
VOMITING: 0
PALPITATIONS: 0
HEADACHES: 0
DIZZINESS: 1
DIAPHORESIS: 0
NERVOUS/ANXIOUS: 1
FLANK PAIN: 0
HEARTBURN: 0
BLURRED VISION: 0
SORE THROAT: 0
COUGH: 1
CHILLS: 0
WHEEZING: 0
SHORTNESS OF BREATH: 0
SPEECH CHANGE: 0
SENSORY CHANGE: 0
FEVER: 0
BACK PAIN: 0
DIARRHEA: 0
WEAKNESS: 1
NAUSEA: 1
NECK PAIN: 0
MYALGIAS: 0
ABDOMINAL PAIN: 0

## 2024-05-08 ASSESSMENT — LIFESTYLE VARIABLES
AUDITORY DISTURBANCES: NOT PRESENT
TREMOR: TREMOR NOT VISIBLE BUT CAN BE FELT, FINGERTIP TO FINGERTIP
HEADACHE, FULLNESS IN HEAD: NOT PRESENT
ANXIETY: MILDLY ANXIOUS
AUDITORY DISTURBANCES: NOT PRESENT
TOTAL SCORE: 6
ORIENTATION AND CLOUDING OF SENSORIUM: CANNOT DO SERIAL ADDITIONS OR IS UNCERTAIN ABOUT DATE
TREMOR: TREMOR NOT VISIBLE BUT CAN BE FELT, FINGERTIP TO FINGERTIP
VISUAL DISTURBANCES: NOT PRESENT
HEADACHE, FULLNESS IN HEAD: NOT PRESENT
NAUSEA AND VOMITING: NO NAUSEA AND NO VOMITING
VISUAL DISTURBANCES: NOT PRESENT
ORIENTATION AND CLOUDING OF SENSORIUM: CANNOT DO SERIAL ADDITIONS OR IS UNCERTAIN ABOUT DATE
VISUAL DISTURBANCES: NOT PRESENT
AUDITORY DISTURBANCES: NOT PRESENT
TREMOR: NO TREMOR
TOTAL SCORE: 4
PAROXYSMAL SWEATS: NO SWEAT VISIBLE
ORIENTATION AND CLOUDING OF SENSORIUM: CANNOT DO SERIAL ADDITIONS OR IS UNCERTAIN ABOUT DATE
TREMOR: TREMOR NOT VISIBLE BUT CAN BE FELT, FINGERTIP TO FINGERTIP
NAUSEA AND VOMITING: NO NAUSEA AND NO VOMITING
TOTAL SCORE: 0
PAROXYSMAL SWEATS: NO SWEAT VISIBLE
HEADACHE, FULLNESS IN HEAD: NOT PRESENT
VISUAL DISTURBANCES: NOT PRESENT
AGITATION: NORMAL ACTIVITY
NAUSEA AND VOMITING: NO NAUSEA AND NO VOMITING
ANXIETY: NO ANXIETY (AT EASE)
ANXIETY: NO ANXIETY (AT EASE)
ANXIETY: MILDLY ANXIOUS
AGITATION: NORMAL ACTIVITY
NAUSEA AND VOMITING: NO NAUSEA AND NO VOMITING
AGITATION: *
AUDITORY DISTURBANCES: NOT PRESENT
HEADACHE, FULLNESS IN HEAD: NOT PRESENT
AGITATION: NORMAL ACTIVITY
TOTAL SCORE: 2
NAUSEA AND VOMITING: NO NAUSEA AND NO VOMITING
PAROXYSMAL SWEATS: NO SWEAT VISIBLE
AUDITORY DISTURBANCES: NOT PRESENT
AGITATION: NORMAL ACTIVITY
PAROXYSMAL SWEATS: NO SWEAT VISIBLE
PAROXYSMAL SWEATS: NO SWEAT VISIBLE
VISUAL DISTURBANCES: NOT PRESENT
TREMOR: *
ANXIETY: NO ANXIETY (AT EASE)
ORIENTATION AND CLOUDING OF SENSORIUM: ORIENTED AND CAN DO SERIAL ADDITIONS
TOTAL SCORE: 3
ORIENTATION AND CLOUDING OF SENSORIUM: DATE DISORIENTATION BY MORE THAN TWO CALENDAR DAYS
HEADACHE, FULLNESS IN HEAD: NOT PRESENT

## 2024-05-08 ASSESSMENT — PATIENT HEALTH QUESTIONNAIRE - PHQ9
1. LITTLE INTEREST OR PLEASURE IN DOING THINGS: NOT AT ALL
2. FEELING DOWN, DEPRESSED, IRRITABLE, OR HOPELESS: NOT AT ALL
SUM OF ALL RESPONSES TO PHQ9 QUESTIONS 1 AND 2: 0

## 2024-05-08 ASSESSMENT — PAIN DESCRIPTION - PAIN TYPE
TYPE: ACUTE PAIN
TYPE: ACUTE PAIN

## 2024-05-08 NOTE — PROGRESS NOTES
Telemetry Shift Summary    Rhythm AFib  HR Range 74-86  Ectopy PVC, PAC, TRIG, COUP  Measurements -/.08/-        Normal Values  Rhythm SR  HR Range    Measurements 0.12-0.20 / 0.06-0.10  / 0.30-0.52

## 2024-05-08 NOTE — PROGRESS NOTES
Hospital Medicine Daily Progress Note    Date of Service  5/8/2024    Chief Complaint  Jeanie Hutchison is a 78 y.o. female admitted 5/3/2024 with alcohol intoxication    Hospital Course  Admitted with alcohol intoxication, acute encephalopathy, known history of alcohol abuse, started on CIWA protocol.  Patient also with known history of persistent atrial fibrillation, she was on anticoagulation with Eliquis.    Interval Problem Update  Alcohol - CIWA 6  A-fib - episode of rate briefly in 30s   Hypoxia - off O2   Hypertension - sbp   Low magnesium    I have discussed this patient's plan of care and discharge plan at IDT rounds today with Case Management, Nursing, Nursing leadership, and other members of the IDT team.    Consultants/Specialty  None    Code Status  Full Code    Disposition  The patient is not medically cleared for discharge to home or a post-acute facility.  Anticipate discharge to: skilled nursing facility    I have placed the appropriate orders for post-discharge needs.    Review of Systems  Review of Systems   Constitutional:  Positive for malaise/fatigue. Negative for chills, diaphoresis and fever.   HENT:  Negative for congestion, hearing loss and sore throat.    Eyes:  Negative for blurred vision.   Respiratory:  Positive for cough. Negative for shortness of breath and wheezing.    Cardiovascular:  Negative for chest pain, palpitations and leg swelling.   Gastrointestinal:  Positive for nausea. Negative for abdominal pain, diarrhea, heartburn and vomiting.   Genitourinary:  Negative for dysuria, flank pain and hematuria.   Musculoskeletal:  Negative for back pain, joint pain, myalgias and neck pain.   Skin:  Negative for rash.   Neurological:  Positive for dizziness and weakness. Negative for sensory change, speech change, focal weakness and headaches.   Psychiatric/Behavioral:  The patient is nervous/anxious.         Physical Exam  Temp:  [36.2 °C (97.2 °F)-36.6 °C (97.9 °F)] 36.2 °C  (97.2 °F)  Pulse:  [79-92] 79  Resp:  [16-18] 18  BP: ()/() 95/69  SpO2:  [92 %-96 %] 95 %    Physical Exam  Vitals and nursing note reviewed.   Constitutional:       Comments: drowsy   HENT:      Head: Normocephalic and atraumatic.      Nose: No congestion.      Mouth/Throat:      Mouth: Mucous membranes are moist.   Eyes:      Conjunctiva/sclera: Conjunctivae normal.   Cardiovascular:      Rate and Rhythm: Normal rate and regular rhythm.   Pulmonary:      Effort: Pulmonary effort is normal.      Breath sounds: Normal breath sounds.   Abdominal:      General: There is no distension.      Tenderness: There is no abdominal tenderness. There is no guarding or rebound.   Musculoskeletal:      Cervical back: No tenderness.      Right lower leg: No edema.      Left lower leg: No edema.   Skin:     General: Skin is warm and dry.   Neurological:      General: No focal deficit present.      Mental Status: She is easily aroused.      Cranial Nerves: No cranial nerve deficit.      Comments: Oriented to person   Psychiatric:         Mood and Affect: Mood is anxious.         Speech: Speech is delayed and tangential.         Cognition and Memory: Cognition is impaired. She exhibits impaired recent memory.         Fluids    Intake/Output Summary (Last 24 hours) at 5/8/2024 1528  Last data filed at 5/8/2024 0600  Gross per 24 hour   Intake 240 ml   Output 700 ml   Net -460 ml       Laboratory  Recent Labs     05/06/24  0251 05/07/24  0430 05/08/24  0828   WBC 7.1 6.6 6.2   RBC 5.09 5.05 5.36   HEMOGLOBIN 17.0* 16.8* 17.8*   HEMATOCRIT 49.3* 50.3* 52.5*   MCV 96.9 99.6* 97.9*   MCH 33.4* 33.3* 33.2*   MCHC 34.5 33.4 33.9   RDW 47.1 49.1 47.7   PLATELETCT 74* 80* 82*   MPV 10.7 10.7 10.6     Recent Labs     05/06/24  0251 05/07/24  0430 05/08/24  0828   SODIUM 135 139 139   POTASSIUM 4.5 3.8 3.8   CHLORIDE 98 99 99   CO2 24 22 21   GLUCOSE 90 81 78   BUN 7* 8 7*   CREATININE 0.34* 0.34* 0.33*   CALCIUM 9.6 9.6 9.5                    Imaging  CT-CTA CHEST PULMONARY ARTERY W/ RECONS   Final Result         1.  No pulmonary embolus appreciated.   2.  Scattered hazy groundglass pulmonary opacities suggesting edema or atypical infiltrates   3.  Small posterior gastric diverticulum   4.  Changes of hepatic steatosis      CT-HEAD W/O   Final Result         1.  No acute intracranial abnormality is identified, there are nonspecific white matter changes, commonly associated with small vessel ischemic disease.  Associated mild cerebral atrophy is noted.   2.  Bilateral nasal bone fractures, age indeterminant but appear likely chronic, correlate with exam   3.  Bilateral maxillary sinusitis changes   4.  Atherosclerosis.         DX-CHEST-PORTABLE (1 VIEW)   Final Result         1.  No acute cardiopulmonary disease.   2.  Cardiomegaly   3.  Atherosclerosis           Assessment/Plan  * Alcohol intoxication in active alcoholic with complication (HCC)- (present on admission)  Assessment & Plan  CIWA protocol  Thiamine + Folic    Acute encephalopathy- (present on admission)  Assessment & Plan  Avoid Benzodiazepines, Anticholinergics, Opiates  Frequent orientation  Update Board daily  Open window blinds during the day  Avoid naps during the day  Family at bedside whenever possible  Ensure adequate sleep at night - use Melatonin preferably  Avoid early morning labs  Avoid vital signs during sleep  Ambulate if possible  Provide adequate pain control  Monitor for urinary retention  Prevent and manage constipation  Discontinue IVs, Catheters, Tubes, Lines, Cardiac monitors, SCDs if possible     Prolonged QT interval  Assessment & Plan  monitor    Hypophosphatemia- (present on admission)  Assessment & Plan  Follow level    HTN (hypertension)- (present on admission)  Assessment & Plan  Amlodipine with parameters  Hold Metoprolol due to bradycardia    Thrombocytopenia (HCC)- (present on admission)  Assessment & Plan  Follow cbc    Presence of automatic  cardioverter/defibrillator (AICD)- (present on admission)  Assessment & Plan  monitor    CLEO (obstructive sleep apnea)- (present on admission)  Assessment & Plan  CPAP    Acute respiratory failure with hypoxia (HCC)- (present on admission)  Assessment & Plan  RT protocol      Hypomagnesemia- (present on admission)  Assessment & Plan  IV Mg 2 g  Follow level    Hypokalemia- (present on admission)  Assessment & Plan  Follow cmp    Persistent atrial fibrillation (HCC)- (present on admission)  Assessment & Plan  Eliquis  Hold Metoprolol and Digoxin due to bradycardia    Hypothyroidism- (present on admission)  Assessment & Plan  Synthroid    Major depressive disorder with current active episode- (present on admission)  Assessment & Plan  Lexapro, Effexor         VTE prophylaxis:    therapeutic anticoagulation with eliquis 5 mg BID      I have performed a physical exam and reviewed and updated ROS and Plan today (5/8/2024). In review of yesterday's note (5/7/2024), there are no changes except as documented above.

## 2024-05-08 NOTE — CARE PLAN
The patient is Stable - Low risk of patient condition declining or worsening    Shift Goals  Clinical Goals: Safety, CIWA, reorient, mobility  Patient Goals: rest, comfort  Family Goals: DANICA    Progress made toward(s) clinical / shift goals:      Problem: Knowledge Deficit - Standard  Goal: Patient and family/care givers will demonstrate understanding of plan of care, disease process/condition, diagnostic tests and medications  Outcome: Progressing     Problem: Optimal Care for Alcohol Withdrawal  Goal: Optimal Care for the alcohol withdrawal patient  Outcome: Progressing       Patient is not progressing towards the following goals:

## 2024-05-08 NOTE — DOCUMENTATION QUERY
[facility]                                                                       Query Response Note      PATIENT:               MARISELA DURHAM  ACCT #:                  [id number]  MRN:                     [id number]  :                      1945  ADMIT DATE:       5/3/2024 9:41 PM  DISCH DATE:          RESPONDING  PROVIDER #:        [id number]           QUERY TEXT:    The medical record includes the diagnosis of Pneumonia documented in the ED, H&P and subsequent  notes, however, was not noted in the HM note dated .     Please confirm the status of the diagnosis on this current admission.     The patient's clinical indicators include:  ED: CTA negative for PE does show possible signs of atypical pneumonia.  Patient dosed with antibiotics.    H&P:   - CAP (community acquired pneumonia) CTA chest scattered groundglass opacities, hypoxia  - Abx: Unasyn, doxycycline for now; s/p ceftriaxone and azithromycin in ER    Clinical Findings (5/3):    - WBC 4.9, procal <0.05, TMax 97.1, negative for cough/SOB per H&P  - CTAP 5/3: Scattered hazy groundglass pulmonary opacities suggesting edema or atypical infiltrates    Risk Factors: Alcoholism, advanced age   Treatment: s/p ceftriaxone IV and azithromycin in ED, Unasyn, doxycycline, supplemental O2     Thank you for your time and attention,  KALANI Bynum RN  Available via Voalte  Options provided:   -- Community-acquired pneumonia ruled in   -- Community-acquired pneumonia ruled out   -- Other explanation, (please specify the other explanation)      Query created by: Angelina Queen on 2024 11:03 AM    RESPONSE TEXT:    Community-acquired pneumonia ruled out          Electronically signed by:  STACY WONG MD 2024 3:57 PM

## 2024-05-08 NOTE — DISCHARGE PLANNING
Case Management Discharge Planning    Admission Date: 5/3/2024  GMLOS: 5  ALOS: 4    6-Clicks ADL Score: 12  6-Clicks Mobility Score: 12  PT and/or OT Eval ordered: No  Post-acute Referrals Ordered: No  Post-acute Choice Obtained: No  Has referral(s) been sent to post-acute provider:  No      Anticipated Discharge Dispo: Discharge Disposition: Discharged to home/self care (01)    DME Needed: No    Action(s) Taken: Updated Provider/Nurse on Discharge Plan and DC Assessment Complete (See below)    Escalations Completed: None    Medically Clear: No    Next Steps: Expect that pt will discharge to home when medically cleared.    Barriers to Discharge: Medical clearance    Is the patient up for discharge tomorrow: No

## 2024-05-08 NOTE — CARE PLAN
The patient is Stable - Low risk of patient condition declining or worsening    Shift Goals  Clinical Goals: CIWA, safety and comfort, VS  Patient Goals: rest  Family Goals: DANICA    Progress made toward(s) clinical / shift goals:    Problem: Knowledge Deficit - Standard  Goal: Patient and family/care givers will demonstrate understanding of plan of care, disease process/condition, diagnostic tests and medications  Outcome: Progressing     Problem: Knowledge Deficit - COPD  Goal: Patient/significant other demonstrates understanding of disease process, utilization of the Action Plan, medications and discharge instruction  Outcome: Progressing     Problem: Nutrition - Advanced  Goal: Patient will display progressive weight gain toward goal have adequate food and fluid intake  Outcome: Progressing     Problem: Seizure Precautions  Goal: Implementation of seizure precautions  Outcome: Progressing     Problem: Lifestyle Changes  Goal: Patient's ability to identify lifestyle changes and available resources to help reduce recurrence of condition will improve  Outcome: Progressing       Patient is not progressing towards the following goals:

## 2024-05-08 NOTE — DISCHARGE PLANNING
"In the case of an emergency, pt's legal NOK is son, Erin     RNCM called pt's son Reilly and obtained the information used in this assessment. Pt verified accuracy of facesheet. Pt lives in a 2 story home alone.  Pt uses [facility]. Prior to current hospitalization, pt was completely independent in ADLS/IADLS. Pt drives and is able to attend necessary MD appointments.Son states that pt has lost spouse and 2 daughters in 10 years  and has struggled with ETOH since them . He has installed a breathalizer on pt's car and handles all finances. Pt has been to 7 ETOH rehab facilities and manages to stay sober for only short periods of time.  Guardianship has been considered in the past but pt has not been deemed \"Incapacitated.\" Pt has a limited support system, son is only support. Pt's sister lives out of state and does not fly. Pt owns no DME. Is usually totally independent .    Care Transition Team Assessment    Information Source:son  Orientation Level: Oriented to person  Information Given By: Relative  Informant's Name: Reilly Hutchison  Who is responsible for making decisions for patient? : Patient         Elopement Risk  Legal Hold: No  Ambulatory or Self Mobile in Wheelchair: No-Not an Elopement Risk  Elopement Risk: Not at Risk for Elopement    Interdisciplinary Discharge Planning  Does Admitting Nurse Feel This Could be a Complex Discharge?: Yes  Primary Care Physician: none  Lives with - Patient's Self Care Capacity: Alone and Unable to Care For Self  Support Systems: Family Member(s)  Housing / Facility: 2 Story House  Do You Take your Prescribed Medications Regularly: Yes  Mobility Issues: No  Prior Services: None  Durable Medical Equipment: Not Applicable    Discharge Preparedness  What is your plan after discharge?: Uncertain - pending medical team collaboration  What are your discharge supports?: Child  Prior Functional Level: Ambulatory, Drives Self, Independent with " Activities of Daily Living, Independent with Medication Management  Difficulity with ADLs: None  Difficulity with IADLs: Keeping track of finances  Difficulity with IADL Comments: son handles finances    Functional Assesment  Prior Functional Level: Ambulatory, Drives Self, Independent with Activities of Daily Living, Independent with Medication Management    Finances  Prescription Coverage: Yes                   Domestic Abuse  Have you ever been the victim of abuse or violence?: No    Psychological Assessment  History of Substance Abuse: Alcohol  History of Psychiatric Problems: No         Anticipated Discharge Information  Discharge Disposition: Discharged to home/self care (01)

## 2024-05-08 NOTE — ASSESSMENT & PLAN NOTE
Avoid Benzodiazepines, Anticholinergics, Opiates  Frequent orientation  Update Board daily  Open window blinds during the day  Avoid naps during the day  Family at bedside whenever possible  Ensure adequate sleep at night - use Melatonin preferably  Avoid early morning labs  Avoid vital signs during sleep  Ambulate if possible  Provide adequate pain control  Monitor for urinary retention  Prevent and manage constipation  Discontinue IVs, Catheters, Tubes, Lines, Cardiac monitors, SCDs if possible   PT and OT

## 2024-05-08 NOTE — CARE PLAN
The patient is Stable - Low risk of patient condition declining or worsening    Shift Goals  Clinical Goals: VSS, safety, CIWA  Patient Goals: rest  Family Goals: DANICA    Progress made toward(s) clinical / shift goals:    Problem: Optimal Care for Alcohol Withdrawal  Goal: Optimal Care for the alcohol withdrawal patient  Outcome: Progressing     Problem: Seizure Precautions  Goal: Implementation of seizure precautions  Outcome: Progressing

## 2024-05-09 PROBLEM — R54 FRAILTY SYNDROME IN GERIATRIC PATIENT: Status: ACTIVE | Noted: 2024-05-09

## 2024-05-09 LAB
ALBUMIN SERPL BCP-MCNC: 3.6 G/DL (ref 3.2–4.9)
ALBUMIN/GLOB SERPL: 1.2 G/DL
ALP SERPL-CCNC: 87 U/L (ref 30–99)
ALT SERPL-CCNC: 19 U/L (ref 2–50)
AMPHET UR QL SCN: NEGATIVE
ANION GAP SERPL CALC-SCNC: 13 MMOL/L (ref 7–16)
AST SERPL-CCNC: 31 U/L (ref 12–45)
BACTERIA BLD CULT: NORMAL
BACTERIA BLD CULT: NORMAL
BARBITURATES UR QL SCN: NEGATIVE
BENZODIAZ UR QL SCN: POSITIVE
BILIRUB SERPL-MCNC: 1.5 MG/DL (ref 0.1–1.5)
BUN SERPL-MCNC: 6 MG/DL (ref 8–22)
BZE UR QL SCN: NEGATIVE
CALCIUM ALBUM COR SERPL-MCNC: 9.3 MG/DL (ref 8.5–10.5)
CALCIUM SERPL-MCNC: 9 MG/DL (ref 8.5–10.5)
CANNABINOIDS UR QL SCN: NEGATIVE
CHLORIDE SERPL-SCNC: 102 MMOL/L (ref 96–112)
CO2 SERPL-SCNC: 24 MMOL/L (ref 20–33)
CREAT SERPL-MCNC: 0.45 MG/DL (ref 0.5–1.4)
ERYTHROCYTE [DISTWIDTH] IN BLOOD BY AUTOMATED COUNT: 47.4 FL (ref 35.9–50)
FENTANYL UR QL: NEGATIVE
GFR SERPLBLD CREATININE-BSD FMLA CKD-EPI: 99 ML/MIN/1.73 M 2
GLOBULIN SER CALC-MCNC: 2.9 G/DL (ref 1.9–3.5)
GLUCOSE SERPL-MCNC: 106 MG/DL (ref 65–99)
HCT VFR BLD AUTO: 44 % (ref 37–47)
HGB BLD-MCNC: 15.3 G/DL (ref 12–16)
MAGNESIUM SERPL-MCNC: 1.7 MG/DL (ref 1.5–2.5)
MCH RBC QN AUTO: 33.8 PG (ref 27–33)
MCHC RBC AUTO-ENTMCNC: 34.8 G/DL (ref 32.2–35.5)
MCV RBC AUTO: 97.1 FL (ref 81.4–97.8)
METHADONE UR QL SCN: NEGATIVE
OPIATES UR QL SCN: NEGATIVE
OXYCODONE UR QL SCN: NEGATIVE
PCP UR QL SCN: NEGATIVE
PHOSPHATE SERPL-MCNC: 3 MG/DL (ref 2.5–4.5)
PLATELET # BLD AUTO: 94 K/UL (ref 164–446)
PLATELETS.RETICULATED NFR BLD AUTO: 4.9 % (ref 0.6–13.1)
PMV BLD AUTO: 10.1 FL (ref 9–12.9)
POTASSIUM SERPL-SCNC: 3 MMOL/L (ref 3.6–5.5)
PROPOXYPH UR QL SCN: NEGATIVE
PROT SERPL-MCNC: 6.5 G/DL (ref 6–8.2)
RBC # BLD AUTO: 4.53 M/UL (ref 4.2–5.4)
SIGNIFICANT IND 70042: NORMAL
SIGNIFICANT IND 70042: NORMAL
SITE SITE: NORMAL
SITE SITE: NORMAL
SODIUM SERPL-SCNC: 139 MMOL/L (ref 135–145)
SOURCE SOURCE: NORMAL
SOURCE SOURCE: NORMAL
WBC # BLD AUTO: 6.1 K/UL (ref 4.8–10.8)

## 2024-05-09 PROCEDURE — 99233 SBSQ HOSP IP/OBS HIGH 50: CPT | Performed by: FAMILY MEDICINE

## 2024-05-09 RX ORDER — POTASSIUM CHLORIDE 20 MEQ/1
40 TABLET, EXTENDED RELEASE ORAL DAILY
Status: DISCONTINUED | OUTPATIENT
Start: 2024-05-09 | End: 2024-05-10 | Stop reason: HOSPADM

## 2024-05-09 RX ORDER — MAGNESIUM SULFATE HEPTAHYDRATE 40 MG/ML
2 INJECTION, SOLUTION INTRAVENOUS ONCE
Status: COMPLETED | OUTPATIENT
Start: 2024-05-09 | End: 2024-05-09

## 2024-05-09 RX ADMIN — APIXABAN 5 MG: 5 TABLET, FILM COATED ORAL at 05:34

## 2024-05-09 RX ADMIN — APIXABAN 5 MG: 5 TABLET, FILM COATED ORAL at 16:38

## 2024-05-09 RX ADMIN — MAGNESIUM SULFATE HEPTAHYDRATE 2 G: 2 INJECTION, SOLUTION INTRAVENOUS at 16:43

## 2024-05-09 RX ADMIN — VENLAFAXINE HYDROCHLORIDE 150 MG: 75 CAPSULE, EXTENDED RELEASE ORAL at 05:34

## 2024-05-09 RX ADMIN — ESCITALOPRAM OXALATE 10 MG: 10 TABLET ORAL at 05:34

## 2024-05-09 RX ADMIN — FOLIC ACID 1 MG: 1 TABLET ORAL at 05:34

## 2024-05-09 RX ADMIN — Medication 100 MG: at 05:34

## 2024-05-09 RX ADMIN — TRAZODONE HYDROCHLORIDE 50 MG: 50 TABLET ORAL at 20:58

## 2024-05-09 RX ADMIN — LEVOTHYROXINE SODIUM 50 MCG: 0.05 TABLET ORAL at 05:30

## 2024-05-09 RX ADMIN — LORAZEPAM 1 MG: 1 TABLET ORAL at 01:28

## 2024-05-09 RX ADMIN — AMLODIPINE BESYLATE 2.5 MG: 5 TABLET ORAL at 05:34

## 2024-05-09 RX ADMIN — POTASSIUM CHLORIDE 40 MEQ: 1500 TABLET, EXTENDED RELEASE ORAL at 16:38

## 2024-05-09 RX ADMIN — ATORVASTATIN CALCIUM 40 MG: 40 TABLET, FILM COATED ORAL at 16:38

## 2024-05-09 RX ADMIN — SODIUM CHLORIDE, POTASSIUM CHLORIDE, SODIUM LACTATE AND CALCIUM CHLORIDE: 600; 310; 30; 20 INJECTION, SOLUTION INTRAVENOUS at 06:16

## 2024-05-09 RX ADMIN — METOPROLOL TARTRATE 25 MG: 25 TABLET, FILM COATED ORAL at 16:38

## 2024-05-09 ASSESSMENT — LIFESTYLE VARIABLES
VISUAL DISTURBANCES: VERY MILD SENSITIVITY
ANXIETY: NO ANXIETY (AT EASE)
VISUAL DISTURBANCES: NOT PRESENT
ANXIETY: NO ANXIETY (AT EASE)
NAUSEA AND VOMITING: NO NAUSEA AND NO VOMITING
HAVE YOU EVER FELT YOU SHOULD CUT DOWN ON YOUR DRINKING: YES
AUDITORY DISTURBANCES: NOT PRESENT
HEADACHE, FULLNESS IN HEAD: NOT PRESENT
AUDITORY DISTURBANCES: NOT PRESENT
CONSUMPTION TOTAL: POSITIVE
AUDITORY DISTURBANCES: NOT PRESENT
ORIENTATION AND CLOUDING OF SENSORIUM: DATE DISORIENTATION BY MORE THAN TWO CALENDAR DAYS
AVERAGE NUMBER OF DAYS PER WEEK YOU HAVE A DRINK CONTAINING ALCOHOL: 7
ORIENTATION AND CLOUDING OF SENSORIUM: DATE DISORIENTATION BY MORE THAN TWO CALENDAR DAYS
NAUSEA AND VOMITING: *
TOTAL SCORE: 8
AUDITORY DISTURBANCES: NOT PRESENT
PAROXYSMAL SWEATS: NO SWEAT VISIBLE
VISUAL DISTURBANCES: VERY MILD SENSITIVITY
AGITATION: NORMAL ACTIVITY
VISUAL DISTURBANCES: NOT PRESENT
TREMOR: NO TREMOR
HEADACHE, FULLNESS IN HEAD: NOT PRESENT
ANXIETY: NO ANXIETY (AT EASE)
HEADACHE, FULLNESS IN HEAD: NOT PRESENT
ORIENTATION AND CLOUDING OF SENSORIUM: DATE DISORIENTATION BY MORE THAN TWO CALENDAR DAYS
TREMOR: NO TREMOR
VISUAL DISTURBANCES: NOT PRESENT
ALCOHOL_USE: YES
AGITATION: NORMAL ACTIVITY
ON A TYPICAL DAY WHEN YOU DRINK ALCOHOL HOW MANY DRINKS DO YOU HAVE: 4
ORIENTATION AND CLOUDING OF SENSORIUM: DATE DISORIENTATION BY MORE THAN TWO CALENDAR DAYS
TOTAL SCORE: 3
NAUSEA AND VOMITING: NO NAUSEA AND NO VOMITING
AGITATION: NORMAL ACTIVITY
HOW MANY TIMES IN THE PAST YEAR HAVE YOU HAD 5 OR MORE DRINKS IN A DAY: 365
HEADACHE, FULLNESS IN HEAD: NOT PRESENT
PAROXYSMAL SWEATS: NO SWEAT VISIBLE
AGITATION: NORMAL ACTIVITY
ANXIETY: NO ANXIETY (AT EASE)
TOTAL SCORE: 3
TREMOR: NO TREMOR
NAUSEA AND VOMITING: NO NAUSEA AND NO VOMITING
HAVE PEOPLE ANNOYED YOU BY CRITICIZING YOUR DRINKING: YES
TOTAL SCORE: 3
NAUSEA AND VOMITING: NO NAUSEA AND NO VOMITING
PAROXYSMAL SWEATS: NO SWEAT VISIBLE
TOTAL SCORE: 3
AGITATION: NORMAL ACTIVITY
AGITATION: NORMAL ACTIVITY
TOTAL SCORE: 4
TOTAL SCORE: 3
EVER HAD A DRINK FIRST THING IN THE MORNING TO STEADY YOUR NERVES TO GET RID OF A HANGOVER: NO
HEADACHE, FULLNESS IN HEAD: NOT PRESENT
AUDITORY DISTURBANCES: NOT PRESENT
TREMOR: TREMOR NOT VISIBLE BUT CAN BE FELT, FINGERTIP TO FINGERTIP
AUDITORY DISTURBANCES: NOT PRESENT
ANXIETY: NO ANXIETY (AT EASE)
EVER FELT BAD OR GUILTY ABOUT YOUR DRINKING: YES
ORIENTATION AND CLOUDING OF SENSORIUM: DATE DISORIENTATION BY MORE THAN TWO CALENDAR DAYS
TOTAL SCORE: 3
TREMOR: NO TREMOR
PAROXYSMAL SWEATS: NO SWEAT VISIBLE
NAUSEA AND VOMITING: NO NAUSEA AND NO VOMITING
ORIENTATION AND CLOUDING OF SENSORIUM: DATE DISORIENTATION BY MORE THAN TWO CALENDAR DAYS
TOTAL SCORE: 3
HEADACHE, FULLNESS IN HEAD: NOT PRESENT
ANXIETY: MILDLY ANXIOUS
TREMOR: NO TREMOR
VISUAL DISTURBANCES: NOT PRESENT

## 2024-05-09 ASSESSMENT — COGNITIVE AND FUNCTIONAL STATUS - GENERAL
TURNING FROM BACK TO SIDE WHILE IN FLAT BAD: A LITTLE
MOVING TO AND FROM BED TO CHAIR: A LITTLE
STANDING UP FROM CHAIR USING ARMS: A LITTLE
DAILY ACTIVITIY SCORE: 13
MOVING FROM LYING ON BACK TO SITTING ON SIDE OF FLAT BED: A LITTLE
SUGGESTED CMS G CODE MODIFIER DAILY ACTIVITY: CL
TOILETING: A LOT
CLIMB 3 TO 5 STEPS WITH RAILING: TOTAL
DRESSING REGULAR LOWER BODY CLOTHING: A LOT
SUGGESTED CMS G CODE MODIFIER MOBILITY: CK
DRESSING REGULAR UPPER BODY CLOTHING: A LOT
MOBILITY SCORE: 15
PERSONAL GROOMING: A LOT
EATING MEALS: A LITTLE
WALKING IN HOSPITAL ROOM: A LOT
HELP NEEDED FOR BATHING: A LOT

## 2024-05-09 ASSESSMENT — ENCOUNTER SYMPTOMS
SORE THROAT: 0
COUGH: 1
FOCAL WEAKNESS: 0
NECK PAIN: 0
HEARTBURN: 0
SENSORY CHANGE: 0
FLANK PAIN: 0
DIZZINESS: 1
CHILLS: 0
MYALGIAS: 0
HEADACHES: 0
DIARRHEA: 0
WHEEZING: 0
BLURRED VISION: 0
SHORTNESS OF BREATH: 0
BACK PAIN: 0
PALPITATIONS: 0
NAUSEA: 1
NERVOUS/ANXIOUS: 1
SPEECH CHANGE: 0
ABDOMINAL PAIN: 0
FEVER: 0
VOMITING: 0
DIAPHORESIS: 0
WEAKNESS: 1

## 2024-05-09 ASSESSMENT — ACTIVITIES OF DAILY LIVING (ADL): TOILETING: INDEPENDENT

## 2024-05-09 ASSESSMENT — PAIN DESCRIPTION - PAIN TYPE
TYPE: ACUTE PAIN
TYPE: ACUTE PAIN

## 2024-05-09 NOTE — PROGRESS NOTES
Hospital Medicine Daily Progress Note    Date of Service  5/9/2024    Chief Complaint  Jeanie Hutchison is a 78 y.o. female admitted 5/3/2024 with alcohol intoxication    Hospital Course  Admitted with alcohol intoxication, acute encephalopathy, known history of alcohol abuse, started on CIWA protocol.  Patient also with known history of persistent atrial fibrillation, she was on anticoagulation with Eliquis.    Interval Problem Update  Alcohol - CIWA 3  A-fib - rate   Hypertension - sbp 104-134  Encephalopathy - more alert and oriented  Low magnesium, potassium    I have discussed this patient's plan of care and discharge plan at IDT rounds today with Case Management, Nursing, Nursing leadership, and other members of the IDT team.    Consultants/Specialty  palliative care    Code Status  Full Code    Disposition  The patient is not medically cleared for discharge to home or a post-acute facility.  Anticipate discharge to: skilled nursing facility    I have placed the appropriate orders for post-discharge needs.    Review of Systems  Review of Systems   Constitutional:  Positive for malaise/fatigue. Negative for chills, diaphoresis and fever.   HENT:  Negative for congestion, hearing loss and sore throat.    Eyes:  Negative for blurred vision.   Respiratory:  Positive for cough. Negative for shortness of breath and wheezing.    Cardiovascular:  Negative for chest pain, palpitations and leg swelling.   Gastrointestinal:  Positive for nausea. Negative for abdominal pain, diarrhea, heartburn and vomiting.   Genitourinary:  Negative for dysuria, flank pain and hematuria.   Musculoskeletal:  Negative for back pain, joint pain, myalgias and neck pain.   Skin:  Negative for rash.   Neurological:  Positive for dizziness and weakness. Negative for sensory change, speech change, focal weakness and headaches.   Psychiatric/Behavioral:  The patient is nervous/anxious.         Physical Exam  Temp:  [36.4 °C (97.6  °F)-36.6 °C (97.9 °F)] 36.5 °C (97.7 °F)  Pulse:  [] 95  Resp:  [14-18] 16  BP: (104-134)/(72-95) 118/87  SpO2:  [93 %-95 %] 94 %    Physical Exam  Vitals and nursing note reviewed.   HENT:      Head: Normocephalic and atraumatic.      Nose: No congestion.      Mouth/Throat:      Mouth: Mucous membranes are moist.   Eyes:      Conjunctiva/sclera: Conjunctivae normal.   Cardiovascular:      Rate and Rhythm: Normal rate and regular rhythm.   Pulmonary:      Effort: Pulmonary effort is normal.      Breath sounds: Normal breath sounds.   Abdominal:      General: There is no distension.      Tenderness: There is no abdominal tenderness. There is no guarding or rebound.   Musculoskeletal:      Cervical back: No tenderness.      Right lower leg: No edema.      Left lower leg: No edema.   Skin:     General: Skin is warm and dry.   Neurological:      General: No focal deficit present.      Mental Status: She is alert, oriented to person, place, and time and easily aroused.      Cranial Nerves: No cranial nerve deficit.   Psychiatric:         Mood and Affect: Mood is anxious.         Speech: Speech is delayed.         Cognition and Memory: She exhibits impaired recent memory.         Fluids  No intake or output data in the 24 hours ending 05/09/24 1434      Laboratory  Recent Labs     05/07/24  0430 05/08/24  0828 05/09/24  0840   WBC 6.6 6.2 6.1   RBC 5.05 5.36 4.53   HEMOGLOBIN 16.8* 17.8* 15.3   HEMATOCRIT 50.3* 52.5* 44.0   MCV 99.6* 97.9* 97.1   MCH 33.3* 33.2* 33.8*   MCHC 33.4 33.9 34.8   RDW 49.1 47.7 47.4   PLATELETCT 80* 82* 94*   MPV 10.7 10.6 10.1     Recent Labs     05/07/24  0430 05/08/24  0828 05/09/24  0840   SODIUM 139 139 139   POTASSIUM 3.8 3.8 3.0*   CHLORIDE 99 99 102   CO2 22 21 24   GLUCOSE 81 78 106*   BUN 8 7* 6*   CREATININE 0.34* 0.33* 0.45*   CALCIUM 9.6 9.5 9.0                   Imaging  CT-CTA CHEST PULMONARY ARTERY W/ RECONS   Final Result         1.  No pulmonary embolus appreciated.   2.   Scattered hazy groundglass pulmonary opacities suggesting edema or atypical infiltrates   3.  Small posterior gastric diverticulum   4.  Changes of hepatic steatosis      CT-HEAD W/O   Final Result         1.  No acute intracranial abnormality is identified, there are nonspecific white matter changes, commonly associated with small vessel ischemic disease.  Associated mild cerebral atrophy is noted.   2.  Bilateral nasal bone fractures, age indeterminant but appear likely chronic, correlate with exam   3.  Bilateral maxillary sinusitis changes   4.  Atherosclerosis.         DX-CHEST-PORTABLE (1 VIEW)   Final Result         1.  No acute cardiopulmonary disease.   2.  Cardiomegaly   3.  Atherosclerosis           Assessment/Plan  * Alcohol intoxication in active alcoholic with complication (HCC)- (present on admission)  Assessment & Plan  [facility] protocol  Thiamine + Folic    Frailty syndrome in geriatric patient- (present on admission)  Assessment & Plan  Consult Palliative care    Acute encephalopathy- (present on admission)  Assessment & Plan  Avoid Benzodiazepines, Anticholinergics, Opiates  Frequent orientation  Update Board daily  Open window blinds during the day  Avoid naps during the day  Family at bedside whenever possible  Ensure adequate sleep at night - use Melatonin preferably  Avoid early morning labs  Avoid vital signs during sleep  Ambulate if possible  Provide adequate pain control  Monitor for urinary retention  Prevent and manage constipation  Discontinue IVs, Catheters, Tubes, Lines, Cardiac monitors, SCDs if possible   PT and OT    Prolonged QT interval  Assessment & Plan  monitor    Hypophosphatemia- (present on admission)  Assessment & Plan  Follow level    HTN (hypertension)- (present on admission)  Assessment & Plan  Amlodipine   Resume Metoprolol     Thrombocytopenia (HCC)- (present on admission)  Assessment & Plan  Follow cbc    Presence of automatic cardioverter/defibrillator (AICD)- (present  on admission)  Assessment & Plan  monitor    CLEO (obstructive sleep apnea)- (present on admission)  Assessment & Plan  CPAP    Acute respiratory failure with hypoxia (HCC)- (present on admission)  Assessment & Plan  RT protocol      Hypomagnesemia- (present on admission)  Assessment & Plan  IV Mg 2 g  Follow level    Hypokalemia- (present on admission)  Assessment & Plan  Start Kdur  Follow cmp    Persistent atrial fibrillation (HCC)- (present on admission)  Assessment & Plan  Eliquis  Resume Metoprolol and Digoxin    Hypothyroidism- (present on admission)  Assessment & Plan  Synthroid    Major depressive disorder with current active episode- (present on admission)  Assessment & Plan  Lexapro, Effexor         VTE prophylaxis:    therapeutic anticoagulation with eliquis 5 mg BID      I have performed a physical exam and reviewed and updated ROS and Plan today (5/9/2024). In review of yesterday's note (5/8/2024), there are no changes except as documented above.

## 2024-05-09 NOTE — DISCHARGE PLANNING
@ 1045: Discussed pt during IDT rounds. Anticipating dc tomorrow. Pending PT/OT and medical clearance. Per MD, pt's mentation is better today. Anticipate to be off CIWA protocol today.    SW called pt's son as OT has recommended post-acute placement; Pt is oriented to self and place only. SNF referrals have been sent out. Left a VM to call back regarding DCP.    PASRR: [id number]

## 2024-05-09 NOTE — PROGRESS NOTES
Bedside report received from Sabra HOOVER. Pt assessment complete. Pt AO x 1, oriented to self. Reviewed plan of care with pt. Pt tele monitored. Chart and labs reviewed. Bed in lowest position, and 3 side rails up. Pt educated on call lights, call light within reach. Hourly rounding in place

## 2024-05-09 NOTE — THERAPY
"Physical Therapy   Initial Evaluation     Patient Name: Jeanie Hutchison  Age:  78 y.o., Sex:  female  Medical Record #: [id number]  Today's Date: 5/9/2024     Precautions  Precautions: Fall Risk    Assessment  Patient is a 78 y.o. female who was admitted with acute encephalopathy and alcohol intoxication. PMH significant for HTN, AICD, CLEO, Afib, recurrent falls, ETOH abuse.    Pt received in bed and agreeable to PT evaluation with encouragement. Pt seen in part with OT to due to pt complexity, unwillingness to participate more than once, and request to see pt for DC recommendations. Pt presents with impaired cognition, generalized weakness, decreased tolerance to activity, and fatigue. Pt required min to mod A for coming to EOB, multiple STS, and side stepping at EOB. Pt returned to sitting repeatedly despite cues to remain standing for changing soiled sheets. Pt reportedly lives alone in a 2SH. Currently recommending post-acute placement given current mobility level. Will follow for acute PT to progress as able.    Plan    Physical Therapy Initial Treatment Plan   Treatment Plan : Bed Mobility, Equipment, Gait Training, Neuro Re-Education / Balance, Self Care / Home Evaluation, Stair Training, Therapeutic Activities, Therapeutic Exercise  Treatment Frequency: 4 Times per Week  Duration: Until Therapy Goals Met    DC Equipment Recommendations: Unable to determine at this time  Discharge Recommendations: Recommend post-acute placement for additional physical therapy services prior to discharge home    Subjective    \"I need to lay down\"     Objective       05/09/24 1450   Precautions   Precautions Fall Risk   Vitals   O2 Delivery Device None - Room Air   Pain 0 - 10 Group   Therapist Pain Assessment Post Activity Pain Same as Prior to Activity;Nurse Notified  (no specific c/o pain during session)   Prior Living Situation   Prior Services None   Housing / Facility 2 Story House   Steps Into Home 1   Steps In Home 15 "   Equipment Owned None   Lives with - Patient's Self Care Capacity Alone and Unable to Care For Self   Comments History obtained per chart, pt unable to answer questions about home setup   Prior Level of Functional Mobility   Bed Mobility Independent   Transfer Status Independent   Ambulation Independent   Assistive Devices Used None   Stairs Independent   Comments Pt was reportedly independent prior to admission   History of Falls   Date of Last Fall   (pt unable to recall)   Cognition    Orientation Level Not Oriented to Place;Not Oriented to Reason;Not Oriented to Time   Level of Consciousness Alert   Ability To Follow Commands 1 Step   Comments Pt lethargic throughout session, followed most commands. Pt returned to sitting when provided cues to remain standing to change soiled sheets. Oriented to self only.   Passive ROM Lower Body   Passive ROM Lower Body WDL   Active ROM Lower Body    Active ROM Lower Body  WDL   Strength Lower Body   Comments BLE strength grossly 3+/5   Sensation Lower Body   Comments NT due to cognition   Lower Body Muscle Tone   Lower Body Muscle Tone  WDL   Coordination Lower Body    Coordination Lower Body  WDL   Balance Assessment   Sitting Balance (Static) Fair   Sitting Balance (Dynamic) Fair -   Standing Balance (Static) Fair -   Standing Balance (Dynamic) Poor +   Weight Shift Sitting Fair   Weight Shift Standing Poor   Comments with FWW in standing, impulsively returns to sitting despite cues   Bed Mobility    Supine to Sit Moderate Assist   Sit to Supine Minimal Assist   Scooting Minimal Assist   Comments HOB elevated, cues for technique. Pt attempted to lay back down on soiled sheets, required cues repeatedly to remain sitting.   Gait Analysis   Comments Pt likely able to ambulate, but unwilling to attempt.   Functional Mobility   Sit to Stand Minimal Assist   Bed, Chair, Wheelchair Transfer Minimal Assist   Mobility EOB, STS multiple times, side steps   Comments Pt required min A  and repeated cues to remain standing for linen changes   6 Clicks Assessment - How much HELP from from another person do you currently need... (If the patient hasn't done an activity recently, how much help from another person do you think he/she would need if he/she tried?)   Turning from your back to your side while in a flat bed without using bedrails? 3   Moving from lying on your back to sitting on the side of a flat bed without using bedrails? 3   Moving to and from a bed to a chair (including a wheelchair)? 3   Standing up from a chair using your arms (e.g., wheelchair, or bedside chair)? 3   Walking in hospital room? 2   Climbing 3-5 steps with a railing? 1   6 clicks Mobility Score 15   Short Term Goals    Short Term Goal # 1 Pt will perform bed mobility from a flat bed with supervision to progress to prior level in 6 visits.   Short Term Goal # 2 Pt will transfer with FWW and supervision to progress function in 6 visits.   Short Term Goal # 3 Pt will ambulate 150ft with FWW and supervision to progress function in 6 visits.   Short Term Goal # 4 Pt will negotiate 5+ stairs with supervision to navigate home environment in 6 visits.   Education Group   Education Provided Role of Physical Therapist   Role of Physical Therapist Patient Response Patient;Acceptance;Explanation;Verbal Demonstration;Reinforcement Needed   Physical Therapy Initial Treatment Plan    Treatment Plan  Bed Mobility;Equipment;Gait Training;Neuro Re-Education / Balance;Self Care / Home Evaluation;Stair Training;Therapeutic Activities;Therapeutic Exercise   Treatment Frequency 4 Times per Week   Duration Until Therapy Goals Met   Problem List    Problems Impaired Bed Mobility;Impaired Transfers;Impaired Ambulation;Decreased Activity Tolerance;Safety Awareness Deficits / Cognition   Anticipated Discharge Equipment and Recommendations   DC Equipment Recommendations Unable to determine at this time   Discharge Recommendations Recommend  post-acute placement for additional physical therapy services prior to discharge home   Interdisciplinary Plan of Care Collaboration   IDT Collaboration with  Nursing;Occupational Therapist   Patient Position at End of Therapy In Bed;Bed Alarm On;Call Light within Reach;Tray Table within Reach;Phone within Reach   Collaboration Comments RN updated   Session Information   Date / Session Number  5/9-1 (1/4, 5/15)

## 2024-05-09 NOTE — CARE PLAN
The patient is Watcher - Medium risk of patient condition declining or worsening    Shift Goals  Clinical Goals: safety, CIWA, reorient  Patient Goals: rest, comfort  Family Goals: pari    Progress made toward(s) clinical / shift goals:  safety maintained, CIWA scores per flowsheets. Pt able to rest comfortably in bed.       Problem: Pain - Standard  Goal: Alleviation of pain or a reduction in pain to the patient’s comfort goal  Outcome: Progressing  Note: Pt able to verbalize pain when prompted, pt medicated per MAR. Pt given comfort measures with extra pillows and blankets.      Problem: Knowledge Deficit - Standard  Goal: Patient and family/care givers will demonstrate understanding of plan of care, disease process/condition, diagnostic tests and medications  Outcome: Progressing  Note: Pt participating in plan of care with questions for care team. All questions addressed       Patient is not progressing towards the following goals:

## 2024-05-09 NOTE — THERAPY
"Occupational Therapy   Initial Evaluation     Patient Name: Jeanie Hutchison  Age:  78 y.o., Sex:  female  Medical Record #: [id number]  Today's Date: 5/9/2024     Precautions  Precautions: Fall Risk    Assessment    Patient is 78 y.o. female admitted with alcohol intoxication and acute encephalopathy. Other pertinent medical history includes HTN, presence of automatic cardioverter/defibrillator, CLEO, acute respiratory failure with hypoxia, a-fib, major depressive disorder, recurrent falls, and alcohol use. Pt seen for OT evaluation. Pt required min-mod A for bed mobility, max A to don/doff gown, max A to don socks, and mod A to brush hair. Pt was an unreliable historian at time of eval, so history was obtained via chart review. Pt was living in a two story house alone prior to this admission and was independent with ADLs and IADLs. Pt has support from a son who is her only support. Pt current functional performance limited by impaired cognition, generalized weakness, impaired balance, and impaired activity tolerance. Pt will benefit from skilled OT while admitted to acute care.     Plan    Occupational Therapy Initial Treatment Plan   Treatment Interventions: Self Care / Activities of Daily Living, Adaptive Equipment, Cognitive Skill Development, Neuro Re-Education / Balance, Therapeutic Exercises, Therapeutic Activity  Treatment Frequency: 3 Times per Week  Duration: Until Therapy Goals Met    DC Equipment Recommendations: Unable to determine at this time  Discharge Recommendations: Recommend post-acute placement for additional occupational therapy services prior to discharge home     Subjective    \"I tried to get up earlier and it was really bad.. hard..\"     Objective     05/09/24 1453   Prior Living Situation   Prior Services None   Housing / Facility 2 Story House   Steps In Home   (FOS)   Equipment Owned None   Lives with - Patient's Self Care Capacity Alone and Able to Care For Self   Comments History " obtained via chart review. Pt unreliable historian at time of eval.   Prior Level of ADL Function   Self Feeding Independent   Grooming / Hygiene Independent   Bathing Independent   Dressing Independent   Toileting Independent   Comments Per chart review, pt normally I with ADLs.   Prior Level of IADL Function   Medication Management Independent   Laundry Independent   Kitchen Mobility Independent   Finances Independent   Home Management Independent   Shopping Independent   Prior Level Of Mobility Independent Without Device in Community;Independent Without Device in Home   Driving / Transportation Driving Independent  (son installed breathalizer on car)   Comments obtained via chart review   Precautions   Precautions Fall Risk   Pain   Pain Scales 0 to 10 Scale    Pain 0 - 10 Group   Therapist Pain Assessment Post Activity Pain Same as Prior to Activity;Nurse Notified  (not rated, agreeable to eval)   Cognition    Cognition / Consciousness X   Orientation Level Not Oriented to Reason;Not Oriented to Month  (oriented to self)   Level of Consciousness Alert  (but sleepy)   Ability To Follow Commands 1 Step  (75% of the time)   Safety Awareness Impaired   New Learning Impaired   Attention Impaired   Sequencing Impaired   Initiation Impaired   Comments Cooperative given education, sleepy throughout eval   Active ROM Upper Body   Active ROM Upper Body  WDL   Strength Upper Body   Comments Grossly 3+/5 bilaterally   Sensation Upper Body   Comments NT due to cognition   Upper Body Muscle Tone   Upper Body Muscle Tone  WDL   Balance Assessment   Sitting Balance (Static) Fair   Sitting Balance (Dynamic) Fair -   Standing Balance (Static) Fair -   Standing Balance (Dynamic) Poor +   Weight Shift Sitting Fair   Weight Shift Standing Poor   Comments w/ FWW   Bed Mobility    Supine to Sit Moderate Assist   Sit to Supine Minimal Assist   Scooting Minimal Assist   Rolling Moderate Assist to Lt.   Comments HOB slightly elevated,  cues for sequencing   ADL Assessment   Grooming Moderate Assist;Seated  (brushed hair, poor attention to task for completion)   Upper Body Dressing Maximal Assist  (don/doff gown)   Lower Body Dressing Maximal Assist  (don socks)   Toileting Total Assist  (incontinent of urine)   Functional Mobility   Sit to Stand Minimal Assist   Bed, Chair, Wheelchair Transfer Minimal Assist   Mobility EOB>stand with side steps x2>supine   Comments required encouragement to remain standing/EOB   Visual Perception   Visual Perception  Not Tested   Activity Tolerance   Sitting in Chair NT   Sitting Edge of Bed >5 min   Standing <2-3 min total   Comments limited by weakness/cognition   Patient / Family Goals   Patient / Family Goal #1 none stated   Short Term Goals   Short Term Goal # 1 Pt will perform gown change w/ min A   Short Term Goal # 2 Pt will perform seated g/h w/ supv   Short Term Goal # 3 Pt will perform ADL transfer w/ supv   Education Group   Education Provided Role of Occupational Therapist;Activities of Daily Living;Pathology of bedrest   Role of Occupational Therapist Patient Response Patient;Acceptance;Explanation;Reinforcement Needed   ADL Patient Response Patient;Acceptance;Explanation;Demonstration;Reinforcement Needed   Pathology of Bedrest Patient Response Patient;Acceptance;Explanation;Reinforcement Needed   Occupational Therapy Initial Treatment Plan    Treatment Interventions Self Care / Activities of Daily Living;Adaptive Equipment;Cognitive Skill Development;Neuro Re-Education / Balance;Therapeutic Exercises;Therapeutic Activity   Treatment Frequency 3 Times per Week   Duration Until Therapy Goals Met   Problem List   Problem List Decreased Active Daily Living Skills;Decreased Homemaking Skills;Decreased Upper Extremity Strength Right;Decreased Upper Extremity Strength Left;Decreased Functional Mobility;Decreased Activity Tolerance;Impaired Cognitive Function;Safety Awareness Deficits / Cognition;Impaired  Postural Control / Balance

## 2024-05-09 NOTE — PROGRESS NOTES
Monitor Summary  Rhythm: A-fib  Rate: 77-98  Ectopy: (F) PVC, (R) coup, (R) trip, 5 beat VT  Measurements: -/0.08/-  ---12 hr Chart Review---

## 2024-05-09 NOTE — PROGRESS NOTES
Monitor summary:    Afib 70-82  6 bts accelerated vent  (O)PVC, big, coup  -/.07/-

## 2024-05-10 VITALS
WEIGHT: 154.98 LBS | TEMPERATURE: 97.5 F | OXYGEN SATURATION: 96 % | SYSTOLIC BLOOD PRESSURE: 126 MMHG | HEART RATE: 88 BPM | BODY MASS INDEX: 24.91 KG/M2 | HEIGHT: 66 IN | DIASTOLIC BLOOD PRESSURE: 93 MMHG | RESPIRATION RATE: 17 BRPM

## 2024-05-10 LAB
ANION GAP SERPL CALC-SCNC: 13 MMOL/L (ref 7–16)
BUN SERPL-MCNC: 4 MG/DL (ref 8–22)
CALCIUM SERPL-MCNC: 9.1 MG/DL (ref 8.5–10.5)
CHLORIDE SERPL-SCNC: 105 MMOL/L (ref 96–112)
CO2 SERPL-SCNC: 23 MMOL/L (ref 20–33)
CREAT SERPL-MCNC: 0.55 MG/DL (ref 0.5–1.4)
ERYTHROCYTE [DISTWIDTH] IN BLOOD BY AUTOMATED COUNT: 49.3 FL (ref 35.9–50)
GFR SERPLBLD CREATININE-BSD FMLA CKD-EPI: 94 ML/MIN/1.73 M 2
GLUCOSE SERPL-MCNC: 101 MG/DL (ref 65–99)
HCT VFR BLD AUTO: 44.6 % (ref 37–47)
HGB BLD-MCNC: 15 G/DL (ref 12–16)
MAGNESIUM SERPL-MCNC: 2 MG/DL (ref 1.5–2.5)
MCH RBC QN AUTO: 33.6 PG (ref 27–33)
MCHC RBC AUTO-ENTMCNC: 33.6 G/DL (ref 32.2–35.5)
MCV RBC AUTO: 100 FL (ref 81.4–97.8)
PHOSPHATE SERPL-MCNC: 2.8 MG/DL (ref 2.5–4.5)
PLATELET # BLD AUTO: 99 K/UL (ref 164–446)
PMV BLD AUTO: 10.9 FL (ref 9–12.9)
POTASSIUM SERPL-SCNC: 3.5 MMOL/L (ref 3.6–5.5)
RBC # BLD AUTO: 4.46 M/UL (ref 4.2–5.4)
SODIUM SERPL-SCNC: 141 MMOL/L (ref 135–145)
WBC # BLD AUTO: 5.5 K/UL (ref 4.8–10.8)

## 2024-05-10 PROCEDURE — 99239 HOSP IP/OBS DSCHRG MGMT >30: CPT | Performed by: FAMILY MEDICINE

## 2024-05-10 RX ORDER — LANOLIN ALCOHOL/MO/W.PET/CERES
100 CREAM (GRAM) TOPICAL DAILY
Status: SHIPPED
Start: 2024-05-11

## 2024-05-10 RX ORDER — FOLIC ACID 1 MG/1
1 TABLET ORAL DAILY
Status: SHIPPED
Start: 2024-05-11

## 2024-05-10 RX ORDER — POTASSIUM CHLORIDE 20 MEQ/1
40 TABLET, EXTENDED RELEASE ORAL DAILY
Status: SHIPPED
Start: 2024-05-11

## 2024-05-10 RX ADMIN — POTASSIUM CHLORIDE 40 MEQ: 1500 TABLET, EXTENDED RELEASE ORAL at 06:01

## 2024-05-10 RX ADMIN — FOLIC ACID 1 MG: 1 TABLET ORAL at 06:02

## 2024-05-10 RX ADMIN — LEVOTHYROXINE SODIUM 50 MCG: 0.05 TABLET ORAL at 06:01

## 2024-05-10 RX ADMIN — AMLODIPINE BESYLATE 2.5 MG: 5 TABLET ORAL at 06:01

## 2024-05-10 RX ADMIN — APIXABAN 5 MG: 5 TABLET, FILM COATED ORAL at 06:01

## 2024-05-10 RX ADMIN — SODIUM CHLORIDE, POTASSIUM CHLORIDE, SODIUM LACTATE AND CALCIUM CHLORIDE: 600; 310; 30; 20 INJECTION, SOLUTION INTRAVENOUS at 02:32

## 2024-05-10 RX ADMIN — DIGOXIN 125 MCG: 0.25 TABLET ORAL at 06:01

## 2024-05-10 RX ADMIN — Medication 100 MG: at 06:01

## 2024-05-10 RX ADMIN — METOPROLOL TARTRATE 25 MG: 25 TABLET, FILM COATED ORAL at 06:02

## 2024-05-10 RX ADMIN — ESCITALOPRAM OXALATE 10 MG: 10 TABLET ORAL at 06:00

## 2024-05-10 RX ADMIN — VENLAFAXINE HYDROCHLORIDE 150 MG: 75 CAPSULE, EXTENDED RELEASE ORAL at 06:00

## 2024-05-10 ASSESSMENT — LIFESTYLE VARIABLES
NAUSEA AND VOMITING: NO NAUSEA AND NO VOMITING
ANXIETY: NO ANXIETY (AT EASE)
AUDITORY DISTURBANCES: NOT PRESENT
PAROXYSMAL SWEATS: NO SWEAT VISIBLE
ANXIETY: NO ANXIETY (AT EASE)
ORIENTATION AND CLOUDING OF SENSORIUM: DATE DISORIENTATION BY MORE THAN TWO CALENDAR DAYS
VISUAL DISTURBANCES: NOT PRESENT
NAUSEA AND VOMITING: NO NAUSEA AND NO VOMITING
TOTAL SCORE: 3
AUDITORY DISTURBANCES: NOT PRESENT
TOTAL SCORE: 4
HEADACHE, FULLNESS IN HEAD: NOT PRESENT
AGITATION: NORMAL ACTIVITY
AGITATION: NORMAL ACTIVITY
HEADACHE, FULLNESS IN HEAD: NOT PRESENT
ORIENTATION AND CLOUDING OF SENSORIUM: DATE DISORIENTATION BY MORE THAN TWO CALENDAR DAYS
VISUAL DISTURBANCES: NOT PRESENT
TREMOR: NO TREMOR
PAROXYSMAL SWEATS: BARELY PERCEPTIBLE SWEATING. PALMS MOIST
TREMOR: NO TREMOR

## 2024-05-10 ASSESSMENT — FIBROSIS 4 INDEX: FIB4 SCORE: 5.75

## 2024-05-10 ASSESSMENT — PAIN DESCRIPTION - PAIN TYPE: TYPE: ACUTE PAIN

## 2024-05-10 NOTE — DISCHARGE PLANNING
DC Transport Scheduled    Transport Company Scheduled:  [employer]  Spoke with Bobbi at [facility] to schedule transport.    Scheduled Date: 5/10/2024  Scheduled Time: 1400    Destination: [facility]   [place] [place] [address]    Notified care team of scheduled transport via Voalte.     If there are any changes needed to the DC transportation scheduled, please contact [facility] at ext. [phone] between the hours of 1971-5321 Mon-Fri. If outside those hours, contact the ED Case Manager at ext. [phone].

## 2024-05-10 NOTE — DISCHARGE PLANNING
0919  Agency/Facility Name: [facility]   Spoke To: Palak   Outcome: DPA called to notify that pt is not longer on CIWA protocol, Palak to come do in-person meeting with pt and determine if pt will be accepted.     0922  Agency/Facility Name: [facility]   Spoke To: Jose Antonio  Outcome: DPA messaged Jose Antonio via Teams that pt is no longer on CIWA protocol and medically cleared. Jose Antonio to review referral and notify DPA with updates.     0927  Agency/Facility Name: [facility]   Spoke To: Jose Antonio  Outcome: Jose Antonio messaged DPA requesting transport at 1330.     TONE Soriano notified.

## 2024-05-10 NOTE — DOCUMENTATION QUERY
[facility]                                                                       Query Response Note      PATIENT:               MARISELA DURHAM  ACCT #:                  [id number]  MRN:                     [id number]  :                      1945  ADMIT DATE:       5/3/2024 9:41 PM  DISCH DATE:          RESPONDING  PROVIDER #:        [id number]           QUERY TEXT:    The medical record includes the diagnosis of acute encephalopathy in this patient admitted with alcohol intoxication with known history of alcohol abuse.     Please specify the type of encephalopathy.    The patient's Clinical Indicators include:   HM: Admitted with alcohol intoxication, acute encephalopathy, known history of alcohol abuse, started on CIWA protocol    Clinical Indicators: Mild confusion on presentation, diagnostic alcohol 349.6, no acute intracranial abnormality per CTH   Risk Factors: Alcohol intoxication in active alcoholic, lactic acidosis  Treatment: Rally bag, CIWA protocol, multivitamin, high-dose IV thiamine, Librium     Thank you for your time and attention,  KALANI Bynum RN  Available via Voalte  Options provided:   -- Toxic encephalopathy   -- Metabolic encephalopathy   -- Toxic metabolic encephalopathy   -- Other type of encephalopathy   -- Other explanation, (please specify other explanation)      Query created by: Angelina Queen on 2024 7:16 AM    RESPONSE TEXT:    Metabolic encephalopathy          Electronically signed by:  STACY WONG MD 5/10/2024 8:02 AM

## 2024-05-10 NOTE — CARE PLAN
The patient is Stable - Low risk of patient condition declining or worsening    Shift Goals  Clinical Goals: increased mentation, PT/OT  Patient Goals: rest  Family Goals: pari    Progress made toward(s) clinical / shift goals:      Patient is not progressing towards the following goals:      Problem: Pain - Standard  Goal: Alleviation of pain or a reduction in pain to the patient’s comfort goal  Outcome: Progressing     Problem: Knowledge Deficit - Standard  Goal: Patient and family/care givers will demonstrate understanding of plan of care, disease process/condition, diagnostic tests and medications  Outcome: Progressing     Problem: Knowledge Deficit - COPD  Goal: Patient/significant other demonstrates understanding of disease process, utilization of the Action Plan, medications and discharge instruction  Outcome: Progressing     Problem: Risk for Infection - COPD  Goal: Patient will remain free from signs and symptoms of infection  Outcome: Progressing     Problem: Nutrition - Advanced  Goal: Patient will display progressive weight gain toward goal have adequate food and fluid intake  Outcome: Progressing     Problem: Ineffective Airway Clearance  Goal: Patient will maintain patent airway with clear/clearing breath sounds  Outcome: Progressing     Problem: Impaired Gas Exchange  Goal: Patient will demonstrate improved ventilation and adequate oxygenation and participate in treatment regimen within the level of ability/situation.  Outcome: Progressing     Problem: Risk for Aspiration  Goal: Patient's risk for aspiration will be absent or decrease  Outcome: Progressing     Problem: Self Care  Goal: Patient will have the ability to perform ADLs independently or with assistance (bathe, groom, dress, toilet and feed)  Outcome: Progressing     Problem: Optimal Care for Alcohol Withdrawal  Goal: Optimal Care for the alcohol withdrawal patient  Outcome: Progressing     Problem: Seizure Precautions  Goal: Implementation  of seizure precautions  Outcome: Progressing     Problem: Lifestyle Changes  Goal: Patient's ability to identify lifestyle changes and available resources to help reduce recurrence of condition will improve  Outcome: Progressing     Problem: Psychosocial  Goal: Patient's level of anxiety will decrease  Outcome: Progressing  Goal: Spiritual and cultural needs incorporated into hospitalization  Outcome: Progressing     Problem: Hemodynamics  Goal: Patient's hemodynamics, fluid balance and neurologic status will be stable or improve  Outcome: Progressing     Problem: Fluid Volume  Goal: Fluid volume balance will be maintained  Outcome: Progressing     Problem: Urinary - Renal Perfusion  Goal: Ability to achieve and maintain adequate renal perfusion and functioning will improve  Outcome: Progressing     Problem: Respiratory  Goal: Patient will achieve/maintain optimum respiratory ventilation and gas exchange  Outcome: Progressing     Problem: Mechanical Ventilation  Goal: Safe management of artificial airway and ventilation  Outcome: Progressing  Goal: Successful weaning off mechanical ventilator, spontaneously maintains adequate gas exchange  Outcome: Progressing  Goal: Patient will be able to express needs and understand communication  Outcome: Progressing     Problem: Physical Regulation  Goal: Diagnostic test results will improve  Outcome: Progressing  Goal: Signs and symptoms of infection will decrease  Outcome: Progressing     Problem: Skin Integrity  Goal: Skin integrity is maintained or improved  Outcome: Progressing     Problem: Fall Risk  Goal: Patient will remain free from falls  Outcome: Progressing

## 2024-05-10 NOTE — CARE PLAN
The patient is Stable - Low risk of patient condition declining or worsening    Shift Goals  Clinical Goals: increased mentation, maintain safety  Patient Goals: rest, comfort  Family Goals: pari    Progress made toward(s) clinical / shift goals:  pts mentation has increased since previous night, safety is maintained. Pt able to rest comfortably in bed.       Problem: Pain - Standard  Goal: Alleviation of pain or a reduction in pain to the patient’s comfort goal  Outcome: Progressing  Note: Pt able to verbalize pain on 0-10 scale when prompted. Pt declines pain medication but accepts repositioning and comfort measures.        Patient is not progressing towards the following goals:      Problem: Knowledge Deficit - Standard  Goal: Patient and family/care givers will demonstrate understanding of plan of care, disease process/condition, diagnostic tests and medications  Outcome: Not Progressing  Note: Pt does not participate in plan of care with care team, pt is AO x3 (stating her name, where she is, and why she is in the hospital) but does not engage further in what has happened during her stay or what the next steps are.

## 2024-05-10 NOTE — PROGRESS NOTES
Pt given discharge instructions.  Discussed diet, activity, follow up appointments, symptoms and management, and prescriptions provided.  Packet sent with patient.  IV d/c'd, tele box off, and all questions answered.  Transported to SNF via [employer]

## 2024-05-10 NOTE — DISCHARGE SUMMARY
"Discharge Summary    CHIEF COMPLAINT ON ADMISSION  Chief Complaint   Patient presents with    Alcohol Intoxication     Pt BIBA from home. Pt called friend saying that they had been in bed and not moved for a few days. Per pt this is correct. States they have been feeling \"not right\" pt not having SI/HI at this time. Pt admits to feeling down and depressed, requesting resources. Upon EMS arrival pt hypotensive in the 80s, given 200 ml NS in route. Bp came up to 125/81     Pt has hx a fib on eliquis        Reason for Admission  EMS    Admission Date  5/3/2024     CODE STATUS  Full Code    HPI & HOSPITAL COURSE  This is a 78 y.o. female here with Alcohol intoxication, acute encephalopathy, known history of alcohol abuse, started on CIWA protocol. Patient also with known history of persistent atrial fibrillation, she was on anticoagulation with Eliquis.  Brief episode of bradycardia, and her metoprolol dose was decreased, she was continued on her digoxin, there were no further episodes her rate was well-controlled.  She had multiple electrolyte deficiencies that were replaced.  Patient has had multiple admissions for alcohol withdrawal.  Her encephalopathy improved, she became more alert and oriented, however still remains oriented to person only.  Physical therapy and Occupational Therapy came to evaluate her and recommended postacute placement.  Management has contacted family, they are agreeable to postacute placement.      Therefore, she is discharged in good and stable condition to skilled nursing facility.    The patient met 2-midnight criteria for an inpatient stay at the time of discharge.      FOLLOW UP ITEMS POST DISCHARGE  Follow-up with PCP    DISCHARGE DIAGNOSES  Principal Problem:    Alcohol intoxication in active alcoholic with complication (HCC) (POA: Yes)  Active Problems:    Acute encephalopathy (POA: Yes)    Frailty syndrome in geriatric patient (POA: Yes)    Persistent atrial fibrillation (HCC) (POA: " Yes)    Hypokalemia (POA: Yes)    Hypomagnesemia (POA: Yes)    Acute respiratory failure with hypoxia (HCC) (POA: Yes)    CLEO (obstructive sleep apnea) (POA: Yes)    Presence of automatic cardioverter/defibrillator (AICD) (POA: Yes)      Overview: July 2020: [employer]onic Primo MRI  [id number] implanted by Dr. Baires.    Thrombocytopenia (HCC) (POA: Yes)    HTN (hypertension) (POA: Yes)    Hypophosphatemia (POA: Yes)    Prolonged QT interval (POA: No)    Major depressive disorder with current active episode (POA: Yes)    Hypothyroidism (POA: Yes)  Resolved Problems:    * No resolved hospital problems. *      FOLLOW UP  No future appointments.  Perla Kitchen, F.N.P.  [id number] [id number]  [phone]    Follow up        MEDICATIONS ON DISCHARGE     Medication List        START taking these medications        Instructions   folic acid 1 MG Tabs  Start taking on: May 11, 2024  Commonly known as: Folvite   Take 1 Tablet by mouth every day.  Dose: 1 mg     metoprolol tartrate 25 MG Tabs  Commonly known as: Lopressor   Take 1 Tablet by mouth 2 times a day.  Dose: 25 mg     potassium chloride SA 20 MEQ Tbcr  Start taking on: May 11, 2024  Commonly known as: Kdur   Take 2 Tablets by mouth every day.  Dose: 40 mEq     thiamine 100 MG tablet  Start taking on: May 11, 2024  Commonly known as: Thiamine   Take 1 Tablet by mouth every day.  Dose: 100 mg            CONTINUE taking these medications        Instructions   amLODIPine 2.5 MG Tabs  Commonly known as: Norvasc   Take 2.5 mg by mouth every day.  Dose: 2.5 mg     apixaban 5mg Tabs  Commonly known as: Eliquis   Take 1 Tablet by mouth 2 times a day.  Dose: 5 mg     digoxin 125 MCG Tabs  Commonly known as: Lanoxin   Take 1 Tablet by mouth every day.  Dose: 125 mcg     escitalopram 10 MG Tabs  Commonly known as: Lexapro   Take 10 mg by mouth every day.  Dose: 10 mg     levothyroxine 50 MCG Tabs  Commonly known as: Synthroid   Take 1 Tablet by mouth every  morning on an empty stomach.  Dose: 50 mcg     mirtazapine 15 MG Tabs  Commonly known as: Remeron   Take 15 mg by mouth at bedtime.  Dose: 15 mg     traZODone 50 MG Tabs  Commonly known as: Desyrel   Take 50 mg by mouth every evening.  Dose: 50 mg     venlafaxine 150 MG extended-release capsule  Commonly known as: Effexor-XR   Take 150 mg by mouth every day.  Dose: 150 mg            STOP taking these medications      metoprolol  MG Tb24  Commonly known as: Toprol XL              Allergies  No Known Allergies    DIET  Orders Placed This Encounter   Procedures    Diet Order Diet: Regular     Standing Status:   Standing     Number of Occurrences:   1     Order Specific Question:   Diet:     Answer:   Regular [1]       ACTIVITY  As tolerated and directed by skilled nursing.  Weight bearing as tolerated    LINES, DRAINS, AND WOUNDS  This is an automated list. Peripheral IVs will be removed prior to discharge.  Peripheral IV 05/10/24 22 G Anterior;Left Hand (Active)   Site Assessment Clean;Dry;Intact 05/10/24 0800   Dressing Type Transparent 05/10/24 0800   Line Status Blood return noted;Flushed;Infusing;Scrubbed the hub prior to access 05/10/24 0800   Dressing Status Clean;Dry;Intact 05/10/24 0800   Dressing Intervention N/A 05/10/24 0800   Infiltration Grading ([facility], [facility]) 0 05/10/24 0800   Phlebitis Scale ([facility] Only) 0 05/10/24 0800          Peripheral IV 05/10/24 22 G Anterior;Left Hand (Active)   Site Assessment Clean;Dry;Intact 05/10/24 0800   Dressing Type Transparent 05/10/24 0800   Line Status Blood return noted;Flushed;Infusing;Scrubbed the hub prior to access 05/10/24 0800   Dressing Status Clean;Dry;Intact 05/10/24 0800   Dressing Intervention N/A 05/10/24 0800   Infiltration Grading ([facility]) 0 05/10/24 0800   Phlebitis Scale ([facility] Only) 0 05/10/24 0800               MENTAL STATUS ON TRANSFER  Alert, oriented to person only          CONSULTATIONS  None    PROCEDURES  None    LABORATORY  Lab  Results   Component Value Date    SODIUM 141 05/10/2024    POTASSIUM 3.5 (L) 05/10/2024    CHLORIDE 105 05/10/2024    CO2 23 05/10/2024    GLUCOSE 101 (H) 05/10/2024    BUN 4 (L) 05/10/2024    CREATININE 0.55 05/10/2024        Lab Results   Component Value Date    WBC 5.5 05/10/2024    HEMOGLOBIN 15.0 05/10/2024    HEMATOCRIT 44.6 05/10/2024    PLATELETCT 99 (L) 05/10/2024        Total time of the discharge process exceeds 40 minutes.

## 2024-05-10 NOTE — PROGRESS NOTES
Bedside report received from Delia HOOVER. Pt assessment complete. Pt AO x 4. Reviewed plan of care with pt. Pt tele monitored. Chart and labs reviewed. Bed in lowest position, and 3 side rails up. Pt educated on call lights, call light within reach. Hourly rounding in place

## 2024-06-09 ENCOUNTER — APPOINTMENT (OUTPATIENT)
Dept: RADIOLOGY | Facility: MEDICAL CENTER | Age: 79
DRG: 897 | End: 2024-06-09
Attending: EMERGENCY MEDICINE
Payer: MEDICARE

## 2024-06-09 ENCOUNTER — HOSPITAL ENCOUNTER (INPATIENT)
Facility: MEDICAL CENTER | Age: 79
LOS: 3 days | DRG: 897 | End: 2024-06-12
Attending: EMERGENCY MEDICINE | Admitting: STUDENT IN AN ORGANIZED HEALTH CARE EDUCATION/TRAINING PROGRAM
Payer: MEDICARE

## 2024-06-09 DIAGNOSIS — F10.929 ACUTE ALCOHOL ABUSE, WITH UNSPECIFIED COMPLICATION (HCC): ICD-10-CM

## 2024-06-09 DIAGNOSIS — E87.29 METABOLIC ACIDOSIS, INCREASED ANION GAP: ICD-10-CM

## 2024-06-09 DIAGNOSIS — I48.91 ATRIAL FIBRILLATION WITH RAPID VENTRICULAR RESPONSE (HCC): ICD-10-CM

## 2024-06-09 PROBLEM — E87.1 HYPONATREMIA: Status: ACTIVE | Noted: 2024-06-09

## 2024-06-09 PROBLEM — F51.04 PSYCHOPHYSIOLOGICAL INSOMNIA: Status: ACTIVE | Noted: 2021-01-21

## 2024-06-09 PROBLEM — K70.10 ALCOHOLIC HEPATITIS WITHOUT ASCITES: Status: ACTIVE | Noted: 2024-06-09

## 2024-06-09 LAB
ALBUMIN SERPL BCP-MCNC: 3.9 G/DL (ref 3.2–4.9)
ALBUMIN/GLOB SERPL: 1.5 G/DL
ALP SERPL-CCNC: 108 U/L (ref 30–99)
ALT SERPL-CCNC: 27 U/L (ref 2–50)
ANION GAP SERPL CALC-SCNC: 20 MMOL/L (ref 7–16)
ANISOCYTOSIS BLD QL SMEAR: ABNORMAL
AST SERPL-CCNC: 54 U/L (ref 12–45)
BASOPHILS # BLD AUTO: 0.5 % (ref 0–1.8)
BASOPHILS # BLD: 0.04 K/UL (ref 0–0.12)
BILIRUB SERPL-MCNC: 3.1 MG/DL (ref 0.1–1.5)
BUN SERPL-MCNC: 25 MG/DL (ref 8–22)
CALCIUM ALBUM COR SERPL-MCNC: 9.5 MG/DL (ref 8.5–10.5)
CALCIUM SERPL-MCNC: 9.4 MG/DL (ref 8.5–10.5)
CHLORIDE SERPL-SCNC: 92 MMOL/L (ref 96–112)
CO2 SERPL-SCNC: 22 MMOL/L (ref 20–33)
COMMENT 1642: NORMAL
CREAT SERPL-MCNC: 0.77 MG/DL (ref 0.5–1.4)
EKG IMPRESSION: NORMAL
EKG IMPRESSION: NORMAL
EOSINOPHIL # BLD AUTO: 0.01 K/UL (ref 0–0.51)
EOSINOPHIL NFR BLD: 0.1 % (ref 0–6.9)
ERYTHROCYTE [DISTWIDTH] IN BLOOD BY AUTOMATED COUNT: 50 FL (ref 35.9–50)
ETHANOL BLD-MCNC: <10.1 MG/DL
GFR SERPLBLD CREATININE-BSD FMLA CKD-EPI: 79 ML/MIN/1.73 M 2
GLOBULIN SER CALC-MCNC: 2.6 G/DL (ref 1.9–3.5)
GLUCOSE SERPL-MCNC: 117 MG/DL (ref 65–99)
HCT VFR BLD AUTO: 42.5 % (ref 37–47)
HGB BLD-MCNC: 14.8 G/DL (ref 12–16)
IMM GRANULOCYTES # BLD AUTO: 0.04 K/UL (ref 0–0.11)
IMM GRANULOCYTES NFR BLD AUTO: 0.5 % (ref 0–0.9)
INR PPP: 1.18 (ref 0.87–1.13)
LYMPHOCYTES # BLD AUTO: 1.95 K/UL (ref 1–4.8)
LYMPHOCYTES NFR BLD: 22 % (ref 22–41)
MACROCYTES BLD QL SMEAR: ABNORMAL
MAGNESIUM SERPL-MCNC: 1.3 MG/DL (ref 1.5–2.5)
MCH RBC QN AUTO: 33.4 PG (ref 27–33)
MCHC RBC AUTO-ENTMCNC: 34.8 G/DL (ref 32.2–35.5)
MCV RBC AUTO: 95.9 FL (ref 81.4–97.8)
MONOCYTES # BLD AUTO: 0.78 K/UL (ref 0–0.85)
MONOCYTES NFR BLD AUTO: 8.8 % (ref 0–13.4)
MORPHOLOGY BLD-IMP: NORMAL
NEUTROPHILS # BLD AUTO: 6.05 K/UL (ref 1.82–7.42)
NEUTROPHILS NFR BLD: 68.1 % (ref 44–72)
NRBC # BLD AUTO: 0 K/UL
NRBC BLD-RTO: 0 /100 WBC (ref 0–0.2)
OVALOCYTES BLD QL SMEAR: NORMAL
PLATELET # BLD AUTO: 63 K/UL (ref 164–446)
PLATELET BLD QL SMEAR: NORMAL
PLATELETS.RETICULATED NFR BLD AUTO: 11.3 % (ref 0.6–13.1)
PMV BLD AUTO: 11.1 FL (ref 9–12.9)
POTASSIUM SERPL-SCNC: 4.3 MMOL/L (ref 3.6–5.5)
PROT SERPL-MCNC: 6.5 G/DL (ref 6–8.2)
PROTHROMBIN TIME: 15.1 SEC (ref 12–14.6)
RBC # BLD AUTO: 4.43 M/UL (ref 4.2–5.4)
RBC BLD AUTO: PRESENT
SODIUM SERPL-SCNC: 134 MMOL/L (ref 135–145)
TROPONIN T SERPL-MCNC: 10 NG/L (ref 6–19)
WBC # BLD AUTO: 8.9 K/UL (ref 4.8–10.8)

## 2024-06-09 PROCEDURE — 36415 COLL VENOUS BLD VENIPUNCTURE: CPT

## 2024-06-09 PROCEDURE — 80053 COMPREHEN METABOLIC PANEL: CPT

## 2024-06-09 PROCEDURE — A9270 NON-COVERED ITEM OR SERVICE: HCPCS | Performed by: STUDENT IN AN ORGANIZED HEALTH CARE EDUCATION/TRAINING PROGRAM

## 2024-06-09 PROCEDURE — 700111 HCHG RX REV CODE 636 W/ 250 OVERRIDE (IP): Performed by: EMERGENCY MEDICINE

## 2024-06-09 PROCEDURE — 770020 HCHG ROOM/CARE - TELE (206)

## 2024-06-09 PROCEDURE — 93005 ELECTROCARDIOGRAM TRACING: CPT | Performed by: EMERGENCY MEDICINE

## 2024-06-09 PROCEDURE — 84484 ASSAY OF TROPONIN QUANT: CPT

## 2024-06-09 PROCEDURE — 700102 HCHG RX REV CODE 250 W/ 637 OVERRIDE(OP): Performed by: STUDENT IN AN ORGANIZED HEALTH CARE EDUCATION/TRAINING PROGRAM

## 2024-06-09 PROCEDURE — 85025 COMPLETE CBC W/AUTO DIFF WBC: CPT

## 2024-06-09 PROCEDURE — 85055 RETICULATED PLATELET ASSAY: CPT

## 2024-06-09 PROCEDURE — 85610 PROTHROMBIN TIME: CPT

## 2024-06-09 PROCEDURE — 700105 HCHG RX REV CODE 258: Performed by: EMERGENCY MEDICINE

## 2024-06-09 PROCEDURE — 71045 X-RAY EXAM CHEST 1 VIEW: CPT

## 2024-06-09 PROCEDURE — 93005 ELECTROCARDIOGRAM TRACING: CPT

## 2024-06-09 PROCEDURE — 700101 HCHG RX REV CODE 250: Performed by: STUDENT IN AN ORGANIZED HEALTH CARE EDUCATION/TRAINING PROGRAM

## 2024-06-09 PROCEDURE — HZ2ZZZZ DETOXIFICATION SERVICES FOR SUBSTANCE ABUSE TREATMENT: ICD-10-PCS | Performed by: STUDENT IN AN ORGANIZED HEALTH CARE EDUCATION/TRAINING PROGRAM

## 2024-06-09 PROCEDURE — 99285 EMERGENCY DEPT VISIT HI MDM: CPT

## 2024-06-09 PROCEDURE — 96375 TX/PRO/DX INJ NEW DRUG ADDON: CPT

## 2024-06-09 PROCEDURE — 82077 ASSAY SPEC XCP UR&BREATH IA: CPT

## 2024-06-09 PROCEDURE — 700101 HCHG RX REV CODE 250: Performed by: EMERGENCY MEDICINE

## 2024-06-09 PROCEDURE — 83735 ASSAY OF MAGNESIUM: CPT

## 2024-06-09 PROCEDURE — 700105 HCHG RX REV CODE 258: Performed by: STUDENT IN AN ORGANIZED HEALTH CARE EDUCATION/TRAINING PROGRAM

## 2024-06-09 PROCEDURE — 94760 N-INVAS EAR/PLS OXIMETRY 1: CPT

## 2024-06-09 PROCEDURE — 99223 1ST HOSP IP/OBS HIGH 75: CPT | Mod: AI | Performed by: STUDENT IN AN ORGANIZED HEALTH CARE EDUCATION/TRAINING PROGRAM

## 2024-06-09 PROCEDURE — 96365 THER/PROPH/DIAG IV INF INIT: CPT

## 2024-06-09 RX ORDER — DIGOXIN 125 MCG
125 TABLET ORAL DAILY
Status: DISCONTINUED | OUTPATIENT
Start: 2024-06-09 | End: 2024-06-12 | Stop reason: HOSPADM

## 2024-06-09 RX ORDER — ONDANSETRON 2 MG/ML
4 INJECTION INTRAMUSCULAR; INTRAVENOUS EVERY 4 HOURS PRN
Status: DISCONTINUED | OUTPATIENT
Start: 2024-06-09 | End: 2024-06-12 | Stop reason: HOSPADM

## 2024-06-09 RX ORDER — LORAZEPAM 2 MG/1
4 TABLET ORAL
Status: DISCONTINUED | OUTPATIENT
Start: 2024-06-09 | End: 2024-06-12

## 2024-06-09 RX ORDER — ESCITALOPRAM OXALATE 10 MG/1
10 TABLET ORAL DAILY
Status: DISCONTINUED | OUTPATIENT
Start: 2024-06-09 | End: 2024-06-12 | Stop reason: HOSPADM

## 2024-06-09 RX ORDER — SODIUM CHLORIDE, SODIUM LACTATE, POTASSIUM CHLORIDE, CALCIUM CHLORIDE 600; 310; 30; 20 MG/100ML; MG/100ML; MG/100ML; MG/100ML
1000 INJECTION, SOLUTION INTRAVENOUS ONCE
Status: COMPLETED | OUTPATIENT
Start: 2024-06-09 | End: 2024-06-10

## 2024-06-09 RX ORDER — DILTIAZEM HYDROCHLORIDE 5 MG/ML
20 INJECTION INTRAVENOUS ONCE
Status: COMPLETED | OUTPATIENT
Start: 2024-06-09 | End: 2024-06-09

## 2024-06-09 RX ORDER — METOPROLOL TARTRATE 1 MG/ML
5 INJECTION, SOLUTION INTRAVENOUS
Status: DISCONTINUED | OUTPATIENT
Start: 2024-06-09 | End: 2024-06-12 | Stop reason: HOSPADM

## 2024-06-09 RX ORDER — GAUZE BANDAGE 2" X 2"
100 BANDAGE TOPICAL DAILY
Status: DISCONTINUED | OUTPATIENT
Start: 2024-06-10 | End: 2024-06-12 | Stop reason: HOSPADM

## 2024-06-09 RX ORDER — FOLIC ACID 1 MG/1
1 TABLET ORAL DAILY
Status: DISCONTINUED | OUTPATIENT
Start: 2024-06-10 | End: 2024-06-12 | Stop reason: HOSPADM

## 2024-06-09 RX ORDER — POLYETHYLENE GLYCOL 3350 17 G/17G
1 POWDER, FOR SOLUTION ORAL
Status: DISCONTINUED | OUTPATIENT
Start: 2024-06-09 | End: 2024-06-12 | Stop reason: HOSPADM

## 2024-06-09 RX ORDER — LORAZEPAM 1 MG/1
1 TABLET ORAL EVERY 4 HOURS PRN
Status: DISCONTINUED | OUTPATIENT
Start: 2024-06-09 | End: 2024-06-12

## 2024-06-09 RX ORDER — AMOXICILLIN 250 MG
2 CAPSULE ORAL EVERY EVENING
Status: DISCONTINUED | OUTPATIENT
Start: 2024-06-09 | End: 2024-06-09

## 2024-06-09 RX ORDER — DIAZEPAM 5 MG/ML
10 INJECTION, SOLUTION INTRAMUSCULAR; INTRAVENOUS ONCE
Status: COMPLETED | OUTPATIENT
Start: 2024-06-09 | End: 2024-06-09

## 2024-06-09 RX ORDER — ACETAMINOPHEN 500 MG
1000 TABLET ORAL EVERY 6 HOURS PRN
Status: DISCONTINUED | OUTPATIENT
Start: 2024-06-09 | End: 2024-06-12 | Stop reason: HOSPADM

## 2024-06-09 RX ORDER — CHLORDIAZEPOXIDE HYDROCHLORIDE 25 MG/1
50 CAPSULE, GELATIN COATED ORAL EVERY 6 HOURS
Status: COMPLETED | OUTPATIENT
Start: 2024-06-09 | End: 2024-06-10

## 2024-06-09 RX ORDER — CHLORDIAZEPOXIDE HYDROCHLORIDE 25 MG/1
25 CAPSULE, GELATIN COATED ORAL EVERY 6 HOURS
Status: DISCONTINUED | OUTPATIENT
Start: 2024-06-10 | End: 2024-06-11

## 2024-06-09 RX ORDER — ONDANSETRON 4 MG/1
4 TABLET, ORALLY DISINTEGRATING ORAL EVERY 4 HOURS PRN
Status: DISCONTINUED | OUTPATIENT
Start: 2024-06-09 | End: 2024-06-12 | Stop reason: HOSPADM

## 2024-06-09 RX ORDER — POLYETHYLENE GLYCOL 3350 17 G/17G
1 POWDER, FOR SOLUTION ORAL
Status: DISCONTINUED | OUTPATIENT
Start: 2024-06-09 | End: 2024-06-09

## 2024-06-09 RX ORDER — AMLODIPINE BESYLATE 5 MG/1
2.5 TABLET ORAL DAILY
Status: DISCONTINUED | OUTPATIENT
Start: 2024-06-09 | End: 2024-06-12 | Stop reason: HOSPADM

## 2024-06-09 RX ORDER — LORAZEPAM 2 MG/1
2 TABLET ORAL
Status: DISCONTINUED | OUTPATIENT
Start: 2024-06-09 | End: 2024-06-12

## 2024-06-09 RX ORDER — AMOXICILLIN 250 MG
2 CAPSULE ORAL NIGHTLY PRN
Status: DISCONTINUED | OUTPATIENT
Start: 2024-06-09 | End: 2024-06-12 | Stop reason: HOSPADM

## 2024-06-09 RX ORDER — LORAZEPAM 0.5 MG/1
0.5 TABLET ORAL EVERY 4 HOURS PRN
Status: DISCONTINUED | OUTPATIENT
Start: 2024-06-09 | End: 2024-06-12

## 2024-06-09 RX ORDER — VENLAFAXINE HYDROCHLORIDE 37.5 MG/1
150 CAPSULE, EXTENDED RELEASE ORAL DAILY
Status: DISCONTINUED | OUTPATIENT
Start: 2024-06-09 | End: 2024-06-12 | Stop reason: HOSPADM

## 2024-06-09 RX ORDER — DIAZEPAM 5 MG/ML
10 INJECTION, SOLUTION INTRAMUSCULAR; INTRAVENOUS
Status: DISCONTINUED | OUTPATIENT
Start: 2024-06-09 | End: 2024-06-12

## 2024-06-09 RX ORDER — TRAZODONE HYDROCHLORIDE 50 MG/1
50 TABLET ORAL NIGHTLY
Status: DISCONTINUED | OUTPATIENT
Start: 2024-06-09 | End: 2024-06-12 | Stop reason: HOSPADM

## 2024-06-09 RX ORDER — LEVOTHYROXINE SODIUM 0.05 MG/1
50 TABLET ORAL
Status: DISCONTINUED | OUTPATIENT
Start: 2024-06-10 | End: 2024-06-12 | Stop reason: HOSPADM

## 2024-06-09 RX ADMIN — METOPROLOL TARTRATE 25 MG: 25 TABLET, FILM COATED ORAL at 21:09

## 2024-06-09 RX ADMIN — METOPROLOL TARTRATE 25 MG: 25 TABLET, FILM COATED ORAL at 16:10

## 2024-06-09 RX ADMIN — MAGNESIUM SULFATE HEPTAHYDRATE: 500 INJECTION, SOLUTION INTRAMUSCULAR; INTRAVENOUS at 14:31

## 2024-06-09 RX ADMIN — LORAZEPAM 3 MG: 2 TABLET ORAL at 18:14

## 2024-06-09 RX ADMIN — LORAZEPAM 1 MG: 1 TABLET ORAL at 23:21

## 2024-06-09 RX ADMIN — CHLORDIAZEPOXIDE HYDROCHLORIDE 50 MG: 25 CAPSULE ORAL at 19:19

## 2024-06-09 RX ADMIN — LORAZEPAM 1 MG: 1 TABLET ORAL at 16:30

## 2024-06-09 RX ADMIN — TRAZODONE HYDROCHLORIDE 50 MG: 50 TABLET ORAL at 21:09

## 2024-06-09 RX ADMIN — LORAZEPAM 2 MG: 2 TABLET ORAL at 19:21

## 2024-06-09 RX ADMIN — LORAZEPAM 2 MG: 2 TABLET ORAL at 21:09

## 2024-06-09 RX ADMIN — CHLORDIAZEPOXIDE HYDROCHLORIDE 50 MG: 25 CAPSULE ORAL at 23:20

## 2024-06-09 RX ADMIN — METOPROLOL TARTRATE 5 MG: 5 INJECTION INTRAVENOUS at 16:10

## 2024-06-09 RX ADMIN — DILTIAZEM HYDROCHLORIDE 20 MG: 5 INJECTION INTRAVENOUS at 14:31

## 2024-06-09 RX ADMIN — DIAZEPAM 10 MG: 5 INJECTION INTRAMUSCULAR; INTRAVENOUS at 14:31

## 2024-06-09 RX ADMIN — VENLAFAXINE HYDROCHLORIDE 150 MG: 37.5 CAPSULE, EXTENDED RELEASE ORAL at 18:13

## 2024-06-09 RX ADMIN — APIXABAN 5 MG: 5 TABLET, FILM COATED ORAL at 17:20

## 2024-06-09 RX ADMIN — DIGOXIN 125 MCG: 0.25 TABLET ORAL at 17:27

## 2024-06-09 RX ADMIN — ESCITALOPRAM OXALATE 10 MG: 10 TABLET ORAL at 17:19

## 2024-06-09 RX ADMIN — SODIUM CHLORIDE, POTASSIUM CHLORIDE, SODIUM LACTATE AND CALCIUM CHLORIDE 1000 ML: 600; 310; 30; 20 INJECTION, SOLUTION INTRAVENOUS at 17:21

## 2024-06-09 SDOH — ECONOMIC STABILITY: TRANSPORTATION INSECURITY
IN THE PAST 12 MONTHS, HAS THE LACK OF TRANSPORTATION KEPT YOU FROM MEDICAL APPOINTMENTS OR FROM GETTING MEDICATIONS?: NO

## 2024-06-09 SDOH — ECONOMIC STABILITY: TRANSPORTATION INSECURITY
IN THE PAST 12 MONTHS, HAS LACK OF RELIABLE TRANSPORTATION KEPT YOU FROM MEDICAL APPOINTMENTS, MEETINGS, WORK OR FROM GETTING THINGS NEEDED FOR DAILY LIVING?: NO

## 2024-06-09 ASSESSMENT — ENCOUNTER SYMPTOMS
ABDOMINAL PAIN: 0
DIARRHEA: 1
SORE THROAT: 0
SINUS PAIN: 0
COUGH: 0
DEPRESSION: 1
BACK PAIN: 0
BLOOD IN STOOL: 0
WEAKNESS: 0
CONSTIPATION: 0
DIZZINESS: 0
MYALGIAS: 0
CHILLS: 0
TREMORS: 1
HEADACHES: 0
FLANK PAIN: 0
SHORTNESS OF BREATH: 1
VOMITING: 1
EYE PAIN: 0
BRUISES/BLEEDS EASILY: 0
HEMOPTYSIS: 0
DIAPHORESIS: 1
FEVER: 0
FALLS: 0
NAUSEA: 1
PALPITATIONS: 1
NERVOUS/ANXIOUS: 0
NECK PAIN: 0
MEMORY LOSS: 1

## 2024-06-09 ASSESSMENT — LIFESTYLE VARIABLES
VISUAL DISTURBANCES: NOT PRESENT
VISUAL DISTURBANCES: NOT PRESENT
TREMOR: *
ORIENTATION AND CLOUDING OF SENSORIUM: DATE DISORIENTATION BY NO MORE THAN TWO CALENDAR DAYS
TREMOR: TREMOR NOT VISIBLE BUT CAN BE FELT, FINGERTIP TO FINGERTIP
AUDITORY DISTURBANCES: NOT PRESENT
TOTAL SCORE: MILD ITCHING, PINS AND NEEDLES SENSATION, BURNING OR NUMBNESS
PAROXYSMAL SWEATS: BARELY PERCEPTIBLE SWEATING. PALMS MOIST
AGITATION: SOMEWHAT MORE THAN NORMAL ACTIVITY
HEADACHE, FULLNESS IN HEAD: MILD
HEADACHE, FULLNESS IN HEAD: NOT PRESENT
TOTAL SCORE: 18
TOTAL SCORE: 8
AUDITORY DISTURBANCES: NOT PRESENT
ORIENTATION AND CLOUDING OF SENSORIUM: ORIENTED AND CAN DO SERIAL ADDITIONS
ORIENTATION AND CLOUDING OF SENSORIUM: CANNOT DO SERIAL ADDITIONS OR IS UNCERTAIN ABOUT DATE
ANXIETY: NO ANXIETY (AT EASE)
TREMOR: *
TOTAL SCORE: 9
HEADACHE, FULLNESS IN HEAD: NOT PRESENT
ORIENTATION AND CLOUDING OF SENSORIUM: ORIENTED AND CAN DO SERIAL ADDITIONS
NAUSEA AND VOMITING: *
SUBSTANCE_ABUSE: 1
TOTAL SCORE: VERY MILD ITCHING, PINS AND NEEDLES SENSATION, BURNING OR NUMBNESS
PAROXYSMAL SWEATS: BARELY PERCEPTIBLE SWEATING. PALMS MOIST
TREMOR: *
NAUSEA AND VOMITING: *
PAROXYSMAL SWEATS: BARELY PERCEPTIBLE SWEATING. PALMS MOIST
AUDITORY DISTURBANCES: NOT PRESENT
PAROXYSMAL SWEATS: NO SWEAT VISIBLE
TOTAL SCORE: MODERATE ITCHING, PINS AND NEEDLES SENSATION, BURNING OR NUMBNESS
NAUSEA AND VOMITING: MILD NAUSEA WITH NO VOMITING
AUDITORY DISTURBANCES: NOT PRESENT
TOTAL SCORE: 11
AUDITORY DISTURBANCES: NOT PRESENT
TREMOR: *
NAUSEA AND VOMITING: NO NAUSEA AND NO VOMITING
ANXIETY: MODERATELY ANXIOUS OR GUARDED, SO ANXIETY IS INFERRED
ORIENTATION AND CLOUDING OF SENSORIUM: DATE DISORIENTATION BY MORE THAN TWO CALENDAR DAYS
AGITATION: NORMAL ACTIVITY
ANXIETY: *
VISUAL DISTURBANCES: MODERATE SENSITIVITY
HEADACHE, FULLNESS IN HEAD: NOT PRESENT
TOTAL SCORE: VERY MILD ITCHING, PINS AND NEEDLES SENSATION, BURNING OR NUMBNESS
TOTAL SCORE: 12
AGITATION: *
NAUSEA AND VOMITING: INTERMITTENT NAUSEA WITH DRY HEAVES
PAROXYSMAL SWEATS: NO SWEAT VISIBLE
VISUAL DISTURBANCES: NOT PRESENT
HEADACHE, FULLNESS IN HEAD: VERY MILD
VISUAL DISTURBANCES: NOT PRESENT
AGITATION: NORMAL ACTIVITY
AGITATION: SOMEWHAT MORE THAN NORMAL ACTIVITY

## 2024-06-09 ASSESSMENT — FIBROSIS 4 INDEX
FIB4 SCORE: 12.54
FIB4 SCORE: 5.46

## 2024-06-09 ASSESSMENT — CHA2DS2 SCORE
CHA2DS2 VASC SCORE: 4
SEX: FEMALE
PRIOR STROKE OR TIA OR THROMBOEMBOLISM: NO
VASCULAR DISEASE: NO
AGE 65 TO 74: NO
HYPERTENSION: YES
CHF OR LEFT VENTRICULAR DYSFUNCTION: NO
DIABETES: NO
AGE 75 OR GREATER: YES

## 2024-06-09 ASSESSMENT — SOCIAL DETERMINANTS OF HEALTH (SDOH)
WITHIN THE PAST 12 MONTHS, THE FOOD YOU BOUGHT JUST DIDN'T LAST AND YOU DIDN'T HAVE MONEY TO GET MORE: NEVER TRUE
WITHIN THE LAST YEAR, HAVE YOU BEEN KICKED, HIT, SLAPPED, OR OTHERWISE PHYSICALLY HURT BY YOUR PARTNER OR EX-PARTNER?: NO
IN THE PAST 12 MONTHS, HAS THE ELECTRIC, GAS, OIL, OR WATER COMPANY THREATENED TO SHUT OFF SERVICE IN YOUR HOME?: NO
WITHIN THE PAST 12 MONTHS, YOU WORRIED THAT YOUR FOOD WOULD RUN OUT BEFORE YOU GOT THE MONEY TO BUY MORE: NEVER TRUE
WITHIN THE LAST YEAR, HAVE YOU BEEN AFRAID OF YOUR PARTNER OR EX-PARTNER?: NO
WITHIN THE LAST YEAR, HAVE TO BEEN RAPED OR FORCED TO HAVE ANY KIND OF SEXUAL ACTIVITY BY YOUR PARTNER OR EX-PARTNER?: NO
WITHIN THE LAST YEAR, HAVE YOU BEEN HUMILIATED OR EMOTIONALLY ABUSED IN OTHER WAYS BY YOUR PARTNER OR EX-PARTNER?: NO

## 2024-06-09 ASSESSMENT — PAIN DESCRIPTION - PAIN TYPE
TYPE: ACUTE PAIN
TYPE: ACUTE PAIN

## 2024-06-09 NOTE — ED TRIAGE NOTES
"Chief Complaint   Patient presents with    Detox     Pt BIBA from home after son called EMS after finding pt at home detoxing from alcohol x 2 days. Pt normally drinks about 1 pint vodka daily. Per son pt has not been eating or drinking x 5 days and hasn't taken medications including medications for a-fib. Pt shaking and  a-fib RVR on arrival.          79 y/o F presents to ER for above complaint. Pt A+Ox4. Pt wishing to quit alcohol. Pt appears shaky on arrival.     BP (!) 169/106   Pulse (!) 168   Temp (!) 35.8 °C (96.4 °F) (Temporal)   Resp 18   Ht 1.676 m (5' 6\")   Wt 69.9 kg (154 lb)   SpO2 94%     "

## 2024-06-09 NOTE — H&P
"Hospital Medicine History & Physical Note    Date of Service  6/9/2024    Primary Care Physician  GRACIELA Benitez.    Consultants  None    Specialist Names: N/A    Code Status  Prior    Chief Complaint  Chief Complaint   Patient presents with    Detox     Pt BIBA from home after son called EMS after finding pt at home detoxing from alcohol x 2 days. Pt normally drinks about 1 pint vodka daily. Per son pt has not been eating or drinking x 5 days and hasn't taken medications including medications for a-fib. Pt shaking and  a-fib RVR on arrival.        History of Presenting Illness  Jeanie Hutchison is a 78 y.o. female with Afib, CLEO, hypothyroidism, depression/anxiety, HTN, and EtOH use DO who presented 6/9/2024 with alcohol withdrawal.    She reports that she has been \"shaky\" and anxious for the past 2 days since stopping alcohol. She decided to stop drinking because she knows she has alienated everyone and uses it to cope with the many losses she's experienced in the past 10 years. She drinks approximately a pint of vodka daily, most recently 2 days ago. She has been hospitalized at [facility] for alcohol withdrawal multiple times and underwent inpatient psychiatric care for SI, which resolved. She denies current SI. She is hoping for help with treating her addiction.    She stopped taking her other medications approximately 4 days ago but she is uncertain why she did that. She has previously stopped taking her medication due to giving up the will to live. She reports diaphoresis and N/V. She has chronic palpitations and dyspnea. She has intermittent diarrhea. She denies bleeding, syncope, falls, chest pain, other pain, cough, congestion, bladder dysfunction.    In the ED she presented with tachycardia  and hypertension 169/106. EKG confirmed Afib with RVR for which she received 10 mg IV valium for withdrawal and 20 mg IV diltiazem with improvement of HR <100. She was initiated on an empiric rally " bag. CBC demonstrates Plt 63. CMP demonstrates hyponatremia 134, AST 54, Bilirubin 3.1. BAL negative. Troponin negative. CXR demosntrates DCPMM with ICD and no acute cardiopulmonary process. INR 1.2.    I discussed the plan of care with patient, bedside RN, and ED provider .    Review of Systems  Review of Systems   Constitutional:  Positive for diaphoresis and malaise/fatigue. Negative for chills and fever.   HENT:  Negative for ear pain, nosebleeds, sinus pain and sore throat.    Eyes:  Negative for pain.   Respiratory:  Positive for shortness of breath. Negative for cough and hemoptysis.    Cardiovascular:  Positive for palpitations. Negative for chest pain.   Gastrointestinal:  Positive for diarrhea, nausea and vomiting. Negative for abdominal pain, blood in stool, constipation and melena.   Genitourinary:  Negative for dysuria, flank pain and hematuria.   Musculoskeletal:  Negative for back pain, falls, joint pain, myalgias and neck pain.   Skin:  Negative for rash.   Neurological:  Positive for tremors. Negative for dizziness, weakness and headaches.   Endo/Heme/Allergies:  Does not bruise/bleed easily.   Psychiatric/Behavioral:  Positive for depression, memory loss and substance abuse. Negative for suicidal ideas. The patient is not nervous/anxious.        Past Medical History   has a past medical history of Alcoholism (HCC), Anticoagulated, Apnea, sleep, Chronic pain, Hypothyroidism, Insomnia, Paroxysmal atrial fibrillation (HCC), Psychiatric disorder, Snoring, and Ventricular tachycardia (HCC).    Surgical History   has a past surgical history that includes breast biopsy; other orthopedic surgery; orif, fracture, clavicle (5/21/2014); and tonsillectomy.     Family History  family history includes Anxiety disorder in her mother; Depression in her mother; Diabetes in her mother; Hyperlipidemia in her father.   Family history reviewed with patient. There is family history that is pertinent to the chief  complaint.     Social History   reports that she has never smoked. She has never used smokeless tobacco. She reports current alcohol use of about 8.4 - 12.6 oz of alcohol per week. She reports that she does not currently use drugs after having used the following drugs: Oral.    Allergies  No Known Allergies    Medications  Prior to Admission Medications   Prescriptions Last Dose Informant Patient Reported? Taking?   amLODIPine (NORVASC) 2.5 MG Tab UNK at [facility], Patient Yes No   Sig: Take 2.5 mg by mouth every day.   apixaban (ELIQUIS) 5mg Tab UNK at [facility], Patient No No   Sig: Take 1 Tablet by mouth 2 times a day.   digoxin (LANOXIN) 125 MCG Tab UNK at [facility], Patient No No   Sig: Take 1 Tablet by mouth every day.   escitalopram (LEXAPRO) 10 MG Tab UNK at [facility], Patient Yes No   Sig: Take 10 mg by mouth every day.   folic acid (FOLVITE) 1 MG Tab UNK at [facility], Patient No No   Sig: Take 1 Tablet by mouth every day.   levothyroxine (SYNTHROID) 50 MCG Tab UNK at [facility], Patient No No   Sig: Take 1 Tablet by mouth every morning on an empty stomach.   metoprolol tartrate (LOPRESSOR) 25 MG Tab UNK at [facility], Patient No No   Sig: Take 1 Tablet by mouth 2 times a day.   mirtazapine (REMERON) 15 MG Tab UNK at [facility], Patient Yes No   Sig: Take 15 mg by mouth at bedtime.   potassium chloride SA (KDUR) 20 MEQ Tab CR UNK at [facility], Patient No No   Sig: Take 2 Tablets by mouth every day.   thiamine (THIAMINE) 100 MG tablet UNK at [facility], Patient No No   Sig: Take 1 Tablet by mouth every day.   traZODone (DESYREL) 50 MG Tab UNK at [facility], Patient Yes No   Sig: Take 50 mg by mouth every evening.   venlafaxine (EFFEXOR-XR) 150 MG extended-release capsule UNK at [facility], Patient Yes  No   Sig: Take 150 mg by mouth every day.      Facility-Administered Medications: None       Physical Exam  Temp:  [35.8 °C (96.4 °F)] 35.8 °C (96.4 °F)  Pulse:  [] 151  Resp:  [12-20] 18  BP: ()/() 146/93  SpO2:  [90 %-97 %] 96 %  Blood Pressure : (!) 146/93   Temperature: (!) 35.8 °C (96.4 °F)   Pulse: (!) 151   Respiration: 18   Pulse Oximetry: 96 %       Physical Exam  Vitals and nursing note reviewed.   Constitutional:       General: She is in acute distress (Visibly anxious).      Appearance: She is diaphoretic. She is not ill-appearing or toxic-appearing.   HENT:      Head: Normocephalic.      Nose: Nose normal.      Mouth/Throat:      Mouth: Mucous membranes are dry.      Pharynx: Oropharynx is clear. No oropharyngeal exudate or posterior oropharyngeal erythema.      Comments: +Tongue fasciculations  Eyes:      General: No scleral icterus.     Conjunctiva/sclera: Conjunctivae normal.      Pupils: Pupils are equal, round, and reactive to light.   Cardiovascular:      Rate and Rhythm: Tachycardia present. Rhythm irregularly irregular.      Pulses: Normal pulses.      Heart sounds: Normal heart sounds. No murmur heard.     No friction rub. No gallop.   Pulmonary:      Effort: Pulmonary effort is normal. No respiratory distress.      Breath sounds: Normal breath sounds. No wheezing, rhonchi or rales.   Abdominal:      General: Abdomen is flat. Bowel sounds are normal. There is no distension.      Palpations: Abdomen is soft.      Tenderness: There is no abdominal tenderness. There is no guarding or rebound.   Genitourinary:     Comments: No salazar  Musculoskeletal:      Cervical back: Neck supple.      Right lower leg: No edema.      Left lower leg: No edema.   Skin:     General: Skin is warm.      Coloration: Skin is pale.   Neurological:      Mental Status: She is alert.      Motor: Tremor present.      Comments: Appropriately conversant, moves all extremities   Psychiatric:         Attention  "and Perception: Attention and perception normal.         Mood and Affect: Mood is anxious and depressed.         Speech: Speech normal.         Behavior: Behavior normal. Behavior is cooperative.         Thought Content: Thought content normal.         Cognition and Memory: Cognition and memory normal.         Judgment: Judgment is inappropriate.         Laboratory:  Recent Labs     06/09/24  1433   WBC 8.9   RBC 4.43   HEMOGLOBIN 14.8   HEMATOCRIT 42.5   MCV 95.9   MCH 33.4*   MCHC 34.8   RDW 50.0   PLATELETCT 63*   MPV 11.1     Recent Labs     06/09/24  1433   SODIUM 134*   POTASSIUM 4.3   CHLORIDE 92*   CO2 22   GLUCOSE 117*   BUN 25*   CREATININE 0.77   CALCIUM 9.4     Recent Labs     06/09/24  1433   ALTSGPT 27   ASTSGOT 54*   ALKPHOSPHAT 108*   TBILIRUBIN 3.1*   GLUCOSE 117*     Recent Labs     06/09/24  1433   INR 1.18*     No results for input(s): \"NTPROBNP\" in the last 72 hours.      Recent Labs     06/09/24  1433   TROPONINT 10       Imaging:  DX-CHEST-PORTABLE (1 VIEW)   Final Result         1. No acute cardiopulmonary abnormalities are identified.          X-Ray:  I have personally reviewed the images and compared with prior images. and My impression is: dual-chamber PPM with ICD, no acute pulmonary process  EKG:  I have personally reviewed the images and compared with prior images. and My impression is: Afib with RVR, Qtc 515    Assessment/Plan:  Justification for Admission Status  I anticipate this patient will require at least two midnights for appropriate medical management, necessitating inpatient admission because EtOH withdrawal warranting CIWA protocol. Per prior hospitalizations he requires >2 days for which she will be monitored for on 72h librium taper due to high initial score. Afib with RVR warrants up-titration of medication to maintain HR <110.    Patient will need a Telemetry bed on MEDICAL service .  The need is secondary to RVR monitoring, CIWA protocol.    * Alcohol withdrawal syndrome " with complication (HCC)- (present on admission)  Assessment & Plan  Recurrent  CIWA - Lorazepam protocol  Empiric MVN + Thiamine + Folate    Alcoholic hepatitis without ascites- (present on admission)  Assessment & Plan  Concurrent thrombocytopenia, coagulopathy, and bilirubin concerning for acute vs chronic hepatitis  Prior abdominal imaging in 2023 predates thrombocytopenia, which now seems to be chronic concerning for cirrhosis  RUQ US for underlying cirrhosis    Hyponatremia- (present on admission)  Assessment & Plan  Mild and hypochloremic likely from inadequate PO intake from alcohol use  1 L LR ordered after completion of rally bag  Repeat AM CMP    Prolonged QT interval- (present on admission)  Assessment & Plan  Telemetry  K normal  Mg ordered  Repeat AM CMP/Mg  Minimize QT-prolonging medications    Acquired hypothyroidism- (present on admission)  Assessment & Plan  Continue levothyroxine    Atrial fibrillation with rapid ventricular response (HCC)- (present on admission)  Assessment & Plan  In setting of alcohol withdrawal and self-discontinuation of BB  Metoprolol 25 mg q8h, up-titrate to maintain HR <110  Metoprolol 5 mg IV PRN RVR  Telemetry  Continue digoxin and apixaban    Primary hypertension- (present on admission)  Assessment & Plan  Continue amlodipine    Major depressive disorder with current active episode- (present on admission)  Assessment & Plan  Continue escitalopram, mirtazapine, and venlafaxine - per chart review and her report she has tolerated concurrent SSRI/SNRI without signs of serotonin syndrome  Psychology consult    Psychosocial stressors- (present on admission)  Assessment & Plan  Alienation of contacts and loss of family in past 10 years  Resorts to alcohol for coping, counseled and demonstrates insight that alcohol perpetuates or worsens her situation  Psychology consult for behavioral options  RNCM consult for substance treatment options    Psychophysiological insomnia-  (present on admission)  Assessment & Plan  Likely due to anxiety and alcohol use  Continue trazodone QHS    Alcohol use disorder, moderate, dependence (HCC)- (present on admission)  Assessment & Plan  Due to coping with loss  Psychology consult for CBT prospects  Discuss MAT at discharge        VTE prophylaxis: SCDs/TEDs and therapeutic anticoagulation with apixaban

## 2024-06-09 NOTE — ED NOTES
Med Rec complete per pt's pharmacy and pt   Allergies Reviewed    Pt prescribed ELIQUIS but unsure of last dose    Pt unable to confirm last doses, states that she has inconstantly been able to take her medications, unable to provide medication details.

## 2024-06-09 NOTE — ED PROVIDER NOTES
ED Provider Note    CHIEF COMPLAINT  Chief Complaint   Patient presents with    Detox     Pt BIBA from home after son called EMS after finding pt at home detoxing from alcohol x 2 days. Pt normally drinks about 1 pint vodka daily. Per son pt has not been eating or drinking x 5 days and hasn't taken medications including medications for a-fib. Pt shaking and  a-fib RVR on arrival.        EXTERNAL RECORDS REVIEWED  Inpatient Notes she has had multiple admissions for A-fib as well as alcohol withdrawal both our hospital and [facility].  She was most recently here 5 3-5 10 for alcohol withdrawal and atrial fibrillation with RVR.  At that time she was rate controlled, detoxed, and there is a plan to send her to postacute.  At that time her mental status was noted to be quite poor    HPI/ROS  LIMITATION TO HISTORY   Select: : None  OUTSIDE HISTORIAN(S):  EMS reports that family called after finding her at home.  They report she is tachycardic    Jeanie Hutchison is a 78 y.o. female who presents requesting help for alcohol withdrawal.  The patient states that she started drinking soon after going home after her last admission.  However she would like to stop, and she is not anything to drink for 2 days.  She is also not taking any of her medicines, nor has she eaten or drinking anything.  She denies any headache or chest pain.  She denies any belly pain.  She feels very anxious.  Generally feels unwell.    PAST MEDICAL HISTORY   has a past medical history of Alcoholism (HCC), Anticoagulated, Apnea, sleep, Chronic pain, Hypothyroidism, Insomnia, Paroxysmal atrial fibrillation (HCC), Psychiatric disorder, Snoring, and Ventricular tachycardia (HCC).    SURGICAL HISTORY   has a past surgical history that includes breast biopsy; other orthopedic surgery; orif, fracture, clavicle (5/21/2014); and tonsillectomy.    FAMILY HISTORY  Family History   Problem Relation Age of Onset    Diabetes Mother     Anxiety disorder  "Mother     Depression Mother     Hyperlipidemia Father        SOCIAL HISTORY  Social History     Tobacco Use    Smoking status: Never    Smokeless tobacco: Never   Vaping Use    Vaping status: Never Used   Substance and Sexual Activity    Alcohol use: Yes     Alcohol/week: 8.4 - 12.6 oz     Types: 14 - 21 Standard drinks or equivalent per week     Comment: everyday (last 2 months 2-3 everyday)    Drug use: Not Currently     Types: Oral     Comment: takes friends tranqualizers     Sexual activity: Not Currently       CURRENT MEDICATIONS  Home Medications       Reviewed by Catrina Childs R.N. (Registered Nurse) on 06/09/24 at 1417  Med List Status: Partial     Medication Last Dose Status   amLODIPine (NORVASC) 2.5 MG Tab  Active   apixaban (ELIQUIS) 5mg Tab  Active   digoxin (LANOXIN) 125 MCG Tab  Active   escitalopram (LEXAPRO) 10 MG Tab  Active   folic acid (FOLVITE) 1 MG Tab  Active   levothyroxine (SYNTHROID) 50 MCG Tab  Active   metoprolol tartrate (LOPRESSOR) 25 MG Tab  Active   mirtazapine (REMERON) 15 MG Tab  Active   potassium chloride SA (KDUR) 20 MEQ Tab CR  Active   thiamine (THIAMINE) 100 MG tablet  Active   traZODone (DESYREL) 50 MG Tab  Active   venlafaxine (EFFEXOR-XR) 150 MG extended-release capsule  Active                    ALLERGIES  No Known Allergies    PHYSICAL EXAM  VITAL SIGNS: BP (!) 169/106   Pulse (!) 168   Temp (!) 35.8 °C (96.4 °F) (Temporal)   Resp 18   Ht 1.676 m (5' 6\")   Wt 69.9 kg (154 lb)   SpO2 94%   BMI 24.86 kg/m²    Constitutional: This is an anxious, ill-appearing patient.  She is tachycardic and hypertensive  HENT: Head is without trauma.  Oropharynx is clear.  Mucous membranes are moist.  Eyes: Sclerae are nonicteric, pupils are equally round.  Neck: Supple with grossly normal range of motion.  Cardiovascular: Irregular irregular, it looks like atrial fibrillation on the monitor.  Peripheral pulses are intact and symmetric throughout.  Thorax & Lungs: Breathing " easily.  Good air movement.  There is no wheeze, rhonchi or rales.  Abdomen: Bowel sounds normal, soft, non-distended, nontender, no mass nor pulsatile mass. I do not appreciate hepatosplenomegaly.  Skin: No apparent rash.  I do not see petechiae or purpura.  Extremities: No evidence of acute trauma.  No clubbing, cyanosis, edema, no Homans or cords.  Neurologic: Alert. Moving all extremities.  Intact sensation and strength throughout.  Gait is not assessed.  She is quite tremulous  Psychiatric: Anxious      EKG/LABS  To my interpretation this is a twelve-lead study showing a atrial fibrillation at a rate of 165.  There are no obvious ST segment or T wave changes.  Impression: Atrial fibrillation with rapid ventricular response  I have independently interpreted this EKG  Labs Reviewed   CBC WITH DIFFERENTIAL - Abnormal; Notable for the following components:       Result Value    MCH 33.4 (*)     Platelet Count 63 (*)     All other components within normal limits   COMP METABOLIC PANEL - Abnormal; Notable for the following components:    Sodium 134 (*)     Chloride 92 (*)     Anion Gap 20.0 (*)     Glucose 117 (*)     Bun 25 (*)     AST(SGOT) 54 (*)     Alkaline Phosphatase 108 (*)     Total Bilirubin 3.1 (*)     All other components within normal limits   DIAGNOSTIC ALCOHOL   TROPONIN   ESTIMATED GFR   PLATELET ESTIMATE   MORPHOLOGY   PERIPHERAL SMEAR REVIEW   IMMATURE PLT FRACTION   DIFFERENTIAL COMMENT   PROTHROMBIN TIME         RADIOLOGY/PROCEDURES   I have independently interpreted the diagnostic imaging associated with this visit and am waiting the final reading from the radiologist.   My preliminary interpretation is as follows: No failure, no infiltrate    Radiologist interpretation:  DX-CHEST-PORTABLE (1 VIEW)   Final Result         1. No acute cardiopulmonary abnormalities are identified.          COURSE & MEDICAL DECISION MAKING    ASSESSMENT, COURSE AND PLAN  Care Narrative: This is an ill-appearing  patient who presents with a history of atrial fibrillation with RVR, not taking her medicines, as well as severe alcohol withdrawal, having abstained for 2 days.  I suspect that she is having combination of both A-fib with RVR due to medicine noncompliance as well as acute severe alcohol withdrawal once again.  We will initiate detoxification bag.  I written for Valium per guideline, also will load her with Cardizem.  Discussed this with the pharmacist.      After the medications, she had a soft blood pressure.  This improved with a small amount of IV fluid and short time of observation.  Upon recheck, the patient looks remarkably improved.  She states that she feels much better.  Heart rate is in the 90s.  Her tremor is gone.     Blood pressure continues to increase although still in the normotensive range; however, her heart rate is starting to increase.  I discussed the patient's case with Dr. Mcguire, hospitalist who has reviewed her chart.  He plans to get some metoprolol on board, and we will put her in the hospital for alcohol withdrawal.    At this point, I think that she will be okay for the telemetry floor, although of course we will need to keep a close eye on her.      DISPOSITION AND DISCUSSIONS  I have discussed management of the patient with the following physicians and CIARAN's: Hospitalist    Discussion of management with other Q or appropriate source(s): Pharmacy            FINAL DIAGNOSIS  1. Acute alcohol abuse, with unspecified complication (HCC)    2. Atrial fibrillation with rapid ventricular response (HCC)    3. Metabolic acidosis, increased anion gap      Critical care time, 35 minutes, which includes obtaining history from patient and/or family/prehospital historians, examination of patient, reevaluation of patient, direction of nursing staff, and discussion with admitting and consulting physicians. It does not include any procedures.    Electronically signed by: Asher Lazo M.D.,  6/9/2024 2:23 PM

## 2024-06-10 LAB
ALBUMIN SERPL BCP-MCNC: 2.9 G/DL (ref 3.2–4.9)
ALBUMIN/GLOB SERPL: 1.5 G/DL
ALP SERPL-CCNC: 75 U/L (ref 30–99)
ALT SERPL-CCNC: 19 U/L (ref 2–50)
ANION GAP SERPL CALC-SCNC: 12 MMOL/L (ref 7–16)
AST SERPL-CCNC: 33 U/L (ref 12–45)
BILIRUB SERPL-MCNC: 2.6 MG/DL (ref 0.1–1.5)
BUN SERPL-MCNC: 18 MG/DL (ref 8–22)
CALCIUM ALBUM COR SERPL-MCNC: 8.8 MG/DL (ref 8.5–10.5)
CALCIUM SERPL-MCNC: 7.9 MG/DL (ref 8.5–10.5)
CHLORIDE SERPL-SCNC: 106 MMOL/L (ref 96–112)
CO2 SERPL-SCNC: 21 MMOL/L (ref 20–33)
CREAT SERPL-MCNC: 0.56 MG/DL (ref 0.5–1.4)
ERYTHROCYTE [DISTWIDTH] IN BLOOD BY AUTOMATED COUNT: 53.7 FL (ref 35.9–50)
GFR SERPLBLD CREATININE-BSD FMLA CKD-EPI: 94 ML/MIN/1.73 M 2
GLOBULIN SER CALC-MCNC: 1.9 G/DL (ref 1.9–3.5)
GLUCOSE SERPL-MCNC: 90 MG/DL (ref 65–99)
HCT VFR BLD AUTO: 36 % (ref 37–47)
HGB BLD-MCNC: 12.1 G/DL (ref 12–16)
MAGNESIUM SERPL-MCNC: 1.5 MG/DL (ref 1.5–2.5)
MCH RBC QN AUTO: 33.3 PG (ref 27–33)
MCHC RBC AUTO-ENTMCNC: 33.6 G/DL (ref 32.2–35.5)
MCV RBC AUTO: 99.2 FL (ref 81.4–97.8)
PHOSPHATE SERPL-MCNC: 1.8 MG/DL (ref 2.5–4.5)
PLATELET # BLD AUTO: 47 K/UL (ref 164–446)
PLATELETS.RETICULATED NFR BLD AUTO: 7.6 % (ref 0.6–13.1)
PMV BLD AUTO: 11.1 FL (ref 9–12.9)
POTASSIUM SERPL-SCNC: 3.2 MMOL/L (ref 3.6–5.5)
PROT SERPL-MCNC: 4.8 G/DL (ref 6–8.2)
RBC # BLD AUTO: 3.63 M/UL (ref 4.2–5.4)
SODIUM SERPL-SCNC: 139 MMOL/L (ref 135–145)
TSH SERPL DL<=0.005 MIU/L-ACNC: 2.83 UIU/ML (ref 0.38–5.33)
WBC # BLD AUTO: 5.3 K/UL (ref 4.8–10.8)

## 2024-06-10 PROCEDURE — 36415 COLL VENOUS BLD VENIPUNCTURE: CPT

## 2024-06-10 PROCEDURE — 99233 SBSQ HOSP IP/OBS HIGH 50: CPT | Performed by: STUDENT IN AN ORGANIZED HEALTH CARE EDUCATION/TRAINING PROGRAM

## 2024-06-10 PROCEDURE — 770020 HCHG ROOM/CARE - TELE (206)

## 2024-06-10 PROCEDURE — 85055 RETICULATED PLATELET ASSAY: CPT

## 2024-06-10 PROCEDURE — 700102 HCHG RX REV CODE 250 W/ 637 OVERRIDE(OP): Mod: JZ

## 2024-06-10 PROCEDURE — 97163 PT EVAL HIGH COMPLEX 45 MIN: CPT

## 2024-06-10 PROCEDURE — A9270 NON-COVERED ITEM OR SERVICE: HCPCS | Performed by: STUDENT IN AN ORGANIZED HEALTH CARE EDUCATION/TRAINING PROGRAM

## 2024-06-10 PROCEDURE — 80053 COMPREHEN METABOLIC PANEL: CPT

## 2024-06-10 PROCEDURE — A9270 NON-COVERED ITEM OR SERVICE: HCPCS | Mod: JZ

## 2024-06-10 PROCEDURE — 85027 COMPLETE CBC AUTOMATED: CPT

## 2024-06-10 PROCEDURE — 84443 ASSAY THYROID STIM HORMONE: CPT

## 2024-06-10 PROCEDURE — 84100 ASSAY OF PHOSPHORUS: CPT

## 2024-06-10 PROCEDURE — 97166 OT EVAL MOD COMPLEX 45 MIN: CPT

## 2024-06-10 PROCEDURE — 700111 HCHG RX REV CODE 636 W/ 250 OVERRIDE (IP): Mod: JZ

## 2024-06-10 PROCEDURE — 700102 HCHG RX REV CODE 250 W/ 637 OVERRIDE(OP): Performed by: STUDENT IN AN ORGANIZED HEALTH CARE EDUCATION/TRAINING PROGRAM

## 2024-06-10 PROCEDURE — 83735 ASSAY OF MAGNESIUM: CPT

## 2024-06-10 RX ORDER — MAGNESIUM SULFATE HEPTAHYDRATE 40 MG/ML
2 INJECTION, SOLUTION INTRAVENOUS ONCE
Status: COMPLETED | OUTPATIENT
Start: 2024-06-10 | End: 2024-06-10

## 2024-06-10 RX ORDER — POTASSIUM CHLORIDE 20 MEQ/1
40 TABLET, EXTENDED RELEASE ORAL ONCE
Status: COMPLETED | OUTPATIENT
Start: 2024-06-10 | End: 2024-06-10

## 2024-06-10 RX ADMIN — CHLORDIAZEPOXIDE HYDROCHLORIDE 50 MG: 25 CAPSULE ORAL at 12:30

## 2024-06-10 RX ADMIN — TRAZODONE HYDROCHLORIDE 50 MG: 50 TABLET ORAL at 21:10

## 2024-06-10 RX ADMIN — AMLODIPINE BESYLATE 2.5 MG: 5 TABLET ORAL at 05:17

## 2024-06-10 RX ADMIN — CHLORDIAZEPOXIDE HYDROCHLORIDE 50 MG: 25 CAPSULE ORAL at 05:18

## 2024-06-10 RX ADMIN — MAGNESIUM SULFATE HEPTAHYDRATE 2 G: 2 INJECTION, SOLUTION INTRAVENOUS at 05:17

## 2024-06-10 RX ADMIN — LORAZEPAM 2 MG: 2 TABLET ORAL at 05:21

## 2024-06-10 RX ADMIN — THERA TABS 1 TABLET: TAB at 05:18

## 2024-06-10 RX ADMIN — APIXABAN 5 MG: 5 TABLET, FILM COATED ORAL at 17:27

## 2024-06-10 RX ADMIN — POTASSIUM CHLORIDE 40 MEQ: 1500 TABLET, EXTENDED RELEASE ORAL at 05:17

## 2024-06-10 RX ADMIN — METOPROLOL TARTRATE 25 MG: 25 TABLET, FILM COATED ORAL at 21:10

## 2024-06-10 RX ADMIN — Medication 100 MG: at 05:17

## 2024-06-10 RX ADMIN — LORAZEPAM 1 MG: 1 TABLET ORAL at 09:30

## 2024-06-10 RX ADMIN — METOPROLOL TARTRATE 25 MG: 25 TABLET, FILM COATED ORAL at 05:18

## 2024-06-10 RX ADMIN — ESCITALOPRAM OXALATE 10 MG: 10 TABLET ORAL at 05:18

## 2024-06-10 RX ADMIN — DIBASIC SODIUM PHOSPHATE, MONOBASIC POTASSIUM PHOSPHATE AND MONOBASIC SODIUM PHOSPHATE 500 MG: 852; 155; 130 TABLET ORAL at 17:27

## 2024-06-10 RX ADMIN — DIGOXIN 125 MCG: 0.25 TABLET ORAL at 05:17

## 2024-06-10 RX ADMIN — VENLAFAXINE HYDROCHLORIDE 150 MG: 37.5 CAPSULE, EXTENDED RELEASE ORAL at 05:18

## 2024-06-10 RX ADMIN — LORAZEPAM 2 MG: 2 TABLET ORAL at 03:27

## 2024-06-10 RX ADMIN — METOPROLOL TARTRATE 25 MG: 25 TABLET, FILM COATED ORAL at 14:42

## 2024-06-10 RX ADMIN — FOLIC ACID 1 MG: 1 TABLET ORAL at 05:17

## 2024-06-10 RX ADMIN — LORAZEPAM 1 MG: 1 TABLET ORAL at 21:10

## 2024-06-10 RX ADMIN — LORAZEPAM 2 MG: 2 TABLET ORAL at 07:34

## 2024-06-10 RX ADMIN — APIXABAN 5 MG: 5 TABLET, FILM COATED ORAL at 05:17

## 2024-06-10 RX ADMIN — LEVOTHYROXINE SODIUM 50 MCG: 0.05 TABLET ORAL at 05:17

## 2024-06-10 ASSESSMENT — COGNITIVE AND FUNCTIONAL STATUS - GENERAL
STANDING UP FROM CHAIR USING ARMS: A LOT
SUGGESTED CMS G CODE MODIFIER DAILY ACTIVITY: CL
TURNING FROM BACK TO SIDE WHILE IN FLAT BAD: A LOT
MOVING TO AND FROM BED TO CHAIR: A LOT
DAILY ACTIVITIY SCORE: 13
HELP NEEDED FOR BATHING: A LOT
WALKING IN HOSPITAL ROOM: TOTAL
DRESSING REGULAR UPPER BODY CLOTHING: A LOT
PERSONAL GROOMING: A LOT
TOILETING: A LOT
TURNING FROM BACK TO SIDE WHILE IN FLAT BAD: A LITTLE
HELP NEEDED FOR BATHING: A LOT
STANDING UP FROM CHAIR USING ARMS: A LOT
CLIMB 3 TO 5 STEPS WITH RAILING: TOTAL
MOVING TO AND FROM BED TO CHAIR: A LITTLE
DRESSING REGULAR LOWER BODY CLOTHING: A LOT
WALKING IN HOSPITAL ROOM: A LOT
MOVING FROM LYING ON BACK TO SITTING ON SIDE OF FLAT BED: A LOT
DRESSING REGULAR LOWER BODY CLOTHING: A LOT
MOBILITY SCORE: 14
MOVING FROM LYING ON BACK TO SITTING ON SIDE OF FLAT BED: A LITTLE
SUGGESTED CMS G CODE MODIFIER DAILY ACTIVITY: CL
CLIMB 3 TO 5 STEPS WITH RAILING: TOTAL
DAILY ACTIVITIY SCORE: 13
MOBILITY SCORE: 10
PERSONAL GROOMING: A LOT
TOILETING: A LOT
DRESSING REGULAR UPPER BODY CLOTHING: A LOT
SUGGESTED CMS G CODE MODIFIER MOBILITY: CL
EATING MEALS: A LITTLE
EATING MEALS: A LITTLE
SUGGESTED CMS G CODE MODIFIER MOBILITY: CL

## 2024-06-10 ASSESSMENT — LIFESTYLE VARIABLES
TREMOR: TREMOR NOT VISIBLE BUT CAN BE FELT, FINGERTIP TO FINGERTIP
PAROXYSMAL SWEATS: *
HAVE PEOPLE ANNOYED YOU BY CRITICIZING YOUR DRINKING: YES
ORIENTATION AND CLOUDING OF SENSORIUM: ORIENTED AND CAN DO SERIAL ADDITIONS
VISUAL DISTURBANCES: NOT PRESENT
ORIENTATION AND CLOUDING OF SENSORIUM: ORIENTED AND CAN DO SERIAL ADDITIONS
TOTAL SCORE: MILD ITCHING, PINS AND NEEDLES SENSATION, BURNING OR NUMBNESS
HOW MANY TIMES IN THE PAST YEAR HAVE YOU HAD 5 OR MORE DRINKS IN A DAY: 360
PAROXYSMAL SWEATS: BARELY PERCEPTIBLE SWEATING. PALMS MOIST
ON A TYPICAL DAY WHEN YOU DRINK ALCOHOL HOW MANY DRINKS DO YOU HAVE: 4
TOTAL SCORE: 9
AUDITORY DISTURBANCES: NOT PRESENT
HAVE YOU EVER FELT YOU SHOULD CUT DOWN ON YOUR DRINKING: YES
TREMOR: TREMOR NOT VISIBLE BUT CAN BE FELT, FINGERTIP TO FINGERTIP
TOTAL SCORE: 4
AUDITORY DISTURBANCES: NOT PRESENT
PAROXYSMAL SWEATS: NO SWEAT VISIBLE
AGITATION: SOMEWHAT MORE THAN NORMAL ACTIVITY
AUDITORY DISTURBANCES: NOT PRESENT
ANXIETY: *
AVERAGE NUMBER OF DAYS PER WEEK YOU HAVE A DRINK CONTAINING ALCOHOL: 7
ANXIETY: *
PAROXYSMAL SWEATS: *
AGITATION: SOMEWHAT MORE THAN NORMAL ACTIVITY
EVER HAD A DRINK FIRST THING IN THE MORNING TO STEADY YOUR NERVES TO GET RID OF A HANGOVER: YES
NAUSEA AND VOMITING: MILD NAUSEA WITH NO VOMITING
TOTAL SCORE: 8
AUDITORY DISTURBANCES: NOT PRESENT
AGITATION: SOMEWHAT MORE THAN NORMAL ACTIVITY
HEADACHE, FULLNESS IN HEAD: NOT PRESENT
TOTAL SCORE: 4
AGITATION: SOMEWHAT MORE THAN NORMAL ACTIVITY
ANXIETY: MODERATELY ANXIOUS OR GUARDED, SO ANXIETY IS INFERRED
ORIENTATION AND CLOUDING OF SENSORIUM: CANNOT DO SERIAL ADDITIONS OR IS UNCERTAIN ABOUT DATE
ORIENTATION AND CLOUDING OF SENSORIUM: CANNOT DO SERIAL ADDITIONS OR IS UNCERTAIN ABOUT DATE
HEADACHE, FULLNESS IN HEAD: MILD
ANXIETY: *
TREMOR: NO TREMOR
ANXIETY: MILDLY ANXIOUS
TOTAL SCORE: 11
PAROXYSMAL SWEATS: *
AUDITORY DISTURBANCES: NOT PRESENT
VISUAL DISTURBANCES: NOT PRESENT
AUDITORY DISTURBANCES: NOT PRESENT
ORIENTATION AND CLOUDING OF SENSORIUM: CANNOT DO SERIAL ADDITIONS OR IS UNCERTAIN ABOUT DATE
TOTAL SCORE: 4
VISUAL DISTURBANCES: MILD SENSITIVITY
HEADACHE, FULLNESS IN HEAD: NOT PRESENT
VISUAL DISTURBANCES: NOT PRESENT
HEADACHE, FULLNESS IN HEAD: NOT PRESENT
NAUSEA AND VOMITING: MILD NAUSEA WITH NO VOMITING
TOTAL SCORE: 4
DOES PATIENT WANT TO STOP DRINKING: NO
AGITATION: NORMAL ACTIVITY
HEADACHE, FULLNESS IN HEAD: MILD
AGITATION: SOMEWHAT MORE THAN NORMAL ACTIVITY
ANXIETY: MILDLY ANXIOUS
TOTAL SCORE: MILD ITCHING, PINS AND NEEDLES SENSATION, BURNING OR NUMBNESS
AGITATION: *
ORIENTATION AND CLOUDING OF SENSORIUM: CANNOT DO SERIAL ADDITIONS OR IS UNCERTAIN ABOUT DATE
VISUAL DISTURBANCES: VERY MILD SENSITIVITY
TREMOR: TREMOR NOT VISIBLE BUT CAN BE FELT, FINGERTIP TO FINGERTIP
TOTAL SCORE: 11
HEADACHE, FULLNESS IN HEAD: NOT PRESENT
NAUSEA AND VOMITING: MILD NAUSEA WITH NO VOMITING
TOTAL SCORE: 13
ORIENTATION AND CLOUDING OF SENSORIUM: ORIENTED AND CAN DO SERIAL ADDITIONS
TREMOR: TREMOR NOT VISIBLE BUT CAN BE FELT, FINGERTIP TO FINGERTIP
PAROXYSMAL SWEATS: BARELY PERCEPTIBLE SWEATING. PALMS MOIST
ANXIETY: MODERATELY ANXIOUS OR GUARDED, SO ANXIETY IS INFERRED
NAUSEA AND VOMITING: NO NAUSEA AND NO VOMITING
HEADACHE, FULLNESS IN HEAD: MILD
AUDITORY DISTURBANCES: NOT PRESENT
TOTAL SCORE: 4
TREMOR: TREMOR NOT VISIBLE BUT CAN BE FELT, FINGERTIP TO FINGERTIP
NAUSEA AND VOMITING: NO NAUSEA AND NO VOMITING
TREMOR: TREMOR NOT VISIBLE BUT CAN BE FELT, FINGERTIP TO FINGERTIP
EVER FELT BAD OR GUILTY ABOUT YOUR DRINKING: YES
VISUAL DISTURBANCES: VERY MILD SENSITIVITY
NAUSEA AND VOMITING: NO NAUSEA AND NO VOMITING
NAUSEA AND VOMITING: NO NAUSEA AND NO VOMITING
CONSUMPTION TOTAL: POSITIVE
PAROXYSMAL SWEATS: NO SWEAT VISIBLE
VISUAL DISTURBANCES: NOT PRESENT
ALCOHOL_USE: YES

## 2024-06-10 ASSESSMENT — ENCOUNTER SYMPTOMS
WEAKNESS: 1
NAUSEA: 1
PALPITATIONS: 1
VOMITING: 1

## 2024-06-10 ASSESSMENT — ACTIVITIES OF DAILY LIVING (ADL): TOILETING: INDEPENDENT

## 2024-06-10 ASSESSMENT — PAIN DESCRIPTION - PAIN TYPE
TYPE: ACUTE PAIN
TYPE: ACUTE PAIN

## 2024-06-10 ASSESSMENT — GAIT ASSESSMENTS: GAIT LEVEL OF ASSIST: UNABLE TO PARTICIPATE

## 2024-06-10 NOTE — ASSESSMENT & PLAN NOTE
Concurrent thrombocytopenia, coagulopathy, and bilirubin concerning for acute vs chronic hepatitis  Prior abdominal imaging in 2023 predates thrombocytopenia, which now seems to be chronic concerning for cirrhosis  RUQ US for underlying cirrhosis

## 2024-06-10 NOTE — ASSESSMENT & PLAN NOTE
Continue escitalopram, mirtazapine, and venlafaxine - per chart review and her report she has tolerated concurrent SSRI/SNRI without signs of serotonin syndrome  Psychology consult

## 2024-06-10 NOTE — ASSESSMENT & PLAN NOTE
Alienation of contacts and loss of family in past 10 years  Resorts to alcohol for coping, counseled and demonstrates insight that alcohol perpetuates or worsens her situation  Psychology consult for behavioral options  RNCM consult for substance treatment options

## 2024-06-10 NOTE — ASSESSMENT & PLAN NOTE
Mild and hypochloremic likely from inadequate PO intake from alcohol use  1 L LR ordered after completion of rally bag  Repeat AM CMP

## 2024-06-10 NOTE — ED NOTES
Bedside report given to SNEHA Henao.   Pt transported to floor via gurney on RA and continuous cardiac monitoring w/ RN.

## 2024-06-10 NOTE — ASSESSMENT & PLAN NOTE
Recurrent  Patient formally on Librium and CIWA protocol.  Discontinue Librium, required a dose of Ativan this morning.  Post recent CIWA is 4.  Empiric MVN + Thiamine + Folate  tele

## 2024-06-10 NOTE — PROGRESS NOTES
4 Eyes Skin Assessment Completed by SNEHA Henao and SNEHA Hobbs.    Head WDL  Ears WDL  Nose WDL  Mouth WDL  Neck WDL  Breast/Chest WDL  Shoulder Blades WDL  Spine WDL  (R) Arm/Elbow/Hand Redness, Blanching, and Bruising  (L) Arm/Elbow/Hand Redness and Blanching  Abdomen WDL  Groin WDL  Scrotum/Coccyx/Buttocks Redness and Blanching  (R) Leg WDL  (L) Leg WDL  (R) Heel/Foot/Toe WDL, healed blister  (L) Heel/Foot/Toe WDL, healed blister          Devices In Places Tele Box and Pulse Ox      Interventions In Place Gray Ear Foams and NC W/Ear Foams, dri-flow, taps, q2 turns, barrier wipes, barrier cream    Possible Skin Injury No    Pictures Uploaded Into Epic Yes  Wound Consult Placed N/A  RN Wound Prevention Protocol Ordered No

## 2024-06-10 NOTE — PROGRESS NOTES
Bedside report received from off going RN/tech: Earlene, assumed care of patient.     Fall Risk Score: HIGH RISK  Fall risk interventions in place: Provide patient/family education based on risk assessment, Educate patient/family to call staff for assistance when getting out of bed, Place fall precaution signage outside patient door, Place patient in room close to nursing station, Utilize bed/chair fall alarm, and Bed alarm connected correctly  Bed type: Regular (Albert Score less than 17 interventions in place)  Patient on cardiac monitor: Yes  IVF/IV medications: Not Applicable   Oxygen: Room Air  Bedside sitter: Not Applicable   Isolation: Not applicable

## 2024-06-10 NOTE — THERAPY
Occupational Therapy   Initial Evaluation     Patient Name: Jeanie Hutchison  Age:  78 y.o., Sex:  female  Medical Record #: [id number]  Today's Date: 6/10/2024     Precautions  Precautions: Fall Risk  Comments: ETOH withdrawal    Assessment  Patient is 78 y.o. female admitted after found down by son for alcohol withdrawal, hyponatremia, hypokalemia, hepatitis w/o ascites (unclear if acute vs chronic) and afib RVR. PMHX of afib, depression, anxiety, HTN, alcoholic, and ETOH abuse. Pt unable to provide history and req max v/cs throughout session for safety, sequencing, and initiation. Info obtained from chart. Recommend clarification with family; per chart, pt has a son who lives in [facility]. Currently limited by decreased functional mobility, activity tolerance, cognition, strength, AROM, coordination, balance, and pain which are currently affecting pt's ability to complete ADLs/IADLs at baseline. Will continue to follow.     Plan    Occupational Therapy Initial Treatment Plan   Treatment Interventions: Self Care / Activities of Daily Living, Adaptive Equipment, Neuro Re-Education / Balance, Therapeutic Exercises, Therapeutic Activity, Family / Caregiver Training  Treatment Frequency: 3 Times per Week  Duration: Until Therapy Goals Met    DC Equipment Recommendations: Unable to determine at this time  Discharge Recommendations: Recommend post-acute placement for additional occupational therapy services prior to discharge home      Objective     06/10/24 1227   Prior Living Situation   Prior Services Home-Independent   Housing / Facility 2 Story House   Steps Into Home 1   Bathroom Set up Bathtub / Shower Combination   Equipment Owned None   Lives with - Patient's Self Care Capacity Alone and Unable to Care For Self   Comments Pt unable to provide history. Info obtained from chart. Recommend clarification with family; per chart, pt has a son who lives in [facility]   Prior Level of ADL Function   Self Feeding Independent    Grooming / Hygiene Independent   Bathing Independent   Dressing Independent   Toileting Independent   Prior Level of IADL Function   Medication Management Independent   Laundry Independent   Kitchen Mobility Independent   Finances Independent   Home Management Independent   Shopping Independent   Prior Level Of Mobility Independent Without Device in Community;Independent Without Device in Home   Driving / Transportation Driving Independent  (per chart, son installed breathalizer on car)   Comments unclear if accurate; recommend clarification   History of Falls   History of Falls Yes   Date of Last Fall   (found down; reason for admit)   Precautions   Precautions Fall Risk   Comments ETOH withdrawal   Vitals   O2 (LPM) 1   O2 Delivery Device Silicone Nasal Cannula   Pain 0 - 10 Group   Therapist Pain Assessment Post Activity Pain Same as Prior to Activity;During Activity;Nurse Notified  (no c/o pain)   Cognition    Cognition / Consciousness X   Speech/ Communication Delayed Responses;Slurred   Level of Consciousness Alert  (but fatigued quickly)   Ability To Follow Commands 1 Step  (~75% of the time)   Safety Awareness Impaired;Impulsive   New Learning Impaired   Attention Impaired   Sequencing Impaired   Initiation Impaired   Comments cooperative with encouragement, appears fatigued/lethargic, unable to answer questions, slurred speech   Passive ROM Upper Body   Passive ROM Upper Body WDL   Active ROM Upper Body   Active ROM Upper Body  X   Comments shoulders limited by weakness/fatigue and volition   Strength Upper Body   Upper Body Strength  X   Gross Strength Generalized Weakness, Equal Bilaterally.    Comments B 3/5 grossly   Neurological Concerns   Neurological Concerns Yes   Sitting Posture During ADL's Posterior Lean;Lateral Lean Right   Coordination Upper Body   Coordination X   Comments FM and GM impaired; tremulous   Balance Assessment   Sitting Balance (Static) Fair -   Sitting Balance (Dynamic) Poor +    Standing Balance (Static) Poor -   Standing Balance (Dynamic) Trace +   Weight Shift Sitting Poor   Weight Shift Standing Poor   Comments w/FWW in standing; multiple LOBs posteriorly and to the R req mod A and max v/cs to correct   Bed Mobility    Supine to Sit Moderate Assist   Sit to Supine Moderate Assist   Scooting Minimal Assist   Comments HOB elevated; req v/cs for sequencing and initiation   ADL Assessment   Eating Minimal Assist   Grooming Moderate Assist;Seated  (mod A for balance)   Lower Body Dressing Maximal Assist  (socks)   Toileting Maximal Assist  (purewick and bedpan)   Functional Mobility   Sit to Stand Minimal Assist  (once standing, slowly sank back to sit EOB)   Toilet Transfers Unable to Participate   Mobility EOB and STS x2 only   Edema / Skin Assessment   Edema / Skin  Not Assessed   Activity Tolerance   Comments limited by fatigue/lethargy, cognition, weakness, and withdrawal symptoms   Patient / Family Goals   Patient / Family Goal #1 to lay down   Short Term Goals   Short Term Goal # 1 BSC txf with min A   Short Term Goal # 2 toileting with SPV   Short Term Goal # 3 seated g/h w/SPV and no LOB   Short Term Goal # 4 LB dressing w/min A   Education Group   Education Provided Role of Occupational Therapist;Pathology of bedrest   Role of Occupational Therapist Patient Response Patient;Acceptance;Explanation;No Learning Evidence;Reinforcement Needed   ADL Patient Response Patient;Acceptance;Explanation;Reinforcement Needed;No Learning Evidence   Pathology of Bedrest Patient Response Patient;Acceptance;Explanation;Reinforcement Needed;No Learning Evidence   Occupational Therapy Initial Treatment Plan    Treatment Interventions Self Care / Activities of Daily Living;Adaptive Equipment;Neuro Re-Education / Balance;Therapeutic Exercises;Therapeutic Activity;Family / Caregiver Training   Treatment Frequency 3 Times per Week   Duration Until Therapy Goals Met   Problem List   Problem List Decreased  Active Daily Living Skills;Decreased Homemaking Skills;Decreased Upper Extremity Strength Right;Decreased Upper Extremity Strength Left;Decreased Upper Extremity AROM Right;Decreased Upper Extremity AROM Left;Decreased Functional Mobility;Decreased Activity Tolerance;Safety Awareness Deficits / Cognition;Impaired Coordination Right Upper Extremity;Impaired Posture / Trunk Alignment;Impaired Coordination Left Upper Extremity;Impaired Cognitive Function;Impaired Postural Control / Balance     Patient seen for team evaluation with Physical Therapist for the following reason(s): Patient required 2 person assistance for safety and to provide effective interventions. Charge RN also requested pt be seen ASAP for d/c recs. Each discipline assisted patient with appropriate and separate goals. Occupational Therapist facilitated evaluation of UB function, ADLs, sitting balance during ADLs, cognition, and prep for ADL txfs while Physical Therapist simultaneously evaluated pt according to POC.

## 2024-06-10 NOTE — ASSESSMENT & PLAN NOTE
In setting of alcohol withdrawal and self-discontinuation of BB  Metoprolol 25 mg q8h, up-titrate to maintain HR <110  Continue digoxin and apixaban  Patient remains rate controlled on metoprolol and digoxin.  Metoprolol 5 mg IV PRN RVR  Now medical 6/11

## 2024-06-10 NOTE — CARE PLAN
The patient is Stable - Low risk of patient condition declining or worsening    Shift Goals  Clinical Goals: CIWA, monitor mentation  Patient Goals: DANICA  Family Goals: N/A    Progress made toward(s) clinical / shift goals:    Problem: Optimal Care for Alcohol Withdrawal  Goal: Optimal Care for the alcohol withdrawal patient  Description: Target End Date:  1 to 3 days    1.  Alcohol history screening done on admission  2.  CIWA-AR score assessment (includes assessment of nausea/vomiting, tremor, sweats, anxiety, agitation, tactile, visual and auditory disturbances, headache and orientation/sensorium).  Document on CIWA flowsheet.  3.  Follow CIWA-AR score protocol  4.  Frequent reorientation  5.  Monitor for fluid and electrolyte imbalance.  6.  Assess for respiratory depression due to sedation (pulse ox)  7.  Consider thiamine, multivitamins, folic acid and magnesium sulfate per provider order  8.  Collaborate with Social Workers/Case Management  9.  Collaborate with mental health  Outcome: Progressing  Note: Appropriate CIWA protocol in place. Pt undergoing q2 hr CIWAs and receiving q6 hr librium.      Problem: Seizure Precautions  Goal: Implementation of seizure precautions  Description: Target End Date:  Prior to discharge or change in level of care    1.  Padded side rails up at all times  2.  Suction equipment and oxygen delivery system at bedside  3.  Continuous pulse oximeter in use  4.  Implement fall precautions, bed alarm on, bed in lowest position  5.  IV access (per order)  6.  Provide low stimulus environment, avoid exposure to triggers  7.  Instruct patient to use call light/seizure button if having warning signs of impending seizure  Outcome: Progressing  Note: Appropriate seizure precautions in place: padded bed rails and bed in lowest, locked position.     Problem: Risk for Aspiration  Goal: Patient's risk for aspiration will be absent or decrease  Description: Target End Date:  Prior to discharge  or change in level of care    1.   Complete dysphagia screening on admission  2.   NPO until dysphagia screening complete or medically cleared  3.   Collaborate with Speech Therapy, Clinical Dietitian and interdisciplinary team  4.   Implement aspiration precautions  5.   Assist patient up to chair for meals  6.   Elevate head of bed 90 degrees if patient is unable to get out of bed  7.   Encourage small bites  8.   Ensure foods/liquids are of appropriate consistency  9.   Assess for any signs/symptoms of aspiration  10. Assess breath sounds and vital signs after oral intake  Outcome: Progressing  Note: Appropriate aspiration precautions in place. Suction at bedside and head of bed greater than 30 degrees.     Problem: Fall Risk  Goal: Patient will remain free from falls  Description: Target End Date:  Prior to discharge or change in level of care    Document interventions on the [facility] Fall Risk Assessment    1.  Assess for fall risk factors  2.  Implement fall precautions  Outcome: Progressing  Note: Pt is a high fall risk. Bed is in lowest, locked position. Call light and belongings within reach. Bed alarm is on. Pt reeducated on how to utilize call light.

## 2024-06-10 NOTE — THERAPY
"Physical Therapy   Initial Evaluation     Patient Name: Jeanie Hutchison  Age:  78 y.o., Sex:  female  Medical Record #: [id number]  Today's Date: 6/10/2024     Precautions  Precautions: (P) Fall Risk    Assessment  Patient is 78 y.o. female admitted after being found down by her son with alcohol withdrawal symptoms. Pt found to have alcohol withdrawal, alcoholic hepatitis without ascites, hyponatremia, a-fib with RVR, HTN. PMH includes Afib, CLEO, hypothyroidism, depression/anxiety, HTN, and EtOH.     Patient received in bed and agreeable to PT session. Pt required min-modA with FWW for mobility; question pt's effort vs cognition. When sitting EOB, pt had notable right/posterior trunk lean, pt required physical assistance to remain upright for majority of session despite cues for use of UE for support, pt intermittently able to sit self supported but briefly. Pt is mobilizing below baseline and is primarily limited by cognition and impaired functional strength, balance, coordination, and activity tolerance. Recommend post acute placement. Will follow for acute care PT needs.     Plan    Physical Therapy Initial Treatment Plan   Treatment Plan : (P) Bed Mobility, Gait Training, Neuro Re-Education / Balance, Self Care / Home Evaluation, Stair Training, Therapeutic Activities, Therapeutic Exercise  Treatment Frequency: (P) 3 Times per Week  Duration: (P) Until Therapy Goals Met    DC Equipment Recommendations: (P) Unable to determine at this time  Discharge Recommendations: (P) Recommend post-acute placement for additional physical therapy services prior to discharge home       Subjective    \"I want my iPad\".      Objective       06/10/24 1226   Initial Contact Note    Initial Contact Note Order Received and Verified, Physical Therapy Evaluation in Progress with Full Report to Follow.   Precautions   Precautions Fall Risk   Vitals   O2 (LPM) 1   O2 Delivery Device Silicone Nasal Cannula   Pain 0 - 10 Group   Therapist " Pain Assessment Post Activity Pain Same as Prior to Activity;Nurse Notified  (pain not rated)   Prior Living Situation   Prior Services None   Housing / Facility 2 Story House   Steps Into Home 1   Equipment Owned None   Lives with - Patient's Self Care Capacity Alone and Unable to Care For Self   Comments obtained per chart review; pt confused, lethargic, unreliable historian   Prior Level of Functional Mobility   Bed Mobility Independent   Transfer Status Independent   Ambulation Independent   Assistive Devices Used None   Stairs Independent   Comments obtained per chart review, pt unable to provide PLOF   History of Falls   History of Falls Yes   Date of Last Fall   (found down by son)   Cognition    Cognition / Consciousness X   Level of Consciousness Alert   Ability To Follow Commands 1 Step  (75% of the time)   Safety Awareness Impaired   New Learning Impaired   Attention Impaired   Sequencing Impaired   Initiation Impaired   Comments appearing lethargic   Active ROM Lower Body    Active ROM Lower Body  WDL   Strength Lower Body   Lower Body Strength  X   Gross Strength Generalized Weakness, Equal Bilaterally   Comments grossly 3+/5   Sensation Lower Body   Comments not tested due to cognition   Lower Body Muscle Tone   Lower Body Muscle Tone  WDL   Balance Assessment   Sitting Balance (Static) Fair -   Sitting Balance (Dynamic) Poor +   Standing Balance (Static) Poor -   Standing Balance (Dynamic) Trace +   Weight Shift Sitting Poor  (questions cognition vs effort)   Weight Shift Standing Poor   Comments w/FWW   Bed Mobility    Supine to Sit Moderate Assist  (questions effort vs cognition)   Sit to Supine Moderate Assist  (questions effort vs cognition)   Scooting Minimal Assist   Comments HOB slightly elevated   Gait Analysis   Gait Level Of Assist Unable to Participate   Functional Mobility   Sit to Stand Minimal Assist   Bed, Chair, Wheelchair Transfer Unable to Participate   Mobility supine > EOB > STS >  supine   6 Clicks Assessment - How much HELP from from another person do you currently need... (If the patient hasn't done an activity recently, how much help from another person do you think he/she would need if he/she tried?)   Turning from your back to your side while in a flat bed without using bedrails? 2   Moving from lying on your back to sitting on the side of a flat bed without using bedrails? 2   Moving to and from a bed to a chair (including a wheelchair)? 2   Standing up from a chair using your arms (e.g., wheelchair, or bedside chair)? 2   Walking in hospital room? 1   Climbing 3-5 steps with a railing? 1   6 clicks Mobility Score 10   Short Term Goals    Short Term Goal # 1 Pt will be able to perform supine <> sit with bed flat and SPV in 6 visits to progress bed mobility   Short Term Goal # 2 Pt will be able to perform STS with LRAD and SPV in 6 visits to progress functional transfers   Short Term Goal # 3 Pt will be able to perform stand step transfer with LRAD and SPV in 6 visits to progress functional mobility   Short Term Goal # 4 Pt will be able to ambulate 150ft with LRAD and SPV in 6 visits to progress functional gait   Short Term Goal # 5 Pt will be able to navigate a flight of stairs with unilateral UE support and SPV in 6 visits in order to safely navigate her home  (if pt does access the upstairs of her home, will need to clarify)   Education Group   Education Provided Role of Physical Therapist   Role of Physical Therapist Patient Response Patient;Acceptance;Explanation;Verbal Demonstration   Physical Therapy Initial Treatment Plan    Treatment Plan  Bed Mobility;Gait Training;Neuro Re-Education / Balance;Self Care / Home Evaluation;Stair Training;Therapeutic Activities;Therapeutic Exercise   Treatment Frequency 3 Times per Week   Duration Until Therapy Goals Met   Problem List    Problems Impaired Bed Mobility;Impaired Transfers;Impaired Ambulation;Functional Strength Deficit;Impaired  Balance;Decreased Activity Tolerance;Safety Awareness Deficits / Cognition   Anticipated Discharge Equipment and Recommendations   DC Equipment Recommendations Unable to determine at this time   Discharge Recommendations Recommend post-acute placement for additional physical therapy services prior to discharge home   Interdisciplinary Plan of Care Collaboration   IDT Collaboration with  Nursing;Occupational Therapist   Patient Position at End of Therapy In Bed;Bed Alarm On;Call Light within Reach;Tray Table within Reach;Phone within Reach   Collaboration Comments RN updated   Session Information   Date / Session Number  6/10 - 1 (1/3, 6/16)     Patient seen for team evaluation with Occupational Therapist for the following reason(s):  Patient required 2 person assistance for safety and to provide effective interventions due to pt's limited volition and cognitive deficits. Each discipline assisted patient with appropriate and separate goals. Due to the medical complexity, the skill of both practitioners is needed to monitor vitals, patient status, and adjust the intervention to fit the patient's needs and goals. Therapy sessions needed to be done by a certain time period for both disciplines, and did not impede patient's progress.

## 2024-06-10 NOTE — PROGRESS NOTES
4 Eyes Skin Assessment Completed by SNEHA Henao and SNEHA Lawton.    Head WDL  Ears WDL  Nose WDL  Mouth WDL  Neck WDL  Breast/Chest WDL  Shoulder Blades WDL  Spine WDL  (R) Arm/Elbow/Hand Bruising, redness, blaching  (L) Arm/Elbow/Hand Redness and Blanching  Abdomen WDL  Groin WDL  Scrotum/Coccyx/Buttocks Redness and Blanching  (R) Leg WDL  (L) Leg WDL  (R) Heel/Foot/Toe WDL, old blister with extra skin, healed  (L) Heel/Foot/Toe WDL, old blister with extra skin, healed          Devices In Places Tele Box and Pulse Ox      Interventions In Place TAP System, Q2 Turns, and Dri-Kushal Pads    Possible Skin Injury No    Pictures Uploaded Into Epic Yes  Wound Consult Placed N/A  RN Wound Prevention Protocol Ordered Yes

## 2024-06-10 NOTE — PROGRESS NOTES
Hospital Medicine Daily Progress Note    Date of Service  6/10/2024    Chief Complaint  Jeanie Hutchison is a 78 y.o. female admitted 6/9/2024 with detox    Hospital Course   78 y.o. female with Afib, CLEO, hypothyroidism, depression/anxiety, HTN, and EtOH use DO who presented 6/9/2024 with alcohol withdrawal. Last drink 2 days prior admission. Also she stopped taking her other medications approximately 4 days ago but she is uncertain why she did that.      In the ED she is noted to have afib RVR. Home med metoprolol and digoxin were resumed     Interval Problem Update  Seen patient at bedside  Patient is lethargic. Reports diaphoretic and palpitations   On librium  Continue CIWA protocol.  CIWA 11  Continue metoprolol and digoxin  I have reviewed telemetry, A-fib with rate around   I have reviewed chest x-ray, per my read no acute cardiopulmonary abnormalities  Continue Eliquis  PT/OT  K 3.2, phos 1.8, replaced    I have discussed this patient's plan of care and discharge plan at IDT rounds today with Case Management, Nursing, Nursing leadership, and other members of the IDT team.    Consultants/Specialty  na    Code Status  Full Code    Disposition  The patient is not medically cleared for discharge to home or a post-acute facility.      I have placed the appropriate orders for post-discharge needs.    Review of Systems  Review of Systems   Constitutional:  Positive for malaise/fatigue.   Cardiovascular:  Positive for palpitations.   Gastrointestinal:  Positive for nausea and vomiting.   Neurological:  Positive for weakness.   All other systems reviewed and are negative.       Physical Exam  Temp:  [35.8 °C (96.4 °F)-36.9 °C (98.4 °F)] 36.6 °C (97.9 °F)  Pulse:  [] 66  Resp:  [12-21] 18  BP: ()/() 111/76  SpO2:  [90 %-97 %] 93 %    Physical Exam  Vitals and nursing note reviewed.   Constitutional:       Appearance: Normal appearance. She is ill-appearing.   HENT:      Head: Normocephalic  and atraumatic.      Nose: Nose normal.      Mouth/Throat:      Pharynx: Oropharynx is clear.   Eyes:      Extraocular Movements: Extraocular movements intact.      Conjunctiva/sclera: Conjunctivae normal.      Pupils: Pupils are equal, round, and reactive to light.   Cardiovascular:      Rate and Rhythm: Normal rate. Rhythm irregular.      Pulses: Normal pulses.      Heart sounds: Normal heart sounds.   Pulmonary:      Effort: Pulmonary effort is normal.      Breath sounds: Normal breath sounds.   Abdominal:      General: Abdomen is flat. Bowel sounds are normal.      Palpations: Abdomen is soft.   Musculoskeletal:         General: Normal range of motion.      Cervical back: Normal range of motion and neck supple.      Comments: Tremor noted on hands   Skin:     General: Skin is warm and dry.      Coloration: Skin is pale.   Neurological:      General: No focal deficit present.      Mental Status: She is alert and oriented to person, place, and time. Mental status is at baseline.   Psychiatric:      Comments: anxious         Fluids    Intake/Output Summary (Last 24 hours) at 6/10/2024 1407  Last data filed at 6/10/2024 0548  Gross per 24 hour   Intake 840 ml   Output 0 ml   Net 840 ml       Laboratory  Recent Labs     06/09/24  1433 06/10/24  0315   WBC 8.9 5.3   RBC 4.43 3.63*   HEMOGLOBIN 14.8 12.1   HEMATOCRIT 42.5 36.0*   MCV 95.9 99.2*   MCH 33.4* 33.3*   MCHC 34.8 33.6   RDW 50.0 53.7*   PLATELETCT 63* 47*   MPV 11.1 11.1     Recent Labs     06/09/24  1433 06/10/24  0315   SODIUM 134* 139   POTASSIUM 4.3 3.2*   CHLORIDE 92* 106   CO2 22 21   GLUCOSE 117* 90   BUN 25* 18   CREATININE 0.77 0.56   CALCIUM 9.4 7.9*     Recent Labs     06/09/24  1433   INR 1.18*               Imaging  DX-CHEST-PORTABLE (1 VIEW)   Final Result         1. No acute cardiopulmonary abnormalities are identified.           Assessment/Plan  * Alcohol withdrawal syndrome with complication (HCC)- (present on admission)  Assessment &  Plan  Recurrent  CIWA - Lorazepam protocol with 72h librium taper  Empiric MVN + Thiamine + Folate  tele    Alcoholic hepatitis without ascites- (present on admission)  Assessment & Plan  Concurrent thrombocytopenia, coagulopathy, and bilirubin concerning for acute vs chronic hepatitis  Prior abdominal imaging in 2023 predates thrombocytopenia, which now seems to be chronic concerning for cirrhosis  RUQ US for underlying cirrhosis    Hyponatremia- (present on admission)  Assessment & Plan  Mild and hypochloremic likely from inadequate PO intake from alcohol use  1 L LR ordered after completion of rally bag  Repeat AM CMP    Prolonged QT interval- (present on admission)  Assessment & Plan  Telemetry  K normal  Mg ordered  Repeat AM CMP/Mg  Minimize QT-prolonging medications    Hypophosphatemia- (present on admission)  Assessment & Plan  Replace as indicated    Acquired hypothyroidism- (present on admission)  Assessment & Plan  Continue levothyroxine  Check TSH in normal range    Atrial fibrillation with rapid ventricular response (HCC)- (present on admission)  Assessment & Plan  In setting of alcohol withdrawal and self-discontinuation of BB  Metoprolol 25 mg q8h, up-titrate to maintain HR <110  Metoprolol 5 mg IV PRN RVR  Telemetry  Continue digoxin and apixaban    Primary hypertension- (present on admission)  Assessment & Plan  Continue amlodipine    Major depressive disorder with current active episode- (present on admission)  Assessment & Plan  Continue escitalopram, mirtazapine, and venlafaxine - per chart review and her report she has tolerated concurrent SSRI/SNRI without signs of serotonin syndrome  Psychology consult    Psychosocial stressors- (present on admission)  Assessment & Plan  Alienation of contacts and loss of family in past 10 years  Resorts to alcohol for coping, counseled and demonstrates insight that alcohol perpetuates or worsens her situation  Psychology consult for behavioral options  RNNEENA  consult for substance treatment options    Psychophysiological insomnia- (present on admission)  Assessment & Plan  Likely due to anxiety and alcohol use  Continue trazodone QHS    Hypokalemia- (present on admission)  Assessment & Plan  Replace as indicated    Alcohol use disorder, moderate, dependence (HCC)- (present on admission)  Assessment & Plan  Due to coping with loss  Psychology consult for CBT prospects           VTE prophylaxis: Eliquis    I have performed a physical exam and reviewed and updated ROS and Plan today (6/10/2024). In review of yesterday's note (6/9/2024), there are no changes except as documented above.    Patient is has a high medical complexity, complex decision making and is at high risk for complication, morbidity, and mortality.  I spent 51 minutes, reviewing the chart, obtaining and/or reviewing separately obtained history. Performing a medically appropriate examination and evaluation.  Counseling and educating the patient. Ordering and reviewing medications, tests, or procedures.  Documenting clinical information in EPIC. Independently interpreting results and communicating results to patient. Discussing future disposition of care with patient, RN and case management.  This does not include time spent on separately billable procedures/tests.

## 2024-06-11 LAB
ANION GAP SERPL CALC-SCNC: 12 MMOL/L (ref 7–16)
BUN SERPL-MCNC: 13 MG/DL (ref 8–22)
CALCIUM SERPL-MCNC: 8.8 MG/DL (ref 8.5–10.5)
CHLORIDE SERPL-SCNC: 104 MMOL/L (ref 96–112)
CO2 SERPL-SCNC: 22 MMOL/L (ref 20–33)
CREAT SERPL-MCNC: 0.43 MG/DL (ref 0.5–1.4)
ERYTHROCYTE [DISTWIDTH] IN BLOOD BY AUTOMATED COUNT: 55.1 FL (ref 35.9–50)
GFR SERPLBLD CREATININE-BSD FMLA CKD-EPI: 100 ML/MIN/1.73 M 2
GLUCOSE SERPL-MCNC: 89 MG/DL (ref 65–99)
HCT VFR BLD AUTO: 39.5 % (ref 37–47)
HGB BLD-MCNC: 13 G/DL (ref 12–16)
MAGNESIUM SERPL-MCNC: 1.9 MG/DL (ref 1.5–2.5)
MCH RBC QN AUTO: 33.6 PG (ref 27–33)
MCHC RBC AUTO-ENTMCNC: 32.9 G/DL (ref 32.2–35.5)
MCV RBC AUTO: 102.1 FL (ref 81.4–97.8)
PHOSPHATE SERPL-MCNC: 2.9 MG/DL (ref 2.5–4.5)
PLATELET # BLD AUTO: 49 K/UL (ref 164–446)
PLATELETS.RETICULATED NFR BLD AUTO: 7.9 % (ref 0.6–13.1)
PMV BLD AUTO: 12.4 FL (ref 9–12.9)
POTASSIUM SERPL-SCNC: 3.8 MMOL/L (ref 3.6–5.5)
RBC # BLD AUTO: 3.87 M/UL (ref 4.2–5.4)
SODIUM SERPL-SCNC: 138 MMOL/L (ref 135–145)
WBC # BLD AUTO: 4.3 K/UL (ref 4.8–10.8)

## 2024-06-11 PROCEDURE — 99233 SBSQ HOSP IP/OBS HIGH 50: CPT | Performed by: GENERAL PRACTICE

## 2024-06-11 PROCEDURE — A9270 NON-COVERED ITEM OR SERVICE: HCPCS | Performed by: STUDENT IN AN ORGANIZED HEALTH CARE EDUCATION/TRAINING PROGRAM

## 2024-06-11 PROCEDURE — 85055 RETICULATED PLATELET ASSAY: CPT

## 2024-06-11 PROCEDURE — 700102 HCHG RX REV CODE 250 W/ 637 OVERRIDE(OP): Performed by: STUDENT IN AN ORGANIZED HEALTH CARE EDUCATION/TRAINING PROGRAM

## 2024-06-11 PROCEDURE — A9270 NON-COVERED ITEM OR SERVICE: HCPCS | Performed by: GENERAL PRACTICE

## 2024-06-11 PROCEDURE — 700102 HCHG RX REV CODE 250 W/ 637 OVERRIDE(OP): Performed by: GENERAL PRACTICE

## 2024-06-11 PROCEDURE — 36415 COLL VENOUS BLD VENIPUNCTURE: CPT

## 2024-06-11 PROCEDURE — 83735 ASSAY OF MAGNESIUM: CPT

## 2024-06-11 PROCEDURE — 84100 ASSAY OF PHOSPHORUS: CPT

## 2024-06-11 PROCEDURE — 770001 HCHG ROOM/CARE - MED/SURG/GYN PRIV*

## 2024-06-11 PROCEDURE — 85027 COMPLETE CBC AUTOMATED: CPT

## 2024-06-11 PROCEDURE — 80048 BASIC METABOLIC PNL TOTAL CA: CPT

## 2024-06-11 RX ADMIN — AMLODIPINE BESYLATE 2.5 MG: 5 TABLET ORAL at 05:38

## 2024-06-11 RX ADMIN — LORAZEPAM 1 MG: 1 TABLET ORAL at 09:29

## 2024-06-11 RX ADMIN — Medication 100 MG: at 05:39

## 2024-06-11 RX ADMIN — METOPROLOL TARTRATE 25 MG: 25 TABLET, FILM COATED ORAL at 05:39

## 2024-06-11 RX ADMIN — DIBASIC SODIUM PHOSPHATE, MONOBASIC POTASSIUM PHOSPHATE AND MONOBASIC SODIUM PHOSPHATE 500 MG: 852; 155; 130 TABLET ORAL at 18:04

## 2024-06-11 RX ADMIN — TRAZODONE HYDROCHLORIDE 50 MG: 50 TABLET ORAL at 21:23

## 2024-06-11 RX ADMIN — DIGOXIN 125 MCG: 0.25 TABLET ORAL at 05:39

## 2024-06-11 RX ADMIN — ESCITALOPRAM OXALATE 10 MG: 10 TABLET ORAL at 05:39

## 2024-06-11 RX ADMIN — DIBASIC SODIUM PHOSPHATE, MONOBASIC POTASSIUM PHOSPHATE AND MONOBASIC SODIUM PHOSPHATE 500 MG: 852; 155; 130 TABLET ORAL at 05:38

## 2024-06-11 RX ADMIN — METOPROLOL TARTRATE 25 MG: 25 TABLET, FILM COATED ORAL at 21:23

## 2024-06-11 RX ADMIN — CHLORDIAZEPOXIDE HYDROCHLORIDE 25 MG: 25 CAPSULE ORAL at 01:05

## 2024-06-11 RX ADMIN — LEVOTHYROXINE SODIUM 50 MCG: 0.05 TABLET ORAL at 05:39

## 2024-06-11 RX ADMIN — CHLORDIAZEPOXIDE HYDROCHLORIDE 25 MG: 25 CAPSULE ORAL at 05:38

## 2024-06-11 RX ADMIN — FOLIC ACID 1 MG: 1 TABLET ORAL at 05:39

## 2024-06-11 RX ADMIN — METOPROLOL TARTRATE 25 MG: 25 TABLET, FILM COATED ORAL at 15:25

## 2024-06-11 RX ADMIN — APIXABAN 5 MG: 5 TABLET, FILM COATED ORAL at 18:04

## 2024-06-11 RX ADMIN — VENLAFAXINE HYDROCHLORIDE 150 MG: 37.5 CAPSULE, EXTENDED RELEASE ORAL at 05:39

## 2024-06-11 RX ADMIN — APIXABAN 5 MG: 5 TABLET, FILM COATED ORAL at 05:39

## 2024-06-11 RX ADMIN — THERA TABS 1 TABLET: TAB at 05:38

## 2024-06-11 ASSESSMENT — LIFESTYLE VARIABLES
TREMOR: TREMOR NOT VISIBLE BUT CAN BE FELT, FINGERTIP TO FINGERTIP
TREMOR: NO TREMOR
ANXIETY: MILDLY ANXIOUS
ORIENTATION AND CLOUDING OF SENSORIUM: CANNOT DO SERIAL ADDITIONS OR IS UNCERTAIN ABOUT DATE
TREMOR: TREMOR NOT VISIBLE BUT CAN BE FELT, FINGERTIP TO FINGERTIP
PAROXYSMAL SWEATS: NO SWEAT VISIBLE
ORIENTATION AND CLOUDING OF SENSORIUM: CANNOT DO SERIAL ADDITIONS OR IS UNCERTAIN ABOUT DATE
VISUAL DISTURBANCES: NOT PRESENT
HEADACHE, FULLNESS IN HEAD: NOT PRESENT
AGITATION: SOMEWHAT MORE THAN NORMAL ACTIVITY
TREMOR: TREMOR NOT VISIBLE BUT CAN BE FELT, FINGERTIP TO FINGERTIP
VISUAL DISTURBANCES: NOT PRESENT
NAUSEA AND VOMITING: NO NAUSEA AND NO VOMITING
NAUSEA AND VOMITING: MILD NAUSEA WITH NO VOMITING
HEADACHE, FULLNESS IN HEAD: VERY MILD
ORIENTATION AND CLOUDING OF SENSORIUM: CANNOT DO SERIAL ADDITIONS OR IS UNCERTAIN ABOUT DATE
PAROXYSMAL SWEATS: NO SWEAT VISIBLE
AUDITORY DISTURBANCES: NOT PRESENT
ORIENTATION AND CLOUDING OF SENSORIUM: CANNOT DO SERIAL ADDITIONS OR IS UNCERTAIN ABOUT DATE
VISUAL DISTURBANCES: NOT PRESENT
PAROXYSMAL SWEATS: BARELY PERCEPTIBLE SWEATING. PALMS MOIST
NAUSEA AND VOMITING: NO NAUSEA AND NO VOMITING
AGITATION: SOMEWHAT MORE THAN NORMAL ACTIVITY
TOTAL SCORE: 3
HEADACHE, FULLNESS IN HEAD: VERY MILD
HEADACHE, FULLNESS IN HEAD: NOT PRESENT
AUDITORY DISTURBANCES: NOT PRESENT
AUDITORY DISTURBANCES: NOT PRESENT
HEADACHE, FULLNESS IN HEAD: VERY MILD
TOTAL SCORE: 4
VISUAL DISTURBANCES: NOT PRESENT
VISUAL DISTURBANCES: MILD SENSITIVITY
PAROXYSMAL SWEATS: NO SWEAT VISIBLE
TOTAL SCORE: 4
AGITATION: NORMAL ACTIVITY
ANXIETY: MILDLY ANXIOUS
PAROXYSMAL SWEATS: BARELY PERCEPTIBLE SWEATING. PALMS MOIST
AGITATION: NORMAL ACTIVITY
AUDITORY DISTURBANCES: NOT PRESENT
ANXIETY: *
TREMOR: TREMOR NOT VISIBLE BUT CAN BE FELT, FINGERTIP TO FINGERTIP
ANXIETY: MILDLY ANXIOUS
AGITATION: NORMAL ACTIVITY
TREMOR: NO TREMOR
TOTAL SCORE: 3
ANXIETY: *
VISUAL DISTURBANCES: NOT PRESENT
ANXIETY: MILDLY ANXIOUS
HEADACHE, FULLNESS IN HEAD: NOT PRESENT
AGITATION: NORMAL ACTIVITY
TOTAL SCORE: 4
TOTAL SCORE: 9
NAUSEA AND VOMITING: NO NAUSEA AND NO VOMITING
PAROXYSMAL SWEATS: NO SWEAT VISIBLE
NAUSEA AND VOMITING: NO NAUSEA AND NO VOMITING
ORIENTATION AND CLOUDING OF SENSORIUM: ORIENTED AND CAN DO SERIAL ADDITIONS
ORIENTATION AND CLOUDING OF SENSORIUM: CANNOT DO SERIAL ADDITIONS OR IS UNCERTAIN ABOUT DATE
AUDITORY DISTURBANCES: NOT PRESENT
AUDITORY DISTURBANCES: NOT PRESENT
NAUSEA AND VOMITING: NO NAUSEA AND NO VOMITING

## 2024-06-11 ASSESSMENT — FIBROSIS 4 INDEX: FIB4 SCORE: 11.74

## 2024-06-11 ASSESSMENT — PAIN DESCRIPTION - PAIN TYPE: TYPE: ACUTE PAIN

## 2024-06-11 NOTE — DISCHARGE PLANNING
Case Management Discharge Planning    Admission Date: 6/9/2024  GMLOS: 3.4  ALOS: 2    6-Clicks ADL Score: 13  6-Clicks Mobility Score: 10  PT and/or OT Eval ordered: Yes  Post-acute Referrals Ordered: Yes  Post-acute Choice Obtained: No  Has referral(s) been sent to post-acute provider:  Yes      Anticipated Discharge Dispo: Discharge Disposition: D/T to SNF with Medicare cert in anticipation of skilled care (03)  Discharge Contact Phone Number: [phone]    Action(s) Taken:   Pt is a readmission and has been admitted at [facility] x 4 in 2024.  3 admissions for alcohol intoxication in 2024.  RN CCM met with patient this afternoon 1240.    Pt sleepy, slurred words and having difficulty comprehending questions and responding. Raised hands/arms up and not lowering for 30 seconds.  She stated she lives alone in a 2 story house.  Per epic, patient found patient at home detoxing from alcohol x 2 days.  [facility] and [facility] SNF determinations pending.    Medically Clear: No    Next Steps:   Meet with patient to discuss dc planning and obtain SNF choice.    Barriers to Discharge: Medical clearance and Pending Placement    Care Transition Team Assessment    Information Source  Orientation Level: Oriented to person  Information Given By: Patient  Who is responsible for making decisions for patient? : Legal next of kin    Readmission Evaluation  Is this a readmission?: Yes - unplanned readmission    Elopement Risk  Legal Hold: No  Ambulatory or Self Mobile in Wheelchair: No-Not an Elopement Risk  Elopement Risk: Not at Risk for Elopement    Interdisciplinary Discharge Planning  Lives with - Patient's Self Care Capacity: Alone and Unable to Care For Self  Patient or legal guardian wants to designate a caregiver: No  Support Systems: Family Member(s)  Housing / Facility: 2 Story House  Prior Services: Home-Independent    Discharge Preparedness  What are your discharge supports?: Child         Finances  Financial Barriers to  Discharge: No  Prescription Coverage: Yes    Vision / Hearing Impairment  Vision Impairment : No  Right Eye Vision: Impaired, Wears Glasses  Left Eye Vision: Impaired, Wears Glasses  Hearing Impairment : No         Advance Directive  Advance Directive?: None    Domestic Abuse  Have you ever been the victim of abuse or violence?: No  Possible Abuse/Neglect Reported to:: Not Applicable    Psychological Assessment  History of Substance Abuse: Alcohol  History of Psychiatric Problems: Yes  Newly Diagnosed Illness: No    Discharge Risks or Barriers  Discharge risks or barriers?: Substance abuse  Patient risk factors: Readmission, Multiple ED visits    Anticipated Discharge Information  Discharge Disposition: D/T to SNF with Medicare cert in anticipation of skilled care (03)  Discharge Contact Phone Number: [phone]

## 2024-06-11 NOTE — CARE PLAN
The patient is Watcher - Medium risk of patient condition declining or worsening    Shift Goals  Clinical Goals: stable VS/labs, CIWA  Patient Goals: pari  Family Goals: not present at this time    Progress made toward(s) clinical / shift goals:    Problem: Knowledge Deficit - Standard  Goal: Patient and family/care givers will demonstrate understanding of plan of care, disease process/condition, diagnostic tests and medications  Outcome: Progressing  Note: Educated pt on POC, monitor VS/labs, CIWA protocol, safety, all questions answered, reinforcement needed, hourly rounding in progress     Problem: Skin Integrity  Goal: Skin integrity is maintained or improved  Outcome: Progressing  Note: Q2 turns, no skin breakdown noted, pure wick in place, heels over pillows, hourly rounding in progress       Patient is not progressing towards the following goals:

## 2024-06-11 NOTE — PROGRESS NOTES
Bedside report received from off going RN/tech: Ariadna, assumed care of patient.     Fall Risk Score: HIGH RISK  Fall risk interventions in place: Provide patient/family education based on risk assessment, Educate patient/family to call staff for assistance when getting out of bed, Place fall precaution signage outside patient door, Place patient in room close to nursing station, Utilize bed/chair fall alarm, and Bed alarm connected correctly  Bed type: Regular (Albert Score less than 17 interventions in place)  Patient on cardiac monitor: Yes  IVF/IV medications: Not Applicable   Oxygen: How many liters 1L  Bedside sitter: Not Applicable   Isolation: Not applicable

## 2024-06-11 NOTE — PROGRESS NOTES
Report received. Assumed care. Pt in bed awake. A/O x2-3. VSS. Responds appropriately. Denies pain, SOB. Assessment complete. Discussed POC, , pt verbalizes understanding. Explained importance of calling before getting OOB. Call light and belongings within reach. Bed alarm on. Bed in the lowest position. Treaded socks in place. Hourly rounding in progress. Will continue to monitor .

## 2024-06-11 NOTE — CARE PLAN
Problem: Knowledge Deficit - Standard  Goal: Patient and family/care givers will demonstrate understanding of plan of care, disease process/condition, diagnostic tests and medications  Description: Target End Date:  1-3 days or as soon as patient condition allows    Document in Patient Education    1.  Patient and family/caregiver oriented to unit, equipment, visitation policy and means for communicating concern  2.  Complete/review Learning Assessment  3.  Assess knowledge level of disease process/condition, treatment plan, diagnostic tests and medications  4.  Explain disease process/condition, treatment plan, diagnostic tests and medications  Outcome: Not Progressing     Problem: Optimal Care for Alcohol Withdrawal  Goal: Optimal Care for the alcohol withdrawal patient  Description: Target End Date:  1 to 3 days    1.  Alcohol history screening done on admission  2.  CIWA-AR score assessment (includes assessment of nausea/vomiting, tremor, sweats, anxiety, agitation, tactile, visual and auditory disturbances, headache and orientation/sensorium).  Document on CIWA flowsheet.  3.  Follow CIWA-AR score protocol  4.  Frequent reorientation  5.  Monitor for fluid and electrolyte imbalance.  6.  Assess for respiratory depression due to sedation (pulse ox)  7.  Consider thiamine, multivitamins, folic acid and magnesium sulfate per provider order  8.  Collaborate with Social Workers/Case Management  9.  Collaborate with mental health  Outcome: Progressing   The patient is Stable - Low risk of patient condition declining or worsening    Shift Goals  Clinical Goals: CIWA  Patient Goals: DANICA  Family Goals: N/A    Progress made toward(s) clinical / shift goals:      Patient is not progressing towards the following goals:      Problem: Knowledge Deficit - Standard  Goal: Patient and family/care givers will demonstrate understanding of plan of care, disease process/condition, diagnostic tests and medications  Description:  Target End Date:  1-3 days or as soon as patient condition allows    Document in Patient Education    1.  Patient and family/caregiver oriented to unit, equipment, visitation policy and means for communicating concern  2.  Complete/review Learning Assessment  3.  Assess knowledge level of disease process/condition, treatment plan, diagnostic tests and medications  4.  Explain disease process/condition, treatment plan, diagnostic tests and medications  Outcome: Not Progressing

## 2024-06-11 NOTE — HOSPITAL COURSE
This is a 78-year-old female chronic atrial fibrillation, hypertension, dyslipidemia, depression/anxiety and alcohol use disorder who was admitted on 6/9/2024 with alcohol withdrawal and atrial fibrillation with RVR.    Patient counseled at length in regards to alcohol cessation.  Started on CIWA protocol and Librium.    Patient resumed on her home medication of metoprolol and digoxin for her A-fib with RVR.

## 2024-06-11 NOTE — CARE PLAN
The patient is Stable - Low risk of patient condition declining or worsening    Shift Goals  Clinical Goals: CIWA, VSS  Patient Goals: DANICA  Family Goals: N/A    Progress made toward(s) clinical / shift goals:    Problem: Optimal Care for Alcohol Withdrawal  Goal: Optimal Care for the alcohol withdrawal patient  Description: Target End Date:  1 to 3 days    1.  Alcohol history screening done on admission  2.  CIWA-AR score assessment (includes assessment of nausea/vomiting, tremor, sweats, anxiety, agitation, tactile, visual and auditory disturbances, headache and orientation/sensorium).  Document on CIWA flowsheet.  3.  Follow CIWA-AR score protocol  4.  Frequent reorientation  5.  Monitor for fluid and electrolyte imbalance.  6.  Assess for respiratory depression due to sedation (pulse ox)  7.  Consider thiamine, multivitamins, folic acid and magnesium sulfate per provider order  8.  Collaborate with Social Workers/Case Management  9.  Collaborate with mental health  Outcome: Progressing  Note: Appropriate CIWA protocol in place. Pt scored q 4hrs. Pt also receiving q 6hr librium.      Problem: Seizure Precautions  Goal: Implementation of seizure precautions  Description: Target End Date:  Prior to discharge or change in level of care    1.  Padded side rails up at all times  2.  Suction equipment and oxygen delivery system at bedside  3.  Continuous pulse oximeter in use  4.  Implement fall precautions, bed alarm on, bed in lowest position  5.  IV access (per order)  6.  Provide low stimulus environment, avoid exposure to triggers  7.  Instruct patient to use call light/seizure button if having warning signs of impending seizure  Outcome: Progressing  Note: Appropriate seizure precautions in place. Bed is in lowest, locked position. Bed rails padded.      Problem: Risk for Aspiration  Goal: Patient's risk for aspiration will be absent or decrease  Description: Target End Date:  Prior to discharge or change in level  of care    1.   Complete dysphagia screening on admission  2.   NPO until dysphagia screening complete or medically cleared  3.   Collaborate with Speech Therapy, Clinical Dietitian and interdisciplinary team  4.   Implement aspiration precautions  5.   Assist patient up to chair for meals  6.   Elevate head of bed 90 degrees if patient is unable to get out of bed  7.   Encourage small bites  8.   Ensure foods/liquids are of appropriate consistency  9.   Assess for any signs/symptoms of aspiration  10. Assess breath sounds and vital signs after oral intake  Outcome: Progressing  Note: Appropriate aspiration precautions in place. Suction at bedside and patient HOB elevated >30 degrees.      Problem: Fall Risk  Goal: Patient will remain free from falls  Description: Target End Date:  Prior to discharge or change in level of care    Document interventions on the [facility] Fall Risk Assessment    1.  Assess for fall risk factors  2.  Implement fall precautions  Outcome: Progressing  Note: Pt is a hig fall risk. Bed is in lowest, locked position. Call light and belongings within reach. Bed alarm is on. Pt calls appropriately.

## 2024-06-11 NOTE — PROGRESS NOTES
Hospital Medicine Daily Progress Note    Date of Service  6/11/2024    Chief Complaint  Jeanie Hutchison is a 78 y.o. female admitted 6/9/2024 with alcohol withdrawal    Hospital Course  This is a 78-year-old female chronic atrial fibrillation, hypertension, dyslipidemia, depression/anxiety and alcohol use disorder who was admitted on 6/9/2024 with alcohol withdrawal and atrial fibrillation with RVR.    Patient counseled at length in regards to alcohol cessation.  Started on CIWA protocol and Librium.    Patient resumed on her home medication of metoprolol and digoxin for her A-fib with RVR.    Interval Problem Update  Patient formally on Librium and CIWA protocol.  Discontinue Librium, required a dose of Ativan this morning.  Post recent CIWA is 4.    Patient remains rate controlled on metoprolol and digoxin.    If no further evidence of alcohol withdrawal, patient will be medically clear for discharge to SNF tomorrow.    I have discussed this patient's plan of care and discharge plan at IDT rounds today with Case Management, Nursing, Nursing leadership, and other members of the IDT team.    Consultants/Specialty  None    Code Status  Full Code    Disposition  The patient is not medically cleared for discharge to home or a post-acute facility.  Anticipate discharge to: skilled nursing facility    I have placed the appropriate orders for post-discharge needs.    Review of Systems  Review of Systems   All other systems reviewed and are negative.       Physical Exam  Temp:  [36.3 °C (97.3 °F)-36.6 °C (97.9 °F)] 36.3 °C (97.3 °F)  Pulse:  [] 98  Resp:  [16-18] 18  BP: (110-124)/(8-90) 110/88  SpO2:  [92 %-96 %] 95 %    Physical Exam  Vitals and nursing note reviewed.   Constitutional:       General: She is not in acute distress.     Appearance: Normal appearance.   HENT:      Head: Normocephalic and atraumatic.      Mouth/Throat:      Mouth: Mucous membranes are moist.      Pharynx: No oropharyngeal exudate.    Eyes:      Extraocular Movements: Extraocular movements intact.      Pupils: Pupils are equal, round, and reactive to light.   Cardiovascular:      Rate and Rhythm: Normal rate. Rhythm irregular.      Pulses: Normal pulses.      Heart sounds: No murmur heard.     No friction rub. No gallop.   Pulmonary:      Effort: Pulmonary effort is normal. No respiratory distress.      Breath sounds: No wheezing, rhonchi or rales.   Abdominal:      General: Bowel sounds are normal. There is no distension.      Palpations: Abdomen is soft. There is no mass.      Tenderness: There is no abdominal tenderness.   Musculoskeletal:         General: No swelling or tenderness. Normal range of motion.      Cervical back: Normal range of motion. No rigidity. No muscular tenderness.      Right lower leg: No edema.      Left lower leg: No edema.   Skin:     General: Skin is warm and dry.      Capillary Refill: Capillary refill takes less than 2 seconds.      Findings: No erythema or rash.   Neurological:      General: No focal deficit present.      Mental Status: She is alert and oriented to person, place, and time.      Motor: No weakness.      Gait: Gait normal.         Fluids    Intake/Output Summary (Last 24 hours) at 6/11/2024 1556  Last data filed at 6/11/2024 1100  Gross per 24 hour   Intake 740 ml   Output 6250 ml   Net -5510 ml       Laboratory  Recent Labs     06/09/24  1433 06/10/24  0315 06/11/24  0034   WBC 8.9 5.3 4.3*   RBC 4.43 3.63* 3.87*   HEMOGLOBIN 14.8 12.1 13.0   HEMATOCRIT 42.5 36.0* 39.5   MCV 95.9 99.2* 102.1*   MCH 33.4* 33.3* 33.6*   MCHC 34.8 33.6 32.9   RDW 50.0 53.7* 55.1*   PLATELETCT 63* 47* 49*   MPV 11.1 11.1 12.4     Recent Labs     06/09/24  1433 06/10/24  0315 06/11/24  0034   SODIUM 134* 139 138   POTASSIUM 4.3 3.2* 3.8   CHLORIDE 92* 106 104   CO2 22 21 22   GLUCOSE 117* 90 89   BUN 25* 18 13   CREATININE 0.77 0.56 0.43*   CALCIUM 9.4 7.9* 8.8     Recent Labs     06/09/24  1433   INR 1.18*                Imaging  DX-CHEST-PORTABLE (1 VIEW)   Final Result         1. No acute cardiopulmonary abnormalities are identified.           Assessment/Plan  * Alcohol withdrawal syndrome with complication (HCC)- (present on admission)  Assessment & Plan  Recurrent  Patient formally on Librium and CIWA protocol.  Discontinue Librium, required a dose of Ativan this morning.  Post recent CIWA is 4.  Empiric MVN + Thiamine + Folate  tele    Atrial fibrillation with rapid ventricular response (HCC)- (present on admission)  Assessment & Plan  In setting of alcohol withdrawal and self-discontinuation of BB  Metoprolol 25 mg q8h, up-titrate to maintain HR <110  Continue digoxin and apixaban  Patient remains rate controlled on metoprolol and digoxin.  Metoprolol 5 mg IV PRN RVR  Now medical 6/11      Alcoholic hepatitis without ascites- (present on admission)  Assessment & Plan  Concurrent thrombocytopenia, coagulopathy, and bilirubin concerning for acute vs chronic hepatitis  Prior abdominal imaging in 2023 predates thrombocytopenia, which now seems to be chronic concerning for cirrhosis  RUQ US for underlying cirrhosis    Hyponatremia- (present on admission)  Assessment & Plan  Mild and hypochloremic likely from inadequate PO intake from alcohol use  1 L LR ordered after completion of rally bag  Repeat AM CMP    Prolonged QT interval- (present on admission)  Assessment & Plan  Telemetry  K normal  Mg ordered  Repeat AM CMP/Mg  Minimize QT-prolonging medications    Hypophosphatemia- (present on admission)  Assessment & Plan  Replace as indicated    Acquired hypothyroidism- (present on admission)  Assessment & Plan  Continue levothyroxine  Check TSH in normal range    Primary hypertension- (present on admission)  Assessment & Plan  Continue amlodipine    Major depressive disorder with current active episode- (present on admission)  Assessment & Plan  Continue escitalopram, mirtazapine, and venlafaxine - per chart review and her  report she has tolerated concurrent SSRI/SNRI without signs of serotonin syndrome  Psychology consult    Psychosocial stressors- (present on admission)  Assessment & Plan  Alienation of contacts and loss of family in past 10 years  Resorts to alcohol for coping, counseled and demonstrates insight that alcohol perpetuates or worsens her situation  Psychology consult for behavioral options  RNCM consult for substance treatment options    Psychophysiological insomnia- (present on admission)  Assessment & Plan  Likely due to anxiety and alcohol use  Continue trazodone QHS    Hypokalemia- (present on admission)  Assessment & Plan  Replace as indicated    Alcohol use disorder, moderate, dependence (HCC)- (present on admission)  Assessment & Plan  Due to coping with loss  Psychology consult for CBT prospects           VTE prophylaxis: Eliquis    I have performed a physical exam and reviewed and updated ROS and Plan today (6/11/2024). In review of yesterday's note (6/10/2024), there are no changes except as documented above.      Greater than 51 minutes spent prepping to see patient (e.g. reviewing EMR) obtaining and/or reviewing separately obtained history. Performing a medically appropriate examination and evaluation. Independently interpreting results and communicating results to patient/family/caregiver. Counseling and educating the patient/family/caregiver. Ordering medications, tests, and/or procedures. Referring and communicating with other health care professionals.  Documenting clinical information in EPIC. Care coordination.

## 2024-06-12 VITALS
HEIGHT: 66 IN | HEART RATE: 70 BPM | BODY MASS INDEX: 28.13 KG/M2 | SYSTOLIC BLOOD PRESSURE: 110 MMHG | RESPIRATION RATE: 18 BRPM | OXYGEN SATURATION: 94 % | TEMPERATURE: 97.2 F | DIASTOLIC BLOOD PRESSURE: 69 MMHG | WEIGHT: 175.04 LBS

## 2024-06-12 PROCEDURE — A9270 NON-COVERED ITEM OR SERVICE: HCPCS | Performed by: STUDENT IN AN ORGANIZED HEALTH CARE EDUCATION/TRAINING PROGRAM

## 2024-06-12 PROCEDURE — 700102 HCHG RX REV CODE 250 W/ 637 OVERRIDE(OP): Performed by: STUDENT IN AN ORGANIZED HEALTH CARE EDUCATION/TRAINING PROGRAM

## 2024-06-12 PROCEDURE — 99239 HOSP IP/OBS DSCHRG MGMT >30: CPT | Performed by: GENERAL PRACTICE

## 2024-06-12 PROCEDURE — A9270 NON-COVERED ITEM OR SERVICE: HCPCS | Performed by: GENERAL PRACTICE

## 2024-06-12 PROCEDURE — 700111 HCHG RX REV CODE 636 W/ 250 OVERRIDE (IP): Mod: JZ | Performed by: STUDENT IN AN ORGANIZED HEALTH CARE EDUCATION/TRAINING PROGRAM

## 2024-06-12 PROCEDURE — 700102 HCHG RX REV CODE 250 W/ 637 OVERRIDE(OP): Performed by: GENERAL PRACTICE

## 2024-06-12 RX ORDER — METOPROLOL SUCCINATE 25 MG/1
75 TABLET, EXTENDED RELEASE ORAL DAILY
Status: SHIPPED
Start: 2024-06-13

## 2024-06-12 RX ADMIN — ONDANSETRON 4 MG: 2 INJECTION INTRAMUSCULAR; INTRAVENOUS at 03:43

## 2024-06-12 RX ADMIN — VENLAFAXINE HYDROCHLORIDE 150 MG: 37.5 CAPSULE, EXTENDED RELEASE ORAL at 05:09

## 2024-06-12 RX ADMIN — FOLIC ACID 1 MG: 1 TABLET ORAL at 05:09

## 2024-06-12 RX ADMIN — METOPROLOL SUCCINATE 75 MG: 50 TABLET, EXTENDED RELEASE ORAL at 05:10

## 2024-06-12 RX ADMIN — DIGOXIN 125 MCG: 0.25 TABLET ORAL at 05:10

## 2024-06-12 RX ADMIN — DIBASIC SODIUM PHOSPHATE, MONOBASIC POTASSIUM PHOSPHATE AND MONOBASIC SODIUM PHOSPHATE 500 MG: 852; 155; 130 TABLET ORAL at 05:10

## 2024-06-12 RX ADMIN — ESCITALOPRAM OXALATE 10 MG: 10 TABLET ORAL at 05:10

## 2024-06-12 RX ADMIN — APIXABAN 5 MG: 5 TABLET, FILM COATED ORAL at 05:09

## 2024-06-12 RX ADMIN — Medication 100 MG: at 05:09

## 2024-06-12 RX ADMIN — AMLODIPINE BESYLATE 2.5 MG: 5 TABLET ORAL at 05:10

## 2024-06-12 RX ADMIN — LEVOTHYROXINE SODIUM 50 MCG: 0.05 TABLET ORAL at 05:10

## 2024-06-12 RX ADMIN — LORAZEPAM 1 MG: 1 TABLET ORAL at 05:10

## 2024-06-12 RX ADMIN — THERA TABS 1 TABLET: TAB at 05:11

## 2024-06-12 ASSESSMENT — LIFESTYLE VARIABLES
ORIENTATION AND CLOUDING OF SENSORIUM: DATE DISORIENTATION BY MORE THAN TWO CALENDAR DAYS
NAUSEA AND VOMITING: MILD NAUSEA WITH NO VOMITING
ANXIETY: MILDLY ANXIOUS
AGITATION: NORMAL ACTIVITY
HEADACHE, FULLNESS IN HEAD: NOT PRESENT
TREMOR: TREMOR NOT VISIBLE BUT CAN BE FELT, FINGERTIP TO FINGERTIP
PAROXYSMAL SWEATS: NO SWEAT VISIBLE
TOTAL SCORE: 8
ORIENTATION AND CLOUDING OF SENSORIUM: ORIENTED AND CAN DO SERIAL ADDITIONS
TOTAL SCORE: MILD ITCHING, PINS AND NEEDLES SENSATION, BURNING OR NUMBNESS
VISUAL DISTURBANCES: NOT PRESENT
ANXIETY: *
VISUAL DISTURBANCES: NOT PRESENT
HEADACHE, FULLNESS IN HEAD: NOT PRESENT
AUDITORY DISTURBANCES: NOT PRESENT
AGITATION: NORMAL ACTIVITY
TREMOR: NO TREMOR
AUDITORY DISTURBANCES: NOT PRESENT
PAROXYSMAL SWEATS: NO SWEAT VISIBLE
NAUSEA AND VOMITING: NO NAUSEA AND NO VOMITING
TOTAL SCORE: 2

## 2024-06-12 NOTE — PROGRESS NOTES
Bedside report received from off going RN/tech: Ewa, assumed care of patient.     Fall Risk Score: HIGH RISK  Fall risk interventions in place: Provide patient/family education based on risk assessment, Educate patient/family to call staff for assistance when getting out of bed, Place fall precaution signage outside patient door, Utilize bed/chair fall alarm, and Bed alarm connected correctly  Bed type: Regular (Albert Score less than 17 interventions in place)  Patient on cardiac monitor: No  IVF/IV medications: Not Applicable   Oxygen: How many liters 1L  Bedside sitter: Not Applicable   Isolation: Not applicable

## 2024-06-12 NOTE — DISCHARGE SUMMARY
Discharge Summary    CHIEF COMPLAINT ON ADMISSION  Chief Complaint   Patient presents with    Detox     Pt BIBA from home after son called EMS after finding pt at home detoxing from alcohol x 2 days. Pt normally drinks about 1 pint vodka daily. Per son pt has not been eating or drinking x 5 days and hasn't taken medications including medications for a-fib. Pt shaking and  a-fib RVR on arrival.        Reason for Admission  EMS    Admission Date  6/9/2024     CODE STATUS  Full Code    HPI & HOSPITAL COURSE  This is a 78-year-old female chronic atrial fibrillation, hypertension, dyslipidemia, depression/anxiety and alcohol use disorder who was admitted on 6/9/2024 with alcohol withdrawal and atrial fibrillation with RVR.    Patient counseled at length in regards to alcohol cessation.  Started on CIWA protocol and Librium.    Patient resumed on her home medication of metoprolol and digoxin for her A-fib with RVR.    Therefore, she is discharged in good and stable condition to skilled nursing facility.    The patient met 2-midnight criteria for an inpatient stay at the time of discharge.      FOLLOW UP ITEMS POST DISCHARGE  PCP    DISCHARGE DIAGNOSES  Principal Problem:    Alcohol withdrawal syndrome with complication (HCC) (POA: Yes)  Active Problems:    Atrial fibrillation with rapid ventricular response (HCC) (POA: Yes)    Alcohol use disorder, moderate, dependence (HCC) (POA: Yes)    Hypokalemia (POA: Yes)    Psychophysiological insomnia (POA: Yes)    Psychosocial stressors (POA: Yes)    Major depressive disorder with current active episode (POA: Yes)    Primary hypertension (POA: Yes)    Acquired hypothyroidism (POA: Yes)    Hypophosphatemia (POA: Yes)    Prolonged QT interval (POA: Yes)    Hyponatremia (POA: Yes)    Alcoholic hepatitis without ascites (POA: Yes)  Resolved Problems:    * No resolved hospital problems. *      FOLLOW UP  Perla Kitchen, F.N.P.  [address]  [id number]  [phone]    Follow up        MEDICATIONS ON DISCHARGE     Medication List        START taking these medications        Instructions   metoprolol SR 25 MG Tb24  Start taking on: June 13, 2024  Commonly known as: Toprol XL   Take 3 Tablets by mouth every day.  Dose: 75 mg            CONTINUE taking these medications        Instructions   amLODIPine 2.5 MG Tabs  Commonly known as: Norvasc   Take 2.5 mg by mouth every day.  Dose: 2.5 mg     apixaban 5mg Tabs  Commonly known as: Eliquis   Take 1 Tablet by mouth 2 times a day.  Dose: 5 mg     digoxin 125 MCG Tabs  Commonly known as: Lanoxin   Take 1 Tablet by mouth every day.  Dose: 125 mcg     escitalopram 10 MG Tabs  Commonly known as: Lexapro   Take 10 mg by mouth every day.  Dose: 10 mg     folic acid 1 MG Tabs  Commonly known as: Folvite   Take 1 Tablet by mouth every day.  Dose: 1 mg     levothyroxine 50 MCG Tabs  Commonly known as: Synthroid   Take 1 Tablet by mouth every morning on an empty stomach.  Dose: 50 mcg     mirtazapine 15 MG Tabs  Commonly known as: Remeron   Take 15 mg by mouth at bedtime.  Dose: 15 mg     thiamine 100 MG tablet  Commonly known as: Thiamine   Take 1 Tablet by mouth every day.  Dose: 100 mg     traZODone 50 MG Tabs  Commonly known as: Desyrel   Take 50 mg by mouth every evening.  Dose: 50 mg     venlafaxine 150 MG extended-release capsule  Commonly known as: Effexor-XR   Take 150 mg by mouth every day.  Dose: 150 mg            STOP taking these medications      metoprolol tartrate 25 MG Tabs  Commonly known as: Lopressor     potassium chloride SA 20 MEQ Tbcr  Commonly known as: Kdur              Allergies  No Known Allergies    DIET  Orders Placed This Encounter   Procedures    Diet Order Diet: Regular     Standing Status:   Standing     Number of Occurrences:   1     Order Specific Question:   Diet:     Answer:   Regular [1]       ACTIVITY  As tolerated and directed by skilled nursing.  Weight bearing as  tolerated    LINES, DRAINS, AND WOUNDS  This is an automated list. Peripheral IVs will be removed prior to discharge.  Peripheral IV 06/09/24 20 G Anterior;Left Forearm (Active)   Site Assessment Clean;Dry;Intact 06/12/24 0900   Dressing Type Transparent 06/12/24 0900   Line Status Scrubbed the hub prior to access;Flushed;Saline locked 06/12/24 0900   Dressing Status Clean;Dry;Intact 06/12/24 0900   Dressing Intervention N/A 06/11/24 2100   Dressing Change Due 06/15/24 06/12/24 0900   Date Primary Tubing Changed 06/11/24 06/11/24 2100   Date Secondary Tubing Changed 06/11/24 06/11/24 2100   Infiltration Grading ([facility]) 0 06/12/24 0900   Phlebitis Scale ([facility] Only) 0 06/12/24 0900     External Urinary Catheter (Female [facility]) (Active)   Collection Container Suction container 06/11/24 2000   Output (mL) 5500 mL 06/10/24 1700         Peripheral IV 06/09/24 20 G Anterior;Left Forearm (Active)   Site Assessment Clean;Dry;Intact 06/12/24 0900   Dressing Type Transparent 06/12/24 0900   Line Status Scrubbed the hub prior to access;Flushed;Saline locked 06/12/24 0900   Dressing Status Clean;Dry;Intact 06/12/24 0900   Dressing Intervention N/A 06/11/24 2100   Dressing Change Due 06/15/24 06/12/24 0900   Date Primary Tubing Changed 06/11/24 06/11/24 2100   Date Secondary Tubing Changed 06/11/24 06/11/24 2100   Infiltration Grading ([facility]) 0 06/12/24 0900   Phlebitis Scale ([facility] Only) 0 06/12/24 0900               MENTAL STATUS ON TRANSFER             CONSULTATIONS  None    PROCEDURES  None    LABORATORY  Lab Results   Component Value Date    SODIUM 138 06/11/2024    POTASSIUM 3.8 06/11/2024    CHLORIDE 104 06/11/2024    CO2 22 06/11/2024    GLUCOSE 89 06/11/2024    BUN 13 06/11/2024    CREATININE 0.43 (L) 06/11/2024        Lab Results   Component Value Date    WBC 4.3 (L) 06/11/2024    HEMOGLOBIN 13.0 06/11/2024    HEMATOCRIT 39.5 06/11/2024    PLATELETCT 49 (L) 06/11/2024      DX-CHEST-PORTABLE (1 VIEW)   Final  Result         1. No acute cardiopulmonary abnormalities are identified.         Total time of the discharge process exceeds 45 minutes.

## 2024-06-12 NOTE — DISCHARGE PLANNING
-0922  DPA left voicemail for Alton with [facility], requesting a callback regarding pending pt acceptance.     -0945  DPA informed by Alton at [facility] that facilities may be able to accept pt, but she must be completely off CIWA protocol. Alton requesting a note in pt's chart explicitly stating that pt is no longer on CIWA before facilities can accept.    -1020  DPA informed by Alton at [facility] that liaison, Neena, will be coming later today to see pt. After Neena speaks with pt facility will determine if they can accept. [facility] can take pt later this afternoon as long as Neena deems that pt is fine to be accepted. RN CM notified.

## 2024-06-12 NOTE — DISCHARGE PLANNING
DC Transport Scheduled    Transport Company Scheduled:  [employer]  Spoke with Bobbi at [facility] to schedule transport.    Scheduled Date: 6/12/2024  Scheduled Time: 1500    Destination: [facility]   [address] [address]    Notified care team of scheduled transport via Voalte.     If there are any changes needed to the DC transportation scheduled, please contact [facility] Line at ext. [phone] between the hours of 7549-7433 Mon-Fri. If outside those hours, contact the ED Case Manager at ext. [phone].

## 2024-06-12 NOTE — PROGRESS NOTES
Patient Jeanie HARTMiles Hutchison.     PATIENT OFF CIWA PROTOCOL SINCE 6/12/2024  6 AM      Luann Black DO

## 2024-06-12 NOTE — PROGRESS NOTES
Patient discharged to [facility] via medical transport. Patient showed no signs or symptoms of distress at time of discharge.

## 2024-06-12 NOTE — CARE PLAN
The patient is Stable - Low risk of patient condition declining or worsening    Shift Goals  Clinical Goals: CIWA, VSS  Patient Goals: DANICA  Family Goals: N/A    Progress made toward(s) clinical / shift goals:    Problem: Optimal Care for Alcohol Withdrawal  Goal: Optimal Care for the alcohol withdrawal patient  Description: Target End Date:  1 to 3 days    1.  Alcohol history screening done on admission  2.  CIWA-AR score assessment (includes assessment of nausea/vomiting, tremor, sweats, anxiety, agitation, tactile, visual and auditory disturbances, headache and orientation/sensorium).  Document on CIWA flowsheet.  3.  Follow CIWA-AR score protocol  4.  Frequent reorientation  5.  Monitor for fluid and electrolyte imbalance.  6.  Assess for respiratory depression due to sedation (pulse ox)  7.  Consider thiamine, multivitamins, folic acid and magnesium sulfate per provider order  8.  Collaborate with Social Workers/Case Management  9.  Collaborate with mental health  Outcome: Progressing  Note: CIWA protocol in place. Pt undergoing q 4 hr CIWAs. Librium discontinued by provider.      Problem: Seizure Precautions  Goal: Implementation of seizure precautions  Description: Target End Date:  Prior to discharge or change in level of care    1.  Padded side rails up at all times  2.  Suction equipment and oxygen delivery system at bedside  3.  Continuous pulse oximeter in use  4.  Implement fall precautions, bed alarm on, bed in lowest position  5.  IV access (per order)  6.  Provide low stimulus environment, avoid exposure to triggers  7.  Instruct patient to use call light/seizure button if having warning signs of impending seizure  Outcome: Progressing  Note: Appropriate seizure precautions in place. Bed is in lowest, locked position. Bed rails padded.     Problem: Risk for Aspiration  Goal: Patient's risk for aspiration will be absent or decrease  Description: Target End Date:  Prior to discharge or change in level  of care    1.   Complete dysphagia screening on admission  2.   NPO until dysphagia screening complete or medically cleared  3.   Collaborate with Speech Therapy, Clinical Dietitian and interdisciplinary team  4.   Implement aspiration precautions  5.   Assist patient up to chair for meals  6.   Elevate head of bed 90 degrees if patient is unable to get out of bed  7.   Encourage small bites  8.   Ensure foods/liquids are of appropriate consistency  9.   Assess for any signs/symptoms of aspiration  10. Assess breath sounds and vital signs after oral intake  Outcome: Progressing  Note: Appropriate aspiration precautions in place. HOB elevated to 30 degrees. Suction at bedside.      Problem: Fall Risk  Goal: Patient will remain free from falls  Description: Target End Date:  Prior to discharge or change in level of care    Document interventions on the [facility] Fall Risk Assessment    1.  Assess for fall risk factors  2.  Implement fall precautions  Outcome: Progressing  Note: Pt is a high fall risk. Bed is in lowest, locked position. Call light and belongings within reach. Bed alarm is on. Pt calls appropriately.

## 2024-06-12 NOTE — PROGRESS NOTES
Bedside report received from off going RN/tech: Surekha, assumed care of patient.     Fall Risk Score: HIGH RISK  Fall risk interventions in place: Place yellow fall risk ID band on patient, Provide patient/family education based on risk assessment, Educate patient/family to call staff for assistance when getting out of bed, Place fall precaution signage outside patient door, Place patient in room close to nursing station, Utilize bed/chair fall alarm, and Bed alarm connected correctly  Bed type: Regular (Albert Score less than 17 interventions in place)  Patient on cardiac monitor: Yes  IVF/IV medications: Not Applicable   Oxygen: How many liters 1L and Traced the line to wall oxygen  Bedside sitter: Not Applicable   Isolation: Not applicable

## 2024-06-12 NOTE — DISCHARGE PLANNING
Case Management Discharge Planning    Admission Date: 6/9/2024  GMLOS: 3.4  ALOS: 3    6-Clicks ADL Score: 13  6-Clicks Mobility Score: 10  PT and/or OT Eval ordered: Yes  Post-acute Referrals Ordered: Yes  Post-acute Choice Obtained: Yes  Has referral(s) been sent to post-acute provider:  Yes      Anticipated Discharge Dispo: Discharge Disposition: D/T to SNF with Medicare cert in anticipation of skilled care (03)  Discharge Contact Phone Number: [phone]    DME Needed: No    Action(s) Taken: Acceptance Received, Transportation Arranged.    Patient discussed in IDT rounds, medically cleared for SNF today. Received acceptance from [facility]; all other SNFs in [facility] declined patient.     RN CM met with patient at bedside to discuss; patient hesitant to discharge to SNF stating she wants to go home. RN CM explained that due to her current mobility score of 10 and 13; it would not be safe for patient to d/c home.     RN CM explained the benefit of discharging to SNF for rehab prior to pt discharging home and patient agreed with transferring to [facility].     Per DPA, [facility] can take patient today at 1500. Ride line request sent for 1500; awaiting confirmation.    12:55 PM- RN CM called and left VM for patient's son Reilly at [phone] requesting call back to discuss DCP.     RN CM called and spoke with patient's sister Gladis at [phone]. Informed Gladis that patient will be discharging from the hospital today at 1500 to [facility]. Gladis was also provided the phone number to [facility].     Escalations Completed: None    Medically Clear: Yes    Next Steps: F/U with Ride line for confirmation.    Barriers to Discharge: Transportation    Is the patient up for discharge tomorrow: No

## 2024-08-23 NOTE — PROGRESS NOTES
Assumed care of patient, received bedside report from Gerber HOOVER. Patient is A&O X 1, disoriented to place, time, and situation. Pain 0/10, on RA, patient is medical. Fall precautions in place. Bed alarm on, bed locked and in lowest position. 1:1 sitter given report and in place.    Is This A New Presentation Or A Follow-Up?: Dry Skin How Severe Is Your Dry Skin?: mild

## 2025-01-02 ENCOUNTER — HOSPITAL ENCOUNTER (INPATIENT)
Facility: MEDICAL CENTER | Age: 80
LOS: 7 days | DRG: 309 | End: 2025-01-09
Attending: EMERGENCY MEDICINE | Admitting: HOSPITALIST
Payer: MEDICARE

## 2025-01-02 ENCOUNTER — APPOINTMENT (OUTPATIENT)
Dept: RADIOLOGY | Facility: MEDICAL CENTER | Age: 80
DRG: 309 | End: 2025-01-02
Attending: EMERGENCY MEDICINE
Payer: MEDICARE

## 2025-01-02 DIAGNOSIS — K70.10 ALCOHOLIC HEPATITIS WITHOUT ASCITES: ICD-10-CM

## 2025-01-02 DIAGNOSIS — Z91.89 AT RISK FOR CONSTIPATION: ICD-10-CM

## 2025-01-02 DIAGNOSIS — I48.91 ATRIAL FIBRILLATION WITH RVR (HCC): ICD-10-CM

## 2025-01-02 DIAGNOSIS — I10 PRIMARY HYPERTENSION: ICD-10-CM

## 2025-01-02 DIAGNOSIS — F10.229 ALCOHOL INTOXICATION IN ACTIVE ALCOHOLIC WITH COMPLICATION (HCC): ICD-10-CM

## 2025-01-02 LAB
ALBUMIN SERPL BCP-MCNC: 3.9 G/DL (ref 3.2–4.9)
ALBUMIN/GLOB SERPL: 1.5 G/DL
ALP SERPL-CCNC: 100 U/L (ref 30–99)
ALT SERPL-CCNC: 99 U/L (ref 2–50)
ANION GAP SERPL CALC-SCNC: 20 MMOL/L (ref 7–16)
AST SERPL-CCNC: 166 U/L (ref 12–45)
BASOPHILS # BLD AUTO: 1.5 % (ref 0–1.8)
BASOPHILS # BLD: 0.07 K/UL (ref 0–0.12)
BILIRUB SERPL-MCNC: 2.4 MG/DL (ref 0.1–1.5)
BUN SERPL-MCNC: 12 MG/DL (ref 8–22)
CALCIUM ALBUM COR SERPL-MCNC: 8.8 MG/DL (ref 8.5–10.5)
CALCIUM SERPL-MCNC: 8.7 MG/DL (ref 8.5–10.5)
CHLORIDE SERPL-SCNC: 102 MMOL/L (ref 96–112)
CO2 SERPL-SCNC: 22 MMOL/L (ref 20–33)
CREAT SERPL-MCNC: 0.59 MG/DL (ref 0.5–1.4)
DIGOXIN SERPL-MCNC: <0.3 NG/ML (ref 0.8–2)
EKG IMPRESSION: NORMAL
EOSINOPHIL # BLD AUTO: 0.04 K/UL (ref 0–0.51)
EOSINOPHIL NFR BLD: 0.9 % (ref 0–6.9)
ERYTHROCYTE [DISTWIDTH] IN BLOOD BY AUTOMATED COUNT: 53.1 FL (ref 35.9–50)
ETHANOL BLD-MCNC: 252.4 MG/DL
GFR SERPLBLD CREATININE-BSD FMLA CKD-EPI: 93 ML/MIN/1.73 M 2
GLOBULIN SER CALC-MCNC: 2.6 G/DL (ref 1.9–3.5)
GLUCOSE SERPL-MCNC: 80 MG/DL (ref 65–99)
HCT VFR BLD AUTO: 44.5 % (ref 37–47)
HGB BLD-MCNC: 15.3 G/DL (ref 12–16)
IMM GRANULOCYTES # BLD AUTO: 0.03 K/UL (ref 0–0.11)
IMM GRANULOCYTES NFR BLD AUTO: 0.6 % (ref 0–0.9)
LYMPHOCYTES # BLD AUTO: 2.19 K/UL (ref 1–4.8)
LYMPHOCYTES NFR BLD: 46.6 % (ref 22–41)
MCH RBC QN AUTO: 34.5 PG (ref 27–33)
MCHC RBC AUTO-ENTMCNC: 34.4 G/DL (ref 32.2–35.5)
MCV RBC AUTO: 100.5 FL (ref 81.4–97.8)
MONOCYTES # BLD AUTO: 0.57 K/UL (ref 0–0.85)
MONOCYTES NFR BLD AUTO: 12.1 % (ref 0–13.4)
NEUTROPHILS # BLD AUTO: 1.8 K/UL (ref 1.82–7.42)
NEUTROPHILS NFR BLD: 38.3 % (ref 44–72)
NRBC # BLD AUTO: 0 K/UL
NRBC BLD-RTO: 0 /100 WBC (ref 0–0.2)
PLATELET # BLD AUTO: 59 K/UL (ref 164–446)
PLATELETS.RETICULATED NFR BLD AUTO: 4.5 % (ref 0.6–13.1)
PMV BLD AUTO: 10.4 FL (ref 9–12.9)
POTASSIUM SERPL-SCNC: 3.5 MMOL/L (ref 3.6–5.5)
PROT SERPL-MCNC: 6.5 G/DL (ref 6–8.2)
RBC # BLD AUTO: 4.43 M/UL (ref 4.2–5.4)
SODIUM SERPL-SCNC: 144 MMOL/L (ref 135–145)
T4 FREE SERPL-MCNC: 1.4 NG/DL (ref 0.93–1.7)
TROPONIN T SERPL-MCNC: 21 NG/L (ref 6–19)
TSH SERPL-ACNC: 4.28 UIU/ML (ref 0.35–5.5)
WBC # BLD AUTO: 4.7 K/UL (ref 4.8–10.8)

## 2025-01-02 PROCEDURE — 84439 ASSAY OF FREE THYROXINE: CPT

## 2025-01-02 PROCEDURE — 80053 COMPREHEN METABOLIC PANEL: CPT

## 2025-01-02 PROCEDURE — 700111 HCHG RX REV CODE 636 W/ 250 OVERRIDE (IP): Mod: JZ | Performed by: HOSPITALIST

## 2025-01-02 PROCEDURE — 85055 RETICULATED PLATELET ASSAY: CPT

## 2025-01-02 PROCEDURE — 96376 TX/PRO/DX INJ SAME DRUG ADON: CPT

## 2025-01-02 PROCEDURE — 770020 HCHG ROOM/CARE - TELE (206)

## 2025-01-02 PROCEDURE — 99223 1ST HOSP IP/OBS HIGH 75: CPT | Performed by: HOSPITALIST

## 2025-01-02 PROCEDURE — HZ2ZZZZ DETOXIFICATION SERVICES FOR SUBSTANCE ABUSE TREATMENT: ICD-10-PCS | Performed by: HOSPITALIST

## 2025-01-02 PROCEDURE — 700111 HCHG RX REV CODE 636 W/ 250 OVERRIDE (IP): Mod: JZ | Performed by: EMERGENCY MEDICINE

## 2025-01-02 PROCEDURE — 700101 HCHG RX REV CODE 250: Performed by: HOSPITALIST

## 2025-01-02 PROCEDURE — 80162 ASSAY OF DIGOXIN TOTAL: CPT

## 2025-01-02 PROCEDURE — 96375 TX/PRO/DX INJ NEW DRUG ADDON: CPT

## 2025-01-02 PROCEDURE — 84443 ASSAY THYROID STIM HORMONE: CPT

## 2025-01-02 PROCEDURE — 99285 EMERGENCY DEPT VISIT HI MDM: CPT

## 2025-01-02 PROCEDURE — 96365 THER/PROPH/DIAG IV INF INIT: CPT

## 2025-01-02 PROCEDURE — 85025 COMPLETE CBC W/AUTO DIFF WBC: CPT

## 2025-01-02 PROCEDURE — 700101 HCHG RX REV CODE 250: Performed by: EMERGENCY MEDICINE

## 2025-01-02 PROCEDURE — 36415 COLL VENOUS BLD VENIPUNCTURE: CPT

## 2025-01-02 PROCEDURE — 71045 X-RAY EXAM CHEST 1 VIEW: CPT

## 2025-01-02 PROCEDURE — 84484 ASSAY OF TROPONIN QUANT: CPT

## 2025-01-02 PROCEDURE — 82077 ASSAY SPEC XCP UR&BREATH IA: CPT

## 2025-01-02 PROCEDURE — 96366 THER/PROPH/DIAG IV INF ADDON: CPT

## 2025-01-02 PROCEDURE — 93005 ELECTROCARDIOGRAM TRACING: CPT | Mod: TC | Performed by: EMERGENCY MEDICINE

## 2025-01-02 RX ORDER — VENLAFAXINE HYDROCHLORIDE 75 MG/1
150 CAPSULE, EXTENDED RELEASE ORAL DAILY
Status: DISCONTINUED | OUTPATIENT
Start: 2025-01-03 | End: 2025-01-09 | Stop reason: HOSPADM

## 2025-01-02 RX ORDER — LORAZEPAM 2 MG/ML
1 INJECTION INTRAMUSCULAR ONCE
Status: COMPLETED | OUTPATIENT
Start: 2025-01-02 | End: 2025-01-02

## 2025-01-02 RX ORDER — ONDANSETRON 4 MG/1
4 TABLET, ORALLY DISINTEGRATING ORAL EVERY 4 HOURS PRN
Status: DISCONTINUED | OUTPATIENT
Start: 2025-01-02 | End: 2025-01-09 | Stop reason: HOSPADM

## 2025-01-02 RX ORDER — ACETAMINOPHEN 325 MG/1
650 TABLET ORAL EVERY 6 HOURS PRN
Status: DISCONTINUED | OUTPATIENT
Start: 2025-01-02 | End: 2025-01-09 | Stop reason: HOSPADM

## 2025-01-02 RX ORDER — LORAZEPAM 2 MG/ML
0.5 INJECTION INTRAMUSCULAR EVERY 4 HOURS PRN
Status: DISCONTINUED | OUTPATIENT
Start: 2025-01-02 | End: 2025-01-07

## 2025-01-02 RX ORDER — LORAZEPAM 2 MG/ML
1.5 INJECTION INTRAMUSCULAR
Status: DISCONTINUED | OUTPATIENT
Start: 2025-01-02 | End: 2025-01-07

## 2025-01-02 RX ORDER — LORAZEPAM 1 MG/1
1 TABLET ORAL EVERY 4 HOURS PRN
Status: DISCONTINUED | OUTPATIENT
Start: 2025-01-02 | End: 2025-01-07

## 2025-01-02 RX ORDER — AMLODIPINE BESYLATE 5 MG/1
2.5 TABLET ORAL DAILY
Status: DISCONTINUED | OUTPATIENT
Start: 2025-01-03 | End: 2025-01-09 | Stop reason: HOSPADM

## 2025-01-02 RX ORDER — FOLIC ACID 1 MG/1
1 TABLET ORAL DAILY
Status: DISCONTINUED | OUTPATIENT
Start: 2025-01-03 | End: 2025-01-09 | Stop reason: HOSPADM

## 2025-01-02 RX ORDER — TRAZODONE HYDROCHLORIDE 50 MG/1
50 TABLET, FILM COATED ORAL NIGHTLY
Status: DISCONTINUED | OUTPATIENT
Start: 2025-01-02 | End: 2025-01-09 | Stop reason: HOSPADM

## 2025-01-02 RX ORDER — LORAZEPAM 0.5 MG/1
0.5 TABLET ORAL EVERY 4 HOURS PRN
Status: DISCONTINUED | OUTPATIENT
Start: 2025-01-02 | End: 2025-01-07

## 2025-01-02 RX ORDER — POTASSIUM CHLORIDE 1500 MG/1
40 TABLET, EXTENDED RELEASE ORAL ONCE
Status: DISCONTINUED | OUTPATIENT
Start: 2025-01-02 | End: 2025-01-03

## 2025-01-02 RX ORDER — LORAZEPAM 2 MG/1
4 TABLET ORAL
Status: DISCONTINUED | OUTPATIENT
Start: 2025-01-02 | End: 2025-01-07

## 2025-01-02 RX ORDER — THIAMINE HYDROCHLORIDE 100 MG/ML
100 INJECTION, SOLUTION INTRAMUSCULAR; INTRAVENOUS DAILY
Status: COMPLETED | OUTPATIENT
Start: 2025-01-03 | End: 2025-01-05

## 2025-01-02 RX ORDER — LORAZEPAM 2 MG/ML
2 INJECTION INTRAMUSCULAR
Status: DISCONTINUED | OUTPATIENT
Start: 2025-01-02 | End: 2025-01-07

## 2025-01-02 RX ORDER — LORAZEPAM 2 MG/1
2 TABLET ORAL
Status: DISCONTINUED | OUTPATIENT
Start: 2025-01-02 | End: 2025-01-07

## 2025-01-02 RX ORDER — METOPROLOL TARTRATE 1 MG/ML
5 INJECTION, SOLUTION INTRAVENOUS
Status: DISCONTINUED | OUTPATIENT
Start: 2025-01-02 | End: 2025-01-02

## 2025-01-02 RX ORDER — CHLORDIAZEPOXIDE HYDROCHLORIDE 25 MG/1
25 CAPSULE, GELATIN COATED ORAL EVERY 6 HOURS
Status: DISPENSED | OUTPATIENT
Start: 2025-01-04 | End: 2025-01-05

## 2025-01-02 RX ORDER — LORAZEPAM 2 MG/ML
1 INJECTION INTRAMUSCULAR
Status: DISCONTINUED | OUTPATIENT
Start: 2025-01-02 | End: 2025-01-07

## 2025-01-02 RX ORDER — ESCITALOPRAM OXALATE 10 MG/1
10 TABLET ORAL DAILY
Status: DISCONTINUED | OUTPATIENT
Start: 2025-01-03 | End: 2025-01-06

## 2025-01-02 RX ORDER — CHLORDIAZEPOXIDE HYDROCHLORIDE 25 MG/1
50 CAPSULE, GELATIN COATED ORAL EVERY 6 HOURS
Status: COMPLETED | OUTPATIENT
Start: 2025-01-03 | End: 2025-01-03

## 2025-01-02 RX ORDER — LEVOTHYROXINE SODIUM 50 UG/1
50 TABLET ORAL
Status: DISCONTINUED | OUTPATIENT
Start: 2025-01-03 | End: 2025-01-09 | Stop reason: HOSPADM

## 2025-01-02 RX ORDER — DIGOXIN 0.25 MG/ML
125 INJECTION INTRAMUSCULAR; INTRAVENOUS DAILY
Status: COMPLETED | OUTPATIENT
Start: 2025-01-02 | End: 2025-01-02

## 2025-01-02 RX ORDER — METOPROLOL TARTRATE 1 MG/ML
5 INJECTION, SOLUTION INTRAVENOUS
Status: DISCONTINUED | OUTPATIENT
Start: 2025-01-02 | End: 2025-01-09

## 2025-01-02 RX ORDER — LABETALOL HYDROCHLORIDE 5 MG/ML
10 INJECTION, SOLUTION INTRAVENOUS EVERY 4 HOURS PRN
Status: DISCONTINUED | OUTPATIENT
Start: 2025-01-02 | End: 2025-01-09 | Stop reason: HOSPADM

## 2025-01-02 RX ORDER — ONDANSETRON 2 MG/ML
4 INJECTION INTRAMUSCULAR; INTRAVENOUS EVERY 4 HOURS PRN
Status: DISCONTINUED | OUTPATIENT
Start: 2025-01-02 | End: 2025-01-09 | Stop reason: HOSPADM

## 2025-01-02 RX ORDER — SODIUM CHLORIDE 9 MG/ML
INJECTION, SOLUTION INTRAVENOUS CONTINUOUS
Status: DISCONTINUED | OUTPATIENT
Start: 2025-01-02 | End: 2025-01-04

## 2025-01-02 RX ORDER — DIGOXIN 125 MCG
125 TABLET ORAL DAILY
Status: DISCONTINUED | OUTPATIENT
Start: 2025-01-03 | End: 2025-01-09 | Stop reason: HOSPADM

## 2025-01-02 RX ORDER — MIRTAZAPINE 15 MG/1
15 TABLET, FILM COATED ORAL
Status: DISCONTINUED | OUTPATIENT
Start: 2025-01-02 | End: 2025-01-09 | Stop reason: HOSPADM

## 2025-01-02 RX ORDER — METOPROLOL SUCCINATE 25 MG/1
25 TABLET, EXTENDED RELEASE ORAL
Status: DISCONTINUED | OUTPATIENT
Start: 2025-01-03 | End: 2025-01-02

## 2025-01-02 RX ORDER — LEVOTHYROXINE SODIUM 50 UG/1
50 TABLET ORAL ONCE
Status: DISCONTINUED | OUTPATIENT
Start: 2025-01-02 | End: 2025-01-02

## 2025-01-02 RX ADMIN — Medication: at 22:00

## 2025-01-02 RX ADMIN — DIGOXIN 125 MCG: 250 INJECTION, SOLUTION INTRAMUSCULAR; INTRAVENOUS; PARENTERAL at 21:07

## 2025-01-02 RX ADMIN — LORAZEPAM 1 MG: 2 INJECTION INTRAMUSCULAR; INTRAVENOUS at 21:21

## 2025-01-02 RX ADMIN — METOPROLOL TARTRATE 5 MG: 5 INJECTION INTRAVENOUS at 23:27

## 2025-01-02 RX ADMIN — LORAZEPAM 0.5 MG: 2 INJECTION INTRAMUSCULAR; INTRAVENOUS at 23:16

## 2025-01-02 RX ADMIN — METOPROLOL TARTRATE 5 MG: 5 INJECTION INTRAVENOUS at 21:48

## 2025-01-02 RX ADMIN — ONDANSETRON 4 MG: 2 INJECTION INTRAMUSCULAR; INTRAVENOUS at 23:16

## 2025-01-02 ASSESSMENT — ENCOUNTER SYMPTOMS
FEVER: 0
NAUSEA: 0
NECK PAIN: 0
BLOOD IN STOOL: 0
MYALGIAS: 0
CLAUDICATION: 0
ORTHOPNEA: 0
DIARRHEA: 0
DEPRESSION: 0
EYE PAIN: 0
POLYDIPSIA: 0
SINUS PAIN: 0
HEMOPTYSIS: 0
STRIDOR: 0
BACK PAIN: 0
SHORTNESS OF BREATH: 0
PND: 0
DOUBLE VISION: 0
BLURRED VISION: 0
SORE THROAT: 0
TINGLING: 0
FLANK PAIN: 0
ABDOMINAL PAIN: 0
SPUTUM PRODUCTION: 0
PALPITATIONS: 1
VOMITING: 0
DIAPHORESIS: 0
TREMORS: 1
CHILLS: 0
COUGH: 0
CONSTIPATION: 0
BRUISES/BLEEDS EASILY: 0
HALLUCINATIONS: 0
PHOTOPHOBIA: 0
WHEEZING: 0
HEADACHES: 1
FALLS: 0
WEAKNESS: 1
HEARTBURN: 0
DIZZINESS: 0

## 2025-01-02 ASSESSMENT — LIFESTYLE VARIABLES
ON A TYPICAL DAY WHEN YOU DRINK ALCOHOL HOW MANY DRINKS DO YOU HAVE: 7
EVER FELT BAD OR GUILTY ABOUT YOUR DRINKING: YES
ORIENTATION AND CLOUDING OF SENSORIUM: ORIENTED AND CAN DO SERIAL ADDITIONS
TOTAL SCORE: 4
HOW MANY TIMES IN THE PAST YEAR HAVE YOU HAD 5 OR MORE DRINKS IN A DAY: 4
AUDITORY DISTURBANCES: NOT PRESENT
SUBSTANCE_ABUSE: 0
HEADACHE, FULLNESS IN HEAD: NOT PRESENT
HEADACHE, FULLNESS IN HEAD: MILD
EVER HAD A DRINK FIRST THING IN THE MORNING TO STEADY YOUR NERVES TO GET RID OF A HANGOVER: YES
HAVE PEOPLE ANNOYED YOU BY CRITICIZING YOUR DRINKING: YES
TOTAL SCORE: 7
AGITATION: NORMAL ACTIVITY
VISUAL DISTURBANCES: NOT PRESENT
AUDITORY DISTURBANCES: NOT PRESENT
DO YOU DRINK ALCOHOL: YES
HAVE YOU EVER FELT YOU SHOULD CUT DOWN ON YOUR DRINKING: YES
PAROXYSMAL SWEATS: NO SWEAT VISIBLE
TREMOR: TREMOR NOT VISIBLE BUT CAN BE FELT, FINGERTIP TO FINGERTIP
AGITATION: NORMAL ACTIVITY
NAUSEA AND VOMITING: INTERMITTENT NAUSEA WITH DRY HEAVES
NAUSEA AND VOMITING: NO NAUSEA AND NO VOMITING
ORIENTATION AND CLOUDING OF SENSORIUM: ORIENTED AND CAN DO SERIAL ADDITIONS
TOTAL SCORE: 4
DOES PATIENT WANT TO STOP DRINKING: CANNOT ASSESS
TREMOR: TREMOR NOT VISIBLE BUT CAN BE FELT, FINGERTIP TO FINGERTIP
VISUAL DISTURBANCES: NOT PRESENT
TOTAL SCORE: 4
CONSUMPTION TOTAL: INCOMPLETE
ANXIETY: MILDLY ANXIOUS
PAROXYSMAL SWEATS: BARELY PERCEPTIBLE SWEATING. PALMS MOIST
ANXIETY: MILDLY ANXIOUS
TOTAL SCORE: 4

## 2025-01-02 ASSESSMENT — FIBROSIS 4 INDEX: FIB4 SCORE: 11.9

## 2025-01-03 LAB
ALBUMIN SERPL BCP-MCNC: 3.3 G/DL (ref 3.2–4.9)
ALBUMIN/GLOB SERPL: 1.4 G/DL
ALP SERPL-CCNC: 102 U/L (ref 30–99)
ALT SERPL-CCNC: 75 U/L (ref 2–50)
ANION GAP SERPL CALC-SCNC: 13 MMOL/L (ref 7–16)
AST SERPL-CCNC: 110 U/L (ref 12–45)
BASOPHILS # BLD AUTO: 1.1 % (ref 0–1.8)
BASOPHILS # BLD: 0.05 K/UL (ref 0–0.12)
BILIRUB SERPL-MCNC: 3 MG/DL (ref 0.1–1.5)
BUN SERPL-MCNC: 12 MG/DL (ref 8–22)
CALCIUM ALBUM COR SERPL-MCNC: 8.9 MG/DL (ref 8.5–10.5)
CALCIUM SERPL-MCNC: 8.3 MG/DL (ref 8.5–10.5)
CHLORIDE SERPL-SCNC: 104 MMOL/L (ref 96–112)
CO2 SERPL-SCNC: 25 MMOL/L (ref 20–33)
CREAT SERPL-MCNC: 0.44 MG/DL (ref 0.5–1.4)
EOSINOPHIL # BLD AUTO: 0.06 K/UL (ref 0–0.51)
EOSINOPHIL NFR BLD: 1.3 % (ref 0–6.9)
ERYTHROCYTE [DISTWIDTH] IN BLOOD BY AUTOMATED COUNT: 54 FL (ref 35.9–50)
GFR SERPLBLD CREATININE-BSD FMLA CKD-EPI: 99 ML/MIN/1.73 M 2
GLOBULIN SER CALC-MCNC: 2.4 G/DL (ref 1.9–3.5)
GLUCOSE SERPL-MCNC: 107 MG/DL (ref 65–99)
HCT VFR BLD AUTO: 40 % (ref 37–47)
HGB BLD-MCNC: 13.6 G/DL (ref 12–16)
IMM GRANULOCYTES # BLD AUTO: 0.02 K/UL (ref 0–0.11)
IMM GRANULOCYTES NFR BLD AUTO: 0.4 % (ref 0–0.9)
LYMPHOCYTES # BLD AUTO: 1.83 K/UL (ref 1–4.8)
LYMPHOCYTES NFR BLD: 39.7 % (ref 22–41)
MAGNESIUM SERPL-MCNC: 1.8 MG/DL (ref 1.5–2.5)
MCH RBC QN AUTO: 35.1 PG (ref 27–33)
MCHC RBC AUTO-ENTMCNC: 34 G/DL (ref 32.2–35.5)
MCV RBC AUTO: 103.1 FL (ref 81.4–97.8)
MONOCYTES # BLD AUTO: 0.37 K/UL (ref 0–0.85)
MONOCYTES NFR BLD AUTO: 8 % (ref 0–13.4)
NEUTROPHILS # BLD AUTO: 2.28 K/UL (ref 1.82–7.42)
NEUTROPHILS NFR BLD: 49.5 % (ref 44–72)
NRBC # BLD AUTO: 0 K/UL
NRBC BLD-RTO: 0 /100 WBC (ref 0–0.2)
PHOSPHATE SERPL-MCNC: 2.4 MG/DL (ref 2.5–4.5)
PLATELET # BLD AUTO: 46 K/UL (ref 164–446)
PLATELETS.RETICULATED NFR BLD AUTO: 5.6 % (ref 0.6–13.1)
PMV BLD AUTO: 10.1 FL (ref 9–12.9)
POTASSIUM SERPL-SCNC: 4 MMOL/L (ref 3.6–5.5)
PROT SERPL-MCNC: 5.7 G/DL (ref 6–8.2)
RBC # BLD AUTO: 3.88 M/UL (ref 4.2–5.4)
SODIUM SERPL-SCNC: 142 MMOL/L (ref 135–145)
WBC # BLD AUTO: 4.6 K/UL (ref 4.8–10.8)

## 2025-01-03 PROCEDURE — 80053 COMPREHEN METABOLIC PANEL: CPT

## 2025-01-03 PROCEDURE — 700102 HCHG RX REV CODE 250 W/ 637 OVERRIDE(OP): Performed by: INTERNAL MEDICINE

## 2025-01-03 PROCEDURE — 700111 HCHG RX REV CODE 636 W/ 250 OVERRIDE (IP): Performed by: HOSPITALIST

## 2025-01-03 PROCEDURE — 700105 HCHG RX REV CODE 258: Performed by: HOSPITALIST

## 2025-01-03 PROCEDURE — 85055 RETICULATED PLATELET ASSAY: CPT

## 2025-01-03 PROCEDURE — 84100 ASSAY OF PHOSPHORUS: CPT

## 2025-01-03 PROCEDURE — 83735 ASSAY OF MAGNESIUM: CPT

## 2025-01-03 PROCEDURE — 99233 SBSQ HOSP IP/OBS HIGH 50: CPT | Performed by: INTERNAL MEDICINE

## 2025-01-03 PROCEDURE — 36415 COLL VENOUS BLD VENIPUNCTURE: CPT

## 2025-01-03 PROCEDURE — 700102 HCHG RX REV CODE 250 W/ 637 OVERRIDE(OP): Performed by: HOSPITALIST

## 2025-01-03 PROCEDURE — A9270 NON-COVERED ITEM OR SERVICE: HCPCS | Performed by: HOSPITALIST

## 2025-01-03 PROCEDURE — 700101 HCHG RX REV CODE 250: Performed by: HOSPITALIST

## 2025-01-03 PROCEDURE — A9270 NON-COVERED ITEM OR SERVICE: HCPCS | Performed by: INTERNAL MEDICINE

## 2025-01-03 PROCEDURE — 85025 COMPLETE CBC W/AUTO DIFF WBC: CPT

## 2025-01-03 PROCEDURE — 770020 HCHG ROOM/CARE - TELE (206)

## 2025-01-03 RX ORDER — SULFAMETHOXAZOLE AND TRIMETHOPRIM 800; 160 MG/1; MG/1
1 TABLET ORAL 2 TIMES DAILY
Status: ON HOLD | COMMUNITY
Start: 2024-12-10 | End: 2025-01-09

## 2025-01-03 RX ORDER — LANOLIN ALCOHOL/MO/W.PET/CERES
400 CREAM (GRAM) TOPICAL 2 TIMES DAILY
Status: COMPLETED | OUTPATIENT
Start: 2025-01-03 | End: 2025-01-03

## 2025-01-03 RX ORDER — HYDROXYZINE HYDROCHLORIDE 25 MG/1
25 TABLET, FILM COATED ORAL
COMMUNITY

## 2025-01-03 RX ORDER — TRAZODONE HYDROCHLORIDE 100 MG/1
100 TABLET ORAL NIGHTLY
COMMUNITY

## 2025-01-03 RX ORDER — ESTRADIOL 0.1 MG/G
1-2 CREAM VAGINAL SEE ADMIN INSTRUCTIONS
COMMUNITY
Start: 2024-12-10

## 2025-01-03 RX ORDER — NITROFURANTOIN 25; 75 MG/1; MG/1
100 CAPSULE ORAL 2 TIMES DAILY
Status: ON HOLD | COMMUNITY
Start: 2024-12-06 | End: 2025-01-09

## 2025-01-03 RX ADMIN — DIBASIC SODIUM PHOSPHATE, MONOBASIC POTASSIUM PHOSPHATE AND MONOBASIC SODIUM PHOSPHATE 500 MG: 852; 155; 130 TABLET ORAL at 17:04

## 2025-01-03 RX ADMIN — LORAZEPAM 1 MG: 1 TABLET ORAL at 07:31

## 2025-01-03 RX ADMIN — CHLORDIAZEPOXIDE HYDROCHLORIDE 25 MG: 25 CAPSULE ORAL at 23:21

## 2025-01-03 RX ADMIN — APIXABAN 5 MG: 5 TABLET, FILM COATED ORAL at 05:55

## 2025-01-03 RX ADMIN — LORAZEPAM 1 MG: 1 TABLET ORAL at 11:08

## 2025-01-03 RX ADMIN — CHLORDIAZEPOXIDE HYDROCHLORIDE 50 MG: 25 CAPSULE ORAL at 06:02

## 2025-01-03 RX ADMIN — THIAMINE HYDROCHLORIDE 100 MG: 100 INJECTION, SOLUTION INTRAMUSCULAR; INTRAVENOUS at 05:50

## 2025-01-03 RX ADMIN — VENLAFAXINE HYDROCHLORIDE 150 MG: 75 CAPSULE, EXTENDED RELEASE ORAL at 05:52

## 2025-01-03 RX ADMIN — METOPROLOL TARTRATE 5 MG: 5 INJECTION INTRAVENOUS at 03:46

## 2025-01-03 RX ADMIN — ESCITALOPRAM OXALATE 10 MG: 10 TABLET ORAL at 05:55

## 2025-01-03 RX ADMIN — LORAZEPAM 1 MG: 1 TABLET ORAL at 19:08

## 2025-01-03 RX ADMIN — TRAZODONE HYDROCHLORIDE 50 MG: 50 TABLET ORAL at 20:26

## 2025-01-03 RX ADMIN — SODIUM CHLORIDE: 9 INJECTION, SOLUTION INTRAVENOUS at 23:25

## 2025-01-03 RX ADMIN — LORAZEPAM 0.5 MG: 2 INJECTION INTRAMUSCULAR; INTRAVENOUS at 03:45

## 2025-01-03 RX ADMIN — SODIUM CHLORIDE: 9 INJECTION, SOLUTION INTRAVENOUS at 01:41

## 2025-01-03 RX ADMIN — DIBASIC SODIUM PHOSPHATE, MONOBASIC POTASSIUM PHOSPHATE AND MONOBASIC SODIUM PHOSPHATE 500 MG: 852; 155; 130 TABLET ORAL at 11:13

## 2025-01-03 RX ADMIN — APIXABAN 5 MG: 5 TABLET, FILM COATED ORAL at 01:32

## 2025-01-03 RX ADMIN — CHLORDIAZEPOXIDE HYDROCHLORIDE 50 MG: 25 CAPSULE ORAL at 17:07

## 2025-01-03 RX ADMIN — CHLORDIAZEPOXIDE HYDROCHLORIDE 50 MG: 25 CAPSULE ORAL at 01:32

## 2025-01-03 RX ADMIN — FOLIC ACID 1 MG: 1 TABLET ORAL at 05:53

## 2025-01-03 RX ADMIN — MIRTAZAPINE 15 MG: 15 TABLET, FILM COATED ORAL at 20:26

## 2025-01-03 RX ADMIN — LORAZEPAM 1 MG: 1 TABLET ORAL at 15:21

## 2025-01-03 RX ADMIN — TRAZODONE HYDROCHLORIDE 50 MG: 50 TABLET ORAL at 01:32

## 2025-01-03 RX ADMIN — LEVOTHYROXINE SODIUM 50 MCG: 0.05 TABLET ORAL at 05:55

## 2025-01-03 RX ADMIN — LORAZEPAM 1 MG: 1 TABLET ORAL at 23:21

## 2025-01-03 RX ADMIN — Medication 400 MG: at 07:31

## 2025-01-03 RX ADMIN — MIRTAZAPINE 15 MG: 15 TABLET, FILM COATED ORAL at 01:32

## 2025-01-03 RX ADMIN — CHLORDIAZEPOXIDE HYDROCHLORIDE 50 MG: 25 CAPSULE ORAL at 11:13

## 2025-01-03 RX ADMIN — DIBASIC SODIUM PHOSPHATE, MONOBASIC POTASSIUM PHOSPHATE AND MONOBASIC SODIUM PHOSPHATE 500 MG: 852; 155; 130 TABLET ORAL at 07:24

## 2025-01-03 RX ADMIN — METOPROLOL SUCCINATE 75 MG: 25 TABLET, EXTENDED RELEASE ORAL at 05:54

## 2025-01-03 RX ADMIN — DIGOXIN 125 MCG: 0.12 TABLET ORAL at 05:51

## 2025-01-03 RX ADMIN — Medication 400 MG: at 17:05

## 2025-01-03 RX ADMIN — AMLODIPINE BESYLATE 2.5 MG: 5 TABLET ORAL at 05:52

## 2025-01-03 ASSESSMENT — LIFESTYLE VARIABLES
AGITATION: NORMAL ACTIVITY
PAROXYSMAL SWEATS: NO SWEAT VISIBLE
HAVE PEOPLE ANNOYED YOU BY CRITICIZING YOUR DRINKING: YES
TREMOR: TREMOR NOT VISIBLE BUT CAN BE FELT, FINGERTIP TO FINGERTIP
VISUAL DISTURBANCES: NOT PRESENT
HEADACHE, FULLNESS IN HEAD: NOT PRESENT
VISUAL DISTURBANCES: MODERATELY SEVERE HALLUCINATIONS
TREMOR: *
AUDITORY DISTURBANCES: NOT PRESENT
AGITATION: NORMAL ACTIVITY
TOTAL SCORE: 4
ANXIETY: NO ANXIETY (AT EASE)
HEADACHE, FULLNESS IN HEAD: NOT PRESENT
TOTAL SCORE: 8
TREMOR: TREMOR NOT VISIBLE BUT CAN BE FELT, FINGERTIP TO FINGERTIP
HEADACHE, FULLNESS IN HEAD: MODERATE
PAROXYSMAL SWEATS: *
AGITATION: NORMAL ACTIVITY
AUDITORY DISTURBANCES: NOT PRESENT
NAUSEA AND VOMITING: MILD NAUSEA WITH NO VOMITING
ORIENTATION AND CLOUDING OF SENSORIUM: ORIENTED AND CAN DO SERIAL ADDITIONS
VISUAL DISTURBANCES: NOT PRESENT
TOTAL SCORE: 10
AGITATION: NORMAL ACTIVITY
TOTAL SCORE: 3
ANXIETY: MODERATELY ANXIOUS OR GUARDED, SO ANXIETY IS INFERRED
VISUAL DISTURBANCES: NOT PRESENT
TREMOR: *
AUDITORY DISTURBANCES: NOT PRESENT
HOW MANY TIMES IN THE PAST YEAR HAVE YOU HAD 5 OR MORE DRINKS IN A DAY: 10
TOTAL SCORE: 8
ANXIETY: MILDLY ANXIOUS
PAROXYSMAL SWEATS: NO SWEAT VISIBLE
ORIENTATION AND CLOUDING OF SENSORIUM: ORIENTED AND CAN DO SERIAL ADDITIONS
TOTAL SCORE: 9
DOES PATIENT WANT TO STOP DRINKING: YES
TOTAL SCORE: VERY MILD ITCHING, PINS AND NEEDLES SENSATION, BURNING OR NUMBNESS
TREMOR: TREMOR NOT VISIBLE BUT CAN BE FELT, FINGERTIP TO FINGERTIP
PAROXYSMAL SWEATS: *
VISUAL DISTURBANCES: NOT PRESENT
ANXIETY: *
TOTAL SCORE: 3
NAUSEA AND VOMITING: MILD NAUSEA WITH NO VOMITING
TOTAL SCORE: VERY MILD ITCHING, PINS AND NEEDLES SENSATION, BURNING OR NUMBNESS
ANXIETY: MODERATELY ANXIOUS OR GUARDED, SO ANXIETY IS INFERRED
ORIENTATION AND CLOUDING OF SENSORIUM: ORIENTED AND CAN DO SERIAL ADDITIONS
EVER HAD A DRINK FIRST THING IN THE MORNING TO STEADY YOUR NERVES TO GET RID OF A HANGOVER: NO
AUDITORY DISTURBANCES: NOT PRESENT
TOTAL SCORE: 10
ANXIETY: MILDLY ANXIOUS
TREMOR: *
NAUSEA AND VOMITING: NO NAUSEA AND NO VOMITING
TREMOR: *
HEADACHE, FULLNESS IN HEAD: NOT PRESENT
AUDITORY DISTURBANCES: NOT PRESENT
TOTAL SCORE: VERY MILD ITCHING, PINS AND NEEDLES SENSATION, BURNING OR NUMBNESS
AVERAGE NUMBER OF DAYS PER WEEK YOU HAVE A DRINK CONTAINING ALCOHOL: 4
TOTAL SCORE: 3
NAUSEA AND VOMITING: *
ANXIETY: MODERATELY ANXIOUS OR GUARDED, SO ANXIETY IS INFERRED
AUDITORY DISTURBANCES: NOT PRESENT
ORIENTATION AND CLOUDING OF SENSORIUM: ORIENTED AND CAN DO SERIAL ADDITIONS
HEADACHE, FULLNESS IN HEAD: VERY MILD
ORIENTATION AND CLOUDING OF SENSORIUM: ORIENTED AND CAN DO SERIAL ADDITIONS
NAUSEA AND VOMITING: MILD NAUSEA WITH NO VOMITING
ORIENTATION AND CLOUDING OF SENSORIUM: ORIENTED AND CAN DO SERIAL ADDITIONS
HEADACHE, FULLNESS IN HEAD: NOT PRESENT
PAROXYSMAL SWEATS: NO SWEAT VISIBLE
CONSUMPTION TOTAL: POSITIVE
EVER FELT BAD OR GUILTY ABOUT YOUR DRINKING: YES
NAUSEA AND VOMITING: NO NAUSEA AND NO VOMITING
AUDITORY DISTURBANCES: NOT PRESENT
TOTAL SCORE: 8
ALCOHOL_USE: YES
DOES PATIENT WANT TO TALK TO SOMEONE ABOUT QUITTING: YES
VISUAL DISTURBANCES: VERY MILD SENSITIVITY
AGITATION: NORMAL ACTIVITY
PAROXYSMAL SWEATS: BARELY PERCEPTIBLE SWEATING. PALMS MOIST
AGITATION: NORMAL ACTIVITY
ON A TYPICAL DAY WHEN YOU DRINK ALCOHOL HOW MANY DRINKS DO YOU HAVE: 4
VISUAL DISTURBANCES: NOT PRESENT
NAUSEA AND VOMITING: NO NAUSEA AND NO VOMITING
HAVE YOU EVER FELT YOU SHOULD CUT DOWN ON YOUR DRINKING: YES
AGITATION: SOMEWHAT MORE THAN NORMAL ACTIVITY
ORIENTATION AND CLOUDING OF SENSORIUM: ORIENTED AND CAN DO SERIAL ADDITIONS
PAROXYSMAL SWEATS: BARELY PERCEPTIBLE SWEATING. PALMS MOIST
HEADACHE, FULLNESS IN HEAD: NOT PRESENT

## 2025-01-03 ASSESSMENT — CHA2DS2 SCORE
HYPERTENSION: YES
CHA2DS2 VASC SCORE: 5
DIABETES: NO
AGE 75 OR GREATER: YES
VASCULAR DISEASE: YES
CHF OR LEFT VENTRICULAR DYSFUNCTION: NO
SEX: FEMALE
PRIOR STROKE OR TIA OR THROMBOEMBOLISM: NO
AGE 65 TO 74: NO

## 2025-01-03 ASSESSMENT — PAIN DESCRIPTION - PAIN TYPE
TYPE: ACUTE PAIN

## 2025-01-03 ASSESSMENT — SOCIAL DETERMINANTS OF HEALTH (SDOH)
WITHIN THE LAST YEAR, HAVE TO BEEN RAPED OR FORCED TO HAVE ANY KIND OF SEXUAL ACTIVITY BY YOUR PARTNER OR EX-PARTNER?: NO
WITHIN THE LAST YEAR, HAVE YOU BEEN KICKED, HIT, SLAPPED, OR OTHERWISE PHYSICALLY HURT BY YOUR PARTNER OR EX-PARTNER?: NO
WITHIN THE PAST 12 MONTHS, THE FOOD YOU BOUGHT JUST DIDN'T LAST AND YOU DIDN'T HAVE MONEY TO GET MORE: NEVER TRUE
IN THE PAST 12 MONTHS, HAS THE ELECTRIC, GAS, OIL, OR WATER COMPANY THREATENED TO SHUT OFF SERVICE IN YOUR HOME?: NO
WITHIN THE LAST YEAR, HAVE YOU BEEN HUMILIATED OR EMOTIONALLY ABUSED IN OTHER WAYS BY YOUR PARTNER OR EX-PARTNER?: NO
WITHIN THE PAST 12 MONTHS, YOU WORRIED THAT YOUR FOOD WOULD RUN OUT BEFORE YOU GOT THE MONEY TO BUY MORE: NEVER TRUE
WITHIN THE LAST YEAR, HAVE YOU BEEN AFRAID OF YOUR PARTNER OR EX-PARTNER?: NO

## 2025-01-03 ASSESSMENT — ENCOUNTER SYMPTOMS
DEPRESSION: 1
PALPITATIONS: 1
SHORTNESS OF BREATH: 1

## 2025-01-03 ASSESSMENT — PATIENT HEALTH QUESTIONNAIRE - PHQ9
SUM OF ALL RESPONSES TO PHQ QUESTIONS 1-9: 22
SUM OF ALL RESPONSES TO PHQ9 QUESTIONS 1 AND 2: 6
6. FEELING BAD ABOUT YOURSELF - OR THAT YOU ARE A FAILURE OR HAVE LET YOURSELF OR YOUR FAMILY DOWN: MORE THAN HALF THE DAYS
1. LITTLE INTEREST OR PLEASURE IN DOING THINGS: NEARLY EVERY DAY
7. TROUBLE CONCENTRATING ON THINGS, SUCH AS READING THE NEWSPAPER OR WATCHING TELEVISION: NEARLY EVERY DAY
5. POOR APPETITE OR OVEREATING: MORE THAN HALF THE DAYS
2. FEELING DOWN, DEPRESSED, IRRITABLE, OR HOPELESS: NEARLY EVERY DAY
4. FEELING TIRED OR HAVING LITTLE ENERGY: NEARLY EVERY DAY
9. THOUGHTS THAT YOU WOULD BE BETTER OFF DEAD, OR OF HURTING YOURSELF: NOT AT ALL
8. MOVING OR SPEAKING SO SLOWLY THAT OTHER PEOPLE COULD HAVE NOTICED. OR THE OPPOSITE, BEING SO FIGETY OR RESTLESS THAT YOU HAVE BEEN MOVING AROUND A LOT MORE THAN USUAL: NEARLY EVERY DAY
3. TROUBLE FALLING OR STAYING ASLEEP OR SLEEPING TOO MUCH: NEARLY EVERY DAY

## 2025-01-03 ASSESSMENT — COGNITIVE AND FUNCTIONAL STATUS - GENERAL
DRESSING REGULAR LOWER BODY CLOTHING: A LOT
SUGGESTED CMS G CODE MODIFIER DAILY ACTIVITY: CK
CLIMB 3 TO 5 STEPS WITH RAILING: A LITTLE
MOVING FROM LYING ON BACK TO SITTING ON SIDE OF FLAT BED: A LITTLE
WALKING IN HOSPITAL ROOM: A LITTLE
DRESSING REGULAR LOWER BODY CLOTHING: A LITTLE
DAILY ACTIVITIY SCORE: 20
DRESSING REGULAR UPPER BODY CLOTHING: A LITTLE
STANDING UP FROM CHAIR USING ARMS: A LITTLE
DAILY ACTIVITIY SCORE: 18
CLIMB 3 TO 5 STEPS WITH RAILING: A LITTLE
DRESSING REGULAR UPPER BODY CLOTHING: A LITTLE
WALKING IN HOSPITAL ROOM: A LITTLE
MOBILITY SCORE: 16
TURNING FROM BACK TO SIDE WHILE IN FLAT BAD: A LITTLE
MOVING TO AND FROM BED TO CHAIR: A LITTLE
TOILETING: A LITTLE
MOBILITY SCORE: 18
MOVING TO AND FROM BED TO CHAIR: A LITTLE
HELP NEEDED FOR BATHING: A LOT
SUGGESTED CMS G CODE MODIFIER DAILY ACTIVITY: CJ
TOILETING: A LITTLE
HELP NEEDED FOR BATHING: A LITTLE
STANDING UP FROM CHAIR USING ARMS: A LOT
SUGGESTED CMS G CODE MODIFIER MOBILITY: CK
MOVING FROM LYING ON BACK TO SITTING ON SIDE OF FLAT BED: A LOT
SUGGESTED CMS G CODE MODIFIER MOBILITY: CK
TURNING FROM BACK TO SIDE WHILE IN FLAT BAD: A LITTLE

## 2025-01-03 ASSESSMENT — FIBROSIS 4 INDEX
FIB4 SCORE: 21.77
FIB4 SCORE: 21.77

## 2025-01-03 NOTE — ASSESSMENT & PLAN NOTE
Uncontrolled  Continue Eliquis  Continue digoxin and metoprolol  IV PRN medications been ordered    Vitals:    01/08/25 1543   BP: 114/74   Pulse: 67   Resp: 18   Temp: 36.2 °C (97.2 °F)   SpO2: 95%     Hr stable

## 2025-01-03 NOTE — PROGRESS NOTES
Hospital Medicine Daily Progress Note    Date of Service  1/3/2025    Chief Complaint  Jeanie Hutchison is a 79 y.o. female admitted 1/2/2025 with   Chief Complaint   Patient presents with    Alcohol Intoxication     Pt endorses heavy drinking over the last 2 weeks due to family loss. Last drink yesterday. Pt states she has felt more confused over the last 3 days.         Hospital Course  No notes on file    Jeanie Hutchison is a 79 y.o. female who presented 1/2/2025 with past medical history of alcohol abuse, atrial fibrillation, history of V. tach, history of AICD who presents to the hospital for palpitations and shortness of breath.  Patient states that she has been drinking alcohol on a daily basis for the past 2 weeks.  She usually drinks vodka.  She presented to hospital tremulous, confused and hallucinating.  She denies any fever, chills, nausea, vomiting, diarrhea, cough.  Patient states that she is not taking her home medications.  Patient is upset that she lost both of her daughters and her  in the past few years.  Currently, she lives with her grandson.  She denies any suicide ideations states that she wants to live for her grandchildren.     Chest x-ray interpreted by me find no acute pulmonary process  EKG interpreted by me found elevation, RVR, left axis deviation    Interval Problem Update  Patient was seen and examined at bedside.  I have personally reviewed and interpreted vitals, labs, and imaging.    1/3.  Afebrile.  Has been tachycardic.  Tachypnea is improved.  On 3-4 L nasal cannula.  Telemetry shows atrial flutter up to the 170s.  Will monitor anticoagulation closely with significant thrombocytopenia.  Added parameters to hold apixaban for platelets less than 50.  Replete phosphorus, magnesium.  High anion gap metabolic acidosis resolved with fluid resuscitation.  LFTs trending down.  Patient is lethargic.  Denies any fever, chills, chest pains.  Denies any shortness of breath.   States she is still shaky with tremors.  Patient is lethargic and is following asleep during encounter or not interested in talking to this provider.  Continue CIWA protocol.  Wbc 4.6  P 46  Tbili 3    I have discussed this patient's plan of care and discharge plan at IDT rounds today with Case Management, Nursing, Nursing leadership, and other members of the IDT team.    Consultants/Specialty  None    Code Status  Full Code    Disposition  The patient is not medically cleared for discharge to home or a post-acute facility.  Anticipate discharge to: home with organized home healthcare and close outpatient follow-up    I have placed the appropriate orders for post-discharge needs.    Review of Systems  Review of Systems   Respiratory:  Positive for shortness of breath.    Cardiovascular:  Positive for palpitations.   Psychiatric/Behavioral:  Positive for depression. Negative for suicidal ideas.         Physical Exam  Temp:  [36.4 °C (97.5 °F)-36.5 °C (97.7 °F)] 36.5 °C (97.7 °F)  Pulse:  [100-147] 117  Resp:  [12-25] 17  BP: (112-147)/() 123/91  SpO2:  [90 %-98 %] 94 %    Physical Exam  Vitals and nursing note reviewed.   Constitutional:       Appearance: Normal appearance. She is ill-appearing.   HENT:      Head: Normocephalic and atraumatic.      Right Ear: External ear normal.      Left Ear: External ear normal.      Nose: Nose normal.      Mouth/Throat:      Mouth: Mucous membranes are moist.      Pharynx: Oropharynx is clear. No oropharyngeal exudate or posterior oropharyngeal erythema.   Eyes:      Extraocular Movements: Extraocular movements intact.      Conjunctiva/sclera: Conjunctivae normal.   Cardiovascular:      Rate and Rhythm: Regular rhythm. Tachycardia present.      Pulses: Normal pulses.      Heart sounds: Normal heart sounds. No murmur heard.  Pulmonary:      Effort: Pulmonary effort is normal. Tachypnea present. No respiratory distress.      Breath sounds: Normal breath sounds. No stridor. No  wheezing or rales.   Abdominal:      General: Abdomen is flat. Bowel sounds are normal. There is no distension.      Palpations: Abdomen is soft. There is no mass.      Tenderness: There is no abdominal tenderness.   Musculoskeletal:      Cervical back: Normal range of motion.   Skin:     General: Skin is warm.      Capillary Refill: Capillary refill takes less than 2 seconds.   Neurological:      General: No focal deficit present.      Mental Status: Mental status is at baseline. She is lethargic and disoriented.      Cranial Nerves: No cranial nerve deficit.   Psychiatric:         Mood and Affect: Mood is depressed.         Fluids  No intake or output data in the 24 hours ending 01/03/25 0656     Laboratory  Recent Labs     01/02/25 1911 01/03/25  0620   WBC 4.7* 4.6*   RBC 4.43 3.88*   HEMOGLOBIN 15.3 13.6   HEMATOCRIT 44.5 40.0   .5* 103.1*   MCH 34.5* 35.1*   MCHC 34.4 34.0   RDW 53.1* 54.0*   PLATELETCT 59* 46*   MPV 10.4 10.1     Recent Labs     01/02/25 1911   SODIUM 144   POTASSIUM 3.5*   CHLORIDE 102   CO2 22   GLUCOSE 80   BUN 12   CREATININE 0.59   CALCIUM 8.7                   Imaging  DX-CHEST-PORTABLE (1 VIEW)   Final Result      No acute cardiopulmonary disease.           Assessment/Plan  * Atrial fibrillation (HCC)- (present on admission)  Assessment & Plan  1/3/2025  Uncontrolled  Continue Eliquis  Continue digoxin and metoprolol  IV PRN medications been ordered    Alcohol intoxication in active alcoholic with complication (HCC)- (present on admission)  Assessment & Plan  1/3/2025  Patient will be admitted to the telemetry unit with close cardiac monitoring on CIWA protocol  Started on rally bag, multivitamin, thiamine and folate  Replete electrolytes  Patient has been counseled on alcohol cessation and will be provided with information for outpatient detox facilities    Acquired hypothyroidism- (present on admission)  Assessment & Plan  1/3/2025  Continue home medications  Check  TSH    Presence of automatic cardioverter/defibrillator (AICD)- (present on admission)  Assessment & Plan  1/3/2025  Monitor on telemetry         VTE prophylaxis: Apixaban    I have performed a physical exam and reviewed and updated ROS and Plan today (1/3/2025). In review of yesterday's note (1/2/2025), there are no changes except as documented above.    Greater than 50 minutes spent prepping to see patient (e.g. review of tests) obtaining and/or reviewing separately obtained history. Performing a medically appropriate examination and/ evaluation.  Counseling and educating the patient/family/caregiver.  Ordering medications, tests, or procedures.  Referring and communicating with other health care professionals.  Documenting clinical information in EPIC.  Independently interpreting results and communicating results to patient/family/caregiver.  Care coordination.

## 2025-01-03 NOTE — H&P
Hospital Medicine History & Physical Note    Date of Service  1/2/2025    Primary Care Physician  KIMBERLY Benitez    Consultants      Specialist Names:     Code Status  Full Code    Chief Complaint  Chief Complaint   Patient presents with    Alcohol Intoxication     Pt endorses heavy drinking over the last 2 weeks due to family loss. Last drink yesterday. Pt states she has felt more confused over the last 3 days.       History of Presenting Illness  Jeanie Hutchison is a 79 y.o. female who presented 1/2/2025 with past medical history of alcohol abuse, atrial fibrillation, history of V. tach, history of AICD who presents to the hospital for palpitations and shortness of breath.  Patient states that she has been drinking alcohol on a daily basis for the past 2 weeks.  She usually drinks vodka.  She presented to hospital tremulous, confused and hallucinating.  She denies any fever, chills, nausea, vomiting, diarrhea, cough.  Patient states that she is not taking her home medications.  Patient is upset that she lost both of her daughters and her  in the past few years.  Currently, she lives with her grandson.  She denies any suicide ideations states that she wants to live for her grandchildren.    Chest x-ray interpreted by me find no acute pulmonary process  EKG interpreted by me found elevation, RVR, left axis deviation    I discussed the plan of care with patient.    Review of Systems  Review of Systems   Constitutional:  Negative for chills, diaphoresis, fever and malaise/fatigue.   HENT:  Negative for congestion, ear discharge, ear pain, hearing loss, nosebleeds, sinus pain, sore throat and tinnitus.    Eyes:  Negative for blurred vision, double vision, photophobia and pain.   Respiratory:  Negative for cough, hemoptysis, sputum production, shortness of breath, wheezing and stridor.    Cardiovascular:  Positive for palpitations. Negative for chest pain, orthopnea, claudication, leg swelling and  PND.   Gastrointestinal:  Negative for abdominal pain, blood in stool, constipation, diarrhea, heartburn, melena, nausea and vomiting.   Genitourinary:  Negative for dysuria, flank pain, frequency, hematuria and urgency.   Musculoskeletal:  Negative for back pain, falls, joint pain, myalgias and neck pain.   Skin:  Negative for itching and rash.   Neurological:  Positive for tremors, weakness and headaches. Negative for dizziness and tingling.   Endo/Heme/Allergies:  Negative for environmental allergies and polydipsia. Does not bruise/bleed easily.   Psychiatric/Behavioral:  Negative for depression, hallucinations, substance abuse and suicidal ideas.        Past Medical History   has a past medical history of Alcoholism ([facility]), Anticoagulated, Apnea, sleep, Chronic pain, Hypothyroidism, Insomnia, Paroxysmal atrial fibrillation (HCC), Psychiatric disorder, Snoring, and Ventricular tachycardia (HCC).    Surgical History   has a past surgical history that includes breast biopsy; other orthopedic surgery; orif, fracture, clavicle (5/21/2014); and tonsillectomy.     Family History  family history includes Anxiety disorder in her mother; Depression in her mother; Diabetes in her mother; Hyperlipidemia in her father.   Family history reviewed with patient. There is no family history that is pertinent to the chief complaint.     Social History   reports that she has never smoked. She has never used smokeless tobacco. She reports current alcohol use of about 8.4 - 12.6 oz of alcohol per week. She reports that she does not currently use drugs after having used the following drugs: Oral.    Allergies  No Known Allergies    Medications  Prior to Admission Medications   Prescriptions Last Dose Informant Patient Reported? Taking?   amLODIPine (NORVASC) 2.5 MG Tab  Patient's Home Pharmacy, Patient Yes No   Sig: Take 2.5 mg by mouth every day.   apixaban (ELIQUIS) 5mg Tab  Patient's Home Pharmacy, Patient No No   Sig: Take 1 Tablet  by mouth 2 times a day.   digoxin (LANOXIN) 125 MCG Tab  Patient's Home Pharmacy, Patient No No   Sig: Take 1 Tablet by mouth every day.   escitalopram (LEXAPRO) 10 MG Tab  Patient's Home Pharmacy, Patient Yes No   Sig: Take 10 mg by mouth every day.   folic acid (FOLVITE) 1 MG Tab  Patient's Home Pharmacy, Patient No No   Sig: Take 1 Tablet by mouth every day.   levothyroxine (SYNTHROID) 50 MCG Tab  Patient's Home Pharmacy, Patient No No   Sig: Take 1 Tablet by mouth every morning on an empty stomach.   metoprolol SR (TOPROL XL) 25 MG TABLET SR 24 HR   No No   Sig: Take 3 Tablets by mouth every day.   mirtazapine (REMERON) 15 MG Tab  Patient's Home Pharmacy, Patient Yes No   Sig: Take 15 mg by mouth at bedtime.   thiamine (THIAMINE) 100 MG tablet  Patient's Home Pharmacy, Patient No No   Sig: Take 1 Tablet by mouth every day.   traZODone (DESYREL) 50 MG Tab  Patient's Home Pharmacy, Patient Yes No   Sig: Take 50 mg by mouth every evening.   venlafaxine (EFFEXOR-XR) 150 MG extended-release capsule  Patient's Home Pharmacy, Patient Yes No   Sig: Take 150 mg by mouth every day.      Facility-Administered Medications: None       Physical Exam  Temp:  [36.4 °C (97.5 °F)] 36.4 °C (97.5 °F)  Pulse:  [100-130] 101  Resp:  [16-23] 20  BP: (112-122)/(80-89) 122/80  SpO2:  [90 %-98 %] 90 %  Blood Pressure : 122/80   Temperature: 36.4 °C (97.5 °F)   Pulse: (!) 101   Respiration: 20   Pulse Oximetry: 90 %       Physical Exam  Vitals and nursing note reviewed.   Constitutional:       General: She is not in acute distress.     Appearance: Normal appearance. She is not ill-appearing, toxic-appearing or diaphoretic.   HENT:      Head: Normocephalic and atraumatic.      Nose: No congestion or rhinorrhea.      Mouth/Throat:      Pharynx: No oropharyngeal exudate or posterior oropharyngeal erythema.   Eyes:      General: No scleral icterus.  Neck:      Vascular: No carotid bruit or JVD.   Cardiovascular:      Rate and Rhythm: Normal  "rate and regular rhythm.      Pulses: Normal pulses.      Heart sounds: Normal heart sounds. No murmur heard.     No friction rub. No gallop.   Pulmonary:      Effort: Pulmonary effort is normal. No respiratory distress.      Breath sounds: No stridor. No wheezing, rhonchi or rales.   Abdominal:      General: Abdomen is flat. There is no distension.      Palpations: There is no mass.      Tenderness: There is no abdominal tenderness. There is no left CVA tenderness, guarding or rebound.      Hernia: No hernia is present.   Musculoskeletal:         General: No swelling. Normal range of motion.      Cervical back: No rigidity. No muscular tenderness.      Right lower leg: No edema.      Left lower leg: No edema.   Lymphadenopathy:      Cervical: No cervical adenopathy.   Skin:     General: Skin is warm and dry.      Capillary Refill: Capillary refill takes more than 3 seconds.      Coloration: Skin is not jaundiced or pale.      Findings: No bruising or erythema.   Neurological:      Mental Status: She is alert.         Laboratory:  Recent Labs     01/02/25 1911   WBC 4.7*   RBC 4.43   HEMOGLOBIN 15.3   HEMATOCRIT 44.5   .5*   MCH 34.5*   MCHC 34.4   RDW 53.1*   PLATELETCT 59*   MPV 10.4     Recent Labs     01/02/25 1911   SODIUM 144   POTASSIUM 3.5*   CHLORIDE 102   CO2 22   GLUCOSE 80   BUN 12   CREATININE 0.59   CALCIUM 8.7     Recent Labs     01/02/25 1911   ALTSGPT 99*   ASTSGOT 166*   ALKPHOSPHAT 100*   TBILIRUBIN 2.4*   GLUCOSE 80         No results for input(s): \"NTPROBNP\" in the last 72 hours.      No results for input(s): \"TROPONINT\" in the last 72 hours.    Imaging:  DX-CHEST-PORTABLE (1 VIEW)   Final Result      No acute cardiopulmonary disease.          X-Ray:  I have personally reviewed the images and compared with prior images.  EKG:  I have personally reviewed the images and compared with prior images.    Assessment/Plan:  Justification for Admission Status  I anticipate this patient will " require at least two midnights for appropriate medical management, necessitating inpatient admission because atrial fibrillation    Patient will need a Telemetry bed on MEDICAL service .  The need is secondary to atrial fibrillation.    * Atrial fibrillation (HCC)- (present on admission)  Assessment & Plan  Uncontrolled  Continue Eliquis  Continue digoxin and metoprolol  IV PRN medications been ordered    Alcohol intoxication in active alcoholic with complication (HCC)- (present on admission)  Assessment & Plan  Patient will be admitted to the telemetry unit with close cardiac monitoring on CIWA protocol  Started on rally bag, multivitamin, thiamine and folate  Replete electrolytes  Patient has been counseled on alcohol cessation and will be provided with information for outpatient detox facilities      Acquired hypothyroidism- (present on admission)  Assessment & Plan  Continue home medications  Check TSH    Presence of automatic cardioverter/defibrillator (AICD)- (present on admission)  Assessment & Plan  Monitor on telemetry        VTE prophylaxis: SCDs/TEDs

## 2025-01-03 NOTE — ASSESSMENT & PLAN NOTE
Patient will be admitted to the telemetry unit with close cardiac monitoring on CIWA protocol  Started on rally bag, multivitamin, thiamine and folate  Replete electrolytes  Patient has been counseled on alcohol cessation and will be provided with information for outpatient detox facilities

## 2025-01-03 NOTE — CARE PLAN
The patient is Watcher - Medium risk of patient condition declining or worsening    Shift Goals  Clinical Goals: safety, admit, CIWA  Patient Goals: rest  Family Goals: pari      Problem: Knowledge Deficit - Standard  Goal: Patient and family/care givers will demonstrate understanding of plan of care, disease process/condition, diagnostic tests and medications  Outcome: Progressing     Problem: Optimal Care for Alcohol Withdrawal  Goal: Optimal Care for the alcohol withdrawal patient  Outcome: Progressing     Problem: Seizure Precautions  Goal: Implementation of seizure precautions  Outcome: Progressing     Problem: Lifestyle Changes  Goal: Patient's ability to identify lifestyle changes and available resources to help reduce recurrence of condition will improve  Outcome: Progressing     Problem: Psychosocial  Goal: Patient's level of anxiety will decrease  Outcome: Progressing  Goal: Spiritual and cultural needs incorporated into hospitalization  Outcome: Progressing     Problem: Risk for Aspiration  Goal: Patient's risk for aspiration will be absent or decrease  Outcome: Progressing     Problem: Skin Integrity  Goal: Skin integrity is maintained or improved  Outcome: Progressing     Problem: Depression  Goal: Patient and family/caregiver will verbalize accurate information about at least two of the possible causes of depression, three-four of the signs and symptoms of depression  Outcome: Progressing     Problem: Fall Risk  Goal: Patient will remain free from falls  Outcome: Progressing

## 2025-01-03 NOTE — PROGRESS NOTES
Bedside report received and patient care assumed. Pt is resting in bed, A&O 4, with no complaints of pain, and is on 4LNC . Tele box on. All fall and seizure precautions are in place, belongings at bedside table. CIWA score of 10. Pt was updated on POC, no questions or concerns. Pt educated on use of call light for assistance.

## 2025-01-03 NOTE — PROGRESS NOTES
4 Eyes Skin Assessment Completed by ian RN and SNEHA monaco.    Head WDL  Ears WDL  Nose WDL  Mouth WDL  Neck WDL  Breast/Chest WDL  Shoulder Blades WDL  Spine Redness and Blanching  (R) Arm/Elbow/Hand WDL  (L) Arm/Elbow/Hand WDL  Abdomen Incision  Groin Redness and Blanching  Scrotum/Coccyx/Buttocks WDL  (R) Leg WDL  (L) Leg WDL  (R) Heel/Foot/Toe WDL  (L) Heel/Foot/Toe WDL          Devices In Places Tele Box, Blood Pressure Cuff, Pulse Ox, and Nasal Cannula      Interventions In Place NC W/Ear Foams and Pillows    Possible Skin Injury No    Pictures Uploaded Into Epic N/A  Wound Consult Placed N/A  RN Wound Prevention Protocol Ordered No

## 2025-01-03 NOTE — ED PROVIDER NOTES
ED PHYSICIAN NOTE    CHIEF COMPLAINT  Chief Complaint   Patient presents with    Alcohol Intoxication     Pt endorses heavy drinking over the last 2 weeks due to family loss. Last drink yesterday. Pt states she has felt more confused over the last 3 days.       EXTERNAL RECORDS REVIEWED  Inpatient Notes hospitalization in June for detox.  Had A-fib RVR.    HPI/ROS      Jeanie Hutchison is a 79 y.o. female who presents not feeling right.  Reports drinking more than normal over the last 2 weeks.  Drinking 5-8 shots a day.  She has been feeling more confused.  Does not think she had anything to drink today.  She feels weak tired tremulous anxious like she is having withdrawal.  No fevers, headache.    PAST MEDICAL HISTORY  Past Medical History:   Diagnosis Date    Alcoholism (HCC)     Anticoagulated     Apnea, sleep     Chronic pain     r/t pelvic fracture    Hypothyroidism     Insomnia     Trouble going to Sleep     Paroxysmal atrial fibrillation (HCC)     Psychiatric disorder     history of depression and anxiety     Snoring     Ventricular tachycardia (HCC)        SOCIAL HISTORY  Social History     Tobacco Use    Smoking status: Never    Smokeless tobacco: Never   Vaping Use    Vaping status: Never Used   Substance Use Topics    Alcohol use: Yes     Alcohol/week: 8.4 - 12.6 oz     Types: 14 - 21 Standard drinks or equivalent per week     Comment: everyday (last 2 months 2-3 everyday)    Drug use: Not Currently     Types: Oral     Comment: takes friends tranqualizers        CURRENT MEDICATIONS  Home Medications       Reviewed by Avery Bowles R.N. (Registered Nurse) on 01/02/25 at 1733  Med List Status: Partial     Medication Last Dose Status   amLODIPine (NORVASC) 2.5 MG Tab  Active   apixaban (ELIQUIS) 5mg Tab  Active   digoxin (LANOXIN) 125 MCG Tab  Active   escitalopram (LEXAPRO) 10 MG Tab  Active   folic acid (FOLVITE) 1 MG Tab  Active   levothyroxine (SYNTHROID) 50 MCG Tab  Active   metoprolol SR  "(TOPROL XL) 25 MG TABLET SR 24 HR  Active   mirtazapine (REMERON) 15 MG Tab  Active   thiamine (THIAMINE) 100 MG tablet  Active   traZODone (DESYREL) 50 MG Tab  Active   venlafaxine (EFFEXOR-XR) 150 MG extended-release capsule  Active                  Audit from Redirected Encounters    **Home medications have not yet been reviewed for this encounter**         ALLERGIES  No Known Allergies    PHYSICAL EXAM  VITAL SIGNS: /89   Pulse (!) 119   Temp 36.4 °C (97.5 °F) (Temporal)   Resp 16   Ht 1.676 m (5' 6\")   Wt 79.4 kg (175 lb)   SpO2 98%   BMI 28.25 kg/m²    Constitutional: Awake and alert  HENT: Normal inspection  Eyes: Normal inspection  Neck: Grossly normal range of motion.  Cardiovascular: Tachycardic and irregular  Thorax & Lungs: No respiratory distress, No wheezing, No rales, No rhonchi, No chest tenderness.   Abdomen: Bowel sounds normal, soft, non-distended, nontender, no mass  Back: No tenderness, No CVA tenderness.   Extremities: No clubbing, cyanosis, edema, no Homans or cords.  Neurologic: Mild generalized tremor    DIAGNOSTIC STUDIES / PROCEDURES  LABS/EKG  Results for orders placed or performed during the hospital encounter of 01/02/25   CBC WITH DIFFERENTIAL    Collection Time: 01/02/25  7:11 PM   Result Value Ref Range    WBC 4.7 (L) 4.8 - 10.8 K/uL    RBC 4.43 4.20 - 5.40 M/uL    Hemoglobin 15.3 12.0 - 16.0 g/dL    Hematocrit 44.5 37.0 - 47.0 %    .5 (H) 81.4 - 97.8 fL    MCH 34.5 (H) 27.0 - 33.0 pg    MCHC 34.4 32.2 - 35.5 g/dL    RDW 53.1 (H) 35.9 - 50.0 fL    Platelet Count 59 (L) 164 - 446 K/uL    MPV 10.4 9.0 - 12.9 fL    Neutrophils-Polys 38.30 (L) 44.00 - 72.00 %    Lymphocytes 46.60 (H) 22.00 - 41.00 %    Monocytes 12.10 0.00 - 13.40 %    Eosinophils 0.90 0.00 - 6.90 %    Basophils 1.50 0.00 - 1.80 %    Immature Granulocytes 0.60 0.00 - 0.90 %    Nucleated RBC 0.00 0.00 - 0.20 /100 WBC    Neutrophils (Absolute) 1.80 (L) 1.82 - 7.42 K/uL    Lymphs (Absolute) 2.19 1.00 - " 4.80 K/uL    Monos (Absolute) 0.57 0.00 - 0.85 K/uL    Eos (Absolute) 0.04 0.00 - 0.51 K/uL    Baso (Absolute) 0.07 0.00 - 0.12 K/uL    Immature Granulocytes (abs) 0.03 0.00 - 0.11 K/uL    NRBC (Absolute) 0.00 K/uL   COMP METABOLIC PANEL    Collection Time: 25  7:11 PM   Result Value Ref Range    Sodium 144 135 - 145 mmol/L    Potassium 3.5 (L) 3.6 - 5.5 mmol/L    Chloride 102 96 - 112 mmol/L    Co2 22 20 - 33 mmol/L    Anion Gap 20.0 (H) 7.0 - 16.0    Glucose 80 65 - 99 mg/dL    Bun 12 8 - 22 mg/dL    Creatinine 0.59 0.50 - 1.40 mg/dL    Calcium 8.7 8.5 - 10.5 mg/dL    Correct Calcium 8.8 8.5 - 10.5 mg/dL    AST(SGOT) 166 (H) 12 - 45 U/L    ALT(SGPT) 99 (H) 2 - 50 U/L    Alkaline Phosphatase 100 (H) 30 - 99 U/L    Total Bilirubin 2.4 (H) 0.1 - 1.5 mg/dL    Albumin 3.9 3.2 - 4.9 g/dL    Total Protein 6.5 6.0 - 8.2 g/dL    Globulin 2.6 1.9 - 3.5 g/dL    A-G Ratio 1.5 g/dL   DIAGNOSTIC ALCOHOL    Collection Time: 25  7:11 PM   Result Value Ref Range    Diagnostic Alcohol 252.4 (H) <10.1 mg/dL   DIGOXIN    Collection Time: 25  7:11 PM   Result Value Ref Range    Digoxin <0.3 (L) 0.8 - 2.0 ng/mL   TSH    Collection Time: 25  7:11 PM   Result Value Ref Range    TSH 4.280 0.350 - 5.500 uIU/mL   IMMATURE PLT FRACTION    Collection Time: 25  7:11 PM   Result Value Ref Range    Imm. Plt Fraction 4.5 0.6 - 13.1 %   ESTIMATED GFR    Collection Time: 25  7:11 PM   Result Value Ref Range    GFR (CKD-EPI) 93 >60 mL/min/1.73 m 2   EKG (Now)    Collection Time: 25  8:36 PM   Result Value Ref Range    Report       [facility]    Test Date:  2025  Pt Name:    MARISELA DURHAM              Department: ER  MRN:        [id number]                      Room:       [facility]  Gender:     Female                       Technician: [id number]  :        [id number]                   Requested By:BENNIE RIOS  Order #:    [id number]                    Reading MD: BENNIE CURRY  MD BLANCA    Measurements  Intervals                                Axis  Rate:       111                          P:          0  CA:         0                            QRS:        -48  QRSD:       81                           T:          46  QT:         365  QTc:        496    Interpretive Statements  Atrial fibrillation  Left anterior fascicular block  Compared to ECG 06/09/2024 14:29:34  Left anterior fascicular block now present  Ventricular premature complex(es) no longer present  Left-axis deviation no longer present  Myocardial infarct finding no longer present  Electronically Signed On 01- 2 0:36:27 PST by BENNIE RIOS MD        I have independently interpreted this EKG as documented above    Rhythm strip interpretation-atrial fibrillation    RADIOLOGY  I have independently interpreted the diagnostic imaging associated with this visit and am waiting the final reading from the radiologist.   My preliminary interpretation is as follows: Chest x-ray without focal infiltrate    COURSE & MEDICAL DECISION MAKING    INITIAL ASSESSMENT, COURSE AND PLAN  Care Narrative: Patient presents with anxiety, tremor, not feeling right.  She has history of atrial fibrillation has been noncompliant with her medications.  She appears clinically intoxicated, but still has tremor possibly some degree of withdrawal.  Ordered EKG and laboratory data.  Will give patient's home dose of oral metoprolol.    Laboratory data as noted.  Mild hypokalemia blood alcohol is elevated.  Digoxin undetectable.    Patient worsening anxiety.  Increased tremor.  She was given lorazepam.  Ordered rally bag.  Give IV Lopressor.  Will give IV dose of digoxin.    Patient's heart rate remains moderately elevated.  She will need to be admitted to hospital for further treatment of atrial fibrillation with rapid ventricular response as well as evolving alcohol withdrawal syndrome.  She is hemodynamically stable at this time.  Hospitalist was  paged.      Interventions  Medications   metoprolol SR (Toprol XL) tablet 25 mg (has no administration in time range)   levothyroxine (Synthroid) tablet 50 mcg (has no administration in time range)   digoxin (Lanoxin) injection 125 mcg (has no administration in time range)   Metoprolol Tartrate (Lopressor) injection 5 mg (has no administration in time range)   Rally Bag IV (D5LR 500 mL + Thiamine 100 mg + Folic Acid 1 mg + Magnesium Sulfate 1 g) infusion (has no administration in time range)   LORazepam (Ativan) injection 1 mg (has no administration in time range)       Measures  Hydration: Based on the patient's presentation of Tachycardia the patient was given IV fluids. IV Hydration was used because oral hydration was not as rapid as required. Upon recheck following hydration, the patient was stable.          DISPOSITION AND DISCUSSIONS  I have discussed management of the patient with the following physicians and CIARAN's:  as noted above    FINAL IMPRESSION  1.  Atrial fibrillation with rapid ventricular response  2.  Alcohol withdrawal      This dictation was created using voice recognition software. The accuracy of the dictation is limited to the abilities of the software. I expect there may be some errors of grammar and possibly content. The nursing notes were reviewed and certain aspects of this information were incorporated into this note.    Electronically signed by: Meng Collins M.D., 1/2/2025

## 2025-01-03 NOTE — CARE PLAN
The patient is Stable - Low risk of patient condition declining or worsening    Shift Goals  Clinical Goals: CIWA, HR control, safety, seizure precautions  Patient Goals: rest  Family Goals: pari    Progress made toward(s) clinical / shift goals:    Problem: Optimal Care for Alcohol Withdrawal  Goal: Optimal Care for the alcohol withdrawal patient  Outcome: Progressing     Problem: Seizure Precautions  Goal: Implementation of seizure precautions  Outcome: Progressing     Problem: Depression  Goal: Patient and family/caregiver will verbalize accurate information about at least two of the possible causes of depression, three-four of the signs and symptoms of depression  Outcome: Progressing     Problem: Neuro Status  Goal: Neuro status will remain stable or improve  Outcome: Progressing       Patient is not progressing towards the following goals:

## 2025-01-03 NOTE — ED NOTES
"Bedside report received from off going RN/tech: Kianna, assumed care of patient.  POC discussed with patient. Call light within reach, all needs addressed at this time.       Fall risk interventions in place: Move the patient closer to the nurse's station, Patient's personal possessions are with in their safe reach, Place socks on patient, Place fall risk sign on patient's door, Keep floor surfaces clean and dry, and Accompanied to restroom (all applicable per [place] Fall risk assessment)   Continuous monitoring: Cardiac Leads, Pulse Ox, or Blood Pressure  IVF/IV medications: Not Applicable   Oxygen: Room Air  Bedside sitter: Not Applicable   Isolation: Not Applicable    BP (!) 147/99   Pulse (!) 126   Temp 36.4 °C (97.5 °F) (Temporal)   Resp 16   Ht 1.676 m (5' 6\")   Wt 79.4 kg (175 lb)   SpO2 97%  - RA  "

## 2025-01-03 NOTE — ED TRIAGE NOTES
"Chief Complaint   Patient presents with    Alcohol Intoxication     Pt endorses heavy drinking over the last 2 weeks due to family loss. Last drink yesterday. Pt states she has felt more confused over the last 3 days.     BIB EMS for above complaint.     EMS had single low BP of 80/60. Pt refusing PIV by EMS. 112/84 in ED.    A+Ox4, GCS 15    /84   Pulse (!) 117   Temp 36.4 °C (97.5 °F) (Temporal)   Resp 16   Ht 1.676 m (5' 6\")   Wt 79.4 kg (175 lb)   SpO2 92%   BMI 28.25 kg/m²     "

## 2025-01-03 NOTE — PROGRESS NOTES
Monitor Summary  Rhythm: Aflutter  Rate: 144-170  Ectopy: FR up to 190's, rare pvc's  Measurements: ---/0.09/---

## 2025-01-04 LAB
ALBUMIN SERPL BCP-MCNC: 3.2 G/DL (ref 3.2–4.9)
ALBUMIN/GLOB SERPL: 1.4 G/DL
ALP SERPL-CCNC: 121 U/L (ref 30–99)
ALT SERPL-CCNC: 58 U/L (ref 2–50)
AMMONIA PLAS-SCNC: 51 UMOL/L (ref 11–45)
ANION GAP SERPL CALC-SCNC: 10 MMOL/L (ref 7–16)
APTT PPP: 29.4 SEC (ref 24.7–36)
AST SERPL-CCNC: 68 U/L (ref 12–45)
BILIRUB CONJ SERPL-MCNC: 0.7 MG/DL (ref 0.1–0.5)
BILIRUB INDIRECT SERPL-MCNC: 2.1 MG/DL (ref 0–1)
BILIRUB SERPL-MCNC: 2.8 MG/DL (ref 0.1–1.5)
BUN SERPL-MCNC: 11 MG/DL (ref 8–22)
CALCIUM ALBUM COR SERPL-MCNC: 9.1 MG/DL (ref 8.5–10.5)
CALCIUM SERPL-MCNC: 8.5 MG/DL (ref 8.5–10.5)
CHLORIDE SERPL-SCNC: 104 MMOL/L (ref 96–112)
CO2 SERPL-SCNC: 27 MMOL/L (ref 20–33)
CREAT SERPL-MCNC: 0.28 MG/DL (ref 0.5–1.4)
ERYTHROCYTE [DISTWIDTH] IN BLOOD BY AUTOMATED COUNT: 54.3 FL (ref 35.9–50)
FIBRINOGEN PPP-MCNC: 232 MG/DL (ref 215–460)
GFR SERPLBLD CREATININE-BSD FMLA CKD-EPI: 111 ML/MIN/1.73 M 2
GLOBULIN SER CALC-MCNC: 2.3 G/DL (ref 1.9–3.5)
GLUCOSE BLD STRIP.AUTO-MCNC: 92 MG/DL (ref 65–99)
GLUCOSE SERPL-MCNC: 120 MG/DL (ref 65–99)
HCT VFR BLD AUTO: 38.8 % (ref 37–47)
HGB BLD-MCNC: 12.8 G/DL (ref 12–16)
INR PPP: 1.19 (ref 0.87–1.13)
LDH SERPL L TO P-CCNC: 304 U/L (ref 107–266)
MAGNESIUM SERPL-MCNC: 1.6 MG/DL (ref 1.5–2.5)
MCH RBC QN AUTO: 35.3 PG (ref 27–33)
MCHC RBC AUTO-ENTMCNC: 33 G/DL (ref 32.2–35.5)
MCV RBC AUTO: 106.9 FL (ref 81.4–97.8)
PHOSPHATE SERPL-MCNC: 4.3 MG/DL (ref 2.5–4.5)
PLATELET # BLD AUTO: 38 K/UL (ref 164–446)
PLATELETS.RETICULATED NFR BLD AUTO: 5.1 % (ref 0.6–13.1)
PMV BLD AUTO: 11.5 FL (ref 9–12.9)
POTASSIUM SERPL-SCNC: 4.4 MMOL/L (ref 3.6–5.5)
PROT SERPL-MCNC: 5.5 G/DL (ref 6–8.2)
PROTHROMBIN TIME: 15.1 SEC (ref 12–14.6)
RBC # BLD AUTO: 3.63 M/UL (ref 4.2–5.4)
SODIUM SERPL-SCNC: 141 MMOL/L (ref 135–145)
WBC # BLD AUTO: 4.1 K/UL (ref 4.8–10.8)

## 2025-01-04 PROCEDURE — 83735 ASSAY OF MAGNESIUM: CPT

## 2025-01-04 PROCEDURE — 85027 COMPLETE CBC AUTOMATED: CPT

## 2025-01-04 PROCEDURE — 85055 RETICULATED PLATELET ASSAY: CPT

## 2025-01-04 PROCEDURE — 36415 COLL VENOUS BLD VENIPUNCTURE: CPT

## 2025-01-04 PROCEDURE — 700111 HCHG RX REV CODE 636 W/ 250 OVERRIDE (IP): Performed by: HOSPITALIST

## 2025-01-04 PROCEDURE — A9270 NON-COVERED ITEM OR SERVICE: HCPCS | Performed by: HOSPITALIST

## 2025-01-04 PROCEDURE — 84100 ASSAY OF PHOSPHORUS: CPT

## 2025-01-04 PROCEDURE — 82962 GLUCOSE BLOOD TEST: CPT

## 2025-01-04 PROCEDURE — 770020 HCHG ROOM/CARE - TELE (206)

## 2025-01-04 PROCEDURE — 700105 HCHG RX REV CODE 258: Performed by: HOSPITALIST

## 2025-01-04 PROCEDURE — 80053 COMPREHEN METABOLIC PANEL: CPT

## 2025-01-04 PROCEDURE — 83615 LACTATE (LD) (LDH) ENZYME: CPT

## 2025-01-04 PROCEDURE — 99233 SBSQ HOSP IP/OBS HIGH 50: CPT | Performed by: INTERNAL MEDICINE

## 2025-01-04 PROCEDURE — 700102 HCHG RX REV CODE 250 W/ 637 OVERRIDE(OP): Performed by: HOSPITALIST

## 2025-01-04 PROCEDURE — 85610 PROTHROMBIN TIME: CPT

## 2025-01-04 PROCEDURE — 82248 BILIRUBIN DIRECT: CPT

## 2025-01-04 PROCEDURE — 82140 ASSAY OF AMMONIA: CPT

## 2025-01-04 PROCEDURE — 85730 THROMBOPLASTIN TIME PARTIAL: CPT

## 2025-01-04 PROCEDURE — 85384 FIBRINOGEN ACTIVITY: CPT

## 2025-01-04 RX ADMIN — FOLIC ACID 1 MG: 1 TABLET ORAL at 05:08

## 2025-01-04 RX ADMIN — LORAZEPAM 0.5 MG: 2 INJECTION INTRAMUSCULAR; INTRAVENOUS at 23:19

## 2025-01-04 RX ADMIN — LEVOTHYROXINE SODIUM 50 MCG: 0.05 TABLET ORAL at 05:08

## 2025-01-04 RX ADMIN — ESCITALOPRAM OXALATE 10 MG: 10 TABLET ORAL at 05:08

## 2025-01-04 RX ADMIN — CHLORDIAZEPOXIDE HYDROCHLORIDE 25 MG: 25 CAPSULE ORAL at 23:11

## 2025-01-04 RX ADMIN — THIAMINE HYDROCHLORIDE 100 MG: 100 INJECTION, SOLUTION INTRAMUSCULAR; INTRAVENOUS at 05:07

## 2025-01-04 RX ADMIN — CHLORDIAZEPOXIDE HYDROCHLORIDE 25 MG: 25 CAPSULE ORAL at 07:00

## 2025-01-04 RX ADMIN — VENLAFAXINE HYDROCHLORIDE 150 MG: 75 CAPSULE, EXTENDED RELEASE ORAL at 05:08

## 2025-01-04 RX ADMIN — LORAZEPAM 0.5 MG: 0.5 TABLET ORAL at 08:16

## 2025-01-04 RX ADMIN — CHLORDIAZEPOXIDE HYDROCHLORIDE 25 MG: 25 CAPSULE ORAL at 11:50

## 2025-01-04 RX ADMIN — CHLORDIAZEPOXIDE HYDROCHLORIDE 25 MG: 25 CAPSULE ORAL at 16:55

## 2025-01-04 RX ADMIN — DIGOXIN 125 MCG: 0.12 TABLET ORAL at 05:08

## 2025-01-04 RX ADMIN — METOPROLOL SUCCINATE 75 MG: 25 TABLET, EXTENDED RELEASE ORAL at 05:08

## 2025-01-04 RX ADMIN — AMLODIPINE BESYLATE 2.5 MG: 5 TABLET ORAL at 05:08

## 2025-01-04 RX ADMIN — SODIUM CHLORIDE: 9 INJECTION, SOLUTION INTRAVENOUS at 15:05

## 2025-01-04 RX ADMIN — LORAZEPAM 0.5 MG: 0.5 TABLET ORAL at 16:55

## 2025-01-04 ASSESSMENT — LIFESTYLE VARIABLES
AUDITORY DISTURBANCES: NOT PRESENT
ANXIETY: NO ANXIETY (AT EASE)
AGITATION: NORMAL ACTIVITY
ORIENTATION AND CLOUDING OF SENSORIUM: DATE DISORIENTATION BY MORE THAN TWO CALENDAR DAYS
AUDITORY DISTURBANCES: NOT PRESENT
AGITATION: NORMAL ACTIVITY
AUDITORY DISTURBANCES: NOT PRESENT
AUDITORY DISTURBANCES: NOT PRESENT
TOTAL SCORE: 9
TREMOR: NO TREMOR
TREMOR: NO TREMOR
VISUAL DISTURBANCES: NOT PRESENT
NAUSEA AND VOMITING: NO NAUSEA AND NO VOMITING
HEADACHE, FULLNESS IN HEAD: NOT PRESENT
NAUSEA AND VOMITING: *
PAROXYSMAL SWEATS: BEADS OF SWEAT OBVIOUS ON FOREHEAD
ORIENTATION AND CLOUDING OF SENSORIUM: DATE DISORIENTATION BY MORE THAN TWO CALENDAR DAYS
VISUAL DISTURBANCES: NOT PRESENT
ANXIETY: NO ANXIETY (AT EASE)
AUDITORY DISTURBANCES: NOT PRESENT
PAROXYSMAL SWEATS: NO SWEAT VISIBLE
VISUAL DISTURBANCES: NOT PRESENT
AGITATION: NORMAL ACTIVITY
VISUAL DISTURBANCES: NOT PRESENT
AGITATION: NORMAL ACTIVITY
HEADACHE, FULLNESS IN HEAD: NOT PRESENT
NAUSEA AND VOMITING: *
HEADACHE, FULLNESS IN HEAD: NOT PRESENT
AUDITORY DISTURBANCES: NOT PRESENT
TREMOR: NO TREMOR
TOTAL SCORE: 4
PAROXYSMAL SWEATS: BARELY PERCEPTIBLE SWEATING. PALMS MOIST
HEADACHE, FULLNESS IN HEAD: MODERATE
ORIENTATION AND CLOUDING OF SENSORIUM: ORIENTED AND CAN DO SERIAL ADDITIONS
ORIENTATION AND CLOUDING OF SENSORIUM: DATE DISORIENTATION BY MORE THAN TWO CALENDAR DAYS
ORIENTATION AND CLOUDING OF SENSORIUM: ORIENTED AND CAN DO SERIAL ADDITIONS
TOTAL SCORE: 5
ORIENTATION AND CLOUDING OF SENSORIUM: DATE DISORIENTATION BY NO MORE THAN TWO CALENDAR DAYS
TOTAL SCORE: 4
ANXIETY: MILDLY ANXIOUS
HEADACHE, FULLNESS IN HEAD: MILD
VISUAL DISTURBANCES: NOT PRESENT
PAROXYSMAL SWEATS: NO SWEAT VISIBLE
NAUSEA AND VOMITING: MILD NAUSEA WITH NO VOMITING
NAUSEA AND VOMITING: NO NAUSEA AND NO VOMITING
TOTAL SCORE: 6
TREMOR: *
ANXIETY: NO ANXIETY (AT EASE)
PAROXYSMAL SWEATS: BARELY PERCEPTIBLE SWEATING. PALMS MOIST
HEADACHE, FULLNESS IN HEAD: VERY MILD
AGITATION: NORMAL ACTIVITY
NAUSEA AND VOMITING: NO NAUSEA AND NO VOMITING
TOTAL SCORE: VERY MILD ITCHING, PINS AND NEEDLES SENSATION, BURNING OR NUMBNESS
ANXIETY: NO ANXIETY (AT EASE)
VISUAL DISTURBANCES: NOT PRESENT
PAROXYSMAL SWEATS: BARELY PERCEPTIBLE SWEATING. PALMS MOIST
ANXIETY: MILDLY ANXIOUS
TOTAL SCORE: 10
TREMOR: NO TREMOR
AGITATION: MODERATELY FIDGETY AND RESTLESS
TREMOR: NO TREMOR

## 2025-01-04 ASSESSMENT — ENCOUNTER SYMPTOMS
DEPRESSION: 1
PALPITATIONS: 1
SHORTNESS OF BREATH: 1

## 2025-01-04 ASSESSMENT — PAIN DESCRIPTION - PAIN TYPE
TYPE: ACUTE PAIN
TYPE: ACUTE PAIN

## 2025-01-04 ASSESSMENT — FIBROSIS 4 INDEX: FIB4 SCORE: 18.09

## 2025-01-04 NOTE — PROGRESS NOTES
Med rec complete per Ramirez's [phone].  Per Ramirez's, Pt has not picked Levothyroxine in a long time, never picked up Lexapro, Thiamine, or folic acid.    Unsure if pt took/finished Bactrim or Nitrofurantoin.   Pt is on Eliquis , but unknown last time pt took this.

## 2025-01-04 NOTE — PROGRESS NOTES
Monitor Summary  Rhythm: ST; A flutter; Afib  Rate: 104-119  Ectopy: touched 168, (R) PVC  Measurements: -/.09/-  ---12 hr Chart Review---

## 2025-01-04 NOTE — CARE PLAN
The patient is Stable - Low risk of patient condition declining or worsening    Shift Goals  Clinical Goals: mike LINARES, monitor HR  Patient Goals: rest  Family Goals: pari    Progress made toward(s) clinical / shift goals:    Problem: Knowledge Deficit - Standard  Goal: Patient and family/care givers will demonstrate understanding of plan of care, disease process/condition, diagnostic tests and medications  Outcome: Progressing     Problem: Seizure Precautions  Goal: Implementation of seizure precautions  Outcome: Progressing     Problem: Lifestyle Changes  Goal: Patient's ability to identify lifestyle changes and available resources to help reduce recurrence of condition will improve  Outcome: Progressing     Problem: Psychosocial  Goal: Patient's level of anxiety will decrease  Outcome: Progressing  Goal: Spiritual and cultural needs incorporated into hospitalization  Outcome: Progressing     Problem: Risk for Aspiration  Goal: Patient's risk for aspiration will be absent or decrease  Outcome: Progressing     Problem: Skin Integrity  Goal: Skin integrity is maintained or improved  Outcome: Progressing     Problem: Depression  Goal: Patient and family/caregiver will verbalize accurate information about at least two of the possible causes of depression, three-four of the signs and symptoms of depression  Outcome: Progressing     Problem: Fall Risk  Goal: Patient will remain free from falls  Outcome: Progressing       Patient is not progressing towards the following goals:

## 2025-01-04 NOTE — CARE PLAN
The patient is Stable - Low risk of patient condition declining or worsening    Shift Goals  Clinical Goals: CIWA, Monitor vitals and labs, safety  Patient Goals: comfort  Family Goals: DANICA    Progress made toward(s) clinical / shift goals:    Problem: Knowledge Deficit - Standard  Goal: Patient and family/care givers will demonstrate understanding of plan of care, disease process/condition, diagnostic tests and medications  Outcome: Progressing     Problem: Optimal Care for Alcohol Withdrawal  Goal: Optimal Care for the alcohol withdrawal patient  Outcome: Progressing     Problem: Seizure Precautions  Goal: Implementation of seizure precautions  Outcome: Progressing     Problem: Skin Integrity  Goal: Skin integrity is maintained or improved  Outcome: Progressing     Problem: Pain - Standard  Goal: Alleviation of pain or a reduction in pain to the patient’s comfort goal  Outcome: Progressing       Patient is not progressing towards the following goals:

## 2025-01-04 NOTE — PROGRESS NOTES
Bedside report recieved and patient care assumed. Pt is sitting up in bed, eating dinner, A&O4, with no complaints of pain, and is on 4L NC. CIWA protocol in place, Tele box on, all fall precautions are in place, belongings at bedisde table. Pt was updated on POC, no questions or concerns. Patient educated on the use of call light for assistance.

## 2025-01-04 NOTE — PROGRESS NOTES
Hospital Medicine Daily Progress Note    Date of Service  1/4/2025    Chief Complaint  Jeanie Hutchison is a 79 y.o. female admitted 1/2/2025 with   Chief Complaint   Patient presents with    Alcohol Intoxication     Pt endorses heavy drinking over the last 2 weeks due to family loss. Last drink yesterday. Pt states she has felt more confused over the last 3 days.         Hospital Course  No notes on file    Jeanie Hutchison is a 79 y.o. female who presented 1/2/2025 with past medical history of alcohol abuse, atrial fibrillation, history of V. tach, history of AICD who presents to the hospital for palpitations and shortness of breath.  Patient states that she has been drinking alcohol on a daily basis for the past 2 weeks.  She usually drinks vodka.  She presented to hospital tremulous, confused and hallucinating.  She denies any fever, chills, nausea, vomiting, diarrhea, cough.  Patient states that she is not taking her home medications.  Patient is upset that she lost both of her daughters and her  in the past few years.  Currently, she lives with her grandson.  She denies any suicide ideations states that she wants to live for her grandchildren.     Chest x-ray interpreted by me find no acute pulmonary process  EKG interpreted by me found elevation, RVR, left axis deviation    Interval Problem Update  Patient was seen and examined at bedside.  I have personally reviewed and interpreted vitals, labs, and imaging.    1/3.  Afebrile.  Has been tachycardic.  Tachypnea is improved.  On 3-4 L nasal cannula.  Telemetry shows atrial flutter up to the 170s.  Will monitor anticoagulation closely with significant thrombocytopenia.  Added parameters to hold apixaban for platelets less than 50.  Replete phosphorus, magnesium.  High anion gap metabolic acidosis resolved with fluid resuscitation.  LFTs trending down.  Patient is lethargic.  Denies any fever, chills, chest pains.  Denies any shortness of breath.   States she is still shaky with tremors.  Patient is lethargic and is following asleep during encounter or not interested in talking to this provider.  Continue CIWA protocol.  1/4.  Afebrile.  Tachycardia is improved.  Tachypnea is improved.  On 4 L nasal cannula.  Patient is lethargic.  Denies fever, chills, pain.  7 tremors and anxiety.  Thinks it is January 8, 2025.  Knows she is in the hospital for drinking too much.  Continue CIWA protocol she goes through withdrawals.  Patient with hyperbilirubinemia, thrombocytopenia, mildly elevated LDH.  Coags are elevated likely from recently receiving apixaban currently on hold for thrombocytopenia.  Fibrinogen within normal limits ruling out DIC.  Normal kidney function.  Will continue to monitor  Wbc 4.6 > 4.1  P 46 > 38  Tbili 3 > 2.8    Ammonia 51    I have discussed this patient's plan of care and discharge plan at IDT rounds today with Case Management, Nursing, Nursing leadership, and other members of the IDT team.    Consultants/Specialty  None    Code Status  Full Code    Disposition  The patient is not medically cleared for discharge to home or a post-acute facility.  Anticipate discharge to: home with close outpatient follow-up    I have placed the appropriate orders for post-discharge needs.    Review of Systems  Review of Systems   Respiratory:  Positive for shortness of breath.    Cardiovascular:  Positive for palpitations.   Psychiatric/Behavioral:  Positive for depression. Negative for suicidal ideas.         Physical Exam  Temp:  [36.1 °C (97 °F)-36.7 °C (98.1 °F)] 36.7 °C (98.1 °F)  Pulse:  [] 82  Resp:  [16-24] 18  BP: (117-155)/() 117/86  SpO2:  [91 %-95 %] 95 %    Physical Exam  Vitals and nursing note reviewed.   Constitutional:       Appearance: Normal appearance. She is ill-appearing.   HENT:      Head: Normocephalic and atraumatic.      Right Ear: External ear normal.      Left Ear: External ear normal.      Nose: Nose normal.       Mouth/Throat:      Mouth: Mucous membranes are moist.      Pharynx: Oropharynx is clear. No oropharyngeal exudate or posterior oropharyngeal erythema.   Eyes:      Extraocular Movements: Extraocular movements intact.      Conjunctiva/sclera: Conjunctivae normal.   Cardiovascular:      Rate and Rhythm: Regular rhythm. Tachycardia present.      Pulses: Normal pulses.      Heart sounds: Normal heart sounds. No murmur heard.  Pulmonary:      Effort: Pulmonary effort is normal. Tachypnea present. No respiratory distress.      Breath sounds: Normal breath sounds. No stridor. No wheezing or rales.   Abdominal:      General: Abdomen is flat. Bowel sounds are normal. There is no distension.      Palpations: Abdomen is soft. There is no mass.      Tenderness: There is no abdominal tenderness.   Musculoskeletal:      Cervical back: Normal range of motion.   Skin:     General: Skin is warm.      Capillary Refill: Capillary refill takes less than 2 seconds.   Neurological:      General: No focal deficit present.      Mental Status: Mental status is at baseline. She is lethargic and disoriented.      Cranial Nerves: No cranial nerve deficit.   Psychiatric:         Mood and Affect: Mood is depressed.         Fluids    Intake/Output Summary (Last 24 hours) at 1/4/2025 1034  Last data filed at 1/4/2025 0815  Gross per 24 hour   Intake 812 ml   Output 0 ml   Net 812 ml        Laboratory  Recent Labs     01/02/25 1911 01/03/25 0620 01/04/25  0322   WBC 4.7* 4.6* 4.1*   RBC 4.43 3.88* 3.63*   HEMOGLOBIN 15.3 13.6 12.8   HEMATOCRIT 44.5 40.0 38.8   .5* 103.1* 106.9*   MCH 34.5* 35.1* 35.3*   MCHC 34.4 34.0 33.0   RDW 53.1* 54.0* 54.3*   PLATELETCT 59* 46* 38*   MPV 10.4 10.1 11.5     Recent Labs     01/02/25 1911 01/03/25  0620 01/04/25  0322   SODIUM 144 142 141   POTASSIUM 3.5* 4.0 4.4   CHLORIDE 102 104 104   CO2 22 25 27   GLUCOSE 80 107* 120*   BUN 12 12 11   CREATININE 0.59 0.44* 0.28*   CALCIUM 8.7 8.3* 8.5                    Imaging  DX-CHEST-PORTABLE (1 VIEW)   Final Result      No acute cardiopulmonary disease.           Assessment/Plan  * Atrial fibrillation (HCC)- (present on admission)  Assessment & Plan  1/4/2025  Uncontrolled  Continue Eliquis  Continue digoxin and metoprolol  IV PRN medications been ordered    Alcohol intoxication in active alcoholic with complication (HCC)- (present on admission)  Assessment & Plan  1/4/2025  Patient will be admitted to the telemetry unit with close cardiac monitoring on CIWA protocol  Started on rally bag, multivitamin, thiamine and folate  Replete electrolytes  Patient has been counseled on alcohol cessation and will be provided with information for outpatient detox facilities    Acquired hypothyroidism- (present on admission)  Assessment & Plan  1/4/2025  Continue home medications  Check TSH    Presence of automatic cardioverter/defibrillator (AICD)- (present on admission)  Assessment & Plan  1/4/2025  Monitor on telemetry         VTE prophylaxis: Apixaban    I have performed a physical exam and reviewed and updated ROS and Plan today (1/4/2025). In review of yesterday's note (1/3/2025), there are no changes except as documented above.    Greater than 50 minutes spent prepping to see patient (e.g. review of tests) obtaining and/or reviewing separately obtained history. Performing a medically appropriate examination and/ evaluation.  Counseling and educating the patient/family/caregiver.  Ordering medications, tests, or procedures.  Referring and communicating with other health care professionals.  Documenting clinical information in EPIC.  Independently interpreting results and communicating results to patient/family/caregiver.  Care coordination.

## 2025-01-05 PROBLEM — E80.6 HYPERBILIRUBINEMIA: Status: ACTIVE | Noted: 2025-01-05

## 2025-01-05 LAB
ALBUMIN SERPL BCP-MCNC: 3.5 G/DL (ref 3.2–4.9)
ALBUMIN/GLOB SERPL: 1.4 G/DL
ALP SERPL-CCNC: 90 U/L (ref 30–99)
ALT SERPL-CCNC: 45 U/L (ref 2–50)
ANION GAP SERPL CALC-SCNC: 11 MMOL/L (ref 7–16)
APTT PPP: 27.3 SEC (ref 24.7–36)
AST SERPL-CCNC: 38 U/L (ref 12–45)
BASOPHILS # BLD AUTO: 0.7 % (ref 0–1.8)
BASOPHILS # BLD: 0.04 K/UL (ref 0–0.12)
BILIRUB CONJ SERPL-MCNC: 0.7 MG/DL (ref 0.1–0.5)
BILIRUB INDIRECT SERPL-MCNC: 2.3 MG/DL (ref 0–1)
BILIRUB SERPL-MCNC: 3 MG/DL (ref 0.1–1.5)
BUN SERPL-MCNC: 7 MG/DL (ref 8–22)
CALCIUM ALBUM COR SERPL-MCNC: 8.8 MG/DL (ref 8.5–10.5)
CALCIUM SERPL-MCNC: 8.4 MG/DL (ref 8.5–10.5)
CHLORIDE SERPL-SCNC: 97 MMOL/L (ref 96–112)
CO2 SERPL-SCNC: 26 MMOL/L (ref 20–33)
CREAT SERPL-MCNC: 0.38 MG/DL (ref 0.5–1.4)
EOSINOPHIL # BLD AUTO: 0.12 K/UL (ref 0–0.51)
EOSINOPHIL NFR BLD: 2.2 % (ref 0–6.9)
ERYTHROCYTE [DISTWIDTH] IN BLOOD BY AUTOMATED COUNT: 48.4 FL (ref 35.9–50)
FIBRINOGEN PPP-MCNC: 288 MG/DL (ref 215–460)
GFR SERPLBLD CREATININE-BSD FMLA CKD-EPI: 103 ML/MIN/1.73 M 2
GLOBULIN SER CALC-MCNC: 2.5 G/DL (ref 1.9–3.5)
GLUCOSE SERPL-MCNC: 83 MG/DL (ref 65–99)
HCT VFR BLD AUTO: 38.5 % (ref 37–47)
HGB BLD-MCNC: 13 G/DL (ref 12–16)
IMM GRANULOCYTES # BLD AUTO: 0.04 K/UL (ref 0–0.11)
IMM GRANULOCYTES NFR BLD AUTO: 0.7 % (ref 0–0.9)
INR PPP: 1.15 (ref 0.87–1.13)
LDH SERPL L TO P-CCNC: 234 U/L (ref 107–266)
LYMPHOCYTES # BLD AUTO: 1.38 K/UL (ref 1–4.8)
LYMPHOCYTES NFR BLD: 24.8 % (ref 22–41)
MAGNESIUM SERPL-MCNC: 1.4 MG/DL (ref 1.5–2.5)
MCH RBC QN AUTO: 34.6 PG (ref 27–33)
MCHC RBC AUTO-ENTMCNC: 33.8 G/DL (ref 32.2–35.5)
MCV RBC AUTO: 102.4 FL (ref 81.4–97.8)
MONOCYTES # BLD AUTO: 0.41 K/UL (ref 0–0.85)
MONOCYTES NFR BLD AUTO: 7.4 % (ref 0–13.4)
NEUTROPHILS # BLD AUTO: 3.58 K/UL (ref 1.82–7.42)
NEUTROPHILS NFR BLD: 64.2 % (ref 44–72)
NRBC # BLD AUTO: 0 K/UL
NRBC BLD-RTO: 0 /100 WBC (ref 0–0.2)
PHOSPHATE SERPL-MCNC: 3.1 MG/DL (ref 2.5–4.5)
PLATELET # BLD AUTO: 48 K/UL (ref 164–446)
PLATELETS.RETICULATED NFR BLD AUTO: 8.1 % (ref 0.6–13.1)
PMV BLD AUTO: 10.4 FL (ref 9–12.9)
POTASSIUM SERPL-SCNC: 3.6 MMOL/L (ref 3.6–5.5)
PROT SERPL-MCNC: 6 G/DL (ref 6–8.2)
PROTHROMBIN TIME: 14.7 SEC (ref 12–14.6)
RBC # BLD AUTO: 3.76 M/UL (ref 4.2–5.4)
SODIUM SERPL-SCNC: 134 MMOL/L (ref 135–145)
WBC # BLD AUTO: 5.6 K/UL (ref 4.8–10.8)

## 2025-01-05 PROCEDURE — 84100 ASSAY OF PHOSPHORUS: CPT

## 2025-01-05 PROCEDURE — 85384 FIBRINOGEN ACTIVITY: CPT

## 2025-01-05 PROCEDURE — 85610 PROTHROMBIN TIME: CPT

## 2025-01-05 PROCEDURE — 700111 HCHG RX REV CODE 636 W/ 250 OVERRIDE (IP): Performed by: INTERNAL MEDICINE

## 2025-01-05 PROCEDURE — 700102 HCHG RX REV CODE 250 W/ 637 OVERRIDE(OP): Performed by: INTERNAL MEDICINE

## 2025-01-05 PROCEDURE — 85055 RETICULATED PLATELET ASSAY: CPT

## 2025-01-05 PROCEDURE — 83615 LACTATE (LD) (LDH) ENZYME: CPT

## 2025-01-05 PROCEDURE — 85025 COMPLETE CBC W/AUTO DIFF WBC: CPT

## 2025-01-05 PROCEDURE — A9270 NON-COVERED ITEM OR SERVICE: HCPCS | Performed by: HOSPITALIST

## 2025-01-05 PROCEDURE — 83735 ASSAY OF MAGNESIUM: CPT

## 2025-01-05 PROCEDURE — 82248 BILIRUBIN DIRECT: CPT

## 2025-01-05 PROCEDURE — 700102 HCHG RX REV CODE 250 W/ 637 OVERRIDE(OP): Performed by: HOSPITALIST

## 2025-01-05 PROCEDURE — 83010 ASSAY OF HAPTOGLOBIN QUANT: CPT

## 2025-01-05 PROCEDURE — 770020 HCHG ROOM/CARE - TELE (206)

## 2025-01-05 PROCEDURE — 85730 THROMBOPLASTIN TIME PARTIAL: CPT

## 2025-01-05 PROCEDURE — 99233 SBSQ HOSP IP/OBS HIGH 50: CPT | Performed by: INTERNAL MEDICINE

## 2025-01-05 PROCEDURE — 36415 COLL VENOUS BLD VENIPUNCTURE: CPT

## 2025-01-05 PROCEDURE — 700111 HCHG RX REV CODE 636 W/ 250 OVERRIDE (IP): Performed by: HOSPITALIST

## 2025-01-05 PROCEDURE — A9270 NON-COVERED ITEM OR SERVICE: HCPCS | Performed by: INTERNAL MEDICINE

## 2025-01-05 PROCEDURE — 80053 COMPREHEN METABOLIC PANEL: CPT

## 2025-01-05 RX ORDER — MAGNESIUM SULFATE HEPTAHYDRATE 40 MG/ML
2 INJECTION, SOLUTION INTRAVENOUS ONCE
Status: COMPLETED | OUTPATIENT
Start: 2025-01-05 | End: 2025-01-05

## 2025-01-05 RX ORDER — LANOLIN ALCOHOL/MO/W.PET/CERES
400 CREAM (GRAM) TOPICAL 2 TIMES DAILY
Status: COMPLETED | OUTPATIENT
Start: 2025-01-05 | End: 2025-01-05

## 2025-01-05 RX ORDER — POTASSIUM CHLORIDE 1500 MG/1
20 TABLET, EXTENDED RELEASE ORAL 3 TIMES DAILY
Status: COMPLETED | OUTPATIENT
Start: 2025-01-05 | End: 2025-01-05

## 2025-01-05 RX ADMIN — Medication 400 MG: at 16:46

## 2025-01-05 RX ADMIN — ESCITALOPRAM OXALATE 10 MG: 10 TABLET ORAL at 06:11

## 2025-01-05 RX ADMIN — LEVOTHYROXINE SODIUM 50 MCG: 0.05 TABLET ORAL at 06:10

## 2025-01-05 RX ADMIN — AMLODIPINE BESYLATE 2.5 MG: 5 TABLET ORAL at 06:11

## 2025-01-05 RX ADMIN — FOLIC ACID 1 MG: 1 TABLET ORAL at 06:13

## 2025-01-05 RX ADMIN — VENLAFAXINE HYDROCHLORIDE 150 MG: 75 CAPSULE, EXTENDED RELEASE ORAL at 06:13

## 2025-01-05 RX ADMIN — MAGNESIUM SULFATE HEPTAHYDRATE 2 G: 2 INJECTION, SOLUTION INTRAVENOUS at 09:13

## 2025-01-05 RX ADMIN — LORAZEPAM 1 MG: 1 TABLET ORAL at 06:07

## 2025-01-05 RX ADMIN — THIAMINE HYDROCHLORIDE 100 MG: 100 INJECTION, SOLUTION INTRAMUSCULAR; INTRAVENOUS at 06:13

## 2025-01-05 RX ADMIN — DIGOXIN 125 MCG: 0.12 TABLET ORAL at 06:13

## 2025-01-05 RX ADMIN — CHLORDIAZEPOXIDE HYDROCHLORIDE 25 MG: 25 CAPSULE ORAL at 12:08

## 2025-01-05 RX ADMIN — POTASSIUM CHLORIDE 20 MEQ: 1500 TABLET, EXTENDED RELEASE ORAL at 12:08

## 2025-01-05 RX ADMIN — CHLORDIAZEPOXIDE HYDROCHLORIDE 25 MG: 25 CAPSULE ORAL at 16:46

## 2025-01-05 RX ADMIN — POTASSIUM CHLORIDE 20 MEQ: 1500 TABLET, EXTENDED RELEASE ORAL at 16:46

## 2025-01-05 RX ADMIN — METOPROLOL SUCCINATE 75 MG: 25 TABLET, EXTENDED RELEASE ORAL at 06:12

## 2025-01-05 RX ADMIN — Medication 400 MG: at 09:15

## 2025-01-05 ASSESSMENT — LIFESTYLE VARIABLES
TOTAL SCORE: 3
NAUSEA AND VOMITING: NO NAUSEA AND NO VOMITING
NAUSEA AND VOMITING: NO NAUSEA AND NO VOMITING
AUDITORY DISTURBANCES: NOT PRESENT
HEADACHE, FULLNESS IN HEAD: VERY MILD
HEADACHE, FULLNESS IN HEAD: VERY MILD
VISUAL DISTURBANCES: NOT PRESENT
HEADACHE, FULLNESS IN HEAD: NOT PRESENT
VISUAL DISTURBANCES: NOT PRESENT
PAROXYSMAL SWEATS: BARELY PERCEPTIBLE SWEATING. PALMS MOIST
TREMOR: NO TREMOR
ORIENTATION AND CLOUDING OF SENSORIUM: DISORIENTED FOR PLACE AND / OR PERSON
AUDITORY DISTURBANCES: NOT PRESENT
ANXIETY: *
ORIENTATION AND CLOUDING OF SENSORIUM: DATE DISORIENTATION BY MORE THAN TWO CALENDAR DAYS
HEADACHE, FULLNESS IN HEAD: NOT PRESENT
ANXIETY: NO ANXIETY (AT EASE)
ANXIETY: NO ANXIETY (AT EASE)
HEADACHE, FULLNESS IN HEAD: NOT PRESENT
AGITATION: NORMAL ACTIVITY
TOTAL SCORE: 4
VISUAL DISTURBANCES: NOT PRESENT
VISUAL DISTURBANCES: NOT PRESENT
NAUSEA AND VOMITING: NO NAUSEA AND NO VOMITING
PAROXYSMAL SWEATS: NO SWEAT VISIBLE
AGITATION: NORMAL ACTIVITY
AUDITORY DISTURBANCES: NOT PRESENT
PAROXYSMAL SWEATS: NO SWEAT VISIBLE
ORIENTATION AND CLOUDING OF SENSORIUM: DISORIENTED FOR PLACE AND / OR PERSON
AGITATION: NORMAL ACTIVITY
TOTAL SCORE: 4
TREMOR: NO TREMOR
ORIENTATION AND CLOUDING OF SENSORIUM: DISORIENTED FOR PLACE AND / OR PERSON
TREMOR: NO TREMOR
AUDITORY DISTURBANCES: NOT PRESENT
TOTAL SCORE: 4
NAUSEA AND VOMITING: NO NAUSEA AND NO VOMITING
AUDITORY DISTURBANCES: NOT PRESENT
ORIENTATION AND CLOUDING OF SENSORIUM: DATE DISORIENTATION BY MORE THAN TWO CALENDAR DAYS
VISUAL DISTURBANCES: NOT PRESENT
PAROXYSMAL SWEATS: NO SWEAT VISIBLE
PAROXYSMAL SWEATS: NO SWEAT VISIBLE
NAUSEA AND VOMITING: NO NAUSEA AND NO VOMITING
AUDITORY DISTURBANCES: NOT PRESENT
ORIENTATION AND CLOUDING OF SENSORIUM: DATE DISORIENTATION BY MORE THAN TWO CALENDAR DAYS
TOTAL SCORE: 10
NAUSEA AND VOMITING: NO NAUSEA AND NO VOMITING
TREMOR: NO TREMOR
TOTAL SCORE: 4
TREMOR: NO TREMOR
AGITATION: *
ANXIETY: MILDLY ANXIOUS
AGITATION: NORMAL ACTIVITY
ANXIETY: NO ANXIETY (AT EASE)
ANXIETY: NO ANXIETY (AT EASE)
AGITATION: NORMAL ACTIVITY
PAROXYSMAL SWEATS: NO SWEAT VISIBLE
TREMOR: NO TREMOR
HEADACHE, FULLNESS IN HEAD: NOT PRESENT
VISUAL DISTURBANCES: NOT PRESENT

## 2025-01-05 ASSESSMENT — ENCOUNTER SYMPTOMS
SHORTNESS OF BREATH: 1
DEPRESSION: 1
PALPITATIONS: 1

## 2025-01-05 ASSESSMENT — FIBROSIS 4 INDEX: FIB4 SCORE: 18.09

## 2025-01-05 ASSESSMENT — PAIN DESCRIPTION - PAIN TYPE: TYPE: ACUTE PAIN

## 2025-01-05 NOTE — CARE PLAN
The patient is Stable - Low risk of patient condition declining or worsening    Shift Goals  Clinical Goals: CIWA, monitor HR and rhythm, safety  Patient Goals: Comfort  Family Goals: DANICA    Progress made toward(s) clinical / shift goals:    Problem: Knowledge Deficit - Standard  Goal: Patient and family/care givers will demonstrate understanding of plan of care, disease process/condition, diagnostic tests and medications  Outcome: Progressing     Problem: Optimal Care for Alcohol Withdrawal  Goal: Optimal Care for the alcohol withdrawal patient  Outcome: Progressing     Problem: Seizure Precautions  Goal: Implementation of seizure precautions  Outcome: Progressing     Problem: Fall Risk  Goal: Patient will remain free from falls  Outcome: Progressing       Patient is not progressing towards the following goals:

## 2025-01-05 NOTE — CODE DOCUMENTATION
Merlene notified of increased mumbled speech and potential slight left droop. No new orders at this time.

## 2025-01-05 NOTE — PROGRESS NOTES
Received bedside report from RN Vickie, pt care assumed. VS WDL, pt assessment complete. Pt A&Ox2 disoriented to time and situation, no c/o pain at this time. POC discussed with pt and verbalizes no questions. Pt denies any additional needs at this time. Bed locked and in lowest position, bed alarm on x2. Pt educated on fall risk and verbalized understanding, call light within reach, hourly rounding initiated.      Change in patient condition due to: Neurologic  Rapid Response called at: 1957  ZEINAB Tobin notified at 1958    See Code Blue timeline for rapid response events. Patient Remained on Unit

## 2025-01-05 NOTE — PROGRESS NOTES
Rapid Response Summary     Rapid response called at 1957 for: Stroke like symptoms     VS: WDL (See Vitals Flowsheet)  Additional info:   MD Paged: ZEINAB Tobin, Neurologist Jelena  Interventions:    Imaging/Tests: N/A   Labs: N/A   Medications:      Other: N/A  Disposition: Improved with rapid response team interventions. Primary RN updated on plan of care. Transfer not indicated at this time.  and Rapid team will continue to follow the patient. Neurologist notified of pt's stroke like symptoms. Per Neurologist, no need for additional imaging at this time.

## 2025-01-05 NOTE — PROGRESS NOTES
Bedside report received, assumed care of patient at change of shift. Chart, labs, and orders reviewed. Pt resting in bed, breathing even and unlabored,complains of pain declines intervention and in no acute distress. A&O x3 disoriented to time.. Tele monitoring in place. Fall precautions including bed alarm in place and education provided. Call light within reach, bed locked and in lowest position, denies other needs at this time.

## 2025-01-05 NOTE — PROGRESS NOTES
Hospital Medicine Daily Progress Note    Date of Service  1/5/2025    Chief Complaint  Jeanie Hutchison is a 79 y.o. female admitted 1/2/2025 with   Chief Complaint   Patient presents with    Alcohol Intoxication     Pt endorses heavy drinking over the last 2 weeks due to family loss. Last drink yesterday. Pt states she has felt more confused over the last 3 days.         Hospital Course  No notes on file    Jeanie Hutchison is a 79 y.o. female who presented 1/2/2025 with past medical history of alcohol abuse, atrial fibrillation, history of V. tach, history of AICD who presents to the hospital for palpitations and shortness of breath.  Patient states that she has been drinking alcohol on a daily basis for the past 2 weeks.  She usually drinks vodka.  She presented to hospital tremulous, confused and hallucinating.  She denies any fever, chills, nausea, vomiting, diarrhea, cough.  Patient states that she is not taking her home medications.  Patient is upset that she lost both of her daughters and her  in the past few years.  Currently, she lives with her grandson.  She denies any suicide ideations states that she wants to live for her grandchildren.     Chest x-ray interpreted by me find no acute pulmonary process  EKG interpreted by me found elevation, RVR, left axis deviation    Interval Problem Update  Patient was seen and examined at bedside.  I have personally reviewed and interpreted vitals, labs, and imaging.    1/3.  Afebrile.  Has been tachycardic.  Tachypnea is improved.  On 3-4 L nasal cannula.  Telemetry shows atrial flutter up to the 170s.  Will monitor anticoagulation closely with significant thrombocytopenia.  Added parameters to hold apixaban for platelets less than 50.  Replete phosphorus, magnesium.  High anion gap metabolic acidosis resolved with fluid resuscitation.  LFTs trending down.  Patient is lethargic.  Denies any fever, chills, chest pains.  Denies any shortness of breath.   States she is still shaky with tremors.  Patient is lethargic and is following asleep during encounter or not interested in talking to this provider.  Continue CIWA protocol.  1/4.  Afebrile.  Tachycardia is improved.  Tachypnea is improved.  On 4 L nasal cannula.  Patient is lethargic.  Denies fever, chills, pain.  7 tremors and anxiety.  Thinks it is January 8, 2025.  Knows she is in the hospital for drinking too much.  Continue CIWA protocol she goes through withdrawals.  Patient with hyperbilirubinemia, thrombocytopenia, mildly elevated LDH.  Coags are elevated likely from recently receiving apixaban currently on hold for thrombocytopenia.  Fibrinogen within normal limits ruling out DIC.  Normal kidney function.  Will continue to monitor  Ammonia 51  1/5.  Afebrile.  Has been tachycardic.  Hypertensive.  Telemetry shows A-fib.  On 2-4 L nasal cannula.  Repeat potassium and magnesium.  Patient is very lethargic.  Denies any pain or needs.  Continue to monitor sugars and alcohol withdrawal.  Wbc 4.6 > 4.1 > 5.6  P 46 > 38 > 48  Tbili 3 > 2.8 > 3. Indirect   > 234    I have discussed this patient's plan of care and discharge plan at IDT rounds today with Case Management, Nursing, Nursing leadership, and other members of the IDT team.    Consultants/Specialty  None    Code Status  Full Code    Disposition  The patient is not medically cleared for discharge to home or a post-acute facility.  Anticipate discharge to: home with organized home healthcare and close outpatient follow-up    I have placed the appropriate orders for post-discharge needs.    Review of Systems  Review of Systems   Respiratory:  Positive for shortness of breath.    Cardiovascular:  Positive for palpitations.   Psychiatric/Behavioral:  Positive for depression. Negative for suicidal ideas.         Physical Exam  Temp:  [36.3 °C (97.3 °F)-36.9 °C (98.4 °F)] 36.4 °C (97.5 °F)  Pulse:  [] 108  Resp:  [14-20] 18  BP: (128-149)/()  142/105  SpO2:  [93 %-95 %] 95 %    Physical Exam  Vitals and nursing note reviewed.   Constitutional:       Appearance: Normal appearance. She is ill-appearing.   HENT:      Head: Normocephalic and atraumatic.      Right Ear: External ear normal.      Left Ear: External ear normal.      Nose: Nose normal.      Mouth/Throat:      Mouth: Mucous membranes are moist.      Pharynx: Oropharynx is clear. No oropharyngeal exudate or posterior oropharyngeal erythema.   Eyes:      Extraocular Movements: Extraocular movements intact.      Conjunctiva/sclera: Conjunctivae normal.   Cardiovascular:      Rate and Rhythm: Regular rhythm. Tachycardia present.      Pulses: Normal pulses.      Heart sounds: Normal heart sounds. No murmur heard.  Pulmonary:      Effort: Pulmonary effort is normal. Tachypnea present. No respiratory distress.      Breath sounds: Normal breath sounds. No stridor. No wheezing or rales.   Abdominal:      General: Abdomen is flat. Bowel sounds are normal. There is no distension.      Palpations: Abdomen is soft. There is no mass.      Tenderness: There is no abdominal tenderness.   Musculoskeletal:      Cervical back: Normal range of motion.   Skin:     General: Skin is warm.      Capillary Refill: Capillary refill takes less than 2 seconds.   Neurological:      General: No focal deficit present.      Mental Status: Mental status is at baseline. She is lethargic and disoriented.      Cranial Nerves: No cranial nerve deficit.   Psychiatric:         Mood and Affect: Mood is depressed.         Fluids    Intake/Output Summary (Last 24 hours) at 1/5/2025 0837  Last data filed at 1/5/2025 0621  Gross per 24 hour   Intake 120 ml   Output 2650 ml   Net -2530 ml        Laboratory  Recent Labs     01/03/25  0620 01/04/25  0322 01/05/25  0458   WBC 4.6* 4.1* 5.6   RBC 3.88* 3.63* 3.76*   HEMOGLOBIN 13.6 12.8 13.0   HEMATOCRIT 40.0 38.8 38.5   .1* 106.9* 102.4*   MCH 35.1* 35.3* 34.6*   MCHC 34.0 33.0 33.8    RDW 54.0* 54.3* 48.4   PLATELETCT 46* 38* 48*   MPV 10.1 11.5 10.4     Recent Labs     01/03/25  0620 01/04/25  0322 01/05/25  0458   SODIUM 142 141 134*   POTASSIUM 4.0 4.4 3.6   CHLORIDE 104 104 97   CO2 25 27 26   GLUCOSE 107* 120* 83   BUN 12 11 7*   CREATININE 0.44* 0.28* 0.38*   CALCIUM 8.3* 8.5 8.4*     Recent Labs     01/04/25  1359 01/05/25  0458   APTT 29.4 27.3   INR 1.19* 1.15*               Imaging  DX-CHEST-PORTABLE (1 VIEW)   Final Result      No acute cardiopulmonary disease.           Assessment/Plan  * Atrial fibrillation (HCC)- (present on admission)  Assessment & Plan  1/5/2025  Uncontrolled  Continue Eliquis  Continue digoxin and metoprolol  IV PRN medications been ordered    Hyperbilirubinemia  Assessment & Plan  1/5/2025  Patient has hyperbilirubinemia with predominance of indirect  The rest of her LFTs are for initially elevated came back normal.  Also found to have thrombocytopenia  LDH is only minimally elevated  No schistocytes on peripheral smear  Coag is slightly elevated but patient is on apixaban  Fibrinogen within normal limits.  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 and bone marrow suppression    Alcohol intoxication in active alcoholic with complication (HCC)- (present on admission)  Assessment & Plan  1/5/2025  Patient will be admitted to the telemetry unit with close cardiac monitoring on CIWA protocol  Started on rally bag, multivitamin, thiamine and folate  Replete electrolytes  Patient has been counseled on alcohol cessation and will be provided with information for outpatient detox facilities    Acquired hypothyroidism- (present on admission)  Assessment & Plan  1/5/2025  Continue home medications  Check TSH    Thrombocytopenia (HCC)- (present on admission)  Assessment & Plan  1/5/2025  Likely secondary to  alcohol    Presence of automatic cardioverter/defibrillator (AICD)- (present on admission)  Assessment & Plan  1/5/2025  Monitor on telemetry         VTE prophylaxis: Hold anticoagulation for thrombocytopenia    I have performed a physical exam and reviewed and updated ROS and Plan today (1/5/2025). In review of yesterday's note (1/4/2025), there are no changes except as documented above.    Greater than 51 minutes spent prepping to see patient (e.g. review of tests) obtaining and/or reviewing separately obtained history. Performing a medically appropriate examination and/ evaluation.  Counseling and educating the patient/family/caregiver.  Ordering medications, tests, or procedures.  Referring and communicating with other health care professionals.  Documenting clinical information in EPIC.  Independently interpreting results and communicating results to patient/family/caregiver.  Care coordination.

## 2025-01-06 LAB
ALBUMIN SERPL BCP-MCNC: 3.7 G/DL (ref 3.2–4.9)
ALBUMIN/GLOB SERPL: 1.4 G/DL
ALP SERPL-CCNC: 92 U/L (ref 30–99)
ALT SERPL-CCNC: 42 U/L (ref 2–50)
ANION GAP SERPL CALC-SCNC: 13 MMOL/L (ref 7–16)
AST SERPL-CCNC: 36 U/L (ref 12–45)
BASOPHILS # BLD AUTO: 0.8 % (ref 0–1.8)
BASOPHILS # BLD: 0.04 K/UL (ref 0–0.12)
BILIRUB CONJ SERPL-MCNC: 1.2 MG/DL (ref 0.1–0.5)
BILIRUB INDIRECT SERPL-MCNC: 1.5 MG/DL (ref 0–1)
BILIRUB SERPL-MCNC: 2.7 MG/DL (ref 0.1–1.5)
BUN SERPL-MCNC: 10 MG/DL (ref 8–22)
CALCIUM ALBUM COR SERPL-MCNC: 9.2 MG/DL (ref 8.5–10.5)
CALCIUM SERPL-MCNC: 9 MG/DL (ref 8.5–10.5)
CHLORIDE SERPL-SCNC: 99 MMOL/L (ref 96–112)
CO2 SERPL-SCNC: 24 MMOL/L (ref 20–33)
CREAT SERPL-MCNC: 0.5 MG/DL (ref 0.5–1.4)
EOSINOPHIL # BLD AUTO: 0.11 K/UL (ref 0–0.51)
EOSINOPHIL NFR BLD: 2.2 % (ref 0–6.9)
ERYTHROCYTE [DISTWIDTH] IN BLOOD BY AUTOMATED COUNT: 47.9 FL (ref 35.9–50)
GFR SERPLBLD CREATININE-BSD FMLA CKD-EPI: 96 ML/MIN/1.73 M 2
GLOBULIN SER CALC-MCNC: 2.6 G/DL (ref 1.9–3.5)
GLUCOSE SERPL-MCNC: 72 MG/DL (ref 65–99)
HAPTOGLOB SERPL-MCNC: 52 MG/DL (ref 30–200)
HCT VFR BLD AUTO: 41.7 % (ref 37–47)
HGB BLD-MCNC: 14.5 G/DL (ref 12–16)
IMM GRANULOCYTES # BLD AUTO: 0.04 K/UL (ref 0–0.11)
IMM GRANULOCYTES NFR BLD AUTO: 0.8 % (ref 0–0.9)
LYMPHOCYTES # BLD AUTO: 1.53 K/UL (ref 1–4.8)
LYMPHOCYTES NFR BLD: 30.3 % (ref 22–41)
MAGNESIUM SERPL-MCNC: 1.9 MG/DL (ref 1.5–2.5)
MCH RBC QN AUTO: 35.5 PG (ref 27–33)
MCHC RBC AUTO-ENTMCNC: 34.8 G/DL (ref 32.2–35.5)
MCV RBC AUTO: 102.2 FL (ref 81.4–97.8)
MONOCYTES # BLD AUTO: 0.54 K/UL (ref 0–0.85)
MONOCYTES NFR BLD AUTO: 10.7 % (ref 0–13.4)
NEUTROPHILS # BLD AUTO: 2.79 K/UL (ref 1.82–7.42)
NEUTROPHILS NFR BLD: 55.2 % (ref 44–72)
NRBC # BLD AUTO: 0 K/UL
NRBC BLD-RTO: 0 /100 WBC (ref 0–0.2)
PHOSPHATE SERPL-MCNC: 3.1 MG/DL (ref 2.5–4.5)
PLATELET # BLD AUTO: 67 K/UL (ref 164–446)
PLATELETS.RETICULATED NFR BLD AUTO: 6.9 % (ref 0.6–13.1)
PMV BLD AUTO: 11.3 FL (ref 9–12.9)
POTASSIUM SERPL-SCNC: 4.2 MMOL/L (ref 3.6–5.5)
PROT SERPL-MCNC: 6.3 G/DL (ref 6–8.2)
RBC # BLD AUTO: 4.08 M/UL (ref 4.2–5.4)
SODIUM SERPL-SCNC: 136 MMOL/L (ref 135–145)
WBC # BLD AUTO: 5.1 K/UL (ref 4.8–10.8)

## 2025-01-06 PROCEDURE — A9270 NON-COVERED ITEM OR SERVICE: HCPCS | Performed by: HOSPITALIST

## 2025-01-06 PROCEDURE — 80053 COMPREHEN METABOLIC PANEL: CPT

## 2025-01-06 PROCEDURE — 92610 EVALUATE SWALLOWING FUNCTION: CPT

## 2025-01-06 PROCEDURE — 82248 BILIRUBIN DIRECT: CPT

## 2025-01-06 PROCEDURE — 85055 RETICULATED PLATELET ASSAY: CPT

## 2025-01-06 PROCEDURE — 700102 HCHG RX REV CODE 250 W/ 637 OVERRIDE(OP): Performed by: INTERNAL MEDICINE

## 2025-01-06 PROCEDURE — 770020 HCHG ROOM/CARE - TELE (206)

## 2025-01-06 PROCEDURE — 99233 SBSQ HOSP IP/OBS HIGH 50: CPT | Performed by: INTERNAL MEDICINE

## 2025-01-06 PROCEDURE — 700102 HCHG RX REV CODE 250 W/ 637 OVERRIDE(OP): Performed by: HOSPITALIST

## 2025-01-06 PROCEDURE — 85025 COMPLETE CBC W/AUTO DIFF WBC: CPT

## 2025-01-06 PROCEDURE — A9270 NON-COVERED ITEM OR SERVICE: HCPCS | Performed by: INTERNAL MEDICINE

## 2025-01-06 PROCEDURE — 36415 COLL VENOUS BLD VENIPUNCTURE: CPT

## 2025-01-06 PROCEDURE — 84100 ASSAY OF PHOSPHORUS: CPT

## 2025-01-06 PROCEDURE — 83735 ASSAY OF MAGNESIUM: CPT

## 2025-01-06 RX ORDER — LANOLIN ALCOHOL/MO/W.PET/CERES
400 CREAM (GRAM) TOPICAL 2 TIMES DAILY
Status: COMPLETED | OUTPATIENT
Start: 2025-01-06 | End: 2025-01-06

## 2025-01-06 RX ORDER — CARBOXYMETHYLCELLULOSE SODIUM 5 MG/ML
2 SOLUTION/ DROPS OPHTHALMIC PRN
Status: DISCONTINUED | OUTPATIENT
Start: 2025-01-06 | End: 2025-01-09 | Stop reason: HOSPADM

## 2025-01-06 RX ADMIN — MIRTAZAPINE 15 MG: 15 TABLET, FILM COATED ORAL at 21:01

## 2025-01-06 RX ADMIN — TRAZODONE HYDROCHLORIDE 50 MG: 50 TABLET ORAL at 21:01

## 2025-01-06 RX ADMIN — Medication 400 MG: at 18:18

## 2025-01-06 RX ADMIN — Medication 400 MG: at 13:25

## 2025-01-06 ASSESSMENT — LIFESTYLE VARIABLES
HEADACHE, FULLNESS IN HEAD: NOT PRESENT
ORIENTATION AND CLOUDING OF SENSORIUM: DISORIENTED FOR PLACE AND / OR PERSON
NAUSEA AND VOMITING: NO NAUSEA AND NO VOMITING
VISUAL DISTURBANCES: NOT PRESENT
HEADACHE, FULLNESS IN HEAD: NOT PRESENT
ANXIETY: NO ANXIETY (AT EASE)
HEADACHE, FULLNESS IN HEAD: NOT PRESENT
TREMOR: NO TREMOR
PAROXYSMAL SWEATS: NO SWEAT VISIBLE
VISUAL DISTURBANCES: NOT PRESENT
HEADACHE, FULLNESS IN HEAD: NOT PRESENT
TOTAL SCORE: 7
AGITATION: NORMAL ACTIVITY
NAUSEA AND VOMITING: NO NAUSEA AND NO VOMITING
AGITATION: NORMAL ACTIVITY
AUDITORY DISTURBANCES: NOT PRESENT
AUDITORY DISTURBANCES: NOT PRESENT
PAROXYSMAL SWEATS: NO SWEAT VISIBLE
VISUAL DISTURBANCES: NOT PRESENT
HEADACHE, FULLNESS IN HEAD: NOT PRESENT
TOTAL SCORE: 4
VISUAL DISTURBANCES: NOT PRESENT
TREMOR: NO TREMOR
TREMOR: NO TREMOR
PAROXYSMAL SWEATS: NO SWEAT VISIBLE
TOTAL SCORE: 3
ORIENTATION AND CLOUDING OF SENSORIUM: DATE DISORIENTATION BY MORE THAN TWO CALENDAR DAYS
ANXIETY: NO ANXIETY (AT EASE)
AUDITORY DISTURBANCES: NOT PRESENT
ORIENTATION AND CLOUDING OF SENSORIUM: DISORIENTED FOR PLACE AND / OR PERSON
ANXIETY: NO ANXIETY (AT EASE)
AGITATION: NORMAL ACTIVITY
ORIENTATION AND CLOUDING OF SENSORIUM: DISORIENTED FOR PLACE AND / OR PERSON
AGITATION: NORMAL ACTIVITY
VISUAL DISTURBANCES: NOT PRESENT
NAUSEA AND VOMITING: NO NAUSEA AND NO VOMITING
AUDITORY DISTURBANCES: NOT PRESENT
TOTAL SCORE: 4
TREMOR: NO TREMOR
NAUSEA AND VOMITING: NO NAUSEA AND NO VOMITING
PAROXYSMAL SWEATS: NO SWEAT VISIBLE
ANXIETY: NO ANXIETY (AT EASE)
VISUAL DISTURBANCES: NOT PRESENT
TREMOR: NO TREMOR
AGITATION: NORMAL ACTIVITY
NAUSEA AND VOMITING: NO NAUSEA AND NO VOMITING
AGITATION: NORMAL ACTIVITY
PAROXYSMAL SWEATS: NO SWEAT VISIBLE
ORIENTATION AND CLOUDING OF SENSORIUM: DISORIENTED FOR PLACE AND / OR PERSON
ORIENTATION AND CLOUDING OF SENSORIUM: DISORIENTED FOR PLACE AND / OR PERSON
PAROXYSMAL SWEATS: NO SWEAT VISIBLE
TREMOR: NO TREMOR
HEADACHE, FULLNESS IN HEAD: NOT PRESENT
ANXIETY: NO ANXIETY (AT EASE)
AUDITORY DISTURBANCES: NOT PRESENT
ANXIETY: *
AUDITORY DISTURBANCES: NOT PRESENT
NAUSEA AND VOMITING: NO NAUSEA AND NO VOMITING

## 2025-01-06 ASSESSMENT — ENCOUNTER SYMPTOMS
DEPRESSION: 1
PALPITATIONS: 1
SHORTNESS OF BREATH: 1

## 2025-01-06 ASSESSMENT — FIBROSIS 4 INDEX: FIB4 SCORE: 6.38

## 2025-01-06 ASSESSMENT — PAIN DESCRIPTION - PAIN TYPE: TYPE: ACUTE PAIN

## 2025-01-06 NOTE — PROGRESS NOTES
Bedside report received, assumed care of patient at change of shift. Chart, labs, and orders reviewed. Pt resting in bed, breathing even and unlabored, denies pain and in no acute distress. A&O x3 disoriented to time. Patient drowsy, frequently falling asleep during conversation.Tele monitoring in place. Fall precautions including bed alarm in place and education provided. Call light within reach, bed locked and in lowest position, denies other needs at this time.

## 2025-01-06 NOTE — THERAPY
"Speech Language Pathology   Clinical Swallow Evaluation     Patient Name: Jeanie Hutchison  AGE:  79 y.o., SEX:  female  Medical Record #: [id number]  Date of Service: 1/6/2025      History of Present Illness  Jeanie Hutchison is a 79 y.o. female who presented 1/2/2025 who presents to the hospital for palpitations and shortness of breath.     PMHx: alcohol abuse, atrial fibrillation, history of V. tach, history of AICD    CXR 1/2/25: No acute cardiopulmonary disease.    Speech Pathology  Cognitive evaluation 2/4/21: \"At this time, recommend patient have 24/7 supervision upon discharge to ensure safety and sound decision-making. SLP to follow and will attempt to complete further testing as patient is able and agreeable.\"    CSE 2/6/2020: Dysphagia III, Nectar Thick Liquid      General Information:  Vitals  O2 (LPM): 1.5  O2 Delivery Device: Silicone Nasal Cannula  Level of Consciousness: Drowsy, Lethargic  Patient Behaviors: Drowsy, Confused, Fatigue, Lethargic  Orientation: Self  Follows Directives: Yes - simple commands only - required repetition a few times during session      Prior Living Situation & Level of Function:  Lives with - Patient's Self Care Capacity: Alone and Able to Care For Self  Communication: Unknown - pt is a non-historian   Swallowing: Unknown - unable to provide PLOF       Oral Mechanism Evaluation:  Dentition: Good, Natural dentition   Facial Symmetry: Equal  Facial Sensation: Equal     Labial Observations: WFL   Lingual Observations: Midline         Laryngeal Function:  Secretion Management: Adequate  Voice Quality: WFL  Cough: Perceptually WNL       Subjective  Patient received sleeping - RN assisted with repositioning pt for evaluation. Patient very drowsy and intermittently closing eyes during session and turning to her right side.      Assessment  Current Method of Nutrition: Oral diet (RG/TN)  Positioning: Medina's (60-90 degrees)  Bolus Administration: SLP  O2 (LPM): 1.5 O2 Delivery " "Device: Silicone Nasal Cannula  Factor(s) Affecting Performance: Impaired endurance, Impaired mental status  Tracheostomy : No       Swallowing Trials:  Swallowing Trials  Ice: WFL  Thin Liquid (TN0): WFL  Regular (RG7): Impaired    Comments: Adequate bolus acceptance and containment. Presumed timely AP transit and bolus formation evidenced by absence of oral residue post swallow. No overt s/sx of aspiration appreciated across all trials tested. Voice and breath sounds remained clear. Mastication was prolonged >1 minute despite liquid wash suspect directly r/t AMS and lethargy.       Clinical Impressions  Patient is presenting with clinical signs most consistent with a mild oral dysphagia suspect acute r/t lethargy and AMS. A short-term modified diet is indicated given 1:1 feeding by nursing staff. Anticipate progression to regular diet as mentation improves. SLP following to ensure diet tolerance and ADAT.     Recommendations  Diet Consistency: Soft & bite size and Thin Liquids  Instrumentation: Instrumental swallow study pending clinical progress  Medication: Whole with liquid, As tolerated, Whole with puree, Cut large pills, One pill at a time  Supervision: 1:1 feeding with constant supervision  Positioning: Fully upright and midline during oral intake  Risk Management : Small bites/sips, Slow rate of intake, Alternate bites and sips  Oral Care: Q4h         SLP Treatment Plan  Treatment Plan: Dysphagia Treatment, Patient/Family/Caregiver Training  SLP Frequency: 3x Per Week  Estimated Duration: 1 Visit      Anticipated Discharge Needs  Discharge Recommendations: Anticipate that the patient will have no further speech therapy needs after discharge from the hospital   Therapy Recommendations Upon DC: Dysphagia Training, Patient / Family / Caregiver Education        Patient / Family Goals  Patient / Family Goal #1: \"yeah, I'll have some food\" (when offered capps\"  Short Term Goals  Short Term Goal # 1: Patient will " consume a SB/TN diet with no overt s/sx of aspiration given 1:1 feeding.      Neela Luz, SLP

## 2025-01-06 NOTE — PROGRESS NOTES
Received bedside report from SNEHA Johnston, pt care assumed. VS WDL, pt assessment complete. Pt A&Ox oriented to self only, no c/o pain at this time. POC discussed with pt and verbalizes no questions. Pt denies any additional needs at this time. Bed locked and in lowest position, bed alarm on x2. Pt educated on fall risk and verbalized understanding, call light within reach, hourly rounding initiated.

## 2025-01-06 NOTE — ASSESSMENT & PLAN NOTE
Likely secondary to alcohol  Recent Labs     01/06/25  0225 01/07/25  0055 01/08/25  0037   WBC 5.1 5.3 4.3*   RBC 4.08* 4.01* 4.25   HEMOGLOBIN 14.5 14.1 14.5   HEMATOCRIT 41.7 42.5 43.3   .2* 106.0* 101.9*   MCH 35.5* 35.2* 34.1*   RDW 47.9 49.8 48.6   PLATELETCT 67* 85* 97*   MPV 11.3 10.8 10.4   NEUTSPOLYS 55.20 53.10 38.70*   LYMPHOCYTES 30.30 26.70 35.80   MONOCYTES 10.70 16.30* 21.30*   EOSINOPHILS 2.20 1.90 2.10   BASOPHILS 0.80 1.10 1.20

## 2025-01-06 NOTE — PROGRESS NOTES
MS:    Atrial FIbrillation (77 - 95) with rare Triplets/Couplets and frequent PVCs  - - / 0.07 / - -

## 2025-01-06 NOTE — PROGRESS NOTES
Hospital Medicine Daily Progress Note    Date of Service  1/6/2025    Chief Complaint  Jeanie Hucthison is a 79 y.o. female admitted 1/2/2025 with   Chief Complaint   Patient presents with    Alcohol Intoxication     Pt endorses heavy drinking over the last 2 weeks due to family loss. Last drink yesterday. Pt states she has felt more confused over the last 3 days.         Hospital Course  No notes on file    Jeanie Hutchison is a 79 y.o. female who presented 1/2/2025 with past medical history of alcohol abuse, atrial fibrillation, history of V. tach, history of AICD who presents to the hospital for palpitations and shortness of breath.  Patient states that she has been drinking alcohol on a daily basis for the past 2 weeks.  She usually drinks vodka.  She presented to hospital tremulous, confused and hallucinating.  She denies any fever, chills, nausea, vomiting, diarrhea, cough.  Patient states that she is not taking her home medications.  Patient is upset that she lost both of her daughters and her  in the past few years.  Currently, she lives with her grandson.  She denies any suicide ideations states that she wants to live for her grandchildren.     Chest x-ray interpreted by me find no acute pulmonary process  EKG interpreted by me found elevation, RVR, left axis deviation    Interval Problem Update  Patient was seen and examined at bedside.  I have personally reviewed and interpreted vitals, labs, and imaging.    1/3.  Afebrile.  Has been tachycardic.  Tachypnea is improved.  On 3-4 L nasal cannula.  Telemetry shows atrial flutter up to the 170s.  Will monitor anticoagulation closely with significant thrombocytopenia.  Added parameters to hold apixaban for platelets less than 50.  Replete phosphorus, magnesium.  High anion gap metabolic acidosis resolved with fluid resuscitation.  LFTs trending down.  Patient is lethargic.  Denies any fever, chills, chest pains.  Denies any shortness of breath.   States she is still shaky with tremors.  Patient is lethargic and is following asleep during encounter or not interested in talking to this provider.  Continue CIWA protocol.  1/4.  Afebrile.  Tachycardia is improved.  Tachypnea is improved.  On 4 L nasal cannula.  Patient is lethargic.  Denies fever, chills, pain.  7 tremors and anxiety.  Thinks it is January 8, 2025.  Knows she is in the hospital for drinking too much.  Continue CIWA protocol she goes through withdrawals.  Patient with hyperbilirubinemia, thrombocytopenia, mildly elevated LDH.  Coags are elevated likely from recently receiving apixaban currently on hold for thrombocytopenia.  Fibrinogen within normal limits ruling out DIC.  Normal kidney function.  Will continue to monitor  Ammonia 51  1/5.  Afebrile.  Has been tachycardic.  Hypertensive.  Telemetry shows A-fib.  On 2-4 L nasal cannula.  Repeat potassium and magnesium.  Patient is very lethargic.  Denies any pain or needs.  Continue to monitor sugars and alcohol withdrawal.  1/6.  Afebrile.  Tachycardia has resolved.  Telemetry still shows A-fib.  Has been hypertensive.  On 0-3 L nasal cannula.  Replete mag.  Patient was very lethargic.  Cannot take place this morning.  She did wake up and participated with speech therapy.  Follows simple commands.  Tolerated diet and medications.  Discontinue scheduled Librium.  Continue to monitor mental status.  Patient has completed going through alcohol withdrawal  Wbc 4.6 > 4.1 > 5.6 > 5.1  P 46 > 38 > 48 > 67  Tbili 3 > 2.8 > 3 > 2.7   > 234    I have discussed this patient's plan of care and discharge plan at IDT rounds today with Case Management, Nursing, Nursing leadership, and other members of the IDT team.    Consultants/Specialty  None    Code Status  Full Code    Disposition  The patient is not medically cleared for discharge to home or a post-acute facility.  Anticipate discharge to: home with organized home healthcare and close outpatient  follow-up    I have placed the appropriate orders for post-discharge needs.    Review of Systems  Review of Systems   Respiratory:  Positive for shortness of breath.    Cardiovascular:  Positive for palpitations.   Psychiatric/Behavioral:  Positive for depression. Negative for suicidal ideas.         Physical Exam  Temp:  [36.3 °C (97.3 °F)-36.6 °C (97.9 °F)] 36.6 °C (97.9 °F)  Pulse:  [72-98] 80  Resp:  [18-20] 18  BP: (105-139)/() 139/89  SpO2:  [89 %-97 %] 89 %    Physical Exam  Vitals and nursing note reviewed.   Constitutional:       Appearance: Normal appearance. She is ill-appearing.   HENT:      Head: Normocephalic and atraumatic.      Right Ear: External ear normal.      Left Ear: External ear normal.      Nose: Nose normal.      Mouth/Throat:      Mouth: Mucous membranes are moist.      Pharynx: Oropharynx is clear. No oropharyngeal exudate or posterior oropharyngeal erythema.   Eyes:      Extraocular Movements: Extraocular movements intact.      Conjunctiva/sclera: Conjunctivae normal.   Cardiovascular:      Rate and Rhythm: Regular rhythm. Tachycardia present.      Pulses: Normal pulses.      Heart sounds: Normal heart sounds. No murmur heard.  Pulmonary:      Effort: Pulmonary effort is normal. Tachypnea present. No respiratory distress.      Breath sounds: Normal breath sounds. No stridor. No wheezing or rales.   Abdominal:      General: Abdomen is flat. Bowel sounds are normal. There is no distension.      Palpations: Abdomen is soft. There is no mass.      Tenderness: There is no abdominal tenderness.   Musculoskeletal:      Cervical back: Normal range of motion.   Skin:     General: Skin is warm.      Capillary Refill: Capillary refill takes less than 2 seconds.   Neurological:      General: No focal deficit present.      Mental Status: Mental status is at baseline. She is lethargic and disoriented.      Cranial Nerves: No cranial nerve deficit.   Psychiatric:         Mood and Affect: Mood is  depressed.         Fluids    Intake/Output Summary (Last 24 hours) at 1/6/2025 0911  Last data filed at 1/6/2025 0632  Gross per 24 hour   Intake --   Output 900 ml   Net -900 ml        Laboratory  Recent Labs     01/04/25 0322 01/05/25 0458 01/06/25 0225   WBC 4.1* 5.6 5.1   RBC 3.63* 3.76* 4.08*   HEMOGLOBIN 12.8 13.0 14.5   HEMATOCRIT 38.8 38.5 41.7   .9* 102.4* 102.2*   MCH 35.3* 34.6* 35.5*   MCHC 33.0 33.8 34.8   RDW 54.3* 48.4 47.9   PLATELETCT 38* 48* 67*   MPV 11.5 10.4 11.3     Recent Labs     01/04/25 0322 01/05/25 0458 01/06/25 0225   SODIUM 141 134* 136   POTASSIUM 4.4 3.6 4.2   CHLORIDE 104 97 99   CO2 27 26 24   GLUCOSE 120* 83 72   BUN 11 7* 10   CREATININE 0.28* 0.38* 0.50   CALCIUM 8.5 8.4* 9.0     Recent Labs     01/04/25  1359 01/05/25 0458   APTT 29.4 27.3   INR 1.19* 1.15*               Imaging  DX-CHEST-PORTABLE (1 VIEW)   Final Result      No acute cardiopulmonary disease.           Assessment/Plan  * Atrial fibrillation (HCC)- (present on admission)  Assessment & Plan  1/6/2025  Uncontrolled  Continue Eliquis  Continue digoxin and metoprolol  IV PRN medications been ordered    Hyperbilirubinemia  Assessment & Plan  1/6/2025  Patient has hyperbilirubinemia with predominance of indirect  The rest of her LFTs are for initially elevated came back normal.  Also found to have thrombocytopenia  LDH is only minimally elevated  No schistocytes on peripheral smear  Coag is slightly elevated but patient is on apixaban  Fibrinogen within normal limits.  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 and bone marrow suppression    Alcohol intoxication in active alcoholic with complication (HCC)- (present on admission)  Assessment & Plan  1/6/2025  Patient will be admitted to the telemetry unit with close cardiac monitoring on [facility]  protocol  Started on rally bag, multivitamin, thiamine and folate  Replete electrolytes  Patient has been counseled on alcohol cessation and will be provided with information for outpatient detox facilities    Acquired hypothyroidism- (present on admission)  Assessment & Plan  1/6/2025  Continue home medications  Check TSH    Thrombocytopenia (HCC)- (present on admission)  Assessment & Plan  1/6/2025  Likely secondary to alcohol    Presence of automatic cardioverter/defibrillator (AICD)- (present on admission)  Assessment & Plan  1/6/2025  Monitor on telemetry         VTE prophylaxis: Hold anticoagulation for thrombocytopenia    I have performed a physical exam and reviewed and updated ROS and Plan today (1/6/2025). In review of yesterday's note (1/5/2025), there are no changes except as documented above.    Greater than 50 minutes spent prepping to see patient (e.g. review of tests) obtaining and/or reviewing separately obtained history. Performing a medically appropriate examination and/ evaluation.  Counseling and educating the patient/family/caregiver.  Ordering medications, tests, or procedures.  Referring and communicating with other health care professionals.  Documenting clinical information in EPIC.  Independently interpreting results and communicating results to patient/family/caregiver.  Care coordination.

## 2025-01-06 NOTE — ASSESSMENT & PLAN NOTE
Patient has hyperbilirubinemia with predominance of indirect  The rest of her LFTs are for initially elevated came back normal.  Also found to have thrombocytopenia  LDH is only minimally elevated  No schistocytes on peripheral smear  Coag is slightly elevated but patient is on apixaban  Fibrinogen within normal limits.  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

## 2025-01-07 LAB
ALBUMIN SERPL BCP-MCNC: 3.5 G/DL (ref 3.2–4.9)
ALBUMIN/GLOB SERPL: 1.3 G/DL
ALP SERPL-CCNC: 87 U/L (ref 30–99)
ALT SERPL-CCNC: 29 U/L (ref 2–50)
AMMONIA PLAS-SCNC: 16 UMOL/L (ref 11–45)
ANION GAP SERPL CALC-SCNC: 14 MMOL/L (ref 7–16)
AST SERPL-CCNC: 27 U/L (ref 12–45)
BASOPHILS # BLD AUTO: 1.1 % (ref 0–1.8)
BASOPHILS # BLD: 0.06 K/UL (ref 0–0.12)
BILIRUB CONJ SERPL-MCNC: 0.6 MG/DL (ref 0.1–0.5)
BILIRUB INDIRECT SERPL-MCNC: 1 MG/DL (ref 0–1)
BILIRUB SERPL-MCNC: 1.6 MG/DL (ref 0.1–1.5)
BUN SERPL-MCNC: 18 MG/DL (ref 8–22)
CALCIUM ALBUM COR SERPL-MCNC: 8.9 MG/DL (ref 8.5–10.5)
CALCIUM SERPL-MCNC: 8.5 MG/DL (ref 8.5–10.5)
CHLORIDE SERPL-SCNC: 101 MMOL/L (ref 96–112)
CO2 SERPL-SCNC: 22 MMOL/L (ref 20–33)
CREAT SERPL-MCNC: 0.59 MG/DL (ref 0.5–1.4)
EOSINOPHIL # BLD AUTO: 0.1 K/UL (ref 0–0.51)
EOSINOPHIL NFR BLD: 1.9 % (ref 0–6.9)
ERYTHROCYTE [DISTWIDTH] IN BLOOD BY AUTOMATED COUNT: 49.8 FL (ref 35.9–50)
GFR SERPLBLD CREATININE-BSD FMLA CKD-EPI: 93 ML/MIN/1.73 M 2
GLOBULIN SER CALC-MCNC: 2.7 G/DL (ref 1.9–3.5)
GLUCOSE SERPL-MCNC: 112 MG/DL (ref 65–99)
HCT VFR BLD AUTO: 42.5 % (ref 37–47)
HGB BLD-MCNC: 14.1 G/DL (ref 12–16)
IMM GRANULOCYTES # BLD AUTO: 0.05 K/UL (ref 0–0.11)
IMM GRANULOCYTES NFR BLD AUTO: 0.9 % (ref 0–0.9)
LYMPHOCYTES # BLD AUTO: 1.41 K/UL (ref 1–4.8)
LYMPHOCYTES NFR BLD: 26.7 % (ref 22–41)
MAGNESIUM SERPL-MCNC: 2 MG/DL (ref 1.5–2.5)
MCH RBC QN AUTO: 35.2 PG (ref 27–33)
MCHC RBC AUTO-ENTMCNC: 33.2 G/DL (ref 32.2–35.5)
MCV RBC AUTO: 106 FL (ref 81.4–97.8)
MONOCYTES # BLD AUTO: 0.86 K/UL (ref 0–0.85)
MONOCYTES NFR BLD AUTO: 16.3 % (ref 0–13.4)
NEUTROPHILS # BLD AUTO: 2.8 K/UL (ref 1.82–7.42)
NEUTROPHILS NFR BLD: 53.1 % (ref 44–72)
NRBC # BLD AUTO: 0 K/UL
NRBC BLD-RTO: 0 /100 WBC (ref 0–0.2)
PHOSPHATE SERPL-MCNC: 3.6 MG/DL (ref 2.5–4.5)
PLATELET # BLD AUTO: 85 K/UL (ref 164–446)
PLATELETS.RETICULATED NFR BLD AUTO: 5.9 % (ref 0.6–13.1)
PMV BLD AUTO: 10.8 FL (ref 9–12.9)
POTASSIUM SERPL-SCNC: 3.8 MMOL/L (ref 3.6–5.5)
PROT SERPL-MCNC: 6.2 G/DL (ref 6–8.2)
RBC # BLD AUTO: 4.01 M/UL (ref 4.2–5.4)
SODIUM SERPL-SCNC: 137 MMOL/L (ref 135–145)
WBC # BLD AUTO: 5.3 K/UL (ref 4.8–10.8)

## 2025-01-07 PROCEDURE — A9270 NON-COVERED ITEM OR SERVICE: HCPCS | Performed by: HOSPITALIST

## 2025-01-07 PROCEDURE — 97167 OT EVAL HIGH COMPLEX 60 MIN: CPT

## 2025-01-07 PROCEDURE — A9270 NON-COVERED ITEM OR SERVICE: HCPCS | Performed by: INTERNAL MEDICINE

## 2025-01-07 PROCEDURE — 700102 HCHG RX REV CODE 250 W/ 637 OVERRIDE(OP)

## 2025-01-07 PROCEDURE — 82248 BILIRUBIN DIRECT: CPT

## 2025-01-07 PROCEDURE — 82140 ASSAY OF AMMONIA: CPT

## 2025-01-07 PROCEDURE — A9270 NON-COVERED ITEM OR SERVICE: HCPCS

## 2025-01-07 PROCEDURE — 36415 COLL VENOUS BLD VENIPUNCTURE: CPT

## 2025-01-07 PROCEDURE — 700105 HCHG RX REV CODE 258

## 2025-01-07 PROCEDURE — 92523 SPEECH SOUND LANG COMPREHEN: CPT

## 2025-01-07 PROCEDURE — 700102 HCHG RX REV CODE 250 W/ 637 OVERRIDE(OP): Performed by: INTERNAL MEDICINE

## 2025-01-07 PROCEDURE — 85025 COMPLETE CBC W/AUTO DIFF WBC: CPT

## 2025-01-07 PROCEDURE — 700102 HCHG RX REV CODE 250 W/ 637 OVERRIDE(OP): Performed by: HOSPITALIST

## 2025-01-07 PROCEDURE — 99233 SBSQ HOSP IP/OBS HIGH 50: CPT | Performed by: INTERNAL MEDICINE

## 2025-01-07 PROCEDURE — 97163 PT EVAL HIGH COMPLEX 45 MIN: CPT

## 2025-01-07 PROCEDURE — 92526 ORAL FUNCTION THERAPY: CPT

## 2025-01-07 PROCEDURE — 85055 RETICULATED PLATELET ASSAY: CPT

## 2025-01-07 PROCEDURE — 84100 ASSAY OF PHOSPHORUS: CPT

## 2025-01-07 PROCEDURE — 80053 COMPREHEN METABOLIC PANEL: CPT

## 2025-01-07 PROCEDURE — 770006 HCHG ROOM/CARE - MED/SURG/GYN SEMI*

## 2025-01-07 PROCEDURE — 83735 ASSAY OF MAGNESIUM: CPT

## 2025-01-07 RX ORDER — SODIUM CHLORIDE 9 MG/ML
500 INJECTION, SOLUTION INTRAVENOUS ONCE
Status: COMPLETED | OUTPATIENT
Start: 2025-01-07 | End: 2025-01-07

## 2025-01-07 RX ORDER — POLYETHYLENE GLYCOL 3350 17 G/17G
1 POWDER, FOR SOLUTION ORAL
Status: DISCONTINUED | OUTPATIENT
Start: 2025-01-07 | End: 2025-01-09 | Stop reason: HOSPADM

## 2025-01-07 RX ORDER — AMOXICILLIN 250 MG
2 CAPSULE ORAL EVERY EVENING
Status: DISCONTINUED | OUTPATIENT
Start: 2025-01-07 | End: 2025-01-09 | Stop reason: HOSPADM

## 2025-01-07 RX ORDER — DIPHENHYDRAMINE HCL 25 MG
25 TABLET ORAL ONCE
Status: COMPLETED | OUTPATIENT
Start: 2025-01-07 | End: 2025-01-07

## 2025-01-07 RX ADMIN — LEVOTHYROXINE SODIUM 50 MCG: 0.05 TABLET ORAL at 05:07

## 2025-01-07 RX ADMIN — AMLODIPINE BESYLATE 2.5 MG: 5 TABLET ORAL at 05:07

## 2025-01-07 RX ADMIN — APIXABAN 2.5 MG: 5 TABLET, FILM COATED ORAL at 17:06

## 2025-01-07 RX ADMIN — DIGOXIN 125 MCG: 0.12 TABLET ORAL at 05:08

## 2025-01-07 RX ADMIN — DIPHENHYDRAMINE HYDROCHLORIDE 25 MG: 25 TABLET ORAL at 05:08

## 2025-01-07 RX ADMIN — MIRTAZAPINE 15 MG: 15 TABLET, FILM COATED ORAL at 22:04

## 2025-01-07 RX ADMIN — FOLIC ACID 1 MG: 1 TABLET ORAL at 05:08

## 2025-01-07 RX ADMIN — METOPROLOL SUCCINATE 75 MG: 25 TABLET, EXTENDED RELEASE ORAL at 05:08

## 2025-01-07 RX ADMIN — ACETAMINOPHEN 650 MG: 325 TABLET ORAL at 05:13

## 2025-01-07 RX ADMIN — SODIUM CHLORIDE 500 ML: 9 INJECTION, SOLUTION INTRAVENOUS at 00:39

## 2025-01-07 RX ADMIN — SENNOSIDES AND DOCUSATE SODIUM 2 TABLET: 50; 8.6 TABLET ORAL at 17:06

## 2025-01-07 RX ADMIN — TRAZODONE HYDROCHLORIDE 50 MG: 50 TABLET ORAL at 22:04

## 2025-01-07 RX ADMIN — POLYETHYLENE GLYCOL 3350 1 PACKET: 17 POWDER, FOR SOLUTION ORAL at 17:07

## 2025-01-07 RX ADMIN — VENLAFAXINE HYDROCHLORIDE 150 MG: 75 CAPSULE, EXTENDED RELEASE ORAL at 05:08

## 2025-01-07 RX ADMIN — ACETAMINOPHEN 650 MG: 325 TABLET ORAL at 12:27

## 2025-01-07 ASSESSMENT — COGNITIVE AND FUNCTIONAL STATUS - GENERAL
PERSONAL GROOMING: A LITTLE
SUGGESTED CMS G CODE MODIFIER MOBILITY: CK
TURNING FROM BACK TO SIDE WHILE IN FLAT BAD: A LITTLE
TOILETING: A LOT
DRESSING REGULAR UPPER BODY CLOTHING: A LOT
DAILY ACTIVITIY SCORE: 14
MOBILITY SCORE: 15
HELP NEEDED FOR BATHING: A LOT
DRESSING REGULAR LOWER BODY CLOTHING: A LOT
MOVING FROM LYING ON BACK TO SITTING ON SIDE OF FLAT BED: A LITTLE
CLIMB 3 TO 5 STEPS WITH RAILING: A LOT
STANDING UP FROM CHAIR USING ARMS: A LOT
WALKING IN HOSPITAL ROOM: A LOT
EATING MEALS: A LITTLE
MOVING TO AND FROM BED TO CHAIR: A LITTLE
SUGGESTED CMS G CODE MODIFIER DAILY ACTIVITY: CK

## 2025-01-07 ASSESSMENT — LIFESTYLE VARIABLES
HEADACHE, FULLNESS IN HEAD: NOT PRESENT
VISUAL DISTURBANCES: NOT PRESENT
VISUAL DISTURBANCES: NOT PRESENT
ORIENTATION AND CLOUDING OF SENSORIUM: DATE DISORIENTATION BY MORE THAN TWO CALENDAR DAYS
ANXIETY: *
ANXIETY: NO ANXIETY (AT EASE)
AUDITORY DISTURBANCES: NOT PRESENT
NAUSEA AND VOMITING: NO NAUSEA AND NO VOMITING
AUDITORY DISTURBANCES: NOT PRESENT
HEADACHE, FULLNESS IN HEAD: NOT PRESENT
TREMOR: NO TREMOR
AGITATION: NORMAL ACTIVITY
AUDITORY DISTURBANCES: NOT PRESENT
PAROXYSMAL SWEATS: NO SWEAT VISIBLE
VISUAL DISTURBANCES: NOT PRESENT
PAROXYSMAL SWEATS: NO SWEAT VISIBLE
PAROXYSMAL SWEATS: NO SWEAT VISIBLE
AGITATION: NORMAL ACTIVITY
TOTAL SCORE: 7
TOTAL SCORE: 3
AGITATION: NORMAL ACTIVITY
HEADACHE, FULLNESS IN HEAD: VERY MILD
ORIENTATION AND CLOUDING OF SENSORIUM: DATE DISORIENTATION BY MORE THAN TWO CALENDAR DAYS
TOTAL SCORE: 4
TREMOR: NO TREMOR
NAUSEA AND VOMITING: NO NAUSEA AND NO VOMITING
NAUSEA AND VOMITING: NO NAUSEA AND NO VOMITING
TREMOR: NO TREMOR
ANXIETY: NO ANXIETY (AT EASE)
ORIENTATION AND CLOUDING OF SENSORIUM: DISORIENTED FOR PLACE AND / OR PERSON

## 2025-01-07 ASSESSMENT — ENCOUNTER SYMPTOMS
DOUBLE VISION: 0
HEADACHES: 0
NAUSEA: 0
SEIZURES: 0
ABDOMINAL PAIN: 0
FALLS: 0
SHORTNESS OF BREATH: 1
PALPITATIONS: 1
BLURRED VISION: 0
FEVER: 0
VOMITING: 0
BACK PAIN: 0
DEPRESSION: 1
SORE THROAT: 0
CHILLS: 0

## 2025-01-07 ASSESSMENT — PAIN DESCRIPTION - PAIN TYPE
TYPE: ACUTE PAIN

## 2025-01-07 ASSESSMENT — FIBROSIS 4 INDEX: FIB4 SCORE: 4.54

## 2025-01-07 ASSESSMENT — GAIT ASSESSMENTS: GAIT LEVEL OF ASSIST: UNABLE TO PARTICIPATE

## 2025-01-07 ASSESSMENT — ACTIVITIES OF DAILY LIVING (ADL): TOILETING: INDEPENDENT

## 2025-01-07 NOTE — HOSPITAL COURSE
Jeanie Hutchison is a 79 y.o. female who presented 1/2/2025 with past medical history of alcohol abuse, atrial fibrillation, history of V. tach, history of AICD who presents to the hospital for palpitations and shortness of breath.  Patient states that she has been drinking alcohol on a daily basis for the past 2 weeks.  She usually drinks vodka.  She presented to hospital tremulous, confused and hallucinating.  She denies any fever, chills, nausea, vomiting, diarrhea, cough.  Patient states that she is not taking her home medications.  Patient is upset that she lost both of her daughters and her  in the past few years.  Currently, she lives with her grandson.  She denies any suicide ideations states that she wants to live for her grandchildren.     Chest x-ray interpreted by me find no acute pulmonary process  EKG interpreted by me found elevation, RVR, left axis deviation

## 2025-01-07 NOTE — THERAPY
Physical Therapy Contact Note    Patient Name: Jeanie Hutchison  Age:  79 y.o., Sex:  female  Medical Record #: [id number]  Today's Date: 1/6/2025  78 y/o female admitted 1/2/2025 with Alcohol Intoxication. Pt endorses heavy drinking over the last 2 weeks due to family loss. Pt upset that she lost both of her daughters and her  in the past few years.  Tremulous, confused and hallucinating. Thromobcytopenia.  History of alcohol abuse, atrial fibrillation, history of V. tach, history of AICD, hypothyroidism.      01/06/25 1043   Initial Contact Note    Initial Contact Note Order Received and Verified, Physical Therapy Evaluation in Progress with Full Report to Follow.   Interdisciplinary Plan of Care Collaboration   Collaboration Comments Chart reviewed. Per OT, RN stated to hold PT/OT today due to pt lethargic and not following commands. WIll monitor EMR and assess pt when appropriate.

## 2025-01-07 NOTE — CARE PLAN
The patient is Stable - Low risk of patient condition declining or worsening    Shift Goals  Clinical Goals: CIWA, Safety, monitor HR and rhythm  Patient Goals: Rest  Family Goals: DANICA    Progress made toward(s) clinical / shift goals:    Problem: Knowledge Deficit - Standard  Goal: Patient and family/care givers will demonstrate understanding of plan of care, disease process/condition, diagnostic tests and medications  Outcome: Not Met     Problem: Optimal Care for Alcohol Withdrawal  Goal: Optimal Care for the alcohol withdrawal patient  Outcome: Progressing     Problem: Skin Integrity  Goal: Skin integrity is maintained or improved  Outcome: Progressing     Problem: Pain - Standard  Goal: Alleviation of pain or a reduction in pain to the patient’s comfort goal  Outcome: Progressing       Patient is not progressing towards the following goals:      Problem: Knowledge Deficit - Standard  Goal: Patient and family/care givers will demonstrate understanding of plan of care, disease process/condition, diagnostic tests and medications  Outcome: Not Met

## 2025-01-07 NOTE — DISCHARGE PLANNING
Case Management Discharge Planning    Admission Date: 1/2/2025  GMLOS: 2.3  ALOS: 5    6-Clicks ADL Score: 18  6-Clicks Mobility Score: 16  PT and/or OT Eval ordered: Yes  Post-acute Referrals Ordered: Pending PT/OT recs  Post-acute Choice Obtained: No  Has referral(s) been sent to post-acute provider:  Pending PT/OT recs      Anticipated Discharge Dispo: Discharge Disposition: D/T to SNF with Medicare cert in anticipation of skilled care (03)    DME Needed: No    Action(s) Taken: Updated Provider/Nurse on Discharge Plan and DC Assessment Complete (See below)    0930, Pt was visited at room to obtain assessment information and discuss discharge planning. Pt was provided Alcohol treatment program resources.    1426, ALVIN Kamara informed to send local SNF referrals.    [facility] # [id number]    Escalations Completed: None    Medically Clear: No    Next Steps: CM will continue to assist Pt with discharge needs.      Barriers to Discharge: Medical clearance and Pending Placement    Is the patient up for discharge tomorrow: No      Care Transition Team Assessment  RN CM met with Pt at bedside to obtain assessment informations. Demographics verified. RN CM introduced self and purpose of visit.   Pt lives with adult grandson in a 2 story house with 1 step to main entrance.  Pt has son and grandson for support.  Pt has  Dr. Jeanie Brewer for PCP  Pt was independent with ADLs prior to hospitalization.        Information Source  Orientation Level: Oriented X4  Information Given By: Patient    Elopement Risk  Legal Hold: No  Ambulatory or Self Mobile in Wheelchair: No-Not an Elopement Risk  Disoriented: No  Psychiatric Symptoms: None  History of Wandering: No  Elopement this Admit: No  Vocalizing Wanting to Leave: No  Displays Behaviors, Body Language Wanting to Leave: No-Not at Risk for Elopement  Elopement Risk: Not at Risk for Elopement    Interdisciplinary Discharge Planning  Primary Care Physician: Dr. Jeanie Brewer  Lives with  - Patient's Self Care Capacity: Related Adult (Adult grandson)  Patient or legal guardian wants to designate a caregiver: No  Support Systems: Children, Family Member(s)  Housing / Facility: 2 Story House (with 1 step to main entrance)    Discharge Preparedness  What is your plan after discharge?: Skilled nursing facility  What are your discharge supports?: Child  Prior Functional Level: Ambulatory, Independent with Activities of Daily Living, Independent with Medication Management    Functional Assesment  Prior Functional Level: Ambulatory, Independent with Activities of Daily Living, Independent with Medication Management    Finances  Financial Barriers to Discharge: No  Prescription Coverage: Yes    Vision / Hearing Impairment  Vision Impairment : Yes  Right Eye Vision: Impaired, Wears Glasses  Left Eye Vision: Impaired, Wears Glasses  Hearing Impairment : No    Domestic Abuse  Have you ever been the victim of abuse or violence?: No  Possible Abuse/Neglect Reported to:: Not Applicable    Psychological Assessment  History of Substance Abuse: Alcohol  History of Psychiatric Problems: Yes (Pt said Depression.)    Anticipated Discharge Information  Discharge Disposition: D/T to SNF with Medicare cert in anticipation of skilled care (03)

## 2025-01-07 NOTE — PROGRESS NOTES
Monitor summary:        Rhythm: paced, afib  Rate:   Ectopy: (r)PVC, coup  Measurements: .17/.12/.51        12hr chart check

## 2025-01-07 NOTE — THERAPY
"Occupational Therapy   Initial Evaluation     Patient Name: Jeanie Hutchison  Age:  79 y.o., Sex:  female  Medical Record #: [id number]  Today's Date: 1/7/2025     Precautions  Precautions: Fall Risk    Assessment  Patient is a 79 y.o. female admitted 1/2 with palpitations and shortness of breath. Pt reported heavy drinking over the last two weeks due to family loss. Found to have afib, was confused and hallucinating. Additional factors influencing patient status / progress: PMHx includes alcohol abuse, atrial fibrillation, history of V. tach, history of AICD.      During OT eval, pt presented with impaired strength, activity tolerance, balance, coordination, cognition and mobility affecting pt's ADL function. Pt needed max-totalA for LBD and toileting. Pt maintained sitting balance EOB with SBA for safety but was impulsive and attempted to transfer herself to the chair multiple times but then also lay herself back down in bed multiple times. Attempted twice to get a BP read with pt sitting EOB after she reported feeling dizzy however was unable to get read as pt was fidgety and then actively resisting when having her arm held at heart level. Pt then laid herself back fully in bed and BP was stable in supine. Pt had poor direction following and attention throughout. Recommend post-acute placement.     Plan    Occupational Therapy Initial Treatment Plan   Treatment Interventions: Self Care / Activities of Daily Living, Adaptive Equipment, Neuro Re-Education / Balance, Therapeutic Exercises, Therapeutic Activity  Treatment Frequency: 3 Times per Week  Duration: Until Therapy Goals Met    DC Equipment Recommendations: Unable to determine at this time  Discharge Recommendations: Recommend post-acute placement for additional occupational therapy services prior to discharge home     Subjective    \"I don't want to get up.\"      Objective     01/07/25 1005   Initial Contact Note    Initial Contact Note Order Received and " Verified, Occupational Therapy Evaluation in Progress with Full Report to Follow.   Prior Living Situation   Prior Services Unable To Determine At This Time   Housing / Facility 2 Story House  (reports her bathroom and bedroom are upstairs)   Steps Into Home 1   Bathroom Set up Walk In Shower   Equipment Owned Unable to Determine At This Time   Lives with - Patient's Self Care Capacity Related Adult   Comments Pt is a poor historian, reports she lives with her nephew though chart indicates she lives with her grandson. Reports her bedroom and bathroom are on the second floor   Prior Level of ADL Function   Self Feeding Independent   Grooming / Hygiene Independent   Bathing Independent   Dressing Independent   Toileting Independent   Comments Pt is a poor historian but reports she was independent prior   Prior Level of IADL Function   Medication Management Unable To Determine At This Time   Laundry Unable To Determine At This Time   Kitchen Mobility Independent   Finances Unable To Determine At This Time   Home Management Unable To Determine At This Time   Shopping Unable To Determine At This Time   Prior Level Of Mobility Independent Without Device in Home   Driving / Transportation Unable To Determine At This Time   Precautions   Precautions Fall Risk   Vitals   Vitals Comments Attempted twice to get BP with pt sitting however pt was very fidgety and then resisting with her arm being held at heart level. BP WNL once supine   Pain   Intervention Declines   Cognition    Cognition / Consciousness X   Speech/ Communication Delayed Responses   Orientation Level Not Oriented to Year;Not Oriented to Month;Not Oriented to Day;Not Oriented to Time;Not Oriented to Place;Not Oriented to Reason   Level of Consciousness Alert   Ability To Follow Commands Unable to Follow 1 Step Commands   Safety Awareness Impaired;Impulsive   New Learning Impaired   Attention Impaired   Sequencing Impaired   Initiation Impaired   Comments Poor  direction following, impulsive with pt attempting to transfer herself to the chair twice and also suddenly return herself back to bed, bed seeking, poor safety awareness, limited historian   Active ROM Upper Body   Active ROM Upper Body  X   Gross Active ROM Gross Active Range of Motion Impaired, but Appears Adequate for Functional Activities.   Strength Upper Body   Upper Body Strength  X   Gross Strength Generalized Weakness, Equal Bilaterally.    Sensation Upper Body   Upper Extremity Sensation  Not Tested   Upper Body Muscle Tone   Upper Body Muscle Tone  WDL   Coordination Upper Body   Coordination X   Comments Decreased bilaterally, fidgety frequently without intention   Balance Assessment   Sitting Balance (Static) Fair -   Sitting Balance (Dynamic) Poor +   Weight Shift Sitting Poor   Comments EOB, bed seeking and laid herself back down multiple times before able to attempt a stand   Bed Mobility    Supine to Sit Minimal Assist   Sit to Supine Minimal Assist   Scooting Minimal Assist   Comments HOB elevated   ADL Assessment   Lower Body Dressing Maximal Assist   Toileting Total Assist   How much help from another person does the patient currently need...   Putting on and taking off regular lower body clothing? 2   Bathing (including washing, rinsing, and drying)? 2   Toileting, which includes using a toilet, bedpan, or urinal? 2   Putting on and taking off regular upper body clothing? 2   Taking care of personal grooming such as brushing teeth? 3   Eating meals? 3   6 Clicks Daily Activity Score 14   Functional Mobility   Sit to Stand Unable to Participate   Mobility EOB only, pt laid herself back down and could not follow directions   Activity Tolerance   Sitting Edge of Bed 8 min   Short Term Goals   Short Term Goal # 1 Pt will transfer to the toilet with SBA   Short Term Goal # 2 Pt will perform standing g/h with SBA   Short Term Goal # 3 Pt will perform LBD with SBA   Short Term Goal # 4 Pt will perform  toileting with SBA   Education Group   Role of Occupational Therapist Patient Response Patient;Acceptance;Explanation;No Learning Evidence   Occupational Therapy Initial Treatment Plan    Treatment Interventions Self Care / Activities of Daily Living;Adaptive Equipment;Neuro Re-Education / Balance;Therapeutic Exercises;Therapeutic Activity   Treatment Frequency 3 Times per Week   Duration Until Therapy Goals Met   Problem List   Problem List Decreased Active Daily Living Skills;Decreased Homemaking Skills;Decreased Upper Extremity Strength Right;Decreased Upper Extremity Strength Left;Decreased Upper Extremity AROM Right;Decreased Upper Extremity AROM Left;Decreased Functional Mobility;Decreased Activity Tolerance;Safety Awareness Deficits / Cognition;Impaired Cognitive Function;Impaired Postural Control / Balance   Anticipated Discharge Equipment and Recommendations   DC Equipment Recommendations Unable to determine at this time   Discharge Recommendations Recommend post-acute placement for additional occupational therapy services prior to discharge home   Interdisciplinary Plan of Care Collaboration   IDT Collaboration with  Nursing;Physical Therapist   Patient Position at End of Therapy In Bed;Bed Alarm On;Call Light within Reach;Tray Table within Reach   Collaboration Comments RN updated   Session Information   Date / Session Number  1/7 #1 (1/3, 1/13)     Pt seen for OT eval in coordination with PT due to the need for two skilled therapists due to profound cognitive deficits and limited mobility.

## 2025-01-07 NOTE — PROGRESS NOTES
Hospital Medicine Daily Progress Note    Date of Service  1/7/2025    Chief Complaint  Jeanie Hutchison is a 79 y.o. female admitted 1/2/2025 with   Chief Complaint   Patient presents with    Alcohol Intoxication     Pt endorses heavy drinking over the last 2 weeks due to family loss. Last drink yesterday. Pt states she has felt more confused over the last 3 days.         Hospital Course  Jeanie Hutchison is a 79 y.o. female who presented 1/2/2025 with past medical history of alcohol abuse, atrial fibrillation, history of V. tach, history of AICD who presents to the hospital for palpitations and shortness of breath.  Patient states that she has been drinking alcohol on a daily basis for the past 2 weeks.  She usually drinks vodka.  She presented to hospital tremulous, confused and hallucinating.  She denies any fever, chills, nausea, vomiting, diarrhea, cough.  Patient states that she is not taking her home medications.  Patient is upset that she lost both of her daughters and her  in the past few years.  Currently, she lives with her grandson.  She denies any suicide ideations states that she wants to live for her grandchildren.     Chest x-ray interpreted by me find no acute pulmonary process  EKG interpreted by me found elevation, RVR, left axis deviation        Interval Problem Update  Patient was seen and examined at bedside.  No acute events overnight. Patient is resting comfortably in bed and in no acute distress.     CIWA  Requiring IV fluid bolus overnight - has had poor PO intake last 24 hours    I have discussed this patient's plan of care and discharge plan at IDT rounds today with Case Management, Nursing, Nursing leadership, and other members of the IDT team.    Consultants/Specialty  None    Code Status  Full Code    Disposition  The patient is not medically cleared for discharge to home or a post-acute facility.      I have placed the appropriate orders for post-discharge  needs.    Review of Systems  Review of Systems   Constitutional:  Negative for chills and fever.   HENT:  Negative for congestion and sore throat.    Eyes:  Negative for blurred vision and double vision.   Respiratory:  Positive for shortness of breath.    Cardiovascular:  Positive for palpitations. Negative for chest pain.   Gastrointestinal:  Negative for abdominal pain, nausea and vomiting.   Genitourinary:  Negative for dysuria and frequency.   Musculoskeletal:  Negative for back pain and falls.   Neurological:  Negative for seizures and headaches.   Psychiatric/Behavioral:  Positive for depression. Negative for suicidal ideas.         Physical Exam  Temp:  [35.9 °C (96.6 °F)-36.4 °C (97.5 °F)] 35.9 °C (96.6 °F)  Pulse:  [73-95] 73  Resp:  [18-24] 20  BP: ()/(54-93) 105/75  SpO2:  [90 %-97 %] 90 %    Physical Exam  Vitals and nursing note reviewed.   Constitutional:       General: She is not in acute distress.     Appearance: Normal appearance. She is ill-appearing.   HENT:      Head: Normocephalic and atraumatic.      Right Ear: External ear normal.      Left Ear: External ear normal.      Nose: No congestion.      Mouth/Throat:      Mouth: Mucous membranes are moist.   Eyes:      General: No scleral icterus.     Extraocular Movements: Extraocular movements intact.      Conjunctiva/sclera: Conjunctivae normal.   Cardiovascular:      Rate and Rhythm: Regular rhythm. Tachycardia present.      Pulses: Normal pulses.      Heart sounds: Normal heart sounds. No murmur heard.  Pulmonary:      Effort: Pulmonary effort is normal. Tachypnea present.      Breath sounds: Normal breath sounds. No wheezing.   Abdominal:      General: Abdomen is flat. Bowel sounds are normal.      Palpations: Abdomen is soft.      Tenderness: There is no abdominal tenderness. There is no guarding or rebound.   Musculoskeletal:      Cervical back: Normal range of motion.   Skin:     General: Skin is warm.      Capillary Refill: Capillary  refill takes less than 2 seconds.   Neurological:      General: No focal deficit present.      Mental Status: She is alert. She is disoriented.      Cranial Nerves: No cranial nerve deficit.      Motor: No weakness.   Psychiatric:         Mood and Affect: Mood is depressed.         Fluids    Intake/Output Summary (Last 24 hours) at 1/7/2025 1438  Last data filed at 1/7/2025 0830  Gross per 24 hour   Intake 0 ml   Output 0 ml   Net 0 ml        Laboratory  Recent Labs     01/05/25 0458 01/06/25 0225 01/07/25  0055   WBC 5.6 5.1 5.3   RBC 3.76* 4.08* 4.01*   HEMOGLOBIN 13.0 14.5 14.1   HEMATOCRIT 38.5 41.7 42.5   .4* 102.2* 106.0*   MCH 34.6* 35.5* 35.2*   MCHC 33.8 34.8 33.2   RDW 48.4 47.9 49.8   PLATELETCT 48* 67* 85*   MPV 10.4 11.3 10.8     Recent Labs     01/05/25 0458 01/06/25 0225 01/07/25 0055   SODIUM 134* 136 137   POTASSIUM 3.6 4.2 3.8   CHLORIDE 97 99 101   CO2 26 24 22   GLUCOSE 83 72 112*   BUN 7* 10 18   CREATININE 0.38* 0.50 0.59   CALCIUM 8.4* 9.0 8.5     Recent Labs     01/05/25 0458   APTT 27.3   INR 1.15*               Imaging  DX-CHEST-PORTABLE (1 VIEW)   Final Result      No acute cardiopulmonary disease.           Assessment/Plan  * Atrial fibrillation (HCC)- (present on admission)  Assessment & Plan    Uncontrolled  Continue Eliquis  Continue digoxin and metoprolol  IV PRN medications been ordered    Hyperbilirubinemia  Assessment & Plan    Patient has hyperbilirubinemia with predominance of indirect  The rest of her LFTs are for initially elevated came back normal.  Also found to have thrombocytopenia  LDH is only minimally elevated  No schistocytes on peripheral smear  Coag is slightly elevated but patient is on apixaban  Fibrinogen within normal limits.  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 and bone marrow  suppression    Alcohol intoxication in active alcoholic with complication (HCC)- (present on admission)  Assessment & Plan    Patient will be admitted to the telemetry unit with close cardiac monitoring on CIWA protocol  Started on rally bag, multivitamin, thiamine and folate  Replete electrolytes  Patient has been counseled on alcohol cessation and will be provided with information for outpatient detox facilities    Acquired hypothyroidism- (present on admission)  Assessment & Plan    Continue home medications  Check TSH    Thrombocytopenia (HCC)- (present on admission)  Assessment & Plan    Likely secondary to alcohol    Presence of automatic cardioverter/defibrillator (AICD)- (present on admission)  Assessment & Plan    Monitor on telemetry         Eliquis 2.5mg  VTE prophylaxis: Hold anticoagulation for thrombocytopenia    I have performed a physical exam and reviewed and updated ROS and Plan today (1/7/2025). In review of yesterday's note (1/6/2025), there are no changes except as documented above.    Greater than 51 minutes spent prepping to see patient (e.g. review of tests) obtaining and/or reviewing separately obtained history. Performing a medically appropriate examination and/ evaluation.  Counseling and educating the patient/family/caregiver.  Ordering medications, tests, or procedures.  Referring and communicating with other health care professionals.  Documenting clinical information in EPIC.  Independently interpreting results and communicating results to patient/family/caregiver.  Care coordination.

## 2025-01-07 NOTE — PROGRESS NOTES
Bedside report received, pt care assumed, tele box on. Pt resting with eyes closed, breathing unlabored. Bed in lowest position, bed alarm on, call light within reach. Tele sitter at bedside

## 2025-01-07 NOTE — PROGRESS NOTES
Bedside report received, assumed care of patient at change of shift. Chart, labs, and orders reviewed. Pt resting in bed, breathing even and unlabored, denies pain and in no acute distress. A&O x3 disoriented to time. Tele monitoring in place. Fall precautions including bed alarm in place and education provided. Call light within reach, bed locked and in lowest position, denies other needs at this time.

## 2025-01-07 NOTE — THERAPY
Occupational Therapy Contact Note    Patient Name: Jeanie Hutchison  Age:  79 y.o., Sex:  female  Medical Record #: [id number]  Today's Date: 1/6/2025    OT order received. Attempted to see pt in AM and PM and both times RN advised holding due to pt being lethargic and unable to follow commands. Will re-attempt another time as able/appropriate.    Vinita Andrade, OTR/L, CNS

## 2025-01-07 NOTE — THERAPY
Speech Language Pathology   Cognitive Evaluation     Patient Name: Jeanie Hutchison  AGE:  79 y.o., SEX:  female  Medical Record #: [id number]  Date of Service: 2025      History of Present Illness  Jeanie Hutchison is a 79 y.o. female who presented 2025 with past medical history of alcohol abuse, atrial fibrillation, history of V. tach, history of AICD who presents to the hospital for palpitations and shortness of breath.     CXR 25: No acute cardiopulmonary disease.    Seen for cog 21: At this time, recommend patient have 24/7 supervision upon discharge to ensure safety and sound decision-making. SLP to follow and will attempt to complete further testing as patient is able and agreeable.     CXE 2020: Dysphagia III, Nectar Thick Liquid      General Information  Vitals  O2 (LPM): 1  O2 Delivery Device: Silicone Nasal Cannula  Level of Consciousness: Drowsy, Lethargic  Patient Behaviors: Confused, Forgetful  Orientation: Self, , General place, Current month, Current year  Follows Directives: Yes - simple commands only      Prior Living Situation & Level of Function  Prior Services: Unable To Determine At This Time  Housing / Facility: [address] [place] [address]  Lives with - Patient's Self Care Capacity: Related Adult  Comments: Pt is a poor historian, reports she lives with her nephew though chart indicates she lives with her grandson. Reports her bedroom and bathroom are on the second floor  Communication: unknown, reports she was completing IADLs including driving independently, unclear if patient is reliable historian  Swallowing: unknown, reports regular diet at baseline, unclear if patient is reliable historian       Oral Mechanism Evaluation  Dentition: Good, Natural dentition  Facial Symmetry: Equal  Facial Sensation: Equal  Labial Observations: WFL  Lingual Observations: Midline  Motor Speech: imprecise articulation and use of non-words intermittently, ability fluctuated during session, AMS  confounds assessment      Laryngeal Function Exam  Secretion Management: Adequate  Voice Quality: WFL  Cough: Perceptually WNL      Subjective  patient alerts to voice and participates in tasks, delayed processing speed and easily distracted during session      Communication Domain(s)  Expressive Language: Mild  Receptive Language: Mild  Cognitive-Linguistic: Mild      Assessment  The patient was seen this date for a cognitive communication evaluation. Portions of the COGNISTAT (Neurobehavioral Cognitive Status Assessment) were administered in conjunction with non-standardized assessments. Results are outlined below:        Orientation: mild impairment   -Answers 2/2 questions accurately regarding person.  -Answers 2/2 questions accurately regarding place.  -Answers 2/5 questions accurately regarding time.    Attention: screen passed, average   --Repeats 6 digits forward in 1/1 trials    Comprehension: Mild impairment, extra processing time required  -Follows 1 step commands accurately in 3/3 trials  -Follows 2 step commands accurately in 1/2 trials  -Follows 3 step commands accurately in 0/1 trials    Repetition: mild impairment      Naming: Average, extra time required   -Names 8/8 pictures accurately    Memory: mild impairment   -Immediate recall: 5 trials to recall 4 unrelated words  -Delayed recall: after 5 minutes, patient recalled 2/4 unrelated words without cues, 3/4 with category cue    Calculations: mild impairment   -Completes 2/2 addition calculations accurately  -Completes 0/1 subtraction calculations accurately  -Completes 0/1 multiplication calculations accurately  -Completes 1/1 division calculations accurately    Similarities: Average, screen passed     Judgement: Mild impairment   -partial accuracy on verbal reasoning/judgement tasks in 3/3 opportunities, answers are incomplete, significant extra processing time for responses required      Clinical Impressions  Patient presents with mild cognitive  "communication impairment based on performance on Cognistat  this date. Impairment is characterized by deficits in memory, problem solving, judgement/safety awareness, expressive/receptive language, and orientation. Significant delay in processing speed and flat affect were also noted. Based on performance today, patient would benefit from discharge to an environment that can provide at least intermittent supervision and assist for all IADLs. Trial of SLP intervention at next level of care warranted to assess benefit and response to treatment should deficits persist after patient is medically optimized.       NOTE: It is not within the scope of practice of Speech-Language Pathologists to determine patient capacity. Please defer to the physician or psych to complete this assessment.       Recommendations  Supervision Needs Upons Discharge: Intermittent supervision throughout the day and direct assistance with IADLs (see below)  IADLs: Financial management, Medication management, Appointment management, Household chores, Cooking         SLP Treatment Plan  Treatment Plan: Dysphagia Treatment, Cognitive Treatment  SLP Frequency: 3x Per Week  Estimated Duration: Until Therapy Goals Met      Anticipated Discharge Needs  Discharge Recommendations: Recommend post-acute placement for additional speech therapy services prior to discharge home  Therapy Recommendations Upon DC: Comprehension Training, Cognitive-Linguistic Training      Patient / Family Goals  Patient / Family Goal #1: \"yeah, I'll have some food\" (when offered capps\"  Goal #1 Outcome: Progressing as expected  Short Term Goal # 1: Patient will consume a SB/TN diet with no overt s/sx of aspiration given 1:1 feeding.  Goal Outcome # 1: Progressing as expected  Short Term Goal # 2: Patient will independently use internal and external memory aids to improve recacll of functional information to at least 90% accuracy  Short Term Goal # 3: Patient will follow 3 part " commands accuracy with no cues given min extra time in 8/10 opportunities      Lilia Álvarez, SLP

## 2025-01-07 NOTE — DISCHARGE PLANNING
1426  DPA sent Julio César/felicia [facility] referrals @[phone], requested by NEENA Mathews.

## 2025-01-07 NOTE — CARE PLAN
The patient is Watcher - Medium risk of patient condition declining or worsening    Shift Goals  Clinical Goals: CIWA, monitor neuro status, monitor HR/rhythm, safety  Patient Goals: rest  Family Goals: pari    Progress made toward(s) clinical / shift goals:    Problem: Optimal Care for Alcohol Withdrawal  Goal: Optimal Care for the alcohol withdrawal patient  Outcome: Progressing     Problem: Seizure Precautions  Goal: Implementation of seizure precautions  Outcome: Progressing     Problem: Psychosocial  Goal: Patient's level of anxiety will decrease  Outcome: Progressing     Problem: Risk for Aspiration  Goal: Patient's risk for aspiration will be absent or decrease  Outcome: Progressing     Problem: Skin Integrity  Goal: Skin integrity is maintained or improved  Outcome: Progressing     Problem: Fall Risk  Goal: Patient will remain free from falls  Outcome: Progressing     Problem: Pain - Standard  Goal: Alleviation of pain or a reduction in pain to the patient’s comfort goal  Outcome: Progressing       Patient is not progressing towards the following goals:      Problem: Knowledge Deficit - Standard  Goal: Patient and family/care givers will demonstrate understanding of plan of care, disease process/condition, diagnostic tests and medications  Outcome: Not Progressing     Problem: Lifestyle Changes  Goal: Patient's ability to identify lifestyle changes and available resources to help reduce recurrence of condition will improve  Outcome: Not Progressing     Problem: Neuro Status  Goal: Neuro status will remain stable or improve  Outcome: Not Progressing

## 2025-01-07 NOTE — THERAPY
"Speech Language Pathology   Daily Treatment     Patient Name: Jeanie Hutchison  AGE:  79 y.o., SEX:  female  Medical Record #: [id number]  Date of Service: 1/7/2025      Precautions:  Precautions: Fall Risk, Swallow Precautions       Subjective  Patient confused, slow processing      Assessment  Trials Administered: regular solids (dry cracker), thin liquids by straw    Oral Phase: mildly prolonged, easily distracted and requires cues to continue eating/chewing cracker    Pharyngeal Phase: no signs of airway invasion, laryngeal elevation present        Clinical Impressions  Patient presents with elevated risk for aspiration due to AMS. Tolerance of modified diet textures demonstrated. Continue to recommend modified solids (soft/bite size) and supervision/assist as needed during meals due to AMS. Anticipate improvements in PO tolerance and readiness for diet advancement with continues improvements in mental status.      Recommendations  Treatment Completed: Dysphagia Treatment       Dysphagia Treatment  Diet Consistency: Soft & bite size and Thin Liquids  Instrumentation: Instrumental swallow study pending clinical progress  Medication: Whole with liquid  Supervision: 1:1 feeding with constant supervision  Positioning: Fully upright and midline during oral intake  Risk Management : Small bites/sips, Alternate bites and sips, Slow rate of intake  Oral Care: Q4h       SLP Treatment Plan  Treatment Plan: Dysphagia Treatment  SLP Frequency: 3x Per Week  Estimated Duration: Until Therapy Goals Met      Anticipated Discharge Needs  Discharge Recommendations: Anticipate that the patient will have no further speech therapy needs after discharge from the hospital  Therapy Recommendations Upon DC: Dysphagia Training      Patient / Family Goals  Patient / Family Goal #1: \"yeah, I'll have some food\" (when offered capps\"  Short Term Goals  Short Term Goal # 1: Patient will consume a SB/TN diet with no overt s/sx of aspiration " given 1:1 feeding.  Goal Outcome # 1: Progressing as expected      Lilia Álvarez, SLP

## 2025-01-08 LAB
ALBUMIN SERPL BCP-MCNC: 3.5 G/DL (ref 3.2–4.9)
BASOPHILS # BLD AUTO: 1.2 % (ref 0–1.8)
BASOPHILS # BLD: 0.05 K/UL (ref 0–0.12)
BUN SERPL-MCNC: 19 MG/DL (ref 8–22)
CALCIUM ALBUM COR SERPL-MCNC: 10.4 MG/DL (ref 8.5–10.5)
CALCIUM SERPL-MCNC: 10 MG/DL (ref 8.5–10.5)
CHLORIDE SERPL-SCNC: 105 MMOL/L (ref 96–112)
CO2 SERPL-SCNC: 24 MMOL/L (ref 20–33)
CREAT SERPL-MCNC: 0.69 MG/DL (ref 0.5–1.4)
EOSINOPHIL # BLD AUTO: 0.09 K/UL (ref 0–0.51)
EOSINOPHIL NFR BLD: 2.1 % (ref 0–6.9)
ERYTHROCYTE [DISTWIDTH] IN BLOOD BY AUTOMATED COUNT: 48.6 FL (ref 35.9–50)
GFR SERPLBLD CREATININE-BSD FMLA CKD-EPI: 89 ML/MIN/1.73 M 2
GLUCOSE SERPL-MCNC: 117 MG/DL (ref 65–99)
HCT VFR BLD AUTO: 43.3 % (ref 37–47)
HGB BLD-MCNC: 14.5 G/DL (ref 12–16)
IMM GRANULOCYTES # BLD AUTO: 0.04 K/UL (ref 0–0.11)
IMM GRANULOCYTES NFR BLD AUTO: 0.9 % (ref 0–0.9)
LYMPHOCYTES # BLD AUTO: 1.53 K/UL (ref 1–4.8)
LYMPHOCYTES NFR BLD: 35.8 % (ref 22–41)
MAGNESIUM SERPL-MCNC: 2 MG/DL (ref 1.5–2.5)
MCH RBC QN AUTO: 34.1 PG (ref 27–33)
MCHC RBC AUTO-ENTMCNC: 33.5 G/DL (ref 32.2–35.5)
MCV RBC AUTO: 101.9 FL (ref 81.4–97.8)
MONOCYTES # BLD AUTO: 0.91 K/UL (ref 0–0.85)
MONOCYTES NFR BLD AUTO: 21.3 % (ref 0–13.4)
NEUTROPHILS # BLD AUTO: 1.65 K/UL (ref 1.82–7.42)
NEUTROPHILS NFR BLD: 38.7 % (ref 44–72)
NRBC # BLD AUTO: 0 K/UL
NRBC BLD-RTO: 0 /100 WBC (ref 0–0.2)
PHOSPHATE SERPL-MCNC: 4.3 MG/DL (ref 2.5–4.5)
PLATELET # BLD AUTO: 97 K/UL (ref 164–446)
PLATELETS.RETICULATED NFR BLD AUTO: 6.3 % (ref 0.6–13.1)
PMV BLD AUTO: 10.4 FL (ref 9–12.9)
POTASSIUM SERPL-SCNC: 3.9 MMOL/L (ref 3.6–5.5)
RBC # BLD AUTO: 4.25 M/UL (ref 4.2–5.4)
SODIUM SERPL-SCNC: 139 MMOL/L (ref 135–145)
WBC # BLD AUTO: 4.3 K/UL (ref 4.8–10.8)

## 2025-01-08 PROCEDURE — 700102 HCHG RX REV CODE 250 W/ 637 OVERRIDE(OP): Performed by: HOSPITALIST

## 2025-01-08 PROCEDURE — 85025 COMPLETE CBC W/AUTO DIFF WBC: CPT

## 2025-01-08 PROCEDURE — 36415 COLL VENOUS BLD VENIPUNCTURE: CPT

## 2025-01-08 PROCEDURE — 99233 SBSQ HOSP IP/OBS HIGH 50: CPT | Performed by: INTERNAL MEDICINE

## 2025-01-08 PROCEDURE — 85055 RETICULATED PLATELET ASSAY: CPT

## 2025-01-08 PROCEDURE — 80069 RENAL FUNCTION PANEL: CPT

## 2025-01-08 PROCEDURE — 700102 HCHG RX REV CODE 250 W/ 637 OVERRIDE(OP): Performed by: INTERNAL MEDICINE

## 2025-01-08 PROCEDURE — A9270 NON-COVERED ITEM OR SERVICE: HCPCS | Performed by: HOSPITALIST

## 2025-01-08 PROCEDURE — 83735 ASSAY OF MAGNESIUM: CPT

## 2025-01-08 PROCEDURE — A9270 NON-COVERED ITEM OR SERVICE: HCPCS | Performed by: INTERNAL MEDICINE

## 2025-01-08 PROCEDURE — 770006 HCHG ROOM/CARE - MED/SURG/GYN SEMI*

## 2025-01-08 RX ORDER — GAUZE BANDAGE 2" X 2"
100 BANDAGE TOPICAL DAILY
Status: DISCONTINUED | OUTPATIENT
Start: 2025-01-08 | End: 2025-01-09 | Stop reason: HOSPADM

## 2025-01-08 RX ADMIN — THERA TABS 1 TABLET: TAB at 13:30

## 2025-01-08 RX ADMIN — DIGOXIN 125 MCG: 0.12 TABLET ORAL at 06:11

## 2025-01-08 RX ADMIN — SENNOSIDES AND DOCUSATE SODIUM 2 TABLET: 50; 8.6 TABLET ORAL at 17:44

## 2025-01-08 RX ADMIN — Medication 100 MG: at 13:30

## 2025-01-08 RX ADMIN — MIRTAZAPINE 15 MG: 15 TABLET, FILM COATED ORAL at 20:34

## 2025-01-08 RX ADMIN — ACETAMINOPHEN 650 MG: 325 TABLET ORAL at 07:47

## 2025-01-08 RX ADMIN — TRAZODONE HYDROCHLORIDE 50 MG: 50 TABLET ORAL at 20:34

## 2025-01-08 RX ADMIN — LEVOTHYROXINE SODIUM 50 MCG: 0.05 TABLET ORAL at 06:11

## 2025-01-08 RX ADMIN — AMLODIPINE BESYLATE 2.5 MG: 5 TABLET ORAL at 06:12

## 2025-01-08 RX ADMIN — APIXABAN 2.5 MG: 5 TABLET, FILM COATED ORAL at 17:45

## 2025-01-08 RX ADMIN — METOPROLOL SUCCINATE 75 MG: 25 TABLET, EXTENDED RELEASE ORAL at 06:11

## 2025-01-08 RX ADMIN — VENLAFAXINE HYDROCHLORIDE 150 MG: 75 CAPSULE, EXTENDED RELEASE ORAL at 06:12

## 2025-01-08 RX ADMIN — FOLIC ACID 1 MG: 1 TABLET ORAL at 06:11

## 2025-01-08 RX ADMIN — APIXABAN 2.5 MG: 5 TABLET, FILM COATED ORAL at 06:11

## 2025-01-08 ASSESSMENT — ENCOUNTER SYMPTOMS
FALLS: 0
SHORTNESS OF BREATH: 1
NAUSEA: 0
FEVER: 0
PALPITATIONS: 1
ABDOMINAL PAIN: 0
BLURRED VISION: 0
SORE THROAT: 0
DEPRESSION: 1
CHILLS: 0
BACK PAIN: 0
HEADACHES: 0
SEIZURES: 0
DOUBLE VISION: 0
VOMITING: 0

## 2025-01-08 ASSESSMENT — PAIN DESCRIPTION - PAIN TYPE
TYPE: ACUTE PAIN

## 2025-01-08 NOTE — PROGRESS NOTES
4 Eyes Skin Assessment Completed by SNEHA leon and SNEHA Martinez.    Head WDL  Ears WDL  Nose WDL  Mouth WDL  Neck WDL  Breast/Chest WDL  Shoulder Blades WDL  Spine WDL  (R) Arm/Elbow/Hand WDL  (L) Arm/Elbow/Hand WDL  Abdomen WDL  Groin WDL  Scrotum/Coccyx/Buttocks WDL  (R) Leg WDL  (L) Leg WDL  (R) Heel/Foot/Toe WDL  (L) Heel/Foot/Toe WDL          Devices In Places: None      Interventions In Place Pillows    Possible Skin Injury No    Pictures Uploaded Into Epic N/A  Wound Consult Placed N/A  RN Wound Prevention Protocol Ordered No

## 2025-01-08 NOTE — PROGRESS NOTES
Assumed pt care with RN. Pt is A&OX3  and states She is in 0/10 pain and declines interventions at this time. Plan of care discussed, no further questions. Personal belongings and call light within reach, bed alarm on.

## 2025-01-08 NOTE — CARE PLAN
The patient is Stable - Low risk of patient condition declining or worsening    Shift Goals  Clinical Goals: Pt to maintain skin integrity during night shift  Patient Goals: Rest and comfort  Family Goals: DANICA    Progress made toward(s) clinical / shift goals:  Pt maintained skin integrity during the night shift    Patient is not progressing towards the following goals:

## 2025-01-08 NOTE — CARE PLAN
The patient is Stable - Low risk of patient condition declining or worsening    Shift Goals  Clinical Goals: CIWA, monitor HR and rhythm, maintain/improve skin integrity  Patient Goals: Comfort,rest  Family Goals: DANICA    Progress made toward(s) clinical / shift goals:    Problem: Knowledge Deficit - Standard  Goal: Patient and family/care givers will demonstrate understanding of plan of care, disease process/condition, diagnostic tests and medications  Outcome: Progressing     Problem: Optimal Care for Alcohol Withdrawal  Goal: Optimal Care for the alcohol withdrawal patient  Outcome: Progressing     Problem: Seizure Precautions  Goal: Implementation of seizure precautions  Outcome: Progressing     Problem: Skin Integrity  Goal: Skin integrity is maintained or improved  Outcome: Progressing     Problem: Depression  Goal: Patient and family/caregiver will verbalize accurate information about at least two of the possible causes of depression, three-four of the signs and symptoms of depression  Outcome: Progressing       Patient is not progressing towards the following goals:

## 2025-01-08 NOTE — PROGRESS NOTES
Pt arrived to unit via gurney.    Received report and assumed care of patient at change of shift. Patient is A&Ox3 disoriented to time and delayed responses, on 1L NC, and reports no pain at this time. Patient assessment completed, bed in lowest position, and call light and personal belongings are within reach. Patient expressed no further needs at this time.

## 2025-01-08 NOTE — PROGRESS NOTES
Report called to Jerilyn HOOVER, all questions answered. Patient taken with transport on a gurney and on 1L O2. Patient taken with all belongings including , ipad, purse, wallet, and clothes. Patient off the floor.

## 2025-01-08 NOTE — THERAPY
Physical Therapy   Initial Evaluation     Patient Name: Jeanie Hutchison  Age:  79 y.o., Sex:  female  Medical Record #: [id number]  Today's Date: 1/7/2025     Precautions  Precautions: Fall Risk;Swallow Precautions    Assessment  Patient is 79 y.o. female admitted 1/2/2025 with Alcohol Intoxication. Pt endorses heavy drinking over the last 2 weeks due to family loss. Pt upset that she lost both of her daughters and her  in the past few years.  Tremulous, confused and hallucinating. Thromobcytopenia.  History of alcohol abuse, atrial fibrillation, history of V. tach, history of AICD, hypothyroidism.     Pt confused, not following commands, assisted to EOB, pt attempted to lay down and also try to get to chair. Pt not safe for out of bed today due to cognition and lethargy. Pt presents with the following Problems: Impaired Bed Mobility, Impaired Transfers, Impaired Ambulation, Decreased Activity Tolerance, Functional Strength Deficit, Impaired Balance, Safety Awareness Deficits / Cognition. Please see flowsheet below for information. Pt is currently below their functional baseline and anticipate that pt will benefit from post acute placement upon DC from hospital.     Plan    Physical Therapy Initial Treatment Plan   Treatment Plan : Bed Mobility, Gait Training, Neuro Re-Education / Balance, Therapeutic Activities, Therapeutic Exercise, Stair Training, Self Care / Home Evaluation, Equipment  Treatment Frequency: 3 Times per Week  Duration: Until Therapy Goals Met    DC Equipment Recommendations: Unable to determine at this time  Discharge Recommendations: Recommend post-acute placement for additional physical therapy services prior to discharge home       Subjective    Pt playing on tablet and alert upon entry. Pt required encouragement to participate.      Objective       01/07/25 1006   Time In/Time Out   Therapy Start Time 0950   Therapy End Time 1006   Total Therapy Time 16   Initial Contact Note     Initial Contact Note Order Received and Verified, Physical Therapy Evaluation in Progress with Full Report to Follow.   Precautions   Precautions Fall Risk;Swallow Precautions   Vitals   Vitals Comments Pt c/o dizzinesss at EOB, OT attempted to get BP at EOB and it did not calculate do to pt resisting it.   Pain 0 - 10 Group   Therapist Pain Assessment 0   Prior Living Situation   Prior Services Unable To Determine At This Time   Housing / Facility 2 Story House   Steps Into Home 1   Steps In Home 10   Bathroom Set up Walk In Shower   Equipment Owned Unable to Determine At This Time  (pt stated no AD)   Lives with - Patient's Self Care Capacity Related Adult   Comments Poor historian, lives with nephew. Bedroom and bathroom upstairs.   Prior Level of Functional Mobility   Ambulation Independent   Ambulation Distance community   Assistive Devices Used None   Comments drives   Cognition    Cognition / Consciousness X   Speech/ Communication Delayed Responses   Orientation Level Not Oriented to Year;Not Oriented to Month;Not Oriented to Day;Not Oriented to Time;Not Oriented to Place;Not Oriented to Reason   Level of Consciousness Alert   Ability To Follow Commands Unable to Follow 1 Step Commands   Safety Awareness Impaired;Impulsive   New Learning Impaired   Attention Impaired   Sequencing Impaired   Initiation Impaired   Comments Pt confused, requried frequent redirection, impulsive, tried to lay back down while at EOB and then tried to get to chair 2 times- inconsistent cognition   Active ROM Lower Body    Active ROM Lower Body  WDL   Strength Lower Body   Lower Body Strength  Not Tested   Comments generalized weakness, pt able to lift bilat LEs on/off bed   Coordination Lower Body    Coordination Lower Body  WDL   Balance Assessment   Sitting Balance (Static) Fair -   Sitting Balance (Dynamic) Poor +   Weight Shift Sitting Poor   Comments standing not attempted due to pt inconsistent cogntivie status   Bed  Mobility    Supine to Sit Minimal Assist   Sit to Supine Minimal Assist   Scooting Minimal Assist   Comments HOB elevated to EOB, flat back to bed   Gait Analysis   Gait Level Of Assist Unable to Participate   Functional Mobility   Sit to Stand Unable to Participate   6 Clicks Assessment - How much HELP from from another person do you currently need... (If the patient hasn't done an activity recently, how much help from another person do you think he/she would need if he/she tried?)   Turning from your back to your side while in a flat bed without using bedrails? 3   Moving from lying on your back to sitting on the side of a flat bed without using bedrails? 3   Moving to and from a bed to a chair (including a wheelchair)? 3   Standing up from a chair using your arms (e.g., wheelchair, or bedside chair)? 2   Walking in hospital room? 2   Climbing 3-5 steps with a railing? 2   6 clicks Mobility Score 15   Activity Tolerance   Sitting Edge of Bed 8 minutes   Comments self limiting   Short Term Goals    Short Term Goal # 1 Pt will perform supine to/from sit with flat bed and no features supervised in 6 visits to progress bed mobility   Short Term Goal # 2 Pt will perform sit to/from stand with LRAD supervised in 6 visits to progress transfers   Short Term Goal # 3 Pt will amb with LRAD 200ft supervised in 6 visits to progress with functional household mobility.   Short Term Goal # 4 Pt will be able to ascend/descend 10 steps with rail SBA in 6 visit to access home and community   Education Group   Education Provided Role of Physical Therapist   Role of Physical Therapist Patient Response Patient;Explanation;Nonacceptance;Reinforcement Needed;No Learning Evidence   Physical Therapy Initial Treatment Plan    Treatment Plan  Bed Mobility;Gait Training;Neuro Re-Education / Balance;Therapeutic Activities;Therapeutic Exercise;Stair Training;Self Care / Home Evaluation;Equipment   Treatment Frequency 3 Times per Week    Duration Until Therapy Goals Met   Problem List    Problems Impaired Bed Mobility;Impaired Transfers;Impaired Ambulation;Decreased Activity Tolerance;Functional Strength Deficit;Impaired Balance;Safety Awareness Deficits / Cognition   Anticipated Discharge Equipment and Recommendations   DC Equipment Recommendations Unable to determine at this time   Discharge Recommendations Recommend post-acute placement for additional physical therapy services prior to discharge home   Interdisciplinary Plan of Care Collaboration   IDT Collaboration with  Nursing;Occupational Therapist   Patient Position at End of Therapy In Bed;Bed Alarm On;Call Light within Reach;Tray Table within Reach;Phone within Reach   Collaboration Comments RN updated

## 2025-01-09 ENCOUNTER — APPOINTMENT (OUTPATIENT)
Dept: RADIOLOGY | Facility: MEDICAL CENTER | Age: 80
DRG: 309 | End: 2025-01-09
Attending: INTERNAL MEDICINE
Payer: MEDICARE

## 2025-01-09 VITALS
TEMPERATURE: 98.5 F | WEIGHT: 154.32 LBS | RESPIRATION RATE: 18 BRPM | HEIGHT: 67 IN | HEART RATE: 84 BPM | DIASTOLIC BLOOD PRESSURE: 85 MMHG | OXYGEN SATURATION: 90 % | SYSTOLIC BLOOD PRESSURE: 135 MMHG | BODY MASS INDEX: 24.22 KG/M2

## 2025-01-09 PROCEDURE — A9270 NON-COVERED ITEM OR SERVICE: HCPCS | Performed by: INTERNAL MEDICINE

## 2025-01-09 PROCEDURE — A9270 NON-COVERED ITEM OR SERVICE: HCPCS | Performed by: HOSPITALIST

## 2025-01-09 PROCEDURE — 99239 HOSP IP/OBS DSCHRG MGMT >30: CPT | Performed by: INTERNAL MEDICINE

## 2025-01-09 PROCEDURE — 74018 RADEX ABDOMEN 1 VIEW: CPT

## 2025-01-09 PROCEDURE — 700102 HCHG RX REV CODE 250 W/ 637 OVERRIDE(OP): Performed by: INTERNAL MEDICINE

## 2025-01-09 PROCEDURE — 700102 HCHG RX REV CODE 250 W/ 637 OVERRIDE(OP): Performed by: HOSPITALIST

## 2025-01-09 RX ORDER — LANOLIN ALCOHOL/MO/W.PET/CERES
100 CREAM (GRAM) TOPICAL DAILY
Status: SHIPPED
Start: 2025-01-10

## 2025-01-09 RX ORDER — CARBOXYMETHYLCELLULOSE SODIUM 5 MG/ML
2 SOLUTION/ DROPS OPHTHALMIC PRN
Status: SHIPPED
Start: 2025-01-09

## 2025-01-09 RX ORDER — FOLIC ACID 1 MG/1
1 TABLET ORAL DAILY
Status: SHIPPED
Start: 2025-01-09

## 2025-01-09 RX ORDER — AMLODIPINE BESYLATE 2.5 MG/1
2.5 TABLET ORAL DAILY
Status: SHIPPED
Start: 2025-01-09 | End: 2025-01-09

## 2025-01-09 RX ORDER — ONDANSETRON 4 MG/1
4 TABLET, ORALLY DISINTEGRATING ORAL EVERY 4 HOURS PRN
Status: SHIPPED
Start: 2025-01-09

## 2025-01-09 RX ORDER — AMOXICILLIN 250 MG
2 CAPSULE ORAL EVERY EVENING
Status: SHIPPED
Start: 2025-01-09

## 2025-01-09 RX ORDER — POLYETHYLENE GLYCOL 3350 17 G/17G
17 POWDER, FOR SOLUTION ORAL
Status: SHIPPED
Start: 2025-01-09

## 2025-01-09 RX ORDER — AMLODIPINE BESYLATE 2.5 MG/1
2.5 TABLET ORAL DAILY
Status: SHIPPED
Start: 2025-01-09

## 2025-01-09 RX ORDER — BISACODYL 10 MG
10 SUPPOSITORY, RECTAL RECTAL DAILY
Status: DISCONTINUED | OUTPATIENT
Start: 2025-01-09 | End: 2025-01-09 | Stop reason: HOSPADM

## 2025-01-09 RX ADMIN — VENLAFAXINE HYDROCHLORIDE 150 MG: 75 CAPSULE, EXTENDED RELEASE ORAL at 05:22

## 2025-01-09 RX ADMIN — THERA TABS 1 TABLET: TAB at 05:22

## 2025-01-09 RX ADMIN — Medication 100 MG: at 05:22

## 2025-01-09 RX ADMIN — METOPROLOL SUCCINATE 75 MG: 25 TABLET, EXTENDED RELEASE ORAL at 05:22

## 2025-01-09 RX ADMIN — MAGNESIUM HYDROXIDE 30 ML: 2400 SUSPENSION ORAL at 12:18

## 2025-01-09 RX ADMIN — FOLIC ACID 1 MG: 1 TABLET ORAL at 05:22

## 2025-01-09 RX ADMIN — APIXABAN 2.5 MG: 5 TABLET, FILM COATED ORAL at 05:23

## 2025-01-09 RX ADMIN — LEVOTHYROXINE SODIUM 50 MCG: 0.05 TABLET ORAL at 05:23

## 2025-01-09 RX ADMIN — POLYETHYLENE GLYCOL 3350 1 PACKET: 17 POWDER, FOR SOLUTION ORAL at 12:22

## 2025-01-09 RX ADMIN — DIGOXIN 125 MCG: 0.12 TABLET ORAL at 05:22

## 2025-01-09 RX ADMIN — AMLODIPINE BESYLATE 2.5 MG: 5 TABLET ORAL at 05:23

## 2025-01-09 ASSESSMENT — PAIN DESCRIPTION - PAIN TYPE: TYPE: ACUTE PAIN

## 2025-01-09 NOTE — PROGRESS NOTES
Pt was D/C today @ 1316  Pt's home meds picked up and given to the pt  Pt's belongings in a bag and taken with the pt  Red phone has been missing since pt was on [employer]. She came to [facility] without her red phone other that that all belongings accounted for and with the pt at D/C  Education was provided to the pt all questions answered. Teach back method was used

## 2025-01-09 NOTE — CARE PLAN
The patient is Stable - Low risk of patient condition declining or worsening    Shift Goals  Clinical Goals: patient's skin integrity will be maintained throughout the shift; pt will remain free from falls throughout the shift  Patient Goals: sleep  Family Goals: DANICA    Progress made toward(s) clinical / shift goals:        Problem: Skin Integrity  Goal: Skin integrity is maintained or improved  Outcome: Progressing  Note: Pt skin integrity maintained during the shift through 2 RN skin check; Q2abnyw, low airloss.      Problem: Fall Risk  Goal: Patient will remain free from falls  Outcome: Progressing  Note: Pt remained free from falls throughout the shift; bed locked in lowest position with bed alarm on. Pt educated on need for bed alarm. Hourly rounding in place. Pt calls appropriately.        Patient is not progressing towards the following goals:

## 2025-01-09 NOTE — PROGRESS NOTES
Assumed care of pt 01/09/2025  Am assessment complete  Education was provided to the pt all questions were answered. Teach back method was used. Call light was left within reach

## 2025-01-09 NOTE — DISCHARGE SUMMARY
Discharge Summary    CHIEF COMPLAINT ON ADMISSION  Chief Complaint   Patient presents with    Alcohol Intoxication     Pt endorses heavy drinking over the last 2 weeks due to family loss. Last drink yesterday. Pt states she has felt more confused over the last 3 days.       Reason for Admission  EMS    Admission Date  1/2/2025     CODE STATUS  Full Code    HPI & HOSPITAL COURSE  This is a 79 y.o. female here with Alcohol Intoxication (Pt endorses heavy drinking over the last 2 weeks due to family loss. Last drink yesterday. Pt states she has felt more confused over the last 3 days.)  Please review Dr. Will Ramos M.D. notes for further details of history of present illness, past medical/social/family histories, allergies and medications.   Jeanie Hutchison is a 79 y.o. female who drinks alcohol and is under a lot of stress/grief with a history of atrial fibrillation, history of V. tach, history of AICD  presented 1/2/2025 confusion, palpitations. She is found to be intoxicated.   At the ED afebrile, hemodynamically stable.   Labs notable for WBC 4.7 K 3.5 mild transaminitis and bilirubinemia Imaging notable for no acute process   Managing alcohol abuse., Afib w/ RVR has AICD and on anticoagulation. She also had lost her daughters and  in the past few years adding to her stress; she currently lives with her grandson. She is no longer withdrawing but has mmild chronic tremors and mild chronic ataxia. I gave her vitaminbs, thiamine. SNF accepted.   At discharge date, Jeanie Hutchison afebrile and hemodynamically stable.  Jeanie Hutchison wanted to be discharged today.    Imaging  DX-CHEST-PORTABLE (1 VIEW)   Final Result      No acute cardiopulmonary disease.              Therefore, she is discharged in fair and stable condition to skilled nursing facility.    The patient met 2-midnight criteria for an inpatient stay at the time of discharge.      FOLLOW UP ITEMS POST DISCHARGE      DISCHARGE  DIAGNOSES  Principal Problem:    Atrial fibrillation (HCC) (POA: Yes)  Active Problems:    Presence of automatic cardioverter/defibrillator (AICD) (POA: Yes)      Overview: July 2020: [employer] Primo MRI  [id number] implanted by Dr. Baires.    Thrombocytopenia (HCC) (POA: Yes)    Acquired hypothyroidism (POA: Yes)    Alcohol intoxication in active alcoholic with complication (HCC) (POA: Yes)    Hyperbilirubinemia (POA: Unknown)  Resolved Problems:    * No resolved hospital problems. *      FOLLOW UP  No future appointments.  [address] [address]  [phone]      Follow up with KIMBERLY Benitez or attending at facility upon receipt  Follow up with gastroenterology in 1-2 weeks for alcoholic liver disease  Follow up with your cardiologist in 1 week for afib/anticoag  NURSING provide resources/pamphlets for  Alcoholicsm (NO MORE ALCOHOL), hypertension (Check and record blood pressures at home at least twice a day for primary provider to review), Afib/anticoag (Record heart rate, blood pressures for primary provider to review. Monitor for bleeding, GI bleeding and black tarry stools and alert your physician. Monitor renal function with your provider especially if you are on DOAC.    MEDICATIONS ON DISCHARGE     Medication List        START taking these medications        Instructions   carboxymethylcellulose 0.5 % Soln  Commonly known as: Refresh Tears   Administer 2 Drops into both eyes as needed (dry/red eyes).  Dose: 2 Drop     multivitamin Tabs  Start taking on: January 10, 2025   Take 1 Tablet by mouth every day.  Dose: 1 Tablet     ondansetron 4 MG Tbdp  Commonly known as: Zofran ODT   Take 1 Tablet by mouth every four hours as needed for Nausea/Vomiting (give PO if IV route is unavailable.).  Dose: 4 mg     polyethylene glycol/lytes Pack  Commonly known as: Miralax   Take 1 Packet by mouth 1 time a day as needed (if no bowel movement in last 2  days).  Dose: 17 g     senna-docusate 8.6-50 MG Tabs  Commonly known as: Pericolace Or Senokot S   Take 2 Tablets by mouth every evening.  Dose: 2 Tablet     thiamine 100 MG tablet  Start taking on: January 10, 2025  Commonly known as: Thiamine   Take 1 Tablet by mouth every day.  Dose: 100 mg            CONTINUE taking these medications        Instructions   amLODIPine 2.5 MG Tabs  Commonly known as: Norvasc   Take 1 Tablet by mouth every day.  Dose: 2.5 mg     apixaban 5mg Tabs  Commonly known as: Eliquis   Take 1 Tablet by mouth 2 times a day.  Dose: 5 mg     digoxin 125 MCG Tabs  Commonly known as: Lanoxin   Take 1 Tablet by mouth every day.  Dose: 125 mcg     estradiol 0.1 MG/GM vaginal cream  Commonly known as: Estrace   Insert 1-2 g into the vagina see administration instructions. 2 g daily x 2 weeks then 1 g 3 x a week after  Dose: 1-2 g     folic acid 1 MG Tabs  Commonly known as: Folvite   Take 1 Tablet by mouth every day.  Dose: 1 mg     hydrOXYzine HCl 25 MG Tabs  Commonly known as: Atarax   Take 25 mg by mouth at bedtime.  Dose: 25 mg     metoprolol SR 25 MG Tb24  Commonly known as: Toprol XL   Take 3 Tablets by mouth every day.  Dose: 75 mg     mirtazapine 15 MG Tabs  Commonly known as: Remeron   Take 15 mg by mouth at bedtime.  Dose: 15 mg     traZODone 100 MG Tabs  Commonly known as: Desyrel   Take 100 mg by mouth every evening.  Dose: 100 mg     venlafaxine 150 MG extended-release capsule  Commonly known as: Effexor-XR   Take 150 mg by mouth every day.  Dose: 150 mg            STOP taking these medications      nitrofurantoin 100 MG Caps  Commonly known as: Macrobid     sulfamethoxazole-trimethoprim 800-160 MG tablet  Commonly known as: Bactrim DS            ASK your doctor about these medications        Instructions   levothyroxine 50 MCG Tabs  Commonly known as: Synthroid   Take 1 Tablet by mouth every morning on an empty stomach.  Dose: 50 mcg              Allergies  No Known  Allergies    DIET  Orders Placed This Encounter   Procedures    Diet Order Diet: Level 6 - Soft and Bite Sized; Liquid level: Level 0 - Thin; Tray Modifications (optional): SLP - 1:1 Supervision by Nursing     Standing Status:   Standing     Number of Occurrences:   1     Order Specific Question:   Diet:     Answer:   Level 6 - Soft and Bite Sized [23]     Order Specific Question:   Liquid level     Answer:   Level 0 - Thin     Order Specific Question:   Tray Modifications (optional)     Answer:   SLP - 1:1 Supervision by Nursing       ACTIVITY  As per SnF    LINES, DRAINS, AND WOUNDS  This is an automated list. Peripheral IVs will be removed prior to discharge.  Peripheral IV 01/05/25 20 G Posterior;Right Forearm (Active)   Site Assessment Clean;Dry;Intact 01/08/25 2000   Dressing Type Transparent 01/08/25 2000   Line Status Scrubbed the hub prior to access;Flushed;Saline locked 01/08/25 2000   Dressing Status Clean;Dry;Intact 01/08/25 2000   Dressing Intervention N/A 01/08/25 2000   Dressing Change Due 01/11/25 01/08/25 2000   Infiltration Grading ([facility]) 0 01/08/25 2000   Phlebitis Scale ([facility] Only) 0 01/08/25 2000     External Urinary Catheter (Female Wick) (Active)   Collection Container Standard drainage bag 01/08/25 1953   Output (mL) 400 mL 01/06/25 0632         Peripheral IV 01/05/25 20 G Posterior;Right Forearm (Active)   Site Assessment Clean;Dry;Intact 01/08/25 2000   Dressing Type Transparent 01/08/25 2000   Line Status Scrubbed the hub prior to access;Flushed;Saline locked 01/08/25 2000   Dressing Status Clean;Dry;Intact 01/08/25 2000   Dressing Intervention N/A 01/08/25 2000   Dressing Change Due 01/11/25 01/08/25 2000   Infiltration Grading ([facility]) 0 01/08/25 2000   Phlebitis Scale ([facility] Only) 0 01/08/25 2000               MENTAL STATUS ON TRANSFER             CONSULTATIONS      PROCEDURES      LABORATORY  Lab Results   Component Value Date    SODIUM 139 01/08/2025    POTASSIUM 3.9  01/08/2025    CHLORIDE 105 01/08/2025    CO2 24 01/08/2025    GLUCOSE 117 (H) 01/08/2025    BUN 19 01/08/2025    CREATININE 0.69 01/08/2025        Lab Results   Component Value Date    WBC 4.3 (L) 01/08/2025    HEMOGLOBIN 14.5 01/08/2025    HEMATOCRIT 43.3 01/08/2025    PLATELETCT 97 (L) 01/08/2025        Total time of the discharge process exceeds 40 minutes.

## 2025-01-09 NOTE — PROGRESS NOTES
4 Eyes Skin Assessment Completed by SNEHA Alamo and SNEHA Hough.    Head WDL  Ears WDL  Nose WDL  Mouth WDL  Neck WDL  Breast/Chest WDL  Shoulder Blades WDL  Spine WDL  (R) Arm/Elbow/Hand WDL  (L) Arm/Elbow/Hand Bruising  Abdomen WDL  Groin WDL  Scrotum/Coccyx/Buttocks WDL  (R) Leg WDL  (L) Leg WDL  (R) Heel/Foot/Toe WDL  (L) Heel/Foot/Toe WDL        Interventions In Place Pillows    Possible Skin Injury No    Pictures Uploaded Into Epic N/A  Wound Consult Placed N/A  RN Wound Prevention Protocol Ordered No

## 2025-01-09 NOTE — PROGRESS NOTES
Hospital Medicine Daily Progress Note    Date of Service  1/8/2025    Chief Complaint  Jeanie Hutchison is a 79 y.o. female admitted 1/2/2025 with   Chief Complaint   Patient presents with    Alcohol Intoxication     Pt endorses heavy drinking over the last 2 weeks due to family loss. Last drink yesterday. Pt states she has felt more confused over the last 3 days.         Hospital Course  Jeanie Hutchison is a 79 y.o. female who presented 1/2/2025 with past medical history of alcohol abuse, atrial fibrillation, history of V. tach, history of AICD who presents to the hospital for palpitations and shortness of breath.  Patient states that she has been drinking alcohol on a daily basis for the past 2 weeks.  She usually drinks vodka.  She presented to hospital tremulous, confused and hallucinating.  She denies any fever, chills, nausea, vomiting, diarrhea, cough.  Patient states that she is not taking her home medications.  Patient is upset that she lost both of her daughters and her  in the past few years.  Currently, she lives with her grandson.  She denies any suicide ideations states that she wants to live for her grandchildren.     Chest x-ray interpreted by me find no acute pulmonary process  EKG interpreted by me found elevation, RVR, left axis deviation        Interval Problem Update  Patient seen and examine at bedside  Medically cleared: Yes blanket referrals to SNF  Pending:  Estimated Discharge Day:  Disposition:      Managing alcohol abuse., Afib w/ RVR has AICD and on anticoagulation. She also had lost her daughters and  in the past few years adding to her stress; she currently lives with her grandson.     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  I reviewed the chart along with vitals, labs, imaging, test (both pending and resulted) and recommendations from specialists and  interdisciplinary team.      I have discussed this patient's plan of care and discharge plan at IDT rounds today with Case Management, Nursing, Nursing leadership, and other members of the IDT team.    Consultants/Specialty  None    Code Status  Full Code    Disposition  Medically Cleared  I have placed the appropriate orders for post-discharge needs.    Review of Systems  Review of Systems   Constitutional:  Negative for chills and fever.   HENT:  Negative for congestion and sore throat.    Eyes:  Negative for blurred vision and double vision.   Respiratory:  Positive for shortness of breath.    Cardiovascular:  Positive for palpitations. Negative for chest pain.   Gastrointestinal:  Negative for abdominal pain, nausea and vomiting.   Genitourinary:  Negative for dysuria and frequency.   Musculoskeletal:  Negative for back pain and falls.   Neurological:  Negative for seizures and headaches.   Psychiatric/Behavioral:  Positive for depression. Negative for suicidal ideas.         Physical Exam  Temp:  [36.2 °C (97.2 °F)-36.6 °C (97.9 °F)] 36.2 °C (97.2 °F)  Pulse:  [65-95] 67  Resp:  [18-20] 18  BP: (101-117)/(71-83) 114/74  SpO2:  [90 %-97 %] 95 %    Physical Exam  Vitals and nursing note reviewed.   Constitutional:       General: She is not in acute distress.     Appearance: Normal appearance. She is ill-appearing.   HENT:      Head: Normocephalic and atraumatic.      Right Ear: External ear normal.      Left Ear: External ear normal.      Nose: No congestion.      Mouth/Throat:      Mouth: Mucous membranes are moist.   Eyes:      General: No scleral icterus.     Extraocular Movements: Extraocular movements intact.      Conjunctiva/sclera: Conjunctivae normal.   Cardiovascular:      Rate and Rhythm: Regular rhythm. Tachycardia present.      Pulses: Normal pulses.      Heart sounds: Normal heart sounds. No murmur heard.  Pulmonary:      Effort: Pulmonary effort is normal. Tachypnea present.      Breath sounds:  Normal breath sounds. No wheezing.   Abdominal:      General: Abdomen is flat. Bowel sounds are normal.      Palpations: Abdomen is soft.      Tenderness: There is no abdominal tenderness. There is no guarding or rebound.   Musculoskeletal:      Cervical back: Normal range of motion.   Skin:     General: Skin is warm.      Capillary Refill: Capillary refill takes less than 2 seconds.   Neurological:      General: No focal deficit present.      Mental Status: She is alert. She is disoriented.      Cranial Nerves: No cranial nerve deficit.      Motor: No weakness.   Psychiatric:         Mood and Affect: Mood is depressed.       Fluids    Intake/Output Summary (Last 24 hours) at 1/8/2025 1738  Last data filed at 1/8/2025 1340  Gross per 24 hour   Intake 360 ml   Output 400 ml   Net -40 ml        Laboratory  Recent Labs     01/06/25 0225 01/07/25 0055 01/08/25  0037   WBC 5.1 5.3 4.3*   RBC 4.08* 4.01* 4.25   HEMOGLOBIN 14.5 14.1 14.5   HEMATOCRIT 41.7 42.5 43.3   .2* 106.0* 101.9*   MCH 35.5* 35.2* 34.1*   MCHC 34.8 33.2 33.5   RDW 47.9 49.8 48.6   PLATELETCT 67* 85* 97*   MPV 11.3 10.8 10.4     Recent Labs     01/06/25 0225 01/07/25 0055 01/08/25  0218   SODIUM 136 137 139   POTASSIUM 4.2 3.8 3.9   CHLORIDE 99 101 105   CO2 24 22 24   GLUCOSE 72 112* 117*   BUN 10 18 19   CREATININE 0.50 0.59 0.69   CALCIUM 9.0 8.5 10.0                     Imaging  DX-CHEST-PORTABLE (1 VIEW)   Final Result      No acute cardiopulmonary disease.           Assessment/Plan  * Atrial fibrillation (HCC)- (present on admission)  Assessment & Plan    Uncontrolled  Continue Eliquis  Continue digoxin and metoprolol  IV PRN medications been ordered    Vitals:    01/08/25 1543   BP: 114/74   Pulse: 67   Resp: 18   Temp: 36.2 °C (97.2 °F)   SpO2: 95%     Hr stable    Hyperbilirubinemia  Assessment & Plan    Patient has hyperbilirubinemia with predominance of indirect  The rest of her LFTs are for initially elevated came back  normal.  Also found to have thrombocytopenia  LDH is only minimally elevated  No schistocytes on peripheral smear  Coag is slightly elevated but patient is on apixaban  Fibrinogen within normal limits.  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 and bone marrow suppression    Alcohol intoxication in active alcoholic with complication (HCC)- (present on admission)  Assessment & Plan    Patient will be admitted to the telemetry unit with close cardiac monitoring on CIWA protocol  Started on rally bag, multivitamin, thiamine and folate  Replete electrolytes  Patient has been counseled on alcohol cessation and will be provided with information for outpatient detox facilities    Acquired hypothyroidism- (present on admission)  Assessment & Plan    Continue home medications  Check TSH    Thrombocytopenia (HCC)- (present on admission)  Assessment & Plan    Likely secondary to alcohol  Recent Labs     01/06/25  0225 01/07/25  0055 01/08/25  0037   WBC 5.1 5.3 4.3*   RBC 4.08* 4.01* 4.25   HEMOGLOBIN 14.5 14.1 14.5   HEMATOCRIT 41.7 42.5 43.3   .2* 106.0* 101.9*   MCH 35.5* 35.2* 34.1*   RDW 47.9 49.8 48.6   PLATELETCT 67* 85* 97*   MPV 11.3 10.8 10.4   NEUTSPOLYS 55.20 53.10 38.70*   LYMPHOCYTES 30.30 26.70 35.80   MONOCYTES 10.70 16.30* 21.30*   EOSINOPHILS 2.20 1.90 2.10   BASOPHILS 0.80 1.10 1.20           Presence of automatic cardioverter/defibrillator (AICD)- (present on admission)  Assessment & Plan    Monitor on telemetry         Eliquis 2.5mg  VTE prophylaxis: Hold anticoagulation for thrombocytopenia    I have performed a physical exam and reviewed and updated ROS and Plan today (1/8/2025). In review of yesterday's note (1/7/2025), there are no changes except as documented above.    Patient is has a high medical complexity, complex decision making and is  at high risk for complication, morbidity, and mortality, thus requiring the highest level of my preparedness for sudden, emergent intervention. Medical decision making is therefore complex. I provided  services, which included ordering labs and/or imaging, and discussing the case with various consultants.medication orders, frequent reevaluations of the patient's condition and response to treatment. Time was also devoted to counseling and coordinating care including review of records, pertinent lab data and studies, as well as discussing diagnostic evaluation and work up, planned therapeutic interventions and future disposition of care. Where indicated, the assessment and plan reflect discussion of patient with consultants, other healthcare providers, family members, and additional research needed to obtain further information in formulating the plan of care for Jeanie Hutchison. Total time spent was 51 minutes.

## 2025-01-09 NOTE — DISCHARGE PLANNING
DC Transport Scheduled    Transport Company Scheduled:  [facility]  Spoke with Brain at [facility] to schedule transport.    Scheduled Date: 1/9/2025  Scheduled Time: 1300    Transport Type: Wheelchair  [facility]ination: [facility]   Destination address: [address] [place] [address]   [place]  [id number]    Notified care team of scheduled transport via [employer].     If there are any changes needed to the DC transportation scheduled, please contact [facility] at ext. [phone] between the hours of 7033-6416. If outside those hours, contact the ED Case Manager at ext. [phone].

## 2025-01-09 NOTE — PROGRESS NOTES
Received report from day shift RN and assumed pt care at 1900. Patient assessment completed. Patient is A&Ox4 and on RA. Patient denies pain at this time. Bed locked and in the lowest position. Call light within reach. Plan of care discussed and Patient expresses no further needs at this time.

## 2025-01-09 NOTE — DISCHARGE PLANNING
Spoke To: Sabi  Agency/Facility Name: [facility]  Plan or Request: Insurance auth approved. Transport time TBD.

## 2025-02-04 ENCOUNTER — HOSPITAL ENCOUNTER (EMERGENCY)
Facility: MEDICAL CENTER | Age: 80
End: 2025-02-04
Attending: EMERGENCY MEDICINE
Payer: MEDICARE

## 2025-02-04 ENCOUNTER — APPOINTMENT (OUTPATIENT)
Dept: RADIOLOGY | Facility: MEDICAL CENTER | Age: 80
End: 2025-02-04
Payer: MEDICARE

## 2025-02-04 ENCOUNTER — APPOINTMENT (OUTPATIENT)
Dept: RADIOLOGY | Facility: MEDICAL CENTER | Age: 80
End: 2025-02-04
Attending: EMERGENCY MEDICINE
Payer: MEDICARE

## 2025-02-04 VITALS
HEIGHT: 66 IN | TEMPERATURE: 98.5 F | WEIGHT: 160 LBS | SYSTOLIC BLOOD PRESSURE: 122 MMHG | DIASTOLIC BLOOD PRESSURE: 96 MMHG | RESPIRATION RATE: 17 BRPM | OXYGEN SATURATION: 96 % | HEART RATE: 128 BPM | BODY MASS INDEX: 25.71 KG/M2

## 2025-02-04 DIAGNOSIS — J06.9 VIRAL URI WITH COUGH: ICD-10-CM

## 2025-02-04 DIAGNOSIS — F10.920 ALCOHOLIC INTOXICATION WITHOUT COMPLICATION (HCC): ICD-10-CM

## 2025-02-04 DIAGNOSIS — I48.91 ATRIAL FIBRILLATION WITH RAPID VENTRICULAR RESPONSE (HCC): ICD-10-CM

## 2025-02-04 DIAGNOSIS — E87.20 LACTIC ACIDOSIS: ICD-10-CM

## 2025-02-04 DIAGNOSIS — Z91.148 NONCOMPLIANCE WITH MEDICATION REGIMEN: ICD-10-CM

## 2025-02-04 LAB
ALBUMIN SERPL BCP-MCNC: 3.9 G/DL (ref 3.2–4.9)
ALBUMIN/GLOB SERPL: 1.3 G/DL
ALP SERPL-CCNC: 99 U/L (ref 30–99)
ALT SERPL-CCNC: 12 U/L (ref 2–50)
ANION GAP SERPL CALC-SCNC: 18 MMOL/L (ref 7–16)
AST SERPL-CCNC: 29 U/L (ref 12–45)
BASOPHILS # BLD AUTO: 1.1 % (ref 0–1.8)
BASOPHILS # BLD: 0.07 K/UL (ref 0–0.12)
BILIRUB SERPL-MCNC: 1.2 MG/DL (ref 0.1–1.5)
BUN SERPL-MCNC: 8 MG/DL (ref 8–22)
CALCIUM ALBUM COR SERPL-MCNC: 8.8 MG/DL (ref 8.5–10.5)
CALCIUM SERPL-MCNC: 8.7 MG/DL (ref 8.5–10.5)
CHLORIDE SERPL-SCNC: 106 MMOL/L (ref 96–112)
CO2 SERPL-SCNC: 20 MMOL/L (ref 20–33)
CREAT SERPL-MCNC: 0.67 MG/DL (ref 0.5–1.4)
EKG IMPRESSION: NORMAL
EOSINOPHIL # BLD AUTO: 0.06 K/UL (ref 0–0.51)
EOSINOPHIL NFR BLD: 1 % (ref 0–6.9)
ERYTHROCYTE [DISTWIDTH] IN BLOOD BY AUTOMATED COUNT: 55.3 FL (ref 35.9–50)
ETHANOL BLD-MCNC: 210 MG/DL
FLUAV RNA SPEC QL NAA+PROBE: NEGATIVE
FLUBV RNA SPEC QL NAA+PROBE: NEGATIVE
GFR SERPLBLD CREATININE-BSD FMLA CKD-EPI: 90 ML/MIN/1.73 M 2
GLOBULIN SER CALC-MCNC: 3 G/DL (ref 1.9–3.5)
GLUCOSE SERPL-MCNC: 84 MG/DL (ref 65–99)
HCT VFR BLD AUTO: 43.5 % (ref 37–47)
HGB BLD-MCNC: 14.6 G/DL (ref 12–16)
IMM GRANULOCYTES # BLD AUTO: 0.03 K/UL (ref 0–0.11)
IMM GRANULOCYTES NFR BLD AUTO: 0.5 % (ref 0–0.9)
LACTATE SERPL-SCNC: 5.6 MMOL/L (ref 0.5–2)
LACTATE SERPL-SCNC: 5.8 MMOL/L (ref 0.5–2)
LYMPHOCYTES # BLD AUTO: 1.88 K/UL (ref 1–4.8)
LYMPHOCYTES NFR BLD: 30.2 % (ref 22–41)
MCH RBC QN AUTO: 34.2 PG (ref 27–33)
MCHC RBC AUTO-ENTMCNC: 33.6 G/DL (ref 32.2–35.5)
MCV RBC AUTO: 101.9 FL (ref 81.4–97.8)
MONOCYTES # BLD AUTO: 0.54 K/UL (ref 0–0.85)
MONOCYTES NFR BLD AUTO: 8.7 % (ref 0–13.4)
NEUTROPHILS # BLD AUTO: 3.65 K/UL (ref 1.82–7.42)
NEUTROPHILS NFR BLD: 58.5 % (ref 44–72)
NRBC # BLD AUTO: 0.02 K/UL
NRBC BLD-RTO: 0.3 /100 WBC (ref 0–0.2)
PLATELET # BLD AUTO: 118 K/UL (ref 164–446)
PMV BLD AUTO: 8.9 FL (ref 9–12.9)
POTASSIUM SERPL-SCNC: 4.1 MMOL/L (ref 3.6–5.5)
PROT SERPL-MCNC: 6.9 G/DL (ref 6–8.2)
RBC # BLD AUTO: 4.27 M/UL (ref 4.2–5.4)
RSV RNA SPEC QL NAA+PROBE: NEGATIVE
SARS-COV-2 RNA RESP QL NAA+PROBE: NOTDETECTED
SODIUM SERPL-SCNC: 144 MMOL/L (ref 135–145)
WBC # BLD AUTO: 6.2 K/UL (ref 4.8–10.8)

## 2025-02-04 PROCEDURE — 93005 ELECTROCARDIOGRAM TRACING: CPT | Mod: TC | Performed by: EMERGENCY MEDICINE

## 2025-02-04 PROCEDURE — 0241U HCHG SARS-COV-2 COVID-19 NFCT DS RESP RNA 4 TRGT ED POC: CPT

## 2025-02-04 PROCEDURE — 87040 BLOOD CULTURE FOR BACTERIA: CPT

## 2025-02-04 PROCEDURE — 96375 TX/PRO/DX INJ NEW DRUG ADDON: CPT

## 2025-02-04 PROCEDURE — 700105 HCHG RX REV CODE 258: Performed by: EMERGENCY MEDICINE

## 2025-02-04 PROCEDURE — A9270 NON-COVERED ITEM OR SERVICE: HCPCS | Performed by: EMERGENCY MEDICINE

## 2025-02-04 PROCEDURE — 93005 ELECTROCARDIOGRAM TRACING: CPT | Mod: TC

## 2025-02-04 PROCEDURE — 96374 THER/PROPH/DIAG INJ IV PUSH: CPT

## 2025-02-04 PROCEDURE — 80053 COMPREHEN METABOLIC PANEL: CPT

## 2025-02-04 PROCEDURE — 99285 EMERGENCY DEPT VISIT HI MDM: CPT

## 2025-02-04 PROCEDURE — 96376 TX/PRO/DX INJ SAME DRUG ADON: CPT

## 2025-02-04 PROCEDURE — 83605 ASSAY OF LACTIC ACID: CPT | Mod: 91

## 2025-02-04 PROCEDURE — 85025 COMPLETE CBC W/AUTO DIFF WBC: CPT

## 2025-02-04 PROCEDURE — 700101 HCHG RX REV CODE 250: Performed by: EMERGENCY MEDICINE

## 2025-02-04 PROCEDURE — 36415 COLL VENOUS BLD VENIPUNCTURE: CPT

## 2025-02-04 PROCEDURE — 82077 ASSAY SPEC XCP UR&BREATH IA: CPT

## 2025-02-04 PROCEDURE — 700111 HCHG RX REV CODE 636 W/ 250 OVERRIDE (IP): Performed by: EMERGENCY MEDICINE

## 2025-02-04 PROCEDURE — 71045 X-RAY EXAM CHEST 1 VIEW: CPT

## 2025-02-04 PROCEDURE — 700102 HCHG RX REV CODE 250 W/ 637 OVERRIDE(OP): Performed by: EMERGENCY MEDICINE

## 2025-02-04 RX ORDER — DIGOXIN 250 MCG
125 TABLET ORAL ONCE
Status: COMPLETED | OUTPATIENT
Start: 2025-02-04 | End: 2025-02-04

## 2025-02-04 RX ORDER — METOPROLOL TARTRATE 1 MG/ML
5 INJECTION, SOLUTION INTRAVENOUS
Status: DISCONTINUED | OUTPATIENT
Start: 2025-02-04 | End: 2025-02-04 | Stop reason: HOSPADM

## 2025-02-04 RX ORDER — ONDANSETRON 2 MG/ML
4 INJECTION INTRAMUSCULAR; INTRAVENOUS ONCE
Status: COMPLETED | OUTPATIENT
Start: 2025-02-04 | End: 2025-02-04

## 2025-02-04 RX ORDER — SODIUM CHLORIDE, SODIUM LACTATE, POTASSIUM CHLORIDE, CALCIUM CHLORIDE 600; 310; 30; 20 MG/100ML; MG/100ML; MG/100ML; MG/100ML
1000 INJECTION, SOLUTION INTRAVENOUS ONCE
Status: COMPLETED | OUTPATIENT
Start: 2025-02-04 | End: 2025-02-04

## 2025-02-04 RX ORDER — LORAZEPAM 2 MG/ML
1 INJECTION INTRAMUSCULAR ONCE
Status: COMPLETED | OUTPATIENT
Start: 2025-02-04 | End: 2025-02-04

## 2025-02-04 RX ADMIN — LORAZEPAM 1 MG: 2 INJECTION INTRAMUSCULAR; INTRAVENOUS at 16:18

## 2025-02-04 RX ADMIN — METOPROLOL TARTRATE 5 MG: 5 INJECTION INTRAVENOUS at 14:33

## 2025-02-04 RX ADMIN — SODIUM CHLORIDE, POTASSIUM CHLORIDE, SODIUM LACTATE AND CALCIUM CHLORIDE 1000 ML: 600; 310; 30; 20 INJECTION, SOLUTION INTRAVENOUS at 15:49

## 2025-02-04 RX ADMIN — METOPROLOL SUCCINATE 75 MG: 25 TABLET, EXTENDED RELEASE ORAL at 16:47

## 2025-02-04 RX ADMIN — DIGOXIN 125 MCG: 0.25 TABLET ORAL at 16:47

## 2025-02-04 RX ADMIN — ONDANSETRON 4 MG: 2 INJECTION INTRAMUSCULAR; INTRAVENOUS at 16:18

## 2025-02-04 RX ADMIN — METOPROLOL TARTRATE 5 MG: 5 INJECTION INTRAVENOUS at 15:49

## 2025-02-04 ASSESSMENT — FIBROSIS 4 INDEX: FIB4 SCORE: 3.98

## 2025-02-04 NOTE — ED PROVIDER NOTES
ED Provider Note    CHIEF COMPLAINT  Chief Complaint   Patient presents with    Tachycardia    Cough    Alcohol Intoxication       EXTERNAL RECORDS REVIEWED  1/9/2025, discharge summary, admitted with atrial fibrillation and alcohol intoxication    HPI/ROS    Jeanie Flakita Hutchison is a 79 y.o. female who presents for evaluation of coughing, congestion, shortness of breath.  Some notable for a week or so.  Recently got out of the hospital after A-fib and alcohol related hospitalization.  Reports today she did not take any of her medications.  She has palpitations.  No pain in her chest or pressure.  No abdominal pain or vomiting.  Reports she has had exposure to RSV.  Reports she has been drinking alcohol today.  Metoprolol and digoxin is what she reports taking for her atrial fibrillation, she is also anticoagulated on Eliquis.    PAST MEDICAL HISTORY   has a past medical history of Alcoholism (HCC), Anticoagulated, Apnea, sleep, Chronic pain, Hypothyroidism, Insomnia, Paroxysmal atrial fibrillation (HCC), Psychiatric disorder, Snoring, and Ventricular tachycardia (HCC).    SURGICAL HISTORY   has a past surgical history that includes breast biopsy; other orthopedic surgery; orif, fracture, clavicle (5/21/2014); and tonsillectomy.    FAMILY HISTORY  Family History   Problem Relation Age of Onset    Diabetes Mother     Anxiety disorder Mother     Depression Mother     Hyperlipidemia Father        SOCIAL HISTORY  Social History     Tobacco Use    Smoking status: Never    Smokeless tobacco: Never   Vaping Use    Vaping status: Never Used   Substance and Sexual Activity    Alcohol use: Yes     Alcohol/week: 8.4 - 12.6 oz     Types: 14 - 21 Standard drinks or equivalent per week     Comment: everyday (last 2 months 2-3 everyday)    Drug use: Not Currently     Types: Oral     Comment: takes friends tranqualizers     Sexual activity: Not Currently       CURRENT MEDICATIONS  Home Medications    **Home medications have not yet  "been reviewed for this encounter**       Audit from Redirected Encounters    **Home medications have not yet been reviewed for this encounter**         ALLERGIES  No Known Allergies    PHYSICAL EXAM  VITAL SIGNS: /85   Pulse 148   Temp 36.9 °C (98.5 °F) (Oral)   Resp 19   Ht 1.676 m (5' 6\")   Wt 72.6 kg (160 lb)   SpO2 97%   BMI 25.82 kg/m²    Constitutional: Well appearing patient in no acute distress.  Awake and alert, not toxic nor ill in appearance.  HENT: Normocephalic, no obvious evidence of acute trauma.  Eyes: No scleral icterus. Normal conjunctiva   Thorax & Lungs: Normal nonlabored respirations. I appreciate no wheezing, rhonchi or rales. There is normal air movement.  Upon cardiac ascultation I appreciate an irregular rhythm and a very tachycardic rate  Abdomen: The abdomen is not visibly distended. Upon palpation, I find it to be without tenderness.  No mass appreciated.  Skin: The exposed portions of skin reveal no obvious rash or other abnormalities.  Extremities/Musculoskeletal: No obvious sign of acute trauma. No asymmetric calf tenderness or edema.   Neurologic: Alert & oriented.  Slurred speech otherwise no focal deficits observed.       EKG/LABS  Results for orders placed or performed during the hospital encounter of 02/04/25   Lactic Acid    Collection Time: 02/04/25  2:16 PM   Result Value Ref Range    Lactic Acid 5.6 (HH) 0.5 - 2.0 mmol/L   CBC with Differential    Collection Time: 02/04/25  2:16 PM   Result Value Ref Range    WBC 6.2 4.8 - 10.8 K/uL    RBC 4.27 4.20 - 5.40 M/uL    Hemoglobin 14.6 12.0 - 16.0 g/dL    Hematocrit 43.5 37.0 - 47.0 %    .9 (H) 81.4 - 97.8 fL    MCH 34.2 (H) 27.0 - 33.0 pg    MCHC 33.6 32.2 - 35.5 g/dL    RDW 55.3 (H) 35.9 - 50.0 fL    Platelet Count 118 (L) 164 - 446 K/uL    MPV 8.9 (L) 9.0 - 12.9 fL    Neutrophils-Polys 58.50 44.00 - 72.00 %    Lymphocytes 30.20 22.00 - 41.00 %    Monocytes 8.70 0.00 - 13.40 %    Eosinophils 1.00 0.00 - 6.90 % "    Basophils 1.10 0.00 - 1.80 %    Immature Granulocytes 0.50 0.00 - 0.90 %    Nucleated RBC 0.30 (H) 0.00 - 0.20 /100 WBC    Neutrophils (Absolute) 3.65 1.82 - 7.42 K/uL    Lymphs (Absolute) 1.88 1.00 - 4.80 K/uL    Monos (Absolute) 0.54 0.00 - 0.85 K/uL    Eos (Absolute) 0.06 0.00 - 0.51 K/uL    Baso (Absolute) 0.07 0.00 - 0.12 K/uL    Immature Granulocytes (abs) 0.03 0.00 - 0.11 K/uL    NRBC (Absolute) 0.02 K/uL   Complete Metabolic Panel    Collection Time: 02/04/25  2:16 PM   Result Value Ref Range    Sodium 144 135 - 145 mmol/L    Potassium 4.1 3.6 - 5.5 mmol/L    Chloride 106 96 - 112 mmol/L    Co2 20 20 - 33 mmol/L    Anion Gap 18.0 (H) 7.0 - 16.0    Glucose 84 65 - 99 mg/dL    Bun 8 8 - 22 mg/dL    Creatinine 0.67 0.50 - 1.40 mg/dL    Calcium 8.7 8.5 - 10.5 mg/dL    Correct Calcium 8.8 8.5 - 10.5 mg/dL    AST(SGOT) 29 12 - 45 U/L    ALT(SGPT) 12 2 - 50 U/L    Alkaline Phosphatase 99 30 - 99 U/L    Total Bilirubin 1.2 0.1 - 1.5 mg/dL    Albumin 3.9 3.2 - 4.9 g/dL    Total Protein 6.9 6.0 - 8.2 g/dL    Globulin 3.0 1.9 - 3.5 g/dL    A-G Ratio 1.3 g/dL   Blood Culture - Draw one from central line and one from peripheral site    Collection Time: 02/04/25  2:16 PM    Specimen: Line; Blood   Result Value Ref Range    Significant Indicator NEG     Source BLD     Site Peripheral     Culture Result       No Growth  Note: Blood cultures are incubated for 5 days and  are monitored continuously.Positive blood cultures  are called to the RN and reported as soon as  they are identified.     ESTIMATED GFR    Collection Time: 02/04/25  2:16 PM   Result Value Ref Range    GFR (CKD-EPI) 90 >60 mL/min/1.73 m 2   POC CoV-2, FLU A/B, RSV by PCR    Collection Time: 02/04/25  2:27 PM   Result Value Ref Range    POC Influenza A RNA, PCR Negative Negative    POC Influenza B RNA, PCR Negative Negative    POC RSV, by PCR Negative Negative    POC SARS-CoV-2, PCR NotDetected NotDetected   Blood Culture - Draw one from central line and  one from peripheral site    Collection Time: 25  3:48 PM    Specimen: Peripheral; Blood   Result Value Ref Range    Significant Indicator NEG     Source BLD     Site PERIPHERAL     Culture Result       No Growth  Note: Blood cultures are incubated for 5 days and  are monitored continuously.Positive blood cultures  are called to the RN and reported as soon as  they are identified.     DIAGNOSTIC ALCOHOL    Collection Time: 25  3:48 PM   Result Value Ref Range    Diagnostic Alcohol 210.0 (H) <10.1 mg/dL   Lactic Acid    Collection Time: 25  4:43 PM   Result Value Ref Range    Lactic Acid 5.8 (HH) 0.5 - 2.0 mmol/L   EKG (NOW)    Collection Time: 25  6:15 PM   Result Value Ref Range    Report       [facility] Emergency Dept[facility]    Test Date:  2025  Pt Name:    MARISELA DURHAM              Department: ER  MRN:        [id number]                      Room:       [facility]  Gender:     Female                       Technician: [id number]  :        [id number]                   Requested By:ER TRIAGE PROTOCOL  Order #:    [id number]                    Reading MD: JERRY RAMEY MD    Measurements  Intervals                                Axis  Rate:       145                          P:          0  MS:         0                            QRS:        -63  QRSD:       71                           T:          49  QT:         310  QTc:        482    Interpretive Statements  A-fib with heart rate of 145, normal QRS interval, T waves are upright, no  obvious ST segment deviation.  My impression: A-fib with RVR, no ischemia  Electronically Signed On 2025 18:15:53 PST by JERRY RAMEY MD       *Note: Due to a large number of results and/or encounters for the requested time period, some results have not been displayed. A complete set of results can be found in Results Review.      I have independently interpreted this EKG    RADIOLOGY/PROCEDURES   I have independently interpreted the  diagnostic imaging associated with this visit and am waiting the final reading from the radiologist.   My preliminary interpretation is as follows: No infiltrate or pneumothorax    Radiologist interpretation:  DX-CHEST-PORTABLE (1 VIEW)   Final Result      No acute cardiopulmonary disease.          COURSE & MEDICAL DECISION MAKING    ASSESSMENT, COURSE AND PLAN  Care Narrative: 79-year-old female with rapid A-fib, shortness of breath.  She also reports being intoxicated, did not take her regular medications today.  Upon first encounter the patient is tachycardic into the 150s.  Otherwise awake and alert.  Benign abdomen.  Vital signs are otherwise unremarkable with a normal blood pressure.  Usually takes beta-blocker and digoxin, did not take her medicine today so I will give her a dose of IV beta-blocker, some IV fluids, check blood work and a chest x-ray and viral swab.  Septic workup has already been initiated by nursing staff.  Will also check a diagnostic alcohol and she will be reevaluated      3:22 PM patient's heart rate has improved significantly.  Normotensive.  WBC is normal.  Viral swabs negative.    Lactic acid comes back elevated, repeat lactic acid after IV fluid reveals no improvement.  Remains tachycardic and in atrial fibrillation albeit with an improved heart rate from her initial presentation.  I did treat her with an oral dose of her metoprolol and digoxin.  Her alcohol level does come back elevated, clinically she is awake and alert and says does not seem to be intoxicated at this time, she actually thinks she is withdrawing so she received a little bit of Ativan      After an additional liter of IV fluids the patient's heart rate still remains the same.  Given the persistent lactic acidosis with persistent tachycardia I do think the patient needs to be hospitalized.  The patient refuses however.  She tells me she is tired of being in hospitals and she just wants to go home.  I explained the  risks of this, including death and permanent disability, she accepts those risks tells me that she will be able to take care of herself just fine at home.  I have recommended she consider alcohol cessation and better compliance with her medications.    The patient is leaving against medical advice.  I discussed with the patient the risks of leaving without receiving appropriate care to include permanent disability or death.  I have discussed possible alternatives.  The patient is not intoxicated.  The patient is a capable decision maker and understands the risks and benefits of their decision.  While I certainly disagree with the patient's decision, I respect the patient's autonomy and will not keep them here against their will.  They understand that their decision to leave can be reversed at any time and they can return to us at any time for any reason at all.    I have asked the nurses to have the patient sign an AMA form and to witness this signature.        DISPOSITION AND DISCUSSIONS    Escalation of care considered, and ultimately not performed: I considered the patient to be admitted to the hospital although she chose to leave AGAINST MEDICAL ADVICE      FINAL DIAGNOSIS  1. Alcoholic intoxication without complication (HCC)    2. Lactic acidosis    3. Atrial fibrillation with rapid ventricular response (HCC)    4. Viral URI with cough    5. Noncompliance with medication regimen         Electronically signed by: Kostas Blanchard M.D., 2/4/2025 3:09 PM

## 2025-02-04 NOTE — ED NOTES
"Pt repeatedly stating \"I feel like I'm going to die, something doesn't feel right.\" Pt on monitor, placed on 2 L NC for 87% O2 saturation.   "

## 2025-02-04 NOTE — ED TRIAGE NOTES
Pt bib ems from home.  Chief Complaint   Patient presents with    Tachycardia    Cough    Alcohol Intoxication     Pt called ems because she has been sick with cold symptoms for more than a week. Recently at rehab for Etoh abuse. Pt admits to drinking today.   Pt -150's. Hx of Afib. Pt reports she has not taken her meds today.   EKG complete.  Sepsis protocol initiated.

## 2025-02-05 NOTE — ED NOTES
Patient signed out AMA. Papers provided and she verbalized understanding of her decision to sign against medical advice. Patient encouraged to return for new or worsening symptoms. IV's removed from arm. She was assisted into a wheelchair and is calling a cab home. All belongings with patient

## 2025-02-05 NOTE — ED NOTES
Patient expressed that she would like to sign out AMA. ERP explained risk/benefits to her. Discharge papers in process. Patient plans to get cab home

## 2025-02-09 LAB
BACTERIA BLD CULT: NORMAL
BACTERIA BLD CULT: NORMAL
SIGNIFICANT IND 70042: NORMAL
SIGNIFICANT IND 70042: NORMAL
SITE SITE: NORMAL
SITE SITE: NORMAL
SOURCE SOURCE: NORMAL
SOURCE SOURCE: NORMAL

## 2025-02-12 ENCOUNTER — HOSPITAL ENCOUNTER (INPATIENT)
Facility: MEDICAL CENTER | Age: 80
LOS: 6 days | DRG: 308 | End: 2025-02-18
Attending: STUDENT IN AN ORGANIZED HEALTH CARE EDUCATION/TRAINING PROGRAM | Admitting: STUDENT IN AN ORGANIZED HEALTH CARE EDUCATION/TRAINING PROGRAM
Payer: MEDICARE

## 2025-02-12 ENCOUNTER — APPOINTMENT (OUTPATIENT)
Dept: RADIOLOGY | Facility: MEDICAL CENTER | Age: 80
DRG: 308 | End: 2025-02-12
Payer: MEDICARE

## 2025-02-12 ENCOUNTER — APPOINTMENT (OUTPATIENT)
Dept: RADIOLOGY | Facility: MEDICAL CENTER | Age: 80
DRG: 308 | End: 2025-02-12
Attending: STUDENT IN AN ORGANIZED HEALTH CARE EDUCATION/TRAINING PROGRAM
Payer: MEDICARE

## 2025-02-12 DIAGNOSIS — I48.91 ATRIAL FIBRILLATION WITH RVR (HCC): ICD-10-CM

## 2025-02-12 DIAGNOSIS — I48.91 ATRIAL FIBRILLATION WITH RAPID VENTRICULAR RESPONSE (HCC): ICD-10-CM

## 2025-02-12 DIAGNOSIS — I48.11 LONGSTANDING PERSISTENT ATRIAL FIBRILLATION (HCC): ICD-10-CM

## 2025-02-12 LAB
ALBUMIN SERPL BCP-MCNC: 3.6 G/DL (ref 3.2–4.9)
ALBUMIN/GLOB SERPL: 1.3 G/DL
ALP SERPL-CCNC: 90 U/L (ref 30–99)
ALT SERPL-CCNC: 16 U/L (ref 2–50)
ANION GAP SERPL CALC-SCNC: 22 MMOL/L (ref 7–16)
AST SERPL-CCNC: 43 U/L (ref 12–45)
BASOPHILS # BLD AUTO: 1 % (ref 0–1.8)
BASOPHILS # BLD: 0.06 K/UL (ref 0–0.12)
BILIRUB SERPL-MCNC: 2.4 MG/DL (ref 0.1–1.5)
BUN SERPL-MCNC: 12 MG/DL (ref 8–22)
CALCIUM ALBUM COR SERPL-MCNC: 8.4 MG/DL (ref 8.5–10.5)
CALCIUM SERPL-MCNC: 8.1 MG/DL (ref 8.5–10.5)
CHLORIDE SERPL-SCNC: 100 MMOL/L (ref 96–112)
CO2 SERPL-SCNC: 18 MMOL/L (ref 20–33)
CREAT SERPL-MCNC: 0.61 MG/DL (ref 0.5–1.4)
DIGOXIN SERPL-MCNC: 1.1 NG/ML (ref 0.8–2)
EKG IMPRESSION: NORMAL
EOSINOPHIL # BLD AUTO: 0.01 K/UL (ref 0–0.51)
EOSINOPHIL NFR BLD: 0.2 % (ref 0–6.9)
ERYTHROCYTE [DISTWIDTH] IN BLOOD BY AUTOMATED COUNT: 63.2 FL (ref 35.9–50)
ETHANOL BLD-MCNC: 170 MG/DL
GFR SERPLBLD CREATININE-BSD FMLA CKD-EPI: 92 ML/MIN/1.73 M 2
GLOBULIN SER CALC-MCNC: 2.7 G/DL (ref 1.9–3.5)
GLUCOSE SERPL-MCNC: 71 MG/DL (ref 65–99)
HCT VFR BLD AUTO: 41.9 % (ref 37–47)
HGB BLD-MCNC: 14.1 G/DL (ref 12–16)
IMM GRANULOCYTES # BLD AUTO: 0.02 K/UL (ref 0–0.11)
IMM GRANULOCYTES NFR BLD AUTO: 0.3 % (ref 0–0.9)
LIPASE SERPL-CCNC: 30 U/L (ref 11–82)
LYMPHOCYTES # BLD AUTO: 1.21 K/UL (ref 1–4.8)
LYMPHOCYTES NFR BLD: 19.6 % (ref 22–41)
MAGNESIUM SERPL-MCNC: 1.2 MG/DL (ref 1.5–2.5)
MCH RBC QN AUTO: 34.8 PG (ref 27–33)
MCHC RBC AUTO-ENTMCNC: 33.7 G/DL (ref 32.2–35.5)
MCV RBC AUTO: 103.5 FL (ref 81.4–97.8)
MONOCYTES # BLD AUTO: 0.44 K/UL (ref 0–0.85)
MONOCYTES NFR BLD AUTO: 7.1 % (ref 0–13.4)
NEUTROPHILS # BLD AUTO: 4.42 K/UL (ref 1.82–7.42)
NEUTROPHILS NFR BLD: 71.8 % (ref 44–72)
NRBC # BLD AUTO: 0.04 K/UL
NRBC BLD-RTO: 0.6 /100 WBC (ref 0–0.2)
NT-PROBNP SERPL IA-MCNC: 868 PG/ML (ref 0–125)
PHOSPHATE SERPL-MCNC: 2.5 MG/DL (ref 2.5–4.5)
PLATELET # BLD AUTO: 69 K/UL (ref 164–446)
PLATELETS.RETICULATED NFR BLD AUTO: 4.2 % (ref 0.6–13.1)
PMV BLD AUTO: 9.4 FL (ref 9–12.9)
POTASSIUM SERPL-SCNC: 3.9 MMOL/L (ref 3.6–5.5)
PROT SERPL-MCNC: 6.3 G/DL (ref 6–8.2)
RBC # BLD AUTO: 4.05 M/UL (ref 4.2–5.4)
SODIUM SERPL-SCNC: 140 MMOL/L (ref 135–145)
T4 FREE SERPL-MCNC: 1.3 NG/DL (ref 0.93–1.7)
TROPONIN T SERPL-MCNC: 8 NG/L (ref 6–19)
TSH SERPL-ACNC: 4.04 UIU/ML (ref 0.35–5.5)
WBC # BLD AUTO: 6.2 K/UL (ref 4.8–10.8)

## 2025-02-12 PROCEDURE — 96376 TX/PRO/DX INJ SAME DRUG ADON: CPT

## 2025-02-12 PROCEDURE — 700105 HCHG RX REV CODE 258: Performed by: STUDENT IN AN ORGANIZED HEALTH CARE EDUCATION/TRAINING PROGRAM

## 2025-02-12 PROCEDURE — 700102 HCHG RX REV CODE 250 W/ 637 OVERRIDE(OP): Performed by: STUDENT IN AN ORGANIZED HEALTH CARE EDUCATION/TRAINING PROGRAM

## 2025-02-12 PROCEDURE — 82077 ASSAY SPEC XCP UR&BREATH IA: CPT

## 2025-02-12 PROCEDURE — 700111 HCHG RX REV CODE 636 W/ 250 OVERRIDE (IP): Mod: JZ | Performed by: STUDENT IN AN ORGANIZED HEALTH CARE EDUCATION/TRAINING PROGRAM

## 2025-02-12 PROCEDURE — 700111 HCHG RX REV CODE 636 W/ 250 OVERRIDE (IP): Performed by: STUDENT IN AN ORGANIZED HEALTH CARE EDUCATION/TRAINING PROGRAM

## 2025-02-12 PROCEDURE — 83690 ASSAY OF LIPASE: CPT

## 2025-02-12 PROCEDURE — 80053 COMPREHEN METABOLIC PANEL: CPT

## 2025-02-12 PROCEDURE — 99285 EMERGENCY DEPT VISIT HI MDM: CPT

## 2025-02-12 PROCEDURE — 96366 THER/PROPH/DIAG IV INF ADDON: CPT

## 2025-02-12 PROCEDURE — 99223 1ST HOSP IP/OBS HIGH 75: CPT | Mod: AI | Performed by: STUDENT IN AN ORGANIZED HEALTH CARE EDUCATION/TRAINING PROGRAM

## 2025-02-12 PROCEDURE — 700101 HCHG RX REV CODE 250: Performed by: STUDENT IN AN ORGANIZED HEALTH CARE EDUCATION/TRAINING PROGRAM

## 2025-02-12 PROCEDURE — 96365 THER/PROPH/DIAG IV INF INIT: CPT

## 2025-02-12 PROCEDURE — 36415 COLL VENOUS BLD VENIPUNCTURE: CPT

## 2025-02-12 PROCEDURE — 71045 X-RAY EXAM CHEST 1 VIEW: CPT

## 2025-02-12 PROCEDURE — 93005 ELECTROCARDIOGRAM TRACING: CPT | Mod: TC | Performed by: STUDENT IN AN ORGANIZED HEALTH CARE EDUCATION/TRAINING PROGRAM

## 2025-02-12 PROCEDURE — 83735 ASSAY OF MAGNESIUM: CPT

## 2025-02-12 PROCEDURE — 84439 ASSAY OF FREE THYROXINE: CPT

## 2025-02-12 PROCEDURE — 83880 ASSAY OF NATRIURETIC PEPTIDE: CPT

## 2025-02-12 PROCEDURE — 85025 COMPLETE CBC W/AUTO DIFF WBC: CPT

## 2025-02-12 PROCEDURE — 84484 ASSAY OF TROPONIN QUANT: CPT

## 2025-02-12 PROCEDURE — 96375 TX/PRO/DX INJ NEW DRUG ADDON: CPT

## 2025-02-12 PROCEDURE — HZ2ZZZZ DETOXIFICATION SERVICES FOR SUBSTANCE ABUSE TREATMENT: ICD-10-PCS | Performed by: STUDENT IN AN ORGANIZED HEALTH CARE EDUCATION/TRAINING PROGRAM

## 2025-02-12 PROCEDURE — 93005 ELECTROCARDIOGRAM TRACING: CPT | Mod: TC

## 2025-02-12 PROCEDURE — 84443 ASSAY THYROID STIM HORMONE: CPT

## 2025-02-12 PROCEDURE — 85055 RETICULATED PLATELET ASSAY: CPT

## 2025-02-12 PROCEDURE — 770020 HCHG ROOM/CARE - TELE (206)

## 2025-02-12 PROCEDURE — A9270 NON-COVERED ITEM OR SERVICE: HCPCS | Performed by: STUDENT IN AN ORGANIZED HEALTH CARE EDUCATION/TRAINING PROGRAM

## 2025-02-12 PROCEDURE — 84100 ASSAY OF PHOSPHORUS: CPT

## 2025-02-12 PROCEDURE — 80162 ASSAY OF DIGOXIN TOTAL: CPT

## 2025-02-12 RX ORDER — LORAZEPAM 2 MG/ML
1.5 INJECTION INTRAMUSCULAR
Status: DISCONTINUED | OUTPATIENT
Start: 2025-02-12 | End: 2025-02-16

## 2025-02-12 RX ORDER — LORAZEPAM 2 MG/1
4 TABLET ORAL
Status: DISCONTINUED | OUTPATIENT
Start: 2025-02-12 | End: 2025-02-16

## 2025-02-12 RX ORDER — METOPROLOL TARTRATE 1 MG/ML
5 INJECTION, SOLUTION INTRAVENOUS
Status: DISCONTINUED | OUTPATIENT
Start: 2025-02-12 | End: 2025-02-15 | Stop reason: HOSPADM

## 2025-02-12 RX ORDER — MIRTAZAPINE 15 MG/1
15 TABLET, FILM COATED ORAL
Status: DISCONTINUED | OUTPATIENT
Start: 2025-02-13 | End: 2025-02-14

## 2025-02-12 RX ORDER — LORAZEPAM 0.5 MG/1
0.5 TABLET ORAL EVERY 4 HOURS PRN
Status: DISCONTINUED | OUTPATIENT
Start: 2025-02-12 | End: 2025-02-16

## 2025-02-12 RX ORDER — LORAZEPAM 2 MG/ML
0.5 INJECTION INTRAMUSCULAR EVERY 4 HOURS PRN
Status: DISCONTINUED | OUTPATIENT
Start: 2025-02-12 | End: 2025-02-12

## 2025-02-12 RX ORDER — LORAZEPAM 2 MG/ML
2 INJECTION INTRAMUSCULAR ONCE
Status: COMPLETED | OUTPATIENT
Start: 2025-02-12 | End: 2025-02-12

## 2025-02-12 RX ORDER — LORAZEPAM 2 MG/ML
1.5 INJECTION INTRAMUSCULAR
Status: DISCONTINUED | OUTPATIENT
Start: 2025-02-12 | End: 2025-02-12

## 2025-02-12 RX ORDER — METOPROLOL TARTRATE 50 MG
100 TABLET ORAL 2 TIMES DAILY
Status: DISCONTINUED | OUTPATIENT
Start: 2025-02-12 | End: 2025-02-18 | Stop reason: HOSPADM

## 2025-02-12 RX ORDER — ACETAMINOPHEN 325 MG/1
650 TABLET ORAL EVERY 6 HOURS PRN
Status: DISCONTINUED | OUTPATIENT
Start: 2025-02-12 | End: 2025-02-18 | Stop reason: HOSPADM

## 2025-02-12 RX ORDER — HYDROXYZINE HYDROCHLORIDE 25 MG/1
25 TABLET, FILM COATED ORAL
Status: DISCONTINUED | OUTPATIENT
Start: 2025-02-13 | End: 2025-02-14

## 2025-02-12 RX ORDER — VENLAFAXINE 75 MG/1
150 TABLET ORAL DAILY
Status: DISCONTINUED | OUTPATIENT
Start: 2025-02-13 | End: 2025-02-16

## 2025-02-12 RX ORDER — LORAZEPAM 2 MG/1
4 TABLET ORAL
Status: DISCONTINUED | OUTPATIENT
Start: 2025-02-12 | End: 2025-02-12

## 2025-02-12 RX ORDER — ONDANSETRON 2 MG/ML
4 INJECTION INTRAMUSCULAR; INTRAVENOUS ONCE
Status: COMPLETED | OUTPATIENT
Start: 2025-02-12 | End: 2025-02-12

## 2025-02-12 RX ORDER — CHLORDIAZEPOXIDE HYDROCHLORIDE 25 MG/1
25 CAPSULE, GELATIN COATED ORAL EVERY 6 HOURS
Status: DISCONTINUED | OUTPATIENT
Start: 2025-02-14 | End: 2025-02-14

## 2025-02-12 RX ORDER — POLYETHYLENE GLYCOL 3350 17 G/17G
1 POWDER, FOR SOLUTION ORAL
Status: DISCONTINUED | OUTPATIENT
Start: 2025-02-12 | End: 2025-02-18 | Stop reason: HOSPADM

## 2025-02-12 RX ORDER — LORAZEPAM 1 MG/1
1 TABLET ORAL EVERY 4 HOURS PRN
Status: DISCONTINUED | OUTPATIENT
Start: 2025-02-12 | End: 2025-02-12

## 2025-02-12 RX ORDER — CHLORDIAZEPOXIDE HYDROCHLORIDE 25 MG/1
50 CAPSULE, GELATIN COATED ORAL EVERY 6 HOURS
Status: COMPLETED | OUTPATIENT
Start: 2025-02-13 | End: 2025-02-13

## 2025-02-12 RX ORDER — AMOXICILLIN 250 MG
2 CAPSULE ORAL EVERY EVENING
Status: DISCONTINUED | OUTPATIENT
Start: 2025-02-12 | End: 2025-02-18 | Stop reason: HOSPADM

## 2025-02-12 RX ORDER — LORAZEPAM 2 MG/ML
2 INJECTION INTRAMUSCULAR
Status: DISCONTINUED | OUTPATIENT
Start: 2025-02-12 | End: 2025-02-16

## 2025-02-12 RX ORDER — LORAZEPAM 2 MG/ML
1 INJECTION INTRAMUSCULAR
Status: DISCONTINUED | OUTPATIENT
Start: 2025-02-12 | End: 2025-02-12

## 2025-02-12 RX ORDER — ONDANSETRON 2 MG/ML
4 INJECTION INTRAMUSCULAR; INTRAVENOUS EVERY 4 HOURS PRN
Status: DISCONTINUED | OUTPATIENT
Start: 2025-02-12 | End: 2025-02-18 | Stop reason: HOSPADM

## 2025-02-12 RX ORDER — FOLIC ACID 1 MG/1
1 TABLET ORAL DAILY
Status: DISCONTINUED | OUTPATIENT
Start: 2025-02-13 | End: 2025-02-12

## 2025-02-12 RX ORDER — LABETALOL HYDROCHLORIDE 5 MG/ML
10 INJECTION, SOLUTION INTRAVENOUS EVERY 4 HOURS PRN
Status: DISCONTINUED | OUTPATIENT
Start: 2025-02-12 | End: 2025-02-18 | Stop reason: HOSPADM

## 2025-02-12 RX ORDER — LORAZEPAM 2 MG/1
2 TABLET ORAL
Status: DISCONTINUED | OUTPATIENT
Start: 2025-02-12 | End: 2025-02-12

## 2025-02-12 RX ORDER — LORAZEPAM 2 MG/ML
1 INJECTION INTRAMUSCULAR
Status: DISCONTINUED | OUTPATIENT
Start: 2025-02-12 | End: 2025-02-16

## 2025-02-12 RX ORDER — GAUZE BANDAGE 2" X 2"
100 BANDAGE TOPICAL DAILY
Status: COMPLETED | OUTPATIENT
Start: 2025-02-13 | End: 2025-02-16

## 2025-02-12 RX ORDER — METOPROLOL TARTRATE 1 MG/ML
5 INJECTION, SOLUTION INTRAVENOUS
Status: DISCONTINUED | OUTPATIENT
Start: 2025-02-12 | End: 2025-02-12

## 2025-02-12 RX ORDER — LORAZEPAM 2 MG/ML
0.5 INJECTION INTRAMUSCULAR EVERY 4 HOURS PRN
Status: DISCONTINUED | OUTPATIENT
Start: 2025-02-12 | End: 2025-02-16

## 2025-02-12 RX ORDER — METOPROLOL TARTRATE 25 MG/1
25 TABLET, FILM COATED ORAL ONCE
Status: COMPLETED | OUTPATIENT
Start: 2025-02-12 | End: 2025-02-12

## 2025-02-12 RX ORDER — DIGOXIN 0.25 MG/ML
125 INJECTION INTRAMUSCULAR; INTRAVENOUS ONCE
Status: COMPLETED | OUTPATIENT
Start: 2025-02-12 | End: 2025-02-12

## 2025-02-12 RX ORDER — LORAZEPAM 1 MG/1
1 TABLET ORAL EVERY 4 HOURS PRN
Status: DISCONTINUED | OUTPATIENT
Start: 2025-02-12 | End: 2025-02-16

## 2025-02-12 RX ORDER — FOLIC ACID 1 MG/1
1 TABLET ORAL DAILY
Status: COMPLETED | OUTPATIENT
Start: 2025-02-13 | End: 2025-02-16

## 2025-02-12 RX ORDER — DIGOXIN 125 MCG
125 TABLET ORAL DAILY
Status: DISCONTINUED | OUTPATIENT
Start: 2025-02-13 | End: 2025-02-18 | Stop reason: HOSPADM

## 2025-02-12 RX ORDER — LORAZEPAM 2 MG/1
2 TABLET ORAL
Status: DISCONTINUED | OUTPATIENT
Start: 2025-02-12 | End: 2025-02-16

## 2025-02-12 RX ORDER — ONDANSETRON 4 MG/1
4 TABLET, ORALLY DISINTEGRATING ORAL EVERY 4 HOURS PRN
Status: DISCONTINUED | OUTPATIENT
Start: 2025-02-12 | End: 2025-02-18 | Stop reason: HOSPADM

## 2025-02-12 RX ORDER — LORAZEPAM 2 MG/ML
2 INJECTION INTRAMUSCULAR
Status: DISCONTINUED | OUTPATIENT
Start: 2025-02-12 | End: 2025-02-12

## 2025-02-12 RX ORDER — LORAZEPAM 1 MG/1
0.5 TABLET ORAL EVERY 4 HOURS PRN
Status: DISCONTINUED | OUTPATIENT
Start: 2025-02-12 | End: 2025-02-12

## 2025-02-12 RX ADMIN — METOPROLOL TARTRATE 5 MG: 5 INJECTION INTRAVENOUS at 17:55

## 2025-02-12 RX ADMIN — LORAZEPAM 0.5 MG: 2 INJECTION INTRAMUSCULAR; INTRAVENOUS at 19:52

## 2025-02-12 RX ADMIN — METOPROLOL TARTRATE 5 MG: 5 INJECTION INTRAVENOUS at 18:30

## 2025-02-12 RX ADMIN — METOPROLOL TARTRATE 5 MG: 5 INJECTION INTRAVENOUS at 19:18

## 2025-02-12 RX ADMIN — DIGOXIN 125 MCG: 250 INJECTION, SOLUTION INTRAMUSCULAR; INTRAVENOUS; PARENTERAL at 20:19

## 2025-02-12 RX ADMIN — LORAZEPAM 2 MG: 2 INJECTION INTRAMUSCULAR; INTRAVENOUS at 17:01

## 2025-02-12 RX ADMIN — LORAZEPAM 1 MG: 2 INJECTION INTRAMUSCULAR; INTRAVENOUS at 21:54

## 2025-02-12 RX ADMIN — FOLIC ACID: 5 INJECTION, SOLUTION INTRAMUSCULAR; INTRAVENOUS; SUBCUTANEOUS at 17:03

## 2025-02-12 RX ADMIN — METOPROLOL TARTRATE 25 MG: 25 TABLET, FILM COATED ORAL at 18:27

## 2025-02-12 RX ADMIN — ONDANSETRON 4 MG: 2 INJECTION INTRAMUSCULAR; INTRAVENOUS at 19:52

## 2025-02-12 ASSESSMENT — SOCIAL DETERMINANTS OF HEALTH (SDOH)
WITHIN THE PAST 12 MONTHS, THE FOOD YOU BOUGHT JUST DIDN'T LAST AND YOU DIDN'T HAVE MONEY TO GET MORE: NEVER TRUE
WITHIN THE LAST YEAR, HAVE TO BEEN RAPED OR FORCED TO HAVE ANY KIND OF SEXUAL ACTIVITY BY YOUR PARTNER OR EX-PARTNER?: NO
WITHIN THE LAST YEAR, HAVE YOU BEEN KICKED, HIT, SLAPPED, OR OTHERWISE PHYSICALLY HURT BY YOUR PARTNER OR EX-PARTNER?: NO
WITHIN THE LAST YEAR, HAVE YOU BEEN HUMILIATED OR EMOTIONALLY ABUSED IN OTHER WAYS BY YOUR PARTNER OR EX-PARTNER?: NO
IN THE PAST 12 MONTHS, HAS THE ELECTRIC, GAS, OIL, OR WATER COMPANY THREATENED TO SHUT OFF SERVICE IN YOUR HOME?: NO
WITHIN THE LAST YEAR, HAVE YOU BEEN AFRAID OF YOUR PARTNER OR EX-PARTNER?: NO
WITHIN THE PAST 12 MONTHS, YOU WORRIED THAT YOUR FOOD WOULD RUN OUT BEFORE YOU GOT THE MONEY TO BUY MORE: NEVER TRUE

## 2025-02-12 ASSESSMENT — LIFESTYLE VARIABLES
TOTAL SCORE: 4
EVER FELT BAD OR GUILTY ABOUT YOUR DRINKING: YES
TOTAL SCORE: 4
TOTAL SCORE: 4
VISUAL DISTURBANCES: NOT PRESENT
TOTAL SCORE: 4
ON A TYPICAL DAY WHEN YOU DRINK ALCOHOL HOW MANY DRINKS DO YOU HAVE: 4
TOTAL SCORE: 4
AUDITORY DISTURBANCES: NOT PRESENT
AVERAGE NUMBER OF DAYS PER WEEK YOU HAVE A DRINK CONTAINING ALCOHOL: 7
HAVE PEOPLE ANNOYED YOU BY CRITICIZING YOUR DRINKING: YES
ON A TYPICAL DAY WHEN YOU DRINK ALCOHOL HOW MANY DRINKS DO YOU HAVE: 4
CONSUMPTION TOTAL: POSITIVE
NAUSEA AND VOMITING: *
HAVE YOU EVER FELT YOU SHOULD CUT DOWN ON YOUR DRINKING: YES
DOES PATIENT WANT TO STOP DRINKING: NO
HAVE YOU EVER FELT YOU SHOULD CUT DOWN ON YOUR DRINKING: YES
ORIENTATION AND CLOUDING OF SENSORIUM: ORIENTED AND CAN DO SERIAL ADDITIONS
HOW MANY TIMES IN THE PAST YEAR HAVE YOU HAD 5 OR MORE DRINKS IN A DAY: 150
EVER HAD A DRINK FIRST THING IN THE MORNING TO STEADY YOUR NERVES TO GET RID OF A HANGOVER: YES
CONSUMPTION TOTAL: POSITIVE
TOTAL SCORE: 4
HOW MANY TIMES IN THE PAST YEAR HAVE YOU HAD 5 OR MORE DRINKS IN A DAY: 150
TREMOR: MODERATE TREMOR WITH ARMS EXTENDED
DO YOU DRINK ALCOHOL: YES
TOTAL SCORE: 14
ANXIETY: *
PAROXYSMAL SWEATS: NO SWEAT VISIBLE
DOES PATIENT WANT TO STOP DRINKING: NO
EVER HAD A DRINK FIRST THING IN THE MORNING TO STEADY YOUR NERVES TO GET RID OF A HANGOVER: YES
ALCOHOL_USE: YES
HAVE PEOPLE ANNOYED YOU BY CRITICIZING YOUR DRINKING: YES
AVERAGE NUMBER OF DAYS PER WEEK YOU HAVE A DRINK CONTAINING ALCOHOL: 7
EVER FELT BAD OR GUILTY ABOUT YOUR DRINKING: YES
HEADACHE, FULLNESS IN HEAD: MODERATE
AGITATION: NORMAL ACTIVITY

## 2025-02-12 ASSESSMENT — CHA2DS2 SCORE
VASCULAR DISEASE: NO
SEX: FEMALE
CHA2DS2 VASC SCORE: 4
HYPERTENSION: YES
CHF OR LEFT VENTRICULAR DYSFUNCTION: NO
AGE 65 TO 74: NO
PRIOR STROKE OR TIA OR THROMBOEMBOLISM: NO
DIABETES: NO
AGE 75 OR GREATER: YES

## 2025-02-12 ASSESSMENT — COGNITIVE AND FUNCTIONAL STATUS - GENERAL
DRESSING REGULAR LOWER BODY CLOTHING: A LITTLE
MOBILITY SCORE: 24
SUGGESTED CMS G CODE MODIFIER MOBILITY: CH
DAILY ACTIVITIY SCORE: 21
TOILETING: A LITTLE
HELP NEEDED FOR BATHING: A LITTLE
SUGGESTED CMS G CODE MODIFIER DAILY ACTIVITY: CJ

## 2025-02-12 ASSESSMENT — HEART SCORE
AGE: 65+
ECG: NORMAL
TROPONIN: LESS THAN OR EQUAL TO NORMAL LIMIT
HISTORY: SLIGHTLY SUSPICIOUS
RISK FACTORS: >2 RISK FACTORS OR HX OF ATHEROSCLEROTIC DISEASE
HEART SCORE: 4

## 2025-02-12 ASSESSMENT — FIBROSIS 4 INDEX
FIB4 SCORE: 12
FIB4 SCORE: 5.46

## 2025-02-12 ASSESSMENT — PAIN DESCRIPTION - PAIN TYPE: TYPE: ACUTE PAIN

## 2025-02-13 LAB
ALBUMIN SERPL BCP-MCNC: 3.1 G/DL (ref 3.2–4.9)
ALBUMIN/GLOB SERPL: 1.4 G/DL
ALP SERPL-CCNC: 73 U/L (ref 30–99)
ALT SERPL-CCNC: 14 U/L (ref 2–50)
ANION GAP SERPL CALC-SCNC: 11 MMOL/L (ref 7–16)
AST SERPL-CCNC: 39 U/L (ref 12–45)
BASOPHILS # BLD AUTO: 1.1 % (ref 0–1.8)
BASOPHILS # BLD: 0.05 K/UL (ref 0–0.12)
BILIRUB SERPL-MCNC: 3.1 MG/DL (ref 0.1–1.5)
BUN SERPL-MCNC: 8 MG/DL (ref 8–22)
CALCIUM ALBUM COR SERPL-MCNC: 8.2 MG/DL (ref 8.5–10.5)
CALCIUM SERPL-MCNC: 7.5 MG/DL (ref 8.5–10.5)
CHLORIDE SERPL-SCNC: 102 MMOL/L (ref 96–112)
CO2 SERPL-SCNC: 25 MMOL/L (ref 20–33)
CREAT SERPL-MCNC: 0.55 MG/DL (ref 0.5–1.4)
EOSINOPHIL # BLD AUTO: 0.05 K/UL (ref 0–0.51)
EOSINOPHIL NFR BLD: 1.1 % (ref 0–6.9)
ERYTHROCYTE [DISTWIDTH] IN BLOOD BY AUTOMATED COUNT: 62.8 FL (ref 35.9–50)
GFR SERPLBLD CREATININE-BSD FMLA CKD-EPI: 94 ML/MIN/1.73 M 2
GLOBULIN SER CALC-MCNC: 2.2 G/DL (ref 1.9–3.5)
GLUCOSE SERPL-MCNC: 162 MG/DL (ref 65–99)
HCT VFR BLD AUTO: 36.4 % (ref 37–47)
HGB BLD-MCNC: 12.1 G/DL (ref 12–16)
IMM GRANULOCYTES # BLD AUTO: 0.02 K/UL (ref 0–0.11)
IMM GRANULOCYTES NFR BLD AUTO: 0.4 % (ref 0–0.9)
LYMPHOCYTES # BLD AUTO: 1.76 K/UL (ref 1–4.8)
LYMPHOCYTES NFR BLD: 37.4 % (ref 22–41)
MCH RBC QN AUTO: 34.6 PG (ref 27–33)
MCHC RBC AUTO-ENTMCNC: 33.2 G/DL (ref 32.2–35.5)
MCV RBC AUTO: 104 FL (ref 81.4–97.8)
MONOCYTES # BLD AUTO: 0.37 K/UL (ref 0–0.85)
MONOCYTES NFR BLD AUTO: 7.9 % (ref 0–13.4)
NEUTROPHILS # BLD AUTO: 2.46 K/UL (ref 1.82–7.42)
NEUTROPHILS NFR BLD: 52.1 % (ref 44–72)
NRBC # BLD AUTO: 0.02 K/UL
NRBC BLD-RTO: 0.4 /100 WBC (ref 0–0.2)
PLATELET # BLD AUTO: 52 K/UL (ref 164–446)
PLATELETS.RETICULATED NFR BLD AUTO: 3.7 % (ref 0.6–13.1)
PMV BLD AUTO: 9.9 FL (ref 9–12.9)
POTASSIUM SERPL-SCNC: 3.3 MMOL/L (ref 3.6–5.5)
PROT SERPL-MCNC: 5.3 G/DL (ref 6–8.2)
RBC # BLD AUTO: 3.5 M/UL (ref 4.2–5.4)
SODIUM SERPL-SCNC: 138 MMOL/L (ref 135–145)
WBC # BLD AUTO: 4.7 K/UL (ref 4.8–10.8)

## 2025-02-13 PROCEDURE — 700102 HCHG RX REV CODE 250 W/ 637 OVERRIDE(OP): Performed by: INTERNAL MEDICINE

## 2025-02-13 PROCEDURE — 99233 SBSQ HOSP IP/OBS HIGH 50: CPT | Performed by: INTERNAL MEDICINE

## 2025-02-13 PROCEDURE — 700101 HCHG RX REV CODE 250: Performed by: STUDENT IN AN ORGANIZED HEALTH CARE EDUCATION/TRAINING PROGRAM

## 2025-02-13 PROCEDURE — 770020 HCHG ROOM/CARE - TELE (206)

## 2025-02-13 PROCEDURE — 700105 HCHG RX REV CODE 258: Performed by: STUDENT IN AN ORGANIZED HEALTH CARE EDUCATION/TRAINING PROGRAM

## 2025-02-13 PROCEDURE — 85055 RETICULATED PLATELET ASSAY: CPT

## 2025-02-13 PROCEDURE — 36415 COLL VENOUS BLD VENIPUNCTURE: CPT

## 2025-02-13 PROCEDURE — 700102 HCHG RX REV CODE 250 W/ 637 OVERRIDE(OP): Performed by: STUDENT IN AN ORGANIZED HEALTH CARE EDUCATION/TRAINING PROGRAM

## 2025-02-13 PROCEDURE — A9270 NON-COVERED ITEM OR SERVICE: HCPCS | Performed by: STUDENT IN AN ORGANIZED HEALTH CARE EDUCATION/TRAINING PROGRAM

## 2025-02-13 PROCEDURE — 700111 HCHG RX REV CODE 636 W/ 250 OVERRIDE (IP): Performed by: STUDENT IN AN ORGANIZED HEALTH CARE EDUCATION/TRAINING PROGRAM

## 2025-02-13 PROCEDURE — A9270 NON-COVERED ITEM OR SERVICE: HCPCS | Performed by: INTERNAL MEDICINE

## 2025-02-13 PROCEDURE — 85025 COMPLETE CBC W/AUTO DIFF WBC: CPT

## 2025-02-13 PROCEDURE — 80053 COMPREHEN METABOLIC PANEL: CPT

## 2025-02-13 RX ORDER — MAGNESIUM SULFATE HEPTAHYDRATE 40 MG/ML
4 INJECTION, SOLUTION INTRAVENOUS ONCE
Status: COMPLETED | OUTPATIENT
Start: 2025-02-13 | End: 2025-02-13

## 2025-02-13 RX ORDER — POTASSIUM CHLORIDE 1500 MG/1
40 TABLET, EXTENDED RELEASE ORAL ONCE
Status: COMPLETED | OUTPATIENT
Start: 2025-02-13 | End: 2025-02-13

## 2025-02-13 RX ADMIN — MAGNESIUM SULFATE HEPTAHYDRATE 4 G: 4 INJECTION, SOLUTION INTRAVENOUS at 06:34

## 2025-02-13 RX ADMIN — CHLORDIAZEPOXIDE HYDROCHLORIDE 50 MG: 25 CAPSULE ORAL at 17:31

## 2025-02-13 RX ADMIN — LORAZEPAM 2 MG: 2 TABLET ORAL at 10:05

## 2025-02-13 RX ADMIN — MIRTAZAPINE 15 MG: 15 TABLET, FILM COATED ORAL at 20:06

## 2025-02-13 RX ADMIN — LORAZEPAM 3 MG: 2 TABLET ORAL at 05:25

## 2025-02-13 RX ADMIN — CHLORDIAZEPOXIDE HYDROCHLORIDE 50 MG: 25 CAPSULE ORAL at 05:06

## 2025-02-13 RX ADMIN — LORAZEPAM 2 MG: 2 TABLET ORAL at 20:09

## 2025-02-13 RX ADMIN — Medication 100 MG: at 05:07

## 2025-02-13 RX ADMIN — LORAZEPAM 2 MG: 2 TABLET ORAL at 13:10

## 2025-02-13 RX ADMIN — LORAZEPAM 1 MG: 1 TABLET ORAL at 23:31

## 2025-02-13 RX ADMIN — METOPROLOL TARTRATE 100 MG: 50 TABLET, FILM COATED ORAL at 00:31

## 2025-02-13 RX ADMIN — VENLAFAXINE HYDROCHLORIDE 150 MG: 75 TABLET ORAL at 07:59

## 2025-02-13 RX ADMIN — POTASSIUM CHLORIDE 40 MEQ: 1500 TABLET, EXTENDED RELEASE ORAL at 13:11

## 2025-02-13 RX ADMIN — THERA TABS 1 TABLET: TAB at 05:07

## 2025-02-13 RX ADMIN — CHLORDIAZEPOXIDE HYDROCHLORIDE 50 MG: 25 CAPSULE ORAL at 13:56

## 2025-02-13 RX ADMIN — APIXABAN 5 MG: 5 TABLET, FILM COATED ORAL at 05:07

## 2025-02-13 RX ADMIN — SENNOSIDES AND DOCUSATE SODIUM 2 TABLET: 50; 8.6 TABLET ORAL at 17:33

## 2025-02-13 RX ADMIN — FOLIC ACID 1 MG: 1 TABLET ORAL at 05:07

## 2025-02-13 RX ADMIN — METOPROLOL TARTRATE 100 MG: 50 TABLET, FILM COATED ORAL at 05:07

## 2025-02-13 RX ADMIN — CHLORDIAZEPOXIDE HYDROCHLORIDE 50 MG: 25 CAPSULE ORAL at 00:30

## 2025-02-13 RX ADMIN — FOLIC ACID: 5 INJECTION, SOLUTION INTRAMUSCULAR; INTRAVENOUS; SUBCUTANEOUS at 02:19

## 2025-02-13 RX ADMIN — LORAZEPAM 2 MG: 2 TABLET ORAL at 02:27

## 2025-02-13 RX ADMIN — DIGOXIN 125 MCG: 0.25 TABLET ORAL at 07:55

## 2025-02-13 RX ADMIN — LORAZEPAM 2 MG: 2 TABLET ORAL at 00:31

## 2025-02-13 RX ADMIN — HYDROXYZINE HYDROCHLORIDE 25 MG: 25 TABLET, FILM COATED ORAL at 20:06

## 2025-02-13 RX ADMIN — LORAZEPAM 2 MG: 2 TABLET ORAL at 07:52

## 2025-02-13 RX ADMIN — HYDROXYZINE HYDROCHLORIDE 25 MG: 25 TABLET, FILM COATED ORAL at 00:31

## 2025-02-13 RX ADMIN — METOPROLOL TARTRATE 100 MG: 50 TABLET, FILM COATED ORAL at 17:28

## 2025-02-13 RX ADMIN — CHLORDIAZEPOXIDE HYDROCHLORIDE 25 MG: 25 CAPSULE ORAL at 23:31

## 2025-02-13 RX ADMIN — MIRTAZAPINE 15 MG: 15 TABLET, FILM COATED ORAL at 00:31

## 2025-02-13 RX ADMIN — LORAZEPAM 2 MG: 2 TABLET ORAL at 15:22

## 2025-02-13 RX ADMIN — LORAZEPAM 2 MG: 2 TABLET ORAL at 17:29

## 2025-02-13 ASSESSMENT — COPD QUESTIONNAIRES
DO YOU EVER COUGH UP ANY MUCUS OR PHLEGM?: NO/ONLY WITH OCCASIONAL COLDS OR INFECTIONS
COPD SCREENING SCORE: 4
HAVE YOU SMOKED AT LEAST 100 CIGARETTES IN YOUR ENTIRE LIFE: YES
DURING THE PAST 4 WEEKS HOW MUCH DID YOU FEEL SHORT OF BREATH: NONE/LITTLE OF THE TIME

## 2025-02-13 ASSESSMENT — LIFESTYLE VARIABLES
ANXIETY: MILDLY ANXIOUS
TOTAL SCORE: VERY MILD ITCHING, PINS AND NEEDLES SENSATION, BURNING OR NUMBNESS
PAROXYSMAL SWEATS: NO SWEAT VISIBLE
PAROXYSMAL SWEATS: NO SWEAT VISIBLE
TOTAL SCORE: 14
AUDITORY DISTURBANCES: NOT PRESENT
HEADACHE, FULLNESS IN HEAD: MILD
TOTAL SCORE: VERY MILD ITCHING, PINS AND NEEDLES SENSATION, BURNING OR NUMBNESS
AUDITORY DISTURBANCES: NOT PRESENT
NAUSEA AND VOMITING: MILD NAUSEA WITH NO VOMITING
NAUSEA AND VOMITING: NO NAUSEA AND NO VOMITING
TREMOR: *
ANXIETY: MILDLY ANXIOUS
NAUSEA AND VOMITING: MILD NAUSEA WITH NO VOMITING
HEADACHE, FULLNESS IN HEAD: NOT PRESENT
VISUAL DISTURBANCES: NOT PRESENT
VISUAL DISTURBANCES: NOT PRESENT
TREMOR: *
AGITATION: *
AGITATION: *
VISUAL DISTURBANCES: NOT PRESENT
HEADACHE, FULLNESS IN HEAD: MILD
VISUAL DISTURBANCES: VERY MILD SENSITIVITY
NAUSEA AND VOMITING: MILD NAUSEA WITH NO VOMITING
PAROXYSMAL SWEATS: NO SWEAT VISIBLE
TOTAL SCORE: 12
TOTAL SCORE: 13
ANXIETY: *
ORIENTATION AND CLOUDING OF SENSORIUM: DISORIENTED FOR PLACE AND / OR PERSON
ANXIETY: *
NAUSEA AND VOMITING: MILD NAUSEA WITH NO VOMITING
ANXIETY: *
TREMOR: *
AGITATION: *
TOTAL SCORE: 16
TREMOR: MODERATE TREMOR WITH ARMS EXTENDED
HEADACHE, FULLNESS IN HEAD: NOT PRESENT
ORIENTATION AND CLOUDING OF SENSORIUM: DATE DISORIENTATION BY MORE THAN TWO CALENDAR DAYS
AUDITORY DISTURBANCES: NOT PRESENT
PAROXYSMAL SWEATS: NO SWEAT VISIBLE
VISUAL DISTURBANCES: NOT PRESENT
AGITATION: *
ORIENTATION AND CLOUDING OF SENSORIUM: CANNOT DO SERIAL ADDITIONS OR IS UNCERTAIN ABOUT DATE
ORIENTATION AND CLOUDING OF SENSORIUM: ORIENTED AND CAN DO SERIAL ADDITIONS
AUDITORY DISTURBANCES: NOT PRESENT
ANXIETY: MILDLY ANXIOUS
PAROXYSMAL SWEATS: NO SWEAT VISIBLE
ORIENTATION AND CLOUDING OF SENSORIUM: ORIENTED AND CAN DO SERIAL ADDITIONS
VISUAL DISTURBANCES: NOT PRESENT
ORIENTATION AND CLOUDING OF SENSORIUM: ORIENTED AND CAN DO SERIAL ADDITIONS
TREMOR: MODERATE TREMOR WITH ARMS EXTENDED
AUDITORY DISTURBANCES: NOT PRESENT
ANXIETY: MILDLY ANXIOUS
NAUSEA AND VOMITING: MILD NAUSEA WITH NO VOMITING
SUBSTANCE_ABUSE: 1
TREMOR: *
AGITATION: *
TOTAL SCORE: VERY MILD ITCHING, PINS AND NEEDLES SENSATION, BURNING OR NUMBNESS
VISUAL DISTURBANCES: NOT PRESENT
PAROXYSMAL SWEATS: BARELY PERCEPTIBLE SWEATING. PALMS MOIST
AGITATION: *
TOTAL SCORE: 11
HEADACHE, FULLNESS IN HEAD: MILD
TOTAL SCORE: 11
NAUSEA AND VOMITING: MILD NAUSEA WITH NO VOMITING
NAUSEA AND VOMITING: NO NAUSEA AND NO VOMITING
HEADACHE, FULLNESS IN HEAD: NOT PRESENT
AGITATION: *
VISUAL DISTURBANCES: NOT PRESENT
AGITATION: *
PAROXYSMAL SWEATS: NO SWEAT VISIBLE
PAROXYSMAL SWEATS: NO SWEAT VISIBLE
HEADACHE, FULLNESS IN HEAD: VERY MILD
AUDITORY DISTURBANCES: NOT PRESENT
AGITATION: *
TOTAL SCORE: 10
AUDITORY DISTURBANCES: NOT PRESENT
ORIENTATION AND CLOUDING OF SENSORIUM: ORIENTED AND CAN DO SERIAL ADDITIONS
ANXIETY: *
TOTAL SCORE: 11
VISUAL DISTURBANCES: NOT PRESENT
TOTAL SCORE: 14
VISUAL DISTURBANCES: NOT PRESENT
AUDITORY DISTURBANCES: NOT PRESENT
TREMOR: *
AGITATION: SOMEWHAT MORE THAN NORMAL ACTIVITY
TREMOR: *
HEADACHE, FULLNESS IN HEAD: MODERATE
AUDITORY DISTURBANCES: NOT PRESENT
ANXIETY: MILDLY ANXIOUS
NAUSEA AND VOMITING: MILD NAUSEA WITH NO VOMITING
ORIENTATION AND CLOUDING OF SENSORIUM: ORIENTED AND CAN DO SERIAL ADDITIONS
ORIENTATION AND CLOUDING OF SENSORIUM: ORIENTED AND CAN DO SERIAL ADDITIONS
HEADACHE, FULLNESS IN HEAD: MILD
TOTAL SCORE: 11
ORIENTATION AND CLOUDING OF SENSORIUM: DATE DISORIENTATION BY NO MORE THAN TWO CALENDAR DAYS
PAROXYSMAL SWEATS: NO SWEAT VISIBLE
AUDITORY DISTURBANCES: MILD HARSHNESS OR ABILITY TO FRIGHTEN
TREMOR: MODERATE TREMOR WITH ARMS EXTENDED
TREMOR: *
ANXIETY: *
NAUSEA AND VOMITING: NO NAUSEA AND NO VOMITING
HEADACHE, FULLNESS IN HEAD: MODERATELY SEVERE
PAROXYSMAL SWEATS: NO SWEAT VISIBLE

## 2025-02-13 ASSESSMENT — ENCOUNTER SYMPTOMS
SHORTNESS OF BREATH: 1
PALPITATIONS: 1
DEPRESSION: 1
NAUSEA: 1
VOMITING: 1

## 2025-02-13 ASSESSMENT — FIBROSIS 4 INDEX: FIB4 SCORE: 15.43

## 2025-02-13 ASSESSMENT — PAIN DESCRIPTION - PAIN TYPE: TYPE: ACUTE PAIN

## 2025-02-13 NOTE — ASSESSMENT & PLAN NOTE
Clinically improved  DC CIWA protocol and monitor  Continue thiamine and folic acid  Patient counseled on alcohol cessation

## 2025-02-13 NOTE — PROGRESS NOTES
Received patient from ED RN in acute distress: A&Ox4, 5L NC, 7/10 pain, vomiting, placed patient on telemetry; notified monitor room, and administered medication per MAR.

## 2025-02-13 NOTE — ASSESSMENT & PLAN NOTE
Encourage incentive spirometry use and wean off oxygen as tolerated  Patient is afebrile no clinical signs of pneumonia  I reviewed chest x-ray with no acute pathology

## 2025-02-13 NOTE — H&P
Hospital Medicine History & Physical Note    Date of Service  2/12/2025    Primary Care Physician  Jeanie Brewer D.O.    Consultants  None    Code Status  Full Code    Chief Complaint  Chief Complaint   Patient presents with    Shortness of Breath     Patient reports SOB since yesterday.   Patient arrives in Afib with RVR, rate 160s    N/V     Patient reports N/V since yesterday.       History of Presenting Illness  Jeanie Hutchison is a 79 y.o. female severe alcohol abuse with complications, hypertension, hypothyroidism, atrial fibrillation, history of SVT, medical noncompliance who presented 2/12/2025 with dyspnea, nausea and vomiting.    Patient presents to the ED complaining of nausea, vomiting, dyspnea ongoing for the past 5 days and progressive.  Patient admits to daily liquor consumption.  She reports compliance with Eliquis, metoprolol and digoxin.  Denies any recent fevers or chills, headache or vision changes, chest pain, cough, abdominal pain, dysuria diarrhea.    In the ED she is afebrile pulse 100 160 respiratory rate 18-24 systolic blood pressure of 102-1 150s pulse ox 83 to 98% from 2 to 5 L nasal cannula.  CBC with thrombocytopenia 69, bicarb 18 corrected calcium 8.4 total bilirubin 2.4 magnesium 1.2 serum alcohol 170 troponin T 8 proBNP 868 chest x-ray negative for acute processes EKG shows A-fib with RVR LAFB.  Treated with metoprolol, digoxin, IV fluids, subsequently referred to hospitalist for admission.    I discussed the plan of care with patient, bedside RN, charge RN, , and pharmacy.    Review of Systems  Review of Systems   Constitutional:  Positive for malaise/fatigue.   Respiratory:  Positive for shortness of breath.    Gastrointestinal:  Positive for nausea and vomiting.       Past Medical History   has a past medical history of Alcoholism (HCC), Anticoagulated, Apnea, sleep, Chronic pain, Hypothyroidism, Insomnia, Paroxysmal atrial fibrillation (HCC), Psychiatric disorder,  Snoring, and Ventricular tachycardia (HCC).    Surgical History   has a past surgical history that includes breast biopsy; other orthopedic surgery; orif, fracture, clavicle (5/21/2014); and tonsillectomy.     Family History  family history includes Anxiety disorder in her mother; Depression in her mother; Diabetes in her mother; Hyperlipidemia in her father.       Social History   reports that she has never smoked. She has never used smokeless tobacco. She reports current alcohol use of about 8.4 - 12.6 oz of alcohol per week. She reports that she does not currently use drugs after having used the following drugs: Oral.    Allergies  No Known Allergies    Medications  Prior to Admission Medications   Prescriptions Last Dose Informant Patient Reported? Taking?   amLODIPine (NORVASC) 2.5 MG Tab   No No   Sig: Take 1 Tablet by mouth every day.   apixaban (ELIQUIS) 5mg Tab   No No   Sig: Take 1 Tablet by mouth 2 times a day.   carboxymethylcellulose (REFRESH TEARS) 0.5 % Solution   No No   Sig: Administer 2 Drops into both eyes as needed (dry/red eyes).   digoxin (LANOXIN) 125 MCG Tab   No No   Sig: Take 1 Tablet by mouth every day.   estradiol (ESTRACE) 0.1 MG/GM vaginal cream   Yes No   Sig: Insert 1-2 g into the vagina see administration instructions. 2 g daily x 2 weeks then 1 g 3 x a week after   folic acid (FOLVITE) 1 MG Tab   No No   Sig: Take 1 Tablet by mouth every day.   hydrOXYzine HCl (ATARAX) 25 MG Tab 2/10/2025 Evening Patient Yes No   Sig: Take 25 mg by mouth at bedtime.   levothyroxine (SYNTHROID) 50 MCG Tab   No No   Sig: Take 1 Tablet by mouth every morning on an empty stomach.   Patient not taking: Reported on 1/3/2025   metoprolol SR (TOPROL XL) 25 MG TABLET SR 24 HR 2/10/2025 Morning Patient No No   Sig: Take 3 Tablets by mouth every day.   Patient taking differently: Take 75 mg by mouth every day. Hold if BP<110/55   mirtazapine (REMERON) 15 MG Tab   Yes No   Sig: Take 15 mg by mouth at bedtime.    multivitamin Tab   No No   Sig: Take 1 Tablet by mouth every day.   ondansetron (ZOFRAN ODT) 4 MG TABLET DISPERSIBLE   No No   Sig: Take 1 Tablet by mouth every four hours as needed for Nausea/Vomiting (give PO if IV route is unavailable.).   polyethylene glycol/lytes (MIRALAX) Pack   No No   Sig: Take 1 Packet by mouth 1 time a day as needed (if no bowel movement in last 2 days).   senna-docusate (PERICOLACE OR SENOKOT S) 8.6-50 MG Tab   No No   Sig: Take 2 Tablets by mouth every evening.   thiamine (THIAMINE) 100 MG tablet   No No   Sig: Take 1 Tablet by mouth every day.   traZODone (DESYREL) 100 MG Tab  Patient's Home Pharmacy Yes No   Sig: Take 100 mg by mouth every evening.   venlafaxine (EFFEXOR-XR) 150 MG extended-release capsule  Patient's Home Pharmacy Yes No   Sig: Take 150 mg by mouth every day.      Facility-Administered Medications: None       Physical Exam  Temp:  [36.9 °C (98.4 °F)-37.6 °C (99.7 °F)] 37.6 °C (99.7 °F)  Pulse:  [105-160] 124  Resp:  [18-24] 24  BP: (102-148)/() 124/93  SpO2:  [83 %-98 %] 96 %  Blood Pressure : (!) 124/93 (Simultaneous filing. User may not have seen previous data.)   Temperature: 37.6 °C (99.7 °F) (Simultaneous filing. User may not have seen previous data.)   Pulse: (!) 124 (Simultaneous filing. User may not have seen previous data.)   Respiration: (!) 24   Pulse Oximetry: 96 % (Simultaneous filing. User may not have seen previous data.)       Physical Exam  Vitals and nursing note reviewed.   Constitutional:       General: She is not in acute distress.     Appearance: She is ill-appearing (chronically). She is not toxic-appearing.      Comments: Tremulous   HENT:      Head: Normocephalic and atraumatic.      Nose: Nose normal. No rhinorrhea.      Mouth/Throat:      Mouth: Mucous membranes are dry.      Pharynx: Oropharynx is clear.   Eyes:      General: No scleral icterus.     Extraocular Movements: Extraocular movements intact.   Cardiovascular:      Rate and  Rhythm: Tachycardia present. Rhythm irregular.   Pulmonary:      Effort: No respiratory distress.      Breath sounds: No wheezing, rhonchi or rales.   Abdominal:      General: There is no distension.      Palpations: Abdomen is soft.      Tenderness: There is no abdominal tenderness. There is no guarding or rebound.   Musculoskeletal:         General: Normal range of motion.      Cervical back: Normal range of motion and neck supple. No rigidity.   Skin:     General: Skin is warm and dry.      Capillary Refill: Capillary refill takes less than 2 seconds.   Neurological:      General: No focal deficit present.      Mental Status: She is alert and oriented to person, place, and time. Mental status is at baseline.      Cranial Nerves: No cranial nerve deficit.      Sensory: No sensory deficit.      Motor: No weakness.   Psychiatric:      Comments: Anxious         Laboratory:  Recent Labs     02/12/25  1635   WBC 6.2   RBC 4.05*   HEMOGLOBIN 14.1   HEMATOCRIT 41.9   .5*   MCH 34.8*   MCHC 33.7   RDW 63.2*   PLATELETCT 69*   MPV 9.4     Recent Labs     02/12/25  1635   SODIUM 140   POTASSIUM 3.9   CHLORIDE 100   CO2 18*   GLUCOSE 71   BUN 12   CREATININE 0.61   CALCIUM 8.1*     Recent Labs     02/12/25  1635   ALTSGPT 16   ASTSGOT 43   ALKPHOSPHAT 90   TBILIRUBIN 2.4*   LIPASE 30   GLUCOSE 71         Recent Labs     02/12/25  1635   NTPROBNP 868*         Recent Labs     02/12/25  1635   TROPONINT 8       Imaging:  DX-CHEST-PORTABLE (1 VIEW)   Final Result         No acute cardiac or pulmonary abnormality is identified.          X-Ray:  I have personally reviewed the images and compared with prior images.  EKG:  I have personally reviewed the images and compared with prior images.    Assessment/Plan:  Justification for Admission Status  I anticipate this patient will require at least two midnights for appropriate medical management, necessitating inpatient admission because A-fib RVR, alcohol withdrawal    * Atrial  fibrillation with RVR (HCC)- (present on admission)  Assessment & Plan  Secondary to noncompliance with medication, continued alcohol abuse.  Continue Eliquis, digoxin, metoprolol.  IV metoprolol for sustained RVR greater than 110    Alcohol intoxication in active alcoholic with complication (HCC)- (present on admission)  Assessment & Plan  Here with elevated serum alcohol level and already appearing to go into early withdrawals.  Alcohol cessation counseling when appropriate    Acquired hypothyroidism- (present on admission)  Assessment & Plan  Not taking any medications, check TSH    Major depressive disorder with current active episode- (present on admission)  Assessment & Plan  Continue Effexor, Remeron, Atarax    Alcohol withdrawal syndrome with perceptual disturbance (HCC)- (present on admission)  Assessment & Plan  CIWA protocol with serial neuro exams  MVI, folate, thiamine  Check & replace magnesium, potassium, phosphorus  On Librium for long acting treatment of withdrawal symptoms    Presence of automatic cardioverter/defibrillator (AICD)- (present on admission)  Assessment & Plan  Noted    Acute respiratory failure with hypoxia (HCC)- (present on admission)  Assessment & Plan  Oxygen per guidelines, wean as able  RT consult, continuous pulse ox, incentive spirometry  No leukocytosis, chest x-ray without acute processes.  Patient presents with alcohol intoxication, withdrawal, A-fib with RVR.  DuoNebs as needed        Recurrent falls- (present on admission)  Assessment & Plan  Fall precautions.  Unclear if from consistently being intoxicated or gait disturbance from long-term alcohol abuse versus physical debility.        VTE prophylaxis: SCDs/TEDs  Total time spent on admission 77 minutes.    This included my review with ER physician, review of ED events, patient's history, outside records, recent records, face to face interview, physical examination; my review of lab results (CBC, chemistry panel),  imaging review (CXR) and ECG. Also includes counseling patient on admission, treatment plan, and documentation of encounter.

## 2025-02-13 NOTE — ED NOTES
Administered Metoprolol 25mg PO and Metoprolol via IV per MD orders/MAR. Pt was compliant when giving medication.

## 2025-02-13 NOTE — PROGRESS NOTES
4 Eyes Skin Assessment Completed by Kathy WING RN and MORGAN Millan.    Head WDL  Ears WDL  Nose WDL  Mouth WDL  Neck Redness and Blanching  Breast/Chest Redness and Blanching  Shoulder Blades Redness and Blanching  Spine WDL  (R) Arm/Elbow/Hand Redness and Blanching  (L) Arm/Elbow/Hand Redness and Blanching  Abdomen WDL  Groin WDL  Scrotum/Coccyx/Buttocks WDL  (R) Leg WDL  (L) Leg WDL  (R) Heel/Foot/Toe Redness, Blanching, and Swelling  (L) Heel/Foot/Toe Redness, Blanching, and Swelling          Devices In Places Tele Box, Blood Pressure Cuff, and Pulse Ox      Interventions In Place NC W/Ear Foams and Pillows    Possible Skin Injury No    Pictures Uploaded Into Epic N/A  Wound Consult Placed N/A  RN Wound Prevention Protocol Ordered No

## 2025-02-13 NOTE — CARE PLAN
The patient is Watcher - Medium risk of patient condition declining or worsening    Shift Goals  Clinical Goals: CIWA, VSS, safety  Patient Goals: sleep  Family Goals: DANICA    Progress made toward(s) clinical / shift goals:      Problem: Knowledge Deficit - Standard  Goal: Patient and family/care givers will demonstrate understanding of plan of care, disease process/condition, diagnostic tests and medications  Outcome: Progressing     Problem: Optimal Care for Alcohol Withdrawal  Goal: Optimal Care for the alcohol withdrawal patient  Outcome: Progressing     Problem: Seizure Precautions  Goal: Implementation of seizure precautions  Outcome: Progressing       Patient is not progressing towards the following goals:

## 2025-02-13 NOTE — ED NOTES
Med Rec partially complete per patient. She was not able to remember when she last took most of her medications or verify the names.     Allergies reviewed: yes     Antibiotics in the past 30 days: no    Anticoagulant in past 14 days: unknown    Eliquis 5 mg is on her med list from previous Med Rec but she doesn't know if she takes it.     [facility] utilizes: [facility]  [phone]

## 2025-02-13 NOTE — ASSESSMENT & PLAN NOTE
Secondary to noncompliance with medication, continued alcohol abuse.  Continue metoprolol and digoxin  Thrombocytopenia improved will resume Eliquis

## 2025-02-13 NOTE — ASSESSMENT & PLAN NOTE
Here with elevated serum alcohol level and already appearing to go into early withdrawals.  Alcohol cessation counseling provided

## 2025-02-13 NOTE — CONSULTS
"Attempted to meet with patient today to discuss depression and anxiety but of note she is very somnolent and confused.  She reports she is currently in\" a very old house, maybe 1 that was built 50 years ago.\"  I attempted to reorient the patient multiple times in the interview but she still could not recall where she is located.  She is aware of the month and the year.  She is unable to tell me what medication she was taking for depression.  She is able to recall that she was having a lot of coughing and her heart rate was fast prior to admission.  She does not recall how she came here and as noted above does not recall where she is.  Effexor and Remeron have been restarted since admission as patient has been readmitted multiple times in the last several months for alcohol withdrawal and typically takes both of these medications when she is admitted.  It appears that these medications were filled as an outpatient as well.  Will attempt to revisit the patient tomorrow and conduct a full psychiatric interview when she is better oriented.  Medicine team has already ordered CIWA protocol and Librium taper for alcohol withdrawal.  I did try to contact the patient's son but he did not answer.  I left a voicemail for a call back so I can obtain more collateral information.  "

## 2025-02-13 NOTE — PROGRESS NOTES
Received report from RN. Assumed pt care. Patient on tele box, on 4L NC, and spO2 at 95%. Patient A&O x4. Fall precautions in place, call light and belongings within reach, bed in lowest position. No signs of distress.

## 2025-02-13 NOTE — PROGRESS NOTES
Monitor Summary  Rhythm: AFIB  Rate:   Ectopy: >151 HB, (O)PVC  Measurements: .19/.08/.37  ---12 hr Chart Review---

## 2025-02-13 NOTE — PROGRESS NOTES
Discussed POC: labs, medications, treatments with patient who verbalized understanding with all questions answered. Bed locked/lowest position with alarm on; call bell and possessions within reach. Patient denies additional needs at this time.

## 2025-02-13 NOTE — ED NOTES
Administered Metoprolol medication per MD/MAR orders. Took pts vitals before administration. Pt was compliant. Pt  and /72 after administration.

## 2025-02-13 NOTE — PROGRESS NOTES
Med rec is complete per [facility]     Has patient had any outside antibiotics in the last 30 days? N    Any Anticoagulants (rivaroxaban, apixaban, edoxaban, dabigatran, warfarin, enoxaparin) taken in the last 14 days? Y  Anticoagulant name: Eliquis, Last dose: unknown.        Pharmacy/Pharmacies Pt utilizes : [facility] [phone]

## 2025-02-13 NOTE — ED PROVIDER NOTES
CHIEF COMPLAINT  Chief Complaint   Patient presents with    Shortness of Breath     Patient reports SOB since yesterday.   Patient arrives in Afib with RVR, rate 160s    N/V     Patient reports N/V since yesterday.       LIMITATION TO HISTORY   Select: alcohol intoxication    HPI    Jeanie Hutchison is a 79 y.o. female who presents to the Emergency Department presented for evaluation of nausea vomiting shortness of breath, which has been ongoing for the past day.  Patient does consume alcohol daily and states that she drinks about 4 shots a day.  Denies any chest pain, has had a dry nonproductive cough, no fevers, no abdominal pain no diarrhea.  Is taking metoprolol and digoxin.  And is compliant with her Eliquis.  No recent trauma or falls.    OUTSIDE HISTORIAN(S):  Select: None    EXTERNAL RECORDS REVIEWED  Select: Other discharge summary 1/2/2025, patient has a history of atrial fibrillation with rapid ventricular response,      PAST MEDICAL HISTORY  Past Medical History:   Diagnosis Date    Alcoholism (HCC)     Anticoagulated     Apnea, sleep     Chronic pain     r/t pelvic fracture    Hypothyroidism     Insomnia     Trouble going to Sleep     Paroxysmal atrial fibrillation (HCC)     Psychiatric disorder     history of depression and anxiety     Snoring     Ventricular tachycardia (HCC)      .    SURGICAL HISTORY  Past Surgical History:   Procedure Laterality Date    ORIF, FRACTURE, CLAVICLE  5/21/2014    Performed by Wilfred Gonzalez M.D. at SURGERY [facility] ORS    BREAST BIOPSY      bilateral neg breast bxs    OTHER ORTHOPEDIC SURGERY      pelvis fracture. 10 years ago     TONSILLECTOMY           FAMILY HISTORY  Family History   Problem Relation Age of Onset    Diabetes Mother     Anxiety disorder Mother     Depression Mother     Hyperlipidemia Father           SOCIAL HISTORY  Social History     Socioeconomic History    Marital status:      Spouse name: Not on file    Number of children: Not on  file    Years of education: Not on file    Highest education level: Not on file   Occupational History    Not on file   Tobacco Use    Smoking status: Never    Smokeless tobacco: Never   Vaping Use    Vaping status: Never Used   Substance and Sexual Activity    Alcohol use: Yes     Alcohol/week: 8.4 - 12.6 oz     Types: 14 - 21 Standard drinks or equivalent per week     Comment: everyday (last 2 months 2-3 everyday)    Drug use: Not Currently     Types: Oral     Comment: takes friends tranqualizers     Sexual activity: Not Currently   Other Topics Concern    Not on file   Social History Narrative    Not on file     Social Drivers of Health     Financial Resource Strain: Low Risk  (12/14/2023)    Received from [facility]    Overall Financial Resource Strain (CARDIA)     Difficulty of Paying Living Expenses: Not very hard   Food Insecurity: No Food Insecurity (2/12/2025)    Hunger Vital Sign     Worried About Running Out of Food in the Last Year: Never true     Ran Out of Food in the Last Year: Never true   Transportation Needs: No Transportation Needs (2/12/2025)    PRAPARE - Transportation     Lack of Transportation (Medical): No     Lack of Transportation (Non-Medical): No   Physical Activity: Inactive (12/14/2023)    Received from [facility]    Exercise Vital Sign     Days of Exercise per Week: 0 days     Minutes of Exercise per Session: 0 min   Stress: Stress Concern Present (12/14/2023)    Received from [facility]    [place] [employer] - Occupational Stress Questionnaire     Feeling of Stress : Very much   Social Connections: Socially Isolated (12/14/2023)    [facility]    Social Connection and Isolation Panel [NHANES]     Frequency of Communication with Friends and Family: Once a week     Frequency of Social Gatherings with Friends and Family: Once a week     Attends [employer]ism  Services: Never     Active Member of Clubs or Organizations: No     Attends Club or Organization Meetings: Never     Marital Status:    Intimate Partner Violence: Not At Risk (2/12/2025)    Humiliation, Afraid, Rape, and Kick questionnaire     Fear of Current or Ex-Partner: No     Emotionally Abused: No     Physically Abused: No     Sexually Abused: No   Housing Stability: Low Risk  (2/12/2025)    Housing Stability Vital Sign     Unable to Pay for Housing in the Last Year: No     Number of Times Moved in the Last Year: 0     Homeless in the Last Year: No         CURRENT MEDICATIONS  No current facility-administered medications on file prior to encounter.     Current Outpatient Medications on File Prior to Encounter   Medication Sig Dispense Refill    carboxymethylcellulose (REFRESH TEARS) 0.5 % Solution Administer 2 Drops into both eyes as needed (dry/red eyes).      folic acid (FOLVITE) 1 MG Tab Take 1 Tablet by mouth every day.      multivitamin Tab Take 1 Tablet by mouth every day.      ondansetron (ZOFRAN ODT) 4 MG TABLET DISPERSIBLE Take 1 Tablet by mouth every four hours as needed for Nausea/Vomiting (give PO if IV route is unavailable.).      senna-docusate (PERICOLACE OR SENOKOT S) 8.6-50 MG Tab Take 2 Tablets by mouth every evening.      polyethylene glycol/lytes (MIRALAX) Pack Take 1 Packet by mouth 1 time a day as needed (if no bowel movement in last 2 days).      thiamine (THIAMINE) 100 MG tablet Take 1 Tablet by mouth every day.      amLODIPine (NORVASC) 2.5 MG Tab Take 1 Tablet by mouth every day.      estradiol (ESTRACE) 0.1 MG/GM vaginal cream Insert 1-2 g into the vagina see administration instructions. 2 g daily x 2 weeks then 1 g 3 x a week after      hydrOXYzine HCl (ATARAX) 25 MG Tab Take 25 mg by mouth at bedtime.      traZODone (DESYREL) 100 MG Tab Take 100 mg by mouth every evening.      metoprolol SR (TOPROL XL) 25 MG TABLET SR 24 HR Take 3 Tablets by mouth every day. (Patient taking  "differently: Take 75 mg by mouth every day. Hold if BP<110/55)      venlafaxine (EFFEXOR-XR) 150 MG extended-release capsule Take 150 mg by mouth every day.      mirtazapine (REMERON) 15 MG Tab Take 15 mg by mouth at bedtime.      apixaban (ELIQUIS) 5mg Tab Take 1 Tablet by mouth 2 times a day. 60 Tablet 1    digoxin (LANOXIN) 125 MCG Tab Take 1 Tablet by mouth every day. 30 Tablet 1    levothyroxine (SYNTHROID) 50 MCG Tab Take 1 Tablet by mouth every morning on an empty stomach. (Patient not taking: Reported on 1/3/2025) 30 Tablet 1           ALLERGIES  No Known Allergies    PHYSICAL EXAM  VITAL SIGNS:BP (!) 124/93   Pulse (!) 124   Temp 37.6 °C (99.7 °F) (Temporal)   Resp (!) 24   Ht 1.676 m (5' 5.98\")   Wt 72.6 kg (160 lb)   SpO2 96%   BMI 25.84 kg/m²       VITALS - vital signs documented prior to this note have been reviewed and noted,  GENERAL - awake, alert, oriented, GCS 15, no apparent distress, non-toxic  appearing  HEENT - normocephalic, atraumatic, pupils equal, sclera anicteric, mucus  membranes moist  NECK - supple, no meningismus, full active range of motion, trachea midline  CARDIOVASCULAR - regular rate/rhythm, no murmurs/gallops/rubs  PULMONARY - no respiratory distress, speaking in full sentences, clear to  auscultation bilaterally, no wheezing/ronchi/rales, no accessory muscle use  GASTROINTESTINAL - soft, non-tender, non-distended, no rebound, guarding,  or peritonitis  GENITOURINARY - Deferred  NEUROLOGIC -awake alert tremulous  MUSCULOSKELETAL - no obvious asymmetry or deformities present  EXTREMITIES - warm, well-perfused, no cyanosis or significant edema  DERMATOLOGIC - warm, dry, no rashes, no jaundice  PSYCHIATRIC - normal affect, normal insight, normal concentration    DIAGNOSTIC STUDIES / PROCEDURES  EKG  I have independently interpreted this EKG  Interpreted below by myself as initial EKG obtained did show atrial fibrillation with rapid ventricular response interpretation was " A-fib RVR    LABS  Labs Reviewed   CBC WITH DIFFERENTIAL - Abnormal; Notable for the following components:       Result Value    RBC 4.05 (*)     .5 (*)     MCH 34.8 (*)     RDW 63.2 (*)     Platelet Count 69 (*)     Lymphocytes 19.60 (*)     Nucleated RBC 0.60 (*)     All other components within normal limits   COMP METABOLIC PANEL - Abnormal; Notable for the following components:    Co2 18 (*)     Anion Gap 22.0 (*)     Calcium 8.1 (*)     Correct Calcium 8.4 (*)     Total Bilirubin 2.4 (*)     All other components within normal limits   PROBRAIN NATRIURETIC PEPTIDE, NT - Abnormal; Notable for the following components:    NT-proBNP 868 (*)     All other components within normal limits   DIAGNOSTIC ALCOHOL - Abnormal; Notable for the following components:    Diagnostic Alcohol 170.0 (*)     All other components within normal limits   MAGNESIUM - Abnormal; Notable for the following components:    Magnesium 1.2 (*)     All other components within normal limits   TROPONIN   LIPASE   ESTIMATED GFR   IMMATURE PLT FRACTION   DIGOXIN   FREE THYROXINE   PHOSPHORUS   TSH   CBC WITH DIFFERENTIAL   COMP METABOLIC PANEL       Troponin negative doubt ACS  RADIOLOGY  I have independently interpreted the diagnostic imaging associated with this visit and am waiting the final reading from the radiologist.   My preliminary interpretation is as follows: No acute cardiopulmonary process      Radiologist interpretation:   DX-CHEST-PORTABLE (1 VIEW)   Final Result         No acute cardiac or pulmonary abnormality is identified.           COURSE & MEDICAL DECISION MAKING    ED COURSE:        INTERVENTIONS BY ME:  Medications   apixaban (Eliquis) tablet 5 mg (has no administration in time range)   digoxin (Lanoxin) tablet 125 mcg (has no administration in time range)   folic acid (Folvite) tablet 1 mg (has no administration in time range)   hydrOXYzine HCl (Atarax) tablet 25 mg (has no administration in time range)   metoprolol  tartrate (Lopressor) tablet 100 mg (has no administration in time range)   mirtazapine (Remeron) tablet 15 mg (has no administration in time range)   venlafaxine (Effexor) tablet 150 mg (has no administration in time range)   Respiratory Therapy Consult (has no administration in time range)   acetaminophen (Tylenol) tablet 650 mg (has no administration in time range)   labetalol (Normodyne/Trandate) injection 10 mg (has no administration in time range)   Metoprolol Tartrate (Lopressor) injection 5 mg (has no administration in time range)   ondansetron (Zofran) syringe/vial injection 4 mg (has no administration in time range)     Or   ondansetron (Zofran ODT) dispertab 4 mg (has no administration in time range)   LORazepam (Ativan) tablet 0.5 mg (has no administration in time range)   LORazepam (Ativan) tablet 1 mg (has no administration in time range)     Or   LORazepam (Ativan) injection 0.5 mg (has no administration in time range)   LORazepam (Ativan) tablet 2 mg ( Oral See Alternative 2/12/25 2154)     Or   LORazepam (Ativan) injection 1 mg (1 mg Intravenous Given 2/12/25 2154)   LORazepam (Ativan) tablet 3 mg (has no administration in time range)     Or   LORazepam (Ativan) injection 1.5 mg (has no administration in time range)   LORazepam (Ativan) tablet 4 mg (has no administration in time range)     Or   LORazepam (Ativan) injection 2 mg (has no administration in time range)   chlordiazePOXIDE (Librium) capsule 50 mg (has no administration in time range)     Followed by   chlordiazePOXIDE (Librium) capsule 25 mg (has no administration in time range)   Rally Bag IV (D5LR 500 mL + Thiamine 100 mg + Folic Acid 1 mg + Magnesium Sulfate 1 g) infusion (has no administration in time range)     And   thiamine (Vitamin B-1) tablet 100 mg (has no administration in time range)     And   multivitamin tablet 1 Tablet (has no administration in time range)     And   folic acid (Folvite) tablet 1 mg (has no administration in  time range)   senna-docusate (Pericolace Or Senokot S) 8.6-50 MG per tablet 2 Tablet (has no administration in time range)     And   polyethylene glycol/lytes (Miralax) Packet 1 Packet (has no administration in time range)   Rally Bag IV (D5LR 500 mL + Thiamine 100 mg + Folic Acid 1 mg + Magnesium Sulfate 1 g) infusion (0 mL Intravenous Stopped 2/12/25 1922)   LORazepam (Ativan) injection 2 mg (2 mg Intravenous Given 2/12/25 1701)   metoprolol tartrate (Lopressor) tablet 25 mg (25 mg Oral Given 2/12/25 1827)   ondansetron (Zofran) syringe/vial injection 4 mg (4 mg Intravenous Given 2/12/25 1952)   digoxin (Lanoxin) injection 125 mcg (125 mcg Intravenous Given 2/12/25 2019)       Critical care    Critical Care Procedure Note    Total critical care time: Approximately 36 minutes    Upon my assess due to a high probability of clinically significant, life threatening deterioration, secondary to A-fib RVR alcohol withdrawal the patient required my direct attention and intervention. This critical care time included obtaining a history; examining the patient; pulse oximetry; ordering and review of studies; arranging urgent treatment with development of a management plan; evaluation of patient's response to treatment; frequent reassessment; and, discussions with other providers.    was exclusive of separately billable procedures and treating other patients and teaching time.        HYDRATION: Based on the patient's presentation of Dehydration the patient was given IV fluids. IV Hydration was used because oral hydration was not adequate alone. Upon recheck following hydration, the patient was mildly improved.  INITIAL ASSESSMENT, COURSE AND PLAN  Care Narrative: Patient presented for evaluation of palpitations and feelings of anxiousness upon arrival in the emergency department the patient was noted to be in atrial fibrillation with ventricular response also appeared to be in mild to moderate alcohol withdrawals.  Was  treated with a detox bag, as well as Ativan.  Was given a loading dose of p.o. metoprolol as well as a push p.o. metoprolol, and a digoxin push after which her heart rate was significantly improved though still slightly high.  In addition has required several doses of IV Ativan for her withdrawal symptoms.  Thus the patient will be admitted.  She is admitted in improved condition             ADDITIONAL PROBLEM LIST    DISPOSITION AND DISCUSSIONS  I have discussed management of the patient with the following physicians and CIARAN's: Hospitalist    Discussion of management with other QHP or appropriate source(s): Pharmacy        Decision tools and prescription drugs considered including, but not limited to:  Heart score 4 .    FINAL DIAGNOSIS  1 alcohol withdrawals  2.  Alcohol abuse  3.  Atrial fibrillation with rapid ventricular response  4.  Dehydration         Electronically signed by: Hakan Francis DO ,11:38 PM 02/12/25

## 2025-02-13 NOTE — ED TRIAGE NOTES
"Chief Complaint   Patient presents with    Shortness of Breath     Patient reports SOB since yesterday.   Patient arrives in Afib with RVR, rate 160s    N/V     Patient reports N/V since yesterday.     80 yo female bib EMS from home for above. Patient reports drinking heavy alcohol, last drink yesterday. Patient tremulous on arrival.    Patient given 4mg zofran and 550mL IVF en route by EMS.    Ht 1.676 m (5' 6\")   Wt 72.6 kg (160 lb)   BMI 25.82 kg/m²     "

## 2025-02-14 LAB
ANION GAP SERPL CALC-SCNC: 9 MMOL/L (ref 7–16)
APPEARANCE UR: CLEAR
BACTERIA #/AREA URNS HPF: NORMAL /HPF
BILIRUB UR QL STRIP.AUTO: NEGATIVE
BUN SERPL-MCNC: 9 MG/DL (ref 8–22)
CALCIUM SERPL-MCNC: 8.4 MG/DL (ref 8.5–10.5)
CASTS URNS QL MICRO: NORMAL /LPF (ref 0–2)
CHLORIDE SERPL-SCNC: 105 MMOL/L (ref 96–112)
CO2 SERPL-SCNC: 26 MMOL/L (ref 20–33)
COLOR UR: ABNORMAL
CREAT SERPL-MCNC: 0.66 MG/DL (ref 0.5–1.4)
EPITHELIAL CELLS 1715: NORMAL /HPF (ref 0–5)
ERYTHROCYTE [DISTWIDTH] IN BLOOD BY AUTOMATED COUNT: 59.8 FL (ref 35.9–50)
GFR SERPLBLD CREATININE-BSD FMLA CKD-EPI: 90 ML/MIN/1.73 M 2
GLUCOSE SERPL-MCNC: 107 MG/DL (ref 65–99)
GLUCOSE UR STRIP.AUTO-MCNC: NEGATIVE MG/DL
HCT VFR BLD AUTO: 39 % (ref 37–47)
HGB BLD-MCNC: 12.6 G/DL (ref 12–16)
KETONES UR STRIP.AUTO-MCNC: NEGATIVE MG/DL
LEUKOCYTE ESTERASE UR QL STRIP.AUTO: ABNORMAL
MCH RBC QN AUTO: 34.1 PG (ref 27–33)
MCHC RBC AUTO-ENTMCNC: 32.3 G/DL (ref 32.2–35.5)
MCV RBC AUTO: 105.4 FL (ref 81.4–97.8)
MICRO URNS: ABNORMAL
NITRITE UR QL STRIP.AUTO: NEGATIVE
PH UR STRIP.AUTO: 8 [PH] (ref 5–8)
PLATELET # BLD AUTO: 48 K/UL (ref 164–446)
PLATELETS.RETICULATED NFR BLD AUTO: 7.1 % (ref 0.6–13.1)
PMV BLD AUTO: 10.5 FL (ref 9–12.9)
POTASSIUM SERPL-SCNC: 3.6 MMOL/L (ref 3.6–5.5)
PROT UR QL STRIP: NEGATIVE MG/DL
RBC # BLD AUTO: 3.7 M/UL (ref 4.2–5.4)
RBC # URNS HPF: NORMAL /HPF (ref 0–2)
RBC UR QL AUTO: NEGATIVE
SODIUM SERPL-SCNC: 140 MMOL/L (ref 135–145)
SP GR UR STRIP.AUTO: 1.01
UROBILINOGEN UR STRIP.AUTO-MCNC: 4 EU/DL
WBC # BLD AUTO: 4.1 K/UL (ref 4.8–10.8)
WBC #/AREA URNS HPF: NORMAL /HPF

## 2025-02-14 PROCEDURE — 85027 COMPLETE CBC AUTOMATED: CPT

## 2025-02-14 PROCEDURE — A9270 NON-COVERED ITEM OR SERVICE: HCPCS | Performed by: STUDENT IN AN ORGANIZED HEALTH CARE EDUCATION/TRAINING PROGRAM

## 2025-02-14 PROCEDURE — 700111 HCHG RX REV CODE 636 W/ 250 OVERRIDE (IP): Performed by: STUDENT IN AN ORGANIZED HEALTH CARE EDUCATION/TRAINING PROGRAM

## 2025-02-14 PROCEDURE — 85055 RETICULATED PLATELET ASSAY: CPT

## 2025-02-14 PROCEDURE — 700102 HCHG RX REV CODE 250 W/ 637 OVERRIDE(OP): Performed by: INTERNAL MEDICINE

## 2025-02-14 PROCEDURE — A9270 NON-COVERED ITEM OR SERVICE: HCPCS | Performed by: INTERNAL MEDICINE

## 2025-02-14 PROCEDURE — 99221 1ST HOSP IP/OBS SF/LOW 40: CPT | Performed by: STUDENT IN AN ORGANIZED HEALTH CARE EDUCATION/TRAINING PROGRAM

## 2025-02-14 PROCEDURE — 99233 SBSQ HOSP IP/OBS HIGH 50: CPT | Performed by: INTERNAL MEDICINE

## 2025-02-14 PROCEDURE — 700102 HCHG RX REV CODE 250 W/ 637 OVERRIDE(OP): Performed by: STUDENT IN AN ORGANIZED HEALTH CARE EDUCATION/TRAINING PROGRAM

## 2025-02-14 PROCEDURE — 36415 COLL VENOUS BLD VENIPUNCTURE: CPT

## 2025-02-14 PROCEDURE — 81001 URINALYSIS AUTO W/SCOPE: CPT

## 2025-02-14 PROCEDURE — 770020 HCHG ROOM/CARE - TELE (206)

## 2025-02-14 PROCEDURE — 80048 BASIC METABOLIC PNL TOTAL CA: CPT

## 2025-02-14 RX ORDER — POTASSIUM CHLORIDE 1500 MG/1
40 TABLET, EXTENDED RELEASE ORAL ONCE
Status: COMPLETED | OUTPATIENT
Start: 2025-02-14 | End: 2025-02-14

## 2025-02-14 RX ADMIN — LORAZEPAM 1 MG: 1 TABLET ORAL at 20:18

## 2025-02-14 RX ADMIN — POTASSIUM CHLORIDE 40 MEQ: 1500 TABLET, EXTENDED RELEASE ORAL at 12:24

## 2025-02-14 RX ADMIN — FOLIC ACID 1 MG: 1 TABLET ORAL at 04:59

## 2025-02-14 RX ADMIN — Medication 100 MG: at 04:59

## 2025-02-14 RX ADMIN — LORAZEPAM 1 MG: 1 TABLET ORAL at 03:54

## 2025-02-14 RX ADMIN — LORAZEPAM 2 MG: 2 TABLET ORAL at 17:25

## 2025-02-14 RX ADMIN — METOPROLOL TARTRATE 100 MG: 50 TABLET, FILM COATED ORAL at 04:59

## 2025-02-14 RX ADMIN — DIGOXIN 125 MCG: 0.25 TABLET ORAL at 04:59

## 2025-02-14 RX ADMIN — VENLAFAXINE HYDROCHLORIDE 150 MG: 75 TABLET ORAL at 04:59

## 2025-02-14 RX ADMIN — METOPROLOL TARTRATE 100 MG: 50 TABLET, FILM COATED ORAL at 17:26

## 2025-02-14 RX ADMIN — SENNOSIDES AND DOCUSATE SODIUM 2 TABLET: 50; 8.6 TABLET ORAL at 17:26

## 2025-02-14 RX ADMIN — LORAZEPAM 2 MG: 2 TABLET ORAL at 04:59

## 2025-02-14 RX ADMIN — ONDANSETRON 4 MG: 4 TABLET, ORALLY DISINTEGRATING ORAL at 12:23

## 2025-02-14 RX ADMIN — THERA TABS 1 TABLET: TAB at 04:59

## 2025-02-14 RX ADMIN — LORAZEPAM 2 MG: 2 TABLET ORAL at 12:24

## 2025-02-14 RX ADMIN — CHLORDIAZEPOXIDE HYDROCHLORIDE 25 MG: 25 CAPSULE ORAL at 12:24

## 2025-02-14 RX ADMIN — CHLORDIAZEPOXIDE HYDROCHLORIDE 25 MG: 25 CAPSULE ORAL at 04:59

## 2025-02-14 RX ADMIN — LORAZEPAM 0.5 MG: 0.5 TABLET ORAL at 07:48

## 2025-02-14 ASSESSMENT — LIFESTYLE VARIABLES
VISUAL DISTURBANCES: NOT PRESENT
TOTAL SCORE: 13
HEADACHE, FULLNESS IN HEAD: MILD
AGITATION: NORMAL ACTIVITY
AUDITORY DISTURBANCES: NOT PRESENT
AGITATION: NORMAL ACTIVITY
ANXIETY: *
ANXIETY: *
VISUAL DISTURBANCES: NOT PRESENT
TOTAL SCORE: 7
HEADACHE, FULLNESS IN HEAD: MILD
ANXIETY: *
AGITATION: *
ANXIETY: MILDLY ANXIOUS
AUDITORY DISTURBANCES: NOT PRESENT
AGITATION: *
AUDITORY DISTURBANCES: VERY MILD HARSHNESS OR ABILITY TO FRIGHTEN
ORIENTATION AND CLOUDING OF SENSORIUM: ORIENTED AND CAN DO SERIAL ADDITIONS
PAROXYSMAL SWEATS: BARELY PERCEPTIBLE SWEATING. PALMS MOIST
ORIENTATION AND CLOUDING OF SENSORIUM: CANNOT DO SERIAL ADDITIONS OR IS UNCERTAIN ABOUT DATE
PAROXYSMAL SWEATS: NO SWEAT VISIBLE
TREMOR: TREMOR NOT VISIBLE BUT CAN BE FELT, FINGERTIP TO FINGERTIP
TOTAL SCORE: 13
NAUSEA AND VOMITING: NO NAUSEA AND NO VOMITING
ORIENTATION AND CLOUDING OF SENSORIUM: ORIENTED AND CAN DO SERIAL ADDITIONS
TREMOR: *
HEADACHE, FULLNESS IN HEAD: VERY MILD
NAUSEA AND VOMITING: NO NAUSEA AND NO VOMITING
NAUSEA AND VOMITING: INTERMITTENT NAUSEA WITH DRY HEAVES
TREMOR: *
TREMOR: *
PAROXYSMAL SWEATS: NO SWEAT VISIBLE
SUBSTANCE_ABUSE: 1
ANXIETY: MILDLY ANXIOUS
ORIENTATION AND CLOUDING OF SENSORIUM: DATE DISORIENTATION BY NO MORE THAN TWO CALENDAR DAYS
AUDITORY DISTURBANCES: MILD HARSHNESS OR ABILITY TO FRIGHTEN
VISUAL DISTURBANCES: MILD SENSITIVITY
AGITATION: NORMAL ACTIVITY
ANXIETY: *
NAUSEA AND VOMITING: NO NAUSEA AND NO VOMITING
NAUSEA AND VOMITING: MILD NAUSEA WITH NO VOMITING
HEADACHE, FULLNESS IN HEAD: VERY MILD
ORIENTATION AND CLOUDING OF SENSORIUM: ORIENTED AND CAN DO SERIAL ADDITIONS
VISUAL DISTURBANCES: NOT PRESENT
TOTAL SCORE: 10
ORIENTATION AND CLOUDING OF SENSORIUM: CANNOT DO SERIAL ADDITIONS OR IS UNCERTAIN ABOUT DATE
AUDITORY DISTURBANCES: VERY MILD HARSHNESS OR ABILITY TO FRIGHTEN
NAUSEA AND VOMITING: INTERMITTENT NAUSEA WITH DRY HEAVES
PAROXYSMAL SWEATS: NO SWEAT VISIBLE
HEADACHE, FULLNESS IN HEAD: MILD
TOTAL SCORE: 10
PAROXYSMAL SWEATS: BARELY PERCEPTIBLE SWEATING. PALMS MOIST
AGITATION: *
PAROXYSMAL SWEATS: BARELY PERCEPTIBLE SWEATING. PALMS MOIST
AUDITORY DISTURBANCES: NOT PRESENT
HEADACHE, FULLNESS IN HEAD: VERY MILD
TREMOR: *
TOTAL SCORE: 11
TREMOR: *

## 2025-02-14 ASSESSMENT — FIBROSIS 4 INDEX: FIB4 SCORE: 16.72

## 2025-02-14 ASSESSMENT — ENCOUNTER SYMPTOMS
DEPRESSION: 1
PALPITATIONS: 1

## 2025-02-14 ASSESSMENT — PAIN DESCRIPTION - PAIN TYPE
TYPE: ACUTE PAIN
TYPE: ACUTE PAIN

## 2025-02-14 NOTE — PROGRESS NOTES
Bedside shift report received from pascale segura. CIWA and Seizure precautions. Pt restless in bed alert and oriented times 3. Bed alarm plugged in, 3 side rails up, call bell within reach.

## 2025-02-14 NOTE — CARE PLAN
The patient is Stable - Low risk of patient condition declining or worsening    Shift Goals  Clinical Goals: CIWA, VSS, safety  Patient Goals: rest  Family Goals: DANCIA    Progress made toward(s) clinical / shift goals:    Problem: Optimal Care for Alcohol Withdrawal  Goal: Optimal Care for the alcohol withdrawal patient  Description: Target End Date:  1 to 3 days    1.  Alcohol history screening done on admission  2.  CIWA-AR score assessment (includes assessment of nausea/vomiting, tremor, sweats, anxiety, agitation, tactile, visual and auditory disturbances, headache and orientation/sensorium).  Document on CIWA flowsheet.  3.  Follow CIWA-AR score protocol  4.  Frequent reorientation  5.  Monitor for fluid and electrolyte imbalance.  6.  Assess for respiratory depression due to sedation (pulse ox)  7.  Consider thiamine, multivitamins, folic acid and magnesium sulfate per provider order  8.  Collaborate with Social Workers/Case Management  9.  Collaborate with mental health  Outcome: Progressing     Problem: Seizure Precautions  Goal: Implementation of seizure precautions  Description: Target End Date:  Prior to discharge or change in level of care    1.  Padded side rails up at all times  2.  Suction equipment and oxygen delivery system at bedside  3.  Continuous pulse oximeter in use  4.  Implement fall precautions, bed alarm on, bed in lowest position  5.  IV access (per order)  6.  Provide low stimulus environment, avoid exposure to triggers  7.  Instruct patient to use call light/seizure button if having warning signs of impending seizure  Outcome: Progressing       Patient is not progressing towards the following goals:      Problem: Knowledge Deficit - Standard  Goal: Patient and family/care givers will demonstrate understanding of plan of care, disease process/condition, diagnostic tests and medications  Description: Target End Date:  1-3 days or as soon as patient condition allows    Document in Patient  Education    1.  Patient and family/caregiver oriented to unit, equipment, visitation policy and means for communicating concern  2.  Complete/review Learning Assessment  3.  Assess knowledge level of disease process/condition, treatment plan, diagnostic tests and medications  4.  Explain disease process/condition, treatment plan, diagnostic tests and medications  Outcome: Not Progressing     Problem: Psychosocial  Goal: Patient's level of anxiety will decrease  Description: Target End Date:  1-3 days or as soon as patient condition allows    1.  Collaborate with patient and family/caregiver to identify triggers and develop strategies to cope with anxiety  2.  Implement stimuli reduction, calming techniques  3.  Pharmacologic management per provider order  4.  Encourage patient/family/care giver participation  5.  Collaborate with interdisciplinary team including Psychologist or Behavioral Health Team as needed  Outcome: Not Progressing     Problem: Fall Risk  Goal: Patient will remain free from falls  Description: Target End Date:  Prior to discharge or change in level of care    Document interventions on the Beth Banda Fall Risk Assessment    1.  Assess for fall risk factors  2.  Implement fall precautions  Outcome: Not Progressing     Problem: Skin Integrity  Goal: Skin integrity is maintained or improved  Description: Target End Date:  Prior to discharge or change in level of care    Document interventions on Skin Risk/Albert flowsheet groups and corresponding LDA    1.  Assess and monitor skin integrity, appearance and/or temperature  2.  Assess risk factors for impaired skin integrity and/or pressures ulcers  3.  Implement precautions to protect skin integrity in collaboration with interdisciplinary team  4.  Implement pressure ulcer prevention protocol if at risk for skin breakdown  5.  Confirm wound care consult if at risk for skin breakdown  6.  Ensure patient use of pressure relieving devices  (Low air  loss bed, waffle overlay, heel protectors, ROHO cushion, etc)  Outcome: Not Progressing

## 2025-02-14 NOTE — PROGRESS NOTES
Hospital Medicine Daily Progress Note    Date of Service  2/13/2025    Chief Complaint  Jeanie Hutchison is a 79 y.o. female admitted 2/12/2025 with shortness of breath and alcohol intoxication    Hospital Course  79 y.o. female severe alcohol abuse with complications, hypertension, hypothyroidism, atrial fibrillation, history of SVT, medical noncompliance who presented 2/12/2025 with dyspnea, nausea and vomiting.     Patient presents to the ED complaining of nausea, vomiting, dyspnea ongoing for the past 5 days and progressive.  Patient admits to daily liquor consumption.  She reports compliance with Eliquis, metoprolol and digoxin.  Denies any recent fevers or chills, headache or vision changes, chest pain, cough, abdominal pain, dysuria diarrhea.     In the ED she is afebrile pulse 100 160 respiratory rate 18-24 systolic blood pressure of 102-1 150s pulse ox 83 to 98% from 2 to 5 L nasal cannula.  CBC with thrombocytopenia 69, bicarb 18 corrected calcium 8.4 total bilirubin 2.4 magnesium 1.2 serum alcohol 170 troponin T 8 proBNP 868 chest x-ray negative for acute processes EKG shows A-fib with RVR LAFB.  Treated with metoprolol, digoxin, IV fluids, subsequently referred to hospitalist for admission.    Interval Problem Update  Patient noted to be somnolent at the time of my interview.  She was able to open her eyes to verbal stimuli.  She is responding to my questions appropriately.  She states she has been drinking 4-5 drinks of hard liquor daily.  Patient states she has been very depressed due to the loss of multiple family members over the past several years and she drinks alcohol to cope with the loss.  I have consulted psychiatry.  I counseled her on alcohol cessation.  Continue CIWA protocol.  She is noted to have leukopenia and thrombocytopenia likely secondary to alcohol abuse.  He is low at 3.3, replace with oral potassium.  Check magnesium and replace if necessary.    I have discussed this patient's  plan of care and discharge plan at IDT rounds today with Case Management, Nursing, Nursing leadership, and other members of the IDT team.    Consultants/Specialty  psychiatry    Code Status  Full Code    Disposition  The patient is not medically cleared for discharge to home or a post-acute facility.      I have placed the appropriate orders for post-discharge needs.    Review of Systems  Review of Systems   Cardiovascular:  Positive for palpitations.   Psychiatric/Behavioral:  Positive for depression and substance abuse.         Physical Exam  Temp:  [36.9 °C (98.4 °F)-37.6 °C (99.7 °F)] 37.3 °C (99.1 °F)  Pulse:  [] 104  Resp:  [18-24] 18  BP: (124-173)/() 127/92  SpO2:  [92 %-98 %] 95 %    Physical Exam  Vitals and nursing note reviewed.   Constitutional:       General: She is not in acute distress.  HENT:      Head: Normocephalic.      Mouth/Throat:      Mouth: Mucous membranes are moist.   Eyes:      Pupils: Pupils are equal, round, and reactive to light.   Cardiovascular:      Rate and Rhythm: Regular rhythm. Tachycardia present.      Pulses: Normal pulses.      Heart sounds: Normal heart sounds.   Pulmonary:      Effort: Pulmonary effort is normal.      Breath sounds: Normal breath sounds.   Abdominal:      Palpations: Abdomen is soft.      Tenderness: There is no abdominal tenderness.   Musculoskeletal:         General: No swelling.      Cervical back: Neck supple.   Skin:     General: Skin is warm.      Coloration: Skin is not jaundiced.   Neurological:      General: No focal deficit present.      Mental Status: She is alert.   Psychiatric:         Mood and Affect: Mood normal.         Behavior: Behavior normal.         Fluids    Intake/Output Summary (Last 24 hours) at 2/13/2025 1732  Last data filed at 2/13/2025 1356  Gross per 24 hour   Intake 960 ml   Output 0 ml   Net 960 ml        Laboratory  Recent Labs     02/12/25  1635 02/13/25  0316   WBC 6.2 4.7*   RBC 4.05* 3.50*   HEMOGLOBIN 14.1  12.1   HEMATOCRIT 41.9 36.4*   .5* 104.0*   MCH 34.8* 34.6*   MCHC 33.7 33.2   RDW 63.2* 62.8*   PLATELETCT 69* 52*   MPV 9.4 9.9     Recent Labs     02/12/25  1635 02/13/25  0316   SODIUM 140 138   POTASSIUM 3.9 3.3*   CHLORIDE 100 102   CO2 18* 25   GLUCOSE 71 162*   BUN 12 8   CREATININE 0.61 0.55   CALCIUM 8.1* 7.5*                   Imaging  DX-CHEST-PORTABLE (1 VIEW)   Final Result         No acute cardiac or pulmonary abnormality is identified.           Assessment/Plan  * Atrial fibrillation with RVR (HCC)- (present on admission)  Assessment & Plan  Secondary to noncompliance with medication, continued alcohol abuse.  Continue Eliquis, digoxin, metoprolol.  IV metoprolol for sustained RVR greater than 110    Alcohol intoxication in active alcoholic with complication (HCC)- (present on admission)  Assessment & Plan  Here with elevated serum alcohol level and already appearing to go into early withdrawals.  Alcohol cessation counseling provided    Acquired hypothyroidism- (present on admission)  Assessment & Plan  Not taking any medications, check TSH    Major depressive disorder with current active episode- (present on admission)  Assessment & Plan  Continue Effexor, Remeron, Atarax    Alcohol withdrawal syndrome with perceptual disturbance (HCC)- (present on admission)  Assessment & Plan  CIWA protocol with serial neuro exams and continuous cardiac monitoring  MVI, folate, thiamine  Check & replace magnesium, potassium, phosphorus  On Librium for long acting treatment of withdrawal symptoms    Presence of automatic cardioverter/defibrillator (AICD)- (present on admission)  Assessment & Plan  Noted    Acute respiratory failure with hypoxia (HCC)- (present on admission)  Assessment & Plan  Oxygen per guidelines, wean as able  RT consult, continuous pulse ox, incentive spirometry  No leukocytosis, chest x-ray without acute processes.  Patient presents with alcohol intoxication, withdrawal, A-fib with  RVR.  DuoNebs as needed        Recurrent falls- (present on admission)  Assessment & Plan  Fall precautions.  Unclear if from consistently being intoxicated or gait disturbance from long-term alcohol abuse versus physical debility.         VTE prophylaxis: SCD    I have performed a physical exam and reviewed and updated ROS and Plan today (2/13/2025). In review of yesterday's note (2/12/2025), there are no changes except as documented above.    I spent 51 minutes, reviewing the chart, obtaining and/or reviewing separately obtained history. Performing a medically appropriate examination and evaluation.  Counseling and educating the patient. Ordering and reviewing medications, tests, or procedures.  Cussing the case with psychiatry.  Documenting clinical information in EPIC. Independently interpreting results and communicating results to patient. Discussing future disposition of care with patient, RN and case management.

## 2025-02-14 NOTE — CARE PLAN
The patient is Watcher - Medium risk of patient condition declining or worsening    Shift Goals  Clinical Goals: CIWA, VSS, safety  Patient Goals: rest  Family Goals: pari    Progress made toward(s) clinical / shift goals:    Problem: Optimal Care for Alcohol Withdrawal  Goal: Optimal Care for the alcohol withdrawal patient  Outcome: Progressing     Problem: Seizure Precautions  Goal: Implementation of seizure precautions  Outcome: Progressing     Problem: Lifestyle Changes  Goal: Patient's ability to identify lifestyle changes and available resources to help reduce recurrence of condition will improve  Outcome: Progressing     Problem: Risk for Aspiration  Goal: Patient's risk for aspiration will be absent or decrease  Outcome: Progressing     Problem: Fall Risk  Goal: Patient will remain free from falls  Outcome: Progressing

## 2025-02-14 NOTE — CARE PLAN
The patient is Stable - Low risk of patient condition declining or worsening    Shift Goals  Clinical Goals: CIWA, VSS, safety  Patient Goals: rest  Family Goals: pari    Progress made toward(s) clinical / shift goals:        Problem: Optimal Care for Alcohol Withdrawal  Goal: Optimal Care for the alcohol withdrawal patient  Outcome: Progressing     Problem: Seizure Precautions  Goal: Implementation of seizure precautions  Outcome: Progressing     Problem: Psychosocial  Goal: Patient's level of anxiety will decrease  Outcome: Progressing     Problem: Risk for Aspiration  Goal: Patient's risk for aspiration will be absent or decrease  Outcome: Progressing     Problem: Fall Risk  Goal: Patient will remain free from falls  Outcome: Progressing

## 2025-02-14 NOTE — CONSULTS
"PSYCHIATRIC CONSULTATION:  *Reason for admission:   Shortness of breath  *Reason for consult:depression   *Requesting Physician: Carlos Ramos M.D.       Legal status:  not applicable    *Chief Complaint: Shortness of breath & nausea/vomiting     *HPI:   Jeanie Hutchison is a 79 y.o. female who was brought to the ED due to shortness of breath and Afib. She also complained of nausea, in part due to alcohol consumption. Psychiatry was consulted due to the patient's history of alcohol use disorder, anxiety, and depression, for which she takes Effexor and Mirtazapine. Her last alcohol use in unclear, however, she does mention that she has been taking 4-6 shots everyday for the past 2 months.    Jeanie has a long history of alcohol use and has alcoholic hepatitis as result. She also has a history of MDD and complex grief disorder. She mentions that her drinking became enhanced after the death of one of her daughters about 8 years ago. She says that both of her daughters have passed and continues to mumble \"worthless.\" When asked what is \"worthless\" or what she means by that, she does not respond.    Family history is shows her mother had a history of depression and anxiety. She is  and currently lives with her grandson. She gets emotional talking about her two daughters who passed, one being from melanoma. Jeanie mentions that she still has a living son.    Jeanie is extremely drowsy, speaks infrequently and even when she does, it is in a quite and unintelligible voice, due in part to mumbling. She seems very sleepy and extremely drowsy. She expresses that she is still nauseous and is not able to eat much because of it. She denies any SI/HI. Multiple times during the consult, she says \"don't let the dog out.\" Thought content and process are difficult to assess due to poor engagement. Cognition is impaired as she is oriented to person and time, but not to place.    I was able to speak with patient's son, Ulices " "for further collateral information  She has been living alone again in her own home, only was in a SNF for 4-5 days in January 2025 before going back home  Her grandson lives in the basement of the home and she lives upstairs on the second story.  He does not help her with any of her ADLs or IADLs\" he essentially just lives his own life.\"  Son sees her daily in the afternoons. Prior to the Christmas holiday she had 4-5 months where she was “functional.” Went out, bought a car and was getting more things done.  After Christmas she started drinking heavily , not eating, not taking medications (because she manages her own medicine)  No delusions or hallucinations noted lately.  She has been more confused and has short term memory problems the last several months.   She has not had any suicide attempts, has at times endorsed not wanting to be alive  She has not been following with her PCP because PCP retired. She refuses to see mental health professionals. [facility] Has not done IP rehab for alcohol in 3 years  Starting her her sons teenager years had addiciotn to pills and alcohol  He does help with finances, reports that if the patient would be eligible for long-term care placement he is open to this and she does have finances for this.  He does feel that she needs more supervision as she is not doing well managing on her own secondary to severe alcohol use.      *Psychiatric Review of Systems:  See interval hx    *Medical Review of Systems: as reported by pt. All systems reviewed. Only those found to be + are noted below. All others are negative.   Pt unable to asnwer    *Past Medical Hx:   Patient Active Problem List   Diagnosis    Persistent atrial fibrillation (HCC)    Recurrent falls    Alcohol use disorder, moderate, dependence (HCC)    Hypokalemia    Hypomagnesemia    Acute respiratory failure with hypoxia (HCC)    History of ventricular tachycardia    AICD " discharge    CLEO (obstructive sleep apnea)    Non-smoker    Psychophysiological insomnia    Gait disorder    Presence of automatic cardioverter/defibrillator (AICD)    Psychosocial stressors    Alcohol withdrawal syndrome with perceptual disturbance (HCC)    Complex grief disorder lasting longer than 12 months    Thrombocytopenia (HCC)    Major depressive disorder with current active episode    Primary hypertension    Atrial fibrillation with rapid ventricular response (HCC)    Acquired hypothyroidism    Alcohol withdrawal syndrome with complication (HCC)    Hypophosphatemia    Elevated hemoglobin (HCC)    Alcohol intoxication in active alcoholic with complication (HCC)    Prolonged QT interval    Acute encephalopathy    Frailty syndrome in geriatric patient    Hyponatremia    Alcoholic hepatitis without ascites    Atrial fibrillation (HCC)    Hyperbilirubinemia    Atrial fibrillation with RVR (HCC)      Per chart review and speaking with son    *Family Medical/Psychiatric Hx:  Mother: Anxiety & depression. Also had diabetes  Father: Hyperlipidemia  Siblings  Children: 2 daughters passed away  Other:  Family hx of suicide: None    *Past Psychiatric Hx:  IP treatment: [facility] in 12/2023 and 4 IP rehabilitation stays  OP treatment: saw a therapist years ago, has not follower with psychiatry   Meds tried:  It appears per chart review that patient is/has also taken mirtazapine and venlafaxine, paroxetine, fluoxitine, duloxetine, sertraline, aripiprazole , most recently Effexor and mirtazepine   Suicide attempts: None   Trauma: death of her  and daughters, Per chairt review of previous admission, Late  was alcoholic and had extra-marital affairs.     *Social Hx:  Living situation: Lives with grandson in own home  Childhood: N/A  Education/Employment (hx of IDD?): retired, was a nurse and professor or nursing  Relationships (estrangements?): 2 daughters that have passes and 1 living son. Began heavy drinking  after daughter passed away 8 years ago  Hobbies/Activity level (if will, include ADLS): none  Legal Hx: none  Access to guns: unknown    *Drug/Alcohol/Tobacco Hx:   Recent daily alcohol use (unknown exact amount per son)  No known illicit drug use      *Psychiatric (Physical) Examination: observed phenomenon:  Vitals:    02/14/25 1224   BP: (!) 130/93   Pulse: 65   Resp: 16   Temp:    SpO2:      General: sleepy, in no acute distress  Patient Appearance: disheveled, dressed in a hospital gown and lying in bed  Behavior: uncooperative as she is somnolent  Speech.: low volume, mumbling, incoherent  Mood Comments: unable to staet  Affect: Flat  Thought Content: Unable to fully assess as patient is confused and somnolent  Thought Process: Unable to fully assess as patient is confused and somnolent  Psychosis: unable to fully assess as patient is confused and somnolent  Insight: Unable to assess as patient is confused and somnolent  Judgment: Unable to assess as patient is confused and somnolent  Cognition: Memory appeared impaired in interview. Distractible. Oriented to self and somewhat for situation  Language: Talks very quite and in mumbling manner  Fund of Knowledge: Unable to assess  Neuro: no tics, tremors, dyskinesias. no dystonias       Allergies:   No Known Allergies     Medications (currently prescribed at [facility]):    Current Facility-Administered Medications:     [Held by provider] apixaban (Eliquis) tablet 5 mg, 5 mg, Oral, BID, Montana Meneses M.D., 5 mg at 02/13/25 0507    digoxin (Lanoxin) tablet 125 mcg, 125 mcg, Oral, DAILY, Montana Meneses M.D., 125 mcg at 02/14/25 0459    metoprolol tartrate (Lopressor) tablet 100 mg, 100 mg, Oral, BID, Montana Meneses M.D., 100 mg at 02/14/25 1726    venlafaxine (Effexor) tablet 150 mg, 150 mg, Oral, DAILY, Montana Meneses M.D., 150 mg at 02/14/25 0459    Respiratory Therapy Consult, , Nebulization, Continuous RT, Montana Meneses M.D.    acetaminophen  (Tylenol) tablet 650 mg, 650 mg, Oral, Q6HRS PRN, Montana Meneses M.D.    labetalol (Normodyne/Trandate) injection 10 mg, 10 mg, Intravenous, Q4HRS PRN, Montana Meneses M.D.    Metoprolol Tartrate (Lopressor) injection 5 mg, 5 mg, Intravenous, Q5 MIN PRN, Montana Meneses M.D.    ondansetron (Zofran) syringe/vial injection 4 mg, 4 mg, Intravenous, Q4HRS PRN **OR** ondansetron (Zofran ODT) dispertab 4 mg, 4 mg, Oral, Q4HRS PRN, Montana Meneses M.D., 4 mg at 02/14/25 1223    LORazepam (Ativan) tablet 0.5 mg, 0.5 mg, Oral, Q4HRS PRN, Montana Meneses M.D., 0.5 mg at 02/14/25 0748    LORazepam (Ativan) tablet 1 mg, 1 mg, Oral, Q4HRS PRN, 1 mg at 02/14/25 2018 **OR** LORazepam (Ativan) injection 0.5 mg, 0.5 mg, Intravenous, Q4HRS PRN, Montana Meneses M.D.    LORazepam (Ativan) tablet 2 mg, 2 mg, Oral, Q2HRS PRN, 2 mg at 02/14/25 1725 **OR** LORazepam (Ativan) injection 1 mg, 1 mg, Intravenous, Q2HRS PRN, Montana Meneses M.D., 1 mg at 02/12/25 2154    LORazepam (Ativan) tablet 3 mg, 3 mg, Oral, Q HOUR PRN, 3 mg at 02/13/25 0525 **OR** LORazepam (Ativan) injection 1.5 mg, 1.5 mg, Intravenous, Q HOUR PRN, Montana Meneses M.D.    LORazepam (Ativan) tablet 4 mg, 4 mg, Oral, Q15 MIN PRN **OR** LORazepam (Ativan) injection 2 mg, 2 mg, Intravenous, Q15 MIN PRN, Montana Meneses M.D.    [COMPLETED] Rally Bag IV (D5LR 500 mL + Thiamine 100 mg + Folic Acid 1 mg + Magnesium Sulfate 1 g) infusion, , Intravenous, Once, Stopped at 02/13/25 2539 **AND** thiamine (Vitamin B-1) tablet 100 mg, 100 mg, Oral, DAILY, 100 mg at 02/14/25 0459 **AND** multivitamin tablet 1 Tablet, 1 Tablet, Oral, DAILY, 1 Tablet at 02/14/25 0459 **AND** folic acid (Folvite) tablet 1 mg, 1 mg, Oral, DAILY, Montana Meneses M.D., 1 mg at 02/14/25 0459    senna-docusate (Pericolace Or Senokot S) 8.6-50 MG per tablet 2 Tablet, 2 Tablet, Oral, Q EVENING, 2 Tablet at 02/14/25 2896 **AND** polyethylene glycol/lytes (Miralax) Packet 1 Packet, 1 Packet,  Oral, QDAY PRN, Montana Meneses M.D.        *Labs:  Lab Results   Component Value Date/Time    SODIUM 140 02/14/2025 01:36 AM    POTASSIUM 3.6 02/14/2025 01:36 AM    CHLORIDE 105 02/14/2025 01:36 AM    CO2 26 02/14/2025 01:36 AM    GLUCOSE 107 (H) 02/14/2025 01:36 AM    BUN 9 02/14/2025 01:36 AM    CREATININE 0.66 02/14/2025 01:36 AM    BUNCREATRAT 21.7 (H) 08/31/2023 03:29 AM      Recent Labs     02/12/25  1635 02/13/25  0316   ASTSGOT 43 39   ALTSGPT 16 14   TBILIRUBIN 2.4* 3.1*   GLOBULIN 2.7 2.2     Lab Results   Component Value Date/Time    WBC 4.1 (L) 02/14/2025 01:36 AM    RBC 3.70 (L) 02/14/2025 01:36 AM    HEMOGLOBIN 12.6 02/14/2025 01:36 AM    HEMATOCRIT 39.0 02/14/2025 01:36 AM    .4 (H) 02/14/2025 01:36 AM    MCH 34.1 (H) 02/14/2025 01:36 AM    MCHC 32.3 02/14/2025 01:36 AM    MPV 10.5 02/14/2025 01:36 AM    NEUTSPOLYS 52.10 02/13/2025 03:16 AM    LYMPHOCYTES 37.40 02/13/2025 03:16 AM    MONOCYTES 7.90 02/13/2025 03:16 AM    EOSINOPHILS 1.10 02/13/2025 03:16 AM    BASOPHILS 1.10 02/13/2025 03:16 AM    ANISOCYTOSIS 1+ 06/09/2024 02:33 PM    Plt-48,000    TSH (2/12/25)-4.040  B12 (1/23/24)-484    EKG (Qtc) (2/12/25)  Measurements  Intervals                                Axis  Rate:       148                          P:          0  OK:         0                            QRS:        -61  QRSD:       79                           T:          48  QT:         317  QTc:        498    Interpretive Statements  Atrial fibrillation with rapid V-rate  Ventricular premature complex  Left anterior fascicular block  Anteroseptal infarct, age indeterminate  Compared to ECG 02/04/2025 14:03:16  Ventricular premature complex(es) now present  Left anterior fascicular block now present  Myocardial infarct finding now present  Possible ischemia no longer present         Imaging/EEG  CT head w/o contrast 5/3/24  1.  No acute intracranial abnormality is identified, there are nonspecific white matter changes, commonly  associated with small vessel ischemic disease.  Associated mild cerebral atrophy is noted.  2.  Bilateral nasal bone fractures, age indeterminant but appear likely chronic, correlate with exam  3.  Bilateral maxillary sinusitis changes  4.  Atherosclerosis.      *ASSESSMENT:   Jeanie is a 79 y.o. female seen today for an initial psychiatric evaluation of depression. Patient is still somnolent and confused and thus unable to engage meaningfully in interview. Son reports that the holidays were difficult for the patient and this is when she relapsed.       Diagnosis  Alcohol use disorder, severe  Hx of MDD    *Plan/Further Workup:   Legal status: not applicable    *Medication Managment:       Hold remeron as patient appears sedated today (has been requiring multiple doses of ativan for severe withdrawal which may be contributing to sedation)  Continue Effexor 150 mg daily for depression  *Labs Reviewed/  *Imaging Reviewed/  *Medical Tests Reviewed/  *Discussed with another provider: Dr. Ramos           Will follow  Thank you for the consult.       For any behavioral health concerns over hte weekend please contact Dr. Reese. This provider will follow up on Monday

## 2025-02-14 NOTE — PROGRESS NOTES
Bedside report received and patient care assumed. Pt is resting in bed, A&O4, with no complaints of pain, and is on 3L NC. Tele box on. All fall precautions are in place, belongings at bedside table.  Pt was updated on POC, no questions or concerns. Pt educated on use of call light for assistance.

## 2025-02-15 LAB
AMMONIA PLAS-SCNC: 44 UMOL/L (ref 11–45)
ANION GAP SERPL CALC-SCNC: 11 MMOL/L (ref 7–16)
BUN SERPL-MCNC: 6 MG/DL (ref 8–22)
CALCIUM SERPL-MCNC: 8.8 MG/DL (ref 8.5–10.5)
CHLORIDE SERPL-SCNC: 102 MMOL/L (ref 96–112)
CO2 SERPL-SCNC: 25 MMOL/L (ref 20–33)
CREAT SERPL-MCNC: 0.54 MG/DL (ref 0.5–1.4)
ERYTHROCYTE [DISTWIDTH] IN BLOOD BY AUTOMATED COUNT: 58.3 FL (ref 35.9–50)
GFR SERPLBLD CREATININE-BSD FMLA CKD-EPI: 94 ML/MIN/1.73 M 2
GLUCOSE SERPL-MCNC: 90 MG/DL (ref 65–99)
HCT VFR BLD AUTO: 39.3 % (ref 37–47)
HGB BLD-MCNC: 13.2 G/DL (ref 12–16)
MAGNESIUM SERPL-MCNC: 1.6 MG/DL (ref 1.5–2.5)
MCH RBC QN AUTO: 35 PG (ref 27–33)
MCHC RBC AUTO-ENTMCNC: 33.6 G/DL (ref 32.2–35.5)
MCV RBC AUTO: 104.2 FL (ref 81.4–97.8)
PLATELET # BLD AUTO: 58 K/UL (ref 164–446)
PLATELETS.RETICULATED NFR BLD AUTO: 7.4 % (ref 0.6–13.1)
PMV BLD AUTO: 11.1 FL (ref 9–12.9)
POTASSIUM SERPL-SCNC: 4.2 MMOL/L (ref 3.6–5.5)
RBC # BLD AUTO: 3.77 M/UL (ref 4.2–5.4)
SODIUM SERPL-SCNC: 138 MMOL/L (ref 135–145)
WBC # BLD AUTO: 5.5 K/UL (ref 4.8–10.8)

## 2025-02-15 PROCEDURE — 36415 COLL VENOUS BLD VENIPUNCTURE: CPT

## 2025-02-15 PROCEDURE — 85055 RETICULATED PLATELET ASSAY: CPT

## 2025-02-15 PROCEDURE — A9270 NON-COVERED ITEM OR SERVICE: HCPCS | Performed by: STUDENT IN AN ORGANIZED HEALTH CARE EDUCATION/TRAINING PROGRAM

## 2025-02-15 PROCEDURE — 80048 BASIC METABOLIC PNL TOTAL CA: CPT

## 2025-02-15 PROCEDURE — 99233 SBSQ HOSP IP/OBS HIGH 50: CPT | Performed by: INTERNAL MEDICINE

## 2025-02-15 PROCEDURE — 83735 ASSAY OF MAGNESIUM: CPT

## 2025-02-15 PROCEDURE — 82140 ASSAY OF AMMONIA: CPT

## 2025-02-15 PROCEDURE — 85027 COMPLETE CBC AUTOMATED: CPT

## 2025-02-15 PROCEDURE — 700102 HCHG RX REV CODE 250 W/ 637 OVERRIDE(OP): Performed by: STUDENT IN AN ORGANIZED HEALTH CARE EDUCATION/TRAINING PROGRAM

## 2025-02-15 PROCEDURE — 770006 HCHG ROOM/CARE - MED/SURG/GYN SEMI*

## 2025-02-15 RX ADMIN — THERA TABS 1 TABLET: TAB at 04:25

## 2025-02-15 RX ADMIN — LORAZEPAM 2 MG: 2 TABLET ORAL at 00:32

## 2025-02-15 RX ADMIN — Medication 100 MG: at 04:25

## 2025-02-15 RX ADMIN — SENNOSIDES AND DOCUSATE SODIUM 2 TABLET: 50; 8.6 TABLET ORAL at 16:34

## 2025-02-15 RX ADMIN — METOPROLOL TARTRATE 100 MG: 50 TABLET, FILM COATED ORAL at 16:35

## 2025-02-15 RX ADMIN — DIGOXIN 125 MCG: 0.25 TABLET ORAL at 04:26

## 2025-02-15 RX ADMIN — METOPROLOL TARTRATE 100 MG: 50 TABLET, FILM COATED ORAL at 04:25

## 2025-02-15 RX ADMIN — LORAZEPAM 1 MG: 1 TABLET ORAL at 03:23

## 2025-02-15 RX ADMIN — VENLAFAXINE HYDROCHLORIDE 150 MG: 75 TABLET ORAL at 04:25

## 2025-02-15 RX ADMIN — FOLIC ACID 1 MG: 1 TABLET ORAL at 04:25

## 2025-02-15 ASSESSMENT — LIFESTYLE VARIABLES
ORIENTATION AND CLOUDING OF SENSORIUM: ORIENTED AND CAN DO SERIAL ADDITIONS
TREMOR: NO TREMOR
AUDITORY DISTURBANCES: NOT PRESENT
ANXIETY: MILDLY ANXIOUS
TREMOR: *
VISUAL DISTURBANCES: MODERATE SENSITIVITY
AUDITORY DISTURBANCES: NOT PRESENT
VISUAL DISTURBANCES: NOT PRESENT
VISUAL DISTURBANCES: MILD SENSITIVITY
PAROXYSMAL SWEATS: NO SWEAT VISIBLE
TOTAL SCORE: VERY MILD ITCHING, PINS AND NEEDLES SENSATION, BURNING OR NUMBNESS
TOTAL SCORE: 4
ORIENTATION AND CLOUDING OF SENSORIUM: DATE DISORIENTATION BY NO MORE THAN TWO CALENDAR DAYS
HEADACHE, FULLNESS IN HEAD: MILD
ORIENTATION AND CLOUDING OF SENSORIUM: ORIENTED AND CAN DO SERIAL ADDITIONS
VISUAL DISTURBANCES: MILD SENSITIVITY
NAUSEA AND VOMITING: NO NAUSEA AND NO VOMITING
NAUSEA AND VOMITING: NO NAUSEA AND NO VOMITING
AUDITORY DISTURBANCES: NOT PRESENT
HEADACHE, FULLNESS IN HEAD: NOT PRESENT
TREMOR: NO TREMOR
NAUSEA AND VOMITING: NO NAUSEA AND NO VOMITING
NAUSEA AND VOMITING: NO NAUSEA AND NO VOMITING
ANXIETY: NO ANXIETY (AT EASE)
TREMOR: *
AGITATION: NORMAL ACTIVITY
PAROXYSMAL SWEATS: NO SWEAT VISIBLE
ORIENTATION AND CLOUDING OF SENSORIUM: ORIENTED AND CAN DO SERIAL ADDITIONS
TOTAL SCORE: 8
VISUAL DISTURBANCES: NOT PRESENT
HEADACHE, FULLNESS IN HEAD: NOT PRESENT
TOTAL SCORE: 12
AGITATION: NORMAL ACTIVITY
VISUAL DISTURBANCES: NOT PRESENT
TOTAL SCORE: 0
TOTAL SCORE: 3
ANXIETY: MILDLY ANXIOUS
TREMOR: NO TREMOR
HEADACHE, FULLNESS IN HEAD: VERY MILD
ANXIETY: NO ANXIETY (AT EASE)
HEADACHE, FULLNESS IN HEAD: NOT PRESENT
PAROXYSMAL SWEATS: NO SWEAT VISIBLE
NAUSEA AND VOMITING: NO NAUSEA AND NO VOMITING
PAROXYSMAL SWEATS: NO SWEAT VISIBLE
AGITATION: NORMAL ACTIVITY
PAROXYSMAL SWEATS: NO SWEAT VISIBLE
AUDITORY DISTURBANCES: NOT PRESENT
ANXIETY: MILDLY ANXIOUS
TOTAL SCORE: VERY MILD ITCHING, PINS AND NEEDLES SENSATION, BURNING OR NUMBNESS
ORIENTATION AND CLOUDING OF SENSORIUM: ORIENTED AND CAN DO SERIAL ADDITIONS
TREMOR: NO TREMOR
HEADACHE, FULLNESS IN HEAD: NOT PRESENT
AGITATION: NORMAL ACTIVITY
AUDITORY DISTURBANCES: NOT PRESENT
VISUAL DISTURBANCES: MODERATE SENSITIVITY
AGITATION: *
HEADACHE, FULLNESS IN HEAD: NOT PRESENT
NAUSEA AND VOMITING: MILD NAUSEA WITH NO VOMITING
AGITATION: NORMAL ACTIVITY
ANXIETY: NO ANXIETY (AT EASE)
PAROXYSMAL SWEATS: NO SWEAT VISIBLE
NAUSEA AND VOMITING: NO NAUSEA AND NO VOMITING
ANXIETY: NO ANXIETY (AT EASE)
ORIENTATION AND CLOUDING OF SENSORIUM: ORIENTED AND CAN DO SERIAL ADDITIONS
SUBSTANCE_ABUSE: 1
PAROXYSMAL SWEATS: BARELY PERCEPTIBLE SWEATING. PALMS MOIST
AGITATION: *
AUDITORY DISTURBANCES: NOT PRESENT
TOTAL SCORE: 2
AUDITORY DISTURBANCES: VERY MILD HARSHNESS OR ABILITY TO FRIGHTEN
ORIENTATION AND CLOUDING OF SENSORIUM: ORIENTED AND CAN DO SERIAL ADDITIONS
TREMOR: *
TOTAL SCORE: 4

## 2025-02-15 ASSESSMENT — ENCOUNTER SYMPTOMS
DEPRESSION: 1
PALPITATIONS: 1

## 2025-02-15 NOTE — PROGRESS NOTES
Bedside shift report received from samir segura. VIJAY, seizure precaution, 3 side rails up, bed alarm set, call bell within reach.

## 2025-02-15 NOTE — PROGRESS NOTES
[facility] Medicine Daily Progress Note    Date of Service  2/14/2025    Chief Complaint  Jeanie Hutchison is a 79 y.o. female admitted 2/12/2025 with shortness of breath and alcohol intoxication    Hospital Course  79 y.o. female severe alcohol abuse with complications, hypertension, hypothyroidism, atrial fibrillation, history of SVT, medical noncompliance who presented 2/12/2025 with dyspnea, nausea and vomiting.     Patient presents to the ED complaining of nausea, vomiting, dyspnea ongoing for the past 5 days and progressive.  Patient admits to daily liquor consumption.  She reports compliance with Eliquis, metoprolol and digoxin.  Denies any recent fevers or chills, headache or vision changes, chest pain, cough, abdominal pain, dysuria diarrhea.     In the ED she is afebrile pulse 100 160 respiratory rate 18-24 systolic blood pressure of 102-1 150s pulse ox 83 to 98% from 2 to 5 L nasal cannula.  CBC with thrombocytopenia 69, bicarb 18 corrected calcium 8.4 total bilirubin 2.4 magnesium 1.2 serum alcohol 170 troponin T 8 proBNP 868 chest x-ray negative for acute processes EKG shows A-fib with RVR LAFB.  Treated with metoprolol, digoxin, IV fluids, subsequently referred to hospitalist for admission.    Interval Problem Update  2/14 patient noted to be confused and slow to respond to questions.  She has no focal deficits on exam.  She is disoriented.  I suspect she may be oversedated.  Will discontinue Librium and hold Remeron for tonight.  IV Ativan as needed per [facility] protocol.  I discussed the case with psychiatry who are unable to do a thorough evaluation due to her current mental state.  Continue Effexor for depression.    2/13 Patient noted to be somnolent at the time of my interview.  She was able to open her eyes to verbal stimuli.  She is responding to my questions appropriately.  She states she has been drinking 4-5 drinks of hard liquor daily.  Patient states she has been very depressed due to the  loss of multiple family members over the past several years and she drinks alcohol to cope with the loss.  I have consulted psychiatry.  I counseled her on alcohol cessation.  Continue CIWA protocol.  She is noted to have leukopenia and thrombocytopenia likely secondary to alcohol abuse.  He is low at 3.3, replace with oral potassium.  Check magnesium and replace if necessary.    I have discussed this patient's plan of care and discharge plan at IDT rounds today with Case Management, Nursing, Nursing leadership, and other members of the IDT team.    Consultants/Specialty  psychiatry    Code Status  Full Code    Disposition  Medically Cleared  I have placed the appropriate orders for post-discharge needs.    Review of Systems  Review of Systems   Cardiovascular:  Positive for palpitations.   Psychiatric/Behavioral:  Positive for depression and substance abuse.         Physical Exam  Temp:  [36.2 °C (97.2 °F)-36.7 °C (98.1 °F)] 36.6 °C (97.9 °F)  Pulse:  [] 90  Resp:  [16-18] 16  BP: (122-131)/(89-95) 130/92  SpO2:  [86 %-97 %] 94 %    Physical Exam  Vitals and nursing note reviewed.   Constitutional:       General: She is not in acute distress.  HENT:      Head: Normocephalic.      Mouth/Throat:      Mouth: Mucous membranes are moist.   Eyes:      Pupils: Pupils are equal, round, and reactive to light.   Cardiovascular:      Rate and Rhythm: Regular rhythm. Tachycardia present.      Pulses: Normal pulses.      Heart sounds: Normal heart sounds.   Pulmonary:      Effort: Pulmonary effort is normal.      Breath sounds: Normal breath sounds.   Abdominal:      Palpations: Abdomen is soft.      Tenderness: There is no abdominal tenderness.   Musculoskeletal:         General: No swelling.      Cervical back: Neck supple.   Skin:     General: Skin is warm.      Coloration: Skin is not jaundiced.   Neurological:      General: No focal deficit present.      Mental Status: She is alert. She is disoriented.   Psychiatric:          Mood and Affect: Mood is depressed.         Speech: Speech is delayed.         Behavior: Behavior is withdrawn.         Fluids  No intake or output data in the 24 hours ending 02/14/25 1712       Laboratory  Recent Labs     02/12/25  1635 02/13/25 0316 02/14/25  0136   WBC 6.2 4.7* 4.1*   RBC 4.05* 3.50* 3.70*   HEMOGLOBIN 14.1 12.1 12.6   HEMATOCRIT 41.9 36.4* 39.0   .5* 104.0* 105.4*   MCH 34.8* 34.6* 34.1*   MCHC 33.7 33.2 32.3   RDW 63.2* 62.8* 59.8*   PLATELETCT 69* 52* 48*   MPV 9.4 9.9 10.5     Recent Labs     02/12/25  1635 02/13/25 0316 02/14/25  0136   SODIUM 140 138 140   POTASSIUM 3.9 3.3* 3.6   CHLORIDE 100 102 105   CO2 18* 25 26   GLUCOSE 71 162* 107*   BUN 12 8 9   CREATININE 0.61 0.55 0.66   CALCIUM 8.1* 7.5* 8.4*                   Imaging  DX-CHEST-PORTABLE (1 VIEW)   Final Result         No acute cardiac or pulmonary abnormality is identified.           Assessment/Plan  * Atrial fibrillation with RVR (HCC)- (present on admission)  Assessment & Plan  Secondary to noncompliance with medication, continued alcohol abuse.  Continue Eliquis, digoxin, metoprolol.  IV metoprolol for sustained RVR greater than 110    Alcohol intoxication in active alcoholic with complication (HCC)- (present on admission)  Assessment & Plan  Here with elevated serum alcohol level and already appearing to go into early withdrawals.  Alcohol cessation counseling provided    Acquired hypothyroidism- (present on admission)  Assessment & Plan  Not taking any medications, check TSH    Major depressive disorder with current active episode- (present on admission)  Assessment & Plan  Continue Effexor  Hold Remeron and Atarax due to somnolence and confusion  Psychiatry is following    Alcohol withdrawal syndrome with perceptual disturbance (HCC)- (present on admission)  Assessment & Plan  CIWA protocol with serial neuro exams and continuous cardiac monitoring  MVI, folate, thiamine  Check & replace magnesium,  potassium, phosphorus  Hold Librium due to somnolence and confusion    Presence of automatic cardioverter/defibrillator (AICD)- (present on admission)  Assessment & Plan  Noted    Acute respiratory failure with hypoxia (HCC)- (present on admission)  Assessment & Plan  Oxygen per guidelines, wean as able  RT consult, continuous pulse ox, incentive spirometry  No leukocytosis, chest x-ray without acute processes.  Patient presents with alcohol intoxication, withdrawal, A-fib with RVR.  DuoNebs as needed        Recurrent falls- (present on admission)  Assessment & Plan  Fall precautions.  Unclear if from consistently being intoxicated or gait disturbance from long-term alcohol abuse versus physical debility.         VTE prophylaxis: SCD    I have performed a physical exam and reviewed and updated ROS and Plan today (2/14/2025). In review of yesterday's note (2/13/2025), there are no changes except as documented above.    I spent 53 minutes, reviewing the chart, obtaining and/or reviewing separately obtained history. Performing a medically appropriate examination and evaluation.  Counseling and educating the patient. Ordering and reviewing medications, tests, or procedures.  Cussing the case with psychiatry.  Documenting clinical information in EPIC. Independently interpreting results and communicating results to patient. Discussing future disposition of care with patient, RN and case management.

## 2025-02-15 NOTE — CARE PLAN
The patient is Watcher - Medium risk of patient condition declining or worsening    Shift Goals  Clinical Goals: CIWA, VSS, safety  Patient Goals: rest  Family Goals: DANICA    Progress made toward(s) clinical / shift goals:      Problem: Optimal Care for Alcohol Withdrawal  Goal: Optimal Care for the alcohol withdrawal patient  Outcome: Progressing     Problem: Seizure Precautions  Goal: Implementation of seizure precautions  Outcome: Progressing     Problem: Lifestyle Changes  Goal: Patient's ability to identify lifestyle changes and available resources to help reduce recurrence of condition will improve  Outcome: Progressing     Problem: Psychosocial  Goal: Patient's level of anxiety will decrease  Outcome: Progressing  Goal: Spiritual and cultural needs incorporated into hospitalization  Outcome: Progressing

## 2025-02-15 NOTE — PROGRESS NOTES
Monitor Summary  Rhythm: Afib  Rate:   Ectopy: (O) PVC  Measurements: -/.07/.40  ---12 hr Chart Review---

## 2025-02-15 NOTE — CARE PLAN
The patient is Stable - Low risk of patient condition declining or worsening    Shift Goals  Clinical Goals: CIWA, VSS, safety  Patient Goals: rest  Family Goals: DANICA    Progress made toward(s) clinical / shift goals:    Problem: Optimal Care for Alcohol Withdrawal  Goal: Optimal Care for the alcohol withdrawal patient  Description: Target End Date:  1 to 3 days    1.  Alcohol history screening done on admission  2.  CIWA-AR score assessment (includes assessment of nausea/vomiting, tremor, sweats, anxiety, agitation, tactile, visual and auditory disturbances, headache and orientation/sensorium).  Document on CIWA flowsheet.  3.  Follow CIWA-AR score protocol  4.  Frequent reorientation  5.  Monitor for fluid and electrolyte imbalance.  6.  Assess for respiratory depression due to sedation (pulse ox)  7.  Consider thiamine, multivitamins, folic acid and magnesium sulfate per provider order  8.  Collaborate with Social Workers/Case Management  9.  Collaborate with mental health  Outcome: Progressing     Problem: Seizure Precautions  Goal: Implementation of seizure precautions  Description: Target End Date:  Prior to discharge or change in level of care    1.  Padded side rails up at all times  2.  Suction equipment and oxygen delivery system at bedside  3.  Continuous pulse oximeter in use  4.  Implement fall precautions, bed alarm on, bed in lowest position  5.  IV access (per order)  6.  Provide low stimulus environment, avoid exposure to triggers  7.  Instruct patient to use call light/seizure button if having warning signs of impending seizure  Outcome: Progressing     Problem: Skin Integrity  Goal: Skin integrity is maintained or improved  Description: Target End Date:  Prior to discharge or change in level of care    Document interventions on Skin Risk/Albert flowsheet groups and corresponding LDA    1.  Assess and monitor skin integrity, appearance and/or temperature  2.  Assess risk factors for impaired skin  integrity and/or pressures ulcers  3.  Implement precautions to protect skin integrity in collaboration with interdisciplinary team  4.  Implement pressure ulcer prevention protocol if at risk for skin breakdown  5.  Confirm wound care consult if at risk for skin breakdown  6.  Ensure patient use of pressure relieving devices  (Low air loss bed, waffle overlay, heel protectors, ROHO cushion, etc)  Outcome: Progressing       Patient is not progressing towards the following goals:      Problem: Psychosocial  Goal: Patient's level of anxiety will decrease  Description: Target End Date:  1-3 days or as soon as patient condition allows    1.  Collaborate with patient and family/caregiver to identify triggers and develop strategies to cope with anxiety  2.  Implement stimuli reduction, calming techniques  3.  Pharmacologic management per provider order  4.  Encourage patient/family/care giver participation  5.  Collaborate with interdisciplinary team including Psychologist or Behavioral Health Team as needed  Outcome: Not Progressing     Problem: Fall Risk  Goal: Patient will remain free from falls  Description: Target End Date:  Prior to discharge or change in level of care    Document interventions on the Beth Banda Fall Risk Assessment    1.  Assess for fall risk factors  2.  Implement fall precautions  Outcome: Not Progressing

## 2025-02-15 NOTE — PROGRESS NOTES
Told by RN that the patient is not going to be able to complete the Home O2 evaluation due to mentation and physical state.

## 2025-02-15 NOTE — DISCHARGE PLANNING
Case Management Discharge Planning    Admission Date: 2/12/2025  GMLOS: 3.4  ALOS: 2    6-Clicks ADL Score: 21  6-Clicks Mobility Score: 24      Anticipated Discharge Dispo: Discharge Disposition: Discharged to home/self care (01)    DME Needed: No    Action(s) Taken: Updated Provider/Nurse on Discharge Plan and DC Assessment Complete (See below)    1100, Pt is not medically cleared. Pt still on CIWA monitoring.    1600,Pt was visited at room to obtain assessment information and discuss discharge planning. Pt said she wants to discharge home.      Escalations Completed: None    Medically Clear: No    Next Steps: CM will continue to assist Pt with discharge needs.      Barriers to Discharge: Medical clearance    Is the patient up for discharge tomorrow: No      Care Transition Team Assessment  RN NEENA met with Pt at bedside to obtain assessment informations. Demographics verified. RN NEENA introduced self and purpose of visit.   Pt lives with adult grandson in a 2 story house with 2 steps to main entrance  Pt has  son for support.  Pt has MARISELA MAZARIEGOS D.O. for PCP  Pt was independent with ADLs and IADLs prior to hospitalization.      Information Source  Orientation Level: Oriented X4  Information Given By: Patient      Elopement Risk  Legal Hold: No  Ambulatory or Self Mobile in Wheelchair: Yes  Disoriented: No  Psychiatric Symptoms: None  History of Wandering: No  Elopement this Admit: No  Vocalizing Wanting to Leave: No  Displays Behaviors, Body Language Wanting to Leave: No-Not at Risk for Elopement  Elopement Risk: Not at Risk for Elopement    Interdisciplinary Discharge Planning  Primary Care Physician: MARISELA MAZARIEGOS D.O.  Lives with - Patient's Self Care Capacity: Other (Comments) (Adult grandson)  Patient or legal guardian wants to designate a caregiver: No  Support Systems: Children (Pt's son)  Housing / Facility: 2 Story House (with 2 steps to main entrance)    Discharge Preparedness  What is your plan after  discharge?: Home with help  What are your discharge supports?: Child  Prior Functional Level: Independent with Activities of Daily Living, Independent with Medication Management    Functional Assesment  Prior Functional Level: Independent with Activities of Daily Living, Independent with Medication Management    Finances  Financial Barriers to Discharge: No  Prescription Coverage: Yes    Vision / Hearing Impairment  Vision Impairment : No  Right Eye Vision: Impaired, Wears Glasses  Left Eye Vision: Impaired, Wears Glasses  Hearing Impairment : No       Domestic Abuse  Have you ever been the victim of abuse or violence?: No  Possible Abuse/Neglect Reported to:: Not Applicable    Psychological Assessment  History of Substance Abuse: Alcohol  Date Last Used - Alcohol: Pt said 2 days ago  History of Psychiatric Problems: No      Anticipated Discharge Information  Discharge Disposition: Discharged to home/self care (01)

## 2025-02-16 ENCOUNTER — APPOINTMENT (OUTPATIENT)
Dept: RADIOLOGY | Facility: MEDICAL CENTER | Age: 80
DRG: 308 | End: 2025-02-16
Attending: HOSPITALIST
Payer: MEDICARE

## 2025-02-16 PROBLEM — F10.229 ALCOHOL INTOXICATION IN ACTIVE ALCOHOLIC WITH COMPLICATION (HCC): Status: RESOLVED | Noted: 2024-05-04 | Resolved: 2025-02-16

## 2025-02-16 LAB
ALBUMIN SERPL BCP-MCNC: 3.3 G/DL (ref 3.2–4.9)
ALBUMIN/GLOB SERPL: 1.1 G/DL
ALP SERPL-CCNC: 88 U/L (ref 30–99)
ALT SERPL-CCNC: 13 U/L (ref 2–50)
ANION GAP SERPL CALC-SCNC: 13 MMOL/L (ref 7–16)
AST SERPL-CCNC: 30 U/L (ref 12–45)
BILIRUB SERPL-MCNC: 1.9 MG/DL (ref 0.1–1.5)
BUN SERPL-MCNC: 8 MG/DL (ref 8–22)
CALCIUM ALBUM COR SERPL-MCNC: 9.6 MG/DL (ref 8.5–10.5)
CALCIUM SERPL-MCNC: 9 MG/DL (ref 8.5–10.5)
CHLORIDE SERPL-SCNC: 101 MMOL/L (ref 96–112)
CO2 SERPL-SCNC: 21 MMOL/L (ref 20–33)
CREAT SERPL-MCNC: 0.58 MG/DL (ref 0.5–1.4)
ERYTHROCYTE [DISTWIDTH] IN BLOOD BY AUTOMATED COUNT: 60.1 FL (ref 35.9–50)
GFR SERPLBLD CREATININE-BSD FMLA CKD-EPI: 93 ML/MIN/1.73 M 2
GLOBULIN SER CALC-MCNC: 3 G/DL (ref 1.9–3.5)
GLUCOSE SERPL-MCNC: 82 MG/DL (ref 65–99)
HCT VFR BLD AUTO: 42.7 % (ref 37–47)
HGB BLD-MCNC: 13.8 G/DL (ref 12–16)
MCH RBC QN AUTO: 34.5 PG (ref 27–33)
MCHC RBC AUTO-ENTMCNC: 32.3 G/DL (ref 32.2–35.5)
MCV RBC AUTO: 106.8 FL (ref 81.4–97.8)
PLATELET # BLD AUTO: 64 K/UL (ref 164–446)
PLATELETS.RETICULATED NFR BLD AUTO: 8.4 % (ref 0.6–13.1)
PMV BLD AUTO: 10.9 FL (ref 9–12.9)
POTASSIUM SERPL-SCNC: 4.1 MMOL/L (ref 3.6–5.5)
PROT SERPL-MCNC: 6.3 G/DL (ref 6–8.2)
RBC # BLD AUTO: 4 M/UL (ref 4.2–5.4)
SODIUM SERPL-SCNC: 135 MMOL/L (ref 135–145)
WBC # BLD AUTO: 4.7 K/UL (ref 4.8–10.8)

## 2025-02-16 PROCEDURE — 99232 SBSQ HOSP IP/OBS MODERATE 35: CPT | Performed by: HOSPITALIST

## 2025-02-16 PROCEDURE — 700102 HCHG RX REV CODE 250 W/ 637 OVERRIDE(OP): Performed by: STUDENT IN AN ORGANIZED HEALTH CARE EDUCATION/TRAINING PROGRAM

## 2025-02-16 PROCEDURE — 85055 RETICULATED PLATELET ASSAY: CPT

## 2025-02-16 PROCEDURE — 36415 COLL VENOUS BLD VENIPUNCTURE: CPT

## 2025-02-16 PROCEDURE — 85027 COMPLETE CBC AUTOMATED: CPT

## 2025-02-16 PROCEDURE — A9270 NON-COVERED ITEM OR SERVICE: HCPCS | Performed by: STUDENT IN AN ORGANIZED HEALTH CARE EDUCATION/TRAINING PROGRAM

## 2025-02-16 PROCEDURE — 80053 COMPREHEN METABOLIC PANEL: CPT

## 2025-02-16 PROCEDURE — 70450 CT HEAD/BRAIN W/O DYE: CPT

## 2025-02-16 PROCEDURE — 770006 HCHG ROOM/CARE - MED/SURG/GYN SEMI*

## 2025-02-16 RX ORDER — VENLAFAXINE HYDROCHLORIDE 75 MG/1
150 CAPSULE, EXTENDED RELEASE ORAL DAILY
Status: DISCONTINUED | OUTPATIENT
Start: 2025-02-17 | End: 2025-02-18 | Stop reason: HOSPADM

## 2025-02-16 RX ADMIN — METOPROLOL TARTRATE 100 MG: 50 TABLET, FILM COATED ORAL at 17:49

## 2025-02-16 RX ADMIN — THERA TABS 1 TABLET: TAB at 06:05

## 2025-02-16 RX ADMIN — DIGOXIN 125 MCG: 0.25 TABLET ORAL at 06:05

## 2025-02-16 RX ADMIN — METOPROLOL TARTRATE 100 MG: 50 TABLET, FILM COATED ORAL at 06:05

## 2025-02-16 RX ADMIN — Medication 100 MG: at 06:05

## 2025-02-16 RX ADMIN — SENNOSIDES AND DOCUSATE SODIUM 2 TABLET: 50; 8.6 TABLET ORAL at 17:49

## 2025-02-16 RX ADMIN — FOLIC ACID 1 MG: 1 TABLET ORAL at 06:05

## 2025-02-16 RX ADMIN — VENLAFAXINE HYDROCHLORIDE 150 MG: 75 TABLET ORAL at 06:05

## 2025-02-16 ASSESSMENT — LIFESTYLE VARIABLES
AGITATION: NORMAL ACTIVITY
VISUAL DISTURBANCES: NOT PRESENT
VISUAL DISTURBANCES: NOT PRESENT
PAROXYSMAL SWEATS: NO SWEAT VISIBLE
TOTAL SCORE: 0
ANXIETY: NO ANXIETY (AT EASE)
ORIENTATION AND CLOUDING OF SENSORIUM: ORIENTED AND CAN DO SERIAL ADDITIONS
HEADACHE, FULLNESS IN HEAD: NOT PRESENT
AGITATION: NORMAL ACTIVITY
NAUSEA AND VOMITING: NO NAUSEA AND NO VOMITING
PAROXYSMAL SWEATS: NO SWEAT VISIBLE
TREMOR: NO TREMOR
AGITATION: NORMAL ACTIVITY
NAUSEA AND VOMITING: NO NAUSEA AND NO VOMITING
TOTAL SCORE: 0
AGITATION: NORMAL ACTIVITY
PAROXYSMAL SWEATS: NO SWEAT VISIBLE
TREMOR: TREMOR NOT VISIBLE BUT CAN BE FELT, FINGERTIP TO FINGERTIP
ORIENTATION AND CLOUDING OF SENSORIUM: ORIENTED AND CAN DO SERIAL ADDITIONS
SUBSTANCE_ABUSE: 1
ORIENTATION AND CLOUDING OF SENSORIUM: ORIENTED AND CAN DO SERIAL ADDITIONS
TOTAL SCORE: 1
PAROXYSMAL SWEATS: NO SWEAT VISIBLE
AUDITORY DISTURBANCES: NOT PRESENT
ANXIETY: NO ANXIETY (AT EASE)
VISUAL DISTURBANCES: NOT PRESENT
TREMOR: NO TREMOR
HEADACHE, FULLNESS IN HEAD: NOT PRESENT
AUDITORY DISTURBANCES: NOT PRESENT
TREMOR: TREMOR NOT VISIBLE BUT CAN BE FELT, FINGERTIP TO FINGERTIP
AUDITORY DISTURBANCES: NOT PRESENT
TOTAL SCORE: 1
NAUSEA AND VOMITING: NO NAUSEA AND NO VOMITING
AUDITORY DISTURBANCES: NOT PRESENT
ANXIETY: NO ANXIETY (AT EASE)
VISUAL DISTURBANCES: NOT PRESENT
HEADACHE, FULLNESS IN HEAD: NOT PRESENT
ANXIETY: NO ANXIETY (AT EASE)
ORIENTATION AND CLOUDING OF SENSORIUM: ORIENTED AND CAN DO SERIAL ADDITIONS
HEADACHE, FULLNESS IN HEAD: NOT PRESENT
NAUSEA AND VOMITING: NO NAUSEA AND NO VOMITING

## 2025-02-16 ASSESSMENT — ENCOUNTER SYMPTOMS: SHORTNESS OF BREATH: 0

## 2025-02-16 NOTE — CARE PLAN
The patient is Stable - Low risk of patient condition declining or worsening    Shift Goals  Clinical Goals: CIWA, VSS, safety  Patient Goals: rest  Family Goals: DANICA    Progress made toward(s) clinical / shift goals:    Problem: Skin Integrity  Goal: Skin integrity is maintained or improved  Outcome: Progressing  Note: Assess skin and monitor for skin breakdown. Alleviate pressure to bony prominences and provide assistance with turning, repositioning, ROM and mobility as appropriate. Continue to monitor.        Problem: Pain - Standard  Goal: Alleviation of pain or a reduction in pain to the patient’s comfort goal  Outcome: Progressing  Note: Assess and monitor for pain. Provide pharmacological and non-pharmacological interventions as appropriate. Re-evaluate and continue to monitor.         Patient is not progressing towards the following goals:

## 2025-02-16 NOTE — PROGRESS NOTES
Assumed care of patient; received bedside report from Lulú HOOVER; all questions answered. Patient voices no needs at this time.

## 2025-02-16 NOTE — PROGRESS NOTES
4 Eyes Skin Assessment Completed by SNEHA Chino and SNEHA Leavitt.    Head WDL  Ears WDL  Nose WDL  Mouth WDL  Neck WDL  Breast/Chest WDL  Shoulder Blades WDL  Spine WDL  (R) Arm/Elbow/Hand WDL  (L) Arm/Elbow/Hand WDL  Abdomen WDL  Groin WDL  Scrotum/Coccyx/Buttocks Redness and Blanching  (R) Leg WDL  (L) Leg WDL  (R) Heel/Foot/Toe WDL  (L) Heel/Foot/Toe WDL          Devices In Places Nasal Cannula      Interventions In Place NC W/Ear Foams and Pillows    Possible Skin Injury No    Pictures Uploaded Into Epic Yes  Wound Consult Placed N/A  RN Wound Prevention Protocol Ordered Yes

## 2025-02-16 NOTE — PROGRESS NOTES
Report receievd from samir segura. Pt medical status, 3L nasal cannula in place, CIWA and seizure precautions, 3 side rails up. Bed alarm set, call bell within reach.

## 2025-02-16 NOTE — CARE PLAN
The patient is Stable - Low risk of patient condition declining or worsening    Shift Goals  Clinical Goals: CIWA, VSS, safety  Patient Goals: rest  Family Goals: DANICA    Progress made toward(s) clinical / shift goals:  CIWA, VSS, safety    Patient is not progressing towards the following goals:

## 2025-02-16 NOTE — ASSESSMENT & PLAN NOTE
Likely toxic encephalopathy secondary to  to alcohol intoxication and withdrawal  CT head reviewed negative for acute pathology ammonia normal  Wean off sedating agents  Close clinical monitoring

## 2025-02-16 NOTE — PROGRESS NOTES
Hospital Medicine Daily Progress Note    Date of Service  2/15/2025    Chief Complaint  Jeanie Hutchison is a 79 y.o. female admitted 2/12/2025 with shortness of breath and alcohol intoxication    Hospital Course  79 y.o. female severe alcohol abuse with complications, hypertension, hypothyroidism, atrial fibrillation, history of SVT, medical noncompliance who presented 2/12/2025 with dyspnea, nausea and vomiting.     Patient presents to the ED complaining of nausea, vomiting, dyspnea ongoing for the past 5 days and progressive.  Patient admits to daily liquor consumption.  She reports compliance with Eliquis, metoprolol and digoxin.  Denies any recent fevers or chills, headache or vision changes, chest pain, cough, abdominal pain, dysuria diarrhea.     In the ED she is afebrile pulse 100 160 respiratory rate 18-24 systolic blood pressure of 102-1 150s pulse ox 83 to 98% from 2 to 5 L nasal cannula.  CBC with thrombocytopenia 69, bicarb 18 corrected calcium 8.4 total bilirubin 2.4 magnesium 1.2 serum alcohol 170 troponin T 8 proBNP 868 chest x-ray negative for acute processes EKG shows A-fib with RVR LAFB.  Treated with metoprolol, digoxin, IV fluids, subsequently referred to hospitalist for admission.    Interval Problem Update  2/15 patient continues to appear confused and somnolent.  Check ammonia levels.  Limit sedatives.  Okay to transfer out of telemetry since her CIWA scores have been low    2/14 patient noted to be confused and slow to respond to questions.  She has no focal deficits on exam.  She is disoriented.  I suspect she may be oversedated.  Will discontinue Librium and hold Remeron for tonight.  IV Ativan as needed per CIWA protocol.  I discussed the case with psychiatry who are unable to do a thorough evaluation due to her current mental state.  Continue Effexor for depression.    2/13 Patient noted to be somnolent at the time of my interview.  She was able to open her eyes to verbal stimuli.   She is responding to my questions appropriately.  She states she has been drinking 4-5 drinks of hard liquor daily.  Patient states she has been very depressed due to the loss of multiple family members over the past several years and she drinks alcohol to cope with the loss.  I have consulted psychiatry.  I counseled her on alcohol cessation.  Continue CIWA protocol.  She is noted to have leukopenia and thrombocytopenia likely secondary to alcohol abuse.  He is low at 3.3, replace with oral potassium.  Check magnesium and replace if necessary.    I have discussed this patient's plan of care and discharge plan at IDT rounds today with Case Management, Nursing, Nursing leadership, and other members of the IDT team.    Consultants/Specialty  psychiatry    Code Status  Full Code    Disposition  The patient is not medically cleared for discharge to home or a post-acute facility.      I have placed the appropriate orders for post-discharge needs.    Review of Systems  Review of Systems   Cardiovascular:  Positive for palpitations.   Psychiatric/Behavioral:  Positive for depression and substance abuse.         Physical Exam  Temp:  [36.2 °C (97.2 °F)-36.8 °C (98.2 °F)] 36.3 °C (97.3 °F)  Pulse:  [] 70  Resp:  [18-20] 18  BP: (138-147)/() 143/101  SpO2:  [90 %-96 %] 94 %    Physical Exam  Vitals and nursing note reviewed.   Constitutional:       General: She is not in acute distress.  HENT:      Head: Normocephalic.      Mouth/Throat:      Mouth: Mucous membranes are moist.   Eyes:      Pupils: Pupils are equal, round, and reactive to light.   Cardiovascular:      Rate and Rhythm: Regular rhythm. Tachycardia present.      Pulses: Normal pulses.      Heart sounds: Normal heart sounds.   Pulmonary:      Effort: Pulmonary effort is normal.      Breath sounds: Normal breath sounds.   Abdominal:      Palpations: Abdomen is soft.      Tenderness: There is no abdominal tenderness.   Musculoskeletal:         General:  No swelling.      Cervical back: Neck supple.   Skin:     General: Skin is warm.      Coloration: Skin is not jaundiced.   Neurological:      General: No focal deficit present.      Mental Status: She is alert. She is disoriented.   Psychiatric:         Mood and Affect: Mood is depressed.         Speech: Speech is delayed.         Behavior: Behavior is withdrawn.         Fluids    Intake/Output Summary (Last 24 hours) at 2/15/2025 1820  Last data filed at 2/14/2025 2022  Gross per 24 hour   Intake 210 ml   Output --   Net 210 ml          Laboratory  Recent Labs     02/13/25 0316 02/14/25  0136 02/15/25  1131   WBC 4.7* 4.1* 5.5   RBC 3.50* 3.70* 3.77*   HEMOGLOBIN 12.1 12.6 13.2   HEMATOCRIT 36.4* 39.0 39.3   .0* 105.4* 104.2*   MCH 34.6* 34.1* 35.0*   MCHC 33.2 32.3 33.6   RDW 62.8* 59.8* 58.3*   PLATELETCT 52* 48* 58*   MPV 9.9 10.5 11.1     Recent Labs     02/13/25 0316 02/14/25 0136 02/15/25  1131   SODIUM 138 140 138   POTASSIUM 3.3* 3.6 4.2   CHLORIDE 102 105 102   CO2 25 26 25   GLUCOSE 162* 107* 90   BUN 8 9 6*   CREATININE 0.55 0.66 0.54   CALCIUM 7.5* 8.4* 8.8                   Imaging  DX-CHEST-PORTABLE (1 VIEW)   Final Result         No acute cardiac or pulmonary abnormality is identified.           Assessment/Plan  * Atrial fibrillation with RVR (HCC)- (present on admission)  Assessment & Plan  Secondary to noncompliance with medication, continued alcohol abuse.  Continue Eliquis, digoxin, metoprolol.  IV metoprolol for sustained RVR greater than 110    Alcohol intoxication in active alcoholic with complication (HCC)- (present on admission)  Assessment & Plan  Here with elevated serum alcohol level and already appearing to go into early withdrawals.  Alcohol cessation counseling provided    Acquired hypothyroidism- (present on admission)  Assessment & Plan  Not taking any medications, check TSH    Major depressive disorder with current active episode- (present on admission)  Assessment &  Plan  Continue Effexor  Hold Remeron and Atarax due to somnolence and confusion  Psychiatry is following    Alcohol withdrawal syndrome with perceptual disturbance (HCC)- (present on admission)  Assessment & Plan  CIWA protocol with serial neuro exams and continuous cardiac monitoring  MVI, folate, thiamine  Check & replace magnesium, potassium, phosphorus  Hold Librium due to somnolence and confusion    Presence of automatic cardioverter/defibrillator (AICD)- (present on admission)  Assessment & Plan  Noted    Acute respiratory failure with hypoxia (HCC)- (present on admission)  Assessment & Plan  Oxygen per guidelines, wean as able  RT consult, continuous pulse ox, incentive spirometry  No leukocytosis, chest x-ray without acute processes.  Patient presents with alcohol intoxication, withdrawal, A-fib with RVR.  DuoNebs as needed        Recurrent falls- (present on admission)  Assessment & Plan  Fall precautions.  Unclear if from consistently being intoxicated or gait disturbance from long-term alcohol abuse versus physical debility.         VTE prophylaxis: SCD    I have performed a physical exam and reviewed and updated ROS and Plan today (2/15/2025). In review of yesterday's note (2/14/2025), there are no changes except as documented above.

## 2025-02-16 NOTE — PROGRESS NOTES
Hospital Medicine Daily Progress Note    Date of Service  2/16/2025    Chief Complaint  Jeanie Hutchison is a 79 y.o. female admitted 2/12/2025 with shortness of breath and alcohol intoxication    Hospital Course  79 y.o. female severe alcohol abuse with complications, hypertension, hypothyroidism, atrial fibrillation, history of SVT, medical noncompliance who presented 2/12/2025 with dyspnea, nausea and vomiting.     Patient presents to the ED complaining of nausea, vomiting, dyspnea ongoing for the past 5 days and progressive.  Patient admits to daily liquor consumption.  She reports compliance with Eliquis, metoprolol and digoxin.  Denies any recent fevers or chills, headache or vision changes, chest pain, cough, abdominal pain, dysuria diarrhea.     In the ED she is afebrile pulse 100 160 respiratory rate 18-24 systolic blood pressure of 102-1 150s pulse ox 83 to 98% from 2 to 5 L nasal cannula.  CBC with thrombocytopenia 69, bicarb 18 corrected calcium 8.4 total bilirubin 2.4 magnesium 1.2 serum alcohol 170 troponin T 8 proBNP 868 chest x-ray negative for acute processes EKG shows A-fib with RVR LAFB.  Treated with metoprolol, digoxin, IV fluids, subsequently referred to hospitalist for admission.    Interval Problem Update    Patient is asleep he is arousable  I ordered CT head and reviewed it with no acute pathology  Reviewed chemistry panel electrolytes stable  I reviewed CBC WBC 4.7 hemoglobin 13.8 platelets 64  Patient is afebrile on 3 L nasal cannula      I have discussed this patient's plan of care and discharge plan at IDT rounds today with Case Management, Nursing, Nursing leadership, and other members of the IDT team.    Consultants/Specialty  psychiatry    Code Status  Full Code    Disposition  The patient is not medically cleared for discharge to home or a post-acute facility.      I have placed the appropriate orders for post-discharge needs.    Review of Systems  Review of Systems    Constitutional:  Positive for malaise/fatigue.   Respiratory:  Negative for shortness of breath.    Cardiovascular:  Negative for chest pain.   Psychiatric/Behavioral:  Positive for substance abuse.         Physical Exam  Temp:  [36 °C (96.8 °F)-37.1 °C (98.8 °F)] 36.1 °C (96.9 °F)  Pulse:  [70-93] 78  Resp:  [17-18] 18  BP: (120-152)/() 152/109  SpO2:  [94 %-99 %] 96 %    Physical Exam  Constitutional:       General: She is not in acute distress.  HENT:      Head: Atraumatic.   Eyes:      General:         Right eye: No discharge.         Left eye: No discharge.   Cardiovascular:      Rate and Rhythm: Normal rate. Rhythm irregular.      Heart sounds: No murmur heard.  Pulmonary:      Breath sounds: No rales.   Chest:      Chest wall: No tenderness.   Abdominal:      Palpations: Abdomen is soft.      Tenderness: There is no abdominal tenderness. There is no guarding.   Musculoskeletal:         General: No swelling or tenderness.      Cervical back: Neck supple.   Neurological:      General: No focal deficit present.      Mental Status: She is alert.         Fluids  No intake or output data in the 24 hours ending 02/16/25 1313         Laboratory  Recent Labs     02/14/25  0136 02/15/25  1131 02/16/25  0058   WBC 4.1* 5.5 4.7*   RBC 3.70* 3.77* 4.00*   HEMOGLOBIN 12.6 13.2 13.8   HEMATOCRIT 39.0 39.3 42.7   .4* 104.2* 106.8*   MCH 34.1* 35.0* 34.5*   MCHC 32.3 33.6 32.3   RDW 59.8* 58.3* 60.1*   PLATELETCT 48* 58* 64*   MPV 10.5 11.1 10.9     Recent Labs     02/14/25  0136 02/15/25  1131 02/16/25  0058   SODIUM 140 138 135   POTASSIUM 3.6 4.2 4.1   CHLORIDE 105 102 101   CO2 26 25 21   GLUCOSE 107* 90 82   BUN 9 6* 8   CREATININE 0.66 0.54 0.58   CALCIUM 8.4* 8.8 9.0                   Imaging  CT-HEAD W/O   Final Result      1.  Diffuse atrophy and white matter changes.   2.  No acute intracranial hemorrhage or territorial infarct.                  DX-CHEST-PORTABLE (1 VIEW)   Final Result         No  acute cardiac or pulmonary abnormality is identified.           Assessment/Plan  * Atrial fibrillation with RVR (HCC)- (present on admission)  Assessment & Plan  Secondary to noncompliance with medication, continued alcohol abuse.  Continue metoprolol and digoxin  Eliquis on hold given thrombocytopenia  PT OT eval and reevaluate resuming anticoagulation accordingly    Acute encephalopathy- (present on admission)  Assessment & Plan  Likely toxic encephalopathy secondary to  to alcohol intoxication and withdrawal  CT head reviewed negative for acute pathology ammonia normal  Wean off sedating agents  Close clinical monitoring    Acquired hypothyroidism- (present on admission)  Assessment & Plan  TSH 4 monitor off medical therapy    Major depressive disorder with current active episode- (present on admission)  Assessment & Plan  Continue Effexor  Remeron and Atarax held due to oversedation  Evaluated by psychiatry    Alcohol withdrawal syndrome with perceptual disturbance (HCC)- (present on admission)  Assessment & Plan  Clinically improved  DC CIWA protocol and monitor  Continue thiamine and folic acid  Patient counseled on alcohol cessation    Presence of automatic cardioverter/defibrillator (AICD)- (present on admission)  Assessment & Plan  Noted    Acute respiratory failure with hypoxia (HCC)- (present on admission)  Assessment & Plan  Encourage incentive spirometry use and wean off oxygen as tolerated  Patient is afebrile no clinical signs of pneumonia  I reviewed chest x-ray with no acute pathology      Recurrent falls- (present on admission)  Assessment & Plan  Fall precautions.  Likely secondary to intoxication         VTE prophylaxis: SCD    I have performed a physical exam and reviewed and updated ROS and Plan today (2/16/2025). In review of yesterday's note (2/15/2025), there are no changes except as documented above.

## 2025-02-17 PROBLEM — G93.40 ACUTE ENCEPHALOPATHY: Status: RESOLVED | Noted: 2024-05-08 | Resolved: 2025-02-17

## 2025-02-17 PROBLEM — J96.01 ACUTE RESPIRATORY FAILURE WITH HYPOXIA (HCC): Status: RESOLVED | Noted: 2020-01-31 | Resolved: 2025-02-17

## 2025-02-17 LAB
ANION GAP SERPL CALC-SCNC: 13 MMOL/L (ref 7–16)
BUN SERPL-MCNC: 14 MG/DL (ref 8–22)
CALCIUM SERPL-MCNC: 9.5 MG/DL (ref 8.5–10.5)
CHLORIDE SERPL-SCNC: 102 MMOL/L (ref 96–112)
CO2 SERPL-SCNC: 24 MMOL/L (ref 20–33)
CREAT SERPL-MCNC: 0.71 MG/DL (ref 0.5–1.4)
ERYTHROCYTE [DISTWIDTH] IN BLOOD BY AUTOMATED COUNT: 59.3 FL (ref 35.9–50)
GFR SERPLBLD CREATININE-BSD FMLA CKD-EPI: 87 ML/MIN/1.73 M 2
GLUCOSE SERPL-MCNC: 80 MG/DL (ref 65–99)
HCT VFR BLD AUTO: 43.2 % (ref 37–47)
HGB BLD-MCNC: 14.4 G/DL (ref 12–16)
MAGNESIUM SERPL-MCNC: 1.6 MG/DL (ref 1.5–2.5)
MCH RBC QN AUTO: 34.7 PG (ref 27–33)
MCHC RBC AUTO-ENTMCNC: 33.3 G/DL (ref 32.2–35.5)
MCV RBC AUTO: 104.1 FL (ref 81.4–97.8)
PHOSPHATE SERPL-MCNC: 4 MG/DL (ref 2.5–4.5)
PLATELET # BLD AUTO: 76 K/UL (ref 164–446)
PLATELETS.RETICULATED NFR BLD AUTO: 5.9 % (ref 0.6–13.1)
PMV BLD AUTO: 11 FL (ref 9–12.9)
POTASSIUM SERPL-SCNC: 4 MMOL/L (ref 3.6–5.5)
RBC # BLD AUTO: 4.15 M/UL (ref 4.2–5.4)
SODIUM SERPL-SCNC: 139 MMOL/L (ref 135–145)
WBC # BLD AUTO: 5.8 K/UL (ref 4.8–10.8)

## 2025-02-17 PROCEDURE — A9270 NON-COVERED ITEM OR SERVICE: HCPCS | Performed by: HOSPITALIST

## 2025-02-17 PROCEDURE — 85027 COMPLETE CBC AUTOMATED: CPT

## 2025-02-17 PROCEDURE — 83735 ASSAY OF MAGNESIUM: CPT

## 2025-02-17 PROCEDURE — 36415 COLL VENOUS BLD VENIPUNCTURE: CPT

## 2025-02-17 PROCEDURE — A9270 NON-COVERED ITEM OR SERVICE: HCPCS | Performed by: STUDENT IN AN ORGANIZED HEALTH CARE EDUCATION/TRAINING PROGRAM

## 2025-02-17 PROCEDURE — 700111 HCHG RX REV CODE 636 W/ 250 OVERRIDE (IP): Performed by: HOSPITALIST

## 2025-02-17 PROCEDURE — 770006 HCHG ROOM/CARE - MED/SURG/GYN SEMI*

## 2025-02-17 PROCEDURE — 700102 HCHG RX REV CODE 250 W/ 637 OVERRIDE(OP): Performed by: HOSPITALIST

## 2025-02-17 PROCEDURE — 84100 ASSAY OF PHOSPHORUS: CPT

## 2025-02-17 PROCEDURE — 99232 SBSQ HOSP IP/OBS MODERATE 35: CPT | Performed by: HOSPITALIST

## 2025-02-17 PROCEDURE — 700105 HCHG RX REV CODE 258: Performed by: HOSPITALIST

## 2025-02-17 PROCEDURE — 85055 RETICULATED PLATELET ASSAY: CPT

## 2025-02-17 PROCEDURE — 80048 BASIC METABOLIC PNL TOTAL CA: CPT

## 2025-02-17 PROCEDURE — 700102 HCHG RX REV CODE 250 W/ 637 OVERRIDE(OP): Performed by: STUDENT IN AN ORGANIZED HEALTH CARE EDUCATION/TRAINING PROGRAM

## 2025-02-17 PROCEDURE — 99231 SBSQ HOSP IP/OBS SF/LOW 25: CPT | Mod: GC | Performed by: STUDENT IN AN ORGANIZED HEALTH CARE EDUCATION/TRAINING PROGRAM

## 2025-02-17 RX ORDER — MAGNESIUM SULFATE HEPTAHYDRATE 40 MG/ML
2 INJECTION, SOLUTION INTRAVENOUS ONCE
Status: COMPLETED | OUTPATIENT
Start: 2025-02-17 | End: 2025-02-17

## 2025-02-17 RX ORDER — SODIUM CHLORIDE 9 MG/ML
INJECTION, SOLUTION INTRAVENOUS CONTINUOUS
Status: DISCONTINUED | OUTPATIENT
Start: 2025-02-17 | End: 2025-02-18

## 2025-02-17 RX ORDER — METOPROLOL SUCCINATE 50 MG/1
150 TABLET, EXTENDED RELEASE ORAL DAILY
Qty: 90 TABLET | Refills: 1 | Status: SHIPPED | OUTPATIENT
Start: 2025-02-17

## 2025-02-17 RX ADMIN — SODIUM CHLORIDE: 9 INJECTION, SOLUTION INTRAVENOUS at 08:04

## 2025-02-17 RX ADMIN — MAGNESIUM SULFATE HEPTAHYDRATE 2 G: 2 INJECTION, SOLUTION INTRAVENOUS at 08:08

## 2025-02-17 RX ADMIN — VENLAFAXINE HYDROCHLORIDE 150 MG: 75 CAPSULE, EXTENDED RELEASE ORAL at 06:23

## 2025-02-17 RX ADMIN — SENNOSIDES AND DOCUSATE SODIUM 2 TABLET: 50; 8.6 TABLET ORAL at 17:27

## 2025-02-17 RX ADMIN — METOPROLOL TARTRATE 100 MG: 50 TABLET, FILM COATED ORAL at 06:23

## 2025-02-17 RX ADMIN — DIGOXIN 125 MCG: 0.25 TABLET ORAL at 06:23

## 2025-02-17 RX ADMIN — APIXABAN 5 MG: 5 TABLET, FILM COATED ORAL at 17:27

## 2025-02-17 RX ADMIN — METOPROLOL TARTRATE 100 MG: 50 TABLET, FILM COATED ORAL at 17:27

## 2025-02-17 ASSESSMENT — PAIN DESCRIPTION - PAIN TYPE
TYPE: ACUTE PAIN

## 2025-02-17 ASSESSMENT — ENCOUNTER SYMPTOMS
NAUSEA: 0
SHORTNESS OF BREATH: 0
VOMITING: 0
ABDOMINAL PAIN: 0

## 2025-02-17 ASSESSMENT — LIFESTYLE VARIABLES: SUBSTANCE_ABUSE: 1

## 2025-02-17 NOTE — DISCHARGE PLANNING
CM left message for Reilly, son to visit about her baseline function and to clarify who she resides with.

## 2025-02-17 NOTE — CONSULTS
"PSYCHIATRIC CONSULTATION:  *Reason for admission:   Shortness of breath  *Reason for consult:depression   *Requesting Physician: Carlos Ramos M.D.        Legal status:  not applicable     *HPI:   Psychiatry last saw her 2/14/25, and at that time remeron was held due to concern for sedation and effexor 150 mg daily was continued.    Feeling okay today. Sleeping day and night per patient and nurse. Reluctant to walk. Feeling confused. Oriented to year, location and self but not situation. Reports feeling confused. Frequently losing track of conversation.    Mood: \"not good\" because of health.  Sleep: denies problems with sleep  Appetite: appetite is /good/  Energy:good until recently  Denies SI/HI  Denies A/V hallucinations  No delusions    Reviewed nursing noted and spoke with nursing staff. The patient is compliant with medication. The patient has not been aggressive or agitated. The patient is eating (25-50)% of breakfast, (25-50) % of lunch and (25-50)% of dinner as of flow sheet yesterday. The patient has not required psychiatric PRN medications in the last 24 hours. Sleeping a lot day and night per nursing. Per nursing she woke up confused and incontinent this morning.      *Medical Review of Systems: as reported by pt. All systems reviewed. Only those found to be + are noted below. All others are negative.   No cough, pain. +SOB intermittent    *Psychiatric (Physical) Examination: observed phenomenon:  Vitals:    02/17/25 1917   BP: 120/82   Pulse: 72   Resp: 19   Temp: 36.7 °C (98 °F)   SpO2:        General: sleepy, in no acute distress, rousable  Patient Appearance: disheveled, dressed in a hospital gown and lying in bed  Behavior: cooperative, calm, confused  Speech.: low volume, mumbling, short responses  Mood Comments: not good  Affect: restricted  Thought Content: No AVH, SI/HI  Thought Process: superficially linear  Insight: limited  Judgment: limited  Cognition: Memory appeared impaired in interview. " Distractible. Attention deficit.  Oriented to self, location, year and not for situation  Language: fluent  Fund of Knowledge: Below expected for age  Neuro: no tics, tremors, dyskinesias. no dystonias, patient able to stand up with a walker but will not walk.      Allergies:   Allergies   No Known Allergies         Medications (currently prescribed at [facility]):  Current Medications     Current Facility-Administered Medications:     magnesium sulfate IVPB premix 2 g, 2 g, Intravenous, Once, Richard Dickinson M.D., Last Rate: 25 mL/hr at 02/17/25 0808, 2 g at 02/17/25 0808    NS infusion, , Intravenous, Continuous, Richard Dickinson M.D., Last Rate: 30 mL/hr at 02/17/25 0804, New Bag at 02/17/25 0804    venlafaxine XR (Effexor XR) capsule 150 mg, 150 mg, Oral, DAILY, Richard Dickinson M.D., 150 mg at 02/17/25 0623    apixaban (Eliquis) tablet 5 mg, 5 mg, Oral, BID, Richard Dickinson M.D., 5 mg at 02/13/25 0507    digoxin (Lanoxin) tablet 125 mcg, 125 mcg, Oral, DAILY, Montana Meneses M.D., 125 mcg at 02/17/25 0623    metoprolol tartrate (Lopressor) tablet 100 mg, 100 mg, Oral, BID, Montana Meneses M.D., 100 mg at 02/17/25 0623    Respiratory Therapy Consult, , Nebulization, Continuous RT, Montana Meneses M.D.    acetaminophen (Tylenol) tablet 650 mg, 650 mg, Oral, Q6HRS PRN, Montana Meneses M.D.    labetalol (Normodyne/Trandate) injection 10 mg, 10 mg, Intravenous, Q4HRS PRN, Montana Meneses M.D.    ondansetron (Zofran) syringe/vial injection 4 mg, 4 mg, Intravenous, Q4HRS PRN **OR** ondansetron (Zofran ODT) dispertab 4 mg, 4 mg, Oral, Q4HRS PRN, Montana Meneses M.D., 4 mg at 02/14/25 1223    senna-docusate (Pericolace Or Senokot S) 8.6-50 MG per tablet 2 Tablet, 2 Tablet, Oral, Q EVENING, 2 Tablet at 02/16/25 3179 **AND** polyethylene glycol/lytes (Miralax) Packet 1 Packet, 1 Packet, Oral, QDAY PRN, Montana Meneses M.D.          *Labs:  Lab Results   Component Value Date/Time    SODIUM 139  02/17/2025 02:11 AM    POTASSIUM 4.0 02/17/2025 02:11 AM    CHLORIDE 102 02/17/2025 02:11 AM    CO2 24 02/17/2025 02:11 AM    GLUCOSE 80 02/17/2025 02:11 AM    BUN 14 02/17/2025 02:11 AM    CREATININE 0.71 02/17/2025 02:11 AM    BUNCREATRAT 21.7 (H) 08/31/2023 03:29 AM      Lab Results   Component Value Date/Time    WBC 5.8 02/17/2025 02:11 AM    RBC 4.15 (L) 02/17/2025 02:11 AM    HEMOGLOBIN 14.4 02/17/2025 02:11 AM    HEMATOCRIT 43.2 02/17/2025 02:11 AM    .1 (H) 02/17/2025 02:11 AM    MCH 34.7 (H) 02/17/2025 02:11 AM    MCHC 33.3 02/17/2025 02:11 AM    MPV 11.0 02/17/2025 02:11 AM    NEUTSPOLYS 52.10 02/13/2025 03:16 AM    LYMPHOCYTES 37.40 02/13/2025 03:16 AM    MONOCYTES 7.90 02/13/2025 03:16 AM    EOSINOPHILS 1.10 02/13/2025 03:16 AM    BASOPHILS 1.10 02/13/2025 03:16 AM    ANISOCYTOSIS 1+ 06/09/2024 02:33 PM      Recent Labs     02/15/25  1708 02/16/25  0058   ASTSGOT  --  30   ALTSGPT  --  13   TBILIRUBIN  --  1.9*   ALKPHOSPHAT  --  88   GLOBULIN  --  3.0   AMMONIA 44  --    Plt-76,000     TSH (2/12/25)-4.040  B12 (1/23/24)-484     EKG (Qtc) (2/12/25)  Measurements  Intervals                                Axis  Rate:       148                          P:          0  SC:         0                            QRS:        -61  QRSD:       79                           T:          48  QT:         317  QTc:        498    Interpretive Statements  Atrial fibrillation with rapid V-rate  Ventricular premature complex  Left anterior fascicular block  Anteroseptal infarct, age indeterminate  Compared to ECG 02/04/2025 14:03:16  Ventricular premature complex(es) now present  Left anterior fascicular block now present  Myocardial infarct finding now present  Possible ischemia no longer present      Imaging/EEG  CT head w/o contrast 2/16/25  1.  Diffuse atrophy and white matter changes.  2.  No acute intracranial hemorrhage or territorial infarct.     *ASSESSMENT:   Jeanie is a 79 y.o. female seen today for a  follow up psychiatric evaluation of depression. Patient is still sleepy and confused and thus unable to engage meaningfully in interview. Positive CAM-ICU screener for delirium. Positive clock test. Attention deficit. Waxing and waning cognition. Not oriented to situation. Symptoms currently consistent with onging delirium. Will continue to monitor.     Diagnosis  Delirium  (Rule out major neurocognitive disorder, Korsakoff's dementia)  Alcohol use disorder, severe  Hx of MDD     *Plan/Further Workup:   Legal status: not applicable     *Medication Managment:       -Continue Effexor 150 mg daily for depression, may consider addition of Ritalin if the patient continues to appear sedated and apathetic.  May also help rapidly treat depression.    *Labs Reviewed/  *Imaging Reviewed/  *Medical Tests Reviewed/  *Discussed with another provider: Dr. Dickinson     Will follow  Thank you for the consult.

## 2025-02-17 NOTE — PROGRESS NOTES
Assumed care of patient after receiving report from Lulú. Pt is A&Ox 4, slow to respond. Pain level zero. Assessment complete. Call light within reach and bed is locked and in lowest position. Bed alarm on. Reinforced the need to call for assistance. Plan of care discussed and patient does not have further needs at this time.

## 2025-02-17 NOTE — CARE PLAN
The patient is Stable - Low risk of patient condition declining or worsening    Shift Goals  Clinical Goals: CIWA, VSS, safety  Patient Goals: rest  Family Goals: DANICA    Progress made toward(s) clinical / shift goals:    Problem: Fall Risk  Goal: Patient will remain free from falls  Outcome: Progressing  Note: Treaded socks and bed/strip alarm on, side rails up x 2. Call light within reach. Pt educated to call for assistance. Reinforce as needed. Continue to monitor.        Problem: Skin Integrity  Goal: Skin integrity is maintained or improved  Outcome: Progressing  Note: Assess skin and monitor for skin breakdown. Alleviate pressure to bony prominences and provide assistance with turning, repositioning, ROM and mobility as appropriate. Continue to monitor.        Patient is not progressing towards the following goals:

## 2025-02-17 NOTE — PROGRESS NOTES
Hospital Medicine Daily Progress Note    Date of Service  2/17/2025    Chief Complaint  Jeanie Hutchison is a 79 y.o. female admitted 2/12/2025 with shortness of breath and alcohol intoxication    Hospital Course  79 y.o. female severe alcohol abuse with complications, hypertension, hypothyroidism, atrial fibrillation, history of SVT, medical noncompliance who presented 2/12/2025 with dyspnea, nausea and vomiting.     Patient presents to the ED complaining of nausea, vomiting, dyspnea ongoing for the past 5 days and progressive.  Patient admits to daily liquor consumption.  She reports compliance with Eliquis, metoprolol and digoxin.  Denies any recent fevers or chills, headache or vision changes, chest pain, cough, abdominal pain, dysuria diarrhea.     In the ED she is afebrile pulse 100 160 respiratory rate 18-24 systolic blood pressure of 102-1 150s pulse ox 83 to 98% from 2 to 5 L nasal cannula.  CBC with thrombocytopenia 69, bicarb 18 corrected calcium 8.4 total bilirubin 2.4 magnesium 1.2 serum alcohol 170 troponin T 8 proBNP 868 chest x-ray negative for acute processes EKG shows A-fib with RVR LAFB.  Treated with metoprolol, digoxin, IV fluids, subsequently referred to hospitalist for admission.    Interval Problem Update    Patient is asleep she is arousable  She denies headache  I discussed with psychiatry  I discussed with case management  I reviewed chemistry panel magnesium 1.6 I ordered repletion  I reviewed CBC hemoglobin 14.4  platelets 76 reviewed vitamin B12 levels from Jan 484      I have discussed this patient's plan of care and discharge plan at IDT rounds today with Case Management, Nursing, Nursing leadership, and other members of the IDT team.    Consultants/Specialty  psychiatry    Code Status  Full Code    Disposition  The patient is not medically cleared for discharge to home or a post-acute facility.      I have placed the appropriate orders for post-discharge needs.    Review of  Systems  Review of Systems   Constitutional:  Positive for malaise/fatigue.   Respiratory:  Negative for shortness of breath.    Cardiovascular:  Negative for chest pain.   Gastrointestinal:  Negative for abdominal pain, nausea and vomiting.   Psychiatric/Behavioral:  Positive for substance abuse.         Physical Exam  Temp:  [36.1 °C (97 °F)-37 °C (98.6 °F)] 36.1 °C (97 °F)  Pulse:  [73-93] 83  Resp:  [17-20] 20  BP: (121-128)/(85-92) 121/85  SpO2:  [91 %-97 %] 93 %    Physical Exam  Constitutional:       General: She is not in acute distress.  Cardiovascular:      Rate and Rhythm: Normal rate. Rhythm irregular.      Heart sounds: No murmur heard.  Pulmonary:      Effort: Pulmonary effort is normal.      Breath sounds: No rales.   Chest:      Chest wall: No tenderness.   Abdominal:      Palpations: Abdomen is soft.      Tenderness: There is no abdominal tenderness. There is no guarding.   Neurological:      General: No focal deficit present.      Cranial Nerves: No cranial nerve deficit.      Motor: Weakness (Generalized) present.   Psychiatric:         Speech: Speech is delayed.         Fluids    Intake/Output Summary (Last 24 hours) at 2/17/2025 1540  Last data filed at 2/16/2025 2100  Gross per 24 hour   Intake 490 ml   Output --   Net 490 ml            Laboratory  Recent Labs     02/15/25  1131 02/16/25  0058 02/17/25  0211   WBC 5.5 4.7* 5.8   RBC 3.77* 4.00* 4.15*   HEMOGLOBIN 13.2 13.8 14.4   HEMATOCRIT 39.3 42.7 43.2   .2* 106.8* 104.1*   MCH 35.0* 34.5* 34.7*   MCHC 33.6 32.3 33.3   RDW 58.3* 60.1* 59.3*   PLATELETCT 58* 64* 76*   MPV 11.1 10.9 11.0     Recent Labs     02/15/25  1131 02/16/25  0058 02/17/25  0211   SODIUM 138 135 139   POTASSIUM 4.2 4.1 4.0   CHLORIDE 102 101 102   CO2 25 21 24   GLUCOSE 90 82 80   BUN 6* 8 14   CREATININE 0.54 0.58 0.71   CALCIUM 8.8 9.0 9.5                   Imaging  CT-HEAD W/O   Final Result      1.  Diffuse atrophy and white matter changes.   2.  No acute  intracranial hemorrhage or territorial infarct.                  DX-CHEST-PORTABLE (1 VIEW)   Final Result         No acute cardiac or pulmonary abnormality is identified.           Assessment/Plan  * Atrial fibrillation with RVR (HCC)- (present on admission)  Assessment & Plan  Secondary to noncompliance with medication, continued alcohol abuse.  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  Likely secondary to intoxication  PT OT SNF referral         VTE prophylaxis: Eliquis    I have performed a physical exam and reviewed and updated ROS and Plan today (2/17/2025). In review of yesterday's note (2/16/2025), there are no changes except as documented above.

## 2025-02-18 ENCOUNTER — APPOINTMENT (OUTPATIENT)
Dept: RADIOLOGY | Facility: MEDICAL CENTER | Age: 80
DRG: 308 | End: 2025-02-18
Attending: STUDENT IN AN ORGANIZED HEALTH CARE EDUCATION/TRAINING PROGRAM
Payer: MEDICARE

## 2025-02-18 VITALS
OXYGEN SATURATION: 97 % | BODY MASS INDEX: 25.51 KG/M2 | RESPIRATION RATE: 18 BRPM | HEART RATE: 77 BPM | HEIGHT: 66 IN | WEIGHT: 158.73 LBS | DIASTOLIC BLOOD PRESSURE: 98 MMHG | TEMPERATURE: 97.4 F | SYSTOLIC BLOOD PRESSURE: 137 MMHG

## 2025-02-18 LAB
ANION GAP SERPL CALC-SCNC: 12 MMOL/L (ref 7–16)
BUN SERPL-MCNC: 10 MG/DL (ref 8–22)
CALCIUM SERPL-MCNC: 9.3 MG/DL (ref 8.5–10.5)
CHLORIDE SERPL-SCNC: 104 MMOL/L (ref 96–112)
CO2 SERPL-SCNC: 23 MMOL/L (ref 20–33)
CREAT SERPL-MCNC: 0.63 MG/DL (ref 0.5–1.4)
ERYTHROCYTE [DISTWIDTH] IN BLOOD BY AUTOMATED COUNT: 60.5 FL (ref 35.9–50)
GFR SERPLBLD CREATININE-BSD FMLA CKD-EPI: 91 ML/MIN/1.73 M 2
GLUCOSE SERPL-MCNC: 98 MG/DL (ref 65–99)
HCT VFR BLD AUTO: 41.4 % (ref 37–47)
HGB BLD-MCNC: 13.8 G/DL (ref 12–16)
MAGNESIUM SERPL-MCNC: 1.8 MG/DL (ref 1.5–2.5)
MCH RBC QN AUTO: 35 PG (ref 27–33)
MCHC RBC AUTO-ENTMCNC: 33.3 G/DL (ref 32.2–35.5)
MCV RBC AUTO: 105.1 FL (ref 81.4–97.8)
PLATELET # BLD AUTO: 89 K/UL (ref 164–446)
PLATELETS.RETICULATED NFR BLD AUTO: 5.8 % (ref 0.6–13.1)
PMV BLD AUTO: 10.4 FL (ref 9–12.9)
POTASSIUM SERPL-SCNC: 4.1 MMOL/L (ref 3.6–5.5)
RBC # BLD AUTO: 3.94 M/UL (ref 4.2–5.4)
SODIUM SERPL-SCNC: 139 MMOL/L (ref 135–145)
WBC # BLD AUTO: 5.2 K/UL (ref 4.8–10.8)

## 2025-02-18 PROCEDURE — 94660 CPAP INITIATION&MGMT: CPT

## 2025-02-18 PROCEDURE — 74018 RADEX ABDOMEN 1 VIEW: CPT

## 2025-02-18 PROCEDURE — 700102 HCHG RX REV CODE 250 W/ 637 OVERRIDE(OP): Performed by: HOSPITALIST

## 2025-02-18 PROCEDURE — 36415 COLL VENOUS BLD VENIPUNCTURE: CPT

## 2025-02-18 PROCEDURE — 80048 BASIC METABOLIC PNL TOTAL CA: CPT

## 2025-02-18 PROCEDURE — 85027 COMPLETE CBC AUTOMATED: CPT

## 2025-02-18 PROCEDURE — A9270 NON-COVERED ITEM OR SERVICE: HCPCS | Performed by: HOSPITALIST

## 2025-02-18 PROCEDURE — 85055 RETICULATED PLATELET ASSAY: CPT

## 2025-02-18 PROCEDURE — 700102 HCHG RX REV CODE 250 W/ 637 OVERRIDE(OP): Performed by: STUDENT IN AN ORGANIZED HEALTH CARE EDUCATION/TRAINING PROGRAM

## 2025-02-18 PROCEDURE — 83735 ASSAY OF MAGNESIUM: CPT

## 2025-02-18 PROCEDURE — 99239 HOSP IP/OBS DSCHRG MGMT >30: CPT | Performed by: STUDENT IN AN ORGANIZED HEALTH CARE EDUCATION/TRAINING PROGRAM

## 2025-02-18 PROCEDURE — A9270 NON-COVERED ITEM OR SERVICE: HCPCS | Performed by: STUDENT IN AN ORGANIZED HEALTH CARE EDUCATION/TRAINING PROGRAM

## 2025-02-18 RX ADMIN — METOPROLOL TARTRATE 100 MG: 50 TABLET, FILM COATED ORAL at 05:20

## 2025-02-18 RX ADMIN — POLYETHYLENE GLYCOL 3350 1 PACKET: 17 POWDER, FOR SOLUTION ORAL at 05:23

## 2025-02-18 RX ADMIN — APIXABAN 5 MG: 5 TABLET, FILM COATED ORAL at 05:21

## 2025-02-18 RX ADMIN — DIGOXIN 125 MCG: 0.25 TABLET ORAL at 05:20

## 2025-02-18 RX ADMIN — VENLAFAXINE HYDROCHLORIDE 150 MG: 75 CAPSULE, EXTENDED RELEASE ORAL at 05:18

## 2025-02-18 ASSESSMENT — PAIN DESCRIPTION - PAIN TYPE
TYPE: ACUTE PAIN
TYPE: ACUTE PAIN

## 2025-02-18 NOTE — DISCHARGE SUMMARY
Discharge Summary    CHIEF COMPLAINT ON ADMISSION  Chief Complaint   Patient presents with    Shortness of Breath     Patient reports SOB since yesterday.   Patient arrives in Afib with RVR, rate 160s    N/V     Patient reports N/V since yesterday.       Reason for Admission  ems     Admission Date  2/12/2025    CODE STATUS  Full Code    HPI & HOSPITAL COURSE  For full details please refer to HPI    79 y.o. female severe alcohol abuse with complications, hypertension, hypothyroidism, atrial fibrillation, history of SVT, medical noncompliance who presented 2/12/2025 with dyspnea, nausea and vomiting.     Patient presents to the ED complaining of nausea, vomiting, dyspnea ongoing for the past 5 days and progressive.  Patient admits to daily liquor consumption.  She reports compliance with Eliquis, metoprolol and digoxin.  Denies any recent fevers or chills, headache or vision changes, chest pain, cough, abdominal pain, dysuria diarrhea.     In the ED she is afebrile pulse 100 160 respiratory rate 18-24 systolic blood pressure of 102-1 150s pulse ox 83 to 98% from 2 to 5 L nasal cannula.  CBC with thrombocytopenia 69, bicarb 18 corrected calcium 8.4 total bilirubin 2.4 magnesium 1.2 serum alcohol 170 troponin T 8 proBNP 868 chest x-ray negative for acute processes EKG shows A-fib with RVR LAFB.  Treated with metoprolol, digoxin, IV fluids, subsequently referred to hospitalist for admission.    Patient was found to be in alcohol withdrawal. Treated with CIWA protocol. Currently afib with rate controlled. Mentation has significantly improvement. Currently alert and oriented X3. Has been evaluated by psychiatry. Hemodynamically stable. Has been accepted into SNF for rehab. Thus discharged in stable condition.         Therefore, she is discharged in good and stable condition to skilled nursing facility.    The patient met 2-midnight criteria for an inpatient stay at the time of discharge.    Discharge Date  2/18    FOLLOW  UP ITEMS POST DISCHARGE  PCP    DISCHARGE DIAGNOSES  Principal Problem:    Atrial fibrillation with RVR (HCC) (POA: Yes)  Active Problems:    Recurrent falls (POA: Yes)    Presence of automatic cardioverter/defibrillator (AICD) (POA: Yes)      Overview: July 2020: [employer] Primo MRI  [id number] implanted by Dr. Baires.    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  [facility] [address] [address]  [phone]          MEDICATIONS ON DISCHARGE     Medication List        CHANGE how you take these medications        Instructions   digoxin 125 MCG Tabs  Commonly known as: Lanoxin   Take 1 Tablet by mouth every day.  Dose: 125 mcg     metoprolol SR 50 MG Tb24  What changed:   medication strength  how much to take  Commonly known as: Toprol XL   Take 3 Tablets by mouth every day.  Dose: 150 mg            CONTINUE taking these medications        Instructions   apixaban 5mg Tabs  Commonly known as: Eliquis   Take 1 Tablet by mouth 2 times a day.  Dose: 5 mg     estradiol 0.1 MG/GM vaginal cream  Commonly known as: Estrace   Insert 1-2 g into the vagina see administration instructions. 2 g daily x 2 weeks then 1 g 3 x a week after  Dose: 1-2 g     hydrOXYzine HCl 25 MG Tabs  Commonly known as: Atarax   Take 25 mg by mouth at bedtime.  Dose: 25 mg     mirtazapine 15 MG Tabs  Commonly known as: Remeron   Take 15 mg by mouth at bedtime.  Dose: 15 mg     traZODone 100 MG Tabs  Commonly known as: Desyrel   Take 100 mg by mouth every evening.  Dose: 100 mg      venlafaxine 150 MG extended-release capsule  Commonly known as: Effexor-XR   Take 150 mg by mouth every day.  Dose: 150 mg            STOP taking these medications      amLODIPine 2.5 MG Tabs  Commonly known as: Norvasc              Allergies  No Known Allergies    DIET  Orders Placed This Encounter   Procedures    Diet Order Diet: Cardiac     Standing Status:   Standing     Number of Occurrences:   1     Diet::   Cardiac [6]       ACTIVITY  As tolerated.  Weight bearing as tolerated    CONSULTATIONS  Psychiatry     PROCEDURES  None     LABORATORY  Lab Results   Component Value Date    SODIUM 139 02/18/2025    POTASSIUM 4.1 02/18/2025    CHLORIDE 104 02/18/2025    CO2 23 02/18/2025    GLUCOSE 98 02/18/2025    BUN 10 02/18/2025    CREATININE 0.63 02/18/2025        Lab Results   Component Value Date    WBC 5.2 02/18/2025    HEMOGLOBIN 13.8 02/18/2025    HEMATOCRIT 41.4 02/18/2025    PLATELETCT 89 (L) 02/18/2025        Total time of the discharge process exceeds 36 minutes.

## 2025-02-18 NOTE — DISCHARGE PLANNING
Case Management Discharge Planning    Admission Date: 2/12/2025  GMLOS: 3.4  ALOS: 6    6-Clicks ADL Score: 21  6-Clicks Mobility Score: 24      Anticipated Discharge Dispo: Discharge Disposition: Discharged to home/self care (01)    DME Needed: No    Action(s) Taken: OTHER    Discussed patient's case during Morning Huddle. Per MD, patient is medically clear.    Per D[facility], [facility]. [facility] spoke with Kenyatta, patient's sister.    Kenyatta indicates she lives in [place], Ulices, patient's son lives in [place].   Kenyatta indicates both her and Ulices are involved in patient's care / medical decisions.    Kenyatta provided verbal consent for transfer to [facility].    Per RN, patient is able to transfer via [facility]. DPA reports [facility] will transport patient today at 1300, MD and RN aware.         Escalations Completed: None    Medically Clear: Yes    Next Steps: SNF    Barriers to Discharge: None    Is the patient up for discharge tomorrow:

## 2025-02-18 NOTE — PROGRESS NOTES
Assumed care of patient at 1900. Received Report from SNEHA Hall. Patient A&Ox3 - oriented to time, pace, and person, on room air and not on apparent distress. Reporting a pain level of 0.Peripheral IV line on left forearm checked, flushed- patent; dressing- clean,dry and intact. Call light within reach, fall prevention education given belongings within reach, bed alarm is on and bed in lowest position.    O2 at 1L per minute via NC started at night. All questions answered, plan of care discussed and pt verbalized understanding.

## 2025-02-18 NOTE — DISCHARGE PLANNING
Spoke To: Piper  Agency/Facility Name: [facility]  Plan or Request: Per Piper, will check for an open bed    0930- Spoke To: Piper  Agency/Facility Name: [facility]  Plan or Request: Piper to call back with a confirmed available bed and a transport time.

## 2025-02-18 NOTE — PROGRESS NOTES
Received report and assumed care of patient at change of shift. Patient is A&Ox3, disoriented to situation, on RA, and reports no pain at this time. Patient assessment completed, bed in lowest position, and call light and personal belongings are within reach. Patient expressed no further needs at this time.

## 2025-02-18 NOTE — CARE PLAN
The patient is Stable - Low risk of patient condition declining or worsening    Shift Goals  Clinical Goals: remain free from falls, mainain skin integrity  Patient Goals: rest  Family Goals: DANICA    Progress made toward(s) clinical / shift goals:      Problem: Fall Risk  Goal: Patient will remain free from falls  Outcome: Progressing  Note: Patient has remained free of falls throughout shift. Patient has proper fall preventions and protocols in place at this time. Patients fall risk will continue to be assessed each shift. Patient will continue to remain free of falls and will call appropriately.      Problem: Skin Integrity  Goal: Skin integrity is maintained or improved  Outcome: Progressing  Note: Patients skin has remained intact throughout shift with no signs of breakdown at this time. Patients skin will continue to be monitored per shift with proper prevention and protocols in place at this time.        Patient is not progressing towards the following goals:

## 2025-02-18 NOTE — CARE PLAN
The patient is Stable - Low risk of patient condition declining or worsening    Shift Goals  Clinical Goals: Skin integrity d/t incontinence  Patient Goals: Rest  Family Goals: [place]    Patient is not progressing towards the following goals:      Problem: Knowledge Deficit - Standard  Goal: Patient and family/care givers will demonstrate understanding of plan of care, disease process/condition, diagnostic tests and medications  Outcome: Not Progressing  Note: Pt verbal responses delayed and not following a clear train of thought. Pt not wanting to participate in daily activities, stating she is tired. Pt sleeping throughout the day, only waking when prompted.     Problem: Lifestyle Changes  Goal: Patient's ability to identify lifestyle changes and available resources to help reduce recurrence of condition will improve  Outcome: Not Progressing  Note: Pt unable to articulate why she is in the hospital. Mentation has been waxing/waning during shift.

## 2025-02-18 NOTE — CARE PLAN
The patient is Stable - Low risk of patient condition declining or worsening    Shift Goals  Clinical Goals: To maintain skin integrity intact for all throughout the shift  Patient Goals: Rest for the night  Family Goals: DANICA    Progress made toward(s) clinical / shift goals:  Patient understands the need for safety education to prevent falls. Patient remains free from fall for the shift. Patient also understands the need to ambulate as tolerated; repositioned with assistance in bed to prevent skin breakdown. Skin integrity remains intact.    Patient is not progressing towards the following goals:

## 2025-02-20 ENCOUNTER — TELEPHONE (OUTPATIENT)
Dept: HEALTH INFORMATION MANAGEMENT | Facility: OTHER | Age: 80
End: 2025-02-20
Payer: MEDICARE

## 2025-03-10 ENCOUNTER — HOSPITAL ENCOUNTER (EMERGENCY)
Facility: MEDICAL CENTER | Age: 80
End: 2025-03-10
Attending: STUDENT IN AN ORGANIZED HEALTH CARE EDUCATION/TRAINING PROGRAM
Payer: MEDICARE

## 2025-03-10 VITALS
SYSTOLIC BLOOD PRESSURE: 138 MMHG | WEIGHT: 154.54 LBS | BODY MASS INDEX: 24.84 KG/M2 | HEART RATE: 95 BPM | OXYGEN SATURATION: 94 % | RESPIRATION RATE: 17 BRPM | DIASTOLIC BLOOD PRESSURE: 95 MMHG | TEMPERATURE: 96.8 F | HEIGHT: 66 IN

## 2025-03-10 DIAGNOSIS — F41.9 ANXIETY: ICD-10-CM

## 2025-03-10 DIAGNOSIS — F10.930 ALCOHOL WITHDRAWAL SYNDROME WITHOUT COMPLICATION (HCC): ICD-10-CM

## 2025-03-10 LAB
ALBUMIN SERPL BCP-MCNC: 3.8 G/DL (ref 3.2–4.9)
ALBUMIN/GLOB SERPL: 1.3 G/DL
ALP SERPL-CCNC: 76 U/L (ref 30–99)
ALT SERPL-CCNC: 15 U/L (ref 2–50)
ANION GAP SERPL CALC-SCNC: 18 MMOL/L (ref 7–16)
AST SERPL-CCNC: 28 U/L (ref 12–45)
BASOPHILS # BLD AUTO: 1 % (ref 0–1.8)
BASOPHILS # BLD: 0.1 K/UL (ref 0–0.12)
BILIRUB SERPL-MCNC: 2 MG/DL (ref 0.1–1.5)
BUN SERPL-MCNC: 9 MG/DL (ref 8–22)
CALCIUM ALBUM COR SERPL-MCNC: 9.4 MG/DL (ref 8.5–10.5)
CALCIUM SERPL-MCNC: 9.2 MG/DL (ref 8.5–10.5)
CHLORIDE SERPL-SCNC: 101 MMOL/L (ref 96–112)
CO2 SERPL-SCNC: 20 MMOL/L (ref 20–33)
CREAT SERPL-MCNC: 0.82 MG/DL (ref 0.5–1.4)
EOSINOPHIL # BLD AUTO: 0.1 K/UL (ref 0–0.51)
EOSINOPHIL NFR BLD: 1 % (ref 0–6.9)
ERYTHROCYTE [DISTWIDTH] IN BLOOD BY AUTOMATED COUNT: 53.1 FL (ref 35.9–50)
ETHANOL BLD-MCNC: 13.5 MG/DL
GFR SERPLBLD CREATININE-BSD FMLA CKD-EPI: 73 ML/MIN/1.73 M 2
GLOBULIN SER CALC-MCNC: 2.9 G/DL (ref 1.9–3.5)
GLUCOSE SERPL-MCNC: 85 MG/DL (ref 65–99)
HCT VFR BLD AUTO: 44.4 % (ref 37–47)
HGB BLD-MCNC: 14.9 G/DL (ref 12–16)
IMM GRANULOCYTES # BLD AUTO: 0.04 K/UL (ref 0–0.11)
IMM GRANULOCYTES NFR BLD AUTO: 0.4 % (ref 0–0.9)
LIPASE SERPL-CCNC: 33 U/L (ref 11–82)
LYMPHOCYTES # BLD AUTO: 2.28 K/UL (ref 1–4.8)
LYMPHOCYTES NFR BLD: 22.6 % (ref 22–41)
MCH RBC QN AUTO: 32.9 PG (ref 27–33)
MCHC RBC AUTO-ENTMCNC: 33.6 G/DL (ref 32.2–35.5)
MCV RBC AUTO: 98 FL (ref 81.4–97.8)
MONOCYTES # BLD AUTO: 0.87 K/UL (ref 0–0.85)
MONOCYTES NFR BLD AUTO: 8.6 % (ref 0–13.4)
NEUTROPHILS # BLD AUTO: 6.68 K/UL (ref 1.82–7.42)
NEUTROPHILS NFR BLD: 66.4 % (ref 44–72)
NRBC # BLD AUTO: 0 K/UL
NRBC BLD-RTO: 0 /100 WBC (ref 0–0.2)
PLATELET # BLD AUTO: 208 K/UL (ref 164–446)
PMV BLD AUTO: 9.5 FL (ref 9–12.9)
POTASSIUM SERPL-SCNC: 3.3 MMOL/L (ref 3.6–5.5)
PROT SERPL-MCNC: 6.7 G/DL (ref 6–8.2)
RBC # BLD AUTO: 4.53 M/UL (ref 4.2–5.4)
SODIUM SERPL-SCNC: 139 MMOL/L (ref 135–145)
WBC # BLD AUTO: 10.1 K/UL (ref 4.8–10.8)

## 2025-03-10 PROCEDURE — 82077 ASSAY SPEC XCP UR&BREATH IA: CPT

## 2025-03-10 PROCEDURE — 85025 COMPLETE CBC W/AUTO DIFF WBC: CPT

## 2025-03-10 PROCEDURE — 83690 ASSAY OF LIPASE: CPT

## 2025-03-10 PROCEDURE — 36415 COLL VENOUS BLD VENIPUNCTURE: CPT

## 2025-03-10 PROCEDURE — 96374 THER/PROPH/DIAG INJ IV PUSH: CPT

## 2025-03-10 PROCEDURE — 700111 HCHG RX REV CODE 636 W/ 250 OVERRIDE (IP): Performed by: STUDENT IN AN ORGANIZED HEALTH CARE EDUCATION/TRAINING PROGRAM

## 2025-03-10 PROCEDURE — 80053 COMPREHEN METABOLIC PANEL: CPT

## 2025-03-10 PROCEDURE — 99285 EMERGENCY DEPT VISIT HI MDM: CPT

## 2025-03-10 RX ORDER — DIAZEPAM 10 MG/2ML
5 INJECTION, SOLUTION INTRAMUSCULAR; INTRAVENOUS ONCE
Status: COMPLETED | OUTPATIENT
Start: 2025-03-10 | End: 2025-03-10

## 2025-03-10 RX ORDER — CHLORDIAZEPOXIDE HYDROCHLORIDE 25 MG/1
CAPSULE, GELATIN COATED ORAL
Qty: 14 CAPSULE | Refills: 0 | Status: SHIPPED | OUTPATIENT
Start: 2025-03-10 | End: 2025-03-14

## 2025-03-10 RX ADMIN — DIAZEPAM 5 MG: 10 INJECTION, SOLUTION INTRAMUSCULAR; INTRAVENOUS at 16:51

## 2025-03-10 ASSESSMENT — FIBROSIS 4 INDEX: FIB4 SCORE: 7.2

## 2025-03-10 NOTE — ED PROVIDER NOTES
ED Provider Note    CHIEF COMPLAINT  Chief Complaint   Patient presents with    Anxiety     Pt released from longterm on the 8th for a DUI. Pt reported to the courthouse and blew a .05 and was going to be taken to longterm. Pt became very anxious and ems was called. Pt currently tremulous in triage and anxious. Pt states she normally drinks 4 shots a vodka/day and her last drink was this morning.       EXTERNAL RECORDS REVIEWED  Patient admitted in February for alcohol withdrawal and rapid A-fib.  She has a history of hypertension, hypothyroidism, A-fib, SVT.  She does drink daily alcohol.  She is compliant with Eliquis, metoprolol and digoxin for atrial fibrillation.    HPI/ROS  LIMITATION TO HISTORY   none  OUTSIDE HISTORIAN(S):  none    Jeanie Hutchison is a 79 y.o. female who presents with anxiety.  Patient was released from longterm on the eighth after a DUI.  She was at the court house and blew 0.05 today and was going to be taken to longterm.  EMS was called as patient became extremely anxious.  Patient is shaking on arrival here.  She says that she is not sure if she is having alcohol withdrawals or if she just feels extremely anxious that she was not expecting this.  She normally drinks about 4 shots of vodka a day.  She did last drink this morning.  She is compliant with all her medications for atrial fibrillation including her Eliquis however did not take it this morning.  She denies any chest pain or shortness of breath she says that she has palpitations but this is ongoing for her in the setting of known atrial fibrillation.    PAST MEDICAL HISTORY   has a past medical history of Alcoholism (HCC), Anticoagulated, Apnea, sleep, Chronic pain, Hypothyroidism, Insomnia, Paroxysmal atrial fibrillation (HCC), Psychiatric disorder, Snoring, and Ventricular tachycardia (HCC).    SURGICAL HISTORY   has a past surgical history that includes breast biopsy; other orthopedic surgery; orif, fracture, clavicle (5/21/2014); and  "tonsillectomy.    FAMILY HISTORY  Family History   Problem Relation Age of Onset    Diabetes Mother     Anxiety disorder Mother     Depression Mother     Hyperlipidemia Father        SOCIAL HISTORY  Social History     Tobacco Use    Smoking status: Never    Smokeless tobacco: Never   Vaping Use    Vaping status: Never Used   Substance and Sexual Activity    Alcohol use: Yes     Alcohol/week: 8.4 - 12.6 oz     Types: 14 - 21 Standard drinks or equivalent per week     Comment: everyday (last 2 months 2-3 everyday)    Drug use: Not Currently     Types: Oral     Comment: takes friends tranqualizers     Sexual activity: Not Currently       CURRENT MEDICATIONS  Home Medications       Reviewed by Luiz Lopez R.N. (Registered Nurse) on 03/10/25 at 1520  Med List Status: Not Addressed     Medication Last Dose Status   apixaban (ELIQUIS) 5mg Tab  Active   digoxin (LANOXIN) 125 MCG Tab  Active   estradiol (ESTRACE) 0.1 MG/GM vaginal cream  Active   hydrOXYzine HCl (ATARAX) 25 MG Tab  Active   metoprolol SR (TOPROL XL) 50 MG TABLET SR 24 HR  Active   mirtazapine (REMERON) 15 MG Tab  Active   traZODone (DESYREL) 100 MG Tab  Active   venlafaxine (EFFEXOR-XR) 150 MG extended-release capsule  Active                    ALLERGIES  No Known Allergies    PHYSICAL EXAM  VITAL SIGNS: BP (!) 138/95   Pulse 95   Temp 36 °C (96.8 °F)   Resp 17   Ht 1.676 m (5' 6\")   Wt 70.1 kg (154 lb 8.7 oz)   SpO2 94%   BMI 24.94 kg/m²    Constitutional: Awake and alert Non toxic  HENT: Normal inspection  Moist mucous membranes  Eyes: Normal inspection  Neck: Grossly normal range of motion.  Cardiovascular: Tachycardic heart rate, Normal rhythm.  Symmetric peripheral pulses.   Thorax & Lungs: No respiratory distress, No wheezing, No rales, No rhonchi  Abdomen: Soft, non-distended, nontender to palpation in all 4 quadrants, no mass  Skin: No obvious rash.  Extremities: Warm, well perfused. No clubbing, cyanosis, edema   Neurologic: She is " tremulous. A&Ox 4. No focal deficit   Psychiatric: Normal for situation      EKG/LABS  Results for orders placed or performed during the hospital encounter of 03/10/25   CBC WITH DIFFERENTIAL    Collection Time: 03/10/25  4:45 PM   Result Value Ref Range    WBC 10.1 4.8 - 10.8 K/uL    RBC 4.53 4.20 - 5.40 M/uL    Hemoglobin 14.9 12.0 - 16.0 g/dL    Hematocrit 44.4 37.0 - 47.0 %    MCV 98.0 (H) 81.4 - 97.8 fL    MCH 32.9 27.0 - 33.0 pg    MCHC 33.6 32.2 - 35.5 g/dL    RDW 53.1 (H) 35.9 - 50.0 fL    Platelet Count 208 164 - 446 K/uL    MPV 9.5 9.0 - 12.9 fL    Neutrophils-Polys 66.40 44.00 - 72.00 %    Lymphocytes 22.60 22.00 - 41.00 %    Monocytes 8.60 0.00 - 13.40 %    Eosinophils 1.00 0.00 - 6.90 %    Basophils 1.00 0.00 - 1.80 %    Immature Granulocytes 0.40 0.00 - 0.90 %    Nucleated RBC 0.00 0.00 - 0.20 /100 WBC    Neutrophils (Absolute) 6.68 1.82 - 7.42 K/uL    Lymphs (Absolute) 2.28 1.00 - 4.80 K/uL    Monos (Absolute) 0.87 (H) 0.00 - 0.85 K/uL    Eos (Absolute) 0.10 0.00 - 0.51 K/uL    Baso (Absolute) 0.10 0.00 - 0.12 K/uL    Immature Granulocytes (abs) 0.04 0.00 - 0.11 K/uL    NRBC (Absolute) 0.00 K/uL   COMP METABOLIC PANEL    Collection Time: 03/10/25  4:45 PM   Result Value Ref Range    Sodium 139 135 - 145 mmol/L    Potassium 3.3 (L) 3.6 - 5.5 mmol/L    Chloride 101 96 - 112 mmol/L    Co2 20 20 - 33 mmol/L    Anion Gap 18.0 (H) 7.0 - 16.0    Glucose 85 65 - 99 mg/dL    Bun 9 8 - 22 mg/dL    Creatinine 0.82 0.50 - 1.40 mg/dL    Calcium 9.2 8.5 - 10.5 mg/dL    Correct Calcium 9.4 8.5 - 10.5 mg/dL    AST(SGOT) 28 12 - 45 U/L    ALT(SGPT) 15 2 - 50 U/L    Alkaline Phosphatase 76 30 - 99 U/L    Total Bilirubin 2.0 (H) 0.1 - 1.5 mg/dL    Albumin 3.8 3.2 - 4.9 g/dL    Total Protein 6.7 6.0 - 8.2 g/dL    Globulin 2.9 1.9 - 3.5 g/dL    A-G Ratio 1.3 g/dL   LIPASE    Collection Time: 03/10/25  4:45 PM   Result Value Ref Range    Lipase 33 11 - 82 U/L   DIAGNOSTIC ALCOHOL    Collection Time: 03/10/25  4:45 PM    Result Value Ref Range    Diagnostic Alcohol 13.5 (H) <10.1 mg/dL   ESTIMATED GFR    Collection Time: 03/10/25  4:45 PM   Result Value Ref Range    GFR (CKD-EPI) 73 >60 mL/min/1.73 m 2     *Note: Due to a large number of results and/or encounters for the requested time period, some results have not been displayed. A complete set of results can be found in Results Review.         COURSE & MEDICAL DECISION MAKING    ASSESSMENT, COURSE AND PLAN  Care Narrative: This is a 79-year-old who presents with anxiety facing incarceration.  Patient very tremulous and shaky on arrival here.  She does report drinking alcohol, last drink this morning.  She is very tearful and anxious.  Other than tremors she does not have any other stigmata of alcohol withdrawal I have treated her with Valium and she feels and looks significantly improved.  She has no signs of worsening alcohol withdrawal, no diaphoresis, tachycardia and her tremors have improved.  Possible however she had a component of mild alcohol withdrawal.  Her labs are largely unremarkable other than mild hypokalemia.  She is not having any cardiopulmonary complaints and I still no indication for further workup.  I do think she safe for discharge home.  She was agreeable to a Librium taper as she understands that she cannot drink alcohol and use this medication at the same time and she does seem reliable.  She can use to look well, mildly hypertensive but otherwise normal vital signs was discharged in good condition.              DISPOSITION AND DISCUSSIONS  I have discussed management of the patient with the following physicians and CIARAN's:  none    Discussion of management with other [facility] or appropriate source(s): None     Escalation of care considered, and ultimately not performed:acute inpatient care management, however at this time, the patient is most appropriate for outpatient management    Barriers to care at this time, including but not limited to:  none .      Decision tools and prescription drugs considered including, but not limited to: none    FINAL DIAGNOSIS  1. Anxiety Acute   2. Alcohol withdrawal syndrome without complication (HCC)         Electronically signed by: Anastasiya Chen M.D., 3/10/2025 4:27 PM

## 2025-03-10 NOTE — ED TRIAGE NOTES
Chief Complaint   Patient presents with    Anxiety     Pt released from snf on the 8th for a DUI. Pt reported to the courthouse and blew a .05 and was going to be taken to snf. Pt became very anxious and ems was called. Pt currently tremulous in triage and anxious. Pt states she normally drinks 4 shots a vodka/day and her last drink was this morning.     Vitals:    03/10/25 1508   BP: (!) 120/91   Pulse: (!) 106   Temp: 36 °C (96.8 °F)   SpO2: 96%

## 2025-03-11 NOTE — ED NOTES
Pt given DC instructions and verbalized understanding. Pt is A&O and ambulatory. Taken to lobby via WC

## 2025-03-11 NOTE — DISCHARGE INSTRUCTIONS
Please not drink and use the Librium at the same time.  The Librium is to help you detox from alcohol completely but it can be extremely dangerous if you drink alcohol in addition to using this medication

## 2025-04-02 NOTE — TELEPHONE ENCOUNTER
Was the patient seen in the last year in this department? Yes     Does patient have an active prescription for medications requested? No     Received Request Via: Pharmacy  
[FreeTextEntry1] : CT Chest (3/14/2025) FINDINGS:  LUNGS/AIRWAYS/PLEURA:Patent trachea and bronchi. Right lower lobe calcified granuloma. Trace left pleural effusion.  LYMPH NODES/MEDIASTINUM: Moderate hiatal hernia. No lymphadenopathy.  HEART/VASCULATURE: Normal sized heart and aorta. No pericardial effusion. Coronary artery calcifications.  UPPER ABDOMEN: Partially included pneumobilia.  BONES/SOFT TISSUES: Multilevel spondylosis.  IMPRESSION: Trace left pleural effusion.  --- End of Report ---   US NINA LE (3/14/2025) FINDINGS:  RIGHT: Normal compressibility of the RIGHT common femoral, femoral and popliteal veins. Doppler examination shows normal spontaneous and phasic flow. No RIGHT calf vein thrombosis is detected.  LEFT: Normal compressibility of the LEFT common femoral, femoral and popliteal veins. Doppler examination shows normal spontaneous and phasic flow. No LEFT calf vein thrombosis is detected.  IMPRESSION: No evidence of deep venous thrombosis in either lower extremity. 
[FreeTextEntry1] : CT Chest (3/14/2025) FINDINGS:  LUNGS/AIRWAYS/PLEURA:Patent trachea and bronchi. Right lower lobe calcified granuloma. Trace left pleural effusion.  LYMPH NODES/MEDIASTINUM: Moderate hiatal hernia. No lymphadenopathy.  HEART/VASCULATURE: Normal sized heart and aorta. No pericardial effusion. Coronary artery calcifications.  UPPER ABDOMEN: Partially included pneumobilia.  BONES/SOFT TISSUES: Multilevel spondylosis.  IMPRESSION: Trace left pleural effusion.  --- End of Report ---   US NINA LE (3/14/2025) FINDINGS:  RIGHT: Normal compressibility of the RIGHT common femoral, femoral and popliteal veins. Doppler examination shows normal spontaneous and phasic flow. No RIGHT calf vein thrombosis is detected.  LEFT: Normal compressibility of the LEFT common femoral, femoral and popliteal veins. Doppler examination shows normal spontaneous and phasic flow. No LEFT calf vein thrombosis is detected.  IMPRESSION: No evidence of deep venous thrombosis in either lower extremity.

## 2025-04-23 NOTE — ED NOTES
Aretha called to reschedule appt from referral. The referral status stated closed. Scheduled pt prior to looking at referral status. Canceled scheduled appt. Please let pt know if need a new referral or can still be scheduled. Please advise.    Bedside report received from previous shift.   Assumed patient care. Verified patient identification.  Checked on bed, connected to monitor,  with unlabored respirations. Discussed plan of care.   Vital signs is stable. Denied any new complaints.   Gurney in low position, side rail up for pt safety. Call light within reach.   No needs identified at the moment. Instructed to use call light when needed.    Contraptions: IV gauge 22 Rt Hand/ Gauge 20 Rt FA  Alert and Oriented: x 4  Ambulatory: Not at the moment  Oxygen:  2 LPM via nasal cannula  Pending: results

## 2025-04-24 NOTE — ASSESSMENT & PLAN NOTE
Continue home medications  Which include escitalopram and venlafaxine   Continue trazodone 50 mg at bedtime    Patient has requested psychotherapy.   Patient is requesting someone to talk to.  However, inpatient psychotherapy is not available at this time.  Case management has offered outpatient resources.   Heart Failure Education outreach Date/Time: 2025 1:58 PM    Ambulatory Care Manager (ACM) contacted the patient by telephone to perform Ambulatory Care Coordination. Verified name and  with patient as identifiers. Provided introduction to self, and explanation of the Ambulatory Care Manager's role.     ACM reviewed that a Heart Healthy tips for the Summer packet has been mailed to the them. ACM reviewed  will review all of the following upon recept of info in the mail, CHF zones, daily weights, fluid restriction, the importance of low sodium diet, and healthy tips packet with the patient. Instructed patient to call their  PCP or cardiologist  if they have a weight gain of 3 lbs in 2 days or 5 lbs in a week.     Patient reminded that there is a physician on call 24 hours a day / 7 days a week should the patient have questions or concerns. The patient verbalized understanding.

## 2025-06-05 ENCOUNTER — HOSPITAL ENCOUNTER (OUTPATIENT)
Facility: MEDICAL CENTER | Age: 80
End: 2025-06-05
Attending: PATHOLOGY
Payer: COMMERCIAL

## 2025-06-05 LAB
APTT PPP: 28.7 SEC (ref 24.7–36)
INR PPP: 1.09 (ref 0.87–1.13)
PROTHROMBIN TIME: 14.2 SEC (ref 12–14.6)

## 2025-06-18 ENCOUNTER — HOSPITAL ENCOUNTER (OUTPATIENT)
Dept: CARDIOLOGY | Facility: MEDICAL CENTER | Age: 80
End: 2025-06-18
Attending: OTOLARYNGOLOGY
Payer: MEDICARE

## 2025-06-18 LAB — EKG IMPRESSION: NORMAL

## 2025-06-18 PROCEDURE — 93005 ELECTROCARDIOGRAM TRACING: CPT | Mod: TC | Performed by: OTOLARYNGOLOGY

## 2025-06-18 PROCEDURE — 93010 ELECTROCARDIOGRAM REPORT: CPT | Performed by: INTERNAL MEDICINE

## 2025-07-30 ENCOUNTER — APPOINTMENT (OUTPATIENT)
Dept: RADIOLOGY | Facility: MEDICAL CENTER | Age: 80
DRG: 309 | End: 2025-07-30
Attending: STUDENT IN AN ORGANIZED HEALTH CARE EDUCATION/TRAINING PROGRAM
Payer: MEDICARE

## 2025-07-30 ENCOUNTER — HOSPITAL ENCOUNTER (INPATIENT)
Facility: MEDICAL CENTER | Age: 80
End: 2025-07-30
Attending: STUDENT IN AN ORGANIZED HEALTH CARE EDUCATION/TRAINING PROGRAM
Payer: MEDICARE

## 2025-07-30 PROCEDURE — 71045 X-RAY EXAM CHEST 1 VIEW: CPT

## 2025-07-30 PROCEDURE — 70450 CT HEAD/BRAIN W/O DYE: CPT

## 2025-07-30 ASSESSMENT — CHA2DS2 SCORE
CHF OR LEFT VENTRICULAR DYSFUNCTION: NO
AGE 65 TO 74: NO
PRIOR STROKE OR TIA OR THROMBOEMBOLISM: NO
HYPERTENSION: YES
SEX: FEMALE
VASCULAR DISEASE: NO
AGE 75 OR GREATER: YES
CHA2DS2 VASC SCORE: 4
DIABETES: NO

## 2025-07-30 ASSESSMENT — ENCOUNTER SYMPTOMS
NAUSEA: 0
WEAKNESS: 0
CHILLS: 0
HEADACHES: 0
CONSTIPATION: 0
DIARRHEA: 0
SHORTNESS OF BREATH: 0
PALPITATIONS: 0
FEVER: 0
ABDOMINAL PAIN: 0
COUGH: 0

## 2025-07-30 ASSESSMENT — FIBROSIS 4 INDEX: FIB4 SCORE: 2.68

## 2025-07-31 PROBLEM — E87.29 HIGH ANION GAP METABOLIC ACIDOSIS: Status: ACTIVE | Noted: 2025-07-31

## 2025-07-31 ASSESSMENT — LIFESTYLE VARIABLES
HEADACHE, FULLNESS IN HEAD: MILD
HEADACHE, FULLNESS IN HEAD: NOT PRESENT
AGITATION: NORMAL ACTIVITY
EVER HAD A DRINK FIRST THING IN THE MORNING TO STEADY YOUR NERVES TO GET RID OF A HANGOVER: YES
TREMOR: *
CONSUMPTION TOTAL: INCOMPLETE
NAUSEA AND VOMITING: INTERMITTENT NAUSEA WITH DRY HEAVES
ANXIETY: MODERATELY ANXIOUS OR GUARDED, SO ANXIETY IS INFERRED
TREMOR: *
AVERAGE NUMBER OF DAYS PER WEEK YOU HAVE A DRINK CONTAINING ALCOHOL: 7
HEADACHE, FULLNESS IN HEAD: NOT PRESENT
TOTAL SCORE: MODERATE ITCHING, PINS AND NEEDLES SENSATION, BURNING OR NUMBNESS
ORIENTATION AND CLOUDING OF SENSORIUM: ORIENTED AND CAN DO SERIAL ADDITIONS
HEADACHE, FULLNESS IN HEAD: MILD
TREMOR: *
SUBSTANCE_ABUSE: 1
ANXIETY: *
AGITATION: NORMAL ACTIVITY
ANXIETY: MODERATELY ANXIOUS OR GUARDED, SO ANXIETY IS INFERRED
TOTAL SCORE: 12
HEADACHE, FULLNESS IN HEAD: NOT PRESENT
ORIENTATION AND CLOUDING OF SENSORIUM: CANNOT DO SERIAL ADDITIONS OR IS UNCERTAIN ABOUT DATE
AUDITORY DISTURBANCES: NOT PRESENT
ANXIETY: MILDLY ANXIOUS
TOTAL SCORE: 12
DOES PATIENT WANT TO TALK TO SOMEONE ABOUT QUITTING: NO
TOTAL SCORE: 9
EVER FELT BAD OR GUILTY ABOUT YOUR DRINKING: YES
AGITATION: NORMAL ACTIVITY
TOTAL SCORE: 10
ORIENTATION AND CLOUDING OF SENSORIUM: ORIENTED AND CAN DO SERIAL ADDITIONS
HEADACHE, FULLNESS IN HEAD: NOT PRESENT
NAUSEA AND VOMITING: NO NAUSEA AND NO VOMITING
VISUAL DISTURBANCES: SEVERE HALLUCINATIONS
NAUSEA AND VOMITING: NO NAUSEA AND NO VOMITING
ALCOHOL_USE: YES
ORIENTATION AND CLOUDING OF SENSORIUM: ORIENTED AND CAN DO SERIAL ADDITIONS
PAROXYSMAL SWEATS: *
VISUAL DISTURBANCES: MODERATE SENSITIVITY
ANXIETY: *
AUDITORY DISTURBANCES: NOT PRESENT
ANXIETY: *
TOTAL SCORE: 4
DOES PATIENT WANT TO STOP DRINKING: CANNOT ASSESS
PAROXYSMAL SWEATS: BEADS OF SWEAT OBVIOUS ON FOREHEAD
AUDITORY DISTURBANCES: NOT PRESENT
HEADACHE, FULLNESS IN HEAD: NOT PRESENT
VISUAL DISTURBANCES: NOT PRESENT
HEADACHE, FULLNESS IN HEAD: NOT PRESENT
TOTAL SCORE: 19
AUDITORY DISTURBANCES: NOT PRESENT
EVER HAD A DRINK FIRST THING IN THE MORNING TO STEADY YOUR NERVES TO GET RID OF A HANGOVER: YES
ANXIETY: *
TOTAL SCORE: 5
PAROXYSMAL SWEATS: *
AGITATION: NORMAL ACTIVITY
ORIENTATION AND CLOUDING OF SENSORIUM: ORIENTED AND CAN DO SERIAL ADDITIONS
PAROXYSMAL SWEATS: NO SWEAT VISIBLE
PAROXYSMAL SWEATS: NO SWEAT VISIBLE
TOTAL SCORE: 29
PAROXYSMAL SWEATS: *
TREMOR: MODERATE TREMOR WITH ARMS EXTENDED
TOTAL SCORE: 12
ORIENTATION AND CLOUDING OF SENSORIUM: CANNOT DO SERIAL ADDITIONS OR IS UNCERTAIN ABOUT DATE
TREMOR: *
AUDITORY DISTURBANCES: NOT PRESENT
AUDITORY DISTURBANCES: NOT PRESENT
VISUAL DISTURBANCES: MODERATE SENSITIVITY
PAROXYSMAL SWEATS: NO SWEAT VISIBLE
NAUSEA AND VOMITING: *
ANXIETY: *
NAUSEA AND VOMITING: *
NAUSEA AND VOMITING: NO NAUSEA AND NO VOMITING
NAUSEA AND VOMITING: NO NAUSEA AND NO VOMITING
TOTAL SCORE: 4
VISUAL DISTURBANCES: VERY MILD SENSITIVITY
ORIENTATION AND CLOUDING OF SENSORIUM: CANNOT DO SERIAL ADDITIONS OR IS UNCERTAIN ABOUT DATE
PAROXYSMAL SWEATS: *
DOES PATIENT WANT TO STOP DRINKING: YES
ANXIETY: MILDLY ANXIOUS
TOTAL SCORE: 4
HAVE PEOPLE ANNOYED YOU BY CRITICIZING YOUR DRINKING: YES
TREMOR: TREMOR NOT VISIBLE BUT CAN BE FELT, FINGERTIP TO FINGERTIP
VISUAL DISTURBANCES: MILD SENSITIVITY
TOTAL SCORE: 10
HOW MANY TIMES IN THE PAST YEAR HAVE YOU HAD 5 OR MORE DRINKS IN A DAY: 365
ORIENTATION AND CLOUDING OF SENSORIUM: ORIENTED AND CAN DO SERIAL ADDITIONS
TOTAL SCORE: 8
AUDITORY DISTURBANCES: MILD HARSHNESS OR ABILITY TO FRIGHTEN
TREMOR: *
AUDITORY DISTURBANCES: NOT PRESENT
AUDITORY DISTURBANCES: MODERATE HARSHNESS OR ABILITY TO FRIGHTEN
ORIENTATION AND CLOUDING OF SENSORIUM: CANNOT DO SERIAL ADDITIONS OR IS UNCERTAIN ABOUT DATE
ANXIETY: *
TOTAL SCORE: 4
VISUAL DISTURBANCES: NOT PRESENT
HEADACHE, FULLNESS IN HEAD: NOT PRESENT
VISUAL DISTURBANCES: NOT PRESENT
PAROXYSMAL SWEATS: NO SWEAT VISIBLE
EVER FELT BAD OR GUILTY ABOUT YOUR DRINKING: YES
PAROXYSMAL SWEATS: NO SWEAT VISIBLE
VISUAL DISTURBANCES: VERY MILD SENSITIVITY
AGITATION: SOMEWHAT MORE THAN NORMAL ACTIVITY
VISUAL DISTURBANCES: VERY MILD SENSITIVITY
VISUAL DISTURBANCES: NOT PRESENT
NAUSEA AND VOMITING: *
PAROXYSMAL SWEATS: *
NAUSEA AND VOMITING: *
ORIENTATION AND CLOUDING OF SENSORIUM: ORIENTED AND CAN DO SERIAL ADDITIONS
AUDITORY DISTURBANCES: NOT PRESENT
NAUSEA AND VOMITING: NO NAUSEA AND NO VOMITING
AUDITORY DISTURBANCES: NOT PRESENT
TOTAL SCORE: MILD ITCHING, PINS AND NEEDLES SENSATION, BURNING OR NUMBNESS
HAVE PEOPLE ANNOYED YOU BY CRITICIZING YOUR DRINKING: YES
AUDITORY DISTURBANCES: NOT PRESENT
AGITATION: NORMAL ACTIVITY
TOTAL SCORE: 8
AGITATION: NORMAL ACTIVITY
ANXIETY: MILDLY ANXIOUS
TOTAL SCORE: SEVERE HALLUCINATIONS
HEADACHE, FULLNESS IN HEAD: MODERATELY SEVERE
HAVE YOU EVER FELT YOU SHOULD CUT DOWN ON YOUR DRINKING: YES
ON A TYPICAL DAY WHEN YOU DRINK ALCOHOL HOW MANY DRINKS DO YOU HAVE: 4
HAVE YOU EVER FELT YOU SHOULD CUT DOWN ON YOUR DRINKING: YES
VISUAL DISTURBANCES: NOT PRESENT
TREMOR: *
TOTAL SCORE: 4
NAUSEA AND VOMITING: MILD NAUSEA WITH NO VOMITING
ORIENTATION AND CLOUDING OF SENSORIUM: CANNOT DO SERIAL ADDITIONS OR IS UNCERTAIN ABOUT DATE
TOTAL SCORE: 13
AGITATION: *
HEADACHE, FULLNESS IN HEAD: NOT PRESENT
DO YOU DRINK ALCOHOL: YES
CONSUMPTION TOTAL: POSITIVE
AGITATION: *
TREMOR: *
AGITATION: NORMAL ACTIVITY
ORIENTATION AND CLOUDING OF SENSORIUM: CANNOT DO SERIAL ADDITIONS OR IS UNCERTAIN ABOUT DATE
ANXIETY: *
TREMOR: *
TREMOR: *
PAROXYSMAL SWEATS: *
AGITATION: SOMEWHAT MORE THAN NORMAL ACTIVITY
TOTAL SCORE: 4
NAUSEA AND VOMITING: NO NAUSEA AND NO VOMITING
TREMOR: TREMOR NOT VISIBLE BUT CAN BE FELT, FINGERTIP TO FINGERTIP
HEADACHE, FULLNESS IN HEAD: VERY MILD
TOTAL SCORE: MILD ITCHING, PINS AND NEEDLES SENSATION, BURNING OR NUMBNESS
AGITATION: NORMAL ACTIVITY

## 2025-07-31 ASSESSMENT — COGNITIVE AND FUNCTIONAL STATUS - GENERAL
SUGGESTED CMS G CODE MODIFIER MOBILITY: CK
TURNING FROM BACK TO SIDE WHILE IN FLAT BAD: A LITTLE
MOVING TO AND FROM BED TO CHAIR: A LITTLE
SUGGESTED CMS G CODE MODIFIER DAILY ACTIVITY: CH
CLIMB 3 TO 5 STEPS WITH RAILING: A LITTLE
DAILY ACTIVITIY SCORE: 24
WALKING IN HOSPITAL ROOM: A LITTLE
MOBILITY SCORE: 18
MOVING FROM LYING ON BACK TO SITTING ON SIDE OF FLAT BED: A LITTLE
STANDING UP FROM CHAIR USING ARMS: A LITTLE

## 2025-07-31 ASSESSMENT — ENCOUNTER SYMPTOMS
SHORTNESS OF BREATH: 0
DEPRESSION: 1
FEVER: 0
NAUSEA: 1
WEAKNESS: 1
MYALGIAS: 1
PALPITATIONS: 0
ABDOMINAL PAIN: 0
TREMORS: 1

## 2025-07-31 ASSESSMENT — PATIENT HEALTH QUESTIONNAIRE - PHQ9
1. LITTLE INTEREST OR PLEASURE IN DOING THINGS: NOT AT ALL
SUM OF ALL RESPONSES TO PHQ9 QUESTIONS 1 AND 2: 0
2. FEELING DOWN, DEPRESSED, IRRITABLE, OR HOPELESS: NOT AT ALL

## 2025-07-31 ASSESSMENT — PAIN DESCRIPTION - PAIN TYPE
TYPE: ACUTE PAIN
TYPE: ACUTE PAIN

## 2025-07-31 ASSESSMENT — FIBROSIS 4 INDEX: FIB4 SCORE: 6.16

## 2025-07-31 NOTE — ED NOTES
Bedside report received from off going RN/tech: gala rn, assumed care of patient.  POC discussed with patient. Call light within reach, all needs addressed at this time.       Fall risk interventions in place: Patient's personal possessions are with in their safe reach, Place socks on patient, Place fall risk sign on patient's door, and Bed Alarm in use (all applicable per [facility] Fall risk assessment)   Continuous monitoring: Cardiac Leads, Pulse Ox, or Blood Pressure  IVF/IV medications: Not Applicable   Oxygen: Room Air  Bedside sitter: Not Applicable   Isolation: Not Applicable

## 2025-07-31 NOTE — H&P
"[facility] History & Physical Note    Date of Service  7/31/2025    [facility] Team: KATELYN   Attending: Joce Jones D.o.  Senior Resident: Dr. Hickman  Intern:    Contact Number: [phone]    Primary Care Physician  Jeanie Brewer D.O.    Consultants      Code Status  Full Code    Chief Complaint  Chief Complaint   Patient presents with    Alcohol Intoxication     BIBA from home by unit 91 for c/o depression and confusion in life,  +ETOH, denies SI/HI pt Aox4       History of Presenting Illness (HPI):   Jeanie Hutchison is a 79 y.o. female with history including alcohol abuse, complex grief, atrial fibrillation, and history of ventricular tachycardia s/p AICD placement who presented 7/30/2025 with complaint of confusion.  She reports being in bed earlier in the day where she suddenly felt significantly confused and \"did not feel quite right\".  She also reports generalized fatigue developing at this time.  Patient reports typically drinking 3-4 shots of vodka daily for the last 6 years after her diet.  Patient reports typically drinking but to help cope with this grief as well as for sleep aid she experiences significant insomnia.  Patient reports last drink was yesterday evening 7/29 and has not had any oral intake since.  She reports withdrawal in the past consisting of anxiety, tremors, confusion, and sweating however denies any history of withdrawal seizures.  Patient reports typically compliant with medications has not missed any recent doses specifically metoprolol and apixaban.  Patient previously on digoxin outpatient however states she stopped this 1 week ago as she was told by her pharmacist that her digoxin levels were too high. She denies chest pain, palpitations, lightheadedness/dizziness, numbness/tingling, falls, or shortness of breath.    In ED, patient tachycardic to 133, tachypneic 24, /103.  Labs demonstrate hemoglobin 16.4, bicarb 18, anion gap 19, glucose 111, AST 89, ALT 65, " digoxin 0.4, diagnostic alcohol 294, negative troponin, and .  CXR demonstrating cardiomegaly with possible mild pulmonary edema.  CT head negative for acute findings.  Patient received Valium 10 mg, digoxin 62.5 mcg, metoprolol 5 mg, Versed 2 mg, 1 L IV NS as well as vitamin supplementation while present in ED.    I discussed the plan of care with patient and Dr. Jones.    Review of Systems  Review of Systems   Constitutional:  Positive for malaise/fatigue. Negative for chills and fever.   Respiratory:  Negative for cough and shortness of breath.    Cardiovascular:  Negative for chest pain, palpitations and leg swelling.   Gastrointestinal:  Negative for abdominal pain, constipation, diarrhea and nausea.   Genitourinary:  Negative for dysuria, frequency and urgency.   Neurological:  Negative for weakness and headaches.       Past Medical History   has a past medical history of Alcoholism (HCC), Anticoagulated, Apnea, sleep, Chronic pain, Hypothyroidism, Insomnia, Paroxysmal atrial fibrillation (HCC), Psychiatric disorder, Snoring, and Ventricular tachycardia (HCC).    Surgical History   has a past surgical history that includes breast biopsy; other orthopedic surgery; orif, fracture, clavicle (5/21/2014); and tonsillectomy.     Family History  Family History   Problem Relation Age of Onset    Diabetes Mother     Anxiety disorder Mother     Depression Mother     Hyperlipidemia Father       Family history reviewed with patient.     Social History  Tobacco: Denies  Alcohol: Drinks 3-4 shots every evening for last 6 years  Recreational drugs (illegal or prescription): Denies  Employment:   Living Situation: Lives at home with son and grandson, states son typical caretaker  Recent Travel:   Primary Care Provider: Reviewed Jeanie Brewer, DO  Other (stressors, spirituality, exposures):     Allergies  Allergies[1]    Medications  Prior to Admission Medications   Prescriptions Last Dose Informant Patient Reported?  Taking?   apixaban (ELIQUIS) 5mg Tab  Patient's Home Pharmacy No No   Sig: Take 1 Tablet by mouth 2 times a day.   digoxin (LANOXIN) 125 MCG Tab  Patient's Home Pharmacy No No   Sig: Take 1 Tablet by mouth every day.   estradiol (ESTRACE) 0.1 MG/GM vaginal cream  Patient's Home Pharmacy Yes No   Sig: Insert 1-2 g into the vagina see administration instructions. 2 g daily x 2 weeks then 1 g 3 x a week after   hydrOXYzine HCl (ATARAX) 25 MG Tab  Patient, Patient's Home Pharmacy Yes No   Sig: Take 25 mg by mouth at bedtime.   metoprolol SR (TOPROL XL) 50 MG TABLET SR 24 HR   No No   Sig: Take 3 Tablets by mouth every day.   mirtazapine (REMERON) 15 MG Tab  Patient's Home Pharmacy Yes No   Sig: Take 15 mg by mouth at bedtime.   traZODone (DESYREL) 100 MG Tab  Patient's Home Pharmacy Yes No   Sig: Take 100 mg by mouth every evening.   venlafaxine (EFFEXOR-XR) 150 MG extended-release capsule  Patient's Home Pharmacy Yes No   Sig: Take 150 mg by mouth every day.      Facility-Administered Medications: None       Physical Exam  Temp:  [36.8 °C (98.2 °F)-37.1 °C (98.7 °F)] 36.8 °C (98.2 °F)  Pulse:  [] 130  Resp:  [14-24] 18  BP: (130-157)/() 155/114  SpO2:  [87 %-98 %] 90 %  Blood Pressure : (!) 146/94   Temperature: 37.1 °C (98.7 °F)   Pulse: (!) 126   Respiration: 18   Pulse Oximetry: 97 %       Physical Exam  Vitals reviewed.   Constitutional:       General: She is not in acute distress.     Appearance: Normal appearance. She is not ill-appearing.   HENT:      Head: Normocephalic and atraumatic.      Mouth/Throat:      Mouth: Mucous membranes are moist.      Pharynx: Oropharynx is clear.   Eyes:      Extraocular Movements: Extraocular movements intact.      Conjunctiva/sclera: Conjunctivae normal.      Pupils: Pupils are equal, round, and reactive to light.   Cardiovascular:      Rate and Rhythm: Tachycardia present. Rhythm irregular.      Pulses: Normal pulses.      Heart sounds: Normal heart sounds. No  murmur heard.  Pulmonary:      Effort: Pulmonary effort is normal. No respiratory distress.      Breath sounds: Normal breath sounds.   Abdominal:      General: Bowel sounds are normal. There is no distension.      Palpations: Abdomen is soft.      Tenderness: There is no abdominal tenderness. There is no guarding.   Musculoskeletal:         General: Normal range of motion.      Right lower leg: No edema.      Left lower leg: No edema.   Skin:     General: Skin is warm and dry.   Neurological:      General: No focal deficit present.      Mental Status: She is alert and oriented to person, place, and time.         Laboratory:  Recent Labs     07/30/25 2059   WBC 6.8   RBC 5.02   HEMOGLOBIN 16.4*   HEMATOCRIT 48.5*   MCV 96.6   MCH 32.7   MCHC 33.8   RDW 50.3*   PLATELETCT 138*   MPV 9.4     Recent Labs     07/30/25 2059   SODIUM 142   POTASSIUM 4.2   CHLORIDE 105   CO2 18*   GLUCOSE 111*   BUN 12   CREATININE 0.63   CALCIUM 8.8     Recent Labs     07/30/25 2059   ALTSGPT 65*   ASTSGOT 89*   ALKPHOSPHAT 96   TBILIRUBIN 0.9   LIPASE 34   GLUCOSE 111*         Recent Labs     07/30/25 2059   NTPROBNP 394*         Recent Labs     07/30/25 2059   TROPONINT <6       Imaging:  CT-HEAD W/O   Final Result      1.  Cerebral atrophy.   2.  White matter lucencies most consistent with small vessel ischemic change versus demyelination or gliosis.   3.  Otherwise, Head CT without contrast with no acute findings. No evidence of acute cerebral infarction, hemorrhage or mass lesion.                  DX-CHEST-PORTABLE (1 VIEW)   Final Result      Cardiomegaly with probable mild pulmonary edema.          X-Ray:  I have personally reviewed the images and compared with prior images.  EKG:  I have personally reviewed the images and compared with prior images.    Assessment/Plan:  Problem Representation:   Jeanie Hutchison is a 79 y.o. female with history including alcohol abuse, complex grief, atrial fibrillation, and history of  ventricular tachycardia s/p AICD placement admitted 7/30/2025 for atrial fibrillation with RVR and alcohol withdrawal.    I anticipate this patient will require at least two midnights for appropriate medical management, necessitating inpatient admission because atrial fibrillation with RVR and alcohol withdrawal    Patient will need a Telemetry bed on MEDICAL service .  The need is secondary to atrial fibrillation with RVR and alcohol withdrawal.    * Atrial fibrillation with RVR (HCC)- (present on admission)  Assessment & Plan  Patien found to be tachycardic in ED to >130s. EKG showing atrial fibrillation with RVR. Patient recently stopped taking digoxin outpatient 1 week ago  -admit to medicine  -telemetry  -digoxin level 0.4, given 62.5mcg IV digoxin in ED, continue digoxin loading  -continue home metoprolol and apixaban  -PRN IV metoprolol    High anion gap metabolic acidosis  Assessment & Plan  Anion gap elevated 19 on admission likely alcoholic ketoacidosis  -s/p 1L IVF in ED  -continue to monitor    Alcohol withdrawal syndrome with complication (HCC)- (present on admission)  Assessment & Plan  Patient with chronic alcohol abuse history drinking reportedly 3-4 shots vodka daily. Reports last drink evening of 7/29 however diagnostic alcohol elevated at 294. No history of alcohol withdrawal seizures  -CIWA protocol  -MV, folate, thiamine  -seizure, fall precautions  -continue to monitor electrolytes and correct as necessary    Presence of automatic cardioverter/defibrillator (AICD)- (present on admission)  Assessment & Plan  AICD implant placed July 2020 for polymorphic VT, long QT, and atrial fibrillation         VTE prophylaxis: SCDs/TEDs and enoxaparin ppx         [1]   Allergies  Allergen Reactions    Bloodless

## 2025-07-31 NOTE — ED NOTES
Medication history reviewed with patient at bedside.     Med rec is complete    Allergies reviewed.     Patient has not had any outpatient anti-MICROBIAL in the last 30 days.     Anticoagulants: No    Sisi Ricci

## 2025-07-31 NOTE — DISCHARGE PLANNING
Care Transition Team Assessment    Patient is a 79 year-old female admitted for Atrial Fibrillation with RVR. Please see pt's H&P for prior medical history. RNCM met with pt at bedside to complete assessment. Pt A&Ox4 and able to verify the information on the face sheet. Pt lives with grandson in a two-story, [address] [place] [address] [place] [address]. Emergency contact is sonManjeet, [phone]. Prior to admission patient is independent with ADL's and IADL’s. Pt reports, pt is retired. Pt receives monthly SSI and alf deposits of $5000. The patient's PCP is Dr. Jeanie Brewer. [facility] Patient denies a history of SNF/IPR  use in the past. Pt has history of HHA, but does not recall the company. Pt drinks 4 shots of vodka a day and suffers from axiety, agitation and depression.  SA cessation and mental health resources were provided by RNCM. Patients confirmed medical coverage with Medicare.  Patient has means to transportation and sonReilly,  will provide transport once medically stable for discharge. RNCM discussed discharge planning. Patient reported pt's goal is to return home once medically stable.         Information Source  Information Given By: Patient  Informant's Name: Jeanie Hutchison  Who is responsible for making decisions for patient? : Patient    Readmission Evaluation  Is this a readmission?: No    Elopement Risk  Legal Hold: No  Ambulatory or Self Mobile in Wheelchair: Yes  Disoriented: No  Psychiatric Symptoms: None  History of Wandering: No  Elopement this Admit: No  Vocalizing Wanting to Leave: No  Displays Behaviors, Body Language Wanting to Leave: No-Not at Risk for Elopement  Elopement Risk: Not at Risk for Elopement    Interdisciplinary Discharge Planning  Lives with - Patient's Self Care Capacity: Adult Children  Patient or legal guardian wants to designate a caregiver: No  Support Systems: Family Member(s)  Housing / Facility: 2 Story  "House  Name of Care Facility: n/a    Discharge Preparedness  What is your plan after discharge?: Home with help  What are your discharge supports?: Child (Melvin Hutchison, [phone])  Prior Functional Level: Ambulatory, Independent with Activities of Daily Living, Independent with Medication Management  Difficulity with ADLs: None  Difficulity with IADLs: None    Functional Assesment  Prior Functional Level: Ambulatory, Independent with Activities of Daily Living, Independent with Medication Management    Finances  Financial Barriers to Discharge: No    Vision / Hearing Impairment  Vision Impairment : No  Right Eye Vision: Impaired, Wears Glasses  Left Eye Vision: Impaired, Wears Glasses  Hearing Impairment : No         Advance Directive  Advance Directive?: None  Advance Directive offered?: AD Booklet given    Domestic Abuse  Have you ever been the victim of abuse or violence?: No    Psychological Assessment  History of Substance Abuse: Alcohol (4 shots of vodka a day)  Date Last Used - Alcohol: 07/31/2025  Substance Abuse Comments: 4 shots of vodka a day; \"had some this morning\"  History of Psychiatric Problems: Yes    Discharge Risks or Barriers  Discharge risks or barriers?: Complex medical needs  Patient risk factors: Complex medical needs    Anticipated Discharge Information  Discharge Disposition: Discharged to home/self care (01)  Discharge Address: [address] [place] [id number]  Discharge Contact Phone Number: [phone]        "

## 2025-07-31 NOTE — ED NOTES
"Pt very emotional over the death of her daughters and states she feels \"very confused.\" Pt trying to get out of the gurney stating she feels like she is going to die. Pt redirected back onto the gurney, ERP at bedside for re evaluation. Pt medicated per mar   "

## 2025-07-31 NOTE — ASSESSMENT & PLAN NOTE
Anion gap elevated 19 on admission likely alcoholic ketoacidosis  -s/p 1L IVF in ED  -continue to monitor

## 2025-07-31 NOTE — ASSESSMENT & PLAN NOTE
Patient with chronic alcohol abuse history drinking reportedly 3-4 shots vodka daily. Reports last drink evening of 7/29 however diagnostic alcohol elevated at 294. No history of alcohol withdrawal seizures but has required intubation in the past for severe withdrawal   -[facility] protocol in place   -MV, folate, thiamine  -seizure, fall precautions  -continue to monitor electrolytes and correct as necessary

## 2025-07-31 NOTE — PROGRESS NOTES
"Hospital Medicine Daily Progress Note    Date of Service  7/31/2025    Chief Complaint  Jeanie Hutchison is a 79 y.o. female admitted 7/30/2025 with alcohol intoxication    Hospital Course  Jeanie Hutchison is a 79 y.o. female with history of alcohol abuse, complex grief, atrial fibrillation, and history of ventricular tachycardia s/p AICD placement who presented 7/30/2025 with complaint of confusion.  She reports being in bed earlier in the day where she suddenly felt significantly confused and \"did not feel quite right\".  She also reports generalized fatigue developing at this time.  Patient reports typically drinking 3-4 shots of vodka daily for the last 6 years.   Patient reports typically drinking but to help cope with this grief as well as for sleep aid she experiences significant insomnia.  Patient reports last drink was  evening 7/29 and has not had any oral intake since.  She reports withdrawal in the past consisting of anxiety, tremors, confusion, and sweating however denies any history of withdrawal seizures.  Patient reports typically compliant with medications has not missed any recent doses specifically metoprolol and apixaban.  Patient previously on digoxin outpatient however states she stopped this 1 week ago as she was told by her pharmacist that her digoxin levels were too high. She denies chest pain, palpitations, lightheadedness/dizziness, numbness/tingling, falls, or shortness of breath.     In ED, patient tachycardic to 133, tachypneic 24, /103.  Labs demonstrate hemoglobin 16.4, bicarb 18, anion gap 19, glucose 111, AST 89, ALT 65, digoxin 0.4, diagnostic alcohol 294, negative troponin, and .  CXR demonstrating cardiomegaly with possible mild pulmonary edema.  CT head negative for acute findings.  Patient received Valium 10 mg, digoxin 62.5 mcg, metoprolol 5 mg, Versed 2 mg, 1 L IV NS as well as vitamin supplementation while present in ED.      Interval Problem Update  Pt " seen and examined, she is nausea and overall feels unwell. Denies SOB, chest pain or palpitations. Feels like she is withdrawing from ETOH   - Her HR remains uncontrolled, ranging from 103-140. She was loaded with digoxin as her levels were subtherapeutic. To resume 125 mcg tomorrow. Continue metoprolol 100 mg daily. She is hypertensive I have started losartan. I suspect some of her RVR is related to her withdrawal. Prn metoprolol available for sustained HR >130, if no improvement in her HR over the next next or any clinical deterioration will need to further adjust meds. Had been on amio in the past however had thyroid issues per documentation from 2023  - LFTs elevated, monitor   - high risk for severe ETOH withdrawal, continue CIWA     I have discussed this patient's plan of care and discharge plan at IDT rounds today with Case Management, Nursing, Nursing leadership, and other members of the IDT team.    Consultants/Specialty  NA    Code Status  Full Code    Disposition  The patient is not medically cleared for discharge to home or a post-acute facility.  Anticipate discharge to: home with close outpatient follow-up    I have placed the appropriate orders for post-discharge needs.    Review of Systems  Review of Systems   Constitutional:  Positive for malaise/fatigue. Negative for fever.   Respiratory:  Negative for shortness of breath.    Cardiovascular:  Negative for chest pain, palpitations and leg swelling.   Gastrointestinal:  Positive for nausea. Negative for abdominal pain.   Genitourinary:  Negative for dysuria.   Musculoskeletal:  Positive for myalgias.   Neurological:  Positive for tremors and weakness.   Psychiatric/Behavioral:  Positive for depression and substance abuse.         Physical Exam  Temp:  [36.4 °C (97.5 °F)-37.1 °C (98.7 °F)] 36.4 °C (97.5 °F)  Pulse:  [] 130  Resp:  [14-24] 18  BP: (130-157)/() 149/102  SpO2:  [87 %-98 %] 95 %    Physical Exam  Vitals and nursing note  reviewed.   Constitutional:       Appearance: She is ill-appearing.   HENT:      Head: Normocephalic and atraumatic.      Mouth/Throat:      Mouth: Mucous membranes are moist.   Eyes:      Conjunctiva/sclera: Conjunctivae normal.   Cardiovascular:      Rate and Rhythm: Tachycardia present. Rhythm irregular.   Pulmonary:      Effort: No respiratory distress.      Breath sounds: No rhonchi.   Abdominal:      General: Bowel sounds are normal.      Palpations: Abdomen is soft.   Musculoskeletal:      Cervical back: Neck supple.      Right lower leg: No edema.      Left lower leg: No edema.   Skin:     General: Skin is warm.   Neurological:      Mental Status: She is alert and oriented to person, place, and time.      Comments: tremulous   Psychiatric:      Comments: Flat affect          Fluids    Intake/Output Summary (Last 24 hours) at 7/31/2025 1603  Last data filed at 7/31/2025 1513  Gross per 24 hour   Intake 1960 ml   Output 1000 ml   Net 960 ml        Laboratory  Recent Labs     07/30/25 2059 07/31/25  0357   WBC 6.8 6.1   RBC 5.02 4.62   HEMOGLOBIN 16.4* 15.6   HEMATOCRIT 48.5* 45.1   MCV 96.6 97.6   MCH 32.7 33.8*   MCHC 33.8 34.6   RDW 50.3* 52.3*   PLATELETCT 138* 127*   MPV 9.4 9.5     Recent Labs     07/30/25 2059 07/31/25  0357   SODIUM 142 141   POTASSIUM 4.2 4.2   CHLORIDE 105 105   CO2 18* 19*   GLUCOSE 111* 167*   BUN 12 11   CREATININE 0.63 0.65   CALCIUM 8.8 8.7                   Imaging  CT-HEAD W/O   Final Result      1.  Cerebral atrophy.   2.  White matter lucencies most consistent with small vessel ischemic change versus demyelination or gliosis.   3.  Otherwise, Head CT without contrast with no acute findings. No evidence of acute cerebral infarction, hemorrhage or mass lesion.                  DX-CHEST-PORTABLE (1 VIEW)   Final Result      Cardiomegaly with probable mild pulmonary edema.           Assessment/Plan  * Atrial fibrillation with RVR (HCC)- (present on admission)  Assessment &  Plan  Patien found to be tachycardic in ED to >130s. EKG showing atrial fibrillation with RVR. Patient recently stopped taking digoxin outpatient 1 week ago  - continue tele  -digoxin level 0.4, completed dig load today, resume 125 mcg tomorrow   - continue metoprolol  mg and apixaban  -PRN IV metoprolol    High anion gap metabolic acidosis  Assessment & Plan  Anion gap elevated 19 on admission likely alcoholic ketoacidosis  -s/p 1L IVF in ED  -continue to monitor    Prolonged QT interval- (present on admission)  Assessment & Plan  Chronic, does have AICD   Monitor on tele   Avoid qt prolonging medications   Goal Mag >2, K >4    Alcohol withdrawal syndrome with complication (HCC)- (present on admission)  Assessment & Plan  Patient with chronic alcohol abuse history drinking reportedly 3-4 shots vodka daily. Reports last drink evening of 7/29 however diagnostic alcohol elevated at 294. No history of alcohol withdrawal seizures but has required intubation in the past for severe withdrawal   -CIWA protocol in place   -MV, folate, thiamine  -seizure, fall precautions  -continue to monitor electrolytes and correct as necessary    Primary hypertension- (present on admission)  Assessment & Plan  Significant hypertension, withdrawal likely contributing   Continue metoprolol   Start losartan 25 mg daily   Monitor     Thrombocytopenia (HCC)- (present on admission)  Assessment & Plan  Chronic, stable. Due to ETOH use   Monitor     Presence of automatic cardioverter/defibrillator (AICD)- (present on admission)  Assessment & Plan  AICD implant placed July 2020 for polymorphic VT, long QT, and atrial fibrillation          VTE prophylaxis:    therapeutic anticoagulation with eliquis 5 mg BID      I have performed a physical exam and reviewed and updated ROS and Plan today (7/31/2025). In review of yesterday's note (7/30/2025), there are no changes except as documented above.    Patient is has a high medical complexity,  complex decision making and is at high risk for complication, morbidity, and mortality.    Greater than 51 minutes spent prepping to see patient (e.g. review of tests) obtaining and/or reviewing separately obtained history. Performing a medically appropriate examination and/ evaluation.  Counseling and educating the patient/family/caregiver.  Ordering medications, tests, or procedures.  Referring and communicating with other health care professionals.  Documenting clinical information in EPIC.  Independently interpreting results and communicating results to patient/family/caregiver.  Care coordination.

## 2025-07-31 NOTE — ASSESSMENT & PLAN NOTE
Significant hypertension, withdrawal likely contributing   Continue metoprolol   Start losartan 25 mg daily   Monitor

## 2025-07-31 NOTE — ED PROVIDER NOTES
"ED Provider Note    CHIEF COMPLAINT  Chief Complaint   Patient presents with    Alcohol Intoxication     BIBA from home by [address] for c/o depression and confusion in life,  +ETOH, denies SI/HI pt Aox4       EXTERNAL RECORDS REVIEWED  Inpatient Notes patient was discharged from the hospital 2/18/2025 after admission for.  History of severe alcohol abuse, hypothyroidism, atrial fibrillation.  She presented with nausea vomiting and dyspnea for 5 days.  Notably she takes Eliquis metoprolol and digoxin.  She was treated for alcohol withdrawal and restarted on her rate control agents.    HPI/ROS  LIMITATION TO HISTORY   Select: Altered mental status / Confusion and Intoxication  OUTSIDE HISTORIAN(S):  EMS who report that the patient was brought in from home for evaluation of confusion and reported alcohol use    Jeanie Hutchison is a 79 y.o. female who presents to the emergency department for evaluation of confusion.  The patient reports that she has been feeling confused today.  She does not remember when the confusion started and states that it got better when she arrived to the emergency department.  She has difficulty specifying what she means by confusing stating that she still knows who she is where she is and what is happening but just consistently states that she felt \"confused\".  She states she is worried she is going to go into alcohol withdrawal or that she is in alcohol withdrawal currently but also states that she drank alcohol heavily today.  She denies chest pain or pressure, shortness of breath, leg swelling, new numbness or weakness of the arms legs or face, difficulty swallowing or speaking.  She denies any falls or head strike.  She cannot remember if she took her medication today and states that her son typically helps.    I spoke with the patient's son via telephone.  He states that he has recently withdrawn from her life but believes that she likely is withdrawing from alcohol.  He notes that she " has a history of alcohol use disorder and typically starts to withdrawal when she can no longer afford or acquire the alcohol.  He is not sure if she drank today and believes she probably has not taken her medications today.    PAST MEDICAL HISTORY   has a past medical history of Alcoholism (HCC), Anticoagulated, Apnea, sleep, Chronic pain, Hypothyroidism, Insomnia, Paroxysmal atrial fibrillation (HCC), Psychiatric disorder, Snoring, and Ventricular tachycardia (HCC).    SURGICAL HISTORY   has a past surgical history that includes breast biopsy; other orthopedic surgery; orif, fracture, clavicle (5/21/2014); and tonsillectomy.    FAMILY HISTORY  Family History   Problem Relation Age of Onset    Diabetes Mother     Anxiety disorder Mother     Depression Mother     Hyperlipidemia Father        SOCIAL HISTORY  Social History     Tobacco Use    Smoking status: Never    Smokeless tobacco: Never   Vaping Use    Vaping status: Never Used   Substance and Sexual Activity    Alcohol use: Yes     Alcohol/week: 8.4 - 12.6 oz     Types: 14 - 21 Standard drinks or equivalent per week     Comment: everyday (last 2 months 2-3 everyday)    Drug use: Not Currently     Types: Oral     Comment: takes friends tranqualizers     Sexual activity: Not Currently       CURRENT MEDICATIONS  Home Medications       Reviewed by Lizeth Stephenson R.N. (Registered Nurse) on 07/31/25 at 0245  Med List Status: Complete     Medication Last Dose Status   apixaban (ELIQUIS) 5mg Tab 7/30/2025 Active   Cholecalciferol (D3 PO) 7/29/2025 Active   Cyanocobalamin (B-12 PO) 7/29/2025 Active   hydrOXYzine HCl (ATARAX) 25 MG Tab 7/30/2025 Active   metoprolol SR (TOPROL XL) 50 MG TABLET SR 24 HR 7/30/2025 Active   mirtazapine (REMERON) 30 MG Tab tablet 7/30/2025 Active   traZODone (DESYREL) 50 MG Tab 7/30/2025 Active   vibegron (GEMTESA) 75 MG tablet 7/30/2025 Active                  Audit from Redirected Encounters    **Home medications have not yet been reviewed for  "this encounter**         ALLERGIES  Allergies[1]    PHYSICAL EXAM  VITAL SIGNS: BP (!) 152/114   Pulse (!) 115   Temp 36.9 °C (98.4 °F)   Resp 18   Ht 1.676 m (5' 6\")   Wt 73.8 kg (162 lb 11.2 oz)   SpO2 92%   BMI 26.26 kg/m²    Constitutional: Anxious  HEENT: Atraumatic, normocephalic, pupils 3 mm equal round and reactive to light.  Horizontal nystagmus appreciated.  Dry mucous membranes.  Neck: Supple, no JVD, no tracheal deviation  Cardiovascular: Tachycardic irregularly irregular rhythm with no murmur  Thorax & Lungs: No respiratory distress, rhonchorous breath sounds bilaterally clearing with coughing  GI: Soft, non-distended, non-tender, no rebound  Skin: Warm, dry, no acute rash or lesion  Musculoskeletal: Moving all extremities, no acute deformity, no edema, no tenderness  Neurologic: A&Ox3, confused as to current situation.  Cranial nerves II through XII intact.  5 out of 5 strength proximally and distally in the upper and lower extremities bilaterally.  Psychiatric: Anxious, confused        EKG/LABS  Labs Reviewed   CBC WITH DIFFERENTIAL - Abnormal; Notable for the following components:       Result Value    Hemoglobin 16.4 (*)     Hematocrit 48.5 (*)     RDW 50.3 (*)     Platelet Count 138 (*)     Neutrophils-Polys 42.30 (*)     Lymphocytes 47.30 (*)     All other components within normal limits   COMP METABOLIC PANEL - Abnormal; Notable for the following components:    Co2 18 (*)     Anion Gap 19.0 (*)     Glucose 111 (*)     AST(SGOT) 89 (*)     ALT(SGPT) 65 (*)     All other components within normal limits   DIAGNOSTIC ALCOHOL - Abnormal; Notable for the following components:    Diagnostic Alcohol 294.0 (*)     All other components within normal limits   DIGOXIN - Abnormal; Notable for the following components:    Digoxin 0.4 (*)     All other components within normal limits   PROBRAIN NATRIURETIC PEPTIDE, NT - Abnormal; Notable for the following components:    NT-proBNP 394 (*)     All other " components within normal limits   URINE DRUG SCREEN - Abnormal; Notable for the following components:    Benzodiazepines Positive (*)     All other components within normal limits   LIPASE   TROPONIN   ESTIMATED GFR   CBC WITH DIFFERENTIAL   COMP METABOLIC PANEL   MAGNESIUM   PHOSPHORUS   URINALYSIS     Results for orders placed or performed during the hospital encounter of 25   EKG (Now)   Result Value Ref Range    Report       [facility] Emergency Dept.    Test Date:  2025  Pt Name:    MARISELA DURHAM              Department: ER  MRN:        [id number]                      Room:       [facility]  Gender:     Female                       Technician: [id number]  :        [id number]                   Requested By:SHU CROOKS  Order #:    [id number]                    Reading MD: Shu Crooks    Measurements  Intervals                                Axis  Rate:       138                          P:          0  FL:         0                            QRS:        -60  QRSD:       91                           T:          63  QT:         349  QTc:        529    Interpretive Statements  Atrial fibrillation with rapid ventricular response, occasional PVCs.  No  evidence of acute ischemia  Electronically Signed On 2025 21:12:50 PDT by Shu Crooks         I have independently interpreted this EKG    RADIOLOGY/PROCEDURES   I have independently interpreted the diagnostic imaging associated with this visit and am waiting the final reading from the radiologist.   My preliminary interpretation is as follows: CT head with no intracranial hemorrhage.  Chest x-ray demonstrating mild pulmonary edema    Radiologist interpretation:  CT-HEAD W/O   Final Result      1.  Cerebral atrophy.   2.  White matter lucencies most consistent with small vessel ischemic change versus demyelination or gliosis.   3.  Otherwise, Head CT without contrast with no acute findings. No evidence of acute cerebral  infarction, hemorrhage or mass lesion.                  DX-CHEST-PORTABLE (1 VIEW)   Final Result      Cardiomegaly with probable mild pulmonary edema.          COURSE & MEDICAL DECISION MAKING    ASSESSMENT, COURSE AND PLAN  Care Narrative:     Patient presents to the emergency department brought in by EMS for evaluation of abnormal behavior noted by son when he went to check on her at home.  History of chronic daily alcohol abuse disorder, alcohol withdrawal when she runs out of alcohol and son concerned that she may be withdrawing and also nonadherent to her medications.  She is hypertensive and tachycardic, anxious and mildly tremulous at the time of initial assessment concerning for alcohol withdrawal however I feel a degree of her tachycardia is likely secondary to medication nonadherence of her rate control agents.  She has no evidence of acute traumatic injury on exam but given her altered mentation, age, use of blood thinners I did feel it was pertinent to obtain CT imaging of her head to rule out intracranial hemorrhage.  In addition broad laboratory assessment undertaken.  An IV was established and resuscitation initiated with benzodiazepines first at low-dose and due to good clinical effect increasing doses were provided.  Fluid bolus provided as well given evidence of dehydration with resultant improvement in the patient's heart rate.  Initially she presented in atrial fibrillation with RVR and remained in rapid ventricular response but with significant improvement.  Her labs were suggestive of dehydration with hemoconcentrated hemoglobin and hematocrit, metabolic acidosis and I suspect her anion gap elevation is secondary to alcoholic ketoacidosis.  Diagnostic alcohol level is actually quite elevated though I wonder if she is beginning to withdrawal in the setting of less use of alcohol over the last day or 2.  Digoxin level is subtherapeutic.  Rate control provided with IV digoxin as well as low-dose  IV metoprolol therapy.  CT imaging ultimately demonstrated no intracranial hemorrhage.  Ultimately her case was discussed with the hospitalist Dr. Jones who accepts the patient for admission.    CRITICAL CARE  The very real possibilty of a deterioration of this patient's condition required the highest level of my preparedness for sudden, emergent intervention.  I provided critical care services, which included medication orders, frequent reevaluations of the patient's condition and response to treatment, ordering and reviewing test results, and discussing the case with various consultants.  The critical care time associated with the care of the patient was 30 minutes. Review chart for interventions. This time is exclusive of any other billable procedures.       Hydration: Based on the patient's presentation of Dehydration and Tachycardia the patient was given IV fluids. IV Hydration was used because oral hydration was not adequate alone. Upon recheck following hydration, the patient was improved.          ADDITIONAL PROBLEMS MANAGED  Atrial fibrillation with rapid ventricular response managed with IV fluids, benzodiazepines for alcohol withdrawal therapy, rate control agents  Dehydration managed with IV fluids    DISPOSITION AND DISCUSSIONS  I have discussed management of the patient with the following physicians and CIARAN's: Hospitalist as above    FINAL DIAGNOSIS  1. Atrial fibrillation with RVR (HCC)    2. Alcoholic intoxication with complication (HCC)    3. Alcohol withdrawal syndrome with complication (HCC)    4. Dehydration    5. Alcoholic ketoacidosis         Electronically signed by: Bebeto Crooks M.D., 7/30/2025 8:41 PM           [1]   Allergies  Allergen Reactions    Bloodless

## 2025-07-31 NOTE — PROGRESS NOTES
4 Eyes Skin Assessment Completed by SNEHA garnica and DAMEON Hurst    Skin assessment is primarily focused on high risk bony prominences. Pay special attention to skin beneath and around medical devices, high risk bony prominences, skin to skin areas and areas where the patient lacks sensation to feel pain and areas where the patient previously had breakdown.     Head (Occipital):  WDL   Ears (Under Medical Devices): WDL   Nose (Under Medical Devices): WDL   Mouth:  WDL   Neck: WDL   Breast/Chest:  Scar L chest   Shoulder Blades:  WDL   Spine:   WDL   (R) Arm/Elbow/Hand: WDL   (L) Arm/Elbow/Hand: WDL   Abdomen: WDL   Pannus/Groin:  WDL   Sacrum/Coccyx:   WDL   (R) Ischial Tuberosity (Sit Bones):  WDL   (L) Ischial Tuberosity (Sit Bones):  WDL   (R) Leg:  WDL   (L) Leg:  WDL   (R) Heel:  WDL   (R) Foot/Toe: WDL   (L) Heel: WDL   (L) Foot/Toe:  WDL       DEVICES IN USE:   Respiratory Devices:  Nasal cannula and Pulse ox  Feeding Devices:  N/A   Lines & BP Monitoring Devices:  Peripheral IV    Orthopedic Devices:  N/A  Miscellaneous Devices:  Telemetry monitor    PROTOCOL INTERVENTIONS:   Standard/Trauma Bed:  Already in place  Nasal Cannula with Gray Foams:  Already in place    WOUND PHOTOS:   N/A no wounds identified    WOUND CONSULT:   N/A, no advanced wound care needs identified

## 2025-07-31 NOTE — ASSESSMENT & PLAN NOTE
Patien found to be tachycardic in ED to >130s. EKG showing atrial fibrillation with RVR. Patient recently stopped taking digoxin outpatient 1 week ago  - continue tele  -digoxin level 0.4, completed dig load today, resume 125 mcg tomorrow   - continue metoprolol  mg and apixaban  -PRN IV metoprolol

## 2025-07-31 NOTE — ED NOTES
"Pt states she is feeling \"shaky and weird\" but is not appearing shakey and is able to move around on the gurney accordingly.   "

## 2025-07-31 NOTE — HOSPITAL COURSE
"Jeanie Hutchison is a 79 y.o. female with history of alcohol abuse, complex grief, atrial fibrillation, and history of ventricular tachycardia s/p AICD placement who presented 7/30/2025 with complaint of confusion.  She reports being in bed earlier in the day where she suddenly felt significantly confused and \"did not feel quite right\".  She also reports generalized fatigue developing at this time.  Patient reports typically drinking 3-4 shots of vodka daily for the last 6 years.   Patient reports typically drinking but to help cope with this grief as well as for sleep aid she experiences significant insomnia.  Patient reports last drink was  evening 7/29 and has not had any oral intake since.  She reports withdrawal in the past consisting of anxiety, tremors, confusion, and sweating however denies any history of withdrawal seizures.  Patient reports typically compliant with medications has not missed any recent doses specifically metoprolol and apixaban.  Patient previously on digoxin outpatient however states she stopped this 1 week ago as she was told by her pharmacist that her digoxin levels were too high. She denies chest pain, palpitations, lightheadedness/dizziness, numbness/tingling, falls, or shortness of breath.     In ED, patient tachycardic to 133, tachypneic 24, /103.  Labs demonstrate hemoglobin 16.4, bicarb 18, anion gap 19, glucose 111, AST 89, ALT 65, digoxin 0.4, diagnostic alcohol 294, negative troponin, and .  CXR demonstrating cardiomegaly with possible mild pulmonary edema.  CT head negative for acute findings.  Patient received Valium 10 mg, digoxin 62.5 mcg, metoprolol 5 mg, Versed 2 mg, 1 L IV NS as well as vitamin supplementation while present in ED.    "

## 2025-07-31 NOTE — PROGRESS NOTES
Monitor summary    Rhythm: A-fib   Rate: 115  Ectopy: rare PVC  Measurements: .-/.07/.-

## 2025-07-31 NOTE — PROGRESS NOTES
Bedside report received from off going RN/tech: Lizeth, assumed care of patient.     Fall Risk Score: MODERATE RISK  Fall risk interventions in place: Provide patient/family education based on risk assessment, Educate patient/family to call staff for assistance when getting out of bed, Place fall precaution signage outside patient door, Utilize bed/chair fall alarm, and Bed alarm connected correctly  Bed type: Regular (Albert Score less than 17 interventions in place)  Patient on cardiac monitor: Yes  IVF/IV medications: Not Applicable   Oxygen: How many liters 1L and Traced the line to wall oxygen  Bedside sitter: Not Applicable   Isolation: Not applicable

## 2025-07-31 NOTE — ED NOTES
Pt used bedside commode for urine  but contaminated urine with wipe before this rn able to collect despite education. pt changed and linens refreshed states that she is incontinent at baseline and didn't realize she had even peed

## 2025-07-31 NOTE — ED TRIAGE NOTES
Chief Complaint   Patient presents with    Alcohol Intoxication     BIBA from home by unit 91 for c/o depression and confusion in life,  +ETOH, denies SI/HI pt Aox4     Vitals:    07/30/25 2012   BP: (!) 156/103   Pulse: (!) 128   Resp: 16   Temp:    SpO2: 93%

## 2025-08-01 ASSESSMENT — LIFESTYLE VARIABLES
TREMOR: *
ANXIETY: *
TOTAL SCORE: 3
AGITATION: *
VISUAL DISTURBANCES: MODERATE SENSITIVITY
TOTAL SCORE: 9
ANXIETY: MILDLY ANXIOUS
PAROXYSMAL SWEATS: NO SWEAT VISIBLE
TOTAL SCORE: 9
VISUAL DISTURBANCES: VERY MILD SENSITIVITY
ANXIETY: MODERATELY ANXIOUS OR GUARDED, SO ANXIETY IS INFERRED
SUBSTANCE_ABUSE: 1
PAROXYSMAL SWEATS: NO SWEAT VISIBLE
ANXIETY: *
TREMOR: TREMOR NOT VISIBLE BUT CAN BE FELT, FINGERTIP TO FINGERTIP
ORIENTATION AND CLOUDING OF SENSORIUM: ORIENTED AND CAN DO SERIAL ADDITIONS
HEADACHE, FULLNESS IN HEAD: NOT PRESENT
PAROXYSMAL SWEATS: NO SWEAT VISIBLE
ORIENTATION AND CLOUDING OF SENSORIUM: CANNOT DO SERIAL ADDITIONS OR IS UNCERTAIN ABOUT DATE
TOTAL SCORE: VERY MILD ITCHING, PINS AND NEEDLES SENSATION, BURNING OR NUMBNESS
TREMOR: *
ORIENTATION AND CLOUDING OF SENSORIUM: ORIENTED AND CAN DO SERIAL ADDITIONS
AGITATION: NORMAL ACTIVITY
PAROXYSMAL SWEATS: NO SWEAT VISIBLE
HEADACHE, FULLNESS IN HEAD: NOT PRESENT
VISUAL DISTURBANCES: NOT PRESENT
ORIENTATION AND CLOUDING OF SENSORIUM: CANNOT DO SERIAL ADDITIONS OR IS UNCERTAIN ABOUT DATE
AUDITORY DISTURBANCES: NOT PRESENT
AUDITORY DISTURBANCES: NOT PRESENT
NAUSEA AND VOMITING: MILD NAUSEA WITH NO VOMITING
TREMOR: TREMOR NOT VISIBLE BUT CAN BE FELT, FINGERTIP TO FINGERTIP
HEADACHE, FULLNESS IN HEAD: NOT PRESENT
VISUAL DISTURBANCES: NOT PRESENT
NAUSEA AND VOMITING: MILD NAUSEA WITH NO VOMITING
AUDITORY DISTURBANCES: NOT PRESENT
HEADACHE, FULLNESS IN HEAD: NOT PRESENT
ANXIETY: MILDLY ANXIOUS
AUDITORY DISTURBANCES: NOT PRESENT
ORIENTATION AND CLOUDING OF SENSORIUM: ORIENTED AND CAN DO SERIAL ADDITIONS
PAROXYSMAL SWEATS: BARELY PERCEPTIBLE SWEATING. PALMS MOIST
NAUSEA AND VOMITING: MILD NAUSEA WITH NO VOMITING
AUDITORY DISTURBANCES: NOT PRESENT
NAUSEA AND VOMITING: MILD NAUSEA WITH NO VOMITING
TOTAL SCORE: 2
ANXIETY: MILDLY ANXIOUS
AGITATION: NORMAL ACTIVITY
HEADACHE, FULLNESS IN HEAD: NOT PRESENT
NAUSEA AND VOMITING: MILD NAUSEA WITH NO VOMITING
AUDITORY DISTURBANCES: MODERATE HARSHNESS OR ABILITY TO FRIGHTEN
TOTAL SCORE: 17
ORIENTATION AND CLOUDING OF SENSORIUM: CANNOT DO SERIAL ADDITIONS OR IS UNCERTAIN ABOUT DATE
PAROXYSMAL SWEATS: NO SWEAT VISIBLE
AUDITORY DISTURBANCES: NOT PRESENT
ANXIETY: *
AGITATION: *
TREMOR: TREMOR NOT VISIBLE BUT CAN BE FELT, FINGERTIP TO FINGERTIP
VISUAL DISTURBANCES: NOT PRESENT
HEADACHE, FULLNESS IN HEAD: NOT PRESENT
VISUAL DISTURBANCES: VERY MILD SENSITIVITY
TOTAL SCORE: 3
AGITATION: NORMAL ACTIVITY
ORIENTATION AND CLOUDING OF SENSORIUM: ORIENTED AND CAN DO SERIAL ADDITIONS
PAROXYSMAL SWEATS: NO SWEAT VISIBLE
VISUAL DISTURBANCES: NOT PRESENT
AGITATION: *
PAROXYSMAL SWEATS: NO SWEAT VISIBLE
TREMOR: TREMOR NOT VISIBLE BUT CAN BE FELT, FINGERTIP TO FINGERTIP
NAUSEA AND VOMITING: *
NAUSEA AND VOMITING: *
NAUSEA AND VOMITING: NO NAUSEA AND NO VOMITING
AGITATION: NORMAL ACTIVITY
HEADACHE, FULLNESS IN HEAD: NOT PRESENT
AGITATION: *
ORIENTATION AND CLOUDING OF SENSORIUM: CANNOT DO SERIAL ADDITIONS OR IS UNCERTAIN ABOUT DATE
TREMOR: TREMOR NOT VISIBLE BUT CAN BE FELT, FINGERTIP TO FINGERTIP
HEADACHE, FULLNESS IN HEAD: NOT PRESENT
AUDITORY DISTURBANCES: NOT PRESENT
VISUAL DISTURBANCES: NOT PRESENT
ANXIETY: NO ANXIETY (AT EASE)
TREMOR: TREMOR NOT VISIBLE BUT CAN BE FELT, FINGERTIP TO FINGERTIP
TOTAL SCORE: 3
TOTAL SCORE: 14

## 2025-08-01 ASSESSMENT — ENCOUNTER SYMPTOMS
DEPRESSION: 1
NAUSEA: 1
FEVER: 0
TREMORS: 1
PALPITATIONS: 0
WEAKNESS: 1
MYALGIAS: 1
ABDOMINAL PAIN: 0
SHORTNESS OF BREATH: 0

## 2025-08-01 ASSESSMENT — FIBROSIS 4 INDEX: FIB4 SCORE: 6.46

## 2025-08-01 ASSESSMENT — PAIN DESCRIPTION - PAIN TYPE
TYPE: ACUTE PAIN
TYPE: ACUTE PAIN

## 2025-08-01 NOTE — PROGRESS NOTES
Bedside report received and patient care assumed. Pt is resting in bed, A&O 2, with no complaints of pain, and is on 1L NC. Tele box on. All fall precautions are in place, belongings at bedside table.  Pt was updated on POC, no questions or concerns. Pt educated on use of call light for assistance.

## 2025-08-01 NOTE — CONSULTS
"      CARDIOLOGY CONSULTATION NOTE   [facility] Cardiology Consult Service    \"\" Pending rounds with attending\"\"    Reason of Consult: Atrial fibrillation with rapid ventricular response    Consulting Physician: Yanira Prater M.d.    Date of Consultation: 8/1/2025      Impression / Assessment:  # Alcohol withdrawal  #Atrial fibrillation with rapid ventricular rate    Recommendations:  -Telemetry monitoring  - Keep K above 4 and mag above 2  - Echo results pending  - Uptitrate digoxin to 250 mcg daily; given digoxin levels at 0.4 on 07/30/2025  - Order new dig level  - Continue with metoprolol  mg daily and apixaban 5 mg twice daily    Disposition:  To be decided    Anticipate discharge from cardiac standpoint to be decided    We will continue to follow. Please contact us with any additional questions or concerns.     Discussed with the referring physician/provider and bedside nursing. Please see attending cardiologist attestation for further recommendations.    History of Present Illness:  79-year-old female with past medical history of atrial fibrillation and AICD who presents due to alcohol intoxication and confusion found to have A-fib RVR. Patient ran out of her digoxin 1 week ago but does report taking her metoprolol and Eliquis. Patient was loaded back with digoxin, CIWA protocol, and fluid resuscitation.     Cardiology consulted for escalated management of A-fib with RVR.    The 10-year ASCVD risk score (Monroe DK, et al., 2019) is: 35.9%    Past Medical History:  Past Medical History[1]    Family History:  Family History   Problem Relation Age of Onset    Diabetes Mother     Anxiety disorder Mother     Depression Mother     Hyperlipidemia Father        Social History:  Tobacco: Denies  Alcohol: Drinks 3-4 shots every evening for the last 6 years  Illicit drugs: Denies    Medications and Allergies:  Medications Ordered Prior to Encounter[2]    Bloodless    Review of Systems:  A pertinent review of systems " "was performed and was otherwise unremarkable except as per HPI/    Vitals:  BP (!) 154/115   Pulse (!) 105   Temp 37.1 °C (98.7 °F)   Resp 20   Ht 1.676 m (5' 6\")   Wt 73.9 kg (162 lb 14.7 oz)   SpO2 96%   BMI 26.30 kg/m²   Vitals:    08/01/25 0513 08/01/25 0600 08/01/25 0705 08/01/25 0746   BP: (!) 179/132 (!) 161/109  (!) 154/115   Pulse: (!) 140  (!) 108 (!) 105   Resp:   (!) 22 20   Temp:   36.6 °C (97.9 °F) 37.1 °C (98.7 °F)   TempSrc:   Temporal    SpO2:   96% 96%   Weight:       Height:         Body mass index is 26.3 kg/m².  Oxygen Therapy: Pulse Oximetry: 96 %, O2 (LPM): 0, O2 Delivery Device: None - Room Air    Physical Examination:  General: Well-appearing for age, no acute distress, having a normal conversation.  Eyes: Extraocular movements intact, anicteric.  Jugular venous distention: No JVD.   Pulmonary: Normal respiratory effort, clear to auscultation bilaterally, normal work of breathing.  Cardiovascular: Irregular, normal S1 and S2.  No murmurs, rubs or gallops.  Gastrointestinal: Soft, non-tender, non-distended, normal bowel sounds.  Extremities: Warm and well perfused, No edema.  Neurological: Alert and oriented, no gross focal motor deficits.  Moving all 4 limbs.  Psychiatric: Appropriate affect, normal judgement    Cardiac Studies and Procedures:  ECG (0/08/2025): atrial fibrillation    Echocardiogram: results pending      Laboratories:  Recent Labs     07/30/25 2059 07/31/25  0357 08/01/25  0011   WBC 6.8 6.1 8.4   RBC 5.02 4.62 4.97   HEMOGLOBIN 16.4* 15.6 16.6*   HEMATOCRIT 48.5* 45.1 48.2*   MCV 96.6 97.6 97.0   MCH 32.7 33.8* 33.4*   MCHC 33.8 34.6 34.4   RDW 50.3* 52.3* 50.2*   PLATELETCT 138* 127* 104*   MPV 9.4 9.5 10.0     Recent Labs     07/30/25 2059 07/31/25  0357 08/01/25  0011   SODIUM 142 141 138   POTASSIUM 4.2 4.2 3.5*   CHLORIDE 105 105 102   CO2 18* 19* 21   GLUCOSE 111* 167* 105*   BUN 12 11 11   CREATININE 0.63 0.65 0.70   CALCIUM 8.8 8.7 9.6                   " "    \"\" Pending rounds with attending\"\"    Please note that this dictation was created using voice recognition software. There may be errors I did not discover before finalizing the note.           [1]   Past Medical History:  Diagnosis Date    Alcoholism (HCC)     Anticoagulated     Apnea, sleep     Chronic pain     r/t pelvic fracture    Hypothyroidism     Insomnia     Trouble going to Sleep     Paroxysmal atrial fibrillation (HCC)     Psychiatric disorder     history of depression and anxiety     Snoring     Ventricular tachycardia (HCC)    [2]   No current facility-administered medications on file prior to encounter.     Current Outpatient Medications on File Prior to Encounter   Medication Sig Dispense Refill    mirtazapine (REMERON) 30 MG Tab tablet Take 30 mg by mouth every evening.      vibegron (GEMTESA) 75 MG tablet Take 75 mg by mouth every day.      Cyanocobalamin (B-12 PO) Take 1 Tablet by mouth every day.      Cholecalciferol (D3 PO) Take 1 Tablet by mouth every day.      metoprolol SR (TOPROL XL) 50 MG TABLET SR 24 HR Take 3 Tablets by mouth every day. (Patient taking differently: Take 100 mg by mouth every day. 2 tablets= 100mg) 90 Tablet 1    hydrOXYzine HCl (ATARAX) 25 MG Tab Take 25 mg by mouth at bedtime.      traZODone (DESYREL) 50 MG Tab Take 50 mg by mouth every evening.      apixaban (ELIQUIS) 5mg Tab Take 1 Tablet by mouth 2 times a day. 60 Tablet 1     "

## 2025-08-01 NOTE — PROGRESS NOTES
"Hospital Medicine Daily Progress Note    Date of Service  8/1/2025    Chief Complaint  Jeanie Hutchison is a 79 y.o. female admitted 7/30/2025 with alcohol intoxication    Hospital Course  Jeanie Hutchison is a 79 y.o. female with history of alcohol abuse, complex grief, atrial fibrillation, and history of ventricular tachycardia s/p AICD placement who presented 7/30/2025 with complaint of confusion.  She reports being in bed earlier in the day where she suddenly felt significantly confused and \"did not feel quite right\".  She also reports generalized fatigue developing at this time.  Patient reports typically drinking 3-4 shots of vodka daily for the last 6 years.   Patient reports typically drinking but to help cope with this grief as well as for sleep aid she experiences significant insomnia.  Patient reports last drink was  evening 7/29 and has not had any oral intake since.  She reports withdrawal in the past consisting of anxiety, tremors, confusion, and sweating however denies any history of withdrawal seizures.  Patient reports typically compliant with medications has not missed any recent doses specifically metoprolol and apixaban.  Patient previously on digoxin outpatient however states she stopped this 1 week ago as she was told by her pharmacist that her digoxin levels were too high. She denies chest pain, palpitations, lightheadedness/dizziness, numbness/tingling, falls, or shortness of breath.     In ED, patient tachycardic to 133, tachypneic 24, /103.  Labs demonstrate hemoglobin 16.4, bicarb 18, anion gap 19, glucose 111, AST 89, ALT 65, digoxin 0.4, diagnostic alcohol 294, negative troponin, and .  CXR demonstrating cardiomegaly with possible mild pulmonary edema.  CT head negative for acute findings.  Patient received Valium 10 mg, digoxin 62.5 mcg, metoprolol 5 mg, Versed 2 mg, 1 L IV NS as well as vitamin supplementation while present in ED.      Interval Problem Update  Pt " seen and examined, appears lethargic but answering appropriately. Continues to have intermittent nausea. Her Qt is improved, will start zofran   - rate remains uncontrolled despite additional po metoprolol and IV pushes. I have started dilt 30 mg Q6H, titrate as needed. Metoprolol stopped and changed to coreg for better BP control. Continue losartan, increased to 50 mg daily   - consulted with cardiology   - TTE ordered and pending   - prn antihypertensives added   - K and mag low, both replaced   - LFTs elevated, monitor   - withdrawal symptoms appear to be improving despite her elevated vitals. Continue CIWA       I have discussed this patient's plan of care and discharge plan at IDT rounds today with Case Management, Nursing, Nursing leadership, and other members of the IDT team.    Consultants/Specialty  NA    Code Status  Full Code    Disposition  The patient is not medically cleared for discharge to home or a post-acute facility.  Anticipate discharge to: skilled nursing facility    I have placed the appropriate orders for post-discharge needs.    Review of Systems  Review of Systems   Constitutional:  Positive for malaise/fatigue. Negative for fever.   Respiratory:  Negative for shortness of breath.    Cardiovascular:  Negative for chest pain, palpitations and leg swelling.   Gastrointestinal:  Positive for nausea. Negative for abdominal pain.   Genitourinary:  Negative for dysuria.   Musculoskeletal:  Positive for myalgias.   Neurological:  Positive for tremors and weakness.   Psychiatric/Behavioral:  Positive for depression and substance abuse.         Physical Exam  Temp:  [36.4 °C (97.5 °F)-37.1 °C (98.7 °F)] 36.8 °C (98.2 °F)  Pulse:  [] 140  Resp:  [18-22] 19  BP: (152-179)/() 152/111  SpO2:  [90 %-96 %] 95 %    Physical Exam  Vitals and nursing note reviewed.   Constitutional:       Appearance: She is ill-appearing.      Comments: lethargic   HENT:      Head: Normocephalic and atraumatic.       Mouth/Throat:      Mouth: Mucous membranes are moist.   Eyes:      Conjunctiva/sclera: Conjunctivae normal.   Cardiovascular:      Rate and Rhythm: Tachycardia present. Rhythm irregular.   Pulmonary:      Effort: No respiratory distress.      Breath sounds: No rhonchi.   Abdominal:      General: Bowel sounds are normal.      Palpations: Abdomen is soft.   Musculoskeletal:      Cervical back: Neck supple.      Right lower leg: No edema.      Left lower leg: No edema.   Skin:     General: Skin is warm.   Neurological:      Mental Status: She is oriented to person, place, and time.      Comments: Less tremulous   Psychiatric:      Comments: Flat affect          Fluids    Intake/Output Summary (Last 24 hours) at 8/1/2025 1554  Last data filed at 8/1/2025 0900  Gross per 24 hour   Intake 650 ml   Output 1250 ml   Net -600 ml        Laboratory  Recent Labs     07/30/25 2059 07/31/25 0357 08/01/25  0011   WBC 6.8 6.1 8.4   RBC 5.02 4.62 4.97   HEMOGLOBIN 16.4* 15.6 16.6*   HEMATOCRIT 48.5* 45.1 48.2*   MCV 96.6 97.6 97.0   MCH 32.7 33.8* 33.4*   MCHC 33.8 34.6 34.4   RDW 50.3* 52.3* 50.2*   PLATELETCT 138* 127* 104*   MPV 9.4 9.5 10.0     Recent Labs     07/30/25 2059 07/31/25 0357 08/01/25  0011   SODIUM 142 141 138   POTASSIUM 4.2 4.2 3.5*   CHLORIDE 105 105 102   CO2 18* 19* 21   GLUCOSE 111* 167* 105*   BUN 12 11 11   CREATININE 0.63 0.65 0.70   CALCIUM 8.8 8.7 9.6                   Imaging  CT-HEAD W/O   Final Result      1.  Cerebral atrophy.   2.  White matter lucencies most consistent with small vessel ischemic change versus demyelination or gliosis.   3.  Otherwise, Head CT without contrast with no acute findings. No evidence of acute cerebral infarction, hemorrhage or mass lesion.                  DX-CHEST-PORTABLE (1 VIEW)   Final Result      Cardiomegaly with probable mild pulmonary edema.      EC-ECHOCARDIOGRAM COMPLETE W/O CONT    (Results Pending)        Assessment/Plan  * Atrial fibrillation with  RVR (HCC)- (present on admission)  Assessment & Plan  Patien found to be tachycardic in ED to >130s. EKG showing atrial fibrillation with RVR. Patient recently stopped taking digoxin outpatient 1 week ago  - continue tele  - ongoing elevated HR and BP, have added dilt 30 mg Q6H  - metoprolol changed to coreg by cardiology   -digoxin level 0.4, loaded with dig. This was stopped by cardiology today   - continue apixaban  -PRN IV metoprolol    High anion gap metabolic acidosis  Assessment & Plan  Anion gap elevated 19 on admission likely alcoholic ketoacidosis  -s/p 1L IVF in ED  -continue to monitor    Prolonged QT interval- (present on admission)  Assessment & Plan  Chronic, does have AICD   Monitor on tele   Avoid qt prolonging medications   Goal Mag >2, K >4  Repeat EKG reviewed, improved    Alcohol withdrawal syndrome with complication (HCC)- (present on admission)  Assessment & Plan  Patient with chronic alcohol abuse history drinking reportedly 3-4 shots vodka daily. Reports last drink evening of 7/29 however diagnostic alcohol elevated at 294. No history of alcohol withdrawal seizures but has required intubation in the past for severe withdrawal   -CIWA protocol in place   -MV, folate, thiamine  -seizure, fall precautions  -continue to monitor electrolytes and correct as necessary    Primary hypertension- (present on admission)  Assessment & Plan  Significant hypertension, withdrawal likely contributing   Started losartan, increased to 50 mg   Metoprolol changed to coreg for better BP   I have added diilt for HR and BP control as well   Monitor     Thrombocytopenia (HCC)- (present on admission)  Assessment & Plan  Chronic, stable. Due to ETOH use   Monitor     Presence of automatic cardioverter/defibrillator (AICD)- (present on admission)  Assessment & Plan  AICD implant placed July 2020 for polymorphic VT, long QT, and atrial fibrillation          VTE prophylaxis:    therapeutic anticoagulation with eliquis 5  mg BID      I have performed a physical exam and reviewed and updated ROS and Plan today (8/1/2025). In review of yesterday's note (7/31/2025), there are no changes except as documented above.    Patient is has a high medical complexity, complex decision making and is at high risk for complication, morbidity, and mortality.    Greater than 51 minutes spent prepping to see patient (e.g. review of tests) obtaining and/or reviewing separately obtained history. Performing a medically appropriate examination and/ evaluation.  Counseling and educating the patient/family/caregiver.  Ordering medications, tests, or procedures.  Referring and communicating with other health care professionals.  Documenting clinical information in EPIC.  Independently interpreting results and communicating results to patient/family/caregiver.  Care coordination.

## 2025-08-01 NOTE — PROGRESS NOTES
Monitor Summary  Rhythm: A-fib  Rate: 103-124 (pt reached high 140's sustaining for several minutes)  Ectopy: (R) PVC's  Measurements: N/A/.07/N/A

## 2025-08-01 NOTE — PROGRESS NOTES
Bedside report received from off going RN/tech: Richard, assumed care of patient.     Fall Risk Score: MODERATE RISK  Fall risk interventions in place: Place yellow fall risk ID band on patient, Provide patient/family education based on risk assessment, Educate patient/family to call staff for assistance when getting out of bed, Place fall precaution signage outside patient door, Place patient in room close to nursing station, and Utilize bed/chair fall alarm  Bed type: Regular (Albert Score less than 17 interventions in place)  Patient on cardiac monitor: Yes  IVF/IV medications: Not Applicable   Oxygen: Room Air  Bedside sitter: Not Applicable   Isolation: Not applicable

## 2025-08-01 NOTE — CARE PLAN
The patient is Stable - Low risk of patient condition declining or worsening    Shift Goals  Clinical Goals: CIWA, Cardiac monitoring, Safety  Patient Goals: Rest  Family Goals: DANICA    Progress made toward(s) clinical / shift goals:      Problem: Knowledge Deficit - Standard  Goal: Patient and family/care givers will demonstrate understanding of plan of care, disease process/condition, diagnostic tests and medications  Outcome: Progressing     Problem: Optimal Care for Alcohol Withdrawal  Goal: Optimal Care for the alcohol withdrawal patient  Outcome: Progressing     Problem: Seizure Precautions  Goal: Implementation of seizure precautions  Outcome: Progressing     Problem: Lifestyle Changes  Goal: Patient's ability to identify lifestyle changes and available resources to help reduce recurrence of condition will improve  Outcome: Progressing     Problem: Psychosocial  Goal: Patient's level of anxiety will decrease  Outcome: Progressing  Goal: Spiritual and cultural needs incorporated into hospitalization  Outcome: Progressing     Problem: Risk for Aspiration  Goal: Patient's risk for aspiration will be absent or decrease  Outcome: Progressing     Problem: Fall Risk  Goal: Patient will remain free from falls  Outcome: Progressing     Problem: Skin Integrity  Goal: Skin integrity is maintained or improved  Outcome: Progressing

## 2025-08-02 ASSESSMENT — LIFESTYLE VARIABLES
PAROXYSMAL SWEATS: NO SWEAT VISIBLE
TREMOR: TREMOR NOT VISIBLE BUT CAN BE FELT, FINGERTIP TO FINGERTIP
VISUAL DISTURBANCES: NOT PRESENT
TOTAL SCORE: 3
ANXIETY: MILDLY ANXIOUS
ANXIETY: MILDLY ANXIOUS
PAROXYSMAL SWEATS: NO SWEAT VISIBLE
AUDITORY DISTURBANCES: NOT PRESENT
SUBSTANCE_ABUSE: 1
HEADACHE, FULLNESS IN HEAD: VERY MILD
AGITATION: NORMAL ACTIVITY
NAUSEA AND VOMITING: NO NAUSEA AND NO VOMITING
TOTAL SCORE: 3
HEADACHE, FULLNESS IN HEAD: VERY MILD
ORIENTATION AND CLOUDING OF SENSORIUM: ORIENTED AND CAN DO SERIAL ADDITIONS
TREMOR: TREMOR NOT VISIBLE BUT CAN BE FELT, FINGERTIP TO FINGERTIP
AUDITORY DISTURBANCES: NOT PRESENT
ORIENTATION AND CLOUDING OF SENSORIUM: ORIENTED AND CAN DO SERIAL ADDITIONS
NAUSEA AND VOMITING: NO NAUSEA AND NO VOMITING
VISUAL DISTURBANCES: NOT PRESENT
AGITATION: NORMAL ACTIVITY

## 2025-08-02 ASSESSMENT — COGNITIVE AND FUNCTIONAL STATUS - GENERAL
SUGGESTED CMS G CODE MODIFIER DAILY ACTIVITY: CK
STANDING UP FROM CHAIR USING ARMS: A LITTLE
MOBILITY SCORE: 14
MOVING FROM LYING ON BACK TO SITTING ON SIDE OF FLAT BED: A LITTLE
DAILY ACTIVITIY SCORE: 17
STANDING UP FROM CHAIR USING ARMS: A LOT
DAILY ACTIVITIY SCORE: 24
SUGGESTED CMS G CODE MODIFIER DAILY ACTIVITY: CH
CLIMB 3 TO 5 STEPS WITH RAILING: A LITTLE
DRESSING REGULAR UPPER BODY CLOTHING: A LITTLE
TOILETING: A LITTLE
MOVING TO AND FROM BED TO CHAIR: A LOT
MOBILITY SCORE: 20
HELP NEEDED FOR BATHING: A LOT
WALKING IN HOSPITAL ROOM: A LITTLE
DRESSING REGULAR LOWER BODY CLOTHING: A LOT
TURNING FROM BACK TO SIDE WHILE IN FLAT BAD: A LITTLE
CLIMB 3 TO 5 STEPS WITH RAILING: A LOT
WALKING IN HOSPITAL ROOM: A LOT
SUGGESTED CMS G CODE MODIFIER MOBILITY: CJ
SUGGESTED CMS G CODE MODIFIER MOBILITY: CL
EATING MEALS: A LITTLE
MOVING TO AND FROM BED TO CHAIR: A LITTLE

## 2025-08-02 ASSESSMENT — ENCOUNTER SYMPTOMS
ABDOMINAL PAIN: 0
SHORTNESS OF BREATH: 0
FEVER: 0
WEAKNESS: 1
MYALGIAS: 1
NAUSEA: 0
DEPRESSION: 1
TREMORS: 1
PALPITATIONS: 0

## 2025-08-02 ASSESSMENT — GAIT ASSESSMENTS
GAIT LEVEL OF ASSIST: STANDBY ASSIST
ASSISTIVE DEVICE: FRONT WHEEL WALKER;NONE
DISTANCE (FEET): 100
DEVIATION: BRADYKINETIC;DECREASED BASE OF SUPPORT

## 2025-08-02 ASSESSMENT — PAIN DESCRIPTION - PAIN TYPE
TYPE: ACUTE PAIN
TYPE: ACUTE PAIN

## 2025-08-02 ASSESSMENT — ACTIVITIES OF DAILY LIVING (ADL): TOILETING: INDEPENDENT

## 2025-08-02 ASSESSMENT — FIBROSIS 4 INDEX: FIB4 SCORE: 7.22

## 2025-08-02 NOTE — CARE PLAN
The patient is Stable - Low risk of patient condition declining or worsening    Shift Goals  Clinical Goals: CIWA, tele monitoring  Patient Goals: feel better  Family Goals: DANICA    Progress made toward(s) clinical / shift goals:    Problem: Knowledge Deficit - Standard  Goal: Patient and family/care givers will demonstrate understanding of plan of care, disease process/condition, diagnostic tests and medications  Description: Target End Date:  1-3 days or as soon as patient condition allows    Document in Patient Education    1.  Patient and family/caregiver oriented to unit, equipment, visitation policy and means for communicating concern  2.  Complete/review Learning Assessment  3.  Assess knowledge level of disease process/condition, treatment plan, diagnostic tests and medications  4.  Explain disease process/condition, treatment plan, diagnostic tests and medications  Outcome: Progressing     Problem: Seizure Precautions  Goal: Implementation of seizure precautions  Description: Target End Date:  Prior to discharge or change in level of care    1.  Padded side rails up at all times  2.  Suction equipment and oxygen delivery system at bedside  3.  Continuous pulse oximeter in use  4.  Implement fall precautions, bed alarm on, bed in lowest position  5.  IV access (per order)  6.  Provide low stimulus environment, avoid exposure to triggers  7.  Instruct patient to use call light/seizure button if having warning signs of impending seizure  Outcome: Progressing     Problem: Fall Risk  Goal: Patient will remain free from falls  Description: Target End Date:  Prior to discharge or change in level of care    Document interventions on the Beth Banda Fall Risk Assessment    1.  Assess for fall risk factors  2.  Implement fall precautions  Outcome: Progressing       Patient is not progressing towards the following goals:

## 2025-08-02 NOTE — PROGRESS NOTES
"Hospital Medicine Daily Progress Note    Date of Service  8/2/2025    Chief Complaint  Jeanie Hutchison is a 79 y.o. female admitted 7/30/2025 with alcohol intoxication    Hospital Course  Jeanie Hutchison is a 79 y.o. female with history of alcohol abuse, complex grief, atrial fibrillation, and history of ventricular tachycardia s/p AICD placement who presented 7/30/2025 with complaint of confusion.  She reports being in bed earlier in the day where she suddenly felt significantly confused and \"did not feel quite right\".  She also reports generalized fatigue developing at this time.  Patient reports typically drinking 3-4 shots of vodka daily for the last 6 years.   Patient reports typically drinking but to help cope with this grief as well as for sleep aid she experiences significant insomnia.  Patient reports last drink was  evening 7/29 and has not had any oral intake since.  She reports withdrawal in the past consisting of anxiety, tremors, confusion, and sweating however denies any history of withdrawal seizures.  Patient reports typically compliant with medications has not missed any recent doses specifically metoprolol and apixaban.  Patient previously on digoxin outpatient however states she stopped this 1 week ago as she was told by her pharmacist that her digoxin levels were too high. She denies chest pain, palpitations, lightheadedness/dizziness, numbness/tingling, falls, or shortness of breath.     In ED, patient tachycardic to 133, tachypneic 24, /103.  Labs demonstrate hemoglobin 16.4, bicarb 18, anion gap 19, glucose 111, AST 89, ALT 65, digoxin 0.4, diagnostic alcohol 294, negative troponin, and .  CXR demonstrating cardiomegaly with possible mild pulmonary edema.  CT head negative for acute findings.  Patient received Valium 10 mg, digoxin 62.5 mcg, metoprolol 5 mg, Versed 2 mg, 1 L IV NS as well as vitamin supplementation while present in ED.      Interval Problem Update  Pt " seen and examined, continues to appear lethargic, sleeping most of the day. Poor intake. Stated she needed to go home but then when assessed states she isnt ready. Encourage her to be up out of bed in the bedside chair. Low motivation to be up   - BP soft but MAP stable. Losartan decreased, continue coreg. HR improved   - TTE pending   - electrolytes stable  - does not appear to be in withdrawal, stop ativan/CIWA  - PT/OT recommending HH, orders placed    Anticipate dc in the next 24 hours       I have discussed this patient's plan of care and discharge plan at IDT rounds today with Case Management, Nursing, Nursing leadership, and other members of the IDT team.    Consultants/Specialty  NA    Code Status  Full Code    Disposition  The patient is not medically cleared for discharge to home or a post-acute facility.  Anticipate discharge to: home with organized home healthcare and close outpatient follow-up    I have placed the appropriate orders for post-discharge needs.    Review of Systems  Review of Systems   Constitutional:  Positive for malaise/fatigue. Negative for fever.   Respiratory:  Negative for shortness of breath.    Cardiovascular:  Negative for chest pain, palpitations and leg swelling.   Gastrointestinal:  Negative for abdominal pain and nausea.   Genitourinary:  Negative for dysuria.   Musculoskeletal:  Positive for myalgias.   Neurological:  Positive for tremors and weakness.   Psychiatric/Behavioral:  Positive for depression and substance abuse.         Physical Exam  Temp:  [36.3 °C (97.3 °F)-37.1 °C (98.7 °F)] 36.8 °C (98.2 °F)  Pulse:  [] 81  Resp:  [16-20] 16  BP: ()/() 82/55  SpO2:  [91 %-97 %] 92 %    Physical Exam  Vitals and nursing note reviewed.   Constitutional:       Appearance: She is ill-appearing.      Comments: lethargic   HENT:      Head: Normocephalic and atraumatic.      Mouth/Throat:      Mouth: Mucous membranes are moist.   Eyes:      Conjunctiva/sclera:  Conjunctivae normal.   Cardiovascular:      Rate and Rhythm: Normal rate. Rhythm irregular.   Pulmonary:      Effort: No respiratory distress.      Breath sounds: No rhonchi.   Abdominal:      General: Bowel sounds are normal.      Palpations: Abdomen is soft.   Musculoskeletal:      Cervical back: Neck supple.      Right lower leg: No edema.      Left lower leg: No edema.   Skin:     General: Skin is warm.   Neurological:      Mental Status: She is oriented to person, place, and time.      Comments: Less tremulous   Psychiatric:      Comments: Flat affect          Fluids    Intake/Output Summary (Last 24 hours) at 8/2/2025 1608  Last data filed at 8/2/2025 1320  Gross per 24 hour   Intake 300 ml   Output 500 ml   Net -200 ml        Laboratory  Recent Labs     07/31/25 0357 08/01/25  0011 08/02/25  0029   WBC 6.1 8.4 8.2   RBC 4.62 4.97 4.81   HEMOGLOBIN 15.6 16.6* 16.2*   HEMATOCRIT 45.1 48.2* 46.9   MCV 97.6 97.0 97.5   MCH 33.8* 33.4* 33.7*   MCHC 34.6 34.4 34.5   RDW 52.3* 50.2* 48.3   PLATELETCT 127* 104* 93*   MPV 9.5 10.0 10.4     Recent Labs     07/31/25 0357 08/01/25  0011 08/02/25  0029   SODIUM 141 138 137   POTASSIUM 4.2 3.5* 4.3   CHLORIDE 105 102 103   CO2 19* 21 20   GLUCOSE 167* 105* 106*   BUN 11 11 16   CREATININE 0.65 0.70 0.79   CALCIUM 8.7 9.6 9.6                   Imaging  CT-HEAD W/O   Final Result      1.  Cerebral atrophy.   2.  White matter lucencies most consistent with small vessel ischemic change versus demyelination or gliosis.   3.  Otherwise, Head CT without contrast with no acute findings. No evidence of acute cerebral infarction, hemorrhage or mass lesion.                  DX-CHEST-PORTABLE (1 VIEW)   Final Result      Cardiomegaly with probable mild pulmonary edema.      EC-ECHOCARDIOGRAM COMPLETE W/O CONT    (Results Pending)        Assessment/Plan  * Atrial fibrillation with RVR (HCC)- (present on admission)  Assessment & Plan  Patien found to be tachycardic in ED to >130s. EKG  showing atrial fibrillation with RVR. Patient recently stopped taking digoxin outpatient 1 week ago  - continue tele  - ongoing elevated HR and BP, have added dilt 30 mg Q6H  - metoprolol changed to coreg by cardiology  and dig was stopped   - HR improved then become hypotensive. Given IVF bolus with improvement. Continue coreg, stop dilt. HR remains controlled today   - continue apixaban  -PRN IV metoprolol    High anion gap metabolic acidosis  Assessment & Plan  Anion gap elevated 19 on admission likely alcoholic ketoacidosis  -s/p 1L IVF in ED  -continue to monitor    Prolonged QT interval- (present on admission)  Assessment & Plan  Chronic, does have AICD   Monitor on tele   Avoid qt prolonging medications   Goal Mag >2, K >4  Repeat EKG reviewed, improved    Alcohol withdrawal syndrome with complication (HCC)- (present on admission)  Assessment & Plan  Patient with chronic alcohol abuse history drinking reportedly 3-4 shots vodka daily. Reports last drink evening of 7/29 however diagnostic alcohol elevated at 294. No history of alcohol withdrawal seizures but has required intubation in the past for severe withdrawal   -MV, folate, thiamine  -seizure, fall precautions  -continue to monitor electrolytes and correct as necessary  - withdrawal symtpoms improving, pt is lethargic. Stop ativan/CIWA and monitor     Primary hypertension- (present on admission)  Assessment & Plan  Significant hypertension, withdrawal likely contributing   Continue coreg 25 mg BID  Monitor     Thrombocytopenia (HCC)- (present on admission)  Assessment & Plan  Chronic, stable. Due to ETOH use   Monitor     Presence of automatic cardioverter/defibrillator (AICD)- (present on admission)  Assessment & Plan  AICD implant placed July 2020 for polymorphic VT, long QT, and atrial fibrillation          VTE prophylaxis:    therapeutic anticoagulation with eliquis 5 mg BID      I have performed a physical exam and reviewed and updated ROS and Plan  today (8/2/2025). In review of yesterday's note (8/1/2025), there are no changes except as documented above.    Patient is has a high medical complexity, complex decision making and is at high risk for complication, morbidity, and mortality.

## 2025-08-02 NOTE — PROGRESS NOTES
Bedside report received from off going RN/tech: Renea, assumed care of patient.     Fall Risk Score: HIGH RISK  Fall risk interventions in place: Place yellow fall risk ID band on patient, Provide patient/family education based on risk assessment, Educate patient/family to call staff for assistance when getting out of bed, Place fall precaution signage outside patient door, Place patient in room close to nursing station, Utilize bed/chair fall alarm, and Bed alarm connected correctly  Bed type: Regular (Albert Score less than 17 interventions in place)  Patient on cardiac monitor: Yes  IVF/IV medications: Not Applicable   Oxygen: Room Air  Bedside sitter: Not Applicable   Isolation: Not applicable

## 2025-08-02 NOTE — PROGRESS NOTES
Monitor summary: AFIB , DC 0.17, QRS 0.10, QT 0.48 with PVCs per strip from monitor room.

## 2025-08-02 NOTE — DISCHARGE PLANNING
Case Management Discharge Planning    Admission Date: 7/30/2025  GMLOS: 2.3  ALOS: 3    6-Clicks ADL Score: 24  6-Clicks Mobility Score: 18      Anticipated Discharge Dispo: Discharge Disposition: Discharged to home/self care (01)  Discharge Address: [address] [address]  Discharge Contact Phone Number: [phone]      Action(s) Taken: Referrals sent.     Escalations Completed: None    Medically Clear: Yes    Next Steps: RNCM sent [place] SN[facility] and rehab referrals per protocol.     Barriers to Discharge: Pending Placement and Transportation    Is the patient up for discharge tomorrow: Yes    Is transport arranged for discharge disposition: No    [address] \[facility]\"" [id number]

## 2025-08-02 NOTE — CARE PLAN
The patient is Stable - Low risk of patient condition declining or worsening    Shift Goals  Clinical Goals: hemodynamic stability  Patient Goals: rest, comfort  Family Goals: DANICA    Progress made toward(s) clinical / shift goals:    Problem: Knowledge Deficit - Standard  Goal: Patient and family/care givers will demonstrate understanding of plan of care, disease process/condition, diagnostic tests and medications  Outcome: Progressing     Problem: Optimal Care for Alcohol Withdrawal  Goal: Optimal Care for the alcohol withdrawal patient  Outcome: Progressing     Problem: Seizure Precautions  Goal: Implementation of seizure precautions  Outcome: Progressing     Problem: Lifestyle Changes  Goal: Patient's ability to identify lifestyle changes and available resources to help reduce recurrence of condition will improve  Outcome: Progressing     Problem: Psychosocial  Goal: Patient's level of anxiety will decrease  Outcome: Progressing  Goal: Spiritual and cultural needs incorporated into hospitalization  Outcome: Progressing     Problem: Risk for Aspiration  Goal: Patient's risk for aspiration will be absent or decrease  Outcome: Progressing     Problem: Fall Risk  Goal: Patient will remain free from falls  Outcome: Progressing     Problem: Skin Integrity  Goal: Skin integrity is maintained or improved  Outcome: Progressing       Patient is not progressing towards the following goals:

## 2025-08-02 NOTE — THERAPY
Physical Therapy   Initial Evaluation     Patient Name:  Jeanie Hutchison  Age:  79 y.o., Sex:  female  Medical Record #:  [id number]  Today's Date: 8/2/2025     Precautions  Medical: Fall Risk, Seizure  Swallowing: Swallow Precautions  Comments: HR goal < 110    Assessment  Patient is 79 y.o. female who presented 7/30/2025 with complaint of confusion.     Currently been managed for A fib with RVR, metabolic acidosis, alcohol withdrawal syndrome     PMH: alcohol abuse, complex grief, atrial fibrillation, ventricular tachycardia s/p AICD placement      Patient seen for PT evaluation. Patient seated EOB, received hand off from OT, patient agreeable for the session. Able to demonstrate functional mobility tasks as detailed below. Will continue to benefit from PT services to help improve overall functional mobility. Recommend  PT with intermittent supervision from family.     Plan    Physical Therapy Initial Treatment Plan   Treatment Plan : Equipment, Family / Caregiver Training, Gait Training, Neuro Re-Education / Balance, Stair Training, Therapeutic Activities, Therapeutic Exercise  Treatment Frequency: 3 Times per Week  Duration: Until Therapy Goals Met    DC Equipment Recommendations: None (Owns FWW)  Discharge Recommendations: Recommend home health for continued physical therapy services (With intermittent supervision from family)     Objective     08/02/25 1428   Time In/Time Out   Therapy Start Time 1359   Therapy End Time 1428   Total Therapy Time 29   Initial Contact Note    Initial Contact Note Order Received and Verified, Physical Therapy Evaluation in Progress with Full Report to Follow.   Precautions   Medical Fall Risk;Seizure   Swallowing Swallow Precautions   Comments HR goal < 110   Vitals   Pulse 81   Patient BP Position Sitting  (EOB, prior to activity)   Blood Pressure  (!) 82/55  (MAP 64)   Pulse Oximetry 92 %   O2 Delivery Device None - Room Air   Vitals Comments Standing EOB: BP 95/58, MAP 70,  HR 68; Soon after ambulation: , SpO2 96. Took brief minute to bring HR below 110. Post activity, seated EOB: /81, , SpO 92. RN made aware of vitals.   Pain   Pain Scales 0 to 10 Scale    Intervention Declines   Pain 0 - 10 Group   Therapist Pain Assessment Prior to Activity;During Activity;Post Activity   Prior Living Situation   Prior Services None   Housing / Facility 2 Story House   Steps Into Home 2   Steps In Home 14   Rail None;Both Rail (Steps into Home)   Equipment Owned Front-Wheel Walker   Lives with - Patient's Self Care Capacity Related Adult  (Grand son-21Y)   Comments Patient mentioned that her grand son usually does not help her with anything, but he could if needed.   Prior Level of Functional Mobility   Bed Mobility Independent   Transfer Status Independent   Ambulation Independent   Ambulation Distance Limited community   Assistive Devices Used None   Stairs Independent   History of Falls   History of Falls No   Cognition    Cognition / Consciousness X   Speech/ Communication Delayed Responses   Orientation Level Not Oriented to Year;Not Oriented to Month;Not Oriented to Day;Not Oriented to Time   Level of Consciousness Alert   Passive ROM Lower Body   Passive ROM Lower Body WDL   Active ROM Lower Body    Active ROM Lower Body  WDL   Strength Lower Body   Lower Body Strength  X   Comments Grossly BLE 4/5   Sensation Lower Body   Lower Extremity Sensation   X   Comments Reports of tingling in B/L feet-chronic in nature-unknown cause   Lower Body Muscle Tone   Lower Body Muscle Tone  WDL   Other Treatments   Other Treatments Provided Discussed about DC recommendations-patient agreeable for  PT at this time. Reinforced to use FWW for initial few days upon returning home for inceased safety/stability. Reinforced importance of daily & frequent mobility, OOB to chair for meals, ambulate with supervision from nursing staff. Educated on general safety precautions during functional  mobility, activity pacing, maintaining each position for brief minute prior to changing positions.   Balance Assessment   Sitting Balance (Static) Good   Sitting Balance (Dynamic) Good   Standing Balance (Static) Fair   Standing Balance (Dynamic) Fair   Weight Shift Sitting Good   Weight Shift Standing Good   Comments Seated EOB; Standing W/O AD-requiring additional support from UE intermittently; Standing with FWW   Bed Mobility    Supine to Sit Supervised   Sit to Supine Supervised   Scooting Supervised   Rolling Supervised   Comments HOB flat, without use of rails, towards R side   Gait Analysis   Gait Level Of Assist Standby Assist   Assistive Device Front Wheel Walker;None   Distance (Feet) 100   # of Times Distance was Traveled 2  (Brief standing rest break as needed)   Deviation Bradykinetic;Decreased Base Of Support   # of Stairs Climbed 3  (x4 times, B/L rails)   Level of Assist with Stairs Standby Assist   Comments Cues for pacing, directions; steady pace, no loss of balance   Functional Mobility   Sit to Stand Standby Assist   Bed, Chair, Wheelchair Transfer Refused   Transfer Method Stand Step   Mobility Hand off from OT-EOB-sit-stand-ambulate in the room-ambulate in the hallway-navigate stairs-ambulate back to room-EOB-bed-EOB-bed-HOB raised   Comments W/O AD; W FWW; cues for hand & foot placement   6 Clicks Assessment - How much HELP from from another person do you currently need... (If the patient hasn't done an activity recently, how much help from another person do you think he/she would need if he/she tried?)   Turning from your back to your side while in a flat bed without using bedrails? 4   Moving from lying on your back to sitting on the side of a flat bed without using bedrails? 4   Moving to and from a bed to a chair (including a wheelchair)? 3   Standing up from a chair using your arms (e.g., wheelchair, or bedside chair)? 3   Walking in hospital room? 3   Climbing 3-5 steps with a railing?  3   6 clicks Mobility Score 20   Activity Tolerance   Sitting Edge of Bed Pre-session   Patient / Family Goals    Patient / Family Goal #1 To return to prior level of functional mobility   Short Term Goals    Short Term Goal # 1 Patient will negotiate 14 steps with B/L rails, 2 steps without rails with supervision in 6 visits to safely navigate stairs inside & outside her home   Short Term Goal # 2 Patient will perform sit-stand with LRAD with supervision in 6 visits to progress with functional mobility   Short Term Goal # 3 Patient will perform chair transfers with LRAD with supervision in 6 visits to safely get OOB to chair   Short Term Goal # 4 Patient will ambulate 200 feet with LRAD with supervision in 6 visits to safely ambulate household distance   Education Group   Education Provided Role of Physical Therapist   Role of Physical Therapist Patient Response Patient;Acceptance;Explanation;Verbal Demonstration   Physical Therapy Initial Treatment Plan    Treatment Plan  Equipment;Family / Caregiver Training;Gait Training;Neuro Re-Education / Balance;Stair Training;Therapeutic Activities;Therapeutic Exercise   Treatment Frequency 3 Times per Week   Duration Until Therapy Goals Met   Problem List    Problems Impaired Transfers;Impaired Ambulation;Impaired Balance;Decreased Activity Tolerance;Safety Awareness Deficits / Cognition   Anticipated Discharge Equipment and Recommendations   DC Equipment Recommendations None  (Owns FWW)   Discharge Recommendations Recommend home health for continued physical therapy services  (With intermittent supervision from family)   Interdisciplinary Plan of Care Collaboration   IDT Collaboration with  Nursing;Occupational Therapist;   Patient Position at End of Therapy Call Light within Reach;Tray Table within Reach;Seated;Bed Alarm On   Session Information   Date / Session Number  8/2-1(1/3, 8/8)

## 2025-08-02 NOTE — THERAPY
Occupational Therapy   Initial Evaluation     Patient Name:  Jeanie Hutchison  Age:  79 y.o., Sex:  female  Medical Record #:  [id number]  Today's Date:  8/2/2025     Precautions  Medical: (P) Fall Risk    Assessment  Patient is 79 y.o. female admitted with alcohol intoxication with hx of alcohol abuse, complex grief, atrial fibrillation, and history of ventricular tachycardia s/p AICD placement. Pt tachy in the ED, EKG showing a fib with RVR. Pt seen for OT evaluation and presented at [facility] level for ADLs. Pt overall depressed affect, reporting she's hopeful she can obtain her vehicle. Recommend HHOT services if available. No further acute OT needs. Patient will not be actively followed for occupational therapy services at this time, however may be seen if requested by physician for 1 more visit within 30 days to address any discharge or equipment needs.     Plan    Occupational Therapy Initial Treatment Plan   Duration: (P) Discharge Needs Only    DC Equipment Recommendations: (P) None  Discharge Recommendations: (P) Recommend home health for continued occupational therapy services (if available not a barrier to DC)        08/02/25 1402   Initial Contact Note    Initial Contact Note Order Received and Verified, Evaluation Only - Patient Does Not Require Further Acute Occupational Therapy at this Time.  However, May Benefit from Post Acute Therapy for Higher Level Functional Deficits.   Prior Living Situation   Prior Services Home-Independent   Housing / Facility 2 Story House   Equipment Owned Front-Wheel Walker   Lives with - Patient's Self Care Capacity   (grandson)   Comments reports baseline independence, although recently got a DUI and is concerned about transport   Prior Level of ADL Function   Self Feeding Independent   Grooming / Hygiene Independent   Bathing Independent   Dressing Independent   Toileting Independent   Prior Level of IADL Function   Comments likely requires assist with IADLs, although has  been independent   Precautions   Medical Fall Risk   Pain   Intervention Declines   Cognition    Cognition / Consciousness X   Comments agreeable, depressed appearing   Active ROM Upper Body   Active ROM Upper Body  WDL   Strength Upper Body   Upper Body Strength  WDL   Balance Assessment   Sitting Balance (Static) Good   Sitting Balance (Dynamic) Good   Standing Balance (Static) Fair +   Standing Balance (Dynamic) Fair +   Weight Shift Sitting Good   Weight Shift Standing Good   Bed Mobility    Supine to Sit Supervised   Scooting Supervised   ADL Assessment   Grooming Supervision;Standing   Upper Body Dressing Supervision   Lower Body Dressing Supervision   Toileting Supervision   How much help from another person does the patient currently need...   6 Clicks Daily Activity Score 24   Functional Mobility   Sit to Stand Supervised   Bed, Chair, Wheelchair Transfer Supervised   Toilet Transfers Supervised   Patient / Family Goals   Patient / Family Goal #1 to get her car back   Education Group   Role of Occupational Therapist Patient Response Patient;Acceptance;Explanation   Occupational Therapy Initial Treatment Plan    Duration Discharge Needs Only   Anticipated Discharge Equipment and Recommendations   DC Equipment Recommendations None   Discharge Recommendations Recommend home health for continued occupational therapy services  (if available not a barrier to DC)   Interdisciplinary Plan of Care Collaboration   IDT Collaboration with  Nursing;Physical Therapist   Patient Position at End of Therapy Seated;Edge of Bed  (handoff to PT)   Collaboration Comments RN aware or recs   Session Information   Date / Session Number  8/2 DC needs

## 2025-08-03 PROCEDURE — RXMED WILLOW AMBULATORY MEDICATION CHARGE: Performed by: STUDENT IN AN ORGANIZED HEALTH CARE EDUCATION/TRAINING PROGRAM

## 2025-08-03 ASSESSMENT — PAIN SCALES - WONG BAKER: WONGBAKER_NUMERICALRESPONSE: DOESN'T HURT AT ALL

## 2025-08-03 ASSESSMENT — PAIN DESCRIPTION - PAIN TYPE
TYPE: ACUTE PAIN
TYPE: ACUTE PAIN

## 2025-08-03 ASSESSMENT — FIBROSIS 4 INDEX: FIB4 SCORE: 7.32

## 2025-08-03 NOTE — PROGRESS NOTES
Monitor Summary  Rhythm: Atrial Fibrillation  Rate: 80-120s  Ectopy: PVCs  .- / .06 / .-    12 hr Chart check.

## 2025-08-03 NOTE — DISCHARGE SUMMARY
"Discharge Summary    CHIEF COMPLAINT ON ADMISSION  Chief Complaint   Patient presents with    Alcohol Intoxication     BIBA from home by unit 91 for c/o depression and confusion in life,  +ETOH, denies SI/HI pt Aox4       Reason for Admission  EMS     Admission Date  7/30/2025    CODE STATUS  Full Code    HPI & HOSPITAL COURSE     Jeanie Hutchison is a 79 y.o. female with history of alcohol abuse with multiple admissions for detox, complex grief, atrial fibrillation, and history of ventricular tachycardia s/p AICD placement who presented 7/30/2025 with complaint of confusion.  She reports being in bed earlier in the day where she suddenly felt significantly confused and \"did not feel quite right\".  She also reports generalized fatigue developing at this time.  Patient reports typically drinking 3-4 shots of vodka daily for the last 6 years.   Patient reports typically drinking but to help cope with this grief as well as for sleep aid she experiences significant insomnia.  Patient reports last drink was  evening 7/29 and has not had any oral intake since.  Patient reports typically compliant with medications has not missed any recent doses specifically metoprolol and apixaban.  Patient previously on digoxin outpatient however states she stopped this 1 week ago as she was told by her pharmacist that her digoxin levels were too high.     On admission, patient  was tachycardic to 133, tachypneic 24, /103.  Labs demonstrate hemoglobin 16.4, bicarb 18, anion gap 19, glucose 111, AST 89, ALT 65, digoxin 0.4, diagnostic alcohol 294, negative troponin, and .  CXR demonstrating cardiomegaly with possible mild pulmonary edema.  CT head negative for acute findings.  Patient received Valium 10 mg, digoxin 62.5 mcg, metoprolol 5 mg, Versed 2 mg, 1 L IV NS as well as vitamin supplementation while present in ED.  She was treated for alcohol detox and he withdrawal symptoms have improved. She had persistent RVR with " hypertension and received several doses of diltizam and her metoprolol for changed to coreg for better blood pressure control. Her heart rate is now stable and her blood pressure is normalize. Patient was seen by therapy and home health was recommended.   Patient was eager for discharge stating that she had take care of her car which was impounded secondary to a DUI prior to admission. As she is hemodynamically stable and out of withdrawal she is safe to discharge and Home health was approved.   She was educated on importance of alcohol cessation.       Therefore, she is discharged in fair and stable condition to home with organized home healthcare and close outpatient follow-up.    The patient met 2-midnight criteria for an inpatient stay at the time of discharge.    Discharge Date  8/4/25    FOLLOW UP ITEMS POST DISCHARGE  Heart rate and blood pressure control   Alcohol use       DISCHARGE DIAGNOSES  Principal Problem:    Atrial fibrillation with RVR (HCC) (POA: Yes)  Active Problems:    Presence of automatic cardioverter/defibrillator (AICD) (POA: Yes)      Overview: July 2020: [employer]onic Primo MRI  [id number] implanted by Dr. Baires.    Thrombocytopenia (HCC) (POA: Yes)    Primary hypertension (POA: Yes)    Alcohol withdrawal syndrome with complication (HCC) (POA: Yes)    Prolonged QT interval (POA: Yes)    High anion gap metabolic acidosis (POA: Unknown)  Resolved Problems:    * No resolved hospital problems. *      FOLLOW UP  No future appointments.  Jeanie Brewer D.O.  [address]  [place] [address]  [phone]    Schedule an appointment as soon as possible for a visit in 1 week(s)        MEDICATIONS ON DISCHARGE     Medication List        START taking these medications        Instructions   carvedilol 25 MG Tabs  Commonly known as: Coreg   Take 1 Tablet by mouth 2 times a day with meals.  Dose: 25 mg     losartan 25 MG Tabs  Start taking on: August 4, 2025  Commonly known as: Cozaar   Take 0.5 Tablets by  mouth every day.  Dose: 12.5 mg            CONTINUE taking these medications        Instructions   apixaban 5 MG Tabs  Commonly known as: Eliquis   Take 1 Tablet by mouth 2 times a day.  Dose: 5 mg     B-12 PO   Take 1 Tablet by mouth every day.  Dose: 1 Tablet     D3 PO   Take 1 Tablet by mouth every day.  Dose: 1 Tablet     Gemtesa 75 MG tablet  Generic drug: vibegron   Take 75 mg by mouth every day.  Dose: 75 mg     hydrOXYzine HCl 25 MG Tabs  Commonly known as: Atarax   Take 25 mg by mouth at bedtime.  Dose: 25 mg     mirtazapine 30 MG Tabs tablet  Commonly known as: Remeron   Take 30 mg by mouth every evening.  Dose: 30 mg     traZODone 50 MG Tabs  Commonly known as: Desyrel   Take 100 mg by mouth every evening. Indications: Trouble Sleeping  Dose: 100 mg            STOP taking these medications      metoprolol SR 50 MG Tb24  Commonly known as: Toprol XL              Allergies  Allergies[1]    DIET  Orders Placed This Encounter   Procedures    Diet Order Diet: Regular     Standing Status:   Standing     Number of Occurrences:   1     Diet::   Regular [1]       ACTIVITY  As tolerated.      CONSULTATIONS  Cardiology     PROCEDURES  NA    LABORATORY  Lab Results   Component Value Date    SODIUM 137 08/02/2025    POTASSIUM 4.3 08/02/2025    CHLORIDE 103 08/02/2025    CO2 20 08/02/2025    GLUCOSE 106 (H) 08/02/2025    BUN 16 08/02/2025    CREATININE 0.79 08/02/2025        Lab Results   Component Value Date    WBC 8.2 08/02/2025    HEMOGLOBIN 16.2 (H) 08/02/2025    HEMATOCRIT 46.9 08/02/2025    PLATELETCT 93 (L) 08/02/2025        Total time of the discharge process exceeds 34 minutes.         [1]   Allergies  Allergen Reactions    Bloodless

## 2025-08-03 NOTE — DISCHARGE PLANNING
Case Management Discharge Planning    Admission Date: 7/30/2025  GMLOS: 2.3  ALOS: 4    6-Clicks ADL Score: 24  6-Clicks Mobility Score: 20      Anticipated Discharge Dispo: Discharge Disposition: D/T to home under HHA care in anticipation of covered skilled care (06)  Discharge Address: [address] [place] [address]  Discharge Contact Phone Number: [phone]      Action(s) Taken: RNCM was notified that patient might appeal DC.     Escalations Completed: None    Medically Clear: Yes    Next Steps: RCNM provided patient with IMM. RNCM received verbal OK to sign chart copy.     Barriers to Discharge: potentially appealing DC.     Is the patient up for discharge tomorrow: Yes

## 2025-08-03 NOTE — PROGRESS NOTES
Pt stated she takes 100 mg of Trazodone every night. Med rec updated. Notified on call ZEINAB Rodrigues, order received.

## 2025-08-03 NOTE — PROGRESS NOTES
Report received from Day RN. Assumed care. Received on her room, asleep with regular chest rise and fall. Saturating well on 3L/NC. Continue on cardiac monitoring Afib. POC updated. Call light within reach. Bed alarm on.

## 2025-08-03 NOTE — DISCHARGE INSTRUCTIONS
You were admitted with alcohol withdrawal and rapid heart rate due to Atrial fibrillation.   IT IS IMPERATIVE THAT YOU STOP DRINKING ALCOHOL, alcohol is directly toxic to the heart and it will continue to cause issues with your heart rate which can lead to respiratory failure and death   Stop metoprolol, start coreg (carvediolol), start losartan for better blood pressure control   Follow up with your primary care doctor in the next 1 week

## 2025-08-03 NOTE — PROGRESS NOTES
Bedside report received from off going RN/tech: Veronica, assumed care of patient.     Fall Risk Score: MODERATE RISK  Fall risk interventions in place: Place yellow fall risk ID band on patient, Provide patient/family education based on risk assessment, Educate patient/family to call staff for assistance when getting out of bed, Place fall precaution signage outside patient door, Place patient in room close to nursing station, Utilize bed/chair fall alarm, and Bed alarm connected correctly  Bed type: Regular (Albert Score less than 17 interventions in place)  Patient on cardiac monitor: Yes  IVF/IV medications: Not Applicable   Oxygen: Room Air  Bedside sitter: Not Applicable   Isolation: Not applicable

## 2025-08-03 NOTE — DISCHARGE PLANNING
@1582  [facility] received Arlineanta request. [facility] currently processing request. Pending DC Summary at this time.     Case  #[id number]    @7784  All documents have been received by [employer].

## 2025-08-03 NOTE — CARE PLAN
Problem: Risk for Aspiration  Goal: Patient's risk for aspiration will be absent or decrease  Outcome: Progressing     Problem: Fall Risk  Goal: Patient will remain free from falls  Outcome: Progressing     Problem: Skin Integrity  Goal: Skin integrity is maintained or improved  Outcome: Progressing     Problem: Pain - Standard  Goal: Alleviation of pain or a reduction in pain to the patient’s comfort goal  Outcome: Progressing   The patient is Stable - Low risk of patient condition declining or worsening    Shift Goals  Clinical Goals: hemodynamically stable, safety  Patient Goals: rest and sleep  Family Goals: DANICA    Progress made toward(s) clinical / shift goals:  Hemodynamically stable, calls appropriately. Bed alarm on.     Patient is not progressing towards the following goals:

## 2025-08-04 ENCOUNTER — PHARMACY VISIT (OUTPATIENT)
Dept: PHARMACY | Facility: MEDICAL CENTER | Age: 80
End: 2025-08-04
Payer: MEDICARE

## 2025-08-04 ASSESSMENT — PAIN DESCRIPTION - PAIN TYPE
TYPE: ACUTE PAIN
TYPE: ACUTE PAIN

## 2025-08-04 ASSESSMENT — FIBROSIS 4 INDEX: FIB4 SCORE: 7.32

## 2025-08-04 ASSESSMENT — PAIN SCALES - WONG BAKER: WONGBAKER_NUMERICALRESPONSE: DOESN'T HURT AT ALL

## 2025-08-04 NOTE — PROGRESS NOTES
Discharge orders received.  Patient arrived to the discharge lounge.  PIV removed by bedside RN prior to arrival. AVS instructions given, medications reviewed and general discharge education provided to patient.  Follow up appointments discussed.  Patient verbalized understanding of dc instructions and prescriptions.  Patient signed discharge instructions.  Patient verbalized understanding and had all belongings with her.  Patient received meds and is pending taxi at this time. Wished patient a speedy recovery.

## 2025-08-04 NOTE — PROGRESS NOTES
Bedside report received from off going RN/tech: Althea, assumed care of patient@0715.     Fall Risk Score: MODERATE RISK  Fall risk interventions in place: Place yellow fall risk ID band on patient, Provide patient/family education based on risk assessment, Educate patient/family to call staff for assistance when getting out of bed, Place fall precaution signage outside patient door, Place patient in room close to nursing station, Utilize bed/chair fall alarm, Notify charge of high risk for huddle, and Bed alarm connected correctly  Bed type: Regular (Albert Score less than 17 interventions in place)  Patient on cardiac monitor: Yes  IVF/IV medications: Not Applicable   Oxygen: Room Air  Bedside sitter: Not Applicable   Isolation: Not applicable

## 2025-08-04 NOTE — DISCHARGE PLANNING
Care Transition Team Discharge Planning    Anticipated Discharge Information  Discharge Disposition: D/T to home under A care in anticipation of covered skilled care (06)  Discharge Address: [address] [place], [place] [address] [address]  Discharge Contact Phone Number: [phone]    Discharge Plan:  Per DPA, no update on [facility] at [facility]. Patient discussed in IDT rounds. Per MD and bedside RN, patient agreeable to DC home today with Kayy MUÑOZ

## 2025-08-04 NOTE — PROGRESS NOTES
Report received from day RN. Plan of care assumed. Upon assessment patient A&0 x4. Saturating well on RA. Cardiac monitor on. Call light within reach.

## 2025-08-04 NOTE — CARE PLAN
Problem: Knowledge Deficit - Standard  Goal: Patient and family/care givers will demonstrate understanding of plan of care, disease process/condition, diagnostic tests and medications  Outcome: Progressing  Note: Plan of care discussed with the patient. Questions and concerns addressed.      Problem: Skin Integrity  Goal: Skin integrity is maintained or improved  Outcome: Progressing  Note: Skin remains clean dry and intact. Barrier Cream applied. Pt up and ambulating    The patient is Stable - Low risk of patient condition declining or worsening    Shift Goals  Clinical Goals: Monitor labs and vital signs  Patient Goals: rest, comfort  Family Goals: DANICA    Progress made toward(s) clinical / shift goals:  Monitor VS/ Labs     Patient is not progressing towards the following goals: